# Patient Record
Sex: MALE | Race: WHITE | NOT HISPANIC OR LATINO | Employment: OTHER | URBAN - METROPOLITAN AREA
[De-identification: names, ages, dates, MRNs, and addresses within clinical notes are randomized per-mention and may not be internally consistent; named-entity substitution may affect disease eponyms.]

---

## 2017-02-21 DIAGNOSIS — I15.2 HYPERTENSION ASSOCIATED WITH DIABETES: ICD-10-CM

## 2017-02-21 DIAGNOSIS — E11.59 HYPERTENSION ASSOCIATED WITH DIABETES: ICD-10-CM

## 2017-02-21 DIAGNOSIS — I10 ESSENTIAL HYPERTENSION: ICD-10-CM

## 2017-02-21 RX ORDER — METOPROLOL SUCCINATE 50 MG/1
TABLET, EXTENDED RELEASE ORAL
Qty: 90 TABLET | Refills: 0 | Status: SHIPPED | OUTPATIENT
Start: 2017-02-21 | End: 2017-07-27 | Stop reason: SDUPTHER

## 2017-02-21 RX ORDER — CHLORTHALIDONE 25 MG/1
TABLET ORAL
Qty: 90 TABLET | Refills: 0 | Status: SHIPPED | OUTPATIENT
Start: 2017-02-21 | End: 2017-07-27 | Stop reason: SDUPTHER

## 2017-02-27 ENCOUNTER — TELEPHONE (OUTPATIENT)
Dept: FAMILY MEDICINE | Facility: CLINIC | Age: 67
End: 2017-02-27

## 2017-02-27 NOTE — TELEPHONE ENCOUNTER
----- Message from [place] sent at 2/27/2017 12:39 PM CST -----  Schedule would not allow me to reschedule for the requested dated.  Reschedule labs to July 20.  Please call patient at [phone].

## 2017-07-21 ENCOUNTER — CLINICAL SUPPORT (OUTPATIENT)
Dept: FAMILY MEDICINE | Facility: CLINIC | Age: 67
End: 2017-07-21
Payer: MEDICARE

## 2017-07-21 NOTE — PROGRESS NOTES
ID patient by name and date of birth.  Specimen collected with 21g butterfly using aseptic technique. Patient tolerated well.

## 2017-07-24 DIAGNOSIS — E11.9 TYPE 2 DIABETES, HBA1C GOAL < 7%: Primary | ICD-10-CM

## 2017-07-26 ENCOUNTER — DOCUMENTATION ONLY (OUTPATIENT)
Dept: FAMILY MEDICINE | Facility: CLINIC | Age: 67
End: 2017-07-26

## 2017-07-26 NOTE — PROGRESS NOTES
Health Maintenance Due   Topic Date Due    Eye Exam  09/26/1961    TETANUS VACCINE  09/26/1969    Colonoscopy  09/26/2001    Zoster Vaccine  09/26/2011    Pneumococcal (65+) (1 of 2 - PCV13) 09/26/2016    Foot Exam  09/15/2017

## 2017-07-27 ENCOUNTER — OFFICE VISIT (OUTPATIENT)
Dept: FAMILY MEDICINE | Facility: CLINIC | Age: 67
End: 2017-07-27
Payer: MEDICARE

## 2017-07-27 VITALS
OXYGEN SATURATION: 97 % | HEART RATE: 92 BPM | HEIGHT: 72 IN | RESPIRATION RATE: 16 BRPM | TEMPERATURE: 98 F | BODY MASS INDEX: 42.66 KG/M2 | WEIGHT: 315 LBS | SYSTOLIC BLOOD PRESSURE: 134 MMHG | DIASTOLIC BLOOD PRESSURE: 87 MMHG

## 2017-07-27 DIAGNOSIS — I15.2 HYPERTENSION ASSOCIATED WITH DIABETES: ICD-10-CM

## 2017-07-27 DIAGNOSIS — E78.5 HYPERLIPIDEMIA, UNSPECIFIED HYPERLIPIDEMIA TYPE: ICD-10-CM

## 2017-07-27 DIAGNOSIS — I10 ESSENTIAL HYPERTENSION: ICD-10-CM

## 2017-07-27 DIAGNOSIS — E66.01 MORBID OBESITY DUE TO EXCESS CALORIES: ICD-10-CM

## 2017-07-27 DIAGNOSIS — E11.40 TYPE 2 DIABETES, CONTROLLED, WITH NEUROPATHY: Primary | ICD-10-CM

## 2017-07-27 DIAGNOSIS — E11.59 HYPERTENSION ASSOCIATED WITH DIABETES: ICD-10-CM

## 2017-07-27 PROCEDURE — 3045F PR MOST RECENT HEMOGLOBIN A1C LEVEL 7.0-9.0%: CPT | Mod: S$GLB,,, | Performed by: INTERNAL MEDICINE

## 2017-07-27 PROCEDURE — 99214 OFFICE O/P EST MOD 30 MIN: CPT | Mod: S$GLB,,, | Performed by: INTERNAL MEDICINE

## 2017-07-27 PROCEDURE — 4010F ACE/ARB THERAPY RXD/TAKEN: CPT | Mod: S$GLB,,, | Performed by: INTERNAL MEDICINE

## 2017-07-27 PROCEDURE — 99499 UNLISTED E&M SERVICE: CPT | Mod: S$GLB,,, | Performed by: INTERNAL MEDICINE

## 2017-07-27 RX ORDER — METOPROLOL SUCCINATE 50 MG/1
TABLET, EXTENDED RELEASE ORAL
Qty: 90 TABLET | Refills: 1 | Status: SHIPPED | OUTPATIENT
Start: 2017-07-27 | End: 2018-07-27 | Stop reason: SDUPTHER

## 2017-07-27 RX ORDER — LISINOPRIL 20 MG/1
20 TABLET ORAL DAILY
Qty: 90 TABLET | Refills: 3 | Status: SHIPPED | OUTPATIENT
Start: 2017-07-27 | End: 2018-07-27 | Stop reason: SDUPTHER

## 2017-07-27 RX ORDER — ATORVASTATIN CALCIUM 10 MG/1
10 TABLET, FILM COATED ORAL DAILY
Qty: 90 TABLET | Refills: 3 | Status: SHIPPED | OUTPATIENT
Start: 2017-07-27 | End: 2018-07-27 | Stop reason: SDUPTHER

## 2017-07-27 RX ORDER — CHLORTHALIDONE 25 MG/1
TABLET ORAL
Qty: 90 TABLET | Refills: 1 | Status: SHIPPED | OUTPATIENT
Start: 2017-07-27 | End: 2018-07-27 | Stop reason: SDUPTHER

## 2017-07-27 NOTE — PATIENT INSTRUCTIONS
Lose 5 pounds.  Suggest [place] diet    Avoid white carbohydrates.   Eat  a palm sized protein with each meal.       Walk for 10 minutes after you eat.  This will keep your sugars from going high at that time.    Low-Salt Diet  This diet removes foods that are high in salt. It also limits the amount of salt you use when cooking. It is most often used for people with high blood pressure, edema (fluid retention), and kidney, liver, or heart disease.  Table salt contains the mineral sodium. Your body needs sodium to work normally. But too much sodium can make your health problems worse. Your healthcare provider is recommending a low-salt (also called low-sodium) diet for you. Your total daily allowance of salt is 1,500 to 2,300 milligrams (mg). It is less than 1 teaspoon of table salt. This means you can have only about 500 to 700 mg of sodium at each meal. People with certain health problems should limit salt intake to the lower end of the recommended range.    When you cook, dont add much salt. If you can cook without using salt, even better. Dont add salt to your food at the table.  When shopping, read food labels. Salt is often called sodium on the label. Choose foods that are salt-free, low salt, or very low salt. Note that foods with reduced salt may not lower your salt intake enough.    Beans, potatoes, and pasta  Ok: Dry beans, split peas, lentils, potatoes, rice, macaroni, pasta, spaghetti without added salt  Avoid: Potato chips, tortilla chips, and similar products  Breads and cereals  Ok: Low-sodium breads, rolls, cereals, and cakes; low-salt crackers, matzo crackers  Avoid: Salted crackers, pretzels, popcorn, Arabic toast, pancakes, muffins  Dairy  Ok: Milk, chocolate milk, hot chocolate mix, low-salt cheeses, and yogurt  Avoid: Processed cheese and cheese spreads; Roquefort, Camembert, and cottage cheese; buttermilk, instant breakfast drink  Desserts  Ok: Ice cream, frozen yogurt, juice bars,  gelatin, cookies and pies, sugar, honey, jelly, hard candy  Avoid: Most pies, cakes and cookies prepared or processed with salt; instant pudding  Drinks  Ok: Tea, coffee, fizzy (carbonated) drinks, juices  Avoid: Flavored coffees, electrolyte replacement drinks, sports drinks  Meats  Ok: All fresh meat, fish, poultry, low-salt tuna, eggs, egg substitute  Avoid: Smoked, pickled, brine-cured, or salted meats and fish. This includes adams, chipped beef, corned beef, hot dogs, deli meats, ham, kosher meats, salt pork, sausage, canned tuna, salted codfish, smoked salmon, herring, sardines, or anchovies.  Seasonings and spices  Ok: Most seasonings are okay. Good substitutes for salt include: fresh herb blends, hot sauce, lemon, garlic, cantrell, vinegar, dry mustard, parsley, cilantro, horseradish, tomato paste, regular margarine, mayonnaise, unsalted butter, cream cheese, vegetable oil, cream, low-salt salad dressing and gravy.  Avoid: Regular ketchup, relishes, pickles, soy sauce, teriyaki sauce, Worcestershire sauce, BBQ sauce, tartar sauce, meat tenderizer, chili sauce, regular gravy, regular salad dressing, salted butter  Soups  Ok: Low-salt soups and broths made with allowed foods  Avoid: Bouillon cubes, soups with smoked or salted meats, regular soup and broth  Vegetables  Ok: Most vegetables are okay; also low-salt tomato and vegetable juices  Avoid: Sauerkraut and other brine-soaked vegetables; pickles and other pickled vegetables; tomato juice, olives  © 4933-4989 [employer]. [address] [place] [address], [place] [address]. All rights reserved. This information is not intended as a substitute for professional medical care. Always follow your healthcare professional's instructions.

## 2017-07-27 NOTE — PROGRESS NOTES
Subjective:       Patient ID: Roosevelt Moser is a 66 y.o. male.    Chief Complaint: Diabetes (labs)    HPI       CHIEF COMPLAINT: Diabetes.  HPI: Patient gained 10 pounds while going on vacation.    ONSET/TIMING:     QUALITY/COURSE:   unchanged..  Controlled: yes.     INTENSITY/SEVERITY: . Measures blood sugar :  3 times per wk.        Lowest recent . Average .     CONTEXT/WHEN: last HgbA1c    Lab Results   Component Value Date    HGBA1C 7.4 (H) 07/21/2017      weights:    Wt Readings from Last 1 Encounters:   07/27/17 0805 (!) 148.9 kg (328 lb 4.2 oz)         SYMPTOMS/RELATED: . . Possible medication side effects include:     The following symptoms/statements  are present IF IN BOLD, negative otherwise.         MODIFIERS/TREATMENTS: Not_taking_medications:  .  Non-compliance_with_Diabetic_diet  .  No_eye_exam_within_last_year.  Not_practicing_good_foot_care.    REVIEW OF SYMPTOMS: . Weight_gain. Weight_loss. Neuropathy. Recurrent_infections. Skin_ulcers          CHIEF COMPLAINT: Hypertension  HPI:     ONSET:      QUALITY/COURSE:   Controlled:  yes     INTENSITY/SEVERITY:  Average blood pressure is .     MODIFIERS/TREATMENTS:  Taking medications: yes. .High sodium intake: no. alcohol: no      The following symptoms are positive only if BOLDED, otherwise are negative.      SYMPTOMS/RELATED: Possible medication side effects include:   Depression..  . Cough. . Constipation.    REVIEW OF SYMPTOMS: . Weight_loss . Weight_gain . Leg_cramps .Potency_problems .    TARGET ORGAN DAMAGE:: angina/ prior myocardial infarction, chronic kidney disease, heart failure, left ventricular hypertrophy, peripheral artery disease, prior coronary revascularization, retinopathy, stroke. transient ischemic attack.        CHIEF COMPLAINT: Hyperlipidemia. cholesterol screening: no.   HPI:     ONSET:    MODIFIERS/TREATMENTS: . Taking medications: yes. . Non-compliance with following diet: no. .     SYMPTOMS/RELATED:Possible medication  side effects include:   Myalgia: no.  .     REVIEW OF SYMPTOMS: past weights:   Wt Readings from Last 1 Encounters:   07/27/17 0805 (!) 148.9 kg (328 lb 4.2 oz)                                                     Last lipids: total   Lab Results   Component Value Date    CHOL 179 07/21/2017    CHOL 167 09/08/2016                                                                     HDL   Lab Results   Component Value Date    HDL 41 07/21/2017    HDL 40 09/08/2016                                                                     LDL   Lab Results   Component Value Date    LDLCALC 77.8 07/21/2017    LDLCALC 80.2 09/08/2016                                                                     TRIG   Lab Results   Component Value Date    TRIG 301 (H) 07/21/2017    TRIG 234 (H) 09/08/2016                                                                           Review of Systems    Objective:      Vitals:    07/27/17 0805   BP: 134/87   Pulse: 92   Resp: 16   Temp: 98.2 °F (36.8 °C)   TempSrc: Oral   SpO2: 97%   Weight: (!) 148.9 kg (328 lb 4.2 oz)   Height: 6' (1.829 m)   PainSc: 0-No pain     Physical Exam   Constitutional: He appears well-developed and well-nourished.   Eyes: Pupils are equal, round, and reactive to light.   Cardiovascular: Normal rate, regular rhythm and normal heart sounds.    Pulmonary/Chest: Effort normal and breath sounds normal.   Abdominal: Soft. There is no tenderness.   Musculoskeletal:   Callus on r great toe.    Neurological: He is alert.   Psychiatric: He has a normal mood and affect. His behavior is normal. Thought content normal.   Nursing note and vitals reviewed.        Assessment:       1. Type 2 diabetes, controlled, with neuropathy    2. Hyperlipidemia, unspecified hyperlipidemia type    3. Essential hypertension    4. Hypertension associated with diabetes    5. Morbid obesity due to excess calories          Plan:     Type 2 diabetes, controlled, with neuropathy  -     Ambulatory  referral to Ophthalmology  -     Hemoglobin A1c; Future; Expected date: 07/27/2017  -     Microalbumin/creatinine urine ratio; Future    Hyperlipidemia, unspecified hyperlipidemia type  -     atorvastatin (LIPITOR) 10 MG tablet; Take 1 tablet (10 mg total) by mouth once daily.  Dispense: 90 tablet; Refill: 3  -     Lipid panel; Future; Expected date: 07/27/2017    Essential hypertension  -     chlorthalidone (HYGROTEN) 25 MG Tab; TAKE 1 TABLET ONE TIME DAILY  Dispense: 90 tablet; Refill: 1    Hypertension associated with diabetes  -     lisinopril (PRINIVIL,ZESTRIL) 20 MG tablet; Take 1 tablet (20 mg total) by mouth once daily.  Dispense: 90 tablet; Refill: 3  -     metoprolol succinate (TOPROL-XL) 50 MG 24 hr tablet; TAKE 1 TABLET ONE TIME DAILY  Dispense: 90 tablet; Refill: 1  -     Comprehensive metabolic panel; Future; Expected date: 07/27/2017    Morbid obesity due to excess calories      Return in about 3 months (around 10/27/2017).

## 2017-08-31 DIAGNOSIS — Z12.11 COLON CANCER SCREENING: ICD-10-CM

## 2017-12-28 DIAGNOSIS — Z13.5 DIABETIC RETINOPATHY SCREENING: ICD-10-CM

## 2018-06-11 ENCOUNTER — PES CALL (OUTPATIENT)
Dept: ADMINISTRATIVE | Facility: CLINIC | Age: 68
End: 2018-06-11

## 2018-07-19 ENCOUNTER — CLINICAL SUPPORT (OUTPATIENT)
Dept: FAMILY MEDICINE | Facility: CLINIC | Age: 68
End: 2018-07-19
Payer: MEDICARE

## 2018-07-19 ENCOUNTER — APPOINTMENT (OUTPATIENT)
Dept: LAB | Facility: HOSPITAL | Age: 68
End: 2018-07-19
Attending: INTERNAL MEDICINE
Payer: MEDICARE

## 2018-07-19 DIAGNOSIS — E78.5 HYPERLIPIDEMIA, UNSPECIFIED HYPERLIPIDEMIA TYPE: ICD-10-CM

## 2018-07-19 DIAGNOSIS — E11.59 HYPERTENSION ASSOCIATED WITH DIABETES: ICD-10-CM

## 2018-07-19 DIAGNOSIS — I15.2 HYPERTENSION ASSOCIATED WITH DIABETES: ICD-10-CM

## 2018-07-19 DIAGNOSIS — E11.40 TYPE 2 DIABETES, CONTROLLED, WITH NEUROPATHY: ICD-10-CM

## 2018-07-19 LAB
ALBUMIN SERPL BCP-MCNC: 3.6 G/DL
ALP SERPL-CCNC: 70 U/L
ALT SERPL W/O P-5'-P-CCNC: 24 U/L
ANION GAP SERPL CALC-SCNC: 14 MMOL/L
AST SERPL-CCNC: 16 U/L
BILIRUB SERPL-MCNC: 0.6 MG/DL
BUN SERPL-MCNC: 22 MG/DL
CALCIUM SERPL-MCNC: 9 MG/DL
CHLORIDE SERPL-SCNC: 105 MMOL/L
CHOLEST SERPL-MCNC: 165 MG/DL
CHOLEST/HDLC SERPL: 3.8 {RATIO}
CO2 SERPL-SCNC: 19 MMOL/L
CREAT SERPL-MCNC: 1 MG/DL
EST. GFR  (AFRICAN AMERICAN): >60 ML/MIN/1.73 M^2
EST. GFR  (NON AFRICAN AMERICAN): >60 ML/MIN/1.73 M^2
ESTIMATED AVG GLUCOSE: 206 MG/DL
GLUCOSE SERPL-MCNC: 223 MG/DL
HBA1C MFR BLD HPLC: 8.8 %
HDLC SERPL-MCNC: 44 MG/DL
HDLC SERPL: 26.7 %
LDLC SERPL CALC-MCNC: 67.8 MG/DL
NONHDLC SERPL-MCNC: 121 MG/DL
POTASSIUM SERPL-SCNC: 4.2 MMOL/L
PROT SERPL-MCNC: 7.3 G/DL
SODIUM SERPL-SCNC: 138 MMOL/L
TRIGL SERPL-MCNC: 266 MG/DL

## 2018-07-19 PROCEDURE — 80061 LIPID PANEL: CPT

## 2018-07-19 PROCEDURE — 83036 HEMOGLOBIN GLYCOSYLATED A1C: CPT

## 2018-07-19 PROCEDURE — 80053 COMPREHEN METABOLIC PANEL: CPT

## 2018-07-24 ENCOUNTER — TELEPHONE (OUTPATIENT)
Dept: FAMILY MEDICINE | Facility: CLINIC | Age: 68
End: 2018-07-24

## 2018-07-24 NOTE — TELEPHONE ENCOUNTER
----- Message from [place] Ely sent at 7/24/2018  2:44 PM CDT -----  Patients wife states that patient has an appointment on tomorrow but wanted to let you know the following things.  Patient is having knee pain, need refill on fluid medication and also need a handicap sticker.  Any questions please call Erica at [phone].

## 2018-07-25 DIAGNOSIS — E11.8 TYPE 2 DIABETES MELLITUS WITH COMPLICATION, WITHOUT LONG-TERM CURRENT USE OF INSULIN: Primary | ICD-10-CM

## 2018-07-27 ENCOUNTER — DOCUMENTATION ONLY (OUTPATIENT)
Dept: FAMILY MEDICINE | Facility: CLINIC | Age: 68
End: 2018-07-27

## 2018-07-27 ENCOUNTER — OFFICE VISIT (OUTPATIENT)
Dept: FAMILY MEDICINE | Facility: CLINIC | Age: 68
End: 2018-07-27
Payer: MEDICARE

## 2018-07-27 VITALS
RESPIRATION RATE: 16 BRPM | TEMPERATURE: 98 F | HEIGHT: 72 IN | WEIGHT: 315 LBS | HEART RATE: 83 BPM | DIASTOLIC BLOOD PRESSURE: 86 MMHG | BODY MASS INDEX: 42.66 KG/M2 | SYSTOLIC BLOOD PRESSURE: 126 MMHG | OXYGEN SATURATION: 96 %

## 2018-07-27 DIAGNOSIS — E11.59 HYPERTENSION ASSOCIATED WITH DIABETES: ICD-10-CM

## 2018-07-27 DIAGNOSIS — E66.01 MORBID OBESITY: ICD-10-CM

## 2018-07-27 DIAGNOSIS — E78.5 HYPERLIPIDEMIA, UNSPECIFIED HYPERLIPIDEMIA TYPE: ICD-10-CM

## 2018-07-27 DIAGNOSIS — I15.2 HYPERTENSION ASSOCIATED WITH DIABETES: ICD-10-CM

## 2018-07-27 DIAGNOSIS — E11.40 TYPE 2 DIABETES, CONTROLLED, WITH NEUROPATHY: Primary | ICD-10-CM

## 2018-07-27 PROCEDURE — 3074F SYST BP LT 130 MM HG: CPT | Mod: CPTII,S$GLB,, | Performed by: INTERNAL MEDICINE

## 2018-07-27 PROCEDURE — 3045F PR MOST RECENT HEMOGLOBIN A1C LEVEL 7.0-9.0%: CPT | Mod: CPTII,S$GLB,, | Performed by: INTERNAL MEDICINE

## 2018-07-27 PROCEDURE — 99214 OFFICE O/P EST MOD 30 MIN: CPT | Mod: S$GLB,,, | Performed by: INTERNAL MEDICINE

## 2018-07-27 PROCEDURE — 3079F DIAST BP 80-89 MM HG: CPT | Mod: CPTII,S$GLB,, | Performed by: INTERNAL MEDICINE

## 2018-07-27 PROCEDURE — 99499 UNLISTED E&M SERVICE: CPT | Mod: S$GLB,,, | Performed by: INTERNAL MEDICINE

## 2018-07-27 RX ORDER — METFORMIN HYDROCHLORIDE 500 MG/1
500 TABLET ORAL 2 TIMES DAILY WITH MEALS
Qty: 180 TABLET | Refills: 3 | Status: CANCELLED | OUTPATIENT
Start: 2018-07-27 | End: 2019-07-27

## 2018-07-27 RX ORDER — CHLORTHALIDONE 25 MG/1
TABLET ORAL
Qty: 90 TABLET | Refills: 1 | Status: SHIPPED | OUTPATIENT
Start: 2018-07-27 | End: 2019-05-02 | Stop reason: SDUPTHER

## 2018-07-27 RX ORDER — LISINOPRIL 20 MG/1
20 TABLET ORAL DAILY
Qty: 90 TABLET | Refills: 3 | Status: SHIPPED | OUTPATIENT
Start: 2018-07-27 | End: 2019-06-27

## 2018-07-27 RX ORDER — ATORVASTATIN CALCIUM 10 MG/1
10 TABLET, FILM COATED ORAL DAILY
Qty: 90 TABLET | Refills: 3 | Status: SHIPPED | OUTPATIENT
Start: 2018-07-27 | End: 2019-06-27 | Stop reason: SDUPTHER

## 2018-07-27 RX ORDER — METOPROLOL SUCCINATE 50 MG/1
TABLET, EXTENDED RELEASE ORAL
Qty: 90 TABLET | Refills: 1 | Status: SHIPPED | OUTPATIENT
Start: 2018-07-27 | End: 2019-06-27 | Stop reason: SDUPTHER

## 2018-07-27 RX ORDER — METFORMIN HYDROCHLORIDE 500 MG/1
500 TABLET, EXTENDED RELEASE ORAL
Qty: 90 TABLET | Refills: 3 | Status: SHIPPED | OUTPATIENT
Start: 2018-07-27 | End: 2019-06-27 | Stop reason: SDUPTHER

## 2018-07-27 NOTE — PATIENT INSTRUCTIONS
Lose 5 pounds.  Suggest [place] diet    Avoid white carbohydrates.   Eat  a palm sized protein with each meal.       Walk for 10 minutes after you eat.  This will keep your sugars from going high at that time.

## 2018-07-27 NOTE — PROGRESS NOTES
Subjective:       Patient ID: Roosevelt Moser is a 67 y.o. male.    Chief Complaint: Hyperlipidemia (labs,refills)    HPI         CHIEF COMPLAINT: Diabetes.  HPI:  Had nausea in the past on metformin    ONSET/TIMING:     QUALITY/COURSE:   unchanged..      INTENSITY/SEVERITY: . Measures blood sugar :  3 times per day.        Lowest recent . Average .     CONTEXT/WHEN: last HgbA1c    Lab Results   Component Value Date    HGBA1C 8.8 (H) 07/19/2018      weights:    Wt Readings from Last 1 Encounters:   07/27/18 0806 (!) 145.7 kg (321 lb 3.4 oz)         SYMPTOMS/RELATED: . . Possible medication side effects include:     The following symptoms/statements  are present IF IN BOLD, negative otherwise.         MODIFIERS/TREATMENTS: Not_taking_medications:  .  Non-compliance_with_Diabetic_diet  .  No_eye_exam_within_last_year.  Not_practicing_good_foot_care.    REVIEW OF SYMPTOMS: . Weight_gain. Weight_loss. Neuropathy but hx of  barre. Recurrent_infections. Skin_ulcers        CHIEF COMPLAINT: Hypertension  HPI:     ONSET:      QUALITY/COURSE:   Unchanged.     INTENSITY/SEVERITY:  Average blood pressure is 120/80 .     MODIFIERS/TREATMENTS:  Taking medications: yes. .High sodium intake: no. alcohol: no      The following symptoms are positive only if BOLDED, otherwise are negative.      SYMPTOMS/RELATED: Possible medication side effects include:   Depression..  . Cough. . Constipation.    REVIEW OF SYMPTOMS: . Weight_loss . Weight_gain . Leg_cramps .Potency_problems .    TARGET ORGAN DAMAGE:: angina/ prior myocardial infarction, chronic kidney disease, heart failure, left ventricular hypertrophy, peripheral artery disease, prior coronary revascularization, retinopathy, stroke. transient ischemic attack.         CHIEF COMPLAINT: Hyperlipidemia. cholesterol screening: no.   HPI:     ONSET:    MODIFIERS/TREATMENTS: . Taking medications: yes. . Non-compliance with following diet: no. .      SYMPTOMS/RELATED:Possible medication side effects include:   Myalgia: no.  .     REVIEW OF SYMPTOMS: past weights:   Wt Readings from Last 1 Encounters:   07/27/18 0806 (!) 145.7 kg (321 lb 3.4 oz)                                                     Last lipids: total   Lab Results   Component Value Date    CHOL 165 07/19/2018    CHOL 179 07/21/2017    CHOL 167 09/08/2016                                                                     HDL   Lab Results   Component Value Date    HDL 44 07/19/2018    HDL 41 07/21/2017    HDL 40 09/08/2016                                                                     LDL   Lab Results   Component Value Date    LDLCALC 67.8 07/19/2018    LDLCALC 77.8 07/21/2017    LDLCALC 80.2 09/08/2016                                                                     TRIG   Lab Results   Component Value Date    TRIG 266 (H) 07/19/2018    TRIG 301 (H) 07/21/2017    TRIG 234 (H) 09/08/2016                                                                         Review of Systems      Objective:      Vitals:    07/27/18 0806   BP: 126/86   Pulse: 83   Resp: 16   Temp: 98.2 °F (36.8 °C)   TempSrc: Oral   SpO2: 96%   Weight: (!) 145.7 kg (321 lb 3.4 oz)   Height: 6' (1.829 m)   PainSc: 0-No pain     Physical Exam   Constitutional: He appears well-developed and well-nourished.   Cardiovascular: Normal rate, regular rhythm and normal heart sounds.    Pulses:       Dorsalis pedis pulses are 2+ on the right side, and 2+ on the left side.   Pulmonary/Chest: Effort normal and breath sounds normal. No respiratory distress. He has no wheezes.   Abdominal: Soft. Bowel sounds are normal. There is no tenderness.   Musculoskeletal:        Right foot: There is normal range of motion and no deformity.        Left foot: There is normal range of motion and no deformity.   Feet:   Right Foot:   Protective Sensation: 5 sites tested. 4 sites sensed.   Skin Integrity: Negative for ulcer, blister, skin breakdown,  erythema, warmth, callus or dry skin.   Left Foot:   Protective Sensation: 5 sites tested. 4 sites sensed.   Skin Integrity: Negative for ulcer, blister, skin breakdown, erythema, warmth, callus or dry skin.         Assessment:       1. Type 2 diabetes, controlled, with neuropathy    2. Hypertension associated with diabetes    3. Hyperlipidemia, unspecified hyperlipidemia type    4. Morbid obesity          Plan:   Discussed bariatric surgery with the patient that he is not ready for it  Will try low-dose extended-release metformin to see if he can take it.    Type 2 diabetes, controlled, with neuropathy  -     SITagliptin (JANUVIA) 100 MG Tab; Take 1 tablet (100 mg total) by mouth once daily.  Dispense: 90 tablet; Refill: 3  -     CBC auto differential; Future; Expected date: 07/27/2018  -     Hemoglobin A1c; Future; Expected date: 07/27/2018  -     Microalbumin/creatinine urine ratio; Future; Expected date: 07/27/2018  -     metFORMIN (GLUCOPHAGE-XR) 500 MG 24 hr tablet; Take 1 tablet (500 mg total) by mouth daily with breakfast.  Dispense: 90 tablet; Refill: 3  -     Ambulatory consult to Ophthalmology    Hypertension associated with diabetes  -     metoprolol succinate (TOPROL-XL) 50 MG 24 hr tablet; TAKE 1 TABLET ONE TIME DAILY  Dispense: 90 tablet; Refill: 1  -     lisinopril (PRINIVIL,ZESTRIL) 20 MG tablet; Take 1 tablet (20 mg total) by mouth once daily.  Dispense: 90 tablet; Refill: 3  -     chlorthalidone (HYGROTEN) 25 MG Tab; TAKE 1 TABLET ONE TIME DAILY  Dispense: 90 tablet; Refill: 1  -     Comprehensive metabolic panel; Future; Expected date: 07/27/2018    Hyperlipidemia, unspecified hyperlipidemia type  -     atorvastatin (LIPITOR) 10 MG tablet; Take 1 tablet (10 mg total) by mouth once daily.  Dispense: 90 tablet; Refill: 3  -     Lipid panel; Future; Expected date: 07/27/2018    Morbid obesity    Other orders  -     Cancel: metFORMIN (GLUCOPHAGE) 500 MG tablet; Take 1 tablet (500 mg total) by mouth 2  (two) times daily with meals.  Dispense: 180 tablet; Refill: 3      Follow-up in about 3 months (around 10/27/2018).

## 2018-09-05 ENCOUNTER — PES CALL (OUTPATIENT)
Dept: ADMINISTRATIVE | Facility: CLINIC | Age: 68
End: 2018-09-05

## 2018-09-06 DIAGNOSIS — Z12.11 COLON CANCER SCREENING: ICD-10-CM

## 2019-05-02 DIAGNOSIS — E11.59 HYPERTENSION ASSOCIATED WITH DIABETES: ICD-10-CM

## 2019-05-02 DIAGNOSIS — I15.2 HYPERTENSION ASSOCIATED WITH DIABETES: ICD-10-CM

## 2019-05-02 NOTE — TELEPHONE ENCOUNTER
----- Message from Brooke Renteria sent at 5/2/2019  2:42 PM CDT -----  Contact: Patient spouse Shana Moser  Type:  RX Refill Request    Who Called:  Patient  Refill or New Rx:  Refill  RX Name and Strength:  chlorthalidone (HYGROTEN) 25 MG Tab   Preferred Pharmacy with phone number:    [facility] - [place], [place] - [address]  [address] [facility] [address]  Phone: [phone] Fax: [phone]    Local or Mail Order:  Mail  Ordering Provider:  Dr.Butt Sorto Call Back Number:  [phone]   Additional Information:  Please contact to advise

## 2019-05-03 RX ORDER — CHLORTHALIDONE 25 MG/1
TABLET ORAL
Qty: 90 TABLET | Refills: 1 | Status: SHIPPED | OUTPATIENT
Start: 2019-05-03 | End: 2019-06-27 | Stop reason: SDUPTHER

## 2019-05-21 ENCOUNTER — PES CALL (OUTPATIENT)
Dept: ADMINISTRATIVE | Facility: CLINIC | Age: 69
End: 2019-05-21

## 2019-06-10 DIAGNOSIS — E11.9 TYPE 2 DIABETES MELLITUS WITHOUT COMPLICATION, UNSPECIFIED WHETHER LONG TERM INSULIN USE: ICD-10-CM

## 2019-06-11 ENCOUNTER — PATIENT OUTREACH (OUTPATIENT)
Dept: ADMINISTRATIVE | Facility: HOSPITAL | Age: 69
End: 2019-06-11

## 2019-06-11 NOTE — LETTER
June 11, 2019    Roosevelt Moser  [address] [facility] [address]             [facility]  [address]  [facility] [address]  Phone: [phone] Roosevelt Moser  [address] [facility] [address]            Dear Javierdemetrio Moser     My name is Chante, and I am a nurse that manages your preventive care.  I see that you are due to have a DIABETIC EYE EXAM.  If you have already seen an eye care specialist for you diabetic eye exam would you please have them fax your eye exam results to [phone], so we can update your chart for Dr. Roland Trotter MD.      Also,  If you have not had an eye exam in the past year, we now have a  Retinal Camera at the [facility].  Depending upon your insurance coverage, you may not have an additional copay for this visit if done on the same day you see a member of the Primary Care Team.   Please confirm with your insurance company.        Sincerely,    [facility]  MD Chante Hilton LPN Clinical Care Coordinator  [facility]  [address] [address]  Phone [phone]  Fax [phone]

## 2019-06-11 NOTE — PROGRESS NOTES
Health Maintenance Due   Topic Date Due    Eye Exam  09[birth year]26/1961    TETANUS VACCINE  09/26/1969    Colonoscopy  09/26/2001    Pneumococcal Vaccine (65+ Low/Medium Risk) (1 of 2 - PCV13) 09/26/2016    Hemoglobin A1c  10/27/2018    Foot Exam  07/27/2019

## 2019-06-13 ENCOUNTER — PATIENT OUTREACH (OUTPATIENT)
Dept: ADMINISTRATIVE | Facility: HOSPITAL | Age: 69
End: 2019-06-13

## 2019-06-13 NOTE — LETTER
"June 13, 2019    Roosevelt Moser  [address] [facility] [place] [address]             [facility]  [address]  [place] [address]  Phone: [phone] [facility] to your overall health.  To help you get the most out of each of your visits, we will review your information to make sure you are up to date on all of your recommended tests and/or procedures.      Dr. Roland Trotter MD has found that your chart shows you may be due for a:    DIABETIC EYE EXAM   DIABETIC FOOT EXAM  HEMOGLOBIN A1C (LAB)  COLORECTAL CANCER SCREENING    Our records show you are due for colon cancer screening.  If you have already had your screening, or you have made an appointment for your screening, congratulations!  You're on the road to good health. If you haven't signed up for a colorectal screening please accept this invitation to be screened.      According to the American Cancer Society, colon cancer is the third most common cancer for people in the [place].  A simple screening test "Fit Kit" - done in your own home - can help find colon cancer at an early stage when it can be treated, even before any signs or symptoms develop. THIS IS A YEARLY TEST.    Testing for blood in your stool (feces or bowel movement) is the first step. If you have an upcoming visit with your Primary Care Physician you can  a Fit Kit during your visit or you can  a Fit Kit at your Primary Care Clinic prior to your visit.    The Fit Kit includes:    · Instructions on how to perform the test  · (1) Sheet of tissue paper  · (1) Small Absorption pad  · (1) Bottle to hold the sample and a small probe to help you take the sample  · (1) Small plastic bio-hazard bag  · (1) Postage-paid return envelope    Please do your test (the instructions will tell you how) and then return your sample in the postage-paid return envelope within 24 hours of collection.     If your test results are negative, you won't need " "testing again for another year.  If results show you need more testing, we will call you with next steps.    Regular colorectal cancer screening is one of the most powerful weapons for preventing colon cancer.  The website https://www.cancer.org/cancer/colon-rectal-cancer.html can answer many of your questions about this cancer and its prevention.  Just search for "colorectal cancer".           If you have had any of the above done at another facility, please bring the records or information with you so that your record at [facility] will be complete.  If you would like to schedule any of these, please contact the clinic at [phone].    If you are currently taking medication, please bring it with you to your appointment for review.    Also, if you have any type of Advanced Directives, please bring them with you to your office visit so we may scan them into your chart.    Thank You,    [facility],  MD Chante Singh LPN Clinical Care Coordinator  [facility] [address] [place] [address]  Phone [phone]  Fax [phone]       "

## 2019-06-20 ENCOUNTER — APPOINTMENT (OUTPATIENT)
Dept: LAB | Facility: HOSPITAL | Age: 69
End: 2019-06-20
Attending: INTERNAL MEDICINE
Payer: MEDICARE

## 2019-06-20 ENCOUNTER — CLINICAL SUPPORT (OUTPATIENT)
Dept: FAMILY MEDICINE | Facility: CLINIC | Age: 69
End: 2019-06-20
Payer: MEDICARE

## 2019-06-20 DIAGNOSIS — E11.59 HYPERTENSION ASSOCIATED WITH DIABETES: ICD-10-CM

## 2019-06-20 DIAGNOSIS — E78.5 HYPERLIPIDEMIA, UNSPECIFIED HYPERLIPIDEMIA TYPE: ICD-10-CM

## 2019-06-20 DIAGNOSIS — E11.40 TYPE 2 DIABETES, CONTROLLED, WITH NEUROPATHY: ICD-10-CM

## 2019-06-20 DIAGNOSIS — I15.2 HYPERTENSION ASSOCIATED WITH DIABETES: ICD-10-CM

## 2019-06-20 LAB
ALBUMIN SERPL BCP-MCNC: 3.6 G/DL (ref 3.5–5.2)
ALP SERPL-CCNC: 69 U/L (ref 55–135)
ALT SERPL W/O P-5'-P-CCNC: 22 U/L (ref 10–44)
ANION GAP SERPL CALC-SCNC: 14 MMOL/L (ref 8–16)
AST SERPL-CCNC: 14 U/L (ref 10–40)
BASOPHILS # BLD AUTO: 0 K/UL (ref 0–0.2)
BASOPHILS NFR BLD: 0.4 % (ref 0–1.9)
BILIRUB SERPL-MCNC: 0.5 MG/DL (ref 0.1–1)
BUN SERPL-MCNC: 19 MG/DL (ref 8–23)
CALCIUM SERPL-MCNC: 10.3 MG/DL (ref 8.7–10.5)
CHLORIDE SERPL-SCNC: 104 MMOL/L (ref 95–110)
CHOLEST SERPL-MCNC: 160 MG/DL (ref 120–199)
CHOLEST/HDLC SERPL: 3.6 {RATIO} (ref 2–5)
CO2 SERPL-SCNC: 23 MMOL/L (ref 23–29)
CREAT SERPL-MCNC: 1.3 MG/DL (ref 0.5–1.4)
DIFFERENTIAL METHOD: ABNORMAL
EOSINOPHIL # BLD AUTO: 0.2 K/UL (ref 0–0.5)
EOSINOPHIL NFR BLD: 1.6 % (ref 0–8)
ERYTHROCYTE [DISTWIDTH] IN BLOOD BY AUTOMATED COUNT: 15.1 % (ref 11.5–14.5)
EST. GFR  (AFRICAN AMERICAN): >60 ML/MIN/1.73 M^2
EST. GFR  (NON AFRICAN AMERICAN): 56 ML/MIN/1.73 M^2
GLUCOSE SERPL-MCNC: 203 MG/DL (ref 70–110)
HCT VFR BLD AUTO: 44 % (ref 40–54)
HDLC SERPL-MCNC: 45 MG/DL (ref 40–75)
HDLC SERPL: 28.1 % (ref 20–50)
HGB BLD-MCNC: 14.5 G/DL (ref 14–18)
LDLC SERPL CALC-MCNC: 65.2 MG/DL (ref 63–159)
LYMPHOCYTES # BLD AUTO: 3.6 K/UL (ref 1–4.8)
LYMPHOCYTES NFR BLD: 31 % (ref 18–48)
MCH RBC QN AUTO: 29 PG (ref 27–31)
MCHC RBC AUTO-ENTMCNC: 33 G/DL (ref 32–36)
MCV RBC AUTO: 88 FL (ref 82–98)
MONOCYTES # BLD AUTO: 1.2 K/UL (ref 0.3–1)
MONOCYTES NFR BLD: 10.7 % (ref 4–15)
NEUTROPHILS # BLD AUTO: 6.5 K/UL (ref 1.8–7.7)
NEUTROPHILS NFR BLD: 56.3 % (ref 38–73)
NONHDLC SERPL-MCNC: 115 MG/DL
PLATELET # BLD AUTO: 278 K/UL (ref 150–350)
PMV BLD AUTO: 9.5 FL (ref 9.2–12.9)
POTASSIUM SERPL-SCNC: 4.3 MMOL/L (ref 3.5–5.1)
PROT SERPL-MCNC: 7.4 G/DL (ref 6–8.4)
RBC # BLD AUTO: 5.02 M/UL (ref 4.6–6.2)
SODIUM SERPL-SCNC: 141 MMOL/L (ref 136–145)
TRIGL SERPL-MCNC: 249 MG/DL (ref 30–150)
WBC # BLD AUTO: 11.6 K/UL (ref 3.9–12.7)

## 2019-06-20 PROCEDURE — 80061 LIPID PANEL: CPT | Mod: HCNC

## 2019-06-20 PROCEDURE — 85025 COMPLETE CBC W/AUTO DIFF WBC: CPT | Mod: HCNC

## 2019-06-20 PROCEDURE — 83036 HEMOGLOBIN GLYCOSYLATED A1C: CPT | Mod: HCNC

## 2019-06-20 PROCEDURE — 80053 COMPREHEN METABOLIC PANEL: CPT | Mod: HCNC

## 2019-06-20 NOTE — PROGRESS NOTES
Patient was unable to give specimen.  labeled specimen cup sent home with patient to bring back when collected.

## 2019-06-20 NOTE — PROGRESS NOTES
ID patient by name and date of birth.  Specimen collected with 21g butterfly to left AC using aseptic technique. Patient tolerated well.

## 2019-06-21 LAB
ESTIMATED AVG GLUCOSE: 209 MG/DL (ref 68–131)
HBA1C MFR BLD HPLC: 8.9 % (ref 4–5.6)

## 2019-06-25 ENCOUNTER — TELEPHONE (OUTPATIENT)
Dept: FAMILY MEDICINE | Facility: CLINIC | Age: 69
End: 2019-06-25

## 2019-06-27 ENCOUNTER — OFFICE VISIT (OUTPATIENT)
Dept: FAMILY MEDICINE | Facility: CLINIC | Age: 69
End: 2019-06-27
Payer: MEDICARE

## 2019-06-27 VITALS
DIASTOLIC BLOOD PRESSURE: 82 MMHG | BODY MASS INDEX: 42.66 KG/M2 | TEMPERATURE: 98 F | WEIGHT: 315 LBS | RESPIRATION RATE: 16 BRPM | SYSTOLIC BLOOD PRESSURE: 132 MMHG | HEIGHT: 72 IN | OXYGEN SATURATION: 96 % | HEART RATE: 99 BPM

## 2019-06-27 DIAGNOSIS — E11.40 TYPE 2 DIABETES, CONTROLLED, WITH NEUROPATHY: ICD-10-CM

## 2019-06-27 DIAGNOSIS — I15.2 HYPERTENSION ASSOCIATED WITH DIABETES: ICD-10-CM

## 2019-06-27 DIAGNOSIS — E11.59 HYPERTENSION ASSOCIATED WITH DIABETES: ICD-10-CM

## 2019-06-27 DIAGNOSIS — E78.5 HYPERLIPIDEMIA, UNSPECIFIED HYPERLIPIDEMIA TYPE: ICD-10-CM

## 2019-06-27 PROCEDURE — 1101F PT FALLS ASSESS-DOCD LE1/YR: CPT | Mod: CPTII,S$GLB,, | Performed by: INTERNAL MEDICINE

## 2019-06-27 PROCEDURE — 3075F PR MOST RECENT SYSTOLIC BLOOD PRESS GE 130-139MM HG: ICD-10-PCS | Mod: CPTII,S$GLB,, | Performed by: INTERNAL MEDICINE

## 2019-06-27 PROCEDURE — 3075F SYST BP GE 130 - 139MM HG: CPT | Mod: CPTII,S$GLB,, | Performed by: INTERNAL MEDICINE

## 2019-06-27 PROCEDURE — 99499 UNLISTED E&M SERVICE: CPT | Mod: S$GLB,,, | Performed by: INTERNAL MEDICINE

## 2019-06-27 PROCEDURE — 3045F PR MOST RECENT HEMOGLOBIN A1C LEVEL 7.0-9.0%: CPT | Mod: CPTII,S$GLB,, | Performed by: INTERNAL MEDICINE

## 2019-06-27 PROCEDURE — 1101F PR PT FALLS ASSESS DOC 0-1 FALLS W/OUT INJ PAST YR: ICD-10-PCS | Mod: CPTII,S$GLB,, | Performed by: INTERNAL MEDICINE

## 2019-06-27 PROCEDURE — 3045F PR MOST RECENT HEMOGLOBIN A1C LEVEL 7.0-9.0%: ICD-10-PCS | Mod: CPTII,S$GLB,, | Performed by: INTERNAL MEDICINE

## 2019-06-27 PROCEDURE — 3079F DIAST BP 80-89 MM HG: CPT | Mod: CPTII,S$GLB,, | Performed by: INTERNAL MEDICINE

## 2019-06-27 PROCEDURE — 3079F PR MOST RECENT DIASTOLIC BLOOD PRESSURE 80-89 MM HG: ICD-10-PCS | Mod: CPTII,S$GLB,, | Performed by: INTERNAL MEDICINE

## 2019-06-27 PROCEDURE — 99499 RISK ADDL DX/OHS AUDIT: ICD-10-PCS | Mod: S$GLB,,, | Performed by: INTERNAL MEDICINE

## 2019-06-27 PROCEDURE — 99214 PR OFFICE/OUTPT VISIT, EST, LEVL IV, 30-39 MIN: ICD-10-PCS | Mod: S$GLB,,, | Performed by: INTERNAL MEDICINE

## 2019-06-27 PROCEDURE — 99214 OFFICE O/P EST MOD 30 MIN: CPT | Mod: S$GLB,,, | Performed by: INTERNAL MEDICINE

## 2019-06-27 RX ORDER — TELMISARTAN 40 MG/1
40 TABLET ORAL DAILY
Qty: 90 TABLET | Refills: 3 | Status: SHIPPED | OUTPATIENT
Start: 2019-06-27 | End: 2020-05-26 | Stop reason: SDUPTHER

## 2019-06-27 RX ORDER — ATORVASTATIN CALCIUM 10 MG/1
10 TABLET, FILM COATED ORAL DAILY
Qty: 90 TABLET | Refills: 3 | Status: SHIPPED | OUTPATIENT
Start: 2019-06-27 | End: 2020-05-26 | Stop reason: SDUPTHER

## 2019-06-27 RX ORDER — METOPROLOL SUCCINATE 50 MG/1
TABLET, EXTENDED RELEASE ORAL
Qty: 90 TABLET | Refills: 1 | Status: SHIPPED | OUTPATIENT
Start: 2019-06-27 | End: 2020-05-26 | Stop reason: SDUPTHER

## 2019-06-27 RX ORDER — CHLORTHALIDONE 25 MG/1
TABLET ORAL
Qty: 90 TABLET | Refills: 1 | Status: SHIPPED | OUTPATIENT
Start: 2019-06-27 | End: 2020-05-26 | Stop reason: SDUPTHER

## 2019-06-27 RX ORDER — METFORMIN HYDROCHLORIDE 500 MG/1
1000 TABLET, EXTENDED RELEASE ORAL 2 TIMES DAILY WITH MEALS
Qty: 360 TABLET | Refills: 3 | Status: SHIPPED | OUTPATIENT
Start: 2019-06-27 | End: 2020-05-26 | Stop reason: SDUPTHER

## 2019-06-27 NOTE — PROGRESS NOTES
Subjective:       Patient ID: Roosevelt Moser is a 68 y.o. male.    Chief Complaint: Diabetes (labs,refills)    HPI         CHIEF COMPLAINT: Diabetes.  HPI:     ONSET/TIMING:     QUALITY/COURSE:   unchanged..      INTENSITY/SEVERITY: . Measures blood sugar :  3 times per month.        Lowest recent . Average .     CONTEXT/WHEN: last HgbA1c    Lab Results   Component Value Date    HGBA1C 8.9 (H) 06/20/2019      weights:    Wt Readings from Last 1 Encounters:   06/27/19 0806 (!) 148.8 kg (328 lb 0.7 oz)         SYMPTOMS/RELATED: . . Possible medication side effects include:     The following symptoms/statements  are present IF IN BOLD, negative otherwise.         MODIFIERS/TREATMENTS: Not_taking_medications:  .  Non-compliance_with_Diabetic_diet  .  No_eye_exam_within_last_year.  Not_practicing_good_foot_care.    REVIEW OF SYMPTOMS: . Weight_gain. Weight_loss. Neuropathy. Recurrent_infections. Skin_ulcers        CHIEF COMPLAINT: Hypertension  HPI:     ONSET:      QUALITY/COURSE:   Unchanged.     INTENSITY/SEVERITY:  Average blood pressure is unknown.      MODIFIERS/TREATMENTS:  Taking medications: yes. .High sodium intake: no. alcohol: no      The following symptoms are positive only if BOLDED, otherwise are negative.      SYMPTOMS/RELATED: Possible medication side effects include:   Depression..  . Cough. . Constipation.    REVIEW OF SYMPTOMS: . Weight_loss . Weight_gain . Leg_cramps .Potency_problems .    TARGET ORGAN DAMAGE:: angina/ prior myocardial infarction, chronic kidney disease, heart failure, left ventricular hypertrophy, peripheral artery disease, prior coronary revascularization, retinopathy, stroke. transient ischemic attack.        CHIEF COMPLAINT: Hyperlipidemia. cholesterol screening: no.   HPI:     ONSET:    MODIFIERS/TREATMENTS: . Taking medications: yes. . Non-compliance with following diet: no. .     SYMPTOMS/RELATED:Possible medication side effects include:   Myalgia: no.  .     REVIEW  OF SYMPTOMS: past weights:   Wt Readings from Last 1 Encounters:   06/27/19 0806 (!) 148.8 kg (328 lb 0.7 oz)                                                     Last lipids: total   Lab Results   Component Value Date    CHOL 160 06/20/2019    CHOL 165 07/19/2018    CHOL 179 07/21/2017                                                                     HDL   Lab Results   Component Value Date    HDL 45 06/20/2019    HDL 44 07/19/2018    HDL 41 07/21/2017                                                                     LDL   Lab Results   Component Value Date    LDLCALC 65.2 06/20/2019    LDLCALC 67.8 07/19/2018    LDLCALC 77.8 07/21/2017                                                                     TRIG   Lab Results   Component Value Date    TRIG 249 (H) 06/20/2019    TRIG 266 (H) 07/19/2018    TRIG 301 (H) 07/21/2017                                                                         Review of Systems   Constitutional: Negative for diaphoresis, fatigue and unexpected weight change.   Eyes: Negative for visual disturbance.   Respiratory: Negative for chest tightness and shortness of breath.    Cardiovascular: Negative for chest pain and leg swelling.   Endocrine: Negative for polyphagia and polyuria.   Neurological: Negative for dizziness, syncope, weakness, numbness and headaches.   Psychiatric/Behavioral: Negative for dysphoric mood. The patient is not nervous/anxious.          Objective:      Vitals:    06/27/19 0806   BP: 132/82   Pulse: 99   Resp: 16   Temp: 98.3 °F (36.8 °C)   TempSrc: Oral   SpO2: 96%   Weight: (!) 148.8 kg (328 lb 0.7 oz)   Height: 6' (1.829 m)   PainSc: 0-No pain     Physical Exam   Constitutional: He appears well-developed and well-nourished.   Cardiovascular: Normal rate, regular rhythm and normal heart sounds.   Pulmonary/Chest: Effort normal and breath sounds normal. No respiratory distress. He has no wheezes.   Abdominal: Soft. Bowel sounds are normal. There is no  "tenderness.   Musculoskeletal:        Right foot: There is normal range of motion and no deformity.        Left foot: There is normal range of motion and no deformity.   Feet:   Right Foot:   Protective Sensation: 5 sites tested. 0 sites sensed.   Skin Integrity: Positive for callus (Callus on the right great toe). Negative for ulcer, blister, skin breakdown, erythema, warmth or dry skin.   Left Foot:   Protective Sensation: 5 sites tested. 1 site sensed.  Skin Integrity: Negative for ulcer, blister, skin breakdown, erythema, warmth, callus or dry skin.         Assessment:       1. Type 2 diabetes, controlled, with neuropathy    2. Hypertension associated with diabetes    3. Hyperlipidemia, unspecified hyperlipidemia type          Plan:   Patient advised that he needs to get diabetic shoes and that he needs to see a podiatrist about the callus with his neuropathy but he refuses.  States that is just because" he has new shoes     Type 2 diabetes, controlled, with neuropathy  -     metFORMIN (GLUCOPHAGE-XR) 500 MG 24 hr tablet; Take 2 tablets (1,000 mg total) by mouth 2 (two) times daily with meals.  Dispense: 360 tablet; Refill: 3  -     empagliflozin (JARDIANCE) 10 mg Tab; Take 1 each by mouth once daily.  Dispense: 90 tablet; Refill: 1  -     CBC auto differential; Future; Expected date: 06/27/2019  -     Comprehensive metabolic panel; Future; Expected date: 06/27/2019  -     Hemoglobin A1c; Future; Expected date: 06/27/2019  -     Lipid panel; Future; Expected date: 06/27/2019  -     Microalbumin/creatinine urine ratio; Future    Hypertension associated with diabetes  -     metoprolol succinate (TOPROL-XL) 50 MG 24 hr tablet; TAKE 1 TABLET ONE TIME DAILY  Dispense: 90 tablet; Refill: 1  -     chlorthalidone (HYGROTEN) 25 MG Tab; TAKE 1 TABLET ONE TIME DAILY  Dispense: 90 tablet; Refill: 1  -     telmisartan (MICARDIS) 40 MG Tab; Take 1 tablet (40 mg total) by mouth once daily.  Dispense: 90 tablet; Refill: 3  -   "   Comprehensive metabolic panel; Future; Expected date: 06/27/2019    Hyperlipidemia, unspecified hyperlipidemia type  -     atorvastatin (LIPITOR) 10 MG tablet; Take 1 tablet (10 mg total) by mouth once daily.  Dispense: 90 tablet; Refill: 3  -     Lipid panel; Future; Expected date: 06/27/2019      Follow up in about 3 months (around 9/27/2019).

## 2019-06-27 NOTE — PATIENT INSTRUCTIONS
If you can get jardiaince stop the Januvia and the chlorthalidone.    Lose 5 pounds.  Suggest [place] diet    Avoid white carbohydrates.   Eat  a palm sized protein with each meal.       Walk for 10 minutes after you eat.  This will keep your sugars from going high at that time.    Foam donut on callus. Check it daily.

## 2019-07-02 NOTE — PROGRESS NOTES
Patient, Roosevelt Moser (MRN #[id number]), presented with a recorded BMI of 44.49 kg/m^2 consistent with the definition of morbid obesity (ICD-10 E66.01). The patient's morbid obesity was monitored, evaluated, addressed and/or treated. This addendum to the medical record is made on 07/01/2019.

## 2019-07-29 DIAGNOSIS — E11.40 TYPE 2 DIABETES, CONTROLLED, WITH NEUROPATHY: ICD-10-CM

## 2019-12-03 ENCOUNTER — PATIENT OUTREACH (OUTPATIENT)
Dept: ADMINISTRATIVE | Facility: HOSPITAL | Age: 69
End: 2019-12-03

## 2019-12-03 NOTE — LETTER
December 3, 2019    Roosevelt Moser  [address] [facility] [address]             [facility]  [address]  [facility] [address]  Phone: [phone] Roosevelt Moser  [address] [facility] [address]        Dear, Roosevelt Moser      [facility] is committed to your overall health.  To help you get the most out of each of your visits, we will review your information to make sure you are up to date on all of your recommended tests and/or procedures.      Dr. Roland Trotter MD  has found that your chart shows you may be due for     DIABETIC EYE EXAM    If you have had any of the above done at another facility, please bring the records or information with you so that your record at [facility] will be complete.  If you would like to schedule any of these, please contact the clinic at [phone].    Also, if you have not had a eye exam in the past year, we now have a Retinal Camera at our clinic. Depending on your insurance coverage, you may not have an additional copay for this visit if done on the same day you see a member of the Primary Care Team. Please confirm this with your insurance.  Thank You,    Your [facility] Team,  MD Yancy Singh,  Panel Care Coordinator  [facility] [address] [place] [address]  Phone [phone]  Fax [phone]

## 2019-12-27 DIAGNOSIS — Z12.11 COLON CANCER SCREENING: ICD-10-CM

## 2020-03-13 ENCOUNTER — PATIENT OUTREACH (OUTPATIENT)
Dept: ADMINISTRATIVE | Facility: HOSPITAL | Age: 70
End: 2020-03-13

## 2020-03-13 NOTE — LETTER
March 13, 2020    Roosevelt Moser  [address] [facility] [address]     [facility]  [address]  [facility] [address]  Phone: [phone] Roosevelt Moser  [address] [facility] [address]    Dear, Roosevelt Moser    [facility] is committed to your overall health.  To help you get the most out of each of your visits, we will review your information to make sure you are up to date on all of your recommended tests and/or procedures.  Roland Trotter MD  has found that your chart shows you may be due for the following:    Health Maintenance Due   Topic    Eye Exam     TETANUS VACCINE     Colonoscopy     Hemoglobin A1c        If you have had any of the above done at another facility, please bring the records with you or Fax them to [phone] so that your record at [facility] will be complete. If you have not had any of these tests or procedures done recently and would like to complete this testing ,  please call [phone] or send a message through your [facility] portal to your provider's office.     If you have an upcoming scheduled appointment for the above test and/or procedures, please disregard this letter.    If you are currently taking medication, please bring it with you to your appointment for review.    Thank you for letting us care for you,    Dawson Childress, Care Coordinator  [facility]  Phone: [phone]  Fax: [phone]

## 2020-03-13 NOTE — PROGRESS NOTES
Chart review completed 03/13/2020.  Care Everywhere updates requested and reviewed.  Immunizations reconciled. Media reviewed.     Letter mailed.

## 2020-05-15 ENCOUNTER — PATIENT OUTREACH (OUTPATIENT)
Dept: ADMINISTRATIVE | Facility: HOSPITAL | Age: 70
End: 2020-05-15

## 2020-05-15 NOTE — LETTER
May 15, 2020    Roosevelt Moser  [address] [facility] [address]     [facility]  [address]  [facility] [address]  Phone: [phone] Roosevelt Moser  [address] [facility] [address]    Dear, Roosevelt Moser    [facility] is committed to your overall health.  To help you get the most out of each of your visits, we will review your information to make sure you are up to date on all of your recommended tests and/or procedures.  Roland Trotter MD  has found that your chart shows you may be due for the following:    Health Maintenance Due   Topic    Eye Exam     TETANUS VACCINE     Colonoscopy     Hemoglobin A1c     Foot Exam      If you have had any of the above done at another facility, please bring the records with you or Fax them to [phone] so that your record at [facility] will be complete. If you have not had any of these tests or procedures done recently and would like to complete this testing ,  please call [phone] or send a message through your [facility] portal to your provider's office.     If you have an upcoming scheduled appointment for the above test and/or procedures, please disregard this letter.    If you are currently taking medication, please bring it with you to your appointment for review.    Thank you for letting us care for you,    Dawson Childress, Care Coordinator  [facility]  Phone: [phone]  Fax: [phone]

## 2020-05-15 NOTE — PROGRESS NOTES
Chart review completed 05/15/2020.  Care Everywhere updates requested and reviewed.  Immunizations reconciled. Media reviewed.     Letter mailed.

## 2020-05-23 ENCOUNTER — LAB VISIT (OUTPATIENT)
Dept: LAB | Facility: HOSPITAL | Age: 70
End: 2020-05-23
Attending: INTERNAL MEDICINE
Payer: MEDICARE

## 2020-05-23 DIAGNOSIS — I15.2 HYPERTENSION ASSOCIATED WITH DIABETES: ICD-10-CM

## 2020-05-23 DIAGNOSIS — E78.5 HYPERLIPIDEMIA, UNSPECIFIED HYPERLIPIDEMIA TYPE: ICD-10-CM

## 2020-05-23 DIAGNOSIS — E11.59 HYPERTENSION ASSOCIATED WITH DIABETES: ICD-10-CM

## 2020-05-23 DIAGNOSIS — E11.40 TYPE 2 DIABETES, CONTROLLED, WITH NEUROPATHY: ICD-10-CM

## 2020-05-23 LAB
ALBUMIN SERPL BCP-MCNC: 3.8 G/DL (ref 3.5–5.2)
ALP SERPL-CCNC: 68 U/L (ref 55–135)
ALT SERPL W/O P-5'-P-CCNC: 25 U/L (ref 10–44)
ANION GAP SERPL CALC-SCNC: 15 MMOL/L (ref 8–16)
AST SERPL-CCNC: 21 U/L (ref 10–40)
BASOPHILS # BLD AUTO: 0.07 K/UL (ref 0–0.2)
BASOPHILS NFR BLD: 0.7 % (ref 0–1.9)
BILIRUB SERPL-MCNC: 0.7 MG/DL (ref 0.1–1)
BUN SERPL-MCNC: 11 MG/DL (ref 8–23)
CALCIUM SERPL-MCNC: 9 MG/DL (ref 8.7–10.5)
CHLORIDE SERPL-SCNC: 104 MMOL/L (ref 95–110)
CHOLEST SERPL-MCNC: 152 MG/DL (ref 120–199)
CHOLEST/HDLC SERPL: 3.6 {RATIO} (ref 2–5)
CO2 SERPL-SCNC: 22 MMOL/L (ref 23–29)
CREAT SERPL-MCNC: 1.1 MG/DL (ref 0.5–1.4)
DIFFERENTIAL METHOD: ABNORMAL
EOSINOPHIL # BLD AUTO: 0.2 K/UL (ref 0–0.5)
EOSINOPHIL NFR BLD: 2.1 % (ref 0–8)
ERYTHROCYTE [DISTWIDTH] IN BLOOD BY AUTOMATED COUNT: 14.8 % (ref 11.5–14.5)
EST. GFR  (AFRICAN AMERICAN): >60 ML/MIN/1.73 M^2
EST. GFR  (NON AFRICAN AMERICAN): >60 ML/MIN/1.73 M^2
ESTIMATED AVG GLUCOSE: 200 MG/DL (ref 68–131)
GLUCOSE SERPL-MCNC: 203 MG/DL (ref 70–110)
HBA1C MFR BLD HPLC: 8.6 % (ref 4–5.6)
HCT VFR BLD AUTO: 47.2 % (ref 40–54)
HDLC SERPL-MCNC: 42 MG/DL (ref 40–75)
HDLC SERPL: 27.6 % (ref 20–50)
HGB BLD-MCNC: 14.7 G/DL (ref 14–18)
IMM GRANULOCYTES # BLD AUTO: 0.09 K/UL (ref 0–0.04)
IMM GRANULOCYTES NFR BLD AUTO: 0.9 % (ref 0–0.5)
LDLC SERPL CALC-MCNC: 47 MG/DL (ref 63–159)
LYMPHOCYTES # BLD AUTO: 3.1 K/UL (ref 1–4.8)
LYMPHOCYTES NFR BLD: 30 % (ref 18–48)
MCH RBC QN AUTO: 29.6 PG (ref 27–31)
MCHC RBC AUTO-ENTMCNC: 31.1 G/DL (ref 32–36)
MCV RBC AUTO: 95 FL (ref 82–98)
MONOCYTES # BLD AUTO: 0.9 K/UL (ref 0.3–1)
MONOCYTES NFR BLD: 8.9 % (ref 4–15)
NEUTROPHILS # BLD AUTO: 6 K/UL (ref 1.8–7.7)
NEUTROPHILS NFR BLD: 57.4 % (ref 38–73)
NONHDLC SERPL-MCNC: 110 MG/DL
NRBC BLD-RTO: 0 /100 WBC
PLATELET # BLD AUTO: 277 K/UL (ref 150–350)
PMV BLD AUTO: 10.8 FL (ref 9.2–12.9)
POTASSIUM SERPL-SCNC: 4.5 MMOL/L (ref 3.5–5.1)
PROT SERPL-MCNC: 7.7 G/DL (ref 6–8.4)
RBC # BLD AUTO: 4.97 M/UL (ref 4.6–6.2)
SODIUM SERPL-SCNC: 141 MMOL/L (ref 136–145)
TRIGL SERPL-MCNC: 315 MG/DL (ref 30–150)
WBC # BLD AUTO: 10.45 K/UL (ref 3.9–12.7)

## 2020-05-23 PROCEDURE — 85025 COMPLETE CBC W/AUTO DIFF WBC: CPT | Mod: HCNC

## 2020-05-23 PROCEDURE — 80053 COMPREHEN METABOLIC PANEL: CPT | Mod: HCNC

## 2020-05-23 PROCEDURE — 83036 HEMOGLOBIN GLYCOSYLATED A1C: CPT | Mod: HCNC

## 2020-05-23 PROCEDURE — 36415 COLL VENOUS BLD VENIPUNCTURE: CPT | Mod: HCNC,PO

## 2020-05-23 PROCEDURE — 80061 LIPID PANEL: CPT | Mod: HCNC

## 2020-05-26 ENCOUNTER — OFFICE VISIT (OUTPATIENT)
Dept: FAMILY MEDICINE | Facility: CLINIC | Age: 70
End: 2020-05-26
Payer: MEDICARE

## 2020-05-26 VITALS
DIASTOLIC BLOOD PRESSURE: 72 MMHG | HEART RATE: 97 BPM | TEMPERATURE: 98 F | RESPIRATION RATE: 20 BRPM | WEIGHT: 315 LBS | SYSTOLIC BLOOD PRESSURE: 130 MMHG | OXYGEN SATURATION: 97 % | BODY MASS INDEX: 42.66 KG/M2 | HEIGHT: 72 IN

## 2020-05-26 DIAGNOSIS — E11.42 DIABETIC POLYNEUROPATHY ASSOCIATED WITH TYPE 2 DIABETES MELLITUS: ICD-10-CM

## 2020-05-26 DIAGNOSIS — E11.40 TYPE 2 DIABETES, CONTROLLED, WITH NEUROPATHY: Primary | ICD-10-CM

## 2020-05-26 DIAGNOSIS — E11.59 HYPERTENSION ASSOCIATED WITH DIABETES: ICD-10-CM

## 2020-05-26 DIAGNOSIS — E11.69 HYPERLIPIDEMIA ASSOCIATED WITH TYPE 2 DIABETES MELLITUS: ICD-10-CM

## 2020-05-26 DIAGNOSIS — E66.01 MORBID OBESITY: ICD-10-CM

## 2020-05-26 DIAGNOSIS — E78.5 HYPERLIPIDEMIA, UNSPECIFIED HYPERLIPIDEMIA TYPE: ICD-10-CM

## 2020-05-26 DIAGNOSIS — E78.5 HYPERLIPIDEMIA ASSOCIATED WITH TYPE 2 DIABETES MELLITUS: ICD-10-CM

## 2020-05-26 DIAGNOSIS — I15.2 HYPERTENSION ASSOCIATED WITH DIABETES: ICD-10-CM

## 2020-05-26 PROCEDURE — 1159F MED LIST DOCD IN RCRD: CPT | Mod: S$GLB,,, | Performed by: INTERNAL MEDICINE

## 2020-05-26 PROCEDURE — 3075F PR MOST RECENT SYSTOLIC BLOOD PRESS GE 130-139MM HG: ICD-10-PCS | Mod: CPTII,S$GLB,, | Performed by: INTERNAL MEDICINE

## 2020-05-26 PROCEDURE — 1126F PR PAIN SEVERITY QUANTIFIED, NO PAIN PRESENT: ICD-10-PCS | Mod: S$GLB,,, | Performed by: INTERNAL MEDICINE

## 2020-05-26 PROCEDURE — 99214 PR OFFICE/OUTPT VISIT, EST, LEVL IV, 30-39 MIN: ICD-10-PCS | Mod: S$GLB,,, | Performed by: INTERNAL MEDICINE

## 2020-05-26 PROCEDURE — 99499 RISK ADDL DX/OHS AUDIT: ICD-10-PCS | Mod: S$GLB,,, | Performed by: INTERNAL MEDICINE

## 2020-05-26 PROCEDURE — 3075F SYST BP GE 130 - 139MM HG: CPT | Mod: CPTII,S$GLB,, | Performed by: INTERNAL MEDICINE

## 2020-05-26 PROCEDURE — 99214 OFFICE O/P EST MOD 30 MIN: CPT | Mod: S$GLB,,, | Performed by: INTERNAL MEDICINE

## 2020-05-26 PROCEDURE — 1101F PR PT FALLS ASSESS DOC 0-1 FALLS W/OUT INJ PAST YR: ICD-10-PCS | Mod: CPTII,S$GLB,, | Performed by: INTERNAL MEDICINE

## 2020-05-26 PROCEDURE — 3052F HG A1C>EQUAL 8.0%<EQUAL 9.0%: CPT | Mod: CPTII,S$GLB,, | Performed by: INTERNAL MEDICINE

## 2020-05-26 PROCEDURE — 1126F AMNT PAIN NOTED NONE PRSNT: CPT | Mod: S$GLB,,, | Performed by: INTERNAL MEDICINE

## 2020-05-26 PROCEDURE — 3078F PR MOST RECENT DIASTOLIC BLOOD PRESSURE < 80 MM HG: ICD-10-PCS | Mod: CPTII,S$GLB,, | Performed by: INTERNAL MEDICINE

## 2020-05-26 PROCEDURE — 99499 UNLISTED E&M SERVICE: CPT | Mod: S$GLB,,, | Performed by: INTERNAL MEDICINE

## 2020-05-26 PROCEDURE — 1159F PR MEDICATION LIST DOCUMENTED IN MEDICAL RECORD: ICD-10-PCS | Mod: S$GLB,,, | Performed by: INTERNAL MEDICINE

## 2020-05-26 PROCEDURE — 3078F DIAST BP <80 MM HG: CPT | Mod: CPTII,S$GLB,, | Performed by: INTERNAL MEDICINE

## 2020-05-26 PROCEDURE — 1101F PT FALLS ASSESS-DOCD LE1/YR: CPT | Mod: CPTII,S$GLB,, | Performed by: INTERNAL MEDICINE

## 2020-05-26 PROCEDURE — 3052F PR MOST RECENT HEMOGLOBIN A1C LEVEL 8.0 - < 9.0%: ICD-10-PCS | Mod: CPTII,S$GLB,, | Performed by: INTERNAL MEDICINE

## 2020-05-26 RX ORDER — TELMISARTAN 40 MG/1
40 TABLET ORAL DAILY
Qty: 90 TABLET | Refills: 3 | Status: SHIPPED | OUTPATIENT
Start: 2020-05-26 | End: 2021-07-30 | Stop reason: SDUPTHER

## 2020-05-26 RX ORDER — METOPROLOL SUCCINATE 50 MG/1
TABLET, EXTENDED RELEASE ORAL
Qty: 90 TABLET | Refills: 1 | Status: SHIPPED | OUTPATIENT
Start: 2020-05-26 | End: 2021-07-30 | Stop reason: SDUPTHER

## 2020-05-26 RX ORDER — METFORMIN HYDROCHLORIDE 500 MG/1
1000 TABLET, EXTENDED RELEASE ORAL 2 TIMES DAILY WITH MEALS
Qty: 360 TABLET | Refills: 3 | Status: SHIPPED | OUTPATIENT
Start: 2020-05-26 | End: 2021-07-30 | Stop reason: SDUPTHER

## 2020-05-26 RX ORDER — ATORVASTATIN CALCIUM 10 MG/1
10 TABLET, FILM COATED ORAL DAILY
Qty: 90 TABLET | Refills: 3 | Status: SHIPPED | OUTPATIENT
Start: 2020-05-26 | End: 2021-07-30 | Stop reason: SDUPTHER

## 2020-05-26 RX ORDER — CHLORTHALIDONE 25 MG/1
TABLET ORAL
Qty: 90 TABLET | Refills: 1 | Status: SHIPPED | OUTPATIENT
Start: 2020-05-26 | End: 2020-05-29

## 2020-05-26 NOTE — PROGRESS NOTES
Subjective:      4:19 PM     Patient ID: Roosevelt Moser is a 69 y.o. male.    Chief Complaint: Diabetes (labs,refills)    HPI         CHIEF COMPLAINT: Diabetes.  HPI:  The patient lost 7 lb any does not want to have more medication.  He does agree to take more medication for his diabetes if further weight loss does not improve sugars.    ONSET/TIMING:     QUALITY/COURSE:   unchanged..      INTENSITY/SEVERITY: . Measures blood sugar :  0x/d    CONTEXT/WHEN: last HgbA1c    Lab Results   Component Value Date    HGBA1C 8.6 (H) 05/23/2020      weights:    Wt Readings from Last 1 Encounters:   05/26/20 1559 (!) 146 kg (321 lb 14 oz)         SYMPTOMS/RELATED: . . Possible medication side effects include:     The following symptoms/statements  are present IF IN BOLD, negative otherwise.         MODIFIERS/TREATMENTS: Not_taking_medications:  .  Non-compliance_with_Diabetic_diet  .  No_eye_exam_within_last_year.  Not_practicing_good_foot_care.    REVIEW OF SYMPTOMS: . Weight_gain. Weight_loss down 7 lb. Neuropathy. Recurrent_infections. Skin_ulcers          CHIEF COMPLAINT: Hypertension  HPI:     ONSET:      QUALITY/COURSE:   Unchanged.     INTENSITY/SEVERITY:  Average blood pressure is unknown.      MODIFIERS/TREATMENTS:  Taking medications: yes. .High sodium intake: no. alcohol: no      The following symptoms are positive only if BOLDED, otherwise are negative.      SYMPTOMS/RELATED: Possible medication side effects include:   Depression..  . Cough. . Constipation.    REVIEW OF SYMPTOMS: . Weight_loss . Weight_gain . Leg_cramps .Potency_problems .    TARGET ORGAN DAMAGE:: angina/ prior myocardial infarction, chronic kidney disease, heart failure, left ventricular hypertrophy, peripheral artery disease, prior coronary revascularization, retinopathy, stroke. transient ischemic attack.        CHIEF COMPLAINT: Hyperlipidemia. cholesterol screening: no.   HPI:     ONSET:    MODIFIERS/TREATMENTS: . Taking medications: yes. .  Non-compliance with following diet: no. .     SYMPTOMS/RELATED:Possible medication side effects include:   Myalgia: no.  .     REVIEW OF SYMPTOMS: past weights:   Wt Readings from Last 1 Encounters:   05/26/20 1559 (!) 146 kg (321 lb 14 oz)                                                     Last lipids: total   Lab Results   Component Value Date    CHOL 152 05/23/2020    CHOL 160 06/20/2019    CHOL 165 07/19/2018                                                                     HDL   Lab Results   Component Value Date    HDL 42 05/23/2020    HDL 45 06/20/2019    HDL 44 07/19/2018                                                                     LDL   Lab Results   Component Value Date    LDLCALC 47.0 (L) 05/23/2020    LDLCALC 65.2 06/20/2019    LDLCALC 67.8 07/19/2018                                                                     TRIG   Lab Results   Component Value Date    TRIG 315 (H) 05/23/2020    TRIG 249 (H) 06/20/2019    TRIG 266 (H) 07/19/2018                                                                         Review of Systems   Constitutional: Negative for diaphoresis, fatigue and unexpected weight change.   Eyes: Negative for visual disturbance.   Respiratory: Negative for chest tightness and shortness of breath.    Cardiovascular: Negative for chest pain and leg swelling.   Endocrine: Negative for polyphagia and polyuria.   Neurological: Negative for dizziness, syncope, weakness, numbness and headaches.   Psychiatric/Behavioral: Negative for dysphoric mood. The patient is not nervous/anxious.          Objective:      Vitals:    05/26/20 1559   BP: 130/72   Pulse: 97   Resp: 20   Temp: 98.4 °F (36.9 °C)   TempSrc: Oral   SpO2: 97%   Weight: (!) 146 kg (321 lb 14 oz)   Height: 6' (1.829 m)   PainSc: 0-No pain     Physical Exam   Constitutional: He appears well-developed and well-nourished.   Cardiovascular: Normal rate, regular rhythm and normal heart sounds.   Pulmonary/Chest: Effort normal  and breath sounds normal.   Abdominal: Soft. There is no tenderness.   Neurological: He is alert.   Psychiatric: He has a normal mood and affect. His behavior is normal. Thought content normal.   Nursing note and vitals reviewed.    Recent Results (from the past 1008 hour(s))   CBC auto differential    Collection Time: 05/23/20  8:52 AM   Result Value Ref Range    WBC 10.45 3.90 - 12.70 K/uL    RBC 4.97 4.60 - 6.20 M/uL    Hemoglobin 14.7 14.0 - 18.0 g/dL    Hematocrit 47.2 40.0 - 54.0 %    Mean Corpuscular Volume 95 82 - 98 fL    Mean Corpuscular Hemoglobin 29.6 27.0 - 31.0 pg    Mean Corpuscular Hemoglobin Conc 31.1 (L) 32.0 - 36.0 g/dL    RDW 14.8 (H) 11.5 - 14.5 %    Platelets 277 150 - 350 K/uL    MPV 10.8 9.2 - 12.9 fL    Immature Granulocytes 0.9 (H) 0.0 - 0.5 %    Gran # (ANC) 6.0 1.8 - 7.7 K/uL    Immature Grans (Abs) 0.09 (H) 0.00 - 0.04 K/uL    Lymph # 3.1 1.0 - 4.8 K/uL    Mono # 0.9 0.3 - 1.0 K/uL    Eos # 0.2 0.0 - 0.5 K/uL    Baso # 0.07 0.00 - 0.20 K/uL    nRBC 0 0 /100 WBC    Gran% 57.4 38.0 - 73.0 %    Lymph% 30.0 18.0 - 48.0 %    Mono% 8.9 4.0 - 15.0 %    Eosinophil% 2.1 0.0 - 8.0 %    Basophil% 0.7 0.0 - 1.9 %    Differential Method Automated    Comprehensive metabolic panel    Collection Time: 05/23/20  8:52 AM   Result Value Ref Range    Sodium 141 136 - 145 mmol/L    Potassium 4.5 3.5 - 5.1 mmol/L    Chloride 104 95 - 110 mmol/L    CO2 22 (L) 23 - 29 mmol/L    Glucose 203 (H) 70 - 110 mg/dL    BUN, Bld 11 8 - 23 mg/dL    Creatinine 1.1 0.5 - 1.4 mg/dL    Calcium 9.0 8.7 - 10.5 mg/dL    Total Protein 7.7 6.0 - 8.4 g/dL    Albumin 3.8 3.5 - 5.2 g/dL    Total Bilirubin 0.7 0.1 - 1.0 mg/dL    Alkaline Phosphatase 68 55 - 135 U/L    AST 21 10 - 40 U/L    ALT 25 10 - 44 U/L    Anion Gap 15 8 - 16 mmol/L    eGFR if African American >60.0 >60 mL/min/1.73 m^2    eGFR if non African American >60.0 >60 mL/min/1.73 m^2   Hemoglobin A1c    Collection Time: 05/23/20  8:52 AM   Result Value Ref Range     Hemoglobin A1C 8.6 (H) 4.0 - 5.6 %    Estimated Avg Glucose 200 (H) 68 - 131 mg/dL   Lipid panel    Collection Time: 05/23/20  8:52 AM   Result Value Ref Range    Cholesterol 152 120 - 199 mg/dL    Triglycerides 315 (H) 30 - 150 mg/dL    HDL 42 40 - 75 mg/dL    LDL Cholesterol 47.0 (L) 63.0 - 159.0 mg/dL    Hdl/Cholesterol Ratio 27.6 20.0 - 50.0 %    Total Cholesterol/HDL Ratio 3.6 2.0 - 5.0    Non-HDL Cholesterol 110 mg/dL          Assessment:       1. Type 2 diabetes, controlled, with neuropathy    2. Morbid obesity    3. Diabetic polyneuropathy associated with type 2 diabetes mellitus    4. Hypertension associated with diabetes    5. Hyperlipidemia, unspecified hyperlipidemia type    6. Hyperlipidemia associated with type 2 diabetes mellitus          Plan:       Type 2 diabetes, controlled, with neuropathy  -     metFORMIN (GLUCOPHAGE-XR) 500 MG XR 24hr tablet; Take 2 tablets (1,000 mg total) by mouth 2 (two) times daily with meals.  Dispense: 360 tablet; Refill: 3  -     SITagliptin (JANUVIA) 100 MG Tab; Take 1 tablet (100 mg total) by mouth once daily.  Dispense: 90 tablet; Refill: 3    Morbid obesity    Diabetic polyneuropathy associated with type 2 diabetes mellitus    Hypertension associated with diabetes  -     metoprolol succinate (TOPROL-XL) 50 MG 24 hr tablet; TAKE 1 TABLET ONE TIME DAILY  Dispense: 90 tablet; Refill: 1  -     telmisartan (MICARDIS) 40 MG Tab; Take 1 tablet (40 mg total) by mouth once daily.  Dispense: 90 tablet; Refill: 3  -     chlorthalidone (HYGROTEN) 25 MG Tab; TAKE 1 TABLET ONE TIME DAILY  Dispense: 90 tablet; Refill: 1    Hyperlipidemia, unspecified hyperlipidemia type  -     atorvastatin (LIPITOR) 10 MG tablet; Take 1 tablet (10 mg total) by mouth once daily.  Dispense: 90 tablet; Refill: 3    Hyperlipidemia associated with type 2 diabetes mellitus  -     atorvastatin (LIPITOR) 10 MG tablet; Take 1 tablet (10 mg total) by mouth once daily.  Dispense: 90 tablet; Refill:  3      Follow up in about 3 months (around 8/26/2020).

## 2020-05-26 NOTE — PATIENT INSTRUCTIONS
Lose 5 pounds.  Suggest [employer] diet  Avoid white carbohydrates.   Eat  a palm sized protein with each meal.       Walk for 10 minutes after you eat.  This will keep your sugars from going high at that time.    Check sugar once a week fasting and once a week before and 2 hrs after a meal.  Write down all results and what you ate during the meal.       Thank you for choosing [facility].     Please fill out the patient experience survey.

## 2020-05-29 ENCOUNTER — TELEPHONE (OUTPATIENT)
Dept: FAMILY MEDICINE | Facility: CLINIC | Age: 70
End: 2020-05-29

## 2020-05-29 NOTE — TELEPHONE ENCOUNTER
----- Message from Thais Baird sent at 5/29/2020  8:54 AM CDT -----  Patient is calling regarding two medications  that were ordered for him that he no longer takes.  Jj is filling the order and there will be a charge for them.  Please cancel the januvia and HYGROTEN as quickly as possible.  His number is [phone].  Thank you!

## 2020-07-21 ENCOUNTER — OFFICE VISIT (OUTPATIENT)
Dept: FAMILY MEDICINE | Facility: CLINIC | Age: 70
End: 2020-07-21
Payer: MEDICARE

## 2020-07-21 VITALS
BODY MASS INDEX: 42.66 KG/M2 | WEIGHT: 315 LBS | OXYGEN SATURATION: 97 % | HEART RATE: 82 BPM | TEMPERATURE: 98 F | DIASTOLIC BLOOD PRESSURE: 82 MMHG | SYSTOLIC BLOOD PRESSURE: 120 MMHG | HEIGHT: 72 IN | RESPIRATION RATE: 20 BRPM

## 2020-07-21 DIAGNOSIS — L72.3 SEBACEOUS CYST: Primary | ICD-10-CM

## 2020-07-21 PROCEDURE — 3079F PR MOST RECENT DIASTOLIC BLOOD PRESSURE 80-89 MM HG: ICD-10-PCS | Mod: CPTII,S$GLB,, | Performed by: INTERNAL MEDICINE

## 2020-07-21 PROCEDURE — 1159F PR MEDICATION LIST DOCUMENTED IN MEDICAL RECORD: ICD-10-PCS | Mod: S$GLB,,, | Performed by: INTERNAL MEDICINE

## 2020-07-21 PROCEDURE — 3074F SYST BP LT 130 MM HG: CPT | Mod: CPTII,S$GLB,, | Performed by: INTERNAL MEDICINE

## 2020-07-21 PROCEDURE — 1101F PR PT FALLS ASSESS DOC 0-1 FALLS W/OUT INJ PAST YR: ICD-10-PCS | Mod: CPTII,S$GLB,, | Performed by: INTERNAL MEDICINE

## 2020-07-21 PROCEDURE — 99212 OFFICE O/P EST SF 10 MIN: CPT | Mod: S$GLB,,, | Performed by: INTERNAL MEDICINE

## 2020-07-21 PROCEDURE — 3079F DIAST BP 80-89 MM HG: CPT | Mod: CPTII,S$GLB,, | Performed by: INTERNAL MEDICINE

## 2020-07-21 PROCEDURE — 1101F PT FALLS ASSESS-DOCD LE1/YR: CPT | Mod: CPTII,S$GLB,, | Performed by: INTERNAL MEDICINE

## 2020-07-21 PROCEDURE — 1126F PR PAIN SEVERITY QUANTIFIED, NO PAIN PRESENT: ICD-10-PCS | Mod: S$GLB,,, | Performed by: INTERNAL MEDICINE

## 2020-07-21 PROCEDURE — 3074F PR MOST RECENT SYSTOLIC BLOOD PRESSURE < 130 MM HG: ICD-10-PCS | Mod: CPTII,S$GLB,, | Performed by: INTERNAL MEDICINE

## 2020-07-21 PROCEDURE — 99212 PR OFFICE/OUTPT VISIT, EST, LEVL II, 10-19 MIN: ICD-10-PCS | Mod: S$GLB,,, | Performed by: INTERNAL MEDICINE

## 2020-07-21 PROCEDURE — 1159F MED LIST DOCD IN RCRD: CPT | Mod: S$GLB,,, | Performed by: INTERNAL MEDICINE

## 2020-07-21 PROCEDURE — 3008F BODY MASS INDEX DOCD: CPT | Mod: CPTII,S$GLB,, | Performed by: INTERNAL MEDICINE

## 2020-07-21 PROCEDURE — 3008F PR BODY MASS INDEX (BMI) DOCUMENTED: ICD-10-PCS | Mod: CPTII,S$GLB,, | Performed by: INTERNAL MEDICINE

## 2020-07-21 PROCEDURE — 1126F AMNT PAIN NOTED NONE PRSNT: CPT | Mod: S$GLB,,, | Performed by: INTERNAL MEDICINE

## 2020-07-21 NOTE — PROGRESS NOTES
Subjective:      9:45 AM     Patient ID: Roosevelt Moser is a 69 y.o. male.    Chief Complaint: knot on back (no refills needed)        HPI   The patient not a cyst on his back to drain spontaneously 5 days ago.  It was bloody drainage.  He has never had any pain there.  No fever.    Review of Systems      Objective:      Vitals:    07/21/20 0903   BP: 120/82   Pulse: 82   Resp: 20   Temp: 98.2 °F (36.8 °C)   TempSrc: Oral   SpO2: 97%   Weight: (!) 143.3 kg (315 lb 14.7 oz)   Height: 6' (1.829 m)   PainSc: 0-No pain     Physical Exam  Skin:     Comments: 4 cm cyst on his back with a ring of bruising around it.                No results found for this or any previous visit (from the past 1008 hour(s)).       Assessment:       1. Sebaceous cyst          Plan:       Sebaceous cyst      Follow up for if you are not better return in 2 weeks.

## 2020-07-31 DIAGNOSIS — E11.40 TYPE 2 DIABETES, CONTROLLED, WITH NEUROPATHY: ICD-10-CM

## 2020-11-05 ENCOUNTER — TELEPHONE (OUTPATIENT)
Dept: FAMILY MEDICINE | Facility: CLINIC | Age: 70
End: 2020-11-05

## 2020-11-05 NOTE — TELEPHONE ENCOUNTER
Notified patient that the office was closed today but I will call him tomorrow when the form is ready for pickup.

## 2020-11-05 NOTE — TELEPHONE ENCOUNTER
----- Message from Sofia Brasher sent at 11/4/2020  3:12 PM CST -----  Contact: patient  Type: Needs Medical Advice  Who Called:  patient    Best Call Back Number: [phone] (home)     Additional Information: failed to complete one part of the paperwork for the handicap sticker so please call to advise with an update

## 2020-11-11 ENCOUNTER — PATIENT OUTREACH (OUTPATIENT)
Dept: ADMINISTRATIVE | Facility: HOSPITAL | Age: 70
End: 2020-11-11

## 2020-11-11 NOTE — LETTER
November 11, 2020    Roosevelt Moser  [address] [facility] [address]             [facility]  [address]  [facility] [address]  Phone: [phone] Roosevelt Moser  [address] [facility] [address]        Dear, Roosevelt Moser      [facility] is committed to your overall health.  To help you get the most out of each of your visits, we will review your information to make sure you are up to date on all of your recommended tests and/or procedures.      Dr. Roland Trotter MD  has found that your chart shows you may be due for     COLON CANCER SCREENING  DIABETIC EYE EXAM    If you have had any of the above done at another facility, please bring the records or information with you so that your record at [facility] will be complete.  If you would like to schedule any of these, please contact the clinic at [phone].      Thank You,    Your [facility],  MD Yancy Singh,  Panel Care Coordinator  [place] [facility] [address] [place] [address]  Phone [phone]  Fax [phone]

## 2020-11-13 DIAGNOSIS — Z12.11 COLON CANCER SCREENING: ICD-10-CM

## 2021-06-17 ENCOUNTER — PATIENT OUTREACH (OUTPATIENT)
Dept: ADMINISTRATIVE | Facility: HOSPITAL | Age: 71
End: 2021-06-17

## 2021-06-17 DIAGNOSIS — E11.9 DIABETES MELLITUS WITHOUT COMPLICATION: Primary | ICD-10-CM

## 2021-06-23 ENCOUNTER — TELEPHONE (OUTPATIENT)
Dept: FAMILY MEDICINE | Facility: CLINIC | Age: 71
End: 2021-06-23

## 2021-07-06 ENCOUNTER — PES CALL (OUTPATIENT)
Dept: ADMINISTRATIVE | Facility: CLINIC | Age: 71
End: 2021-07-06

## 2021-07-21 ENCOUNTER — TELEPHONE (OUTPATIENT)
Dept: FAMILY MEDICINE | Facility: CLINIC | Age: 71
End: 2021-07-21

## 2021-07-21 DIAGNOSIS — I15.2 HYPERTENSION ASSOCIATED WITH DIABETES: ICD-10-CM

## 2021-07-21 DIAGNOSIS — E11.40 TYPE 2 DIABETES, CONTROLLED, WITH NEUROPATHY: Primary | ICD-10-CM

## 2021-07-21 DIAGNOSIS — E11.59 HYPERTENSION ASSOCIATED WITH DIABETES: ICD-10-CM

## 2021-07-21 DIAGNOSIS — E78.5 HYPERLIPIDEMIA, UNSPECIFIED HYPERLIPIDEMIA TYPE: ICD-10-CM

## 2021-07-21 DIAGNOSIS — E11.42 DIABETIC POLYNEUROPATHY ASSOCIATED WITH TYPE 2 DIABETES MELLITUS: ICD-10-CM

## 2021-07-27 ENCOUNTER — LAB VISIT (OUTPATIENT)
Dept: LAB | Facility: HOSPITAL | Age: 71
End: 2021-07-27
Attending: INTERNAL MEDICINE
Payer: MEDICARE

## 2021-07-27 DIAGNOSIS — I15.2 HYPERTENSION ASSOCIATED WITH DIABETES: ICD-10-CM

## 2021-07-27 DIAGNOSIS — E11.40 TYPE 2 DIABETES, CONTROLLED, WITH NEUROPATHY: ICD-10-CM

## 2021-07-27 DIAGNOSIS — E78.5 HYPERLIPIDEMIA, UNSPECIFIED HYPERLIPIDEMIA TYPE: ICD-10-CM

## 2021-07-27 DIAGNOSIS — E11.59 HYPERTENSION ASSOCIATED WITH DIABETES: ICD-10-CM

## 2021-07-27 LAB
ALBUMIN SERPL BCP-MCNC: 3.5 G/DL (ref 3.5–5.2)
ALP SERPL-CCNC: 72 U/L (ref 55–135)
ALT SERPL W/O P-5'-P-CCNC: 17 U/L (ref 10–44)
ANION GAP SERPL CALC-SCNC: 14 MMOL/L (ref 8–16)
AST SERPL-CCNC: 15 U/L (ref 10–40)
BASOPHILS # BLD AUTO: 0.07 K/UL (ref 0–0.2)
BASOPHILS NFR BLD: 0.6 % (ref 0–1.9)
BILIRUB SERPL-MCNC: 0.7 MG/DL (ref 0.1–1)
BUN SERPL-MCNC: 22 MG/DL (ref 8–23)
CALCIUM SERPL-MCNC: 10 MG/DL (ref 8.7–10.5)
CHLORIDE SERPL-SCNC: 103 MMOL/L (ref 95–110)
CHOLEST SERPL-MCNC: 143 MG/DL (ref 120–199)
CHOLEST/HDLC SERPL: 3.8 {RATIO} (ref 2–5)
CO2 SERPL-SCNC: 21 MMOL/L (ref 23–29)
CREAT SERPL-MCNC: 1.3 MG/DL (ref 0.5–1.4)
DIFFERENTIAL METHOD: ABNORMAL
EOSINOPHIL # BLD AUTO: 0.3 K/UL (ref 0–0.5)
EOSINOPHIL NFR BLD: 2.8 % (ref 0–8)
ERYTHROCYTE [DISTWIDTH] IN BLOOD BY AUTOMATED COUNT: 14.2 % (ref 11.5–14.5)
EST. GFR  (AFRICAN AMERICAN): >60 ML/MIN/1.73 M^2
EST. GFR  (NON AFRICAN AMERICAN): 55.7 ML/MIN/1.73 M^2
ESTIMATED AVG GLUCOSE: 246 MG/DL (ref 68–131)
GLUCOSE SERPL-MCNC: 228 MG/DL (ref 70–110)
HBA1C MFR BLD: 10.2 % (ref 4–5.6)
HCT VFR BLD AUTO: 46.3 % (ref 40–54)
HDLC SERPL-MCNC: 38 MG/DL (ref 40–75)
HDLC SERPL: 26.6 % (ref 20–50)
HGB BLD-MCNC: 15.1 G/DL (ref 14–18)
IMM GRANULOCYTES # BLD AUTO: 0.13 K/UL (ref 0–0.04)
IMM GRANULOCYTES NFR BLD AUTO: 1.1 % (ref 0–0.5)
LDLC SERPL CALC-MCNC: 53.8 MG/DL (ref 63–159)
LYMPHOCYTES # BLD AUTO: 4.2 K/UL (ref 1–4.8)
LYMPHOCYTES NFR BLD: 36.1 % (ref 18–48)
MCH RBC QN AUTO: 29.5 PG (ref 27–31)
MCHC RBC AUTO-ENTMCNC: 32.6 G/DL (ref 32–36)
MCV RBC AUTO: 91 FL (ref 82–98)
MONOCYTES # BLD AUTO: 1.1 K/UL (ref 0.3–1)
MONOCYTES NFR BLD: 9.7 % (ref 4–15)
NEUTROPHILS # BLD AUTO: 5.7 K/UL (ref 1.8–7.7)
NEUTROPHILS NFR BLD: 49.7 % (ref 38–73)
NONHDLC SERPL-MCNC: 105 MG/DL
NRBC BLD-RTO: 0 /100 WBC
PLATELET # BLD AUTO: 305 K/UL (ref 150–450)
PMV BLD AUTO: 11.1 FL (ref 9.2–12.9)
POTASSIUM SERPL-SCNC: 4.4 MMOL/L (ref 3.5–5.1)
PROT SERPL-MCNC: 7.3 G/DL (ref 6–8.4)
RBC # BLD AUTO: 5.11 M/UL (ref 4.6–6.2)
SODIUM SERPL-SCNC: 138 MMOL/L (ref 136–145)
TRIGL SERPL-MCNC: 256 MG/DL (ref 30–150)
WBC # BLD AUTO: 11.53 K/UL (ref 3.9–12.7)

## 2021-07-27 PROCEDURE — 80061 LIPID PANEL: CPT | Performed by: INTERNAL MEDICINE

## 2021-07-27 PROCEDURE — 83036 HEMOGLOBIN GLYCOSYLATED A1C: CPT | Performed by: INTERNAL MEDICINE

## 2021-07-27 PROCEDURE — 36415 COLL VENOUS BLD VENIPUNCTURE: CPT | Mod: PO | Performed by: INTERNAL MEDICINE

## 2021-07-27 PROCEDURE — 80053 COMPREHEN METABOLIC PANEL: CPT | Performed by: INTERNAL MEDICINE

## 2021-07-27 PROCEDURE — 85025 COMPLETE CBC W/AUTO DIFF WBC: CPT | Performed by: INTERNAL MEDICINE

## 2021-07-30 ENCOUNTER — TELEPHONE (OUTPATIENT)
Dept: FAMILY MEDICINE | Facility: CLINIC | Age: 71
End: 2021-07-30

## 2021-07-30 ENCOUNTER — OFFICE VISIT (OUTPATIENT)
Dept: FAMILY MEDICINE | Facility: CLINIC | Age: 71
End: 2021-07-30
Payer: MEDICARE

## 2021-07-30 VITALS
HEART RATE: 90 BPM | DIASTOLIC BLOOD PRESSURE: 70 MMHG | WEIGHT: 310.44 LBS | OXYGEN SATURATION: 95 % | BODY MASS INDEX: 42.1 KG/M2 | RESPIRATION RATE: 20 BRPM | SYSTOLIC BLOOD PRESSURE: 132 MMHG | TEMPERATURE: 99 F

## 2021-07-30 DIAGNOSIS — E11.40 TYPE 2 DIABETES, CONTROLLED, WITH NEUROPATHY: ICD-10-CM

## 2021-07-30 DIAGNOSIS — E11.59 HYPERTENSION ASSOCIATED WITH DIABETES: ICD-10-CM

## 2021-07-30 DIAGNOSIS — E78.5 HYPERLIPIDEMIA, UNSPECIFIED HYPERLIPIDEMIA TYPE: ICD-10-CM

## 2021-07-30 DIAGNOSIS — E11.69 HYPERLIPIDEMIA ASSOCIATED WITH TYPE 2 DIABETES MELLITUS: ICD-10-CM

## 2021-07-30 DIAGNOSIS — E78.5 HYPERLIPIDEMIA ASSOCIATED WITH TYPE 2 DIABETES MELLITUS: ICD-10-CM

## 2021-07-30 DIAGNOSIS — L84 TYPE 2 DIABETES MELLITUS WITH PRESSURE CALLUS: ICD-10-CM

## 2021-07-30 DIAGNOSIS — E11.628 TYPE 2 DIABETES MELLITUS WITH PRESSURE CALLUS: ICD-10-CM

## 2021-07-30 DIAGNOSIS — I15.2 HYPERTENSION ASSOCIATED WITH DIABETES: ICD-10-CM

## 2021-07-30 PROCEDURE — 3075F SYST BP GE 130 - 139MM HG: CPT | Mod: CPTII,S$GLB,, | Performed by: INTERNAL MEDICINE

## 2021-07-30 PROCEDURE — 3075F PR MOST RECENT SYSTOLIC BLOOD PRESS GE 130-139MM HG: ICD-10-PCS | Mod: CPTII,S$GLB,, | Performed by: INTERNAL MEDICINE

## 2021-07-30 PROCEDURE — 99214 OFFICE O/P EST MOD 30 MIN: CPT | Mod: S$GLB,,, | Performed by: INTERNAL MEDICINE

## 2021-07-30 PROCEDURE — 3046F PR MOST RECENT HEMOGLOBIN A1C LEVEL > 9.0%: ICD-10-PCS | Mod: CPTII,S$GLB,, | Performed by: INTERNAL MEDICINE

## 2021-07-30 PROCEDURE — 99499 RISK ADDL DX/OHS AUDIT: ICD-10-PCS | Mod: S$GLB,,, | Performed by: INTERNAL MEDICINE

## 2021-07-30 PROCEDURE — 99214 PR OFFICE/OUTPT VISIT, EST, LEVL IV, 30-39 MIN: ICD-10-PCS | Mod: S$GLB,,, | Performed by: INTERNAL MEDICINE

## 2021-07-30 PROCEDURE — 3046F HEMOGLOBIN A1C LEVEL >9.0%: CPT | Mod: CPTII,S$GLB,, | Performed by: INTERNAL MEDICINE

## 2021-07-30 PROCEDURE — 3078F DIAST BP <80 MM HG: CPT | Mod: CPTII,S$GLB,, | Performed by: INTERNAL MEDICINE

## 2021-07-30 PROCEDURE — 3008F BODY MASS INDEX DOCD: CPT | Mod: CPTII,S$GLB,, | Performed by: INTERNAL MEDICINE

## 2021-07-30 PROCEDURE — 99499 UNLISTED E&M SERVICE: CPT | Mod: S$GLB,,, | Performed by: INTERNAL MEDICINE

## 2021-07-30 PROCEDURE — 3078F PR MOST RECENT DIASTOLIC BLOOD PRESSURE < 80 MM HG: ICD-10-PCS | Mod: CPTII,S$GLB,, | Performed by: INTERNAL MEDICINE

## 2021-07-30 PROCEDURE — 3008F PR BODY MASS INDEX (BMI) DOCUMENTED: ICD-10-PCS | Mod: CPTII,S$GLB,, | Performed by: INTERNAL MEDICINE

## 2021-07-30 RX ORDER — ATORVASTATIN CALCIUM 10 MG/1
10 TABLET, FILM COATED ORAL DAILY
Qty: 90 TABLET | Refills: 3 | Status: SHIPPED | OUTPATIENT
Start: 2021-07-30 | End: 2022-05-23 | Stop reason: SDUPTHER

## 2021-07-30 RX ORDER — METOPROLOL SUCCINATE 50 MG/1
TABLET, EXTENDED RELEASE ORAL
Qty: 90 TABLET | Refills: 1 | Status: SHIPPED | OUTPATIENT
Start: 2021-07-30 | End: 2022-02-01 | Stop reason: SDUPTHER

## 2021-07-30 RX ORDER — TELMISARTAN 40 MG/1
40 TABLET ORAL DAILY
Qty: 90 TABLET | Refills: 3 | Status: SHIPPED | OUTPATIENT
Start: 2021-07-30 | End: 2021-08-16 | Stop reason: DRUGHIGH

## 2021-07-30 RX ORDER — METFORMIN HYDROCHLORIDE 500 MG/1
1000 TABLET, EXTENDED RELEASE ORAL 2 TIMES DAILY WITH MEALS
Qty: 360 TABLET | Refills: 3 | Status: SHIPPED | OUTPATIENT
Start: 2021-07-30 | End: 2022-05-23 | Stop reason: SDUPTHER

## 2021-08-03 ENCOUNTER — TELEPHONE (OUTPATIENT)
Dept: FAMILY MEDICINE | Facility: CLINIC | Age: 71
End: 2021-08-03

## 2021-08-04 DIAGNOSIS — E11.40 TYPE 2 DIABETES, CONTROLLED, WITH NEUROPATHY: ICD-10-CM

## 2021-08-04 RX ORDER — SEMAGLUTIDE 1.34 MG/ML
1 INJECTION, SOLUTION SUBCUTANEOUS
Qty: 2 PEN | Refills: 11 | Status: SHIPPED | OUTPATIENT
Start: 2021-09-04 | End: 2021-08-13 | Stop reason: SDUPTHER

## 2021-08-13 ENCOUNTER — HOSPITAL ENCOUNTER (OUTPATIENT)
Dept: RADIOLOGY | Facility: HOSPITAL | Age: 71
Discharge: HOME | End: 2021-08-13
Attending: NURSE PRACTITIONER
Payer: MEDICARE

## 2021-08-13 ENCOUNTER — OFFICE VISIT (OUTPATIENT)
Dept: FAMILY MEDICINE | Facility: CLINIC | Age: 71
End: 2021-08-13
Payer: MEDICARE

## 2021-08-13 VITALS
HEIGHT: 72 IN | OXYGEN SATURATION: 96 % | HEART RATE: 89 BPM | RESPIRATION RATE: 18 BRPM | BODY MASS INDEX: 41.35 KG/M2 | WEIGHT: 305.31 LBS | TEMPERATURE: 99 F | DIASTOLIC BLOOD PRESSURE: 74 MMHG | SYSTOLIC BLOOD PRESSURE: 136 MMHG

## 2021-08-13 DIAGNOSIS — L03.031 CELLULITIS OF TOE OF RIGHT FOOT: ICD-10-CM

## 2021-08-13 DIAGNOSIS — L08.9 DIABETIC FOOT INFECTION: ICD-10-CM

## 2021-08-13 DIAGNOSIS — E11.628 DIABETIC FOOT INFECTION: ICD-10-CM

## 2021-08-13 PROCEDURE — 3288F PR FALLS RISK ASSESSMENT DOCUMENTED: ICD-10-PCS | Mod: CPTII,S$GLB,, | Performed by: NURSE PRACTITIONER

## 2021-08-13 PROCEDURE — 1159F PR MEDICATION LIST DOCUMENTED IN MEDICAL RECORD: ICD-10-PCS | Mod: CPTII,S$GLB,, | Performed by: NURSE PRACTITIONER

## 2021-08-13 PROCEDURE — 1126F AMNT PAIN NOTED NONE PRSNT: CPT | Mod: CPTII,S$GLB,, | Performed by: NURSE PRACTITIONER

## 2021-08-13 PROCEDURE — 3078F PR MOST RECENT DIASTOLIC BLOOD PRESSURE < 80 MM HG: ICD-10-PCS | Mod: CPTII,S$GLB,, | Performed by: NURSE PRACTITIONER

## 2021-08-13 PROCEDURE — 99215 OFFICE O/P EST HI 40 MIN: CPT | Mod: S$GLB,,, | Performed by: NURSE PRACTITIONER

## 2021-08-13 PROCEDURE — 73630 XR FOOT COMPLETE 3 VIEW RIGHT: ICD-10-PCS | Mod: 26,RT,, | Performed by: RADIOLOGY

## 2021-08-13 PROCEDURE — 99215 PR OFFICE/OUTPT VISIT, EST, LEVL V, 40-54 MIN: ICD-10-PCS | Mod: S$GLB,,, | Performed by: NURSE PRACTITIONER

## 2021-08-13 PROCEDURE — 87075 CULTR BACTERIA EXCEPT BLOOD: CPT | Performed by: NURSE PRACTITIONER

## 2021-08-13 PROCEDURE — 3075F PR MOST RECENT SYSTOLIC BLOOD PRESS GE 130-139MM HG: ICD-10-PCS | Mod: CPTII,S$GLB,, | Performed by: NURSE PRACTITIONER

## 2021-08-13 PROCEDURE — 73630 X-RAY EXAM OF FOOT: CPT | Mod: TC,FY,RT

## 2021-08-13 PROCEDURE — 1160F PR REVIEW ALL MEDS BY PRESCRIBER/CLIN PHARMACIST DOCUMENTED: ICD-10-PCS | Mod: CPTII,S$GLB,, | Performed by: NURSE PRACTITIONER

## 2021-08-13 PROCEDURE — 3075F SYST BP GE 130 - 139MM HG: CPT | Mod: CPTII,S$GLB,, | Performed by: NURSE PRACTITIONER

## 2021-08-13 PROCEDURE — 87070 CULTURE OTHR SPECIMN AEROBIC: CPT | Performed by: NURSE PRACTITIONER

## 2021-08-13 PROCEDURE — 1159F MED LIST DOCD IN RCRD: CPT | Mod: CPTII,S$GLB,, | Performed by: NURSE PRACTITIONER

## 2021-08-13 PROCEDURE — 87184 SC STD DISK METHOD PER PLATE: CPT | Performed by: NURSE PRACTITIONER

## 2021-08-13 PROCEDURE — 3078F DIAST BP <80 MM HG: CPT | Mod: CPTII,S$GLB,, | Performed by: NURSE PRACTITIONER

## 2021-08-13 PROCEDURE — 87186 SC STD MICRODIL/AGAR DIL: CPT | Mod: 59 | Performed by: NURSE PRACTITIONER

## 2021-08-13 PROCEDURE — 3046F HEMOGLOBIN A1C LEVEL >9.0%: CPT | Mod: CPTII,S$GLB,, | Performed by: NURSE PRACTITIONER

## 2021-08-13 PROCEDURE — 1126F PR PAIN SEVERITY QUANTIFIED, NO PAIN PRESENT: ICD-10-PCS | Mod: CPTII,S$GLB,, | Performed by: NURSE PRACTITIONER

## 2021-08-13 PROCEDURE — 3288F FALL RISK ASSESSMENT DOCD: CPT | Mod: CPTII,S$GLB,, | Performed by: NURSE PRACTITIONER

## 2021-08-13 PROCEDURE — 1101F PR PT FALLS ASSESS DOC 0-1 FALLS W/OUT INJ PAST YR: ICD-10-PCS | Mod: CPTII,S$GLB,, | Performed by: NURSE PRACTITIONER

## 2021-08-13 PROCEDURE — 73630 X-RAY EXAM OF FOOT: CPT | Mod: 26,RT,, | Performed by: RADIOLOGY

## 2021-08-13 PROCEDURE — 3008F BODY MASS INDEX DOCD: CPT | Mod: CPTII,S$GLB,, | Performed by: NURSE PRACTITIONER

## 2021-08-13 PROCEDURE — 1101F PT FALLS ASSESS-DOCD LE1/YR: CPT | Mod: CPTII,S$GLB,, | Performed by: NURSE PRACTITIONER

## 2021-08-13 PROCEDURE — 3046F PR MOST RECENT HEMOGLOBIN A1C LEVEL > 9.0%: ICD-10-PCS | Mod: CPTII,S$GLB,, | Performed by: NURSE PRACTITIONER

## 2021-08-13 PROCEDURE — 3008F PR BODY MASS INDEX (BMI) DOCUMENTED: ICD-10-PCS | Mod: CPTII,S$GLB,, | Performed by: NURSE PRACTITIONER

## 2021-08-13 PROCEDURE — 1160F RVW MEDS BY RX/DR IN RCRD: CPT | Mod: CPTII,S$GLB,, | Performed by: NURSE PRACTITIONER

## 2021-08-13 PROCEDURE — 87077 CULTURE AEROBIC IDENTIFY: CPT | Mod: 59 | Performed by: NURSE PRACTITIONER

## 2021-08-13 RX ORDER — AMOXICILLIN AND CLAVULANATE POTASSIUM 875; 125 MG/1; MG/1
1 TABLET, FILM COATED ORAL 2 TIMES DAILY
Qty: 20 TABLET | Refills: 0 | Status: SHIPPED | OUTPATIENT
Start: 2021-08-13 | End: 2021-08-16

## 2021-08-13 RX ORDER — SEMAGLUTIDE 1.34 MG/ML
INJECTION, SOLUTION SUBCUTANEOUS
COMMUNITY
Start: 2021-08-06 | End: 2022-10-03

## 2021-08-16 ENCOUNTER — OFFICE VISIT (OUTPATIENT)
Dept: FAMILY MEDICINE | Facility: CLINIC | Age: 71
End: 2021-08-16
Payer: MEDICARE

## 2021-08-16 VITALS
HEIGHT: 72 IN | SYSTOLIC BLOOD PRESSURE: 142 MMHG | TEMPERATURE: 99 F | OXYGEN SATURATION: 95 % | DIASTOLIC BLOOD PRESSURE: 84 MMHG | RESPIRATION RATE: 18 BRPM | WEIGHT: 305.31 LBS | BODY MASS INDEX: 41.35 KG/M2 | HEART RATE: 89 BPM

## 2021-08-16 DIAGNOSIS — E11.59 HYPERTENSION ASSOCIATED WITH DIABETES: ICD-10-CM

## 2021-08-16 DIAGNOSIS — Z22.322 MRSA (METHICILLIN RESISTANT STAPH AUREUS) CULTURE POSITIVE: Primary | ICD-10-CM

## 2021-08-16 DIAGNOSIS — I15.2 HYPERTENSION ASSOCIATED WITH DIABETES: ICD-10-CM

## 2021-08-16 DIAGNOSIS — L08.9 DIABETIC INFECTION OF RIGHT FOOT: Primary | ICD-10-CM

## 2021-08-16 DIAGNOSIS — E11.628 DIABETIC INFECTION OF RIGHT FOOT: Primary | ICD-10-CM

## 2021-08-16 PROCEDURE — 3077F PR MOST RECENT SYSTOLIC BLOOD PRESSURE >= 140 MM HG: ICD-10-PCS | Mod: CPTII,S$GLB,, | Performed by: NURSE PRACTITIONER

## 2021-08-16 PROCEDURE — 1159F PR MEDICATION LIST DOCUMENTED IN MEDICAL RECORD: ICD-10-PCS | Mod: CPTII,S$GLB,, | Performed by: NURSE PRACTITIONER

## 2021-08-16 PROCEDURE — 3008F BODY MASS INDEX DOCD: CPT | Mod: CPTII,S$GLB,, | Performed by: NURSE PRACTITIONER

## 2021-08-16 PROCEDURE — 3079F PR MOST RECENT DIASTOLIC BLOOD PRESSURE 80-89 MM HG: ICD-10-PCS | Mod: CPTII,S$GLB,, | Performed by: NURSE PRACTITIONER

## 2021-08-16 PROCEDURE — 99214 PR OFFICE/OUTPT VISIT, EST, LEVL IV, 30-39 MIN: ICD-10-PCS | Mod: S$GLB,,, | Performed by: NURSE PRACTITIONER

## 2021-08-16 PROCEDURE — 1160F PR REVIEW ALL MEDS BY PRESCRIBER/CLIN PHARMACIST DOCUMENTED: ICD-10-PCS | Mod: CPTII,S$GLB,, | Performed by: NURSE PRACTITIONER

## 2021-08-16 PROCEDURE — 3008F PR BODY MASS INDEX (BMI) DOCUMENTED: ICD-10-PCS | Mod: CPTII,S$GLB,, | Performed by: NURSE PRACTITIONER

## 2021-08-16 PROCEDURE — 1159F MED LIST DOCD IN RCRD: CPT | Mod: CPTII,S$GLB,, | Performed by: NURSE PRACTITIONER

## 2021-08-16 PROCEDURE — 3046F HEMOGLOBIN A1C LEVEL >9.0%: CPT | Mod: CPTII,S$GLB,, | Performed by: NURSE PRACTITIONER

## 2021-08-16 PROCEDURE — 1126F AMNT PAIN NOTED NONE PRSNT: CPT | Mod: CPTII,S$GLB,, | Performed by: NURSE PRACTITIONER

## 2021-08-16 PROCEDURE — 3288F FALL RISK ASSESSMENT DOCD: CPT | Mod: CPTII,S$GLB,, | Performed by: NURSE PRACTITIONER

## 2021-08-16 PROCEDURE — 1101F PR PT FALLS ASSESS DOC 0-1 FALLS W/OUT INJ PAST YR: ICD-10-PCS | Mod: CPTII,S$GLB,, | Performed by: NURSE PRACTITIONER

## 2021-08-16 PROCEDURE — 3079F DIAST BP 80-89 MM HG: CPT | Mod: CPTII,S$GLB,, | Performed by: NURSE PRACTITIONER

## 2021-08-16 PROCEDURE — 3288F PR FALLS RISK ASSESSMENT DOCUMENTED: ICD-10-PCS | Mod: CPTII,S$GLB,, | Performed by: NURSE PRACTITIONER

## 2021-08-16 PROCEDURE — 1101F PT FALLS ASSESS-DOCD LE1/YR: CPT | Mod: CPTII,S$GLB,, | Performed by: NURSE PRACTITIONER

## 2021-08-16 PROCEDURE — 1160F RVW MEDS BY RX/DR IN RCRD: CPT | Mod: CPTII,S$GLB,, | Performed by: NURSE PRACTITIONER

## 2021-08-16 PROCEDURE — 99214 OFFICE O/P EST MOD 30 MIN: CPT | Mod: S$GLB,,, | Performed by: NURSE PRACTITIONER

## 2021-08-16 PROCEDURE — 3046F PR MOST RECENT HEMOGLOBIN A1C LEVEL > 9.0%: ICD-10-PCS | Mod: CPTII,S$GLB,, | Performed by: NURSE PRACTITIONER

## 2021-08-16 PROCEDURE — 1126F PR PAIN SEVERITY QUANTIFIED, NO PAIN PRESENT: ICD-10-PCS | Mod: CPTII,S$GLB,, | Performed by: NURSE PRACTITIONER

## 2021-08-16 PROCEDURE — 3077F SYST BP >= 140 MM HG: CPT | Mod: CPTII,S$GLB,, | Performed by: NURSE PRACTITIONER

## 2021-08-16 RX ORDER — DOXYCYCLINE HYCLATE 100 MG
100 TABLET ORAL 2 TIMES DAILY
Qty: 20 TABLET | Refills: 0 | Status: SHIPPED | OUTPATIENT
Start: 2021-08-16 | End: 2021-08-16

## 2021-08-16 RX ORDER — TELMISARTAN 80 MG/1
80 TABLET ORAL DAILY
Qty: 90 TABLET | Refills: 3 | Status: SHIPPED | OUTPATIENT
Start: 2021-08-16 | End: 2022-08-08 | Stop reason: SDUPTHER

## 2021-08-16 RX ORDER — SULFAMETHOXAZOLE AND TRIMETHOPRIM 800; 160 MG/1; MG/1
1 TABLET ORAL 2 TIMES DAILY
Qty: 20 TABLET | Refills: 0 | Status: SHIPPED | OUTPATIENT
Start: 2021-08-16 | End: 2022-05-23

## 2021-08-16 RX ORDER — CIPROFLOXACIN 500 MG/1
500 TABLET ORAL 2 TIMES DAILY
Qty: 20 TABLET | Refills: 0 | Status: SHIPPED | OUTPATIENT
Start: 2021-08-16 | End: 2021-08-26

## 2021-08-16 RX ORDER — SULFAMETHOXAZOLE AND TRIMETHOPRIM 800; 160 MG/1; MG/1
1 TABLET ORAL 2 TIMES DAILY
Qty: 20 TABLET | Refills: 0 | Status: SHIPPED | OUTPATIENT
Start: 2021-08-16 | End: 2021-08-16 | Stop reason: SDUPTHER

## 2021-08-16 RX ORDER — CIPROFLOXACIN 500 MG/1
500 TABLET ORAL 2 TIMES DAILY
Qty: 20 TABLET | Refills: 0 | Status: SHIPPED | OUTPATIENT
Start: 2021-08-16 | End: 2021-08-16 | Stop reason: SDUPTHER

## 2021-08-18 LAB
BACTERIA SPEC AEROBE CULT: ABNORMAL

## 2021-08-19 ENCOUNTER — OFFICE VISIT (OUTPATIENT)
Dept: PODIATRY | Facility: CLINIC | Age: 71
End: 2021-08-19
Payer: MEDICARE

## 2021-08-19 VITALS — HEIGHT: 72 IN | OXYGEN SATURATION: 96 % | BODY MASS INDEX: 41.31 KG/M2 | HEART RATE: 102 BPM | WEIGHT: 305 LBS

## 2021-08-19 DIAGNOSIS — E11.49 TYPE II DIABETES MELLITUS WITH NEUROLOGICAL MANIFESTATIONS: ICD-10-CM

## 2021-08-19 DIAGNOSIS — L97.512 ULCER OF RIGHT SECOND TOE WITH FAT LAYER EXPOSED: Primary | ICD-10-CM

## 2021-08-19 DIAGNOSIS — L03.031 CELLULITIS OF TOE OF RIGHT FOOT: ICD-10-CM

## 2021-08-19 DIAGNOSIS — E11.628 DIABETIC INFECTION OF RIGHT FOOT: ICD-10-CM

## 2021-08-19 DIAGNOSIS — M79.674 PAIN OF TOE OF RIGHT FOOT: ICD-10-CM

## 2021-08-19 DIAGNOSIS — M20.41 HAMMER TOES, BILATERAL: ICD-10-CM

## 2021-08-19 DIAGNOSIS — M20.42 HAMMER TOES, BILATERAL: ICD-10-CM

## 2021-08-19 DIAGNOSIS — L08.9 DIABETIC INFECTION OF RIGHT FOOT: ICD-10-CM

## 2021-08-19 LAB — BACTERIA SPEC ANAEROBE CULT: NORMAL

## 2021-08-19 PROCEDURE — 3008F BODY MASS INDEX DOCD: CPT | Mod: CPTII,S$GLB,, | Performed by: PODIATRIST

## 2021-08-19 PROCEDURE — 1159F MED LIST DOCD IN RCRD: CPT | Mod: CPTII,S$GLB,, | Performed by: PODIATRIST

## 2021-08-19 PROCEDURE — 99204 PR OFFICE/OUTPT VISIT, NEW, LEVL IV, 45-59 MIN: ICD-10-PCS | Mod: S$GLB,,, | Performed by: PODIATRIST

## 2021-08-19 PROCEDURE — 1126F PR PAIN SEVERITY QUANTIFIED, NO PAIN PRESENT: ICD-10-PCS | Mod: CPTII,S$GLB,, | Performed by: PODIATRIST

## 2021-08-19 PROCEDURE — 1160F RVW MEDS BY RX/DR IN RCRD: CPT | Mod: CPTII,S$GLB,, | Performed by: PODIATRIST

## 2021-08-19 PROCEDURE — 3046F HEMOGLOBIN A1C LEVEL >9.0%: CPT | Mod: CPTII,S$GLB,, | Performed by: PODIATRIST

## 2021-08-19 PROCEDURE — 3288F PR FALLS RISK ASSESSMENT DOCUMENTED: ICD-10-PCS | Mod: CPTII,S$GLB,, | Performed by: PODIATRIST

## 2021-08-19 PROCEDURE — 1126F AMNT PAIN NOTED NONE PRSNT: CPT | Mod: CPTII,S$GLB,, | Performed by: PODIATRIST

## 2021-08-19 PROCEDURE — 99204 OFFICE O/P NEW MOD 45 MIN: CPT | Mod: S$GLB,,, | Performed by: PODIATRIST

## 2021-08-19 PROCEDURE — 3046F PR MOST RECENT HEMOGLOBIN A1C LEVEL > 9.0%: ICD-10-PCS | Mod: CPTII,S$GLB,, | Performed by: PODIATRIST

## 2021-08-19 PROCEDURE — 1101F PT FALLS ASSESS-DOCD LE1/YR: CPT | Mod: CPTII,S$GLB,, | Performed by: PODIATRIST

## 2021-08-19 PROCEDURE — 1160F PR REVIEW ALL MEDS BY PRESCRIBER/CLIN PHARMACIST DOCUMENTED: ICD-10-PCS | Mod: CPTII,S$GLB,, | Performed by: PODIATRIST

## 2021-08-19 PROCEDURE — 3008F PR BODY MASS INDEX (BMI) DOCUMENTED: ICD-10-PCS | Mod: CPTII,S$GLB,, | Performed by: PODIATRIST

## 2021-08-19 PROCEDURE — 1101F PR PT FALLS ASSESS DOC 0-1 FALLS W/OUT INJ PAST YR: ICD-10-PCS | Mod: CPTII,S$GLB,, | Performed by: PODIATRIST

## 2021-08-19 PROCEDURE — 3288F FALL RISK ASSESSMENT DOCD: CPT | Mod: CPTII,S$GLB,, | Performed by: PODIATRIST

## 2021-08-19 PROCEDURE — 1159F PR MEDICATION LIST DOCUMENTED IN MEDICAL RECORD: ICD-10-PCS | Mod: CPTII,S$GLB,, | Performed by: PODIATRIST

## 2021-09-02 ENCOUNTER — OFFICE VISIT (OUTPATIENT)
Dept: PODIATRY | Facility: CLINIC | Age: 71
End: 2021-09-02
Payer: MEDICARE

## 2021-09-02 VITALS — WEIGHT: 299 LBS | BODY MASS INDEX: 40.5 KG/M2 | HEIGHT: 72 IN | OXYGEN SATURATION: 97 % | HEART RATE: 105 BPM

## 2021-09-02 DIAGNOSIS — B35.1 ONYCHOMYCOSIS DUE TO DERMATOPHYTE: ICD-10-CM

## 2021-09-02 DIAGNOSIS — M79.674 PAIN OF TOE OF RIGHT FOOT: ICD-10-CM

## 2021-09-02 DIAGNOSIS — B35.3 TINEA PEDIS OF BOTH FEET: ICD-10-CM

## 2021-09-02 DIAGNOSIS — L03.031 CELLULITIS OF TOE OF RIGHT FOOT: Primary | ICD-10-CM

## 2021-09-02 PROCEDURE — 1160F PR REVIEW ALL MEDS BY PRESCRIBER/CLIN PHARMACIST DOCUMENTED: ICD-10-PCS | Mod: CPTII,S$GLB,, | Performed by: PODIATRIST

## 2021-09-02 PROCEDURE — 1159F PR MEDICATION LIST DOCUMENTED IN MEDICAL RECORD: ICD-10-PCS | Mod: CPTII,S$GLB,, | Performed by: PODIATRIST

## 2021-09-02 PROCEDURE — 3008F BODY MASS INDEX DOCD: CPT | Mod: CPTII,S$GLB,, | Performed by: PODIATRIST

## 2021-09-02 PROCEDURE — 1126F PR PAIN SEVERITY QUANTIFIED, NO PAIN PRESENT: ICD-10-PCS | Mod: CPTII,S$GLB,, | Performed by: PODIATRIST

## 2021-09-02 PROCEDURE — 99213 PR OFFICE/OUTPT VISIT, EST, LEVL III, 20-29 MIN: ICD-10-PCS | Mod: S$GLB,,, | Performed by: PODIATRIST

## 2021-09-02 PROCEDURE — 1126F AMNT PAIN NOTED NONE PRSNT: CPT | Mod: CPTII,S$GLB,, | Performed by: PODIATRIST

## 2021-09-02 PROCEDURE — 3288F FALL RISK ASSESSMENT DOCD: CPT | Mod: CPTII,S$GLB,, | Performed by: PODIATRIST

## 2021-09-02 PROCEDURE — 3008F PR BODY MASS INDEX (BMI) DOCUMENTED: ICD-10-PCS | Mod: CPTII,S$GLB,, | Performed by: PODIATRIST

## 2021-09-02 PROCEDURE — 1101F PR PT FALLS ASSESS DOC 0-1 FALLS W/OUT INJ PAST YR: ICD-10-PCS | Mod: CPTII,S$GLB,, | Performed by: PODIATRIST

## 2021-09-02 PROCEDURE — 1159F MED LIST DOCD IN RCRD: CPT | Mod: CPTII,S$GLB,, | Performed by: PODIATRIST

## 2021-09-02 PROCEDURE — 3288F PR FALLS RISK ASSESSMENT DOCUMENTED: ICD-10-PCS | Mod: CPTII,S$GLB,, | Performed by: PODIATRIST

## 2021-09-02 PROCEDURE — 3046F PR MOST RECENT HEMOGLOBIN A1C LEVEL > 9.0%: ICD-10-PCS | Mod: CPTII,S$GLB,, | Performed by: PODIATRIST

## 2021-09-02 PROCEDURE — 1160F RVW MEDS BY RX/DR IN RCRD: CPT | Mod: CPTII,S$GLB,, | Performed by: PODIATRIST

## 2021-09-02 PROCEDURE — 1101F PT FALLS ASSESS-DOCD LE1/YR: CPT | Mod: CPTII,S$GLB,, | Performed by: PODIATRIST

## 2021-09-02 PROCEDURE — 3046F HEMOGLOBIN A1C LEVEL >9.0%: CPT | Mod: CPTII,S$GLB,, | Performed by: PODIATRIST

## 2021-09-02 PROCEDURE — 99213 OFFICE O/P EST LOW 20 MIN: CPT | Mod: S$GLB,,, | Performed by: PODIATRIST

## 2021-09-02 RX ORDER — CLOTRIMAZOLE AND BETAMETHASONE DIPROPIONATE 10; .64 MG/G; MG/G
CREAM TOPICAL 2 TIMES DAILY
Qty: 45 G | Refills: 5 | Status: SHIPPED | OUTPATIENT
Start: 2021-09-02 | End: 2021-10-02

## 2021-09-02 RX ORDER — DOXYCYCLINE HYCLATE 100 MG
TABLET ORAL
COMMUNITY
Start: 2021-08-16 | End: 2022-10-03

## 2021-09-09 ENCOUNTER — TELEPHONE (OUTPATIENT)
Dept: FAMILY MEDICINE | Facility: CLINIC | Age: 71
End: 2021-09-09

## 2021-10-22 ENCOUNTER — LAB VISIT (OUTPATIENT)
Dept: LAB | Facility: HOSPITAL | Age: 71
End: 2021-10-22
Attending: INTERNAL MEDICINE
Payer: MEDICARE

## 2021-10-22 LAB
ALBUMIN/CREAT UR: 150 UG/MG (ref 0–30)
CREAT UR-MCNC: 68 MG/DL (ref 23–375)
MICROALBUMIN UR DL<=1MG/L-MCNC: 102 UG/ML

## 2021-10-22 PROCEDURE — 82570 ASSAY OF URINE CREATININE: CPT | Mod: HCNC | Performed by: INTERNAL MEDICINE

## 2021-11-01 ENCOUNTER — OFFICE VISIT (OUTPATIENT)
Dept: FAMILY MEDICINE | Facility: CLINIC | Age: 71
End: 2021-11-01
Payer: MEDICARE

## 2021-11-01 VITALS
WEIGHT: 295.63 LBS | OXYGEN SATURATION: 96 % | HEIGHT: 72 IN | DIASTOLIC BLOOD PRESSURE: 88 MMHG | TEMPERATURE: 99 F | HEART RATE: 65 BPM | SYSTOLIC BLOOD PRESSURE: 134 MMHG | BODY MASS INDEX: 40.04 KG/M2

## 2021-11-01 DIAGNOSIS — E78.5 HYPERLIPIDEMIA ASSOCIATED WITH TYPE 2 DIABETES MELLITUS: ICD-10-CM

## 2021-11-01 DIAGNOSIS — E11.59 HYPERTENSION ASSOCIATED WITH DIABETES: ICD-10-CM

## 2021-11-01 DIAGNOSIS — L84 TYPE 2 DIABETES MELLITUS WITH PRESSURE CALLUS: Primary | ICD-10-CM

## 2021-11-01 DIAGNOSIS — L08.9 DIABETIC INFECTION OF RIGHT FOOT: ICD-10-CM

## 2021-11-01 DIAGNOSIS — E11.21 DIABETIC NEPHROPATHY ASSOCIATED WITH TYPE 2 DIABETES MELLITUS: ICD-10-CM

## 2021-11-01 DIAGNOSIS — E11.69 HYPERLIPIDEMIA ASSOCIATED WITH TYPE 2 DIABETES MELLITUS: ICD-10-CM

## 2021-11-01 DIAGNOSIS — E11.628 TYPE 2 DIABETES MELLITUS WITH PRESSURE CALLUS: Primary | ICD-10-CM

## 2021-11-01 DIAGNOSIS — E11.628 DIABETIC INFECTION OF RIGHT FOOT: ICD-10-CM

## 2021-11-01 DIAGNOSIS — I15.2 HYPERTENSION ASSOCIATED WITH DIABETES: ICD-10-CM

## 2021-11-01 PROCEDURE — 3051F PR MOST RECENT HEMOGLOBIN A1C LEVEL 7.0 - < 8.0%: ICD-10-PCS | Mod: CPTII,S$GLB,, | Performed by: INTERNAL MEDICINE

## 2021-11-01 PROCEDURE — 3288F PR FALLS RISK ASSESSMENT DOCUMENTED: ICD-10-PCS | Mod: CPTII,S$GLB,, | Performed by: INTERNAL MEDICINE

## 2021-11-01 PROCEDURE — 4010F ACE/ARB THERAPY RXD/TAKEN: CPT | Mod: CPTII,S$GLB,, | Performed by: INTERNAL MEDICINE

## 2021-11-01 PROCEDURE — 3079F DIAST BP 80-89 MM HG: CPT | Mod: CPTII,S$GLB,, | Performed by: INTERNAL MEDICINE

## 2021-11-01 PROCEDURE — 3060F PR POS MICROALBUMINURIA RESULT DOCUMENTED/REVIEW: ICD-10-PCS | Mod: CPTII,S$GLB,, | Performed by: INTERNAL MEDICINE

## 2021-11-01 PROCEDURE — 3051F HG A1C>EQUAL 7.0%<8.0%: CPT | Mod: CPTII,S$GLB,, | Performed by: INTERNAL MEDICINE

## 2021-11-01 PROCEDURE — 3060F POS MICROALBUMINURIA REV: CPT | Mod: CPTII,S$GLB,, | Performed by: INTERNAL MEDICINE

## 2021-11-01 PROCEDURE — 1159F PR MEDICATION LIST DOCUMENTED IN MEDICAL RECORD: ICD-10-PCS | Mod: CPTII,S$GLB,, | Performed by: INTERNAL MEDICINE

## 2021-11-01 PROCEDURE — 3075F PR MOST RECENT SYSTOLIC BLOOD PRESS GE 130-139MM HG: ICD-10-PCS | Mod: CPTII,S$GLB,, | Performed by: INTERNAL MEDICINE

## 2021-11-01 PROCEDURE — 1160F RVW MEDS BY RX/DR IN RCRD: CPT | Mod: CPTII,S$GLB,, | Performed by: INTERNAL MEDICINE

## 2021-11-01 PROCEDURE — 99214 PR OFFICE/OUTPT VISIT, EST, LEVL IV, 30-39 MIN: ICD-10-PCS | Mod: S$GLB,,, | Performed by: INTERNAL MEDICINE

## 2021-11-01 PROCEDURE — 3008F BODY MASS INDEX DOCD: CPT | Mod: CPTII,S$GLB,, | Performed by: INTERNAL MEDICINE

## 2021-11-01 PROCEDURE — 1160F PR REVIEW ALL MEDS BY PRESCRIBER/CLIN PHARMACIST DOCUMENTED: ICD-10-PCS | Mod: CPTII,S$GLB,, | Performed by: INTERNAL MEDICINE

## 2021-11-01 PROCEDURE — 4010F PR ACE/ARB THEARPY RXD/TAKEN: ICD-10-PCS | Mod: CPTII,S$GLB,, | Performed by: INTERNAL MEDICINE

## 2021-11-01 PROCEDURE — 3075F SYST BP GE 130 - 139MM HG: CPT | Mod: CPTII,S$GLB,, | Performed by: INTERNAL MEDICINE

## 2021-11-01 PROCEDURE — 3288F FALL RISK ASSESSMENT DOCD: CPT | Mod: CPTII,S$GLB,, | Performed by: INTERNAL MEDICINE

## 2021-11-01 PROCEDURE — 1159F MED LIST DOCD IN RCRD: CPT | Mod: CPTII,S$GLB,, | Performed by: INTERNAL MEDICINE

## 2021-11-01 PROCEDURE — 1101F PR PT FALLS ASSESS DOC 0-1 FALLS W/OUT INJ PAST YR: ICD-10-PCS | Mod: CPTII,S$GLB,, | Performed by: INTERNAL MEDICINE

## 2021-11-01 PROCEDURE — 3066F NEPHROPATHY DOC TX: CPT | Mod: CPTII,S$GLB,, | Performed by: INTERNAL MEDICINE

## 2021-11-01 PROCEDURE — 1126F AMNT PAIN NOTED NONE PRSNT: CPT | Mod: CPTII,S$GLB,, | Performed by: INTERNAL MEDICINE

## 2021-11-01 PROCEDURE — 3066F PR DOCUMENTATION OF TREATMENT FOR NEPHROPATHY: ICD-10-PCS | Mod: CPTII,S$GLB,, | Performed by: INTERNAL MEDICINE

## 2021-11-01 PROCEDURE — 1101F PT FALLS ASSESS-DOCD LE1/YR: CPT | Mod: CPTII,S$GLB,, | Performed by: INTERNAL MEDICINE

## 2021-11-01 PROCEDURE — 3079F PR MOST RECENT DIASTOLIC BLOOD PRESSURE 80-89 MM HG: ICD-10-PCS | Mod: CPTII,S$GLB,, | Performed by: INTERNAL MEDICINE

## 2021-11-01 PROCEDURE — 3008F PR BODY MASS INDEX (BMI) DOCUMENTED: ICD-10-PCS | Mod: CPTII,S$GLB,, | Performed by: INTERNAL MEDICINE

## 2021-11-01 PROCEDURE — 1126F PR PAIN SEVERITY QUANTIFIED, NO PAIN PRESENT: ICD-10-PCS | Mod: CPTII,S$GLB,, | Performed by: INTERNAL MEDICINE

## 2021-11-01 PROCEDURE — 99214 OFFICE O/P EST MOD 30 MIN: CPT | Mod: S$GLB,,, | Performed by: INTERNAL MEDICINE

## 2021-11-02 ENCOUNTER — TELEPHONE (OUTPATIENT)
Dept: FAMILY MEDICINE | Facility: CLINIC | Age: 71
End: 2021-11-02
Payer: MEDICARE

## 2021-11-17 DIAGNOSIS — Z12.11 COLON CANCER SCREENING: ICD-10-CM

## 2021-12-02 ENCOUNTER — PATIENT OUTREACH (OUTPATIENT)
Dept: ADMINISTRATIVE | Facility: HOSPITAL | Age: 71
End: 2021-12-02
Payer: MEDICARE

## 2021-12-22 DIAGNOSIS — I15.2 HYPERTENSION ASSOCIATED WITH DIABETES: ICD-10-CM

## 2021-12-22 DIAGNOSIS — E11.59 HYPERTENSION ASSOCIATED WITH DIABETES: ICD-10-CM

## 2021-12-27 ENCOUNTER — PATIENT OUTREACH (OUTPATIENT)
Dept: ADMINISTRATIVE | Facility: HOSPITAL | Age: 71
End: 2021-12-27
Payer: MEDICARE

## 2021-12-30 DIAGNOSIS — I15.2 HYPERTENSION ASSOCIATED WITH DIABETES: ICD-10-CM

## 2021-12-30 DIAGNOSIS — E11.59 HYPERTENSION ASSOCIATED WITH DIABETES: ICD-10-CM

## 2022-01-05 DIAGNOSIS — E11.59 HYPERTENSION ASSOCIATED WITH DIABETES: ICD-10-CM

## 2022-01-05 DIAGNOSIS — I15.2 HYPERTENSION ASSOCIATED WITH DIABETES: ICD-10-CM

## 2022-01-12 DIAGNOSIS — I15.2 HYPERTENSION ASSOCIATED WITH DIABETES: ICD-10-CM

## 2022-01-12 DIAGNOSIS — E11.59 HYPERTENSION ASSOCIATED WITH DIABETES: ICD-10-CM

## 2022-01-21 DIAGNOSIS — E11.59 HYPERTENSION ASSOCIATED WITH DIABETES: ICD-10-CM

## 2022-01-21 DIAGNOSIS — I15.2 HYPERTENSION ASSOCIATED WITH DIABETES: ICD-10-CM

## 2022-01-25 ENCOUNTER — LAB VISIT (OUTPATIENT)
Dept: LAB | Facility: HOSPITAL | Age: 72
End: 2022-01-25
Attending: INTERNAL MEDICINE
Payer: MEDICARE

## 2022-01-25 DIAGNOSIS — E78.5 HYPERLIPIDEMIA ASSOCIATED WITH TYPE 2 DIABETES MELLITUS: ICD-10-CM

## 2022-01-25 DIAGNOSIS — E11.628 TYPE 2 DIABETES MELLITUS WITH PRESSURE CALLUS: ICD-10-CM

## 2022-01-25 DIAGNOSIS — E11.628 DIABETIC INFECTION OF RIGHT FOOT: ICD-10-CM

## 2022-01-25 DIAGNOSIS — L08.9 DIABETIC INFECTION OF RIGHT FOOT: ICD-10-CM

## 2022-01-25 DIAGNOSIS — L84 TYPE 2 DIABETES MELLITUS WITH PRESSURE CALLUS: ICD-10-CM

## 2022-01-25 DIAGNOSIS — E11.69 HYPERLIPIDEMIA ASSOCIATED WITH TYPE 2 DIABETES MELLITUS: ICD-10-CM

## 2022-01-25 LAB
ALBUMIN SERPL BCP-MCNC: 3.7 G/DL (ref 3.5–5.2)
ALP SERPL-CCNC: 66 U/L (ref 55–135)
ALT SERPL W/O P-5'-P-CCNC: 18 U/L (ref 10–44)
ANION GAP SERPL CALC-SCNC: 12 MMOL/L (ref 8–16)
AST SERPL-CCNC: 12 U/L (ref 10–40)
BASOPHILS # BLD AUTO: 0.1 K/UL (ref 0–0.2)
BASOPHILS NFR BLD: 0.9 % (ref 0–1.9)
BILIRUB SERPL-MCNC: 0.9 MG/DL (ref 0.1–1)
BUN SERPL-MCNC: 18 MG/DL (ref 8–23)
CALCIUM SERPL-MCNC: 9.9 MG/DL (ref 8.7–10.5)
CHLORIDE SERPL-SCNC: 105 MMOL/L (ref 95–110)
CHOLEST SERPL-MCNC: 135 MG/DL (ref 120–199)
CHOLEST/HDLC SERPL: 3.6 {RATIO} (ref 2–5)
CO2 SERPL-SCNC: 21 MMOL/L (ref 23–29)
CREAT SERPL-MCNC: 1.1 MG/DL (ref 0.5–1.4)
DIFFERENTIAL METHOD: ABNORMAL
EOSINOPHIL # BLD AUTO: 0.1 K/UL (ref 0–0.5)
EOSINOPHIL NFR BLD: 1 % (ref 0–8)
ERYTHROCYTE [DISTWIDTH] IN BLOOD BY AUTOMATED COUNT: 14.4 % (ref 11.5–14.5)
EST. GFR  (AFRICAN AMERICAN): >60 ML/MIN/1.73 M^2
EST. GFR  (NON AFRICAN AMERICAN): >60 ML/MIN/1.73 M^2
ESTIMATED AVG GLUCOSE: 157 MG/DL (ref 68–131)
GLUCOSE SERPL-MCNC: 149 MG/DL (ref 70–110)
HBA1C MFR BLD: 7.1 % (ref 4–5.6)
HCT VFR BLD AUTO: 49.3 % (ref 40–54)
HDLC SERPL-MCNC: 37 MG/DL (ref 40–75)
HDLC SERPL: 27.4 % (ref 20–50)
HGB BLD-MCNC: 15.8 G/DL (ref 14–18)
IMM GRANULOCYTES # BLD AUTO: 0.12 K/UL (ref 0–0.04)
IMM GRANULOCYTES NFR BLD AUTO: 1 % (ref 0–0.5)
LDLC SERPL CALC-MCNC: 48 MG/DL (ref 63–159)
LYMPHOCYTES # BLD AUTO: 3.6 K/UL (ref 1–4.8)
LYMPHOCYTES NFR BLD: 31.3 % (ref 18–48)
MCH RBC QN AUTO: 29.3 PG (ref 27–31)
MCHC RBC AUTO-ENTMCNC: 32 G/DL (ref 32–36)
MCV RBC AUTO: 91 FL (ref 82–98)
MONOCYTES # BLD AUTO: 1 K/UL (ref 0.3–1)
MONOCYTES NFR BLD: 8.8 % (ref 4–15)
NEUTROPHILS # BLD AUTO: 6.5 K/UL (ref 1.8–7.7)
NEUTROPHILS NFR BLD: 57 % (ref 38–73)
NONHDLC SERPL-MCNC: 98 MG/DL
NRBC BLD-RTO: 0 /100 WBC
PLATELET # BLD AUTO: 297 K/UL (ref 150–450)
PMV BLD AUTO: 10.7 FL (ref 9.2–12.9)
POTASSIUM SERPL-SCNC: 4.8 MMOL/L (ref 3.5–5.1)
PROT SERPL-MCNC: 7.7 G/DL (ref 6–8.4)
RBC # BLD AUTO: 5.4 M/UL (ref 4.6–6.2)
SODIUM SERPL-SCNC: 138 MMOL/L (ref 136–145)
TRIGL SERPL-MCNC: 250 MG/DL (ref 30–150)
WBC # BLD AUTO: 11.46 K/UL (ref 3.9–12.7)

## 2022-01-25 PROCEDURE — 85025 COMPLETE CBC W/AUTO DIFF WBC: CPT | Mod: HCNC | Performed by: INTERNAL MEDICINE

## 2022-01-25 PROCEDURE — 80061 LIPID PANEL: CPT | Mod: HCNC | Performed by: INTERNAL MEDICINE

## 2022-01-25 PROCEDURE — 80053 COMPREHEN METABOLIC PANEL: CPT | Mod: HCNC | Performed by: INTERNAL MEDICINE

## 2022-01-25 PROCEDURE — 36415 COLL VENOUS BLD VENIPUNCTURE: CPT | Mod: HCNC,PO | Performed by: INTERNAL MEDICINE

## 2022-01-25 PROCEDURE — 83036 HEMOGLOBIN GLYCOSYLATED A1C: CPT | Mod: HCNC | Performed by: INTERNAL MEDICINE

## 2022-01-27 NOTE — PROGRESS NOTES
Sugars are under good control.   Dr. Trotter has reviewed your labs, let us know if you have any problems

## 2022-02-01 ENCOUNTER — OFFICE VISIT (OUTPATIENT)
Dept: FAMILY MEDICINE | Facility: CLINIC | Age: 72
End: 2022-02-01
Payer: MEDICARE

## 2022-02-01 VITALS
TEMPERATURE: 98 F | SYSTOLIC BLOOD PRESSURE: 131 MMHG | BODY MASS INDEX: 39.44 KG/M2 | OXYGEN SATURATION: 98 % | HEIGHT: 73 IN | HEART RATE: 97 BPM | DIASTOLIC BLOOD PRESSURE: 67 MMHG | WEIGHT: 297.63 LBS

## 2022-02-01 DIAGNOSIS — I15.2 HYPERTENSION ASSOCIATED WITH DIABETES: ICD-10-CM

## 2022-02-01 DIAGNOSIS — I10 PRIMARY HYPERTENSION: Primary | ICD-10-CM

## 2022-02-01 DIAGNOSIS — E11.59 HYPERTENSION ASSOCIATED WITH DIABETES: ICD-10-CM

## 2022-02-01 DIAGNOSIS — E66.01 MORBID OBESITY: ICD-10-CM

## 2022-02-01 DIAGNOSIS — E11.9 CONTROLLED TYPE 2 DIABETES MELLITUS WITHOUT COMPLICATION, WITHOUT LONG-TERM CURRENT USE OF INSULIN: ICD-10-CM

## 2022-02-01 PROCEDURE — 99214 OFFICE O/P EST MOD 30 MIN: CPT | Mod: S$GLB,,, | Performed by: PHYSICIAN ASSISTANT

## 2022-02-01 PROCEDURE — 1101F PT FALLS ASSESS-DOCD LE1/YR: CPT | Mod: CPTII,S$GLB,, | Performed by: PHYSICIAN ASSISTANT

## 2022-02-01 PROCEDURE — 3078F DIAST BP <80 MM HG: CPT | Mod: CPTII,S$GLB,, | Performed by: PHYSICIAN ASSISTANT

## 2022-02-01 PROCEDURE — 1159F PR MEDICATION LIST DOCUMENTED IN MEDICAL RECORD: ICD-10-PCS | Mod: CPTII,S$GLB,, | Performed by: PHYSICIAN ASSISTANT

## 2022-02-01 PROCEDURE — 3288F PR FALLS RISK ASSESSMENT DOCUMENTED: ICD-10-PCS | Mod: CPTII,S$GLB,, | Performed by: PHYSICIAN ASSISTANT

## 2022-02-01 PROCEDURE — 3008F BODY MASS INDEX DOCD: CPT | Mod: CPTII,S$GLB,, | Performed by: PHYSICIAN ASSISTANT

## 2022-02-01 PROCEDURE — 1101F PR PT FALLS ASSESS DOC 0-1 FALLS W/OUT INJ PAST YR: ICD-10-PCS | Mod: CPTII,S$GLB,, | Performed by: PHYSICIAN ASSISTANT

## 2022-02-01 PROCEDURE — 3288F FALL RISK ASSESSMENT DOCD: CPT | Mod: CPTII,S$GLB,, | Performed by: PHYSICIAN ASSISTANT

## 2022-02-01 PROCEDURE — 99499 UNLISTED E&M SERVICE: CPT | Mod: S$GLB,,, | Performed by: PHYSICIAN ASSISTANT

## 2022-02-01 PROCEDURE — 3078F PR MOST RECENT DIASTOLIC BLOOD PRESSURE < 80 MM HG: ICD-10-PCS | Mod: CPTII,S$GLB,, | Performed by: PHYSICIAN ASSISTANT

## 2022-02-01 PROCEDURE — 3075F PR MOST RECENT SYSTOLIC BLOOD PRESS GE 130-139MM HG: ICD-10-PCS | Mod: CPTII,S$GLB,, | Performed by: PHYSICIAN ASSISTANT

## 2022-02-01 PROCEDURE — 99499 RISK ADDL DX/OHS AUDIT: ICD-10-PCS | Mod: S$GLB,,, | Performed by: PHYSICIAN ASSISTANT

## 2022-02-01 PROCEDURE — 1126F PR PAIN SEVERITY QUANTIFIED, NO PAIN PRESENT: ICD-10-PCS | Mod: CPTII,S$GLB,, | Performed by: PHYSICIAN ASSISTANT

## 2022-02-01 PROCEDURE — 1126F AMNT PAIN NOTED NONE PRSNT: CPT | Mod: CPTII,S$GLB,, | Performed by: PHYSICIAN ASSISTANT

## 2022-02-01 PROCEDURE — 3051F HG A1C>EQUAL 7.0%<8.0%: CPT | Mod: CPTII,S$GLB,, | Performed by: PHYSICIAN ASSISTANT

## 2022-02-01 PROCEDURE — 3051F PR MOST RECENT HEMOGLOBIN A1C LEVEL 7.0 - < 8.0%: ICD-10-PCS | Mod: CPTII,S$GLB,, | Performed by: PHYSICIAN ASSISTANT

## 2022-02-01 PROCEDURE — 99214 PR OFFICE/OUTPT VISIT, EST, LEVL IV, 30-39 MIN: ICD-10-PCS | Mod: S$GLB,,, | Performed by: PHYSICIAN ASSISTANT

## 2022-02-01 PROCEDURE — 1159F MED LIST DOCD IN RCRD: CPT | Mod: CPTII,S$GLB,, | Performed by: PHYSICIAN ASSISTANT

## 2022-02-01 PROCEDURE — 3075F SYST BP GE 130 - 139MM HG: CPT | Mod: CPTII,S$GLB,, | Performed by: PHYSICIAN ASSISTANT

## 2022-02-01 PROCEDURE — 1160F PR REVIEW ALL MEDS BY PRESCRIBER/CLIN PHARMACIST DOCUMENTED: ICD-10-PCS | Mod: CPTII,S$GLB,, | Performed by: PHYSICIAN ASSISTANT

## 2022-02-01 PROCEDURE — 3008F PR BODY MASS INDEX (BMI) DOCUMENTED: ICD-10-PCS | Mod: CPTII,S$GLB,, | Performed by: PHYSICIAN ASSISTANT

## 2022-02-01 PROCEDURE — 1160F RVW MEDS BY RX/DR IN RCRD: CPT | Mod: CPTII,S$GLB,, | Performed by: PHYSICIAN ASSISTANT

## 2022-02-01 RX ORDER — METOPROLOL SUCCINATE 50 MG/1
TABLET, EXTENDED RELEASE ORAL
Qty: 90 TABLET | Refills: 1 | Status: SHIPPED | OUTPATIENT
Start: 2022-02-01 | End: 2022-10-03 | Stop reason: SDUPTHER

## 2022-02-02 NOTE — PROGRESS NOTES
Subjective:       Patient ID: Roosevelt Moser is a 71 y.o. male.    Chief Complaint: Hyperlipidemia, Diabetes, and Hypertension    HPI   Pt. Feels well  Needs med refills  Recent labs stable  HbA1c improved to 7.1  Eye exam 7 mos ago  Review of Systems   Constitutional: Negative.  Negative for activity change, appetite change, chills, diaphoresis, fatigue, fever and unexpected weight change.   HENT: Negative.    Eyes: Negative.    Respiratory: Negative.  Negative for cough and shortness of breath.    Cardiovascular: Negative.  Negative for chest pain and leg swelling.   Gastrointestinal: Negative.    Endocrine: Negative.    Genitourinary: Negative.    Musculoskeletal: Negative.    Integumentary:  Negative for rash. Negative.   Neurological: Negative.          Objective:      Physical Exam  Vitals reviewed.   Constitutional:       General: He is not in acute distress.     Appearance: Normal appearance. He is obese. He is not ill-appearing, toxic-appearing or diaphoretic.   HENT:      Head: Normocephalic and atraumatic.      Right Ear: Tympanic membrane, ear canal and external ear normal. There is no impacted cerumen.      Left Ear: Tympanic membrane, ear canal and external ear normal. There is no impacted cerumen.      Nose: Nose normal.      Mouth/Throat:      Mouth: Mucous membranes are moist.      Pharynx: Oropharynx is clear. No oropharyngeal exudate or posterior oropharyngeal erythema.   Eyes:      General: No scleral icterus.     Conjunctiva/sclera: Conjunctivae normal.   Neck:      Vascular: No carotid bruit.   Cardiovascular:      Rate and Rhythm: Normal rate and regular rhythm.      Pulses: Normal pulses.      Heart sounds: Normal heart sounds. No murmur heard.  No friction rub.   Pulmonary:      Effort: Pulmonary effort is normal. No respiratory distress.      Breath sounds: Normal breath sounds. No stridor. No wheezing, rhonchi or rales.   Abdominal:      General: Abdomen is flat. Bowel sounds are normal.  There is no distension.      Palpations: Abdomen is soft. There is no mass.      Tenderness: There is no abdominal tenderness. There is no guarding or rebound.      Hernia: No hernia is present.   Musculoskeletal:         General: No swelling.      Cervical back: Normal range of motion and neck supple. No rigidity or tenderness.      Right lower leg: No edema.      Left lower leg: No edema.   Lymphadenopathy:      Cervical: No cervical adenopathy.   Skin:     General: Skin is warm and dry.      Findings: No rash.   Neurological:      General: No focal deficit present.      Mental Status: He is alert and oriented to person, place, and time.         Assessment:       Problem List Items Addressed This Visit    None     Visit Diagnoses     Hypertension associated with diabetes        Relevant Medications    metoprolol succinate (TOPROL-XL) 50 MG 24 hr tablet    Other Relevant Orders    Hemoglobin A1C          Plan:       Roosevelt was seen today for hyperlipidemia, diabetes and hypertension.    Diagnoses and all orders for this visit:    Hypertension associated with diabetes  -     metoprolol succinate (TOPROL-XL) 50 MG 24 hr tablet; TAKE 1 TABLET ONE TIME DAILY  -     Hemoglobin A1C; Future     discussed diet  Discussed exercise  90 day f/y  Discussed new PCP

## 2022-03-14 ENCOUNTER — TELEPHONE (OUTPATIENT)
Dept: FAMILY MEDICINE | Facility: CLINIC | Age: 72
End: 2022-03-14
Payer: MEDICARE

## 2022-03-14 NOTE — TELEPHONE ENCOUNTER
Patient has been rescheduled for 5/17/22 at 3:40pm, patient verbalized understanding and scheduled appt.

## 2022-04-20 ENCOUNTER — TELEPHONE (OUTPATIENT)
Dept: FAMILY MEDICINE | Facility: CLINIC | Age: 72
End: 2022-04-20
Payer: MEDICARE

## 2022-04-29 ENCOUNTER — LAB VISIT (OUTPATIENT)
Dept: LAB | Facility: HOSPITAL | Age: 72
End: 2022-04-29
Attending: PHYSICIAN ASSISTANT
Payer: MEDICARE

## 2022-04-29 DIAGNOSIS — E11.59 HYPERTENSION ASSOCIATED WITH DIABETES: ICD-10-CM

## 2022-04-29 DIAGNOSIS — I15.2 HYPERTENSION ASSOCIATED WITH DIABETES: ICD-10-CM

## 2022-04-29 LAB
ESTIMATED AVG GLUCOSE: 163 MG/DL (ref 68–131)
HBA1C MFR BLD: 7.3 % (ref 4–5.6)

## 2022-04-29 PROCEDURE — 36415 COLL VENOUS BLD VENIPUNCTURE: CPT | Mod: PO | Performed by: PHYSICIAN ASSISTANT

## 2022-04-29 PROCEDURE — 83036 HEMOGLOBIN GLYCOSYLATED A1C: CPT | Performed by: PHYSICIAN ASSISTANT

## 2022-05-04 ENCOUNTER — TELEPHONE (OUTPATIENT)
Dept: FAMILY MEDICINE | Facility: CLINIC | Age: 72
End: 2022-05-04
Payer: MEDICARE

## 2022-05-23 ENCOUNTER — OFFICE VISIT (OUTPATIENT)
Dept: FAMILY MEDICINE | Facility: CLINIC | Age: 72
End: 2022-05-23
Payer: MEDICARE

## 2022-05-23 VITALS
DIASTOLIC BLOOD PRESSURE: 78 MMHG | SYSTOLIC BLOOD PRESSURE: 130 MMHG | WEIGHT: 299.19 LBS | RESPIRATION RATE: 18 BRPM | OXYGEN SATURATION: 97 % | HEART RATE: 100 BPM | BODY MASS INDEX: 39.65 KG/M2 | TEMPERATURE: 99 F | HEIGHT: 73 IN

## 2022-05-23 DIAGNOSIS — Z12.11 COLON CANCER SCREENING: Primary | ICD-10-CM

## 2022-05-23 DIAGNOSIS — E78.5 HYPERLIPIDEMIA, UNSPECIFIED HYPERLIPIDEMIA TYPE: ICD-10-CM

## 2022-05-23 DIAGNOSIS — E78.5 HYPERLIPIDEMIA ASSOCIATED WITH TYPE 2 DIABETES MELLITUS: ICD-10-CM

## 2022-05-23 DIAGNOSIS — E11.40 TYPE 2 DIABETES, CONTROLLED, WITH NEUROPATHY: ICD-10-CM

## 2022-05-23 DIAGNOSIS — E11.69 HYPERLIPIDEMIA ASSOCIATED WITH TYPE 2 DIABETES MELLITUS: ICD-10-CM

## 2022-05-23 PROCEDURE — 1126F PR PAIN SEVERITY QUANTIFIED, NO PAIN PRESENT: ICD-10-PCS | Mod: CPTII,S$GLB,, | Performed by: FAMILY MEDICINE

## 2022-05-23 PROCEDURE — 4010F ACE/ARB THERAPY RXD/TAKEN: CPT | Mod: CPTII,S$GLB,, | Performed by: FAMILY MEDICINE

## 2022-05-23 PROCEDURE — 3008F BODY MASS INDEX DOCD: CPT | Mod: CPTII,S$GLB,, | Performed by: FAMILY MEDICINE

## 2022-05-23 PROCEDURE — 3288F FALL RISK ASSESSMENT DOCD: CPT | Mod: CPTII,S$GLB,, | Performed by: FAMILY MEDICINE

## 2022-05-23 PROCEDURE — 1159F PR MEDICATION LIST DOCUMENTED IN MEDICAL RECORD: ICD-10-PCS | Mod: CPTII,S$GLB,, | Performed by: FAMILY MEDICINE

## 2022-05-23 PROCEDURE — 99999 PR PBB SHADOW E&M-EST. PATIENT-LVL IV: CPT | Mod: PBBFAC,,, | Performed by: FAMILY MEDICINE

## 2022-05-23 PROCEDURE — 3288F PR FALLS RISK ASSESSMENT DOCUMENTED: ICD-10-PCS | Mod: CPTII,S$GLB,, | Performed by: FAMILY MEDICINE

## 2022-05-23 PROCEDURE — 3078F PR MOST RECENT DIASTOLIC BLOOD PRESSURE < 80 MM HG: ICD-10-PCS | Mod: CPTII,S$GLB,, | Performed by: FAMILY MEDICINE

## 2022-05-23 PROCEDURE — 3075F PR MOST RECENT SYSTOLIC BLOOD PRESS GE 130-139MM HG: ICD-10-PCS | Mod: CPTII,S$GLB,, | Performed by: FAMILY MEDICINE

## 2022-05-23 PROCEDURE — 4010F PR ACE/ARB THEARPY RXD/TAKEN: ICD-10-PCS | Mod: CPTII,S$GLB,, | Performed by: FAMILY MEDICINE

## 2022-05-23 PROCEDURE — 99214 PR OFFICE/OUTPT VISIT, EST, LEVL IV, 30-39 MIN: ICD-10-PCS | Mod: S$GLB,,, | Performed by: FAMILY MEDICINE

## 2022-05-23 PROCEDURE — 3078F DIAST BP <80 MM HG: CPT | Mod: CPTII,S$GLB,, | Performed by: FAMILY MEDICINE

## 2022-05-23 PROCEDURE — 1159F MED LIST DOCD IN RCRD: CPT | Mod: CPTII,S$GLB,, | Performed by: FAMILY MEDICINE

## 2022-05-23 PROCEDURE — 99999 PR PBB SHADOW E&M-EST. PATIENT-LVL IV: ICD-10-PCS | Mod: PBBFAC,,, | Performed by: FAMILY MEDICINE

## 2022-05-23 PROCEDURE — 3051F HG A1C>EQUAL 7.0%<8.0%: CPT | Mod: CPTII,S$GLB,, | Performed by: FAMILY MEDICINE

## 2022-05-23 PROCEDURE — 1101F PT FALLS ASSESS-DOCD LE1/YR: CPT | Mod: CPTII,S$GLB,, | Performed by: FAMILY MEDICINE

## 2022-05-23 PROCEDURE — 3008F PR BODY MASS INDEX (BMI) DOCUMENTED: ICD-10-PCS | Mod: CPTII,S$GLB,, | Performed by: FAMILY MEDICINE

## 2022-05-23 PROCEDURE — 3051F PR MOST RECENT HEMOGLOBIN A1C LEVEL 7.0 - < 8.0%: ICD-10-PCS | Mod: CPTII,S$GLB,, | Performed by: FAMILY MEDICINE

## 2022-05-23 PROCEDURE — 3075F SYST BP GE 130 - 139MM HG: CPT | Mod: CPTII,S$GLB,, | Performed by: FAMILY MEDICINE

## 2022-05-23 PROCEDURE — 1126F AMNT PAIN NOTED NONE PRSNT: CPT | Mod: CPTII,S$GLB,, | Performed by: FAMILY MEDICINE

## 2022-05-23 PROCEDURE — 99214 OFFICE O/P EST MOD 30 MIN: CPT | Mod: S$GLB,,, | Performed by: FAMILY MEDICINE

## 2022-05-23 PROCEDURE — 1101F PR PT FALLS ASSESS DOC 0-1 FALLS W/OUT INJ PAST YR: ICD-10-PCS | Mod: CPTII,S$GLB,, | Performed by: FAMILY MEDICINE

## 2022-05-23 RX ORDER — ATORVASTATIN CALCIUM 10 MG/1
10 TABLET, FILM COATED ORAL DAILY
Qty: 90 TABLET | Refills: 3 | Status: SHIPPED | OUTPATIENT
Start: 2022-05-23 | End: 2023-05-08 | Stop reason: SDUPTHER

## 2022-05-23 RX ORDER — METFORMIN HYDROCHLORIDE 500 MG/1
1000 TABLET, EXTENDED RELEASE ORAL 2 TIMES DAILY WITH MEALS
Qty: 360 TABLET | Refills: 3 | Status: SHIPPED | OUTPATIENT
Start: 2022-05-23 | End: 2023-05-08 | Stop reason: SDUPTHER

## 2022-05-23 RX ORDER — CLOTRIMAZOLE AND BETAMETHASONE DIPROPIONATE 10; .64 MG/G; MG/G
CREAM TOPICAL 2 TIMES DAILY
COMMUNITY
Start: 2022-03-07 | End: 2023-02-06

## 2022-05-23 NOTE — PROGRESS NOTES
Subjective:       Patient ID: Roosevelt Moser is a 71 y.o. male.    Chief Complaint: Follow-up (3 month)    Patient is a 71 year old male with history of hypertension, hyperlipidemia controlled type 2 diabetes, osteoarthritis obstructive sleep apnea diabetic polyneuropathy presenting for routine health maintenance and to establish care, patient endorses compliance with medication regimen and denies any acute symptoms today.   Patient would not like to do colon cnacer screening or eye screening        Current Outpatient Medications:     clotrimazole-betamethasone 1-0.05% (LOTRISONE) cream, 2 (two) times daily., Disp: , Rfl:     metoprolol succinate (TOPROL-XL) 50 MG 24 hr tablet, TAKE 1 TABLET ONE TIME DAILY, Disp: 90 tablet, Rfl: 1    telmisartan (MICARDIS) 80 MG Tab, Take 1 tablet (80 mg total) by mouth once daily., Disp: 90 tablet, Rfl: 3    atorvastatin (LIPITOR) 10 MG tablet, Take 1 tablet (10 mg total) by mouth once daily., Disp: 90 tablet, Rfl: 3    doxycycline (VIBRA-TABS) 100 MG tablet, , Disp: , Rfl:     metFORMIN (GLUCOPHAGE-XR) 500 MG ER 24hr tablet, Take 2 tablets (1,000 mg total) by mouth 2 (two) times daily with meals., Disp: 360 tablet, Rfl: 3    OZEMPIC 1 mg/dose (4 mg/3 mL), , Disp: , Rfl:     Review of patient's allergies indicates:  No Known Allergies    Past Medical History:   Diagnosis Date    Diabetes mellitus, type 2     Guillain-[place]     Hyperlipidemia     Hypertension        History reviewed. No pertinent surgical history.    History reviewed. No pertinent family history.    Social History     Tobacco Use    Smoking status: Never Smoker    Smokeless tobacco: Never Used   Substance Use Topics    Alcohol use: Yes     Alcohol/week: 0.0 standard drinks     Comment: social    Drug use: No       Review of Systems   Constitutional: Negative for activity change, appetite change, chills, diaphoresis, fatigue, fever and unexpected weight change.   HENT: Negative for hearing loss,  "postnasal drip, rhinorrhea, sinus pressure/congestion, sore throat and trouble swallowing.    Eyes: Negative for visual disturbance.   Respiratory: Negative for apnea, chest tightness, shortness of breath and wheezing.    Cardiovascular: Negative for chest pain.   Gastrointestinal: Negative for abdominal pain, blood in stool, change in bowel habit, constipation, diarrhea, nausea, vomiting and change in bowel habit.   Endocrine: Negative for polydipsia and polyphagia.   Genitourinary: Negative for dysuria, enuresis, flank pain, frequency and urgency.   Integumentary:  Negative for rash and mole/lesion.   Neurological: Negative for dizziness, light-headedness, headaches and memory loss.   Psychiatric/Behavioral: Negative for confusion, decreased concentration, sleep disturbance and suicidal ideas. The patient is not nervous/anxious.            Objective:      Vitals:    05/23/22 1455   BP: 130/78   BP Location: Left arm   Patient Position: Sitting   BP Method: Large (Manual)   Pulse: 100   Resp: 18   Temp: 98.5 °F (36.9 °C)   TempSrc: Oral   SpO2: 97%   Weight: 135.7 kg (299 lb 2.6 oz)   Height: 6' 1" (1.854 m)     Physical Exam  Constitutional:       General: He is not in acute distress.     Appearance: He is obese. He is not ill-appearing, toxic-appearing or diaphoretic.   HENT:      Head: Normocephalic and atraumatic.      Nose: Nose normal. No congestion or rhinorrhea.      Mouth/Throat:      Mouth: Mucous membranes are moist.      Pharynx: No oropharyngeal exudate or posterior oropharyngeal erythema.   Eyes:      General: No scleral icterus.        Right eye: No discharge.         Left eye: No discharge.      Conjunctiva/sclera: Conjunctivae normal.      Pupils: Pupils are equal, round, and reactive to light.   Cardiovascular:      Rate and Rhythm: Normal rate and regular rhythm.      Pulses: Normal pulses.      Heart sounds: Normal heart sounds. No murmur heard.    No friction rub. No gallop.   Pulmonary:      " Effort: Pulmonary effort is normal. No respiratory distress.      Breath sounds: Normal breath sounds. No stridor. No wheezing, rhonchi or rales.   Chest:      Chest wall: No tenderness.   Abdominal:      General: Abdomen is flat. Bowel sounds are normal. There is no distension.      Palpations: Abdomen is soft. There is no mass.      Tenderness: There is no abdominal tenderness. There is no guarding or rebound.      Hernia: No hernia is present.   Musculoskeletal:      Cervical back: No rigidity or tenderness.   Neurological:      Mental Status: He is alert.           Assessment:       1. Colon cancer screening    2. Hyperlipidemia associated with type 2 diabetes mellitus    3. Hyperlipidemia, unspecified hyperlipidemia type    4. Type 2 diabetes, controlled, with neuropathy        Plan:         Colon cancer screening    Hyperlipidemia associated with type 2 diabetes mellitus  -     atorvastatin (LIPITOR) 10 MG tablet; Take 1 tablet (10 mg total) by mouth once daily.  Dispense: 90 tablet; Refill: 3    Hyperlipidemia, unspecified hyperlipidemia type  -     atorvastatin (LIPITOR) 10 MG tablet; Take 1 tablet (10 mg total) by mouth once daily.  Dispense: 90 tablet; Refill: 3    Type 2 diabetes, controlled, with neuropathy  -     metFORMIN (GLUCOPHAGE-XR) 500 MG ER 24hr tablet; Take 2 tablets (1,000 mg total) by mouth 2 (two) times daily with meals.  Dispense: 360 tablet; Refill: 3        Follow up in about 6 months (around 11/23/2022).    Nicanor Ackerman MD           DISCLAIMER: This note was prepared with [employer] voice recognition transcription software. Garbled syntax, mangled pronouns, and other bizarre constructions may be attributed to that software system.

## 2022-05-23 NOTE — PATIENT INSTRUCTIONS
Vidal Albert,     If you are due for any health screening(s) below please notify me so we can arrange them to be ordered and scheduled to maintain your health. Most healthy patients complete it. Don't lose out on improving your health.     Health Maintenance   Topic Date Due    Eye Exam  Never done    TETANUS VACCINE  Never done    Foot Exam  08/19/2022    Hemoglobin A1c  10/29/2022    Lipid Panel  01/25/2023    Low Dose Statin  05/23/2023    Hepatitis C Screening  Completed          Colon Cancer Screening    Of cancers affecting both men and women, colorectal cancer is the third leading cancer killer in the [place]. But it doesnt have to be. Screening can prevent colorectal cancer or find it at an early stage when treatment often leads to a cure.    A colonoscopy is the preferred test for detecting colon cancer. It is needed only once every 10 years if results are negative. While sedated, a flexible, lighted tube with a tiny camera is inserted into the rectum and advanced through the colon to look for cancers. An alternative screening test that is used at home and returned to the lab may also be used. It detects hidden blood in bowel movements which could indicate cancer in the colon. If results are positive, you will need a colonoscopy to determine if the blood is a sign of cancer. This type of follow up (diagnostic) colonoscopy usually requires additional copays as required by your insurance provider. Please contact your PCP if you have any questions.    Although you are still overdue for this important screening, due to the COVID-19 pandemic, we recommend scheduling it in the near future.       Diabetic Retinal Eye Exam    Diabetes is the #1 cause of blindness in the [place] - early detection before signs or symptoms develop can prevent debilitating blindness.    Once-a-year screening is recommended for all diabetic patients. This exam can prevent and treat diabetes complications in the eye before developing  symptoms. This can be done with a special camera is used to take photographs of the back of your eye without having to dilate them, or you can see an eye doctor for a full dilated exam.    Although you are still overdue for this important screening, due to the COVID-19 pandemic, we recommend scheduling it in the near future.

## 2022-08-05 DIAGNOSIS — I15.2 HYPERTENSION ASSOCIATED WITH DIABETES: ICD-10-CM

## 2022-08-05 DIAGNOSIS — E11.59 HYPERTENSION ASSOCIATED WITH DIABETES: ICD-10-CM

## 2022-08-05 NOTE — TELEPHONE ENCOUNTER
----- Message from Roopa Snyder sent at 8/5/2022 12:41 PM CDT -----  Type:  RX Refill Request    Who Called: Pt     Refill or New Rx: Refill     RX Name and Strength:telmisartan (MICARDIS) 80 MG Tab    Preferred Pharmacy with phone number:[facility] ([facility] - [place] [address] [address]    Local or Mail Order:Mail     Ordering Provider: Nicanor Ackerman MD    Best Call Back Number:[phone]

## 2022-08-08 DIAGNOSIS — E11.59 HYPERTENSION ASSOCIATED WITH DIABETES: ICD-10-CM

## 2022-08-08 DIAGNOSIS — I15.2 HYPERTENSION ASSOCIATED WITH DIABETES: ICD-10-CM

## 2022-08-08 NOTE — TELEPHONE ENCOUNTER
----- Message from Josse Karimi sent at 8/8/2022  2:33 PM CDT -----  Contact: pt at [phone]  Type: Needs Medical Advice  Who Called:  pt    Best Call Back Number: [phone]    Additional Information: pt is calling the office to see if he can get a refill on his blood pressure medication. He states he previous provider is no longer with [facility] and he saw Mr. Cardoso a few months ago. He states he would like to have medication mailed through [facility]. Please call back and advise.

## 2022-08-09 RX ORDER — TELMISARTAN 80 MG/1
80 TABLET ORAL DAILY
Qty: 90 TABLET | Refills: 3 | Status: SHIPPED | OUTPATIENT
Start: 2022-08-09 | End: 2023-05-08 | Stop reason: SDUPTHER

## 2022-08-09 RX ORDER — TELMISARTAN 80 MG/1
80 TABLET ORAL DAILY
Qty: 90 TABLET | Refills: 3 | Status: SHIPPED | OUTPATIENT
Start: 2022-08-09 | End: 2022-10-03 | Stop reason: SDUPTHER

## 2022-10-03 ENCOUNTER — OFFICE VISIT (OUTPATIENT)
Dept: FAMILY MEDICINE | Facility: CLINIC | Age: 72
End: 2022-10-03
Payer: MEDICARE

## 2022-10-03 VITALS
OXYGEN SATURATION: 97 % | HEART RATE: 95 BPM | WEIGHT: 307.75 LBS | SYSTOLIC BLOOD PRESSURE: 136 MMHG | BODY MASS INDEX: 40.79 KG/M2 | HEIGHT: 73 IN | DIASTOLIC BLOOD PRESSURE: 80 MMHG | TEMPERATURE: 97 F

## 2022-10-03 DIAGNOSIS — Z86.69 HISTORY OF GUILLAIN-BARRE SYNDROME: ICD-10-CM

## 2022-10-03 DIAGNOSIS — I48.91 ATRIAL FIBRILLATION, NEW ONSET: ICD-10-CM

## 2022-10-03 DIAGNOSIS — E11.59 HYPERTENSION ASSOCIATED WITH DIABETES: ICD-10-CM

## 2022-10-03 DIAGNOSIS — R94.31 ABNORMAL EKG: ICD-10-CM

## 2022-10-03 DIAGNOSIS — E66.01 MORBID OBESITY: ICD-10-CM

## 2022-10-03 DIAGNOSIS — I10 PRIMARY HYPERTENSION: Primary | ICD-10-CM

## 2022-10-03 DIAGNOSIS — I15.2 HYPERTENSION ASSOCIATED WITH DIABETES: ICD-10-CM

## 2022-10-03 DIAGNOSIS — R31.9 URINARY TRACT INFECTION WITH HEMATURIA, SITE UNSPECIFIED: ICD-10-CM

## 2022-10-03 DIAGNOSIS — N39.0 URINARY TRACT INFECTION WITH HEMATURIA, SITE UNSPECIFIED: ICD-10-CM

## 2022-10-03 DIAGNOSIS — I49.9 IRREGULARLY IRREGULAR PULSE RHYTHM: ICD-10-CM

## 2022-10-03 DIAGNOSIS — E11.9 CONTROLLED TYPE 2 DIABETES MELLITUS WITHOUT COMPLICATION, WITHOUT LONG-TERM CURRENT USE OF INSULIN: ICD-10-CM

## 2022-10-03 PROCEDURE — 3079F DIAST BP 80-89 MM HG: CPT | Mod: CPTII,S$GLB,, | Performed by: PHYSICIAN ASSISTANT

## 2022-10-03 PROCEDURE — 1160F RVW MEDS BY RX/DR IN RCRD: CPT | Mod: CPTII,S$GLB,, | Performed by: PHYSICIAN ASSISTANT

## 2022-10-03 PROCEDURE — 1160F PR REVIEW ALL MEDS BY PRESCRIBER/CLIN PHARMACIST DOCUMENTED: ICD-10-PCS | Mod: CPTII,S$GLB,, | Performed by: PHYSICIAN ASSISTANT

## 2022-10-03 PROCEDURE — 99499 UNLISTED E&M SERVICE: CPT | Mod: HCNC,S$GLB,, | Performed by: PHYSICIAN ASSISTANT

## 2022-10-03 PROCEDURE — 93010 EKG 12-LEAD: ICD-10-PCS | Mod: S$GLB,,, | Performed by: INTERNAL MEDICINE

## 2022-10-03 PROCEDURE — 3075F SYST BP GE 130 - 139MM HG: CPT | Mod: CPTII,S$GLB,, | Performed by: PHYSICIAN ASSISTANT

## 2022-10-03 PROCEDURE — 99999 PR PBB SHADOW E&M-EST. PATIENT-LVL IV: ICD-10-PCS | Mod: PBBFAC,,, | Performed by: PHYSICIAN ASSISTANT

## 2022-10-03 PROCEDURE — 99214 PR OFFICE/OUTPT VISIT, EST, LEVL IV, 30-39 MIN: ICD-10-PCS | Mod: S$GLB,,, | Performed by: PHYSICIAN ASSISTANT

## 2022-10-03 PROCEDURE — 3288F PR FALLS RISK ASSESSMENT DOCUMENTED: ICD-10-PCS | Mod: CPTII,S$GLB,, | Performed by: PHYSICIAN ASSISTANT

## 2022-10-03 PROCEDURE — 93005 EKG 12-LEAD: ICD-10-PCS | Mod: S$GLB,,, | Performed by: PHYSICIAN ASSISTANT

## 2022-10-03 PROCEDURE — 1101F PT FALLS ASSESS-DOCD LE1/YR: CPT | Mod: CPTII,S$GLB,, | Performed by: PHYSICIAN ASSISTANT

## 2022-10-03 PROCEDURE — 4010F ACE/ARB THERAPY RXD/TAKEN: CPT | Mod: CPTII,S$GLB,, | Performed by: PHYSICIAN ASSISTANT

## 2022-10-03 PROCEDURE — 3008F BODY MASS INDEX DOCD: CPT | Mod: CPTII,S$GLB,, | Performed by: PHYSICIAN ASSISTANT

## 2022-10-03 PROCEDURE — 4010F PR ACE/ARB THEARPY RXD/TAKEN: ICD-10-PCS | Mod: CPTII,S$GLB,, | Performed by: PHYSICIAN ASSISTANT

## 2022-10-03 PROCEDURE — 99214 OFFICE O/P EST MOD 30 MIN: CPT | Mod: S$GLB,,, | Performed by: PHYSICIAN ASSISTANT

## 2022-10-03 PROCEDURE — 99999 PR PBB SHADOW E&M-EST. PATIENT-LVL IV: CPT | Mod: PBBFAC,,, | Performed by: PHYSICIAN ASSISTANT

## 2022-10-03 PROCEDURE — 1126F PR PAIN SEVERITY QUANTIFIED, NO PAIN PRESENT: ICD-10-PCS | Mod: CPTII,S$GLB,, | Performed by: PHYSICIAN ASSISTANT

## 2022-10-03 PROCEDURE — 93010 ELECTROCARDIOGRAM REPORT: CPT | Mod: S$GLB,,, | Performed by: INTERNAL MEDICINE

## 2022-10-03 PROCEDURE — 3288F FALL RISK ASSESSMENT DOCD: CPT | Mod: CPTII,S$GLB,, | Performed by: PHYSICIAN ASSISTANT

## 2022-10-03 PROCEDURE — 1101F PR PT FALLS ASSESS DOC 0-1 FALLS W/OUT INJ PAST YR: ICD-10-PCS | Mod: CPTII,S$GLB,, | Performed by: PHYSICIAN ASSISTANT

## 2022-10-03 PROCEDURE — 1159F PR MEDICATION LIST DOCUMENTED IN MEDICAL RECORD: ICD-10-PCS | Mod: CPTII,S$GLB,, | Performed by: PHYSICIAN ASSISTANT

## 2022-10-03 PROCEDURE — 3051F PR MOST RECENT HEMOGLOBIN A1C LEVEL 7.0 - < 8.0%: ICD-10-PCS | Mod: CPTII,S$GLB,, | Performed by: PHYSICIAN ASSISTANT

## 2022-10-03 PROCEDURE — 1126F AMNT PAIN NOTED NONE PRSNT: CPT | Mod: CPTII,S$GLB,, | Performed by: PHYSICIAN ASSISTANT

## 2022-10-03 PROCEDURE — 1159F MED LIST DOCD IN RCRD: CPT | Mod: CPTII,S$GLB,, | Performed by: PHYSICIAN ASSISTANT

## 2022-10-03 PROCEDURE — 93005 ELECTROCARDIOGRAM TRACING: CPT | Mod: S$GLB,,, | Performed by: PHYSICIAN ASSISTANT

## 2022-10-03 PROCEDURE — 3079F PR MOST RECENT DIASTOLIC BLOOD PRESSURE 80-89 MM HG: ICD-10-PCS | Mod: CPTII,S$GLB,, | Performed by: PHYSICIAN ASSISTANT

## 2022-10-03 PROCEDURE — 3008F PR BODY MASS INDEX (BMI) DOCUMENTED: ICD-10-PCS | Mod: CPTII,S$GLB,, | Performed by: PHYSICIAN ASSISTANT

## 2022-10-03 PROCEDURE — 3075F PR MOST RECENT SYSTOLIC BLOOD PRESS GE 130-139MM HG: ICD-10-PCS | Mod: CPTII,S$GLB,, | Performed by: PHYSICIAN ASSISTANT

## 2022-10-03 PROCEDURE — 3051F HG A1C>EQUAL 7.0%<8.0%: CPT | Mod: CPTII,S$GLB,, | Performed by: PHYSICIAN ASSISTANT

## 2022-10-03 RX ORDER — METOPROLOL SUCCINATE 50 MG/1
TABLET, EXTENDED RELEASE ORAL
Qty: 90 TABLET | Refills: 1 | Status: SHIPPED | OUTPATIENT
Start: 2022-10-03 | End: 2023-02-06 | Stop reason: SDUPTHER

## 2022-10-03 NOTE — PROGRESS NOTES
Subjective:       Patient ID: Roosevelt Moser is a 72 y.o. male.    Chief Complaint: Follow-up (6 month follow up visit)    HPI  Feels well  No chest pain or SOB  Last A1c 7.3  4-2022  Review of Systems   Constitutional: Negative.  Negative for activity change, appetite change, chills, diaphoresis, fatigue, fever and unexpected weight change.   HENT: Negative.     Eyes: Negative.    Respiratory: Negative.  Negative for cough, shortness of breath and wheezing.    Cardiovascular: Negative.  Negative for chest pain and leg swelling.   Gastrointestinal: Negative.    Endocrine: Negative.    Genitourinary: Negative.    Musculoskeletal:  Positive for arthralgias and leg pain.   Integumentary:  Negative for rash. Negative.   Neurological: Negative.        Objective:      Physical Exam  Constitutional:       General: He is not in acute distress.     Appearance: Normal appearance. He is obese. He is not ill-appearing, toxic-appearing or diaphoretic.   HENT:      Head: Normocephalic and atraumatic.      Right Ear: Tympanic membrane, ear canal and external ear normal. There is no impacted cerumen.      Left Ear: Tympanic membrane, ear canal and external ear normal. There is no impacted cerumen.      Nose: Nose normal.      Mouth/Throat:      Mouth: Mucous membranes are moist.      Pharynx: Oropharynx is clear. No oropharyngeal exudate or posterior oropharyngeal erythema.   Eyes:      General: No scleral icterus.     Conjunctiva/sclera: Conjunctivae normal.   Neck:      Vascular: No carotid bruit.   Cardiovascular:      Rate and Rhythm: Normal rate. Rhythm irregular.      Pulses: Normal pulses.      Heart sounds: Normal heart sounds. No murmur heard.    No friction rub. No gallop.      Comments: EKG confirms new onset A fib  Pulmonary:      Effort: Pulmonary effort is normal. No respiratory distress.      Breath sounds: Normal breath sounds. No stridor. No wheezing, rhonchi or rales.   Musculoskeletal:         General: No swelling.       Cervical back: Normal range of motion and neck supple. No rigidity or tenderness.      Right lower leg: No edema.      Left lower leg: No edema.   Lymphadenopathy:      Cervical: No cervical adenopathy.   Skin:     General: Skin is warm and dry.      Findings: No rash.   Neurological:      General: No focal deficit present.      Mental Status: He is alert and oriented to person, place, and time.       Assessment:       Problem List Items Addressed This Visit       Morbid obesity    Hypertension - Primary    Relevant Orders    CBC Auto Differential (Completed)    Comprehensive Metabolic Panel (Completed)    Hemoglobin A1C (Completed)    Lipid Panel (Completed)    TSH (Completed)    Urinalysis (Completed)    BNP (Completed)    Controlled type 2 diabetes mellitus without complication, without long-term current use of insulin    Relevant Orders    CBC Auto Differential (Completed)    Comprehensive Metabolic Panel (Completed)    Hemoglobin A1C (Completed)    Lipid Panel (Completed)    TSH (Completed)    Urinalysis (Completed)    BNP (Completed)    History of Guillain-[place] syndrome    Relevant Orders    CBC Auto Differential (Completed)    Comprehensive Metabolic Panel (Completed)    Hemoglobin A1C (Completed)    Lipid Panel (Completed)    TSH (Completed)    Urinalysis (Completed)    BNP (Completed)    Atrial fibrillation, new onset    Relevant Medications    apixaban (ELIQUIS) 5 mg Tab    Other Relevant Orders    Ambulatory referral/consult to Cardiology    CBC Auto Differential (Completed)    Comprehensive Metabolic Panel (Completed)    Hemoglobin A1C (Completed)    Lipid Panel (Completed)    TSH (Completed)    Urinalysis (Completed)    BNP (Completed)    Abnormal EKG    Relevant Orders    Ambulatory referral/consult to Cardiology    CBC Auto Differential (Completed)    Comprehensive Metabolic Panel (Completed)    Hemoglobin A1C (Completed)    Lipid Panel (Completed)    TSH (Completed)    Urinalysis (Completed)     BNP (Completed)     Other Visit Diagnoses       Hypertension associated with diabetes        Relevant Medications    metoprolol succinate (TOPROL-XL) 50 MG 24 hr tablet    Other Relevant Orders    CBC Auto Differential (Completed)    Comprehensive Metabolic Panel (Completed)    Hemoglobin A1C (Completed)    Lipid Panel (Completed)    TSH (Completed)    Urinalysis (Completed)    BNP (Completed)    Irregularly irregular pulse rhythm        Relevant Orders    EKG 12-lead (Completed)    CBC Auto Differential (Completed)    Comprehensive Metabolic Panel (Completed)    Hemoglobin A1C (Completed)    Lipid Panel (Completed)    TSH (Completed)    Urinalysis (Completed)    BNP (Completed)    Urinary tract infection with hematuria, site unspecified        Relevant Medications    doxycycline (MONODOX) 100 MG capsule    Other Relevant Orders    Urine culture              Plan:       Roosevelt was seen today for follow-up.    Diagnoses and all orders for this visit:    Primary hypertension  -     CBC Auto Differential; Future  -     Comprehensive Metabolic Panel; Future  -     Hemoglobin A1C; Future  -     Lipid Panel; Future  -     TSH; Future  -     Urinalysis; Future  -     BNP; Future    Hypertension associated with diabetes  -     metoprolol succinate (TOPROL-XL) 50 MG 24 hr tablet; TAKE 1 TABLET ONE TIME DAILY  -     CBC Auto Differential; Future  -     Comprehensive Metabolic Panel; Future  -     Hemoglobin A1C; Future  -     Lipid Panel; Future  -     TSH; Future  -     Urinalysis; Future  -     BNP; Future    Controlled type 2 diabetes mellitus without complication, without long-term current use of insulin  -     CBC Auto Differential; Future  -     Comprehensive Metabolic Panel; Future  -     Hemoglobin A1C; Future  -     Lipid Panel; Future  -     TSH; Future  -     Urinalysis; Future  -     BNP; Future    History of Guillain-[place] syndrome  -     CBC Auto Differential; Future  -     Comprehensive Metabolic Panel;  Future  -     Hemoglobin A1C; Future  -     Lipid Panel; Future  -     TSH; Future  -     Urinalysis; Future  -     BNP; Future    Irregularly irregular pulse rhythm  -     EKG 12-lead; Future  -     CBC Auto Differential; Future  -     Comprehensive Metabolic Panel; Future  -     Hemoglobin A1C; Future  -     Lipid Panel; Future  -     TSH; Future  -     Urinalysis; Future  -     BNP; Future  -     EKG 12-lead    Atrial fibrillation, new onset  -     Ambulatory referral/consult to Cardiology; Future  -     apixaban (ELIQUIS) 5 mg Tab; Take 1 tablet (5 mg total) by mouth 2 (two) times daily.  -     CBC Auto Differential; Future  -     Comprehensive Metabolic Panel; Future  -     Hemoglobin A1C; Future  -     Lipid Panel; Future  -     TSH; Future  -     Urinalysis; Future  -     BNP; Future    Abnormal EKG  -     Ambulatory referral/consult to Cardiology; Future  -     CBC Auto Differential; Future  -     Comprehensive Metabolic Panel; Future  -     Hemoglobin A1C; Future  -     Lipid Panel; Future  -     TSH; Future  -     Urinalysis; Future  -     BNP; Future    Morbid obesity    Urinary tract infection with hematuria, site unspecified  -     doxycycline (MONODOX) 100 MG capsule; Take 1 capsule (100 mg total) by mouth 2 (two) times daily. for 10 days  -     Urine culture       Pt. Instructed to go to ER immediately if chest pain or SOB occurs   Discussed diet  Salt restriction

## 2022-10-04 ENCOUNTER — LAB VISIT (OUTPATIENT)
Dept: LAB | Facility: HOSPITAL | Age: 72
End: 2022-10-04
Attending: PHYSICIAN ASSISTANT
Payer: MEDICARE

## 2022-10-04 ENCOUNTER — TELEPHONE (OUTPATIENT)
Dept: FAMILY MEDICINE | Facility: CLINIC | Age: 72
End: 2022-10-04

## 2022-10-04 DIAGNOSIS — R94.31 ABNORMAL EKG: ICD-10-CM

## 2022-10-04 DIAGNOSIS — I48.91 ATRIAL FIBRILLATION, NEW ONSET: ICD-10-CM

## 2022-10-04 DIAGNOSIS — I10 PRIMARY HYPERTENSION: ICD-10-CM

## 2022-10-04 DIAGNOSIS — E11.59 HYPERTENSION ASSOCIATED WITH DIABETES: ICD-10-CM

## 2022-10-04 DIAGNOSIS — I49.9 IRREGULARLY IRREGULAR PULSE RHYTHM: ICD-10-CM

## 2022-10-04 DIAGNOSIS — E11.9 CONTROLLED TYPE 2 DIABETES MELLITUS WITHOUT COMPLICATION, WITHOUT LONG-TERM CURRENT USE OF INSULIN: ICD-10-CM

## 2022-10-04 DIAGNOSIS — Z86.69 HISTORY OF GUILLAIN-BARRE SYNDROME: ICD-10-CM

## 2022-10-04 DIAGNOSIS — I15.2 HYPERTENSION ASSOCIATED WITH DIABETES: ICD-10-CM

## 2022-10-04 LAB
ALBUMIN SERPL BCP-MCNC: 3.6 G/DL (ref 3.5–5.2)
ALP SERPL-CCNC: 77 U/L (ref 55–135)
ALT SERPL W/O P-5'-P-CCNC: 16 U/L (ref 10–44)
ANION GAP SERPL CALC-SCNC: 15 MMOL/L (ref 8–16)
AST SERPL-CCNC: 13 U/L (ref 10–40)
BASOPHILS # BLD AUTO: 0.08 K/UL (ref 0–0.2)
BASOPHILS NFR BLD: 0.7 % (ref 0–1.9)
BILIRUB SERPL-MCNC: 0.6 MG/DL (ref 0.1–1)
BNP SERPL-MCNC: 85 PG/ML (ref 0–99)
BUN SERPL-MCNC: 28 MG/DL (ref 8–23)
CALCIUM SERPL-MCNC: 9.8 MG/DL (ref 8.7–10.5)
CHLORIDE SERPL-SCNC: 103 MMOL/L (ref 95–110)
CHOLEST SERPL-MCNC: 177 MG/DL (ref 120–199)
CHOLEST/HDLC SERPL: 4.3 {RATIO} (ref 2–5)
CO2 SERPL-SCNC: 21 MMOL/L (ref 23–29)
CREAT SERPL-MCNC: 1.3 MG/DL (ref 0.5–1.4)
DIFFERENTIAL METHOD: ABNORMAL
EOSINOPHIL # BLD AUTO: 0.3 K/UL (ref 0–0.5)
EOSINOPHIL NFR BLD: 2.3 % (ref 0–8)
ERYTHROCYTE [DISTWIDTH] IN BLOOD BY AUTOMATED COUNT: 14.2 % (ref 11.5–14.5)
EST. GFR  (NO RACE VARIABLE): 58.7 ML/MIN/1.73 M^2
ESTIMATED AVG GLUCOSE: 197 MG/DL (ref 68–131)
GLUCOSE SERPL-MCNC: 222 MG/DL (ref 70–110)
HBA1C MFR BLD: 8.5 % (ref 4–5.6)
HCT VFR BLD AUTO: 46.4 % (ref 40–54)
HDLC SERPL-MCNC: 41 MG/DL (ref 40–75)
HDLC SERPL: 23.2 % (ref 20–50)
HGB BLD-MCNC: 15.1 G/DL (ref 14–18)
IMM GRANULOCYTES # BLD AUTO: 0.13 K/UL (ref 0–0.04)
IMM GRANULOCYTES NFR BLD AUTO: 1.2 % (ref 0–0.5)
LDLC SERPL CALC-MCNC: 81.2 MG/DL (ref 63–159)
LYMPHOCYTES # BLD AUTO: 3.2 K/UL (ref 1–4.8)
LYMPHOCYTES NFR BLD: 29.8 % (ref 18–48)
MCH RBC QN AUTO: 29.6 PG (ref 27–31)
MCHC RBC AUTO-ENTMCNC: 32.5 G/DL (ref 32–36)
MCV RBC AUTO: 91 FL (ref 82–98)
MONOCYTES # BLD AUTO: 1.1 K/UL (ref 0.3–1)
MONOCYTES NFR BLD: 10.4 % (ref 4–15)
NEUTROPHILS # BLD AUTO: 6 K/UL (ref 1.8–7.7)
NEUTROPHILS NFR BLD: 55.6 % (ref 38–73)
NONHDLC SERPL-MCNC: 136 MG/DL
NRBC BLD-RTO: 0 /100 WBC
PLATELET # BLD AUTO: 272 K/UL (ref 150–450)
PMV BLD AUTO: 10.7 FL (ref 9.2–12.9)
POTASSIUM SERPL-SCNC: 4.6 MMOL/L (ref 3.5–5.1)
PROT SERPL-MCNC: 6.8 G/DL (ref 6–8.4)
RBC # BLD AUTO: 5.1 M/UL (ref 4.6–6.2)
SODIUM SERPL-SCNC: 139 MMOL/L (ref 136–145)
TRIGL SERPL-MCNC: 274 MG/DL (ref 30–150)
TSH SERPL DL<=0.005 MIU/L-ACNC: 1.71 UIU/ML (ref 0.4–4)
WBC # BLD AUTO: 10.72 K/UL (ref 3.9–12.7)

## 2022-10-04 PROCEDURE — 36415 COLL VENOUS BLD VENIPUNCTURE: CPT | Mod: PO | Performed by: PHYSICIAN ASSISTANT

## 2022-10-04 PROCEDURE — 80061 LIPID PANEL: CPT | Performed by: PHYSICIAN ASSISTANT

## 2022-10-04 PROCEDURE — 84443 ASSAY THYROID STIM HORMONE: CPT | Performed by: PHYSICIAN ASSISTANT

## 2022-10-04 PROCEDURE — 83036 HEMOGLOBIN GLYCOSYLATED A1C: CPT | Performed by: PHYSICIAN ASSISTANT

## 2022-10-04 PROCEDURE — 83880 ASSAY OF NATRIURETIC PEPTIDE: CPT | Performed by: PHYSICIAN ASSISTANT

## 2022-10-04 PROCEDURE — 85025 COMPLETE CBC W/AUTO DIFF WBC: CPT | Performed by: PHYSICIAN ASSISTANT

## 2022-10-04 PROCEDURE — 80053 COMPREHEN METABOLIC PANEL: CPT | Performed by: PHYSICIAN ASSISTANT

## 2022-10-04 NOTE — TELEPHONE ENCOUNTER
----- Message from Maryam Ceron sent at 10/4/2022 10:31 AM CDT -----  Contact: patient  Type:  Needs Medical Advice    Who Called: Patient     Symptoms (please be specific): Dark urine    How long has patient had these symptoms:  yesterday ( Same day apixaban (ELIQUIS) 5 mg Tab started)    Would the patient rather a call back or a response via [facility]? Call    Best Call Back Number: [phone] (home)     Additional Information: Patient states that dark urine started the same day he started      apixaban (ELIQUIS) 5 mg Tab

## 2022-10-05 RX ORDER — DOXYCYCLINE 100 MG/1
100 CAPSULE ORAL 2 TIMES DAILY
Qty: 20 CAPSULE | Refills: 0 | Status: SHIPPED | OUTPATIENT
Start: 2022-10-05 | End: 2022-10-15

## 2022-10-05 NOTE — PROGRESS NOTES
Spoke with patient regarding his results, his medication that he should  today, stopping eliquis and cardiology appointment for tomorrow.

## 2022-10-10 ENCOUNTER — OFFICE VISIT (OUTPATIENT)
Dept: FAMILY MEDICINE | Facility: CLINIC | Age: 72
End: 2022-10-10
Payer: MEDICARE

## 2022-10-10 VITALS
HEART RATE: 110 BPM | WEIGHT: 300.06 LBS | OXYGEN SATURATION: 98 % | BODY MASS INDEX: 39.77 KG/M2 | DIASTOLIC BLOOD PRESSURE: 74 MMHG | HEIGHT: 73 IN | TEMPERATURE: 98 F | SYSTOLIC BLOOD PRESSURE: 130 MMHG

## 2022-10-10 DIAGNOSIS — I48.91 ATRIAL FIBRILLATION, NEW ONSET: ICD-10-CM

## 2022-10-10 DIAGNOSIS — I10 HYPERTENSION, UNSPECIFIED TYPE: ICD-10-CM

## 2022-10-10 DIAGNOSIS — N39.0 URINARY TRACT INFECTION WITH HEMATURIA, SITE UNSPECIFIED: Primary | ICD-10-CM

## 2022-10-10 DIAGNOSIS — E11.9 CONTROLLED TYPE 2 DIABETES MELLITUS WITHOUT COMPLICATION, WITHOUT LONG-TERM CURRENT USE OF INSULIN: ICD-10-CM

## 2022-10-10 DIAGNOSIS — R31.9 URINARY TRACT INFECTION WITH HEMATURIA, SITE UNSPECIFIED: Primary | ICD-10-CM

## 2022-10-10 PROCEDURE — 1101F PR PT FALLS ASSESS DOC 0-1 FALLS W/OUT INJ PAST YR: ICD-10-PCS | Mod: CPTII,S$GLB,, | Performed by: PHYSICIAN ASSISTANT

## 2022-10-10 PROCEDURE — 99214 PR OFFICE/OUTPT VISIT, EST, LEVL IV, 30-39 MIN: ICD-10-PCS | Mod: S$GLB,,, | Performed by: PHYSICIAN ASSISTANT

## 2022-10-10 PROCEDURE — 99499 UNLISTED E&M SERVICE: CPT | Mod: HCNC,S$GLB,, | Performed by: PHYSICIAN ASSISTANT

## 2022-10-10 PROCEDURE — 1126F AMNT PAIN NOTED NONE PRSNT: CPT | Mod: CPTII,S$GLB,, | Performed by: PHYSICIAN ASSISTANT

## 2022-10-10 PROCEDURE — 3078F PR MOST RECENT DIASTOLIC BLOOD PRESSURE < 80 MM HG: ICD-10-PCS | Mod: CPTII,S$GLB,, | Performed by: PHYSICIAN ASSISTANT

## 2022-10-10 PROCEDURE — 1159F MED LIST DOCD IN RCRD: CPT | Mod: CPTII,S$GLB,, | Performed by: PHYSICIAN ASSISTANT

## 2022-10-10 PROCEDURE — 1159F PR MEDICATION LIST DOCUMENTED IN MEDICAL RECORD: ICD-10-PCS | Mod: CPTII,S$GLB,, | Performed by: PHYSICIAN ASSISTANT

## 2022-10-10 PROCEDURE — 1101F PT FALLS ASSESS-DOCD LE1/YR: CPT | Mod: CPTII,S$GLB,, | Performed by: PHYSICIAN ASSISTANT

## 2022-10-10 PROCEDURE — 3075F PR MOST RECENT SYSTOLIC BLOOD PRESS GE 130-139MM HG: ICD-10-PCS | Mod: CPTII,S$GLB,, | Performed by: PHYSICIAN ASSISTANT

## 2022-10-10 PROCEDURE — 3052F HG A1C>EQUAL 8.0%<EQUAL 9.0%: CPT | Mod: CPTII,S$GLB,, | Performed by: PHYSICIAN ASSISTANT

## 2022-10-10 PROCEDURE — 4010F PR ACE/ARB THEARPY RXD/TAKEN: ICD-10-PCS | Mod: CPTII,S$GLB,, | Performed by: PHYSICIAN ASSISTANT

## 2022-10-10 PROCEDURE — 1126F PR PAIN SEVERITY QUANTIFIED, NO PAIN PRESENT: ICD-10-PCS | Mod: CPTII,S$GLB,, | Performed by: PHYSICIAN ASSISTANT

## 2022-10-10 PROCEDURE — 99999 PR PBB SHADOW E&M-EST. PATIENT-LVL IV: ICD-10-PCS | Mod: PBBFAC,,, | Performed by: PHYSICIAN ASSISTANT

## 2022-10-10 PROCEDURE — 3075F SYST BP GE 130 - 139MM HG: CPT | Mod: CPTII,S$GLB,, | Performed by: PHYSICIAN ASSISTANT

## 2022-10-10 PROCEDURE — 3078F DIAST BP <80 MM HG: CPT | Mod: CPTII,S$GLB,, | Performed by: PHYSICIAN ASSISTANT

## 2022-10-10 PROCEDURE — 4010F ACE/ARB THERAPY RXD/TAKEN: CPT | Mod: CPTII,S$GLB,, | Performed by: PHYSICIAN ASSISTANT

## 2022-10-10 PROCEDURE — 3052F PR MOST RECENT HEMOGLOBIN A1C LEVEL 8.0 - < 9.0%: ICD-10-PCS | Mod: CPTII,S$GLB,, | Performed by: PHYSICIAN ASSISTANT

## 2022-10-10 PROCEDURE — 3288F PR FALLS RISK ASSESSMENT DOCUMENTED: ICD-10-PCS | Mod: CPTII,S$GLB,, | Performed by: PHYSICIAN ASSISTANT

## 2022-10-10 PROCEDURE — 3288F FALL RISK ASSESSMENT DOCD: CPT | Mod: CPTII,S$GLB,, | Performed by: PHYSICIAN ASSISTANT

## 2022-10-10 PROCEDURE — 99214 OFFICE O/P EST MOD 30 MIN: CPT | Mod: S$GLB,,, | Performed by: PHYSICIAN ASSISTANT

## 2022-10-10 PROCEDURE — 99999 PR PBB SHADOW E&M-EST. PATIENT-LVL IV: CPT | Mod: PBBFAC,,, | Performed by: PHYSICIAN ASSISTANT

## 2022-10-10 PROCEDURE — 1160F PR REVIEW ALL MEDS BY PRESCRIBER/CLIN PHARMACIST DOCUMENTED: ICD-10-PCS | Mod: CPTII,S$GLB,, | Performed by: PHYSICIAN ASSISTANT

## 2022-10-10 PROCEDURE — 1160F RVW MEDS BY RX/DR IN RCRD: CPT | Mod: CPTII,S$GLB,, | Performed by: PHYSICIAN ASSISTANT

## 2022-10-10 RX ORDER — SOTALOL HYDROCHLORIDE 80 MG/1
80 TABLET ORAL 2 TIMES DAILY
COMMUNITY
Start: 2022-10-06 | End: 2023-05-08

## 2022-10-10 NOTE — PROGRESS NOTES
Subjective:       Patient ID: Roosevelt Moser is a 72 y.o. male.    Chief Complaint: Follow-up    HPI  Pt. In for f/u   Urinary sx improved on doxy  Feels well  Pt. Did see Cardiology 4 days ago   Has f/u appt with Cardiology  dec 18  Saw Dr. Null in [place]  Restarted Eliquis at 2.5 BID  Also started new antiarthyrimitic 80 mg BID sotalol   Review of Systems   Constitutional:  Positive for fatigue. Negative for activity change, appetite change, chills, diaphoresis, fever and unexpected weight change.   HENT: Negative.     Eyes: Negative.    Respiratory: Negative.  Negative for cough, shortness of breath and wheezing.    Cardiovascular: Negative.  Negative for chest pain and leg swelling.   Gastrointestinal: Negative.    Endocrine: Negative.    Genitourinary: Negative.    Musculoskeletal: Negative.    Integumentary:  Negative for rash. Negative.   Neurological: Negative.        Objective:      Physical Exam  Vitals reviewed.   Constitutional:       General: He is not in acute distress.     Appearance: Normal appearance. He is obese. He is not ill-appearing, toxic-appearing or diaphoretic.   HENT:      Head: Normocephalic and atraumatic.      Right Ear: Tympanic membrane, ear canal and external ear normal. There is no impacted cerumen.      Left Ear: Tympanic membrane, ear canal and external ear normal. There is no impacted cerumen.      Nose: Nose normal.      Mouth/Throat:      Mouth: Mucous membranes are moist.      Pharynx: Oropharynx is clear. No oropharyngeal exudate or posterior oropharyngeal erythema.   Eyes:      General: No scleral icterus.     Conjunctiva/sclera: Conjunctivae normal.   Neck:      Vascular: No carotid bruit.   Cardiovascular:      Rate and Rhythm: Normal rate. Rhythm irregular.      Pulses: Normal pulses.      Heart sounds: Normal heart sounds. No murmur heard.    No friction rub. No gallop.   Pulmonary:      Effort: Pulmonary effort is normal. No respiratory distress.      Breath sounds:  Normal breath sounds. No stridor. No wheezing, rhonchi or rales.   Musculoskeletal:         General: No swelling.      Cervical back: Normal range of motion and neck supple. No rigidity or tenderness.      Right lower leg: No edema.      Left lower leg: No edema.   Lymphadenopathy:      Cervical: No cervical adenopathy.   Skin:     General: Skin is warm and dry.      Findings: No rash.   Neurological:      General: No focal deficit present.      Mental Status: He is alert and oriented to person, place, and time.       Assessment:       Problem List Items Addressed This Visit       Hypertension    Controlled type 2 diabetes mellitus without complication, without long-term current use of insulin    Atrial fibrillation, new onset     Other Visit Diagnoses       Urinary tract infection with hematuria, site unspecified    -  Primary    Relevant Orders    Urinalysis    Urine culture            Plan:       Roosevelt was seen today for follow-up.    Diagnoses and all orders for this visit:    Urinary tract infection with hematuria, site unspecified  -     Urinalysis; Future  -     Urine culture; Future    Atrial fibrillation, new onset    Hypertension, unspecified type    Controlled type 2 diabetes mellitus without complication, without long-term current use of insulin     F/u check 3 wks

## 2022-10-13 ENCOUNTER — PATIENT OUTREACH (OUTPATIENT)
Dept: ADMINISTRATIVE | Facility: HOSPITAL | Age: 72
End: 2022-10-13
Payer: MEDICARE

## 2022-10-13 NOTE — LETTER
October 13, 2022    Roosevelt Moser  [address] [facility] [place] [address]             [facility]  [address]  [place] [address]  Phone: [phone] Dear Mr. Moser,    Good morning!!    I am reaching out to you because you are over due for a Diabetic eye exam. Have you had an eye exam in the last year,  if so where did you have it? If not,  would you like me to schedule you an eye exam with one of our Physician's at [facility]?    Thanks so much and have a great day!    Sirena GUPTA LPN [facility]  [address] [address]  P- [phone]  F- [phone]

## 2022-12-16 ENCOUNTER — LAB VISIT (OUTPATIENT)
Dept: LAB | Facility: HOSPITAL | Age: 72
End: 2022-12-16
Attending: PHYSICIAN ASSISTANT
Payer: MEDICARE

## 2022-12-16 DIAGNOSIS — R31.9 URINARY TRACT INFECTION WITH HEMATURIA, SITE UNSPECIFIED: ICD-10-CM

## 2022-12-16 DIAGNOSIS — N39.0 URINARY TRACT INFECTION WITH HEMATURIA, SITE UNSPECIFIED: ICD-10-CM

## 2022-12-16 LAB
BACTERIA #/AREA URNS AUTO: ABNORMAL /HPF
BILIRUB UR QL STRIP: NEGATIVE
CAOX CRY UR QL COMP ASSIST: ABNORMAL
CLARITY UR REFRACT.AUTO: CLEAR
COLOR UR AUTO: YELLOW
GLUCOSE UR QL STRIP: ABNORMAL
HGB UR QL STRIP: ABNORMAL
HYALINE CASTS UR QL AUTO: 0 /LPF
KETONES UR QL STRIP: NEGATIVE
LEUKOCYTE ESTERASE UR QL STRIP: ABNORMAL
MICROSCOPIC COMMENT: ABNORMAL
NITRITE UR QL STRIP: NEGATIVE
PH UR STRIP: 6 [PH] (ref 5–8)
PROT UR QL STRIP: ABNORMAL
RBC #/AREA URNS AUTO: 20 /HPF (ref 0–4)
SP GR UR STRIP: 1.02 (ref 1–1.03)
URATE CRY UR QL COMP ASSIST: ABNORMAL
URN SPEC COLLECT METH UR: ABNORMAL
WBC #/AREA URNS AUTO: 81 /HPF (ref 0–5)
YEAST UR QL AUTO: ABNORMAL

## 2022-12-16 PROCEDURE — 87086 URINE CULTURE/COLONY COUNT: CPT | Performed by: PHYSICIAN ASSISTANT

## 2022-12-16 PROCEDURE — 81001 URINALYSIS AUTO W/SCOPE: CPT | Performed by: PHYSICIAN ASSISTANT

## 2022-12-17 LAB — BACTERIA UR CULT: NO GROWTH

## 2022-12-19 ENCOUNTER — TELEPHONE (OUTPATIENT)
Dept: FAMILY MEDICINE | Facility: CLINIC | Age: 72
End: 2022-12-19
Payer: MEDICARE

## 2022-12-19 NOTE — TELEPHONE ENCOUNTER
----- Message from Hudson Cardoso PA-C sent at 12/18/2022 10:35 PM CST -----  Your urine culture is normal

## 2023-02-06 ENCOUNTER — OFFICE VISIT (OUTPATIENT)
Dept: FAMILY MEDICINE | Facility: CLINIC | Age: 73
End: 2023-02-06
Payer: MEDICARE

## 2023-02-06 VITALS
DIASTOLIC BLOOD PRESSURE: 66 MMHG | HEART RATE: 87 BPM | TEMPERATURE: 98 F | SYSTOLIC BLOOD PRESSURE: 116 MMHG | HEIGHT: 73 IN | OXYGEN SATURATION: 96 % | WEIGHT: 313.5 LBS | BODY MASS INDEX: 41.55 KG/M2 | RESPIRATION RATE: 14 BRPM

## 2023-02-06 DIAGNOSIS — I10 HYPERTENSION, UNSPECIFIED TYPE: ICD-10-CM

## 2023-02-06 DIAGNOSIS — Z28.39 IMMUNIZATION DEFICIENCY: ICD-10-CM

## 2023-02-06 DIAGNOSIS — Z00.00 PERIODIC HEALTH ASSESSMENT, GENERAL SCREENING, ADULT: ICD-10-CM

## 2023-02-06 DIAGNOSIS — E11.9 CONTROLLED TYPE 2 DIABETES MELLITUS WITHOUT COMPLICATION, WITHOUT LONG-TERM CURRENT USE OF INSULIN: Primary | ICD-10-CM

## 2023-02-06 DIAGNOSIS — E11.59 HYPERTENSION ASSOCIATED WITH DIABETES: ICD-10-CM

## 2023-02-06 DIAGNOSIS — I15.2 HYPERTENSION ASSOCIATED WITH DIABETES: ICD-10-CM

## 2023-02-06 DIAGNOSIS — E66.01 MORBID OBESITY: ICD-10-CM

## 2023-02-06 PROCEDURE — 1126F AMNT PAIN NOTED NONE PRSNT: CPT | Mod: HCNC,CPTII,S$GLB, | Performed by: PHYSICIAN ASSISTANT

## 2023-02-06 PROCEDURE — 1160F PR REVIEW ALL MEDS BY PRESCRIBER/CLIN PHARMACIST DOCUMENTED: ICD-10-PCS | Mod: HCNC,CPTII,S$GLB, | Performed by: PHYSICIAN ASSISTANT

## 2023-02-06 PROCEDURE — 3288F FALL RISK ASSESSMENT DOCD: CPT | Mod: HCNC,CPTII,S$GLB, | Performed by: PHYSICIAN ASSISTANT

## 2023-02-06 PROCEDURE — 1160F RVW MEDS BY RX/DR IN RCRD: CPT | Mod: HCNC,CPTII,S$GLB, | Performed by: PHYSICIAN ASSISTANT

## 2023-02-06 PROCEDURE — 1101F PR PT FALLS ASSESS DOC 0-1 FALLS W/OUT INJ PAST YR: ICD-10-PCS | Mod: HCNC,CPTII,S$GLB, | Performed by: PHYSICIAN ASSISTANT

## 2023-02-06 PROCEDURE — 1159F PR MEDICATION LIST DOCUMENTED IN MEDICAL RECORD: ICD-10-PCS | Mod: HCNC,CPTII,S$GLB, | Performed by: PHYSICIAN ASSISTANT

## 2023-02-06 PROCEDURE — 3074F SYST BP LT 130 MM HG: CPT | Mod: HCNC,CPTII,S$GLB, | Performed by: PHYSICIAN ASSISTANT

## 2023-02-06 PROCEDURE — 3008F PR BODY MASS INDEX (BMI) DOCUMENTED: ICD-10-PCS | Mod: HCNC,CPTII,S$GLB, | Performed by: PHYSICIAN ASSISTANT

## 2023-02-06 PROCEDURE — 3288F PR FALLS RISK ASSESSMENT DOCUMENTED: ICD-10-PCS | Mod: HCNC,CPTII,S$GLB, | Performed by: PHYSICIAN ASSISTANT

## 2023-02-06 PROCEDURE — 3074F PR MOST RECENT SYSTOLIC BLOOD PRESSURE < 130 MM HG: ICD-10-PCS | Mod: HCNC,CPTII,S$GLB, | Performed by: PHYSICIAN ASSISTANT

## 2023-02-06 PROCEDURE — 3078F DIAST BP <80 MM HG: CPT | Mod: HCNC,CPTII,S$GLB, | Performed by: PHYSICIAN ASSISTANT

## 2023-02-06 PROCEDURE — 99214 OFFICE O/P EST MOD 30 MIN: CPT | Mod: HCNC,S$GLB,, | Performed by: PHYSICIAN ASSISTANT

## 2023-02-06 PROCEDURE — 1101F PT FALLS ASSESS-DOCD LE1/YR: CPT | Mod: HCNC,CPTII,S$GLB, | Performed by: PHYSICIAN ASSISTANT

## 2023-02-06 PROCEDURE — 1159F MED LIST DOCD IN RCRD: CPT | Mod: HCNC,CPTII,S$GLB, | Performed by: PHYSICIAN ASSISTANT

## 2023-02-06 PROCEDURE — 99214 PR OFFICE/OUTPT VISIT, EST, LEVL IV, 30-39 MIN: ICD-10-PCS | Mod: HCNC,S$GLB,, | Performed by: PHYSICIAN ASSISTANT

## 2023-02-06 PROCEDURE — 1126F PR PAIN SEVERITY QUANTIFIED, NO PAIN PRESENT: ICD-10-PCS | Mod: HCNC,CPTII,S$GLB, | Performed by: PHYSICIAN ASSISTANT

## 2023-02-06 PROCEDURE — 99999 PR PBB SHADOW E&M-EST. PATIENT-LVL III: CPT | Mod: PBBFAC,HCNC,, | Performed by: PHYSICIAN ASSISTANT

## 2023-02-06 PROCEDURE — 3008F BODY MASS INDEX DOCD: CPT | Mod: HCNC,CPTII,S$GLB, | Performed by: PHYSICIAN ASSISTANT

## 2023-02-06 PROCEDURE — 99999 PR PBB SHADOW E&M-EST. PATIENT-LVL III: ICD-10-PCS | Mod: PBBFAC,HCNC,, | Performed by: PHYSICIAN ASSISTANT

## 2023-02-06 PROCEDURE — 3078F PR MOST RECENT DIASTOLIC BLOOD PRESSURE < 80 MM HG: ICD-10-PCS | Mod: HCNC,CPTII,S$GLB, | Performed by: PHYSICIAN ASSISTANT

## 2023-02-06 RX ORDER — METOPROLOL SUCCINATE 50 MG/1
TABLET, EXTENDED RELEASE ORAL
Qty: 90 TABLET | Refills: 1 | Status: SHIPPED | OUTPATIENT
Start: 2023-02-06 | End: 2023-05-08 | Stop reason: SDUPTHER

## 2023-02-06 NOTE — PROGRESS NOTES
Subjective:       Patient ID: Roosevelt Moser is a 72 y.o. male.    Chief Complaint: No chief complaint on file.    HPI  Review of Systems   Constitutional: Negative.  Negative for activity change, appetite change, chills, diaphoresis, fatigue, fever and unexpected weight change.   HENT: Negative.     Eyes: Negative.    Respiratory: Negative.  Negative for cough and shortness of breath.    Cardiovascular: Negative.  Negative for chest pain and leg swelling.   Gastrointestinal: Negative.    Endocrine: Negative.    Genitourinary: Negative.    Musculoskeletal: Negative.    Integumentary:  Negative for rash. Negative.   Neurological: Negative.        Objective:      Physical Exam  Vitals reviewed.   Constitutional:       General: He is not in acute distress.     Appearance: Normal appearance. He is obese. He is not ill-appearing, toxic-appearing or diaphoretic.   HENT:      Head: Normocephalic and atraumatic.      Right Ear: Tympanic membrane, ear canal and external ear normal. There is no impacted cerumen.      Left Ear: Tympanic membrane, ear canal and external ear normal. There is no impacted cerumen.      Nose: Nose normal.      Mouth/Throat:      Mouth: Mucous membranes are moist.      Pharynx: Oropharynx is clear. No oropharyngeal exudate or posterior oropharyngeal erythema.   Eyes:      General: No scleral icterus.     Conjunctiva/sclera: Conjunctivae normal.   Neck:      Vascular: No carotid bruit.   Cardiovascular:      Rate and Rhythm: Normal rate and regular rhythm.      Pulses: Normal pulses.      Heart sounds: Normal heart sounds. No murmur heard.    No friction rub. No gallop.   Pulmonary:      Effort: Pulmonary effort is normal. No respiratory distress.      Breath sounds: Normal breath sounds. No stridor. No wheezing, rhonchi or rales.   Abdominal:      General: Abdomen is flat. Bowel sounds are normal. There is no distension.      Palpations: Abdomen is soft. There is no mass.      Tenderness: There is no  abdominal tenderness. There is no guarding or rebound.      Hernia: No hernia is present.   Musculoskeletal:         General: No swelling.      Cervical back: Normal range of motion and neck supple. No rigidity or tenderness.      Right lower leg: No edema.      Left lower leg: No edema.   Lymphadenopathy:      Cervical: No cervical adenopathy.   Skin:     General: Skin is warm and dry.      Findings: No rash.   Neurological:      General: No focal deficit present.      Mental Status: He is alert and oriented to person, place, and time.       Assessment:       Problem List Items Addressed This Visit       Morbid obesity    Relevant Orders    Basic Metabolic Panel    Hemoglobin A1C    Hypertension    Relevant Orders    Basic Metabolic Panel    Controlled type 2 diabetes mellitus without complication, without long-term current use of insulin - Primary    Relevant Orders    Basic Metabolic Panel    Hemoglobin A1C     Other Visit Diagnoses       Hypertension associated with diabetes        Relevant Medications    metoprolol succinate (TOPROL-XL) 50 MG 24 hr tablet    Periodic health assessment, general screening, adult        Immunization deficiency                  Plan:       Diagnoses and all orders for this visit:    Controlled type 2 diabetes mellitus without complication, without long-term current use of insulin  -     Basic Metabolic Panel; Future  -     Hemoglobin A1C; Future    Morbid obesity  -     Basic Metabolic Panel; Future  -     Hemoglobin A1C; Future    Hypertension, unspecified type  -     Basic Metabolic Panel; Future    Hypertension associated with diabetes  -     metoprolol succinate (TOPROL-XL) 50 MG 24 hr tablet; TAKE 1 TABLET ONE TIME DAILY    Periodic health assessment, general screening, adult    Immunization deficiency    Discussed otc's  Discussed diet  Discussed exercise  Schedule eye exam  Discussed immunization needs

## 2023-03-10 ENCOUNTER — PES CALL (OUTPATIENT)
Dept: ADMINISTRATIVE | Facility: CLINIC | Age: 73
End: 2023-03-10
Payer: MEDICARE

## 2023-04-28 ENCOUNTER — LAB VISIT (OUTPATIENT)
Dept: LAB | Facility: HOSPITAL | Age: 73
End: 2023-04-28
Payer: MEDICARE

## 2023-04-28 DIAGNOSIS — E66.01 MORBID OBESITY: ICD-10-CM

## 2023-04-28 DIAGNOSIS — I10 HYPERTENSION, UNSPECIFIED TYPE: ICD-10-CM

## 2023-04-28 DIAGNOSIS — E11.9 CONTROLLED TYPE 2 DIABETES MELLITUS WITHOUT COMPLICATION, WITHOUT LONG-TERM CURRENT USE OF INSULIN: ICD-10-CM

## 2023-04-28 LAB
ANION GAP SERPL CALC-SCNC: 10 MMOL/L (ref 8–16)
BUN SERPL-MCNC: 23 MG/DL (ref 8–23)
CALCIUM SERPL-MCNC: 9.4 MG/DL (ref 8.7–10.5)
CHLORIDE SERPL-SCNC: 105 MMOL/L (ref 95–110)
CO2 SERPL-SCNC: 23 MMOL/L (ref 23–29)
CREAT SERPL-MCNC: 1.1 MG/DL (ref 0.5–1.4)
EST. GFR  (NO RACE VARIABLE): >60 ML/MIN/1.73 M^2
ESTIMATED AVG GLUCOSE: 203 MG/DL (ref 68–131)
GLUCOSE SERPL-MCNC: 240 MG/DL (ref 70–110)
HBA1C MFR BLD: 8.7 % (ref 4–5.6)
POTASSIUM SERPL-SCNC: 4.3 MMOL/L (ref 3.5–5.1)
SODIUM SERPL-SCNC: 138 MMOL/L (ref 136–145)

## 2023-04-28 PROCEDURE — 80048 BASIC METABOLIC PNL TOTAL CA: CPT | Mod: HCNC | Performed by: PHYSICIAN ASSISTANT

## 2023-04-28 PROCEDURE — 36415 COLL VENOUS BLD VENIPUNCTURE: CPT | Mod: HCNC,PO | Performed by: PHYSICIAN ASSISTANT

## 2023-04-28 PROCEDURE — 83036 HEMOGLOBIN GLYCOSYLATED A1C: CPT | Mod: HCNC | Performed by: PHYSICIAN ASSISTANT

## 2023-05-08 ENCOUNTER — OFFICE VISIT (OUTPATIENT)
Dept: FAMILY MEDICINE | Facility: CLINIC | Age: 73
End: 2023-05-08
Payer: MEDICARE

## 2023-05-08 VITALS
TEMPERATURE: 98 F | DIASTOLIC BLOOD PRESSURE: 88 MMHG | SYSTOLIC BLOOD PRESSURE: 152 MMHG | OXYGEN SATURATION: 95 % | WEIGHT: 307.56 LBS | BODY MASS INDEX: 40.76 KG/M2 | HEIGHT: 73 IN | HEART RATE: 93 BPM

## 2023-05-08 DIAGNOSIS — E11.00 UNCONTROLLED TYPE 2 DM WITH HYPEROSMOLAR NONKETOTIC HYPERGLYCEMIA: ICD-10-CM

## 2023-05-08 DIAGNOSIS — I10 PRIMARY HYPERTENSION: Primary | ICD-10-CM

## 2023-05-08 DIAGNOSIS — E11.69 HYPERLIPIDEMIA ASSOCIATED WITH TYPE 2 DIABETES MELLITUS: ICD-10-CM

## 2023-05-08 DIAGNOSIS — E78.5 HYPERLIPIDEMIA, UNSPECIFIED HYPERLIPIDEMIA TYPE: ICD-10-CM

## 2023-05-08 DIAGNOSIS — E11.59 HYPERTENSION ASSOCIATED WITH DIABETES: ICD-10-CM

## 2023-05-08 DIAGNOSIS — E66.01 MORBID OBESITY: ICD-10-CM

## 2023-05-08 DIAGNOSIS — E78.2 MIXED HYPERLIPIDEMIA: ICD-10-CM

## 2023-05-08 DIAGNOSIS — I15.2 HYPERTENSION ASSOCIATED WITH DIABETES: ICD-10-CM

## 2023-05-08 DIAGNOSIS — E11.40 TYPE 2 DIABETES, CONTROLLED, WITH NEUROPATHY: ICD-10-CM

## 2023-05-08 DIAGNOSIS — E78.5 HYPERLIPIDEMIA ASSOCIATED WITH TYPE 2 DIABETES MELLITUS: ICD-10-CM

## 2023-05-08 DIAGNOSIS — Z91.199 NON-COMPLIANCE: ICD-10-CM

## 2023-05-08 PROCEDURE — 1126F PR PAIN SEVERITY QUANTIFIED, NO PAIN PRESENT: ICD-10-PCS | Mod: HCNC,CPTII,S$GLB, | Performed by: PHYSICIAN ASSISTANT

## 2023-05-08 PROCEDURE — 1126F AMNT PAIN NOTED NONE PRSNT: CPT | Mod: HCNC,CPTII,S$GLB, | Performed by: PHYSICIAN ASSISTANT

## 2023-05-08 PROCEDURE — 3079F PR MOST RECENT DIASTOLIC BLOOD PRESSURE 80-89 MM HG: ICD-10-PCS | Mod: HCNC,CPTII,S$GLB, | Performed by: PHYSICIAN ASSISTANT

## 2023-05-08 PROCEDURE — 4010F PR ACE/ARB THEARPY RXD/TAKEN: ICD-10-PCS | Mod: HCNC,CPTII,S$GLB, | Performed by: PHYSICIAN ASSISTANT

## 2023-05-08 PROCEDURE — 99214 PR OFFICE/OUTPT VISIT, EST, LEVL IV, 30-39 MIN: ICD-10-PCS | Mod: HCNC,S$GLB,, | Performed by: PHYSICIAN ASSISTANT

## 2023-05-08 PROCEDURE — 4010F ACE/ARB THERAPY RXD/TAKEN: CPT | Mod: HCNC,CPTII,S$GLB, | Performed by: PHYSICIAN ASSISTANT

## 2023-05-08 PROCEDURE — 3008F BODY MASS INDEX DOCD: CPT | Mod: HCNC,CPTII,S$GLB, | Performed by: PHYSICIAN ASSISTANT

## 2023-05-08 PROCEDURE — 99999 PR PBB SHADOW E&M-EST. PATIENT-LVL III: ICD-10-PCS | Mod: PBBFAC,HCNC,, | Performed by: PHYSICIAN ASSISTANT

## 2023-05-08 PROCEDURE — 3052F PR MOST RECENT HEMOGLOBIN A1C LEVEL 8.0 - < 9.0%: ICD-10-PCS | Mod: HCNC,CPTII,S$GLB, | Performed by: PHYSICIAN ASSISTANT

## 2023-05-08 PROCEDURE — 3077F PR MOST RECENT SYSTOLIC BLOOD PRESSURE >= 140 MM HG: ICD-10-PCS | Mod: HCNC,CPTII,S$GLB, | Performed by: PHYSICIAN ASSISTANT

## 2023-05-08 PROCEDURE — 3077F SYST BP >= 140 MM HG: CPT | Mod: HCNC,CPTII,S$GLB, | Performed by: PHYSICIAN ASSISTANT

## 2023-05-08 PROCEDURE — 99214 OFFICE O/P EST MOD 30 MIN: CPT | Mod: HCNC,S$GLB,, | Performed by: PHYSICIAN ASSISTANT

## 2023-05-08 PROCEDURE — 1159F MED LIST DOCD IN RCRD: CPT | Mod: HCNC,CPTII,S$GLB, | Performed by: PHYSICIAN ASSISTANT

## 2023-05-08 PROCEDURE — 3008F PR BODY MASS INDEX (BMI) DOCUMENTED: ICD-10-PCS | Mod: HCNC,CPTII,S$GLB, | Performed by: PHYSICIAN ASSISTANT

## 2023-05-08 PROCEDURE — 1159F PR MEDICATION LIST DOCUMENTED IN MEDICAL RECORD: ICD-10-PCS | Mod: HCNC,CPTII,S$GLB, | Performed by: PHYSICIAN ASSISTANT

## 2023-05-08 PROCEDURE — 3288F FALL RISK ASSESSMENT DOCD: CPT | Mod: HCNC,CPTII,S$GLB, | Performed by: PHYSICIAN ASSISTANT

## 2023-05-08 PROCEDURE — 1101F PT FALLS ASSESS-DOCD LE1/YR: CPT | Mod: HCNC,CPTII,S$GLB, | Performed by: PHYSICIAN ASSISTANT

## 2023-05-08 PROCEDURE — 3288F PR FALLS RISK ASSESSMENT DOCUMENTED: ICD-10-PCS | Mod: HCNC,CPTII,S$GLB, | Performed by: PHYSICIAN ASSISTANT

## 2023-05-08 PROCEDURE — 3079F DIAST BP 80-89 MM HG: CPT | Mod: HCNC,CPTII,S$GLB, | Performed by: PHYSICIAN ASSISTANT

## 2023-05-08 PROCEDURE — 1101F PR PT FALLS ASSESS DOC 0-1 FALLS W/OUT INJ PAST YR: ICD-10-PCS | Mod: HCNC,CPTII,S$GLB, | Performed by: PHYSICIAN ASSISTANT

## 2023-05-08 PROCEDURE — 3052F HG A1C>EQUAL 8.0%<EQUAL 9.0%: CPT | Mod: HCNC,CPTII,S$GLB, | Performed by: PHYSICIAN ASSISTANT

## 2023-05-08 PROCEDURE — 1160F PR REVIEW ALL MEDS BY PRESCRIBER/CLIN PHARMACIST DOCUMENTED: ICD-10-PCS | Mod: HCNC,CPTII,S$GLB, | Performed by: PHYSICIAN ASSISTANT

## 2023-05-08 PROCEDURE — 99999 PR PBB SHADOW E&M-EST. PATIENT-LVL III: CPT | Mod: PBBFAC,HCNC,, | Performed by: PHYSICIAN ASSISTANT

## 2023-05-08 PROCEDURE — 1160F RVW MEDS BY RX/DR IN RCRD: CPT | Mod: HCNC,CPTII,S$GLB, | Performed by: PHYSICIAN ASSISTANT

## 2023-05-08 RX ORDER — ATORVASTATIN CALCIUM 10 MG/1
10 TABLET, FILM COATED ORAL DAILY
Qty: 90 TABLET | Refills: 3 | Status: SHIPPED | OUTPATIENT
Start: 2023-05-08 | End: 2024-03-19 | Stop reason: SDUPTHER

## 2023-05-08 RX ORDER — TELMISARTAN 80 MG/1
80 TABLET ORAL DAILY
Qty: 90 TABLET | Refills: 3 | Status: ON HOLD | OUTPATIENT
Start: 2023-05-08 | End: 2024-03-09 | Stop reason: HOSPADM

## 2023-05-08 RX ORDER — METFORMIN HYDROCHLORIDE 500 MG/1
1000 TABLET, EXTENDED RELEASE ORAL 2 TIMES DAILY WITH MEALS
Qty: 360 TABLET | Refills: 3 | Status: SHIPPED | OUTPATIENT
Start: 2023-05-08 | End: 2024-03-19 | Stop reason: SDUPTHER

## 2023-05-08 RX ORDER — METOPROLOL SUCCINATE 50 MG/1
TABLET, EXTENDED RELEASE ORAL
Qty: 90 TABLET | Refills: 3 | Status: SHIPPED | OUTPATIENT
Start: 2023-05-08 | End: 2023-08-07

## 2023-06-01 PROBLEM — Z91.199 NON-COMPLIANCE: Status: ACTIVE | Noted: 2023-06-01

## 2023-06-01 NOTE — PROGRESS NOTES
Subjective     Patient ID: Roosevelt Moser is a 72 y.o. male.    Chief Complaint: Follow-up (Follow up)    HPI  Pt. Not checking glucose  Not interested in preventive care   Review of Systems   Constitutional:  Positive for activity change and fatigue. Negative for appetite change, chills, diaphoresis, fever and unexpected weight change.   HENT: Negative.     Eyes: Negative.    Respiratory: Negative.  Negative for cough, shortness of breath and wheezing.    Cardiovascular: Negative.  Negative for chest pain and leg swelling.   Gastrointestinal: Negative.    Endocrine: Negative.    Genitourinary: Negative.    Musculoskeletal: Negative.    Integumentary:  Negative for rash. Negative.   Neurological: Negative.         Objective     Physical Exam  Vitals reviewed.   Constitutional:       General: He is not in acute distress.     Appearance: Normal appearance. He is obese. He is not ill-appearing, toxic-appearing or diaphoretic.   HENT:      Head: Normocephalic and atraumatic.      Right Ear: Tympanic membrane, ear canal and external ear normal. There is no impacted cerumen.      Left Ear: Tympanic membrane, ear canal and external ear normal. There is no impacted cerumen.      Nose: Nose normal.      Mouth/Throat:      Mouth: Mucous membranes are moist.      Pharynx: Oropharynx is clear. No oropharyngeal exudate or posterior oropharyngeal erythema.   Eyes:      General: No scleral icterus.     Conjunctiva/sclera: Conjunctivae normal.   Neck:      Vascular: No carotid bruit.   Cardiovascular:      Rate and Rhythm: Normal rate and regular rhythm.      Pulses: Normal pulses.      Heart sounds: Normal heart sounds. No murmur heard.    No friction rub. No gallop.   Pulmonary:      Effort: Pulmonary effort is normal. No respiratory distress.      Breath sounds: Normal breath sounds. No stridor. No wheezing, rhonchi or rales.   Abdominal:      General: Abdomen is flat. Bowel sounds are normal. There is no distension.       Palpations: Abdomen is soft. There is no mass.      Tenderness: There is no abdominal tenderness. There is no right CVA tenderness, left CVA tenderness, guarding or rebound.      Hernia: No hernia is present.   Musculoskeletal:         General: No swelling.      Cervical back: Normal range of motion and neck supple. No rigidity or tenderness.      Right lower leg: No edema.      Left lower leg: No edema.   Lymphadenopathy:      Cervical: No cervical adenopathy.   Skin:     General: Skin is warm and dry.      Findings: No rash.   Neurological:      General: No focal deficit present.      Mental Status: He is alert and oriented to person, place, and time.          Assessment and Plan     1. Primary hypertension  -     Basic Metabolic Panel; Future; Expected date: 08/08/2023  -     Hemoglobin A1C; Future; Expected date: 08/08/2023    2. Hypertension associated with diabetes  -     telmisartan (MICARDIS) 80 MG Tab; Take 1 tablet (80 mg total) by mouth once daily.  Dispense: 90 tablet; Refill: 3  -     metoprolol succinate (TOPROL-XL) 50 MG 24 hr tablet; TAKE 1 TABLET ONE TIME DAILY  Dispense: 90 tablet; Refill: 3  -     Basic Metabolic Panel; Future; Expected date: 08/08/2023  -     Hemoglobin A1C; Future; Expected date: 08/08/2023    3. Type 2 diabetes, controlled, with neuropathy  -     metFORMIN (GLUCOPHAGE-XR) 500 MG ER 24hr tablet; Take 2 tablets (1,000 mg total) by mouth 2 (two) times daily with meals.  Dispense: 360 tablet; Refill: 3  -     Basic Metabolic Panel; Future; Expected date: 08/08/2023  -     Hemoglobin A1C; Future; Expected date: 08/08/2023    4. Hyperlipidemia associated with type 2 diabetes mellitus  -     atorvastatin (LIPITOR) 10 MG tablet; Take 1 tablet (10 mg total) by mouth once daily.  Dispense: 90 tablet; Refill: 3  -     Basic Metabolic Panel; Future; Expected date: 08/08/2023  -     Hemoglobin A1C; Future; Expected date: 08/08/2023    5. Hyperlipidemia, unspecified hyperlipidemia type  -      atorvastatin (LIPITOR) 10 MG tablet; Take 1 tablet (10 mg total) by mouth once daily.  Dispense: 90 tablet; Refill: 3  -     Basic Metabolic Panel; Future; Expected date: 08/08/2023  -     Hemoglobin A1C; Future; Expected date: 08/08/2023    6. Mixed hyperlipidemia  -     Basic Metabolic Panel; Future; Expected date: 08/08/2023  -     Hemoglobin A1C; Future; Expected date: 08/08/2023    7. Uncontrolled type 2 DM with hyperosmolar nonketotic hyperglycemia  -     Basic Metabolic Panel; Future; Expected date: 08/08/2023  -     Hemoglobin A1C; Future; Expected date: 08/08/2023    8. Morbid obesity  -     Basic Metabolic Panel; Future; Expected date: 08/08/2023  -     Hemoglobin A1C; Future; Expected date: 08/08/2023        Edward was seen today for follow-up.    Diagnoses and all orders for this visit:    Primary hypertension  -     Basic Metabolic Panel; Future  -     Hemoglobin A1C; Future    Hypertension associated with diabetes  -     telmisartan (MICARDIS) 80 MG Tab; Take 1 tablet (80 mg total) by mouth once daily.  -     metoprolol succinate (TOPROL-XL) 50 MG 24 hr tablet; TAKE 1 TABLET ONE TIME DAILY  -     Basic Metabolic Panel; Future  -     Hemoglobin A1C; Future    Type 2 diabetes, controlled, with neuropathy  -     metFORMIN (GLUCOPHAGE-XR) 500 MG ER 24hr tablet; Take 2 tablets (1,000 mg total) by mouth 2 (two) times daily with meals.  -     Basic Metabolic Panel; Future  -     Hemoglobin A1C; Future    Hyperlipidemia associated with type 2 diabetes mellitus  -     atorvastatin (LIPITOR) 10 MG tablet; Take 1 tablet (10 mg total) by mouth once daily.  -     Basic Metabolic Panel; Future  -     Hemoglobin A1C; Future    Hyperlipidemia, unspecified hyperlipidemia type  -     atorvastatin (LIPITOR) 10 MG tablet; Take 1 tablet (10 mg total) by mouth once daily.  -     Basic Metabolic Panel; Future  -     Hemoglobin A1C; Future    Mixed hyperlipidemia  -     Basic Metabolic Panel; Future  -     Hemoglobin A1C;  Future    Uncontrolled type 2 DM with hyperosmolar nonketotic hyperglycemia  -     Basic Metabolic Panel; Future  -     Hemoglobin A1C; Future    Morbid obesity  -     Basic Metabolic Panel; Future  -     Hemoglobin A1C; Future    Non-compliance       Discussed diet  Discussed exercise  Discussed immunization needs  Pt. Needs to establish care with new PCP  Serial BP checks and log       No follow-ups on file.

## 2023-07-25 ENCOUNTER — PATIENT OUTREACH (OUTPATIENT)
Dept: ADMINISTRATIVE | Facility: HOSPITAL | Age: 73
End: 2023-07-25
Payer: MEDICARE

## 2023-07-25 NOTE — PROGRESS NOTES
Patient has upcoming appt on 8/7 with PA.  Added to appt note that he needs to establish care with new PCP

## 2023-07-31 ENCOUNTER — LAB VISIT (OUTPATIENT)
Dept: LAB | Facility: HOSPITAL | Age: 73
End: 2023-07-31
Attending: PHYSICIAN ASSISTANT
Payer: MEDICARE

## 2023-07-31 DIAGNOSIS — E11.00 UNCONTROLLED TYPE 2 DM WITH HYPEROSMOLAR NONKETOTIC HYPERGLYCEMIA: ICD-10-CM

## 2023-07-31 DIAGNOSIS — I10 PRIMARY HYPERTENSION: ICD-10-CM

## 2023-07-31 DIAGNOSIS — E78.5 HYPERLIPIDEMIA, UNSPECIFIED HYPERLIPIDEMIA TYPE: ICD-10-CM

## 2023-07-31 DIAGNOSIS — E78.2 MIXED HYPERLIPIDEMIA: ICD-10-CM

## 2023-07-31 DIAGNOSIS — E11.59 HYPERTENSION ASSOCIATED WITH DIABETES: ICD-10-CM

## 2023-07-31 DIAGNOSIS — E11.40 TYPE 2 DIABETES, CONTROLLED, WITH NEUROPATHY: ICD-10-CM

## 2023-07-31 DIAGNOSIS — E66.01 MORBID OBESITY: ICD-10-CM

## 2023-07-31 DIAGNOSIS — E11.69 HYPERLIPIDEMIA ASSOCIATED WITH TYPE 2 DIABETES MELLITUS: ICD-10-CM

## 2023-07-31 DIAGNOSIS — I15.2 HYPERTENSION ASSOCIATED WITH DIABETES: ICD-10-CM

## 2023-07-31 DIAGNOSIS — E78.5 HYPERLIPIDEMIA ASSOCIATED WITH TYPE 2 DIABETES MELLITUS: ICD-10-CM

## 2023-07-31 LAB
ANION GAP SERPL CALC-SCNC: 14 MMOL/L (ref 8–16)
BUN SERPL-MCNC: 16 MG/DL (ref 8–23)
CALCIUM SERPL-MCNC: 9.7 MG/DL (ref 8.7–10.5)
CHLORIDE SERPL-SCNC: 106 MMOL/L (ref 95–110)
CO2 SERPL-SCNC: 19 MMOL/L (ref 23–29)
CREAT SERPL-MCNC: 1 MG/DL (ref 0.5–1.4)
EST. GFR  (NO RACE VARIABLE): >60 ML/MIN/1.73 M^2
ESTIMATED AVG GLUCOSE: 206 MG/DL (ref 68–131)
GLUCOSE SERPL-MCNC: 202 MG/DL (ref 70–110)
HBA1C MFR BLD: 8.8 % (ref 4–5.6)
POTASSIUM SERPL-SCNC: 4.7 MMOL/L (ref 3.5–5.1)
SODIUM SERPL-SCNC: 139 MMOL/L (ref 136–145)

## 2023-07-31 PROCEDURE — 80048 BASIC METABOLIC PNL TOTAL CA: CPT | Mod: HCNC | Performed by: PHYSICIAN ASSISTANT

## 2023-07-31 PROCEDURE — 83036 HEMOGLOBIN GLYCOSYLATED A1C: CPT | Mod: HCNC | Performed by: PHYSICIAN ASSISTANT

## 2023-07-31 PROCEDURE — 36415 COLL VENOUS BLD VENIPUNCTURE: CPT | Mod: HCNC,PO | Performed by: PHYSICIAN ASSISTANT

## 2023-08-07 ENCOUNTER — OFFICE VISIT (OUTPATIENT)
Dept: FAMILY MEDICINE | Facility: CLINIC | Age: 73
End: 2023-08-07
Payer: MEDICARE

## 2023-08-07 VITALS
WEIGHT: 310.88 LBS | HEIGHT: 73 IN | OXYGEN SATURATION: 96 % | HEART RATE: 98 BPM | DIASTOLIC BLOOD PRESSURE: 86 MMHG | RESPIRATION RATE: 16 BRPM | BODY MASS INDEX: 41.2 KG/M2 | TEMPERATURE: 99 F | SYSTOLIC BLOOD PRESSURE: 152 MMHG

## 2023-08-07 DIAGNOSIS — Z86.69 HX OF GUILLAIN-BARRE SYNDROME: ICD-10-CM

## 2023-08-07 DIAGNOSIS — E66.01 MORBID OBESITY WITH BMI OF 40.0-44.9, ADULT: ICD-10-CM

## 2023-08-07 DIAGNOSIS — M17.0 BILATERAL PRIMARY OSTEOARTHRITIS OF KNEE: ICD-10-CM

## 2023-08-07 DIAGNOSIS — E11.59 HYPERTENSION ASSOCIATED WITH DIABETES: Primary | ICD-10-CM

## 2023-08-07 DIAGNOSIS — E11.69 HYPERLIPIDEMIA ASSOCIATED WITH TYPE 2 DIABETES MELLITUS: ICD-10-CM

## 2023-08-07 DIAGNOSIS — E11.65 UNCONTROLLED TYPE 2 DIABETES MELLITUS WITH HYPERGLYCEMIA: ICD-10-CM

## 2023-08-07 DIAGNOSIS — I15.2 HYPERTENSION ASSOCIATED WITH DIABETES: Primary | ICD-10-CM

## 2023-08-07 DIAGNOSIS — E78.5 HYPERLIPIDEMIA ASSOCIATED WITH TYPE 2 DIABETES MELLITUS: ICD-10-CM

## 2023-08-07 PROCEDURE — 1160F RVW MEDS BY RX/DR IN RCRD: CPT | Mod: HCNC,CPTII,S$GLB,

## 2023-08-07 PROCEDURE — 3288F FALL RISK ASSESSMENT DOCD: CPT | Mod: HCNC,CPTII,S$GLB,

## 2023-08-07 PROCEDURE — 99999 PR PBB SHADOW E&M-EST. PATIENT-LVL IV: ICD-10-PCS | Mod: PBBFAC,HCNC,,

## 2023-08-07 PROCEDURE — 1101F PT FALLS ASSESS-DOCD LE1/YR: CPT | Mod: HCNC,CPTII,S$GLB,

## 2023-08-07 PROCEDURE — 3288F PR FALLS RISK ASSESSMENT DOCUMENTED: ICD-10-PCS | Mod: HCNC,CPTII,S$GLB,

## 2023-08-07 PROCEDURE — 3008F PR BODY MASS INDEX (BMI) DOCUMENTED: ICD-10-PCS | Mod: HCNC,CPTII,S$GLB,

## 2023-08-07 PROCEDURE — 99215 OFFICE O/P EST HI 40 MIN: CPT | Mod: HCNC,S$GLB,,

## 2023-08-07 PROCEDURE — 3079F DIAST BP 80-89 MM HG: CPT | Mod: HCNC,CPTII,S$GLB,

## 2023-08-07 PROCEDURE — 1126F PR PAIN SEVERITY QUANTIFIED, NO PAIN PRESENT: ICD-10-PCS | Mod: HCNC,CPTII,S$GLB,

## 2023-08-07 PROCEDURE — 3077F PR MOST RECENT SYSTOLIC BLOOD PRESSURE >= 140 MM HG: ICD-10-PCS | Mod: HCNC,CPTII,S$GLB,

## 2023-08-07 PROCEDURE — 3008F BODY MASS INDEX DOCD: CPT | Mod: HCNC,CPTII,S$GLB,

## 2023-08-07 PROCEDURE — 4010F ACE/ARB THERAPY RXD/TAKEN: CPT | Mod: HCNC,CPTII,S$GLB,

## 2023-08-07 PROCEDURE — 99215 PR OFFICE/OUTPT VISIT, EST, LEVL V, 40-54 MIN: ICD-10-PCS | Mod: HCNC,S$GLB,,

## 2023-08-07 PROCEDURE — 1126F AMNT PAIN NOTED NONE PRSNT: CPT | Mod: HCNC,CPTII,S$GLB,

## 2023-08-07 PROCEDURE — 3079F PR MOST RECENT DIASTOLIC BLOOD PRESSURE 80-89 MM HG: ICD-10-PCS | Mod: HCNC,CPTII,S$GLB,

## 2023-08-07 PROCEDURE — 3052F HG A1C>EQUAL 8.0%<EQUAL 9.0%: CPT | Mod: HCNC,CPTII,S$GLB,

## 2023-08-07 PROCEDURE — 4010F PR ACE/ARB THEARPY RXD/TAKEN: ICD-10-PCS | Mod: HCNC,CPTII,S$GLB,

## 2023-08-07 PROCEDURE — 3077F SYST BP >= 140 MM HG: CPT | Mod: HCNC,CPTII,S$GLB,

## 2023-08-07 PROCEDURE — 1160F PR REVIEW ALL MEDS BY PRESCRIBER/CLIN PHARMACIST DOCUMENTED: ICD-10-PCS | Mod: HCNC,CPTII,S$GLB,

## 2023-08-07 PROCEDURE — 99999 PR PBB SHADOW E&M-EST. PATIENT-LVL IV: CPT | Mod: PBBFAC,HCNC,,

## 2023-08-07 PROCEDURE — 3052F PR MOST RECENT HEMOGLOBIN A1C LEVEL 8.0 - < 9.0%: ICD-10-PCS | Mod: HCNC,CPTII,S$GLB,

## 2023-08-07 PROCEDURE — 1159F PR MEDICATION LIST DOCUMENTED IN MEDICAL RECORD: ICD-10-PCS | Mod: HCNC,CPTII,S$GLB,

## 2023-08-07 PROCEDURE — 1159F MED LIST DOCD IN RCRD: CPT | Mod: HCNC,CPTII,S$GLB,

## 2023-08-07 PROCEDURE — 1101F PR PT FALLS ASSESS DOC 0-1 FALLS W/OUT INJ PAST YR: ICD-10-PCS | Mod: HCNC,CPTII,S$GLB,

## 2023-08-07 RX ORDER — METOPROLOL SUCCINATE 100 MG/1
100 TABLET, EXTENDED RELEASE ORAL DAILY
Qty: 90 TABLET | Refills: 1
Start: 2023-08-07 | End: 2024-02-09 | Stop reason: SDUPTHER

## 2023-08-07 RX ORDER — METOPROLOL SUCCINATE 100 MG/1
100 TABLET, EXTENDED RELEASE ORAL DAILY
Qty: 90 TABLET | Refills: 1 | Status: SHIPPED | OUTPATIENT
Start: 2023-08-07 | End: 2023-08-07

## 2023-08-07 RX ORDER — GLIMEPIRIDE 2 MG/1
2 TABLET ORAL
Qty: 90 TABLET | Refills: 1 | Status: CANCELLED | OUTPATIENT
Start: 2023-08-07 | End: 2024-08-06

## 2023-08-07 NOTE — PROGRESS NOTES
Subjective:       Patient ID: Roosevelt Moser is a 72 y.o. male.    Chief Complaint: Follow-up (3 mo f/u)    Routine follow up chronic medical conditions. Overall states that he is feeling well     HTN. Elevated. States that he was working out in the yard prior to appointment. Elevated on previous appointments as well. Will increase therapy. He wishes to hold off on refill of the higher dose bc he already has a lot of the 50 left over. Will take 2 of the 50 and requests refill when he needs it in the future.     DM. Worsened. Taking metformin. Knows that he needs to modify his diet. Discussed started amaryl. Refuses changes in therapy today. Previously on ozempic and he states it didn't work well for him.     HLD. On statin therapy.     He doesn't want to establish with a new PCP. He states he would rather see me.     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         Past Medical History:   Diagnosis Date    Diabetes mellitus, type 2     Guill[place]     Hyperlipidemia     Hypertension        Review of patient's allergies indicates:  No Known Allergies      Current Outpatient Medications:     atorvastatin (LIPITOR) 10 MG tablet, Take 1 tablet (10 mg total) by mouth once daily., Disp: 90 tablet, Rfl: 3    metFORMIN (GLUCOPHAGE-XR) 500 MG ER 24hr tablet, Take 2 tablets (1,000 mg total) by mouth 2 (two) times daily with meals., Disp: 360 tablet, Rfl: 3    telmisartan (MICARDIS) 80 MG Tab, Take 1 tablet (80 mg total) by mouth once daily., Disp: 90 tablet, Rfl: 3    metoprolol succinate (TOPROL-XL) 100 MG 24 hr tablet, Take 1 tablet (100 mg total) by mouth once daily., Disp: 90 tablet, Rfl: 1    Review of Systems   Respiratory:  Negative for shortness of breath.    Cardiovascular:  Negative for chest pain and leg swelling.   Musculoskeletal:  Positive for arthralgias and gait problem.       Objective:      BP (!) 152/86 (BP Location: Left arm, Patient Position: Sitting, BP Method: Large (Manual))   Pulse 98   Temp  "98.6 °F (37 °C) (Oral)   Resp 16   Ht 6' 1" (1.854 m)   Wt (!) 141 kg (310 lb 13.6 oz)   SpO2 96%   BMI 41.01 kg/m²   Physical Exam  Vitals reviewed.   Constitutional:       General: He is not in acute distress.     Appearance: Normal appearance. He is obese. He is not ill-appearing, toxic-appearing or diaphoretic.   HENT:      Head: Normocephalic.      Right Ear: External ear normal.      Left Ear: External ear normal.      Nose: Nose normal. No congestion or rhinorrhea.      Mouth/Throat:      Mouth: Mucous membranes are moist.      Pharynx: Oropharynx is clear.   Eyes:      General: No scleral icterus.        Right eye: No discharge.         Left eye: No discharge.      Extraocular Movements: Extraocular movements intact.      Conjunctiva/sclera: Conjunctivae normal.   Cardiovascular:      Rate and Rhythm: Normal rate and regular rhythm.      Pulses: Normal pulses.      Heart sounds: Normal heart sounds. No murmur heard.     No friction rub. No gallop.   Pulmonary:      Effort: Pulmonary effort is normal. No respiratory distress.      Breath sounds: Normal breath sounds. No wheezing, rhonchi or rales.   Chest:      Chest wall: No tenderness.   Musculoskeletal:         General: Tenderness present. No swelling or deformity. Normal range of motion.      Cervical back: Normal range of motion.      Right lower leg: No edema.      Left lower leg: No edema.   Skin:     General: Skin is warm and dry.      Capillary Refill: Capillary refill takes less than 2 seconds.      Coloration: Skin is not jaundiced.      Findings: No bruising, erythema, lesion or rash.   Neurological:      Mental Status: He is alert and oriented to person, place, and time.      Gait: Gait abnormal.   Psychiatric:         Mood and Affect: Mood normal.         Behavior: Behavior normal.         Thought Content: Thought content normal.         Judgment: Judgment normal.         Assessment:       1. Hypertension associated with diabetes    2. " Hyperlipidemia associated with type 2 diabetes mellitus    3. Uncontrolled type 2 diabetes mellitus with hyperglycemia    4. Morbid obesity with BMI of 40.0-44.9, adult    5. Hx of Guillain-[place] syndrome    6. Bilateral primary osteoarthritis of knee        Plan:       Hypertension associated with diabetes  -     Comprehensive Metabolic Panel; Future; Expected date: 11/07/2023  -     Discontinue: metoprolol succinate (TOPROL-XL) 100 MG 24 hr tablet; Take 1 tablet (100 mg total) by mouth once daily.  Dispense: 90 tablet; Refill: 1  -     metoprolol succinate (TOPROL-XL) 100 MG 24 hr tablet; Take 1 tablet (100 mg total) by mouth once daily.  Dispense: 90 tablet; Refill: 1        -     Increase toprol. Will take 2 of his 50mg tablets daily until he runs out.     Hyperlipidemia associated with type 2 diabetes mellitus  -     Comprehensive Metabolic Panel; Future; Expected date: 11/07/2023        -    Continue meds. Will continue to monitor.     Uncontrolled type 2 diabetes mellitus with hyperglycemia  -     Comprehensive Metabolic Panel; Future; Expected date: 11/07/2023  -     Hemoglobin A1C; Future; Expected date: 11/07/2023  -     Microalbumin/Creatinine Ratio, Urine; Future; Expected date: 11/07/2023       -     Poor adherence to diet. Refuses additional therapy at this time.     Morbid obesity with BMI of 40.0-44.9, adult        -     Patient would benefit from healthy diet, exercise and weight loss. Overall health and wellbeing would likely improve.      Hx of Guillain-[place] syndrome          -     Ambulates with cane    Bilateral primary osteoarthritis of knee          -    Ambulates with cane. Will continue to monitor.              6 months me labs prior          Miguel Angel Enriquez PA-C  Family Medicine Physician Assistant       I spent a total of 40 minutes on the day of the visit.This includes face to face time and non-face to face time preparing to see the patient (eg, review of tests), obtaining and/or  reviewing separately obtained history, documenting clinical information in the electronic or other health record, independently interpreting results and communicating results to the patient/family/caregiver, or care coordinator.      We have addressed [4] Moderate: 1 or more chronic illnesses with exacerbation, progression, or side effects of treatment / 2 or more stable chronic illnesses / 1 undiagnosed new problem with uncertain prognosis / 1 acute illness with systemic symptoms / 1 acute complicated injury  The complexity of the data reviewed and analyzed for this visit was [3] Limited (Reviewed prior external note, ordered unique testing or reviewed the results of each unique test)   The risk of complications and/or morbidity or mortality are [4] Moderate risk (I.e. prescription drug management / decision regarding minor surgery with identified pt or procedure risk factors / decision regarding elective major surgery without identified pt or procedure risk factors / diagnosis or treatment significantly limited by social determinants of health)   The level of Medical Decision Making for this visit is [4] Moderate

## 2023-08-07 NOTE — PATIENT INSTRUCTIONS
Vidal Albert,     If you are due for any health screening(s) below please notify me so we can arrange them to be ordered and scheduled to maintain your health. Most healthy patients complete it. Don't lose out on improving your health.     Tests to Keep You Healthy    Eye Exam: ORDERED BUT NOT SCHEDULED  Colon Cancer Screening: DUE  Last Blood Pressure <= 139/89 (8/7/2023): NO  Last HbA1c < 8 (07/31/2023): NO         Colon Cancer Screening    Of cancers affecting both men and women, colorectal cancer is the third leading cancer killer in the [place]. But it doesnt have to be. Screening can prevent colorectal cancer or find it at an early stage when treatment often leads to a cure.    A colonoscopy is the preferred test for detecting colon cancer. It is needed only once every 10 years if results are negative. While sedated, a flexible, lighted tube with a tiny camera is inserted into the rectum and advanced through the colon to look for cancers. An alternative screening test that is used at home and returned to the lab may also be used. It detects hidden blood in bowel movements which could indicate cancer in the colon. If results are positive, you will need a colonoscopy to determine if the blood is a sign of cancer. This type of follow up (diagnostic) colonoscopy usually requires additional copays as required by your insurance provider. Please contact your PCP if you have any questions.         Diabetic Retinal Eye Exam    Diabetes is the #1 cause of blindness in the [place] - early detection before signs or symptoms develop can prevent debilitating blindness.    Once-a-year screening is recommended for all diabetic patients. This exam can prevent and treat diabetes complications in the eye before developing symptoms. This can be done with a special camera is used to take photographs of the back of your eye without having to dilate them, or you can see an eye doctor for a full dilated exam.      A1c every 3-6  "months, lipid panel every year, microalbumin (urine test) once per year, comprehensive foot exam once per year, dilated eye exam at least once per year, dental exam every 6 months, flu vaccination every year, and pneumonia vaccination(s) as recommended   Patient Education       Checking Your Blood Pressure at Home   The Basics   Written by the doctors and editors at [facility]   How is blood pressure measured? -- Blood pressure is usually measured with a device that goes around your upper arm. This is often done in a doctor's office. But some people also check their blood pressure themselves, at home or at work.  Blood pressure is explained with 2 numbers. For instance, your blood pressure might be "140 over 90." The first (top) number is the pressure inside your arteries when your heart is eitan. The second (bottom) number is the pressure inside your arteries when your heart is relaxed. The table shows how doctors and nurses define high and normal blood pressure (table 1).  If your blood pressure gets too high, it puts you at risk for heart attack, stroke, and kidney disease. High blood pressure does not usually cause symptoms. But it can be serious.  What is a home blood pressure meter? -- A home blood pressure meter (or "monitor") is a device you can use to check your blood pressure yourself. It has a cuff that goes around your upper arm (figure 1). Some devices have a cuff that goes around your wrist instead. But doctors aren't sure if these work as well. The meter also has a small screen, or dial, that shows your blood pressure numbers.  There are also special meters you can wear for a day or 2. These are different because they automatically check your blood pressure throughout the day and night, even while you are sleeping. If your doctor thinks you should use one of these devices, they will talk to you about how to wear it.  Why do I need to check my blood pressure at home? -- If your doctor knows or " suspects that you have high blood pressure, they might want you to check it at home. There are a few reasons for this. Your doctor might want to look at:  Whether your blood pressure measures the same at home as it did in the doctor's office  How well your blood pressure medicines are working  Changes in your blood pressure, for example, if it goes up and down  People who check their own blood pressure at home usually do better at keeping it low.  How do I choose a home blood pressure meter? -- When choosing a home blood pressure meter, you will probably want to think about:  Cost - Some devices cost more than others. You should also check to see if your insurance will help pay for your device.  Size - It's important to make sure the cuff fits your arm comfortably. Your doctor or nurse can help you with this.  How easy it is to use - You should make sure you understand how to use the device. You also need to be able to read the numbers on the screen.  You do not need a prescription to buy a home blood pressure meter. You can buy them at most pharmacies or over the internet. Your doctor or nurse can help you choose the right device for you.  How do I check my blood pressure at home? -- Once you have a home blood pressure meter, your doctor or nurse should check it to make sure it fits you and works correctly.  When it's time to check your blood pressure:  Go to the bathroom and empty your bladder first. Having a full bladder can temporarily increase your blood pressure, making the results inaccurate.  Sit in a chair with your feet flat on the ground.  Try to breathe normally and stay calm.  Attach the cuff to your arm. Place the cuff directly on your skin, not over your clothing. The cuff should be tight enough to not slip down, but not uncomfortably tight.  Sit and relax for about 3 to 5 minutes with the cuff on.  Follow the directions that came with your device to start measuring your blood pressure. This might  involve squeezing the bulb at the end of the tube to inflate the cuff (fill it with air). With some monitors, you just need to press a button to inflate the cuff. When the cuff fills with air, it feels like someone is squeezing your arm, but it should not hurt. Then you will slowly deflate the cuff (let the air out of it), or it will deflate by itself. The screen or dial will show your blood pressure numbers.  Stay seated and relax for 1 minute, then measure your blood pressure again.  How often should I check my blood pressure? -- It depends. Different people need to follow different schedules. Your doctor or nurse will tell you how often to check your blood pressure, and when. Some people need to check their blood pressure twice a day, in the morning and evening.  Your doctor or nurse will probably tell you to keep track of your blood pressure for at least a few days (table 2). Then they will look at the numbers. The reason for this is that it's normal for your blood pressure to change a bit from day to day. For example, the numbers might change depending on whether you recently had caffeine, just exercised, or feel stressed. Checking your blood pressure over several days - or longer - will give your doctor or nurse a better idea of what is average for you.  How should I keep track of my blood pressure? -- Some blood pressure meters will record your numbers for you, or send them to your computer or smartphone. If yours does not do this, you will need to write them down. Your doctor or nurse can help you figure out the best way to keep track of the numbers.  What if my blood pressure is high? -- Your doctor or nurse will tell you what to do if your blood pressure is high when you check it at home. If you get a number that is higher than normal, measure it again to see if it is still high. If it is very high (above a certain number, which your doctor or nurse will tell you to watch out for), you should call your  "doctor right away.  If your blood pressure is only a little high, your doctor or nurse might tell you to keep checking it for a few more days or weeks, and then call if it does not go back down. Then they can help you decide what to do next.  All topics are updated as new evidence becomes available and our peer review process is complete.  This topic retrieved from [employer] on: Sep 21, 2021.  Topic 644646 Version 4.0  Release: 29.4.2 - C29.263  © 2021 UpToDate, Inc. and/or its affiliates. All rights reserved.  table 1: Definition of normal and high blood pressure  Level  Top number  Bottom number    High 130 or above 80 or above   Elevated 120 to 129 79 or below   Normal 119 or below 79 or below   These definitions are from the American College of Cardiology/American Heart Association. Other expert groups might use slightly different definitions.  "Elevated blood pressure" is a term doctor or nurses use as a warning. It means you do not yet have high blood pressure, but your blood pressure is not as low as it should be for good health.  Graphic 40096 Version 6.0  figure 1: Using a home blood pressure meter     This is an example of a person using a home blood pressure meter.  Graphic 186781 Version 1.0    table 2: 7-day diary for checking blood pressure at home  Day 1  Day 2  Day 3  Day 4  Day 5  Day 6  Day 7    Morning  1st read Morning  1st read Morning  1st read Morning  1st read Morning  1st read Morning  1st read Morning  1st read   Systolic: __________ Systolic: __________ Systolic: __________ Systolic: __________ Systolic: __________ Systolic: __________ Systolic: __________   Diastolic: __________ Diastolic: __________ Diastolic: __________ Diastolic: __________ Diastolic: __________ Diastolic: __________ Diastolic: __________   Pulse: __________ Pulse: __________ Pulse: __________ Pulse: __________ Pulse: __________ Pulse: __________ Pulse: __________   Morning  2nd read Morning  2nd read Morning  2nd read " Morning  2nd read Morning  2nd read Morning  2nd read Morning  2nd read   Systolic: __________ Systolic: __________ Systolic: __________ Systolic: __________ Systolic: __________ Systolic: __________ Systolic: __________   Diastolic: __________ Diastolic: __________ Diastolic: __________ Diastolic: __________ Diastolic: __________ Diastolic: __________ Diastolic: __________   Pulse: __________ Pulse: __________ Pulse: __________ Pulse: __________ Pulse: __________ Pulse: __________ Pulse: __________   Evening  1st read Evening  1st read Evening  1st read Evening  1st read Evening  1st read Evening  1st read Evening  1st read   Systolic: __________ Systolic: __________ Systolic: __________ Systolic: __________ Systolic: __________ Systolic: __________ Systolic: __________   Diastolic: __________ Diastolic: __________ Diastolic: __________ Diastolic: __________ Diastolic: __________ Diastolic: __________ Diastolic: __________   Pulse: __________ Pulse: __________ Pulse: __________ Pulse: __________ Pulse: __________ Pulse: __________ Pulse: __________   Evening  2nd read Evening  2nd read Evening  2nd read Evening  2nd read Evening  2nd read Evening  2nd read Evening  2nd read   Systolic: __________ Systolic: __________ Systolic: __________ Systolic: __________ Systolic: __________ Systolic: __________ Systolic: __________   Diastolic: __________ Diastolic: __________ Diastolic: __________ Diastolic: __________ Diastolic: __________ Diastolic: __________ Diastolic: __________   Pulse: __________ Pulse: __________ Pulse: __________ Pulse: __________ Pulse: __________ Pulse: __________ Pulse: __________   Notes    Notes    Notes    Notes    Notes    Notes    Notes      ____________________ ____________________ ____________________ ____________________ ____________________ ____________________ ____________________   ____________________ ____________________ ____________________ ____________________ ____________________  ____________________ ____________________   ____________________ ____________________ ____________________ ____________________ ____________________ ____________________ ____________________   Patient name: ______________________________     Patient ID: ________________________________    Primary care provider: _______________________    Average BP: _______________________________    Graphic 941349 Version 1.0  Consumer Information Use and Disclaimer   This information is not specific medical advice and does not replace information you receive from your health care provider. This is only a brief summary of general information. It does NOT include all information about conditions, illnesses, injuries, tests, procedures, treatments, therapies, discharge instructions or life-style choices that may apply to you. You must talk with your health care provider for complete information about your health and treatment options. This information should not be used to decide whether or not to accept your health care provider's advice, instructions or recommendations. Only your health care provider has the knowledge and training to provide advice that is right for you. The use of this information is governed by the [employer] End User License Agreement, available at https://www.GTI.Tissue Regenix/en/solutions/[employer]/about/kay.The use of UpToDate content is governed by the [employer] Terms of Use. ©2021 [employer]. All rights reserved.  Copyright   © 2021 [employer] and/or its affiliates. All rights reserved.

## 2023-08-14 ENCOUNTER — TELEPHONE (OUTPATIENT)
Dept: DERMATOLOGY | Facility: CLINIC | Age: 73
End: 2023-08-14
Payer: MEDICARE

## 2023-08-14 NOTE — TELEPHONE ENCOUNTER
----- Message from Isabela Winters sent at 8/14/2023  1:32 PM CDT -----  Regarding: sooner apt  Contact: Patient  Type:  Sooner Appointment Request    Caller is requesting a sooner appointment.  Caller declined first available appointment listed below.  Caller will not accept being placed on the waitlist and is requesting a message be sent to doctor.    Name of Caller:  Patient  When is the first available appointment?    Symptoms:  boil on back of neck    Best Call Back Number:  [phone]    Additional Information:  Needs to be seen as soon as possible thanks!

## 2023-12-20 ENCOUNTER — PATIENT OUTREACH (OUTPATIENT)
Dept: ADMINISTRATIVE | Facility: HOSPITAL | Age: 73
End: 2023-12-20
Payer: MEDICARE

## 2023-12-20 NOTE — PROGRESS NOTES
Population Health Chart Review & Patient Outreach Details    Outreach Performed: YES Telephone Not Successful    Additional Pop Health Notes: Left Voice Message Regarding Overdue HM           Updates Requested / Reviewed:      Updated Care Coordination Note, Care Everywhere, , and Immunizations Reconciliation Completed or Queried: [place]         Health Maintenance Topics Overdue:    Health Maintenance Due   Topic Date Due    COVID-19 Vaccine (1) Never done    Pneumococcal Vaccines (Age 65+) (1 - PCV) Never done    Eye Exam  Never done    TETANUS VACCINE  Never done    Colorectal Cancer Screening  Never done    Shingles Vaccine (1 of 2) Never done    RSV Vaccine (Age 60+ and Pregnant patients) (1 - 1-dose 60+ series) Never done    Foot Exam  08/19/2022    Diabetes Urine Screening  10/22/2022    Influenza Vaccine (1) Never done    Lipid Panel  10/04/2023    Hemoglobin A1c  10/31/2023         Health Maintenance Topic(s) Outreach Outcomes & Actions Taken:    Eye Exam - Outreach Outcomes & Actions Taken  : Called left voice message.

## 2024-02-06 DIAGNOSIS — Z12.11 COLON CANCER SCREENING: ICD-10-CM

## 2024-02-09 DIAGNOSIS — I15.2 HYPERTENSION ASSOCIATED WITH DIABETES: ICD-10-CM

## 2024-02-09 DIAGNOSIS — E11.59 HYPERTENSION ASSOCIATED WITH DIABETES: ICD-10-CM

## 2024-02-09 RX ORDER — METOPROLOL SUCCINATE 100 MG/1
100 TABLET, EXTENDED RELEASE ORAL DAILY
Qty: 90 TABLET | Refills: 1 | Status: SHIPPED | OUTPATIENT
Start: 2024-02-09 | End: 2024-03-19 | Stop reason: SDUPTHER

## 2024-02-09 NOTE — TELEPHONE ENCOUNTER
Kayce Hernandez Staff     ----- Message from Kayce Hernandez sent at 2/9/2024  9:22 AM CST -----  Contact: pt [phone]  Type:  RX Refill Request    Who Called:  Pt   Refill or New Rx:  refill  RX Name and Strength:  metoprolol succinate (TOPROL-XL) 100 MG 24 hr tablet   How is the patient currently taking it? (ex. 1XDay):  1xday   Is this a 30 day or 90 day RX:  90  Preferred Pharmacy with phone number:  ,  [facility] - [place], [place] - [address]  [address] [address]  Phone: [phone] Fax: [phone]        Local or Mail Order:  Mail   Ordering Provider:  Zoe   Best Call Back Number:  [phone]    Additional Information:  Pt has appt in April/ he is out of town for a few months.

## 2024-03-04 ENCOUNTER — HOSPITAL ENCOUNTER (EMERGENCY)
Facility: HOSPITAL | Age: 74
Discharge: HOME | End: 2024-03-04
Attending: EMERGENCY MEDICINE
Payer: MEDICARE

## 2024-03-04 VITALS
HEART RATE: 76 BPM | BODY MASS INDEX: 41.08 KG/M2 | HEIGHT: 73 IN | WEIGHT: 310 LBS | SYSTOLIC BLOOD PRESSURE: 153 MMHG | RESPIRATION RATE: 20 BRPM | TEMPERATURE: 98 F | DIASTOLIC BLOOD PRESSURE: 95 MMHG | OXYGEN SATURATION: 99 %

## 2024-03-04 DIAGNOSIS — S42.212A CLOSED DISPLACED FRACTURE OF SURGICAL NECK OF LEFT HUMERUS, UNSPECIFIED FRACTURE MORPHOLOGY, INITIAL ENCOUNTER: Primary | ICD-10-CM
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
## 2024-03-04 PROCEDURE — 99283 EMERGENCY DEPT VISIT LOW MDM: CPT | Mod: 25

## 2024-03-04 PROCEDURE — 25000003 PHARM REV CODE 250: Performed by: EMERGENCY MEDICINE

## 2024-03-04 PROCEDURE — 29105 APPLICATION LONG ARM SPLINT: CPT | Mod: LT

## 2024-03-04 RX ORDER — HYDROCODONE BITARTRATE AND ACETAMINOPHEN 5; 325 MG/1; MG/1
1 TABLET ORAL EVERY 6 HOURS PRN
Qty: 12 TABLET | Refills: 0 | Status: SHIPPED | OUTPATIENT
Start: 2024-03-04 | End: 2024-03-07

## 2024-03-04 RX ORDER — MUPIROCIN 20 MG/G
1 OINTMENT TOPICAL
Status: COMPLETED | OUTPATIENT
Start: 2024-03-04 | End: 2024-03-04

## 2024-03-04 RX ORDER — ONDANSETRON 4 MG/1
4 TABLET, FILM COATED ORAL EVERY 6 HOURS
Qty: 12 TABLET | Refills: 0 | Status: SHIPPED | OUTPATIENT
Start: 2024-03-04 | End: 2024-03-04

## 2024-03-04 RX ORDER — ONDANSETRON 4 MG/1
4 TABLET, FILM COATED ORAL EVERY 6 HOURS
Qty: 12 TABLET | Refills: 0 | Status: SHIPPED | OUTPATIENT
Start: 2024-03-04 | End: 2024-03-07

## 2024-03-04 RX ORDER — HYDROCODONE BITARTRATE AND ACETAMINOPHEN 5; 325 MG/1; MG/1
1 TABLET ORAL EVERY 6 HOURS PRN
Qty: 12 TABLET | Refills: 0 | Status: SHIPPED | OUTPATIENT
Start: 2024-03-04 | End: 2024-03-04

## 2024-03-04 RX ADMIN — MUPIROCIN 1 TUBE: 20 OINTMENT TOPICAL at 03:03

## 2024-03-04 NOTE — ED PROVIDER NOTES
Encounter Date: 3/4/2024       History     Chief Complaint   Patient presents with    Fall     Pt comes in via ems with c/o pain to L shoulder after a slip and fall getting out of shower.      Patient was status post trip and fall.  Patient fell on the left shoulder just prior to arrival.  He did not hit his head or lose consciousness.  He complains of left arm pain.  At the worst symptoms are mild-to-moderate.  Movement makes it worse.  Rest makes it better.      Review of patient's allergies indicates:  No Known Allergies  Past Medical History:   Diagnosis Date    Diabetes mellitus, type 2     Guillain-Clio     Hyperlipidemia     Hypertension      No past surgical history on file.  No family history on file.  Social History     Tobacco Use    Smoking status: Never    Smokeless tobacco: Never   Substance Use Topics    Alcohol use: Yes     Alcohol/week: 0.0 standard drinks of alcohol     Comment: social    Drug use: No     Review of Systems   All other systems reviewed and are negative.      Physical Exam     Initial Vitals [03/04/24 1404]   BP Pulse Resp Temp SpO2   (!) 153/95 76 20 98.2 °F (36.8 °C) 99 %      MAP       --         Physical Exam    Nursing note and vitals reviewed.  Constitutional: He appears well-developed and well-nourished. He is not diaphoretic. No distress.   Pleasant, polite.   HENT:   Head: Normocephalic and atraumatic.   Eyes: EOM are normal.   Neck: Neck supple.   Normal range of motion.  Cardiovascular:  Intact distal pulses.           Pulmonary/Chest: No respiratory distress.   Musculoskeletal:      Cervical back: Normal range of motion and neck supple.      Comments: Patient is exquisitely tender to the left shoulder.  He is unable to raise shoulder secondary to pain.  There is a skin tear as well to the left mid arm towards the elbow.  Patient has a good radial pulse.  He is able to move all fingers.  There is no radial or ulnar nerve deficit.     Neurological: He is alert.   Skin:  Skin is warm and dry.   Psychiatric: He has a normal mood and affect. His behavior is normal. Judgment and thought content normal.         ED Course   Procedures  Labs Reviewed - No data to display       Imaging Results              X-Ray Shoulder 2 or More Views Left (Final result)  Result time 03/04/24 14:38:36      Final result by Papito Gonzalez MD (03/04/24 14:38:36)                   Impression:      Comminuted, displaced left proximal humerus fracture.    Severe glenohumeral osteoarthritis with intra-articular loose bodies.      Electronically signed by: Papito Gonzalez MD  Date:    03/04/2024  Time:    14:38               Narrative:    EXAMINATION:  XR SHOULDER COMPLETE 2 OR MORE VIEWS LEFT    CLINICAL HISTORY:  fall;    TECHNIQUE:  Two or three views of the left shoulder were performed.    COMPARISON:  None    FINDINGS:  There is a comminuted fracture of the left humeral neck and greater tuberosity with 1 shaft with posterior displacement of the shaft relative to the humeral head.  Severe glenohumeral joint space loss is present accompanied by marginal osteophyte formation and multiple intra-articular loose bodies.                                       X-Ray Humerus 2 View Left (Final result)  Result time 03/04/24 14:37:42      Final result by Papito Gonzalez MD (03/04/24 14:37:42)                   Impression:      Acute, comminuted, displaced left proximal humerus fracture.    Severe glenohumeral osteoarthritis with intra-articular bodies.      Electronically signed by: Papito Gonzalez MD  Date:    03/04/2024  Time:    14:37               Narrative:    EXAMINATION:  XR HUMERUS 2 VIEW LEFT    CLINICAL HISTORY:  Unspecified fall, initial encounter    TECHNIQUE:  AP and lateral left humerus    COMPARISON:  None    FINDINGS:  There is a comminuted, moderately displaced fracture of the left humeral neck and greater tuberosity.  There is severe glenohumeral joint space loss along with multiple  intra-articular bodies.                                       Medications   mupirocin 2 % ointment 1 Tube (1 Tube Topical (Top) Given 3/4/24 1552)     Medical Decision Making  Patient appears in pain    Considerations include but are not limited to fracture, dislocation    X-ray performed does indeed show a proximal humerus fracture with displacement.  Splint was placed with coaptation and posterior long-arm.  Splint was checked by myself to be in good position.  Sling was applied.  Patient was given prescription for analgesia and was advised he should follow up with Orthopedics within the next 1-2 days.  Patient will be discharged stable condition.  Detailed return precautions discussed.    Amount and/or Complexity of Data Reviewed  Radiology: ordered.    Risk  Prescription drug management.                                      Clinical Impression:  Final diagnoses:  [W19.XXXA] Fall  [S42.212A] Closed displaced fracture of surgical neck of left humerus, unspecified fracture morphology, initial encounter (Primary)          ED Disposition Condition    Discharge Stable          ED Prescriptions       Medication Sig Dispense Start Date End Date Auth. Provider    HYDROcodone-acetaminophen (NORCO) 5-325 mg per tablet  (Status: Discontinued) Take 1 tablet by mouth every 6 (six) hours as needed for Pain. 12 tablet 3/4/2024 3/4/2024 Shelton Linda MD    ondansetron (ZOFRAN) 4 MG tablet  (Status: Discontinued) Take 1 tablet (4 mg total) by mouth every 6 (six) hours. 12 tablet 3/4/2024 3/4/2024 Shelton Linda MD    HYDROcodone-acetaminophen (NORCO) 5-325 mg per tablet Take 1 tablet by mouth every 6 (six) hours as needed for Pain. 12 tablet 3/4/2024 -- Shelton Linda MD    ondansetron (ZOFRAN) 4 MG tablet Take 1 tablet (4 mg total) by mouth every 6 (six) hours. 12 tablet 3/4/2024 -- Shelton Linda MD          Follow-up Information       Follow up With Specialties Details Why Contact Info    Roman Paulson MD  Sports Medicine, Orthopedic Surgery Schedule an appointment as soon as possible for a visit in 1 day  [address] [place] [address] DR Nieves  [facility] [address]  [phone]               Shelton Linda MD  03/04/24 6133

## 2024-03-05 ENCOUNTER — PATIENT OUTREACH (OUTPATIENT)
Dept: EMERGENCY MEDICINE | Facility: HOSPITAL | Age: 74
End: 2024-03-05

## 2024-03-05 ENCOUNTER — TELEPHONE (OUTPATIENT)
Dept: ORTHOPEDICS | Facility: CLINIC | Age: 74
End: 2024-03-05
Payer: MEDICARE

## 2024-03-05 NOTE — TELEPHONE ENCOUNTER
----- Message from Juana Martinez MA sent at 3/5/2024  8:01 AM CST -----  Contact: pt  Calling to schedule appt   FX Humerus   Call back

## 2024-03-07 ENCOUNTER — OFFICE VISIT (OUTPATIENT)
Dept: ORTHOPEDICS | Facility: CLINIC | Age: 74
End: 2024-03-07
Payer: MEDICARE

## 2024-03-07 ENCOUNTER — HOSPITAL ENCOUNTER (OUTPATIENT)
Facility: HOSPITAL | Age: 74
Discharge: HOME | DRG: 309 | End: 2024-03-09
Attending: EMERGENCY MEDICINE | Admitting: STUDENT IN AN ORGANIZED HEALTH CARE EDUCATION/TRAINING PROGRAM
Payer: MEDICARE

## 2024-03-07 ENCOUNTER — HOSPITAL ENCOUNTER (OUTPATIENT)
Dept: PREADMISSION TESTING | Facility: HOSPITAL | Age: 74
Discharge: HOME | DRG: 309 | End: 2024-03-07
Attending: ORTHOPAEDIC SURGERY
Payer: MEDICARE

## 2024-03-07 ENCOUNTER — HOSPITAL ENCOUNTER (OUTPATIENT)
Dept: RADIOLOGY | Facility: HOSPITAL | Age: 74
Discharge: HOME | DRG: 309 | End: 2024-03-07
Attending: ORTHOPAEDIC SURGERY
Payer: MEDICARE

## 2024-03-07 VITALS — WEIGHT: 310 LBS | BODY MASS INDEX: 41.08 KG/M2 | HEIGHT: 73 IN

## 2024-03-07 VITALS
BODY MASS INDEX: 41.08 KG/M2 | HEIGHT: 73 IN | DIASTOLIC BLOOD PRESSURE: 84 MMHG | HEART RATE: 108 BPM | WEIGHT: 310 LBS | RESPIRATION RATE: 20 BRPM | OXYGEN SATURATION: 95 % | SYSTOLIC BLOOD PRESSURE: 149 MMHG

## 2024-03-07 DIAGNOSIS — Z01.818 PRE-OP TESTING: ICD-10-CM

## 2024-03-07 DIAGNOSIS — N18.31 ACUTE RENAL FAILURE WITH ACUTE RENAL CORTICAL NECROSIS SUPERIMPOSED ON STAGE 3A CHRONIC KIDNEY DISEASE: ICD-10-CM

## 2024-03-07 DIAGNOSIS — S42.202A CLOSED TRAUMATIC DISPLACED FRACTURE OF PROXIMAL END OF LEFT HUMERUS, INITIAL ENCOUNTER: Primary | ICD-10-CM

## 2024-03-07 DIAGNOSIS — R07.9 CHEST PAIN: ICD-10-CM

## 2024-03-07 DIAGNOSIS — I48.91 A-FIB: ICD-10-CM

## 2024-03-07 DIAGNOSIS — I48.91 ATRIAL FIBRILLATION WITH RAPID VENTRICULAR RESPONSE: Primary | ICD-10-CM

## 2024-03-07 DIAGNOSIS — E83.42 HYPOMAGNESEMIA: ICD-10-CM

## 2024-03-07 DIAGNOSIS — N17.9 AKI (ACUTE KIDNEY INJURY): ICD-10-CM

## 2024-03-07 DIAGNOSIS — E86.1 HYPOVOLEMIA: ICD-10-CM

## 2024-03-07 DIAGNOSIS — N17.1 ACUTE RENAL FAILURE WITH ACUTE RENAL CORTICAL NECROSIS SUPERIMPOSED ON STAGE 3A CHRONIC KIDNEY DISEASE: ICD-10-CM

## 2024-03-07 DIAGNOSIS — S42.202A CLOSED TRAUMATIC DISPLACED FRACTURE OF PROXIMAL END OF LEFT HUMERUS, INITIAL ENCOUNTER: ICD-10-CM

## 2024-03-07 LAB
ALBUMIN SERPL BCP-MCNC: 3.7 G/DL (ref 3.5–5.2)
ALP SERPL-CCNC: 82 U/L (ref 55–135)
ALT SERPL W/O P-5'-P-CCNC: 9 U/L (ref 10–44)
ANION GAP SERPL CALC-SCNC: 13 MMOL/L (ref 8–16)
AST SERPL-CCNC: 9 U/L (ref 10–40)
BASOPHILS # BLD AUTO: 0.11 K/UL (ref 0–0.2)
BASOPHILS NFR BLD: 0.8 % (ref 0–1.9)
BILIRUB SERPL-MCNC: 0.7 MG/DL (ref 0.1–1)
BNP SERPL-MCNC: 220 PG/ML (ref 0–99)
BUN SERPL-MCNC: 34 MG/DL (ref 8–23)
CALCIUM SERPL-MCNC: 9.2 MG/DL (ref 8.7–10.5)
CHLORIDE SERPL-SCNC: 101 MMOL/L (ref 95–110)
CO2 SERPL-SCNC: 19 MMOL/L (ref 23–29)
CREAT SERPL-MCNC: 2 MG/DL (ref 0.5–1.4)
DIFFERENTIAL METHOD BLD: ABNORMAL
EOSINOPHIL # BLD AUTO: 0.2 K/UL (ref 0–0.5)
EOSINOPHIL NFR BLD: 1.1 % (ref 0–8)
ERYTHROCYTE [DISTWIDTH] IN BLOOD BY AUTOMATED COUNT: 14.7 % (ref 11.5–14.5)
EST. GFR  (NO RACE VARIABLE): 34.8 ML/MIN/1.73 M^2
GLUCOSE SERPL-MCNC: 302 MG/DL (ref 70–110)
GLUCOSE SERPL-MCNC: 325 MG/DL (ref 70–110)
HCT VFR BLD AUTO: 41.8 % (ref 40–54)
HGB BLD-MCNC: 13.8 G/DL (ref 14–18)
IMM GRANULOCYTES # BLD AUTO: 0.21 K/UL (ref 0–0.04)
IMM GRANULOCYTES NFR BLD AUTO: 1.5 % (ref 0–0.5)
LYMPHOCYTES # BLD AUTO: 2.4 K/UL (ref 1–4.8)
LYMPHOCYTES NFR BLD: 17.2 % (ref 18–48)
MAGNESIUM SERPL-MCNC: 1.2 MG/DL (ref 1.6–2.6)
MCH RBC QN AUTO: 28.3 PG (ref 27–31)
MCHC RBC AUTO-ENTMCNC: 33 G/DL (ref 32–36)
MCV RBC AUTO: 86 FL (ref 82–98)
MONOCYTES # BLD AUTO: 1.8 K/UL (ref 0.3–1)
MONOCYTES NFR BLD: 13 % (ref 4–15)
NEUTROPHILS # BLD AUTO: 9.3 K/UL (ref 1.8–7.7)
NEUTROPHILS NFR BLD: 66.4 % (ref 38–73)
NRBC BLD-RTO: 0 /100 WBC
PLATELET # BLD AUTO: 448 K/UL (ref 150–450)
PMV BLD AUTO: 9.8 FL (ref 9.2–12.9)
POTASSIUM SERPL-SCNC: 4.6 MMOL/L (ref 3.5–5.1)
PROT SERPL-MCNC: 8 G/DL (ref 6–8.4)
RBC # BLD AUTO: 4.87 M/UL (ref 4.6–6.2)
SODIUM SERPL-SCNC: 133 MMOL/L (ref 136–145)
TROPONIN I SERPL HS-MCNC: 12.8 PG/ML (ref 0–14.9)
TSH SERPL DL<=0.005 MIU/L-ACNC: 1.86 UIU/ML (ref 0.34–5.6)
WBC # BLD AUTO: 14.05 K/UL (ref 3.9–12.7)

## 2024-03-07 PROCEDURE — 83880 ASSAY OF NATRIURETIC PEPTIDE: CPT | Performed by: PHYSICIAN ASSISTANT

## 2024-03-07 PROCEDURE — 25000003 PHARM REV CODE 250: Performed by: PHYSICAL THERAPY ASSISTANT

## 2024-03-07 PROCEDURE — 96361 HYDRATE IV INFUSION ADD-ON: CPT

## 2024-03-07 PROCEDURE — 1100F PTFALLS ASSESS-DOCD GE2>/YR: CPT | Mod: CPTII,S$GLB,, | Performed by: ORTHOPAEDIC SURGERY

## 2024-03-07 PROCEDURE — 25000003 PHARM REV CODE 250: Performed by: STUDENT IN AN ORGANIZED HEALTH CARE EDUCATION/TRAINING PROGRAM

## 2024-03-07 PROCEDURE — 25000003 PHARM REV CODE 250: Performed by: PHYSICIAN ASSISTANT

## 2024-03-07 PROCEDURE — 80053 COMPREHEN METABOLIC PANEL: CPT | Performed by: PHYSICIAN ASSISTANT

## 2024-03-07 PROCEDURE — 96365 THER/PROPH/DIAG IV INF INIT: CPT

## 2024-03-07 PROCEDURE — 36415 COLL VENOUS BLD VENIPUNCTURE: CPT | Performed by: EMERGENCY MEDICINE

## 2024-03-07 PROCEDURE — G0378 HOSPITAL OBSERVATION PER HR: HCPCS

## 2024-03-07 PROCEDURE — 1125F AMNT PAIN NOTED PAIN PRSNT: CPT | Mod: CPTII,S$GLB,, | Performed by: ORTHOPAEDIC SURGERY

## 2024-03-07 PROCEDURE — 99285 EMERGENCY DEPT VISIT HI MDM: CPT | Mod: 25

## 2024-03-07 PROCEDURE — 73200 CT UPPER EXTREMITY W/O DYE: CPT | Mod: TC,LT

## 2024-03-07 PROCEDURE — 63600175 PHARM REV CODE 636 W HCPCS: Performed by: STUDENT IN AN ORGANIZED HEALTH CARE EDUCATION/TRAINING PROGRAM

## 2024-03-07 PROCEDURE — 96366 THER/PROPH/DIAG IV INF ADDON: CPT

## 2024-03-07 PROCEDURE — 85025 COMPLETE CBC W/AUTO DIFF WBC: CPT | Mod: 91 | Performed by: PHYSICIAN ASSISTANT

## 2024-03-07 PROCEDURE — 93005 ELECTROCARDIOGRAM TRACING: CPT | Performed by: INTERNAL MEDICINE

## 2024-03-07 PROCEDURE — 93010 ELECTROCARDIOGRAM REPORT: CPT | Mod: ,,, | Performed by: INTERNAL MEDICINE

## 2024-03-07 PROCEDURE — 3288F FALL RISK ASSESSMENT DOCD: CPT | Mod: CPTII,S$GLB,, | Performed by: ORTHOPAEDIC SURGERY

## 2024-03-07 PROCEDURE — 3008F BODY MASS INDEX DOCD: CPT | Mod: CPTII,S$GLB,, | Performed by: ORTHOPAEDIC SURGERY

## 2024-03-07 PROCEDURE — 36415 COLL VENOUS BLD VENIPUNCTURE: CPT | Performed by: PHYSICIAN ASSISTANT

## 2024-03-07 PROCEDURE — 93010 ELECTROCARDIOGRAM REPORT: CPT | Mod: 76,,, | Performed by: INTERNAL MEDICINE

## 2024-03-07 PROCEDURE — 84484 ASSAY OF TROPONIN QUANT: CPT | Performed by: PHYSICIAN ASSISTANT

## 2024-03-07 PROCEDURE — 25000003 PHARM REV CODE 250: Performed by: INTERNAL MEDICINE

## 2024-03-07 PROCEDURE — 83735 ASSAY OF MAGNESIUM: CPT | Performed by: EMERGENCY MEDICINE

## 2024-03-07 PROCEDURE — 4010F ACE/ARB THERAPY RXD/TAKEN: CPT | Mod: CPTII,S$GLB,, | Performed by: ORTHOPAEDIC SURGERY

## 2024-03-07 PROCEDURE — 84443 ASSAY THYROID STIM HORMONE: CPT | Performed by: STUDENT IN AN ORGANIZED HEALTH CARE EDUCATION/TRAINING PROGRAM

## 2024-03-07 PROCEDURE — 99203 OFFICE O/P NEW LOW 30 MIN: CPT | Mod: S$GLB,,, | Performed by: ORTHOPAEDIC SURGERY

## 2024-03-07 RX ORDER — ACETAMINOPHEN 325 MG/1
650 TABLET ORAL EVERY 8 HOURS PRN
Status: DISCONTINUED | OUTPATIENT
Start: 2024-03-07 | End: 2024-03-09 | Stop reason: HOSPADM

## 2024-03-07 RX ORDER — HYDROCODONE BITARTRATE AND ACETAMINOPHEN 10; 325 MG/1; MG/1
1 TABLET ORAL EVERY 8 HOURS PRN
Qty: 21 TABLET | Refills: 0 | Status: SHIPPED | OUTPATIENT
Start: 2024-03-07 | End: 2024-03-20 | Stop reason: SDUPTHER

## 2024-03-07 RX ORDER — INSULIN ASPART 100 [IU]/ML
0-10 INJECTION, SOLUTION INTRAVENOUS; SUBCUTANEOUS
Status: DISCONTINUED | OUTPATIENT
Start: 2024-03-07 | End: 2024-03-09 | Stop reason: HOSPADM

## 2024-03-07 RX ORDER — GLUCAGON 1 MG
1 KIT INJECTION
Status: DISCONTINUED | OUTPATIENT
Start: 2024-03-07 | End: 2024-03-09 | Stop reason: HOSPADM

## 2024-03-07 RX ORDER — ENOXAPARIN SODIUM 100 MG/ML
40 INJECTION SUBCUTANEOUS EVERY 12 HOURS
Status: DISCONTINUED | OUTPATIENT
Start: 2024-03-07 | End: 2024-03-07

## 2024-03-07 RX ORDER — ENOXAPARIN SODIUM 100 MG/ML
40 INJECTION SUBCUTANEOUS EVERY 24 HOURS
Status: DISCONTINUED | OUTPATIENT
Start: 2024-03-08 | End: 2024-03-09 | Stop reason: HOSPADM

## 2024-03-07 RX ORDER — VALSARTAN 80 MG/1
320 TABLET ORAL NIGHTLY
Status: DISCONTINUED | OUTPATIENT
Start: 2024-03-07 | End: 2024-03-07

## 2024-03-07 RX ORDER — HYDROCODONE BITARTRATE AND ACETAMINOPHEN 5; 325 MG/1; MG/1
1 TABLET ORAL EVERY 6 HOURS PRN
Status: DISCONTINUED | OUTPATIENT
Start: 2024-03-07 | End: 2024-03-09

## 2024-03-07 RX ORDER — ATORVASTATIN CALCIUM 10 MG/1
10 TABLET, FILM COATED ORAL NIGHTLY
Status: DISCONTINUED | OUTPATIENT
Start: 2024-03-07 | End: 2024-03-09 | Stop reason: HOSPADM

## 2024-03-07 RX ORDER — METOPROLOL SUCCINATE 25 MG/1
100 TABLET, EXTENDED RELEASE ORAL ONCE
Status: COMPLETED | OUTPATIENT
Start: 2024-03-07 | End: 2024-03-07

## 2024-03-07 RX ORDER — LANOLIN ALCOHOL/MO/W.PET/CERES
800 CREAM (GRAM) TOPICAL
Status: DISCONTINUED | OUTPATIENT
Start: 2024-03-07 | End: 2024-03-09 | Stop reason: HOSPADM

## 2024-03-07 RX ORDER — AMOXICILLIN 250 MG
1 CAPSULE ORAL 2 TIMES DAILY PRN
Status: DISCONTINUED | OUTPATIENT
Start: 2024-03-07 | End: 2024-03-09 | Stop reason: HOSPADM

## 2024-03-07 RX ORDER — ONDANSETRON HYDROCHLORIDE 2 MG/ML
4 INJECTION, SOLUTION INTRAVENOUS EVERY 6 HOURS PRN
Status: DISCONTINUED | OUTPATIENT
Start: 2024-03-07 | End: 2024-03-09 | Stop reason: HOSPADM

## 2024-03-07 RX ORDER — INSULIN ASPART 100 [IU]/ML
0-5 INJECTION, SOLUTION INTRAVENOUS; SUBCUTANEOUS
Status: DISCONTINUED | OUTPATIENT
Start: 2024-03-07 | End: 2024-03-07

## 2024-03-07 RX ORDER — SODIUM,POTASSIUM PHOSPHATES 280-250MG
2 POWDER IN PACKET (EA) ORAL
Status: DISCONTINUED | OUTPATIENT
Start: 2024-03-07 | End: 2024-03-09 | Stop reason: HOSPADM

## 2024-03-07 RX ORDER — SODIUM CHLORIDE 9 MG/ML
INJECTION, SOLUTION INTRAVENOUS CONTINUOUS
Status: DISCONTINUED | OUTPATIENT
Start: 2024-03-07 | End: 2024-03-09

## 2024-03-07 RX ORDER — TALC
6 POWDER (GRAM) TOPICAL NIGHTLY PRN
Status: DISCONTINUED | OUTPATIENT
Start: 2024-03-07 | End: 2024-03-09 | Stop reason: HOSPADM

## 2024-03-07 RX ORDER — MAGNESIUM SULFATE HEPTAHYDRATE 40 MG/ML
2 INJECTION, SOLUTION INTRAVENOUS ONCE
Status: COMPLETED | OUTPATIENT
Start: 2024-03-07 | End: 2024-03-07

## 2024-03-07 RX ORDER — NALOXONE HCL 0.4 MG/ML
0.02 VIAL (ML) INJECTION
Status: DISCONTINUED | OUTPATIENT
Start: 2024-03-07 | End: 2024-03-09 | Stop reason: HOSPADM

## 2024-03-07 RX ORDER — HYDROCODONE BITARTRATE AND ACETAMINOPHEN 5; 325 MG/1; MG/1
1 TABLET ORAL
Status: COMPLETED | OUTPATIENT
Start: 2024-03-07 | End: 2024-03-07

## 2024-03-07 RX ORDER — FAMOTIDINE 20 MG/1
20 TABLET, FILM COATED ORAL DAILY
Status: DISCONTINUED | OUTPATIENT
Start: 2024-03-08 | End: 2024-03-09 | Stop reason: HOSPADM

## 2024-03-07 RX ORDER — UBIDECARENONE 30 MG
30 CAPSULE ORAL DAILY
Status: ON HOLD | COMMUNITY

## 2024-03-07 RX ORDER — ALUMINUM HYDROXIDE, MAGNESIUM HYDROXIDE, AND SIMETHICONE 1200; 120; 1200 MG/30ML; MG/30ML; MG/30ML
30 SUSPENSION ORAL 4 TIMES DAILY PRN
Status: DISCONTINUED | OUTPATIENT
Start: 2024-03-07 | End: 2024-03-09 | Stop reason: HOSPADM

## 2024-03-07 RX ORDER — METOPROLOL SUCCINATE 50 MG/1
100 TABLET, EXTENDED RELEASE ORAL NIGHTLY
Status: DISCONTINUED | OUTPATIENT
Start: 2024-03-08 | End: 2024-03-09 | Stop reason: HOSPADM

## 2024-03-07 RX ORDER — ACETAMINOPHEN 325 MG/1
650 TABLET ORAL EVERY 4 HOURS PRN
Status: DISCONTINUED | OUTPATIENT
Start: 2024-03-07 | End: 2024-03-09 | Stop reason: HOSPADM

## 2024-03-07 RX ORDER — SODIUM CHLORIDE 0.9 % (FLUSH) 0.9 %
10 SYRINGE (ML) INJECTION EVERY 12 HOURS PRN
Status: DISCONTINUED | OUTPATIENT
Start: 2024-03-07 | End: 2024-03-09 | Stop reason: HOSPADM

## 2024-03-07 RX ORDER — IBUPROFEN 200 MG
24 TABLET ORAL
Status: DISCONTINUED | OUTPATIENT
Start: 2024-03-07 | End: 2024-03-09 | Stop reason: HOSPADM

## 2024-03-07 RX ORDER — IBUPROFEN 200 MG
16 TABLET ORAL
Status: DISCONTINUED | OUTPATIENT
Start: 2024-03-07 | End: 2024-03-09 | Stop reason: HOSPADM

## 2024-03-07 RX ADMIN — ATORVASTATIN CALCIUM 10 MG: 10 TABLET, FILM COATED ORAL at 09:03

## 2024-03-07 RX ADMIN — HYDROCODONE BITARTRATE AND ACETAMINOPHEN 1 TABLET: 5; 325 TABLET ORAL at 02:03

## 2024-03-07 RX ADMIN — MAGNESIUM SULFATE HEPTAHYDRATE 2 G: 40 INJECTION, SOLUTION INTRAVENOUS at 04:03

## 2024-03-07 RX ADMIN — METOPROLOL SUCCINATE 100 MG: 25 TABLET, EXTENDED RELEASE ORAL at 04:03

## 2024-03-07 RX ADMIN — HYDROCODONE BITARTRATE AND ACETAMINOPHEN 1 TABLET: 5; 325 TABLET ORAL at 09:03

## 2024-03-07 RX ADMIN — SODIUM CHLORIDE: 9 INJECTION, SOLUTION INTRAVENOUS at 09:03

## 2024-03-07 RX ADMIN — SODIUM CHLORIDE, POTASSIUM CHLORIDE, SODIUM LACTATE AND CALCIUM CHLORIDE 250 ML: 600; 310; 30; 20 INJECTION, SOLUTION INTRAVENOUS at 04:03

## 2024-03-07 NOTE — PRE-PROCEDURE INSTRUCTIONS
EKG for pre op denotes afib with variable heart rate, /84 20 RR O2 sats 95% on room air. Result called to Dr Workman- Report called to Johnathon in ED; patient escorted via wheelchair with wife to ED.

## 2024-03-07 NOTE — HPI
Patient is a 73-year-old male with a past medical history of DM, hyperlipidemia, hypertension, Guillain-Conrad, and AFib.  Patient presents to the ED today after preop clearance for right humerus orthopedic clearance. Patient was found to be in AFib RVR on exam and was thus sent to ED for further evaluation.  EKG shows afib RVR with rate of 108. Patient denies dizziness, lightheadedness, chest pain, shortness for breath, palpitations, abdominal pain, nausea, vomiting, constipation and diarrhea.  Patient is on home metoprolol nightly but denies anticoagulation.  Patient denies history of AFib but per chart review had new onset in 2022.  Patient was given home dose of metoprolol in ED and rate decreased into the 80s and 90s.  No echo on file, updated pending. Cardiology was consulted.  Pt is supposed to have humeral fracture surgery following cardiology clearance. Pt follows with Dr Cooper. Patient is full code.    In ED: TSH 1.865, Magnesium 1.2, troponin 12.8, , K 4.6, WBC 14.05, hbg 13.8, AST and ALT 9, and creatinine 2.0.

## 2024-03-07 NOTE — SUBJECTIVE & OBJECTIVE
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    Review of patient's allergies indicates:  No Known Allergies    No current facility-administered medications on file prior to encounter.     Current Outpatient Medications on File Prior to Encounter   Medication Sig    atorvastatin (LIPITOR) 10 MG tablet Take 1 tablet (10 mg total) by mouth once daily. (Patient taking differently: Take 10 mg by mouth every evening.)    co-enzyme Q-10 30 mg capsule Take 30 mg by mouth once daily.    ergocalciferol, vitamin D2, (VITAMIN D ORAL) Take 1 tablet by mouth once daily.    metFORMIN (GLUCOPHAGE-XR) 500 MG ER 24hr tablet Take 2 tablets (1,000 mg total) by mouth 2 (two) times daily with meals.    metoprolol succinate (TOPROL-XL) 100 MG 24 hr tablet Take 1 tablet (100 mg total) by mouth once daily. (Patient taking differently: Take 100 mg by mouth nightly.)    telmisartan (MICARDIS) 80 MG Tab Take 1 tablet (80 mg total) by mouth once daily. (Patient taking differently: Take 80 mg by mouth every evening.)    HYDROcodone-acetaminophen (NORCO)  mg per tablet Take 1 tablet by mouth every 8 (eight) hours as needed for Pain.    [DISCONTINUED] HYDROcodone-acetaminophen (NORCO) 5-325 mg per tablet Take 1 tablet by mouth every 6 (six) hours as needed for Pain.    [DISCONTINUED] ondansetron (ZOFRAN) 4 MG tablet Take 1 tablet (4 mg total) by mouth every 6 (six) hours. (Patient not taking: Reported on 3/7/2024)     Family History    None       Tobacco Use    Smoking status: Never    Smokeless tobacco: Never   Substance and Sexual Activity    Alcohol use: Yes     Alcohol/week: 0.0 standard drinks of alcohol     Comment: social    Drug use: No    Sexual activity: Yes     Review of Systems   Constitutional: Negative.  Negative for appetite change, chills, fatigue, fever and unexpected weight change.   HENT:  Negative.  Negative for congestion, ear pain, hearing loss, rhinorrhea, sore throat and tinnitus.    Eyes: Negative.  Negative for pain, discharge and redness.   Respiratory: Negative.  Negative for cough, shortness of breath, wheezing and stridor.    Cardiovascular: Negative.  Negative for chest pain, palpitations and leg swelling.   Gastrointestinal: Negative.  Negative for abdominal distention, abdominal pain, constipation, diarrhea, nausea and vomiting.   Endocrine: Negative.    Genitourinary: Negative.  Negative for decreased urine volume, difficulty urinating, flank pain, hematuria and urgency.   Musculoskeletal:  Positive for arthralgias (left shoulder and arm). Negative for gait problem and myalgias.   Skin: Negative.  Negative for pallor and rash.   Allergic/Immunologic: Negative.  Negative for environmental allergies, food allergies and immunocompromised state.   Neurological: Negative.  Negative for dizziness, syncope, facial asymmetry, weakness and headaches.   Hematological: Negative.    Psychiatric/Behavioral: Negative.  Negative for agitation, confusion, self-injury and suicidal ideas.      Objective:     Vital Signs (Most Recent):  Temp: 98.4 °F (36.9 °C) (03/07/24 1353)  Pulse: 98 (03/07/24 1653)  Resp: (!) 28 (03/07/24 1645)  BP: (!) 140/64 (03/07/24 1653)  SpO2: 100 % (03/07/24 1645) Vital Signs (24h Range):  Temp:  [98.4 °F (36.9 °C)] 98.4 °F (36.9 °C)  Pulse:  [] 98  Resp:  [18-34] 28  SpO2:  [92 %-100 %] 100 %  BP: (107-173)/(64-92) 140/64     Weight: 136.1 kg (300 lb)  Body mass index is 39.58 kg/m².     Physical Exam  Vitals reviewed.   Constitutional:       General: He is not in acute distress.     Appearance: Normal appearance. He is obese. He is not toxic-appearing.   HENT:      Head: Normocephalic and atraumatic.      Nose: Nose normal. No congestion or rhinorrhea.      Mouth/Throat:      Mouth: Mucous membranes are moist.      Pharynx: Oropharynx is clear.   Eyes:      General:          Right eye: No discharge.         Left eye: No discharge.      Extraocular Movements: Extraocular movements intact.      Conjunctiva/sclera: Conjunctivae normal.      Pupils: Pupils are equal, round, and reactive to light.   Cardiovascular:      Rate and Rhythm: Tachycardia present. Rhythm irregular.      Pulses: Normal pulses.      Heart sounds: Normal heart sounds.   Pulmonary:      Effort: Pulmonary effort is normal. No respiratory distress.      Breath sounds: Normal breath sounds.   Chest:      Chest wall: No tenderness.   Abdominal:      General: Abdomen is flat. Bowel sounds are normal. There is no distension.      Palpations: Abdomen is soft.      Tenderness: There is no abdominal tenderness. There is no guarding.   Musculoskeletal:         General: Normal range of motion.      Cervical back: Normal range of motion and neck supple.      Right lower leg: No edema.      Left lower leg: No edema.      Comments: LUE in sling due to humerus fracture   Skin:     General: Skin is warm and dry.      Findings: No rash.   Neurological:      General: No focal deficit present.      Mental Status: He is alert and oriented to person, place, and time. Mental status is at baseline.      Motor: Weakness (generalized, diffiuculty sitting up in bed without assist due to LUE pain and imobility) present.   Psychiatric:         Mood and Affect: Mood normal.              CRANIAL NERVES     CN III, IV, VI   Pupils are equal, round, and reactive to light.       Significant Labs: All pertinent labs within the past 24 hours have been reviewed.  CBC:   Recent Labs   Lab 03/07/24  1238 03/07/24  1446   WBC 15.14* 14.05*   HGB 13.4* 13.8*   HCT 42.3 41.8    448     CMP:   Recent Labs   Lab 03/07/24  1238 03/07/24  1446   * 133*   K 4.6 4.6    101   CO2 22* 19*   * 302*   BUN 32* 34*   CREATININE 1.9* 2.0*   CALCIUM 9.2 9.2   PROT  --  8.0   ALBUMIN  --  3.7   BILITOT  --  0.7   ALKPHOS  --  82   AST  --  9*    ALT  --  9*   ANIONGAP 12 13     Troponin:   Recent Labs   Lab 03/07/24  1446   TROPONINIHS 12.8     Significant Imaging: I have reviewed all pertinent imaging results/findings within the past 24 hours.

## 2024-03-07 NOTE — Clinical Note
Diagnosis: A-fib [[id number]]   Future Attending Provider: RAGHU VILLA [[id number]]   Place in Observation: [facility] [[id number]]

## 2024-03-07 NOTE — ED PROVIDER NOTES
"Encounter Date: 3/7/2024       History     Chief Complaint   Patient presents with    Abnormal ECG     Sent from preop for "afib"     HPI    73-year-old man with a history of diabetes, hyperlipidemia, hypertension, Guillain-[place], presents for evaluation of AFib from preop clearance.  Denies dizziness lightheadedness chest pain shortness of breath palpitations belly pain nausea vomiting change in bowel or bladder habits.  He fell and broke his arm earlier this week and was being seen for preop ortho clearance.  He denies having a history of AFib.  He reports taking metoprolol some antihypertensives metformin.  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  No family history on file.  Social History     Tobacco Use    Smoking status: Never    Smokeless tobacco: Never   Substance Use Topics    Alcohol use: Yes     Alcohol/week: 0.0 standard drinks of alcohol     Comment: social    Drug use: No     Review of Systems   All other systems reviewed and are negative.      Physical Exam     Initial Vitals [03/07/24 1353]   BP Pulse Resp Temp SpO2   (!) 107/92 (!) 128 18 98.4 °F (36.9 °C) (!) 92 %      MAP       --         Physical Exam    Constitutional: He appears well-developed and well-nourished.   HENT:   Head: Normocephalic and atraumatic.   Eyes: Right eye exhibits no discharge. Left eye exhibits no discharge.   Neck: No tracheal deviation present.   Cardiovascular:  Normal rate and regular rhythm.           Pulmonary/Chest: Breath sounds normal. No stridor. No respiratory distress.   Abdominal: Abdomen is soft. Bowel sounds are normal. There is no abdominal tenderness.   Musculoskeletal:         General: No edema.      Comments: Left arm in splint and sling, able to wiggle his fingers on left hand, sensation " intact to light touch, good cap refill     Neurological: He is alert and oriented to person, place, and time.   Skin: Skin is warm and dry. Capillary refill takes less than 2 seconds.         ED Course   Procedures  Labs Reviewed   B-TYPE NATRIURETIC PEPTIDE - Abnormal; Notable for the following components:       Result Value     (*)     All other components within normal limits   CBC W/ AUTO DIFFERENTIAL - Abnormal; Notable for the following components:    WBC 14.05 (*)     Hemoglobin 13.8 (*)     RDW 14.7 (*)     Immature Granulocytes 1.5 (*)     Gran # (ANC) 9.3 (*)     Immature Grans (Abs) 0.21 (*)     Mono # 1.8 (*)     Lymph % 17.2 (*)     All other components within normal limits   COMPREHENSIVE METABOLIC PANEL - Abnormal; Notable for the following components:    Sodium 133 (*)     CO2 19 (*)     Glucose 302 (*)     BUN 34 (*)     Creatinine 2.0 (*)     AST 9 (*)     ALT 9 (*)     eGFR 34.8 (*)     All other components within normal limits   MAGNESIUM - Abnormal; Notable for the following components:    Magnesium 1.2 (*)     All other components within normal limits   TROPONIN I HIGH SENSITIVITY   TSH   MAGNESIUM   POCT GLUCOSE MONITORING CONTINUOUS        ECG Results              EKG 12-lead (In process)        Collection Time Result Time QRS Duration OHS QTC Calculation    03/07/24 14:25:59 03/07/24 14:36:49 98 460                     In process by Interface, Lab In [facility] (03/07/24 14:36:56)                   Narrative:    Test Reason : I48.91,    Vent. Rate : 110 BPM     Atrial Rate : 113 BPM     P-R Int : 000 ms          QRS Dur : 098 ms      QT Int : 340 ms       P-R-T Axes : 000 -45 049 degrees     QTc Int : 460 ms    Atrial fibrillation with rapid ventricular response  Left anterior fascicular block  Minimal voltage criteria for LVH, may be normal variant  Cannot rule out Inferior infarct (cited on or before 03-OCT-2022)  Possible Anterolateral infarct (cited on or before  03-OCT-2022)  Abnormal ECG  When compared with ECG of 07-MAR-2024 14:25,  No significant change was found    Referred By: [email or web address]   SELF           Confirmed By:                                   Imaging Results              X-Ray Chest 1 View (Final result)  Result time 03/07/24 16:45:22      Final result by Herberth Siddiqui MD (03/07/24 16:45:22)                   Narrative:    HISTORY: Atrial fibrillation.    FINDINGS: 2 AP chest radiographs at 1625 hours with no prior studies for comparison show the cardiac silhouette is enlarged, with normal pulmonary vascularity.    The lungs are normally expanded, with no consolidation, large pleural effusion, or evidence of pulmonary edema. No confluent infiltrates or pneumothorax. There are left glenohumeral arthropathic changes with prominent osteophytes, with no acute fractures.    IMPRESSION: No evidence of acute cardiopulmonary disease.    Electronically signed by:  Herberth Siddiqui MD  03/07/2024 04:45 PM CST Workstation: [id number]                                     Medications   magnesium sulfate 2g in water 50mL IVPB (premix) (2 g Intravenous New Bag 3/7/24 1655)   sodium chloride 0.9% flush 10 mL (has no administration in time range)   melatonin tablet 6 mg (has no administration in time range)   ondansetron injection 4 mg (has no administration in time range)   senna-docusate 8.6-50 mg per tablet 1 tablet (has no administration in time range)   acetaminophen tablet 650 mg (has no administration in time range)   aluminum-magnesium hydroxide-simethicone 200-200-20 mg/5 mL suspension 30 mL (has no administration in time range)   acetaminophen tablet 650 mg (has no administration in time range)   HYDROcodone-acetaminophen 5-325 mg per tablet 1 tablet (has no administration in time range)   naloxone 0.4 mg/mL injection 0.02 mg (has no administration in time range)   potassium bicarbonate disintegrating tablet 50 mEq (has no administration in time range)   potassium  bicarbonate disintegrating tablet 35 mEq (has no administration in time range)   potassium bicarbonate disintegrating tablet 60 mEq (has no administration in time range)   magnesium oxide tablet 800 mg (has no administration in time range)   magnesium oxide tablet 800 mg (has no administration in time range)   potassium, sodium phosphates 280-160-250 mg packet 2 packet (has no administration in time range)   potassium, sodium phosphates 280-160-250 mg packet 2 packet (has no administration in time range)   potassium, sodium phosphates 280-160-250 mg packet 2 packet (has no administration in time range)   glucose chewable tablet 16 g (has no administration in time range)   glucose chewable tablet 24 g (has no administration in time range)   dextrose 50% injection 12.5 g (has no administration in time range)   dextrose 50% injection 25 g (has no administration in time range)   glucagon (human recombinant) injection 1 mg (has no administration in time range)   atorvastatin tablet 10 mg (has no administration in time range)   metoprolol succinate (TOPROL-XL) 24 hr tablet 100 mg (has no administration in time range)   valsartan tablet 320 mg (has no administration in time range)   enoxaparin injection 40 mg (has no administration in time range)   insulin aspart U-100 pen 0-10 Units (has no administration in time range)   HYDROcodone-acetaminophen 5-325 mg per tablet 1 tablet (1 tablet Oral Given 3/7/24 1435)   metoprolol succinate (TOPROL-XL) 24 hr tablet 100 mg (100 mg Oral Given 3/7/24 1653)   lactated ringers bolus 250 mL (250 mLs Intravenous New Bag 3/7/24 1655)     Medical Decision Making  Man sent from preop for AFib with RVR, he has no symptoms, his initial blood pressure was 1 100s, tachycardic to 130, his initial listed sat was 92 on my exam he was rate controlled on his own in the 90s, normo hypertensive, satting well on room air, not tachypneic, on exam he is resting comfortably in bed, normal work of  breathing, differential includes metabolic endocrine derangement ACS volume overload arrhythmia medication noncompliance.  His workup notable for hypomagnesia,  from a normal baseline a year ago, troponin negative, potassium within normal limits, he has not MARBIN 7 months ago his creatinine was, bedside ultrasound was performed by me demonstrates no pericardial effusion demonstrates Bill collapsible IVC with regular respirations, we will give him small fluid bolus replete his Mag give him his home metoprolol early.  He had an episode of nonsustained VT he was asymptomatic.  He initially did not want to stay, we had a discussion about how he needed cardiac risk stratification to get his surgery and that is safest option was to stay he is amenable to this.    Amount and/or Complexity of Data Reviewed  Independent Historian:      Details: Family member at bedside he has not sure if he had AFib in the past  External Data Reviewed: notes.     Details: Episode of paroxysmal AFib in 2022 Eliquis was ordered  Labs: ordered.     Details:   Radiology: ordered and independent interpretation performed. Decision-making details documented in ED Course.  ECG/medicine tests: ordered and independent interpretation performed. Decision-making details documented in ED Course.    Risk  Prescription drug management.  Drug therapy requiring intensive monitoring for toxicity.  Decision regarding hospitalization.      Rayo Drake, HO4  U-  Chart dictated using voice recognition software, possible dictation errors present            Attending Attestation:   Physician Attestation Statement for Resident:  As the supervising MD   I agree with the above history.  -:   As the supervising MD I agree with the above PE.     As the supervising MD I agree with the above treatment, course, plan, and disposition.    I have reviewed and agree with the residents interpretation of the following: lab data, x-rays, EKG and rhythm strips.                  ED Course as of 03/07/24 1840   Thu Mar 07, 2024   1643 Chest x-ray on my independent interpretation no focal consolidation or pneumothorax poor inspiratory effort [IC]   1644 EKG on my independent interpretation rapid irregular rhythm left axis no STEMI, proximally 110 beats per minute [IC]      ED Course User Index  [IC] Rayo Drake MD                           Clinical Impression:  Final diagnoses:  [I48.91] A-fib  [E83.42] Hypomagnesemia  [I48.91] Atrial fibrillation with rapid ventricular response (Primary)  [N17.9] MARBIN (acute kidney injury)  [E86.1] Hypovolemia          ED Disposition Condition    Observation                 Rayo Drake MD  Resident  03/07/24 1743       Juan Ronquillo MD  03/07/24 1060

## 2024-03-07 NOTE — DISCHARGE INSTRUCTIONS
To confirm, Your doctor has instructed you that surgery is scheduled for:   Wednesday, March 13, 2024    Pre-Op will call the afternoon prior to surgery between 4:00 and 6:00 PM with the final arrival time.    Tuesday, March 12, 2024    Please report to [facility] entrance. Check in at registration desk.    Do not eat or drink anything after midnight the night before your surgery - THIS INCLUDES  WATER, GUM, MINTS AND CANDY.  YOU MAY BRUSH YOUR TEETH BUT DO NOT SWALLOW     TAKE ONLY THESE MEDICATIONS WITH A SMALL SIP OF WATER THE MORNING OF YOUR PROCEDURE:  OK TO TAKE PAIN PILL WITH SIP OF WATER MORNING OF SURGERY      ONLY if you are diabetic, check your sugar in the morning before your procedure.       Do not take any diabetic medicines or insulin the morning of surgery .     PLEASE NOTE:  The surgery schedule has many variables which may affect the time of your surgery case.  Family members should be available if your surgery time changes.  Plan to be here the day of your procedure between 4-6 hours.      DO NOT TAKE THESE MEDICATIONS 5-7 DAYS PRIOR to your procedure or per your surgeon's request: ASPIRIN, ALEVE, ADVIL, IBUPROFEN,  ANAY SELTZER, BC , FISH OIL , VITAMIN E, HERBALS  (May take Tylenol)      ONLY if you are prescribed any types of blood thinners such as:  Aspirin, Coumadin, Plavix, Pradaxa, Xarelto, Aggrenox, Effient, Eliquis, Savasya, Brilinta, or any other, ask your surgeon whether you should stop taking them and how long before surgery you should stop.  You may also need to verify with the prescribing physician if it is ok to stop your medication.                                                        IMPORTANT INSTRUCTIONS      Shower the night before AND the morning of your procedure with a Chlorhexidine wash such as Hibiclens or Dial antibacterial soap from the neck down. Do not apply any deodorants, lotions or powders after each shower.  Do not get it on your face or  in your eyes.  You may use your own shampoo and face wash. This helps your skin to be as bacteria free as possible.  DO NOT remove hair from the surgery site.  Do not shave the incision site unless you are given specific instructions to do so.    Sleep in a bed with clean sheets.  Do not sleep with a pet in the bed.     Please leave all jewelry, piercing's and valuables at home.   ONLY if you have been diagnosed with sleep apnea please bring your C-PAP machine.    Make arrangements in advance for transportation home by a responsible adult.    You must make arrangements for transportation, TAXI'S, UBER'S OR LYFTS ARE NOT ALLOWED.      If you have any questions about these instructions, call Pre-Op Admit  Nursing at [phone] or the Pre-Op Day Surgery Unit at [phone].

## 2024-03-07 NOTE — PHARMACY MED REC
"Admission Medication History     The home medication history was taken by Sorin Espinosa.    You may go to "Admission" then "Reconcile Home Medications" tabs to review and/or act upon these items.     The home medication list has been updated by the Pharmacy department.   Please read ALL comments highlighted in yellow.   Please address this information as you see fit.    Feel free to contact us if you have any questions or require assistance.        Medications listed below were obtained from: Patient/family and Analytic software- [employer]  No current facility-administered medications on file prior to encounter.     Current Outpatient Medications on File Prior to Encounter   Medication Sig Dispense Refill    atorvastatin (LIPITOR) 10 MG tablet Take 1 tablet (10 mg total) by mouth once daily. (Patient taking differently: Take 10 mg by mouth every evening.) 90 tablet 3    co-enzyme Q-10 30 mg capsule Take 30 mg by mouth once daily.      ergocalciferol, vitamin D2, (VITAMIN D ORAL) Take 1 tablet by mouth once daily.      metFORMIN (GLUCOPHAGE-XR) 500 MG ER 24hr tablet Take 2 tablets (1,000 mg total) by mouth 2 (two) times daily with meals. 360 tablet 3    metoprolol succinate (TOPROL-XL) 100 MG 24 hr tablet Take 1 tablet (100 mg total) by mouth once daily. (Patient taking differently: Take 100 mg by mouth nightly.) 90 tablet 1    telmisartan (MICARDIS) 80 MG Tab Take 1 tablet (80 mg total) by mouth once daily. (Patient taking differently: Take 80 mg by mouth every evening.) 90 tablet 3    HYDROcodone-acetaminophen (NORCO)  mg per tablet Take 1 tablet by mouth every 8 (eight) hours as needed for Pain. 21 tablet 0    [DISCONTINUED] HYDROcodone-acetaminophen (NORCO) 5-325 mg per tablet Take 1 tablet by mouth every 6 (six) hours as needed for Pain. 12 tablet 0    [DISCONTINUED] ondansetron (ZOFRAN) 4 MG tablet Take 1 tablet (4 mg total) by mouth every 6 (six) hours. (Patient not taking: Reported on 3/7/2024) 12 tablet " 0             Sorin Espinosa  EXT 1924                .

## 2024-03-07 NOTE — FIRST PROVIDER EVALUATION
" Emergency Department TeleTriage Encounter Note      CHIEF COMPLAINT    Chief Complaint   Patient presents with    Abnormal ECG     Sent from preop for "afib"       VITAL SIGNS   Initial Vitals [03/07/24 1353]   BP Pulse Resp Temp SpO2   (!) 107/92 (!) 128 18 98.4 °F (36.9 °C) (!) 92 %      MAP       --            ALLERGIES    Review of patient's allergies indicates:  No Known Allergies    PROVIDER TRIAGE NOTE  Patient was sent from pre-op because he was in afib. No prior history. He denies chest pain or shortness. He states he feels fine except for severe pain in the left arm secondary to fracture. He is trying to get clearance for surgery on the arm.       ORDERS  Labs Reviewed - No data to display    ED Orders (720h ago, onward)      None              Virtual Visit Note: The provider triage portion of this emergency department evaluation and documentation was performed via WhatsApp, a HIPAA-compliant telemedicine application, in concert with a tele-presenter in the room. A face to face patient evaluation with one of my colleagues will occur once the patient is placed in an emergency department room.      DISCLAIMER: This note was prepared with [employer] voice recognition transcription software. Garbled syntax, mangled pronouns, and other bizarre constructions may be attributed to that software system.    "

## 2024-03-07 NOTE — PROGRESS NOTES
[facility]    Subjective:     Chief Complaint:   Chief Complaint   Patient presents with    Left Shoulder - Pain     //EPIC// Left Closed Displaced Fracture Humerus, ED visit 03/04/24, on Monday, slipped on wet floor, landed on left arm,        Past Medical History:   Diagnosis Date    Diabetes mellitus, type 2     Guillain-[place]     Hyperlipidemia     Hypertension        No past surgical history on file.    Current Outpatient Medications   Medication Sig    atorvastatin (LIPITOR) 10 MG tablet Take 1 tablet (10 mg total) by mouth once daily.    HYDROcodone-acetaminophen (NORCO) 5-325 mg per tablet Take 1 tablet by mouth every 6 (six) hours as needed for Pain.    metFORMIN (GLUCOPHAGE-XR) 500 MG ER 24hr tablet Take 2 tablets (1,000 mg total) by mouth 2 (two) times daily with meals.    metoprolol succinate (TOPROL-XL) 100 MG 24 hr tablet Take 1 tablet (100 mg total) by mouth once daily.    telmisartan (MICARDIS) 80 MG Tab Take 1 tablet (80 mg total) by mouth once daily.    ondansetron (ZOFRAN) 4 MG tablet Take 1 tablet (4 mg total) by mouth every 6 (six) hours. (Patient not taking: Reported on 3/7/2024)     No current facility-administered medications for this visit.       Review of patient's allergies indicates:  No Known Allergies    No family history on file.    Social History     Socioeconomic History    Marital status:    Tobacco Use    Smoking status: Never    Smokeless tobacco: Never   Substance and Sexual Activity    Alcohol use: Yes     Alcohol/week: 0.0 standard drinks of alcohol     Comment: social    Drug use: No    Sexual activity: Yes       History of present illness: Mr. Moser presents to the office today as a new patient chief complaint of left shoulder pain after a fall at his home 4 days ago.  He was seen in the ED, had radiographs, diagnosed with a left proximal humeral fracture and placed in a sling.  He was referred to us for further evaluation and treatment.    Review of  Systems:    Constitution: Negative for chills, fever, and sweats.  Negative for unexplained weight loss.    HENT:  Negative for headaches and blurry vision.    Cardiovascular:Negative for chest pain or irregular heart beat. Negative for hypertension.    Respiratory:  Negative for cough and shortness of breath.    Gastrointestinal: Negative for abdominal pain, heartburn, melena, nausea, and vomitting.    Genitourinary:  Negative bladder incontinence and dysuria.    Musculoskeletal:  See HPI for details.     Neurological: Negative for numbness.    Psychiatric/Behavioral: Negative for depression.  The patient is not nervous/anxious.      Endocrine: Negative for polyuria    Hematologic/Lymphatic: Negative for bleeding problem.  Does not bruise/bleed easily.    Skin: Negative for poor would healing and rash    Objective:      Physical Examination:    Vital Signs:  There were no vitals filed for this visit.    Body mass index is 40.9 kg/m².    This a well-developed, well nourished patient in no acute distress.  They are alert and oriented and cooperative to examination.        Left shoulder exam:  Skin to left shoulder is clean dry and intact.  No erythema.  He has limited range of motion left shoulder secondary to pain in the known proximal humerus fracture.  He can open and close her left hand into a fist.  Capillary refill is brisk to all digits in the left hand.  He is neurovascularly intact throughout the left upper extremity.      Pertinent New Results:    XRAY Report / Interpretation:   No new radiographs taken on today's clinic visit.  Did review x-rays taken in the ED of the left humerus and left shoulder which do reveal a displaced comminuted left proximal humerus fracture.  It is extra-articular.  He however, he does have severe glenohumeral arthrosis with bone-on-bone deformity throughout.    Assessment/Plan:      1. Left proximal humerus fracture, displaced, comminuted, closed, initial encounter.  2. Left  "glenohumeral joint osteoarthritis, severe.      Treatment options discussed with him and his spouse today.  Recommendation would be to do an open reduction internal fixation with intramedullary lizabeth placement to better align the fracture fragments.  He has 100% displacement of the left humeral head in regards to the left humeral shaft.  We will start with a stat CT scan of the left shoulder with 3D reconstructions to better evaluate the fracture fragments to determine if he is even able to be repaired with an intramedullary nail.  Risks and benefits of the surgery were discussed with him and his spouse today.  They did understand.  All of their questions were answered.  Surgical consents were obtained today.  Also, did discuss with him the severity of the glenohumeral arthrosis in the shoulder in the potential for possible need for total shoulder arthroplasty at some point in the future as well.  Gave him a prescription Norco 10/325 to be taken once every 8 hours as needed pain as well.  Quantity 21.     Risks of the procedure were reviewed with the patient.  This includes, but is not limited to: bleeding, pain, swelling, decreased range of motion, nerve injury/damage, wound dehiscence, hardware failure, deep vein thrombosis, pulmonary embolism, reflex sympathetic dystrophy, dislocation, upper arm/humerus pain, hardware failure, and possible need for further surgery.    Hansel Tate, Physician Assistant, served in the capacity as a "scribe" for this patient encounter.  A "face-to-face" encounter occurred with Dr. Nathan Cooper on this date.  The treatment plan and medical decision-making is outlined above. Patient was seen and examined with a chaperone.       This note was created using Dragon voice recognition software that occasionally misinterpreted phrases or words.          "

## 2024-03-08 ENCOUNTER — CLINICAL SUPPORT (OUTPATIENT)
Dept: CARDIOLOGY | Facility: HOSPITAL | Age: 74
DRG: 309 | End: 2024-03-08
Attending: EMERGENCY MEDICINE
Payer: MEDICARE

## 2024-03-08 VITALS — WEIGHT: 300.06 LBS | BODY MASS INDEX: 39.77 KG/M2 | HEIGHT: 73 IN

## 2024-03-08 PROBLEM — N18.9 ACUTE ON CHRONIC KIDNEY FAILURE: Status: ACTIVE | Noted: 2024-03-08

## 2024-03-08 PROBLEM — N17.9 ACUTE ON CHRONIC KIDNEY FAILURE: Status: ACTIVE | Noted: 2024-03-08

## 2024-03-08 LAB
ALBUMIN SERPL BCP-MCNC: 3.2 G/DL (ref 3.5–5.2)
ALP SERPL-CCNC: 71 U/L (ref 55–135)
ALT SERPL W/O P-5'-P-CCNC: 9 U/L (ref 10–44)
ANION GAP SERPL CALC-SCNC: 11 MMOL/L (ref 8–16)
ANION GAP SERPL CALC-SCNC: 9 MMOL/L (ref 8–16)
AORTIC ROOT ANNULUS: 4.1 CM
AORTIC VALVE CUSP SEPERATION: 2.3 CM
AST SERPL-CCNC: 10 U/L (ref 10–40)
AV INDEX (PROSTH): 0.62
AV MEAN GRADIENT: 6 MMHG
AV PEAK GRADIENT: 11 MMHG
AV VALVE AREA BY VELOCITY RATIO: 2.3 CM²
AV VALVE AREA: 2.58 CM²
AV VELOCITY RATIO: 0.55
BACTERIA #/AREA URNS HPF: ABNORMAL /HPF
BASOPHILS # BLD AUTO: 0.12 K/UL (ref 0–0.2)
BASOPHILS NFR BLD: 1 % (ref 0–1.9)
BILIRUB SERPL-MCNC: 0.7 MG/DL (ref 0.1–1)
BILIRUB UR QL STRIP: NEGATIVE
BSA FOR ECHO PROCEDURE: 2.65 M2
BUN SERPL-MCNC: 31 MG/DL (ref 8–23)
BUN SERPL-MCNC: 38 MG/DL (ref 8–23)
CALCIUM SERPL-MCNC: 8.6 MG/DL (ref 8.7–10.5)
CALCIUM SERPL-MCNC: 8.6 MG/DL (ref 8.7–10.5)
CHLORIDE SERPL-SCNC: 102 MMOL/L (ref 95–110)
CHLORIDE SERPL-SCNC: 103 MMOL/L (ref 95–110)
CLARITY UR: CLEAR
CO2 SERPL-SCNC: 22 MMOL/L (ref 23–29)
CO2 SERPL-SCNC: 23 MMOL/L (ref 23–29)
COLOR UR: YELLOW
CREAT SERPL-MCNC: 1.7 MG/DL (ref 0.5–1.4)
CREAT SERPL-MCNC: 1.9 MG/DL (ref 0.5–1.4)
CV ECHO LV RWT: 0.5 CM
DIFFERENTIAL METHOD BLD: ABNORMAL
DOP CALC AO PEAK VEL: 1.64 M/S
DOP CALC AO VTI: 25.3 CM
DOP CALC LVOT AREA: 4.2 CM2
DOP CALC LVOT DIAMETER: 2.3 CM
DOP CALC LVOT PEAK VEL: 0.91 M/S
DOP CALC LVOT STROKE VOLUME: 65.2 CM3
DOP CALC MV VTI: 18.3 CM
DOP CALCLVOT PEAK VEL VTI: 15.7 CM
E WAVE DECELERATION TIME: 144 MSEC
E/E' RATIO: 10.7 M/S
ECHO LV POSTERIOR WALL: 1.23 CM (ref 0.6–1.1)
EOSINOPHIL # BLD AUTO: 0.4 K/UL (ref 0–0.5)
EOSINOPHIL NFR BLD: 3.1 % (ref 0–8)
ERYTHROCYTE [DISTWIDTH] IN BLOOD BY AUTOMATED COUNT: 14.9 % (ref 11.5–14.5)
EST. GFR  (NO RACE VARIABLE): 37 ML/MIN/1.73 M^2
EST. GFR  (NO RACE VARIABLE): 42.3 ML/MIN/1.73 M^2
FRACTIONAL SHORTENING: 23 % (ref 28–44)
GLUCOSE SERPL-MCNC: 265 MG/DL (ref 70–110)
GLUCOSE SERPL-MCNC: 282 MG/DL (ref 70–110)
GLUCOSE SERPL-MCNC: 282 MG/DL (ref 70–110)
GLUCOSE SERPL-MCNC: 301 MG/DL (ref 70–110)
GLUCOSE SERPL-MCNC: 324 MG/DL (ref 70–110)
GLUCOSE UR QL STRIP: ABNORMAL
HCT VFR BLD AUTO: 39.1 % (ref 40–54)
HGB BLD-MCNC: 12.8 G/DL (ref 14–18)
HGB UR QL STRIP: ABNORMAL
IMM GRANULOCYTES # BLD AUTO: 0.15 K/UL (ref 0–0.04)
IMM GRANULOCYTES NFR BLD AUTO: 1.2 % (ref 0–0.5)
INTERVENTRICULAR SEPTUM: 1.14 CM (ref 0.6–1.1)
IVC DIAMETER: 2.48 CM
KETONES UR QL STRIP: NEGATIVE
LEFT INTERNAL DIMENSION IN SYSTOLE: 3.81 CM (ref 2.1–4)
LEFT VENTRICLE DIASTOLIC VOLUME INDEX: 44.53 ML/M2
LEFT VENTRICLE DIASTOLIC VOLUME: 114 ML
LEFT VENTRICLE MASS INDEX: 87 G/M2
LEFT VENTRICLE SYSTOLIC VOLUME INDEX: 24.3 ML/M2
LEFT VENTRICLE SYSTOLIC VOLUME: 62.3 ML
LEFT VENTRICULAR INTERNAL DIMENSION IN DIASTOLE: 4.92 CM (ref 3.5–6)
LEFT VENTRICULAR MASS: 223.85 G
LEUKOCYTE ESTERASE UR QL STRIP: NEGATIVE
LV LATERAL E/E' RATIO: 8.2 M/S
LV SEPTAL E/E' RATIO: 15.38 M/S
LVOT MG: 2 MMHG
LVOT MV: 0.58 CM/S
LYMPHOCYTES # BLD AUTO: 2.5 K/UL (ref 1–4.8)
LYMPHOCYTES NFR BLD: 20.6 % (ref 18–48)
MAGNESIUM SERPL-MCNC: 1.9 MG/DL (ref 1.6–2.6)
MCH RBC QN AUTO: 28.8 PG (ref 27–31)
MCHC RBC AUTO-ENTMCNC: 32.7 G/DL (ref 32–36)
MCV RBC AUTO: 88 FL (ref 82–98)
MICROSCOPIC COMMENT: ABNORMAL
MONOCYTES # BLD AUTO: 1.7 K/UL (ref 0.3–1)
MONOCYTES NFR BLD: 13.6 % (ref 4–15)
MV MEAN GRADIENT: 3 MMHG
MV PEAK E VEL: 1.23 M/S
MV PEAK GRADIENT: 6 MMHG
MV STENOSIS PRESSURE HALF TIME: 51 MS
MV VALVE AREA BY CONTINUITY EQUATION: 3.56 CM2
MV VALVE AREA P 1/2 METHOD: 4.31 CM2
NEUTROPHILS # BLD AUTO: 7.5 K/UL (ref 1.8–7.7)
NEUTROPHILS NFR BLD: 60.5 % (ref 38–73)
NITRITE UR QL STRIP: NEGATIVE
NRBC BLD-RTO: 0 /100 WBC
PH UR STRIP: 5 [PH] (ref 5–8)
PHOSPHATE SERPL-MCNC: 3.3 MG/DL (ref 2.7–4.5)
PISA MRMAX VEL: 3.54 M/S
PISA TR MAX VEL: 2.66 M/S
PLATELET # BLD AUTO: 352 K/UL (ref 150–450)
PMV BLD AUTO: 9.5 FL (ref 9.2–12.9)
POTASSIUM SERPL-SCNC: 4.5 MMOL/L (ref 3.5–5.1)
POTASSIUM SERPL-SCNC: 4.6 MMOL/L (ref 3.5–5.1)
PROT SERPL-MCNC: 7 G/DL (ref 6–8.4)
PROT UR QL STRIP: ABNORMAL
PV MV: 0.97 M/S
PV PEAK GRADIENT: 8 MMHG
PV PEAK VELOCITY: 1.39 M/S
RA PRESSURE ESTIMATED: 3 MMHG
RBC # BLD AUTO: 4.44 M/UL (ref 4.6–6.2)
RBC #/AREA URNS HPF: 13 /HPF (ref 0–4)
RV TB RVSP: 6 MMHG
RV TISSUE DOPPLER FREE WALL SYSTOLIC VELOCITY 1 (APICAL 4 CHAMBER VIEW): 13.5 CM/S
SODIUM SERPL-SCNC: 135 MMOL/L (ref 136–145)
SODIUM SERPL-SCNC: 135 MMOL/L (ref 136–145)
SP GR UR STRIP: 1.01 (ref 1–1.03)
TDI LATERAL: 0.15 M/S
TDI SEPTAL: 0.08 M/S
TDI: 0.12 M/S
TR MAX PG: 28 MMHG
TRICUSPID ANNULAR PLANE SYSTOLIC EXCURSION: 1.28 CM
TROPONIN I SERPL HS-MCNC: 12.2 PG/ML (ref 0–14.9)
TV REST PULMONARY ARTERY PRESSURE: 31 MMHG
UNIDENT CRYS URNS QL MICRO: 2
URN SPEC COLLECT METH UR: ABNORMAL
UROBILINOGEN UR STRIP-ACNC: NEGATIVE EU/DL
WBC # BLD AUTO: 12.31 K/UL (ref 3.9–12.7)
WBC #/AREA URNS HPF: 3 /HPF (ref 0–5)
YEAST URNS QL MICRO: ABNORMAL
Z-SCORE OF LEFT VENTRICULAR DIMENSION IN END DIASTOLE: -12.05
Z-SCORE OF LEFT VENTRICULAR DIMENSION IN END SYSTOLE: -7.37

## 2024-03-08 PROCEDURE — 93306 TTE W/DOPPLER COMPLETE: CPT

## 2024-03-08 PROCEDURE — 25000003 PHARM REV CODE 250: Performed by: INTERNAL MEDICINE

## 2024-03-08 PROCEDURE — 63600175 PHARM REV CODE 636 W HCPCS: Mod: GZ | Performed by: PHYSICAL THERAPY ASSISTANT

## 2024-03-08 PROCEDURE — 84484 ASSAY OF TROPONIN QUANT: CPT | Performed by: PHYSICAL THERAPY ASSISTANT

## 2024-03-08 PROCEDURE — G0378 HOSPITAL OBSERVATION PER HR: HCPCS

## 2024-03-08 PROCEDURE — 83735 ASSAY OF MAGNESIUM: CPT | Performed by: PHYSICAL THERAPY ASSISTANT

## 2024-03-08 PROCEDURE — 96361 HYDRATE IV INFUSION ADD-ON: CPT

## 2024-03-08 PROCEDURE — 94799 UNLISTED PULMONARY SVC/PX: CPT

## 2024-03-08 PROCEDURE — 85025 COMPLETE CBC W/AUTO DIFF WBC: CPT | Performed by: PHYSICAL THERAPY ASSISTANT

## 2024-03-08 PROCEDURE — 96372 THER/PROPH/DIAG INJ SC/IM: CPT | Performed by: PHYSICAL THERAPY ASSISTANT

## 2024-03-08 PROCEDURE — 21400001 HC TELEMETRY ROOM

## 2024-03-08 PROCEDURE — 94761 N-INVAS EAR/PLS OXIMETRY MLT: CPT

## 2024-03-08 PROCEDURE — 80053 COMPREHEN METABOLIC PANEL: CPT | Performed by: PHYSICAL THERAPY ASSISTANT

## 2024-03-08 PROCEDURE — 63600175 PHARM REV CODE 636 W HCPCS: Performed by: PHYSICAL THERAPY ASSISTANT

## 2024-03-08 PROCEDURE — 25000003 PHARM REV CODE 250: Performed by: NURSE PRACTITIONER

## 2024-03-08 PROCEDURE — 84100 ASSAY OF PHOSPHORUS: CPT | Performed by: PHYSICAL THERAPY ASSISTANT

## 2024-03-08 PROCEDURE — 25000003 PHARM REV CODE 250: Performed by: PHYSICAL THERAPY ASSISTANT

## 2024-03-08 PROCEDURE — 81001 URINALYSIS AUTO W/SCOPE: CPT | Performed by: NURSE PRACTITIONER

## 2024-03-08 PROCEDURE — 99900035 HC TECH TIME PER 15 MIN (STAT)

## 2024-03-08 PROCEDURE — 93306 TTE W/DOPPLER COMPLETE: CPT | Mod: 26,,, | Performed by: INTERNAL MEDICINE

## 2024-03-08 PROCEDURE — 36415 COLL VENOUS BLD VENIPUNCTURE: CPT | Mod: XB | Performed by: NURSE PRACTITIONER

## 2024-03-08 PROCEDURE — 80048 BASIC METABOLIC PNL TOTAL CA: CPT | Performed by: NURSE PRACTITIONER

## 2024-03-08 PROCEDURE — 96372 THER/PROPH/DIAG INJ SC/IM: CPT | Performed by: NURSE PRACTITIONER

## 2024-03-08 RX ORDER — HYDRALAZINE HYDROCHLORIDE 25 MG/1
25 TABLET, FILM COATED ORAL EVERY 12 HOURS
Status: DISCONTINUED | OUTPATIENT
Start: 2024-03-08 | End: 2024-03-09 | Stop reason: HOSPADM

## 2024-03-08 RX ORDER — SODIUM CHLORIDE 9 MG/ML
INJECTION, SOLUTION INTRAVENOUS
Status: DISCONTINUED | OUTPATIENT
Start: 2024-03-08 | End: 2024-03-09

## 2024-03-08 RX ADMIN — INSULIN DETEMIR 10 UNITS: 100 INJECTION, SOLUTION SUBCUTANEOUS at 11:03

## 2024-03-08 RX ADMIN — HYDROCODONE BITARTRATE AND ACETAMINOPHEN 1 TABLET: 5; 325 TABLET ORAL at 05:03

## 2024-03-08 RX ADMIN — ENOXAPARIN SODIUM 40 MG: 100 INJECTION SUBCUTANEOUS at 05:03

## 2024-03-08 RX ADMIN — INSULIN ASPART 3 UNITS: 100 INJECTION, SOLUTION INTRAVENOUS; SUBCUTANEOUS at 05:03

## 2024-03-08 RX ADMIN — ATORVASTATIN CALCIUM 10 MG: 10 TABLET, FILM COATED ORAL at 09:03

## 2024-03-08 RX ADMIN — HYDRALAZINE HYDROCHLORIDE 25 MG: 25 TABLET ORAL at 09:03

## 2024-03-08 RX ADMIN — METOPROLOL SUCCINATE 100 MG: 50 TABLET, FILM COATED, EXTENDED RELEASE ORAL at 09:03

## 2024-03-08 NOTE — ASSESSMENT & PLAN NOTE
Chronic, controlled. Latest blood pressure and vitals reviewed-     Temp:  [98.4 °F (36.9 °C)]   Pulse:  []   Resp:  [18-34]   BP: (107-173)/(64-92)   SpO2:  [92 %-100 %] .   Home meds for hypertension were reviewed and noted below.   Hypertension Medications               metoprolol succinate (TOPROL-XL) 100 MG 24 hr tablet Take 1 tablet (100 mg total) by mouth once daily.    telmisartan (MICARDIS) 80 MG Tab Take 1 tablet (80 mg total) by mouth once daily.            While in the hospital, will manage blood pressure as follows; Continue home antihypertensive regimen    Will utilize p.r.n. blood pressure medication only if patient's blood pressure greater than 180/110 and he develops symptoms such as worsening chest pain or shortness of breath.

## 2024-03-08 NOTE — PLAN OF CARE
03/08/24 1215   EDDY Message   Medicare Outpatient and Observation Notification regarding financial responsibility Given to patient/caregiver;Explained to patient/caregiver;Signed/date by patient/caregiver   Date EDDY was signed 03/08/24   Time EDDY was signed [id number]

## 2024-03-08 NOTE — ASSESSMENT & PLAN NOTE
Creatine = 1.9 now -- Patient with acute kidney injury/acute renal failure likely due to pre-renal azotemia due to dehydration MARBIN is currently improving. Baseline creatinine  1.0  - Labs reviewed- Renal function/electrolytes with Estimated Creatinine Clearance: 50.9 mL/min (A) (based on SCr of 1.9 mg/dL (H)). according to latest data. Monitor urine output and serial BMP and adjust therapy as needed. Avoid nephrotoxins and renally dose meds for GFR listed above.  -Holding Telmisartan for now  -Hydralazine added for BP control  -IV fluids cautious at 75 cc/hr  -Follow 2d echo, if no risk for heart failure will consider NS bolus and recheck in the evening. He really wants to go home

## 2024-03-08 NOTE — H&P
"  [facility] - Emergency Dept  Hospital Medicine  History & Physical    Patient Name: Roosevelt Moser  MRN: [id number]  Patient Class: OP- Observation  Admission Date: 3/7/2024  Attending Physician: John Corona MD   Primary Care Provider: Miguel Angel Enriquez PA-C         Patient information was obtained from patient and ER records.     Subjective:     Principal Problem:Atrial fibrillation with rapid ventricular response    Chief Complaint:   Chief Complaint   Patient presents with    Abnormal ECG     Sent from preop for "afib"        HPI: Patient is a 73-year-old male with a past medical history of DM, hyperlipidemia, hypertension, Guillain-[place], and AFib.  Patient presents to the ED today after preop clearance for right humerus orthopedic clearance. Patient was found to be in AFib RVR on exam and was thus sent to ED for further evaluation.  EKG shows afib RVR with rate of 108. Patient denies dizziness, lightheadedness, chest pain, shortness for breath, palpitations, abdominal pain, nausea, vomiting, constipation and diarrhea.  Patient is on home metoprolol nightly but denies anticoagulation.  Patient denies history of AFib but per chart review had new onset in 2022.  Patient was given home dose of metoprolol in ED and rate decreased into the 80s and 90s.  No echo on file, updated pending. Cardiology was consulted.  Pt is supposed to have humeral fracture surgery following cardiology clearance. Pt follows with Dr Cooper. Patient is full code.    In ED: TSH 1.865, Magnesium 1.2, troponin 12.8, , K 4.6, WBC 14.05, hbg 13.8, AST and ALT 9, and creatinine 2.0.     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    Review of patient's allergies indicates:  No Known Allergies    No current facility-administered medications on file prior to encounter.     Current Outpatient " Medications on File Prior to Encounter   Medication Sig    atorvastatin (LIPITOR) 10 MG tablet Take 1 tablet (10 mg total) by mouth once daily. (Patient taking differently: Take 10 mg by mouth every evening.)    co-enzyme Q-10 30 mg capsule Take 30 mg by mouth once daily.    ergocalciferol, vitamin D2, (VITAMIN D ORAL) Take 1 tablet by mouth once daily.    metFORMIN (GLUCOPHAGE-XR) 500 MG ER 24hr tablet Take 2 tablets (1,000 mg total) by mouth 2 (two) times daily with meals.    metoprolol succinate (TOPROL-XL) 100 MG 24 hr tablet Take 1 tablet (100 mg total) by mouth once daily. (Patient taking differently: Take 100 mg by mouth nightly.)    telmisartan (MICARDIS) 80 MG Tab Take 1 tablet (80 mg total) by mouth once daily. (Patient taking differently: Take 80 mg by mouth every evening.)    HYDROcodone-acetaminophen (NORCO)  mg per tablet Take 1 tablet by mouth every 8 (eight) hours as needed for Pain.    [DISCONTINUED] HYDROcodone-acetaminophen (NORCO) 5-325 mg per tablet Take 1 tablet by mouth every 6 (six) hours as needed for Pain.    [DISCONTINUED] ondansetron (ZOFRAN) 4 MG tablet Take 1 tablet (4 mg total) by mouth every 6 (six) hours. (Patient not taking: Reported on 3/7/2024)     Family History    None       Tobacco Use    Smoking status: Never    Smokeless tobacco: Never   Substance and Sexual Activity    Alcohol use: Yes     Alcohol/week: 0.0 standard drinks of alcohol     Comment: social    Drug use: No    Sexual activity: Yes     Review of Systems   Constitutional: Negative.  Negative for appetite change, chills, fatigue, fever and unexpected weight change.   HENT: Negative.  Negative for congestion, ear pain, hearing loss, rhinorrhea, sore throat and tinnitus.    Eyes: Negative.  Negative for pain, discharge and redness.   Respiratory: Negative.  Negative for cough, shortness of breath, wheezing and stridor.    Cardiovascular: Negative.  Negative for chest pain, palpitations and leg swelling.    Gastrointestinal: Negative.  Negative for abdominal distention, abdominal pain, constipation, diarrhea, nausea and vomiting.   Endocrine: Negative.    Genitourinary: Negative.  Negative for decreased urine volume, difficulty urinating, flank pain, hematuria and urgency.   Musculoskeletal:  Positive for arthralgias (left shoulder and arm). Negative for gait problem and myalgias.   Skin: Negative.  Negative for pallor and rash.   Allergic/Immunologic: Negative.  Negative for environmental allergies, food allergies and immunocompromised state.   Neurological: Negative.  Negative for dizziness, syncope, facial asymmetry, weakness and headaches.   Hematological: Negative.    Psychiatric/Behavioral: Negative.  Negative for agitation, confusion, self-injury and suicidal ideas.      Objective:     Vital Signs (Most Recent):  Temp: 98.4 °F (36.9 °C) (03/07/24 1353)  Pulse: 98 (03/07/24 1653)  Resp: (!) 28 (03/07/24 1645)  BP: (!) 140/64 (03/07/24 1653)  SpO2: 100 % (03/07/24 1645) Vital Signs (24h Range):  Temp:  [98.4 °F (36.9 °C)] 98.4 °F (36.9 °C)  Pulse:  [] 98  Resp:  [18-34] 28  SpO2:  [92 %-100 %] 100 %  BP: (107-173)/(64-92) 140/64     Weight: 136.1 kg (300 lb)  Body mass index is 39.58 kg/m².     Physical Exam  Vitals reviewed.   Constitutional:       General: He is not in acute distress.     Appearance: Normal appearance. He is obese. He is not toxic-appearing.   HENT:      Head: Normocephalic and atraumatic.      Nose: Nose normal. No congestion or rhinorrhea.      Mouth/Throat:      Mouth: Mucous membranes are moist.      Pharynx: Oropharynx is clear.   Eyes:      General:         Right eye: No discharge.         Left eye: No discharge.      Extraocular Movements: Extraocular movements intact.      Conjunctiva/sclera: Conjunctivae normal.      Pupils: Pupils are equal, round, and reactive to light.   Cardiovascular:      Rate and Rhythm: Tachycardia present. Rhythm irregular.      Pulses: Normal pulses.       Heart sounds: Normal heart sounds.   Pulmonary:      Effort: Pulmonary effort is normal. No respiratory distress.      Breath sounds: Normal breath sounds.   Chest:      Chest wall: No tenderness.   Abdominal:      General: Abdomen is flat. Bowel sounds are normal. There is no distension.      Palpations: Abdomen is soft.      Tenderness: There is no abdominal tenderness. There is no guarding.   Musculoskeletal:         General: Normal range of motion.      Cervical back: Normal range of motion and neck supple.      Right lower leg: No edema.      Left lower leg: No edema.      Comments: LUE in sling due to humerus fracture   Skin:     General: Skin is warm and dry.      Findings: No rash.   Neurological:      General: No focal deficit present.      Mental Status: He is alert and oriented to person, place, and time. Mental status is at baseline.      Motor: Weakness (generalized, diffiuculty sitting up in bed without assist due to LUE pain and imobility) present.   Psychiatric:         Mood and Affect: Mood normal.              CRANIAL NERVES     CN III, IV, VI   Pupils are equal, round, and reactive to light.       Significant Labs: All pertinent labs within the past 24 hours have been reviewed.  CBC:   Recent Labs   Lab 03/07/24  1238 03/07/24  1446   WBC 15.14* 14.05*   HGB 13.4* 13.8*   HCT 42.3 41.8    448     CMP:   Recent Labs   Lab 03/07/24  1238 03/07/24  1446   * 133*   K 4.6 4.6    101   CO2 22* 19*   * 302*   BUN 32* 34*   CREATININE 1.9* 2.0*   CALCIUM 9.2 9.2   PROT  --  8.0   ALBUMIN  --  3.7   BILITOT  --  0.7   ALKPHOS  --  82   AST  --  9*   ALT  --  9*   ANIONGAP 12 13     Troponin:   Recent Labs   Lab 03/07/24  1446   TROPONINIHS 12.8     Significant Imaging: I have reviewed all pertinent imaging results/findings within the past 24 hours.    Assessment/Plan:     * Atrial fibrillation with rapid ventricular response  Patient with Paroxysmal (<7 days) atrial  fibrillation which is uncontrolled rate on arrival, currently controlled with one dose of home Beta Blocker. Patient is currently in atrial fibrillation.[id number] Score: 2. Anticoagulation indicated. Anticoagulation done with lovenox . Admit to monitor on telemetry. Cardiology consulted.     Mixed hyperlipidemia  Continue statin       Type 2 diabetes mellitus  POCT glucose, insulin sliding scale.   Last A1c reviewed-   Lab Results   Component Value Date    HGBA1C 8.8 (H) 07/31/2023     Current correctional scale  Medium  Antihyperglycemics (From admission, onward)      Start     Stop Route Frequency Ordered    03/07/24 1839  insulin aspart U-100 pen 0-10 Units         -- SubQ Before meals & nightly PRN 03/07/24 1816          Hold Oral hypoglycemics while patient is in the hospital.    Hypertension  Chronic, controlled. Latest blood pressure and vitals reviewed-     Temp:  [98.4 °F (36.9 °C)]   Pulse:  []   Resp:  [18-34]   BP: (107-173)/(64-92)   SpO2:  [92 %-100 %] .   Home meds for hypertension were reviewed and noted below.   Hypertension Medications               metoprolol succinate (TOPROL-XL) 100 MG 24 hr tablet Take 1 tablet (100 mg total) by mouth once daily.    telmisartan (MICARDIS) 80 MG Tab Take 1 tablet (80 mg total) by mouth once daily.            While in the hospital, will manage blood pressure as follows; Continue home antihypertensive regimen    Will utilize p.r.n. blood pressure medication only if patient's blood pressure greater than 180/110 and he develops symptoms such as worsening chest pain or shortness of breath.    MARA (obstructive sleep apnea)  CPAP qhs      VTE Risk Mitigation (From admission, onward)           Ordered     enoxaparin injection 40 mg  Every 24 hours         03/07/24 1759     IP VTE HIGH RISK PATIENT  Once         03/07/24 1741     Place sequential compression device  Until discontinued         03/07/24 1741                     On 03/07/2024, patient should be  placed in hospital observation services under my care in collaboration with Dr Corona.      KAREN Mathur  Department of Hospital Medicine  [facility] - Emergency Dept

## 2024-03-08 NOTE — ASSESSMENT & PLAN NOTE
POCT glucose, insulin sliding scale.   Last A1c reviewed-   Lab Results   Component Value Date    HGBA1C 8.8 (H) 07/31/2023     Current correctional scale  Medium  Antihyperglycemics (From admission, onward)      Start     Stop Route Frequency Ordered    03/07/24 1839  insulin aspart U-100 pen 0-10 Units         -- SubQ Before meals & nightly PRN 03/07/24 1816          Hold Oral hypoglycemics while patient is in the hospital.

## 2024-03-08 NOTE — ASSESSMENT & PLAN NOTE
Patient with Paroxysmal (<7 days) atrial fibrillation which is uncontrolled currently with Beta Blocker. Patient is currently in atrial fibrillation.[id number] Score: 2. Anticoagulation indicated. Anticoagulation done with lovenox .

## 2024-03-08 NOTE — SUBJECTIVE & OBJECTIVE
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    Review of patient's allergies indicates:  No Known Allergies    No current facility-administered medications on file prior to encounter.     Current Outpatient Medications on File Prior to Encounter   Medication Sig    atorvastatin (LIPITOR) 10 MG tablet Take 1 tablet (10 mg total) by mouth once daily. (Patient taking differently: Take 10 mg by mouth every evening.)    co-enzyme Q-10 30 mg capsule Take 30 mg by mouth once daily.    ergocalciferol, vitamin D2, (VITAMIN D ORAL) Take 1 tablet by mouth once daily.    metFORMIN (GLUCOPHAGE-XR) 500 MG ER 24hr tablet Take 2 tablets (1,000 mg total) by mouth 2 (two) times daily with meals.    metoprolol succinate (TOPROL-XL) 100 MG 24 hr tablet Take 1 tablet (100 mg total) by mouth once daily. (Patient taking differently: Take 100 mg by mouth nightly.)    telmisartan (MICARDIS) 80 MG Tab Take 1 tablet (80 mg total) by mouth once daily. (Patient taking differently: Take 80 mg by mouth every evening.)    HYDROcodone-acetaminophen (NORCO)  mg per tablet Take 1 tablet by mouth every 8 (eight) hours as needed for Pain.     Family History    None       Tobacco Use    Smoking status: Never    Smokeless tobacco: Never   Substance and Sexual Activity    Alcohol use: Yes     Alcohol/week: 0.0 standard drinks of alcohol     Comment: social    Drug use: No    Sexual activity: Yes     Review of Systems   Constitutional: Negative.  Negative for appetite change, chills, fatigue, fever and unexpected weight change.   HENT: Negative.  Negative for congestion, ear pain, hearing loss, rhinorrhea, sore throat and tinnitus.    Eyes: Negative.  Negative for pain, discharge and redness.   Respiratory: Negative.  Negative for cough, shortness of breath, wheezing and  stridor.    Cardiovascular: Negative.  Negative for chest pain, palpitations and leg swelling.   Gastrointestinal: Negative.  Negative for abdominal distention, abdominal pain, constipation, diarrhea, nausea and vomiting.   Endocrine: Negative.    Genitourinary: Negative.  Negative for decreased urine volume, difficulty urinating, flank pain, hematuria and urgency.   Musculoskeletal:  Positive for arthralgias (left shoulder and arm). Negative for gait problem and myalgias.   Skin: Negative.  Negative for pallor and rash.   Allergic/Immunologic: Negative.  Negative for environmental allergies, food allergies and immunocompromised state.   Neurological: Negative.  Negative for dizziness, syncope, facial asymmetry, weakness and headaches.   Hematological: Negative.    Psychiatric/Behavioral: Negative.  Negative for agitation, confusion, self-injury and suicidal ideas.      Objective:     Vital Signs (Most Recent):  Temp: 98.4 °F (36.9 °C) (03/07/24 1353)  Pulse: 75 (03/08/24 0804)  Resp: (!) 22 (03/08/24 0804)  BP: (!) 144/79 (03/08/24 0430)  SpO2: (!) 94 % (03/08/24 0804) Vital Signs (24h Range):  Temp:  [98.4 °F (36.9 °C)] 98.4 °F (36.9 °C)  Pulse:  [] 75  Resp:  [18-40] 22  SpO2:  [92 %-100 %] 94 %  BP: (107-173)/(56-92) 144/79     Weight: 136.1 kg (300 lb)  Body mass index is 39.58 kg/m².     Physical Exam  Vitals reviewed.   Constitutional:       General: He is not in acute distress.     Appearance: Normal appearance. He is obese. He is not toxic-appearing.   HENT:      Head: Normocephalic and atraumatic.      Nose: Nose normal. No congestion or rhinorrhea.      Mouth/Throat:      Mouth: Mucous membranes are moist.      Pharynx: Oropharynx is clear.   Eyes:      General:         Right eye: No discharge.         Left eye: No discharge.      Extraocular Movements: Extraocular movements intact.      Conjunctiva/sclera: Conjunctivae normal.      Pupils: Pupils are equal, round, and reactive to light.    Cardiovascular:      Rate and Rhythm: Tachycardia present. Rhythm irregular.      Pulses: Normal pulses.      Heart sounds: Normal heart sounds.   Pulmonary:      Effort: Pulmonary effort is normal. No respiratory distress.      Breath sounds: Normal breath sounds.   Chest:      Chest wall: No tenderness.   Abdominal:      General: Abdomen is flat. Bowel sounds are normal. There is no distension.      Palpations: Abdomen is soft.      Tenderness: There is no abdominal tenderness. There is no guarding.   Musculoskeletal:         General: Normal range of motion.      Cervical back: Normal range of motion and neck supple.      Right lower leg: No edema.      Left lower leg: No edema.      Comments: LUE in sling due to humerus fracture   Skin:     General: Skin is warm and dry.      Findings: No rash.   Neurological:      General: No focal deficit present.      Mental Status: He is alert and oriented to person, place, and time. Mental status is at baseline.      Motor: Weakness (generalized, diffiuculty sitting up in bed without assist due to LUE pain and imobility) present.   Psychiatric:         Mood and Affect: Mood normal.              CRANIAL NERVES     CN III, IV, VI   Pupils are equal, round, and reactive to light.       Significant Labs: All pertinent labs within the past 24 hours have been reviewed.  CBC:   Recent Labs   Lab 03/07/24  1238 03/07/24  1446 03/08/24  0529   WBC 15.14* 14.05* 12.31   HGB 13.4* 13.8* 12.8*   HCT 42.3 41.8 39.1*    448 352       CMP:   Recent Labs   Lab 03/07/24  1238 03/07/24  1446 03/08/24  0529   * 133* 135*   K 4.6 4.6 4.5    101 103   CO2 22* 19* 23   * 302* 324*   BUN 32* 34* 38*   CREATININE 1.9* 2.0* 1.9*   CALCIUM 9.2 9.2 8.6*   PROT  --  8.0 7.0   ALBUMIN  --  3.7 3.2*   BILITOT  --  0.7 0.7   ALKPHOS  --  82 71   AST  --  9* 10   ALT  --  9* 9*   ANIONGAP 12 13 9       Troponin:   Recent Labs   Lab 03/07/24  1446 03/08/24  0529   TROPONINIHS  12.8 12.2       Significant Imaging:   Imaging Results              X-Ray Chest 1 View (Final result)  Result time 03/07/24 16:45:22      Final result by Herberth Siddiqui MD (03/07/24 16:45:22)                   Narrative:    HISTORY: Atrial fibrillation.    FINDINGS: 2 AP chest radiographs at 1625 hours with no prior studies for comparison show the cardiac silhouette is enlarged, with normal pulmonary vascularity.    The lungs are normally expanded, with no consolidation, large pleural effusion, or evidence of pulmonary edema. No confluent infiltrates or pneumothorax. There are left glenohumeral arthropathic changes with prominent osteophytes, with no acute fractures.    IMPRESSION: No evidence of acute cardiopulmonary disease.    Electronically signed by:  Herberth Siddiqui MD  03/07/2024 04:45 PM [facility] Workstation: [id number]

## 2024-03-08 NOTE — PROGRESS NOTES
[facility] - Emergency Dept  Hospital Medicine  Progress Note    Patient Name: Roosevelt Moser  MRN: [id number]  Patient Class: OP- Observation   Admission Date: 3/7/2024  Length of Stay: 0 days  Attending Physician: Tex Belcher MD  Primary Care Provider: Miguel Angel Enriquez PA-C        Subjective:     Principal Problem:Atrial fibrillation with rapid ventricular response        HPI:  Patient is a 73-year-old male with a past medical history of DM, hyperlipidemia, hypertension, Guillain-[place], and AFib.  Patient presents to the ED today after preop clearance for right humerus orthopedic clearance. Patient was found to be in AFib RVR on exam and was thus sent to ED for further evaluation.  EKG shows afib RVR with rate of 108. Patient denies dizziness, lightheadedness, chest pain, shortness for breath, palpitations, abdominal pain, nausea, vomiting, constipation and diarrhea.  Patient is on home metoprolol nightly but denies anticoagulation.  Patient denies history of AFib but per chart review had new onset in 2022.  Patient was given home dose of metoprolol in ED and rate decreased into the 80s and 90s.  No echo on file, updated pending. Cardiology was consulted.  Pt is supposed to have humeral fracture surgery following cardiology clearance. Pt follows with Dr Cooper. Patient is full code.    In ED: TSH 1.865, Magnesium 1.2, troponin 12.8, , K 4.6, WBC 14.05, hbg 13.8, AST and ALT 9, and creatinine 2.0.     Overview/Hospital Course:  73 y.o. M patient who presented after pre-procedure workup revealed Afib RVR. His home medications were restarted with improvement to HR - now controlled afib. Patient also noted to have MARBIN with creatine noted 2.0 which improved somewhat to 1.9 with IV fluids. 2D echo was obtained and Cardiology consulted from ED for risk stratification. CHADsVac score >2 (age/afib, HTN, DMII) - 2D echo pending. He is not on anticoagulation - he has expressed that he is trying to get  surgery to repair his left femur fracture and is concerned about starting oral anticoagulation. Additionally, he denies history of afib but chart review reveals afib 2022 - on Toprol XL home med.    Awaiting cardiology consult - 2D echo - IV fluids Acute on chronic kidney failure. 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    Review of patient's allergies indicates:  No Known Allergies    No current facility-administered medications on file prior to encounter.     Current Outpatient Medications on File Prior to Encounter   Medication Sig    atorvastatin (LIPITOR) 10 MG tablet Take 1 tablet (10 mg total) by mouth once daily. (Patient taking differently: Take 10 mg by mouth every evening.)    co-enzyme Q-10 30 mg capsule Take 30 mg by mouth once daily.    ergocalciferol, vitamin D2, (VITAMIN D ORAL) Take 1 tablet by mouth once daily.    metFORMIN (GLUCOPHAGE-XR) 500 MG ER 24hr tablet Take 2 tablets (1,000 mg total) by mouth 2 (two) times daily with meals.    metoprolol succinate (TOPROL-XL) 100 MG 24 hr tablet Take 1 tablet (100 mg total) by mouth once daily. (Patient taking differently: Take 100 mg by mouth nightly.)    telmisartan (MICARDIS) 80 MG Tab Take 1 tablet (80 mg total) by mouth once daily. (Patient taking differently: Take 80 mg by mouth every evening.)    HYDROcodone-acetaminophen (NORCO)  mg per tablet Take 1 tablet by mouth every 8 (eight) hours as needed for Pain.     Family History    None       Tobacco Use    Smoking status: Never    Smokeless tobacco: Never   Substance and Sexual Activity    Alcohol use: Yes     Alcohol/week: 0.0 standard drinks of alcohol     Comment: social    Drug use: No    Sexual activity: Yes     Review of Systems   Constitutional: Negative.  Negative for appetite  change, chills, fatigue, fever and unexpected weight change.   HENT: Negative.  Negative for congestion, ear pain, hearing loss, rhinorrhea, sore throat and tinnitus.    Eyes: Negative.  Negative for pain, discharge and redness.   Respiratory: Negative.  Negative for cough, shortness of breath, wheezing and stridor.    Cardiovascular: Negative.  Negative for chest pain, palpitations and leg swelling.   Gastrointestinal: Negative.  Negative for abdominal distention, abdominal pain, constipation, diarrhea, nausea and vomiting.   Endocrine: Negative.    Genitourinary: Negative.  Negative for decreased urine volume, difficulty urinating, flank pain, hematuria and urgency.   Musculoskeletal:  Positive for arthralgias (left shoulder and arm). Negative for gait problem and myalgias.   Skin: Negative.  Negative for pallor and rash.   Allergic/Immunologic: Negative.  Negative for environmental allergies, food allergies and immunocompromised state.   Neurological: Negative.  Negative for dizziness, syncope, facial asymmetry, weakness and headaches.   Hematological: Negative.    Psychiatric/Behavioral: Negative.  Negative for agitation, confusion, self-injury and suicidal ideas.      Objective:     Vital Signs (Most Recent):  Temp: 98.4 °F (36.9 °C) (03/07/24 1353)  Pulse: 75 (03/08/24 0804)  Resp: (!) 22 (03/08/24 0804)  BP: (!) 144/79 (03/08/24 0430)  SpO2: (!) 94 % (03/08/24 0804) Vital Signs (24h Range):  Temp:  [98.4 °F (36.9 °C)] 98.4 °F (36.9 °C)  Pulse:  [] 75  Resp:  [18-40] 22  SpO2:  [92 %-100 %] 94 %  BP: (107-173)/(56-92) 144/79     Weight: 136.1 kg (300 lb)  Body mass index is 39.58 kg/m².     Physical Exam  Vitals reviewed.   Constitutional:       General: He is not in acute distress.     Appearance: Normal appearance. He is obese. He is not toxic-appearing.   HENT:      Head: Normocephalic and atraumatic.      Nose: Nose normal. No congestion or rhinorrhea.      Mouth/Throat:      Mouth: Mucous membranes  are moist.      Pharynx: Oropharynx is clear.   Eyes:      General:         Right eye: No discharge.         Left eye: No discharge.      Extraocular Movements: Extraocular movements intact.      Conjunctiva/sclera: Conjunctivae normal.      Pupils: Pupils are equal, round, and reactive to light.   Cardiovascular:      Rate and Rhythm: Tachycardia present. Rhythm irregular.      Pulses: Normal pulses.      Heart sounds: Normal heart sounds.   Pulmonary:      Effort: Pulmonary effort is normal. No respiratory distress.      Breath sounds: Normal breath sounds.   Chest:      Chest wall: No tenderness.   Abdominal:      General: Abdomen is flat. Bowel sounds are normal. There is no distension.      Palpations: Abdomen is soft.      Tenderness: There is no abdominal tenderness. There is no guarding.   Musculoskeletal:         General: Normal range of motion.      Cervical back: Normal range of motion and neck supple.      Right lower leg: No edema.      Left lower leg: No edema.      Comments: LUE in sling due to humerus fracture   Skin:     General: Skin is warm and dry.      Findings: No rash.   Neurological:      General: No focal deficit present.      Mental Status: He is alert and oriented to person, place, and time. Mental status is at baseline.      Motor: Weakness (generalized, diffiuculty sitting up in bed without assist due to LUE pain and imobility) present.   Psychiatric:         Mood and Affect: Mood normal.              CRANIAL NERVES     CN III, IV, VI   Pupils are equal, round, and reactive to light.       Significant Labs: All pertinent labs within the past 24 hours have been reviewed.  CBC:   Recent Labs   Lab 03/07/24  1238 03/07/24  1446 03/08/24  0529   WBC 15.14* 14.05* 12.31   HGB 13.4* 13.8* 12.8*   HCT 42.3 41.8 39.1*    448 352       CMP:   Recent Labs   Lab 03/07/24  1238 03/07/24  1446 03/08/24  0529   * 133* 135*   K 4.6 4.6 4.5    101 103   CO2 22* 19* 23   * 302*  324*   BUN 32* 34* 38*   CREATININE 1.9* 2.0* 1.9*   CALCIUM 9.2 9.2 8.6*   PROT  --  8.0 7.0   ALBUMIN  --  3.7 3.2*   BILITOT  --  0.7 0.7   ALKPHOS  --  82 71   AST  --  9* 10   ALT  --  9* 9*   ANIONGAP 12 13 9       Troponin:   Recent Labs   Lab 03/07/24  1446 03/08/24  0529   TROPONINIHS 12.8 12.2       Significant Imaging:   Imaging Results              X-Ray Chest 1 View (Final result)  Result time 03/07/24 16:45:22      Final result by Herberth Siddiqui MD (03/07/24 16:45:22)                   Narrative:    HISTORY: Atrial fibrillation.    FINDINGS: 2 AP chest radiographs at 1625 hours with no prior studies for comparison show the cardiac silhouette is enlarged, with normal pulmonary vascularity.    The lungs are normally expanded, with no consolidation, large pleural effusion, or evidence of pulmonary edema. No confluent infiltrates or pneumothorax. There are left glenohumeral arthropathic changes with prominent osteophytes, with no acute fractures.    IMPRESSION: No evidence of acute cardiopulmonary disease.    Electronically signed by:  Herberth Siddiqui MD  03/07/2024 04:45 PM CST Workstation: [id number]                                      Assessment/Plan:      * Atrial fibrillation with rapid ventricular response  Patient with Paroxysmal (<7 days) atrial fibrillation which is uncontrolled rate on arrival, currently controlled with one dose of home Beta Blocker. Patient is currently in atrial fibrillation.[id number] Score: 2. Anticoagulation indicated. Anticoagulation done with lovenox . Admit to monitor on telemetry. Cardiology consulted - 2d echo ordered  -Anticoagulation?      Acute on chronic kidney failure  Creatine = 1.9 now -- Patient with acute kidney injury/acute renal failure likely due to pre-renal azotemia due to dehydration MARBIN is currently improving. Baseline creatinine  1.0  - Labs reviewed- Renal function/electrolytes with Estimated Creatinine Clearance: 50.9 mL/min (A) (based on SCr of 1.9  mg/dL (H)). according to latest data. Monitor urine output and serial BMP and adjust therapy as needed. Avoid nephrotoxins and renally dose meds for GFR listed above.  -Holding Telmisartan for now  -Hydralazine added for BP control  -IV fluids cautious at 75 cc/hr  -Follow 2d echo, if no risk for heart failure will consider NS bolus and recheck in the evening. He really wants to go home    Mixed hyperlipidemia  Continue statin       Type 2 diabetes mellitus  POCT glucose, insulin sliding scale.   Last A1c reviewed-   Lab Results   Component Value Date    HGBA1C 8.8 (H) 07/31/2023     Current correctional scale  Medium  Antihyperglycemics (From admission, onward)      Start     Stop Route Frequency Ordered    03/07/24 1839  insulin aspart U-100 pen 0-10 Units         -- SubQ Before meals & nightly PRN 03/07/24 1816          Hold Oral hypoglycemics while patient is in the hospital.    Hypertension  Chronic, controlled. Latest blood pressure and vitals reviewed-     Temp:  [98.4 °F (36.9 °C)]   Pulse:  []   Resp:  [18-40]   BP: (107-173)/(56-92)   SpO2:  [92 %-100 %] .   Home meds for hypertension were reviewed and noted below.   Hypertension Medications               metoprolol succinate (TOPROL-XL) 100 MG 24 hr tablet Take 1 tablet (100 mg total) by mouth once daily.    telmisartan (MICARDIS) 80 MG Tab Take 1 tablet (80 mg total) by mouth once daily.          Holding telmisartan 2/2 renal  Added hydralazine po  Will utilize p.r.n. blood pressure medication only if patient's blood pressure greater than 180/110 and he develops symptoms such as worsening chest pain or shortness of breath.    MARA (obstructive sleep apnea)  CPAP qhs      VTE Risk Mitigation (From admission, onward)           Ordered     enoxaparin injection 40 mg  Every 24 hours         03/07/24 1759     IP VTE HIGH RISK PATIENT  Once         03/07/24 1741     Place sequential compression device  Until discontinued         03/07/24 1741                     Discharge Planning   KAMILA:      Code Status: Full Code   Is the patient medically ready for discharge?:     Reason for patient still in hospital (select all that apply): Patient trending condition, Treatment, and Consult recommendations  Discharge Plan A: Home with family              Batool Austin, INA, APRN, FNP-C  [facility]  [facility]  [email or web address]

## 2024-03-08 NOTE — HOSPITAL COURSE
73 y.o. M patient presented after pre-procedure workup revealed Afib RVR. Home medications were restarted with improvement to HR - now controlled afib. Patient also noted to have MARBIN with creatine noted 2.0 (baseline 1.0). cautious IV fluids started. 2D echo was obtained which showed HFEF 45-65% with normal DF - Cardiology consulted from ED for risk stratification. CHADsVac score >3 (age/afib, HTN, DMII/systolic HF). Patient has not been on anticoagulation in the past. He has expressed that he is trying to get surgery to repair his left femur fracture and is concerned about starting oral anticoagulation. On Toprol XL home med. Reached out via secure chat to Dr. Cooper to let him know patient was in hospital. If he has surgery scheduled for 3/15/24, records of appointments show a 3/12/24 encounter pending for Dr. Cooper - will need to coordinate with po anticoagulation. Cardiology has seen the patient and recommends anticoagulation - patient agrees to start after his surgery. Creatine improved from 2.0 to 1.5 near normal. Continue to hold home Telmisartan 2/2 renal - added hydralazine 25 mg BID to his usual metoprolol succinate 100 mg daily for tighter BP control while Telmisartan is being held. He can coordinate restarting with cardiology in clinic if kidney functions remain stable, anticipate will continue to self-correct. Cleared by cardiology for discharge to follow up in 1-2 weeks in clinic. Follow up with PCP in 1 week to repeat labs.

## 2024-03-08 NOTE — ASSESSMENT & PLAN NOTE
Chronic, controlled. Latest blood pressure and vitals reviewed-     Temp:  [98.4 °F (36.9 °C)]   Pulse:  []   Resp:  [18-40]   BP: (107-173)/(56-92)   SpO2:  [92 %-100 %] .   Home meds for hypertension were reviewed and noted below.   Hypertension Medications               metoprolol succinate (TOPROL-XL) 100 MG 24 hr tablet Take 1 tablet (100 mg total) by mouth once daily.    telmisartan (MICARDIS) 80 MG Tab Take 1 tablet (80 mg total) by mouth once daily.          Holding telmisartan 2/2 renal  Added hydralazine po  Will utilize p.r.n. blood pressure medication only if patient's blood pressure greater than 180/110 and he develops symptoms such as worsening chest pain or shortness of breath.

## 2024-03-08 NOTE — PROGRESS NOTES
Pharmacist Renal Dose Adjustment Note    Roosevelt Moser is a 73 y.o. male being treated with the medication Enoxaparin    Patient Data:    Vital Signs (Most Recent):  Temp: 98.4 °F (36.9 °C) (03/07/24 1353)  Pulse: 98 (03/07/24 1653)  Resp: (!) 28 (03/07/24 1645)  BP: (!) 140/64 (03/07/24 1653)  SpO2: 100 % (03/07/24 1645) Vital Signs (72h Range):  Temp:  [98.4 °F (36.9 °C)]   Pulse:  []   Resp:  [18-34]   BP: (107-173)/(64-92)   SpO2:  [92 %-100 %]      Recent Labs   Lab 03/07/24  1238 03/07/24  1446   CREATININE 1.9* 2.0*     Serum creatinine: 2 mg/dL (H) 03/07/24 1446  Estimated creatinine clearance: 48.4 mL/min (A)    Medication: Enoxaparin 40 mg Q12H changed to Enoxaparin 40 mg Q24H per Renal Dosing Protocol: BMI <40 kg/m2  Pharmacist's Name: Kimberly Lopez  Pharmacist's Extension: [phone]

## 2024-03-08 NOTE — PLAN OF CARE
[facility] - Emergency Dept  Initial Discharge Assessment       Primary Care Provider: Miguel Angel Enriquez PA-C    Admission Diagnosis: A-fib [I48.91]    Admission Date: 3/7/2024  Expected Discharge Date:     Pt is a 73-year-old male who arrived from home with Atrial fibrillation with rapid ventricular response. Information verified as correct on facesheet. The assessment was completed at the patient's bedside.  Pt lives with wife, Shana Moser [phone]. Pt does have a Living Will. The Pt is oriented to person, places, and times. PCP last seen six months ago.  Pt denies Coumadin, dialysis, , HH, and has not been readmitted into the hospital in the last thirty days.  Pt is capable of performing ADLs without assistance; Pt uses a CPAP. Pt drives to and from medical appointments. Pt reports he takes medication as prescribed; pharmacy used [facility].  Pt verbalized plan to discharge home via family transport. Pt has no other needs to be addressed at this time. CM reviewed the chart and will continue to monitor.       Transition of Care Barriers: None    Payor: HUMANA MANAGED MEDICARE / Plan: HUMANA MEDICARE HMO / Product Type: Capitation /     Extended Emergency Contact Information  Primary Emergency Contact: Shana Moser  Address: [address] [place] [address] [id number] [place] [address] of [facility]  Home Phone: [phone]  Mobile Phone: [phone]  Relation: Spouse  Preferred language: English   needed? No    Discharge Plan A: Home with family  Discharge Plan B: Home      [facility] - [place], [place] - [address]  [address] [address]  Phone: [phone] Fax: [phone]    [facility] - [place], [place] - [address]  [address]  [place] [address]  Phone: [phone] Fax: [phone]      Initial Assessment (most recent)       Adult Discharge Assessment - 03/08/24 1206          Discharge Assessment     Assessment Type Discharge Planning Assessment     Confirmed/corrected address, phone number and insurance Yes     Confirmed Demographics Correct on Facesheet     Source of Information patient     When was your last doctors appointment? --   6 months ago    Does patient/caregiver understand observation status Yes     Communicated KAMILA with patient/caregiver Date not available/Unable to determine     Reason For Admission Atrial fibrillation with rapid ventricular response     People in Home spouse     Facility Arrived From: Pt came from Dr. Cooper's office     Do you expect to return to your current living situation? Yes     Do you have help at home or someone to help you manage your care at home? Yes     Who are your caregiver(s) and their phone number(s)? Shana Moser/wife [phone]     Prior to hospitilization cognitive status: Alert/Oriented     Current cognitive status: Alert/Oriented     Walking or Climbing Stairs Difficulty no     Dressing/Bathing Difficulty no     Home Accessibility not wheelchair accessible     Equipment Currently Used at Home CPAP     Readmission within 30 days? No     Patient currently being followed by outpatient case management? No     Do you currently have service(s) that help you manage your care at home? No     Do you take prescription medications? Yes     Do you have prescription coverage? Yes     Coverage Payor: HUMANA MANAGED MEDICARE - HUMANA MEDICARE HMO - CAPITATED     Do you have any problems affording any of your prescribed medications? No     Is the patient taking medications as prescribed? yes     Who is going to help you get home at discharge? Shana Moser/wife [phone]     How do you get to doctors appointments? car, drives self     Are you on dialysis? No     Do you take coumadin? No     Discharge Plan A Home with family     Discharge Plan B Home     DME Needed Upon Discharge  none     Discharge Plan discussed with: Patient     Transition of Care Barriers None           no

## 2024-03-08 NOTE — NURSING
Nurses Note -- 4 Eyes      3/8/2024   4:55 PM      Skin assessed during: Admit      [x] No Altered Skin Integrity Present    []Prevention Measures Documented      [] Yes- Altered Skin Integrity Present or Discovered   [] LDA Added if Not in Epic (Describe Wound)   [] New Altered Skin Integrity was Present on Admit and Documented in LDA   [] Wound Image Taken    Wound Care Consulted? No    Attending Nurse:  Jasmine Ceballos RN/Staff Member:   [id number]

## 2024-03-09 VITALS
SYSTOLIC BLOOD PRESSURE: 135 MMHG | OXYGEN SATURATION: 96 % | WEIGHT: 313.5 LBS | HEIGHT: 73 IN | DIASTOLIC BLOOD PRESSURE: 76 MMHG | TEMPERATURE: 98 F | BODY MASS INDEX: 41.55 KG/M2 | HEART RATE: 87 BPM | RESPIRATION RATE: 20 BRPM

## 2024-03-09 LAB
ALBUMIN SERPL BCP-MCNC: 3.2 G/DL (ref 3.5–5.2)
ALP SERPL-CCNC: 71 U/L (ref 55–135)
ALT SERPL W/O P-5'-P-CCNC: 9 U/L (ref 10–44)
ANION GAP SERPL CALC-SCNC: 10 MMOL/L (ref 8–16)
ANION GAP SERPL CALC-SCNC: 10 MMOL/L (ref 8–16)
AST SERPL-CCNC: 9 U/L (ref 10–40)
BASOPHILS # BLD AUTO: 0.13 K/UL (ref 0–0.2)
BASOPHILS NFR BLD: 1 % (ref 0–1.9)
BILIRUB SERPL-MCNC: 0.7 MG/DL (ref 0.1–1)
BUN SERPL-MCNC: 27 MG/DL (ref 8–23)
BUN SERPL-MCNC: 28 MG/DL (ref 8–23)
CALCIUM SERPL-MCNC: 8.4 MG/DL (ref 8.7–10.5)
CALCIUM SERPL-MCNC: 8.5 MG/DL (ref 8.7–10.5)
CHLORIDE SERPL-SCNC: 102 MMOL/L (ref 95–110)
CHLORIDE SERPL-SCNC: 103 MMOL/L (ref 95–110)
CO2 SERPL-SCNC: 21 MMOL/L (ref 23–29)
CO2 SERPL-SCNC: 22 MMOL/L (ref 23–29)
CREAT SERPL-MCNC: 1.5 MG/DL (ref 0.5–1.4)
CREAT SERPL-MCNC: 1.5 MG/DL (ref 0.5–1.4)
DIFFERENTIAL METHOD BLD: ABNORMAL
EOSINOPHIL # BLD AUTO: 0.5 K/UL (ref 0–0.5)
EOSINOPHIL NFR BLD: 3.4 % (ref 0–8)
ERYTHROCYTE [DISTWIDTH] IN BLOOD BY AUTOMATED COUNT: 14.8 % (ref 11.5–14.5)
EST. GFR  (NO RACE VARIABLE): 49.2 ML/MIN/1.73 M^2
EST. GFR  (NO RACE VARIABLE): 49.2 ML/MIN/1.73 M^2
GLUCOSE SERPL-MCNC: 273 MG/DL (ref 70–110)
GLUCOSE SERPL-MCNC: 281 MG/DL (ref 70–110)
GLUCOSE SERPL-MCNC: 288 MG/DL (ref 70–110)
GLUCOSE SERPL-MCNC: 347 MG/DL (ref 70–110)
HCT VFR BLD AUTO: 39.2 % (ref 40–54)
HGB BLD-MCNC: 12.7 G/DL (ref 14–18)
IMM GRANULOCYTES # BLD AUTO: 0.19 K/UL (ref 0–0.04)
IMM GRANULOCYTES NFR BLD AUTO: 1.4 % (ref 0–0.5)
LYMPHOCYTES # BLD AUTO: 2.6 K/UL (ref 1–4.8)
LYMPHOCYTES NFR BLD: 19.1 % (ref 18–48)
MAGNESIUM SERPL-MCNC: 1.7 MG/DL (ref 1.6–2.6)
MCH RBC QN AUTO: 28 PG (ref 27–31)
MCHC RBC AUTO-ENTMCNC: 32.4 G/DL (ref 32–36)
MCV RBC AUTO: 87 FL (ref 82–98)
MONOCYTES # BLD AUTO: 1.9 K/UL (ref 0.3–1)
MONOCYTES NFR BLD: 14.5 % (ref 4–15)
NEUTROPHILS # BLD AUTO: 8.1 K/UL (ref 1.8–7.7)
NEUTROPHILS NFR BLD: 60.6 % (ref 38–73)
NRBC BLD-RTO: 0 /100 WBC
PHOSPHATE SERPL-MCNC: 2.8 MG/DL (ref 2.7–4.5)
PLATELET # BLD AUTO: 366 K/UL (ref 150–450)
PMV BLD AUTO: 9.4 FL (ref 9.2–12.9)
POTASSIUM SERPL-SCNC: 4.8 MMOL/L (ref 3.5–5.1)
POTASSIUM SERPL-SCNC: 5 MMOL/L (ref 3.5–5.1)
PROT SERPL-MCNC: 7 G/DL (ref 6–8.4)
RBC # BLD AUTO: 4.53 M/UL (ref 4.6–6.2)
SODIUM SERPL-SCNC: 134 MMOL/L (ref 136–145)
SODIUM SERPL-SCNC: 134 MMOL/L (ref 136–145)
WBC # BLD AUTO: 13.35 K/UL (ref 3.9–12.7)

## 2024-03-09 PROCEDURE — 36415 COLL VENOUS BLD VENIPUNCTURE: CPT | Mod: XB | Performed by: NURSE PRACTITIONER

## 2024-03-09 PROCEDURE — 63700000 PHARM REV CODE 250 ALT 637 W/O HCPCS: Performed by: NURSE PRACTITIONER

## 2024-03-09 PROCEDURE — 85025 COMPLETE CBC W/AUTO DIFF WBC: CPT | Performed by: PHYSICAL THERAPY ASSISTANT

## 2024-03-09 PROCEDURE — 94761 N-INVAS EAR/PLS OXIMETRY MLT: CPT

## 2024-03-09 PROCEDURE — 25000003 PHARM REV CODE 250: Performed by: NURSE PRACTITIONER

## 2024-03-09 PROCEDURE — 83735 ASSAY OF MAGNESIUM: CPT | Performed by: PHYSICAL THERAPY ASSISTANT

## 2024-03-09 PROCEDURE — G0378 HOSPITAL OBSERVATION PER HR: HCPCS

## 2024-03-09 PROCEDURE — 36415 COLL VENOUS BLD VENIPUNCTURE: CPT | Performed by: PHYSICAL THERAPY ASSISTANT

## 2024-03-09 PROCEDURE — 84100 ASSAY OF PHOSPHORUS: CPT | Performed by: PHYSICAL THERAPY ASSISTANT

## 2024-03-09 PROCEDURE — 80053 COMPREHEN METABOLIC PANEL: CPT | Performed by: PHYSICAL THERAPY ASSISTANT

## 2024-03-09 PROCEDURE — 25000003 PHARM REV CODE 250: Performed by: INTERNAL MEDICINE

## 2024-03-09 PROCEDURE — 99233 SBSQ HOSP IP/OBS HIGH 50: CPT | Mod: ,,, | Performed by: INTERNAL MEDICINE

## 2024-03-09 PROCEDURE — 99900035 HC TECH TIME PER 15 MIN (STAT)

## 2024-03-09 PROCEDURE — 80048 BASIC METABOLIC PNL TOTAL CA: CPT | Mod: XB | Performed by: NURSE PRACTITIONER

## 2024-03-09 RX ORDER — HYDRALAZINE HYDROCHLORIDE 25 MG/1
25 TABLET, FILM COATED ORAL EVERY 12 HOURS
Qty: 60 TABLET | Refills: 3 | Status: SHIPPED | OUTPATIENT
Start: 2024-03-09 | End: 2024-03-09

## 2024-03-09 RX ORDER — SODIUM CHLORIDE 9 MG/ML
INJECTION, SOLUTION INTRAVENOUS ONCE
Status: COMPLETED | OUTPATIENT
Start: 2024-03-09 | End: 2024-03-09

## 2024-03-09 RX ORDER — HYDROCODONE BITARTRATE AND ACETAMINOPHEN 10; 325 MG/1; MG/1
1 TABLET ORAL EVERY 4 HOURS PRN
Status: DISCONTINUED | OUTPATIENT
Start: 2024-03-09 | End: 2024-03-09 | Stop reason: HOSPADM

## 2024-03-09 RX ORDER — HYDRALAZINE HYDROCHLORIDE 25 MG/1
25 TABLET, FILM COATED ORAL EVERY 12 HOURS
Qty: 60 TABLET | Refills: 3 | Status: ON HOLD | OUTPATIENT
Start: 2024-03-09 | End: 2025-03-09

## 2024-03-09 RX ADMIN — FLUCONAZOLE 150 MG: 50 TABLET ORAL at 09:03

## 2024-03-09 RX ADMIN — INSULIN ASPART 1 UNITS: 100 INJECTION, SOLUTION INTRAVENOUS; SUBCUTANEOUS at 12:03

## 2024-03-09 RX ADMIN — FAMOTIDINE 20 MG: 20 TABLET ORAL at 09:03

## 2024-03-09 RX ADMIN — HYDROCODONE BITARTRATE AND ACETAMINOPHEN 1 TABLET: 10; 325 TABLET ORAL at 09:03

## 2024-03-09 RX ADMIN — SODIUM CHLORIDE: 9 INJECTION, SOLUTION INTRAVENOUS at 03:03

## 2024-03-09 RX ADMIN — INSULIN ASPART 3 UNITS: 100 INJECTION, SOLUTION INTRAVENOUS; SUBCUTANEOUS at 09:03

## 2024-03-09 RX ADMIN — INSULIN DETEMIR 10 UNITS: 100 INJECTION, SOLUTION SUBCUTANEOUS at 09:03

## 2024-03-09 RX ADMIN — SODIUM CHLORIDE: 9 INJECTION, SOLUTION INTRAVENOUS at 10:03

## 2024-03-09 RX ADMIN — HYDRALAZINE HYDROCHLORIDE 25 MG: 25 TABLET ORAL at 09:03

## 2024-03-09 RX ADMIN — INSULIN ASPART 4 UNITS: 100 INJECTION, SOLUTION INTRAVENOUS; SUBCUTANEOUS at 12:03

## 2024-03-09 NOTE — CONSULTS
[facility]   Cardiology Note    Consult Requested By: CELESTINO  Reason for Consult: Afib rvr     SUBJECTIVE:     History of Present Illness: The pt is 72 y/o M, last seen in office 3/2023 by  with pmh paroxysmal atrial fibrillation, Guillain-[place], hypertension, and hyperlipidemia, MARA-CPAP compliant who presented to ED from preop for afib RVR. The pt fell on wet floor and has left radial fracture. He denies any CP , SOB, dizziness , or palpitations. He reports feeling well other than left arm pain.     Per last office visit note, the pt was in afib rate 100 range in 3/2023. He states he does not feel palpiations at all . At that time he was taking Eliquis 2.5 mg BID dose and it was recommended that he be fully anticoagulated at 5 mg dosing and that he have a stress test. The pt did stopped DOAC at some point last year.     This am his bp is stable. Afib 80-90 range.He denies CP SOB . No LE edema. Echo noted. H/H 12/39 cr 1.5 egfr 49.2. EKG with no acute ST Set changes with inferior/lateral scar noted on previous EKG 2023. 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  No family history on file.  Social History     Tobacco Use    Smoking status: Never    Smokeless tobacco: Never   Substance Use Topics    Alcohol use: Yes     Alcohol/week: 0.0 standard drinks of alcohol     Comment: social    Drug use: No       Review of Systems:  Review of Systems   Constitutional:  Negative for chills, fever and weight loss.   Respiratory:  Negative for cough, hemoptysis, sputum production, shortness of breath and wheezing.    Cardiovascular:  Negative for chest pain, palpitations, orthopnea, claudication, leg swelling and PND.   Neurological:  Negative for dizziness.       OBJECTIVE:     Vital  Signs (Most Recent)  Temp: 98.2 °F (36.8 °C) (03/09/24 1100)  Pulse: 87 (03/09/24 1100)  Resp: 20 (03/09/24 1100)  BP: 135/76 (03/09/24 1100)  SpO2: 96 % (03/09/24 1100)    Vital Signs Range (Last 24H):  Temp:  [97.2 °F (36.2 °C)-99.2 °F (37.3 °C)]   Pulse:  [87-98]   Resp:  [15-20]   BP: (135-165)/()   SpO2:  [94 %-97 %]     I & O (Last 24H):    Intake/Output Summary (Last 24 hours) at 3/9/2024 1151  Last data filed at 3/9/2024 0936  Gross per 24 hour   Intake 600 ml   Output 1100 ml   Net -500 ml       Current Diet:     Current Diet Order   Procedures    Diet diabetic Cardiac (Low Na/Chol); 2000 Calorie; Standard Tray     Order Specific Question:   Additional Diet Options:     Answer:   Cardiac (Low Na/Chol)     Order Specific Question:   Total calories:     Answer:   2000 Calorie     Order Specific Question:   Tray type:     Answer:   Standard Tray        Allergies:  Review of patient's allergies indicates:  No Known Allergies    Meds:  Scheduled Meds:   atorvastatin  10 mg Oral QHS    enoxparin  40 mg Subcutaneous Q24H (prophylaxis, 1700)    famotidine  20 mg Oral Daily    hydrALAZINE  25 mg Oral Q12H    insulin detemir U-100  10 Units Subcutaneous Daily    metoprolol succinate  100 mg Oral Nightly     Continuous Infusions:   sodium chloride 0.9% 75 mL/hr at 03/09/24 0302     PRN Meds:sodium chloride 0.9%, acetaminophen, acetaminophen, aluminum-magnesium hydroxide-simethicone, dextrose 50% in water (D50W), dextrose 50% in water (D50W), glucagon (human recombinant), glucose, glucose, HYDROcodone-acetaminophen, insulin aspart U-100, magnesium oxide, magnesium oxide, melatonin, naloxone, ondansetron, potassium bicarbonate, potassium bicarbonate, potassium bicarbonate, potassium, sodium phosphates, potassium, sodium phosphates, potassium, sodium phosphates, senna-docusate 8.6-50 mg, sodium chloride 0.9%    Oxygen/Ventilator Data (Last 24H):  (if applicable)            Hemodynamic Parameters (Last 24H):   (if  applicable)        Laboratory and Radiology Data:  Recent Results (from the past 24 hour(s))   Basic Metabolic Panel    Collection Time: 03/08/24  4:02 PM   Result Value Ref Range    Sodium 135 (L) 136 - 145 mmol/L    Potassium 4.6 3.5 - 5.1 mmol/L    Chloride 102 95 - 110 mmol/L    CO2 22 (L) 23 - 29 mmol/L    Glucose 282 (H) 70 - 110 mg/dL    BUN 31 (H) 8 - 23 mg/dL    Creatinine 1.7 (H) 0.5 - 1.4 mg/dL    Calcium 8.6 (L) 8.7 - 10.5 mg/dL    Anion Gap 11 8 - 16 mmol/L    eGFR 42.3 (A) >60 mL/min/1.73 m^2   POCT glucose    Collection Time: 03/08/24  4:48 PM   Result Value Ref Range    POC Glucose 265 (H) 70 - 110   POCT glucose    Collection Time: 03/08/24  8:44 PM   Result Value Ref Range    POC Glucose 282 (H) 70 - 110   Comprehensive Metabolic Panel (CMP)    Collection Time: 03/09/24  5:17 AM   Result Value Ref Range    Sodium 134 (L) 136 - 145 mmol/L    Potassium 4.8 3.5 - 5.1 mmol/L    Chloride 102 95 - 110 mmol/L    CO2 22 (L) 23 - 29 mmol/L    Glucose 281 (H) 70 - 110 mg/dL    BUN 27 (H) 8 - 23 mg/dL    Creatinine 1.5 (H) 0.5 - 1.4 mg/dL    Calcium 8.5 (L) 8.7 - 10.5 mg/dL    Total Protein 7.0 6.0 - 8.4 g/dL    Albumin 3.2 (L) 3.5 - 5.2 g/dL    Total Bilirubin 0.7 0.1 - 1.0 mg/dL    Alkaline Phosphatase 71 55 - 135 U/L    AST 9 (L) 10 - 40 U/L    ALT 9 (L) 10 - 44 U/L    eGFR 49.2 (A) >60 mL/min/1.73 m^2    Anion Gap 10 8 - 16 mmol/L   Magnesium    Collection Time: 03/09/24  5:17 AM   Result Value Ref Range    Magnesium 1.7 1.6 - 2.6 mg/dL   CBC with Automated Differential    Collection Time: 03/09/24  5:17 AM   Result Value Ref Range    WBC 13.35 (H) 3.90 - 12.70 K/uL    RBC 4.53 (L) 4.60 - 6.20 M/uL    Hemoglobin 12.7 (L) 14.0 - 18.0 g/dL    Hematocrit 39.2 (L) 40.0 - 54.0 %    MCV 87 82 - 98 fL    MCH 28.0 27.0 - 31.0 pg    MCHC 32.4 32.0 - 36.0 g/dL    RDW 14.8 (H) 11.5 - 14.5 %    Platelets 366 150 - 450 K/uL    MPV 9.4 9.2 - 12.9 fL    Immature Granulocytes 1.4 (H) 0.0 - 0.5 %    Gran # (ANC) 8.1 (H)  1.8 - 7.7 K/uL    Immature Grans (Abs) 0.19 (H) 0.00 - 0.04 K/uL    Lymph # 2.6 1.0 - 4.8 K/uL    Mono # 1.9 (H) 0.3 - 1.0 K/uL    Eos # 0.5 0.0 - 0.5 K/uL    Baso # 0.13 0.00 - 0.20 K/uL    nRBC 0 0 /100 WBC    Gran % 60.6 38.0 - 73.0 %    Lymph % 19.1 18.0 - 48.0 %    Mono % 14.5 4.0 - 15.0 %    Eosinophil % 3.4 0.0 - 8.0 %    Basophil % 1.0 0.0 - 1.9 %    Differential Method Automated    Phosphorus    Collection Time: 03/09/24  5:17 AM   Result Value Ref Range    Phosphorus 2.8 2.7 - 4.5 mg/dL   POCT glucose    Collection Time: 03/09/24  7:34 AM   Result Value Ref Range    POC Glucose 288 (H) 70 - 110   POCT glucose    Collection Time: 03/09/24 11:14 AM   Result Value Ref Range    POC Glucose 347 (H) 70 - 110     Imaging Results              X-Ray Chest 1 View (Final result)  Result time 03/07/24 16:45:22      Final result by Herberth Siddiqui MD (03/07/24 16:45:22)                   Narrative:    HISTORY: Atrial fibrillation.    FINDINGS: 2 AP chest radiographs at 1625 hours with no prior studies for comparison show the cardiac silhouette is enlarged, with normal pulmonary vascularity.    The lungs are normally expanded, with no consolidation, large pleural effusion, or evidence of pulmonary edema. No confluent infiltrates or pneumothorax. There are left glenohumeral arthropathic changes with prominent osteophytes, with no acute fractures.    IMPRESSION: No evidence of acute cardiopulmonary disease.    Electronically signed by:  Herberth Siddiqui MD  03/07/2024 04:45 PM [facility] Workstation: [id number]                                    12-lead EKG interpretation:  (if applicable)      Current Cardiac Rhythm:   (if applicable)    Physical Exam:   Physical Exam  Cardiovascular:      Rate and Rhythm: Normal rate and regular rhythm.      Heart sounds: Murmur heard.   Pulmonary:      Effort: Pulmonary effort is normal.      Breath sounds: Normal breath sounds.   Abdominal:      General: Abdomen is flat.      Palpations:  Abdomen is soft.   Musculoskeletal:         General: No swelling.   Neurological:      General: No focal deficit present.      Mental Status: He is alert and oriented to person, place, and time.         ASSESSMENT/PLAN:   Assessment:   Afib RVR  Left radial fracture  HF mildly reduced EF  MARBIN   HTN  DM --appears uncontrolled last A1C 8.8 7/2023  Echo--EF 45-50 % LA mod dilated PAP 31 mmhg no sig valve abnormalities mild global hypokinesis    Plan:   CHADVASC 4--recommended that pt be anticoagulated , discussed at length with pt and wife. Pt needs to start Eliquis 5 mg BID  He is not agreeable to this until after surgery  He is scheduled for surgical repair left radial  fx 3/12  Pt has mildly reduced EF   He is on Toprol  Pt likely has been in afib for quite some time, LA mod dilated, goal for now is rate control and can discuss options for rhythm management at follow up  Goal HR < 110, pt has been in 80-90's   Cr was up to 1.5  Continue toprol 100 mg daily   Pt is stable for d/c home   He will need to follow up in office in 1-2 weeks

## 2024-03-09 NOTE — PLAN OF CARE
03/09/24 1334   Final Note   Assessment Type Final Discharge Note   Anticipated Discharge Disposition Home   What phone number can be called within the next 1-3 days to see how you are doing after discharge? [phone]   Post-Acute Status   Discharge Delays None known at this time     Discharge orders and chart reviewed with no further post-acute discharge needs identified at this time.  At this time, patient is cleared for discharge from Case Management standpoint.

## 2024-03-09 NOTE — ASSESSMENT & PLAN NOTE
Patient with Paroxysmal (<7 days) atrial fibrillation which is uncontrolled rate on arrival, currently controlled with one dose of home Beta Blocker. Patient is currently in atrial fibrillation.[id number] Score: 2. Anticoagulation indicated. Anticoagulation done with lovenox . Admit to monitor on telemetry.   -Cardiology consulted - 2d echo showed mildly depressed EF45-50% with normal DF  -Anticoagulation?

## 2024-03-09 NOTE — CARE UPDATE
03/09/24 0925   PRE-TX-O2   Device (Oxygen Therapy) room air   SpO2 97 %   Pulse Oximetry Type Intermittent   $ Pulse Oximetry - Multiple Charge Pulse Oximetry - Multiple   Pulse 98   Resp 15   Preset CPAP/BiPAP Settings   Mode Of Delivery   (pt is having family member bring home unit)   Respiratory Evaluation   $ Care Plan Tech Time 15 min

## 2024-03-09 NOTE — PLAN OF CARE
Problem: Adult Inpatient Plan of Care  Goal: Plan of Care Review  Outcome: Ongoing, Progressing  Goal: Patient-Specific Goal (Individualized)  Outcome: Ongoing, Progressing  Goal: Absence of Hospital-Acquired Illness or Injury  Outcome: Ongoing, Progressing  Goal: Optimal Comfort and Wellbeing  Outcome: Ongoing, Progressing  Goal: Readiness for Transition of Care  Outcome: Ongoing, Progressing     Problem: Diabetes Comorbidity  Goal: Blood Glucose Level Within Targeted Range  Outcome: Ongoing, Progressing     Problem: Fluid and Electrolyte Imbalance (Acute Kidney Injury/Impairment)  Goal: Fluid and Electrolyte Balance  Outcome: Ongoing, Progressing     Problem: Oral Intake Inadequate (Acute Kidney Injury/Impairment)  Goal: Optimal Nutrition Intake  Outcome: Ongoing, Progressing     Problem: Renal Function Impairment (Acute Kidney Injury/Impairment)  Goal: Effective Renal Function  Outcome: Ongoing, Progressing     Problem: Bariatric Environmental Safety  Goal: Safety Maintained with Care  Outcome: Ongoing, Progressing

## 2024-03-09 NOTE — ASSESSMENT & PLAN NOTE
Creatine = 1.5 now -- Patient with acute kidney injury/acute renal failure likely due to pre-renal azotemia due to dehydration MARBIN is currently improving. Baseline creatinine  1.0  - Labs reviewed- Renal function/electrolytes with Estimated Creatinine Clearance: 66 mL/min (A) (based on SCr of 1.5 mg/dL (H)). according to latest data. Monitor urine output and serial BMP and adjust therapy as needed. Avoid nephrotoxins and renally dose meds for GFR listed above.  -Holding Telmisartan for now  -Hydralazine added for BP control  -IV fluids cautious  -Repeat bmp this afternoon

## 2024-03-09 NOTE — SUBJECTIVE & OBJECTIVE
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    Review of patient's allergies indicates:  No Known Allergies    No current facility-administered medications on file prior to encounter.     Current Outpatient Medications on File Prior to Encounter   Medication Sig    atorvastatin (LIPITOR) 10 MG tablet Take 1 tablet (10 mg total) by mouth once daily. (Patient taking differently: Take 10 mg by mouth every evening.)    co-enzyme Q-10 30 mg capsule Take 30 mg by mouth once daily.    ergocalciferol, vitamin D2, (VITAMIN D ORAL) Take 1 tablet by mouth once daily.    metFORMIN (GLUCOPHAGE-XR) 500 MG ER 24hr tablet Take 2 tablets (1,000 mg total) by mouth 2 (two) times daily with meals.    metoprolol succinate (TOPROL-XL) 100 MG 24 hr tablet Take 1 tablet (100 mg total) by mouth once daily. (Patient taking differently: Take 100 mg by mouth nightly.)    telmisartan (MICARDIS) 80 MG Tab Take 1 tablet (80 mg total) by mouth once daily. (Patient taking differently: Take 80 mg by mouth every evening.)    HYDROcodone-acetaminophen (NORCO)  mg per tablet Take 1 tablet by mouth every 8 (eight) hours as needed for Pain.     Family History    None       Tobacco Use    Smoking status: Never    Smokeless tobacco: Never   Substance and Sexual Activity    Alcohol use: Yes     Alcohol/week: 0.0 standard drinks of alcohol     Comment: social    Drug use: No    Sexual activity: Yes     Review of Systems   Constitutional: Negative.  Negative for appetite change, chills, fatigue, fever and unexpected weight change.   HENT: Negative.  Negative for congestion, ear pain, hearing loss, rhinorrhea, sore throat and tinnitus.    Eyes: Negative.  Negative for pain, discharge and redness.   Respiratory: Negative.  Negative for cough, shortness of breath, wheezing and stridor.     Cardiovascular: Negative.  Negative for chest pain, palpitations and leg swelling.   Gastrointestinal: Negative.  Negative for abdominal distention, abdominal pain, constipation, diarrhea, nausea and vomiting.   Endocrine: Negative.    Genitourinary: Negative.  Negative for decreased urine volume, difficulty urinating, flank pain, hematuria and urgency.   Musculoskeletal:  Positive for arthralgias (left shoulder and arm). Negative for gait problem and myalgias.   Skin: Negative.  Negative for pallor and rash.   Allergic/Immunologic: Negative.  Negative for environmental allergies, food allergies and immunocompromised state.   Neurological: Negative.  Negative for dizziness, syncope, facial asymmetry, weakness and headaches.   Hematological: Negative.    Psychiatric/Behavioral: Negative.  Negative for agitation, confusion, self-injury and suicidal ideas.      Objective:     Vital Signs (Most Recent):  Temp: 98.1 °F (36.7 °C) (03/09/24 0701)  Pulse: 98 (03/09/24 0925)  Resp: 20 (03/09/24 0936)  BP: (!) 165/108 (03/09/24 0701)  SpO2: 97 % (03/09/24 0925) Vital Signs (24h Range):  Temp:  [97.2 °F (36.2 °C)-99.2 °F (37.3 °C)] 98.1 °F (36.7 °C)  Pulse:  [88-98] 98  Resp:  [15-20] 20  SpO2:  [94 %-97 %] 97 %  BP: (137-165)/() 165/108     Weight: (!) 142.2 kg (313 lb 7.9 oz)  Body mass index is 41.36 kg/m².     Physical Exam  Vitals reviewed.   Constitutional:       General: He is not in acute distress.     Appearance: Normal appearance. He is obese. He is not toxic-appearing.   HENT:      Head: Normocephalic and atraumatic.      Nose: Nose normal. No congestion or rhinorrhea.      Mouth/Throat:      Mouth: Mucous membranes are moist.      Pharynx: Oropharynx is clear.   Eyes:      General:         Right eye: No discharge.         Left eye: No discharge.      Extraocular Movements: Extraocular movements intact.      Conjunctiva/sclera: Conjunctivae normal.      Pupils: Pupils are equal, round, and reactive to light.    Cardiovascular:      Rate and Rhythm: Tachycardia present. Rhythm irregular.      Pulses: Normal pulses.      Heart sounds: Normal heart sounds.   Pulmonary:      Effort: Pulmonary effort is normal. No respiratory distress.      Breath sounds: Normal breath sounds.   Chest:      Chest wall: No tenderness.   Abdominal:      General: Abdomen is flat. Bowel sounds are normal. There is no distension.      Palpations: Abdomen is soft.      Tenderness: There is no abdominal tenderness. There is no guarding.   Musculoskeletal:         General: Normal range of motion.      Cervical back: Normal range of motion and neck supple.      Right lower leg: No edema.      Left lower leg: No edema.      Comments: LUE in sling due to humerus fracture   Skin:     General: Skin is warm and dry.      Findings: No rash.   Neurological:      General: No focal deficit present.      Mental Status: He is alert and oriented to person, place, and time. Mental status is at baseline.      Motor: Weakness (generalized, diffiuculty sitting up in bed without assist due to LUE pain and imobility) present.   Psychiatric:         Mood and Affect: Mood normal.              CRANIAL NERVES     CN III, IV, VI   Pupils are equal, round, and reactive to light.       Significant Labs: All pertinent labs within the past 24 hours have been reviewed.  CBC:   Recent Labs   Lab 03/07/24  1446 03/08/24  0529 03/09/24  0517   WBC 14.05* 12.31 13.35*   HGB 13.8* 12.8* 12.7*   HCT 41.8 39.1* 39.2*    352 366       CMP:   Recent Labs   Lab 03/07/24  1446 03/08/24  0529 03/08/24  1602 03/09/24  0517   * 135* 135* 134*   K 4.6 4.5 4.6 4.8    103 102 102   CO2 19* 23 22* 22*   * 324* 282* 281*   BUN 34* 38* 31* 27*   CREATININE 2.0* 1.9* 1.7* 1.5*   CALCIUM 9.2 8.6* 8.6* 8.5*   PROT 8.0 7.0  --  7.0   ALBUMIN 3.7 3.2*  --  3.2*   BILITOT 0.7 0.7  --  0.7   ALKPHOS 82 71  --  71   AST 9* 10  --  9*   ALT 9* 9*  --  9*   ANIONGAP 13 9 11 10        Troponin:   Recent Labs   Lab 03/07/24  1446 03/08/24  0529   TROPONINIHS 12.8 12.2       Significant Imaging:   Imaging Results              X-Ray Chest 1 View (Final result)  Result time 03/07/24 16:45:22      Final result by Herberth Siddiqui MD (03/07/24 16:45:22)                   Narrative:    HISTORY: Atrial fibrillation.    FINDINGS: 2 AP chest radiographs at 1625 hours with no prior studies for comparison show the cardiac silhouette is enlarged, with normal pulmonary vascularity.    The lungs are normally expanded, with no consolidation, large pleural effusion, or evidence of pulmonary edema. No confluent infiltrates or pneumothorax. There are left glenohumeral arthropathic changes with prominent osteophytes, with no acute fractures.    IMPRESSION: No evidence of acute cardiopulmonary disease.    Electronically signed by:  Herberth Siddiqui MD  03/07/2024 04:45 PM [facility] Workstation: [id number]

## 2024-03-09 NOTE — CARE UPDATE
03/08/24 2200   Patient Assessment/Suction   Level of Consciousness (AVPU) alert   Respiratory Effort Normal;Unlabored   Expansion/Accessory Muscles/Retractions no use of accessory muscles   All Lung Fields Breath Sounds equal bilaterally;clear   Rhythm/Pattern, Respiratory unlabored   PRE-TX-O2   Device (Oxygen Therapy) room air   SpO2 95 %   Pulse Oximetry Type Intermittent   $ Pulse Oximetry - Multiple Charge Pulse Oximetry - Multiple   Pulse 95   Resp 18   Preset CPAP/BiPAP Settings   Mode Of Delivery CPAP  (Pt stated he would get has home unit.)

## 2024-03-09 NOTE — DISCHARGE INSTRUCTIONS
- Stop Telmisartan for now due to acute renal failure  - Start hydralazine 25 mg twice daily for BP control  - Continue metoprolol succinate (toprol XL) 100 mg  - Start apixaban after surgery - coordinate with Cardiology and Ortho surgery      Complete medications as ordered  Follow all discharge instructions.  Please schedule follow up appointments as necessary      When to Call Your Doctor  Call your doctor immediately if you have any of the following:  Severe headache  Severe dizziness, or fainting  Nausea or vomiting  Fast heartbeat (higher than 100 beats per minute)  Fever or chills  Swollen ankles  Weakness  Chest Pain attacks that last longer, occur more often, or are more severe than in the past

## 2024-03-09 NOTE — PROGRESS NOTES
[facility] Medicine  Progress Note    Patient Name: Roosevelt Moser  MRN: [id number]  Patient Class: IP- Inpatient   Admission Date: 3/7/2024  Length of Stay: 1 days  Attending Physician: Miko Galaviz Jr., MD  Primary Care Provider: Miguel Angel Enriquez PA-C        Subjective:     Principal Problem:Atrial fibrillation with rapid ventricular response        HPI:  Patient is a 73-year-old male with a past medical history of DM, hyperlipidemia, hypertension, Guillain-[place], and AFib.  Patient presents to the ED today after preop clearance for right humerus orthopedic clearance. Patient was found to be in AFib RVR on exam and was thus sent to ED for further evaluation.  EKG shows afib RVR with rate of 108. Patient denies dizziness, lightheadedness, chest pain, shortness for breath, palpitations, abdominal pain, nausea, vomiting, constipation and diarrhea.  Patient is on home metoprolol nightly but denies anticoagulation.  Patient denies history of AFib but per chart review had new onset in 2022.  Patient was given home dose of metoprolol in ED and rate decreased into the 80s and 90s.  No echo on file, updated pending. Cardiology was consulted.  Pt is supposed to have humeral fracture surgery following cardiology clearance. Pt follows with Dr Cooper. Patient is full code.    In ED: TSH 1.865, Magnesium 1.2, troponin 12.8, , K 4.6, WBC 14.05, hbg 13.8, AST and ALT 9, and creatinine 2.0.     Overview/Hospital Course:  73 y.o. M patient presented after pre-procedure workup revealed Afib RVR. Home medications were restarted with improvement to HR - now controlled afib. Patient also noted to have MARBIN with creatine noted 2.0 (baseline 1.0). cautious IV fluids started. 2D echo was obtained which showed HFEF 45-65% with normal DF - Cardiology consulted from ED for risk stratification. CHADsVac score >3 (age/afib, HTN, DMII/systolic HF). Patient has not been on anticoagulation in the past. He has  expressed that he is trying to get surgery to repair his left femur fracture and is concerned about starting oral anticoagulation. On Toprol XL home med. Reached out to Dr. Cooper to let him know patient was in hospital. If he has surgery scheduled for 3/15/24 will need to coordinate with po anticoagulation if warranted.    Awaiting cardiology consult, anticoagulation discussion afib. Creatine improving 2.0-->1.5. Follow BMP. - hopefully home soon.    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    Review of patient's allergies indicates:  No Known Allergies    No current facility-administered medications on file prior to encounter.     Current Outpatient Medications on File Prior to Encounter   Medication Sig    atorvastatin (LIPITOR) 10 MG tablet Take 1 tablet (10 mg total) by mouth once daily. (Patient taking differently: Take 10 mg by mouth every evening.)    co-enzyme Q-10 30 mg capsule Take 30 mg by mouth once daily.    ergocalciferol, vitamin D2, (VITAMIN D ORAL) Take 1 tablet by mouth once daily.    metFORMIN (GLUCOPHAGE-XR) 500 MG ER 24hr tablet Take 2 tablets (1,000 mg total) by mouth 2 (two) times daily with meals.    metoprolol succinate (TOPROL-XL) 100 MG 24 hr tablet Take 1 tablet (100 mg total) by mouth once daily. (Patient taking differently: Take 100 mg by mouth nightly.)    telmisartan (MICARDIS) 80 MG Tab Take 1 tablet (80 mg total) by mouth once daily. (Patient taking differently: Take 80 mg by mouth every evening.)    HYDROcodone-acetaminophen (NORCO)  mg per tablet Take 1 tablet by mouth every 8 (eight) hours as needed for Pain.     Family History    None       Tobacco Use    Smoking status: Never    Smokeless tobacco: Never   Substance and Sexual Activity    Alcohol use: Yes     Alcohol/week: 0.0 standard drinks of alcohol      Comment: social    Drug use: No    Sexual activity: Yes     Review of Systems   Constitutional: Negative.  Negative for appetite change, chills, fatigue, fever and unexpected weight change.   HENT: Negative.  Negative for congestion, ear pain, hearing loss, rhinorrhea, sore throat and tinnitus.    Eyes: Negative.  Negative for pain, discharge and redness.   Respiratory: Negative.  Negative for cough, shortness of breath, wheezing and stridor.    Cardiovascular: Negative.  Negative for chest pain, palpitations and leg swelling.   Gastrointestinal: Negative.  Negative for abdominal distention, abdominal pain, constipation, diarrhea, nausea and vomiting.   Endocrine: Negative.    Genitourinary: Negative.  Negative for decreased urine volume, difficulty urinating, flank pain, hematuria and urgency.   Musculoskeletal:  Positive for arthralgias (left shoulder and arm). Negative for gait problem and myalgias.   Skin: Negative.  Negative for pallor and rash.   Allergic/Immunologic: Negative.  Negative for environmental allergies, food allergies and immunocompromised state.   Neurological: Negative.  Negative for dizziness, syncope, facial asymmetry, weakness and headaches.   Hematological: Negative.    Psychiatric/Behavioral: Negative.  Negative for agitation, confusion, self-injury and suicidal ideas.      Objective:     Vital Signs (Most Recent):  Temp: 98.1 °F (36.7 °C) (03/09/24 0701)  Pulse: 98 (03/09/24 0925)  Resp: 20 (03/09/24 0936)  BP: (!) 165/108 (03/09/24 0701)  SpO2: 97 % (03/09/24 0925) Vital Signs (24h Range):  Temp:  [97.2 °F (36.2 °C)-99.2 °F (37.3 °C)] 98.1 °F (36.7 °C)  Pulse:  [88-98] 98  Resp:  [15-20] 20  SpO2:  [94 %-97 %] 97 %  BP: (137-165)/() 165/108     Weight: (!) 142.2 kg (313 lb 7.9 oz)  Body mass index is 41.36 kg/m².     Physical Exam  Vitals reviewed.   Constitutional:       General: He is not in acute distress.     Appearance: Normal appearance. He is obese. He is not  toxic-appearing.   HENT:      Head: Normocephalic and atraumatic.      Nose: Nose normal. No congestion or rhinorrhea.      Mouth/Throat:      Mouth: Mucous membranes are moist.      Pharynx: Oropharynx is clear.   Eyes:      General:         Right eye: No discharge.         Left eye: No discharge.      Extraocular Movements: Extraocular movements intact.      Conjunctiva/sclera: Conjunctivae normal.      Pupils: Pupils are equal, round, and reactive to light.   Cardiovascular:      Rate and Rhythm: Tachycardia present. Rhythm irregular.      Pulses: Normal pulses.      Heart sounds: Normal heart sounds.   Pulmonary:      Effort: Pulmonary effort is normal. No respiratory distress.      Breath sounds: Normal breath sounds.   Chest:      Chest wall: No tenderness.   Abdominal:      General: Abdomen is flat. Bowel sounds are normal. There is no distension.      Palpations: Abdomen is soft.      Tenderness: There is no abdominal tenderness. There is no guarding.   Musculoskeletal:         General: Normal range of motion.      Cervical back: Normal range of motion and neck supple.      Right lower leg: No edema.      Left lower leg: No edema.      Comments: LUE in sling due to humerus fracture   Skin:     General: Skin is warm and dry.      Findings: No rash.   Neurological:      General: No focal deficit present.      Mental Status: He is alert and oriented to person, place, and time. Mental status is at baseline.      Motor: Weakness (generalized, diffiuculty sitting up in bed without assist due to LUE pain and imobility) present.   Psychiatric:         Mood and Affect: Mood normal.              CRANIAL NERVES     CN III, IV, VI   Pupils are equal, round, and reactive to light.       Significant Labs: All pertinent labs within the past 24 hours have been reviewed.  CBC:   Recent Labs   Lab 03/07/24  1446 03/08/24  0529 03/09/24  0517   WBC 14.05* 12.31 13.35*   HGB 13.8* 12.8* 12.7*   HCT 41.8 39.1* 39.2*     352 366       CMP:   Recent Labs   Lab 03/07/24  1446 03/08/24  0529 03/08/24  1602 03/09/24  0517   * 135* 135* 134*   K 4.6 4.5 4.6 4.8    103 102 102   CO2 19* 23 22* 22*   * 324* 282* 281*   BUN 34* 38* 31* 27*   CREATININE 2.0* 1.9* 1.7* 1.5*   CALCIUM 9.2 8.6* 8.6* 8.5*   PROT 8.0 7.0  --  7.0   ALBUMIN 3.7 3.2*  --  3.2*   BILITOT 0.7 0.7  --  0.7   ALKPHOS 82 71  --  71   AST 9* 10  --  9*   ALT 9* 9*  --  9*   ANIONGAP 13 9 11 10       Troponin:   Recent Labs   Lab 03/07/24  1446 03/08/24  0529   TROPONINIHS 12.8 12.2       Significant Imaging:   Imaging Results              X-Ray Chest 1 View (Final result)  Result time 03/07/24 16:45:22      Final result by Herberth Siddiqui MD (03/07/24 16:45:22)                   Narrative:    HISTORY: Atrial fibrillation.    FINDINGS: 2 AP chest radiographs at 1625 hours with no prior studies for comparison show the cardiac silhouette is enlarged, with normal pulmonary vascularity.    The lungs are normally expanded, with no consolidation, large pleural effusion, or evidence of pulmonary edema. No confluent infiltrates or pneumothorax. There are left glenohumeral arthropathic changes with prominent osteophytes, with no acute fractures.    IMPRESSION: No evidence of acute cardiopulmonary disease.    Electronically signed by:  Herberth Siddiqui MD  03/07/2024 04:45 PM [facility] Workstation: [id number]                                      Assessment/Plan:      * Atrial fibrillation with rapid ventricular response  Patient with Paroxysmal (<7 days) atrial fibrillation which is uncontrolled rate on arrival, currently controlled with one dose of home Beta Blocker. Patient is currently in atrial fibrillation.[id number] Score: 2. Anticoagulation indicated. Anticoagulation done with lovenox . Admit to monitor on telemetry.   -Cardiology consulted - 2d echo showed mildly depressed EF45-50% with normal DF  -Anticoagulation?      Acute on chronic kidney failure  Creatine =  1.5 now -- Patient with acute kidney injury/acute renal failure likely due to pre-renal azotemia due to dehydration MARBIN is currently improving. Baseline creatinine  1.0  - Labs reviewed- Renal function/electrolytes with Estimated Creatinine Clearance: 66 mL/min (A) (based on SCr of 1.5 mg/dL (H)). according to latest data. Monitor urine output and serial BMP and adjust therapy as needed. Avoid nephrotoxins and renally dose meds for GFR listed above.  -Holding Telmisartan for now  -Hydralazine added for BP control  -IV fluids cautious  -Repeat bmp this afternoon      Mixed hyperlipidemia  Continue statin       Type 2 diabetes mellitus  POCT glucose, insulin sliding scale.   Last A1c reviewed-   Lab Results   Component Value Date    HGBA1C 8.8 (H) 07/31/2023     Current correctional scale  Medium  Antihyperglycemics (From admission, onward)      Start     Stop Route Frequency Ordered    03/07/24 1839  insulin aspart U-100 pen 0-10 Units         -- SubQ Before meals & nightly PRN 03/07/24 1816          Hold Oral hypoglycemics while patient is in the hospital.    Hypertension  Chronic, controlled. Latest blood pressure and vitals reviewed-     Temp:  [98.4 °F (36.9 °C)]   Pulse:  []   Resp:  [18-40]   BP: (107-173)/(56-92)   SpO2:  [92 %-100 %] .   Home meds for hypertension were reviewed and noted below.   Hypertension Medications               metoprolol succinate (TOPROL-XL) 100 MG 24 hr tablet Take 1 tablet (100 mg total) by mouth once daily.    telmisartan (MICARDIS) 80 MG Tab Take 1 tablet (80 mg total) by mouth once daily.          Holding telmisartan 2/2 renal  Added hydralazine po  Will utilize p.r.n. blood pressure medication only if patient's blood pressure greater than 180/110 and he develops symptoms such as worsening chest pain or shortness of breath.    MARA (obstructive sleep apnea)  CPAP qhs      VTE Risk Mitigation (From admission, onward)           Ordered     enoxaparin injection 40 mg  Every 24  hours         03/07/24 1759     IP VTE HIGH RISK PATIENT  Once         03/07/24 1741     Place sequential compression device  Until discontinued         03/07/24 1741                    Discharge Planning   KAMILA:      Code Status: Full Code   Is the patient medically ready for discharge?:     Reason for patient still in hospital (select all that apply): Laboratory test, Treatment, and Consult recommendations  Discharge Plan A: Home with family              Batool Austin, INA, APRN, FNP-C  [facility] Medicine  [facility]  [email or web address]

## 2024-03-09 NOTE — DISCHARGE SUMMARY
[facility] Medicine  Discharge Summary      Patient Name: Roosevelt Moser  MRN: [id number]  [facility]: [id number]  Patient Class: IP- Inpatient  Admission Date: 3/7/2024  Hospital Length of Stay: 1 days  Discharge Date and Time:  03/09/2024 1:12 PM  Attending Physician: Miko Galaviz Jr., MD   Discharging Provider: MEI Simmons  Primary Care Provider: Miguel Angel Enriquez PA-C    Primary Care Team: Networked reference to record PCT     HPI:   Patient is a 73-year-old male with a past medical history of DM, hyperlipidemia, hypertension, Guillain-[place], and AFib.  Patient presents to the ED today after preop clearance for right humerus orthopedic clearance. Patient was found to be in AFib RVR on exam and was thus sent to ED for further evaluation.  EKG shows afib RVR with rate of 108. Patient denies dizziness, lightheadedness, chest pain, shortness for breath, palpitations, abdominal pain, nausea, vomiting, constipation and diarrhea.  Patient is on home metoprolol nightly but denies anticoagulation.  Patient denies history of AFib but per chart review had new onset in 2022.  Patient was given home dose of metoprolol in ED and rate decreased into the 80s and 90s.  No echo on file, updated pending. Cardiology was consulted.  Pt is supposed to have humeral fracture surgery following cardiology clearance. Pt follows with Dr Cooper. Patient is full code.    In ED: TSH 1.865, Magnesium 1.2, troponin 12.8, , K 4.6, WBC 14.05, hbg 13.8, AST and ALT 9, and creatinine 2.0.     * No surgery found *      Hospital Course:   73 y.o. M patient presented after pre-procedure workup revealed Afib RVR. Home medications were restarted with improvement to HR - now controlled afib. Patient also noted to have MARBIN with creatine noted 2.0 (baseline 1.0). cautious IV fluids started. 2D echo was obtained which showed HFEF 45-65% with normal DF - Cardiology consulted from ED for risk stratification. CHADsVac score  >3 (age/afib, HTN, DMII/systolic HF). Patient has not been on anticoagulation in the past. He has expressed that he is trying to get surgery to repair his left femur fracture and is concerned about starting oral anticoagulation. On Toprol XL home med. Reached out via secure chat to Dr. Cooper to let him know patient was in hospital. If he has surgery scheduled for 3/15/24, records of appointments show a 3/12/24 encounter pending for Dr. Cooper - will need to coordinate with po anticoagulation. Cardiology has seen the patient and recommends anticoagulation - patient agrees to start after his surgery. Creatine improved from 2.0 to 1.5 near normal. Continue to hold home Telmisartan 2/2 renal - added hydralazine 25 mg BID to his usual metoprolol succinate 100 mg daily for tighter BP control while Telmisartan is being held. He can coordinate restarting with cardiology in clinic if kidney functions remain stable, anticipate will continue to self-correct. Cleared by cardiology for discharge to follow up in 1-2 weeks in clinic. Follow up with PCP in 1 week to repeat labs.     Goals of Care Treatment Preferences:  Code Status: Full Code      Consults:   Consults (From admission, onward)          Status Ordering Provider     Inpatient consult to Cardiology  Once        Provider:  Hansel Espinosa MD    Completed BERNY HUNTLEY     Inpatient consult to Social Work  Once        Provider:  (Not yet assigned)    KLEBER Melgar                Final Active Diagnoses:    Diagnosis Date Noted POA    PRINCIPAL PROBLEM:  Atrial fibrillation with rapid ventricular response [I48.91] 10/03/2022 Yes    Acute on chronic kidney failure [N17.9, N18.9] 03/08/2024 Yes    Hypertension [I10] 07/22/2015 Yes    Mixed hyperlipidemia [E78.2] 07/22/2015 Yes    MARA (obstructive sleep apnea) [G47.33] 07/22/2015 Yes    Type 2 diabetes mellitus [E11.9] 07/22/2015 Yes      Problems Resolved During this Admission:       Discharged  "Condition: stable    Disposition: Home or Self Care    Follow Up:   Follow-up Information       Miguel Angel Enriquez PA-C. Go on 3/19/2024.    Specialty: Family Medicine  Why: hospital follow up apt scheduled  Contact information:  [address] [place] [address]  [phone]               Devan De La Vega MD Follow up in 2 week(s).    Specialty: Cardiology  Why: For outpatient follow-up/post hospitalizaton, Call the office to schedule appointment  Contact information:  [address]  [address]  [facility]  [place] [address]  [phone]                           Patient Instructions:      Ambulatory referral/consult to Cardiology   Standing Status: Future   Referral Priority: Routine Referral Type: Consultation   Referral Reason: Specialty Services Required   Requested Specialty: Cardiology   Number of Visits Requested: 1     Diet Cardiac     Activity as tolerated       Significant Diagnostic Studies: Labs: CMP   Recent Labs   Lab 03/08/24  0529 03/08/24  1602 03/09/24  0517 03/09/24  1213   * 135* 134* 134*   K 4.5 4.6 4.8 5.0    102 102 103   CO2 23 22* 22* 21*   * 282* 281* 273*   BUN 38* 31* 27* 28*   CREATININE 1.9* 1.7* 1.5* 1.5*   CALCIUM 8.6* 8.6* 8.5* 8.4*   PROT 7.0  --  7.0  --    ALBUMIN 3.2*  --  3.2*  --    BILITOT 0.7  --  0.7  --    ALKPHOS 71  --  71  --    AST 10  --  9*  --    ALT 9*  --  9*  --    ANIONGAP 9 11 10 10   , CBC   Recent Labs   Lab 03/07/24  1446 03/08/24  0529 03/09/24  0517   WBC 14.05* 12.31 13.35*   HGB 13.8* 12.8* 12.7*   HCT 41.8 39.1* 39.2*    352 366   , INR No results found for: "INR", "PROTIME", Lipid Panel   Lab Results   Component Value Date    CHOL 177 10/04/2022    HDL 41 10/04/2022    LDLCALC 81.2 10/04/2022    TRIG 274 (H) 10/04/2022    CHOLHDL 23.2 10/04/2022        Medications:  Reconciled Home Medications:      Medication List        START taking these medications      apixaban 5 mg Tab  Commonly known as: " ELIQUIS  Take 1 tablet (5 mg total) by mouth 2 (two) times daily.  Start taking on: March 26, 2024     hydrALAZINE 25 MG tablet  Commonly known as: APRESOLINE  Take 1 tablet (25 mg total) by mouth every 12 (twelve) hours.            CHANGE how you take these medications      atorvastatin 10 MG tablet  Commonly known as: LIPITOR  Take 1 tablet (10 mg total) by mouth once daily.  What changed: when to take this     metoprolol succinate 100 MG 24 hr tablet  Commonly known as: TOPROL-XL  Take 1 tablet (100 mg total) by mouth once daily.  What changed: when to take this            CONTINUE taking these medications      co-enzyme Q-10 30 mg capsule  Take 30 mg by mouth once daily.     HYDROcodone-acetaminophen  mg per tablet  Commonly known as: NORCO  Take 1 tablet by mouth every 8 (eight) hours as needed for Pain.     metFORMIN 500 MG ER 24hr tablet  Commonly known as: GLUCOPHAGE-XR  Take 2 tablets (1,000 mg total) by mouth 2 (two) times daily with meals.     VITAMIN D ORAL  Take 1 tablet by mouth once daily.            STOP taking these medications      telmisartan 80 MG Tab  Commonly known as: MICARDIS              Indwelling Lines/Drains at time of discharge:   Lines/Drains/Airways       None                   Time spent on the discharge of patient: 35 minutes               Batool Austin, INA, APRN, FNP-C  [facility] Medicine  [facility]  [email or web address]

## 2024-03-11 ENCOUNTER — PATIENT OUTREACH (OUTPATIENT)
Dept: ADMINISTRATIVE | Facility: CLINIC | Age: 74
End: 2024-03-11
Payer: MEDICARE

## 2024-03-11 NOTE — PROGRESS NOTES
C3 nurse spoke with Roosevelt Moser & Spouse for a TCC post hospital discharge follow up call. The patient has a scheduled HOSFU appointment with Miguel Angel Enriquez PA-C on 03/19/2024 @ 0800.

## 2024-03-12 ENCOUNTER — OFFICE VISIT (OUTPATIENT)
Dept: ORTHOPEDICS | Facility: CLINIC | Age: 74
End: 2024-03-12
Payer: MEDICARE

## 2024-03-12 VITALS — HEIGHT: 73 IN | WEIGHT: 313.5 LBS | BODY MASS INDEX: 41.55 KG/M2 | RESPIRATION RATE: 18 BRPM

## 2024-03-12 DIAGNOSIS — S42.202D CLOSED TRAUMATIC DISPLACED FRACTURE OF PROXIMAL END OF LEFT HUMERUS WITH ROUTINE HEALING, SUBSEQUENT ENCOUNTER: Primary | ICD-10-CM

## 2024-03-12 PROCEDURE — 4010F ACE/ARB THERAPY RXD/TAKEN: CPT | Mod: CPTII,S$GLB,, | Performed by: ORTHOPAEDIC SURGERY

## 2024-03-12 PROCEDURE — 3288F FALL RISK ASSESSMENT DOCD: CPT | Mod: CPTII,S$GLB,, | Performed by: ORTHOPAEDIC SURGERY

## 2024-03-12 PROCEDURE — 3008F BODY MASS INDEX DOCD: CPT | Mod: CPTII,S$GLB,, | Performed by: ORTHOPAEDIC SURGERY

## 2024-03-12 PROCEDURE — 1125F AMNT PAIN NOTED PAIN PRSNT: CPT | Mod: CPTII,S$GLB,, | Performed by: ORTHOPAEDIC SURGERY

## 2024-03-12 PROCEDURE — 23600 CLTX PROX HUMRL FX W/O MNPJ: CPT | Mod: LT,S$GLB,, | Performed by: ORTHOPAEDIC SURGERY

## 2024-03-12 PROCEDURE — 1160F RVW MEDS BY RX/DR IN RCRD: CPT | Mod: CPTII,S$GLB,, | Performed by: ORTHOPAEDIC SURGERY

## 2024-03-12 PROCEDURE — 99213 OFFICE O/P EST LOW 20 MIN: CPT | Mod: 57,S$GLB,, | Performed by: ORTHOPAEDIC SURGERY

## 2024-03-12 PROCEDURE — 1100F PTFALLS ASSESS-DOCD GE2>/YR: CPT | Mod: CPTII,S$GLB,, | Performed by: ORTHOPAEDIC SURGERY

## 2024-03-12 PROCEDURE — 1159F MED LIST DOCD IN RCRD: CPT | Mod: CPTII,S$GLB,, | Performed by: ORTHOPAEDIC SURGERY

## 2024-03-12 PROCEDURE — 1111F DSCHRG MED/CURRENT MED MERGE: CPT | Mod: CPTII,S$GLB,, | Performed by: ORTHOPAEDIC SURGERY

## 2024-03-12 NOTE — PROGRESS NOTES
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 No pertinent surgical history.    Current Outpatient Medications   Medication Sig    atorvastatin (LIPITOR) 10 MG tablet Take 1 tablet (10 mg total) by mouth once daily. (Patient taking differently: Take 10 mg by mouth every evening.)    co-enzyme Q-10 30 mg capsule Take 30 mg by mouth once daily.    ergocalciferol, vitamin D2, (VITAMIN D ORAL) Take 1 tablet by mouth once daily.    hydrALAZINE (APRESOLINE) 25 MG tablet Take 1 tablet (25 mg total) by mouth every 12 (twelve) hours.    HYDROcodone-acetaminophen (NORCO)  mg per tablet Take 1 tablet by mouth every 8 (eight) hours as needed for Pain.    metFORMIN (GLUCOPHAGE-XR) 500 MG ER 24hr tablet Take 2 tablets (1,000 mg total) by mouth 2 (two) times daily with meals.    metoprolol succinate (TOPROL-XL) 100 MG 24 hr tablet Take 1 tablet (100 mg total) by mouth once daily. (Patient taking differently: Take 100 mg by mouth nightly.)    [START ON 3/26/2024] apixaban (ELIQUIS) 5 mg Tab Take 1 tablet (5 mg total) by mouth 2 (two) times daily. (Patient not taking: Reported on 3/11/2024)     No current facility-administered medications for this visit.       Review of patient's allergies indicates:  No Known Allergies    History reviewed. No pertinent family history.    Social History     Socioeconomic History    Marital status:    Tobacco Use    Smoking status: Never    Smokeless tobacco: Never   Substance and Sexual Activity    Alcohol use: Yes     Alcohol/week: 0.0 standard drinks of alcohol     Comment: social    Drug use: No    Sexual activity: Yes       History of present illness:  73-year-old female returns to the  office today for follow-up of a chief complaint of left shoulder pain after a fall at his home March 4th.  He was seen in the ED, had radiographs, diagnosed with a left proximal humeral fracture and placed in a sling.  He was referred to us for further evaluation and treatment, we ordered a CT scan of his shoulder.  He is here today to review those results.       Review of Systems:    Constitution: Negative for chills, fever, and sweats.  Negative for unexplained weight loss.    HENT:  Negative for headaches and blurry vision.    Cardiovascular:Negative for chest pain or irregular heart beat. Negative for hypertension.    Respiratory:  Negative for cough and shortness of breath.    Gastrointestinal: Negative for abdominal pain, heartburn, melena, nausea, and vomitting.    Genitourinary:  Negative bladder incontinence and dysuria.    Musculoskeletal:  See HPI for details.     Neurological: Negative for numbness.    Psychiatric/Behavioral: Negative for depression.  The patient is not nervous/anxious.      Endocrine: Negative for polyuria    Hematologic/Lymphatic: Negative for bleeding problem.  Does not bruise/bleed easily.    Skin: Negative for poor would healing and rash    Objective:      Physical Examination:    Vital Signs:    Vitals:    03/12/24 0913   Resp: 18       Body mass index is 41.36 kg/m².    This a well-developed, well nourished patient in no acute distress.  They are alert and oriented and cooperative to examination.        Examination of the left shoulder, we removed the patient's posterior splint and ortho glass all of his dressings from the emergency department today.  Has significant bruising noted in the upper arm as well as the axilla the left chest wall.  He has good range of motion in the elbow wrist hand and fingers.  He has good range of motion in the shoulder girdle when he shrugs.  But he gets pain with active range of motion of the shoulder joint itself.    Pertinent New  Results:  Narrative & Impression  CMS MANDATED QUALITY DATA - CT RADIATION - 436     All CT scans at this facility utilize dose modulation, iterative reconstruction, and/or weight based dosing when appropriate to reduce radiation dose to as low as reasonably achievable.  Unless otherwise stated, incidental findings do not require dedicated follow-up imaging.           Reason: fracture Table formatting from the original note was not included.; Dx: Closed traumatic displaced fracture of proximal end of left humerus     TECHNIQUE: Left shoulder CT without IV contrast.     Comparison: None     Findings:  Acute comminuted proximal left humerus surgical neck fracture is displaced. The distal fragment demonstrates one full shaft width posterior and 6 mm lateral displacement. The distal fragment demonstrates 25 mm of proximal overlap and fracture has apex varus angulation.     Left humeral head remains concentrically within the left glenoid. Severe left glenohumeral joint osteoarthrosis consistent joint space narrowing and marginal osteophyte formation, with numerous ossified loose bodies along anterior, superior, and inferior aspects of the left glenohumeral joint. Moderate left AC joint osteoarthrosis is present. Type II acromion demonstrates no significant lateral downsloping.     Degenerative changes of the spine are partially visualized. Calcified left superior lung zone granuloma noted.     Impression:     1. Acute comminuted displaced proximal left humerus fracture, characteristic of Neer classification 2 part fracture.  2. 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     Electronically signed by:  Andres Garrison MD  03/07/2024 12:58 PM CST Workstation: [id number]           Specimen Collected: 03/07/24 12:19 CST Last Resulted: 03/07/24 12:58 CST           XRAY Report / Interpretation:       Assessment/Plan:      Left proximal humerus fracture with displacement of the humeral neck  "and the humeral head.  Great consideration has been given in terms of surgical fixation either by open reduction internal fixation in the placement of a intramedullary lizabeth to better align the fracture fragments.  However, I think that the patient is tolerating the fracture well.  His pain is well controlled.  That considerations for surgical fixation versus conservative healing would be strongly considered patient does not want to have surgery, he has several risk factors to include obesity, new onset atrial fibrillation, they started him on blood thinners.  Although he has been cleared to have surgery recommendations would be for serial x-rays, fracture is only 8-day-old.  We will check him back in 1 more week see how he is doing.  If he continues to tolerate pain well then I think we can let this heal conservatively.  He also has advanced glenohumeral arthritis which may necessitate shoulder replacement surgery at some point in the future.  We took him out of the OCL posterior splint, he is just in his sling for comfort.  Hopefully with removal of the splint he will get some distraction just with gravitational forces on the fracture site and improve alignment.  Follow-up 1 week for repeat x-rays.    Harish Aldrich, Physician Assistant, served in the capacity as a "scribe" for this patient encounter.  A "face-to-face" encounter occurred with Dr. Nathan Cooper on this date.  The treatment plan and medical decision-making is outlined above. Patient was seen and examined with a chaperone.       This note was created using Dragon voice recognition software that occasionally misinterpreted phrases or words.          "

## 2024-03-19 ENCOUNTER — LAB VISIT (OUTPATIENT)
Dept: LAB | Facility: HOSPITAL | Age: 74
End: 2024-03-19
Payer: MEDICARE

## 2024-03-19 ENCOUNTER — OFFICE VISIT (OUTPATIENT)
Dept: FAMILY MEDICINE | Facility: CLINIC | Age: 74
End: 2024-03-19
Payer: MEDICARE

## 2024-03-19 ENCOUNTER — OFFICE VISIT (OUTPATIENT)
Dept: ORTHOPEDICS | Facility: CLINIC | Age: 74
End: 2024-03-19
Payer: MEDICARE

## 2024-03-19 VITALS
SYSTOLIC BLOOD PRESSURE: 126 MMHG | TEMPERATURE: 98 F | OXYGEN SATURATION: 96 % | HEART RATE: 86 BPM | DIASTOLIC BLOOD PRESSURE: 78 MMHG | WEIGHT: 313.5 LBS | RESPIRATION RATE: 18 BRPM | HEIGHT: 73 IN | BODY MASS INDEX: 41.55 KG/M2

## 2024-03-19 VITALS — BODY MASS INDEX: 41.55 KG/M2 | WEIGHT: 313.5 LBS | HEIGHT: 73 IN

## 2024-03-19 DIAGNOSIS — E78.5 HYPERLIPIDEMIA ASSOCIATED WITH TYPE 2 DIABETES MELLITUS: ICD-10-CM

## 2024-03-19 DIAGNOSIS — S42.202D CLOSED TRAUMATIC DISPLACED FRACTURE OF PROXIMAL END OF LEFT HUMERUS WITH ROUTINE HEALING, SUBSEQUENT ENCOUNTER: Primary | ICD-10-CM

## 2024-03-19 DIAGNOSIS — E11.65 UNCONTROLLED TYPE 2 DIABETES MELLITUS WITH HYPERGLYCEMIA: ICD-10-CM

## 2024-03-19 DIAGNOSIS — E78.5 HYPERLIPIDEMIA, UNSPECIFIED HYPERLIPIDEMIA TYPE: ICD-10-CM

## 2024-03-19 DIAGNOSIS — N17.9 AKI (ACUTE KIDNEY INJURY): ICD-10-CM

## 2024-03-19 DIAGNOSIS — E11.40 TYPE 2 DIABETES, CONTROLLED, WITH NEUROPATHY: ICD-10-CM

## 2024-03-19 DIAGNOSIS — E11.69 HYPERLIPIDEMIA ASSOCIATED WITH TYPE 2 DIABETES MELLITUS: ICD-10-CM

## 2024-03-19 DIAGNOSIS — E11.59 HYPERTENSION ASSOCIATED WITH DIABETES: ICD-10-CM

## 2024-03-19 DIAGNOSIS — I15.2 HYPERTENSION ASSOCIATED WITH DIABETES: ICD-10-CM

## 2024-03-19 DIAGNOSIS — Z09 HOSPITAL DISCHARGE FOLLOW-UP: Primary | ICD-10-CM

## 2024-03-19 DIAGNOSIS — S91.109D OPEN WOUND OF TOE, SUBSEQUENT ENCOUNTER: ICD-10-CM

## 2024-03-19 DIAGNOSIS — I48.91 ATRIAL FIBRILLATION WITH RAPID VENTRICULAR RESPONSE: ICD-10-CM

## 2024-03-19 DIAGNOSIS — E66.01 MORBID OBESITY: ICD-10-CM

## 2024-03-19 LAB
ALBUMIN SERPL BCP-MCNC: 2.9 G/DL (ref 3.5–5.2)
ALP SERPL-CCNC: 95 U/L (ref 55–135)
ALT SERPL W/O P-5'-P-CCNC: 10 U/L (ref 10–44)
ANION GAP SERPL CALC-SCNC: 14 MMOL/L (ref 8–16)
AST SERPL-CCNC: 9 U/L (ref 10–40)
BILIRUB SERPL-MCNC: 0.6 MG/DL (ref 0.1–1)
BUN SERPL-MCNC: 23 MG/DL (ref 8–23)
CALCIUM SERPL-MCNC: 9.9 MG/DL (ref 8.7–10.5)
CHLORIDE SERPL-SCNC: 105 MMOL/L (ref 95–110)
CO2 SERPL-SCNC: 21 MMOL/L (ref 23–29)
CREAT SERPL-MCNC: 1 MG/DL (ref 0.5–1.4)
EST. GFR  (NO RACE VARIABLE): >60 ML/MIN/1.73 M^2
ESTIMATED AVG GLUCOSE: 226 MG/DL (ref 68–131)
GLUCOSE SERPL-MCNC: 254 MG/DL (ref 70–110)
HBA1C MFR BLD: 9.5 % (ref 4–5.6)
POTASSIUM SERPL-SCNC: 4.2 MMOL/L (ref 3.5–5.1)
PROT SERPL-MCNC: 7.7 G/DL (ref 6–8.4)
SODIUM SERPL-SCNC: 140 MMOL/L (ref 136–145)

## 2024-03-19 PROCEDURE — 99999 PR PBB SHADOW E&M-EST. PATIENT-LVL V: CPT | Mod: PBBFAC,HCNC,,

## 2024-03-19 PROCEDURE — 4010F ACE/ARB THERAPY RXD/TAKEN: CPT | Mod: HCNC,CPTII,S$GLB,

## 2024-03-19 PROCEDURE — 1125F AMNT PAIN NOTED PAIN PRSNT: CPT | Mod: CPTII,S$GLB,, | Performed by: ORTHOPAEDIC SURGERY

## 2024-03-19 PROCEDURE — 1111F DSCHRG MED/CURRENT MED MERGE: CPT | Mod: CPTII,S$GLB,, | Performed by: ORTHOPAEDIC SURGERY

## 2024-03-19 PROCEDURE — 1159F MED LIST DOCD IN RCRD: CPT | Mod: HCNC,CPTII,S$GLB,

## 2024-03-19 PROCEDURE — 3078F DIAST BP <80 MM HG: CPT | Mod: HCNC,CPTII,S$GLB,

## 2024-03-19 PROCEDURE — 80053 COMPREHEN METABOLIC PANEL: CPT | Mod: HCNC

## 2024-03-19 PROCEDURE — 1101F PT FALLS ASSESS-DOCD LE1/YR: CPT | Mod: HCNC,CPTII,S$GLB,

## 2024-03-19 PROCEDURE — 1100F PTFALLS ASSESS-DOCD GE2>/YR: CPT | Mod: CPTII,S$GLB,, | Performed by: ORTHOPAEDIC SURGERY

## 2024-03-19 PROCEDURE — 1111F DSCHRG MED/CURRENT MED MERGE: CPT | Mod: HCNC,CPTII,S$GLB,

## 2024-03-19 PROCEDURE — 3074F SYST BP LT 130 MM HG: CPT | Mod: HCNC,CPTII,S$GLB,

## 2024-03-19 PROCEDURE — 36415 COLL VENOUS BLD VENIPUNCTURE: CPT | Mod: HCNC,PO

## 2024-03-19 PROCEDURE — 4010F ACE/ARB THERAPY RXD/TAKEN: CPT | Mod: CPTII,S$GLB,, | Performed by: ORTHOPAEDIC SURGERY

## 2024-03-19 PROCEDURE — 99024 POSTOP FOLLOW-UP VISIT: CPT | Mod: S$GLB,POP,, | Performed by: ORTHOPAEDIC SURGERY

## 2024-03-19 PROCEDURE — 1125F AMNT PAIN NOTED PAIN PRSNT: CPT | Mod: HCNC,CPTII,S$GLB,

## 2024-03-19 PROCEDURE — 3288F FALL RISK ASSESSMENT DOCD: CPT | Mod: CPTII,S$GLB,, | Performed by: ORTHOPAEDIC SURGERY

## 2024-03-19 PROCEDURE — 1159F MED LIST DOCD IN RCRD: CPT | Mod: CPTII,S$GLB,, | Performed by: ORTHOPAEDIC SURGERY

## 2024-03-19 PROCEDURE — 1160F RVW MEDS BY RX/DR IN RCRD: CPT | Mod: CPTII,S$GLB,, | Performed by: ORTHOPAEDIC SURGERY

## 2024-03-19 PROCEDURE — 1160F RVW MEDS BY RX/DR IN RCRD: CPT | Mod: HCNC,CPTII,S$GLB,

## 2024-03-19 PROCEDURE — 3008F BODY MASS INDEX DOCD: CPT | Mod: CPTII,S$GLB,, | Performed by: ORTHOPAEDIC SURGERY

## 2024-03-19 PROCEDURE — 99495 TRANSJ CARE MGMT MOD F2F 14D: CPT | Mod: HCNC,S$GLB,,

## 2024-03-19 PROCEDURE — 3288F FALL RISK ASSESSMENT DOCD: CPT | Mod: HCNC,CPTII,S$GLB,

## 2024-03-19 PROCEDURE — 83036 HEMOGLOBIN GLYCOSYLATED A1C: CPT | Mod: HCNC

## 2024-03-19 RX ORDER — METOPROLOL SUCCINATE 100 MG/1
100 TABLET, EXTENDED RELEASE ORAL DAILY
Qty: 90 TABLET | Refills: 3 | Status: ON HOLD | OUTPATIENT
Start: 2024-03-19 | End: 2025-03-19

## 2024-03-19 RX ORDER — ATORVASTATIN CALCIUM 10 MG/1
10 TABLET, FILM COATED ORAL DAILY
Qty: 90 TABLET | Refills: 3 | Status: ON HOLD | OUTPATIENT
Start: 2024-03-19 | End: 2025-03-19

## 2024-03-19 RX ORDER — METFORMIN HYDROCHLORIDE 500 MG/1
1000 TABLET, EXTENDED RELEASE ORAL 2 TIMES DAILY WITH MEALS
Qty: 360 TABLET | Refills: 3 | Status: ON HOLD | OUTPATIENT
Start: 2024-03-19 | End: 2025-03-19

## 2024-03-19 RX ORDER — DOXYCYCLINE 100 MG/1
100 CAPSULE ORAL 2 TIMES DAILY
Status: ON HOLD | COMMUNITY

## 2024-03-19 RX ORDER — CEFAZOLIN SODIUM 2 G/50ML
2 SOLUTION INTRAVENOUS
Status: CANCELLED | OUTPATIENT
Start: 2024-03-19

## 2024-03-19 NOTE — PROGRESS NOTES
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    Current Outpatient Medications   Medication Sig    [START ON 3/26/2024] apixaban (ELIQUIS) 5 mg Tab Take 1 tablet (5 mg total) by mouth 2 (two) times daily.    atorvastatin (LIPITOR) 10 MG tablet Take 1 tablet (10 mg total) by mouth once daily.    co-enzyme Q-10 30 mg capsule Take 30 mg by mouth once daily.    doxycycline (VIBRAMYCIN) 100 MG Cap Take 100 mg by mouth 2 (two) times daily.    ergocalciferol, vitamin D2, (VITAMIN D ORAL) Take 1 tablet by mouth once daily.    hydrALAZINE (APRESOLINE) 25 MG tablet Take 1 tablet (25 mg total) by mouth every 12 (twelve) hours.    HYDROcodone-acetaminophen (NORCO)  mg per tablet Take 1 tablet by mouth every 8 (eight) hours as needed for Pain.    metFORMIN (GLUCOPHAGE-XR) 500 MG ER 24hr tablet Take 2 tablets (1,000 mg total) by mouth 2 (two) times daily with meals.    metoprolol succinate (TOPROL-XL) 100 MG 24 hr tablet Take 1 tablet (100 mg total) by mouth once daily.     No current facility-administered medications for this visit.       Review of patient's allergies indicates:  No Known Allergies    No family history on file.    Social History     Socioeconomic History    Marital status:    Tobacco Use    Smoking status: Never    Smokeless tobacco: Never   Substance and Sexual Activity    Alcohol use: Yes     Alcohol/week: 0.0 standard drinks of alcohol     Comment: social    Drug use: No    Sexual activity: Yes       History of present illness: 73-year-old female returns to the office today for follow-up of a chief complaint of left shoulder pain  after a fall at his home March 4th.     Consideration had been given to open reduction internal fixation versus allowing this to heal without surgical intervention.  Patient is doing well in terms of continued pain management.  He has using a sling for comfort.  He has been off his Eliquis.      Review of Systems:    Constitution: Negative for chills, fever, and sweats.  Negative for unexplained weight loss.    HENT:  Negative for headaches and blurry vision.    Cardiovascular:Negative for chest pain or irregular heart beat. Negative for hypertension.    Respiratory:  Negative for cough and shortness of breath.    Gastrointestinal: Negative for abdominal pain, heartburn, melena, nausea, and vomitting.    Genitourinary:  Negative bladder incontinence and dysuria.    Musculoskeletal:  See HPI for details.     Neurological: Negative for numbness.    Psychiatric/Behavioral: Negative for depression.  The patient is not nervous/anxious.      Endocrine: Negative for polyuria    Hematologic/Lymphatic: Negative for bleeding problem.  Does not bruise/bleed easily.    Skin: Negative for poor would healing and rash    Objective:      Physical Examination:    Vital Signs:  There were no vitals filed for this visit.    Body mass index is 41.36 kg/m².    This a well-developed, well nourished patient in no acute distress.  They are alert and oriented and cooperative to examination.        Examination of the left shoulder, the patient has prior noted ecchymosis has almost completely resolved.  He still has decreased and painful range motion of the left shoulder as to be expected.  Pertinent New Results:    XRAY Report / Interpretation:   AP and lateral views of the left shoulder taken today in the office, compared to initial images from March 4th show no interval change or displacement in the fracture.    Assessment/Plan:      Left proximal humerus fracture.  Patient has a displaced left proximal humerus fracture and consideration  "has been given for conservative treatment however I have recommended stabilization with open reduction internal fixation and placement of an intramedullary lizabeth to better approximate the fracture fragments to assist with healing and ultimately range of motion postoperatively.    Patient was consented for surgery. Risks and benefits of the procedure were discussed in great detail to include the expected preoperative, intraoperative and postoperative courses. Complications may include but are not limited to bleeding, infection, damage to nerves, arteries, blood vessels, bones, tendons, ligaments, stiffness, instability, deep vein thrombus (DVT), pulmonary embolus (PE), altered sensation, continued pain, RSD, loss of motion or a need for additional surgery. As well as general anesthetic complications including seizure, stroke, heart attack and even death.    Harish Aldrich, Physician Assistant, served in the capacity as a "scribe" for this patient encounter.  A "face-to-face" encounter occurred with Dr. Nathan Cooper on this date.  The treatment plan and medical decision-making is outlined above. Patient was seen and examined with a chaperone.      This note was created using Dragon voice recognition software that occasionally misinterpreted phrases or words.          "

## 2024-03-19 NOTE — H&P (VIEW-ONLY)
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    Current Outpatient Medications   Medication Sig    [START ON 3/26/2024] apixaban (ELIQUIS) 5 mg Tab Take 1 tablet (5 mg total) by mouth 2 (two) times daily.    atorvastatin (LIPITOR) 10 MG tablet Take 1 tablet (10 mg total) by mouth once daily.    co-enzyme Q-10 30 mg capsule Take 30 mg by mouth once daily.    doxycycline (VIBRAMYCIN) 100 MG Cap Take 100 mg by mouth 2 (two) times daily.    ergocalciferol, vitamin D2, (VITAMIN D ORAL) Take 1 tablet by mouth once daily.    hydrALAZINE (APRESOLINE) 25 MG tablet Take 1 tablet (25 mg total) by mouth every 12 (twelve) hours.    HYDROcodone-acetaminophen (NORCO)  mg per tablet Take 1 tablet by mouth every 8 (eight) hours as needed for Pain.    metFORMIN (GLUCOPHAGE-XR) 500 MG ER 24hr tablet Take 2 tablets (1,000 mg total) by mouth 2 (two) times daily with meals.    metoprolol succinate (TOPROL-XL) 100 MG 24 hr tablet Take 1 tablet (100 mg total) by mouth once daily.     No current facility-administered medications for this visit.       Review of patient's allergies indicates:  No Known Allergies    No family history on file.    Social History     Socioeconomic History    Marital status:    Tobacco Use    Smoking status: Never    Smokeless tobacco: Never   Substance and Sexual Activity    Alcohol use: Yes     Alcohol/week: 0.0 standard drinks of alcohol     Comment: social    Drug use: No    Sexual activity: Yes       History of present illness: 73-year-old female returns to the office today for follow-up of a chief complaint of left shoulder pain  after a fall at his home March 4th.     Consideration had been given to open reduction internal fixation versus allowing this to heal without surgical intervention.  Patient is doing well in terms of continued pain management.  He has using a sling for comfort.  He has been off his Eliquis.      Review of Systems:    Constitution: Negative for chills, fever, and sweats.  Negative for unexplained weight loss.    HENT:  Negative for headaches and blurry vision.    Cardiovascular:Negative for chest pain or irregular heart beat. Negative for hypertension.    Respiratory:  Negative for cough and shortness of breath.    Gastrointestinal: Negative for abdominal pain, heartburn, melena, nausea, and vomitting.    Genitourinary:  Negative bladder incontinence and dysuria.    Musculoskeletal:  See HPI for details.     Neurological: Negative for numbness.    Psychiatric/Behavioral: Negative for depression.  The patient is not nervous/anxious.      Endocrine: Negative for polyuria    Hematologic/Lymphatic: Negative for bleeding problem.  Does not bruise/bleed easily.    Skin: Negative for poor would healing and rash    Objective:      Physical Examination:    Vital Signs:  There were no vitals filed for this visit.    Body mass index is 41.36 kg/m².    This a well-developed, well nourished patient in no acute distress.  They are alert and oriented and cooperative to examination.        Examination of the left shoulder, the patient has prior noted ecchymosis has almost completely resolved.  He still has decreased and painful range motion of the left shoulder as to be expected.  Pertinent New Results:    XRAY Report / Interpretation:   AP and lateral views of the left shoulder taken today in the office, compared to initial images from March 4th show no interval change or displacement in the fracture.    Assessment/Plan:      Left proximal humerus fracture.  Patient has a displaced left proximal humerus fracture and consideration  "has been given for conservative treatment however I have recommended stabilization with open reduction internal fixation and placement of an intramedullary lizabeth to better approximate the fracture fragments to assist with healing and ultimately range of motion postoperatively.    Patient was consented for surgery. Risks and benefits of the procedure were discussed in great detail to include the expected preoperative, intraoperative and postoperative courses. Complications may include but are not limited to bleeding, infection, damage to nerves, arteries, blood vessels, bones, tendons, ligaments, stiffness, instability, deep vein thrombus (DVT), pulmonary embolus (PE), altered sensation, continued pain, RSD, loss of motion or a need for additional surgery. As well as general anesthetic complications including seizure, stroke, heart attack and even death.    Harish Aldrich, Physician Assistant, served in the capacity as a "scribe" for this patient encounter.  A "face-to-face" encounter occurred with Dr. Nathan Cooper on this date.  The treatment plan and medical decision-making is outlined above. Patient was seen and examined with a chaperone.      This note was created using Dragon voice recognition software that occasionally misinterpreted phrases or words.          "

## 2024-03-19 NOTE — PROGRESS NOTES
Transitional Care Note  Admit date: 03/07/2024  Discharge date: 03/09/2024  Date of interactive contact (2 business days post D/C): 03/11/2024  Hospitalized at: [facility]  Discharge diagnoses: A fib with RVR, acute on chronic kidney failure, MARA, HTN, T2DM, HLD    Family and/or Caretaker present at visit?  Yes.  Diagnostic tests reviewed/disposition: I have reviewed all completed as well as pending diagnostic tests at the time of discharge.  Disease/illness education: As below  Medication changes: As below  Home health/community services discussion/referrals: Patient does not have home health established from hospital visit.  They do need home health.  If needed, we will set up home health for the patient.   Establishment or re-establishment of referral orders for community resources: No other necessary community resources.   Discussion with other health care providers: No discussion with other health care providers necessary.                     Subjective:       Patient ID: Roosevelt Moser is a 73 y.o. male.    Chief Complaint: Hospital Follow Up    [facility] hospital discharge follow up. Presntation and course can be seen below. Since discharge has been doing fairly well. Arm feels better to a degree since putting it in a sling. Scheduled to follow up with Dr. Cooper today. He has follow up with cardiology scheduled for next Monday. States that he plans to hold off on any further cardiac testing until his arm is healed. Blood pressure has been well controlled on new regimen. Need to recheck kidneys. Will do other labs that were planned for the near future today as well. He wishes to establish with home health for assistance with wound care of a toe wound on his F foot.     Needs to establish with PCP.         HPI:   Patient is a 73-year-old male with a past medical history of DM, hyperlipidemia, hypertension, Guillain-[place], and AFib.  Patient presents to the ED today after preop clearance for right humerus orthopedic  clearance. Patient was found to be in AFib RVR on exam and was thus sent to ED for further evaluation.  EKG shows afib RVR with rate of 108. Patient denies dizziness, lightheadedness, chest pain, shortness for breath, palpitations, abdominal pain, nausea, vomiting, constipation and diarrhea.  Patient is on home metoprolol nightly but denies anticoagulation.  Patient denies history of AFib but per chart review had new onset in 2022.  Patient was given home dose of metoprolol in ED and rate decreased into the 80s and 90s.  No echo on file, updated pending. Cardiology was consulted.  Pt is supposed to have humeral fracture surgery following cardiology clearance. Pt follows with Dr Cooper. Patient is full code.     In ED: TSH 1.865, Magnesium 1.2, troponin 12.8, , K 4.6, WBC 14.05, hbg 13.8, AST and ALT 9, and creatinine 2.0.      * No surgery found *       Hospital Course:   73 y.o. M patient presented after pre-procedure workup revealed Afib RVR. Home medications were restarted with improvement to HR - now controlled afib. Patient also noted to have MARBIN with creatine noted 2.0 (baseline 1.0). cautious IV fluids started. 2D echo was obtained which showed HFEF 45-65% with normal DF - Cardiology consulted from ED for risk stratification. CHADsVac score >3 (age/afib, HTN, DMII/systolic HF). Patient has not been on anticoagulation in the past. He has expressed that he is trying to get surgery to repair his left femur fracture and is concerned about starting oral anticoagulation. On Toprol XL home med. Reached out via secure chat to Dr. Cooper to let him know patient was in hospital. If he has surgery scheduled for 3/15/24, records of appointments show a 3/12/24 encounter pending for Dr. Cooper - will need to coordinate with po anticoagulation. Cardiology has seen the patient and recommends anticoagulation - patient agrees to start after his surgery. Creatine improved from 2.0 to 1.5 near normal. Continue to hold  home Telmisartan 2/2 renal - added hydralazine 25 mg BID to his usual metoprolol succinate 100 mg daily for tighter BP control while Telmisartan is being held. He can coordinate restarting with cardiology in clinic if kidney functions remain stable, anticipate will continue to self-correct. Cleared by cardiology for discharge to follow up in 1-2 weeks in clinic. Follow up with PCP in 1 week to repeat labs.       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    Hyperlipidemia     Hypertension 2016       Review of patient's allergies indicates:  No Known Allergies      Current Outpatient Medications:     [START ON 3/26/2024] apixaban (ELIQUIS) 5 mg Tab, Take 1 tablet (5 mg total) by mouth 2 (two) times daily., Disp: 60 tablet, Rfl: 1    co-enzyme Q-10 30 mg capsule, Take 30 mg by mouth once daily., Disp: , Rfl:     doxycycline (VIBRAMYCIN) 100 MG Cap, Take 100 mg by mouth 2 (two) times daily., Disp: , Rfl:     ergocalciferol, vitamin D2, (VITAMIN D ORAL), Take 1 tablet by mouth once daily., Disp: , Rfl:     hydrALAZINE (APRESOLINE) 25 MG tablet, Take 1 tablet (25 mg total) by mouth every 12 (twelve) hours., Disp: 60 tablet, Rfl: 3    HYDROcodone-acetaminophen (NORCO)  mg per tablet, Take 1 tablet by mouth every 8 (eight) hours as needed for Pain., Disp: 21 tablet, Rfl: 0    atorvastatin (LIPITOR) 10 MG tablet, Take 1 tablet (10 mg total) by mouth once daily., Disp: 90 tablet, Rfl: 3    metFORMIN (GLUCOPHAGE-XR) 500 MG ER 24hr tablet, Take 2 tablets (1,000 mg total) by mouth 2 (two) times daily with meals., Disp: 360 tablet, Rfl: 3    metoprolol succinate (TOPROL-XL) 100 MG 24 hr tablet, Take 1 tablet (100 mg total) by mouth once daily., Disp: 90 tablet, Rfl: 3    Review of Systems   Respiratory:  Negative for shortness of breath.    Cardiovascular:  Negative for chest pain.   Musculoskeletal:  Positive for arthralgias.   Skin:  Positive for wound (3rd toe R  "foot).       Objective:      /78 (BP Location: Right arm, Patient Position: Sitting, BP Method: Large (Manual))   Pulse 86   Temp 98.1 °F (36.7 °C)   Resp 18   Ht 6' 1" (1.854 m)   Wt (!) 142.2 kg (313 lb 7.9 oz)   SpO2 96%   BMI 41.36 kg/m²   Physical Exam  Vitals reviewed.   Constitutional:       General: He is not in acute distress.     Appearance: Normal appearance. He is obese. He is not ill-appearing, toxic-appearing or diaphoretic.   HENT:      Head: Normocephalic.      Right Ear: External ear normal.      Left Ear: External ear normal.      Nose: Nose normal. No congestion or rhinorrhea.      Mouth/Throat:      Mouth: Mucous membranes are moist.      Pharynx: Oropharynx is clear.   Eyes:      General: No scleral icterus.        Right eye: No discharge.         Left eye: No discharge.      Extraocular Movements: Extraocular movements intact.      Conjunctiva/sclera: Conjunctivae normal.   Cardiovascular:      Rate and Rhythm: Normal rate and regular rhythm.      Pulses: Normal pulses.      Heart sounds: Normal heart sounds. No murmur heard.     No friction rub. No gallop.   Pulmonary:      Effort: Pulmonary effort is normal. No respiratory distress.      Breath sounds: Normal breath sounds. No wheezing, rhonchi or rales.   Chest:      Chest wall: No tenderness.   Musculoskeletal:         General: Signs of injury present. No swelling, tenderness or deformity. Normal range of motion.      Cervical back: Normal range of motion.      Right lower leg: No edema.      Left lower leg: No edema.   Skin:     General: Skin is warm and dry.      Capillary Refill: Capillary refill takes less than 2 seconds.      Coloration: Skin is not jaundiced.      Findings: Bruising present. No erythema, lesion or rash.   Neurological:      Mental Status: He is alert and oriented to person, place, and time.      Gait: Gait abnormal.         Assessment:       1. Hospital discharge follow-up    2. Hyperlipidemia associated " with type 2 diabetes mellitus    3. Hyperlipidemia, unspecified hyperlipidemia type    4. Type 2 diabetes, controlled, with neuropathy    5. Hypertension associated with diabetes    6. Open wound of toe, subsequent encounter        Plan:       Hospital discharge follow-up       -     Has appropriate follow ups scheduled with cardiology and orthopedics. Requests home health at this time.     Hyperlipidemia associated with type 2 diabetes mellitus  -     atorvastatin (LIPITOR) 10 MG tablet; Take 1 tablet (10 mg total) by mouth once daily.  Dispense: 90 tablet; Refill: 3        -    Continue meds. Will continue to monitor.     Hyperlipidemia, unspecified hyperlipidemia type  -     atorvastatin (LIPITOR) 10 MG tablet; Take 1 tablet (10 mg total) by mouth once daily.  Dispense: 90 tablet; Refill: 3    Type 2 diabetes, controlled, with neuropathy  -     metFORMIN (GLUCOPHAGE-XR) 500 MG ER 24hr tablet; Take 2 tablets (1,000 mg total) by mouth 2 (two) times daily with meals.  Dispense: 360 tablet; Refill: 3        -    Continue meds. Will continue to monitor.     Hypertension associated with diabetes  -     metoprolol succinate (TOPROL-XL) 100 MG 24 hr tablet; Take 1 tablet (100 mg total) by mouth once daily.  Dispense: 90 tablet; Refill: 3        -    Stable. Continue meds. Will continue to monitor.     Open wound of toe, subsequent encounter  -     Ambulatory referral/consult to Home Health; Future; Expected date: 03/20/2024    Uncontrolled type 2 diabetes mellitus with hyperglycemia        -    Continue meds. Will continue to monitor.     Morbid obesity        -     Patient would benefit from healthy diet, exercise and weight loss. Overall health and wellbeing would likely improve.      Atrial fibrillation with rapid ventricular response        -    Stable. Continue meds. Will continue to monitor.     MARBIN (acute kidney injury)        -    Recheck labs today. Continue meds as prescribed.                Miguel Angel Enriquez,  SILVIO  Family Medicine Physician Assistant       Future Appointments       Date Provider Specialty Appt Notes    3/19/2024  Lab .    6/19/2024  Lab .    7/19/2024 Carolin Lucio MD Family Medicine Establish Care               I spent a total of 20 minutes on the day of the visit.This includes face to face time and non-face to face time preparing to see the patient (eg, review of tests), obtaining and/or reviewing separately obtained history, documenting clinical information in the electronic or other health record, independently interpreting results and communicating results to the patient/family/caregiver, or care coordinator.      We have addressed [4] Moderate: 1 or more chronic illnesses with exacerbation, progression, or side effects of treatment / 2 or more stable chronic illnesses / 1 undiagnosed new problem with uncertain prognosis / 1 acute illness with systemic symptoms / 1 acute complicated injury  The complexity of the data reviewed and analyzed for this visit was [3] Limited (Reviewed prior external note, ordered unique testing or reviewed the results of each unique test)   The risk of complications and/or morbidity or mortality are [4] Moderate risk (I.e. prescription drug management / decision regarding minor surgery with identified pt or procedure risk factors / decision regarding elective major surgery without identified pt or procedure risk factors / diagnosis or treatment significantly limited by social determinants of health)   The level of Medical Decision Making for this visit is [4] Moderate

## 2024-03-19 NOTE — PATIENT INSTRUCTIONS
Vidal Albert,     If you are due for any health screening(s) below please notify me so we can arrange them to be ordered and scheduled to maintain your health. Most healthy patients complete it. Don't lose out on improving your health.     Tests to Keep You Healthy    Eye Exam: ORDERED BUT NOT SCHEDULED  Colon Cancer Screening: ORDERED  Last Blood Pressure <= 139/89 (3/19/2024): NO  Last HbA1c < 8 (07/31/2023): NO         Colon Cancer Screening    Of cancers affecting both men and women, colorectal cancer is the third leading cancer killer in the [place]. But it doesnt have to be. Screening can prevent colorectal cancer or find it at an early stage when treatment often leads to a cure.    A colonoscopy is the preferred test for detecting colon cancer. It is needed only once every 10 years if results are negative. While sedated, a flexible, lighted tube with a tiny camera is inserted into the rectum and advanced through the colon to look for cancers. An alternative screening test that is used at home and returned to the lab may also be used. It detects hidden blood in bowel movements which could indicate cancer in the colon. If results are positive, you will need a colonoscopy to determine if the blood is a sign of cancer. This type of follow up (diagnostic) colonoscopy usually requires additional copays as required by your insurance provider. Please contact your PCP if you have any questions.         Diabetic Retinal Eye Exam    Diabetes is the #1 cause of blindness in the [place] - early detection before signs or symptoms develop can prevent debilitating blindness.    Once-a-year screening is recommended for all diabetic patients. This exam can prevent and treat diabetes complications in the eye before developing symptoms. This can be done with a special camera is used to take photographs of the back of your eye without having to dilate them, or you can see an eye doctor for a full dilated exam.    Diabetic A1c  Testing    Your chart identifies you as having diabetes. It is recommended that all diabetes patients have an A1c test done at least once a year, but [facility] recommends twice a year for most patients. This helps your doctor to better help you manage your diabetes. This is a non-fasting lab test which can be completed at any time. Your result will be sent to your Primary Care Provider for review and that office will contact you with the results.  A1c every 3-6 months, lipid panel every year, microalbumin (urine test) once per year, comprehensive foot exam once per year, dilated eye exam at least once per year, dental exam every 6 months, flu vaccination every year, and pneumonia vaccination(s) as recommended

## 2024-03-20 ENCOUNTER — TELEPHONE (OUTPATIENT)
Dept: ORTHOPEDICS | Facility: CLINIC | Age: 74
End: 2024-03-20

## 2024-03-20 DIAGNOSIS — E11.65 UNCONTROLLED TYPE 2 DIABETES MELLITUS WITH HYPERGLYCEMIA: Primary | ICD-10-CM

## 2024-03-20 DIAGNOSIS — S42.202A CLOSED TRAUMATIC DISPLACED FRACTURE OF PROXIMAL END OF LEFT HUMERUS, INITIAL ENCOUNTER: ICD-10-CM

## 2024-03-20 RX ORDER — GLIMEPIRIDE 2 MG/1
2 TABLET ORAL
Qty: 90 TABLET | Refills: 3 | Status: ON HOLD | OUTPATIENT
Start: 2024-03-20 | End: 2025-03-20

## 2024-03-20 RX ORDER — HYDROCODONE BITARTRATE AND ACETAMINOPHEN 10; 325 MG/1; MG/1
1 TABLET ORAL EVERY 8 HOURS PRN
Qty: 15 TABLET | Refills: 0 | Status: SHIPPED | OUTPATIENT
Start: 2024-03-20 | End: 2024-03-21

## 2024-03-20 NOTE — TELEPHONE ENCOUNTER
Patient was supposed to have surgery today, so no pain medication was called in at his visit yesterday. Surgery has been moved to Monday and patient is requesting pain medication until his surgery.

## 2024-03-21 DIAGNOSIS — S42.202A CLOSED TRAUMATIC DISPLACED FRACTURE OF PROXIMAL END OF LEFT HUMERUS, INITIAL ENCOUNTER: Primary | ICD-10-CM

## 2024-03-21 RX ORDER — OXYCODONE AND ACETAMINOPHEN 7.5; 325 MG/1; MG/1
1 TABLET ORAL EVERY 6 HOURS PRN
Qty: 28 TABLET | Refills: 0 | Status: SHIPPED | OUTPATIENT
Start: 2024-03-21 | End: 2024-04-08

## 2024-03-21 NOTE — PROGRESS NOTES
Through communication with [facility], the Inpatient order is being changed to observation as the payer will not authorize Inpatient and the facility agrees not to appeal or challenge the change in status. The account will be changed to Observation for billing purposes.      Patrizia Valenzuela MD  Physician Advisor

## 2024-03-23 ENCOUNTER — ANESTHESIA EVENT (OUTPATIENT)
Dept: SURGERY | Facility: HOSPITAL | Age: 74
End: 2024-03-23
Payer: MEDICARE

## 2024-03-25 ENCOUNTER — ANESTHESIA (OUTPATIENT)
Dept: SURGERY | Facility: HOSPITAL | Age: 74
End: 2024-03-25
Payer: MEDICARE

## 2024-03-25 ENCOUNTER — HOSPITAL ENCOUNTER (OUTPATIENT)
Facility: HOSPITAL | Age: 74
Discharge: HOME | End: 2024-03-25
Attending: ORTHOPAEDIC SURGERY | Admitting: ORTHOPAEDIC SURGERY
Payer: MEDICARE

## 2024-03-25 DIAGNOSIS — Z01.818 PREOP TESTING: ICD-10-CM

## 2024-03-25 DIAGNOSIS — S42.202D CLOSED TRAUMATIC DISPLACED FRACTURE OF PROXIMAL END OF LEFT HUMERUS WITH ROUTINE HEALING, SUBSEQUENT ENCOUNTER: Primary | ICD-10-CM

## 2024-03-25 LAB
APTT PPP: 29.1 SEC (ref 21–32)
BACTERIA #/AREA URNS HPF: ABNORMAL /HPF
BILIRUB UR QL STRIP: NEGATIVE
CLARITY UR: ABNORMAL
COLOR UR: YELLOW
GLUCOSE UR QL STRIP: NEGATIVE
HGB UR QL STRIP: ABNORMAL
HYALINE CASTS #/AREA URNS LPF: 0 /LPF
INR PPP: 1.1 (ref 0.8–1.2)
KETONES UR QL STRIP: NEGATIVE
LEUKOCYTE ESTERASE UR QL STRIP: ABNORMAL
MICROSCOPIC COMMENT: ABNORMAL
NITRITE UR QL STRIP: NEGATIVE
OHS QRS DURATION: 82 MS
OHS QRS DURATION: 98 MS
OHS QTC CALCULATION: 423 MS
OHS QTC CALCULATION: 460 MS
PH UR STRIP: 6 [PH] (ref 5–8)
PROT UR QL STRIP: ABNORMAL
PROTHROMBIN TIME: 11.9 SEC (ref 9–12.5)
RBC #/AREA URNS HPF: >100 /HPF (ref 0–4)
SP GR UR STRIP: 1.02 (ref 1–1.03)
URN SPEC COLLECT METH UR: ABNORMAL
UROBILINOGEN UR STRIP-ACNC: NEGATIVE EU/DL
WBC #/AREA URNS HPF: 18 /HPF (ref 0–5)

## 2024-03-25 PROCEDURE — 71000039 HC RECOVERY, EACH ADD'L HOUR: Performed by: ORTHOPAEDIC SURGERY

## 2024-03-25 PROCEDURE — 63600175 PHARM REV CODE 636 W HCPCS: Mod: JZ,JG | Performed by: ANESTHESIOLOGY

## 2024-03-25 PROCEDURE — 71000015 HC POSTOP RECOV 1ST HR: Performed by: ORTHOPAEDIC SURGERY

## 2024-03-25 PROCEDURE — 27200665 HC NERVE BLOCK NEEDLE/ CATHETER: Performed by: ANESTHESIOLOGY

## 2024-03-25 PROCEDURE — 85730 THROMBOPLASTIN TIME PARTIAL: CPT | Performed by: ANESTHESIOLOGY

## 2024-03-25 PROCEDURE — C1769 GUIDE WIRE: HCPCS | Performed by: ORTHOPAEDIC SURGERY

## 2024-03-25 PROCEDURE — 37000008 HC ANESTHESIA 1ST 15 MINUTES: Performed by: ORTHOPAEDIC SURGERY

## 2024-03-25 PROCEDURE — 27201423 OPTIME MED/SURG SUP & DEVICES STERILE SUPPLY: Performed by: ORTHOPAEDIC SURGERY

## 2024-03-25 PROCEDURE — 85610 PROTHROMBIN TIME: CPT | Performed by: ANESTHESIOLOGY

## 2024-03-25 PROCEDURE — C9290 INJ, BUPIVACAINE LIPOSOME: HCPCS | Performed by: ANESTHESIOLOGY

## 2024-03-25 PROCEDURE — 64415 NJX AA&/STRD BRCH PLXS IMG: CPT | Performed by: ANESTHESIOLOGY

## 2024-03-25 PROCEDURE — 94799 UNLISTED PULMONARY SVC/PX: CPT | Mod: XB

## 2024-03-25 PROCEDURE — 93010 ELECTROCARDIOGRAM REPORT: CPT | Mod: ,,, | Performed by: GENERAL PRACTICE

## 2024-03-25 PROCEDURE — 25000003 PHARM REV CODE 250: Performed by: ORTHOPAEDIC SURGERY

## 2024-03-25 PROCEDURE — D9220A PRA ANESTHESIA: Mod: ANES,,, | Performed by: ANESTHESIOLOGY

## 2024-03-25 PROCEDURE — 25000003 PHARM REV CODE 250: Performed by: ANESTHESIOLOGY

## 2024-03-25 PROCEDURE — 93005 ELECTROCARDIOGRAM TRACING: CPT

## 2024-03-25 PROCEDURE — 63600175 PHARM REV CODE 636 W HCPCS: Performed by: NURSE ANESTHETIST, CERTIFIED REGISTERED

## 2024-03-25 PROCEDURE — 25000003 PHARM REV CODE 250: Performed by: NURSE ANESTHETIST, CERTIFIED REGISTERED

## 2024-03-25 PROCEDURE — 87086 URINE CULTURE/COLONY COUNT: CPT | Performed by: ORTHOPAEDIC SURGERY

## 2024-03-25 PROCEDURE — 63600175 PHARM REV CODE 636 W HCPCS: Performed by: ANESTHESIOLOGY

## 2024-03-25 PROCEDURE — 71000033 HC RECOVERY, INTIAL HOUR: Performed by: ORTHOPAEDIC SURGERY

## 2024-03-25 PROCEDURE — 36000710: Performed by: ORTHOPAEDIC SURGERY

## 2024-03-25 PROCEDURE — 36415 COLL VENOUS BLD VENIPUNCTURE: CPT | Performed by: ANESTHESIOLOGY

## 2024-03-25 PROCEDURE — 81000 URINALYSIS NONAUTO W/SCOPE: CPT | Performed by: ORTHOPAEDIC SURGERY

## 2024-03-25 PROCEDURE — 37000009 HC ANESTHESIA EA ADD 15 MINS: Performed by: ORTHOPAEDIC SURGERY

## 2024-03-25 PROCEDURE — C1713 ANCHOR/SCREW BN/BN,TIS/BN: HCPCS | Performed by: ORTHOPAEDIC SURGERY

## 2024-03-25 PROCEDURE — 36000711: Performed by: ORTHOPAEDIC SURGERY

## 2024-03-25 PROCEDURE — 23615 OPTX PROX HUMRL FX W/INT FIX: CPT | Mod: 58,LT,, | Performed by: ORTHOPAEDIC SURGERY

## 2024-03-25 PROCEDURE — D9220A PRA ANESTHESIA: Mod: CRNA,,, | Performed by: NURSE ANESTHETIST, CERTIFIED REGISTERED

## 2024-03-25 DEVICE — IMPLANTABLE DEVICE: Type: IMPLANTABLE DEVICE | Site: HUMERUS | Status: FUNCTIONAL

## 2024-03-25 RX ORDER — PROPOFOL 10 MG/ML
VIAL (ML) INTRAVENOUS
Status: DISCONTINUED | OUTPATIENT
Start: 2024-03-25 | End: 2024-03-25

## 2024-03-25 RX ORDER — HYDROCODONE BITARTRATE AND ACETAMINOPHEN 10; 325 MG/1; MG/1
1 TABLET ORAL
COMMUNITY
End: 2024-04-08

## 2024-03-25 RX ORDER — FENTANYL CITRATE 50 UG/ML
25 INJECTION, SOLUTION INTRAMUSCULAR; INTRAVENOUS EVERY 5 MIN PRN
Status: DISCONTINUED | OUTPATIENT
Start: 2024-03-25 | End: 2024-03-25 | Stop reason: HOSPADM

## 2024-03-25 RX ORDER — LIDOCAINE HYDROCHLORIDE 10 MG/ML
1 INJECTION, SOLUTION EPIDURAL; INFILTRATION; INTRACAUDAL; PERINEURAL ONCE
Status: DISCONTINUED | OUTPATIENT
Start: 2024-03-25 | End: 2024-03-25 | Stop reason: HOSPADM

## 2024-03-25 RX ORDER — PHENYLEPHRINE HYDROCHLORIDE 10 MG/ML
INJECTION INTRAVENOUS
Status: DISCONTINUED | OUTPATIENT
Start: 2024-03-25 | End: 2024-03-25

## 2024-03-25 RX ORDER — EPHEDRINE SULFATE 50 MG/ML
INJECTION, SOLUTION INTRAVENOUS
Status: DISCONTINUED | OUTPATIENT
Start: 2024-03-25 | End: 2024-03-25

## 2024-03-25 RX ORDER — ACETAMINOPHEN 10 MG/ML
INJECTION, SOLUTION INTRAVENOUS
Status: DISCONTINUED | OUTPATIENT
Start: 2024-03-25 | End: 2024-03-25

## 2024-03-25 RX ORDER — FENTANYL CITRATE 50 UG/ML
25-200 INJECTION, SOLUTION INTRAMUSCULAR; INTRAVENOUS
Status: DISCONTINUED | OUTPATIENT
Start: 2024-03-25 | End: 2024-03-25 | Stop reason: HOSPADM

## 2024-03-25 RX ORDER — ONDANSETRON HYDROCHLORIDE 2 MG/ML
4 INJECTION, SOLUTION INTRAVENOUS ONCE AS NEEDED
Status: DISCONTINUED | OUTPATIENT
Start: 2024-03-25 | End: 2024-03-25 | Stop reason: HOSPADM

## 2024-03-25 RX ORDER — DEXAMETHASONE SODIUM PHOSPHATE 4 MG/ML
INJECTION, SOLUTION INTRA-ARTICULAR; INTRALESIONAL; INTRAMUSCULAR; INTRAVENOUS; SOFT TISSUE
Status: DISCONTINUED | OUTPATIENT
Start: 2024-03-25 | End: 2024-03-25

## 2024-03-25 RX ORDER — MIDAZOLAM HYDROCHLORIDE 1 MG/ML
2 INJECTION, SOLUTION INTRAMUSCULAR; INTRAVENOUS
Status: DISCONTINUED | OUTPATIENT
Start: 2024-03-25 | End: 2024-03-25 | Stop reason: HOSPADM

## 2024-03-25 RX ORDER — ROCURONIUM BROMIDE 10 MG/ML
INJECTION, SOLUTION INTRAVENOUS
Status: DISCONTINUED | OUTPATIENT
Start: 2024-03-25 | End: 2024-03-25

## 2024-03-25 RX ORDER — BUPIVACAINE HYDROCHLORIDE 5 MG/ML
INJECTION, SOLUTION EPIDURAL; INTRACAUDAL
Status: COMPLETED | OUTPATIENT
Start: 2024-03-25 | End: 2024-03-25

## 2024-03-25 RX ORDER — SUCCINYLCHOLINE CHLORIDE 20 MG/ML
INJECTION INTRAMUSCULAR; INTRAVENOUS
Status: DISCONTINUED | OUTPATIENT
Start: 2024-03-25 | End: 2024-03-25

## 2024-03-25 RX ORDER — LIDOCAINE HYDROCHLORIDE 20 MG/ML
INJECTION INTRAVENOUS
Status: DISCONTINUED | OUTPATIENT
Start: 2024-03-25 | End: 2024-03-25

## 2024-03-25 RX ORDER — ONDANSETRON HYDROCHLORIDE 2 MG/ML
INJECTION, SOLUTION INTRAVENOUS
Status: DISCONTINUED | OUTPATIENT
Start: 2024-03-25 | End: 2024-03-25

## 2024-03-25 RX ORDER — OXYCODONE HYDROCHLORIDE 5 MG/1
5 TABLET ORAL ONCE AS NEEDED
Status: DISCONTINUED | OUTPATIENT
Start: 2024-03-25 | End: 2024-03-25 | Stop reason: HOSPADM

## 2024-03-25 RX ADMIN — SUCCINYLCHOLINE CHLORIDE 160 MG: 20 INJECTION, SOLUTION INTRAMUSCULAR; INTRAVENOUS at 10:03

## 2024-03-25 RX ADMIN — SODIUM CHLORIDE, SODIUM GLUCONATE, SODIUM ACETATE, POTASSIUM CHLORIDE AND MAGNESIUM CHLORIDE: 526; 502; 368; 37; 30 INJECTION, SOLUTION INTRAVENOUS at 12:03

## 2024-03-25 RX ADMIN — EPHEDRINE SULFATE 15 MG: 50 INJECTION, SOLUTION INTRAMUSCULAR; INTRAVENOUS; SUBCUTANEOUS at 12:03

## 2024-03-25 RX ADMIN — BUPIVACAINE 10 ML: 13.3 INJECTION, SUSPENSION, LIPOSOMAL INFILTRATION at 10:03

## 2024-03-25 RX ADMIN — EPHEDRINE SULFATE 15 MG: 50 INJECTION, SOLUTION INTRAMUSCULAR; INTRAVENOUS; SUBCUTANEOUS at 11:03

## 2024-03-25 RX ADMIN — FENTANYL CITRATE 50 MCG: 50 INJECTION, SOLUTION INTRAMUSCULAR; INTRAVENOUS at 10:03

## 2024-03-25 RX ADMIN — EPHEDRINE SULFATE 10 MG: 50 INJECTION, SOLUTION INTRAMUSCULAR; INTRAVENOUS; SUBCUTANEOUS at 12:03

## 2024-03-25 RX ADMIN — PHENYLEPHRINE HYDROCHLORIDE 0.32 MCG/KG/MIN: 10 INJECTION INTRAVENOUS at 11:03

## 2024-03-25 RX ADMIN — DEXAMETHASONE SODIUM PHOSPHATE 4 MG: 4 INJECTION, SOLUTION INTRA-ARTICULAR; INTRALESIONAL; INTRAMUSCULAR; INTRAVENOUS; SOFT TISSUE at 11:03

## 2024-03-25 RX ADMIN — MIDAZOLAM 2 MG: 1 INJECTION INTRAMUSCULAR; INTRAVENOUS at 10:03

## 2024-03-25 RX ADMIN — EPHEDRINE SULFATE 10 MG: 50 INJECTION, SOLUTION INTRAMUSCULAR; INTRAVENOUS; SUBCUTANEOUS at 11:03

## 2024-03-25 RX ADMIN — BUPIVACAINE HYDROCHLORIDE 5 ML: 5 INJECTION, SOLUTION EPIDURAL; INTRACAUDAL; PERINEURAL at 10:03

## 2024-03-25 RX ADMIN — PHENYLEPHRINE HYDROCHLORIDE 200 MCG: 10 INJECTION INTRAVENOUS at 11:03

## 2024-03-25 RX ADMIN — LIDOCAINE HYDROCHLORIDE 100 MG: 20 INJECTION, SOLUTION INTRAVENOUS at 10:03

## 2024-03-25 RX ADMIN — ACETAMINOPHEN 1000 MG: 10 INJECTION, SOLUTION INTRAVENOUS at 12:03

## 2024-03-25 RX ADMIN — PROPOFOL 150 MG: 10 INJECTION, EMULSION INTRAVENOUS at 10:03

## 2024-03-25 RX ADMIN — SODIUM CHLORIDE, SODIUM GLUCONATE, SODIUM ACETATE, POTASSIUM CHLORIDE AND MAGNESIUM CHLORIDE: 526; 502; 368; 37; 30 INJECTION, SOLUTION INTRAVENOUS at 09:03

## 2024-03-25 RX ADMIN — ONDANSETRON 4 MG: 2 INJECTION INTRAMUSCULAR; INTRAVENOUS at 11:03

## 2024-03-25 RX ADMIN — ROCURONIUM BROMIDE 10 MG: 10 INJECTION, SOLUTION INTRAVENOUS at 10:03

## 2024-03-25 NOTE — OP NOTE
[facility]  Surgery Department  Operative Note    SUMMARY     Date of Procedure: 3/25/2024     Procedure:  Intramedullary nailing of proximal humerus fracture    Surgeon(s) and Role:     * Nathan Cooper MD - Primary    Assisting Surgeon:  Eric    Pre-Operative Diagnosis: Closed traumatic displaced fracture of proximal end of left humerus with routine healing, subsequent encounter [S42.202D]    Post-Operative Diagnosis: Post-Op Diagnosis Codes:     * Closed traumatic displaced fracture of proximal end of left humerus with routine healing, subsequent encounter [S42.202D]    Anesthesia: General      Description of the Findings of the Procedure:  Patient was placed on the operative table in the beach chair position or.  We spent a lot of time padding and protecting him.  He was prepped and draped in the usual sterile manner for surgery.  Incision was made off the lateral aspect of the acromion.  A guidewire was inserted into the head and down the shaft.  With traction were able to get the construct to line up acceptably.  We now over drilled proximally and then inserted the nail down the shaft.  We now placed 5 locking screws proximally.  There position was checked on the C-arm to be sure that they were not penetrating the joint.  We now placed 2 distal locking screws using the out rigor.  Multiple C-arm views were taken to confirm that we had no screws in the joint.  We copiously irrigated.  The construct moved well as a unit.  He was obvious extremely arthritic and may ultimately need a reverse total shoulder.  The deltoid was closed with a running 1. Vicryl.  We irrigated and closed the subQ with 3-0 Vicryl and the skin with 4-0 Monocryl.  Sterile dressings were applied and the patient was noted to be in stable condition    Complications: No    Estimated Blood Loss (EBL): 100 mL           Implants:   Implant Name Type Inv. Item Serial No.  Lot No. LRB No. Used Action    POLARUS PROXIMAL LOCKING NAIL 150MM    [employer] [id number] Left 1 Implanted       Specimens:   Specimen (24h ago, onward)      None                    Condition: Good    Disposition: PACU - hemodynamically stable.              Discharge Note    SUMMARY     Admit Date: 3/25/2024    Discharge Date and Time:  03/25/2024 12:47 PM    Hospital Course (synopsis of major diagnoses, care, treatment, and services provided during the course of the hospital stay): Uneventful      Final Diagnosis: Post-Op Diagnosis Codes:     * Closed traumatic displaced fracture of proximal end of left humerus with routine healing, subsequent encounter [S42.202D]    Disposition: Home or Self Care    Follow Up/Patient Instructions:     Medications:  Reconciled Home Medications:   Current Discharge Medication List        CONTINUE these medications which have NOT CHANGED    Details   atorvastatin (LIPITOR) 10 MG tablet Take 1 tablet (10 mg total) by mouth once daily.  Qty: 90 tablet, Refills: 3    Associated Diagnoses: Hyperlipidemia associated with type 2 diabetes mellitus; Hyperlipidemia, unspecified hyperlipidemia type      co-enzyme Q-10 30 mg capsule Take 30 mg by mouth once daily.      doxycycline (VIBRAMYCIN) 100 MG Cap Take 100 mg by mouth 2 (two) times daily.      ergocalciferol, vitamin D2, (VITAMIN D ORAL) Take 1 tablet by mouth once daily.      hydrALAZINE (APRESOLINE) 25 MG tablet Take 1 tablet (25 mg total) by mouth every 12 (twelve) hours.  Qty: 60 tablet, Refills: 3    Comments: .      HYDROcodone-acetaminophen (NORCO)  mg per tablet Take 1 tablet by mouth.      metFORMIN (GLUCOPHAGE-XR) 500 MG ER 24hr tablet Take 2 tablets (1,000 mg total) by mouth 2 (two) times daily with meals.  Qty: 360 tablet, Refills: 3    Associated Diagnoses: Type 2 diabetes, controlled, with neuropathy      metoprolol succinate (TOPROL-XL) 100 MG 24 hr tablet Take 1 tablet (100 mg total) by mouth once daily.  Qty: 90 tablet, Refills: 3    Comments:  .  Associated Diagnoses: Hypertension associated with diabetes      apixaban (ELIQUIS) 5 mg Tab Take 1 tablet (5 mg total) by mouth 2 (two) times daily.  Qty: 60 tablet, Refills: 1    Comments: Do not start until after your surgery - coordinate with Dr. Cooper.      glimepiride (AMARYL) 2 MG tablet Take 1 tablet (2 mg total) by mouth before breakfast.  Qty: 90 tablet, Refills: 3    Associated Diagnoses: Uncontrolled type 2 diabetes mellitus with hyperglycemia      oxyCODONE-acetaminophen (PERCOCET) 7.5-325 mg per tablet Take 1 tablet by mouth every 6 (six) hours as needed for Pain.  Qty: 28 tablet, Refills: 0    Comments: This prescription is for postoperative use for the patient's scheduled ORIF on Monday March 25, 2024.  This is for the meds to bed program.  Associated Diagnoses: Closed traumatic displaced fracture of proximal end of left humerus, initial encounter           Discharge Procedure Orders   Diet Adult Regular     Leave dressing on - Keep it clean, dry, and intact until clinic visit     Activity as tolerated

## 2024-03-25 NOTE — ANESTHESIA POSTPROCEDURE EVALUATION
Anesthesia Post Evaluation    Patient: Roosevelt Moser    Procedure(s) Performed: Procedure(s) (LRB):  ORIF, FRACTURE, HUMERUS, PROXIMAL/IM HANNA, LEFT (Left)    Final Anesthesia Type: general      Patient location during evaluation: PACU  Patient participation: Yes- Able to Participate  Level of consciousness: awake  Post-procedure vital signs: reviewed and stable  Pain management: adequate  Airway patency: patent    PONV status at discharge: No PONV  Anesthetic complications: no      Cardiovascular status: blood pressure returned to baseline  Respiratory status: unassisted  Hydration status: euvolemic  Follow-up not needed.              Vitals Value Taken Time   /71 03/25/24 1419   Temp 36.9 °C (98.4 °F) 03/25/24 1405   Pulse 88 03/25/24 1419   Resp 20 03/25/24 1419   SpO2 94 % 03/25/24 1419         Event Time   Out of Recovery 14:11:00         Pain/Duane Score: Pain Rating Post Med Admin: 0 (3/25/2024  2:11 PM)  Duane Score: 10 (3/25/2024  2:00 PM)  Modified Duane Score: 20 (3/25/2024  2:11 PM)

## 2024-03-25 NOTE — ANESTHESIA PROCEDURE NOTES
Peripheral Block    Patient location during procedure: pre-op   Block not for primary anesthetic.  Reason for block: at surgeon's request and post-op pain management   Post-op Pain Location: Left shoulder pain   Start time: 3/25/2024 10:45 AM  Timeout: 3/25/2024 10:40 AM   End time: 3/25/2024 10:50 AM    Staffing  Authorizing Provider: Don Mccann MD  Performing Provider: Don Mccann MD    Staffing  Performed by: Don Mccann MD  Authorized by: Don Mccann MD    Preanesthetic Checklist  Completed: patient identified, IV checked, site marked, risks and benefits discussed, surgical consent, monitors and equipment checked, pre-op evaluation and timeout performed  Peripheral Block  Patient position: sitting  Prep: ChloraPrep  Patient monitoring: heart rate, cardiac monitor, continuous pulse ox, continuous capnometry and frequent blood pressure checks  Block type: interscalene  Laterality: left  Injection technique: single shot  Needle  Needle type: Stimuplex   Needle gauge: 22 G  Needle length: 2 in  Needle localization: anatomical landmarks and ultrasound guidance   -ultrasound image captured on disc.  Assessment  Injection assessment: negative aspiration, negative parasthesia and local visualized surrounding nerve  Paresthesia pain: none  Heart rate change: no  Slow fractionated injection: yes    Medications:    Medications: BUPivacaine liposome (PF) 1.3 % (13.3 mg/mL) suspension - Injection   10 mL - 3/25/2024 10:45:00 AM  bupivacaine (pf) (MARCAINE) injection 0.5% - Perineural   5 mL - 3/25/2024 10:45:00 AM    Additional Notes  VSS.  [facility] RN monitoring vitals throughout procedure.  Patient tolerated procedure well.

## 2024-03-25 NOTE — ANESTHESIA PREPROCEDURE EVALUATION
03/25/2024  Roosevelt Moser is a 73 y.o., male.      Pre-op Assessment    I have reviewed the Patient Summary Reports.     I have reviewed the Nursing Notes. I have reviewed the NPO Status.   I have reviewed the Medications.     Review of Systems  Anesthesia Hx:  No problems with previous Anesthesia                Cardiovascular:     Hypertension    Dysrhythmias atrial fibrillation      hyperlipidemia                       Hypertension     Atrial Fibrillation     Pulmonary:        Sleep Apnea     Obstructive Sleep Apnea (MARA).           Renal/:  Chronic Renal Disease        Kidney Function/Disease             Musculoskeletal:  Arthritis        Arthritis          Neurological:    Neuromuscular Disease,       Hx Guillain [place]    Arthritis  Peripheral Neuropathy                        Neuromuscular Disease   Endocrine:  Diabetes    Diabetes                    Obesity / BMI > 30      Physical Exam  General: Well nourished    Airway:  Mallampati: III   Mouth Opening: Normal  TM Distance: Normal  Neck ROM: Extension Decreased        Anesthesia Plan  Type of Anesthesia, risks & benefits discussed:    Anesthesia Type: Gen ETT  Intra-op Monitoring Plan: Standard ASA Monitors  Post Op Pain Control Plan: multimodal analgesia  Induction:  IV  Airway Plan: Video  Informed Consent: Informed consent signed with the Patient and all parties understand the risks and agree with anesthesia plan.  All questions answered.   ASA Score: 3    Ready For Surgery From Anesthesia Perspective.     .

## 2024-03-25 NOTE — TRANSFER OF CARE
"Anesthesia Transfer of Care Note    Patient: Roosevelt Moser    Procedure(s) Performed: Procedure(s) (LRB):  ORIF, FRACTURE, HUMERUS, PROXIMAL/IM HANNA, LEFT (Left)    Patient location: PACU    Anesthesia Type: general    Transport from OR: Transported from OR on 2-3 L/min O2 by NC with adequate spontaneous ventilation    Post pain: adequate analgesia    Post assessment: no apparent anesthetic complications    Post vital signs: stable    Level of consciousness: awake    Nausea/Vomiting: no nausea/vomiting    Complications: none    Transfer of care protocol was followed      Last vitals: Visit Vitals  /66   Pulse 73   Temp 36.5 °C (97.7 °F) (Temporal)   Resp 16   Ht 6' 1" (1.854 m)   Wt 136.1 kg (300 lb)   SpO2 100%   BMI 39.58 kg/m²     "

## 2024-03-25 NOTE — ANESTHESIA PROCEDURE NOTES
Intubation    Date/Time: 3/25/2024 11:00 AM    Performed by: Yovanny De La Cruz CRNA  Authorized by: Don Mccann MD    Intubation:     Induction:  Intravenous    Intubated:  Postinduction    Mask Ventilation:  Easy mask    Attempts:  1    Attempted By:  CRNA    Method of Intubation:  Video laryngoscopy    Blade:  Blunt 4    Laryngeal View Grade: Grade I - full view of cords      Difficult Airway Encountered?: No      Complications:  None    Airway Device:  Oral endotracheal tube    Airway Device Size:  8.0    Style/Cuff Inflation:  Cuffed (inflated to minimal occlusive pressure)    Inflation Amount (mL):  6    Tube secured:  23    Secured at:  The lips    Placement Verified By:  Capnometry    Complicating Factors:  None    Findings Post-Intubation:  BS equal bilateral

## 2024-03-26 ENCOUNTER — OFFICE VISIT (OUTPATIENT)
Dept: ORTHOPEDICS | Facility: CLINIC | Age: 74
End: 2024-03-26
Payer: MEDICARE

## 2024-03-26 VITALS
HEART RATE: 88 BPM | OXYGEN SATURATION: 94 % | BODY MASS INDEX: 39.76 KG/M2 | TEMPERATURE: 98 F | WEIGHT: 300 LBS | HEIGHT: 73 IN | DIASTOLIC BLOOD PRESSURE: 71 MMHG | SYSTOLIC BLOOD PRESSURE: 116 MMHG | RESPIRATION RATE: 20 BRPM

## 2024-03-26 VITALS — WEIGHT: 300.06 LBS | BODY MASS INDEX: 39.77 KG/M2 | HEIGHT: 73 IN

## 2024-03-26 DIAGNOSIS — Z98.890 S/P ORIF (OPEN REDUCTION INTERNAL FIXATION) FRACTURE: Primary | ICD-10-CM

## 2024-03-26 DIAGNOSIS — Z87.81 S/P ORIF (OPEN REDUCTION INTERNAL FIXATION) FRACTURE: Primary | ICD-10-CM

## 2024-03-26 PROCEDURE — 1159F MED LIST DOCD IN RCRD: CPT | Mod: CPTII,S$GLB,, | Performed by: ORTHOPAEDIC SURGERY

## 2024-03-26 PROCEDURE — 1160F RVW MEDS BY RX/DR IN RCRD: CPT | Mod: CPTII,S$GLB,, | Performed by: ORTHOPAEDIC SURGERY

## 2024-03-26 PROCEDURE — 1126F AMNT PAIN NOTED NONE PRSNT: CPT | Mod: CPTII,S$GLB,, | Performed by: ORTHOPAEDIC SURGERY

## 2024-03-26 PROCEDURE — 3046F HEMOGLOBIN A1C LEVEL >9.0%: CPT | Mod: CPTII,S$GLB,, | Performed by: ORTHOPAEDIC SURGERY

## 2024-03-26 PROCEDURE — 1100F PTFALLS ASSESS-DOCD GE2>/YR: CPT | Mod: CPTII,S$GLB,, | Performed by: ORTHOPAEDIC SURGERY

## 2024-03-26 PROCEDURE — 4010F ACE/ARB THERAPY RXD/TAKEN: CPT | Mod: CPTII,S$GLB,, | Performed by: ORTHOPAEDIC SURGERY

## 2024-03-26 PROCEDURE — 99024 POSTOP FOLLOW-UP VISIT: CPT | Mod: S$GLB,POP,, | Performed by: ORTHOPAEDIC SURGERY

## 2024-03-26 PROCEDURE — 3288F FALL RISK ASSESSMENT DOCD: CPT | Mod: CPTII,S$GLB,, | Performed by: ORTHOPAEDIC SURGERY

## 2024-03-26 NOTE — PROGRESS NOTES
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    Current Outpatient Medications   Medication Sig    apixaban (ELIQUIS) 5 mg Tab Take 1 tablet (5 mg total) by mouth 2 (two) times daily.    atorvastatin (LIPITOR) 10 MG tablet Take 1 tablet (10 mg total) by mouth once daily.    co-enzyme Q-10 30 mg capsule Take 30 mg by mouth once daily.    doxycycline (VIBRAMYCIN) 100 MG Cap Take 100 mg by mouth 2 (two) times daily.    ergocalciferol, vitamin D2, (VITAMIN D ORAL) Take 1 tablet by mouth once daily.    glimepiride (AMARYL) 2 MG tablet Take 1 tablet (2 mg total) by mouth before breakfast.    hydrALAZINE (APRESOLINE) 25 MG tablet Take 1 tablet (25 mg total) by mouth every 12 (twelve) hours.    HYDROcodone-acetaminophen (NORCO)  mg per tablet Take 1 tablet by mouth.    metFORMIN (GLUCOPHAGE-XR) 500 MG ER 24hr tablet Take 2 tablets (1,000 mg total) by mouth 2 (two) times daily with meals.    metoprolol succinate (TOPROL-XL) 100 MG 24 hr tablet Take 1 tablet (100 mg total) by mouth once daily. (Patient taking differently: Take 100 mg by mouth nightly.)    oxyCODONE-acetaminophen (PERCOCET) 7.5-325 mg per tablet Take 1 tablet by mouth every 6 (six) hours as needed for Pain.     No current facility-administered medications for this visit.       Review of patient's allergies indicates:  No Known Allergies    No family history on file.    Social History     Socioeconomic History    Marital status:    Tobacco Use    Smoking status: Never    Smokeless tobacco: Never   Substance and Sexual Activity    Alcohol use: Yes     Alcohol/week: 0.0 standard drinks  of alcohol     Comment: social    Drug use: No    Sexual activity: Yes       History of present illness:  73-year-old male, returns to clinic today status post open reduction internal fixation with the intramedullary lizabeth of a left proximal humerus fracture March 24th.  He is here today for routine follow-up.      Review of Systems:    Constitution: Negative for chills, fever, and sweats.  Negative for unexplained weight loss.    HENT:  Negative for headaches and blurry vision.    Cardiovascular:Negative for chest pain or irregular heart beat. Negative for hypertension.    Respiratory:  Negative for cough and shortness of breath.    Gastrointestinal: Negative for abdominal pain, heartburn, melena, nausea, and vomitting.    Genitourinary:  Negative bladder incontinence and dysuria.    Musculoskeletal:  See HPI for details.     Neurological: Negative for numbness.    Psychiatric/Behavioral: Negative for depression.  The patient is not nervous/anxious.      Endocrine: Negative for polyuria    Hematologic/Lymphatic: Negative for bleeding problem.  Does not bruise/bleed easily.    Skin: Negative for poor would healing and rash    Objective:      Physical Examination:    Vital Signs:  There were no vitals filed for this visit.    Body mass index is 39.59 kg/m².    This a well-developed, well nourished patient in no acute distress.  They are alert and oriented and cooperative to examination.        Examination of the left shoulder, patient has surgical dressings were removed, he still has some resolving ecchymosis from the initial injury, he has some new forming ecchymosis around the insertion of the lizabeth.  Dressings were dry and intact.  Scant amount of dry bloody drainage was noted.  He still has some residual affects of the block but he is begun with motion in the hand and fingers.  Still has some altered sensation.    Pertinent New Results:    XRAY Report / Interpretation:       Assessment/Plan:      Stable status post  "open reduction internal fixation of a left proximal humerus fracture yesterday.  He is doing well postoperatively.  His pain is well managed she is still having some residual affects of his block as well as his pain medication.  We went over his wound care instructions.  He will follow up for suture removal as scheduled.    Wound Care review: on approximately day 3 after surgery, or 72 hours after your initial surgery date, you may begin cleaning your wounds (AS LONG AS THERE IS NO DRAINAGE PRESENT).     Gentle cleansing with a mild soap and water is recommended. Pat the area dry, and then cover the wound with a clean, sterile dressing.  Do this at least once daily.  Do not apply any ointments creams or lotions to the wounds until told to do so.  Avoid submerging or immersing the wounds in water such as a sink, tub, or pool for at least 1 month after surgery.    Harish Aldrich, Physician Assistant, served in the capacity as a "scribe" for this patient encounter.  A "face-to-face" encounter occurred with Dr. Nathan Cooper on this date.  The treatment plan and medical decision-making is outlined above. Patient was seen and examined with a chaperone.        This note was created using Dragon voice recognition software that occasionally misinterpreted phrases or words.          "

## 2024-03-26 NOTE — PROGRESS NOTES
3/26/24  very pleased with care given.  States all staff were great.  States he has read and understands all discharge instructions.

## 2024-03-27 PROCEDURE — G0180 MD CERTIFICATION HHA PATIENT: HCPCS | Mod: ,,,

## 2024-03-28 LAB — BACTERIA UR CULT: NO GROWTH

## 2024-04-01 LAB
OHS QRS DURATION: 100 MS
OHS QTC CALCULATION: 437 MS

## 2024-04-08 ENCOUNTER — OFFICE VISIT (OUTPATIENT)
Dept: ORTHOPEDICS | Facility: CLINIC | Age: 74
End: 2024-04-08
Payer: MEDICARE

## 2024-04-08 VITALS
DIASTOLIC BLOOD PRESSURE: 66 MMHG | SYSTOLIC BLOOD PRESSURE: 122 MMHG | HEIGHT: 73 IN | WEIGHT: 292.19 LBS | BODY MASS INDEX: 38.73 KG/M2

## 2024-04-08 DIAGNOSIS — Z87.81 S/P ORIF (OPEN REDUCTION INTERNAL FIXATION) FRACTURE: Primary | ICD-10-CM

## 2024-04-08 DIAGNOSIS — Z98.890 S/P ORIF (OPEN REDUCTION INTERNAL FIXATION) FRACTURE: Primary | ICD-10-CM

## 2024-04-08 PROCEDURE — 3046F HEMOGLOBIN A1C LEVEL >9.0%: CPT | Mod: CPTII,S$GLB,, | Performed by: PHYSICIAN ASSISTANT

## 2024-04-08 PROCEDURE — 1159F MED LIST DOCD IN RCRD: CPT | Mod: CPTII,S$GLB,, | Performed by: PHYSICIAN ASSISTANT

## 2024-04-08 PROCEDURE — 99024 POSTOP FOLLOW-UP VISIT: CPT | Mod: S$GLB,,, | Performed by: PHYSICIAN ASSISTANT

## 2024-04-08 PROCEDURE — 1160F RVW MEDS BY RX/DR IN RCRD: CPT | Mod: CPTII,S$GLB,, | Performed by: PHYSICIAN ASSISTANT

## 2024-04-08 PROCEDURE — 1100F PTFALLS ASSESS-DOCD GE2>/YR: CPT | Mod: CPTII,S$GLB,, | Performed by: PHYSICIAN ASSISTANT

## 2024-04-08 PROCEDURE — 3074F SYST BP LT 130 MM HG: CPT | Mod: CPTII,S$GLB,, | Performed by: PHYSICIAN ASSISTANT

## 2024-04-08 PROCEDURE — 4010F ACE/ARB THERAPY RXD/TAKEN: CPT | Mod: CPTII,S$GLB,, | Performed by: PHYSICIAN ASSISTANT

## 2024-04-08 PROCEDURE — 3078F DIAST BP <80 MM HG: CPT | Mod: CPTII,S$GLB,, | Performed by: PHYSICIAN ASSISTANT

## 2024-04-08 PROCEDURE — 1125F AMNT PAIN NOTED PAIN PRSNT: CPT | Mod: CPTII,S$GLB,, | Performed by: PHYSICIAN ASSISTANT

## 2024-04-08 PROCEDURE — 3288F FALL RISK ASSESSMENT DOCD: CPT | Mod: CPTII,S$GLB,, | Performed by: PHYSICIAN ASSISTANT

## 2024-04-08 RX ORDER — OXYCODONE AND ACETAMINOPHEN 7.5; 325 MG/1; MG/1
1 TABLET ORAL EVERY 8 HOURS PRN
Qty: 21 TABLET | Refills: 0 | Status: SHIPPED | OUTPATIENT
Start: 2024-04-08 | End: 2024-05-07 | Stop reason: ALTCHOICE

## 2024-04-08 NOTE — PROGRESS NOTES
[facility]  [address]  [place] [address]  Phone: [phone]   [phone]    Patient's PCP:No, Primary Doctor  Referring Provider: No ref. 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    atorvastatin (LIPITOR) 10 MG tablet Take 1 tablet (10 mg total) by mouth once daily.    co-enzyme Q-10 30 mg capsule Take 30 mg by mouth once daily.    doxycycline (VIBRAMYCIN) 100 MG Cap Take 100 mg by mouth 2 (two) times daily.    ergocalciferol, vitamin D2, (VITAMIN D ORAL) Take 1 tablet by mouth once daily.    glimepiride (AMARYL) 2 MG tablet Take 1 tablet (2 mg total) by mouth before breakfast.    hydrALAZINE (APRESOLINE) 25 MG tablet Take 1 tablet (25 mg total) by mouth every 12 (twelve) hours.    metFORMIN (GLUCOPHAGE-XR) 500 MG ER 24hr tablet Take 2 tablets (1,000 mg total) by mouth 2 (two) times daily with meals.    metoprolol succinate (TOPROL-XL) 100 MG 24 hr tablet Take 1 tablet (100 mg total) by mouth once daily. (Patient taking differently: Take 100 mg by mouth nightly.)    oxyCODONE-acetaminophen (PERCOCET)  7.5-325 mg per tablet Take 1 tablet by mouth every 8 (eight) hours as needed for Pain.     No current facility-administered medications for this visit.       Review of patient's allergies indicates:  No Known Allergies    History reviewed. No pertinent family history.    Social History     Socioeconomic History    Marital status:    Tobacco Use    Smoking status: Never    Smokeless tobacco: Never   Substance and Sexual Activity    Alcohol use: Yes     Alcohol/week: 0.0 standard drinks of alcohol     Comment: social    Drug use: No    Sexual activity: Yes       History of present illness:  Mr. Moser comes in today for follow-up for his proximal humerus open reduction internal fixation.  He is 2 weeks out.  Comes in today for wound check and suture removal with plain film radiographs.  Reports his pain is progressively improving.    Review of Systems:    Musculoskeletal:  See HPI       Objective:        Physical Examination:    Vital Signs:   Vitals:    04/08/24 1037   BP: 122/66       Body mass index is 38.55 kg/m².    This a well-developed, well nourished patient in no acute distress.  They are alert and oriented and cooperative to examination.        Left shoulder exam:  Skin to left shoulder is clean dry and intact.  There is no erythema or ecchymosis.  No signs or symptoms of infection.  He is neurovascularly intact throughout the left upper extremity.  Surgical incisions are healing well without wound dehiscence or drainage.  He can fully flex/extend at the left elbow, pronate/supinate the left forearm, flex/extend at the left wrist, an open and close her left hand into a fist.  He can oppose his left thumb to all digits in the left hand.  I did not range of motion of the shoulder.    Pertinent New Results:     XRAY Report / Interpretation:  Three views taken of the left shoulder today:  2 different AP views and Y-view.  Patient has intramedullary early placed short retrograde nail in the proximal humerus  with cross fixed screws without evidence of hardware failure.  Interval reduction of the proximal humerus fracture.  Of note, he does have multiple bony loose bodies in the glenohumeral joint and subacromial space.  These were evident preoperatively.     Assessment:       1. S/P ORIF (open reduction internal fixation) fracture      Plan:     S/P ORIF (open reduction internal fixation) fracture  -     X-Ray Shoulder 2 or More Views Left  -     oxyCODONE-acetaminophen (PERCOCET) 7.5-325 mg per tablet; Take 1 tablet by mouth every 8 (eight) hours as needed for Pain.  Dispense: 21 tablet; Refill: 0        Follow up for 4 wk f/u, //XR// s/p left shoulder humerus IM nailing 3/25/24.    Sutures removed from the left shoulder today.  Tolerated this well.  He is advised to clean the operative site once a day with warm soapy water not apply any topical creams or ointments.  Instructed to not submerge operative site underwater in a pool or bathtub type environment for least 1 more week.  Encouraged gentle range of motion left elbow, forearm, wrist, and digits to reduce stiffness there.  Encouraged to not lift the left hand above the left shoulder yet.  He can perform gentle motion of the left shoulder.  Advised to lift nothing heavier than his cellular phone with the left upper extremity.  We will check him back in 1 month with repeat radiographs with the intent of referring to formal physical therapy at that time to work on left shoulder range of motion.  I did provide a refill of his oxycodone pain medication.      Hansel Tate, LORES, PA-C    This note was created using [employer] voice recognition software that occasionally misinterprets words or phrases.

## 2024-04-24 ENCOUNTER — TELEPHONE (OUTPATIENT)
Dept: FAMILY MEDICINE | Facility: CLINIC | Age: 74
End: 2024-04-24
Payer: MEDICARE

## 2024-04-24 DIAGNOSIS — E11.65 UNCONTROLLED TYPE 2 DIABETES MELLITUS WITH HYPERGLYCEMIA: Primary | ICD-10-CM

## 2024-04-24 NOTE — TELEPHONE ENCOUNTER
Kimberly Powell Miguel Angel Staff     ----- Message from Kimberly Powell sent at 4/24/2024 12:25 PM CDT -----  Contact: Ori Baer  Type:  Patient Requesting Referral    Who Called:  Pts ori Baer  Does the patient already have the specialty appointment scheduled?:  No  If yes, what is the date of that appointment?:  N/A  Referral to What Specialty:  Endocrinology  Reason for Referral:  Diabetes  Does the patient want the referral with a specific physician?:  Dr Ryanne Pruitt  [facility]  Patient Requesting a Call Back?:  yes  Best Call Back Number:  [phone]  Additional Information:   Thank You

## 2024-04-24 NOTE — TELEPHONE ENCOUNTER
"Call placed to patient's wife (Buffy) at number indicated in original message.  Call answered by patient who states he did not want referral to endocrinology.  Writer advised the referral has been faxed to Dr. Pruitt's office as per Mrs. Baer's request.  Patient stated "My wife needs to quit doing all of that stuff.  I don't want it.  Not right now.  Goodbye."  Call abruptly ended.   "

## 2024-04-24 NOTE — TELEPHONE ENCOUNTER
Patient requesting external endocrinology referral to Dr. Pruitt.  Referral pended; please review and make necessary changes. Thank you.

## 2024-04-29 ENCOUNTER — EXTERNAL HOME HEALTH (OUTPATIENT)
Dept: HOME HEALTH SERVICES | Facility: HOSPITAL | Age: 74
End: 2024-04-29
Payer: MEDICARE

## 2024-05-07 ENCOUNTER — OFFICE VISIT (OUTPATIENT)
Dept: ORTHOPEDICS | Facility: CLINIC | Age: 74
End: 2024-05-07
Payer: MEDICARE

## 2024-05-07 VITALS — BODY MASS INDEX: 38.72 KG/M2 | WEIGHT: 292.13 LBS | HEIGHT: 73 IN

## 2024-05-07 DIAGNOSIS — Z87.81 S/P ORIF (OPEN REDUCTION INTERNAL FIXATION) FRACTURE: Primary | ICD-10-CM

## 2024-05-07 DIAGNOSIS — Z98.890 S/P ORIF (OPEN REDUCTION INTERNAL FIXATION) FRACTURE: Primary | ICD-10-CM

## 2024-05-07 PROCEDURE — 99024 POSTOP FOLLOW-UP VISIT: CPT | Mod: S$GLB,,, | Performed by: ORTHOPAEDIC SURGERY

## 2024-05-07 PROCEDURE — 1159F MED LIST DOCD IN RCRD: CPT | Mod: CPTII,S$GLB,, | Performed by: ORTHOPAEDIC SURGERY

## 2024-05-07 PROCEDURE — 4010F ACE/ARB THERAPY RXD/TAKEN: CPT | Mod: CPTII,S$GLB,, | Performed by: ORTHOPAEDIC SURGERY

## 2024-05-07 PROCEDURE — 3046F HEMOGLOBIN A1C LEVEL >9.0%: CPT | Mod: CPTII,S$GLB,, | Performed by: ORTHOPAEDIC SURGERY

## 2024-05-07 PROCEDURE — 1160F RVW MEDS BY RX/DR IN RCRD: CPT | Mod: CPTII,S$GLB,, | Performed by: ORTHOPAEDIC SURGERY

## 2024-05-07 PROCEDURE — 1101F PT FALLS ASSESS-DOCD LE1/YR: CPT | Mod: CPTII,S$GLB,, | Performed by: ORTHOPAEDIC SURGERY

## 2024-05-07 PROCEDURE — 1125F AMNT PAIN NOTED PAIN PRSNT: CPT | Mod: CPTII,S$GLB,, | Performed by: ORTHOPAEDIC SURGERY

## 2024-05-07 PROCEDURE — 3288F FALL RISK ASSESSMENT DOCD: CPT | Mod: CPTII,S$GLB,, | Performed by: ORTHOPAEDIC SURGERY

## 2024-05-07 RX ORDER — HYDROCODONE BITARTRATE AND ACETAMINOPHEN 7.5; 325 MG/1; MG/1
1 TABLET ORAL EVERY 8 HOURS PRN
Qty: 21 TABLET | Refills: 0 | Status: SHIPPED | OUTPATIENT
Start: 2024-05-07 | End: 2024-05-14

## 2024-05-07 NOTE — PROGRESS NOTES
[facility] POST-OP NOTE    Subjective:           Chief Complaint:   Chief Complaint   Patient presents with    Left Shoulder - Post-op Evaluation     Patient is here PO for left humerus IM nailing 3.25.24. states pain is better than last visit. ROM is painful with raising motions.       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    atorvastatin (LIPITOR) 10 MG tablet Take 1 tablet (10 mg total) by mouth once daily.    co-enzyme Q-10 30 mg capsule Take 30 mg by mouth once daily.    doxycycline (VIBRAMYCIN) 100 MG Cap Take 100 mg by mouth 2 (two) times daily.    ergocalciferol, vitamin D2, (VITAMIN D ORAL) Take 1 tablet by mouth once daily.    glimepiride (AMARYL) 2 MG tablet Take 1 tablet (2 mg total) by mouth before breakfast.    hydrALAZINE (APRESOLINE) 25 MG tablet Take 1 tablet (25 mg total) by mouth every 12 (twelve) hours.    HYDROcodone-acetaminophen (NORCO) 7.5-325 mg per tablet Take 1 tablet by mouth every 8 (eight) hours as needed for Pain.    metFORMIN (GLUCOPHAGE-XR) 500 MG ER 24hr tablet Take 2 tablets (1,000 mg total) by mouth 2 (two) times daily with meals.    metoprolol succinate (TOPROL-XL) 100 MG 24 hr tablet Take 1 tablet (100 mg total) by mouth once daily. (Patient taking differently: Take 100 mg by mouth nightly.)     No current facility-administered medications for this visit.        Review of patient's allergies indicates:  No Known Allergies    No family history on file.    Social History     Socioeconomic History    Marital status:    Tobacco Use    Smoking status: Never    Smokeless tobacco: Never   Substance and Sexual Activity    Alcohol use: Yes     Alcohol/week: 0.0 standard drinks of alcohol     Comment: social    Drug use: No    Sexual activity: Yes       History of present illness:  73-year-old male, returns to clinic today status post open reduction internal fixation of a left proximal humerus fracture March 25th.  He is here today for routine follow-up.      Review of Systems:    Musculoskeletal:  See HPI      Objective:        Physical Examination:    Vital Signs:  There were no vitals filed for this visit.    Body mass index is 38.54 kg/m².    This a well-developed, well nourished patient in no acute distress.  They are alert and oriented and cooperative to examination.        Examination of the left shoulder, skin is dry and intact, no erythema or ecchymosis, no signs symptoms of infection.  His surgical sites are all well healed.  No evidence of wound failure dehiscence.  He still has some limitations with active and passive range of motion.  He can forward flex to about 70°, passively I can get him to 90° before he becomes painful.    Pertinent New Results:    XRAY Report / Interpretation:   AP and lateral views left shoulder taken today in the office do demonstrate some mild loss of fixation compared to prior images but still generally acceptable.  There is evidence of interval healing.    Assessment/Plan:      Stable status post open reduction internal fixation of a left proximal humerus fracture.  He does appear to have some mild loss of fixation but still very acceptable positioning after the surgery.  We will check him back in a month hold off on physical therapy for now and we will get a repeat x-ray.    Harish Aldrich, Physician Assistant, served in the  "capacity as a "scribe" for this patient encounter.  A "face-to-face" encounter occurred with Dr. Nathan Cooper on this date.  The treatment plan and medical decision-making is outlined above. Patient was seen and examined with a chaperone.     This note was created using Dragon voice recognition software that occasionally misinterpreted phrases or words.        "

## 2024-05-09 ENCOUNTER — TELEPHONE (OUTPATIENT)
Dept: FAMILY MEDICINE | Facility: CLINIC | Age: 74
End: 2024-05-09
Payer: MEDICARE

## 2024-05-09 NOTE — TELEPHONE ENCOUNTER
This message serves as a communication for the purpose of continuity of care.      Alba () with Dr. Pruitt's office advised patient was contacted for scheduling.  Patient declined to schedule appointment.  Please be advised. Thank you.

## 2024-05-09 NOTE — TELEPHONE ENCOUNTER
Kayce Hernandez Logan Staff     ----- Message from Kayce Hernandez sent at 5/9/2024 12:02 PM CDT -----  Contact: Alba [phone]  Type: Needs Medical Advice  Who Called:  Alba enrique/ DR ATKINS's office     Best Call Back Number: [phone]  Additional Information: Alba calling to advise that pt reused to schedule appt w/ dr Atkins.

## 2024-06-04 ENCOUNTER — OFFICE VISIT (OUTPATIENT)
Dept: ORTHOPEDICS | Facility: CLINIC | Age: 74
End: 2024-06-04
Payer: MEDICARE

## 2024-06-04 VITALS — HEIGHT: 73 IN | RESPIRATION RATE: 18 BRPM | WEIGHT: 292.13 LBS | OXYGEN SATURATION: 98 % | BODY MASS INDEX: 38.72 KG/M2

## 2024-06-04 DIAGNOSIS — Z98.890 S/P ORIF (OPEN REDUCTION INTERNAL FIXATION) FRACTURE: Primary | ICD-10-CM

## 2024-06-04 DIAGNOSIS — Z87.81 S/P ORIF (OPEN REDUCTION INTERNAL FIXATION) FRACTURE: Primary | ICD-10-CM

## 2024-06-04 PROCEDURE — 4010F ACE/ARB THERAPY RXD/TAKEN: CPT | Mod: CPTII,S$GLB,, | Performed by: ORTHOPAEDIC SURGERY

## 2024-06-04 PROCEDURE — 1100F PTFALLS ASSESS-DOCD GE2>/YR: CPT | Mod: CPTII,S$GLB,, | Performed by: ORTHOPAEDIC SURGERY

## 2024-06-04 PROCEDURE — 3288F FALL RISK ASSESSMENT DOCD: CPT | Mod: CPTII,S$GLB,, | Performed by: ORTHOPAEDIC SURGERY

## 2024-06-04 PROCEDURE — 99024 POSTOP FOLLOW-UP VISIT: CPT | Mod: S$GLB,,, | Performed by: ORTHOPAEDIC SURGERY

## 2024-06-04 PROCEDURE — 1159F MED LIST DOCD IN RCRD: CPT | Mod: CPTII,S$GLB,, | Performed by: ORTHOPAEDIC SURGERY

## 2024-06-04 PROCEDURE — 1160F RVW MEDS BY RX/DR IN RCRD: CPT | Mod: CPTII,S$GLB,, | Performed by: ORTHOPAEDIC SURGERY

## 2024-06-04 PROCEDURE — 1125F AMNT PAIN NOTED PAIN PRSNT: CPT | Mod: CPTII,S$GLB,, | Performed by: ORTHOPAEDIC SURGERY

## 2024-06-04 PROCEDURE — 3046F HEMOGLOBIN A1C LEVEL >9.0%: CPT | Mod: CPTII,S$GLB,, | Performed by: ORTHOPAEDIC SURGERY

## 2024-06-04 RX ORDER — HYDROCODONE BITARTRATE AND ACETAMINOPHEN 7.5; 325 MG/1; MG/1
1 TABLET ORAL EVERY 6 HOURS PRN
Qty: 28 TABLET | Refills: 0 | Status: SHIPPED | OUTPATIENT
Start: 2024-06-04 | End: 2024-06-11

## 2024-06-04 NOTE — PROGRESS NOTES
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    atorvastatin (LIPITOR) 10 MG tablet Take 1 tablet (10 mg total) by mouth once daily.    co-enzyme Q-10 30 mg capsule Take 30 mg by mouth once daily.    doxycycline (VIBRAMYCIN) 100 MG Cap Take 100 mg by mouth 2 (two) times daily.    ergocalciferol, vitamin D2, (VITAMIN D ORAL) Take 1 tablet by mouth once daily.    glimepiride (AMARYL) 2 MG tablet Take 1 tablet (2 mg total) by mouth before breakfast.    hydrALAZINE (APRESOLINE) 25 MG tablet Take 1 tablet (25 mg total) by mouth every 12 (twelve) hours.    metFORMIN (GLUCOPHAGE-XR) 500 MG ER 24hr tablet Take 2 tablets (1,000 mg total) by mouth 2 (two) times daily with meals.    metoprolol succinate (TOPROL-XL) 100 MG 24 hr tablet Take 1 tablet (100 mg total) by mouth once daily. (Patient taking differently: Take 100 mg by mouth nightly.)     No current facility-administered medications for this visit.       Review of patient's allergies indicates:  No Known Allergies    No family history on file.    Social History     Socioeconomic History    Marital status:    Tobacco Use     Smoking status: Never    Smokeless tobacco: Never   Substance and Sexual Activity    Alcohol use: Yes     Alcohol/week: 0.0 standard drinks of alcohol     Comment: social    Drug use: No    Sexual activity: Yes       History of present illness:  Patient comes in today for the left shoulder continues complain of left pain although it is certainly improving.  He said he really does not have any resting pain at this point.      Review of Systems:    Musculoskeletal:  See HPI      Objective:        Physical Examination:    Vital Signs:    Vitals:    06/04/24 1119   Resp: 18       Body mass index is 38.54 kg/m².    This a well-developed, well nourished patient in no acute distress.  They are alert and oriented and cooperative to examination.        Patient has very limited range of motion of the left proximal humerus and shoulder.  He had very limited range of motion before the surgery because of the severe osteoarthrosis.  It is difficult to tell for him whether not this is baseline for worse  Pertinent New Results:    XRAY Report / Interpretation:   AP and lateral left humerus demonstrates his fracture is beginning to callus.  He has had some loss of fixation from the nail best noted on the Y-view    Assessment/Plan:      Left shoulder proximal humerus fracture status post IM nailing with some loss of fixation.  I had a long discussion about possible revision to reverse total shoulder.  At this point he is completely uninterested in any surgical intervention says he is very happy with the result and is basically back to the way he was.  I am going to start him on gentle range of motion with therapy I will check him back in a month    This note was created using Dragon voice recognition software that occasionally misinterpreted phrases or words.

## 2024-06-10 PROBLEM — N17.9 ACUTE ON CHRONIC KIDNEY FAILURE: Status: RESOLVED | Noted: 2024-03-08 | Resolved: 2024-06-10

## 2024-06-10 PROBLEM — N18.9 ACUTE ON CHRONIC KIDNEY FAILURE: Status: RESOLVED | Noted: 2024-03-08 | Resolved: 2024-06-10

## 2024-06-13 ENCOUNTER — TELEPHONE (OUTPATIENT)
Dept: FAMILY MEDICINE | Facility: CLINIC | Age: 74
End: 2024-06-13
Payer: MEDICARE

## 2024-06-13 DIAGNOSIS — R29.6 FREQUENT FALLS: ICD-10-CM

## 2024-06-13 DIAGNOSIS — R29.898 DECREASED STRENGTH OF LOWER EXTREMITY: Primary | ICD-10-CM

## 2024-06-13 NOTE — TELEPHONE ENCOUNTER
Patient's wife (Shana) states patient's mobility status is declining steadily.  \[facility]\"" patient falls frequently due to decrease strength to lower extremities. \[facility]\"" patient recently had surgery performed to shoulder s/p fracture related to fall.  \[facility]\"" patient fell at 2 AM and again around 9 AM,  both times EMS was called to the home to assist with getting patient up from the floor.  Patient was not transported to ER either time.  Requesting to know if physical therapy per home health can be ordered for patient.  \[facility]\"" order can not be placed by surgeon as this provider did not see patient related to lower extremity weakness.  Mrs. Saldana states she is unable to bring patient in for an appointment due to not being able to assist with patient's transfers, unable to get patient in and out of the car.  Mrs. Saldana states if patient falls again, she plans to have EMS transport patient to ER.  Would like to know if Mr. Enriquez will jade home health for physical therapy.  Mrs. Saldana is aware Mr. Enriquez is out of the office today.  Requesting to send message to Mr. Enriquez to address upon his return to clinic tomorrow.  Message forwarded to provider as per Mrs. Saldana's request.  Please advise. Thank you.

## 2024-06-13 NOTE — TELEPHONE ENCOUNTER
Crystal Cosby [facility] Staff     ----- Message from Crystal Cosby sent at 6/13/2024  9:12 AM CDT -----  Contact: wife/Shana  Type: Needs Medical Advice  Who Called:  wife/shana     Best Call Back Number: [phone]  Additional Information: wife called, pt mobility is declining, states he needs pt hospital. Wife says she needs help. Pt is currently on the floor.

## 2024-06-14 ENCOUNTER — HOSPITAL ENCOUNTER (INPATIENT)
Facility: HOSPITAL | Age: 74
LOS: 23 days | Discharge: ANOTHER HOSPITAL | DRG: 463 | End: 2024-07-07
Attending: EMERGENCY MEDICINE | Admitting: STUDENT IN AN ORGANIZED HEALTH CARE EDUCATION/TRAINING PROGRAM
Payer: MEDICARE

## 2024-06-14 DIAGNOSIS — M00.9 PYOGENIC ARTHRITIS OF LEFT SHOULDER REGION, DUE TO UNSPECIFIED ORGANISM: ICD-10-CM

## 2024-06-14 DIAGNOSIS — A41.9 SEVERE SEPSIS: ICD-10-CM

## 2024-06-14 DIAGNOSIS — L02.414 ABSCESS OF LEFT SHOULDER: Primary | ICD-10-CM

## 2024-06-14 DIAGNOSIS — D63.8 ANEMIA, CHRONIC DISEASE: ICD-10-CM

## 2024-06-14 DIAGNOSIS — A41.9 SEPSIS, DUE TO UNSPECIFIED ORGANISM, UNSPECIFIED WHETHER ACUTE ORGAN DYSFUNCTION PRESENT: ICD-10-CM

## 2024-06-14 DIAGNOSIS — M86.9 OSTEOMYELITIS OF TOE: ICD-10-CM

## 2024-06-14 DIAGNOSIS — R65.20 SEVERE SEPSIS: ICD-10-CM

## 2024-06-14 DIAGNOSIS — L97.514 SKIN ULCER OF TOE OF RIGHT FOOT WITH NECROSIS OF BONE: ICD-10-CM

## 2024-06-14 DIAGNOSIS — A49.02 MRSA INFECTION: ICD-10-CM

## 2024-06-14 DIAGNOSIS — R53.1 WEAKNESS: ICD-10-CM

## 2024-06-14 DIAGNOSIS — N12 PYELONEPHRITIS: ICD-10-CM

## 2024-06-14 DIAGNOSIS — J90 PLEURAL EFFUSION: ICD-10-CM

## 2024-06-14 DIAGNOSIS — N17.9 AKI (ACUTE KIDNEY INJURY): ICD-10-CM

## 2024-06-14 DIAGNOSIS — E87.5 HYPERKALEMIA: ICD-10-CM

## 2024-06-14 DIAGNOSIS — L02.213 ABSCESS OF CHEST WALL: ICD-10-CM

## 2024-06-14 DIAGNOSIS — N18.9 ACUTE RENAL FAILURE SUPERIMPOSED ON CHRONIC KIDNEY DISEASE: ICD-10-CM

## 2024-06-14 DIAGNOSIS — I48.91 ATRIAL FIBRILLATION WITH RVR: ICD-10-CM

## 2024-06-14 DIAGNOSIS — R07.9 CHEST PAIN: ICD-10-CM

## 2024-06-14 DIAGNOSIS — N17.9 ACUTE RENAL FAILURE SUPERIMPOSED ON CHRONIC KIDNEY DISEASE: ICD-10-CM

## 2024-06-14 LAB
ALBUMIN SERPL BCP-MCNC: 1.6 G/DL (ref 3.5–5.2)
ALP SERPL-CCNC: 270 U/L (ref 55–135)
ALT SERPL W/O P-5'-P-CCNC: 22 U/L (ref 10–44)
ANION GAP SERPL CALC-SCNC: 18 MMOL/L (ref 8–16)
AST SERPL-CCNC: 39 U/L (ref 10–40)
BACTERIA #/AREA URNS HPF: ABNORMAL /HPF
BASOPHILS # BLD AUTO: 0.1 K/UL (ref 0–0.2)
BASOPHILS NFR BLD: 0.5 % (ref 0–1.9)
BILIRUB SERPL-MCNC: 0.6 MG/DL (ref 0.1–1)
BILIRUB UR QL STRIP: NEGATIVE
BUN SERPL-MCNC: 85 MG/DL (ref 8–23)
BUN SERPL-MCNC: 86 MG/DL (ref 8–23)
BUN SERPL-MCNC: 87 MG/DL (ref 8–23)
CALCIUM SERPL-MCNC: 9 MG/DL (ref 8.7–10.5)
CALCIUM SERPL-MCNC: 9.1 MG/DL (ref 8.7–10.5)
CALCIUM SERPL-MCNC: 9.1 MG/DL (ref 8.7–10.5)
CHLORIDE SERPL-SCNC: 106 MMOL/L (ref 95–110)
CHLORIDE SERPL-SCNC: 107 MMOL/L (ref 95–110)
CHLORIDE SERPL-SCNC: 108 MMOL/L (ref 95–110)
CK SERPL-CCNC: 79 U/L (ref 20–200)
CLARITY UR: ABNORMAL
CO2 SERPL-SCNC: 12 MMOL/L (ref 23–29)
CO2 SERPL-SCNC: 12 MMOL/L (ref 23–29)
CO2 SERPL-SCNC: 15 MMOL/L (ref 23–29)
COLOR UR: ABNORMAL
CREAT SERPL-MCNC: 4.9 MG/DL (ref 0.5–1.4)
CREAT SERPL-MCNC: 5 MG/DL (ref 0.5–1.4)
CREAT SERPL-MCNC: 5.1 MG/DL (ref 0.5–1.4)
DIFFERENTIAL METHOD BLD: ABNORMAL
EOSINOPHIL # BLD AUTO: 0.1 K/UL (ref 0–0.5)
EOSINOPHIL NFR BLD: 0.3 % (ref 0–8)
ERYTHROCYTE [DISTWIDTH] IN BLOOD BY AUTOMATED COUNT: 18.7 % (ref 11.5–14.5)
EST. GFR  (NO RACE VARIABLE): 11 ML/MIN/1.73 M^2
EST. GFR  (NO RACE VARIABLE): 12 ML/MIN/1.73 M^2
EST. GFR  (NO RACE VARIABLE): 12 ML/MIN/1.73 M^2
ESTIMATED AVG GLUCOSE: 134 MG/DL (ref 68–131)
GLUCOSE SERPL-MCNC: 105 MG/DL (ref 70–110)
GLUCOSE SERPL-MCNC: 106 MG/DL (ref 70–110)
GLUCOSE SERPL-MCNC: 91 MG/DL (ref 70–110)
GLUCOSE UR QL STRIP: ABNORMAL
HBA1C MFR BLD: 6.3 % (ref 4.5–6.2)
HCT VFR BLD AUTO: 34.9 % (ref 40–54)
HGB BLD-MCNC: 11.5 G/DL (ref 14–18)
HGB UR QL STRIP: ABNORMAL
HYALINE CASTS #/AREA URNS LPF: 0 /LPF
IMM GRANULOCYTES # BLD AUTO: 0.39 K/UL (ref 0–0.04)
IMM GRANULOCYTES NFR BLD AUTO: 1.9 % (ref 0–0.5)
KETONES UR QL STRIP: NEGATIVE
LACTATE SERPL-SCNC: 2.1 MMOL/L (ref 0.5–2.2)
LEUKOCYTE ESTERASE UR QL STRIP: ABNORMAL
LYMPHOCYTES # BLD AUTO: 1.8 K/UL (ref 1–4.8)
LYMPHOCYTES NFR BLD: 8.4 % (ref 18–48)
MAGNESIUM SERPL-MCNC: 1.3 MG/DL (ref 1.6–2.6)
MCH RBC QN AUTO: 26.6 PG (ref 27–31)
MCHC RBC AUTO-ENTMCNC: 33 G/DL (ref 32–36)
MCV RBC AUTO: 81 FL (ref 82–98)
MICROSCOPIC COMMENT: ABNORMAL
MONOCYTES # BLD AUTO: 1.6 K/UL (ref 0.3–1)
MONOCYTES NFR BLD: 7.9 % (ref 4–15)
NEUTROPHILS # BLD AUTO: 16.9 K/UL (ref 1.8–7.7)
NEUTROPHILS NFR BLD: 81 % (ref 38–73)
NITRITE UR QL STRIP: NEGATIVE
NRBC BLD-RTO: 0 /100 WBC
PH UR STRIP: 6 [PH] (ref 5–8)
PHOSPHATE SERPL-MCNC: 5 MG/DL (ref 2.7–4.5)
PLATELET # BLD AUTO: 541 K/UL (ref 150–450)
PMV BLD AUTO: 10.1 FL (ref 9.2–12.9)
POCT GLUCOSE: 115 MG/DL (ref 70–110)
POCT GLUCOSE: 88 MG/DL (ref 70–110)
POTASSIUM SERPL-SCNC: 5.8 MMOL/L (ref 3.5–5.1)
POTASSIUM SERPL-SCNC: 5.9 MMOL/L (ref 3.5–5.1)
POTASSIUM SERPL-SCNC: 6 MMOL/L (ref 3.5–5.1)
PROCALCITONIN SERPL IA-MCNC: 19.75 NG/ML (ref 0–0.5)
PROT SERPL-MCNC: 6.5 G/DL (ref 6–8.4)
PROT UR QL STRIP: ABNORMAL
RBC # BLD AUTO: 4.33 M/UL (ref 4.6–6.2)
RBC #/AREA URNS HPF: >100 /HPF (ref 0–4)
SODIUM SERPL-SCNC: 137 MMOL/L (ref 136–145)
SODIUM SERPL-SCNC: 138 MMOL/L (ref 136–145)
SODIUM SERPL-SCNC: 139 MMOL/L (ref 136–145)
SP GR UR STRIP: 1.02 (ref 1–1.03)
SQUAMOUS #/AREA URNS HPF: 1 /HPF
URN SPEC COLLECT METH UR: ABNORMAL
UROBILINOGEN UR STRIP-ACNC: NEGATIVE EU/DL
WBC # BLD AUTO: 20.81 K/UL (ref 3.9–12.7)
WBC #/AREA URNS HPF: >100 /HPF (ref 0–5)
WBC CLUMPS URNS QL MICRO: ABNORMAL

## 2024-06-14 PROCEDURE — 84145 PROCALCITONIN (PCT): CPT | Performed by: EMERGENCY MEDICINE

## 2024-06-14 PROCEDURE — 80053 COMPREHEN METABOLIC PANEL: CPT | Performed by: EMERGENCY MEDICINE

## 2024-06-14 PROCEDURE — 83605 ASSAY OF LACTIC ACID: CPT | Performed by: EMERGENCY MEDICINE

## 2024-06-14 PROCEDURE — 25000003 PHARM REV CODE 250

## 2024-06-14 PROCEDURE — 85025 COMPLETE CBC W/AUTO DIFF WBC: CPT | Performed by: EMERGENCY MEDICINE

## 2024-06-14 PROCEDURE — 99900035 HC TECH TIME PER 15 MIN (STAT)

## 2024-06-14 PROCEDURE — 82550 ASSAY OF CK (CPK): CPT

## 2024-06-14 PROCEDURE — 36415 COLL VENOUS BLD VENIPUNCTURE: CPT

## 2024-06-14 PROCEDURE — 94761 N-INVAS EAR/PLS OXIMETRY MLT: CPT

## 2024-06-14 PROCEDURE — 27000221 HC OXYGEN, UP TO 24 HOURS

## 2024-06-14 PROCEDURE — 93010 ELECTROCARDIOGRAM REPORT: CPT | Mod: ,,, | Performed by: GENERAL PRACTICE

## 2024-06-14 PROCEDURE — 93005 ELECTROCARDIOGRAM TRACING: CPT

## 2024-06-14 PROCEDURE — 63600175 PHARM REV CODE 636 W HCPCS: Performed by: INTERNAL MEDICINE

## 2024-06-14 PROCEDURE — 36569 INSJ PICC 5 YR+ W/O IMAGING: CPT

## 2024-06-14 PROCEDURE — 84100 ASSAY OF PHOSPHORUS: CPT

## 2024-06-14 PROCEDURE — 11000001 HC ACUTE MED/SURG PRIVATE ROOM

## 2024-06-14 PROCEDURE — 96361 HYDRATE IV INFUSION ADD-ON: CPT

## 2024-06-14 PROCEDURE — C1751 CATH, INF, PER/CENT/MIDLINE: HCPCS

## 2024-06-14 PROCEDURE — 87040 BLOOD CULTURE FOR BACTERIA: CPT | Mod: 59 | Performed by: EMERGENCY MEDICINE

## 2024-06-14 PROCEDURE — 25000003 PHARM REV CODE 250: Mod: JZ,JG | Performed by: EMERGENCY MEDICINE

## 2024-06-14 PROCEDURE — 63600175 PHARM REV CODE 636 W HCPCS: Performed by: EMERGENCY MEDICINE

## 2024-06-14 PROCEDURE — 80048 BASIC METABOLIC PNL TOTAL CA: CPT

## 2024-06-14 PROCEDURE — 94660 CPAP INITIATION&MGMT: CPT

## 2024-06-14 PROCEDURE — 36415 COLL VENOUS BLD VENIPUNCTURE: CPT | Performed by: EMERGENCY MEDICINE

## 2024-06-14 PROCEDURE — 83036 HEMOGLOBIN GLYCOSYLATED A1C: CPT

## 2024-06-14 PROCEDURE — 87086 URINE CULTURE/COLONY COUNT: CPT | Performed by: EMERGENCY MEDICINE

## 2024-06-14 PROCEDURE — 20600001 HC STEP DOWN PRIVATE ROOM

## 2024-06-14 PROCEDURE — 02HV33Z INSERTION OF INFUSION DEVICE INTO SUPERIOR VENA CAVA, PERCUTANEOUS APPROACH: ICD-10-PCS | Performed by: INTERNAL MEDICINE

## 2024-06-14 PROCEDURE — 99285 EMERGENCY DEPT VISIT HI MDM: CPT | Mod: 25

## 2024-06-14 PROCEDURE — 63600175 PHARM REV CODE 636 W HCPCS

## 2024-06-14 PROCEDURE — 94799 UNLISTED PULMONARY SVC/PX: CPT

## 2024-06-14 PROCEDURE — 83735 ASSAY OF MAGNESIUM: CPT

## 2024-06-14 PROCEDURE — 81000 URINALYSIS NONAUTO W/SCOPE: CPT | Performed by: EMERGENCY MEDICINE

## 2024-06-14 PROCEDURE — 96367 TX/PROPH/DG ADDL SEQ IV INF: CPT

## 2024-06-14 PROCEDURE — 51701 INSERT BLADDER CATHETER: CPT

## 2024-06-14 PROCEDURE — 96375 TX/PRO/DX INJ NEW DRUG ADDON: CPT

## 2024-06-14 PROCEDURE — 96365 THER/PROPH/DIAG IV INF INIT: CPT

## 2024-06-14 RX ORDER — METOPROLOL SUCCINATE 50 MG/1
100 TABLET, EXTENDED RELEASE ORAL NIGHTLY
Status: DISCONTINUED | OUTPATIENT
Start: 2024-06-14 | End: 2024-06-14

## 2024-06-14 RX ORDER — FUROSEMIDE 10 MG/ML
60 INJECTION INTRAMUSCULAR; INTRAVENOUS ONCE
Status: COMPLETED | OUTPATIENT
Start: 2024-06-14 | End: 2024-06-14

## 2024-06-14 RX ORDER — DILTIAZEM HYDROCHLORIDE 5 MG/ML
20 INJECTION INTRAVENOUS
Status: COMPLETED | OUTPATIENT
Start: 2024-06-14 | End: 2024-06-14

## 2024-06-14 RX ORDER — ONDANSETRON HYDROCHLORIDE 2 MG/ML
4 INJECTION, SOLUTION INTRAVENOUS EVERY 8 HOURS PRN
Status: DISCONTINUED | OUTPATIENT
Start: 2024-06-14 | End: 2024-07-01 | Stop reason: SDUPTHER

## 2024-06-14 RX ORDER — SODIUM CHLORIDE 0.9 % (FLUSH) 0.9 %
10 SYRINGE (ML) INJECTION
Status: DISCONTINUED | OUTPATIENT
Start: 2024-06-14 | End: 2024-07-07 | Stop reason: HOSPADM

## 2024-06-14 RX ORDER — DILTIAZEM HCL 1 MG/ML
0-15 INJECTION, SOLUTION INTRAVENOUS CONTINUOUS
Status: DISCONTINUED | OUTPATIENT
Start: 2024-06-14 | End: 2024-06-14

## 2024-06-14 RX ORDER — CALCIUM CHLORIDE INJECTION 100 MG/ML
INJECTION, SOLUTION INTRAVENOUS
Status: DISPENSED
Start: 2024-06-14 | End: 2024-06-15

## 2024-06-14 RX ORDER — SODIUM CHLORIDE 0.9 % (FLUSH) 0.9 %
10 SYRINGE (ML) INJECTION EVERY 6 HOURS
Status: DISCONTINUED | OUTPATIENT
Start: 2024-06-15 | End: 2024-07-07 | Stop reason: HOSPADM

## 2024-06-14 RX ORDER — MAGNESIUM SULFATE HEPTAHYDRATE 40 MG/ML
2 INJECTION, SOLUTION INTRAVENOUS ONCE
Status: COMPLETED | OUTPATIENT
Start: 2024-06-14 | End: 2024-06-15

## 2024-06-14 RX ORDER — SODIUM CHLORIDE 9 MG/ML
1000 INJECTION, SOLUTION INTRAVENOUS
Status: COMPLETED | OUTPATIENT
Start: 2024-06-14 | End: 2024-06-14

## 2024-06-14 RX ORDER — NALOXONE HCL 0.4 MG/ML
0.02 VIAL (ML) INJECTION
Status: DISCONTINUED | OUTPATIENT
Start: 2024-06-14 | End: 2024-07-07 | Stop reason: HOSPADM

## 2024-06-14 RX ORDER — HYDROCODONE BITARTRATE AND ACETAMINOPHEN 7.5; 325 MG/1; MG/1
1 TABLET ORAL EVERY 6 HOURS PRN
Status: DISCONTINUED | OUTPATIENT
Start: 2024-06-14 | End: 2024-06-17

## 2024-06-14 RX ORDER — CALCIUM GLUCONATE 98 MG/ML
1 INJECTION, SOLUTION INTRAVENOUS ONCE
Status: DISCONTINUED | OUTPATIENT
Start: 2024-06-14 | End: 2024-06-14

## 2024-06-14 RX ORDER — INSULIN ASPART 100 [IU]/ML
0-5 INJECTION, SOLUTION INTRAVENOUS; SUBCUTANEOUS
Status: DISCONTINUED | OUTPATIENT
Start: 2024-06-14 | End: 2024-06-18

## 2024-06-14 RX ORDER — PROCHLORPERAZINE EDISYLATE 5 MG/ML
5 INJECTION INTRAMUSCULAR; INTRAVENOUS EVERY 6 HOURS PRN
Status: DISCONTINUED | OUTPATIENT
Start: 2024-06-14 | End: 2024-07-01 | Stop reason: SDUPTHER

## 2024-06-14 RX ORDER — CEFTRIAXONE 1 G/1
1 INJECTION, POWDER, FOR SOLUTION INTRAMUSCULAR; INTRAVENOUS
Status: DISCONTINUED | OUTPATIENT
Start: 2024-06-14 | End: 2024-06-14

## 2024-06-14 RX ORDER — IPRATROPIUM BROMIDE AND ALBUTEROL SULFATE 2.5; .5 MG/3ML; MG/3ML
3 SOLUTION RESPIRATORY (INHALATION) EVERY 6 HOURS PRN
Status: DISCONTINUED | OUTPATIENT
Start: 2024-06-14 | End: 2024-07-03

## 2024-06-14 RX ORDER — SIMETHICONE 80 MG
1 TABLET,CHEWABLE ORAL 4 TIMES DAILY PRN
Status: DISCONTINUED | OUTPATIENT
Start: 2024-06-14 | End: 2024-07-07 | Stop reason: HOSPADM

## 2024-06-14 RX ORDER — CALCIUM GLUCONATE 20 MG/ML
1 INJECTION, SOLUTION INTRAVENOUS ONCE
Status: COMPLETED | OUTPATIENT
Start: 2024-06-14 | End: 2024-06-14

## 2024-06-14 RX ORDER — ALUMINUM HYDROXIDE, MAGNESIUM HYDROXIDE, AND SIMETHICONE 2400; 240; 2400 MG/30ML; MG/30ML; MG/30ML
15 SUSPENSION ORAL 4 TIMES DAILY PRN
Status: DISCONTINUED | OUTPATIENT
Start: 2024-06-14 | End: 2024-07-07 | Stop reason: HOSPADM

## 2024-06-14 RX ORDER — IBUPROFEN 200 MG
24 TABLET ORAL
Status: DISCONTINUED | OUTPATIENT
Start: 2024-06-14 | End: 2024-07-07 | Stop reason: HOSPADM

## 2024-06-14 RX ORDER — METOPROLOL SUCCINATE 50 MG/1
100 TABLET, EXTENDED RELEASE ORAL NIGHTLY
Status: DISCONTINUED | OUTPATIENT
Start: 2024-06-14 | End: 2024-07-07 | Stop reason: HOSPADM

## 2024-06-14 RX ORDER — IBUPROFEN 200 MG
16 TABLET ORAL
Status: DISCONTINUED | OUTPATIENT
Start: 2024-06-14 | End: 2024-07-07 | Stop reason: HOSPADM

## 2024-06-14 RX ORDER — SODIUM CHLORIDE 9 MG/ML
INJECTION, SOLUTION INTRAVENOUS
Status: COMPLETED | OUTPATIENT
Start: 2024-06-14 | End: 2024-06-14

## 2024-06-14 RX ORDER — SODIUM CHLORIDE 0.9 % (FLUSH) 0.9 %
10 SYRINGE (ML) INJECTION EVERY 12 HOURS PRN
Status: DISCONTINUED | OUTPATIENT
Start: 2024-06-14 | End: 2024-07-07 | Stop reason: HOSPADM

## 2024-06-14 RX ORDER — GLUCAGON 1 MG
1 KIT INJECTION
Status: DISCONTINUED | OUTPATIENT
Start: 2024-06-14 | End: 2024-07-07 | Stop reason: HOSPADM

## 2024-06-14 RX ADMIN — METOPROLOL SUCCINATE 100 MG: 50 TABLET, FILM COATED, EXTENDED RELEASE ORAL at 06:06

## 2024-06-14 RX ADMIN — SODIUM BICARBONATE: 84 INJECTION, SOLUTION INTRAVENOUS at 04:06

## 2024-06-14 RX ADMIN — FUROSEMIDE 60 MG: 10 INJECTION, SOLUTION INTRAMUSCULAR; INTRAVENOUS at 10:06

## 2024-06-14 RX ADMIN — APIXABAN 5 MG: 2.5 TABLET, FILM COATED ORAL at 08:06

## 2024-06-14 RX ADMIN — SODIUM CHLORIDE 500 ML: 9 INJECTION, SOLUTION INTRAVENOUS at 06:06

## 2024-06-14 RX ADMIN — CEFTRIAXONE SODIUM 1 G: 1 INJECTION, POWDER, FOR SOLUTION INTRAMUSCULAR; INTRAVENOUS at 02:06

## 2024-06-14 RX ADMIN — CALCIUM GLUCONATE 1 G: 20 INJECTION, SOLUTION INTRAVENOUS at 01:06

## 2024-06-14 RX ADMIN — SODIUM CHLORIDE 1000 ML: 9 INJECTION, SOLUTION INTRAVENOUS at 02:06

## 2024-06-14 RX ADMIN — MAGNESIUM SULFATE HEPTAHYDRATE 2 G: 40 INJECTION, SOLUTION INTRAVENOUS at 10:06

## 2024-06-14 RX ADMIN — DILTIAZEM HYDROCHLORIDE 20 MG: 5 INJECTION INTRAVENOUS at 12:06

## 2024-06-14 RX ADMIN — SODIUM ZIRCONIUM CYCLOSILICATE 10 G: 10 POWDER, FOR SUSPENSION ORAL at 02:06

## 2024-06-14 RX ADMIN — SODIUM CHLORIDE: 9 INJECTION, SOLUTION INTRAVENOUS at 12:06

## 2024-06-14 RX ADMIN — HYDROCODONE BITARTRATE AND ACETAMINOPHEN 1 TABLET: 7.5; 325 TABLET ORAL at 06:06

## 2024-06-14 RX ADMIN — FUROSEMIDE 60 MG: 10 INJECTION, SOLUTION INTRAMUSCULAR; INTRAVENOUS at 04:06

## 2024-06-14 RX ADMIN — CEFTRIAXONE SODIUM 1 G: 1 INJECTION, POWDER, FOR SOLUTION INTRAMUSCULAR; INTRAVENOUS at 04:06

## 2024-06-14 RX ADMIN — DILTIAZEM HYDROCHLORIDE 2.5 MG/HR: 5 INJECTION INTRAVENOUS at 08:06

## 2024-06-14 NOTE — ASSESSMENT & PLAN NOTE
Patient with Long standing persistent (>12 months) atrial fibrillation which is controlled currently with Beta Blocker and Calcium Channel Blocker. Patient is currently in atrial fibrillation.Patient currently takes Eliquis 5mg BID which has been continued

## 2024-06-14 NOTE — ASSESSMENT & PLAN NOTE
"Patient's FSGs are uncontrolled due to hyperglycemia on current medication regimen.  Last A1c reviewed-   Lab Results   Component Value Date    HGBA1C 9.5 (H) 03/19/2024     Most recent fingerstick glucose reviewed- No results for input(s): "POCTGLUCOSE" in the last 24 hours.  Current correctional scale  Low  Maintain anti-hyperglycemic dose as follows-   Antihyperglycemics (From admission, onward)      Start     Stop Route Frequency Ordered    06/14/24 1535  insulin aspart U-100 pen 0-5 Units         -- SubQ Before meals & nightly PRN 06/14/24 1438          Hold Oral hypoglycemics while patient is in the hospital.  "

## 2024-06-14 NOTE — ED PROVIDER NOTES
Encounter Date: 6/14/2024       History     Chief Complaint   Patient presents with    Weakness     Pt brought to ED via EMS with complaint of weakness and falling, pt advised EMS his urine is very dark.       Chief complaint: Weakness    HPI: 73-year-old male reports that he slid out of a chair and was unable to get up without assistance; therefore, EMS was called.  He reports that he ordinarily walks with a walker but has had more difficulty ambulating and has generalized weakness.  He suspects that he is dehydrated due to the dark discoloration of his urine.  He has had no vomiting or diarrhea.  He has no focal weakness.  Past medical history is significant for atrial fibrillation, diabetes, Guillain-Chauvin, hypertension and hyperlipidemia.  He underwent a ORIF of his left humerus in March 2024.      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;  Service: Orthopedics;  Laterality: Left;  Accumed, Synthes Small Frag set Avelino verified 3/22/24 ark     No family history on file.  Social History     Tobacco Use    Smoking status: Never    Smokeless tobacco: Never   Substance Use Topics    Alcohol use: Yes     Alcohol/week: 0.0 standard drinks of alcohol     Comment: social    Drug use: No     Review of Systems   Constitutional:  Negative for fever.   Eyes:  Negative for visual disturbance.   Respiratory:  Negative for apnea and shortness of breath.    Cardiovascular:  Negative for chest pain and palpitations.   Gastrointestinal:  Negative for abdominal distention and abdominal pain.    Genitourinary:  Positive for decreased urine volume. Negative for difficulty urinating.   Musculoskeletal:  Negative for neck pain.   Skin:  Negative for pallor and rash.   Neurological:  Positive for weakness. Negative for headaches.   Hematological:  Does not bruise/bleed easily.   Psychiatric/Behavioral:  Negative for agitation.        Physical Exam     Initial Vitals [06/14/24 1157]   BP Pulse Resp Temp SpO2   (!) 128/54 90 18 98.6 °F (37 °C) 97 %      MAP       --         Physical Exam    Nursing note and vitals reviewed.  Constitutional: He appears well-developed and well-nourished.   HENT:   Head: Normocephalic and atraumatic.   Eyes: Conjunctivae are normal.   Neck: Neck supple.   Normal range of motion.  Cardiovascular:  Normal rate, regular rhythm and normal heart sounds.           Pulmonary/Chest: Breath sounds normal. No respiratory distress.   Abdominal: He exhibits no distension.   Musculoskeletal:         General: Normal range of motion.      Cervical back: Normal range of motion and neck supple.     Neurological: He is alert and oriented to person, place, and time.   Skin: Skin is warm and dry.   Psychiatric: He has a normal mood and affect.         ED Course   Critical Care    Date/Time: 6/14/2024 11:54 AM    Performed by: Westley Alvarez III, MD  Authorized by: Shelton Newton MD  Direct patient critical care time: 120 minutes  Total critical care time (exclusive of procedural time) : 120 minutes  Critical care was necessary to treat or prevent imminent or life-threatening deterioration of the following conditions: sepsis and renal failure.  Critical care was time spent personally by me on the following activities: discussions with primary provider, evaluation of patient's response to treatment, examination of patient, obtaining history from patient or surrogate, ordering and performing treatments and interventions, ordering and review of laboratory studies, ordering and review of  radiographic studies, pulse oximetry, re-evaluation of patient's condition and review of old charts.        Labs Reviewed   CBC W/ AUTO DIFFERENTIAL - Abnormal; Notable for the following components:       Result Value    WBC 20.81 (*)     RBC 4.33 (*)     Hemoglobin 11.5 (*)     Hematocrit 34.9 (*)     MCV 81 (*)     MCH 26.6 (*)     RDW 18.7 (*)     Platelets 541 (*)     Immature Granulocytes 1.9 (*)     Gran # (ANC) 16.9 (*)     Immature Grans (Abs) 0.39 (*)     Mono # 1.6 (*)     Gran % 81.0 (*)     Lymph % 8.4 (*)     All other components within normal limits   COMPREHENSIVE METABOLIC PANEL - Abnormal; Notable for the following components:    Potassium 6.0 (*)     CO2 12 (*)     BUN 85 (*)     Creatinine 5.0 (*)     Albumin 1.6 (*)     Alkaline Phosphatase 270 (*)     eGFR 12 (*)     Anion Gap 18 (*)     All other components within normal limits   URINALYSIS, REFLEX TO URINE CULTURE - Abnormal; Notable for the following components:    Color, UA Orange (*)     Appearance, UA Cloudy (*)     Protein, UA 2+ (*)     Glucose, UA Trace (*)     Occult Blood UA 3+ (*)     Leukocytes, UA 3+ (*)     All other components within normal limits    Narrative:     Specimen Source->Urine   PROCALCITONIN - Abnormal; Notable for the following components:    Procalcitonin 19.75 (*)     All other components within normal limits   URINALYSIS MICROSCOPIC - Abnormal; Notable for the following components:    RBC, UA >100 (*)     WBC, UA >100 (*)     WBC Clumps, UA Few (*)     Bacteria Many (*)     All other components within normal limits    Narrative:     Specimen Source->Urine   CULTURE, URINE   LACTIC ACID, PLASMA     EKG Readings: (Independently Interpreted)   Rhythm: Atrial Fibrillation. Heart Rate: 120. Ectopy: No Ectopy. Conduction: Normal. ST Segments: Normal ST Segments. T Waves: Normal. Axis: Left Axis Deviation. Clinical Impression: Atrial Fibrillation with RVR       Imaging Results              US Retroperitoneal Complete  (Final result)  Result time 06/14/24 17:54:43      Final result by Cass Cutler MD (06/14/24 17:54:43)                   Impression:      No hydronephrosis.  Elevated resistive index right kidney suggesting intrinsic renal disease.      Electronically signed by: Cass Cutler MD  Date:    06/14/2024  Time:    17:54               Narrative:    EXAMINATION:  US RETROPERITONEAL COMPLETE    CLINICAL HISTORY:  acute renal failure;    TECHNIQUE:  Ultrasound of the kidneys and urinary bladder was performed including color flow and Doppler evaluation of the kidneys.    COMPARISON:  None.    FINDINGS:  Right kidney: The right kidney measures 12 cm. No cortical thinning. No loss of corticomedullary distinction. Resistive index measures 7 2.  No mass. No renal stone. No hydronephrosis.    Left kidney: The left kidney measures 12.9 cm. No cortical thinning. No loss of corticomedullary distinction. Resistive index measures 0.67.  No mass. No renal stone. No hydronephrosis.    The region the urinary bladder is image but bladder decompressed at the time of the exam.                                       Medications   calcium chloride 100 mg/mL (10 %) injection (  Canceled Entry 6/14/24 1345)   sodium chloride 0.9% flush 10 mL (has no administration in time range)   naloxone 0.4 mg/mL injection 0.02 mg (has no administration in time range)   glucose chewable tablet 16 g (has no administration in time range)   glucose chewable tablet 24 g (has no administration in time range)   glucagon (human recombinant) injection 1 mg (has no administration in time range)   apixaban tablet 5 mg ( Oral Canceled Entry 6/15/24 0900)   ondansetron injection 4 mg (has no administration in time range)   prochlorperazine injection Soln 5 mg (has no administration in time range)   insulin aspart U-100 pen 0-5 Units (2 Units Subcutaneous Given 6/15/24 0747)   albuterol-ipratropium 2.5 mg-0.5 mg/3 mL nebulizer solution 3 mL (has no administration  in time range)   simethicone chewable tablet 80 mg (has no administration in time range)   aluminum & magnesium hydroxide-simethicone 400-400-40 mg/5 mL suspension 15 mL (has no administration in time range)   sodium bicarbonate 150 mEq in dextrose 5 % (D5W) 1,000 mL infusion ( Intravenous Verify Only 6/15/24 0750)   dextrose 10% bolus 125 mL 125 mL (has no administration in time range)   dextrose 10% bolus 250 mL 250 mL (has no administration in time range)   HYDROcodone-acetaminophen 7.5-325 mg per tablet 1 tablet (1 tablet Oral Given 6/15/24 0746)   cefTRIAXone (Rocephin) 1 g in dextrose 5 % in water (D5W) 100 mL IVPB (MB+) (has no administration in time range)   metoprolol succinate (TOPROL-XL) 24 hr tablet 100 mg (100 mg Oral Given 6/14/24 1808)   diltiaZEM (CARDIZEM) 125 mg in dextrose 5 % (D5W) 125 mL infusion (2.5 mg/hr Intravenous Verify Only 6/15/24 0750)   sodium chloride 0.9% flush 10 mL (10 mLs Intravenous Not Given 6/15/24 0600)     And   sodium chloride 0.9% flush 10 mL (has no administration in time range)   sodium zirconium cyclosilicate packet 10 g (has no administration in time range)   0.9%  NaCl infusion (0 mL/hr Intravenous Stopped 6/14/24 1317)   diltiaZEM injection 20 mg (20 mg Intravenous Given 6/14/24 1254)   calcium gluconate 1 g in NS IVPB (premixed) (0 g Intravenous Stopped 6/14/24 1417)   cefTRIAXone (Rocephin) 1 g in dextrose 5 % in water (D5W) 100 mL IVPB (MB+) (0 g Intravenous Stopped 6/14/24 1447)   0.9%  NaCl infusion (0 mLs Intravenous Paused 6/14/24 1510)   sodium zirconium cyclosilicate packet 10 g (10 g Oral Given 6/14/24 1453)   cefTRIAXone (Rocephin) 1 g in dextrose 5 % in water (D5W) 100 mL IVPB (MB+) (0 g Intravenous Stopped 6/14/24 1704)   furosemide injection 60 mg (60 mg Intravenous Given 6/14/24 1635)   sodium chloride 0.9% bolus 500 mL 500 mL (0 mLs Intravenous Stopped 6/14/24 1910)   magnesium sulfate 2g in water 50mL IVPB (premix) (0 g Intravenous Stopped 6/15/24  0035)   furosemide injection 60 mg (60 mg Intravenous Given 6/14/24 4094)     Medical Decision Making  73-year-old male presents with generalized weakness.  He is found have significant pyuria consistent with pyelonephritis.  He has a history of atrial fibrillation and has atrial fibrillation with a rapid ventricular response.  He is also found to have acute kidney injury with a creatinine of 5.  He will be admitted for IV antibiotics and fluid resuscitation.  I discussed the case with hospital medicine who will admit the patient.    Amount and/or Complexity of Data Reviewed  Labs: ordered.    Risk  Prescription drug management.  Decision regarding hospitalization.                                      Clinical Impression:  Final diagnoses:  [R53.1] Weakness  [I48.91] Atrial fibrillation with RVR (Primary)  [N17.9] MARBIN (acute kidney injury)  [E87.5] Hyperkalemia  [N12] Pyelonephritis  [A41.9] Sepsis, due to unspecified organism, unspecified whether acute organ dysfunction present          ED Disposition Condition    Admit                 Westley Alvarez III, MD  06/15/24 2484

## 2024-06-14 NOTE — ASSESSMENT & PLAN NOTE
This patient has hyperkalemia which is uncontrolled. We will monitor for arrhythmias with EKG or continuous telemetry. We will treat the hyperkalemia with Potassium Binders and Calcium gluconate. The likely etiology of the hyperkalemia is MARBIN.  The patients latest potassium has been reviewed and the results are listed below  Recent Labs   Lab 06/14/24  1229   K 6.0*   -BMP Q 4 hours  -Lasix 60mg IV once per Nephrology recommendation

## 2024-06-14 NOTE — HPI
Roosevelt Moser is a 73 year old male with a previous medical history of atrial fibrillation on eliquis, HTN, DMII, obstructive sleep apnea, HLD, ORIF of left humerus and guillian-[place] who presented to the emergency room for weakness and multiple falls. Per wife of the patient he has had progressive weakness over the past week along with two falls one at 0300 Thursday and the other early morning Friday. Both time she had to activate EMS to pick patient up off floor due to weakness. Patient refused transport to hospital on both occasions. Today he slid out of his chair and was unable to get up without assistance and at this point the wife activated EMS to transport patient to the hospital. She endorses that two days ago she noticed that the patient had stopped urinating as he normally does. The patient concurs that he has noticed that his urine has decreased over the past two days and that it is dark. Patient denies dysuria or incomplete bladder emptying. Bladder scan showed zero upon admit and urine sample was obtained via straight cath in ED. Patient denies decreased PO intake and keeps a bottle of water with him at all times. Initial ED work-up showed a creatinine of 5 and potassium of 6. Urine studies positive for infection and WBC 20.81 with procalcitonin at 19.75. Blood cultures obtained and patient given 1 gram of rocephin and 1000ml of normal saline. Patient noted to bein afib with RVR and 20mg of diltiazem was administered IV which resulted in low blood pressure and 1 gram of calcium gluconate was administered. Patient admitted by hospital medicine for further evaluation and management

## 2024-06-14 NOTE — Clinical Note
Diagnosis: Atrial fibrillation with RVR [[id number]]   Future Attending Provider: KAYELY TORO [[id number]]   Place in Observation: [facility] [[id number]]

## 2024-06-14 NOTE — ED NOTES
Pt to be transferred to assigned room with RN on transport cardiac monitor. Pt clean/dry. Wife at bedside. Purewick in place and disconnected.

## 2024-06-14 NOTE — H&P
[facility] Medicine  History & Physical    Patient Name: Roosevelt Moser  MRN: [id number]  Patient Class: IP- Inpatient  Admission Date: 6/14/2024  Attending Physician: Ezra Villegas MD   Primary Care Provider: Miriam Primary Doctor         Patient information was obtained from patient, spouse/SO, and ER records.     Subjective:     Principal Problem:Acute renal failure superimposed on chronic kidney disease    Chief Complaint:   Chief Complaint   Patient presents with    Weakness     Pt brought to ED via EMS with complaint of weakness and falling, pt advised EMS his urine is very dark.          HPI: Roosevelt Moser is a 73 year old male with a previous medical history of atrial fibrillation on eliquis, HTN, DMII, obstructive sleep apnea, HLD, ORIF of left humerus and guillian-[place] who presented to the emergency room for weakness and multiple falls. Per wife of the patient he has had progressive weakness over the past week along with two falls one at 0300 Thursday and the other early morning Friday. Both time she had to activate EMS to pick patient up off floor due to weakness. Patient refused transport to hospital on both occasions. Today he slid out of his chair and was unable to get up without assistance and at this point the wife activated EMS to transport patient to the hospital. She endorses that two days ago she noticed that the patient had stopped urinating as he normally does. The patient concurs that he has noticed that his urine has decreased over the past two days and that it is dark. Patient denies dysuria or incomplete bladder emptying. Bladder scan showed zero upon admit and urine sample was obtained via straight cath in ED with only 100ml output. Patient denies decreased PO intake and keeps a bottle of water with him at all times. Initial ED work-up showed a creatinine of 5 and potassium of 6. Urine studies positive for infection and WBC 20.81 with procalcitonin at 19.75.  Blood cultures obtained and patient given 1 gram of rocephin and 1000ml of normal saline. Patient noted to bein afib with RVR and 20mg of diltiazem was administered IV which resulted in low blood pressure and 1 gram of calcium gluconate was administered. 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     Current Outpatient Medications on File Prior to Encounter   Medication Sig    apixaban (ELIQUIS) 5 mg Tab Take 1 tablet (5 mg total) by mouth 2 (two) times daily.    atorvastatin (LIPITOR) 10 MG tablet Take 1 tablet (10 mg total) by mouth once daily.    co-enzyme Q-10 30 mg capsule Take 30 mg by mouth once daily.    doxycycline (VIBRAMYCIN) 100 MG Cap Take 100 mg by mouth 2 (two) times daily.    ergocalciferol, vitamin D2, (VITAMIN D ORAL) Take 1 tablet by mouth once daily.    glimepiride (AMARYL) 2 MG tablet Take 1 tablet (2 mg total) by mouth before breakfast.    hydrALAZINE (APRESOLINE) 25 MG tablet Take 1 tablet (25 mg total) by mouth every 12 (twelve) hours.    metFORMIN (GLUCOPHAGE-XR) 500 MG ER 24hr tablet Take 2 tablets (1,000 mg total) by mouth 2 (two) times daily with meals.    metoprolol succinate (TOPROL-XL) 100 MG 24 hr tablet Take 1 tablet (100 mg total) by mouth once daily. (Patient taking differently: Take 100 mg by  mouth nightly.)     Family History    None       Tobacco Use    Smoking status: Never    Smokeless tobacco: Never   Substance and Sexual Activity    Alcohol use: Yes     Alcohol/week: 0.0 standard drinks of alcohol     Comment: social    Drug use: No    Sexual activity: Yes     Review of Systems   Genitourinary:  Positive for decreased urine volume and difficulty urinating.   Musculoskeletal:  Positive for gait problem.   Neurological:  Positive for weakness.        Generalized Weakness   All other systems reviewed and are negative.    Objective:     Vital Signs (Most Recent):  Temp: 98.6 °F (37 °C) (06/14/24 1157)  Pulse: 109 (06/14/24 1500)  Resp: 16 (06/14/24 1500)  BP: 122/60 (06/14/24 1430)  SpO2: 96 % (06/14/24 1500) Vital Signs (24h Range):  Temp:  [98.6 °F (37 °C)] 98.6 °F (37 °C)  Pulse:  [] 109  Resp:  [16-18] 16  SpO2:  [96 %-97 %] 96 %  BP: ()/(54-67) 122/60     Weight: 131.5 kg (290 lb)  Body mass index is 38.26 kg/m².     Physical Exam  Vitals reviewed.   Constitutional:       General: He is not in acute distress.     Appearance: He is obese.   HENT:      Head: Normocephalic and atraumatic.      Nose: Nose normal.      Mouth/Throat:      Mouth: Mucous membranes are dry.      Pharynx: Oropharynx is clear.   Eyes:      Extraocular Movements: Extraocular movements intact.      Pupils: Pupils are equal, round, and reactive to light.   Cardiovascular:      Rate and Rhythm: Tachycardia present. Rhythm irregular.   Pulmonary:      Effort: Pulmonary effort is normal.      Breath sounds: Normal breath sounds.   Abdominal:      General: Bowel sounds are normal.      Palpations: Abdomen is soft.   Musculoskeletal:         General: Normal range of motion.      Cervical back: Normal range of motion and neck supple.   Skin:     General: Skin is warm and dry.      Capillary Refill: Capillary refill takes less than 2 seconds.      Coloration: Skin is pale.   Neurological:      General: No focal deficit  present.      Mental Status: He is alert and oriented to person, place, and time. Mental status is at baseline.   Psychiatric:         Mood and Affect: Mood normal.         Behavior: Behavior normal.         Thought Content: Thought content normal.         Judgment: Judgment normal.              CRANIAL NERVES     CN III, IV, VI   Pupils are equal, round, and reactive to light.       Significant Labs: All pertinent labs within the past 24 hours have been reviewed.  CBC:   Recent Labs   Lab 06/14/24  1229   WBC 20.81*   HGB 11.5*   HCT 34.9*   *     CMP:   Recent Labs   Lab 06/14/24  1229      K 6.0*      CO2 12*      BUN 85*   CREATININE 5.0*   CALCIUM 9.1   PROT 6.5   ALBUMIN 1.6*   BILITOT 0.6   ALKPHOS 270*   AST 39   ALT 22   ANIONGAP 18*     Lactic Acid:   Recent Labs   Lab 06/14/24  1355   LACTATE 2.1     Urine Studies:   Recent Labs   Lab 06/14/24  1337   COLORU Orange*   APPEARANCEUA Cloudy*   PHUR 6.0   SPECGRAV 1.020   PROTEINUA 2+*   GLUCUA Trace*   KETONESU Negative   BILIRUBINUA Negative   OCCULTUA 3+*   NITRITE Negative   UROBILINOGEN Negative   LEUKOCYTESUR 3+*   RBCUA >100*   WBCUA >100*   BACTERIA Many*   SQUAMEPITHEL 1   HYALINECASTS 0     Procalcitonin: 19.75    Significant Imaging: I have reviewed all pertinent imaging results/findings within the past 24 hours.  Retroperitoneal US pending at time of note  Assessment/Plan:     * Acute renal failure superimposed on chronic kidney disease  Patient with acute kidney injury/acute renal failure likely due to pre-renal azotemia due to dehydration MARBIN is currently worsening. Baseline creatinine  1.5  - Labs reviewed- Renal function/electrolytes with Estimated Creatinine Clearance: 19 mL/min (A) (based on SCr of 5 mg/dL (H)). according to latest data.      -Strict Intake and output   -BMP Q 4 hours  -Nephrology consulted recommendations appreciated  -Avoid nephrotoxins and renally dose meds for GFR listed above.  -Renal  "Ultrasound pending  -Sodium bicarbonate infusion at 100ml/hour  -renal/diabetic diet      Severe sepsis  This patient does have evidence of infective focus  My overall impression is sepsis.  Source: Urinary Tract  Antibiotics given-   Antibiotics (72h ago, onward)      Rocephin 1 grams IV daily          Latest lactate reviewed-  Recent Labs   Lab 06/14/24  1355   LACTATE 2.1     Organ dysfunction indicated by Acute kidney injury    Limited fluid bolus of 1000ml normal saline. Per nephrology recommendation initiate sodium bicarb infusion    Post- resuscitation assessment Yes Perfusion exam was performed within 6 hours of septic shock presentation after bolus shows Adequate tissue perfusion assessed by non-invasive monitoring       Will Not start Pressors- Levophed for MAP of 65  Source control achieved by: IV antibiotics  Urine culture pending    Hyperkalemia  This patient has hyperkalemia which is uncontrolled. We will monitor for arrhythmias with EKG or continuous telemetry. We will treat the hyperkalemia with Potassium Binders and Calcium gluconate. The likely etiology of the hyperkalemia is MARBIN.  The patients latest potassium has been reviewed and the results are listed below  Recent Labs   Lab 06/14/24  1229   K 6.0*   -BMP Q 4 hours  -Lasix 60mg IV once per Nephrology recommendation          Atrial fibrillation with rapid ventricular response  Patient with Long standing persistent (>12 months) atrial fibrillation which is controlled currently with Beta Blocker and Calcium Channel Blocker. Patient is currently in atrial fibrillation.Patient currently takes Eliquis 5mg BID which has been continued    Type 2 diabetes mellitus  Patient's FSGs are uncontrolled due to hyperglycemia on current medication regimen.  Last A1c reviewed-   Lab Results   Component Value Date    HGBA1C 9.5 (H) 03/19/2024     Most recent fingerstick glucose reviewed- No results for input(s): "POCTGLUCOSE" in the last 24 hours.  Current " correctional scale  Low  Maintain anti-hyperglycemic dose as follows-   Antihyperglycemics (From admission, onward)      Start     Stop Route Frequency Ordered    06/14/24 1535  insulin aspart U-100 pen 0-5 Units         -- SubQ Before meals & nightly PRN 06/14/24 1438          Hold Oral hypoglycemics while patient is in the hospital.      VTE Risk Mitigation (From admission, onward)           Ordered     apixaban tablet 5 mg  2 times daily         06/14/24 1438     Reason for No Pharmacological VTE Prophylaxis  Once        Question:  Reasons:  Answer:  Already adequately anticoagulated on oral Anticoagulants    06/14/24 1438     IP VTE HIGH RISK PATIENT  Once         06/14/24 1438     Place sequential compression device  Until discontinued         06/14/24 1438                                    Lizett Elkins NP  Department of Hospital Medicine  [facility] [place] [facility]/Surg

## 2024-06-14 NOTE — SUBJECTIVE & OBJECTIVE
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     Current Outpatient Medications on File Prior to Encounter   Medication Sig    apixaban (ELIQUIS) 5 mg Tab Take 1 tablet (5 mg total) by mouth 2 (two) times daily.    atorvastatin (LIPITOR) 10 MG tablet Take 1 tablet (10 mg total) by mouth once daily.    co-enzyme Q-10 30 mg capsule Take 30 mg by mouth once daily.    doxycycline (VIBRAMYCIN) 100 MG Cap Take 100 mg by mouth 2 (two) times daily.    ergocalciferol, vitamin D2, (VITAMIN D ORAL) Take 1 tablet by mouth once daily.    glimepiride (AMARYL) 2 MG tablet Take 1 tablet (2 mg total) by mouth before breakfast.    hydrALAZINE (APRESOLINE) 25 MG tablet Take 1 tablet (25 mg total) by mouth every 12 (twelve) hours.    metFORMIN (GLUCOPHAGE-XR) 500 MG ER 24hr tablet Take 2 tablets (1,000 mg total) by mouth 2 (two) times daily with meals.    metoprolol succinate (TOPROL-XL) 100 MG 24 hr tablet Take 1 tablet (100 mg total) by mouth once daily. (Patient taking differently: Take 100 mg by mouth nightly.)     Family History    None       Tobacco Use    Smoking status: Never    Smokeless tobacco: Never   Substance and Sexual Activity    Alcohol use: Yes     Alcohol/week: 0.0 standard drinks of alcohol     Comment: social    Drug use: No    Sexual activity: Yes     Review of Systems   Genitourinary:  Positive for decreased  urine volume and difficulty urinating.   Musculoskeletal:  Positive for gait problem.   Neurological:  Positive for weakness.        Generalized Weakness   All other systems reviewed and are negative.    Objective:     Vital Signs (Most Recent):  Temp: 98.6 °F (37 °C) (06/14/24 1157)  Pulse: 109 (06/14/24 1500)  Resp: 16 (06/14/24 1500)  BP: 122/60 (06/14/24 1430)  SpO2: 96 % (06/14/24 1500) Vital Signs (24h Range):  Temp:  [98.6 °F (37 °C)] 98.6 °F (37 °C)  Pulse:  [] 109  Resp:  [16-18] 16  SpO2:  [96 %-97 %] 96 %  BP: ()/(54-67) 122/60     Weight: 131.5 kg (290 lb)  Body mass index is 38.26 kg/m².     Physical Exam  Vitals reviewed.   Constitutional:       General: He is not in acute distress.     Appearance: He is obese.   HENT:      Head: Normocephalic and atraumatic.      Nose: Nose normal.      Mouth/Throat:      Mouth: Mucous membranes are dry.      Pharynx: Oropharynx is clear.   Eyes:      Extraocular Movements: Extraocular movements intact.      Pupils: Pupils are equal, round, and reactive to light.   Cardiovascular:      Rate and Rhythm: Tachycardia present. Rhythm irregular.   Pulmonary:      Effort: Pulmonary effort is normal.      Breath sounds: Normal breath sounds.   Abdominal:      General: Bowel sounds are normal.      Palpations: Abdomen is soft.   Musculoskeletal:         General: Normal range of motion.      Cervical back: Normal range of motion and neck supple.   Skin:     General: Skin is warm and dry.      Capillary Refill: Capillary refill takes less than 2 seconds.      Coloration: Skin is pale.   Neurological:      General: No focal deficit present.      Mental Status: He is alert and oriented to person, place, and time. Mental status is at baseline.   Psychiatric:         Mood and Affect: Mood normal.         Behavior: Behavior normal.         Thought Content: Thought content normal.         Judgment: Judgment normal.              CRANIAL NERVES     CN III, IV, VI   Pupils  are equal, round, and reactive to light.       Significant Labs: All pertinent labs within the past 24 hours have been reviewed.  CBC:   Recent Labs   Lab 06/14/24  1229   WBC 20.81*   HGB 11.5*   HCT 34.9*   *     CMP:   Recent Labs   Lab 06/14/24  1229      K 6.0*      CO2 12*      BUN 85*   CREATININE 5.0*   CALCIUM 9.1   PROT 6.5   ALBUMIN 1.6*   BILITOT 0.6   ALKPHOS 270*   AST 39   ALT 22   ANIONGAP 18*     Lactic Acid:   Recent Labs   Lab 06/14/24  1355   LACTATE 2.1     Urine Studies:   Recent Labs   Lab 06/14/24  1337   COLORU Orange*   APPEARANCEUA Cloudy*   PHUR 6.0   SPECGRAV 1.020   PROTEINUA 2+*   GLUCUA Trace*   KETONESU Negative   BILIRUBINUA Negative   OCCULTUA 3+*   NITRITE Negative   UROBILINOGEN Negative   LEUKOCYTESUR 3+*   RBCUA >100*   WBCUA >100*   BACTERIA Many*   SQUAMEPITHEL 1   HYALINECASTS 0     Procalcitonin: 19.75    Significant Imaging: I have reviewed all pertinent imaging results/findings within the past 24 hours.  Retroperitoneal US pending at time of note

## 2024-06-14 NOTE — NURSING
Nurses Note -- 4 Eyes      6/14/2024   4:09 PM      Skin assessed during: Admit      [] No Altered Skin Integrity Present    []Prevention Measures Documented      [x] Yes- Altered Skin Integrity Present or Discovered   [x] LDA Added if Not in Epic (Describe Wound)   [] New Altered Skin Integrity was Present on Admit and Documented in LDA   [] Wound Image Taken    Wound Care Consulted? Yes    Attending Nurse: Rosalia HACKETT    Second RN/Staff Member:   ROXANN May

## 2024-06-14 NOTE — TELEPHONE ENCOUNTER
Call placed to patient's wife (Shana). No answer received. Left message requesting return call to office.

## 2024-06-14 NOTE — ASSESSMENT & PLAN NOTE
This patient does have evidence of infective focus  My overall impression is sepsis.  Source: Urinary Tract  Antibiotics given-   Antibiotics (72h ago, onward)      Rocephin 1 grams IV daily          Latest lactate reviewed-  Recent Labs   Lab 06/14/24  1355   LACTATE 2.1     Organ dysfunction indicated by Acute kidney injury    Fluid challenge Ideal Body Weight- The patient's ideal body weight is Ideal body weight: 79.9 kg (176 lb 2.4 oz) which will be used to calculate fluid bolus of 30 ml/kg for treatment of septic shock.      Post- resuscitation assessment Yes Perfusion exam was performed within 6 hours of septic shock presentation after bolus shows Adequate tissue perfusion assessed by non-invasive monitoring       Will Not start Pressors- Levophed for MAP of 65  Source control achieved by: IV antibiotics  Urine culture pending

## 2024-06-14 NOTE — ASSESSMENT & PLAN NOTE
Patient with acute kidney injury/acute renal failure likely due to pre-renal azotemia due to dehydration MARBIN is currently worsening. Baseline creatinine  1.5  - Labs reviewed- Renal function/electrolytes with Estimated Creatinine Clearance: 19 mL/min (A) (based on SCr of 5 mg/dL (H)). according to latest data.      -Strict Intake and output   -BMP Q 4 hours  -Nephrology consulted recommendations appreciated  -Avoid nephrotoxins and renally dose meds for GFR listed above.  -Renal Ultrasound pending  -Sodium bicarbonate infusion at 100ml/hour  -renal/diabetic diet

## 2024-06-14 NOTE — TELEPHONE ENCOUNTER
Follow up call placed to patient/patient's wife (Shana). No answer received. Left message requesting return call to office.

## 2024-06-14 NOTE — CONSULTS
Nephrology Consult Note        Patient Name: Roosevelt Moser  MRN: [id number]    Patient Class: IP- Inpatient   Admission Date: 6/14/2024  Length of Stay: 0 days  Date of Service: 6/14/2024    Attending Physician: Ezra Villegas MD  Primary Care Provider: No, Primary Doctor    Reason for Consult: marbin/hyperkalemia    SUBJECTIVE:     HPI: 73M with h/o DM, HTN, AF, GBS and recent shoulder surgery was brought to ER by EMS with recurrent falls in the last 24h. Reports decreased UO but no fever, cough, SOB, dysuria. Upon admission, noted to be in MARBIN with sCr 5, baseline 1 month ago is 1, hyperkalemia with K 6, acidosis with CO2 12, hypoalbuminemia with albumin 1.6. Lactate notably 2.1. Procal 20. A1c 9.5 in 3/2024. UA with hematuria and pyuria, urine Cx pending. Notably on Apixaban. WBC in blood elevated to 20. Plt 540. RP US ordered. Afebrile, normotensive, received IVF and abx. Lokelma, ca gluconate given bicarb gtt ordered. Straight cath in ER with only 100cc urine out.    Review of Systems:  Constitutional:  Negative for chills, fever, malaise/fatigue and weight loss.   HENT:  Negative for hearing loss and nosebleeds.    Eyes:  Negative for blurred vision, double vision and photophobia.   Respiratory:  Negative for cough, shortness of breath and wheezing.    Cardiovascular:  Negative for chest pain, palpitations and leg swelling.   Gastrointestinal:  Negative for abdominal pain, constipation, diarrhea, heartburn, nausea and vomiting.   Genitourinary:  Negative for dysuria, frequency and urgency.   Musculoskeletal:  POSITIVE for falls, joint pain and myalgias.   Skin:  Negative for itching and rash.   Neurological:  Negative for dizziness, speech change, focal weakness, loss of consciousness and headaches.   Endo/Heme/Allergies:  Does not bruise/bleed easily.   Psychiatric/Behavioral:  Negative for depression and substance abuse. The patient is not nervous/anxious.      ASSESSMENT/PLAN:     Oliguric MARBIN due to ATN due to  "sepsis due to UTI and/or PNA  Acidosis  Hyperkalemia  CKD stage 2 with diabetic proteinuria and severe hypoalbuminemia  HTN  DM poorly controlled A1c 9.5  Anemia  GBS  No NSAIDs, ACEI/ARB, IV contrast or other nephrotoxins.  Keep MAP > 60, SBP > 100.  Dose meds for GFR < 30 ml/min.  Renal diet - low K, low phos. Add protean supplement tomorrow.  Agree with lokelma, ca gluconate, bicarb gtt, add lasix 60mg IV x1 and PRN, labs q4 until K controlled. Get PICC line if possible. May need insulin as well.  Retroperitoneal US, gonzales if there are residuals. Strict IOs as possible.  Abx for UTI/PNA. Follow cx.  Control DM, hold metformin, give insulin.    Hgb and HCT are acceptable. Monitor for now.    Tolerate asymptomatic HTN up to -160.    Thank you for allowing us to participate in the care of your patient!   We will follow the patient and provide recommendations as needed.         Laboratory:  Recent Labs   Lab 06/14/24  1229      K 6.0*      CO2 12*   BUN 85*   CREATININE 5.0*          Recent Labs   Lab 06/14/24  1229   CALCIUM 9.1   ALBUMIN 1.6*             No results for input(s): "POCTGLUCOSE" in the last 168 hours.    Recent Labs   Lab 04/28/23  0742 07/31/23  0955 03/19/24  0830   Hemoglobin A1C 8.7 H 8.8 H 9.5 H       Recent Labs   Lab 06/14/24  1229   WBC 20.81*   HGB 11.5*   HCT 34.9*   *   MCV 81*   MCHC 33.0   MONO 7.9  1.6*   EOSINOPHIL 0.3       Recent Labs   Lab 06/14/24  1229   BILITOT 0.6   PROT 6.5   ALBUMIN 1.6*   ALKPHOS 270*   ALT 22   AST 39       Recent Labs   Lab 12/16/22  1238 03/08/24  1034 03/25/24  1022 06/14/24  1337   Color, UA Yellow Yellow Yellow Tina A   Appearance, UA Clear Clear Hazy A Cloudy A   pH, UA 6.0 5.0 6.0 6.0   Specific [place], UA 1.020 1.010 1.020 1.020   Protein, UA 1+ A Trace A 1+ A 2+ A   Glucose, UA 1+ A 3+ A Negative Trace A   Ketones, UA Negative Negative Negative Negative   Urobilinogen, UA  --  Negative Negative Negative "   Bilirubin (UA) Negative Negative Negative Negative   Occult Blood UA 3+ A 2+ A 2+ A 3+ A   Nitrite, UA Negative Negative Negative Negative   RBC, UA 20 H 13 H >100 H >100 H   WBC, UA 81 H 3 18 H >100 H   Bacteria Rare None Rare Many A   Hyaline Casts, UA 0  --  0 0             Microbiology Results (last 7 days)       Procedure Component Value Units Date/Time    Blood culture [[id number]] Collected: 06/14/24 1355    Order Status: Sent Specimen: Blood from Peripheral, Antecubital, Right     Blood culture [[id number]] Collected: 06/14/24 1355    Order Status: Sent Specimen: Blood from Peripheral, Hand, Right     Urine culture [[id number]] Collected: 06/14/24 1337    Order Status: No result Specimen: Urine Updated: 06/14/24 1347            Review of patient's allergies indicates:  No Known Allergies    Outpatient meds:  No current facility-administered medications on file prior to encounter.     Current Outpatient Medications on File Prior to Encounter   Medication Sig Dispense Refill    apixaban (ELIQUIS) 5 mg Tab Take 1 tablet (5 mg total) by mouth 2 (two) times daily. 60 tablet 1    atorvastatin (LIPITOR) 10 MG tablet Take 1 tablet (10 mg total) by mouth once daily. 90 tablet 3    co-enzyme Q-10 30 mg capsule Take 30 mg by mouth once daily.      doxycycline (VIBRAMYCIN) 100 MG Cap Take 100 mg by mouth 2 (two) times daily.      ergocalciferol, vitamin D2, (VITAMIN D ORAL) Take 1 tablet by mouth once daily.      glimepiride (AMARYL) 2 MG tablet Take 1 tablet (2 mg total) by mouth before breakfast. 90 tablet 3    hydrALAZINE (APRESOLINE) 25 MG tablet Take 1 tablet (25 mg total) by mouth every 12 (twelve) hours. 60 tablet 3    metFORMIN (GLUCOPHAGE-XR) 500 MG ER 24hr tablet Take 2 tablets (1,000 mg total) by mouth 2 (two) times daily with meals. 360 tablet 3    metoprolol succinate (TOPROL-XL) 100 MG 24 hr tablet Take 1 tablet (100 mg total) by mouth once daily. (Patient taking differently: Take 100 mg by mouth  nightly.) 90 tablet 3       Scheduled meds:   apixaban  5 mg Oral BID    calcium chloride        metoprolol succinate  100 mg Oral Nightly       Infusions:   sodium bicarbonate 150 mEq in dextrose 5 % (D5W) 1,000 mL infusion   Intravenous Continuous           PRN meds:    Current Facility-Administered Medications:     albuterol-ipratropium, 3 mL, Nebulization, Q6H PRN    aluminum & magnesium hydroxide-simethicone, 15 mL, Oral, QID PRN    calcium chloride, , ,     dextrose 10%, 12.5 g, Intravenous, PRN    dextrose 10%, 25 g, Intravenous, PRN    glucagon (human recombinant), 1 mg, Intramuscular, PRN    glucose, 16 g, Oral, PRN    glucose, 24 g, Oral, PRN    HYDROcodone-acetaminophen, 1 tablet, Oral, Q6H PRN    insulin aspart U-100, 0-5 Units, Subcutaneous, QID (AC + HS) PRN    naloxone, 0.02 mg, Intravenous, PRN    ondansetron, 4 mg, Intravenous, Q8H PRN    prochlorperazine, 5 mg, Intravenous, Q6H PRN    simethicone, 1 tablet, Oral, QID PRN    sodium chloride 0.9%, 10 mL, Intravenous, Q12H PRN    Past Medical History:   Diagnosis Date    A-fib 2024    Diabetes mellitus, type 2 2021    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;  Service: Orthopedics;  Laterality: Left;  Accumed, Synthes Small Frag set Avelino verified 3/22/24 ark     No family history on file.  Social History     Tobacco Use    Smoking status: Never    Smokeless tobacco: Never   Substance Use Topics    Alcohol use: Yes     Alcohol/week: 0.0 standard drinks of alcohol     Comment: social    Drug use: No       OBJECTIVE:     Vital Signs and IO:  Temp:  [98.6 °F (37 °C)]   Pulse:  []   Resp:  [16-18]   BP: ()/(54-67)   SpO2:  [96 %-97 %]   No intake/output data  recorded.  Wt Readings from Last 5 Encounters:   06/14/24 131.5 kg (290 lb)   06/04/24 132.5 kg (292 lb 1.8 oz)   05/07/24 132.5 kg (292 lb 1.8 oz)   04/08/24 132.5 kg (292 lb 3.2 oz)   03/26/24 (!) 136.1 kg (300 lb 0.7 oz)     Body mass index is 38.26 kg/m².    Physical Exam  Constitutional:       General: Patient is not in acute distress.     Appearance: Patient is well-developed. She is not diaphoretic.   HENT:      Head: Normocephalic and atraumatic.      Mouth/Throat: Mucous membranes are moist.   Eyes:      General: No scleral icterus.     Pupils: Pupils are equal, round, and reactive to light.   Cardiovascular:      Rate and Rhythm: Normal rate and regular rhythm.   Pulmonary:      Effort: Pulmonary effort is normal. No respiratory distress.      Breath sounds: No stridor.   Abdominal:      General: There is no distension.      Palpations: Abdomen is soft.   Musculoskeletal:         General: No deformity. Normal range of motion.      Cervical back: Neck supple.   Skin:     General: Skin is warm and dry.      Findings: No rash present. No erythema.   Neurological:      Mental Status: Patient is alert and oriented to person, place, and time.      Cranial Nerves: No cranial nerve deficit.   Psychiatric:         Behavior: Behavior normal.          Patient care time was spent personally by me on the following activities:     Obtaining a history.  Examination of patient.  Providing medical care at the patients bedside.  Developing a treatment plan with patient or surrogate and bedside caregivers.  Ordering and reviewing laboratory studies, radiographic studies, pulse oximetry.  Ordering and performing treatments and interventions.  Evaluation of patient's response to treatment.  Discussions with consultants while on the unit and immediately available to the patient.  Re-evaluation of the patient's condition.  Documentation in the medical record.     Anjum Mcmullen MD    [facility]  [address]  [place] [address]    [phone] - tel  [phone] - fax    6/14/2024

## 2024-06-15 LAB
ALBUMIN SERPL BCP-MCNC: 1.4 G/DL (ref 3.5–5.2)
ALP SERPL-CCNC: 238 U/L (ref 55–135)
ALT SERPL W/O P-5'-P-CCNC: 19 U/L (ref 10–44)
ANION GAP SERPL CALC-SCNC: 16 MMOL/L (ref 8–16)
ANION GAP SERPL CALC-SCNC: 16 MMOL/L (ref 8–16)
ANION GAP SERPL CALC-SCNC: 17 MMOL/L (ref 8–16)
AST SERPL-CCNC: 31 U/L (ref 10–40)
BILIRUB SERPL-MCNC: 0.5 MG/DL (ref 0.1–1)
BUN SERPL-MCNC: 87 MG/DL (ref 8–23)
BUN SERPL-MCNC: 89 MG/DL (ref 8–23)
BUN SERPL-MCNC: 93 MG/DL (ref 8–23)
CALCIUM SERPL-MCNC: 8.5 MG/DL (ref 8.7–10.5)
CALCIUM SERPL-MCNC: 8.7 MG/DL (ref 8.7–10.5)
CALCIUM SERPL-MCNC: 8.7 MG/DL (ref 8.7–10.5)
CHLORIDE SERPL-SCNC: 102 MMOL/L (ref 95–110)
CHLORIDE SERPL-SCNC: 102 MMOL/L (ref 95–110)
CHLORIDE SERPL-SCNC: 104 MMOL/L (ref 95–110)
CO2 SERPL-SCNC: 16 MMOL/L (ref 23–29)
CO2 SERPL-SCNC: 17 MMOL/L (ref 23–29)
CO2 SERPL-SCNC: 19 MMOL/L (ref 23–29)
CREAT SERPL-MCNC: 4.9 MG/DL (ref 0.5–1.4)
CREAT SERPL-MCNC: 5 MG/DL (ref 0.5–1.4)
CREAT SERPL-MCNC: 5 MG/DL (ref 0.5–1.4)
ERYTHROCYTE [DISTWIDTH] IN BLOOD BY AUTOMATED COUNT: 21.3 % (ref 11.5–14.5)
EST. GFR  (NO RACE VARIABLE): 12 ML/MIN/1.73 M^2
GLUCOSE SERPL-MCNC: 131 MG/DL (ref 70–110)
GLUCOSE SERPL-MCNC: 214 MG/DL (ref 70–110)
GLUCOSE SERPL-MCNC: 222 MG/DL (ref 70–110)
HCT VFR BLD AUTO: 33.6 % (ref 40–54)
HGB BLD-MCNC: 11.2 G/DL (ref 14–18)
MAGNESIUM SERPL-MCNC: 1.7 MG/DL (ref 1.6–2.6)
MCH RBC QN AUTO: 26.4 PG (ref 27–31)
MCHC RBC AUTO-ENTMCNC: 33.3 G/DL (ref 32–36)
MCV RBC AUTO: 79 FL (ref 82–98)
PHOSPHATE SERPL-MCNC: 5 MG/DL (ref 2.7–4.5)
PLATELET # BLD AUTO: 585 K/UL (ref 150–450)
PMV BLD AUTO: 12.3 FL (ref 9.2–12.9)
POCT GLUCOSE: 168 MG/DL (ref 70–110)
POCT GLUCOSE: 205 MG/DL (ref 70–110)
POCT GLUCOSE: 224 MG/DL (ref 70–110)
POCT GLUCOSE: 236 MG/DL (ref 70–110)
POTASSIUM SERPL-SCNC: 5.3 MMOL/L (ref 3.5–5.1)
POTASSIUM SERPL-SCNC: 5.4 MMOL/L (ref 3.5–5.1)
POTASSIUM SERPL-SCNC: 5.6 MMOL/L (ref 3.5–5.1)
PROT SERPL-MCNC: 6.2 G/DL (ref 6–8.4)
RBC # BLD AUTO: 4.24 M/UL (ref 4.6–6.2)
SODIUM SERPL-SCNC: 136 MMOL/L (ref 136–145)
SODIUM SERPL-SCNC: 136 MMOL/L (ref 136–145)
SODIUM SERPL-SCNC: 137 MMOL/L (ref 136–145)
WBC # BLD AUTO: 14.76 K/UL (ref 3.9–12.7)

## 2024-06-15 PROCEDURE — 86580 TB INTRADERMAL TEST: CPT | Performed by: HOSPITALIST

## 2024-06-15 PROCEDURE — 97110 THERAPEUTIC EXERCISES: CPT

## 2024-06-15 PROCEDURE — 80053 COMPREHEN METABOLIC PANEL: CPT | Performed by: STUDENT IN AN ORGANIZED HEALTH CARE EDUCATION/TRAINING PROGRAM

## 2024-06-15 PROCEDURE — 84100 ASSAY OF PHOSPHORUS: CPT | Performed by: STUDENT IN AN ORGANIZED HEALTH CARE EDUCATION/TRAINING PROGRAM

## 2024-06-15 PROCEDURE — 36415 COLL VENOUS BLD VENIPUNCTURE: CPT

## 2024-06-15 PROCEDURE — 36415 COLL VENOUS BLD VENIPUNCTURE: CPT | Performed by: STUDENT IN AN ORGANIZED HEALTH CARE EDUCATION/TRAINING PROGRAM

## 2024-06-15 PROCEDURE — 27000221 HC OXYGEN, UP TO 24 HOURS

## 2024-06-15 PROCEDURE — 25000003 PHARM REV CODE 250: Performed by: INTERNAL MEDICINE

## 2024-06-15 PROCEDURE — 97162 PT EVAL MOD COMPLEX 30 MIN: CPT

## 2024-06-15 PROCEDURE — 25000003 PHARM REV CODE 250: Performed by: STUDENT IN AN ORGANIZED HEALTH CARE EDUCATION/TRAINING PROGRAM

## 2024-06-15 PROCEDURE — 21400001 HC TELEMETRY ROOM

## 2024-06-15 PROCEDURE — 83735 ASSAY OF MAGNESIUM: CPT | Performed by: STUDENT IN AN ORGANIZED HEALTH CARE EDUCATION/TRAINING PROGRAM

## 2024-06-15 PROCEDURE — 97535 SELF CARE MNGMENT TRAINING: CPT

## 2024-06-15 PROCEDURE — 80048 BASIC METABOLIC PNL TOTAL CA: CPT

## 2024-06-15 PROCEDURE — 99900035 HC TECH TIME PER 15 MIN (STAT)

## 2024-06-15 PROCEDURE — 80048 BASIC METABOLIC PNL TOTAL CA: CPT | Mod: 91

## 2024-06-15 PROCEDURE — 11000001 HC ACUTE MED/SURG PRIVATE ROOM

## 2024-06-15 PROCEDURE — 25000003 PHARM REV CODE 250

## 2024-06-15 PROCEDURE — 85027 COMPLETE CBC AUTOMATED: CPT

## 2024-06-15 PROCEDURE — 5A09357 ASSISTANCE WITH RESPIRATORY VENTILATION, LESS THAN 24 CONSECUTIVE HOURS, CONTINUOUS POSITIVE AIRWAY PRESSURE: ICD-10-PCS

## 2024-06-15 PROCEDURE — 97530 THERAPEUTIC ACTIVITIES: CPT

## 2024-06-15 PROCEDURE — 25000003 PHARM REV CODE 250: Performed by: HOSPITALIST

## 2024-06-15 PROCEDURE — 30200315 PPD INTRADERMAL TEST REV CODE 302: Performed by: HOSPITALIST

## 2024-06-15 PROCEDURE — A4216 STERILE WATER/SALINE, 10 ML: HCPCS | Performed by: STUDENT IN AN ORGANIZED HEALTH CARE EDUCATION/TRAINING PROGRAM

## 2024-06-15 PROCEDURE — 63600175 PHARM REV CODE 636 W HCPCS

## 2024-06-15 PROCEDURE — 97165 OT EVAL LOW COMPLEX 30 MIN: CPT

## 2024-06-15 PROCEDURE — 94761 N-INVAS EAR/PLS OXIMETRY MLT: CPT

## 2024-06-15 RX ORDER — ATORVASTATIN CALCIUM 10 MG/1
10 TABLET, FILM COATED ORAL NIGHTLY
Status: DISCONTINUED | OUTPATIENT
Start: 2024-06-15 | End: 2024-06-24

## 2024-06-15 RX ADMIN — SODIUM BICARBONATE: 84 INJECTION, SOLUTION INTRAVENOUS at 04:06

## 2024-06-15 RX ADMIN — SODIUM CHLORIDE, PRESERVATIVE FREE 10 ML: 5 INJECTION INTRAVENOUS at 12:06

## 2024-06-15 RX ADMIN — CEFTRIAXONE SODIUM 1 G: 1 INJECTION, POWDER, FOR SOLUTION INTRAMUSCULAR; INTRAVENOUS at 04:06

## 2024-06-15 RX ADMIN — SODIUM BICARBONATE: 84 INJECTION, SOLUTION INTRAVENOUS at 09:06

## 2024-06-15 RX ADMIN — INSULIN ASPART 2 UNITS: 100 INJECTION, SOLUTION INTRAVENOUS; SUBCUTANEOUS at 04:06

## 2024-06-15 RX ADMIN — SODIUM ZIRCONIUM CYCLOSILICATE 10 G: 5 POWDER, FOR SUSPENSION ORAL at 09:06

## 2024-06-15 RX ADMIN — APIXABAN 5 MG: 2.5 TABLET, FILM COATED ORAL at 09:06

## 2024-06-15 RX ADMIN — ATORVASTATIN CALCIUM 10 MG: 10 TABLET, FILM COATED ORAL at 09:06

## 2024-06-15 RX ADMIN — SODIUM CHLORIDE, PRESERVATIVE FREE 10 ML: 5 INJECTION INTRAVENOUS at 05:06

## 2024-06-15 RX ADMIN — INSULIN ASPART 2 UNITS: 100 INJECTION, SOLUTION INTRAVENOUS; SUBCUTANEOUS at 11:06

## 2024-06-15 RX ADMIN — SODIUM BICARBONATE: 84 INJECTION, SOLUTION INTRAVENOUS at 12:06

## 2024-06-15 RX ADMIN — HYDROCODONE BITARTRATE AND ACETAMINOPHEN 1 TABLET: 7.5; 325 TABLET ORAL at 07:06

## 2024-06-15 RX ADMIN — APIXABAN 5 MG: 2.5 TABLET, FILM COATED ORAL at 08:06

## 2024-06-15 RX ADMIN — HYDROCODONE BITARTRATE AND ACETAMINOPHEN 1 TABLET: 7.5; 325 TABLET ORAL at 10:06

## 2024-06-15 RX ADMIN — INSULIN ASPART 2 UNITS: 100 INJECTION, SOLUTION INTRAVENOUS; SUBCUTANEOUS at 07:06

## 2024-06-15 RX ADMIN — HYDROCODONE BITARTRATE AND ACETAMINOPHEN 1 TABLET: 7.5; 325 TABLET ORAL at 02:06

## 2024-06-15 RX ADMIN — METOPROLOL SUCCINATE 100 MG: 50 TABLET, FILM COATED, EXTENDED RELEASE ORAL at 09:06

## 2024-06-15 RX ADMIN — TUBERCULIN PURIFIED PROTEIN DERIVATIVE 5 UNITS: 5 INJECTION, SOLUTION INTRADERMAL at 01:06

## 2024-06-15 NOTE — ASSESSMENT & PLAN NOTE
In setting of acute on chronic renal failure  Lokelma given x 1  Avoid ACE/ARB, K+ replacement  Trending down  tele

## 2024-06-15 NOTE — CONSULTS
CM addressed consult.  Spoke to pt's spouse in detail about SNF VS inpt rehab. Spouse agrees pt more appropriate for SNF. CM will assist with SNF placement

## 2024-06-15 NOTE — PLAN OF CARE
Goals to be met by: 7/13/24     Patient will increase functional independence with ADLs by performing:    Feeding with [place].  UE Dressing with Moderate Assistance.  LE Dressing with Moderate Assistance.  Grooming while seated with Set-up Assistance.  Toileting from bedside commode with Moderate Assistance for hygiene and clothing management.   Bathing from  shower chair/bench with Moderate Assistance.  Toilet transfer to bedside commode with Minimal Assistance.  Increased strength and functional activity tolerance for ADL's/IADL's

## 2024-06-15 NOTE — ASSESSMENT & PLAN NOTE
In setting of acute UTI  BCX NGTD  Ur CX NGTD  Continue Rocephin as he is clinically much improved  Trend PCT  No obstruction on renal u/s  He's urinating ok today

## 2024-06-15 NOTE — SUBJECTIVE & OBJECTIVE
Interval History:     6/15/24  Assumed care, pt seen and examined, chart reviewed.  Pt HR down, off dilt, denies cp/sob.  Debilitated, having mobility issues at home, await PT/OT input for post acute needs.     Review of Systems    11 pt ROS negative except as noted o/w    Objective:     Vital Signs (Most Recent):  Temp: 98 °F (36.7 °C) (06/15/24 1200)  Pulse: 76 (06/15/24 1200)  Resp: (!) 26 (06/15/24 1200)  BP: (!) 125/58 (06/15/24 1200)  SpO2: 96 % (06/15/24 1200) Vital Signs (24h Range):  Temp:  [97.4 °F (36.3 °C)-98.3 °F (36.8 °C)] 98 °F (36.7 °C)  Pulse:  [] 76  Resp:  [0-50] 26  SpO2:  [93 %-97 %] 96 %  BP: ()/() 125/58     Weight: (!) 138 kg (304 lb 3.8 oz)  Body mass index is 41.26 kg/m².    Intake/Output Summary (Last 24 hours) at 6/15/2024 1243  Last data filed at 6/15/2024 1209  Gross per 24 hour   Intake 3537.87 ml   Output 1950 ml   Net 1587.87 ml         Physical Exam      General:  A&O, NAD  HEENT: neck supple, PERRL/EOMI, EAC clear bilaterally, normal bilateral carotid upstroke w/o bruits, inf turbs clear bilaterally, OC/OP clear  Lungs: CTAB  CV: RRR w/o M/G/R  Abdomen: soft, NTND, no HSM, no bruits/masses/pulsations  Ext: no edema  : def  Rectal: def  Neuro: NF    Significant Labs: All pertinent labs within the past 24 hours have been reviewed.    Significant Imaging: I have reviewed all pertinent imaging results/findings within the past 24 hours.

## 2024-06-15 NOTE — PT/OT/SLP EVAL
Occupational Therapy   Evaluation    Name: Roosevelt Moser  MRN: [id number]  Admitting Diagnosis: Acute renal failure superimposed on chronic kidney disease  Recent Surgery: * No surgery found *      Recommendations:     Discharge Recommendations: High Intensity Therapy  Discharge Equipment Recommendations:  other (see comments) (TBD)  Barriers to discharge:       Assessment:     Roosevelt Moser is a 73 y.o. male with a medical diagnosis of Acute renal failure superimposed on chronic kidney disease.  He presents with the following performance deficits affecting function: weakness, impaired endurance, impaired self care skills, impaired functional mobility, impaired sensation, decreased upper extremity function, decreased lower extremity function, pain, decreased ROM, impaired coordination, impaired fine motor, edema.  Spouse present.    Rehab Prognosis: Good; patient would benefit from acute skilled OT services to address these deficits and reach maximum level of function.       Plan:     Patient to be seen 6 x/week to address the above listed problems via self-care/home management, therapeutic activities, therapeutic exercises  Plan of Care Expires: 07/13/24  Plan of Care Reviewed with: patient, spouse    Subjective     Chief Complaint: Pain/unable to use LUE  Patient/Family Comments/goals: To get better    Occupational Profile:  Living Environment: Pt lives with spouse in 1 story home with 1 SUDHEER. Pt has roll in shower with built in seat/grab bars; Pt has raised toilet(borrowed BSC over toilet).   Previous level of function: Assistance with ADL's since humerus fracture 3/4/24. ORIF 6/4/24 per spouse. No very active since retiring 10 years ago and becoming more debilitated over time. Neuropathy bilateral feet since Guillain [place].  Reports unable to move L elbow, wrist. Hand since surgery secondary to pain.  Roles and Routines: Spouse  Equipment Used at Home: cane, straight, bedside commode, raised toilet, grab bar, other  (see comments) (Built in seat in roll in shower)  Assistance upon Discharge: Spouse    Pain/Comfort:  Pain Rating 1: 8/10  Location - Side 1: Left  Location 1: arm  Pain Addressed 1: Reposition  Pain Rating Post-Intervention 1: 2/10    Patients cultural, spiritual, [employer] conflicts given the current situation:      Objective:     Communicated with: Nurse Lindsey prior to session.  Patient found HOB elevated with bed alarm, blood pressure cuff, peripheral IV, PureWick, telemetry upon OT entry to room.    General Precautions: Standard, fall  Orthopedic Precautions:  (L humerus ORIF 3/25/24. Pts spouse said due to start outpatient therapy this week. Recent follow up with Dr. Cooper spouse states pt is not supposed to lift more than a cell phone, but encourages L elbow/wrist/hand AROM.)  Braces:    Respiratory Status: Room air    Occupational Performance:      Activities of Daily Living:  Feeding:  minimum assistance    Grooming: moderate assistance    Bathing: maximal assistance    Upper Body Dressing: maximal assistance    Lower Body Dressing: maximal assistance    Toileting: Purewick in place      Cognitive/Visual Perceptual:  Pt alert and oriented    Physical Exam:  Upper Extremity Strength:    -       Right Upper Extremity: WFL  -       Left Upper Extremity: Pts spouse reports recent history of L humerus fracture 3/4/24 with ORIF L humerus 3/25/24 Spouse reports pt is only allowed to  items the weight of a cell phone and was due to begin light PT this week. When initially asked, pt said he could not move his elbow, wrist, and hand secondary to pain, but when distracted, pt was able to participate in AAROM L elbow and AROM L wrist/hand with encouragement and education. Pt with edema L hand with concern for wedding band creating a skin issue. OT spoke with nurse Lindsey and OT provided ice, massage, and instruction in AROM L wrist/hand/elbow and was able to remove ring and handed directly to pts spouse.  Nurse Rosalia advised.  OT provided education in importance of AROM L elbow/wrist/hand to reduce risk of weakness and edema. Pt/spouse verbalized understanding.    AMPAC 6 Click ADL:  AMPAC Total Score: 14    Treatment & Education:  OT provided education in role of OT. Patient verbalized understanding and participated in OT.  OT provided instruction in home safety with ADL/IADL including review of home set up and DME/AE. Patient verbalized understanding.  OT provided education in calling for assist. Patient verbalized understanding.        Patient left HOB elevated with all lines intact, call button in reach, bed alarm on, Nurse Rosalia  notified, and Spouse  present    GOALS:   Multidisciplinary Problems       Occupational Therapy Goals          Problem: Occupational Therapy    Goal Priority Disciplines Outcome Interventions   Occupational Therapy Goal     OT, PT/OT     Description: Goals to be met by: 7/13/24     Patient will increase functional independence with ADLs by performing:    Feeding with [place].  UE Dressing with Moderate Assistance.  LE Dressing with Moderate Assistance.  Grooming while seated with Set-up Assistance.  Toileting from bedside commode with Moderate Assistance for hygiene and clothing management.   Bathing from  shower chair/bench with Moderate Assistance.  Toilet transfer to bedside commode with Minimal Assistance.  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    Hyperlipidemia     Hypertension 2016         Past Surgical History:   Procedure Laterality Date    OPEN REDUCTION AND INTERNAL FIXATION (ORIF) OF FRACTURE OF PROXIMAL HUMERUS Left 3/25/2024    Procedure: ORIF, FRACTURE, HUMERUS, PROXIMAL/IM HANNA, LEFT;  Surgeon: Nathan Cooper MD;  Location: [facility];  Service: Orthopedics;  Laterality: Left;  Accumed,  Synthes Small Frag set Avelino verified 3/22/24 ark       Time Tracking:     OT Date of Treatment: 06/15/24  OT Start Time: 0957  OT Stop Time: 1046  OT Total Time (min): 49 min    Billable Minutes:Evaluation 8  Self Care/Home Management 18  Therapeutic Exercise 23    6/15/2024

## 2024-06-15 NOTE — PLAN OF CARE
Problem: Adult Inpatient Plan of Care  Goal: Plan of Care Review  Outcome: Progressing  Goal: Patient-Specific Goal (Individualized)  Outcome: Progressing     Problem: Diabetes Comorbidity  Goal: Blood Glucose Level Within Targeted Range  Outcome: Progressing     Problem: Acute Kidney Injury/Impairment  Goal: Fluid and Electrolyte Balance  Outcome: Progressing     Problem: Sepsis/Septic Shock  Goal: Optimal Coping  Outcome: Progressing

## 2024-06-15 NOTE — ASSESSMENT & PLAN NOTE
Appreciate input from renal services  In setting of acute UTI and IVVD  Gentle IVF  I think we can stop the IV bicarb and replace PO  Trend  U/s is w/o obstruction, masses, etc

## 2024-06-15 NOTE — EICU
Intervention Initiated From:  [facility] / EICU    Jose intervened regarding:  Rounding (Video assessment)    Nurse Notified:  Yes    Doctor Notified:  No    Comments: Awake in bed with nurse at bedside. Cardizem drip being started with HR A-fib 100-130 fluctuating. No needs at this time.

## 2024-06-15 NOTE — PROCEDURES
Roosevelt Moser is a 73 y.o. male patient.    Temp: 98.3 °F (36.8 °C) (06/14/24 2030)  Pulse: 109 (06/14/24 2115)  Resp: (!) 31 (06/14/24 2115)  BP: 132/75 (06/14/24 2115)  SpO2: 95 % (06/14/24 2115)  Weight: (!) 138 kg (304 lb 3.8 oz) (06/14/24 1700)  Height: 6' (182.9 cm) (06/14/24 1700)    PICC  Date/Time: 6/14/2024 9:15 PM  Performed by: Lucio Dooley RN  Consent Done: Yes  Time out: Immediately prior to procedure a time out was called to verify the correct patient, procedure, equipment, support staff and site/side marked as required  Indications: med administration and vascular access  Anesthesia: local infiltration  Local anesthetic: lidocaine 1% without epinephrine  Anesthetic Total (mL): 2  Preparation: skin prepped with ChloraPrep  Skin prep agent dried: skin prep agent completely dried prior to procedure  Sterile barriers: all five maximum sterile barriers used - cap, mask, sterile gown, sterile gloves, and large sterile sheet  Hand hygiene: hand hygiene performed prior to central venous catheter insertion  Location details: right basilic  Catheter type: double lumen  Catheter size: 5 Fr  Catheter Length: 38cm    Ultrasound guidance: yes  Vessel Caliber: medium and patent, compressibility normal  Vascular Doppler: not done  Needle advanced into vessel with real time Ultrasound guidance.  Guidewire confirmed in vessel.  Sterile sheath used.  no esophageal manometryNumber of attempts: 1  Post-procedure: blood return through all ports, chlorhexidine patch and sterile dressing applied            Name NAKIA HACKETT  6/14/2024

## 2024-06-15 NOTE — ASSESSMENT & PLAN NOTE
Body mass index is 41.26 kg/m².  Morbid obesity complicates all aspects of disease management from diagnostic modalities to treatment  Weight loss encouraged and health benefits explained to patient.

## 2024-06-15 NOTE — PLAN OF CARE
Problem: Adult Inpatient Plan of Care  Goal: Plan of Care Review  Outcome: Progressing     Problem: Adult Inpatient Plan of Care  Goal: Absence of Hospital-Acquired Illness or Injury  Outcome: Progressing     Problem: Adult Inpatient Plan of Care  Goal: Optimal Comfort and Wellbeing  Outcome: Progressing     Problem: Diabetes Comorbidity  Goal: Blood Glucose Level Within Targeted Range  Outcome: Progressing     Problem: Acute Kidney Injury/Impairment  Goal: Fluid and Electrolyte Balance  Outcome: Progressing     Problem: Sepsis/Septic Shock  Goal: Blood Glucose Level Within Targeted Range  Outcome: Progressing     Problem: Sepsis/Septic Shock  Goal: Absence of Infection Signs and Symptoms  Outcome: Progressing

## 2024-06-15 NOTE — RESPIRATORY THERAPY
06/15/24 0735   Patient Assessment/Suction   Level of Consciousness (AVPU) alert   Respiratory Effort Normal;Unlabored   Expansion/Accessory Muscles/Retractions expansion symmetric;no retractions;no use of accessory muscles   Rhythm/Pattern, Respiratory no shortness of breath reported;depth regular;pattern regular;unlabored   Cough Frequency no cough   PRE-TX-O2   Device (Oxygen Therapy) room air   $ Is the patient on Low Flow Oxygen? Yes  (on standby at NC 2L)   SpO2 96 %   Pulse Oximetry Type Continuous   $ Pulse Oximetry - Multiple Charge Pulse Oximetry - Multiple   Pulse 78   Resp 18   /70   Positioning HOB elevated 45 degrees   Preset CPAP/BiPAP Settings   Mode Of Delivery Standby

## 2024-06-15 NOTE — HOSPITAL COURSE
Patient presented after fall.  He was found to have osteo of his toe and abscesses left shoulder.  He underwent washouts with surgery on his shoulder. Status post repeat irrigation and debridement (3rd washout) and hardware removal of left shoulder for suspected infection. Wound VAC was discontinued intraoperatively 6/27. They cleared him on 06/28/2024. The patient was noted to have osteomyelitis of second right toe. Seen by podiatry, who performed amputation on 07/01. Patient noted to have dyspnea. CXR revealed bilateral pleural effusions. IV lasix started and stopped due to MARBIN. Pending snf placement at this time. Pt taken to OR for I&D of a chest wall abscess on 7/4/24. He had a thoracentesis of 1550 cc.  Dr. Escobedo states that the abscess extended down to the rectus abdominis muscles and to the pericardium but there was no pericardial effusion.  The patient also has a loculated right effusion and s/p VATS with mechanical lysis and drainage with chest tube placement was done.  Cont Teflaro, meropenem, and micagungin per ID recs and continued treatment .   CT surgery think thatthe patient is going to need to transfer to a facility that has thoracic surgery and reconstructive plastic surgery. The patient has osteomyelitis of multiple ribs which will need further debridement and then a flap placed. Unfortunately, [facility] do not have this capability at this facility. And later transfer initiated and transferred to [facility] plastic surgery team in stable condition .

## 2024-06-15 NOTE — ASSESSMENT & PLAN NOTE
Chronic AF  RVR in setting of acute illness, resolved, he's back off dilitiazem today, denies CP/SOB  TSH 1.8 in March  Monitor lytes  No etoh/drug use  TTE from March 2024:    Left Ventricle: The left ventricle is normal in size. Normal wall thickness. Mild global hypokinesis present. There is mildly reduced systolic function with a visually estimated ejection fraction of 45 - 50%. There is normal diastolic function.    Right Ventricle: Normal right ventricular cavity size. Wall thickness is normal. Right ventricle wall motion  is normal. Systolic function is normal.    Left Atrium: Left atrium is moderately dilated.    IVC/SVC: Normal venous pressure at 3 mmHg.  Continue OAC
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
## 2024-06-15 NOTE — PLAN OF CARE
Problem: Physical Therapy  Goal: Physical Therapy Goal  Description: Goals to be met by: 7/15/24     Patient will increase functional independence with mobility by performin. Supine to sit with Contact Guard Assistance  2. Sit to stand transfer with Contact Guard Assistance  3. Bed to chair transfer with Contact Guard Assistance using Rolling Walker  4. Gait  x 200 feet with Contact Guard Assistance using Rolling Walker.   5. Lower extremity exercise program x30 reps per handout, with supervision    Outcome: Progressing

## 2024-06-15 NOTE — PLAN OF CARE
SNF VS rehab discussed. Spouse agrees that pt is more appropriate for SNF at this time. CM provided SNF list to pt's spouse to review for preference.   SNF referral sent. Will need preference from spouse and choice form signed       06/15/24 6435   Post-Acute Status   Post-Acute Authorization Placement   Post-Acute Placement Status Referrals Sent   Patient choice form signed by patient/caregiver List from System Post-Acute Care

## 2024-06-15 NOTE — EICU
Intervention Initiated From:  COR / JUAN CARLOSU    Jose intervened regarding:  Rounding (Video assessment)    Nurse Notified:  No    Doctor Notified:  No    Comments: Video rounds complete. Patient resting in bed. Denies needs at this time. No distress noted.

## 2024-06-15 NOTE — PLAN OF CARE
06/1950   Patient Assessment/Suction   Level of Consciousness (AVPU) alert   Respiratory Effort Normal;Unlabored   Expansion/Accessory Muscles/Retractions expansion symmetric   All Lung Fields Breath Sounds diminished   Rhythm/Pattern, Respiratory pattern regular   Cough Frequency no cough   PRE-TX-O2   Device (Oxygen Therapy) room air   SpO2 95 %   Pulse Oximetry Type Continuous   Pulse 109   Resp (!) 34   Aerosol Therapy   $ Aerosol Therapy Charges PRN treatment not required   Preset CPAP/BiPAP Settings   Mode Of Delivery Standby

## 2024-06-15 NOTE — PT/OT/SLP EVAL
Physical Therapy Evaluation    Patient Name:  Roosevelt Moser   MRN:  [id number]    Recommendations:     Discharge Recommendations: High Intensity Therapy   Discharge Equipment Recommendations: to be determined by next level of care   Barriers to discharge: Decreased caregiver support    Assessment:     Roosevelt Moser is a 73 y.o. male admitted with a medical diagnosis of Acute renal failure superimposed on chronic kidney disease.  He presents with the following impairments/functional limitations: weakness, impaired self care skills, impaired sensation, impaired functional mobility, gait instability, pain, decreased lower extremity function, decreased upper extremity function; pt states his L shldr is very painful; pt fixated on L shldr pain; PT reassured pt to move LUE very carefully to avoid aggravation; pt sta at EOB x 2 min then performed sit<-> stand with a RW x 3 reps, lasting 4s, 8s & 15s, then pt requested to sit down in bed; pt requested to go lie back in bed after Tx; pt will benefit from skilled acute PT services to improve current level of functional mob and improve overall capacity to perform activities.    Rehab Prognosis: Good; patient would benefit from acute skilled PT services to address these deficits and reach maximum level of function.    Recent Surgery: * No surgery found *      Plan:     During this hospitalization, patient to be seen daily to address the identified rehab impairments via gait training, therapeutic activities, therapeutic exercises and progress toward the following goals:    Plan of Care Expires:  07/15/24    Subjective     Chief Complaint: pain to L shldr, very protective of it  Patient/Family Comments/goals: wants to sit and maybe stand if possible  Pain/Comfort:  Pain Rating 1: 8/10  Location - Side 1: Left  Location - Orientation 1: generalized  Location 1: shoulder  Pain Addressed 1: Nurse notified, Distraction, Reposition  Pain Rating Post-Intervention 1: 8/10    Patients  cultural, spiritual, Bahai conflicts given the current situation: no    Living Environment:  Lives with wife in single story home with a threshold @ the entrance;   Prior to admission, patients level of function was pt does not drive but was able to walk with his RW & cane at home with SBA.  Equipment used at home: CPAP, walker, rolling, rollator, other (see comments) (Hurri cane).  DME owned (not currently used): none.  Upon discharge, patient will have assistance from wife.    Objective:     Communicated with RN Rosalia prior to session.  Patient found HOB elevated with bed alarm, blood pressure cuff, peripheral IV, PureWick, telemetry, pulse ox (continuous)  upon PT entry to room.    General Precautions: Standard, fall  Orthopedic Precautions: (LUE S/P shldr Sx in March 2024)   Braces: N/A  Respiratory Status: Room air    Exams:  Cognitive Exam:  Patient is oriented to Person, Place, Time, and Situation  Gross Motor Coordination:  decreased on LUE  Postural Exam:  Patient presented with the following abnormalities:    -       Rounded shoulders  -       Forward head  -       Kyphosis  Skin Integrity/Edema:      -       Skin integrity: Visible skin intact  RLE ROM: WFL  RLE Strength: WFL  LLE ROM: WFL  LLE Strength: WFL    Functional Mobility:  Bed Mobility:     Rolling Left:  moderate assistance  Rolling Right: moderate assistance  Supine to Sit: moderate assistance  Sit to Supine: moderate assistance  Transfers:     Sit to Stand:  moderate assistance with rolling walker  Balance: Sit: G; Stand: F  Gait: took 3 small steps sideways towards the HOB; refused to amb further due to L shldr pain      AM-PAC 6 CLICK MOBILITY  Total Score:12     Treatment & Education:  Functional mobility training as above; pt educated on safety precautions and mobility techniques, as well as the role and benefits of PT and mobilization.      Patient left HOB elevated with all lines intact, call button in reach, bed alarm on, and RN  notified.    GOALS:   Multidisciplinary Problems       Physical Therapy Goals          Problem: Physical Therapy    Goal Priority Disciplines Outcome Goal Variances Interventions   Physical Therapy Goal     PT, PT/OT Progressing     Description: Goals to be met by: 7/15/24     Patient will increase functional independence with mobility by performin. Supine to sit with Contact Guard Assistance  2. Sit to stand transfer with Contact Guard Assistance  3. Bed to chair transfer with Contact Guard Assistance using Rolling Walker  4. Gait  x 200 feet with Contact Guard Assistance using Rolling Walker.   5. Lower extremity exercise program x30 reps per handout, with supervision                         History:     Past Medical History:   Diagnosis Date    A-fib     Diabetes mellitus, type 2     Guillain-[place]burg 10/2003    Hyperlipidemia     Hypertension        Past Surgical History:   Procedure Laterality Date    OPEN REDUCTION AND INTERNAL FIXATION (ORIF) OF FRACTURE OF PROXIMAL HUMERUS Left 3/25/2024    Procedure: ORIF, FRACTURE, HUMERUS, PROXIMAL/IM HANNA, LEFT;  Surgeon: Nathan Cooper MD;  Location: [facility];  Service: Orthopedics;  Laterality: Left;  Accumed, Synthes Small Frag set Avelino verified 3/22/24 ark       Time Tracking:     PT Received On: 06/15/24  PT Start Time: 1431     PT Stop Time: 1501  PT Total Time (min): 30 min     Billable Minutes: Evaluation 12 and Therapeutic Activity 17      06/15/2024

## 2024-06-15 NOTE — PROGRESS NOTES
[facility] Medicine  Progress Note    Patient Name: Roosevelt Moser  MRN: [id number]  Patient Class: IP- Inpatient   Admission Date: 6/14/2024  Length of Stay: 1 days  Attending Physician: Shelton Newton MD  Primary Care Provider: Miriam, Primary Doctor        Subjective:     Principal Problem:Acute renal failure superimposed on chronic kidney disease        HPI:  Roosevelt Moser is a 73 year old male with a previous medical history of atrial fibrillation on eliquis, HTN, DMII, obstructive sleep apnea, HLD, ORIF of left humerus and guillian-[place] who presented to the emergency room for weakness and multiple falls. Per wife of the patient he has had progressive weakness over the past week along with two falls one at 0300 Thursday and the other early morning Friday. Both time she had to activate EMS to pick patient up off floor due to weakness. Patient refused transport to hospital on both occasions. Today he slid out of his chair and was unable to get up without assistance and at this point the wife activated EMS to transport patient to the hospital. She endorses that two days ago she noticed that the patient had stopped urinating as he normally does. The patient concurs that he has noticed that his urine has decreased over the past two days and that it is dark. Patient denies dysuria or incomplete bladder emptying. Bladder scan showed zero upon admit and urine sample was obtained via straight cath in ED. Patient denies decreased PO intake and keeps a bottle of water with him at all times. Initial ED work-up showed a creatinine of 5 and potassium of 6. Urine studies positive for infection and WBC 20.81 with procalcitonin at 19.75. Blood cultures obtained and patient given 1 gram of rocephin and 1000ml of normal saline. Patient noted to bein afib with RVR and 20mg of diltiazem was administered IV which resulted in low blood pressure and 1 gram of calcium gluconate was administered. Patient  admitted by hospital medicine for further evaluation and management     Overview/Hospital Course:  6/15/24  Assumed care today, pt seen and examined, chart reviewed.  Pt sitting up in bed, wife at bedside, feeling better.  Off dilt drip since this AM, in AF on monitor but rate down to 80s.   Denies cp/sob.  Has been weak and debilitated, await PT/OT to see what post acute needs will be.      Interval History:     6/15/24  Assumed care, pt seen and examined, chart reviewed.  Pt HR down, off dilt, denies cp/sob.  Debilitated, having mobility issues at home, await PT/OT input for post acute needs.     Review of Systems    11 pt ROS negative except as noted o/w    Objective:     Vital Signs (Most Recent):  Temp: 98 °F (36.7 °C) (06/15/24 1200)  Pulse: 76 (06/15/24 1200)  Resp: (!) 26 (06/15/24 1200)  BP: (!) 125/58 (06/15/24 1200)  SpO2: 96 % (06/15/24 1200) Vital Signs (24h Range):  Temp:  [97.4 °F (36.3 °C)-98.3 °F (36.8 °C)] 98 °F (36.7 °C)  Pulse:  [] 76  Resp:  [0-50] 26  SpO2:  [93 %-97 %] 96 %  BP: ()/() 125/58     Weight: (!) 138 kg (304 lb 3.8 oz)  Body mass index is 41.26 kg/m².    Intake/Output Summary (Last 24 hours) at 6/15/2024 1243  Last data filed at 6/15/2024 1209  Gross per 24 hour   Intake 3537.87 ml   Output 1950 ml   Net 1587.87 ml         Physical Exam      General:  A&O, NAD  HEENT: neck supple, PERRL/EOMI, EAC clear bilaterally, normal bilateral carotid upstroke w/o bruits, inf turbs clear bilaterally, OC/OP clear  Lungs: CTAB  CV: RRR w/o M/G/R  Abdomen: soft, NTND, no HSM, no bruits/masses/pulsations  Ext: no edema  : def  Rectal: def  Neuro: NF    Significant Labs: All pertinent labs within the past 24 hours have been reviewed.    Significant Imaging: I have reviewed all pertinent imaging results/findings within the past 24 hours.    Assessment/Plan:      * Acute renal failure superimposed on chronic kidney disease  Appreciate input from renal services  In setting of acute  UTI and IVVD  Gentle IVF  I think we can stop the IV bicarb and replace PO  Trend  U/s is w/o obstruction, masses, etc        Severe sepsis  In setting of acute UTI  BCX NGTD  Ur CX NGTD  Continue Rocephin as he is clinically much improved  Trend PCT  No obstruction on renal u/s  He's urinating ok today    Hyperkalemia  In setting of acute on chronic renal failure  Lokelma given x 1  Avoid ACE/ARB, K+ replacement  Trending down  tele          Atrial fibrillation with rapid ventricular response  Chronic AF  RVR in setting of acute illness, resolved, he's back off dilitiazem today, denies CP/SOB  TSH 1.8 in March  Monitor lytes  No etoh/drug use  TTE from March 2024:    Left Ventricle: The left ventricle is normal in size. Normal wall thickness. Mild global hypokinesis present. There is mildly reduced systolic function with a visually estimated ejection fraction of 45 - 50%. There is normal diastolic function.    Right Ventricle: Normal right ventricular cavity size. Wall thickness is normal. Right ventricle wall motion  is normal. Systolic function is normal.    Left Atrium: Left atrium is moderately dilated.    IVC/SVC: Normal venous pressure at 3 mmHg.  Continue OAC      History of Guillain-Reedsburg syndrome  No acute issues  However, pt has impaired mobility and is acutely weakened from his sepsis/UTI/AF RVR  Await input from PT/OT  They could not work w/ him earlier d/t RVR        Type 2 diabetes mellitus  A1c 6.3%  SSI      Hypertension  Home meds as appropriate    MARA (obstructive sleep apnea)  Does pt use NIPPV at home?      Morbid obesity  Body mass index is 41.26 kg/m².  Morbid obesity complicates all aspects of disease management from diagnostic modalities to treatment  Weight loss encouraged and health benefits explained to patient.           VTE Risk Mitigation (From admission, onward)           Ordered     apixaban tablet 5 mg  2 times daily         06/14/24 5971     Reason for No Pharmacological VTE  Prophylaxis  Once        Question:  Reasons:  Answer:  Already adequately anticoagulated on oral Anticoagulants    06/14/24 1438     IP VTE HIGH RISK PATIENT  Once         06/14/24 1438     Place sequential compression device  Until discontinued         06/14/24 1438                    Discharge Planning   KAMILA:      Code Status: Full Code   Is the patient medically ready for discharge?:     Reason for patient still in hospital (select all that apply): Patient trending condition  Discharge Plan A: Skilled Nursing Facility        Time spent in direct patient care, review of data, conversation w/ patient and/or family, and moderately complex MDM 45 minutes            Shelton Newton MD  Department of Hospital Medicine   [facility]/Surg

## 2024-06-15 NOTE — PROGRESS NOTES
Nephrology addendum     Spoke just now to the floor nurse about the abnormal repeat potassium level of 5.9. She informed me that the patient made around 300cc urine output after he had the bicarb drip started and was given a dose of lasix.     I recommend the patient to receive another dose of lasix 60 mg IV * 1 and to increase the bicarb drip rate from 100cc per hour to 150cc per hour.    Anjum Mcmullen MD

## 2024-06-15 NOTE — ASSESSMENT & PLAN NOTE
No acute issues  However, pt has impaired mobility and is acutely weakened from his sepsis/UTI/AF RVR  Await input from PT/OT  They could not work w/ him earlier d/t RVR

## 2024-06-15 NOTE — CONSULTS
Nephrology Consult Note        Patient Name: Roosevelt Moser  MRN: [id number]    Patient Class: IP- Inpatient   Admission Date: 6/14/2024  Length of Stay: 1 days  Date of Service: 6/15/2024    Attending Physician: Shelton Newton MD  Primary Care Provider: No, Primary Doctor    Reason for Consult: marbin/hyperkalemia    SUBJECTIVE:     HPI: 73M with h/o DM, HTN, AF, GBS and recent shoulder surgery was brought to ER by EMS with recurrent falls in the last 24h. Reports decreased UO but no fever, cough, SOB, dysuria. Upon admission, noted to be in MARBIN with sCr 5, baseline 1 month ago is 1, hyperkalemia with K 6, acidosis with CO2 12, hypoalbuminemia with albumin 1.6. Lactate notably 2.1. Procal 20. A1c 9.5 in 3/2024. UA with hematuria and pyuria, urine Cx pending. Notably on Apixaban. WBC in blood elevated to 20. Plt 540. RP US ordered. Afebrile, normotensive, received IVF and abx. Lokelma, ca gluconate given bicarb gtt ordered. Straight cath in ER with only 100cc urine out.    Review of Systems:  Constitutional:  Negative for chills, fever, malaise/fatigue and weight loss.   HENT:  Negative for hearing loss and nosebleeds.    Eyes:  Negative for blurred vision, double vision and photophobia.   Respiratory:  Negative for cough, shortness of breath and wheezing.    Cardiovascular:  Negative for chest pain, palpitations and leg swelling.   Gastrointestinal:  Negative for abdominal pain, constipation, diarrhea, heartburn, nausea and vomiting.   Genitourinary:  Negative for dysuria, frequency and urgency.   Musculoskeletal:  POSITIVE for falls, joint pain and myalgias.   Skin:  Negative for itching and rash.   Neurological:  Negative for dizziness, speech change, focal weakness, loss of consciousness and headaches.   Endo/Heme/Allergies:  Does not bruise/bleed easily.   Psychiatric/Behavioral:  Negative for depression and substance abuse. The patient is not nervous/anxious.       6/15  AFVSS.   UOP 1200cc plus 2 unmeasured      ASSESSMENT/PLAN:     Oliguric MARBIN due to ATN due to sepsis due to UTI and/or PNA  Acidosis  Hyperkalemia  CKD stage 2 with diabetic proteinuria and severe hypoalbuminemia  HTN  DM poorly controlled A1c 9.5  Anemia  GBS  No NSAIDs, ACEI/ARB, IV contrast or other nephrotoxins.  Keep MAP > 60, SBP > 100.  Dose meds for GFR < 30 ml/min.  Renal diet - low K, low phos. Add protean supplement tomorrow.  Low k diet  Renal ultrasound with no hydronephrosis  Abx for UTI/PNA. Follow cx.  Control DM, hold metformin, give insulin.  Quantify proteinuria      Hgb and HCT are acceptable. Monitor for now.    Tolerate asymptomatic HTN up to -160.    Thank you for allowing us to participate in the care of your patient!   We will follow the patient and provide recommendations as needed.         Laboratory:  Recent Labs   Lab 06/14/24  2029 06/15/24  0022 06/15/24  0658    136 136   K 5.9* 5.4* 5.6*    104 102   CO2 15* 16* 17*   BUN 87* 87* 89*   CREATININE 5.1* 5.0* 5.0*    131* 214*       Recent Labs   Lab 06/14/24  1229 06/14/24  1539 06/14/24  2029 06/15/24  0022 06/15/24  0658   CALCIUM 9.1 9.1 9.0 8.7 8.7   ALBUMIN 1.6*  --   --   --  1.4*   PHOS  --  5.0*  --   --  5.0*   MG  --  1.3*  --   --  1.7             Recent Labs   Lab 06/14/24  1623 06/14/24  2037 06/15/24  0653   POCTGLUCOSE 88 115* 205*       Recent Labs   Lab 07/31/23  0955 03/19/24  0830 06/14/24  1539   Hemoglobin A1C 8.8 H 9.5 H 6.3 H       Recent Labs   Lab 06/14/24  1229 06/15/24  0556   WBC 20.81* 14.76*   HGB 11.5* 11.2*   HCT 34.9* 33.6*   * 585*   MCV 81* 79*   MCHC 33.0 33.3   MONO 7.9  1.6*  --    EOSINOPHIL 0.3  --        Recent Labs   Lab 06/14/24  1229 06/15/24  0658   BILITOT 0.6 0.5   PROT 6.5 6.2   ALBUMIN 1.6* 1.4*   ALKPHOS 270* 238*   ALT 22 19   AST 39 31       Recent Labs   Lab 12/16/22  1238 03/08/24  1034 03/25/24  1022 06/14/24  1337   Color, UA Yellow Yellow Yellow [place] A   Appearance, UA Clear Clear  Hazy A Cloudy A   pH, UA 6.0 5.0 6.0 6.0   Specific [place], UA 1.020 1.010 1.020 1.020   Protein, UA 1+ A Trace A 1+ A 2+ A   Glucose, UA 1+ A 3+ A Negative Trace A   Ketones, UA Negative Negative Negative Negative   Urobilinogen, UA  --  Negative Negative Negative   Bilirubin (UA) Negative Negative Negative Negative   Occult Blood UA 3+ A 2+ A 2+ A 3+ A   Nitrite, UA Negative Negative Negative Negative   RBC, UA 20 H 13 H >100 H >100 H   WBC, UA 81 H 3 18 H >100 H   Bacteria Rare None Rare Many A   Hyaline Casts, UA 0  --  0 0             Microbiology Results (last 7 days)       Procedure Component Value Units Date/Time    Blood culture [[id number]] Collected: 06/14/24 1355    Order Status: Completed Specimen: Blood from Peripheral, Hand, Right Updated: 06/15/24 0158     Blood Culture, Routine No Growth to date    Blood culture [[id number]] Collected: 06/14/24 1355    Order Status: Completed Specimen: Blood from Peripheral, Antecubital, Right Updated: 06/15/24 0158     Blood Culture, Routine No Growth to date    Urine culture [[id number]] Collected: 06/14/24 1337    Order Status: No result Specimen: Urine Updated: 06/14/24 1347            Review of patient's allergies indicates:  No Known Allergies    Outpatient meds:  No current facility-administered medications on file prior to encounter.     Current Outpatient Medications on File Prior to Encounter   Medication Sig Dispense Refill    apixaban (ELIQUIS) 5 mg Tab Take 1 tablet (5 mg total) by mouth 2 (two) times daily. 60 tablet 1    atorvastatin (LIPITOR) 10 MG tablet Take 1 tablet (10 mg total) by mouth once daily. 90 tablet 3    co-enzyme Q-10 30 mg capsule Take 30 mg by mouth once daily.      doxycycline (VIBRAMYCIN) 100 MG Cap Take 100 mg by mouth 2 (two) times daily.      ergocalciferol, vitamin D2, (VITAMIN D ORAL) Take 1 tablet by mouth once daily.      glimepiride (AMARYL) 2 MG tablet Take 1 tablet (2 mg total) by mouth before breakfast. 90 tablet 3     hydrALAZINE (APRESOLINE) 25 MG tablet Take 1 tablet (25 mg total) by mouth every 12 (twelve) hours. 60 tablet 3    metFORMIN (GLUCOPHAGE-XR) 500 MG ER 24hr tablet Take 2 tablets (1,000 mg total) by mouth 2 (two) times daily with meals. 360 tablet 3    metoprolol succinate (TOPROL-XL) 100 MG 24 hr tablet Take 1 tablet (100 mg total) by mouth once daily. (Patient taking differently: Take 100 mg by mouth nightly.) 90 tablet 3       Scheduled meds:   apixaban  5 mg Oral BID    cefTRIAXone (Rocephin) IV (PEDS and ADULTS)  1 g Intravenous Q24H    metoprolol succinate  100 mg Oral QHS    sodium chloride 0.9%  10 mL Intravenous Q6H       Infusions:   dilTIAZem  0-15 mg/hr Intravenous Continuous 2.5 mL/hr at 06/15/24 0750 2.5 mg/hr at 06/15/24 0750    sodium bicarbonate 150 mEq in dextrose 5 % (D5W) 1,000 mL infusion   Intravenous Continuous 150 mL/hr at 06/15/24 0750 Rate Verify at 06/15/24 0750       PRN meds:    Current Facility-Administered Medications:     albuterol-ipratropium, 3 mL, Nebulization, Q6H PRN    aluminum & magnesium hydroxide-simethicone, 15 mL, Oral, QID PRN    dextrose 10%, 12.5 g, Intravenous, PRN    dextrose 10%, 25 g, Intravenous, PRN    glucagon (human recombinant), 1 mg, Intramuscular, PRN    glucose, 16 g, Oral, PRN    glucose, 24 g, Oral, PRN    HYDROcodone-acetaminophen, 1 tablet, Oral, Q6H PRN    insulin aspart U-100, 0-5 Units, Subcutaneous, QID (AC + HS) PRN    naloxone, 0.02 mg, Intravenous, PRN    ondansetron, 4 mg, Intravenous, Q8H PRN    prochlorperazine, 5 mg, Intravenous, Q6H PRN    simethicone, 1 tablet, Oral, QID PRN    sodium chloride 0.9%, 10 mL, Intravenous, Q12H PRN    Flushing PICC/Midline Protocol, , , Until Discontinued **AND** sodium chloride 0.9%, 10 mL, Intravenous, Q6H **AND** sodium chloride 0.9%, 10 mL, Intravenous, PRN    Past Medical History:   Diagnosis Date    A-fib 2024    Diabetes mellitus, type 2 2021    Guillain-Bowman 10/2003    Hyperlipidemia     Hypertension 2016      Past Surgical History:   Procedure Laterality Date    OPEN REDUCTION AND INTERNAL FIXATION (ORIF) OF FRACTURE OF PROXIMAL HUMERUS Left 3/25/2024    Procedure: ORIF, FRACTURE, HUMERUS, PROXIMAL/IM HANNA, LEFT;  Surgeon: Nathan Cooper MD;  Location: [facility];  Service: Orthopedics;  Laterality: Left;  Accumed, [employer] Small Frag set Avelino verified 3/22/24 ark     No family history on file.  Social History     Tobacco Use    Smoking status: Never    Smokeless tobacco: Never   Substance Use Topics    Alcohol use: Yes     Alcohol/week: 0.0 standard drinks of alcohol     Comment: social    Drug use: No       OBJECTIVE:     Vital Signs and IO:  Temp:  [97.4 °F (36.3 °C)-98.6 °F (37 °C)]   Pulse:  []   Resp:  [16-50]   BP: ()/()   SpO2:  [93 %-97 %]   I/O last 3 completed shifts:  In: 1250 [P.O.:150; I.V.:1100]  Out: 1200 [Urine:1200]  Wt Readings from Last 5 Encounters:   06/14/24 (!) 138 kg (304 lb 3.8 oz)   06/04/24 132.5 kg (292 lb 1.8 oz)   05/07/24 132.5 kg (292 lb 1.8 oz)   04/08/24 132.5 kg (292 lb 3.2 oz)   03/26/24 (!) 136.1 kg (300 lb 0.7 oz)     Body mass index is 41.26 kg/m².    Physical Exam  Constitutional:       General: Patient is not in acute distress.     Appearance: Patient is well-developed. She is not diaphoretic.   HENT:      Head: Normocephalic and atraumatic.      Mouth/Throat: Mucous membranes are moist.   Eyes:      General: No scleral icterus.     Pupils: Pupils are equal, round, and reactive to light.   Cardiovascular:      Rate and Rhythm: Normal rate and regular rhythm.   Pulmonary:      Effort: Pulmonary effort is normal. No respiratory distress.      Breath sounds: No stridor.   Abdominal:      General: There is no distension.      Palpations: Abdomen is soft.   Musculoskeletal:         General: No deformity. Normal range of motion.      Cervical back: Neck supple.   Skin:     General: Skin is warm and dry.      Findings: No rash present. No erythema.   Neurological:       Mental Status: Patient is alert and oriented to person, place, and time.      Cranial Nerves: No cranial nerve deficit.   Psychiatric:         Behavior: Behavior normal.          Patient care time was spent personally by me on the following activities:     Obtaining a history.  Examination of patient.  Providing medical care at the patients bedside.  Developing a treatment plan with patient or surrogate and bedside caregivers.  Ordering and reviewing laboratory studies, radiographic studies, pulse oximetry.  Ordering and performing treatments and interventions.  Evaluation of patient's response to treatment.  Discussions with consultants while on the unit and immediately available to the patient.  Re-evaluation of the patient's condition.  Documentation in the medical record.     Jay Talavera MD    [facility]  [address] [place] [id number] [phone] - tel  [phone] - fax    6/15/2024

## 2024-06-16 PROBLEM — S41.112A SKIN TEAR OF LEFT UPPER EXTREMITY: Status: ACTIVE | Noted: 2024-06-16

## 2024-06-16 PROBLEM — M79.89 SWELLING OF LIMB, LEFT: Status: ACTIVE | Noted: 2024-06-16

## 2024-06-16 PROBLEM — R53.81 DEBILITY: Status: ACTIVE | Noted: 2024-06-16

## 2024-06-16 LAB
ALBUMIN SERPL BCP-MCNC: 1.4 G/DL (ref 3.5–5.2)
ALP SERPL-CCNC: 230 U/L (ref 55–135)
ALT SERPL W/O P-5'-P-CCNC: 14 U/L (ref 10–44)
ANION GAP SERPL CALC-SCNC: 16 MMOL/L (ref 8–16)
AST SERPL-CCNC: 22 U/L (ref 10–40)
BILIRUB SERPL-MCNC: 0.6 MG/DL (ref 0.1–1)
BNP SERPL-MCNC: 354 PG/ML (ref 0–99)
BUN SERPL-MCNC: 87 MG/DL (ref 8–23)
CALCIUM SERPL-MCNC: 8.3 MG/DL (ref 8.7–10.5)
CHLORIDE SERPL-SCNC: 98 MMOL/L (ref 95–110)
CO2 SERPL-SCNC: 24 MMOL/L (ref 23–29)
CREAT SERPL-MCNC: 4.4 MG/DL (ref 0.5–1.4)
CREAT UR-MCNC: 64.6 MG/DL (ref 23–375)
ERYTHROCYTE [DISTWIDTH] IN BLOOD BY AUTOMATED COUNT: 18.1 % (ref 11.5–14.5)
EST. GFR  (NO RACE VARIABLE): 14 ML/MIN/1.73 M^2
GLUCOSE SERPL-MCNC: 234 MG/DL (ref 70–110)
HCT VFR BLD AUTO: 31.8 % (ref 40–54)
HGB BLD-MCNC: 10.7 G/DL (ref 14–18)
MAGNESIUM SERPL-MCNC: 1.6 MG/DL (ref 1.6–2.6)
MCH RBC QN AUTO: 26.4 PG (ref 27–31)
MCHC RBC AUTO-ENTMCNC: 33.6 G/DL (ref 32–36)
MCV RBC AUTO: 79 FL (ref 82–98)
PHOSPHATE SERPL-MCNC: 4.5 MG/DL (ref 2.7–4.5)
PLATELET # BLD AUTO: 496 K/UL (ref 150–450)
PMV BLD AUTO: 10.5 FL (ref 9.2–12.9)
POCT GLUCOSE: 157 MG/DL (ref 70–110)
POCT GLUCOSE: 192 MG/DL (ref 70–110)
POCT GLUCOSE: 242 MG/DL (ref 70–110)
POCT GLUCOSE: 273 MG/DL (ref 70–110)
POTASSIUM SERPL-SCNC: 4.3 MMOL/L (ref 3.5–5.1)
PROCALCITONIN SERPL IA-MCNC: 7.88 NG/ML (ref 0–0.5)
PROT SERPL-MCNC: 5.9 G/DL (ref 6–8.4)
PROT UR-MCNC: 79 MG/DL (ref 0–15)
RBC # BLD AUTO: 4.05 M/UL (ref 4.6–6.2)
SODIUM SERPL-SCNC: 138 MMOL/L (ref 136–145)
WBC # BLD AUTO: 13.11 K/UL (ref 3.9–12.7)

## 2024-06-16 PROCEDURE — 25000003 PHARM REV CODE 250

## 2024-06-16 PROCEDURE — 36415 COLL VENOUS BLD VENIPUNCTURE: CPT | Performed by: INTERNAL MEDICINE

## 2024-06-16 PROCEDURE — 11000001 HC ACUTE MED/SURG PRIVATE ROOM

## 2024-06-16 PROCEDURE — 97530 THERAPEUTIC ACTIVITIES: CPT | Mod: CQ

## 2024-06-16 PROCEDURE — 99900035 HC TECH TIME PER 15 MIN (STAT)

## 2024-06-16 PROCEDURE — 94761 N-INVAS EAR/PLS OXIMETRY MLT: CPT

## 2024-06-16 PROCEDURE — 25000003 PHARM REV CODE 250: Performed by: STUDENT IN AN ORGANIZED HEALTH CARE EDUCATION/TRAINING PROGRAM

## 2024-06-16 PROCEDURE — 36415 COLL VENOUS BLD VENIPUNCTURE: CPT

## 2024-06-16 PROCEDURE — 84156 ASSAY OF PROTEIN URINE: CPT | Performed by: INTERNAL MEDICINE

## 2024-06-16 PROCEDURE — 82570 ASSAY OF URINE CREATININE: CPT | Performed by: INTERNAL MEDICINE

## 2024-06-16 PROCEDURE — A4216 STERILE WATER/SALINE, 10 ML: HCPCS | Performed by: STUDENT IN AN ORGANIZED HEALTH CARE EDUCATION/TRAINING PROGRAM

## 2024-06-16 PROCEDURE — 25000003 PHARM REV CODE 250: Performed by: INTERNAL MEDICINE

## 2024-06-16 PROCEDURE — 25000003 PHARM REV CODE 250: Performed by: HOSPITALIST

## 2024-06-16 PROCEDURE — 63600175 PHARM REV CODE 636 W HCPCS

## 2024-06-16 PROCEDURE — 83735 ASSAY OF MAGNESIUM: CPT

## 2024-06-16 PROCEDURE — 80053 COMPREHEN METABOLIC PANEL: CPT

## 2024-06-16 PROCEDURE — 84100 ASSAY OF PHOSPHORUS: CPT

## 2024-06-16 PROCEDURE — 21400001 HC TELEMETRY ROOM

## 2024-06-16 PROCEDURE — 83880 ASSAY OF NATRIURETIC PEPTIDE: CPT | Performed by: INTERNAL MEDICINE

## 2024-06-16 PROCEDURE — 84145 PROCALCITONIN (PCT): CPT | Performed by: HOSPITALIST

## 2024-06-16 PROCEDURE — 85027 COMPLETE CBC AUTOMATED: CPT

## 2024-06-16 RX ORDER — SODIUM CHLORIDE 9 MG/ML
INJECTION, SOLUTION INTRAVENOUS CONTINUOUS
Status: DISCONTINUED | OUTPATIENT
Start: 2024-06-16 | End: 2024-06-16

## 2024-06-16 RX ADMIN — SODIUM CHLORIDE: 9 INJECTION, SOLUTION INTRAVENOUS at 10:06

## 2024-06-16 RX ADMIN — APIXABAN 5 MG: 2.5 TABLET, FILM COATED ORAL at 08:06

## 2024-06-16 RX ADMIN — SODIUM CHLORIDE, PRESERVATIVE FREE 10 ML: 5 INJECTION INTRAVENOUS at 06:06

## 2024-06-16 RX ADMIN — INSULIN ASPART 2 UNITS: 100 INJECTION, SOLUTION INTRAVENOUS; SUBCUTANEOUS at 12:06

## 2024-06-16 RX ADMIN — ATORVASTATIN CALCIUM 10 MG: 10 TABLET, FILM COATED ORAL at 08:06

## 2024-06-16 RX ADMIN — HYDROCODONE BITARTRATE AND ACETAMINOPHEN 1 TABLET: 7.5; 325 TABLET ORAL at 06:06

## 2024-06-16 RX ADMIN — SODIUM BICARBONATE: 84 INJECTION, SOLUTION INTRAVENOUS at 06:06

## 2024-06-16 RX ADMIN — HYDROCODONE BITARTRATE AND ACETAMINOPHEN 1 TABLET: 7.5; 325 TABLET ORAL at 12:06

## 2024-06-16 RX ADMIN — SODIUM CHLORIDE, PRESERVATIVE FREE 10 ML: 5 INJECTION INTRAVENOUS at 03:06

## 2024-06-16 RX ADMIN — METOPROLOL SUCCINATE 100 MG: 50 TABLET, FILM COATED, EXTENDED RELEASE ORAL at 08:06

## 2024-06-16 RX ADMIN — CEFTRIAXONE SODIUM 1 G: 1 INJECTION, POWDER, FOR SOLUTION INTRAMUSCULAR; INTRAVENOUS at 04:06

## 2024-06-16 RX ADMIN — SODIUM BICARBONATE: 84 INJECTION, SOLUTION INTRAVENOUS at 12:06

## 2024-06-16 NOTE — CONSULTS
Nephrology Consult Note        Patient Name: Roosevelt Moser  MRN: [id number]    Patient Class: IP- Inpatient   Admission Date: 6/14/2024  Length of Stay: 2 days  Date of Service: 6/16/2024    Attending Physician: Shelton Newton MD  Primary Care Provider: No, Primary Doctor    Reason for Consult: marbin/hyperkalemia    SUBJECTIVE:     HPI: 73M with h/o DM, HTN, AF, GBS and recent shoulder surgery was brought to ER by EMS with recurrent falls in the last 24h. Reports decreased UO but no fever, cough, SOB, dysuria. Upon admission, noted to be in MARBIN with sCr 5, baseline 1 month ago is 1, hyperkalemia with K 6, acidosis with CO2 12, hypoalbuminemia with albumin 1.6. Lactate notably 2.1. Procal 20. A1c 9.5 in 3/2024. UA with hematuria and pyuria, urine Cx pending. Notably on Apixaban. WBC in blood elevated to 20. Plt 540. RP US ordered. Afebrile, normotensive, received IVF and abx. Lokelma, ca gluconate given bicarb gtt ordered. Straight cath in ER with only 100cc urine out.    Review of Systems:  Constitutional:  Negative for chills, fever, malaise/fatigue and weight loss.   HENT:  Negative for hearing loss and nosebleeds.    Eyes:  Negative for blurred vision, double vision and photophobia.   Respiratory:  Negative for cough, shortness of breath and wheezing.    Cardiovascular:  Negative for chest pain, palpitations and leg swelling.   Gastrointestinal:  Negative for abdominal pain, constipation, diarrhea, heartburn, nausea and vomiting.   Genitourinary:  Negative for dysuria, frequency and urgency.   Musculoskeletal:  POSITIVE for falls, joint pain and myalgias.   Skin:  Negative for itching and rash.   Neurological:  Negative for dizziness, speech change, focal weakness, loss of consciousness and headaches.   Endo/Heme/Allergies:  Does not bruise/bleed easily.   Psychiatric/Behavioral:  Negative for depression and substance abuse. The patient is not nervous/anxious.       6/15  AFVSS.   UOP 1200cc plus 2 unmeasured    6/16  AFVSS.  2150 cc uop.  No distress    ASSESSMENT/PLAN:     Oliguric MARBIN due to ATN due to sepsis due to UTI and/or PNA  Acidosis  Hyperkalemia  CKD stage 2 with diabetic proteinuria and severe hypoalbuminemia  HTN  DM poorly controlled A1c 9.5  Anemia  GBS  No NSAIDs, ACEI/ARB, IV contrast or other nephrotoxins.  Keep MAP > 60, SBP > 100.  Dose meds for GFR < 30 ml/min.  Renal diet - low K, low phos.   Low k diet  Renal ultrasound with no hydronephrosis  Abx for UTI/PNA. Follow cx.  Control DM, hold metformin, give insulin.  Quantify proteinuria  Switch to normal saline.  Cut rate to 75 cc given h/o depressed LV function       Hgb and HCT are acceptable. Monitor for now.    Tolerate asymptomatic HTN up to -160.    Thank you for allowing us to participate in the care of your patient!   We will follow the patient and provide recommendations as needed.         Laboratory:  Recent Labs   Lab 06/15/24  0658 06/15/24  1108 06/16/24  0453    137 138   K 5.6* 5.3* 4.3    102 98   CO2 17* 19* 24   BUN 89* 93* 87*   CREATININE 5.0* 4.9* 4.4*   * 222* 234*       Recent Labs   Lab 06/14/24  1229 06/14/24  1539 06/14/24 2029 06/15/24  0658 06/15/24  1108 06/16/24  0453   CALCIUM 9.1 9.1   < > 8.7 8.5* 8.3*   ALBUMIN 1.6*  --   --  1.4*  --  1.4*   PHOS  --  5.0*  --  5.0*  --  4.5   MG  --  1.3*  --  1.7  --  1.6    < > = values in this interval not displayed.             Recent Labs   Lab 06/14/24  1623 06/14/24  2037 06/15/24  0653 06/15/24  1117 06/15/24  1624 06/15/24  2131 06/16/24  0727   POCTGLUCOSE 88 115* 205* 236* 224* 168* 273*       Recent Labs   Lab 07/31/23  0955 03/19/24  0830 06/14/24  1539   Hemoglobin A1C 8.8 H 9.5 H 6.3 H       Recent Labs   Lab 06/14/24  1229 06/15/24  0556 06/16/24  0453   WBC 20.81* 14.76* 13.11*   HGB 11.5* 11.2* 10.7*   HCT 34.9* 33.6* 31.8*   * 585* 496*   MCV 81* 79* 79*   MCHC 33.0 33.3 33.6   MONO 7.9  1.6*  --   --    EOSINOPHIL 0.3  --   --         Recent Labs   Lab 06/14/24  1229 06/15/24  0658 06/16/24  0453   BILITOT 0.6 0.5 0.6   PROT 6.5 6.2 5.9*   ALBUMIN 1.6* 1.4* 1.4*   ALKPHOS 270* 238* 230*   ALT 22 19 14   AST 39 31 22       Recent Labs   Lab 12/16/22  1238 03/08/24  1034 03/25/24  1022 06/14/24  1337   Color, UA Yellow Yellow Yellow [place] A   Appearance, UA Clear Clear Hazy A Cloudy A   pH, UA 6.0 5.0 6.0 6.0   Specific [place], UA 1.020 1.010 1.020 1.020   Protein, UA 1+ A Trace A 1+ A 2+ A   Glucose, UA 1+ A 3+ A Negative Trace A   Ketones, UA Negative Negative Negative Negative   Urobilinogen, UA  --  Negative Negative Negative   Bilirubin (UA) Negative Negative Negative Negative   Occult Blood UA 3+ A 2+ A 2+ A 3+ A   Nitrite, UA Negative Negative Negative Negative   RBC, UA 20 H 13 H >100 H >100 H   WBC, UA 81 H 3 18 H >100 H   Bacteria Rare None Rare Many A   Hyaline Casts, UA 0  --  0 0             Microbiology Results (last 7 days)       Procedure Component Value Units Date/Time    Blood culture [[id number]] Collected: 06/14/24 1355    Order Status: Completed Specimen: Blood from Peripheral, Antecubital, Right Updated: 06/15/24 2032     Blood Culture, Routine No Growth to date      No Growth to date    Blood culture [[id number]] Collected: 06/14/24 1355    Order Status: Completed Specimen: Blood from Peripheral, Hand, Right Updated: 06/15/24 2032     Blood Culture, Routine No Growth to date      No Growth to date    Urine culture [[id number]] Collected: 06/14/24 1337    Order Status: Completed Specimen: Urine Updated: 06/15/24 1016     Urine Culture, Routine No growth to date    Narrative:      Specimen Source->Urine            Review of patient's allergies indicates:  No Known Allergies    Outpatient meds:  No current facility-administered medications on file prior to encounter.     Current Outpatient Medications on File Prior to Encounter   Medication Sig Dispense Refill    apixaban (ELIQUIS) 5 mg Tab Take 1 tablet (5 mg total) by  mouth 2 (two) times daily. 60 tablet 1    atorvastatin (LIPITOR) 10 MG tablet Take 1 tablet (10 mg total) by mouth once daily. 90 tablet 3    co-enzyme Q-10 30 mg capsule Take 30 mg by mouth once daily.      doxycycline (VIBRAMYCIN) 100 MG Cap Take 100 mg by mouth 2 (two) times daily.      ergocalciferol, vitamin D2, (VITAMIN D ORAL) Take 1 tablet by mouth once daily.      glimepiride (AMARYL) 2 MG tablet Take 1 tablet (2 mg total) by mouth before breakfast. 90 tablet 3    hydrALAZINE (APRESOLINE) 25 MG tablet Take 1 tablet (25 mg total) by mouth every 12 (twelve) hours. 60 tablet 3    metFORMIN (GLUCOPHAGE-XR) 500 MG ER 24hr tablet Take 2 tablets (1,000 mg total) by mouth 2 (two) times daily with meals. 360 tablet 3    metoprolol succinate (TOPROL-XL) 100 MG 24 hr tablet Take 1 tablet (100 mg total) by mouth once daily. (Patient taking differently: Take 100 mg by mouth nightly.) 90 tablet 3       Scheduled meds:   apixaban  5 mg Oral BID    atorvastatin  10 mg Oral QHS    cefTRIAXone (Rocephin) IV (PEDS and ADULTS)  1 g Intravenous Q24H    metoprolol succinate  100 mg Oral QHS    sodium chloride 0.9%  10 mL Intravenous Q6H       Infusions:   sodium bicarbonate 150 mEq in dextrose 5 % (D5W) 1,000 mL infusion   Intravenous Continuous 150 mL/hr at 06/16/24 0652 New Bag at 06/16/24 0652       PRN meds:    Current Facility-Administered Medications:     albuterol-ipratropium, 3 mL, Nebulization, Q6H PRN    aluminum & magnesium hydroxide-simethicone, 15 mL, Oral, QID PRN    dextrose 10%, 12.5 g, Intravenous, PRN    dextrose 10%, 25 g, Intravenous, PRN    glucagon (human recombinant), 1 mg, Intramuscular, PRN    glucose, 16 g, Oral, PRN    glucose, 24 g, Oral, PRN    HYDROcodone-acetaminophen, 1 tablet, Oral, Q6H PRN    insulin aspart U-100, 0-5 Units, Subcutaneous, QID (AC + HS) PRN    naloxone, 0.02 mg, Intravenous, PRN    ondansetron, 4 mg, Intravenous, Q8H PRN    prochlorperazine, 5 mg, Intravenous, Q6H PRN     simethicone, 1 tablet, Oral, QID PRN    sodium chloride 0.9%, 10 mL, Intravenous, Q12H PRN    Flushing PICC/Midline Protocol, , , Until Discontinued **AND** sodium chloride 0.9%, 10 mL, Intravenous, Q6H **AND** sodium chloride 0.9%, 10 mL, Intravenous, PRN    Past Medical History:   Diagnosis Date    A-fib 2024    Diabetes mellitus, type 2 2021    Guillain-Hitchcock 10/2003    Hyperlipidemia     Hypertension 2016     Past Surgical History:   Procedure Laterality Date    OPEN REDUCTION AND INTERNAL FIXATION (ORIF) OF FRACTURE OF PROXIMAL HUMERUS Left 3/25/2024    Procedure: ORIF, FRACTURE, HUMERUS, PROXIMAL/IM HANNA, LEFT;  Surgeon: Nathan Cooper MD;  Location: [facility];  Service: Orthopedics;  Laterality: Left;  Accumed, [employer] Small Frag set Avelino verified 3/22/24 ark     No family history on file.  Social History     Tobacco Use    Smoking status: Never    Smokeless tobacco: Never   Substance Use Topics    Alcohol use: Yes     Alcohol/week: 0.0 standard drinks of alcohol     Comment: social    Drug use: No       OBJECTIVE:     Vital Signs and IO:  Temp:  [97 °F (36.1 °C)-98.6 °F (37 °C)]   Pulse:  []   Resp:  [16-26]   BP: (100-133)/(53-90)   SpO2:  [90 %-97 %]   I/O last 3 completed shifts:  In: 3707.9 [P.O.:940; I.V.:2767.9]  Out: 3250 [Urine:3250]  Wt Readings from Last 5 Encounters:   06/14/24 (!) 138 kg (304 lb 3.8 oz)   06/04/24 132.5 kg (292 lb 1.8 oz)   05/07/24 132.5 kg (292 lb 1.8 oz)   04/08/24 132.5 kg (292 lb 3.2 oz)   03/26/24 (!) 136.1 kg (300 lb 0.7 oz)     Body mass index is 41.26 kg/m².    Physical Exam  Constitutional:       General: Patient is not in acute distress.     Appearance: Patient is well-developed. She is not diaphoretic.   HENT:      Head: Normocephalic and atraumatic.      Mouth/Throat: Mucous membranes are moist.   Eyes:      General: No scleral icterus.     Pupils: Pupils are equal, round, and reactive to light.   Cardiovascular:      Rate and Rhythm: Normal rate and regular  rhythm.   Pulmonary:      Effort: Pulmonary effort is normal. No respiratory distress.      Breath sounds: No stridor.   Abdominal:      General: There is no distension.      Palpations: Abdomen is soft.   Musculoskeletal:         General: No deformity. Normal range of motion.      Cervical back: Neck supple.   Skin:     General: Skin is warm and dry.      Findings: No rash present. No erythema.   Neurological:      Mental Status: Patient is alert and oriented to person, place, and time.      Cranial Nerves: No cranial nerve deficit.   Psychiatric:         Behavior: Behavior normal.          Patient care time was spent personally by me on the following activities:     Obtaining a history.  Examination of patient.  Providing medical care at the patients bedside.  Developing a treatment plan with patient or surrogate and bedside caregivers.  Ordering and reviewing laboratory studies, radiographic studies, pulse oximetry.  Ordering and performing treatments and interventions.  Evaluation of patient's response to treatment.  Discussions with consultants while on the unit and immediately available to the patient.  Re-evaluation of the patient's condition.  Documentation in the medical record.     Jay Talavera MD    [facility]  [address] [place] [address]  [place] [address]    [phone] - tel  [phone] - fax    6/16/2024

## 2024-06-16 NOTE — ASSESSMENT & PLAN NOTE
In setting of acute on chronic renal failure  Lokelma given x 1  Avoid ACE/ARB, K+ replacement  Trending down, 4.3 today, was 6 in ED  tele

## 2024-06-16 NOTE — ASSESSMENT & PLAN NOTE
Chronic AF  RVR in setting of acute illness, resolved, he's back off dilitiazem today, denies CP/SOB  TSH 1.8 in March  Monitor lytes  No etoh/drug use  TTE from March 2024:    Left Ventricle: The left ventricle is normal in size. Normal wall thickness. Mild global hypokinesis present. There is mildly reduced systolic function with a visually estimated ejection fraction of 45 - 50%. There is normal diastolic function.    Right Ventricle: Normal right ventricular cavity size. Wall thickness is normal. Right ventricle wall motion  is normal. Systolic function is normal.    Left Atrium: Left atrium is moderately dilated.    IVC/SVC: Normal venous pressure at 3 mmHg.  Continue OAC

## 2024-06-16 NOTE — NURSING
Report given to ROXANN Kemp. Pt resting in bed. Wife at bedside. Updated patient and wife with plan of care.

## 2024-06-16 NOTE — ASSESSMENT & PLAN NOTE
Body mass index is 41.26 kg/m².  Morbid obesity complicates all aspects of disease management from diagnostic modalities to treatment  Weight loss encouraged and health benefits explained to patient.   He has been working on weight loss at home w/ reduced caloric intake

## 2024-06-16 NOTE — PT/OT/SLP PROGRESS
Physical Therapy Treatment    Patient Name:  Roosevelt Moser   MRN:  [id number]    Recommendations:     Discharge Recommendations: High Intensity Therapy  Discharge Equipment Recommendations: to be determined by next level of care  Barriers to discharge:  Decreased mobility, increased caregiver burden    Assessment:     Roosevelt Moser is a 73 y.o. male admitted with a medical diagnosis of Acute renal failure superimposed on chronic kidney disease.  He presents with the following impairments/functional limitations: weakness, impaired endurance, impaired self care skills, gait instability, impaired functional mobility, impaired balance, decreased upper extremity function, pain, decreased ROM, impaired fine motor, edema, impaired cardiopulmonary response to activity . Upon entry to room, RN requested assistance with rolling pt for donning brief. Spoke with wife regarding status of WB, she reports pt is NWB to L UE, only allowed to  a cell phone. Pt then mobilized to EOB requiring mod a x2 d/t c/o L shoulder pain. Pt informed that we were going to attempt a stand pivot t/f to chair. Pt completed sit to stand t/f HHA requiring mod a. Shortly after pt requested to return to bed so that he could use the bed pan. Due to lack of endurance with standing, pt would not tolerate hygiene care following use of bsc.      Rehab Prognosis: Fair; patient would benefit from acute skilled PT services to address these deficits and reach maximum level of function.    Recent Surgery: * No surgery found *      Plan:     During this hospitalization, patient to be seen daily to address the identified rehab impairments via gait training, therapeutic activities, therapeutic exercises and progress toward the following goals:    Plan of Care Expires:  07/15/24    Subjective     Chief Complaint: L shoulder pain  Patient/Family Comments/goals: Pt agreeable to sit eob.   Pain/Comfort:  Pain Rating 1: 10/10  Location - Side 1: Left  Location -  Orientation 1: generalized  Location 1: shoulder  Pain Addressed 1: Cessation of Activity  Pain Rating Post-Intervention 1: 10/10      Objective:     Communicated with RN prior to session.  Patient found HOB elevated with bed alarm, blood pressure cuff, PureWick, peripheral IV, pulse ox (continuous), telemetry upon PT entry to room.     General Precautions: Standard, fall  Orthopedic Precautions:  (LUE S/P shldr Sx in 2024)  Braces: N/A  Respiratory Status: Room air     Functional Mobility:  Bed Mobility:     Supine to Sit: moderate assistance and of 2 persons  Sit to Supine: moderate assistance and of 2 persons  Transfers:     Sit to Stand:  moderate assistance with hand-held assist      AM-PAC 6 CLICK MOBILITY          Treatment & Education:  Pt was educated on the following: call light use, importance of OOB activity and functional mobility to negate the negative effects of prolonged bed rest during this hospitalization, safe transfers/ambulation and discharge planning recommendations/options.     Patient left HOB elevated with all lines intact, call button in reach, RN notified, and Spouse present..    GOALS:   Multidisciplinary Problems       Physical Therapy Goals          Problem: Physical Therapy    Goal Priority Disciplines Outcome Goal Variances Interventions   Physical Therapy Goal     PT, PT/OT Progressing     Description: Goals to be met by: 7/15/24     Patient will increase functional independence with mobility by performin. Supine to sit with Contact Guard Assistance  2. Sit to stand transfer with Contact Guard Assistance  3. Bed to chair transfer with Contact Guard Assistance using Rolling Walker  4. Gait  x 200 feet with Contact Guard Assistance using Rolling Walker.   5. Lower extremity exercise program x30 reps per handout, with supervision                         Time Tracking:     PT Received On: 24  PT Start Time: 0955     PT Stop Time: 1024  PT Total Time (min): 29 min      Billable Minutes: Therapeutic Activity 29    Treatment Type: Treatment  PT/PTA: PTA     Number of PTA visits since last PT visit: 1 06/16/2024

## 2024-06-16 NOTE — PLAN OF CARE
Problem: Adult Inpatient Plan of Care  Goal: Plan of Care Review  Outcome: Progressing     Problem: Adult Inpatient Plan of Care  Goal: Absence of Hospital-Acquired Illness or Injury  Outcome: Progressing     Problem: Adult Inpatient Plan of Care  Goal: Optimal Comfort and Wellbeing  Outcome: Progressing     Problem: Diabetes Comorbidity  Goal: Blood Glucose Level Within Targeted Range  Outcome: Progressing     Problem: Acute Kidney Injury/Impairment  Goal: Effective Renal Function  Outcome: Progressing     Problem: Sepsis/Septic Shock  Goal: Blood Glucose Level Within Targeted Range  Outcome: Progressing

## 2024-06-16 NOTE — ASSESSMENT & PLAN NOTE
Obtain LUE u/s to r/o DVT  Pt is already on OAC  Likely just related to recent ORIF and cuff being used on that arm for BP, will change to RUE  Arm is elevated on pillow  Not red/painful, no compartment syndrome  D/w pt/wife

## 2024-06-16 NOTE — PLAN OF CARE
06/15/24 2020   Patient Assessment/Suction   Level of Consciousness (AVPU) alert   Respiratory Effort Normal;Unlabored   Rhythm/Pattern, Respiratory pattern regular   Cough Frequency no cough   PRE-TX-O2   Device (Oxygen Therapy) room air   SpO2 (!) 93 %   Pulse Oximetry Type Continuous   Pulse (!) 111   Resp (!) 26   Aerosol Therapy   $ Aerosol Therapy Charges PRN treatment not required   Preset CPAP/BiPAP Settings   Mode Of Delivery other (see comments)  (pt has home cpap at bedside)

## 2024-06-16 NOTE — ASSESSMENT & PLAN NOTE
In setting of acute UTI  BCX NGTD  Ur CX NGTD  Continue Rocephin as he is clinically much improved  PCT and WBC trending down nicely  No obstruction on renal u/s  He's urinating ok today

## 2024-06-16 NOTE — PLAN OF CARE
Plan of care reviewed with patient. Safety maintained with bed in lowest position, wheels locked, side rails up x2 and call button in reach. Patient instructed to call for any assistance.     Problem: Adult Inpatient Plan of Care  Goal: Plan of Care Review  Outcome: Progressing  Goal: Patient-Specific Goal (Individualized)  Outcome: Progressing  Goal: Absence of Hospital-Acquired Illness or Injury  Outcome: Progressing  Goal: Optimal Comfort and Wellbeing  Outcome: Progressing  Goal: Readiness for Transition of Care  Outcome: Progressing     Problem: Diabetes Comorbidity  Goal: Blood Glucose Level Within Targeted Range  Outcome: Progressing     Problem: Acute Kidney Injury/Impairment  Goal: Fluid and Electrolyte Balance  Outcome: Progressing  Goal: Improved Oral Intake  Outcome: Progressing  Goal: Effective Renal Function  Outcome: Progressing     Problem: Sepsis/Septic Shock  Goal: Optimal Coping  Outcome: Progressing  Goal: Absence of Bleeding  Outcome: Progressing  Goal: Blood Glucose Level Within Targeted Range  Outcome: Progressing  Goal: Absence of Infection Signs and Symptoms  Outcome: Progressing  Goal: Optimal Nutrition Intake  Outcome: Progressing     Problem: Skin Injury Risk Increased  Goal: Skin Health and Integrity  Outcome: Progressing     Problem: Bariatric Environmental Safety  Goal: Safety Maintained with Care  Outcome: Progressing     Problem: Wound  Goal: Optimal Coping  Outcome: Progressing  Goal: Optimal Functional Ability  Outcome: Progressing  Goal: Absence of Infection Signs and Symptoms  Outcome: Progressing  Goal: Improved Oral Intake  Outcome: Progressing  Goal: Optimal Pain Control and Function  Outcome: Progressing  Goal: Skin Health and Integrity  Outcome: Progressing  Goal: Optimal Wound Healing  Outcome: Progressing     Problem: Infection  Goal: Absence of Infection Signs and Symptoms  Outcome: Progressing

## 2024-06-16 NOTE — ASSESSMENT & PLAN NOTE
Multifactorial process  Pt not able to manage his own care and wife cannot adequately care for him at this time  SNF consult  CM aware

## 2024-06-16 NOTE — PROGRESS NOTES
[facility] Medicine  Progress Note    Patient Name: Roosevelt Moser  MRN: [id number]  Patient Class: IP- Inpatient   Admission Date: 6/14/2024  Length of Stay: 2 days  Attending Physician: Shelton Newton MD  Primary Care Provider: Miriam, Primary Doctor        Subjective:     Principal Problem:Acute renal failure superimposed on chronic kidney disease        HPI:  Roosevelt Moser is a 73 year old male with a previous medical history of atrial fibrillation on eliquis, HTN, DMII, obstructive sleep apnea, HLD, ORIF of left humerus and guillian-[place] who presented to the emergency room for weakness and multiple falls. Per wife of the patient he has had progressive weakness over the past week along with two falls one at 0300 Thursday and the other early morning Friday. Both time she had to activate EMS to pick patient up off floor due to weakness. Patient refused transport to hospital on both occasions. Today he slid out of his chair and was unable to get up without assistance and at this point the wife activated EMS to transport patient to the hospital. She endorses that two days ago she noticed that the patient had stopped urinating as he normally does. The patient concurs that he has noticed that his urine has decreased over the past two days and that it is dark. Patient denies dysuria or incomplete bladder emptying. Bladder scan showed zero upon admit and urine sample was obtained via straight cath in ED. Patient denies decreased PO intake and keeps a bottle of water with him at all times. Initial ED work-up showed a creatinine of 5 and potassium of 6. Urine studies positive for infection and WBC 20.81 with procalcitonin at 19.75. Blood cultures obtained and patient given 1 gram of rocephin and 1000ml of normal saline. Patient noted to bein afib with RVR and 20mg of diltiazem was administered IV which resulted in low blood pressure and 1 gram of calcium gluconate was administered. Patient  admitted by hospital medicine for further evaluation and management     Overview/Hospital Course:  6/15/24  Assumed care today, pt seen and examined, chart reviewed.  Pt sitting up in bed, wife at bedside, feeling better.  Off dilt drip since this AM, in AF on monitor but rate down to 80s.   Denies cp/sob.  Has been weak and debilitated, await PT/OT to see what post acute needs will be.    6/16/24  Pt resting in NAD.  Advised by CM yesterday wife not willing/able to take him home at this time - We talked about that frankly today and she says she is unable to care for him, planning on SNF, hopefully regain enough function to allow d/c home from there. Pt w/ slow improvement.  Feels pretty good.  Appetite not too good, he says he's been deliberately eating small quantities of food at home to lose weight and feels it's been working.  Note pain/swelling LUE where he had a recent fx/ORIF.      Interval History:   6/15/24  Assumed care, pt seen and examined, chart reviewed.  Pt HR down, off dilt, denies cp/sob.  Debilitated, having mobility issues at home, await PT/OT input for post acute needs.    6/16/24  See course.    Review of Systems    11 pt ROS negative except as noted o/w    Objective:     Vital Signs (Most Recent):  Temp: 98.5 °F (36.9 °C) (06/16/24 1200)  Pulse: 94 (06/16/24 1200)  Resp: 20 (06/16/24 1228)  BP: 137/61 (06/16/24 1200)  SpO2: 95 % (06/16/24 1200) Vital Signs (24h Range):  Temp:  [97 °F (36.1 °C)-98.6 °F (37 °C)] 98.5 °F (36.9 °C)  Pulse:  [] 94  Resp:  [14-26] 20  SpO2:  [90 %-98 %] 95 %  BP: (100-137)/(53-90) 137/61     Weight: (!) 138 kg (304 lb 3.8 oz)  Body mass index is 41.26 kg/m².    Intake/Output Summary (Last 24 hours) at 6/16/2024 1414  Last data filed at 6/16/2024 1012  Gross per 24 hour   Intake 3660.6 ml   Output 1800 ml   Net 1860.6 ml         Physical Exam      General:  A&O, NAD, very non toxic looking  HEENT: neck supple, PERRL/EOMI, EAC clear bilaterally, normal bilateral  carotid upstroke w/o bruits, inf turbs clear bilaterally, OC/OP clear  Lungs: CTAB  CV: RRR w/o M/G/R  Abdomen: soft, NTND, no HSM, no bruits/masses/pulsations, obese  Ext: swelling LUE from shoulder to wrist, not warm/red/indurated.  Limited ROM shoulder d/t recent ORIF there.  Handgrip good.  Radial pulse ok.   : def  Rectal: def  Neuro: NF    Significant Labs: All pertinent labs within the past 24 hours have been reviewed.    Significant Imaging: I have reviewed all pertinent imaging results/findings within the past 24 hours.    Assessment/Plan:      * Acute renal failure superimposed on chronic kidney disease  Appreciate input from renal services  In setting of acute UTI and IVVD  Will d/c IVF  He did not require oral NaHCO3  U/s is w/o obstruction, masses, etc        Swelling of limb, left  Obtain LUE u/s to r/o DVT  Pt is already on OAC  Likely just related to recent ORIF and cuff being used on that arm for BP, will change to RUE  Arm is elevated on pillow  Not red/painful, no compartment syndrome  D/w pt/wife      Debility  Multifactorial process  Pt not able to manage his own care and wife cannot adequately care for him at this time  SNF consult  CM aware      Severe sepsis  In setting of acute UTI  BCX NGTD  Ur CX NGTD  Continue Rocephin as he is clinically much improved  PCT and WBC trending down nicely  No obstruction on renal u/s  He's urinating ok today    Hyperkalemia  In setting of acute on chronic renal failure  Lokelma given x 1  Avoid ACE/ARB, K+ replacement  Trending down, 4.3 today, was 6 in ED  tele          Atrial fibrillation with rapid ventricular response  Chronic AF  RVR in setting of acute illness, resolved, he's back off dilitiazem today, denies CP/SOB  TSH 1.8 in March  Monitor lytes  No etoh/drug use  TTE from March 2024:    Left Ventricle: The left ventricle is normal in size. Normal wall thickness. Mild global hypokinesis present. There is mildly reduced systolic function with a  visually estimated ejection fraction of 45 - 50%. There is normal diastolic function.    Right Ventricle: Normal right ventricular cavity size. Wall thickness is normal. Right ventricle wall motion  is normal. Systolic function is normal.    Left Atrium: Left atrium is moderately dilated.    IVC/SVC: Normal venous pressure at 3 mmHg.  Continue OAC      History of Guillain-Mckenna syndrome  No acute issues  However, pt has impaired mobility and is acutely weakened from his sepsis/UTI/AF RVR  PT/OT working w/ pt  Wife cannot manage his care at home currently  For SNF evaluation tomorrow  CM aware        Type 2 diabetes mellitus  A1c 6.3%  SSI      Hypertension  Home meds as appropriate    MARA (obstructive sleep apnea)  Does pt use NIPPV at home?      Morbid obesity  Body mass index is 41.26 kg/m².  Morbid obesity complicates all aspects of disease management from diagnostic modalities to treatment  Weight loss encouraged and health benefits explained to patient.   He has been working on weight loss at home w/ reduced caloric intake          VTE Risk Mitigation (From admission, onward)           Ordered     apixaban tablet 5 mg  2 times daily         06/14/24 1438     Reason for No Pharmacological VTE Prophylaxis  Once        Question:  Reasons:  Answer:  Already adequately anticoagulated on oral Anticoagulants    06/14/24 1438     IP VTE HIGH RISK PATIENT  Once         06/14/24 1438     Place sequential compression device  Until discontinued         06/14/24 1438                    Discharge Planning   KAMILA:      Code Status: Full Code   Is the patient medically ready for discharge?:     Reason for patient still in hospital (select all that apply): Patient new problem and Patient trending condition  Discharge Plan A: Skilled Nursing Facility      Time spent in direct patient care, review of data, conversation w/ patient and/or family, and moderately complex MDM 40 minutes              Shelton Newton MD  Department of  [facility] Medicine   [facility]/Surg

## 2024-06-16 NOTE — ASSESSMENT & PLAN NOTE
Appreciate input from renal services  In setting of acute UTI and IVVD  Will d/c IVF  He did not require oral NaHCO3  U/s is w/o obstruction, masses, etc

## 2024-06-16 NOTE — ASSESSMENT & PLAN NOTE
No acute issues  However, pt has impaired mobility and is acutely weakened from his sepsis/UTI/AF RVR  PT/OT working w/ pt  Wife cannot manage his care at home currently  For SNF evaluation tomorrow  CM aware

## 2024-06-16 NOTE — SUBJECTIVE & OBJECTIVE
Interval History:   6/15/24  Assumed care, pt seen and examined, chart reviewed.  Pt HR down, off dilt, denies cp/sob.  Debilitated, having mobility issues at home, await PT/OT input for post acute needs.    6/16/24  See course.    Review of Systems    11 pt ROS negative except as noted o/w    Objective:     Vital Signs (Most Recent):  Temp: 98.5 °F (36.9 °C) (06/16/24 1200)  Pulse: 94 (06/16/24 1200)  Resp: 20 (06/16/24 1228)  BP: 137/61 (06/16/24 1200)  SpO2: 95 % (06/16/24 1200) Vital Signs (24h Range):  Temp:  [97 °F (36.1 °C)-98.6 °F (37 °C)] 98.5 °F (36.9 °C)  Pulse:  [] 94  Resp:  [14-26] 20  SpO2:  [90 %-98 %] 95 %  BP: (100-137)/(53-90) 137/61     Weight: (!) 138 kg (304 lb 3.8 oz)  Body mass index is 41.26 kg/m².    Intake/Output Summary (Last 24 hours) at 6/16/2024 1414  Last data filed at 6/16/2024 1012  Gross per 24 hour   Intake 3660.6 ml   Output 1800 ml   Net 1860.6 ml         Physical Exam      General:  A&O, NAD, very non toxic looking  HEENT: neck supple, PERRL/EOMI, EAC clear bilaterally, normal bilateral carotid upstroke w/o bruits, inf turbs clear bilaterally, OC/OP clear  Lungs: CTAB  CV: RRR w/o M/G/R  Abdomen: soft, NTND, no HSM, no bruits/masses/pulsations, obese  Ext: swelling LUE from shoulder to wrist, not warm/red/indurated.  Limited ROM shoulder d/t recent ORIF there.  Handgrip good.  Radial pulse ok.   : def  Rectal: def  Neuro: NF    Significant Labs: All pertinent labs within the past 24 hours have been reviewed.    Significant Imaging: I have reviewed all pertinent imaging results/findings within the past 24 hours.

## 2024-06-17 LAB
ALBUMIN SERPL BCP-MCNC: 1.3 G/DL (ref 3.5–5.2)
ALP SERPL-CCNC: 235 U/L (ref 55–135)
ALT SERPL W/O P-5'-P-CCNC: 12 U/L (ref 10–44)
ANION GAP SERPL CALC-SCNC: 17 MMOL/L (ref 8–16)
AST SERPL-CCNC: 23 U/L (ref 10–40)
BACTERIA UR CULT: NO GROWTH
BILIRUB SERPL-MCNC: 0.6 MG/DL (ref 0.1–1)
BUN SERPL-MCNC: 76 MG/DL (ref 8–23)
CALCIUM SERPL-MCNC: 8.5 MG/DL (ref 8.7–10.5)
CHLORIDE SERPL-SCNC: 97 MMOL/L (ref 95–110)
CO2 SERPL-SCNC: 25 MMOL/L (ref 23–29)
CREAT SERPL-MCNC: 3.3 MG/DL (ref 0.5–1.4)
ERYTHROCYTE [DISTWIDTH] IN BLOOD BY AUTOMATED COUNT: 18.3 % (ref 11.5–14.5)
EST. GFR  (NO RACE VARIABLE): 19 ML/MIN/1.73 M^2
GLUCOSE SERPL-MCNC: 143 MG/DL (ref 70–110)
HCT VFR BLD AUTO: 32.7 % (ref 40–54)
HGB BLD-MCNC: 10.7 G/DL (ref 14–18)
MAGNESIUM SERPL-MCNC: 1.5 MG/DL (ref 1.6–2.6)
MCH RBC QN AUTO: 25.9 PG (ref 27–31)
MCHC RBC AUTO-ENTMCNC: 32.7 G/DL (ref 32–36)
MCV RBC AUTO: 79 FL (ref 82–98)
PHOSPHATE SERPL-MCNC: 4.3 MG/DL (ref 2.7–4.5)
PLATELET # BLD AUTO: 539 K/UL (ref 150–450)
PMV BLD AUTO: 10.7 FL (ref 9.2–12.9)
POCT GLUCOSE: 173 MG/DL (ref 70–110)
POCT GLUCOSE: 237 MG/DL (ref 70–110)
POCT GLUCOSE: 281 MG/DL (ref 70–110)
POCT GLUCOSE: 282 MG/DL (ref 70–110)
POTASSIUM SERPL-SCNC: 4.1 MMOL/L (ref 3.5–5.1)
PROT SERPL-MCNC: 6 G/DL (ref 6–8.4)
RBC # BLD AUTO: 4.13 M/UL (ref 4.6–6.2)
SODIUM SERPL-SCNC: 139 MMOL/L (ref 136–145)
WBC # BLD AUTO: 15.42 K/UL (ref 3.9–12.7)

## 2024-06-17 PROCEDURE — 21400001 HC TELEMETRY ROOM

## 2024-06-17 PROCEDURE — 99222 1ST HOSP IP/OBS MODERATE 55: CPT | Mod: ,,, | Performed by: FAMILY MEDICINE

## 2024-06-17 PROCEDURE — A4216 STERILE WATER/SALINE, 10 ML: HCPCS | Performed by: STUDENT IN AN ORGANIZED HEALTH CARE EDUCATION/TRAINING PROGRAM

## 2024-06-17 PROCEDURE — 85027 COMPLETE CBC AUTOMATED: CPT

## 2024-06-17 PROCEDURE — 36415 COLL VENOUS BLD VENIPUNCTURE: CPT

## 2024-06-17 PROCEDURE — 80053 COMPREHEN METABOLIC PANEL: CPT

## 2024-06-17 PROCEDURE — 25000003 PHARM REV CODE 250

## 2024-06-17 PROCEDURE — 25000003 PHARM REV CODE 250: Performed by: STUDENT IN AN ORGANIZED HEALTH CARE EDUCATION/TRAINING PROGRAM

## 2024-06-17 PROCEDURE — 83735 ASSAY OF MAGNESIUM: CPT

## 2024-06-17 PROCEDURE — 84100 ASSAY OF PHOSPHORUS: CPT

## 2024-06-17 PROCEDURE — 99900035 HC TECH TIME PER 15 MIN (STAT)

## 2024-06-17 PROCEDURE — 94761 N-INVAS EAR/PLS OXIMETRY MLT: CPT

## 2024-06-17 PROCEDURE — 63600175 PHARM REV CODE 636 W HCPCS: Performed by: HOSPITALIST

## 2024-06-17 PROCEDURE — 11000001 HC ACUTE MED/SURG PRIVATE ROOM

## 2024-06-17 PROCEDURE — 63600175 PHARM REV CODE 636 W HCPCS

## 2024-06-17 PROCEDURE — 25000003 PHARM REV CODE 250: Performed by: HOSPITALIST

## 2024-06-17 RX ORDER — MUPIROCIN 20 MG/G
OINTMENT TOPICAL 2 TIMES DAILY
Status: DISPENSED | OUTPATIENT
Start: 2024-06-17 | End: 2024-06-22

## 2024-06-17 RX ORDER — LIDOCAINE 50 MG/G
1 PATCH TOPICAL
Status: DISCONTINUED | OUTPATIENT
Start: 2024-06-17 | End: 2024-07-07 | Stop reason: HOSPADM

## 2024-06-17 RX ORDER — HYDROCODONE BITARTRATE AND ACETAMINOPHEN 10; 325 MG/1; MG/1
1 TABLET ORAL EVERY 4 HOURS PRN
Status: DISCONTINUED | OUTPATIENT
Start: 2024-06-17 | End: 2024-07-01 | Stop reason: SDUPTHER

## 2024-06-17 RX ORDER — MAGNESIUM SULFATE 1 G/100ML
1 INJECTION INTRAVENOUS ONCE
Status: COMPLETED | OUTPATIENT
Start: 2024-06-17 | End: 2024-06-17

## 2024-06-17 RX ADMIN — SODIUM CHLORIDE, PRESERVATIVE FREE 10 ML: 5 INJECTION INTRAVENOUS at 06:06

## 2024-06-17 RX ADMIN — APIXABAN 5 MG: 2.5 TABLET, FILM COATED ORAL at 08:06

## 2024-06-17 RX ADMIN — INSULIN ASPART 4 UNITS: 100 INJECTION, SOLUTION INTRAVENOUS; SUBCUTANEOUS at 11:06

## 2024-06-17 RX ADMIN — MAGNESIUM SULFATE 1 G: 1 INJECTION INTRAVENOUS at 12:06

## 2024-06-17 RX ADMIN — HYDROCODONE BITARTRATE AND ACETAMINOPHEN 1 TABLET: 7.5; 325 TABLET ORAL at 05:06

## 2024-06-17 RX ADMIN — CEFTRIAXONE SODIUM 1 G: 1 INJECTION, POWDER, FOR SOLUTION INTRAMUSCULAR; INTRAVENOUS at 03:06

## 2024-06-17 RX ADMIN — APIXABAN 5 MG: 2.5 TABLET, FILM COATED ORAL at 09:06

## 2024-06-17 RX ADMIN — METOPROLOL SUCCINATE 100 MG: 50 TABLET, FILM COATED, EXTENDED RELEASE ORAL at 09:06

## 2024-06-17 RX ADMIN — INSULIN ASPART 1 UNITS: 100 INJECTION, SOLUTION INTRAVENOUS; SUBCUTANEOUS at 09:06

## 2024-06-17 RX ADMIN — SODIUM CHLORIDE, PRESERVATIVE FREE 10 ML: 5 INJECTION INTRAVENOUS at 11:06

## 2024-06-17 RX ADMIN — ATORVASTATIN CALCIUM 10 MG: 10 TABLET, FILM COATED ORAL at 09:06

## 2024-06-17 RX ADMIN — INSULIN ASPART 4 UNITS: 100 INJECTION, SOLUTION INTRAVENOUS; SUBCUTANEOUS at 04:06

## 2024-06-17 RX ADMIN — LIDOCAINE 5% 1 PATCH: 700 PATCH TOPICAL at 04:06

## 2024-06-17 RX ADMIN — PIPERACILLIN AND TAZOBACTAM 4.5 G: 4; .5 INJECTION, POWDER, FOR SOLUTION INTRAVENOUS; PARENTERAL at 09:06

## 2024-06-17 RX ADMIN — SODIUM CHLORIDE, PRESERVATIVE FREE 10 ML: 5 INJECTION INTRAVENOUS at 12:06

## 2024-06-17 NOTE — ASSESSMENT & PLAN NOTE
Multifactorial process  Pt not able to manage his own care and wife cannot adequately care for him at this time  PT/OT  SNF consult  CM aware

## 2024-06-17 NOTE — ASSESSMENT & PLAN NOTE
Chronic AF w/ acute RVR, resolved  Required dilt drip on admission but off of that now  Tele some PVC/bigem/trigem  TSH 1.8 in March  Monitor lytes  No etoh/drug use  TTE from March 2024:    Left Ventricle: The left ventricle is normal in size. Normal wall thickness. Mild global hypokinesis present. There is mildly reduced systolic function with a visually estimated ejection fraction of 45 - 50%. There is normal diastolic function.    Right Ventricle: Normal right ventricular cavity size. Wall thickness is normal. Right ventricle wall motion  is normal. Systolic function is normal.    Left Atrium: Left atrium is moderately dilated.    IVC/SVC: Normal venous pressure at 3 mmHg.  Continue OAC

## 2024-06-17 NOTE — ASSESSMENT & PLAN NOTE
Resolved  in setting of acute on chronic renal failure  Lokelma given x 1 on admit  Avoid ACE/ARB, K+ replacement  tele

## 2024-06-17 NOTE — SUBJECTIVE & OBJECTIVE
Interval History:     6/15-17  See course.     Review of Systems    11 pt ROS negative except as noted o/w    Objective:     Vital Signs (Most Recent):  Temp: 98.1 °F (36.7 °C) (06/17/24 0736)  Pulse: 89 (06/17/24 0736)  Resp: 17 (06/17/24 0736)  BP: (!) 140/69 (06/17/24 0736)  SpO2: 97 % (06/17/24 0736) Vital Signs (24h Range):  Temp:  [97.9 °F (36.6 °C)-98.4 °F (36.9 °C)] 98.1 °F (36.7 °C)  Pulse:  [] 89  Resp:  [17-18] 17  SpO2:  [93 %-97 %] 97 %  BP: (127-140)/(61-72) 140/69     Weight: (!) 138 kg (304 lb 3.8 oz)  Body mass index is 41.26 kg/m².    Intake/Output Summary (Last 24 hours) at 6/17/2024 1527  Last data filed at 6/17/2024 0400  Gross per 24 hour   Intake 200 ml   Output 1100 ml   Net -900 ml         Physical Exam      General:  A&O, NAD  HEENT: neck supple, PERRL/EOMI, EAC clear bilaterally, normal bilateral carotid upstroke w/o bruits, inf turbs clear bilaterally, OC/OP clear  Lungs: CTAB  CV: RRR w/o M/G/R  Abdomen: soft, NTND, no HSM, no bruits/masses/pulsations, obese  Ext: puffy, dependent edema LUE but no compartment, radial pulse good, handgrip good, no redness/warmth  Dec ROM left shoulder/RC d/t pain - pressure across left ribs is painful, no crepitus, dropoff, no ext evidence of trauma is seen  : def  Rectal: def  Neuro: NF    Significant Labs: All pertinent labs within the past 24 hours have been reviewed.    Significant Imaging: I have reviewed all pertinent imaging results/findings within the past 24 hours.

## 2024-06-17 NOTE — PLAN OF CARE
Patient choice signed and placed in patient's blue chart.  Patient accepted by [facility] and [facility] via [employer] system.  Patient declined by [facility].   of [facility] reviewing.    Per [place] patient's 1st choice is [facility].  SW sent message to  via [employer] to review for acceptance.    CAROL called locet in to [facility] [phone] and faxed [facility] to [phone] for state approval.  Fax confirmation received.    2:30pm CAROL spoke to Jeanette with [facility], who stated patient medically accepted pending financial.  CAROL explained that patient has [employer] and authorization need to be submitted.  Per Jeanette, once financials completed she will return call.    3:30pm [facility] state approval obtained and scanned into [employer].     06/17/24 1005   Post-Acute Status   Post-Acute Authorization Placement   Post-Acute Placement Status Pending state direction/certification

## 2024-06-17 NOTE — ASSESSMENT & PLAN NOTE
U/s w/o DVT  Obtain xray of left upper extremity to assess recent surgical site as well as ribs  Pt is already on OAC  Arm is elevated on pillow  Not red/painful, no compartment syndrome  D/w pt/wife

## 2024-06-17 NOTE — ASSESSMENT & PLAN NOTE
No acute issues  However, pt has impaired mobility and is acutely weakened from his sepsis/UTI/AF RVR  PT/OT working w/ pt  Wife cannot manage his care at home currently  SNF assessment underway

## 2024-06-17 NOTE — ASSESSMENT & PLAN NOTE
In setting of acute UTI  BCX NGTD  Ur CX neg  Continue empiric Rocephin as he is clinically much improved, plan 7 days of that  PCT and WBC trending down nicely  No obstruction on renal u/s  He's urinating ok today

## 2024-06-17 NOTE — PLAN OF CARE
Met with pt and wife at bedside to discuss dc planning. Pt is not medically ready for dc at this time. They are agreeable to SNF. See SW note for placement updates       06/17/24 1311   Discharge Reassessment   Assessment Type Discharge Planning Reassessment   Did the patient's condition or plan change since previous assessment? Yes   Discharge Plan discussed with: Patient;Spouse/sig other   Communicated KAMILA with patient/caregiver Yes   Discharge Plan A Skilled Nursing Facility   Discharge Plan B Skilled Nursing Facility   DME Needed Upon Discharge  none   Why the patient remains in the hospital Requires continued medical care   Post-Acute Status   Post-Acute Authorization Placement   Post-Acute Placement Status Referrals Sent

## 2024-06-17 NOTE — TELEPHONE ENCOUNTER
Call placed to patient/patient's wife. No answer received. Left message requesting return call to office.  3rd attempt to reach.     [phone] --no answer, left message   [phone] --call answered by a person who states writer reached the wrong number

## 2024-06-17 NOTE — PLAN OF CARE
Plan of care reviewed with patient. Vital signs stable. Safety maintained, call light within reach, bed alarm on.    Problem: Adult Inpatient Plan of Care  Goal: Plan of Care Review  Outcome: Progressing     Problem: Adult Inpatient Plan of Care  Goal: Absence of Hospital-Acquired Illness or Injury  Outcome: Progressing     Problem: Adult Inpatient Plan of Care  Goal: Optimal Comfort and Wellbeing  Outcome: Progressing     Problem: Diabetes Comorbidity  Goal: Blood Glucose Level Within Targeted Range  Outcome: Progressing     Problem: Acute Kidney Injury/Impairment  Goal: Fluid and Electrolyte Balance  Outcome: Progressing     Problem: Sepsis/Septic Shock  Goal: Absence of Infection Signs and Symptoms  Outcome: Progressing

## 2024-06-17 NOTE — CARE UPDATE
06/17/24 0736   Patient Assessment/Suction   Level of Consciousness (AVPU) alert   Respiratory Effort Unlabored;Normal   Expansion/Accessory Muscles/Retractions no retractions   All Lung Fields Breath Sounds equal bilaterally   Rhythm/Pattern, Respiratory pattern regular;depth regular   Skin Integrity   $ Wound Care Tech Time 15 min   Area Observed Bridge of nose   Skin Appearance without discoloration   PRE-TX-O2   Device (Oxygen Therapy) room air   SpO2 97 %   Pulse Oximetry Type Intermittent   $ Pulse Oximetry - Multiple Charge Pulse Oximetry - Multiple   Pulse 89   Resp 17   Aerosol Therapy   $ Aerosol Therapy Charges PRN treatment not required   Respiratory Treatment Status (SVN) PRN treatment not required

## 2024-06-17 NOTE — ASSESSMENT & PLAN NOTE
Appreciate input from renal services  In setting of acute UTI and IVVD  I stopped IVF yesterday  He did not require oral NaHCO3  U/s is w/o obstruction, masses, etc

## 2024-06-17 NOTE — PROGRESS NOTES
[facility] Medicine  Progress Note    Patient Name: Roosevelt Moser  MRN: [id number]  Patient Class: IP- Inpatient   Admission Date: 6/14/2024  Length of Stay: 3 days  Attending Physician: Shelton Newton MD  Primary Care Provider: Miriam, Primary Doctor        Subjective:     Principal Problem:Acute renal failure superimposed on chronic kidney disease        HPI:  Roosevelt Moser is a 73 year old male with a previous medical history of atrial fibrillation on eliquis, HTN, DMII, obstructive sleep apnea, HLD, ORIF of left humerus and gu[place] who presented to the emergency room for weakness and multiple falls. Per wife of the patient he has had progressive weakness over the past week along with two falls one at 0300 Thursday and the other early morning Friday. Both time she had to activate EMS to pick patient up off floor due to weakness. Patient refused transport to hospital on both occasions. Today he slid out of his chair and was unable to get up without assistance and at this point the wife activated EMS to transport patient to the hospital. She endorses that two days ago she noticed that the patient had stopped urinating as he normally does. The patient concurs that he has noticed that his urine has decreased over the past two days and that it is dark. Patient denies dysuria or incomplete bladder emptying. Bladder scan showed zero upon admit and urine sample was obtained via straight cath in ED. Patient denies decreased PO intake and keeps a bottle of water with him at all times. Initial ED work-up showed a creatinine of 5 and potassium of 6. Urine studies positive for infection and WBC 20.81 with procalcitonin at 19.75. Blood cultures obtained and patient given 1 gram of rocephin and 1000ml of normal saline. Patient noted to bein afib with RVR and 20mg of diltiazem was administered IV which resulted in low blood pressure and 1 gram of calcium gluconate was administered. Patient  "admitted by hospital medicine for further evaluation and management     Overview/Hospital Course:  6/15/24  Assumed care today, pt seen and examined, chart reviewed.  Pt sitting up in bed, wife at bedside, feeling better.  Off dilt drip since this AM, in AF on monitor but rate down to 80s.   Denies cp/sob.  Has been weak and debilitated, await PT/OT to see what post acute needs will be.    6/16/24  Pt resting in NAD.  Advised by CM yesterday wife not willing/able to take him home at this time - We talked about that frankly today and she says she is unable to care for him, planning on SNF, hopefully regain enough function to allow d/c home from there. Pt w/ slow improvement.  Feels pretty good.  Appetite not too good, he says he's been deliberately eating small quantities of food at home to lose weight and feels it's been working.  Note pain/swelling LUE where he had a recent fx/ORIF.    6/17/24  Pt doing reasonably well, having pain sitting on the bedpan "it's sticking me", but overcame that w/ some adjusting position.  LUE u/s no DVT.  Still having a lot of pain in upper left arm w/ movement and also worried about persistent pain "under left boob" where he struck himself during a fall - we will image those areas too.  Says Norco 7.5 is like "eating a jelly bean" asking if we can give 10.  Interesting notes that he's currently constipated, at home often has diarrhea, says that higher doses of narcotics cause him to have loose bowels.  Odd.  Albumin 1.3.  Await SNF dispo. Wife at bedside.     Interval History:     6/15-17  See course.     Review of Systems    11 pt ROS negative except as noted o/w    Objective:     Vital Signs (Most Recent):  Temp: 98.1 °F (36.7 °C) (06/17/24 0736)  Pulse: 89 (06/17/24 0736)  Resp: 17 (06/17/24 0736)  BP: (!) 140/69 (06/17/24 0736)  SpO2: 97 % (06/17/24 0736) Vital Signs (24h Range):  Temp:  [97.9 °F (36.6 °C)-98.4 °F (36.9 °C)] 98.1 °F (36.7 °C)  Pulse:  [] 89  Resp:  [17-18] " 17  SpO2:  [93 %-97 %] 97 %  BP: (127-140)/(61-72) 140/69     Weight: (!) 138 kg (304 lb 3.8 oz)  Body mass index is 41.26 kg/m².    Intake/Output Summary (Last 24 hours) at 6/17/2024 1527  Last data filed at 6/17/2024 0400  Gross per 24 hour   Intake 200 ml   Output 1100 ml   Net -900 ml         Physical Exam      General:  A&O, NAD  HEENT: neck supple, PERRL/EOMI, EAC clear bilaterally, normal bilateral carotid upstroke w/o bruits, inf turbs clear bilaterally, OC/OP clear  Lungs: CTAB  CV: RRR w/o M/G/R  Abdomen: soft, NTND, no HSM, no bruits/masses/pulsations, obese  Ext: puffy, dependent edema LUE but no compartment, radial pulse good, handgrip good, no redness/warmth  Dec ROM left shoulder/RC d/t pain - pressure across left ribs is painful, no crepitus, dropoff, no ext evidence of trauma is seen  : def  Rectal: def  Neuro: NF    Significant Labs: All pertinent labs within the past 24 hours have been reviewed.    Significant Imaging: I have reviewed all pertinent imaging results/findings within the past 24 hours.    Assessment/Plan:      * Acute renal failure superimposed on chronic kidney disease  Appreciate input from renal services  In setting of acute UTI and IVVD  I stopped IVF yesterday  He did not require oral NaHCO3  U/s is w/o obstruction, masses, etc        Skin tear of left upper extremity  Local care, healing      Swelling of limb, left  U/s w/o DVT  Obtain xray of left upper extremity to assess recent surgical site as well as ribs  Pt is already on OAC  Arm is elevated on pillow  Not red/painful, no compartment syndrome  D/w pt/wife      Debility  Multifactorial process  Pt not able to manage his own care and wife cannot adequately care for him at this time  PT/OT  SNF consult  CM aware      Severe sepsis  In setting of acute UTI  BCX NGTD  Ur CX neg  Continue empiric Rocephin as he is clinically much improved, plan 7 days of that  PCT and WBC trending down nicely  No obstruction on renal  u/s  He's urinating ok today    Hyperkalemia  Resolved  in setting of acute on chronic renal failure  Lokelma given x 1 on admit  Avoid ACE/ARB, K+ replacement  tele          Atrial fibrillation with rapid ventricular response  Chronic AF w/ acute RVR, resolved  Required dilt drip on admission but off of that now  Tele some PVC/bigem/trigem  TSH 1.8 in March  Monitor lytes  No etoh/drug use  TTE from March 2024:    Left Ventricle: The left ventricle is normal in size. Normal wall thickness. Mild global hypokinesis present. There is mildly reduced systolic function with a visually estimated ejection fraction of 45 - 50%. There is normal diastolic function.    Right Ventricle: Normal right ventricular cavity size. Wall thickness is normal. Right ventricle wall motion  is normal. Systolic function is normal.    Left Atrium: Left atrium is moderately dilated.    IVC/SVC: Normal venous pressure at 3 mmHg.  Continue OAC      History of Guillain-[place] syndrome  No acute issues  However, pt has impaired mobility and is acutely weakened from his sepsis/UTI/AF RVR  PT/OT working w/ pt  Wife cannot manage his care at home currently  SNF assessment underway          Type 2 diabetes mellitus  A1c 6.3%  SSI      Hypertension  Home meds as appropriate    MARA (obstructive sleep apnea)  NIPPV HS/naps      Morbid obesity  Body mass index is 41.26 kg/m².  Morbid obesity complicates all aspects of disease management from diagnostic modalities to treatment  Weight loss encouraged and health benefits explained to patient.   He has been working on weight loss at home w/ reduced caloric intake        ADDENDUM 1830  Contacted by radiology re: xray of left humerus/shoulder shows unexplained gas all throughout the shoulder/axillla/chest wall - hardware and bones look ok -  I went back and reexamined pt and talked to him - He denies having any type of intervention since his ORIF left prox humerus Dr Finger in March, so this should not be  postop gas - The whole area is sensitive but not exquisitely so - No fluctuance, redness - incisions are well healed - axilla appears ok though pt is unable to fully adduct his arm for me to inspect it - In any case will change Rocephin for UTI to Zosyn to cover anaerobes, obtain CT shoulder and ask ortho to take a look tomorrow AM - d/w pt - AFC MD    VTE Risk Mitigation (From admission, onward)           Ordered     apixaban tablet 5 mg  2 times daily         06/14/24 1438     Reason for No Pharmacological VTE Prophylaxis  Once        Question:  Reasons:  Answer:  Already adequately anticoagulated on oral Anticoagulants    06/14/24 1438     IP VTE HIGH RISK PATIENT  Once         06/14/24 1438     Place sequential compression device  Until discontinued         06/14/24 1438                    Discharge Planning   KAMILA: 6/19/2024     Code Status: Full Code   Is the patient medically ready for discharge?:     Reason for patient still in hospital (select all that apply): Patient trending condition  Discharge Plan A: Skilled Nursing Facility      Time spent in direct patient care, review of data, conversation w/ patient and/or family, and moderately complex MDM 40 minutes            Shelton Newton MD  Department of Hospital Medicine   [facility]/Surg

## 2024-06-17 NOTE — PLAN OF CARE
Problem: Adult Inpatient Plan of Care  Goal: Plan of Care Review  Outcome: Progressing  Goal: Patient-Specific Goal (Individualized)  Outcome: Progressing  Goal: Absence of Hospital-Acquired Illness or Injury  Outcome: Progressing  Goal: Optimal Comfort and Wellbeing  Outcome: Progressing  Goal: Readiness for Transition of Care  Outcome: Progressing     Problem: Diabetes Comorbidity  Goal: Blood Glucose Level Within Targeted Range  Outcome: Progressing     Problem: Acute Kidney Injury/Impairment  Goal: Fluid and Electrolyte Balance  Outcome: Progressing  Goal: Improved Oral Intake  Outcome: Progressing  Goal: Effective Renal Function  Outcome: Progressing     Problem: Sepsis/Septic Shock  Goal: Optimal Coping  Outcome: Progressing  Goal: Absence of Bleeding  Outcome: Progressing  Goal: Blood Glucose Level Within Targeted Range  Outcome: Progressing  Goal: Absence of Infection Signs and Symptoms  Outcome: Progressing  Goal: Optimal Nutrition Intake  Outcome: Progressing     Problem: Skin Injury Risk Increased  Goal: Skin Health and Integrity  Outcome: Progressing     Problem: Bariatric Environmental Safety  Goal: Safety Maintained with Care  Outcome: Progressing     Problem: Wound  Goal: Optimal Coping  Outcome: Progressing  Goal: Optimal Functional Ability  Outcome: Progressing  Goal: Absence of Infection Signs and Symptoms  Outcome: Progressing  Goal: Improved Oral Intake  Outcome: Progressing  Goal: Optimal Pain Control and Function  Outcome: Progressing  Goal: Skin Health and Integrity  Outcome: Progressing  Goal: Optimal Wound Healing  Outcome: Progressing     Problem: Infection  Goal: Absence of Infection Signs and Symptoms  Outcome: Progressing

## 2024-06-17 NOTE — PT/OT/SLP PROGRESS
"Occupational Therapy      Patient Name:  Roosevelt Moser   MRN:  [id number]    Attempted OT tx. Patient's wife deferred OT. Patient's wife requested patient sleep as patient "didn't get much sleep last night." Will follow-up when appropriate.    6/17/2024  "

## 2024-06-17 NOTE — PROGRESS NOTES
Nephrology Progress Note        Patient Name: Roosevelt Moser  MRN: [id number]    Patient Class: IP- Inpatient   Admission Date: 6/14/2024  Length of Stay: 3 days  Date of Service: 6/17/2024    Attending Physician: Shelton Newton MD  Primary Care Provider: Miriam, Primary Doctor    Reason for Consult: marbin/hyperkalemia    SUBJECTIVE:     HPI: 73M with h/o DM, HTN, AF, GBS and recent shoulder surgery was brought to ER by EMS with recurrent falls in the last 24h. Reports decreased UO but no fever, cough, SOB, dysuria. Upon admission, noted to be in MARBIN with sCr 5, baseline 1 month ago is 1, hyperkalemia with K 6, acidosis with CO2 12, hypoalbuminemia with albumin 1.6. Lactate notably 2.1. Procal 20. A1c 9.5 in 3/2024. UA with hematuria and pyuria, urine Cx pending. Notably on Apixaban. WBC in blood elevated to 20. Plt 540. RP US ordered. Afebrile, normotensive, received IVF and abx. Lokelma, ca gluconate given bicarb gtt ordered. Straight cath in ER with only 100cc urine out.    Review of Systems:  Neg    6/15  AFVSS.   UOP 1200cc plus 2 unmeasured   6/16  AFVSS.  2150 cc uop.  No distress  6/17 VSS, on RA, UOP 1.5L- updated family at bedside    ASSESSMENT/PLAN:     MARBIN due to ATN due to sepsis due to UTI and/or PNA  CKD stage 2 with diabetic proteinuria and severe hypoalbuminemia  HTN  DM poorly controlled A1c 9.5  HyperK/Acidosis  HypoMg  SHPT  Anemia    - renal function is improving- nonoliguric  - no acute RRT needs- no nsaids or IV contrast- dose meds for CrCl < 20  - about 1g proteinuria- low alb is nutrition/acute phase reactant  - hold hydralazine  - hold metformin- use insulin  - hyperK/acidosis improved  - give IV Mg  - H/H stable    Thank you for allowing us to participate in the care of your patient!   We will follow the patient and provide recommendations as needed.         Laboratory:  Recent Labs   Lab 06/15/24  1108 06/16/24  0453 06/17/24  0255    138 139   K 5.3* 4.3 4.1    98 97   CO2 19* 24 25    BUN 93* 87* 76*   CREATININE 4.9* 4.4* 3.3*   * 234* 143*       Recent Labs   Lab 06/15/24  0658 06/15/24  1108 06/16/24  0453 06/17/24  0255   CALCIUM 8.7 8.5* 8.3* 8.5*   ALBUMIN 1.4*  --  1.4* 1.3*   PHOS 5.0*  --  4.5 4.3   MG 1.7  --  1.6 1.5*             Recent Labs   Lab 06/15/24  0653 06/15/24  1117 06/15/24  1624 06/15/24  2131 06/16/24  0727 06/16/24  1227 06/16/24  1656 06/16/24  2150 06/17/24  0723 06/17/24  1109   POCTGLUCOSE 205* 236* 224* 168* 273* 242* 192* 157* 173* 237*       Recent Labs   Lab 07/31/23  0955 03/19/24  0830 06/14/24  1539   Hemoglobin A1C 8.8 H 9.5 H 6.3 H       Recent Labs   Lab 06/14/24  1229 06/15/24  0556 06/16/24  0453 06/17/24  0255   WBC 20.81* 14.76* 13.11* 15.42*   HGB 11.5* 11.2* 10.7* 10.7*   HCT 34.9* 33.6* 31.8* 32.7*   * 585* 496* 539*   MCV 81* 79* 79* 79*   MCHC 33.0 33.3 33.6 32.7   MONO 7.9  1.6*  --   --   --    EOSINOPHIL 0.3  --   --   --        Recent Labs   Lab 06/15/24  0658 06/16/24  0453 06/17/24  0255   BILITOT 0.5 0.6 0.6   PROT 6.2 5.9* 6.0   ALBUMIN 1.4* 1.4* 1.3*   ALKPHOS 238* 230* 235*   ALT 19 14 12   AST 31 22 23       Recent Labs   Lab 12/16/22  1238 03/08/24  1034 03/25/24  1022 06/14/24  1337   Color, UA Yellow Yellow Yellow [place] A   Appearance, UA Clear Clear Hazy A Cloudy A   pH, UA 6.0 5.0 6.0 6.0   Specific [place], UA 1.020 1.010 1.020 1.020   Protein, UA 1+ A Trace A 1+ A 2+ A   Glucose, UA 1+ A 3+ A Negative Trace A   Ketones, UA Negative Negative Negative Negative   Urobilinogen, UA  --  Negative Negative Negative   Bilirubin (UA) Negative Negative Negative Negative   Occult Blood UA 3+ A 2+ A 2+ A 3+ A   Nitrite, UA Negative Negative Negative Negative   RBC, UA 20 H 13 H >100 H >100 H   WBC, UA 81 H 3 18 H >100 H   Bacteria Rare None Rare Many A   Hyaline Casts, UA 0  --  0 0             Microbiology Results (last 7 days)       Procedure Component Value Units Date/Time    Urine culture [[id number]] Collected: 06/14/24  1337    Order Status: Completed Specimen: Urine Updated: 06/17/24 0709     Urine Culture, Routine No growth    Narrative:      Specimen Source->Urine    Blood culture [[id number]] Collected: 06/14/24 1355    Order Status: Completed Specimen: Blood from Peripheral, Antecubital, Right Updated: 06/16/24 2032     Blood Culture, Routine No Growth to date      No Growth to date      No Growth to date    Blood culture [[id number]] Collected: 06/14/24 1355    Order Status: Completed Specimen: Blood from Peripheral, Hand, Right Updated: 06/16/24 2032     Blood Culture, Routine No Growth to date      No Growth to date      No Growth to date            Review of patient's allergies indicates:  No Known Allergies    Outpatient meds:  No current facility-administered medications on file prior to encounter.     Current Outpatient Medications on File Prior to Encounter   Medication Sig Dispense Refill    apixaban (ELIQUIS) 5 mg Tab Take 1 tablet (5 mg total) by mouth 2 (two) times daily. 60 tablet 1    atorvastatin (LIPITOR) 10 MG tablet Take 1 tablet (10 mg total) by mouth once daily. 90 tablet 3    co-enzyme Q-10 30 mg capsule Take 30 mg by mouth once daily.      doxycycline (VIBRAMYCIN) 100 MG Cap Take 100 mg by mouth 2 (two) times daily.      ergocalciferol, vitamin D2, (VITAMIN D ORAL) Take 1 tablet by mouth once daily.      glimepiride (AMARYL) 2 MG tablet Take 1 tablet (2 mg total) by mouth before breakfast. 90 tablet 3    hydrALAZINE (APRESOLINE) 25 MG tablet Take 1 tablet (25 mg total) by mouth every 12 (twelve) hours. 60 tablet 3    metFORMIN (GLUCOPHAGE-XR) 500 MG ER 24hr tablet Take 2 tablets (1,000 mg total) by mouth 2 (two) times daily with meals. 360 tablet 3    metoprolol succinate (TOPROL-XL) 100 MG 24 hr tablet Take 1 tablet (100 mg total) by mouth once daily. (Patient taking differently: Take 100 mg by mouth nightly.) 90 tablet 3       Scheduled meds:   apixaban  5 mg Oral BID    atorvastatin  10 mg Oral QHS     cefTRIAXone (Rocephin) IV (PEDS and ADULTS)  1 g Intravenous Q24H    magnesium sulfate IVPB  1 g Intravenous Once    metoprolol succinate  100 mg Oral QHS    sodium chloride 0.9%  10 mL Intravenous Q6H       Infusions:        PRN meds:    Current Facility-Administered Medications:     albuterol-ipratropium, 3 mL, Nebulization, Q6H PRN    aluminum & magnesium hydroxide-simethicone, 15 mL, Oral, QID PRN    dextrose 10%, 12.5 g, Intravenous, PRN    dextrose 10%, 25 g, Intravenous, PRN    glucagon (human recombinant), 1 mg, Intramuscular, PRN    glucose, 16 g, Oral, PRN    glucose, 24 g, Oral, PRN    HYDROcodone-acetaminophen, 1 tablet, Oral, Q6H PRN    insulin aspart U-100, 0-5 Units, Subcutaneous, QID (AC + HS) PRN    naloxone, 0.02 mg, Intravenous, PRN    ondansetron, 4 mg, Intravenous, Q8H PRN    prochlorperazine, 5 mg, Intravenous, Q6H PRN    simethicone, 1 tablet, Oral, QID PRN    sodium chloride 0.9%, 10 mL, Intravenous, Q12H PRN    Flushing PICC/Midline Protocol, , , Until Discontinued **AND** sodium chloride 0.9%, 10 mL, Intravenous, Q6H **AND** sodium chloride 0.9%, 10 mL, Intravenous, PRN    Past Medical History:   Diagnosis Date    A-fib 2024    Diabetes mellitus, type 2 2021    Guillain-[place] 10/2003    Hyperlipidemia     Hypertension 2016     Past Surgical History:   Procedure Laterality Date    OPEN REDUCTION AND INTERNAL FIXATION (ORIF) OF FRACTURE OF PROXIMAL HUMERUS Left 3/25/2024    Procedure: ORIF, FRACTURE, HUMERUS, PROXIMAL/IM HANNA, LEFT;  Surgeon: Nathan Cooper MD;  Location: [facility];  Service: Orthopedics;  Laterality: Left;  Accumed, [employer] Small Frag set Avelino verified 3/22/24 ark     No family history on file.  Social History     Tobacco Use    Smoking status: Never    Smokeless tobacco: Never   Substance Use Topics    Alcohol use: Yes     Alcohol/week: 0.0 standard drinks of alcohol     Comment: social    Drug use: No       OBJECTIVE:     Vital Signs and IO:  Temp:  [97.9 °F (36.6 °C)-98.4  °F (36.9 °C)]   Pulse:  []   Resp:  [17-18]   BP: (127-140)/(61-72)   SpO2:  [93 %-97 %]   I/O last 3 completed shifts:  In: 3581.4 [P.O.:850; I.V.:2731.4]  Out: 2300 [Urine:2300]  Wt Readings from Last 5 Encounters:   06/14/24 (!) 138 kg (304 lb 3.8 oz)   06/04/24 132.5 kg (292 lb 1.8 oz)   05/07/24 132.5 kg (292 lb 1.8 oz)   04/08/24 132.5 kg (292 lb 3.2 oz)   03/26/24 (!) 136.1 kg (300 lb 0.7 oz)     Body mass index is 41.26 kg/m².    Physical Exam  Constitutional:       General: Patient is not in acute distress.     Appearance: Patient is well-developed. She is not diaphoretic.   HENT:      Head: Normocephalic and atraumatic.      Mouth/Throat: Mucous membranes are moist.   Eyes:      General: No scleral icterus.     Pupils: Pupils are equal, round, and reactive to light.   Cardiovascular:      Rate and Rhythm: Normal rate and regular rhythm.   Pulmonary:      Effort: Pulmonary effort is normal. No respiratory distress.      Breath sounds: No stridor.   Abdominal:      General: There is no distension.      Palpations: Abdomen is soft.   Musculoskeletal:         General: No deformity. Normal range of motion.      Cervical back: Neck supple.   Skin:     General: Skin is warm and dry.      Findings: No rash present. No erythema.   Neurological:      Mental Status: Patient is alert and oriented to person, place, and time.      Cranial Nerves: No cranial nerve deficit.   Psychiatric:         Behavior: Behavior normal.          Patient care time was spent personally by me on the following activities:     Obtaining a history.  Examination of patient.  Providing medical care at the patients bedside.  Developing a treatment plan with patient or surrogate and bedside caregivers.  Ordering and reviewing laboratory studies, radiographic studies, pulse oximetry.  Ordering and performing treatments and interventions.  Evaluation of patient's response to treatment.  Discussions with consultants while on the unit and  immediately available to the patient.  Re-evaluation of the patient's condition.  Documentation in the medical record.     Von Braxton MD    [facility]  [address] [place] [id number]    [phone] - tel  [phone] - fax    6/17/2024

## 2024-06-17 NOTE — PLAN OF CARE
06/16/24 1952   Patient Assessment/Suction   Level of Consciousness (AVPU) alert   Respiratory Effort Normal;Unlabored   Rhythm/Pattern, Respiratory pattern regular   Cough Frequency no cough   PRE-TX-O2   Device (Oxygen Therapy) room air   SpO2 (!) 94 %   Pulse Oximetry Type Intermittent   Aerosol Therapy   $ Aerosol Therapy Charges PRN treatment not required

## 2024-06-17 NOTE — PT/OT/SLP PROGRESS
Physical Therapy      Patient Name:  Roosevelt Moser   MRN:  [id number]    Patient not seen today secondary to BIPAP in am and MRI in pm. Will follow 06-.

## 2024-06-18 ENCOUNTER — TELEPHONE (OUTPATIENT)
Dept: FAMILY MEDICINE | Facility: CLINIC | Age: 74
End: 2024-06-18
Payer: MEDICARE

## 2024-06-18 PROBLEM — L02.414 ABSCESS OF LEFT SHOULDER: Status: ACTIVE | Noted: 2024-06-18

## 2024-06-18 PROBLEM — M86.9 OSTEOMYELITIS OF TOE: Status: ACTIVE | Noted: 2024-06-18

## 2024-06-18 LAB
ALBUMIN SERPL BCP-MCNC: 1.3 G/DL (ref 3.5–5.2)
ANION GAP SERPL CALC-SCNC: 15 MMOL/L (ref 8–16)
BUN SERPL-MCNC: 68 MG/DL (ref 8–23)
CALCIUM SERPL-MCNC: 8.6 MG/DL (ref 8.7–10.5)
CHLORIDE SERPL-SCNC: 97 MMOL/L (ref 95–110)
CO2 SERPL-SCNC: 25 MMOL/L (ref 23–29)
CREAT SERPL-MCNC: 2.7 MG/DL (ref 0.5–1.4)
CRP SERPL-MCNC: 319 MG/L (ref 0–8.2)
ERYTHROCYTE [SEDIMENTATION RATE] IN BLOOD BY WESTERGREN METHOD: 95 MM/HR (ref 0–10)
EST. GFR  (NO RACE VARIABLE): 24 ML/MIN/1.73 M^2
GLUCOSE SERPL-MCNC: 225 MG/DL (ref 70–110)
MAGNESIUM SERPL-MCNC: 1.5 MG/DL (ref 1.6–2.6)
PHOSPHATE SERPL-MCNC: 3.8 MG/DL (ref 2.7–4.5)
POCT GLUCOSE: 236 MG/DL (ref 70–110)
POCT GLUCOSE: 242 MG/DL (ref 70–110)
POCT GLUCOSE: 272 MG/DL (ref 70–110)
POCT GLUCOSE: 348 MG/DL (ref 70–110)
POCT GLUCOSE: 403 MG/DL (ref 70–110)
POTASSIUM SERPL-SCNC: 3.8 MMOL/L (ref 3.5–5.1)
PREALB SERPL-MCNC: 5 MG/DL (ref 20–43)
SODIUM SERPL-SCNC: 137 MMOL/L (ref 136–145)
TB INDURATION 48 - 72 HR READ: NORMAL
TB SKIN TEST 48 - 72 HR READ: NORMAL

## 2024-06-18 PROCEDURE — 86060 ANTISTREPTOLYSIN O TITER: CPT | Performed by: INTERNAL MEDICINE

## 2024-06-18 PROCEDURE — 63600175 PHARM REV CODE 636 W HCPCS: Performed by: HOSPITALIST

## 2024-06-18 PROCEDURE — 36415 COLL VENOUS BLD VENIPUNCTURE: CPT | Performed by: HOSPITALIST

## 2024-06-18 PROCEDURE — 83735 ASSAY OF MAGNESIUM: CPT | Performed by: INTERNAL MEDICINE

## 2024-06-18 PROCEDURE — 85651 RBC SED RATE NONAUTOMATED: CPT | Performed by: HOSPITALIST

## 2024-06-18 PROCEDURE — 87075 CULTR BACTERIA EXCEPT BLOOD: CPT | Performed by: PODIATRIST

## 2024-06-18 PROCEDURE — 11000001 HC ACUTE MED/SURG PRIVATE ROOM

## 2024-06-18 PROCEDURE — 87070 CULTURE OTHR SPECIMN AEROBIC: CPT | Performed by: PODIATRIST

## 2024-06-18 PROCEDURE — 99222 1ST HOSP IP/OBS MODERATE 55: CPT | Mod: 25,,, | Performed by: PODIATRIST

## 2024-06-18 PROCEDURE — 63600175 PHARM REV CODE 636 W HCPCS: Mod: JZ,JG | Performed by: INTERNAL MEDICINE

## 2024-06-18 PROCEDURE — 86140 C-REACTIVE PROTEIN: CPT | Performed by: HOSPITALIST

## 2024-06-18 PROCEDURE — 63600175 PHARM REV CODE 636 W HCPCS: Performed by: INTERNAL MEDICINE

## 2024-06-18 PROCEDURE — 36415 COLL VENOUS BLD VENIPUNCTURE: CPT | Performed by: INTERNAL MEDICINE

## 2024-06-18 PROCEDURE — 87186 SC STD MICRODIL/AGAR DIL: CPT | Performed by: PODIATRIST

## 2024-06-18 PROCEDURE — 99223 1ST HOSP IP/OBS HIGH 75: CPT | Mod: ,,, | Performed by: INTERNAL MEDICINE

## 2024-06-18 PROCEDURE — 25000003 PHARM REV CODE 250: Performed by: HOSPITALIST

## 2024-06-18 PROCEDURE — 80069 RENAL FUNCTION PANEL: CPT | Performed by: INTERNAL MEDICINE

## 2024-06-18 PROCEDURE — 87077 CULTURE AEROBIC IDENTIFY: CPT | Performed by: PODIATRIST

## 2024-06-18 PROCEDURE — 25000003 PHARM REV CODE 250

## 2024-06-18 PROCEDURE — 25000003 PHARM REV CODE 250: Performed by: STUDENT IN AN ORGANIZED HEALTH CARE EDUCATION/TRAINING PROGRAM

## 2024-06-18 PROCEDURE — 25000003 PHARM REV CODE 250: Performed by: PODIATRIST

## 2024-06-18 PROCEDURE — 94761 N-INVAS EAR/PLS OXIMETRY MLT: CPT

## 2024-06-18 PROCEDURE — 87147 CULTURE TYPE IMMUNOLOGIC: CPT | Performed by: PODIATRIST

## 2024-06-18 PROCEDURE — 84134 ASSAY OF PREALBUMIN: CPT | Performed by: HOSPITALIST

## 2024-06-18 PROCEDURE — A4216 STERILE WATER/SALINE, 10 ML: HCPCS | Performed by: STUDENT IN AN ORGANIZED HEALTH CARE EDUCATION/TRAINING PROGRAM

## 2024-06-18 PROCEDURE — 97110 THERAPEUTIC EXERCISES: CPT

## 2024-06-18 PROCEDURE — 21400001 HC TELEMETRY ROOM

## 2024-06-18 PROCEDURE — 0JBQ0ZZ EXCISION OF RIGHT FOOT SUBCUTANEOUS TISSUE AND FASCIA, OPEN APPROACH: ICD-10-PCS | Performed by: PODIATRIST

## 2024-06-18 PROCEDURE — 99900035 HC TECH TIME PER 15 MIN (STAT)

## 2024-06-18 PROCEDURE — 11042 DBRDMT SUBQ TIS 1ST 20SQCM/<: CPT | Mod: ,,, | Performed by: PODIATRIST

## 2024-06-18 PROCEDURE — 87389 HIV-1 AG W/HIV-1&-2 AB AG IA: CPT | Performed by: INTERNAL MEDICINE

## 2024-06-18 RX ORDER — CLINDAMYCIN PHOSPHATE 600 MG/50ML
600 INJECTION, SOLUTION INTRAVENOUS
Status: DISCONTINUED | OUTPATIENT
Start: 2024-06-18 | End: 2024-06-22

## 2024-06-18 RX ORDER — SILVER NITRATE 38.21; 12.74 MG/1; MG/1
1 STICK TOPICAL ONCE AS NEEDED
Status: COMPLETED | OUTPATIENT
Start: 2024-06-18 | End: 2024-06-18

## 2024-06-18 RX ORDER — ENOXAPARIN SODIUM 100 MG/ML
40 INJECTION SUBCUTANEOUS EVERY 12 HOURS
Status: DISCONTINUED | OUTPATIENT
Start: 2024-06-18 | End: 2024-06-20

## 2024-06-18 RX ORDER — MAGNESIUM SULFATE HEPTAHYDRATE 40 MG/ML
2 INJECTION, SOLUTION INTRAVENOUS ONCE
Status: COMPLETED | OUTPATIENT
Start: 2024-06-18 | End: 2024-06-18

## 2024-06-18 RX ORDER — SODIUM CHLORIDE, SODIUM LACTATE, POTASSIUM CHLORIDE, CALCIUM CHLORIDE 600; 310; 30; 20 MG/100ML; MG/100ML; MG/100ML; MG/100ML
INJECTION, SOLUTION INTRAVENOUS CONTINUOUS
Status: DISCONTINUED | OUTPATIENT
Start: 2024-06-18 | End: 2024-06-19

## 2024-06-18 RX ORDER — INSULIN ASPART 100 [IU]/ML
0-10 INJECTION, SOLUTION INTRAVENOUS; SUBCUTANEOUS
Status: DISCONTINUED | OUTPATIENT
Start: 2024-06-18 | End: 2024-07-07 | Stop reason: HOSPADM

## 2024-06-18 RX ORDER — INSULIN GLARGINE 100 [IU]/ML
10 INJECTION, SOLUTION SUBCUTANEOUS DAILY
Status: DISCONTINUED | OUTPATIENT
Start: 2024-06-18 | End: 2024-06-25

## 2024-06-18 RX ORDER — SILVER NITRATE 38.21; 12.74 MG/1; MG/1
1 STICK TOPICAL ONCE
Status: COMPLETED | OUTPATIENT
Start: 2024-06-18 | End: 2024-06-18

## 2024-06-18 RX ADMIN — INSULIN ASPART 3 UNITS: 100 INJECTION, SOLUTION INTRAVENOUS; SUBCUTANEOUS at 08:06

## 2024-06-18 RX ADMIN — VANCOMYCIN HYDROCHLORIDE 2000 MG: 1 INJECTION, POWDER, LYOPHILIZED, FOR SOLUTION INTRAVENOUS at 02:06

## 2024-06-18 RX ADMIN — SILVER NITRATE APPLICATORS 1 APPLICATOR: 25; 75 STICK TOPICAL at 12:06

## 2024-06-18 RX ADMIN — HYDROCODONE BITARTRATE AND ACETAMINOPHEN 1 TABLET: 10; 325 TABLET ORAL at 04:06

## 2024-06-18 RX ADMIN — MAGNESIUM SULFATE HEPTAHYDRATE 2 G: 40 INJECTION, SOLUTION INTRAVENOUS at 04:06

## 2024-06-18 RX ADMIN — PIPERACILLIN AND TAZOBACTAM 4.5 G: 4; .5 INJECTION, POWDER, FOR SOLUTION INTRAVENOUS; PARENTERAL at 07:06

## 2024-06-18 RX ADMIN — CLINDAMYCIN PHOSPHATE 600 MG: 600 INJECTION, SOLUTION INTRAVENOUS at 09:06

## 2024-06-18 RX ADMIN — MUPIROCIN 1 G: 20 OINTMENT TOPICAL at 08:06

## 2024-06-18 RX ADMIN — SODIUM CHLORIDE, PRESERVATIVE FREE 10 ML: 5 INJECTION INTRAVENOUS at 06:06

## 2024-06-18 RX ADMIN — LIDOCAINE 5% 1 PATCH: 700 PATCH TOPICAL at 04:06

## 2024-06-18 RX ADMIN — HYDROCODONE BITARTRATE AND ACETAMINOPHEN 1 TABLET: 10; 325 TABLET ORAL at 02:06

## 2024-06-18 RX ADMIN — INSULIN ASPART 2 UNITS: 100 INJECTION, SOLUTION INTRAVENOUS; SUBCUTANEOUS at 08:06

## 2024-06-18 RX ADMIN — INSULIN ASPART 10 UNITS: 100 INJECTION, SOLUTION INTRAVENOUS; SUBCUTANEOUS at 05:06

## 2024-06-18 RX ADMIN — SODIUM CHLORIDE, PRESERVATIVE FREE 10 ML: 5 INJECTION INTRAVENOUS at 11:06

## 2024-06-18 RX ADMIN — METOPROLOL SUCCINATE 100 MG: 50 TABLET, FILM COATED, EXTENDED RELEASE ORAL at 08:06

## 2024-06-18 RX ADMIN — ATORVASTATIN CALCIUM 10 MG: 10 TABLET, FILM COATED ORAL at 08:06

## 2024-06-18 RX ADMIN — PIPERACILLIN AND TAZOBACTAM 4.5 G: 4; .5 INJECTION, POWDER, FOR SOLUTION INTRAVENOUS; PARENTERAL at 11:06

## 2024-06-18 RX ADMIN — SODIUM CHLORIDE, POTASSIUM CHLORIDE, SODIUM LACTATE AND CALCIUM CHLORIDE: 600; 310; 30; 20 INJECTION, SOLUTION INTRAVENOUS at 04:06

## 2024-06-18 RX ADMIN — INSULIN GLARGINE 10 UNITS: 100 INJECTION, SOLUTION SUBCUTANEOUS at 05:06

## 2024-06-18 RX ADMIN — PIPERACILLIN AND TAZOBACTAM 4.5 G: 4; .5 INJECTION, POWDER, FOR SOLUTION INTRAVENOUS; PARENTERAL at 04:06

## 2024-06-18 RX ADMIN — INSULIN ASPART 4 UNITS: 100 INJECTION, SOLUTION INTRAVENOUS; SUBCUTANEOUS at 11:06

## 2024-06-18 RX ADMIN — SODIUM CHLORIDE, PRESERVATIVE FREE 10 ML: 5 INJECTION INTRAVENOUS at 05:06

## 2024-06-18 RX ADMIN — ENOXAPARIN SODIUM 40 MG: 40 INJECTION SUBCUTANEOUS at 08:06

## 2024-06-18 RX ADMIN — APIXABAN 5 MG: 2.5 TABLET, FILM COATED ORAL at 08:06

## 2024-06-18 RX ADMIN — CLINDAMYCIN PHOSPHATE 600 MG: 600 INJECTION, SOLUTION INTRAVENOUS at 03:06

## 2024-06-18 NOTE — CARE UPDATE
06/18/24 0905   Patient Assessment/Suction   Level of Consciousness (AVPU) alert   Respiratory Effort Unlabored;Normal   Expansion/Accessory Muscles/Retractions no use of accessory muscles;no retractions;expansion symmetric   All Lung Fields Breath Sounds Anterior:;Lateral:;equal bilaterally   Rhythm/Pattern, Respiratory unlabored;pattern regular;depth regular;no shortness of breath reported   PRE-TX-O2   Device (Oxygen Therapy) room air   SpO2 (!) 94 %   Pulse Oximetry Type Intermittent   $ Pulse Oximetry - Multiple Charge Pulse Oximetry - Multiple   Pulse 104   Resp 20   Aerosol Therapy   $ Aerosol Therapy Charges PRN treatment not required   Respiratory Treatment Status (SVN) PRN treatment not required   Preset CPAP/BiPAP Settings   Mode Of Delivery Standby  (home unti at bedside)

## 2024-06-18 NOTE — PROCEDURES
"Roosevelt Moser is a 73 y.o. male patient.    Temp: 98 °F (36.7 °C) (06/18/24 1215)  Pulse: 93 (06/18/24 1215)  Resp: 20 (06/18/24 1215)  BP: (!) 159/79 (06/18/24 1215)  SpO2: 99 % (06/18/24 1215)  Weight: (!) 138 kg (304 lb 3.8 oz) (06/14/24 1700)  Height: 6' (182.9 cm) (06/14/24 1700)       Debridement    Date/Time: 6/18/2024 1:48 PM    Performed by: Yocasta Zhu DPM  Authorized by: Yocasta Zhu DPM    Time out: Immediately prior to procedure a "time out" was called to verify the correct patient, procedure, equipment, support staff and site/side marked as required.    Consent Done?:  Yes (Verbal)    Preparation: Patient was prepped and draped in usual sterile fashion, Patient was prepped and draped with aseptic technique and Patient was prepped and draped with clean technique    Local anesthesia used?: No      Wound Details:    Location:  Right foot    Location:  Right 2nd Toe    Type of Debridement:  Excisional       Length (cm):  1       Area (sq cm):  0.8       Width (cm):  0.8       Percent Debrided (%):  60       Depth (cm):  0.2       Total Area Debrided (sq cm):  0.48    Depth of debridement:  Subcutaneous tissue    Tissue debrided:  Subcutaneous    Devitalized tissue debrided:  Biofilm, Callus, Slough and Exudate    Instruments:  Forceps, Blade, Curette and Scissors  Bleeding:  Moderate  Hemostasis Achieved: Yes  Method Used:  Pressure and Silver Nitrate  Patient tolerance:  Patient tolerated the procedure well with no immediate complications  1st Wound Pain Assessment: 0  Specimen Collected: Specimen sent to microbiology     Deep tissue culture taken and sent for analysis      6/18/2024    "

## 2024-06-18 NOTE — ASSESSMENT & PLAN NOTE
In setting of acute UTI  Also has OM 2nd toe and appears to have anaerobic infection of left shoulder s/p ORIF there 3 mo ago  BCX NGTD  Ur CX neg  Rocephin replaced yesterday w/ Zosyn, today clinda/vanc added  PCT and WBC trending down nicely  No obstruction on renal u/s  He's urinating ok today, some blood in urine, on Eliquis

## 2024-06-18 NOTE — CONSULTS
Chief complaint:  Weakness (Pt brought to ED via EMS with complaint of weakness and falling, pt advised EMS his urine is very dark.  )      HPI:  Roosevelt Moser is a 73 y.o. male presenting with with a skin tear of the left elbow, eschar covered surgical wound of the abdomen, BL buttock MAD and a right second toe eschar covered wound, all POA. Pt was admitted for renal failure and wounds were found on admission. Not sure how long  he has had the wounds. 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    Hyperlipidemia     Hypertension 2016       Social History     Socioeconomic History    Marital status:    Tobacco Use    Smoking status: Never    Smokeless tobacco: Never   Substance and Sexual Activity    Alcohol use: Yes     Alcohol/week: 0.0 standard drinks of alcohol     Comment: social    Drug use: No    Sexual activity: Yes       Past Surgical History:   Procedure Laterality Date    OPEN REDUCTION AND INTERNAL FIXATION (ORIF) OF FRACTURE OF PROXIMAL HUMERUS Left 3/25/2024    Procedure: ORIF, FRACTURE, HUMERUS, PROXIMAL/IM HANNA, LEFT;  Surgeon: Nathan Cooper MD;  Location: [facility];  Service: Orthopedics;  Laterality: Left;  Accumed, Synthes Small Frag set Avelino verified 3/22/24 ark       No family history on file.    Review of patient's allergies indicates:  No Known Allergies    No current facility-administered medications on file prior to encounter.     Current Outpatient Medications on File Prior to Encounter   Medication Sig Dispense Refill    apixaban (ELIQUIS) 5 mg Tab Take 1 tablet (5 mg total) by mouth 2 (two) times daily. 60 tablet 1    atorvastatin (LIPITOR) 10 MG tablet Take 1 tablet (10 mg total) by mouth once daily. 90 tablet 3    co-enzyme Q-10 30 mg capsule Take 30 mg by mouth once daily.      doxycycline (VIBRAMYCIN) 100 MG Cap Take 100 mg by mouth 2 (two) times daily.       ergocalciferol, vitamin D2, (VITAMIN D ORAL) Take 1 tablet by mouth once daily.      glimepiride (AMARYL) 2 MG tablet Take 1 tablet (2 mg total) by mouth before breakfast. 90 tablet 3    hydrALAZINE (APRESOLINE) 25 MG tablet Take 1 tablet (25 mg total) by mouth every 12 (twelve) hours. 60 tablet 3    metFORMIN (GLUCOPHAGE-XR) 500 MG ER 24hr tablet Take 2 tablets (1,000 mg total) by mouth 2 (two) times daily with meals. 360 tablet 3    metoprolol succinate (TOPROL-XL) 100 MG 24 hr tablet Take 1 tablet (100 mg total) by mouth once daily. (Patient taking differently: Take 100 mg by mouth nightly.) 90 tablet 3       ROS: As per HPI and below:  Pertinent items are noted in HPI.      Physical Exam:     Vitals:    24 2347 24 0306 24 0519 24 0736   BP: 127/66 136/61  (!) 140/69   BP Location: Left arm Left arm     Patient Position: Lying Lying     Pulse: (!) 120 109  89   Resp: 18 18 18 17   Temp: 97.9 °F (36.6 °C) 98.4 °F (36.9 °C)  98.1 °F (36.7 °C)   TempSrc: Tympanic Oral     SpO2: (!) 93% 95%  97%   Weight:       Height:           BP  Min: 97/57  Max: 156/106  Temp  Av °F (36.7 °C)  Min: 97 °F (36.1 °C)  Max: 98.6 °F (37 °C)  Pulse  Av.6  Min: 69  Max: 127  Resp  Av.2  Min: 0  Max: 50  SpO2  Av.2 %  Min: 90 %  Max: 98 %  Height  Av' (182.9 cm)  Min: 6' (182.9 cm)  Max: 6' (182.9 cm)  Weight  Av.8 kg (297 lb 1.9 oz)  Min: 131.5 kg (290 lb)  Max: 138 kg (304 lb 3.8 oz)    Body mass index is 41.26 kg/m².          General:             Well developed, well nourished, no apparent distress  HEENT:              NCAT, no JVD, mucous membranes moist, EOM intact  Cardiovascular:  Regular rate and rhythm, normal S1, normal S2, No murmurs, rubs, or gallops  Respiratory:        Normal breath sounds, no wheezes, no rales, no rhonchi  Abdomen:           Bowel sounds present, non tender, non distended, no masses, no hepatojugular reflux  Extremities:        No clubbing, no  cyanosis, no edema  Vascular:            2+ b/l radial.  Peripheral pulses intact.  No carotid bruits.  Neurological:      No focal deficits  Skin:                   No obvious rashes or erythema, wounds as follows:  1. Osteomyelitis of the second toe of the right foot  2. Skin tear of the left elbow  3. Eschar covered surgical wound of the abdomen  4. BL buttocks MAD                    Lab Results   Component Value Date    WBC 15.42 (H) 06/17/2024    HGB 10.7 (L) 06/17/2024    HCT 32.7 (L) 06/17/2024    MCV 79 (L) 06/17/2024     (H) 06/17/2024     Lab Results   Component Value Date    CHOL 177 10/04/2022    CHOL 135 01/25/2022    CHOL 119 (L) 10/22/2021     Lab Results   Component Value Date    HDL 41 10/04/2022    HDL 37 (L) 01/25/2022    HDL 37 (L) 10/22/2021     Lab Results   Component Value Date    LDLCALC 81.2 10/04/2022    LDLCALC 48.0 (L) 01/25/2022    LDLCALC 51.2 (L) 10/22/2021     Lab Results   Component Value Date    TRIG 274 (H) 10/04/2022    TRIG 250 (H) 01/25/2022    TRIG 154 (H) 10/22/2021     Lab Results   Component Value Date    CHOLHDL 23.2 10/04/2022    CHOLHDL 27.4 01/25/2022    CHOLHDL 31.1 10/22/2021     CMP  Recent Labs   Lab 06/17/24  0255   *   CALCIUM 8.5*   ALBUMIN 1.3*   PROT 6.0      K 4.1   CO2 25   CL 97   BUN 76*   CREATININE 3.3*   ALKPHOS 235*   ALT 12   AST 23   BILITOT 0.6      Lab Results   Component Value Date    TSH 1.865 03/07/2024   MRI FOOT (FOREFOOT) RIGHT WITHOUT CONTRAST     CLINICAL HISTORY:  DFU right second toe;     TECHNIQUE:  To sequence multiplanar imaging of the foot tailored to evaluate the forefoot obtained without contrast     COMPARISON:  None     FINDINGS:  There are findings of osteomyelitis involving the middle and distal phalanx of the 3rd toe with decreased T1, increased T2 signal and cortical destruction.  Surrounding soft tissue edema and cellulitis.  Soft tissue changes extend around the distal aspect of the proximal phalanx as well  but without definite destruction.     Fairly diffuse edema suggesting cellulitis.  Increased T2 signal in the distal aspect of the distal phalanx of the 2nd toe without corresponding decreased T1 signal suggesting hyperemia.  Some images question very slight destruction of the tip of the tuft of the distal phalanx of the 2nd toe     Impression:     Findings consistent osteomyelitis involving the middle distal phalanges of the 3rd toe.  Suggesting changes from hyperemia the distal aspect of the proximal phalanx of the 3rd toe and distal aspect of the distal phalanx of the 2nd toe.     Cellulitis.     Findings detailed above        Electronically signed by:Cass Cutler MD  Date:                                            06/17/2024  Time:                                           16:09        Assessment and Recommendations       Diagnoses:    1. Osteomyelitis of the second toe of the right foot  2. Skin tear of the left elbow  3. Eschar covered surgical wound of the abdomen  4. BL buttocks MAD    Plan:  1. Triad to the BL buttocks   2. MRI of the right foot, done  3. Surgical wound of the abdomen - betadine painted daily  4.Xeroform to the left elbow skin tear daily  5. Consult podiatry    Complexity:    moderate

## 2024-06-18 NOTE — SUBJECTIVE & OBJECTIVE
Principal Problem:Acute renal failure superimposed on chronic kidney disease    Principal Orthopedic Problem:  Status post left shoulder open reduction internal fixation of proximal humerus fracture.    Interval History:      Review of patient's allergies indicates:  No Known Allergies    Current Facility-Administered Medications   Medication    albuterol-ipratropium 2.5 mg-0.5 mg/3 mL nebulizer solution 3 mL    aluminum & magnesium hydroxide-simethicone 400-400-40 mg/5 mL suspension 15 mL    atorvastatin tablet 10 mg    clindamycin in D5W 600 mg/50 mL IVPB 600 mg    dextrose 10% bolus 125 mL 125 mL    dextrose 10% bolus 250 mL 250 mL    enoxaparin injection 40 mg    glucagon (human recombinant) injection 1 mg    glucose chewable tablet 16 g    glucose chewable tablet 24 g    HYDROcodone-acetaminophen  mg per tablet 1 tablet    insulin aspart U-100 pen 0-5 Units    lactated ringers infusion    LIDOcaine 5 % patch 1 patch    magnesium sulfate 2g in water 50mL IVPB (premix)    metoprolol succinate (TOPROL-XL) 24 hr tablet 100 mg    mupirocin 2 % ointment    naloxone 0.4 mg/mL injection 0.02 mg    ondansetron injection 4 mg    piperacillin-tazobactam (ZOSYN) 4.5 g in dextrose 5 % in water (D5W) 100 mL IVPB (MB+)    prochlorperazine injection Soln 5 mg    simethicone chewable tablet 80 mg    sodium chloride 0.9% flush 10 mL    sodium chloride 0.9% flush 10 mL    And    sodium chloride 0.9% flush 10 mL    vancomycin - pharmacy to dose     Objective:     Vital Signs (Most Recent):  Temp: 98 °F (36.7 °C) (06/18/24 1215)  Pulse: 93 (06/18/24 1215)  Resp: 18 (06/18/24 1421)  BP: (!) 159/79 (06/18/24 1215)  SpO2: 99 % (06/18/24 1215) Vital Signs (24h Range):  Temp:  [97.8 °F (36.6 °C)-98 °F (36.7 °C)] 98 °F (36.7 °C)  Pulse:  [] 93  Resp:  [18-20] 18  SpO2:  [94 %-100 %] 99 %  BP: (111-159)/(56-79) 159/79     Weight: (!) 138 kg (304 lb 3.8 oz)  Height: 6' (182.9 cm)  Body mass index is 41.26  kg/m².      Intake/Output Summary (Last 24 hours) at 6/18/2024 1627  Last data filed at 6/18/2024 0434  Gross per 24 hour   Intake --   Output 1300 ml   Net -1300 ml        General    Constitutional: He is oriented to person, place, and time. He appears well-developed and well-nourished.   Pulmonary/Chest: Effort normal.   Neurological: He is alert and oriented to person, place, and time.   Psychiatric: He has a normal mood and affect. His behavior is normal. Judgment and thought content normal.             Left Shoulder Exam     Inspection/Observation   Swelling: present  Bruising: absent    Tenderness   The patient is experiencing no tenderness.     Other   Sensation: normal     Comments:  Skin to left shoulder is clean dry and intact.  There is no erythema or ecchymosis.  Surgical incisions are all well healed.  He does have a palpable effusion to the left shoulder.  There is no real change in his physical exam from previous clinic visits.  He has markedly reduced range of motion secondary to the known injury, surgery, and subsequent loss of reduction.  There is no real tenderness to palpation about the entirety of the shoulder both anteriorly, laterally, and posteriorly.  No pain with flexion/extension of the left elbow with pronation/supination of the left forearm.  He has expected usual pain with any attempts of range of motion of the left shoulder with the associated stiffness.  No change from previous clinic visit examinations.         Significant Labs: None    Significant Imaging: I have reviewed and interpreted all pertinent imaging results/findings.

## 2024-06-18 NOTE — CONSULTS
[facility]/Surg   Department of Infectious Disease  Consult Note        PATIENT NAME: Roosevelt Moser  MRN: [id number]  TODAY'S DATE: 06/18/2024  ADMIT DATE: 6/14/2024  LOS: 4 days    CHIEF COMPLAINT: Weakness (Pt brought to ED via EMS with complaint of weakness and falling, pt advised EMS his urine is very dark.  )      PRINCIPLE PROBLEM: Acute renal failure superimposed on chronic kidney disease    REASON FOR CONSULT:     ASSESSMENT and PLAN      1. Left upper extremity acute bacterial skin and skin structure infection.  Also concern for left shoulder septic arthritis.  CT done today has documented left shoulder effusion and also fluid collection in the soft tissue.  He will need evaluation by orthopedic surgeon.  Add vancomycin and clindamycin at this time.  Continue Zosyn for now.  Check ASO titer.    2. Right 3rd toe osteomyelitis.  He previously received?  Dalbavancin x2 doses.  Also has practically healed.  We will consider this as partially treated.  Antibiotics as above for this as well.    3. ARF.  This is resolving.  Current creatinine 2.7.  Management as per nephrologist.      4. Diabetes mellitus.  Management as per hospitalist.      5. Debility.    6. Oral thrush.  This is most likely secondary to hyperglycemia related to his diabetes mellitus.  Mycelex troches for now.  Check HIV screen    RECOMMENDATIONS:   Consult orthopedic surgeon to evaluate for I&D of left shoulder and left arm.  Add vancomycin and clindamycin for now and continue Zosyn   Check ASO titer  Check x-ray right foot    Thank you for this consult. Please send [employer] secure chat with any questions.  Discussed with patient and his wife at bedside.    Antibiotics (From admission, onward)      Start     Stop Route Frequency Ordered    06/17/24 2100  mupirocin 2 % ointment         06/22/24 2059 Nasl 2 times daily 06/17/24 1544    06/17/24 1930  piperacillin-tazobactam (ZOSYN) 4.5 g in dextrose 5 % in water (D5W) 100 mL IVPB (MB+)          -- IV Every 8 hours (non-standard times) 06/17/24 1825          Antifungals (From admission, onward)      None           Antivirals (From admission, onward)      None            HPI      Roosevelt Moser is a 73 y.o. male with history of diabetes mellitus, hypertension and previous going bowel syndrome.  He fell on 03/04/2024 and sustained a displaced comminuted fracture of the left humerus.  Seen by orthopedic surgeon with plan for ORIF which was deferred because of new AFib.  Admitted 03/09/2024 for rate control and ultimately discharged back home 03/12/2024.  Later had left humerus ORIF 03/25/2024 as a day procedure and was discharged home.      According to his wife, he developed pain and swelling of the 3rd toe and was with an ulcer.  It appears an x-ray of the foot was unremarkable.  Received 2 doses of an antibiotic that I presume to be dalbavancin 1 week apart in early April.    In the last 2 weeks he has continued to decline.  He also has reduction in urine output.  Fell twice at home on 06/13/2024 and wife activated EMS and he was brought to the hospital.  Received IV fluid for low normal blood pressure.  Also noted to have MARBIN with creatinine 5.0 and WBC was 21 K.  UA was abnormal with> 100 WBC,> 100 RBC, 3+ LE and positive nitrite.  He has been managed conservatively for MARBIN and for dehydration.    X-ray of left shoulder and left upper extremity 06/17/2024 documented gas in soft tissue.  MRI right foot documented osteomyelitis of the 3rd toe.  I was asked to see to assist with his care.  ESR 95,  milligram/liter.    Antibiotics   Ceftriaxone:  06/14/2024-06/17/2024   Zosyn:  06/17/2024-    Cultures   Blood culture 06/14/2024:  NGTD   Urine culture 06/14/2024: NGTD    Outdoor activities:  He lives with his wife has had difficulty ambulating in the last few weeks.  Travel:  No recent travel  Implants:  Left  humerus ORIF  Antibiotic history:  As in HPI    Social History  Marital Status:    Alcohol History:  reports current alcohol use.  Tobacco History:  reports that he has never smoked. He has never used smokeless tobacco.  Drug History:  reports no history of drug use.    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    SUBJECTIVE     Review of systems: As in HPI     OBJECTIVE   Temp:  [97.8 °F (36.6 °C)-98 °F (36.7 °C)] 98 °F (36.7 °C)  Pulse:  [] 104  Resp:  [18-20] 20  SpO2:  [94 %-100 %] 94 %  BP: (111-122)/(56-75) 119/56  Temp:  [97.8 °F (36.6 °C)-98 °F (36.7 °C)]   Temp: 98 °F (36.7 °C) (06/18/24 0809)  Pulse: 104 (06/18/24 0905)  Resp: 20 (06/18/24 0905)  BP: (!) 119/56 (06/18/24 0809)  SpO2: (!) 94 % (06/18/24 0905)    Intake/Output Summary (Last 24 hours) at 6/18/2024 1154  Last data filed at 6/18/2024 0434  Gross per 24 hour   Intake --   Output 1300 ml   Net -1300 ml       Physical Exam  General:  Elderly man lying quietly in bed in no acute distress at this time   HEENT: Oral thrush involving the oropharynx  Left upper extremity:  Edematous with faint erythema of arm and forearm.  Evidence of left shoulder effusion.  Limited active movement of the left upper extremity.  Pain on passive elevation of the forearm centered on the shoulder  CVS: S1 and 2 heard   Respiratory: Clear to auscultation anteriorly   Abdomen: Protuberant, soft, nontender, no palpable organomegaly   Skin: No rash appreciated   CNS: No focal deficits    Right foot:  Dry eschar 2nd toe tip with mild edema.  Mild edema and faint erythema of the 3rd toe.  Dry healed punctate ulcer dorsal 3rd toe    VAD:  Right arm PICC line  ISOLATION:  None    Wounds:     Significant Labs: All pertinent labs within the past 24 hours have been reviewed.    CBC LAST 7  Recent Labs   Lab 06/14/24  1229 06/15/24  0556 06/16/24  0453 06/17/24  0255   WBC 20.81* 14.76* 13.11* 15.42*   RBC 4.33* 4.24* 4.05* 4.13*   HGB 11.5* 11.2* 10.7* 10.7*   HCT 34.9* 33.6* 31.8* 32.7*   MCV 81* 79* 79* 79*   MCH 26.6* 26.4* 26.4* 25.9*   MCHC 33.0 33.3 33.6 32.7   RDW 18.7* 21.3* 18.1* 18.3*   * 585* 496* 539*   MPV 10.1 12.3 10.5 10.7   GRAN 81.0*  16.9*  --   --   --    LYMPH 8.4*  1.8  --   --   --    MONO 7.9  1.6*  --   --   --    BASO 0.10  --   --   --    NRBC 0  --   --   --        CHEMISTRY LAST 7  Recent Labs   Lab 06/14/24  1229 06/14/24  1539 06/14/24 2029 06/15/24  0022 06/15/24  0658 06/15/24  1108 06/16/24  0453 06/17/24  0255 06/18/24  0234    138 139 136 136 137 138 139 137   K 6.0* 5.8* 5.9* 5.4* 5.6* 5.3* 4.3 4.1 3.8    108 106 104 102 102 98 97 97   CO2 12* 12* 15* 16* 17* 19* 24 25 25   ANIONGAP 18* 18* 18* 16 17* 16 16 17* 15   BUN 85* 86* 87* 87* 89* 93* 87* 76* 68*   CREATININE 5.0* 4.9* 5.1* 5.0* 5.0* 4.9* 4.4* 3.3* 2.7*    91 106 131* 214* 222* 234* 143* 225*   CALCIUM 9.1 9.1 9.0 8.7 8.7 8.5* 8.3* 8.5* 8.6*   MG  --  1.3*  --   --  1.7  --  1.6 1.5* 1.5*   ALBUMIN 1.6*  --   --   --  1.4*  --  1.4* 1.3* 1.3*   PROT 6.5  --   --   --  6.2  --  5.9* 6.0  --    ALKPHOS 270*  --   --   --  238*  --  230* 235*  --    ALT 22  --   --   --  19  --  14 12  --    AST 39  --   --   --  31  --  22 23  --    BILITOT 0.6  --   --   --  0.5  --  0.6 0.6  --        Estimated Creatinine Clearance: 35.6 mL/min (A) (based on SCr of 2.7 mg/dL (H)).    INFLAMMATORY/PROCAL  LAST 7  Recent Labs   Lab 06/14/24  1229 06/16/24  0453 06/18/24  0233   PROCAL 19.75* 7.88*   "--    CRP  --   --  319.0*     No results found for: "ESR"  CRP   Date Value Ref Range Status   06/18/2024 319.0 (H) 0.0 - 8.2 mg/L Final       PRIOR  MICROBIOLOGY:  None    No results found for the last 90 days.    LAST 7 DAYS MICROBIOLOGY   Microbiology Results (last 7 days)       Procedure Component Value Units Date/Time    Blood culture [[id number]] Collected: 06/14/24 1355    Order Status: Completed Specimen: Blood from Peripheral, Hand, Right Updated: 06/17/24 2032     Blood Culture, Routine No Growth to date      No Growth to date      No Growth to date      No Growth to date    Blood culture [[id number]] Collected: 06/14/24 1355    Order Status: Completed Specimen: Blood from Peripheral, Antecubital, Right Updated: 06/17/24 2032     Blood Culture, Routine No Growth to date      No Growth to date      No Growth to date      No Growth to date    Urine culture [[id number]] Collected: 06/14/24 1337    Order Status: Completed Specimen: Urine Updated: 06/17/24 0709     Urine Culture, Routine No growth    Narrative:      Specimen Source->Urine              CURRENT/PREVIOUS VISIT EKG  Results for orders placed or performed during the hospital encounter of 03/25/24   EKG 12-lead    Collection Time: 03/25/24  9:14 AM   Result Value Ref Range    QRS Duration 100 ms    [facility] QTC Calculation 437 ms    Narrative    Test Reason : Z01.818,    Vent. Rate : 074 BPM     Atrial Rate : 340 BPM     P-R Int : 000 ms          QRS Dur : 100 ms      QT Int : 394 ms       P-R-T Axes : 000 -54 027 degrees     QTc Int : 437 ms    Atrial fibrillation  Left axis deviation  Abnormal ECG  No previous ECGs available  Confirmed by Jesús HATHAWAY, Hansel AMIN ([id number]) on 4/1/2024 11:51:23 PM    Referred By: ALLISON WINN           Confirmed By:Hansel Espinosa MD       Significant Imaging: I have reviewed all relevant and available imaging results/findings within the past 24 hours.    I spent a total of 45 minutes on the day of the visit.This includes " face to face time and non-face to face time preparing to see the patient (eg, review of tests), obtaining and/or reviewing separately obtained history, documenting clinical information in the electronic or other health record, independently interpreting results and communicating results to the patient/family/caregiver, or care coordinator.    Vasquez Calvin MD  Date of Service: 06/18/2024      This note was created using [employer] voice recognition software that occasionally misinterpreted phrases or words.

## 2024-06-18 NOTE — ASSESSMENT & PLAN NOTE
Chronic AF w/ acute RVR, resolved  Required dilt drip on admission but off of that now  Tele some PVC/bigem/trigem  TSH 1.8 in March  Monitor lytes  No etoh/drug use  TTE from March 2024:    Left Ventricle: The left ventricle is normal in size. Normal wall thickness. Mild global hypokinesis present. There is mildly reduced systolic function with a visually estimated ejection fraction of 45 - 50%. There is normal diastolic function.    Right Ventricle: Normal right ventricular cavity size. Wall thickness is normal. Right ventricle wall motion  is normal. Systolic function is normal.    Left Atrium: Left atrium is moderately dilated.    IVC/SVC: Normal venous pressure at 3 mmHg.  Hold OAC tonight for possible shoulder washout, start Lovenox 40 QD

## 2024-06-18 NOTE — HPI
""Roosevelt Moser is a 73 year old male with a previous medical history of atrial fibrillation on eliquis, HTN, DMII, obstructive sleep apnea, HLD, ORIF of left humerus and guillian-Orwell who presented to the emergency room for weakness and multiple falls. Per wife of the patient he has had progressive weakness over the past week along with two falls one at 0300 Thursday and the other early morning Friday. Both time she had to activate EMS to pick patient up off floor due to weakness. Patient refused transport to hospital on both occasions. Today he slid out of his chair and was unable to get up without assistance and at this point the wife activated EMS to transport patient to the hospital. She endorses that two days ago she noticed that the patient had stopped urinating as he normally does. The patient concurs that he has noticed that his urine has decreased over the past two days and that it is dark. Patient denies dysuria or incomplete bladder emptying. Bladder scan showed zero upon admit and urine sample was obtained via straight cath in ED. Patient denies decreased PO intake and keeps a bottle of water with him at all times. Initial ED work-up showed a creatinine of 5 and potassium of 6. Urine studies positive for infection and WBC 20.81 with procalcitonin at 19.75. Blood cultures obtained and patient given 1 gram of rocephin and 1000ml of normal saline. Patient noted to bein afib with RVR and 20mg of diltiazem was administered IV which resulted in low blood pressure and 1 gram of calcium gluconate was administered. Patient admitted by hospital medicine for further evaluation and management"    Patient was noted to have wounds to his right foot and Podiatry was consulted.    MRI was done which showed osteomyelitis of both the 2nd and 3rd digits.      "

## 2024-06-18 NOTE — TELEPHONE ENCOUNTER
Adelita Hamilton LPN Yarbrough, Donna M., LPN  Caller: Wife - Buffy (Yesterday,  4:15 PM)      ----- Message from Adelita Hamilton LPN sent at 6/17/2024  4:21 PM CDT -----  Contact: Wife - Buffy    ----- Message -----  From: Kimberly Powell  Sent: 6/17/2024   4:17 PM CDT  To: Zoe Michelle Staff    Type:  Patient Returning Call    Who Called:  Patients Wife   Who Left Message for Patient:  Daksha  Does the patient know what this is regarding?:  yes  Best Call Back Number:  [phone]  Additional Information:  Stated pt has been in [facility] since Friday and won't need Home Health yet since they will be transferring him to skilled nursing once he is discharged. Please call back to discuss on above number. Thank You

## 2024-06-18 NOTE — CONSULTS
[facility]/Surg  Orthopedics  Progress Note    Patient Name: Roosevelt Moser  MRN: [id number]  Admission Date: 6/14/2024  Hospital Length of Stay: 4 days  Attending Provider: Shelton Newton MD  Primary Care Provider: Miriam, Primary Doctor    Subjective:     Principal Problem:Acute renal failure superimposed on chronic kidney disease    Principal Orthopedic Problem:  Status post left shoulder open reduction internal fixation of proximal humerus fracture.    Interval History:      Review of patient's allergies indicates:  No Known Allergies    Current Facility-Administered Medications   Medication    albuterol-ipratropium 2.5 mg-0.5 mg/3 mL nebulizer solution 3 mL    aluminum & magnesium hydroxide-simethicone 400-400-40 mg/5 mL suspension 15 mL    atorvastatin tablet 10 mg    clindamycin in D5W 600 mg/50 mL IVPB 600 mg    dextrose 10% bolus 125 mL 125 mL    dextrose 10% bolus 250 mL 250 mL    enoxaparin injection 40 mg    glucagon (human recombinant) injection 1 mg    glucose chewable tablet 16 g    glucose chewable tablet 24 g    HYDROcodone-acetaminophen  mg per tablet 1 tablet    insulin aspart U-100 pen 0-5 Units    lactated ringers infusion    LIDOcaine 5 % patch 1 patch    magnesium sulfate 2g in water 50mL IVPB (premix)    metoprolol succinate (TOPROL-XL) 24 hr tablet 100 mg    mupirocin 2 % ointment    naloxone 0.4 mg/mL injection 0.02 mg    ondansetron injection 4 mg    piperacillin-tazobactam (ZOSYN) 4.5 g in dextrose 5 % in water (D5W) 100 mL IVPB (MB+)    prochlorperazine injection Soln 5 mg    simethicone chewable tablet 80 mg    sodium chloride 0.9% flush 10 mL    sodium chloride 0.9% flush 10 mL    And    sodium chloride 0.9% flush 10 mL    vancomycin - pharmacy to dose     Objective:     Vital Signs (Most Recent):  Temp: 98 °F (36.7 °C) (06/18/24 1215)  Pulse: 93 (06/18/24 1215)  Resp: 18 (06/18/24 1421)  BP: (!) 159/79 (06/18/24 1215)  SpO2: 99 % (06/18/24 1215) Vital Signs (24h  Range):  Temp:  [97.8 °F (36.6 °C)-98 °F (36.7 °C)] 98 °F (36.7 °C)  Pulse:  [] 93  Resp:  [18-20] 18  SpO2:  [94 %-100 %] 99 %  BP: (111-159)/(56-79) 159/79     Weight: (!) 138 kg (304 lb 3.8 oz)  Height: 6' (182.9 cm)  Body mass index is 41.26 kg/m².      Intake/Output Summary (Last 24 hours) at 6/18/2024 1627  Last data filed at 6/18/2024 0434  Gross per 24 hour   Intake --   Output 1300 ml   Net -1300 ml        General    Constitutional: He is oriented to person, place, and time. He appears well-developed and well-nourished.   Pulmonary/Chest: Effort normal.   Neurological: He is alert and oriented to person, place, and time.   Psychiatric: He has a normal mood and affect. His behavior is normal. Judgment and thought content normal.             Left Shoulder Exam     Inspection/Observation   Swelling: present  Bruising: absent    Tenderness   The patient is experiencing no tenderness.     Other   Sensation: normal     Comments:  Skin to left shoulder is clean dry and intact.  There is no erythema or ecchymosis.  Surgical incisions are all well healed.  He does have a palpable effusion to the left shoulder.  There is no real change in his physical exam from previous clinic visits.  He has markedly reduced range of motion secondary to the known injury, surgery, and subsequent loss of reduction.  There is no real tenderness to palpation about the entirety of the shoulder both anteriorly, laterally, and posteriorly.  No pain with flexion/extension of the left elbow with pronation/supination of the left forearm.  He has expected usual pain with any attempts of range of motion of the left shoulder with the associated stiffness.  No change from previous clinic visit examinations.         Significant Labs: None    Significant Imaging: I have reviewed and interpreted all pertinent imaging results/findings.  Assessment/Plan:     Swelling of limb, left  1. History of left proximal humerus fracture with open reduction  internal fixation.      Patient well known to our clinic.  I discussed the radiographic findings and reviewed the patient's chart with Dr. Cooper this afternoon.  On physical exam, there is no significant change from his previous clinic visits.  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  There is known intra-articular extension of hardware into the joint.  There is no loss of reduction of the proximal humerus fracture.  Only real changes a glenohumeral joint effusion.  It is quite large.  This is secondary to the injury, surgery, and loss of reduction.  He has no overt signs or symptoms of active infection.  There is no erythema or tenderness to palpation.  No surgical intervention is warranted currently at this time.  Plan is to allow this fracture to heal as is with subsequent hardware removal and conversion to total shoulder arthroplasty.  Dr. Cooper will evaluate the patient himself tomorrow morning.            Hansel Tate PA-C  Orthopedics  [facility]/Surg

## 2024-06-18 NOTE — PROGRESS NOTES
"Pharmacokinetic Initial Assessment: IV Vancomycin    Assessment/Plan:    Initiate intravenous vancomycin with a dose of 2000 mg once with subsequent doses when random concentrations are less than 20 mcg/mL  Desired empiric serum trough concentration is 15 to 20 mcg/mL  Draw vancomycin random level on 0630 at 6/19.  Pharmacy will continue to follow and monitor vancomycin.      Please contact pharmacy at extension [phone] with any questions regarding this assessment.     Thank you for the consult,   Terence Cadet       Patient brief summary:  Roosevelt Moser is a 73 y.o. male initiated on antimicrobial therapy with IV Vancomycin for treatment of suspected bone/joint infection    Drug Allergies:   Review of patient's allergies indicates:  No Known Allergies    Actual Body Weight:   138    Renal Function:   Estimated Creatinine Clearance: 35.6 mL/min (A) (based on SCr of 2.7 mg/dL (H)).,     Dialysis Method (if applicable):  N/A    CBC (last 72 hours):  Recent Labs   Lab Result Units 06/16/24  0453 06/17/24  0255   WBC K/uL 13.11* 15.42*   Hemoglobin g/dL 10.7* 10.7*   Hematocrit % 31.8* 32.7*   Platelets K/uL 496* 539*       Metabolic Panel (last 72 hours):  Recent Labs   Lab Result Units 06/16/24  0453 06/16/24  1540 06/17/24  0255 06/18/24  0234   Sodium mmol/L 138  --  139 137   Potassium mmol/L 4.3  --  4.1 3.8   Chloride mmol/L 98  --  97 97   CO2 mmol/L 24  --  25 25   Glucose mg/dL 234*  --  143* 225*   BUN mg/dL 87*  --  76* 68*   Creatinine mg/dL 4.4*  --  3.3* 2.7*   Creatinine, Urine mg/dL  --  64.6  --   --    Albumin g/dL 1.4*  --  1.3* 1.3*   Total Bilirubin mg/dL 0.6  --  0.6  --    Alkaline Phosphatase U/L 230*  --  235*  --    AST U/L 22  --  23  --    ALT U/L 14  --  12  --    Magnesium mg/dL 1.6  --  1.5* 1.5*   Phosphorus mg/dL 4.5  --  4.3 3.8       Drug levels (last 3 results):  No results for input(s): "VANCOMYCINRA", "VANCORANDOM", "VANCOMYCINPE", "VANCOPEAK", "VANCOMYCINTR", "VANCOTROUGH" in the " last 72 hours.    Microbiologic Results:  Microbiology Results (last 7 days)       Procedure Component Value Units Date/Time    Blood culture [[id number]] Collected: 06/14/24 1355    Order Status: Completed Specimen: Blood from Peripheral, Hand, Right Updated: 06/17/24 2032     Blood Culture, Routine No Growth to date      No Growth to date      No Growth to date      No Growth to date    Blood culture [[id number]] Collected: 06/14/24 1355    Order Status: Completed Specimen: Blood from Peripheral, Antecubital, Right Updated: 06/17/24 2032     Blood Culture, Routine No Growth to date      No Growth to date      No Growth to date      No Growth to date    Urine culture [[id number]] Collected: 06/14/24 1337    Order Status: Completed Specimen: Urine Updated: 06/17/24 0709     Urine Culture, Routine No growth    Narrative:      Specimen Source->Urine             Azathioprine Counseling:  I discussed with the patient the risks of azathioprine including but not limited to myelosuppression, immunosuppression, hepatotoxicity, lymphoma, and infections.  The patient understands that monitoring is required including baseline LFTs, Creatinine, possible TPMP genotyping and weekly CBCs for the first month and then every 2 weeks thereafter.  The patient verbalized understanding of the proper use and possible adverse effects of azathioprine.  All of the patient's questions and concerns were addressed.

## 2024-06-18 NOTE — CONSULTS
Wound care consult completed by Dr. Cunningham and wound care nurse at the bedside. Plan of care formulated. Wound care to follow.

## 2024-06-18 NOTE — ASSESSMENT & PLAN NOTE
U/s w/o DVT  Xray showed good position hardware and no bony destruction but a lot of SC air for which we don't have sufficient explanation, so got CT and looks like he's got an infected fluid collection there, will need washout  Messaging w/ Dr Bernal about it - he is going to assess in AM - ordinarily this would be a bit more emergent of a problem but suspect it's been like this for at least 7-10 days (was not present during xray/ofc visit 6/4 presumably but pt and wife say he's had swelling/pain there since surgery)  Appreciate help from ID, wound care, podiatry et al  Pt is already on OAC, holding now as he will need OR  Arm is elevated on pillow  Not red/painful, no compartment syndrome  D/w pt/wife

## 2024-06-18 NOTE — ASSESSMENT & PLAN NOTE
S/p bedside I&D  Appreciate input all consultants  Pt/wife opposed to amputation of digit  F/u on deep wound cultures done today  Local wound care  Note markedly elevated ESR, CRP

## 2024-06-18 NOTE — PT/OT/SLP PROGRESS
Physical Therapy Treatment    Patient Name:  Roosevelt Moser   MRN:  [id number]    Recommendations:     Discharge Recommendations: Moderate Intensity Therapy  Discharge Equipment Recommendations: none  Barriers to discharge: Decreased caregiver support    Assessment:     Roosevelt Moser is a 73 y.o. male admitted with a medical diagnosis of Acute renal failure superimposed on chronic kidney disease.  He presents with the following impairments/functional limitations: weakness, impaired endurance, impaired self care skills, gait instability, impaired functional mobility, impaired balance, decreased upper extremity function, pain, decreased ROM, impaired fine motor, edema, impaired cardiopulmonary response to activity .    Pt seen supine in bed. C/o pain LUE. Pt requiring encouragement for therapies. Pt seen for AAROME to SAI's. Pt completed isometrics. Requesting defer OOb due to having had no sleep x days  Spouse at bedside encouraging pt..    Rehab Prognosis: Fair; patient would benefit from acute skilled PT services to address these deficits and reach maximum level of function.    Recent Surgery: * No surgery found *      Plan:     During this hospitalization, patient to be seen daily to address the identified rehab impairments via gait training, therapeutic activities, therapeutic exercises and progress toward the following goals:    Plan of Care Expires:  07/15/24    Subjective     Chief Complaint: weakness/pain and tired  Patient/Family Comments/goals: none  Pain/Comfort:  Pain Rating 1:  (not rated)  Location - Side 1: Left  Location 1: arm  Pain Addressed 1: Reposition, Distraction, Cessation of Activity      Objective:     Communicated with nurse Rojas prior to session.  Patient found HOB elevated with   upon PT entry to room.     General Precautions: Standard, fall  Orthopedic Precautions:  (NINO S/P carmen Sx in March 2024)  Braces: N/A  Respiratory Status: Room air     Functional Mobility:  Bed Mobility:     Scooting:  maximal assistance      AM-PAC 6 CLICK MOBILITY          Treatment & Education:  Patient was educated on the importance of OOB activity and functional mobility to negate negative effects of prolonged bed rest during hospitalization, safe transfers and ambulation, and D/C planning   Thera ex including isometrics, AP,QS/GS,HS,SLR  Repositioning max assist    Patient left HOB elevated with all lines intact, call button in reach, bed alarm on, and spouse present..    GOALS:   Multidisciplinary Problems       Physical Therapy Goals          Problem: Physical Therapy    Goal Priority Disciplines Outcome Goal Variances Interventions   Physical Therapy Goal     PT, PT/OT Progressing     Description: Goals to be met by: 7/15/24     Patient will increase functional independence with mobility by performin. Supine to sit with Contact Guard Assistance  2. Sit to stand transfer with Contact Guard Assistance  3. Bed to chair transfer with Contact Guard Assistance using Rolling Walker  4. Gait  x 200 feet with Contact Guard Assistance using Rolling Walker.   5. Lower extremity exercise program x30 reps per handout, with supervision                         Time Tracking:     PT Received On: 24  PT Start Time: 0951     PT Stop Time: 1008  PT Total Time (min): 17 min     Billable Minutes: Therapeutic Exercise 17    Treatment Type: Treatment  PT/PTA: PT     Number of PTA visits since last PT visit: 0     2024

## 2024-06-18 NOTE — SUBJECTIVE & OBJECTIVE
Scheduled Meds:   apixaban  5 mg Oral BID    atorvastatin  10 mg Oral QHS    clindamycin IV (PEDS and ADULTS)  600 mg Intravenous Q8H    LIDOcaine  1 patch Transdermal Q24H    magnesium sulfate IVPB  2 g Intravenous Once    metoprolol succinate  100 mg Oral QHS    mupirocin   Nasal BID    piperacillin-tazobactam (Zosyn) IV (PEDS and ADULTS) (extended infusion is not appropriate)  4.5 g Intravenous Q8H    sodium chloride 0.9%  10 mL Intravenous Q6H    vancomycin (VANCOCIN) IV (PEDS and ADULTS)  2,000 mg Intravenous Once     Continuous Infusions:  PRN Meds:  Current Facility-Administered Medications:     albuterol-ipratropium, 3 mL, Nebulization, Q6H PRN    aluminum & magnesium hydroxide-simethicone, 15 mL, Oral, QID PRN    dextrose 10%, 12.5 g, Intravenous, PRN    dextrose 10%, 25 g, Intravenous, PRN    glucagon (human recombinant), 1 mg, Intramuscular, PRN    glucose, 16 g, Oral, PRN    glucose, 24 g, Oral, PRN    HYDROcodone-acetaminophen, 1 tablet, Oral, Q4H PRN    insulin aspart U-100, 0-5 Units, Subcutaneous, QID (AC + HS) PRN    naloxone, 0.02 mg, Intravenous, PRN    ondansetron, 4 mg, Intravenous, Q8H PRN    prochlorperazine, 5 mg, Intravenous, Q6H PRN    simethicone, 1 tablet, Oral, QID PRN    sodium chloride 0.9%, 10 mL, Intravenous, Q12H PRN    Flushing PICC/Midline Protocol, , , Until Discontinued **AND** sodium chloride 0.9%, 10 mL, Intravenous, Q6H **AND** sodium chloride 0.9%, 10 mL, Intravenous, PRN    Pharmacy to dose Vancomycin consult, , , Once **AND** vancomycin - pharmacy to dose, , Intravenous, pharmacy to manage frequency    Review of patient's allergies indicates:  No Known Allergies     Past Medical History:   Diagnosis Date    A-fib 2024    Diabetes mellitus, type 2 2021    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    Hyperlipidemia     Hypertension 2016     Past Surgical History:   Procedure Laterality Date    OPEN REDUCTION AND INTERNAL FIXATION (ORIF) OF FRACTURE OF PROXIMAL HUMERUS Left 3/25/2024     Procedure: ORIF, FRACTURE, HUMERUS, PROXIMAL/IM HANNA, LEFT;  Surgeon: Nathan Cooper MD;  Location: [facility];  Service: Orthopedics;  Laterality: Left;  Accumed, Synthes Small Frag set Avelino verified 3/22/24 ark       Family History    None       Tobacco Use    Smoking status: Never    Smokeless tobacco: Never   Substance and Sexual Activity    Alcohol use: Yes     Alcohol/week: 0.0 standard drinks of alcohol     Comment: social    Drug use: No    Sexual activity: Yes     Review of Systems  Objective:     Vital Signs (Most Recent):  Temp: 98 °F (36.7 °C) (06/18/24 1215)  Pulse: 93 (06/18/24 1215)  Resp: 20 (06/18/24 1215)  BP: (!) 159/79 (06/18/24 1215)  SpO2: 99 % (06/18/24 1215) Vital Signs (24h Range):  Temp:  [97.8 °F (36.6 °C)-98 °F (36.7 °C)] 98 °F (36.7 °C)  Pulse:  [] 93  Resp:  [18-20] 20  SpO2:  [94 %-100 %] 99 %  BP: (111-159)/(56-79) 159/79     Weight: (!) 138 kg (304 lb 3.8 oz)  Body mass index is 41.26 kg/m².    Foot Exam    Right Foot/Ankle     Neurovascular  Dorsalis pedis: 2+  Posterior tibial: 2+  Saphenous nerve sensation: diminished  Tibial nerve sensation: diminished  Superficial peroneal nerve sensation: diminished  Deep peroneal nerve sensation: diminished  Sural nerve sensation: diminished          Pre bedside debridement of right 2nd toe wound above.  Post debridement picture did not load.  Laboratory:  All pertinent labs reviewed within the last 24 hours.    Diagnostic Results:  I have reviewed all pertinent imaging results/findings within the past 24 hours.

## 2024-06-18 NOTE — ASSESSMENT & PLAN NOTE
Debridement of right 2nd toe wound with culture taken of deep tissue.    There is no open wound present to the right 3rd toe.  Therefore, no debridement was done.    Discussed options with patient's wife bedside for treatment of osteomyelitis consisting of amputation versus wound care and IV antibiotics long-term.  Patient and wife is against amputation.  Therefore, we will proceed with wound care and IV antibiotics.  Infectious Disease is following.    Recommend Aquacel Ag followed by bulky wrap.  No compression.    Patient can weightbear to tolerate.    Catarino and protein drinks daily.    Patient can follow-up outpatient in wound clinic once discharged.       Counseled patient on increasing protein intake, not getting wound wet, keeping dressing clean dry and intact, following a healthy diet, elevating legs when able, removing pressure from wound

## 2024-06-18 NOTE — HPI
"73-year-old male reports that he slid out of a chair and was unable to get up without assistance; therefore, EMS was called 6/14/24. He reports that he ordinarily walks with a walker but has had more difficulty ambulating and has generalized weakness. He suspects that he is dehydrated due to the dark discoloration of his urine. He has had no vomiting or diarrhea. He has no focal weakness. Past medical history is significant for atrial fibrillation, diabetes, Guillain-[place], hypertension and hyperlipidemia.     He underwent a ORIF of his left humerus in March 2024. Patient is status post left humerus fracture from a fall 3/4/24 and initial follow up in our clinic 3/7/24 with subsequent open reduction internal fixation using an IM nail by Dr. Kenneth shah in March. DOS was 3/25/24.  There was known loss of reduction of the proximal humerus fragments on follow up visits in May, 5/7/24 and June, 6/4/24 visits. He also had known severe glenohumeral osteoarthrosis and plan was to allow for sufficient healing to proceed with shoulder replacement surgery at a later date.      After admission and continued complaints of L chest wall and shoulder pain imagining was done of the left shoulder which demonstrated gas or air about the L shoulder region.    Per  note 6/17/24:    "ADDENDUM 1830  Contacted by radiology re: xray of left humerus/shoulder shows unexplained gas all throughout the shoulder/axillla/chest wall - hardware and bones look ok -  I went back and reexamined pt and talked to him - He denies having any type of intervention since his ORIF left prox humerus Dr Cooper in March, so this should not be postop gas - The whole area is sensitive but not exquisitely so - No fluctuance, redness - incisions are well healed - axilla appears ok though pt is unable to fully adduct his arm for me to inspect it - In any case will change Rocephin for UTI to Zosyn to cover anaerobes, obtain CT shoulder and ask ortho to take a look " "tomorrow AM - d/w pt - [place] [facility]"    Per ID consult 6/18/24:    "Left upper extremity acute bacterial skin and skin structure infection.  Also concern for left shoulder septic arthritis.  CT done today has documented left shoulder effusion and also fluid collection in the soft tissue.  He will need evaluation by orthopedic surgeon.  Add vancomycin and clindamycin at this time.  Continue Zosyn for now.  Check ASO titer."     We were consulted an saw the patient on 6/18/24. Patient was noted to have a left shoulder effusion but no overt signs of infection.     On 6/20/24 Dr. Cooper and the on call Orthopaedic surgeon Dr. Paulson discussed the case and the on call physician assumed care of the patient. Agreed the patient had a L shoulder infection and brought the patient to the OR 6/21 for I&D as well as hardware removal.  "

## 2024-06-18 NOTE — PLAN OF CARE
Problem: Physical Therapy  Goal: Physical Therapy Goal  Description: Goals to be met by: 7/15/24     Patient will increase functional independence with mobility by performin. Supine to sit with Contact Guard Assistance  2. Sit to stand transfer with Contact Guard Assistance  3. Bed to chair transfer with Contact Guard Assistance using Rolling Walker  4. Gait  x 200 feet with Contact Guard Assistance using Rolling Walker.   5. Lower extremity exercise program x30 reps per handout, with supervision    Outcome: Progressing   Pt seen for thera ex in supine with lots of encouragement. Educated on HEP

## 2024-06-18 NOTE — CONSULTS
"[facility]/Surg  Podiatry  Consult Note    Patient Name: Roosevelt Moser  MRN: [id number]  Admission Date: 6/14/2024  Hospital Length of Stay: 4 days  Attending Physician: Shelton Newton MD  Primary Care Provider: Miriam, Primary Doctor     Consults  Subjective:     History of Present Illness:  "Roosevelt Moser is a 73 year old male with a previous medical history of atrial fibrillation on eliquis, HTN, DMII, obstructive sleep apnea, HLD, ORIF of left humerus and guillian-[place] who presented to the emergency room for weakness and multiple falls. Per wife of the patient he has had progressive weakness over the past week along with two falls one at 0300 Thursday and the other early morning Friday. Both time she had to activate EMS to pick patient up off floor due to weakness. Patient refused transport to hospital on both occasions. Today he slid out of his chair and was unable to get up without assistance and at this point the wife activated EMS to transport patient to the hospital. She endorses that two days ago she noticed that the patient had stopped urinating as he normally does. The patient concurs that he has noticed that his urine has decreased over the past two days and that it is dark. Patient denies dysuria or incomplete bladder emptying. Bladder scan showed zero upon admit and urine sample was obtained via straight cath in ED. Patient denies decreased PO intake and keeps a bottle of water with him at all times. Initial ED work-up showed a creatinine of 5 and potassium of 6. Urine studies positive for infection and WBC 20.81 with procalcitonin at 19.75. Blood cultures obtained and patient given 1 gram of rocephin and 1000ml of normal saline. Patient noted to bein afib with RVR and 20mg of diltiazem was administered IV which resulted in low blood pressure and 1 gram of calcium gluconate was administered. Patient admitted by hospital medicine for further evaluation and management"    Patient was noted " to have wounds to his right foot and Podiatry was consulted.    MRI was done which showed osteomyelitis of both the 2nd and 3rd digits.        Scheduled Meds:   apixaban  5 mg Oral BID    atorvastatin  10 mg Oral QHS    clindamycin IV (PEDS and ADULTS)  600 mg Intravenous Q8H    LIDOcaine  1 patch Transdermal Q24H    magnesium sulfate IVPB  2 g Intravenous Once    metoprolol succinate  100 mg Oral QHS    mupirocin   Nasal BID    piperacillin-tazobactam (Zosyn) IV (PEDS and ADULTS) (extended infusion is not appropriate)  4.5 g Intravenous Q8H    sodium chloride 0.9%  10 mL Intravenous Q6H    vancomycin (VANCOCIN) IV (PEDS and ADULTS)  2,000 mg Intravenous Once     Continuous Infusions:  PRN Meds:  Current Facility-Administered Medications:     albuterol-ipratropium, 3 mL, Nebulization, Q6H PRN    aluminum & magnesium hydroxide-simethicone, 15 mL, Oral, QID PRN    dextrose 10%, 12.5 g, Intravenous, PRN    dextrose 10%, 25 g, Intravenous, PRN    glucagon (human recombinant), 1 mg, Intramuscular, PRN    glucose, 16 g, Oral, PRN    glucose, 24 g, Oral, PRN    HYDROcodone-acetaminophen, 1 tablet, Oral, Q4H PRN    insulin aspart U-100, 0-5 Units, Subcutaneous, QID (AC + HS) PRN    naloxone, 0.02 mg, Intravenous, PRN    ondansetron, 4 mg, Intravenous, Q8H PRN    prochlorperazine, 5 mg, Intravenous, Q6H PRN    simethicone, 1 tablet, Oral, QID PRN    sodium chloride 0.9%, 10 mL, Intravenous, Q12H PRN    Flushing PICC/Midline Protocol, , , Until Discontinued **AND** sodium chloride 0.9%, 10 mL, Intravenous, Q6H **AND** sodium chloride 0.9%, 10 mL, Intravenous, PRN    Pharmacy to dose Vancomycin consult, , , Once **AND** vancomycin - pharmacy to dose, , Intravenous, pharmacy to manage frequency    Review of patient's allergies indicates:  No Known Allergies     Past Medical History:   Diagnosis Date    A-fib 2024    Diabetes mellitus, type 2 2021    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;  Service: Orthopedics;  Laterality: Left;  Accumed, Synthes Small Frag set Avelino verified 3/22/24 ark       Family History    None       Tobacco Use    Smoking status: Never    Smokeless tobacco: Never   Substance and Sexual Activity    Alcohol use: Yes     Alcohol/week: 0.0 standard drinks of alcohol     Comment: social    Drug use: No    Sexual activity: Yes     Review of Systems  Objective:     Vital Signs (Most Recent):  Temp: 98 °F (36.7 °C) (06/18/24 1215)  Pulse: 93 (06/18/24 1215)  Resp: 20 (06/18/24 1215)  BP: (!) 159/79 (06/18/24 1215)  SpO2: 99 % (06/18/24 1215) Vital Signs (24h Range):  Temp:  [97.8 °F (36.6 °C)-98 °F (36.7 °C)] 98 °F (36.7 °C)  Pulse:  [] 93  Resp:  [18-20] 20  SpO2:  [94 %-100 %] 99 %  BP: (111-159)/(56-79) 159/79     Weight: (!) 138 kg (304 lb 3.8 oz)  Body mass index is 41.26 kg/m².    Foot Exam    Right Foot/Ankle     Neurovascular  Dorsalis pedis: 2+  Posterior tibial: 2+  Saphenous nerve sensation: diminished  Tibial nerve sensation: diminished  Superficial peroneal nerve sensation: diminished  Deep peroneal nerve sensation: diminished  Sural nerve sensation: diminished          Pre bedside debridement of right 2nd toe wound above.  Post debridement picture did not load.  Laboratory:  All pertinent labs reviewed within the last 24 hours.    Diagnostic Results:  I have reviewed all pertinent imaging results/findings within the past 24 hours.  Assessment/Plan:     ID  Osteomyelitis of toe  Debridement of right 2nd toe wound with culture taken of deep tissue.    There is no open wound present to the right 3rd toe.  Therefore, no debridement was done.    Discussed options with patient's wife bedside for treatment of osteomyelitis  consisting of amputation versus wound care and IV antibiotics long-term.  Patient and wife is against amputation.  Therefore, we will proceed with wound care and IV antibiotics.  Infectious Disease is following.    Recommend Aquacel Ag followed by bulky wrap.  No compression.    Patient can weightbear to tolerate.    Catarino and protein drinks daily.    Patient can follow-up outpatient in wound clinic once discharged.       Counseled patient on increasing protein intake, not getting wound wet, keeping dressing clean dry and intact, following a healthy diet, elevating legs when able, removing pressure from wound            Thank you for your consult. I will follow-up with patient. Please contact us if you have any additional questions.    Yocasta Zhu DPM  Podiatry  [facility]/Surg

## 2024-06-18 NOTE — PROGRESS NOTES
Nephrology Progress Note        Patient Name: Roosevelt Moser  MRN: [id number]    Patient Class: IP- Inpatient   Admission Date: 6/14/2024  Length of Stay: 4 days  Date of Service: 6/18/2024    Attending Physician: Shelton Newton MD  Primary Care Provider: Miriam, Primary Doctor    Reason for Consult: marbin/hyperkalemia    SUBJECTIVE:     HPI: 73M with h/o DM, HTN, AF, GBS and recent shoulder surgery was brought to ER by EMS with recurrent falls in the last 24h. Reports decreased UO but no fever, cough, SOB, dysuria. Upon admission, noted to be in MARBIN with sCr 5, baseline 1 month ago is 1, hyperkalemia with K 6, acidosis with CO2 12, hypoalbuminemia with albumin 1.6. Lactate notably 2.1. Procal 20. A1c 9.5 in 3/2024. UA with hematuria and pyuria, urine Cx pending. Notably on Apixaban. WBC in blood elevated to 20. Plt 540. RP US ordered. Afebrile, normotensive, received IVF and abx. Lokelma, ca gluconate given bicarb gtt ordered. Straight cath in ER with only 100cc urine out.    Review of Systems:  Neg    6/15  AFVSS.   UOP 1200cc plus 2 unmeasured   6/16  AFVSS.  2150 cc uop.  No distress  6/17 VSS, on RA, UOP 1.5L- updated family at bedside  6/18 VSS, on RA, UOP 1.3L    ASSESSMENT/PLAN:     MARBIN due to ATN due to sepsis due to UTI and/or PNA  CKD stage 2 with diabetic proteinuria and severe hypoalbuminemia  HTN  DM poorly controlled A1c 9.5  HyperK/Acidosis  HypoMg  SHPT  Anemia    - renal function is improving- nonoliguric  - no acute RRT needs- no nsaids or IV contrast- dose meds for CrCl < 20  - about 1g proteinuria- low alb is nutrition/acute phase reactant  - hold hydralazine  - hold metformin- use insulin  - hyperK/acidosis improved  - give IV Mg  - H/H stable    Thank you for allowing us to participate in the care of your patient!   We will follow the patient and provide recommendations as needed.         Laboratory:  Recent Labs   Lab 06/15/24  1108 06/16/24  0453 06/17/24  0255    138 139   K 5.3* 4.3 4.1     98 97   CO2 19* 24 25   BUN 93* 87* 76*   CREATININE 4.9* 4.4* 3.3*   * 234* 143*       Recent Labs   Lab 06/15/24  0658 06/15/24  1108 06/16/24  0453 06/17/24  0255   CALCIUM 8.7 8.5* 8.3* 8.5*   ALBUMIN 1.4*  --  1.4* 1.3*   PHOS 5.0*  --  4.5 4.3   MG 1.7  --  1.6 1.5*             Recent Labs   Lab 06/16/24  0727 06/16/24  1227 06/16/24  1656 06/16/24  2150 06/17/24  0723 06/17/24  1109 06/17/24  1623 06/17/24  2023 06/18/24  0631 06/18/24  0749   POCTGLUCOSE 273* 242* 192* 157* 173* 237* 282* 281* 236* 242*       Recent Labs   Lab 07/31/23  0955 03/19/24  0830 06/14/24  1539   Hemoglobin A1C 8.8 H 9.5 H 6.3 H       Recent Labs   Lab 06/14/24  1229 06/15/24  0556 06/16/24  0453 06/17/24  0255   WBC 20.81* 14.76* 13.11* 15.42*   HGB 11.5* 11.2* 10.7* 10.7*   HCT 34.9* 33.6* 31.8* 32.7*   * 585* 496* 539*   MCV 81* 79* 79* 79*   MCHC 33.0 33.3 33.6 32.7   MONO 7.9  1.6*  --   --   --    EOSINOPHIL 0.3  --   --   --        Recent Labs   Lab 06/15/24  0658 06/16/24  0453 06/17/24  0255   BILITOT 0.5 0.6 0.6   PROT 6.2 5.9* 6.0   ALBUMIN 1.4* 1.4* 1.3*   ALKPHOS 238* 230* 235*   ALT 19 14 12   AST 31 22 23       Recent Labs   Lab 12/16/22  1238 03/08/24  1034 03/25/24  1022 06/14/24  1337   Color, UA Yellow Yellow Yellow [place] A   Appearance, UA Clear Clear Hazy A Cloudy A   pH, UA 6.0 5.0 6.0 6.0   Specific [place], UA 1.020 1.010 1.020 1.020   Protein, UA 1+ A Trace A 1+ A 2+ A   Glucose, UA 1+ A 3+ A Negative Trace A   Ketones, UA Negative Negative Negative Negative   Urobilinogen, UA  --  Negative Negative Negative   Bilirubin (UA) Negative Negative Negative Negative   Occult Blood UA 3+ A 2+ A 2+ A 3+ A   Nitrite, UA Negative Negative Negative Negative   RBC, UA 20 H 13 H >100 H >100 H   WBC, UA 81 H 3 18 H >100 H   Bacteria Rare None Rare Many A   Hyaline Casts, UA 0  --  0 0             Microbiology Results (last 7 days)       Procedure Component Value Units Date/Time    Blood culture  [[id number]] Collected: 06/14/24 1355    Order Status: Completed Specimen: Blood from Peripheral, Hand, Right Updated: 06/17/24 2032     Blood Culture, Routine No Growth to date      No Growth to date      No Growth to date      No Growth to date    Blood culture [[id number]] Collected: 06/14/24 1355    Order Status: Completed Specimen: Blood from Peripheral, Antecubital, Right Updated: 06/17/24 2032     Blood Culture, Routine No Growth to date      No Growth to date      No Growth to date      No Growth to date    Urine culture [[id number]] Collected: 06/14/24 1337    Order Status: Completed Specimen: Urine Updated: 06/17/24 0709     Urine Culture, Routine No growth    Narrative:      Specimen Source->Urine            Review of patient's allergies indicates:  No Known Allergies    Outpatient meds:  No current facility-administered medications on file prior to encounter.     Current Outpatient Medications on File Prior to Encounter   Medication Sig Dispense Refill    apixaban (ELIQUIS) 5 mg Tab Take 1 tablet (5 mg total) by mouth 2 (two) times daily. 60 tablet 1    atorvastatin (LIPITOR) 10 MG tablet Take 1 tablet (10 mg total) by mouth once daily. 90 tablet 3    co-enzyme Q-10 30 mg capsule Take 30 mg by mouth once daily.      doxycycline (VIBRAMYCIN) 100 MG Cap Take 100 mg by mouth 2 (two) times daily.      ergocalciferol, vitamin D2, (VITAMIN D ORAL) Take 1 tablet by mouth once daily.      glimepiride (AMARYL) 2 MG tablet Take 1 tablet (2 mg total) by mouth before breakfast. 90 tablet 3    hydrALAZINE (APRESOLINE) 25 MG tablet Take 1 tablet (25 mg total) by mouth every 12 (twelve) hours. 60 tablet 3    metFORMIN (GLUCOPHAGE-XR) 500 MG ER 24hr tablet Take 2 tablets (1,000 mg total) by mouth 2 (two) times daily with meals. 360 tablet 3    metoprolol succinate (TOPROL-XL) 100 MG 24 hr tablet Take 1 tablet (100 mg total) by mouth once daily. (Patient taking differently: Take 100 mg by mouth nightly.) 90 tablet 3        Scheduled meds:   apixaban  5 mg Oral BID    atorvastatin  10 mg Oral QHS    LIDOcaine  1 patch Transdermal Q24H    metoprolol succinate  100 mg Oral QHS    mupirocin   Nasal BID    piperacillin-tazobactam (Zosyn) IV (PEDS and ADULTS) (extended infusion is not appropriate)  4.5 g Intravenous Q8H    sodium chloride 0.9%  10 mL Intravenous Q6H       Infusions:        PRN meds:    Current Facility-Administered Medications:     albuterol-ipratropium, 3 mL, Nebulization, Q6H PRN    aluminum & magnesium hydroxide-simethicone, 15 mL, Oral, QID PRN    dextrose 10%, 12.5 g, Intravenous, PRN    dextrose 10%, 25 g, Intravenous, PRN    glucagon (human recombinant), 1 mg, Intramuscular, PRN    glucose, 16 g, Oral, PRN    glucose, 24 g, Oral, PRN    HYDROcodone-acetaminophen, 1 tablet, Oral, Q4H PRN    insulin aspart U-100, 0-5 Units, Subcutaneous, QID (AC + HS) PRN    naloxone, 0.02 mg, Intravenous, PRN    ondansetron, 4 mg, Intravenous, Q8H PRN    prochlorperazine, 5 mg, Intravenous, Q6H PRN    simethicone, 1 tablet, Oral, QID PRN    sodium chloride 0.9%, 10 mL, Intravenous, Q12H PRN    Flushing PICC/Midline Protocol, , , Until Discontinued **AND** sodium chloride 0.9%, 10 mL, Intravenous, Q6H **AND** sodium chloride 0.9%, 10 mL, Intravenous, PRN    Past Medical History:   Diagnosis Date    A-fib 2024    Diabetes mellitus, type 2 2021    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  Social History     Tobacco Use    Smoking status: Never    Smokeless tobacco: Never   Substance Use Topics    Alcohol use: Yes      Alcohol/week: 0.0 standard drinks of alcohol     Comment: social    Drug use: No       OBJECTIVE:     Vital Signs and IO:  Temp:  [97.8 °F (36.6 °C)-98 °F (36.7 °C)]   Pulse:  []   Resp:  [18-20]   BP: (111-122)/(56-75)   SpO2:  [94 %-100 %]   I/O last 3 completed shifts:  In: 100 [P.O.:100]  Out: 2000 [Urine:2000]  Wt Readings from Last 5 Encounters:   06/14/24 (!) 138 kg (304 lb 3.8 oz)   06/04/24 132.5 kg (292 lb 1.8 oz)   05/07/24 132.5 kg (292 lb 1.8 oz)   04/08/24 132.5 kg (292 lb 3.2 oz)   03/26/24 (!) 136.1 kg (300 lb 0.7 oz)     Body mass index is 41.26 kg/m².    Physical Exam  Constitutional:       General: Patient is not in acute distress.     Appearance: Patient is well-developed. She is not diaphoretic.   HENT:      Head: Normocephalic and atraumatic.      Mouth/Throat: Mucous membranes are moist.   Eyes:      General: No scleral icterus.     Pupils: Pupils are equal, round, and reactive to light.   Cardiovascular:      Rate and Rhythm: Normal rate and regular rhythm.   Pulmonary:      Effort: Pulmonary effort is normal. No respiratory distress.      Breath sounds: No stridor.   Abdominal:      General: There is no distension.      Palpations: Abdomen is soft.   Musculoskeletal:         General: No deformity. Normal range of motion.      Cervical back: Neck supple.   Skin:     General: Skin is warm and dry.      Findings: No rash present. No erythema.   Neurological:      Mental Status: Patient is alert and oriented to person, place, and time.      Cranial Nerves: No cranial nerve deficit.   Psychiatric:         Behavior: Behavior normal.          Patient care time was spent personally by me on the following activities:     Obtaining a history.  Examination of patient.  Providing medical care at the patients bedside.  Developing a treatment plan with patient or surrogate and bedside caregivers.  Ordering and reviewing laboratory studies, radiographic studies, pulse oximetry.  Ordering and  performing treatments and interventions.  Evaluation of patient's response to treatment.  Discussions with consultants while on the unit and immediately available to the patient.  Re-evaluation of the patient's condition.  Documentation in the medical record.     Von Braxton MD    [facility]  [address] [place] [id number]    [phone] - tel  [phone] - fax    6/18/2024

## 2024-06-18 NOTE — ASSESSMENT & PLAN NOTE
Appreciate input from renal services  In setting of acute UTI and IVVD  I will resume low rate IVF as we are giving him vancomycin and Zosyn  He did not require oral NaHCO3  U/s is w/o obstruction, masses, etc

## 2024-06-18 NOTE — PT/OT/SLP PROGRESS
Occupational Therapy      Patient Name:  Roosevelt Moser   MRN:  [id number]    Patient not seen today secondary to Other (Comment) (pt unavailable/with another discipline). Will follow-up.    6/18/2024

## 2024-06-18 NOTE — PROGRESS NOTES
[facility] Medicine  Progress Note    Patient Name: Roosevelt Moser  MRN: [id number]  Patient Class: IP- Inpatient   Admission Date: 6/14/2024  Length of Stay: 4 days  Attending Physician: Shelton Newton MD  Primary Care Provider: Miriam, Primary Doctor        Subjective:     Principal Problem:Acute renal failure superimposed on chronic kidney disease        HPI:  Roosevelt Moser is a 73 year old male with a previous medical history of atrial fibrillation on eliquis, HTN, DMII, obstructive sleep apnea, HLD, ORIF of left humerus and guillian-[place] who presented to the emergency room for weakness and multiple falls. Per wife of the patient he has had progressive weakness over the past week along with two falls one at 0300 Thursday and the other early morning Friday. Both time she had to activate EMS to pick patient up off floor due to weakness. Patient refused transport to hospital on both occasions. Today he slid out of his chair and was unable to get up without assistance and at this point the wife activated EMS to transport patient to the hospital. She endorses that two days ago she noticed that the patient had stopped urinating as he normally does. The patient concurs that he has noticed that his urine has decreased over the past two days and that it is dark. Patient denies dysuria or incomplete bladder emptying. Bladder scan showed zero upon admit and urine sample was obtained via straight cath in ED. Patient denies decreased PO intake and keeps a bottle of water with him at all times. Initial ED work-up showed a creatinine of 5 and potassium of 6. Urine studies positive for infection and WBC 20.81 with procalcitonin at 19.75. Blood cultures obtained and patient given 1 gram of rocephin and 1000ml of normal saline. Patient noted to bein afib with RVR and 20mg of diltiazem was administered IV which resulted in low blood pressure and 1 gram of calcium gluconate was administered. Patient  "admitted by hospital medicine for further evaluation and management     Overview/Hospital Course:  6/15/24  Assumed care today, pt seen and examined, chart reviewed.  Pt sitting up in bed, wife at bedside, feeling better.  Off dilt drip since this AM, in AF on monitor but rate down to 80s.   Denies cp/sob.  Has been weak and debilitated, await PT/OT to see what post acute needs will be.    6/16/24  Pt resting in NAD.  Advised by CM yesterday wife not willing/able to take him home at this time - We talked about that frankly today and she says she is unable to care for him, planning on SNF, hopefully regain enough function to allow d/c home from there. Pt w/ slow improvement.  Feels pretty good.  Appetite not too good, he says he's been deliberately eating small quantities of food at home to lose weight and feels it's been working.  Note pain/swelling LUE where he had a recent fx/ORIF.    6/17/24  Pt doing reasonably well, having pain sitting on the bedpan "it's sticking me", but overcame that w/ some adjusting position.  LUE u/s no DVT.  Still having a lot of pain in upper left arm w/ movement and also worried about persistent pain "under left boob" where he struck himself during a fall - we will image those areas too.  Says Norco 7.5 is like "eating a jelly bean" asking if we can give 10.  Interesting notes that he's currently constipated, at home often has diarrhea, says that higher doses of narcotics cause him to have loose bowels.  Odd.  Albumin 1.3.  Await SNF dispo. Wife at bedside.   6/18/24  Pt continues to be quite jolly about everything, has good sense of humor, acting very non toxic - HOWEVER upon wound care assessment by Dr Elise he is seen to have osteomyelitis of his right 2nd toe - Podiatry and ID consulted, s/p debridement at bedside w/ deep tissue culture taken, pt/wife opposed to amputation.  Additionally shoulder CT favors abscess around his op site - await input from ortho - He has pain there but " it's not exquisite and the area is not flucuant/red/warm, pain seems more arthritic in nature - but he appears to have a deep seated infection and will need a washout in OR -  Dr Calvin has added clinda/vanc to Zosyn started yesterday.     Interval History:     6/15-18 see course    Review of Systems    11 pt ROS negative except as noted o/w    Objective:     Vital Signs (Most Recent):  Temp: 98 °F (36.7 °C) (06/18/24 1215)  Pulse: 93 (06/18/24 1215)  Resp: 18 (06/18/24 1421)  BP: (!) 159/79 (06/18/24 1215)  SpO2: 99 % (06/18/24 1215) Vital Signs (24h Range):  Temp:  [97.8 °F (36.6 °C)-98 °F (36.7 °C)] 98 °F (36.7 °C)  Pulse:  [] 93  Resp:  [18-20] 18  SpO2:  [94 %-100 %] 99 %  BP: (111-159)/(56-79) 159/79     Weight: (!) 138 kg (304 lb 3.8 oz)  Body mass index is 41.26 kg/m².    Intake/Output Summary (Last 24 hours) at 6/18/2024 1524  Last data filed at 6/18/2024 0434  Gross per 24 hour   Intake --   Output 1300 ml   Net -1300 ml         Physical Exam      General:  A&O, NAD, non toxic looking, very jovial  HEENT: neck supple, PERRL/EOMI, EAC clear bilaterally, normal bilateral carotid upstroke w/o bruits, inf turbs clear bilaterally, OC/OP clear  Lungs: CTAB  CV: RRR w/o M/G/R  Abdomen: soft, NTND, no HSM, no bruits/masses/pulsations, obese  Ext: puffy.  Left shoulder area is balottable d/t SC air, relative to right.  Not tender to palpation, only to manipulation.  Handgrip/radial pulse good.   Right 2nd toe bandaged s/p bedside procedure  : ext cathter draining red clear urine  Rectal: def  Neuro: NF    Significant Labs: All pertinent labs within the past 24 hours have been reviewed.    Significant Imaging: I have reviewed all pertinent imaging results/findings within the past 24 hours.    Assessment/Plan:      * Acute renal failure superimposed on chronic kidney disease  Appreciate input from renal services  In setting of acute UTI and IVVD  I will resume low rate IVF as we are giving him vancomycin and  Zosyn  He did not require oral NaHCO3  U/s is w/o obstruction, masses, etc        Abscess of left shoulder  As discussed under limb swelling      Osteomyelitis of toe  S/p bedside I&D  Appreciate input all consultants  Pt/wife opposed to amputation of digit  F/u on deep wound cultures done today  Local wound care  Note markedly elevated ESR, CRP        Skin tear of left upper extremity  Local care, healing      Swelling of limb, left  U/s w/o DVT  Xray showed good position hardware and no bony destruction but a lot of SC air for which we don't have sufficient explanation, so got CT and looks like he's got an infected fluid collection there, will need washout  Messaging w/ Dr Bernal about it - he is going to assess in AM - ordinarily this would be a bit more emergent of a problem but suspect it's been like this for at least 7-10 days (was not present during xray/ofc visit 6/4 presumably but pt and wife say he's had swelling/pain there since surgery)  Appreciate help from ID, wound care, podiatry et al  Pt is already on OAC, holding now as he will need OR  Arm is elevated on pillow  Not red/painful, no compartment syndrome  D/w pt/wife      Debility  Multifactorial process  Pt not able to manage his own care and wife cannot adequately care for him at this time  PT/OT  SNF consult  CM aware      Severe sepsis  In setting of acute UTI  Also has OM 2nd toe and appears to have anaerobic infection of left shoulder s/p ORIF there 3 mo ago  BCX NGTD  Ur CX neg  Rocephin replaced yesterday w/ Zosyn, today clinda/vanc added  PCT and WBC trending down nicely  No obstruction on renal u/s  He's urinating ok today, some blood in urine, on Eliquis    Hyperkalemia  Resolved  in setting of acute on chronic renal failure  Lokelma given x 1 on admit  Avoid ACE/ARB, K+ replacement  tele          Atrial fibrillation with rapid ventricular response  Chronic AF w/ acute RVR, resolved  Required dilt drip on admission but off of that  now  Tele some PVC/bigem/trigem  TSH 1.8 in March  Monitor lytes  No etoh/drug use  TTE from March 2024:    Left Ventricle: The left ventricle is normal in size. Normal wall thickness. Mild global hypokinesis present. There is mildly reduced systolic function with a visually estimated ejection fraction of 45 - 50%. There is normal diastolic function.    Right Ventricle: Normal right ventricular cavity size. Wall thickness is normal. Right ventricle wall motion  is normal. Systolic function is normal.    Left Atrium: Left atrium is moderately dilated.    IVC/SVC: Normal venous pressure at 3 mmHg.  Hold OAC tonight for possible shoulder washout, start Lovenox 40 QD      History of Guillain-[place] syndrome  No acute issues  However, pt has impaired mobility and is acutely weakened from his sepsis/UTI/AF RVR  PT/OT working w/ pt  Wife cannot manage his care at home currently  SNF assessment underway          Type 2 diabetes mellitus  A1c 6.3%  SSI      Hypertension  Home meds as appropriate    MARA (obstructive sleep apnea)  NIPPV HS/naps      Morbid obesity  Body mass index is 41.26 kg/m².  Morbid obesity complicates all aspects of disease management from diagnostic modalities to treatment  Weight loss encouraged and health benefits explained to patient.   He has been working on weight loss at home w/ reduced caloric intake        ADDENDUM  1646  Some confusion re: ortho consult.  Per charge was called this AM to Dr Cooper.  I could not find documentation of this when I was looking in chart so I reached out per on call finder to Dr Bernal and he was willing to see pt in AM.  Since this seems subchronic and pt is stable and covered w/ abx I was fine w/ that, but then Dr Luis Daniel RENTERIA came and saw pt - feels this is postop/traumatic - I agree it's quite atypical and he does not seem infected BUT this does not explain the GAS - I think we need to get a specimen of the fluid - whether by direct aspiration or CT guided or  whatever - Will d/w Dr Cooper who is going to see pt in AM.  In interim continue abx per ID.  Pt afebrile, WBC ok, PCT coming down w/ tx of UTI.  Eliquis held for now but will resume if no surgery unless this is thought to be blood in shoulder?  AFC MD    VTE Risk Mitigation (From admission, onward)           Ordered     enoxaparin injection 40 mg  Every 12 hours         06/18/24 1538     Reason for No Pharmacological VTE Prophylaxis  Once        Question:  Reasons:  Answer:  Already adequately anticoagulated on oral Anticoagulants    06/14/24 1438     IP VTE HIGH RISK PATIENT  Once         06/14/24 1438     Place sequential compression device  Until discontinued         06/14/24 1438                    Discharge Planning   KAMILA:      Code Status: Full Code   Is the patient medically ready for discharge?:     Reason for patient still in hospital (select all that apply): Patient trending condition  Discharge Plan A: Skilled Nursing Facility        Time spent in direct patient care, review of data, conversation w/ patient and/or family, and moderately complex MDM 50 minutes            Shelton Newton MD  Department of Hospital Medicine   [facility]/Surg

## 2024-06-18 NOTE — ASSESSMENT & PLAN NOTE
1. History of left proximal humerus fracture with open reduction internal fixation.      Patient well known to our clinic.  I discussed the radiographic findings and reviewed the patient's chart with Dr. Cooper this afternoon.  On physical exam, there is no significant change from his previous clinic visits.  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  There is known intra-articular extension of hardware into the joint.  There is no loss of reduction of the proximal humerus fracture.  Only real changes a glenohumeral joint effusion.  It is quite large.  This is secondary to the injury, surgery, and loss of reduction.  He has no overt signs or symptoms of active infection.  There is no erythema or tenderness to palpation.  No surgical intervention is warranted currently at this time.  Plan is to allow this fracture to heal as is with subsequent hardware removal and conversion to total shoulder arthroplasty.  Dr. Cooper will evaluate the patient himself tomorrow morning.

## 2024-06-18 NOTE — PLAN OF CARE
External catheter changed. The patient remained free from injury during shift. The patient had periods of confusion during shift.  The patient removed Telemetry leads at least 3 times during shift.  The patient stated that he was going to get up and walk.  The patient was redirected multiple times during shift.  Medication administered per MD order and tolerated well.

## 2024-06-18 NOTE — SUBJECTIVE & OBJECTIVE
Interval History:     6/15-18 see course    Review of Systems    11 pt ROS negative except as noted o/w    Objective:     Vital Signs (Most Recent):  Temp: 98 °F (36.7 °C) (06/18/24 1215)  Pulse: 93 (06/18/24 1215)  Resp: 18 (06/18/24 1421)  BP: (!) 159/79 (06/18/24 1215)  SpO2: 99 % (06/18/24 1215) Vital Signs (24h Range):  Temp:  [97.8 °F (36.6 °C)-98 °F (36.7 °C)] 98 °F (36.7 °C)  Pulse:  [] 93  Resp:  [18-20] 18  SpO2:  [94 %-100 %] 99 %  BP: (111-159)/(56-79) 159/79     Weight: (!) 138 kg (304 lb 3.8 oz)  Body mass index is 41.26 kg/m².    Intake/Output Summary (Last 24 hours) at 6/18/2024 1524  Last data filed at 6/18/2024 0434  Gross per 24 hour   Intake --   Output 1300 ml   Net -1300 ml         Physical Exam      General:  A&O, NAD, non toxic looking, very jovial  HEENT: neck supple, PERRL/EOMI, EAC clear bilaterally, normal bilateral carotid upstroke w/o bruits, inf turbs clear bilaterally, OC/OP clear  Lungs: CTAB  CV: RRR w/o M/G/R  Abdomen: soft, NTND, no HSM, no bruits/masses/pulsations, obese  Ext: puffy.  Left shoulder area is balottable d/t SC air, relative to right.  Not tender to palpation, only to manipulation.  Handgrip/radial pulse good.   Right 2nd toe bandaged s/p bedside procedure  : ext cathter draining red clear urine  Rectal: def  Neuro: NF    Significant Labs: All pertinent labs within the past 24 hours have been reviewed.    Significant Imaging: I have reviewed all pertinent imaging results/findings within the past 24 hours.

## 2024-06-18 NOTE — PROGRESS NOTES
Pharmacist Renal Dose Adjustment Note    Roosevelt Moser is a 73 y.o. male being treated with the medication Lovenox    Patient Data:    Vital Signs (Most Recent):  Temp: 98 °F (36.7 °C) (06/18/24 1215)  Pulse: 93 (06/18/24 1215)  Resp: 18 (06/18/24 1421)  BP: (!) 159/79 (06/18/24 1215)  SpO2: 99 % (06/18/24 1215) Vital Signs (72h Range):  Temp:  [97 °F (36.1 °C)-98.6 °F (37 °C)]   Pulse:  []   Resp:  [14-26]   BP: (100-159)/(53-90)   SpO2:  [90 %-100 %]      Recent Labs   Lab 06/16/24  0453 06/17/24  0255 06/18/24  0234   CREATININE 4.4* 3.3* 2.7*     Serum creatinine: 2.7 mg/dL (H) 06/18/24 0234  Estimated creatinine clearance: 35.6 mL/min (A)    Lovenox 40 mg Daily will be changed to Lovenox 40 mg BID  Due to pt's current BMI > 40, CrCl > 30    Pharmacist's Name: Dulce Cosby  Pharmacist's Extension: [phone]

## 2024-06-19 LAB
ALBUMIN SERPL BCP-MCNC: 1.3 G/DL (ref 3.5–5.2)
ALP SERPL-CCNC: 312 U/L (ref 55–135)
ALT SERPL W/O P-5'-P-CCNC: 23 U/L (ref 10–44)
ANION GAP SERPL CALC-SCNC: 17 MMOL/L (ref 8–16)
AST SERPL-CCNC: 45 U/L (ref 10–40)
BACTERIA BLD CULT: NORMAL
BACTERIA BLD CULT: NORMAL
BILIRUB SERPL-MCNC: 0.7 MG/DL (ref 0.1–1)
BUN SERPL-MCNC: 73 MG/DL (ref 8–23)
CALCIUM SERPL-MCNC: 8.8 MG/DL (ref 8.7–10.5)
CHLORIDE SERPL-SCNC: 98 MMOL/L (ref 95–110)
CO2 SERPL-SCNC: 25 MMOL/L (ref 23–29)
CREAT SERPL-MCNC: 2.8 MG/DL (ref 0.5–1.4)
ERYTHROCYTE [DISTWIDTH] IN BLOOD BY AUTOMATED COUNT: 18 % (ref 11.5–14.5)
EST. GFR  (NO RACE VARIABLE): 23 ML/MIN/1.73 M^2
GLUCOSE SERPL-MCNC: 198 MG/DL (ref 70–110)
HCT VFR BLD AUTO: 32.5 % (ref 40–54)
HGB BLD-MCNC: 10.6 G/DL (ref 14–18)
HIV 1+2 AB+HIV1 P24 AG SERPL QL IA: NEGATIVE
MAGNESIUM SERPL-MCNC: 2 MG/DL (ref 1.6–2.6)
MCH RBC QN AUTO: 26 PG (ref 27–31)
MCHC RBC AUTO-ENTMCNC: 32.6 G/DL (ref 32–36)
MCV RBC AUTO: 80 FL (ref 82–98)
PLATELET # BLD AUTO: 613 K/UL (ref 150–450)
PMV BLD AUTO: 10.2 FL (ref 9.2–12.9)
POCT GLUCOSE: 202 MG/DL (ref 70–110)
POCT GLUCOSE: 219 MG/DL (ref 70–110)
POCT GLUCOSE: 220 MG/DL (ref 70–110)
POCT GLUCOSE: 258 MG/DL (ref 70–110)
POTASSIUM SERPL-SCNC: 4 MMOL/L (ref 3.5–5.1)
PROT SERPL-MCNC: 6.2 G/DL (ref 6–8.4)
RBC # BLD AUTO: 4.08 M/UL (ref 4.6–6.2)
SODIUM SERPL-SCNC: 140 MMOL/L (ref 136–145)
VANCOMYCIN SERPL-MCNC: 15.8 UG/ML
VANCOMYCIN SERPL-MCNC: 19.7 UG/ML
WBC # BLD AUTO: 19.14 K/UL (ref 3.9–12.7)

## 2024-06-19 PROCEDURE — 80202 ASSAY OF VANCOMYCIN: CPT | Performed by: HOSPITALIST

## 2024-06-19 PROCEDURE — 25000003 PHARM REV CODE 250: Performed by: STUDENT IN AN ORGANIZED HEALTH CARE EDUCATION/TRAINING PROGRAM

## 2024-06-19 PROCEDURE — 94761 N-INVAS EAR/PLS OXIMETRY MLT: CPT

## 2024-06-19 PROCEDURE — 80053 COMPREHEN METABOLIC PANEL: CPT | Performed by: HOSPITALIST

## 2024-06-19 PROCEDURE — 25000003 PHARM REV CODE 250: Performed by: INTERNAL MEDICINE

## 2024-06-19 PROCEDURE — 99233 SBSQ HOSP IP/OBS HIGH 50: CPT | Mod: ,,, | Performed by: INTERNAL MEDICINE

## 2024-06-19 PROCEDURE — 85027 COMPLETE CBC AUTOMATED: CPT | Performed by: HOSPITALIST

## 2024-06-19 PROCEDURE — 99900035 HC TECH TIME PER 15 MIN (STAT)

## 2024-06-19 PROCEDURE — 83735 ASSAY OF MAGNESIUM: CPT | Performed by: HOSPITALIST

## 2024-06-19 PROCEDURE — A4216 STERILE WATER/SALINE, 10 ML: HCPCS | Performed by: STUDENT IN AN ORGANIZED HEALTH CARE EDUCATION/TRAINING PROGRAM

## 2024-06-19 PROCEDURE — 25000003 PHARM REV CODE 250: Performed by: HOSPITALIST

## 2024-06-19 PROCEDURE — 63600175 PHARM REV CODE 636 W HCPCS: Mod: JZ,JG | Performed by: INTERNAL MEDICINE

## 2024-06-19 PROCEDURE — 25000003 PHARM REV CODE 250

## 2024-06-19 PROCEDURE — 27000221 HC OXYGEN, UP TO 24 HOURS

## 2024-06-19 PROCEDURE — 11000001 HC ACUTE MED/SURG PRIVATE ROOM

## 2024-06-19 PROCEDURE — 21400001 HC TELEMETRY ROOM

## 2024-06-19 PROCEDURE — 36415 COLL VENOUS BLD VENIPUNCTURE: CPT | Performed by: HOSPITALIST

## 2024-06-19 PROCEDURE — 97110 THERAPEUTIC EXERCISES: CPT

## 2024-06-19 PROCEDURE — 63600175 PHARM REV CODE 636 W HCPCS: Performed by: HOSPITALIST

## 2024-06-19 RX ORDER — CLOTRIMAZOLE 10 MG/1
10 LOZENGE ORAL; TOPICAL
Status: DISPENSED | OUTPATIENT
Start: 2024-06-19 | End: 2024-06-29

## 2024-06-19 RX ADMIN — CLOTRIMAZOLE 10 MG: 10 LOZENGE ORAL at 05:06

## 2024-06-19 RX ADMIN — CLOTRIMAZOLE 10 MG: 10 LOZENGE ORAL at 11:06

## 2024-06-19 RX ADMIN — ENOXAPARIN SODIUM 40 MG: 40 INJECTION SUBCUTANEOUS at 08:06

## 2024-06-19 RX ADMIN — LIDOCAINE 5% 1 PATCH: 700 PATCH TOPICAL at 05:06

## 2024-06-19 RX ADMIN — HYDROCODONE BITARTRATE AND ACETAMINOPHEN 1 TABLET: 10; 325 TABLET ORAL at 06:06

## 2024-06-19 RX ADMIN — INSULIN ASPART 4 UNITS: 100 INJECTION, SOLUTION INTRAVENOUS; SUBCUTANEOUS at 05:06

## 2024-06-19 RX ADMIN — SODIUM CHLORIDE, PRESERVATIVE FREE 10 ML: 5 INJECTION INTRAVENOUS at 05:06

## 2024-06-19 RX ADMIN — CEFTRIAXONE SODIUM 2 G: 2 INJECTION, POWDER, FOR SOLUTION INTRAMUSCULAR; INTRAVENOUS at 11:06

## 2024-06-19 RX ADMIN — INSULIN GLARGINE 10 UNITS: 100 INJECTION, SOLUTION SUBCUTANEOUS at 08:06

## 2024-06-19 RX ADMIN — PIPERACILLIN AND TAZOBACTAM 4.5 G: 4; .5 INJECTION, POWDER, FOR SOLUTION INTRAVENOUS; PARENTERAL at 02:06

## 2024-06-19 RX ADMIN — HYDROCODONE BITARTRATE AND ACETAMINOPHEN 1 TABLET: 10; 325 TABLET ORAL at 08:06

## 2024-06-19 RX ADMIN — SODIUM CHLORIDE, PRESERVATIVE FREE 10 ML: 5 INJECTION INTRAVENOUS at 11:06

## 2024-06-19 RX ADMIN — MUPIROCIN 1 G: 20 OINTMENT TOPICAL at 08:06

## 2024-06-19 RX ADMIN — CLINDAMYCIN PHOSPHATE 600 MG: 600 INJECTION, SOLUTION INTRAVENOUS at 05:06

## 2024-06-19 RX ADMIN — ATORVASTATIN CALCIUM 10 MG: 10 TABLET, FILM COATED ORAL at 08:06

## 2024-06-19 RX ADMIN — SODIUM CHLORIDE, POTASSIUM CHLORIDE, SODIUM LACTATE AND CALCIUM CHLORIDE: 600; 310; 30; 20 INJECTION, SOLUTION INTRAVENOUS at 12:06

## 2024-06-19 RX ADMIN — INSULIN ASPART 2 UNITS: 100 INJECTION, SOLUTION INTRAVENOUS; SUBCUTANEOUS at 09:06

## 2024-06-19 RX ADMIN — SODIUM CHLORIDE, PRESERVATIVE FREE 10 ML: 5 INJECTION INTRAVENOUS at 12:06

## 2024-06-19 RX ADMIN — VANCOMYCIN HYDROCHLORIDE 750 MG: 750 INJECTION, POWDER, LYOPHILIZED, FOR SOLUTION INTRAVENOUS at 07:06

## 2024-06-19 RX ADMIN — HYDROCODONE BITARTRATE AND ACETAMINOPHEN 1 TABLET: 10; 325 TABLET ORAL at 12:06

## 2024-06-19 RX ADMIN — INSULIN ASPART 4 UNITS: 100 INJECTION, SOLUTION INTRAVENOUS; SUBCUTANEOUS at 07:06

## 2024-06-19 RX ADMIN — METOPROLOL SUCCINATE 100 MG: 50 TABLET, FILM COATED, EXTENDED RELEASE ORAL at 08:06

## 2024-06-19 RX ADMIN — INSULIN ASPART 6 UNITS: 100 INJECTION, SOLUTION INTRAVENOUS; SUBCUTANEOUS at 11:06

## 2024-06-19 RX ADMIN — HYDROCODONE BITARTRATE AND ACETAMINOPHEN 1 TABLET: 10; 325 TABLET ORAL at 04:06

## 2024-06-19 RX ADMIN — CLOTRIMAZOLE 10 MG: 10 LOZENGE ORAL at 08:06

## 2024-06-19 NOTE — PROGRESS NOTES
"[facility]   Department of Infectious Disease  Progress Note        PATIENT NAME: Roosevelt Moser  MRN: [id number]  TODAY'S DATE: 06/19/2024  ADMIT DATE: 6/14/2024  LOS: 5 days    CHIEF COMPLAINT: Weakness (Pt brought to ED via EMS with complaint of weakness and falling, pt advised EMS his urine is very dark.  )      PRINCIPLE PROBLEM: Acute renal failure superimposed on chronic kidney disease    INTERVAL HISTORY      06/19/2024: Seen and evaluated at bedside.  States left upper extremity pain better.  Having improved movement at the wrist and forearm.  Seen by Orthopedic surgery PA yesterday.  Notes reviewed.  Blood cultures remain negative.      Antibiotics (From admission, onward)      Start     Stop Route Frequency Ordered    06/18/24 1400  clindamycin in D5W 600 mg/50 mL IVPB 600 mg         -- IV Every 8 hours (non-standard times) 06/18/24 1212    06/18/24 1311  vancomycin - pharmacy to dose  (vancomycin IVPB (PEDS and ADULTS))        Placed in "And" Linked Group    -- IV pharmacy to manage frequency 06/18/24 1212    06/17/24 2100  mupirocin 2 % ointment         06/22/24 2059 Nasl 2 times daily 06/17/24 1544    06/17/24 1930  piperacillin-tazobactam (ZOSYN) 4.5 g in dextrose 5 % in water (D5W) 100 mL IVPB (MB+)         -- IV Every 8 hours (non-standard times) 06/17/24 1825          Antifungals (From admission, onward)      None           Antivirals (From admission, onward)      None            ASSESSMENT and PLAN      1. Left upper extremity acute bacterial skin and skin structure infection.  Also concern for left shoulder septic arthritis.  CT done today has documented left shoulder effusion and also fluid collection in the soft tissue.  He we will need diagnostic arthrocentesis left shoulder.  Continue vancomycin and clindamycin.  Switch Zosyn to ceftriaxone for now and reassess with ASO titer and joint fluid culture when available.     2. Right 3rd toe osteomyelitis.  He previously received?  " Dalbavancin x2 doses.  Ulcer practically healed.  We will consider this as partially treated.  Antibiotics as above for this as well.    3. Right 2nd toe tip ulcer.  It was debrided at bedside by podiatrist yesterday.  No osteomyelitis at this site on MRI foot.  Follow culture results.       4. ARF.  This is resolving.  Management as per nephrologist.       5. Diabetes mellitus.  Management as per hospitalist.       6. Debility.     7. Oral thrush.  This is most likely secondary to hyperglycemia related to his diabetes mellitus.  Mycelex troches for now.  Follow HIV screen    RECOMMENDATIONS:    He needs diagnostic arthrocentesis left shoulder.  DC Zosyn  Start ceftriaxone 2 g q.12 hours  Follow other pending studies including HIV screen and ASO titer.    Please send Epic secure chat with any questions.      SUBJECTIVE    Roosevelt Moser is a 73 y.o. male with history of diabetes mellitus, hypertension and previous going bowel syndrome.  He fell on 03/04/2024 and sustained a displaced comminuted fracture of the left humerus.  Seen by orthopedic surgeon with plan for ORIF which was deferred because of new AFib.  Admitted 03/09/2024 for rate control and ultimately discharged back home 03/12/2024.  Later had left humerus ORIF 03/25/2024 as a day procedure and was discharged home.       According to his wife, he developed pain and swelling of the 3rd toe and was with an ulcer.  It appears an x-ray of the foot was unremarkable.  Received 2 doses of an antibiotic that I presume to be dalbavancin 1 week apart in early April.     In the last 2 weeks he has continued to decline.  He also has reduction in urine output.  Fell twice at home on 06/13/2024 and wife activated EMS and he was brought to the hospital.  Received IV fluid for low normal blood pressure.  Also noted to have MARBIN with creatinine 5.0 and WBC was 21 K.  UA was abnormal with> 100 WBC,> 100 RBC, 3+ LE and positive nitrite.  He has been managed conservatively for  MARBIN and for dehydration.     X-ray of left shoulder and left upper extremity 06/17/2024 documented gas in soft tissue.  MRI right foot documented osteomyelitis of the 3rd toe.  I was asked to see to assist with his care.  ESR 95,  milligram/liter.     Antibiotics   Ceftriaxone:  06/14/2024-06/17/2024   Zosyn:  06/17/2024-     Cultures   Blood culture 06/14/2024:  NGTD   Urine culture 06/14/2024: NGTD    Review of Systems  Negative except as stated above in Interval History     OBJECTIVE   Temp:  [97.5 °F (36.4 °C)-98.2 °F (36.8 °C)] 98.2 °F (36.8 °C)  Pulse:  [] 95  Resp:  [18-20] 18  SpO2:  [94 %-99 %] 95 %  BP: (120-177)/(59-90) 125/73  Temp:  [97.5 °F (36.4 °C)-98.2 °F (36.8 °C)]   Temp: 98.2 °F (36.8 °C) (06/19/24 0743)  Pulse: 95 (06/19/24 0743)  Resp: 18 (06/19/24 0819)  BP: 125/73 (06/19/24 0743)  SpO2: 95 % (06/19/24 0743)    Intake/Output Summary (Last 24 hours) at 6/19/2024 0829  Last data filed at 6/19/2024 0517  Gross per 24 hour   Intake 920 ml   Output 1050 ml   Net -130 ml       Physical Exam  General:  Obese elderly man lying quietly in bed in no acute distress   Left upper extremity:  Erythema improved and almost resolved.  Persistent edema though better.  Left shoulder effusion.  Power 2+ over 5 left upper extremity with improvement in movement left hand and wrist.  Still unable to raise left upper extremity  CVS: S1 and 2 heard   Respiratory: Clear to auscultation   Abdomen:  Full, soft, nontender, no palpable organomegaly   Skin: No rash appreciated   CNS: No focal deficits    VAD:  None  ISOLATION:  None    WOUNDS:  Abrasion left upper extremity    Significant Labs: All pertinent labs within the past 24 hours have been reviewed.    CBC LAST 7 DAYS  Recent Labs   Lab 06/14/24  1229 06/15/24  0556 06/16/24  0453 06/17/24  0255 06/19/24  0322   WBC 20.81* 14.76* 13.11* 15.42* 19.14*   RBC 4.33* 4.24* 4.05* 4.13* 4.08*   HGB 11.5* 11.2* 10.7* 10.7* 10.6*   HCT 34.9* 33.6* 31.8* 32.7*  "32.5*   MCV 81* 79* 79* 79* 80*   MCH 26.6* 26.4* 26.4* 25.9* 26.0*   MCHC 33.0 33.3 33.6 32.7 32.6   RDW 18.7* 21.3* 18.1* 18.3* 18.0*   * 585* 496* 539* 613*   MPV 10.1 12.3 10.5 10.7 10.2   GRAN 81.0*  16.9*  --   --   --   --    LYMPH 8.4*  1.8  --   --   --   --    MONO 7.9  1.6*  --   --   --   --    BASO 0.10  --   --   --   --    NRBC 0  --   --   --   --        CHEMISTRY LAST 7 DAYS  Recent Labs   Lab 06/14/24  1229 06/14/24  1539 06/14/24  2029 06/15/24  0022 06/15/24  0658 06/15/24  1108 06/16/24  0453 06/17/24  0255 06/18/24  0234 06/19/24  0322    138   < > 136 136 137 138 139 137 140   K 6.0* 5.8*   < > 5.4* 5.6* 5.3* 4.3 4.1 3.8 4.0    108   < > 104 102 102 98 97 97 98   CO2 12* 12*   < > 16* 17* 19* 24 25 25 25   ANIONGAP 18* 18*   < > 16 17* 16 16 17* 15 17*   BUN 85* 86*   < > 87* 89* 93* 87* 76* 68* 73*   CREATININE 5.0* 4.9*   < > 5.0* 5.0* 4.9* 4.4* 3.3* 2.7* 2.8*    91   < > 131* 214* 222* 234* 143* 225* 198*   CALCIUM 9.1 9.1   < > 8.7 8.7 8.5* 8.3* 8.5* 8.6* 8.8   MG  --  1.3*  --   --  1.7  --  1.6 1.5* 1.5* 2.0   ALBUMIN 1.6*  --   --   --  1.4*  --  1.4* 1.3* 1.3* 1.3*   PROT 6.5  --   --   --  6.2  --  5.9* 6.0  --  6.2   ALKPHOS 270*  --   --   --  238*  --  230* 235*  --  312*   ALT 22  --   --   --  19  --  14 12  --  23   AST 39  --   --   --  31  --  22 23  --  45*   BILITOT 0.6  --   --   --  0.5  --  0.6 0.6  --  0.7    < > = values in this interval not displayed.       Estimated Creatinine Clearance: 34.3 mL/min (A) (based on SCr of 2.8 mg/dL (H)).    INFLAMMATORY/PROCAL  LAST 7 DAYS  Recent Labs   Lab 06/14/24  1229 06/16/24  0453 06/18/24  0233   PROCAL 19.75* 7.88*  --    CRP  --   --  319.0*     No results found for: "ESR"  CRP   Date Value Ref Range Status   06/18/2024 319.0 (H) 0.0 - 8.2 mg/L Final       PRIOR  MICROBIOLOGY:    No results found for the last 90 days.      LAST 7 DAYS MICROBIOLOGY   Microbiology Results (last 7 days)       " Procedure Component Value Units Date/Time    Blood culture [[id number]] Collected: 06/14/24 1355    Order Status: Completed Specimen: Blood from Peripheral, Antecubital, Right Updated: 06/18/24 2032     Blood Culture, Routine No Growth to date      No Growth to date      No Growth to date      No Growth to date      No Growth to date    Blood culture [[id number]] Collected: 06/14/24 1355    Order Status: Completed Specimen: Blood from Peripheral, Hand, Right Updated: 06/18/24 2032     Blood Culture, Routine No Growth to date      No Growth to date      No Growth to date      No Growth to date      No Growth to date    Culture, Anaerobe [[id number]] Collected: 06/18/24 1251    Order Status: Sent Specimen: Wound from Toe, Right Foot Updated: 06/18/24 1925    Aerobic culture [[id number]] Collected: 06/18/24 1251    Order Status: Sent Specimen: Wound from Toe, Right Foot Updated: 06/18/24 1925    Urine culture [[id number]] Collected: 06/14/24 1337    Order Status: Completed Specimen: Urine Updated: 06/17/24 0709     Urine Culture, Routine No growth    Narrative:      Specimen Source->Urine              CURRENT/PREVIOUS VISIT EKG  Results for orders placed or performed during the hospital encounter of 03/25/24   EKG 12-lead    Collection Time: 03/25/24  9:14 AM   Result Value Ref Range    QRS Duration 100 ms    [facility] QTC Calculation 437 ms    Narrative    Test Reason : Z01.818,    Vent. Rate : 074 BPM     Atrial Rate : 340 BPM     P-R Int : 000 ms          QRS Dur : 100 ms      QT Int : 394 ms       P-R-T Axes : 000 -54 027 degrees     QTc Int : 437 ms    Atrial fibrillation  Left axis deviation  Abnormal ECG  No previous ECGs available  Confirmed by Jesús HATHAWAY, Hansel AMIN ([id number]) on 4/1/2024 11:51:23 PM    Referred By: ALLISON WINN           Confirmed By:Hansel Espinosa MD          Significant Imaging: I have reviewed all relevant and available imaging results/findings within the past 24 hours.    I spent a total of 30  minutes on the day of the visit.This includes face to face time and non-face to face time preparing to see the patient (eg, review of tests), obtaining and/or reviewing separately obtained history, documenting clinical information in the electronic or other health record, independently interpreting results and communicating results to the patient/family/caregiver, or care coordinator.    Vasquez Calvin MD  Date of Service: 06/19/2024      This note was created using [employer] voice recognition software that occasionally misinterpreted phrases or words.

## 2024-06-19 NOTE — PROGRESS NOTES
Pharmacokinetic Assessment Follow Up: IV Vancomycin    Vancomycin serum concentration assessment(s):    The random level was drawn correctly and can be used to guide therapy at this time. The measurement is within the desired definitive target range of 15 to 20 mcg/mL.    Vancomycin Regimen Plan:    Change regimen to Vancomycin 750 mg IV once with next serum trough concentration measured at 1600 prior to next dose     Drug levels (last 3 results):  Recent Labs   Lab Result Units 06/19/24  0322 06/19/24  1246   Vancomycin, Random ug/mL 19.7 15.8       Pharmacy will continue to follow and monitor vancomycin.    Please contact pharmacy at extension [phone] for questions regarding this assessment.    Thank you for the consult,   Fabiola Rosas       Patient brief summary:  Roosevelt Moser is a 73 y.o. male initiated on antimicrobial therapy with IV Vancomycin for treatment of bone/joint infection    The patient's current regimen is pulse    Drug Allergies:   Review of patient's allergies indicates:  No Known Allergies    Actual Body Weight:   138 kg    Renal Function:   Estimated Creatinine Clearance: 34.3 mL/min (A) (based on SCr of 2.8 mg/dL (H)).,     Dialysis Method (if applicable):  N/A    CBC (last 72 hours):  Recent Labs   Lab Result Units 06/17/24  0255 06/19/24  0322   WBC K/uL 15.42* 19.14*   Hemoglobin g/dL 10.7* 10.6*   Hematocrit % 32.7* 32.5*   Platelets K/uL 539* 613*       Metabolic Panel (last 72 hours):  Recent Labs   Lab Result Units 06/17/24  0255 06/18/24  0234 06/19/24  0322   Sodium mmol/L 139 137 140   Potassium mmol/L 4.1 3.8 4.0   Chloride mmol/L 97 97 98   CO2 mmol/L 25 25 25   Glucose mg/dL 143* 225* 198*   BUN mg/dL 76* 68* 73*   Creatinine mg/dL 3.3* 2.7* 2.8*   Albumin g/dL 1.3* 1.3* 1.3*   Total Bilirubin mg/dL 0.6  --  0.7   Alkaline Phosphatase U/L 235*  --  312*   AST U/L 23  --  45*   ALT U/L 12  --  23   Magnesium mg/dL 1.5* 1.5* 2.0   Phosphorus mg/dL 4.3 3.8  --        Vancomycin  Administrations:  vancomycin given in the last 96 hours                     vancomycin (VANCOCIN) 2,000 mg in dextrose 5 % (D5W) 500 mL IVPB (mg) 2,000 mg New Bag 06/18/24 1426                    Microbiologic Results:  Microbiology Results (last 7 days)       Procedure Component Value Units Date/Time    Fungus culture [[id number]]     Order Status: No result Specimen: Abscess     Gram stain [[id number]]     Order Status: No result Specimen: Abscess     Culture, Anaerobic [[id number]]     Order Status: No result Specimen: Abscess     Aerobic culture [[id number]]     Order Status: No result Specimen: Abscess     Aerobic culture [[id number]] Collected: 06/18/24 1251    Order Status: Completed Specimen: Wound from Toe, Right Foot Updated: 06/19/24 0901     Aerobic Bacterial Culture No growth    Blood culture [[id number]] Collected: 06/14/24 1355    Order Status: Completed Specimen: Blood from Peripheral, Antecubital, Right Updated: 06/18/24 2032     Blood Culture, Routine No Growth to date      No Growth to date      No Growth to date      No Growth to date      No Growth to date    Blood culture [[id number]] Collected: 06/14/24 1355    Order Status: Completed Specimen: Blood from Peripheral, Hand, Right Updated: 06/18/24 2032     Blood Culture, Routine No Growth to date      No Growth to date      No Growth to date      No Growth to date      No Growth to date    Culture, Anaerobe [[id number]] Collected: 06/18/24 1251    Order Status: Sent Specimen: Wound from Toe, Right Foot Updated: 06/18/24 1925    Urine culture [[id number]] Collected: 06/14/24 1337    Order Status: Completed Specimen: Urine Updated: 06/17/24 0709     Urine Culture, Routine No growth    Narrative:      Specimen Source->Urine

## 2024-06-19 NOTE — ASSESSMENT & PLAN NOTE
In setting of acute UTI  Also has OM 2nd toe and appears to possibly anaerobic infection of left shoulder s/p ORIF there 3 mo ago  BCX NGTD  Ur CX neg  Rocephin was given for UTI, changed to Zosyn > added vanc/clinda, as of today Rocephin restarted and clinda continued, vanc/zosyn stopped per ID  Oral clotrimazole added for thrush  PCT and WBC trending down nicely  No obstruction on renal u/s  He's urinating ok, some blood in urine, on Eliquis

## 2024-06-19 NOTE — PLAN OF CARE
06/18/24 1935   Patient Assessment/Suction   Level of Consciousness (AVPU) alert   Respiratory Effort Normal;Unlabored   Expansion/Accessory Muscles/Retractions expansion symmetric   Rhythm/Pattern, Respiratory pattern regular   Cough Frequency no cough   PRE-TX-O2   Device (Oxygen Therapy) room air   SpO2 95 %   Pulse Oximetry Type Intermittent   Aerosol Therapy   $ Aerosol Therapy Charges PRN treatment not required   Preset CPAP/BiPAP Settings   Mode Of Delivery Standby  (home cpap at bedside)

## 2024-06-19 NOTE — PROGRESS NOTES
Nephrology Progress Note        Patient Name: Roosevelt Moser  MRN: [id number]    Patient Class: IP- Inpatient   Admission Date: 6/14/2024  Length of Stay: 5 days  Date of Service: 6/19/2024    Attending Physician: Shelton Newton MD  Primary Care Provider: [facility], Primary Doctor    Reason for Consult: marbin/hyperkalemia    SUBJECTIVE:     HPI: 73M with h/o DM, HTN, AF, GBS and recent shoulder surgery was brought to ER by EMS with recurrent falls in the last 24h. Reports decreased UO but no fever, cough, SOB, dysuria. Upon admission, noted to be in MARBIN with sCr 5, baseline 1 month ago is 1, hyperkalemia with K 6, acidosis with CO2 12, hypoalbuminemia with albumin 1.6. Lactate notably 2.1. Procal 20. A1c 9.5 in 3/2024. UA with hematuria and pyuria, urine Cx pending. Notably on Apixaban. WBC in blood elevated to 20. Plt 540. RP US ordered. Afebrile, normotensive, received IVF and abx. Lokelma, ca gluconate given bicarb gtt ordered. Straight cath in ER with only 100cc urine out.    Review of Systems:  Neg    6/15  AFVSS.   UOP 1200cc plus 2 unmeasured   6/16  AFVSS.  2150 cc uop.  No distress  6/17 VSS, on RA, UOP 1.5L- updated family at bedside  6/18 VSS, on RA, UOP 1.3L  6/19 VSS, on RA, UOP 1L, with osteo R toe- s/p debridement- not interested in amputation- antbx adjusted    ASSESSMENT/PLAN:     MARBIN due to ATN due to sepsis due to UTI and/or PNA  CKD stage 2 with diabetic proteinuria and severe hypoalbuminemia  HTN  DM poorly controlled A1c 9.5  HyperK/Acidosis  HypoMg  SHPT  Anemia  Hypoalbuminemia    - renal function is overall improved- nonoliguric  - no acute RRT needs- no nsaids or IV contrast- dose meds for CrCl < 30  - about 1g proteinuria- low alb is nutrition/acute phase reactant  - hold hydralazine  - hold metformin- use insulin  - hyperK/acidosis resolved  - Mg replete  - H/H stable  - optimize protein intake    Updated family at bedside    Thank you for allowing us to participate in the care of your patient!    We will follow the patient and provide recommendations as needed.         Laboratory:  Recent Labs   Lab 06/17/24  0255 06/18/24  0234 06/19/24  0322    137 140   K 4.1 3.8 4.0   CL 97 97 98   CO2 25 25 25   BUN 76* 68* 73*   CREATININE 3.3* 2.7* 2.8*   * 225* 198*       Recent Labs   Lab 06/16/24  0453 06/17/24  0255 06/18/24  0234 06/19/24  0322   CALCIUM 8.3* 8.5* 8.6* 8.8   ALBUMIN 1.4* 1.3* 1.3* 1.3*   PHOS 4.5 4.3 3.8  --    MG 1.6 1.5* 1.5* 2.0             Recent Labs   Lab 06/17/24  0723 06/17/24  1109 06/17/24  1623 06/17/24  2023 06/18/24  0631 06/18/24  0749 06/18/24  1134 06/18/24  1659 06/18/24  2031 06/19/24  0739   POCTGLUCOSE 173* 237* 282* 281* 236* 242* 348* 403* 272* 219*       Recent Labs   Lab 07/31/23  0955 03/19/24  0830 06/14/24  1539   Hemoglobin A1C 8.8 H 9.5 H 6.3 H       Recent Labs   Lab 06/14/24  1229 06/15/24  0556 06/16/24  0453 06/17/24  0255 06/19/24  0322   WBC 20.81*   < > 13.11* 15.42* 19.14*   HGB 11.5*   < > 10.7* 10.7* 10.6*   HCT 34.9*   < > 31.8* 32.7* 32.5*   *   < > 496* 539* 613*   MCV 81*   < > 79* 79* 80*   MCHC 33.0   < > 33.6 32.7 32.6   MONO 7.9  1.6*  --   --   --   --    EOSINOPHIL 0.3  --   --   --   --     < > = values in this interval not displayed.       Recent Labs   Lab 06/16/24  0453 06/17/24  0255 06/18/24  0234 06/19/24  0322   BILITOT 0.6 0.6  --  0.7   PROT 5.9* 6.0  --  6.2   ALBUMIN 1.4* 1.3* 1.3* 1.3*   ALKPHOS 230* 235*  --  312*   ALT 14 12  --  23   AST 22 23  --  45*       Recent Labs   Lab 12/16/22  1238 03/08/24  1034 03/25/24  1022 06/14/24  1337   Color, UA Yellow Yellow Yellow [place] A   Appearance, UA Clear Clear Hazy A Cloudy A   pH, UA 6.0 5.0 6.0 6.0   Specific [place], UA 1.020 1.010 1.020 1.020   Protein, UA 1+ A Trace A 1+ A 2+ A   Glucose, UA 1+ A 3+ A Negative Trace A   Ketones, UA Negative Negative Negative Negative   Urobilinogen, UA  --  Negative Negative Negative   Bilirubin (UA) Negative Negative Negative  Negative   Occult Blood UA 3+ A 2+ A 2+ A 3+ A   Nitrite, UA Negative Negative Negative Negative   RBC, UA 20 H 13 H >100 H >100 H   WBC, UA 81 H 3 18 H >100 H   Bacteria Rare None Rare Many A   Hyaline Casts, UA 0  --  0 0             Microbiology Results (last 7 days)       Procedure Component Value Units Date/Time    Aerobic culture [[id number]] Collected: 06/18/24 1251    Order Status: Completed Specimen: Wound from Toe, Right Foot Updated: 06/19/24 0901     Aerobic Bacterial Culture No growth    Blood culture [[id number]] Collected: 06/14/24 1355    Order Status: Completed Specimen: Blood from Peripheral, Antecubital, Right Updated: 06/18/24 2032     Blood Culture, Routine No Growth to date      No Growth to date      No Growth to date      No Growth to date      No Growth to date    Blood culture [[id number]] Collected: 06/14/24 1355    Order Status: Completed Specimen: Blood from Peripheral, Hand, Right Updated: 06/18/24 2032     Blood Culture, Routine No Growth to date      No Growth to date      No Growth to date      No Growth to date      No Growth to date    Culture, Anaerobe [[id number]] Collected: 06/18/24 1251    Order Status: Sent Specimen: Wound from Toe, Right Foot Updated: 06/18/24 1925    Urine culture [[id number]] Collected: 06/14/24 1337    Order Status: Completed Specimen: Urine Updated: 06/17/24 0709     Urine Culture, Routine No growth    Narrative:      Specimen Source->Urine            Review of patient's allergies indicates:  No Known Allergies    Outpatient meds:  No current facility-administered medications on file prior to encounter.     Current Outpatient Medications on File Prior to Encounter   Medication Sig Dispense Refill    apixaban (ELIQUIS) 5 mg Tab Take 1 tablet (5 mg total) by mouth 2 (two) times daily. 60 tablet 1    atorvastatin (LIPITOR) 10 MG tablet Take 1 tablet (10 mg total) by mouth once daily. 90 tablet 3    co-enzyme Q-10 30 mg capsule Take 30 mg by mouth once daily.       doxycycline (VIBRAMYCIN) 100 MG Cap Take 100 mg by mouth 2 (two) times daily.      ergocalciferol, vitamin D2, (VITAMIN D ORAL) Take 1 tablet by mouth once daily.      glimepiride (AMARYL) 2 MG tablet Take 1 tablet (2 mg total) by mouth before breakfast. 90 tablet 3    hydrALAZINE (APRESOLINE) 25 MG tablet Take 1 tablet (25 mg total) by mouth every 12 (twelve) hours. 60 tablet 3    metFORMIN (GLUCOPHAGE-XR) 500 MG ER 24hr tablet Take 2 tablets (1,000 mg total) by mouth 2 (two) times daily with meals. 360 tablet 3    metoprolol succinate (TOPROL-XL) 100 MG 24 hr tablet Take 1 tablet (100 mg total) by mouth once daily. (Patient taking differently: Take 100 mg by mouth nightly.) 90 tablet 3       Scheduled meds:   atorvastatin  10 mg Oral QHS    cefTRIAXone (Rocephin) IV (PEDS and ADULTS)  2 g Intravenous Q24H    clindamycin IV (PEDS and ADULTS)  600 mg Intravenous Q8H    clotrimazole  10 mg Oral 5x Daily    enoxparin  40 mg Subcutaneous Q12H (prophylaxis, 0900/2100)    insulin glargine U-100  10 Units Subcutaneous Daily    LIDOcaine  1 patch Transdermal Q24H    metoprolol succinate  100 mg Oral QHS    mupirocin   Nasal BID    sodium chloride 0.9%  10 mL Intravenous Q6H       Infusions:   lactated ringers   Intravenous Continuous 50 mL/hr at 06/18/24 1656 New Bag at 06/18/24 1656         PRN meds:    Current Facility-Administered Medications:     albuterol-ipratropium, 3 mL, Nebulization, Q6H PRN    aluminum & magnesium hydroxide-simethicone, 15 mL, Oral, QID PRN    dextrose 10%, 12.5 g, Intravenous, PRN    dextrose 10%, 25 g, Intravenous, PRN    glucagon (human recombinant), 1 mg, Intramuscular, PRN    glucose, 16 g, Oral, PRN    glucose, 24 g, Oral, PRN    HYDROcodone-acetaminophen, 1 tablet, Oral, Q4H PRN    insulin aspart U-100, 0-10 Units, Subcutaneous, QID (AC + HS) PRN    naloxone, 0.02 mg, Intravenous, PRN    ondansetron, 4 mg, Intravenous, Q8H PRN    prochlorperazine, 5 mg, Intravenous, Q6H PRN     simethicone, 1 tablet, Oral, QID PRN    sodium chloride 0.9%, 10 mL, Intravenous, Q12H PRN    Flushing PICC/Midline Protocol, , , Until Discontinued **AND** sodium chloride 0.9%, 10 mL, Intravenous, Q6H **AND** sodium chloride 0.9%, 10 mL, Intravenous, PRN    Pharmacy to dose Vancomycin consult, , , Once **AND** vancomycin - pharmacy to dose, , Intravenous, pharmacy to manage frequency    Past Medical History:   Diagnosis Date    A-fib 2024    Diabetes mellitus, type 2 2021    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  Social History     Tobacco Use    Smoking status: Never    Smokeless tobacco: Never   Substance Use Topics    Alcohol use: Yes     Alcohol/week: 0.0 standard drinks of alcohol     Comment: social    Drug use: No       OBJECTIVE:     Vital Signs and IO:  Temp:  [97.5 °F (36.4 °C)-98.2 °F (36.8 °C)]   Pulse:  []   Resp:  [18-20]   BP: (120-177)/(59-90)   SpO2:  [94 %-99 %]   I/O last 3 completed shifts:  In: 920 [P.O.:920]  Out: 1950 [Urine:1950]  Wt Readings from Last 5 Encounters:   06/14/24 (!) 138 kg (304 lb 3.8 oz)   06/04/24 132.5 kg (292 lb 1.8 oz)   05/07/24 132.5 kg (292 lb 1.8 oz)   04/08/24 132.5 kg (292 lb 3.2 oz)   03/26/24 (!) 136.1 kg (300 lb 0.7 oz)     Body mass index is 41.26 kg/m².    Physical Exam  Constitutional:       General: Patient is not in acute distress.     Appearance: Patient is well-developed. She is not diaphoretic.   HENT:      Head: Normocephalic and atraumatic.      Mouth/Throat: Mucous membranes are moist.   Eyes:      General: No scleral icterus.      Pupils: Pupils are equal, round, and reactive to light.   Cardiovascular:      Rate and Rhythm: Normal rate and regular rhythm.   Pulmonary:      Effort: Pulmonary effort is normal. No respiratory distress.      Breath sounds: No stridor.   Abdominal:      General: There is no distension.      Palpations: Abdomen is soft.   Musculoskeletal:         General: No deformity. Normal range of motion.      Cervical back: Neck supple.   Skin:     General: Skin is warm and dry.      Findings: No rash present. No erythema.   Neurological:      Mental Status: Patient is alert and oriented to person, place, and time.      Cranial Nerves: No cranial nerve deficit.   Psychiatric:         Behavior: Behavior normal.          Patient care time was spent personally by me on the following activities:     Obtaining a history.  Examination of patient.  Providing medical care at the patients bedside.  Developing a treatment plan with patient or surrogate and bedside caregivers.  Ordering and reviewing laboratory studies, radiographic studies, pulse oximetry.  Ordering and performing treatments and interventions.  Evaluation of patient's response to treatment.  Discussions with consultants while on the unit and immediately available to the patient.  Re-evaluation of the patient's condition.  Documentation in the medical record.     Von Braxton MD    [facility]  [address] [place] [address]  [place] [id number]    [phone] - tel  [phone] - fax    6/19/2024

## 2024-06-19 NOTE — PLAN OF CARE
DC plan could change to LTAC. ID ortho & podiatry consulted. Briefly discussed with pt and wife and they are agreeable. CM continues to follow    A referral was sent to [facility]ac for review via careport by CAROL        06/19/24 2938   Discharge Reassessment   Assessment Type Discharge Planning Reassessment   Did the patient's condition or plan change since previous assessment? Yes   Discharge Plan discussed with: Patient;Spouse/sig other   Communicated KAMILA with patient/caregiver Yes   Discharge Plan A Long-term acute care facility (LTAC)   Discharge Plan B Skilled Nursing Facility   DME Needed Upon Discharge  none   Why the patient remains in the hospital Requires continued medical care   Post-Acute Status   Post-Acute Authorization Placement

## 2024-06-19 NOTE — PLAN OF CARE
Problem: Adult Inpatient Plan of Care  Goal: Plan of Care Review  Outcome: Progressing  Goal: Patient-Specific Goal (Individualized)  Outcome: Progressing  Goal: Absence of Hospital-Acquired Illness or Injury  Outcome: Progressing  Goal: Optimal Comfort and Wellbeing  Outcome: Progressing     Problem: Diabetes Comorbidity  Goal: Blood Glucose Level Within Targeted Range  Outcome: Progressing     Problem: Acute Kidney Injury/Impairment  Goal: Fluid and Electrolyte Balance  Outcome: Progressing  Goal: Effective Renal Function  Outcome: Progressing     Problem: Sepsis/Septic Shock  Goal: Absence of Infection Signs and Symptoms  Outcome: Progressing     Problem: Skin Injury Risk Increased  Goal: Skin Health and Integrity  Outcome: Progressing     Problem: Bariatric Environmental Safety  Goal: Safety Maintained with Care  Outcome: Progressing     Problem: Wound  Goal: Optimal Coping  Outcome: Progressing

## 2024-06-19 NOTE — CARE UPDATE
06/19/24 0728   Patient Assessment/Suction   Level of Consciousness (AVPU) alert   Respiratory Effort Normal;Unlabored   PRE-TX-O2   Device (Oxygen Therapy) room air   SpO2 (!) 94 %   Pulse Oximetry Type Intermittent   $ Pulse Oximetry - Multiple Charge Pulse Oximetry - Multiple   Aerosol Therapy   $ Aerosol Therapy Charges PRN treatment not required   Preset CPAP/BiPAP Settings   Mode Of Delivery Standby  (Home cpap on S/B at bedside.)

## 2024-06-19 NOTE — ASSESSMENT & PLAN NOTE
U/s w/o DVT  Xray showed good position hardware and no bony destruction but a lot of SC air for which we don't have sufficient explanation, so got CT and looks like he's got an infected fluid collection there  D/w ortho team who do not recommend washout but are ok w/ IR doing diagnostic arthrocentesis which we will endeavor to schedule, I am in process of talking to them about it  Appreciate help from ID, wound care, podiatry et al  Pt is already on OAC, holding for now pending shoulder procedure  Arm is elevated on pillow  Not red/painful, no compartment syndrome  D/w pt/wife

## 2024-06-19 NOTE — PROGRESS NOTES
Pharmacokinetic Assessment Follow Up: IV Vancomycin    Vancomycin serum concentration assessment(s):    The random level was drawn correctly and can be used to guide therapy at this time. The measurement is within the desired definitive target range of 15 to 20 mcg/mL.    Vancomycin Regimen Plan:    Vancomycin level ordered for 6/19 at 1400.    Drug levels (last 3 results):  Recent Labs   Lab Result Units 06/19/24  0322   Vancomycin, Random ug/mL 19.7       Pharmacy will continue to follow and monitor vancomycin.    Please contact pharmacy at extension [phone] for questions regarding this assessment.    Thank you for the consult,   Terence Cadet       Patient brief summary:  Roosevelt Moser is a 73 y.o. male initiated on antimicrobial therapy with IV Vancomycin for treatment of bone/joint infection    The patient's current regimen is pulse    Drug Allergies:   Review of patient's allergies indicates:  No Known Allergies    Actual Body Weight:   138    Renal Function:   Estimated Creatinine Clearance: 34.3 mL/min (A) (based on SCr of 2.8 mg/dL (H)).,     Dialysis Method (if applicable):  N/A    CBC (last 72 hours):  Recent Labs   Lab Result Units 06/17/24  0255 06/19/24  0322   WBC K/uL 15.42* 19.14*   Hemoglobin g/dL 10.7* 10.6*   Hematocrit % 32.7* 32.5*   Platelets K/uL 539* 613*       Metabolic Panel (last 72 hours):  Recent Labs   Lab Result Units 06/16/24  1540 06/17/24  0255 06/18/24  0234 06/19/24  0322   Sodium mmol/L  --  139 137 140   Potassium mmol/L  --  4.1 3.8 4.0   Chloride mmol/L  --  97 97 98   CO2 mmol/L  --  25 25 25   Glucose mg/dL  --  143* 225* 198*   BUN mg/dL  --  76* 68* 73*   Creatinine mg/dL  --  3.3* 2.7* 2.8*   Creatinine, Urine mg/dL 64.6  --   --   --    Albumin g/dL  --  1.3* 1.3* 1.3*   Total Bilirubin mg/dL  --  0.6  --  0.7   Alkaline Phosphatase U/L  --  235*  --  312*   AST U/L  --  23  --  45*   ALT U/L  --  12  --  23   Magnesium mg/dL  --  1.5* 1.5* 2.0   Phosphorus mg/dL  --  4.3  3.8  --        Vancomycin Administrations:  vancomycin given in the last 96 hours                     vancomycin (VANCOCIN) 2,000 mg in dextrose 5 % (D5W) 500 mL IVPB (mg) 2,000 mg New Bag 06/18/24 1426                    Microbiologic Results:  Microbiology Results (last 7 days)       Procedure Component Value Units Date/Time    Blood culture [[id number]] Collected: 06/14/24 1355    Order Status: Completed Specimen: Blood from Peripheral, Antecubital, Right Updated: 06/18/24 2032     Blood Culture, Routine No Growth to date      No Growth to date      No Growth to date      No Growth to date      No Growth to date    Blood culture [[id number]] Collected: 06/14/24 1355    Order Status: Completed Specimen: Blood from Peripheral, Hand, Right Updated: 06/18/24 2032     Blood Culture, Routine No Growth to date      No Growth to date      No Growth to date      No Growth to date      No Growth to date    Culture, Anaerobe [[id number]] Collected: 06/18/24 1251    Order Status: Sent Specimen: Wound from Toe, Right Foot Updated: 06/18/24 1925    Aerobic culture [[id number]] Collected: 06/18/24 1251    Order Status: Sent Specimen: Wound from Toe, Right Foot Updated: 06/18/24 1925    Urine culture [[id number]] Collected: 06/14/24 1337    Order Status: Completed Specimen: Urine Updated: 06/17/24 0709     Urine Culture, Routine No growth    Narrative:      Specimen Source->Urine

## 2024-06-19 NOTE — PROGRESS NOTES
Pharmacokinetic Assessment Follow Up: IV Vancomycin    Vancomycin serum concentration assessment(s):    The random level was drawn correctly and can be used to guide therapy at this time. The measurement is within the desired definitive target range of 15 to 20 mcg/mL.    Vancomycin Regimen Plan:    Change regimen to Vancomycin 750 mg IV once with next serum trough concentration measured at 1330 prior to next dose on 6/20    Drug levels (last 3 results):  Recent Labs   Lab Result Units 06/19/24  0322   Vancomycin, Random ug/mL 19.7       Pharmacy will continue to follow and monitor vancomycin.    Please contact pharmacy at extension [phone] for questions regarding this assessment.    Thank you for the consult,   Kenroy Man       Patient brief summary:  Roosevelt Moser is a 73 y.o. male initiated on antimicrobial therapy with IV Vancomycin for treatment of bone/joint infection        Drug Allergies:   Review of patient's allergies indicates:  No Known Allergies    Actual Body Weight:   138 kg    Renal Function:   Estimated Creatinine Clearance: 34.3 mL/min (A) (based on SCr of 2.8 mg/dL (H)).,     Dialysis Method (if applicable):  N/A    CBC (last 72 hours):  Recent Labs   Lab Result Units 06/17/24  0255 06/19/24  0322   WBC K/uL 15.42* 19.14*   Hemoglobin g/dL 10.7* 10.6*   Hematocrit % 32.7* 32.5*   Platelets K/uL 539* 613*       Metabolic Panel (last 72 hours):  Recent Labs   Lab Result Units 06/16/24  1540 06/17/24  0255 06/18/24  0234 06/19/24  0322   Sodium mmol/L  --  139 137 140   Potassium mmol/L  --  4.1 3.8 4.0   Chloride mmol/L  --  97 97 98   CO2 mmol/L  --  25 25 25   Glucose mg/dL  --  143* 225* 198*   BUN mg/dL  --  76* 68* 73*   Creatinine mg/dL  --  3.3* 2.7* 2.8*   Creatinine, Urine mg/dL 64.6  --   --   --    Albumin g/dL  --  1.3* 1.3* 1.3*   Total Bilirubin mg/dL  --  0.6  --  0.7   Alkaline Phosphatase U/L  --  235*  --  312*   AST U/L  --  23  --  45*   ALT U/L  --  12  --  23   Magnesium mg/dL  --   1.5* 1.5* 2.0   Phosphorus mg/dL  --  4.3 3.8  --        Vancomycin Administrations:  vancomycin given in the last 96 hours                     vancomycin (VANCOCIN) 2,000 mg in dextrose 5 % (D5W) 500 mL IVPB (mg) 2,000 mg New Bag 06/18/24 1426                    Microbiologic Results:  Microbiology Results (last 7 days)       Procedure Component Value Units Date/Time    Blood culture [[id number]] Collected: 06/14/24 1355    Order Status: Completed Specimen: Blood from Peripheral, Antecubital, Right Updated: 06/18/24 2032     Blood Culture, Routine No Growth to date      No Growth to date      No Growth to date      No Growth to date      No Growth to date    Blood culture [[id number]] Collected: 06/14/24 1355    Order Status: Completed Specimen: Blood from Peripheral, Hand, Right Updated: 06/18/24 2032     Blood Culture, Routine No Growth to date      No Growth to date      No Growth to date      No Growth to date      No Growth to date    Culture, Anaerobe [[id number]] Collected: 06/18/24 1251    Order Status: Sent Specimen: Wound from Toe, Right Foot Updated: 06/18/24 1925    Aerobic culture [[id number]] Collected: 06/18/24 1251    Order Status: Sent Specimen: Wound from Toe, Right Foot Updated: 06/18/24 1925    Urine culture [[id number]] Collected: 06/14/24 1337    Order Status: Completed Specimen: Urine Updated: 06/17/24 0709     Urine Culture, Routine No growth    Narrative:      Specimen Source->Urine

## 2024-06-19 NOTE — ASSESSMENT & PLAN NOTE
Chronic AF w/ acute RVR, resolved  Required dilt drip on admission but off since 6/15  Tele some PVC/bigem/trigem  TSH 1.8 in March  Monitor lytes  No etoh/drug use  TTE from March 2024:    Left Ventricle: The left ventricle is normal in size. Normal wall thickness. Mild global hypokinesis present. There is mildly reduced systolic function with a visually estimated ejection fraction of 45 - 50%. There is normal diastolic function.    Right Ventricle: Normal right ventricular cavity size. Wall thickness is normal. Right ventricle wall motion  is normal. Systolic function is normal.    Left Atrium: Left atrium is moderately dilated.    IVC/SVC: Normal venous pressure at 3 mmHg.  OAC has been on hold d/t possible need for shoulder surgery but that is looking less likely  Trying to arrange diagnostic aspirate w/ IR

## 2024-06-19 NOTE — PT/OT/SLP PROGRESS
"Physical Therapy Treatment    Patient Name:  Roosevelt Moser   MRN:  [id number]    Recommendations:     Discharge Recommendations: Moderate Intensity Therapy  Discharge Equipment Recommendations: none  Barriers to discharge: Decreased caregiver support    Assessment:     Roosevelt Moser is a 73 y.o. male admitted with a medical diagnosis of Acute renal failure superimposed on chronic kidney disease.  He presents with the following impairments/functional limitations: weakness, impaired endurance, impaired self care skills, gait instability, impaired functional mobility, impaired balance, decreased upper extremity function, pain, decreased ROM, impaired fine motor, edema, impaired cardiopulmonary response to activity .    Pt seen supine in bed, alert and agreeable to PT. Pt talkative. Pt completed AAROME thera ex to LE"s. Pt is requesting defer EOB/OOB till seen by Dr Cooper for L shoulder  effusion.  Pt with dark urine- tea colored via [employer] system    Rehab Prognosis: Fair; patient would benefit from acute skilled PT services to address these deficits and reach maximum level of function.    Recent Surgery: * No surgery found *      Plan:     During this hospitalization, patient to be seen daily to address the identified rehab impairments via gait training, therapeutic activities, therapeutic exercises and progress toward the following goals:    Plan of Care Expires:  07/15/24    Subjective   Pt stated is waiting on Dr Coopre for LUE/shoulder pain  Chief Complaint: pain L UE  Patient/Family Comments/goals: get well  Pain/Comfort:  Pain Rating 1:  (not rated)  Location - Side 1: Left  Location 1: arm  Pain Addressed 1: Reposition, Distraction, Cessation of Activity      Objective:     Communicated with nurse Rojas prior to session.  Patient found HOB elevated with PureWick, bed alarm, peripheral IV upon PT entry to room.     General Precautions: Standard, fall  Orthopedic Precautions:  (LUE S/P shldr Sx in March " )  Braces: N/A  Respiratory Status: Room air     Functional Mobility:  Bed Mobility:     Scooting: maximal assistance      AM-PAC 6 CLICK MOBILITY          Treatment & Education:  Patient was educated on the importance of OOB activity and functional mobility to negate negative effects of prolonged bed rest during hospitalization, safe transfers and ambulation, and D/C planning   Thera ex with AP,QS/GS,abd/add, HS,SLR  Attempted long sitting max/total assist due to L shoulder pain    Patient left HOB elevated with all lines intact and bed alarm on..    GOALS:   Multidisciplinary Problems       Physical Therapy Goals          Problem: Physical Therapy    Goal Priority Disciplines Outcome Goal Variances Interventions   Physical Therapy Goal     PT, PT/OT Progressing     Description: Goals to be met by: 7/15/24     Patient will increase functional independence with mobility by performin. Supine to sit with Contact Guard Assistance  2. Sit to stand transfer with Contact Guard Assistance  3. Bed to chair transfer with Contact Guard Assistance using Rolling Walker  4. Gait  x 200 feet with Contact Guard Assistance using Rolling Walker.   5. Lower extremity exercise program x30 reps per handout, with supervision                         Time Tracking:     PT Received On: 24  PT Start Time: 1501     PT Stop Time: 1521  PT Total Time (min): 20 min     Billable Minutes: Therapeutic Exercise 20    Treatment Type: Treatment  PT/PTA: PT     Number of PTA visits since last PT visit: 0     2024

## 2024-06-19 NOTE — PROGRESS NOTES
[facility] Medicine  Progress Note    Patient Name: Roosevelt Moser  MRN: [id number]  Patient Class: IP- Inpatient   Admission Date: 6/14/2024  Length of Stay: 5 days  Attending Physician: Shelton Newton MD  Primary Care Provider: Miriam, Primary Doctor        Subjective:     Principal Problem:Acute renal failure superimposed on chronic kidney disease        HPI:  Roosevelt Moser is a 73 year old male with a previous medical history of atrial fibrillation on eliquis, HTN, DMII, obstructive sleep apnea, HLD, ORIF of left humerus and guillian-[place] who presented to the emergency room for weakness and multiple falls. Per wife of the patient he has had progressive weakness over the past week along with two falls one at 0300 Thursday and the other early morning Friday. Both time she had to activate EMS to pick patient up off floor due to weakness. Patient refused transport to hospital on both occasions. Today he slid out of his chair and was unable to get up without assistance and at this point the wife activated EMS to transport patient to the hospital. She endorses that two days ago she noticed that the patient had stopped urinating as he normally does. The patient concurs that he has noticed that his urine has decreased over the past two days and that it is dark. Patient denies dysuria or incomplete bladder emptying. Bladder scan showed zero upon admit and urine sample was obtained via straight cath in ED. Patient denies decreased PO intake and keeps a bottle of water with him at all times. Initial ED work-up showed a creatinine of 5 and potassium of 6. Urine studies positive for infection and WBC 20.81 with procalcitonin at 19.75. Blood cultures obtained and patient given 1 gram of rocephin and 1000ml of normal saline. Patient noted to bein afib with RVR and 20mg of diltiazem was administered IV which resulted in low blood pressure and 1 gram of calcium gluconate was administered. Patient  "admitted by hospital medicine for further evaluation and management     Overview/Hospital Course:  6/15/24  Assumed care today, pt seen and examined, chart reviewed.  Pt sitting up in bed, wife at bedside, feeling better.  Off dilt drip since this AM, in AF on monitor but rate down to 80s.   Denies cp/sob.  Has been weak and debilitated, await PT/OT to see what post acute needs will be.    6/16/24  Pt resting in NAD.  Advised by CM yesterday wife not willing/able to take him home at this time - We talked about that frankly today and she says she is unable to care for him, planning on SNF, hopefully regain enough function to allow d/c home from there. Pt w/ slow improvement.  Feels pretty good.  Appetite not too good, he says he's been deliberately eating small quantities of food at home to lose weight and feels it's been working.  Note pain/swelling LUE where he had a recent fx/ORIF.    6/17/24  Pt doing reasonably well, having pain sitting on the bedpan "it's sticking me", but overcame that w/ some adjusting position.  LUE u/s no DVT.  Still having a lot of pain in upper left arm w/ movement and also worried about persistent pain "under left boob" where he struck himself during a fall - we will image those areas too.  Says Norco 7.5 is like "eating a jelly bean" asking if we can give 10.  Interesting notes that he's currently constipated, at home often has diarrhea, says that higher doses of narcotics cause him to have loose bowels.  Odd.  Albumin 1.3.  Await SNF dispo. Wife at bedside.   6/18/24  Pt continues to be quite jolly about everything, has good sense of humor, acting very non toxic - HOWEVER upon wound care assessment by Dr Elise he is seen to have osteomyelitis of his right 2nd toe - Podiatry and ID consulted, s/p debridement at bedside w/ deep tissue culture taken, pt/wife opposed to amputation.  Additionally shoulder CT favors abscess around his op site - await input from ortho - He has pain there but " "it's not exquisite and the area is not flucuant/red/warm, pain seems more arthritic in nature - but he appears to have a deep seated infection and will need a washout in OR -  Dr Calvin has added clinda/vanc to Zosyn started yesterday.   6/19/24  Pt sitting up in bed, very conversant and non toxic appearing, thanks much to all consultants.  Per ortho conservative approach to shoulder, I am in chat w/ them currently about possible aspiration of fluid.  Note ID changes in abx coverage.  Pt still has a great deal of pain/mobility loss of left shoulder.  We have been working on SNF but he may need LTAC w/ current level of complexity/abx regimen.  Deep wound cultures from right 2nd toe are pending.       Interval History:     6/15-19 see course    Review of Systems    11 pt ROS negative except as noted o/w    Objective:     Vital Signs (Most Recent):  Temp: 98.1 °F (36.7 °C) (06/19/24 1223)  Pulse: 69 (06/19/24 1223)  Resp: 18 (06/19/24 1238)  BP: (!) 165/82 (06/19/24 1223)  SpO2: 95 % (06/19/24 1223) Vital Signs (24h Range):  Temp:  [97.5 °F (36.4 °C)-98.2 °F (36.8 °C)] 98.1 °F (36.7 °C)  Pulse:  [] 69  Resp:  [16-20] 18  SpO2:  [94 %-95 %] 95 %  BP: (120-177)/(59-90) 165/82     Weight: (!) 138 kg (304 lb 3.8 oz)  Body mass index is 41.26 kg/m².    Intake/Output Summary (Last 24 hours) at 6/19/2024 1401  Last data filed at 6/19/2024 0517  Gross per 24 hour   Intake 560 ml   Output 1050 ml   Net -490 ml         Physical Exam      General:  A&O, NAD, looks very non toxic, is jovial and conversant  HEENT: neck supple, PERRL/EOMI, EAC clear bilaterally, normal bilateral carotid upstroke w/o bruits, inf turbs clear bilaterally, OC/OP w some thrush noted today per ID  Lungs: CTAB  CV: RRR w/o M/G/R  Abdomen: soft, NTND, no HSM, no bruits/masses/pulsations, obese  Ext: puffy, LUE w/ perhaps a little less balottment of deltoid area (wife pointed out "it's softer"), limited ROM d/t pain/weakness in LUE, handgrip/radial " pulse good, right foot w/ dressing 2nd toe, foot looks good.   :  external catheter and blood tinged urine in wall canister  Rectal: def  Neuro: NF    Significant Labs: All pertinent labs within the past 24 hours have been reviewed.    Significant Imaging: I have reviewed all pertinent imaging results/findings within the past 24 hours.    Assessment/Plan:      * Acute renal failure superimposed on chronic kidney disease  Appreciate input from renal services  In setting of acute UTI and IVVD  Resumed gentle hydration on Zosyn/vanc but those have been stopped  Pt drinking ok - will d/c IVF  He did not require oral NaHCO3  U/s is w/o obstruction, masses, etc        Abscess of left shoulder  As discussed under limb swelling      Osteomyelitis of toe  S/p bedside I&D, deep tissue Cx pending  ID and podiatry following, appreciate help  Await plan for selection and duration of abx tx  Appreciate input all consultants  Pt/wife opposed to amputation of digit  Local wound care  Note markedly elevated ESR, CRP        Skin tear of left upper extremity  Local care, healing      Swelling of limb, left  U/s w/o DVT  Xray showed good position hardware and no bony destruction but a lot of SC air for which we don't have sufficient explanation, so got CT and looks like he's got an infected fluid collection there  D/w ortho team who do not recommend washout but are ok w/ IR doing diagnostic arthrocentesis which we will endeavor to schedule, I am in process of talking to them about it  Appreciate help from ID, wound care, podiatry et al  Pt is already on OAC, holding for now pending shoulder procedure  Arm is elevated on pillow  Not red/painful, no compartment syndrome  D/w pt/wife      Debility  Multifactorial process  Pt not able to manage his own care and wife cannot adequately care for him at this time  PT/OT  SNF consult  CM aware      Severe sepsis  In setting of acute UTI  Also has OM 2nd toe and appears to possibly anaerobic  infection of left shoulder s/p ORIF there 3 mo ago  BCX NGTD  Ur CX neg  Rocephin was given for UTI, changed to Zosyn > added vanc/clinda, as of today Rocephin restarted and clinda continued, vanc/zosyn stopped per ID  Oral clotrimazole added for thrush  PCT and WBC trending down nicely  No obstruction on renal u/s  He's urinating ok, some blood in urine, on Eliquis    Hyperkalemia  Resolved  in setting of acute on chronic renal failure  Lokelma given x 1 on admit  Avoid ACE/ARB, K+ replacement  tele          Atrial fibrillation with rapid ventricular response  Chronic AF w/ acute RVR, resolved  Required dilt drip on admission but off since 6/15  Tele some PVC/bigem/trigem  TSH 1.8 in March  Monitor lytes  No etoh/drug use  TTE from March 2024:    Left Ventricle: The left ventricle is normal in size. Normal wall thickness. Mild global hypokinesis present. There is mildly reduced systolic function with a visually estimated ejection fraction of 45 - 50%. There is normal diastolic function.    Right Ventricle: Normal right ventricular cavity size. Wall thickness is normal. Right ventricle wall motion  is normal. Systolic function is normal.    Left Atrium: Left atrium is moderately dilated.    IVC/SVC: Normal venous pressure at 3 mmHg.  OAC has been on hold d/t possible need for shoulder surgery but that is looking less likely  Trying to arrange diagnostic aspirate w/ IR      History of Guillain-[place] syndrome  No acute issues  However, pt has impaired mobility and is acutely weakened from his sepsis/UTI/AF RVR  PT/OT working w/ pt  Wife cannot manage his care at home currently  SNF assessment underway, may need LTAC depending on what abx he ends up for how long, it may take a few more days to narrow that down          Type 2 diabetes mellitus  A1c 6.3%  SSI      Hypertension  Home meds as appropriate    MARA (obstructive sleep apnea)  NIPPV HS/naps      Morbid obesity  Body mass index is 41.26 kg/m².  Morbid obesity  complicates all aspects of disease management from diagnostic modalities to treatment  Weight loss encouraged and health benefits explained to patient.   He has been working on weight loss at home w/ reduced caloric intake        Of note pt hyperglycemic on D5 with abx yesterday so added some scheduled Minal Casanova md    VTE Risk Mitigation (From admission, onward)           Ordered     enoxaparin injection 40 mg  Every 12 hours         06/18/24 1538     Reason for No Pharmacological VTE Prophylaxis  Once        Question:  Reasons:  Answer:  Already adequately anticoagulated on oral Anticoagulants    06/14/24 1438     IP VTE HIGH RISK PATIENT  Once         06/14/24 1438     Place sequential compression device  Until discontinued         06/14/24 1438                    Discharge Planning   KAMILA:      Code Status: Full Code   Is the patient medically ready for discharge?:     Reason for patient still in hospital (select all that apply): Patient trending condition  Discharge Plan A: Skilled Nursing Facility          Time spent in direct patient care, review of data, conversation w/ patient and/or family, and moderately complex MDM 40 minutes          Shelton Newton MD  Department of Hospital Medicine   [facility]/Surg

## 2024-06-19 NOTE — ASSESSMENT & PLAN NOTE
No acute issues  However, pt has impaired mobility and is acutely weakened from his sepsis/UTI/AF RVR  PT/OT working w/ pt  Wife cannot manage his care at home currently  SNF assessment underway, may need LTAC depending on what abx he ends up for how long, it may take a few more days to narrow that down

## 2024-06-19 NOTE — PT/OT/SLP PROGRESS
Occupational Therapy   Treatment    Name: Roosevelt Moser  MRN: [id number]  Admitting Diagnosis:  Acute renal failure superimposed on chronic kidney disease       Recommendations:     Discharge Recommendations: Moderate Intensity Therapy  Discharge Equipment Recommendations:  other (see comments) (TBD)  Barriers to discharge:       Assessment:     Roosevelt Moser is a 73 y.o. male with a medical diagnosis of Acute renal failure superimposed on chronic kidney disease.  He presents with the following performance deficits affecting function are weakness, impaired endurance, impaired self care skills, impaired functional mobility, decreased upper extremity function, decreased lower extremity function, pain, decreased ROM, edema, impaired skin, impaired coordination, impaired fine motor, orthopedic precautions. Spouse present. Pt refused initially, but agreed to participate with encouragement from spouse.     Rehab Prognosis:  Good; patient would benefit from acute skilled OT services to address these deficits and reach maximum level of function.       Plan:     Patient to be seen 6 x/week to address the above listed problems via self-care/home management, therapeutic activities, therapeutic exercises  Plan of Care Expires: 07/13/24  Plan of Care Reviewed with: patient, spouse    Subjective     Chief Complaint: Pain LUE  Patient/Family Comments/goals: To get better  Pain/Comfort:  Pain Rating 1: 8/10  Location - Side 1: Left  Location - Orientation 1: generalized  Location 1: arm  Pain Rating Post-Intervention 1: 6/10    Objective:     Communicated with: Nurse Rojas prior to session.  Patient found HOB elevated with bed alarm, peripheral IV upon OT entry to room.    General Precautions: Standard, fall    Orthopedic Precautions:LUE non weight bearing (LUE s/p shoulder surgery 3/25/24)  Braces: N/A  Respiratory Status: Room air          AMPAC 6 Click ADL:      Treatment & Education:  OT provided instruction in RUE AROM exercises to  increase strength and functional activity tolerance for ADL's/IADL's. Pt required cues and demonstration to complete.   Edema noted LUE greater than the last time this OT saw pt. OT provided instruction in AROM L wrist/hand and gentle AAROM L elbow to reduce risk of edema and weakness during immobilization of L shoulder. Pt reports pain 8/10 at beginning of session and 6/10 at end of session LUE.   OT provided education in calling for assist. Patient verbalized understanding.      Patient left HOB elevated with all lines intact, call button in reach, bed alarm on, and spouse  present    GOALS:   Multidisciplinary Problems       Occupational Therapy Goals          Problem: Occupational Therapy    Goal Priority Disciplines Outcome Interventions   Occupational Therapy Goal     OT, PT/OT Progressing    Description: Goals to be met by: 7/13/24     Patient will increase functional independence with ADLs by performing:    Feeding with [place].  UE Dressing with Moderate Assistance.  LE Dressing with Moderate Assistance.  Grooming while seated with Set-up Assistance.  Toileting from bedside commode with Moderate Assistance for hygiene and clothing management.   Bathing from  shower chair/bench with Moderate Assistance.  Toilet transfer to bedside commode with Minimal Assistance.  Increased strength and functional activity tolerance for ADL's/IADL's                         Time Tracking:     OT Date of Treatment: 06/19/24  OT Start Time: 1318  OT Stop Time: 1341  OT Total Time (min): 23 min    Billable Minutes:Therapeutic Exercise 23    OT/BAR: OT          6/19/2024

## 2024-06-19 NOTE — SUBJECTIVE & OBJECTIVE
"Interval History:     6/15-19 see course    Review of Systems    11 pt ROS negative except as noted o/w    Objective:     Vital Signs (Most Recent):  Temp: 98.1 °F (36.7 °C) (06/19/24 1223)  Pulse: 69 (06/19/24 1223)  Resp: 18 (06/19/24 1238)  BP: (!) 165/82 (06/19/24 1223)  SpO2: 95 % (06/19/24 1223) Vital Signs (24h Range):  Temp:  [97.5 °F (36.4 °C)-98.2 °F (36.8 °C)] 98.1 °F (36.7 °C)  Pulse:  [] 69  Resp:  [16-20] 18  SpO2:  [94 %-95 %] 95 %  BP: (120-177)/(59-90) 165/82     Weight: (!) 138 kg (304 lb 3.8 oz)  Body mass index is 41.26 kg/m².    Intake/Output Summary (Last 24 hours) at 6/19/2024 1401  Last data filed at 6/19/2024 0517  Gross per 24 hour   Intake 560 ml   Output 1050 ml   Net -490 ml         Physical Exam      General:  A&O, NAD, looks very non toxic, is jovial and conversant  HEENT: neck supple, PERRL/EOMI, EAC clear bilaterally, normal bilateral carotid upstroke w/o bruits, inf turbs clear bilaterally, OC/OP w some thrush noted today per ID  Lungs: CTAB  CV: RRR w/o M/G/R  Abdomen: soft, NTND, no HSM, no bruits/masses/pulsations, obese  Ext: puffy, LUE w/ perhaps a little less balottment of deltoid area (wife pointed out "it's softer"), limited ROM d/t pain/weakness in LUE, handgrip/radial pulse good, right foot w/ dressing 2nd toe, foot looks good.   :  external catheter and blood tinged urine in wall canister  Rectal: def  Neuro: NF    Significant Labs: All pertinent labs within the past 24 hours have been reviewed.    Significant Imaging: I have reviewed all pertinent imaging results/findings within the past 24 hours.  "

## 2024-06-19 NOTE — PLAN OF CARE
Problem: Occupational Therapy  Goal: Occupational Therapy Goal  Description: Goals to be met by: 7/13/24     Patient will increase functional independence with ADLs by performing:    Feeding with [place].  UE Dressing with Moderate Assistance.  LE Dressing with Moderate Assistance.  Grooming while seated with Set-up Assistance.  Toileting from bedside commode with Moderate Assistance for hygiene and clothing management.   Bathing from  shower chair/bench with Moderate Assistance.  Toilet transfer to bedside commode with Minimal Assistance.  Increased strength and functional activity tolerance for ADL's/IADL's    Outcome: Progressing  Pt participated in RUE exercises with encouragement of spouse after initially refusing.   Pt completed 3x10 reps R shoulder flex/ext, presses, elbow flex/ext and horizontal ab/adduction with HOB elevated. Pt participated in AROM L wrist/hand and AAROM L elbow to reduce risk of edema and weakness during immobilization proximal LUE.   Continue with POC

## 2024-06-19 NOTE — ASSESSMENT & PLAN NOTE
S/p bedside I&D, deep tissue Cx pending  ID and podiatry following, appreciate help  Await plan for selection and duration of abx tx  Appreciate input all consultants  Pt/wife opposed to amputation of digit  Local wound care  Note markedly elevated ESR, CRP

## 2024-06-19 NOTE — PLAN OF CARE
Problem: Physical Therapy  Goal: Physical Therapy Goal  Description: Goals to be met by: 7/15/24     Patient will increase functional independence with mobility by performin. Supine to sit with Contact Guard Assistance  2. Sit to stand transfer with Contact Guard Assistance  3. Bed to chair transfer with Contact Guard Assistance using Rolling Walker  4. Gait  x 200 feet with Contact Guard Assistance using Rolling Walker.   5. Lower extremity exercise program x30 reps per handout, with supervision    Outcome: Progressing   Pt seen for thera ex in supine with encouragement.  Not agreeable for EOB/OOB. Being eval for LTAC

## 2024-06-19 NOTE — ASSESSMENT & PLAN NOTE
Appreciate input from renal services  In setting of acute UTI and IVVD  Resumed gentle hydration on Zosyn/vanc but those have been stopped  Pt drinking ok - will d/c IVF  He did not require oral NaHCO3  U/s is w/o obstruction, masses, etc

## 2024-06-20 LAB
ALBUMIN SERPL BCP-MCNC: 1.3 G/DL (ref 3.5–5.2)
ANION GAP SERPL CALC-SCNC: 14 MMOL/L (ref 8–16)
APPEARANCE FLD: NORMAL
BODY FLD TYPE: NORMAL
BUN SERPL-MCNC: 78 MG/DL (ref 8–23)
CALCIUM SERPL-MCNC: 8.7 MG/DL (ref 8.7–10.5)
CHLORIDE SERPL-SCNC: 99 MMOL/L (ref 95–110)
CO2 SERPL-SCNC: 25 MMOL/L (ref 23–29)
COLOR FLD: NORMAL
CREAT SERPL-MCNC: 2.5 MG/DL (ref 0.5–1.4)
ERYTHROCYTE [DISTWIDTH] IN BLOOD BY AUTOMATED COUNT: 18.1 % (ref 11.5–14.5)
EST. GFR  (NO RACE VARIABLE): 27 ML/MIN/1.73 M^2
GLUCOSE SERPL-MCNC: 179 MG/DL (ref 70–110)
HCT VFR BLD AUTO: 31.3 % (ref 40–54)
HGB BLD-MCNC: 10.2 G/DL (ref 14–18)
LYMPHOCYTES NFR FLD MANUAL: 14 %
MCH RBC QN AUTO: 25.9 PG (ref 27–31)
MCHC RBC AUTO-ENTMCNC: 32.6 G/DL (ref 32–36)
MCV RBC AUTO: 79 FL (ref 82–98)
MONOS+MACROS NFR FLD MANUAL: 7 %
NEUTROPHILS NFR FLD MANUAL: 79 %
OHS QRS DURATION: 106 MS
OHS QTC CALCULATION: 443 MS
PHOSPHATE SERPL-MCNC: 4.2 MG/DL (ref 2.7–4.5)
PLATELET # BLD AUTO: 560 K/UL (ref 150–450)
PMV BLD AUTO: 10.2 FL (ref 9.2–12.9)
POCT GLUCOSE: 192 MG/DL (ref 70–110)
POCT GLUCOSE: 222 MG/DL (ref 70–110)
POCT GLUCOSE: 243 MG/DL (ref 70–110)
POCT GLUCOSE: 289 MG/DL (ref 70–110)
POCT GLUCOSE: 302 MG/DL (ref 70–110)
POTASSIUM SERPL-SCNC: 4 MMOL/L (ref 3.5–5.1)
PROCALCITONIN SERPL IA-MCNC: 1.41 NG/ML (ref 0–0.5)
RBC # BLD AUTO: 3.94 M/UL (ref 4.6–6.2)
SODIUM SERPL-SCNC: 138 MMOL/L (ref 136–145)
VANCOMYCIN SERPL-MCNC: 16.3 UG/ML
WBC # BLD AUTO: 21.32 K/UL (ref 3.9–12.7)
WBC # FLD: NORMAL /CU MM

## 2024-06-20 PROCEDURE — 87075 CULTR BACTERIA EXCEPT BLOOD: CPT | Performed by: HOSPITALIST

## 2024-06-20 PROCEDURE — 36415 COLL VENOUS BLD VENIPUNCTURE: CPT | Performed by: INTERNAL MEDICINE

## 2024-06-20 PROCEDURE — 0R9K30Z DRAINAGE OF LEFT SHOULDER JOINT WITH DRAINAGE DEVICE, PERCUTANEOUS APPROACH: ICD-10-PCS | Performed by: RADIOLOGY

## 2024-06-20 PROCEDURE — 89051 BODY FLUID CELL COUNT: CPT | Performed by: HOSPITALIST

## 2024-06-20 PROCEDURE — 87070 CULTURE OTHR SPECIMN AEROBIC: CPT | Performed by: HOSPITALIST

## 2024-06-20 PROCEDURE — 36415 COLL VENOUS BLD VENIPUNCTURE: CPT | Performed by: HOSPITALIST

## 2024-06-20 PROCEDURE — 80202 ASSAY OF VANCOMYCIN: CPT | Performed by: HOSPITALIST

## 2024-06-20 PROCEDURE — 84145 PROCALCITONIN (PCT): CPT | Performed by: HOSPITALIST

## 2024-06-20 PROCEDURE — 80069 RENAL FUNCTION PANEL: CPT | Performed by: HOSPITALIST

## 2024-06-20 PROCEDURE — 63600175 PHARM REV CODE 636 W HCPCS: Mod: JZ,JG | Performed by: INTERNAL MEDICINE

## 2024-06-20 PROCEDURE — 11000001 HC ACUTE MED/SURG PRIVATE ROOM

## 2024-06-20 PROCEDURE — 99900035 HC TECH TIME PER 15 MIN (STAT)

## 2024-06-20 PROCEDURE — 25000003 PHARM REV CODE 250: Performed by: STUDENT IN AN ORGANIZED HEALTH CARE EDUCATION/TRAINING PROGRAM

## 2024-06-20 PROCEDURE — 99900031 HC PATIENT EDUCATION (STAT)

## 2024-06-20 PROCEDURE — 25000003 PHARM REV CODE 250

## 2024-06-20 PROCEDURE — A4216 STERILE WATER/SALINE, 10 ML: HCPCS | Performed by: STUDENT IN AN ORGANIZED HEALTH CARE EDUCATION/TRAINING PROGRAM

## 2024-06-20 PROCEDURE — 25000003 PHARM REV CODE 250: Performed by: INTERNAL MEDICINE

## 2024-06-20 PROCEDURE — 21400001 HC TELEMETRY ROOM

## 2024-06-20 PROCEDURE — 87102 FUNGUS ISOLATION CULTURE: CPT | Performed by: HOSPITALIST

## 2024-06-20 PROCEDURE — 87205 SMEAR GRAM STAIN: CPT | Performed by: HOSPITALIST

## 2024-06-20 PROCEDURE — 85027 COMPLETE CBC AUTOMATED: CPT | Performed by: HOSPITALIST

## 2024-06-20 PROCEDURE — 94761 N-INVAS EAR/PLS OXIMETRY MLT: CPT

## 2024-06-20 PROCEDURE — 99223 1ST HOSP IP/OBS HIGH 75: CPT | Mod: ,,, | Performed by: ORTHOPAEDIC SURGERY

## 2024-06-20 PROCEDURE — 63600175 PHARM REV CODE 636 W HCPCS: Performed by: HOSPITALIST

## 2024-06-20 PROCEDURE — 86060 ANTISTREPTOLYSIN O TITER: CPT | Performed by: INTERNAL MEDICINE

## 2024-06-20 PROCEDURE — 99233 SBSQ HOSP IP/OBS HIGH 50: CPT | Mod: ,,, | Performed by: INTERNAL MEDICINE

## 2024-06-20 PROCEDURE — 25000003 PHARM REV CODE 250: Performed by: HOSPITALIST

## 2024-06-20 RX ADMIN — INSULIN ASPART 2 UNITS: 100 INJECTION, SOLUTION INTRAVENOUS; SUBCUTANEOUS at 10:06

## 2024-06-20 RX ADMIN — CLINDAMYCIN PHOSPHATE 600 MG: 600 INJECTION, SOLUTION INTRAVENOUS at 12:06

## 2024-06-20 RX ADMIN — LIDOCAINE 5% 1 PATCH: 700 PATCH TOPICAL at 06:06

## 2024-06-20 RX ADMIN — CLINDAMYCIN PHOSPHATE 600 MG: 600 INJECTION, SOLUTION INTRAVENOUS at 10:06

## 2024-06-20 RX ADMIN — CLOTRIMAZOLE 10 MG: 10 LOZENGE ORAL at 05:06

## 2024-06-20 RX ADMIN — ENOXAPARIN SODIUM 40 MG: 40 INJECTION SUBCUTANEOUS at 10:06

## 2024-06-20 RX ADMIN — INSULIN ASPART 6 UNITS: 100 INJECTION, SOLUTION INTRAVENOUS; SUBCUTANEOUS at 12:06

## 2024-06-20 RX ADMIN — SODIUM CHLORIDE, PRESERVATIVE FREE 10 ML: 5 INJECTION INTRAVENOUS at 06:06

## 2024-06-20 RX ADMIN — CEFTRIAXONE SODIUM 2 G: 2 INJECTION, POWDER, FOR SOLUTION INTRAMUSCULAR; INTRAVENOUS at 12:06

## 2024-06-20 RX ADMIN — ATORVASTATIN CALCIUM 10 MG: 10 TABLET, FILM COATED ORAL at 08:06

## 2024-06-20 RX ADMIN — VANCOMYCIN HYDROCHLORIDE 750 MG: 750 INJECTION, POWDER, LYOPHILIZED, FOR SOLUTION INTRAVENOUS at 06:06

## 2024-06-20 RX ADMIN — SODIUM CHLORIDE, PRESERVATIVE FREE 10 ML: 5 INJECTION INTRAVENOUS at 07:06

## 2024-06-20 RX ADMIN — MUPIROCIN 1 G: 20 OINTMENT TOPICAL at 10:06

## 2024-06-20 RX ADMIN — CLOTRIMAZOLE 10 MG: 10 LOZENGE ORAL at 10:06

## 2024-06-20 RX ADMIN — CLOTRIMAZOLE 10 MG: 10 LOZENGE ORAL at 06:06

## 2024-06-20 RX ADMIN — CLOTRIMAZOLE 10 MG: 10 LOZENGE ORAL at 12:06

## 2024-06-20 RX ADMIN — CLINDAMYCIN PHOSPHATE 600 MG: 600 INJECTION, SOLUTION INTRAVENOUS at 06:06

## 2024-06-20 RX ADMIN — INSULIN ASPART 8 UNITS: 100 INJECTION, SOLUTION INTRAVENOUS; SUBCUTANEOUS at 06:06

## 2024-06-20 RX ADMIN — INSULIN GLARGINE 10 UNITS: 100 INJECTION, SOLUTION SUBCUTANEOUS at 10:06

## 2024-06-20 RX ADMIN — HYDROCODONE BITARTRATE AND ACETAMINOPHEN 1 TABLET: 10; 325 TABLET ORAL at 10:06

## 2024-06-20 RX ADMIN — MUPIROCIN 1 G: 20 OINTMENT TOPICAL at 08:06

## 2024-06-20 RX ADMIN — SODIUM CHLORIDE, PRESERVATIVE FREE 10 ML: 5 INJECTION INTRAVENOUS at 12:06

## 2024-06-20 RX ADMIN — HYDROCODONE BITARTRATE AND ACETAMINOPHEN 1 TABLET: 10; 325 TABLET ORAL at 04:06

## 2024-06-20 RX ADMIN — HYDROCODONE BITARTRATE AND ACETAMINOPHEN 1 TABLET: 10; 325 TABLET ORAL at 09:06

## 2024-06-20 RX ADMIN — METOPROLOL SUCCINATE 100 MG: 50 TABLET, FILM COATED, EXTENDED RELEASE ORAL at 08:06

## 2024-06-20 NOTE — ASSESSMENT & PLAN NOTE
U/s w/o DVT  Appreciate help from Dr Bernal, ID, wound care, podiatry et al  Pt is already on OAC, holding for now pending shoulder procedure  Arm is elevated on pillow  Not red/painful, no compartment syndrome  D/w pt/wife

## 2024-06-20 NOTE — CONSULTS
[facility]/Surg  Orthopedics  Consult Note    Patient Name: Roosevelt Moser  MRN: [id number]  Admission Date: 6/14/2024  Hospital Length of Stay: 6 days  Attending Provider: Shelton Newton MD  Primary Care Provider: Miriam, Primary Doctor    Patient information was obtained from patient, spouse/SO, past medical records, ER records, and primary team.     Inpatient consult to Orthopedic Surgery  Consult performed by: Roman Paulson MD  Consult ordered by: Shelton Newton MD  Reason for consult: L shoulder infection        Subjective:     Principal Problem:Acute renal failure superimposed on chronic kidney disease    Chief Complaint:   Chief Complaint   Patient presents with    Weakness     Pt brought to ED via EMS with complaint of weakness and falling, pt advised EMS his urine is very dark.          HPI: Patient is status post left humerus fracture with subsequent open reduction internal fixation using an IM nail by Dr. Cooper back in March.  There is known loss of reduction of the proximal humerus fragments and severe glenohumeral osteoarthrosis.  Patient has a left shoulder infection.    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;  Service: Orthopedics;  Laterality: Left;  Accumed, S[employer] Small Frag set Avelino verified 3/22/24 ark       Review of patient's allergies indicates:  No Known Allergies    Current Facility-Administered Medications   Medication    albuterol-ipratropium 2.5 mg-0.5 mg/3 mL nebulizer solution 3 mL    aluminum & magnesium hydroxide-simethicone 400-400-40 mg/5 mL suspension 15 mL    atorvastatin tablet 10  mg    cefTRIAXone (ROCEPHIN) 2 g in dextrose 5 % in water (D5W) 100 mL IVPB (MB+)    clindamycin in D5W 600 mg/50 mL IVPB 600 mg    clotrimazole megha 10 mg    dextrose 10% bolus 125 mL 125 mL    dextrose 10% bolus 250 mL 250 mL    glucagon (human recombinant) injection 1 mg    glucose chewable tablet 16 g    glucose chewable tablet 24 g    HYDROcodone-acetaminophen  mg per tablet 1 tablet    insulin aspart U-100 pen 0-10 Units    insulin glargine U-100 (Lantus) pen 10 Units    LIDOcaine 5 % patch 1 patch    metoprolol succinate (TOPROL-XL) 24 hr tablet 100 mg    mupirocin 2 % ointment    naloxone 0.4 mg/mL injection 0.02 mg    ondansetron injection 4 mg    prochlorperazine injection Soln 5 mg    simethicone chewable tablet 80 mg    sodium chloride 0.9% flush 10 mL    sodium chloride 0.9% flush 10 mL    And    sodium chloride 0.9% flush 10 mL    vancomycin - pharmacy to dose     Family History    None       Tobacco Use    Smoking status: Never    Smokeless tobacco: Never   Substance and Sexual Activity    Alcohol use: Yes     Alcohol/week: 0.0 standard drinks of alcohol     Comment: social    Drug use: No    Sexual activity: Yes     Review of Systems   Constitutional: Negative for chills and fever.   HENT:  Negative for congestion.    Eyes:  Negative for blurred vision.   Cardiovascular:  Negative for chest pain and syncope.   Respiratory:  Negative for cough and shortness of breath.    Endocrine: Negative for polyuria.   Hematologic/Lymphatic: Negative for bleeding problem. Does not bruise/bleed easily.   Skin:  Negative for rash.   Musculoskeletal:  Positive for joint pain, joint swelling and muscle weakness. Negative for falls, muscle cramps and myalgias.   Gastrointestinal:  Negative for abdominal pain, nausea and vomiting.   Genitourinary:  Negative for flank pain.   Neurological:  Negative for numbness and seizures.   Psychiatric/Behavioral:  Negative for altered mental status.    Allergic/Immunologic:  "Negative for persistent infections.     Objective:     Vital Signs (Most Recent):  Temp: 97.2 °F (36.2 °C) (06/20/24 0355)  Pulse: 104 (06/20/24 1140)  Resp: 18 (06/20/24 1140)  BP: 129/83 (06/20/24 1140)  SpO2: 95 % (06/20/24 1140) Vital Signs (24h Range):  Temp:  [97 °F (36.1 °C)-97.9 °F (36.6 °C)] 97.2 °F (36.2 °C)  Pulse:  [] 104  Resp:  [16-18] 18  SpO2:  [94 %-95 %] 95 %  BP: (113-129)/(56-83) 129/83     Weight: (!) 138 kg (304 lb 3.8 oz)  Height: 6' (182.9 cm)  Body mass index is 41.26 kg/m².      Intake/Output Summary (Last 24 hours) at 6/20/2024 1624  Last data filed at 6/20/2024 0607  Gross per 24 hour   Intake 30 ml   Output 1200 ml   Net -1170 ml        General    Nursing note and vitals reviewed.  Constitutional: He is oriented to person, place, and time. He appears well-developed and well-nourished. No distress.   HENT:   Nose: Nose normal.   Eyes: EOM are normal.   Cardiovascular:  Regular rhythm.            Pulmonary/Chest: Effort normal.   Abdominal: Soft.   Neurological: He is alert and oriented to person, place, and time.   Psychiatric: His behavior is normal.         Right Shoulder Exam   Right shoulder exam is normal.    Left Shoulder Exam     Inspection/Observation   Swelling: present  Bruising: absent  Scars: present    Range of Motion   Active abduction:  abnormal   Passive abduction:  abnormal   Extension:  abnormal   Forward Flexion:  abnormal   Forward Elevation: abnormal  Adduction: abnormal  External Rotation 90 degrees: abnormal  Internal rotation 90 degrees:  abnormal     Other   Sensation: normal     Comments:  Left swollen shoulder with pain with range of motion.      Vascular Exam       Left Pulses      Radial:                    2+      Capillary Refill  Left Hand: normal capillary refill           Significant Labs: Blood Culture: No results for input(s): "LABBLOO" in the last 48 hours.  BMP:   Recent Labs   Lab 06/19/24  0322 06/20/24  0458   * 179*    138   K " "4.0 4.0   CL 98 99   CO2 25 25   BUN 73* 78*   CREATININE 2.8* 2.5*   CALCIUM 8.8 8.7   MG 2.0  --      CBC:   Recent Labs   Lab 06/19/24 0322 06/20/24  0458   WBC 19.14* 21.32*   HGB 10.6* 10.2*   HCT 32.5* 31.3*   * 560*     CMP:   Recent Labs   Lab 06/19/24 0322 06/20/24  0458    138   K 4.0 4.0   CL 98 99   CO2 25 25   * 179*   BUN 73* 78*   CREATININE 2.8* 2.5*   CALCIUM 8.8 8.7   PROT 6.2  --    ALBUMIN 1.3* 1.3*   BILITOT 0.7  --    ALKPHOS 312*  --    AST 45*  --    ALT 23  --    ANIONGAP 17* 14     Coagulation: No results for input(s): "LABPROT", "INR", "APTT" in the last 48 hours.  CRP: No results for input(s): "CRP" in the last 48 hours.  Lactic Acid: No results for input(s): "LACTATE" in the last 48 hours.  Wound Culture: No results for input(s): "LABAERO" in the last 48 hours.  All pertinent labs within the past 24 hours have been reviewed.    Significant Imaging: CT: I have reviewed all pertinent results/findings and my personal findings are:  CT of the left shoulder shows subacute left proximal humerus fracture with previous internal fixation.  Incomplete bony bridging across the fracture site.  Hardware only partially engage is some of the bone.  Severe arthritis of the glenohumeral joint with loose bodies and significant effusion.  Some focal soft tissue gas around the left shoulder.  X-Ray: I have reviewed all pertinent results/findings and my personal findings are:  X-rays of the left shoulder show lizabeth and screws with malunion.  Screws show some missing of the humeral head and penetration into the joint.  Assessment/Plan:     Abscess of left shoulder  Plan is for open I and D of the left shoulder with hardware removal.  Discussed with the patient and his wife.  Discussed the plan and Dr. Cooper agrees.    Risks, benefits, and alternatives to the procedure were explained to the patient including but not limited to damage to nerves, arteries, blood vessels, bones, tendons, " ligaments, stiffness, instability, infection, permanent limb dysfunction, DVT, PE, as well as general anesthetic complications including seizure, stroke, heart attack and even death. The patient understood these risks and wished to proceed and signed the informed consent.       Swelling of limb, left  1. History of left proximal humerus fracture with open reduction internal fixation.      Patient well known to our clinic.  I discussed the radiographic findings and reviewed the patient's chart with Dr. Cooper this afternoon.  On physical exam, there is no significant change from his previous clinic visits.  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  There is known intra-articular extension of hardware into the joint.  There is no loss of reduction of the proximal humerus fracture.  Only real changes a glenohumeral joint effusion.  It is quite large.  This is secondary to the injury, surgery, and loss of reduction.  He has no overt signs or symptoms of active infection.  There is no erythema or tenderness to palpation.  No surgical intervention is warranted currently at this time.  Plan is to allow this fracture to heal as is with subsequent hardware removal and conversion to total shoulder arthroplasty.  Dr. Cooper will evaluate the patient himself tomorrow morning.          Thank you for your consult. I will follow-up with patient. Please contact us if you have any additional questions.    Roman Paulson MD  Orthopedics  [facility]/Surg

## 2024-06-20 NOTE — ASSESSMENT & PLAN NOTE
Chronic AF w/ acute RVR, resolved  Required dilt drip on admission but off since 6/15  Tele some PVC/bigem/trigem  TSH 1.8 in March  Monitor lytes  No etoh/drug use  TTE from March 2024:    Left Ventricle: The left ventricle is normal in size. Normal wall thickness. Mild global hypokinesis present. There is mildly reduced systolic function with a visually estimated ejection fraction of 45 - 50%. There is normal diastolic function.    Right Ventricle: Normal right ventricular cavity size. Wall thickness is normal. Right ventricle wall motion  is normal. Systolic function is normal.    Left Atrium: Left atrium is moderately dilated.    IVC/SVC: Normal venous pressure at 3 mmHg.  OAC has been on hold d/t possible need for shoulder surgery - has been on Lovenox, holding for OR in AM

## 2024-06-20 NOTE — SUBJECTIVE & OBJECTIVE
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    Hyperlipidemia     Hypertension 2016       Past Surgical History:   Procedure Laterality Date    OPEN REDUCTION AND INTERNAL FIXATION (ORIF) OF FRACTURE OF PROXIMAL HUMERUS Left 3/25/2024    Procedure: ORIF, FRACTURE, HUMERUS, PROXIMAL/IM HANNA, LEFT;  Surgeon: Nathan Cooper MD;  Location: [facility];  Service: Orthopedics;  Laterality: Left;  Accumed, S[employer] Small Frag set Avelino verified 3/22/24 ark       Review of patient's allergies indicates:  No Known Allergies    Current Facility-Administered Medications   Medication    albuterol-ipratropium 2.5 mg-0.5 mg/3 mL nebulizer solution 3 mL    aluminum & magnesium hydroxide-simethicone 400-400-40 mg/5 mL suspension 15 mL    atorvastatin tablet 10 mg    cefTRIAXone (ROCEPHIN) 2 g in dextrose 5 % in water (D5W) 100 mL IVPB (MB+)    clindamycin in D5W 600 mg/50 mL IVPB 600 mg    clotrimazole megha 10 mg    dextrose 10% bolus 125 mL 125 mL    dextrose 10% bolus 250 mL 250 mL    glucagon (human recombinant) injection 1 mg    glucose chewable tablet 16 g    glucose chewable tablet 24 g    HYDROcodone-acetaminophen  mg per tablet 1 tablet    insulin aspart U-100 pen 0-10 Units    insulin glargine U-100 (Lantus) pen 10 Units    LIDOcaine 5 % patch 1 patch    metoprolol succinate (TOPROL-XL) 24 hr tablet 100 mg    mupirocin 2 % ointment    naloxone 0.4 mg/mL injection 0.02 mg    ondansetron injection 4 mg    prochlorperazine injection Soln 5 mg    simethicone chewable tablet 80 mg    sodium chloride 0.9% flush 10 mL    sodium chloride 0.9% flush 10 mL    And    sodium chloride 0.9% flush 10 mL    vancomycin - pharmacy to dose     Family History    None       Tobacco Use    Smoking status: Never    Smokeless tobacco: Never   Substance and Sexual Activity    Alcohol use: Yes     Alcohol/week: 0.0 standard drinks of alcohol     Comment: social    Drug use: No     Sexual activity: Yes     Review of Systems   Constitutional: Negative for chills and fever.   HENT:  Negative for congestion.    Eyes:  Negative for blurred vision.   Cardiovascular:  Negative for chest pain and syncope.   Respiratory:  Negative for cough and shortness of breath.    Endocrine: Negative for polyuria.   Hematologic/Lymphatic: Negative for bleeding problem. Does not bruise/bleed easily.   Skin:  Negative for rash.   Musculoskeletal:  Positive for joint pain, joint swelling and muscle weakness. Negative for falls, muscle cramps and myalgias.   Gastrointestinal:  Negative for abdominal pain, nausea and vomiting.   Genitourinary:  Negative for flank pain.   Neurological:  Negative for numbness and seizures.   Psychiatric/Behavioral:  Negative for altered mental status.    Allergic/Immunologic: Negative for persistent infections.     Objective:     Vital Signs (Most Recent):  Temp: 97.2 °F (36.2 °C) (06/20/24 0355)  Pulse: 104 (06/20/24 1140)  Resp: 18 (06/20/24 1140)  BP: 129/83 (06/20/24 1140)  SpO2: 95 % (06/20/24 1140) Vital Signs (24h Range):  Temp:  [97 °F (36.1 °C)-97.9 °F (36.6 °C)] 97.2 °F (36.2 °C)  Pulse:  [] 104  Resp:  [16-18] 18  SpO2:  [94 %-95 %] 95 %  BP: (113-129)/(56-83) 129/83     Weight: (!) 138 kg (304 lb 3.8 oz)  Height: 6' (182.9 cm)  Body mass index is 41.26 kg/m².      Intake/Output Summary (Last 24 hours) at 6/20/2024 1624  Last data filed at 6/20/2024 0607  Gross per 24 hour   Intake 30 ml   Output 1200 ml   Net -1170 ml        General    Nursing note and vitals reviewed.  Constitutional: He is oriented to person, place, and time. He appears well-developed and well-nourished. No distress.   HENT:   Nose: Nose normal.   Eyes: EOM are normal.   Cardiovascular:  Regular rhythm.            Pulmonary/Chest: Effort normal.   Abdominal: Soft.   Neurological: He is alert and oriented to person, place, and time.   Psychiatric: His behavior is normal.         Right Shoulder Exam  "  Right shoulder exam is normal.    Left Shoulder Exam     Inspection/Observation   Swelling: present  Bruising: absent  Scars: present    Range of Motion   Active abduction:  abnormal   Passive abduction:  abnormal   Extension:  abnormal   Forward Flexion:  abnormal   Forward Elevation: abnormal  Adduction: abnormal  External Rotation 90 degrees: abnormal  Internal rotation 90 degrees:  abnormal     Other   Sensation: normal     Comments:  Left swollen shoulder with pain with range of motion.      Vascular Exam       Left Pulses      Radial:                    2+      Capillary Refill  Left Hand: normal capillary refill           Significant Labs: Blood Culture: No results for input(s): "LABBLOO" in the last 48 hours.  BMP:   Recent Labs   Lab 06/19/24  0322 06/20/24  0458   * 179*    138   K 4.0 4.0   CL 98 99   CO2 25 25   BUN 73* 78*   CREATININE 2.8* 2.5*   CALCIUM 8.8 8.7   MG 2.0  --      CBC:   Recent Labs   Lab 06/19/24 0322 06/20/24  0458   WBC 19.14* 21.32*   HGB 10.6* 10.2*   HCT 32.5* 31.3*   * 560*     CMP:   Recent Labs   Lab 06/19/24 0322 06/20/24  0458    138   K 4.0 4.0   CL 98 99   CO2 25 25   * 179*   BUN 73* 78*   CREATININE 2.8* 2.5*   CALCIUM 8.8 8.7   PROT 6.2  --    ALBUMIN 1.3* 1.3*   BILITOT 0.7  --    ALKPHOS 312*  --    AST 45*  --    ALT 23  --    ANIONGAP 17* 14     Coagulation: No results for input(s): "LABPROT", "INR", "APTT" in the last 48 hours.  CRP: No results for input(s): "CRP" in the last 48 hours.  Lactic Acid: No results for input(s): "LACTATE" in the last 48 hours.  Wound Culture: No results for input(s): "LABAERO" in the last 48 hours.  All pertinent labs within the past 24 hours have been reviewed.    Significant Imaging: CT: I have reviewed all pertinent results/findings and my personal findings are:  CT of the left shoulder shows subacute left proximal humerus fracture with previous internal fixation.  Incomplete bony bridging across " the fracture site.  Hardware only partially engage is some of the bone.  Severe arthritis of the glenohumeral joint with loose bodies and significant effusion.  Some focal soft tissue gas around the left shoulder.  X-Ray: I have reviewed all pertinent results/findings and my personal findings are:  X-rays of the left shoulder show lizabeth and screws with malunion.  Screws show some missing of the humeral head and penetration into the joint.

## 2024-06-20 NOTE — PROGRESS NOTES
Nephrology Progress Note        Patient Name: Roosevelt Moser  MRN: [id number]    Patient Class: IP- Inpatient   Admission Date: 6/14/2024  Length of Stay: 6 days  Date of Service: 6/20/2024    Attending Physician: Shelton Newton MD  Primary Care Provider: Miriam, Primary Doctor    Reason for Consult: marbin/hyperkalemia    SUBJECTIVE:     HPI: 73M with h/o DM, HTN, AF, GBS and recent shoulder surgery was brought to ER by EMS with recurrent falls in the last 24h. Reports decreased UO but no fever, cough, SOB, dysuria. Upon admission, noted to be in MARBIN with sCr 5, baseline 1 month ago is 1, hyperkalemia with K 6, acidosis with CO2 12, hypoalbuminemia with albumin 1.6. Lactate notably 2.1. Procal 20. A1c 9.5 in 3/2024. UA with hematuria and pyuria, urine Cx pending. Notably on Apixaban. WBC in blood elevated to 20. Plt 540. RP US ordered. Afebrile, normotensive, received IVF and abx. Lokelma, ca gluconate given bicarb gtt ordered. Straight cath in ER with only 100cc urine out.    Review of Systems:  Neg    6/15  AFVSS.   UOP 1200cc plus 2 unmeasured   6/16  AFVSS.  2150 cc uop.  No distress  6/17 VSS, on RA, UOP 1.5L- updated family at bedside  6/18 VSS, on RA, UOP 1.3L  6/19 VSS, on RA, UOP 1L, with osteo R toe- s/p debridement- not interested in amputation- antbx adjusted  6/20 HR , BP stable, on 2L NC, UOP 1.2L, went for procedure, wife reports LE edema    ASSESSMENT/PLAN:     MARBIN due to ATN due to sepsis due to UTI and/or PNA  CKD stage 2 with diabetic proteinuria and severe hypoalbuminemia  HTN  DM poorly controlled A1c 9.5  HyperK/Acidosis  HypoMg  SHPT  Anemia  Hypoalbuminemia  Edema    - renal function is improving- nonoliguric  - no acute RRT needs- no nsaids or IV contrast- dose meds for CrCl < 30  - about 1g proteinuria- low alb is nutrition/acute phase reactant  - hold hydralazine  - hold metformin- use insulin  - hyperK/acidosis resolved  - Mg replete  - H/H stable  - optimize protein intake  - prefer  better renal function before trying diuretic therapy- may need midodrine as well    Updated family at bedside    Thank you for allowing us to participate in the care of your patient!   We will follow the patient and provide recommendations as needed.         Laboratory:  Recent Labs   Lab 06/18/24  0234 06/19/24  0322 06/20/24  0458    140 138   K 3.8 4.0 4.0   CL 97 98 99   CO2 25 25 25   BUN 68* 73* 78*   CREATININE 2.7* 2.8* 2.5*   * 198* 179*       Recent Labs   Lab 06/17/24  0255 06/18/24  0234 06/19/24  0322 06/20/24  0458   CALCIUM 8.5* 8.6* 8.8 8.7   ALBUMIN 1.3* 1.3* 1.3* 1.3*   PHOS 4.3 3.8  --  4.2   MG 1.5* 1.5* 2.0  --              Recent Labs   Lab 06/17/24 2023 06/18/24  0631 06/18/24  0749 06/18/24  1134 06/18/24  1659 06/18/24  2031 06/19/24  0739 06/19/24  1147 06/19/24  1737 06/19/24  2057   POCTGLUCOSE 281* 236* 242* 348* 403* 272* 219* 258* 220* 202*       Recent Labs   Lab 07/31/23  0955 03/19/24  0830 06/14/24  1539   Hemoglobin A1C 8.8 H 9.5 H 6.3 H       Recent Labs   Lab 06/14/24  1229 06/15/24  0556 06/17/24  0255 06/19/24  0322 06/20/24  0458   WBC 20.81*   < > 15.42* 19.14* 21.32*   HGB 11.5*   < > 10.7* 10.6* 10.2*   HCT 34.9*   < > 32.7* 32.5* 31.3*   *   < > 539* 613* 560*   MCV 81*   < > 79* 80* 79*   MCHC 33.0   < > 32.7 32.6 32.6   MONO 7.9  1.6*  --   --   --   --    EOSINOPHIL 0.3  --   --   --   --     < > = values in this interval not displayed.       Recent Labs   Lab 06/16/24  0453 06/17/24  0255 06/18/24  0234 06/19/24  0322 06/20/24  0458   BILITOT 0.6 0.6  --  0.7  --    PROT 5.9* 6.0  --  6.2  --    ALBUMIN 1.4* 1.3* 1.3* 1.3* 1.3*   ALKPHOS 230* 235*  --  312*  --    ALT 14 12  --  23  --    AST 22 23  --  45*  --        Recent Labs   Lab 12/16/22  1238 03/08/24  1034 03/25/24  1022 06/14/24  1337   Color, UA Yellow Yellow Yellow [place] A   Appearance, UA Clear Clear Hazy A Cloudy A   pH, UA 6.0 5.0 6.0 6.0   Specific [place], UA 1.020 1.010 1.020  1.020   Protein, UA 1+ A Trace A 1+ A 2+ A   Glucose, UA 1+ A 3+ A Negative Trace A   Ketones, UA Negative Negative Negative Negative   Urobilinogen, UA  --  Negative Negative Negative   Bilirubin (UA) Negative Negative Negative Negative   Occult Blood UA 3+ A 2+ A 2+ A 3+ A   Nitrite, UA Negative Negative Negative Negative   RBC, UA 20 H 13 H >100 H >100 H   WBC, UA 81 H 3 18 H >100 H   Bacteria Rare None Rare Many A   Hyaline Casts, UA 0  --  0 0             Microbiology Results (last 7 days)       Procedure Component Value Units Date/Time    Aerobic culture [[id number]]  (Abnormal) Collected: 06/18/24 1251    Order Status: Completed Specimen: Wound from Toe, Right Foot Updated: 06/20/24 0930     Aerobic Bacterial Culture STAPHYLOCOCCUS AUREUS  Few  Susceptibility pending      Blood culture [[id number]] Collected: 06/14/24 1355    Order Status: Completed Specimen: Blood from Peripheral, Antecubital, Right Updated: 06/19/24 2032     Blood Culture, Routine No growth after 5 days.    Blood culture [[id number]] Collected: 06/14/24 1355    Order Status: Completed Specimen: Blood from Peripheral, Hand, Right Updated: 06/19/24 2032     Blood Culture, Routine No growth after 5 days.    Fungus culture [[id number]]     Order Status: No result Specimen: Abscess     Gram stain [[id number]]     Order Status: No result Specimen: Abscess     Culture, Anaerobic [[id number]]     Order Status: No result Specimen: Abscess     Aerobic culture [[id number]]     Order Status: No result Specimen: Abscess     Culture, Anaerobe [[id number]] Collected: 06/18/24 1251    Order Status: Sent Specimen: Wound from Toe, Right Foot Updated: 06/18/24 1925    Urine culture [[id number]] Collected: 06/14/24 1337    Order Status: Completed Specimen: Urine Updated: 06/17/24 0709     Urine Culture, Routine No growth    Narrative:      Specimen Source->Urine            Review of patient's allergies indicates:  No Known Allergies    Outpatient meds:  No  current facility-administered medications on file prior to encounter.     Current Outpatient Medications on File Prior to Encounter   Medication Sig Dispense Refill    apixaban (ELIQUIS) 5 mg Tab Take 1 tablet (5 mg total) by mouth 2 (two) times daily. 60 tablet 1    atorvastatin (LIPITOR) 10 MG tablet Take 1 tablet (10 mg total) by mouth once daily. 90 tablet 3    co-enzyme Q-10 30 mg capsule Take 30 mg by mouth once daily.      doxycycline (VIBRAMYCIN) 100 MG Cap Take 100 mg by mouth 2 (two) times daily.      ergocalciferol, vitamin D2, (VITAMIN D ORAL) Take 1 tablet by mouth once daily.      glimepiride (AMARYL) 2 MG tablet Take 1 tablet (2 mg total) by mouth before breakfast. 90 tablet 3    hydrALAZINE (APRESOLINE) 25 MG tablet Take 1 tablet (25 mg total) by mouth every 12 (twelve) hours. 60 tablet 3    metFORMIN (GLUCOPHAGE-XR) 500 MG ER 24hr tablet Take 2 tablets (1,000 mg total) by mouth 2 (two) times daily with meals. 360 tablet 3    metoprolol succinate (TOPROL-XL) 100 MG 24 hr tablet Take 1 tablet (100 mg total) by mouth once daily. (Patient taking differently: Take 100 mg by mouth nightly.) 90 tablet 3       Scheduled meds:   atorvastatin  10 mg Oral QHS    cefTRIAXone (Rocephin) IV (PEDS and ADULTS)  2 g Intravenous Q24H    clindamycin IV (PEDS and ADULTS)  600 mg Intravenous Q8H    clotrimazole  10 mg Oral 5x Daily    enoxparin  40 mg Subcutaneous Q12H (prophylaxis, 0900/2100)    insulin glargine U-100  10 Units Subcutaneous Daily    LIDOcaine  1 patch Transdermal Q24H    metoprolol succinate  100 mg Oral QHS    mupirocin   Nasal BID    sodium chloride 0.9%  10 mL Intravenous Q6H       Infusions:          PRN meds:    Current Facility-Administered Medications:     albuterol-ipratropium, 3 mL, Nebulization, Q6H PRN    aluminum & magnesium hydroxide-simethicone, 15 mL, Oral, QID PRN    dextrose 10%, 12.5 g, Intravenous, PRN    dextrose 10%, 25 g, Intravenous, PRN    glucagon (human recombinant), 1 mg,  Intramuscular, PRN    glucose, 16 g, Oral, PRN    glucose, 24 g, Oral, PRN    HYDROcodone-acetaminophen, 1 tablet, Oral, Q4H PRN    insulin aspart U-100, 0-10 Units, Subcutaneous, QID (AC + HS) PRN    naloxone, 0.02 mg, Intravenous, PRN    ondansetron, 4 mg, Intravenous, Q8H PRN    prochlorperazine, 5 mg, Intravenous, Q6H PRN    simethicone, 1 tablet, Oral, QID PRN    sodium chloride 0.9%, 10 mL, Intravenous, Q12H PRN    Flushing PICC/Midline Protocol, , , Until Discontinued **AND** sodium chloride 0.9%, 10 mL, Intravenous, Q6H **AND** sodium chloride 0.9%, 10 mL, Intravenous, PRN    Pharmacy to dose Vancomycin consult, , , Once **AND** vancomycin - pharmacy to dose, , Intravenous, pharmacy to manage frequency    Past Medical History:   Diagnosis Date    A-fib 2024    Diabetes mellitus, type 2 2021    Guillain-[place] 10/2003    Hyperlipidemia     Hypertension 2016     Past Surgical History:   Procedure Laterality Date    OPEN REDUCTION AND INTERNAL FIXATION (ORIF) OF FRACTURE OF PROXIMAL HUMERUS Left 3/25/2024    Procedure: ORIF, FRACTURE, HUMERUS, PROXIMAL/IM HANNA, LEFT;  Surgeon: Nathan Cooper MD;  Location: [facility];  Service: Orthopedics;  Laterality: Left;  Accumed, [employer] Small Frag set Avelino verified 3/22/24 ark     No family history on file.  Social History     Tobacco Use    Smoking status: Never    Smokeless tobacco: Never   Substance Use Topics    Alcohol use: Yes     Alcohol/week: 0.0 standard drinks of alcohol     Comment: social    Drug use: No       OBJECTIVE:     Vital Signs and IO:  Temp:  [97 °F (36.1 °C)-98.3 °F (36.8 °C)]   Pulse:  []   Resp:  [16-18]   BP: (113-168)/(56-82)   SpO2:  [94 %-96 %]   I/O last 3 completed shifts:  In: 350 [P.O.:350]  Out: 1600 [Urine:1600]  Wt Readings from Last 5 Encounters:   06/14/24 (!) 138 kg (304 lb 3.8 oz)   06/04/24 132.5 kg (292 lb 1.8 oz)   05/07/24 132.5 kg (292 lb 1.8 oz)   04/08/24 132.5 kg (292 lb 3.2 oz)   03/26/24 (!) 136.1 kg (300 lb 0.7 oz)      Body mass index is 41.26 kg/m².    Physical Exam  Constitutional:       General: Patient is not in acute distress.     Appearance: Patient is well-developed. She is not diaphoretic.   HENT:      Head: Normocephalic and atraumatic.      Mouth/Throat: Mucous membranes are moist.   Eyes:      General: No scleral icterus.     Pupils: Pupils are equal, round, and reactive to light.   Cardiovascular:      Rate and Rhythm: Normal rate and regular rhythm.   Pulmonary:      Effort: Pulmonary effort is normal. No respiratory distress.      Breath sounds: No stridor.   Abdominal:      General: There is no distension.      Palpations: Abdomen is soft.   Musculoskeletal:         General: No deformity. Normal range of motion.      Cervical back: Neck supple.   Skin:     General: Skin is warm and dry.      Findings: No rash present. No erythema.   Neurological:      Mental Status: Patient is alert and oriented to person, place, and time.      Cranial Nerves: No cranial nerve deficit.   Psychiatric:         Behavior: Behavior normal.          Patient care time was spent personally by me on the following activities:     Obtaining a history.  Examination of patient.  Providing medical care at the patients bedside.  Developing a treatment plan with patient or surrogate and bedside caregivers.  Ordering and reviewing laboratory studies, radiographic studies, pulse oximetry.  Ordering and performing treatments and interventions.  Evaluation of patient's response to treatment.  Discussions with consultants while on the unit and immediately available to the patient.  Re-evaluation of the patient's condition.  Documentation in the medical record.     Von Braxton MD    [facility]  [address] [place] [address]  [place] [address]    [phone] - tel  [phone] - fax    6/20/2024

## 2024-06-20 NOTE — PT/OT/SLP PROGRESS
Occupational Therapy      Patient Name:  Roosevelt Moser   MRN:  [id number]    Patient not seen today secondary to unavailable due to a procedure. Will follow-up 6/21/2024.    6/20/2024

## 2024-06-20 NOTE — PROGRESS NOTES
Left shoulder aspiration-  Pt is brought to Cath lab. Ultrasound shows moderate fluid collection anterior to shoulder joint 2 cm from skin surface.   Following informed consent and time out and following local prep and anesthesia a 18 g spinal needle was advanced into fluid collection .  10 ml of pussy fluid was drained and sent for gram stain and cultures. Needle was withdrawn and bandage  placed.     Impression-  septic arthritis

## 2024-06-20 NOTE — PLAN OF CARE
Per [facility] via [employer] system, interested but need more information.  Per [facility], they need qualifying rev codes.  [place] sent email requesting rev code updates.       06/20/24 1247   Post-Acute Status   Post-Acute Authorization Placement   Post-Acute Placement Status Pending post-acute provider review/more information requested

## 2024-06-20 NOTE — PLAN OF CARE
Problem: Adult Inpatient Plan of Care  Goal: Plan of Care Review  Outcome: Progressing  Goal: Readiness for Transition of Care  Outcome: Progressing     Problem: Diabetes Comorbidity  Goal: Blood Glucose Level Within Targeted Range  Outcome: Progressing     Problem: Acute Kidney Injury/Impairment  Goal: Fluid and Electrolyte Balance  Outcome: Progressing     Problem: Sepsis/Septic Shock  Goal: Optimal Nutrition Intake  Outcome: Progressing     Problem: Skin Injury Risk Increased  Goal: Skin Health and Integrity  Outcome: Progressing     Problem: Bariatric Environmental Safety  Goal: Safety Maintained with Care  Outcome: Progressing     Problem: Wound  Goal: Optimal Coping  Outcome: Progressing  Goal: Optimal Wound Healing  Outcome: Progressing   Refused to wear home cpap over night.

## 2024-06-20 NOTE — PROGRESS NOTES
"[facility]   Department of Infectious Disease  Progress Note        PATIENT NAME: Roosevelt Moser  MRN: [id number]  TODAY'S DATE: 06/20/2024  ADMIT DATE: 6/14/2024  LOS: 6 days    CHIEF COMPLAINT: Weakness (Pt brought to ED via EMS with complaint of weakness and falling, pt advised EMS his urine is very dark.  )      PRINCIPLE PROBLEM: Acute renal failure superimposed on chronic kidney disease    INTERVAL HISTORY      06/19/2024: Seen and evaluated at bedside.  States left upper extremity pain better.  Having improved movement at the wrist and forearm.  Seen by Orthopedic surgery PA yesterday.  Notes reviewed.  Blood cultures remain negative.      06/20/2024:  Oral anticoagulants on hold to allow left shoulder arthrocentesis.  No other acute issues overnight.  Culture from right 3rd toe growing Staphylococcus aureus.  Blood culture remain negative.  Had aspiration left shoulder today that yielded purulent material.  Synovial fluid studies in progress.    Antibiotics (From admission, onward)      Start     Stop Route Frequency Ordered    06/19/24 1045  cefTRIAXone (ROCEPHIN) 2 g in dextrose 5 % in water (D5W) 100 mL IVPB (MB+)         -- IV Every 24 hours (non-standard times) 06/19/24 0932    06/18/24 1400  clindamycin in D5W 600 mg/50 mL IVPB 600 mg         -- IV Every 8 hours (non-standard times) 06/18/24 1212    06/18/24 1311  vancomycin - pharmacy to dose  (vancomycin IVPB (PEDS and ADULTS))        Placed in "And" Linked Group    -- IV pharmacy to manage frequency 06/18/24 1212    06/17/24 2100  mupirocin 2 % ointment         06/22/24 2059 Nasl 2 times daily 06/17/24 1544          Antifungals (From admission, onward)      Start     Stop Route Frequency Ordered    06/19/24 1045  clotrimazole megha 10 mg         06/29/24 0959 Oral 5 times daily 06/19/24 0932           Antivirals (From admission, onward)      None            ASSESSMENT and PLAN      1. Left upper extremity acute bacterial skin and " skin structure infection.  Also concern for left shoulder septic arthritis.  CT done today has documented left shoulder effusion and also fluid collection in the soft tissue.  Continue vancomycin, ceftriaxone and clindamycin for now and reassess with ASO titer and joint fluid culture when available.     2. Right 3rd toe osteomyelitis.  He previously received?  Dalbavancin x2 doses.  Ulcer practically healed.  We will consider this as partially treated.  Antibiotics as above for this as well.    3. Right 2nd toe tip ulcer.  It was debrided at bedside by podiatrist 03/18/2024.  No osteomyelitis at this site on MRI foot.  Cultures growing Staphylococcus.  Antibiotics as above.       4. ARF.  This is resolving.  Management as per nephrologist.       5. Diabetes mellitus.  Management as per hospitalist.       6. Debility.     7. Oral thrush.  HIV screen negative.  This is most likely secondary to hyperglycemia related to his diabetes mellitus.  Mycelex troches for now.      RECOMMENDATIONS:    Continue current antibiotics and reassess with synovial fluid results and sensitivity of Staphylococcus aureus  Follow other pending studies including  Await left shoulder on arm I&D and washout by orthopedic surgeon.    Please send Epic secure chat with any questions.  Discussed with hospitalist.      SUBJECTIVE    Roosevelt Moser is a 73 y.o. male with history of diabetes mellitus, hypertension and previous going bowel syndrome.  He fell on 03/04/2024 and sustained a displaced comminuted fracture of the left humerus.  Seen by orthopedic surgeon with plan for ORIF which was deferred because of new AFib.  Admitted 03/09/2024 for rate control and ultimately discharged back home 03/12/2024.  Later had left humerus ORIF 03/25/2024 as a day procedure and was discharged home.       According to his wife, he developed pain and swelling of the 3rd toe and was with an ulcer.  It appears an x-ray of the foot was unremarkable.  Received 2 doses  of an antibiotic that I presume to be dalbavancin 1 week apart in early April.     In the last 2 weeks he has continued to decline.  He also has reduction in urine output.  Fell twice at home on 06/13/2024 and wife activated EMS and he was brought to the hospital.  Received IV fluid for low normal blood pressure.  Also noted to have MARBIN with creatinine 5.0 and WBC was 21 K.  UA was abnormal with> 100 WBC,> 100 RBC, 3+ LE and positive nitrite.  He has been managed conservatively for MARBIN and for dehydration.     X-ray of left shoulder and left upper extremity 06/17/2024 documented gas in soft tissue.  MRI right foot documented osteomyelitis of the 3rd toe.  I was asked to see to assist with his care.  ESR 95,  milligram/liter.     Antibiotics   Ceftriaxone:  06/14/2024-06/17/2024   Zosyn:  06/17/2024-     Cultures   Blood culture 06/14/2024:  NGTD   Urine culture 06/14/2024: NGTD    Review of Systems  Negative except as stated above in Interval History     OBJECTIVE   Temp:  [97 °F (36.1 °C)-98.3 °F (36.8 °C)] 97.2 °F (36.2 °C)  Pulse:  [] 94  Resp:  [16-18] 17  SpO2:  [94 %-96 %] 95 %  BP: (113-168)/(56-82) 113/72  Temp:  [97 °F (36.1 °C)-98.3 °F (36.8 °C)]   Temp: 97.2 °F (36.2 °C) (06/20/24 0355)  Pulse: 94 (06/20/24 0746)  Resp: 17 (06/20/24 1024)  BP: 113/72 (06/20/24 0355)  SpO2: 95 % (06/20/24 0746)    Intake/Output Summary (Last 24 hours) at 6/20/2024 1042  Last data filed at 6/20/2024 0607  Gross per 24 hour   Intake 30 ml   Output 1200 ml   Net -1170 ml       Physical Exam  General:  Obese elderly man lying quietly in bed in no acute distress   Left upper extremity:  Erythema improved and almost resolved.  Persistent edema though better.  Left shoulder effusion.  Minimal movement of shoulder  CVS: S1 and 2 heard   Respiratory: Clear to auscultation   Abdomen:  Full, soft, nontender, no palpable organomegaly   Skin: No rash appreciated   CNS: No focal deficits    VAD:  None  ISOLATION:   None    WOUNDS:  Abrasion left upper extremity.  Right 2nd toe ulcer    Significant Labs: All pertinent labs within the past 24 hours have been reviewed.    CBC LAST 7 DAYS  Recent Labs   Lab 06/14/24  1229 06/15/24  0556 06/16/24  0453 06/17/24  0255 06/19/24  0322 06/20/24  0458   WBC 20.81* 14.76* 13.11* 15.42* 19.14* 21.32*   RBC 4.33* 4.24* 4.05* 4.13* 4.08* 3.94*   HGB 11.5* 11.2* 10.7* 10.7* 10.6* 10.2*   HCT 34.9* 33.6* 31.8* 32.7* 32.5* 31.3*   MCV 81* 79* 79* 79* 80* 79*   MCH 26.6* 26.4* 26.4* 25.9* 26.0* 25.9*   MCHC 33.0 33.3 33.6 32.7 32.6 32.6   RDW 18.7* 21.3* 18.1* 18.3* 18.0* 18.1*   * 585* 496* 539* 613* 560*   MPV 10.1 12.3 10.5 10.7 10.2 10.2   GRAN 81.0*  16.9*  --   --   --   --   --    LYMPH 8.4*  1.8  --   --   --   --   --    MONO 7.9  1.6*  --   --   --   --   --    BASO 0.10  --   --   --   --   --    NRBC 0  --   --   --   --   --        CHEMISTRY LAST 7 DAYS  Recent Labs   Lab 06/14/24  1229 06/14/24  1539 06/14/24  2029 06/15/24  0658 06/15/24  1108 06/16/24  0453 06/17/24  0255 06/18/24  0234 06/19/24  0322 06/20/24  0458    138   < > 136 137 138 139 137 140 138   K 6.0* 5.8*   < > 5.6* 5.3* 4.3 4.1 3.8 4.0 4.0    108   < > 102 102 98 97 97 98 99   CO2 12* 12*   < > 17* 19* 24 25 25 25 25   ANIONGAP 18* 18*   < > 17* 16 16 17* 15 17* 14   BUN 85* 86*   < > 89* 93* 87* 76* 68* 73* 78*   CREATININE 5.0* 4.9*   < > 5.0* 4.9* 4.4* 3.3* 2.7* 2.8* 2.5*    91   < > 214* 222* 234* 143* 225* 198* 179*   CALCIUM 9.1 9.1   < > 8.7 8.5* 8.3* 8.5* 8.6* 8.8 8.7   MG  --  1.3*  --  1.7  --  1.6 1.5* 1.5* 2.0  --    ALBUMIN 1.6*  --   --  1.4*  --  1.4* 1.3* 1.3* 1.3* 1.3*   PROT 6.5  --   --  6.2  --  5.9* 6.0  --  6.2  --    ALKPHOS 270*  --   --  238*  --  230* 235*  --  312*  --    ALT 22  --   --  19  --  14 12  --  23  --    AST 39  --   --  31  --  22 23  --  45*  --    BILITOT 0.6  --   --  0.5  --  0.6 0.6  --  0.7  --     < > = values in this interval not  "displayed.       Estimated Creatinine Clearance: 38.5 mL/min (A) (based on SCr of 2.5 mg/dL (H)).    INFLAMMATORY/PROCAL  LAST 7 DAYS  Recent Labs   Lab 06/14/24  1229 06/16/24  0453 06/18/24  0233 06/20/24  0458   PROCAL 19.75* 7.88*  --  1.41*   CRP  --   --  319.0*  --      No results found for: "ESR"  CRP   Date Value Ref Range Status   06/18/2024 319.0 (H) 0.0 - 8.2 mg/L Final       PRIOR  MICROBIOLOGY:    Susceptibility data from last 90 days.  Collected Specimen Info Organism   06/18/24 Wound from Toe, Right Foot Staphylococcus aureus   06/14/24 Blood from Peripheral, Antecubital, Right No growth after 5 days.   06/14/24 Blood from Peripheral, Hand, Right No growth after 5 days.       LAST 7 DAYS MICROBIOLOGY   Microbiology Results (last 7 days)       Procedure Component Value Units Date/Time    Aerobic culture [[id number]]  (Abnormal) Collected: 06/18/24 1251    Order Status: Completed Specimen: Wound from Toe, Right Foot Updated: 06/20/24 0930     Aerobic Bacterial Culture STAPHYLOCOCCUS AUREUS  Few  Susceptibility pending      Blood culture [[id number]] Collected: 06/14/24 1355    Order Status: Completed Specimen: Blood from Peripheral, Antecubital, Right Updated: 06/19/24 2032     Blood Culture, Routine No growth after 5 days.    Blood culture [[id number]] Collected: 06/14/24 1355    Order Status: Completed Specimen: Blood from Peripheral, Hand, Right Updated: 06/19/24 2032     Blood Culture, Routine No growth after 5 days.    Fungus culture [[id number]]     Order Status: No result Specimen: Abscess     Gram stain [[id number]]     Order Status: No result Specimen: Abscess     Culture, Anaerobic [[id number]]     Order Status: No result Specimen: Abscess     Aerobic culture [[id number]]     Order Status: No result Specimen: Abscess     Culture, Anaerobe [[id number]] Collected: 06/18/24 1251    Order Status: Sent Specimen: Wound from Toe, Right Foot Updated: 06/18/24 1925    Urine culture [[id number]] " Collected: 06/14/24 1337    Order Status: Completed Specimen: Urine Updated: 06/17/24 0709     Urine Culture, Routine No growth    Narrative:      Specimen Source->Urine              CURRENT/PREVIOUS VISIT EKG  Results for orders placed or performed during the hospital encounter of 03/25/24   EKG 12-lead    Collection Time: 03/25/24  9:14 AM   Result Value Ref Range    QRS Duration 100 ms    [facility] QTC Calculation 437 ms    Narrative    Test Reason : Z01.818,    Vent. Rate : 074 BPM     Atrial Rate : 340 BPM     P-R Int : 000 ms          QRS Dur : 100 ms      QT Int : 394 ms       P-R-T Axes : 000 -54 027 degrees     QTc Int : 437 ms    Atrial fibrillation  Left axis deviation  Abnormal ECG  No previous ECGs available  Confirmed by Jesús HATHAWAY, Hansel AMIN ([id number]) on 4/1/2024 11:51:23 PM    Referred By: ALLISON WINN           Confirmed By:Hansel Espinosa MD     Significant Imaging: I have reviewed all relevant and available imaging results/findings within the past 24 hours.    I spent a total of 30 minutes on the day of the visit.This includes face to face time and non-face to face time preparing to see the patient (eg, review of tests), obtaining and/or reviewing separately obtained history, documenting clinical information in the electronic or other health record, independently interpreting results and communicating results to the patient/family/caregiver, or care coordinator.    Vasquez Calvin MD  Date of Service: 06/20/2024      This note was created using [employer] voice recognition software that occasionally misinterpreted phrases or words.

## 2024-06-20 NOTE — SUBJECTIVE & OBJECTIVE
Interval History:     6/15-20  see course    Review of Systems    11 pt ROS negative except as noted o/w    Objective:     Vital Signs (Most Recent):  Temp: 97.2 °F (36.2 °C) (06/20/24 0355)  Pulse: 104 (06/20/24 1140)  Resp: 18 (06/20/24 1140)  BP: 129/83 (06/20/24 1140)  SpO2: 95 % (06/20/24 1140) Vital Signs (24h Range):  Temp:  [97 °F (36.1 °C)-98.3 °F (36.8 °C)] 97.2 °F (36.2 °C)  Pulse:  [] 104  Resp:  [16-18] 18  SpO2:  [94 %-96 %] 95 %  BP: (113-168)/(56-83) 129/83     Weight: (!) 138 kg (304 lb 3.8 oz)  Body mass index is 41.26 kg/m².    Intake/Output Summary (Last 24 hours) at 6/20/2024 1551  Last data filed at 6/20/2024 0607  Gross per 24 hour   Intake 30 ml   Output 1200 ml   Net -1170 ml         Physical Exam      General:  A&O, NAD  HEENT: neck supple, PERRL/EOMI, EAC clear bilaterally, normal bilateral carotid upstroke w/o bruits, inf turbs clear bilaterally, OC/OP clear  Lungs: CTAB  CV: RRR w/o M/G/R  Abdomen: soft, NTND, no HSM, no bruits/masses/pulsations  Ext: LUE edema improved, still w/ very limited ROM left shoulder, handgrip ok, radial pulse ok  Right foot looks good, toe dressed  : ext catheter, urine is less red today  Rectal: def  Neuro: NF    Significant Labs: All pertinent labs within the past 24 hours have been reviewed.    Significant Imaging: I have reviewed all pertinent imaging results/findings within the past 24 hours.

## 2024-06-20 NOTE — ASSESSMENT & PLAN NOTE
In setting of acute UTI  Also has OM 2nd toe and appears to possibly anaerobic infection of left shoulder s/p ORIF there 3 mo ago  BCX NGTD  Ur CX neg  Rocephin was given for UTI, changed to Zosyn > added vanc/clinda, now on Rocephin/clinda, vanc/zosyn stopped per ID  Oral clotrimazole added for thrush  PCT and WBC trending down nicely  No obstruction on renal u/s  Has gas containing abscess in and around left shoulder w/ malpositioned hardware, likely d/t falls, hard to say how the bug entered  Long d/w pt/wife, ortho - for washout  in OR tomorrow - aspirate sent for culture today  He's urinating well

## 2024-06-20 NOTE — PROGRESS NOTES
[facility] Medicine  Progress Note    Patient Name: Roosevelt Moser  MRN: [id number]  Patient Class: IP- Inpatient   Admission Date: 6/14/2024  Length of Stay: 6 days  Attending Physician: Shelton Newton MD  Primary Care Provider: Miriam, Primary Doctor        Subjective:     Principal Problem:Acute renal failure superimposed on chronic kidney disease        HPI:  Roosevelt Moser is a 73 year old male with a previous medical history of atrial fibrillation on eliquis, HTN, DMII, obstructive sleep apnea, HLD, ORIF of left humerus and guillian-[place] who presented to the emergency room for weakness and multiple falls. Per wife of the patient he has had progressive weakness over the past week along with two falls one at 0300 Thursday and the other early morning Friday. Both time she had to activate EMS to pick patient up off floor due to weakness. Patient refused transport to hospital on both occasions. Today he slid out of his chair and was unable to get up without assistance and at this point the wife activated EMS to transport patient to the hospital. She endorses that two days ago she noticed that the patient had stopped urinating as he normally does. The patient concurs that he has noticed that his urine has decreased over the past two days and that it is dark. Patient denies dysuria or incomplete bladder emptying. Bladder scan showed zero upon admit and urine sample was obtained via straight cath in ED. Patient denies decreased PO intake and keeps a bottle of water with him at all times. Initial ED work-up showed a creatinine of 5 and potassium of 6. Urine studies positive for infection and WBC 20.81 with procalcitonin at 19.75. Blood cultures obtained and patient given 1 gram of rocephin and 1000ml of normal saline. Patient noted to bein afib with RVR and 20mg of diltiazem was administered IV which resulted in low blood pressure and 1 gram of calcium gluconate was administered. Patient  "admitted by hospital medicine for further evaluation and management     Overview/Hospital Course:  6/15/24  Assumed care today, pt seen and examined, chart reviewed.  Pt sitting up in bed, wife at bedside, feeling better.  Off dilt drip since this AM, in AF on monitor but rate down to 80s.   Denies cp/sob.  Has been weak and debilitated, await PT/OT to see what post acute needs will be.    6/16/24  Pt resting in NAD.  Advised by CM yesterday wife not willing/able to take him home at this time - We talked about that frankly today and she says she is unable to care for him, planning on SNF, hopefully regain enough function to allow d/c home from there. Pt w/ slow improvement.  Feels pretty good.  Appetite not too good, he says he's been deliberately eating small quantities of food at home to lose weight and feels it's been working.  Note pain/swelling LUE where he had a recent fx/ORIF.    6/17/24  Pt doing reasonably well, having pain sitting on the bedpan "it's sticking me", but overcame that w/ some adjusting position.  LUE u/s no DVT.  Still having a lot of pain in upper left arm w/ movement and also worried about persistent pain "under left boob" where he struck himself during a fall - we will image those areas too.  Says Norco 7.5 is like "eating a jelly bean" asking if we can give 10.  Interesting notes that he's currently constipated, at home often has diarrhea, says that higher doses of narcotics cause him to have loose bowels.  Odd.  Albumin 1.3.  Await SNF dispo. Wife at bedside.   6/18/24  Pt continues to be quite jolly about everything, has good sense of humor, acting very non toxic - HOWEVER upon wound care assessment by Dr Elise he is seen to have osteomyelitis of his right 2nd toe - Podiatry and ID consulted, s/p debridement at bedside w/ deep tissue culture taken, pt/wife opposed to amputation.  Additionally shoulder CT favors abscess around his op site - await input from ortho - He has pain there but " "it's not exquisite and the area is not flucuant/red/warm, pain seems more arthritic in nature - but he appears to have a deep seated infection and will need a washout in OR -  Dr Calvin has added clinda/vanc to Zosyn started yesterday.   6/19/24  Pt sitting up in bed, very conversant and non toxic appearing, thanks much to all consultants.  Per ortho conservative approach to shoulder, I am in chat w/ them currently about possible aspiration of fluid.  Note ID changes in abx coverage.  Pt still has a great deal of pain/mobility loss of left shoulder.  We have been working on SNF but he may need LTAC w/ current level of complexity/abx regimen.  Deep wound cultures from right 2nd toe are pending.   6/20/24  Aspirate done today per IR and returned 10 cc of "pussy" fluid, S Lea reviewed and messaged me that Dr Cooper is out of town and to please contact on call ortho, Dr Bernal on call and is aware of care from prior discussion, he reviewed findings and will do washout in OR tomorrow, went to d/w pt and wife, he's sitting up in bed, feeling ok, no new c/o.  I also encountered Dr Calvin in the [place] and we talked about this and his abx.        Interval History:     6/15-20  see course    Review of Systems    11 pt ROS negative except as noted o/w    Objective:     Vital Signs (Most Recent):  Temp: 97.2 °F (36.2 °C) (06/20/24 0355)  Pulse: 104 (06/20/24 1140)  Resp: 18 (06/20/24 1140)  BP: 129/83 (06/20/24 1140)  SpO2: 95 % (06/20/24 1140) Vital Signs (24h Range):  Temp:  [97 °F (36.1 °C)-98.3 °F (36.8 °C)] 97.2 °F (36.2 °C)  Pulse:  [] 104  Resp:  [16-18] 18  SpO2:  [94 %-96 %] 95 %  BP: (113-168)/(56-83) 129/83     Weight: (!) 138 kg (304 lb 3.8 oz)  Body mass index is 41.26 kg/m².    Intake/Output Summary (Last 24 hours) at 6/20/2024 1551  Last data filed at 6/20/2024 0607  Gross per 24 hour   Intake 30 ml   Output 1200 ml   Net -1170 ml         Physical Exam      General:  A&O, NAD  HEENT: neck supple, " PERRL/EOMI, EAC clear bilaterally, normal bilateral carotid upstroke w/o bruits, inf turbs clear bilaterally, OC/OP clear  Lungs: CTAB  CV: RRR w/o M/G/R  Abdomen: soft, NTND, no HSM, no bruits/masses/pulsations  Ext: LUE edema improved, still w/ very limited ROM left shoulder, handgrip ok, radial pulse ok  Right foot looks good, toe dressed  : ext catheter, urine is less red today  Rectal: def  Neuro: NF    Significant Labs: All pertinent labs within the past 24 hours have been reviewed.    Significant Imaging: I have reviewed all pertinent imaging results/findings within the past 24 hours.    Assessment/Plan:      * Acute renal failure superimposed on chronic kidney disease  Appreciate input from renal services  In setting of acute UTI and IVVD  Resumed gentle hydration on Zosyn/vanc but those have been stopped  He did not require oral NaHCO3  U/s is w/o obstruction, masses, etc        Abscess of left shoulder  As discussed under limb swelling      Osteomyelitis of toe  S/p bedside I&D, deep tissue Cx growing staph, ID&S pending  ID and podiatry following, appreciate help  Await plan for selection and duration of abx tx  Appreciate input all consultants  Pt/wife opposed to amputation of digit  Local wound care  Note markedly elevated ESR, CRP        Skin tear of left upper extremity  Local care, healing      Swelling of limb, left  U/s w/o DVT  Appreciate help from Dr Bernal, ID, wound care, podiatry et al  Pt is already on OAC, holding for now pending shoulder procedure  Arm is elevated on pillow  Not red/painful, no compartment syndrome  D/w pt/wife      Debility  Multifactorial process  Pt not able to manage his own care and wife cannot adequately care for him at this time  PT/OT  SNF or possibly LTAC consult  CM aware      Severe sepsis  In setting of acute UTI  Also has OM 2nd toe and appears to possibly anaerobic infection of left shoulder s/p ORIF there 3 mo ago  BCX NGTD  Ur CX neg  Rocephin was given  for UTI, changed to Zosyn > added vanc/clinda, now on Rocephin/clinda, vanc/zosyn stopped per ID  Oral clotrimazole added for thrush  PCT and WBC trending down nicely  No obstruction on renal u/s  Has gas containing abscess in and around left shoulder w/ malpositioned hardware, likely d/t falls, hard to say how the bug entered  Long d/w pt/wife, ortho - for washout  in OR tomorrow - aspirate sent for culture today  He's urinating well    Hyperkalemia  Resolved  in setting of acute on chronic renal failure  Lokelma given x 1 on admit  Avoid ACE/ARB, K+ replacement  tele          Atrial fibrillation with rapid ventricular response  Chronic AF w/ acute RVR, resolved  Required dilt drip on admission but off since 6/15  Tele some PVC/bigem/trigem  TSH 1.8 in March  Monitor lytes  No etoh/drug use  TTE from March 2024:    Left Ventricle: The left ventricle is normal in size. Normal wall thickness. Mild global hypokinesis present. There is mildly reduced systolic function with a visually estimated ejection fraction of 45 - 50%. There is normal diastolic function.    Right Ventricle: Normal right ventricular cavity size. Wall thickness is normal. Right ventricle wall motion  is normal. Systolic function is normal.    Left Atrium: Left atrium is moderately dilated.    IVC/SVC: Normal venous pressure at 3 mmHg.  OAC has been on hold d/t possible need for shoulder surgery - has been on Lovenox, holding for OR in AM        History of Guillain-[place] syndrome  No acute issues  However, pt has impaired mobility and is acutely weakened from his sepsis/UTI/AF RVR  PT/OT working w/ pt  Wife cannot manage his care at home currently  SNF assessment underway, may need LTAC depending on what abx he ends up for how long, it may take a few more days to narrow that down          Type 2 diabetes mellitus  A1c 6.3%  SSI      Hypertension  Home meds as appropriate    MARA (obstructive sleep apnea)  NIPPV HS/naps      Morbid obesity  Body mass  index is 41.26 kg/m².  Morbid obesity complicates all aspects of disease management from diagnostic modalities to treatment  Weight loss encouraged and health benefits explained to patient.   He has been working on weight loss at home w/ reduced caloric intake          VTE Risk Mitigation (From admission, onward)           Ordered     Reason for No Pharmacological VTE Prophylaxis  Once        Question:  Reasons:  Answer:  Already adequately anticoagulated on oral Anticoagulants    06/14/24 1438     IP VTE HIGH RISK PATIENT  Once         06/14/24 1438     Place sequential compression device  Until discontinued         06/14/24 1438                    Discharge Planning   KAMILA:      Code Status: Full Code   Is the patient medically ready for discharge?:     Reason for patient still in hospital (select all that apply): Patient trending condition  Discharge Plan A: Long-term acute care facility (LTAC)        Time spent in direct patient care, review of data, conversation w/ patient and/or family, and moderately complex MDM 40 minutes            Shelton Newton MD  Department of Hospital Medicine   [facility]/Surg

## 2024-06-20 NOTE — PROGRESS NOTES
Pharmacokinetic Assessment Follow Up: IV Vancomycin    Vancomycin serum concentration assessment(s):    The random level was drawn correctly and can be used to guide therapy at this time. The measurement is within the desired definitive target range of 15 to 20 mcg/mL.    Vancomycin Regimen Plan:    Redose 750 mg Vancomycin x 1 today and Vancomycin random level ordered for 6/21 @1730    Drug levels (last 3 results):  Recent Labs   Lab Result Units 06/19/24  0322 06/19/24  1246 06/20/24  1623   Vancomycin, Random ug/mL 19.7 15.8 16.3       Pharmacy will continue to follow and monitor vancomycin.    Please contact pharmacy at extension [phone] for questions regarding this assessment.    Thank you for the consult,   Fabiola Rosas       Patient brief summary:  Roosevelt Moser is a 73 y.o. male initiated on antimicrobial therapy with IV Vancomycin for treatment of bone/joint infection    The patient's current regimen is pulse    Drug Allergies:   Review of patient's allergies indicates:  No Known Allergies    Actual Body Weight:   138 kg    Renal Function:   Estimated Creatinine Clearance: 38.5 mL/min (A) (based on SCr of 2.5 mg/dL (H)).,     Dialysis Method (if applicable):  N/A    CBC (last 72 hours):  Recent Labs   Lab Result Units 06/19/24  0322 06/20/24  0458   WBC K/uL 19.14* 21.32*   Hemoglobin g/dL 10.6* 10.2*   Hematocrit % 32.5* 31.3*   Platelets K/uL 613* 560*       Metabolic Panel (last 72 hours):  Recent Labs   Lab Result Units 06/18/24  0234 06/19/24  0322 06/20/24  0458   Sodium mmol/L 137 140 138   Potassium mmol/L 3.8 4.0 4.0   Chloride mmol/L 97 98 99   CO2 mmol/L 25 25 25   Glucose mg/dL 225* 198* 179*   BUN mg/dL 68* 73* 78*   Creatinine mg/dL 2.7* 2.8* 2.5*   Albumin g/dL 1.3* 1.3* 1.3*   Total Bilirubin mg/dL  --  0.7  --    Alkaline Phosphatase U/L  --  312*  --    AST U/L  --  45*  --    ALT U/L  --  23  --    Magnesium mg/dL 1.5* 2.0  --    Phosphorus mg/dL 3.8  --  4.2       Vancomycin  Administrations:  vancomycin given in the last 96 hours                     vancomycin 750 mg in sodium chloride 0.9% 250 mL IVPB (Vial-Mate) (mg) 750 mg New Bag 06/19/24 1930    vancomycin (VANCOCIN) 2,000 mg in dextrose 5 % (D5W) 500 mL IVPB (mg) 2,000 mg New Bag 06/18/24 1426                    Microbiologic Results:  Microbiology Results (last 7 days)       Procedure Component Value Units Date/Time    Aerobic culture [[id number]]  (Abnormal) Collected: 06/18/24 1251    Order Status: Completed Specimen: Wound from Toe, Right Foot Updated: 06/20/24 1509     Aerobic Bacterial Culture METHICILLIN RESISTANT STAPHYLOCOCCUS AUREUS  Few  Susceptibility pending  Results called to and read back by Rich Crockett RN-[facility];    06/20/2024  15:05 CJD      Culture, Anaerobic [[id number]] Collected: 06/20/24 1204    Order Status: Sent Specimen: Abscess from Shoulder, Left Updated: 06/20/24 1336    Aerobic culture [[id number]] Collected: 06/20/24 1204    Order Status: Sent Specimen: Abscess from Shoulder, Left Updated: 06/20/24 1335    Fungus culture [[id number]] Collected: 06/20/24 1204    Order Status: Sent Specimen: Abscess from Shoulder, Left Updated: 06/20/24 1335    Gram stain [[id number]] Collected: 06/20/24 1204    Order Status: Sent Specimen: Abscess from Shoulder, Left Updated: 06/20/24 1205    Blood culture [[id number]] Collected: 06/14/24 1355    Order Status: Completed Specimen: Blood from Peripheral, Antecubital, Right Updated: 06/19/24 2032     Blood Culture, Routine No growth after 5 days.    Blood culture [[id number]] Collected: 06/14/24 1355    Order Status: Completed Specimen: Blood from Peripheral, Hand, Right Updated: 06/19/24 2032     Blood Culture, Routine No growth after 5 days.    Culture, Anaerobe [[id number]] Collected: 06/18/24 1251    Order Status: Sent Specimen: Wound from Toe, Right Foot Updated: 06/18/24 1925    Urine culture [[id number]] Collected: 06/14/24 1337    Order Status: Completed  Specimen: Urine Updated: 06/17/24 0709     Urine Culture, Routine No growth    Narrative:      Specimen Source->Urine

## 2024-06-20 NOTE — PT/OT/SLP PROGRESS
Physical Therapy      Patient Name:  Roosevelt Moser   MRN:  [id number]    Patient not seen today secondary to Off the floor for procedure/surgery (patient in radiology per nurse.). Will follow-up 6/21/24.

## 2024-06-20 NOTE — HOSPITAL COURSE
07/07/2024, hospital day 23 Sunday:     Patient was resting comfortably in his bed.  Easily arousable.  No complaints overnight.  Pain is well controlled.  We discussed the findings from his repeat left shoulder x-ray yesterday.      07/06/2024, hospital day 22 Saturday:     Patient was alert and awake when I entered the room.  No significant complaints of the left shoulder.  Concerns voiced by family addressed in terms of induration or swelling.  Denies any pain associated with the left shoulder.  Had VATS procedure yesterday evening.  Hospital medicine has recommended transfer to higher level of care.    07/04/2024, hospital day 20    Transferred to ICU at [facility] secondary to overall regression in status.  Thoracentesis done this AM with planned I&D later.  Likely transfer to [facility] for plastics coverage if needed after I&D. No change with L shoulder.    07/03/2024, hospital day 19.      Patient lying in bed sleeping.  Did not arouse.  No change in orthopedic status.    07/02/2024, hospital day 18.    Patient continues to do well orthopedically postoperatively.  Lying in bed asleep.  Did not arouse.    07/01/2024, hospital day 17.    Patient doing well postoperatively.  Pain is controlled.    06/29/2024, hospital day 15    Patient remains stable.  No significant complaints pain.    06/28/2024 hospital day 14:    Status post repeat irrigation and drainage of the left shoulder wound.  Dr. Cooper reported that the procedure went well, no signs and symptoms of infection purulent material or abnormal drainage noted.  He decided to discontinue the wound VAC, and primarily close the left shoulder wound.  Patient without significant complaints.    06/27/2024 hospital day 13:    Patient underwent repeat irrigation and debridement of his left shoulder wound and removal of his wound VAC.  This was not done yesterday due to power failure at the hospital.    06/26/2024 hospital day 12th:    Patient is doing well.   Expected repeat irrigation and debridement left shoulder today with wound VAC change.    06/25/2024 hospital day 11: postop day 1 from repeat irrigation and debridement 06/24/2024    Patient had a bowel movement, he was being tended to by the nursing staff.  He was complaining of some pain or discomfort with movement such as being log rolled for nursing care.  Otherwise he is his normal state of health and his good spirits.    6/23/24 hospital day 10:    Patient doing exceptionally well today.  He is alert smiling cheerful and happy to see me.  I have not seen him since our last office visit in May.  Nursing staff was at bedside.  They performed a dressing change today.  They removed some of the packing as instructed by Dr. Bernal.  His pain is well-controlled.  He has no complaints today.    6/22/24:  Left shoulder infection with hardware for previous fracture status post I and D and hardware removal 6/21/24.

## 2024-06-20 NOTE — NURSING
Pt arrived via stretcher from 213 for an abscess drainage.   Pt AAOx4.- procedure explained and consent obtained by Dr Barrios.  Time out performed.   Pt received lidocaine from MD.   Direct patient monitoring provided by RN stable for duration of procedure.   Specimen submitted to lab per  orders.    Report given to inna.  Pt transported back to room via bed in stable condition.  10ml of fluid aspirated and collected.

## 2024-06-20 NOTE — ASSESSMENT & PLAN NOTE
Multifactorial process  Pt not able to manage his own care and wife cannot adequately care for him at this time  PT/OT  SNF or possibly LTAC consult  CM aware

## 2024-06-20 NOTE — CARE UPDATE
06/19/24 2135   Patient Assessment/Suction   Level of Consciousness (AVPU) alert   Respiratory Effort Unlabored;Normal   Expansion/Accessory Muscles/Retractions no use of accessory muscles   All Lung Fields Breath Sounds Anterior:;Lateral:;equal bilaterally   Rhythm/Pattern, Respiratory unlabored   Cough Frequency infrequent   Cough Type nonproductive   PRE-TX-O2   Device (Oxygen Therapy) nasal cannula   $ Is the patient on Low Flow Oxygen? Yes   Flow (L/min) (Oxygen Therapy) 2   Oxygen Concentration (%) 28   SpO2 95 %   Pulse Oximetry Type Intermittent   $ Pulse Oximetry - Multiple Charge Pulse Oximetry - Multiple   Pulse (!) 112   Resp 18   Aerosol Therapy   $ Aerosol Therapy Charges PRN treatment not required   Respiratory Treatment Status (SVN) PRN treatment not required   Preset CPAP/BiPAP Settings   Mode Of Delivery other (see comments)  (home unit on standby)

## 2024-06-20 NOTE — ASSESSMENT & PLAN NOTE
S/p bedside I&D, deep tissue Cx growing staph, ID&S pending  ID and podiatry following, appreciate help  Await plan for selection and duration of abx tx  Appreciate input all consultants  Pt/wife opposed to amputation of digit  Local wound care  Note markedly elevated ESR, CRP

## 2024-06-20 NOTE — ASSESSMENT & PLAN NOTE
Plan is for open I and D of the left shoulder with hardware removal.  Discussed with the patient and his wife.  Discussed the plan and Dr. Cooper agrees.    Risks, benefits, and alternatives to the procedure were explained to the patient including but not limited to damage to nerves, arteries, blood vessels, bones, tendons, ligaments, stiffness, instability, infection, permanent limb dysfunction, DVT, PE, as well as general anesthetic complications including seizure, stroke, heart attack and even death. The patient understood these risks and wished to proceed and signed the informed consent.

## 2024-06-20 NOTE — ASSESSMENT & PLAN NOTE
Appreciate input from renal services  In setting of acute UTI and IVVD  Resumed gentle hydration on Zosyn/vanc but those have been stopped  He did not require oral NaHCO3  U/s is w/o obstruction, masses, etc

## 2024-06-20 NOTE — H&P (VIEW-ONLY)
[place]/Surg  Orthopedics  Consult Note    Patient Name: Roosevelt Moser  MRN: [id number]  Admission Date: 6/14/2024  Hospital Length of Stay: 6 days  Attending Provider: Shelton Newton MD  Primary Care Provider: Miriam, Primary Doctor    Patient information was obtained from patient, spouse/SO, past medical records, ER records, and primary team.     Inpatient consult to Orthopedic Surgery  Consult performed by: Roman Paulson MD  Consult ordered by: Shelton Newton MD  Reason for consult: L shoulder infection        Subjective:     Principal Problem:Acute renal failure superimposed on chronic kidney disease    Chief Complaint:   Chief Complaint   Patient presents with    Weakness     Pt brought to ED via EMS with complaint of weakness and falling, pt advised EMS his urine is very dark.          HPI: Patient is status post left humerus fracture with subsequent open reduction internal fixation using an IM nail by Dr. Cooper back in March.  There is known loss of reduction of the proximal humerus fragments and severe glenohumeral osteoarthrosis.  Patient has a left shoulder infection.    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;  Service: Orthopedics;  Laterality: Left;  Accumed, [employer] Small Frag set Avelino verified 3/22/24 ark       Review of patient's allergies indicates:  No Known Allergies    Current Facility-Administered Medications   Medication    albuterol-ipratropium 2.5 mg-0.5 mg/3 mL nebulizer solution 3 mL    aluminum & magnesium hydroxide-simethicone 400-400-40 mg/5 mL suspension 15 mL    atorvastatin tablet 10  mg    cefTRIAXone (ROCEPHIN) 2 g in dextrose 5 % in water (D5W) 100 mL IVPB (MB+)    clindamycin in D5W 600 mg/50 mL IVPB 600 mg    clotrimazole megha 10 mg    dextrose 10% bolus 125 mL 125 mL    dextrose 10% bolus 250 mL 250 mL    glucagon (human recombinant) injection 1 mg    glucose chewable tablet 16 g    glucose chewable tablet 24 g    HYDROcodone-acetaminophen  mg per tablet 1 tablet    insulin aspart U-100 pen 0-10 Units    insulin glargine U-100 (Lantus) pen 10 Units    LIDOcaine 5 % patch 1 patch    metoprolol succinate (TOPROL-XL) 24 hr tablet 100 mg    mupirocin 2 % ointment    naloxone 0.4 mg/mL injection 0.02 mg    ondansetron injection 4 mg    prochlorperazine injection Soln 5 mg    simethicone chewable tablet 80 mg    sodium chloride 0.9% flush 10 mL    sodium chloride 0.9% flush 10 mL    And    sodium chloride 0.9% flush 10 mL    vancomycin - pharmacy to dose     Family History    None       Tobacco Use    Smoking status: Never    Smokeless tobacco: Never   Substance and Sexual Activity    Alcohol use: Yes     Alcohol/week: 0.0 standard drinks of alcohol     Comment: social    Drug use: No    Sexual activity: Yes     Review of Systems   Constitutional: Negative for chills and fever.   HENT:  Negative for congestion.    Eyes:  Negative for blurred vision.   Cardiovascular:  Negative for chest pain and syncope.   Respiratory:  Negative for cough and shortness of breath.    Endocrine: Negative for polyuria.   Hematologic/Lymphatic: Negative for bleeding problem. Does not bruise/bleed easily.   Skin:  Negative for rash.   Musculoskeletal:  Positive for joint pain, joint swelling and muscle weakness. Negative for falls, muscle cramps and myalgias.   Gastrointestinal:  Negative for abdominal pain, nausea and vomiting.   Genitourinary:  Negative for flank pain.   Neurological:  Negative for numbness and seizures.   Psychiatric/Behavioral:  Negative for altered mental status.    Allergic/Immunologic:  "Negative for persistent infections.     Objective:     Vital Signs (Most Recent):  Temp: 97.2 °F (36.2 °C) (06/20/24 0355)  Pulse: 104 (06/20/24 1140)  Resp: 18 (06/20/24 1140)  BP: 129/83 (06/20/24 1140)  SpO2: 95 % (06/20/24 1140) Vital Signs (24h Range):  Temp:  [97 °F (36.1 °C)-97.9 °F (36.6 °C)] 97.2 °F (36.2 °C)  Pulse:  [] 104  Resp:  [16-18] 18  SpO2:  [94 %-95 %] 95 %  BP: (113-129)/(56-83) 129/83     Weight: (!) 138 kg (304 lb 3.8 oz)  Height: 6' (182.9 cm)  Body mass index is 41.26 kg/m².      Intake/Output Summary (Last 24 hours) at 6/20/2024 1624  Last data filed at 6/20/2024 0607  Gross per 24 hour   Intake 30 ml   Output 1200 ml   Net -1170 ml        General    Nursing note and vitals reviewed.  Constitutional: He is oriented to person, place, and time. He appears well-developed and well-nourished. No distress.   HENT:   Nose: Nose normal.   Eyes: EOM are normal.   Cardiovascular:  Regular rhythm.            Pulmonary/Chest: Effort normal.   Abdominal: Soft.   Neurological: He is alert and oriented to person, place, and time.   Psychiatric: His behavior is normal.         Right Shoulder Exam   Right shoulder exam is normal.    Left Shoulder Exam     Inspection/Observation   Swelling: present  Bruising: absent  Scars: present    Range of Motion   Active abduction:  abnormal   Passive abduction:  abnormal   Extension:  abnormal   Forward Flexion:  abnormal   Forward Elevation: abnormal  Adduction: abnormal  External Rotation 90 degrees: abnormal  Internal rotation 90 degrees:  abnormal     Other   Sensation: normal     Comments:  Left swollen shoulder with pain with range of motion.      Vascular Exam       Left Pulses      Radial:                    2+      Capillary Refill  Left Hand: normal capillary refill           Significant Labs: Blood Culture: No results for input(s): "LABBLOO" in the last 48 hours.  BMP:   Recent Labs   Lab 06/19/24  0322 06/20/24  0458   * 179*    138   K " "4.0 4.0   CL 98 99   CO2 25 25   BUN 73* 78*   CREATININE 2.8* 2.5*   CALCIUM 8.8 8.7   MG 2.0  --      CBC:   Recent Labs   Lab 06/19/24 0322 06/20/24  0458   WBC 19.14* 21.32*   HGB 10.6* 10.2*   HCT 32.5* 31.3*   * 560*     CMP:   Recent Labs   Lab 06/19/24 0322 06/20/24  0458    138   K 4.0 4.0   CL 98 99   CO2 25 25   * 179*   BUN 73* 78*   CREATININE 2.8* 2.5*   CALCIUM 8.8 8.7   PROT 6.2  --    ALBUMIN 1.3* 1.3*   BILITOT 0.7  --    ALKPHOS 312*  --    AST 45*  --    ALT 23  --    ANIONGAP 17* 14     Coagulation: No results for input(s): "LABPROT", "INR", "APTT" in the last 48 hours.  CRP: No results for input(s): "CRP" in the last 48 hours.  Lactic Acid: No results for input(s): "LACTATE" in the last 48 hours.  Wound Culture: No results for input(s): "LABAERO" in the last 48 hours.  All pertinent labs within the past 24 hours have been reviewed.    Significant Imaging: CT: I have reviewed all pertinent results/findings and my personal findings are:  CT of the left shoulder shows subacute left proximal humerus fracture with previous internal fixation.  Incomplete bony bridging across the fracture site.  Hardware only partially engage is some of the bone.  Severe arthritis of the glenohumeral joint with loose bodies and significant effusion.  Some focal soft tissue gas around the left shoulder.  X-Ray: I have reviewed all pertinent results/findings and my personal findings are:  X-rays of the left shoulder show lizabeth and screws with malunion.  Screws show some missing of the humeral head and penetration into the joint.  Assessment/Plan:     Abscess of left shoulder  Plan is for open I and D of the left shoulder with hardware removal.  Discussed with the patient and his wife.  Discussed the plan and Dr. Cooper agrees.    Risks, benefits, and alternatives to the procedure were explained to the patient including but not limited to damage to nerves, arteries, blood vessels, bones, tendons, " ligaments, stiffness, instability, infection, permanent limb dysfunction, DVT, PE, as well as general anesthetic complications including seizure, stroke, heart attack and even death. The patient understood these risks and wished to proceed and signed the informed consent.       Swelling of limb, left  1. History of left proximal humerus fracture with open reduction internal fixation.      Patient well known to our clinic.  I discussed the radiographic findings and reviewed the patient's chart with Dr. Cooper this afternoon.  On physical exam, there is no significant change from his previous clinic visits.  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  There is known intra-articular extension of hardware into the joint.  There is no loss of reduction of the proximal humerus fracture.  Only real changes a glenohumeral joint effusion.  It is quite large.  This is secondary to the injury, surgery, and loss of reduction.  He has no overt signs or symptoms of active infection.  There is no erythema or tenderness to palpation.  No surgical intervention is warranted currently at this time.  Plan is to allow this fracture to heal as is with subsequent hardware removal and conversion to total shoulder arthroplasty.  Dr. Cooper will evaluate the patient himself tomorrow morning.          Thank you for your consult. I will follow-up with patient. Please contact us if you have any additional questions.    Roman Paulson MD  Orthopedics  [facility]/Surg

## 2024-06-20 NOTE — CARE UPDATE
06/20/24 0746   Patient Assessment/Suction   Level of Consciousness (AVPU) alert   Respiratory Effort Normal;Unlabored   Expansion/Accessory Muscles/Retractions no use of accessory muscles;no retractions;expansion symmetric   All Lung Fields Breath Sounds Anterior:;Lateral:;diminished   Rhythm/Pattern, Respiratory unlabored;pattern regular;depth regular;no shortness of breath reported   Cough Frequency no cough   PRE-TX-O2   Device (Oxygen Therapy) room air   SpO2 95 %   Pulse Oximetry Type Intermittent   $ Pulse Oximetry - Multiple Charge Pulse Oximetry - Multiple   Pulse 94   Resp 18   Positioning HOB elevated 30 degrees   Aerosol Therapy   $ Aerosol Therapy Charges PRN treatment not required   Education   $ Education Bronchodilator;15 min     Home CPAP at bedside

## 2024-06-21 ENCOUNTER — ANESTHESIA EVENT (OUTPATIENT)
Dept: SURGERY | Facility: HOSPITAL | Age: 74
End: 2024-06-21
Payer: MEDICARE

## 2024-06-21 ENCOUNTER — ANESTHESIA (OUTPATIENT)
Dept: SURGERY | Facility: HOSPITAL | Age: 74
End: 2024-06-21
Payer: MEDICARE

## 2024-06-21 PROBLEM — M00.9 PYOGENIC ARTHRITIS OF LEFT SHOULDER REGION: Status: ACTIVE | Noted: 2024-06-21

## 2024-06-21 LAB
ANION GAP SERPL CALC-SCNC: 13 MMOL/L (ref 8–16)
ASO AB SERPL-ACNC: 68 IU/ML
BACTERIA SPEC AEROBE CULT: ABNORMAL
BACTERIA SPEC ANAEROBE CULT: NORMAL
BUN SERPL-MCNC: 82 MG/DL (ref 8–23)
CALCIUM SERPL-MCNC: 9 MG/DL (ref 8.7–10.5)
CHLORIDE SERPL-SCNC: 100 MMOL/L (ref 95–110)
CO2 SERPL-SCNC: 25 MMOL/L (ref 23–29)
CREAT SERPL-MCNC: 2.4 MG/DL (ref 0.5–1.4)
ERYTHROCYTE [DISTWIDTH] IN BLOOD BY AUTOMATED COUNT: 17.8 % (ref 11.5–14.5)
EST. GFR  (NO RACE VARIABLE): 28 ML/MIN/1.73 M^2
GLUCOSE SERPL-MCNC: 206 MG/DL (ref 70–110)
GRAM STN SPEC: NORMAL
GRAM STN SPEC: NORMAL
HCT VFR BLD AUTO: 30.1 % (ref 40–54)
HGB BLD-MCNC: 9.8 G/DL (ref 14–18)
MCH RBC QN AUTO: 26.3 PG (ref 27–31)
MCHC RBC AUTO-ENTMCNC: 32.6 G/DL (ref 32–36)
MCV RBC AUTO: 81 FL (ref 82–98)
PLATELET # BLD AUTO: 560 K/UL (ref 150–450)
PMV BLD AUTO: 10.1 FL (ref 9.2–12.9)
POCT GLUCOSE: 206 MG/DL (ref 70–110)
POCT GLUCOSE: 225 MG/DL (ref 70–110)
POCT GLUCOSE: 261 MG/DL (ref 70–110)
POTASSIUM SERPL-SCNC: 3.7 MMOL/L (ref 3.5–5.1)
RBC # BLD AUTO: 3.73 M/UL (ref 4.6–6.2)
SODIUM SERPL-SCNC: 138 MMOL/L (ref 136–145)
VANCOMYCIN SERPL-MCNC: 15.3 UG/ML
WBC # BLD AUTO: 21.39 K/UL (ref 3.9–12.7)

## 2024-06-21 PROCEDURE — 36000708 HC OR TIME LEV III 1ST 15 MIN: Performed by: ORTHOPAEDIC SURGERY

## 2024-06-21 PROCEDURE — 64415 NJX AA&/STRD BRCH PLXS IMG: CPT | Performed by: ANESTHESIOLOGY

## 2024-06-21 PROCEDURE — 27200750 HC INSULATED NEEDLE/ STIMUPLEX: Performed by: ANESTHESIOLOGY

## 2024-06-21 PROCEDURE — 94799 UNLISTED PULMONARY SVC/PX: CPT | Mod: XB

## 2024-06-21 PROCEDURE — 25000003 PHARM REV CODE 250: Performed by: NURSE ANESTHETIST, CERTIFIED REGISTERED

## 2024-06-21 PROCEDURE — 27201423 OPTIME MED/SURG SUP & DEVICES STERILE SUPPLY: Performed by: ORTHOPAEDIC SURGERY

## 2024-06-21 PROCEDURE — A4216 STERILE WATER/SALINE, 10 ML: HCPCS | Performed by: ORTHOPAEDIC SURGERY

## 2024-06-21 PROCEDURE — 63600175 PHARM REV CODE 636 W HCPCS: Mod: JZ,JG | Performed by: ORTHOPAEDIC SURGERY

## 2024-06-21 PROCEDURE — 99900035 HC TECH TIME PER 15 MIN (STAT)

## 2024-06-21 PROCEDURE — 63600175 PHARM REV CODE 636 W HCPCS: Mod: JZ,JG | Performed by: INTERNAL MEDICINE

## 2024-06-21 PROCEDURE — 25000003 PHARM REV CODE 250: Performed by: INTERNAL MEDICINE

## 2024-06-21 PROCEDURE — 99233 SBSQ HOSP IP/OBS HIGH 50: CPT | Mod: ,,, | Performed by: INTERNAL MEDICINE

## 2024-06-21 PROCEDURE — 63600175 PHARM REV CODE 636 W HCPCS: Performed by: INTERNAL MEDICINE

## 2024-06-21 PROCEDURE — 87116 MYCOBACTERIA CULTURE: CPT | Performed by: ORTHOPAEDIC SURGERY

## 2024-06-21 PROCEDURE — 87205 SMEAR GRAM STAIN: CPT | Performed by: ORTHOPAEDIC SURGERY

## 2024-06-21 PROCEDURE — 63600175 PHARM REV CODE 636 W HCPCS

## 2024-06-21 PROCEDURE — 94761 N-INVAS EAR/PLS OXIMETRY MLT: CPT

## 2024-06-21 PROCEDURE — 37000009 HC ANESTHESIA EA ADD 15 MINS: Performed by: ORTHOPAEDIC SURGERY

## 2024-06-21 PROCEDURE — 23040 ARTHRT GH JT EXPL/DRG/RMV FB: CPT | Mod: LT,,, | Performed by: ORTHOPAEDIC SURGERY

## 2024-06-21 PROCEDURE — 87206 SMEAR FLUORESCENT/ACID STAI: CPT | Performed by: ORTHOPAEDIC SURGERY

## 2024-06-21 PROCEDURE — 71000039 HC RECOVERY, EACH ADD'L HOUR: Performed by: ORTHOPAEDIC SURGERY

## 2024-06-21 PROCEDURE — C9290 INJ, BUPIVACAINE LIPOSOME: HCPCS | Performed by: ANESTHESIOLOGY

## 2024-06-21 PROCEDURE — 25000003 PHARM REV CODE 250: Performed by: ORTHOPAEDIC SURGERY

## 2024-06-21 PROCEDURE — 87070 CULTURE OTHR SPECIMN AEROBIC: CPT | Performed by: ORTHOPAEDIC SURGERY

## 2024-06-21 PROCEDURE — 85027 COMPLETE CBC AUTOMATED: CPT | Performed by: HOSPITALIST

## 2024-06-21 PROCEDURE — 71000033 HC RECOVERY, INTIAL HOUR: Performed by: ORTHOPAEDIC SURGERY

## 2024-06-21 PROCEDURE — 36000709 HC OR TIME LEV III EA ADD 15 MIN: Performed by: ORTHOPAEDIC SURGERY

## 2024-06-21 PROCEDURE — 63600175 PHARM REV CODE 636 W HCPCS: Performed by: NURSE ANESTHETIST, CERTIFIED REGISTERED

## 2024-06-21 PROCEDURE — 11000001 HC ACUTE MED/SURG PRIVATE ROOM

## 2024-06-21 PROCEDURE — 80202 ASSAY OF VANCOMYCIN: CPT | Performed by: HOSPITALIST

## 2024-06-21 PROCEDURE — 37000008 HC ANESTHESIA 1ST 15 MINUTES: Performed by: ORTHOPAEDIC SURGERY

## 2024-06-21 PROCEDURE — 25000003 PHARM REV CODE 250: Performed by: HOSPITALIST

## 2024-06-21 PROCEDURE — 20680 REMOVAL OF IMPLANT DEEP: CPT | Mod: 51,,, | Performed by: ORTHOPAEDIC SURGERY

## 2024-06-21 PROCEDURE — 36415 COLL VENOUS BLD VENIPUNCTURE: CPT | Performed by: HOSPITALIST

## 2024-06-21 PROCEDURE — 27000221 HC OXYGEN, UP TO 24 HOURS

## 2024-06-21 PROCEDURE — 0PPG04Z REMOVAL OF INTERNAL FIXATION DEVICE FROM LEFT HUMERAL SHAFT, OPEN APPROACH: ICD-10-PCS | Performed by: ORTHOPAEDIC SURGERY

## 2024-06-21 PROCEDURE — 63600175 PHARM REV CODE 636 W HCPCS: Mod: JZ,JG | Performed by: ANESTHESIOLOGY

## 2024-06-21 PROCEDURE — 80048 BASIC METABOLIC PNL TOTAL CA: CPT | Performed by: HOSPITALIST

## 2024-06-21 PROCEDURE — 87102 FUNGUS ISOLATION CULTURE: CPT | Performed by: ORTHOPAEDIC SURGERY

## 2024-06-21 PROCEDURE — 87075 CULTR BACTERIA EXCEPT BLOOD: CPT | Performed by: ORTHOPAEDIC SURGERY

## 2024-06-21 RX ORDER — BUPIVACAINE HYDROCHLORIDE 5 MG/ML
INJECTION, SOLUTION EPIDURAL; INTRACAUDAL
Status: COMPLETED | OUTPATIENT
Start: 2024-06-21 | End: 2024-06-21

## 2024-06-21 RX ORDER — MEPERIDINE HYDROCHLORIDE 50 MG/ML
12.5 INJECTION INTRAMUSCULAR; INTRAVENOUS; SUBCUTANEOUS ONCE
Status: DISCONTINUED | OUTPATIENT
Start: 2024-06-21 | End: 2024-06-21 | Stop reason: HOSPADM

## 2024-06-21 RX ORDER — OXYCODONE HYDROCHLORIDE 5 MG/1
5 TABLET ORAL
Status: DISCONTINUED | OUTPATIENT
Start: 2024-06-21 | End: 2024-06-21 | Stop reason: HOSPADM

## 2024-06-21 RX ORDER — ONDANSETRON HYDROCHLORIDE 2 MG/ML
4 INJECTION, SOLUTION INTRAVENOUS ONCE
Status: DISCONTINUED | OUTPATIENT
Start: 2024-06-21 | End: 2024-06-21 | Stop reason: HOSPADM

## 2024-06-21 RX ORDER — DEXAMETHASONE SODIUM PHOSPHATE 4 MG/ML
INJECTION, SOLUTION INTRA-ARTICULAR; INTRALESIONAL; INTRAMUSCULAR; INTRAVENOUS; SOFT TISSUE
Status: DISCONTINUED | OUTPATIENT
Start: 2024-06-21 | End: 2024-06-21

## 2024-06-21 RX ORDER — FENTANYL CITRATE 50 UG/ML
INJECTION, SOLUTION INTRAMUSCULAR; INTRAVENOUS
Status: DISCONTINUED | OUTPATIENT
Start: 2024-06-21 | End: 2024-06-21

## 2024-06-21 RX ORDER — ONDANSETRON HYDROCHLORIDE 2 MG/ML
INJECTION, SOLUTION INTRAMUSCULAR; INTRAVENOUS
Status: DISCONTINUED | OUTPATIENT
Start: 2024-06-21 | End: 2024-06-21

## 2024-06-21 RX ORDER — DIPHENHYDRAMINE HYDROCHLORIDE 50 MG/ML
25 INJECTION INTRAMUSCULAR; INTRAVENOUS EVERY 6 HOURS PRN
Status: DISCONTINUED | OUTPATIENT
Start: 2024-06-21 | End: 2024-06-21 | Stop reason: HOSPADM

## 2024-06-21 RX ORDER — EPHEDRINE SULFATE 50 MG/ML
INJECTION, SOLUTION INTRAVENOUS
Status: DISCONTINUED | OUTPATIENT
Start: 2024-06-21 | End: 2024-06-21

## 2024-06-21 RX ORDER — METOPROLOL TARTRATE 1 MG/ML
5 INJECTION, SOLUTION INTRAVENOUS EVERY 5 MIN PRN
Status: DISCONTINUED | OUTPATIENT
Start: 2024-06-21 | End: 2024-07-07 | Stop reason: HOSPADM

## 2024-06-21 RX ORDER — LIDOCAINE HYDROCHLORIDE 20 MG/ML
INJECTION INTRAVENOUS
Status: DISCONTINUED | OUTPATIENT
Start: 2024-06-21 | End: 2024-06-21

## 2024-06-21 RX ORDER — SUCCINYLCHOLINE CHLORIDE 20 MG/ML
INJECTION INTRAMUSCULAR; INTRAVENOUS
Status: DISCONTINUED | OUTPATIENT
Start: 2024-06-21 | End: 2024-06-21

## 2024-06-21 RX ORDER — PROCHLORPERAZINE EDISYLATE 5 MG/ML
5 INJECTION INTRAMUSCULAR; INTRAVENOUS EVERY 4 HOURS PRN
Status: DISCONTINUED | OUTPATIENT
Start: 2024-06-21 | End: 2024-06-21 | Stop reason: HOSPADM

## 2024-06-21 RX ORDER — ACETAMINOPHEN 10 MG/ML
INJECTION, SOLUTION INTRAVENOUS
Status: DISCONTINUED | OUTPATIENT
Start: 2024-06-21 | End: 2024-06-21

## 2024-06-21 RX ORDER — HYDROMORPHONE HYDROCHLORIDE 2 MG/ML
0.2 INJECTION, SOLUTION INTRAMUSCULAR; INTRAVENOUS; SUBCUTANEOUS EVERY 5 MIN PRN
Status: DISCONTINUED | OUTPATIENT
Start: 2024-06-21 | End: 2024-06-21 | Stop reason: HOSPADM

## 2024-06-21 RX ORDER — ROCURONIUM BROMIDE 10 MG/ML
INJECTION, SOLUTION INTRAVENOUS
Status: DISCONTINUED | OUTPATIENT
Start: 2024-06-21 | End: 2024-06-21

## 2024-06-21 RX ORDER — PHENYLEPHRINE HYDROCHLORIDE 10 MG/ML
INJECTION INTRAVENOUS
Status: DISCONTINUED | OUTPATIENT
Start: 2024-06-21 | End: 2024-06-21

## 2024-06-21 RX ORDER — FENTANYL CITRATE 50 UG/ML
25 INJECTION, SOLUTION INTRAMUSCULAR; INTRAVENOUS EVERY 5 MIN PRN
Status: DISCONTINUED | OUTPATIENT
Start: 2024-06-21 | End: 2024-06-21 | Stop reason: HOSPADM

## 2024-06-21 RX ORDER — SODIUM CHLORIDE 0.9 % (FLUSH) 0.9 %
10 SYRINGE (ML) INJECTION EVERY 12 HOURS PRN
Status: DISCONTINUED | OUTPATIENT
Start: 2024-06-21 | End: 2024-07-07 | Stop reason: HOSPADM

## 2024-06-21 RX ORDER — PROPOFOL 10 MG/ML
VIAL (ML) INTRAVENOUS
Status: DISCONTINUED | OUTPATIENT
Start: 2024-06-21 | End: 2024-06-21

## 2024-06-21 RX ADMIN — CLOTRIMAZOLE 10 MG: 10 LOZENGE ORAL at 08:06

## 2024-06-21 RX ADMIN — BUPIVACAINE 10 ML: 13.3 INJECTION, SUSPENSION, LIPOSOMAL INFILTRATION at 01:06

## 2024-06-21 RX ADMIN — INSULIN ASPART 4 UNITS: 100 INJECTION, SOLUTION INTRAVENOUS; SUBCUTANEOUS at 09:06

## 2024-06-21 RX ADMIN — PHENYLEPHRINE HYDROCHLORIDE 200 MCG: 10 INJECTION INTRAVENOUS at 02:06

## 2024-06-21 RX ADMIN — ONDANSETRON 4 MG: 2 INJECTION INTRAMUSCULAR; INTRAVENOUS at 02:06

## 2024-06-21 RX ADMIN — SODIUM CHLORIDE, PRESERVATIVE FREE 10 ML: 5 INJECTION INTRAVENOUS at 08:06

## 2024-06-21 RX ADMIN — ATORVASTATIN CALCIUM 10 MG: 10 TABLET, FILM COATED ORAL at 08:06

## 2024-06-21 RX ADMIN — PHENYLEPHRINE HYDROCHLORIDE 200 MCG: 10 INJECTION INTRAVENOUS at 03:06

## 2024-06-21 RX ADMIN — PHENYLEPHRINE HYDROCHLORIDE 0.2 MCG/KG/MIN: 10 INJECTION INTRAVENOUS at 03:06

## 2024-06-21 RX ADMIN — SODIUM CHLORIDE, SODIUM GLUCONATE, SODIUM ACETATE, POTASSIUM CHLORIDE, MAGNESIUM CHLORIDE, SODIUM PHOSPHATE, DIBASIC, AND POTASSIUM PHOSPHATE: .53; .5; .37; .037; .03; .012; .00082 INJECTION, SOLUTION INTRAVENOUS at 04:06

## 2024-06-21 RX ADMIN — MUPIROCIN 1 G: 20 OINTMENT TOPICAL at 08:06

## 2024-06-21 RX ADMIN — EPHEDRINE SULFATE 20 MG: 50 INJECTION, SOLUTION INTRAMUSCULAR; INTRAVENOUS; SUBCUTANEOUS at 02:06

## 2024-06-21 RX ADMIN — INSULIN ASPART 4 UNITS: 100 INJECTION, SOLUTION INTRAVENOUS; SUBCUTANEOUS at 11:06

## 2024-06-21 RX ADMIN — EPHEDRINE SULFATE 10 MG: 50 INJECTION, SOLUTION INTRAMUSCULAR; INTRAVENOUS; SUBCUTANEOUS at 02:06

## 2024-06-21 RX ADMIN — CLINDAMYCIN PHOSPHATE 600 MG: 600 INJECTION, SOLUTION INTRAVENOUS at 09:06

## 2024-06-21 RX ADMIN — CLOTRIMAZOLE 10 MG: 10 LOZENGE ORAL at 05:06

## 2024-06-21 RX ADMIN — SUCCINYLCHOLINE CHLORIDE 120 MG: 20 INJECTION, SOLUTION INTRAMUSCULAR; INTRAVENOUS at 02:06

## 2024-06-21 RX ADMIN — PROPOFOL 150 MG: 10 INJECTION, EMULSION INTRAVENOUS at 02:06

## 2024-06-21 RX ADMIN — DEXAMETHASONE SODIUM PHOSPHATE 4 MG: 4 INJECTION, SOLUTION INTRA-ARTICULAR; INTRALESIONAL; INTRAMUSCULAR; INTRAVENOUS; SOFT TISSUE at 03:06

## 2024-06-21 RX ADMIN — ACETAMINOPHEN 1000 MG: 10 INJECTION, SOLUTION INTRAVENOUS at 03:06

## 2024-06-21 RX ADMIN — SODIUM CHLORIDE, SODIUM GLUCONATE, SODIUM ACETATE, POTASSIUM CHLORIDE, MAGNESIUM CHLORIDE, SODIUM PHOSPHATE, DIBASIC, AND POTASSIUM PHOSPHATE: .53; .5; .37; .037; .03; .012; .00082 INJECTION, SOLUTION INTRAVENOUS at 02:06

## 2024-06-21 RX ADMIN — FENTANYL CITRATE 50 MCG: 50 INJECTION, SOLUTION INTRAMUSCULAR; INTRAVENOUS at 01:06

## 2024-06-21 RX ADMIN — FENTANYL CITRATE 25 MCG: 50 INJECTION, SOLUTION INTRAMUSCULAR; INTRAVENOUS at 03:06

## 2024-06-21 RX ADMIN — INSULIN GLARGINE 10 UNITS: 100 INJECTION, SOLUTION SUBCUTANEOUS at 09:06

## 2024-06-21 RX ADMIN — GLYCOPYRROLATE 0.2 MG: 0.2 INJECTION, SOLUTION INTRAMUSCULAR; INTRAVITREAL at 02:06

## 2024-06-21 RX ADMIN — LIDOCAINE 5% 1 PATCH: 700 PATCH TOPICAL at 05:06

## 2024-06-21 RX ADMIN — LIDOCAINE HYDROCHLORIDE 75 MG: 20 INJECTION, SOLUTION INTRAVENOUS at 02:06

## 2024-06-21 RX ADMIN — FENTANYL CITRATE 50 MCG: 50 INJECTION, SOLUTION INTRAMUSCULAR; INTRAVENOUS at 04:06

## 2024-06-21 RX ADMIN — HYDROCODONE BITARTRATE AND ACETAMINOPHEN 1 TABLET: 10; 325 TABLET ORAL at 08:06

## 2024-06-21 RX ADMIN — MUPIROCIN 1 G: 20 OINTMENT TOPICAL at 09:06

## 2024-06-21 RX ADMIN — VANCOMYCIN HYDROCHLORIDE 1000 MG: 1 INJECTION, POWDER, LYOPHILIZED, FOR SOLUTION INTRAVENOUS at 06:06

## 2024-06-21 RX ADMIN — DEXAMETHASONE SODIUM PHOSPHATE 4 MG: 4 INJECTION, SOLUTION INTRA-ARTICULAR; INTRALESIONAL; INTRAMUSCULAR; INTRAVENOUS; SOFT TISSUE at 02:06

## 2024-06-21 RX ADMIN — CEFTRIAXONE SODIUM 2 G: 2 INJECTION, POWDER, FOR SOLUTION INTRAMUSCULAR; INTRAVENOUS at 11:06

## 2024-06-21 RX ADMIN — HYDROCODONE BITARTRATE AND ACETAMINOPHEN 1 TABLET: 10; 325 TABLET ORAL at 09:06

## 2024-06-21 RX ADMIN — CLINDAMYCIN PHOSPHATE 600 MG: 600 INJECTION, SOLUTION INTRAVENOUS at 05:06

## 2024-06-21 RX ADMIN — METOPROLOL SUCCINATE 100 MG: 50 TABLET, FILM COATED, EXTENDED RELEASE ORAL at 08:06

## 2024-06-21 RX ADMIN — CLINDAMYCIN PHOSPHATE 600 MG: 600 INJECTION, SOLUTION INTRAVENOUS at 02:06

## 2024-06-21 RX ADMIN — ROCURONIUM BROMIDE 5 MG: 10 INJECTION, SOLUTION INTRAVENOUS at 02:06

## 2024-06-21 RX ADMIN — INSULIN ASPART 3 UNITS: 100 INJECTION, SOLUTION INTRAVENOUS; SUBCUTANEOUS at 08:06

## 2024-06-21 RX ADMIN — BUPIVACAINE HYDROCHLORIDE 10 ML: 5 INJECTION, SOLUTION EPIDURAL; INTRACAUDAL; PERINEURAL at 01:06

## 2024-06-21 RX ADMIN — SODIUM CHLORIDE, PRESERVATIVE FREE 10 ML: 5 INJECTION INTRAVENOUS at 06:06

## 2024-06-21 NOTE — ASSESSMENT & PLAN NOTE
S/p bedside I&D, deep tissue Cx growing MRSA, S to clinda  ID and podiatry following, appreciate help  Await plan for selection and duration of abx tx  Appreciate input all consultants  Pt/wife opposed to amputation of digit  Local wound care  Note markedly elevated ESR, CRP

## 2024-06-21 NOTE — PROGRESS NOTES
[facility] Medicine  Progress Note    Patient Name: Roosevelt Moser  MRN: [id number]  Patient Class: IP- Inpatient   Admission Date: 6/14/2024  Length of Stay: 7 days  Attending Physician: Shelton Newton MD  Primary Care Provider: Miguel Angel Enriquez PA-C        Subjective:     Principal Problem:Acute renal failure superimposed on chronic kidney disease        HPI:  Roosevelt Moser is a 73 year old male with a previous medical history of atrial fibrillation on eliquis, HTN, DMII, obstructive sleep apnea, HLD, ORIF of left humerus and guillian-[place] who presented to the emergency room for weakness and multiple falls. Per wife of the patient he has had progressive weakness over the past week along with two falls one at 0300 Thursday and the other early morning Friday. Both time she had to activate EMS to pick patient up off floor due to weakness. Patient refused transport to hospital on both occasions. Today he slid out of his chair and was unable to get up without assistance and at this point the wife activated EMS to transport patient to the hospital. She endorses that two days ago she noticed that the patient had stopped urinating as he normally does. The patient concurs that he has noticed that his urine has decreased over the past two days and that it is dark. Patient denies dysuria or incomplete bladder emptying. Bladder scan showed zero upon admit and urine sample was obtained via straight cath in ED. Patient denies decreased PO intake and keeps a bottle of water with him at all times. Initial ED work-up showed a creatinine of 5 and potassium of 6. Urine studies positive for infection and WBC 20.81 with procalcitonin at 19.75. Blood cultures obtained and patient given 1 gram of rocephin and 1000ml of normal saline. Patient noted to bein afib with RVR and 20mg of diltiazem was administered IV which resulted in low blood pressure and 1 gram of calcium gluconate was administered.  "Patient admitted by hospital medicine for further evaluation and management     Overview/Hospital Course:  6/15/24  Assumed care today, pt seen and examined, chart reviewed.  Pt sitting up in bed, wife at bedside, feeling better.  Off dilt drip since this AM, in AF on monitor but rate down to 80s.   Denies cp/sob.  Has been weak and debilitated, await PT/OT to see what post acute needs will be.    6/16/24  Pt resting in NAD.  Advised by CM yesterday wife not willing/able to take him home at this time - We talked about that frankly today and she says she is unable to care for him, planning on SNF, hopefully regain enough function to allow d/c home from there. Pt w/ slow improvement.  Feels pretty good.  Appetite not too good, he says he's been deliberately eating small quantities of food at home to lose weight and feels it's been working.  Note pain/swelling LUE where he had a recent fx/ORIF.    6/17/24  Pt doing reasonably well, having pain sitting on the bedpan "it's sticking me", but overcame that w/ some adjusting position.  LUE u/s no DVT.  Still having a lot of pain in upper left arm w/ movement and also worried about persistent pain "under left boob" where he struck himself during a fall - we will image those areas too.  Says Norco 7.5 is like "eating a jelly bean" asking if we can give 10.  Interesting notes that he's currently constipated, at home often has diarrhea, says that higher doses of narcotics cause him to have loose bowels.  Odd.  Albumin 1.3.  Await SNF dispo. Wife at bedside.   6/18/24  Pt continues to be quite jolly about everything, has good sense of humor, acting very non toxic - HOWEVER upon wound care assessment by Dr Elise he is seen to have osteomyelitis of his right 2nd toe - Podiatry and ID consulted, s/p debridement at bedside w/ deep tissue culture taken, pt/wife opposed to amputation.  Additionally shoulder CT favors abscess around his op site - await input from ortho - He has pain " "there but it's not exquisite and the area is not flucuant/red/warm, pain seems more arthritic in nature - but he appears to have a deep seated infection and will need a washout in OR -  Dr Calvin has added clinda/vanc to Zosyn started yesterday.   6/19/24  Pt sitting up in bed, very conversant and non toxic appearing, thanks much to all consultants.  Per ortho conservative approach to shoulder, I am in chat w/ them currently about possible aspiration of fluid.  Note ID changes in abx coverage.  Pt still has a great deal of pain/mobility loss of left shoulder.  We have been working on SNF but he may need LTAC w/ current level of complexity/abx regimen.  Deep wound cultures from right 2nd toe are pending.   6/20/24  Aspirate done today per IR and returned 10 cc of "pussy" fluid, S Lea reviewed and messaged me that Dr Cooper is out of town and to please contact on call ortho, Dr Bernal on call and is aware of care from prior discussion, he reviewed findings and will do washout in OR tomorrow, went to d/w pt and wife, he's sitting up in bed, feeling ok, no new c/o.  I also encountered Dr Calvin in the vale and we talked about this and his abx.    6/21/24  Pt going for washout/removal of infected hardware today in OR.  Sitting up in bed, sister and wife here, everyone on board w/ careplan. He does have a lot more swelling today around proximal deltoid.  Not warm/tender but visibly swollen and this is the 1st time we have really noted that.          Interval History:     6/15-21 see course    Review of Systems    11 pt ROS negative except as noted o/w    Objective:     Vital Signs (Most Recent):  Temp: 97.9 °F (36.6 °C) (06/21/24 1239)  Pulse: 99 (06/21/24 1239)  Resp: 18 (06/21/24 1239)  BP: 135/66 (06/21/24 1239)  SpO2: 100 % (06/21/24 1239) Vital Signs (24h Range):  Temp:  [97.8 °F (36.6 °C)-98.9 °F (37.2 °C)] 97.9 °F (36.6 °C)  Pulse:  [] 99  Resp:  [16-20] 18  SpO2:  [92 %-100 %] 100 %  BP: " ()/(55-90) 135/66     Weight: (!) 137.9 kg (304 lb)  Body mass index is 41.23 kg/m².    Intake/Output Summary (Last 24 hours) at 6/21/2024 1427  Last data filed at 6/21/2024 1303  Gross per 24 hour   Intake 480 ml   Output 2500 ml   Net -2020 ml         Physical Exam      General:  A&O, NAD, continues to be very jovial and non toxic  HEENT: neck supple, PERRL/EOMI, EAC clear bilaterally, normal bilateral carotid upstroke w/o bruits, inf turbs clear bilaterally, OC/OP clear  Lungs: CTAB  CV: RRR w/o M/G/R  Abdomen: soft, NTND, no HSM, no bruits/masses/pulsations, obese  Ext: no edema. Right foot 2nd toe wrapped  : ext cath and urine clearing up, dark but not blood tinged now  Rectal: def  Neuro: NF    Significant Labs: All pertinent labs within the past 24 hours have been reviewed.    Significant Imaging: I have reviewed all pertinent imaging results/findings within the past 24 hours.    Assessment/Plan:      * Acute renal failure superimposed on chronic kidney disease  Appreciate input from renal services  In setting of acute UTI and IVVD  Resumed gentle hydration on Zosyn/vanc but those have been stopped  He did not require oral NaHCO3  U/s is w/o obstruction, masses, etc        Abscess of left shoulder  As discussed under limb swelling      Osteomyelitis of toe  S/p bedside I&D, deep tissue Cx growing MRSA, S to clinda  ID and podiatry following, appreciate help  Await plan for selection and duration of abx tx  Appreciate input all consultants  Pt/wife opposed to amputation of digit  Local wound care  Note markedly elevated ESR, CRP        Skin tear of left upper extremity  Local care, healing  Doubt this is a portal of entry    Swelling of limb, left  U/s w/o DVT  Appreciate help from Dr Bernal, ID, wound care, podiatry et al  Pt is already on OAC, holding for now pending shoulder procedure  Arm is elevated on pillow  Not red/painful, no compartment syndrome  D/w pt/wife      Debility  Multifactorial  process  Pt not able to manage his own care and wife cannot adequately care for him at this time  PT/OT  SNF or possibly LTAC consult  CM aware      Severe sepsis  In setting of acute UTI, MRSA OM right 2nd toe, and infected left shoulder hardware  BCX NGTD  Ur CX neg  Rocephin was given empirically for UTI, changed to Zosyn > added vanc/clinda, now on Rocephin/clinda, vanc/zosyn stopped per ID  Oral clotrimazole added for thrush  PCT and WBC trended down, WBC staying up a little now but pt looks good  No obstruction on renal u/s  Has gas containing abscess in and around left shoulder w/ malpositioned hardware, likely d/t falls, hard to say how the bug entered  Long d/w pt/wife, ortho - today he's having washout and hardware removal w/ Dr Bernal  F/u w/ Dr Kenneth mann d/c  He's urinating well    Hyperkalemia  Resolved  in setting of acute on chronic renal failure  Lokelma given x 1 on admit  Avoid ACE/ARB, K+ replacement  tele          Atrial fibrillation with rapid ventricular response  Chronic AF w/ acute RVR, resolved  Required dilt drip on admission but off since 6/15  Tele some PVC/bigem/trigem  TSH 1.8 in March  Monitor lytes  No etoh/drug use  TTE from March 2024:    Left Ventricle: The left ventricle is normal in size. Normal wall thickness. Mild global hypokinesis present. There is mildly reduced systolic function with a visually estimated ejection fraction of 45 - 50%. There is normal diastolic function.    Right Ventricle: Normal right ventricular cavity size. Wall thickness is normal. Right ventricle wall motion  is normal. Systolic function is normal.    Left Atrium: Left atrium is moderately dilated.    IVC/SVC: Normal venous pressure at 3 mmHg.  OAC has been on hold d/t possible need for shoulder surgery - has been on prophylaxis dose of Lovenox, this was help today for shoulder operation  Resume Eliquis as soon as feasible postop      History of Guillain-[place] syndrome  No acute issues  However,  pt has impaired mobility and is acutely weakened from his sepsis/UTI/AF RVR  PT/OT working w/ pt  Wife cannot manage his care at home currently  SNF assessment underway, may need LTAC depending on what abx he ends up for how long, it may take a few more days to narrow that down          Type 2 diabetes mellitus  A1c 6.3%  SSI      Hypertension  Home meds as appropriate    MARA (obstructive sleep apnea)  Continue CPAP HS and w/ naps      Morbid obesity  Body mass index is 41.23 kg/m².  Morbid obesity complicates all aspects of disease management from diagnostic modalities to treatment  Weight loss encouraged and health benefits explained to patient  He has been working on weight loss at home w/ reduced caloric intake          VTE Risk Mitigation (From admission, onward)           Ordered     Reason for No Pharmacological VTE Prophylaxis  Once        Question:  Reasons:  Answer:  Already adequately anticoagulated on oral Anticoagulants    06/14/24 1438     IP VTE HIGH RISK PATIENT  Once         06/14/24 1438     Place sequential compression device  Until discontinued         06/14/24 1438                    Discharge Planning   KAMILA:      Code Status: Full Code   Is the patient medically ready for discharge?:     Reason for patient still in hospital (select all that apply): Patient trending condition  Discharge Plan A: Long-term acute care facility (LTAC)      Time spent in direct patient care, review of data, conversation w/ patient and/or family, and moderately complex MDM 40 minutes              Shelton Newton MD  Department of Hospital Medicine   [facility]

## 2024-06-21 NOTE — PROGRESS NOTES
Nephrology Progress Note        Patient Name: Roosevelt Moser  MRN: [id number]    Patient Class: IP- Inpatient   Admission Date: 6/14/2024  Length of Stay: 7 days  Date of Service: 6/21/2024    Attending Physician: Shelton Newton MD  Primary Care Provider: Miriam, Primary Doctor    Reason for Consult: marbin/hyperkalemia    SUBJECTIVE:     HPI: 73M with h/o DM, HTN, AF, GBS and recent shoulder surgery was brought to ER by EMS with recurrent falls in the last 24h. Reports decreased UO but no fever, cough, SOB, dysuria. Upon admission, noted to be in MARBIN with sCr 5, baseline 1 month ago is 1, hyperkalemia with K 6, acidosis with CO2 12, hypoalbuminemia with albumin 1.6. Lactate notably 2.1. Procal 20. A1c 9.5 in 3/2024. UA with hematuria and pyuria, urine Cx pending. Notably on Apixaban. WBC in blood elevated to 20. Plt 540. RP US ordered. Afebrile, normotensive, received IVF and abx. Lokelma, ca gluconate given bicarb gtt ordered. Straight cath in ER with only 100cc urine out.    Review of Systems:  Neg    6/15  AFVSS.   UOP 1200cc plus 2 unmeasured   6/16  AFVSS.  2150 cc uop.  No distress  6/17 VSS, on RA, UOP 1.5L- updated family at bedside  6/18 VSS, on RA, UOP 1.3L  6/19 VSS, on RA, UOP 1L, with osteo R toe- s/p debridement- not interested in amputation- antbx adjusted  6/20 HR , BP stable, on 2L NC, UOP 1.2L, went for procedure, wife reports LE edema  6/21 HR 90-110s, -140s mostly, on RA, UOP 1.6L, to OR for shoulder washout and hardware removal today    ASSESSMENT/PLAN:     MARBIN due to ATN due to sepsis due to UTI and/or PNA  CKD stage 2 with diabetic proteinuria and severe hypoalbuminemia  HTN  DM poorly controlled A1c 9.5  HyperK/Acidosis  HypoMg  SHPT  Anemia  Hypoalbuminemia  Edema    - renal function is improving- nonoliguric  - no acute RRT needs- no nsaids or IV contrast- dose meds for CrCl < 30  - about 1g proteinuria- low alb is nutrition/acute phase reactant  - hold hydralazine  - hold metformin-  use insulin  - hyperK/acidosis resolved  - Mg replete  - H/H stable  - optimize protein intake  - prefer better renal function and > 24 hours away from washout (in case he becomes septic) before trying diuretic therapy- may need midodrine as well b/c with infection and low albumin might drop BP quickly    Updated family at bedside    Thank you for allowing us to participate in the care of your patient!   We will follow the patient and provide recommendations as needed.         Laboratory:  Recent Labs   Lab 06/19/24  0322 06/20/24  0458 06/21/24  0405    138 138   K 4.0 4.0 3.7   CL 98 99 100   CO2 25 25 25   BUN 73* 78* 82*   CREATININE 2.8* 2.5* 2.4*   * 179* 206*       Recent Labs   Lab 06/17/24  0255 06/18/24  0234 06/19/24  0322 06/20/24  0458 06/21/24  0405   CALCIUM 8.5* 8.6* 8.8 8.7 9.0   ALBUMIN 1.3* 1.3* 1.3* 1.3*  --    PHOS 4.3 3.8  --  4.2  --    MG 1.5* 1.5* 2.0  --   --              Recent Labs   Lab 06/19/24  0739 06/19/24  1147 06/19/24  1737 06/19/24  2057 06/20/24  0720 06/20/24  1057 06/20/24  1638 06/20/24  2016 06/20/24  2227 06/21/24  0714   POCTGLUCOSE 219* 258* 220* 202* 192* 289* 302* 243* 222* 206*       Recent Labs   Lab 07/31/23  0955 03/19/24  0830 06/14/24  1539   Hemoglobin A1C 8.8 H 9.5 H 6.3 H       Recent Labs   Lab 06/14/24  1229 06/15/24  0556 06/19/24  0322 06/20/24  0458 06/21/24  0410   WBC 20.81*   < > 19.14* 21.32* 21.39*   HGB 11.5*   < > 10.6* 10.2* 9.8*   HCT 34.9*   < > 32.5* 31.3* 30.1*   *   < > 613* 560* 560*   MCV 81*   < > 80* 79* 81*   MCHC 33.0   < > 32.6 32.6 32.6   MONO 7.9  1.6*  --   --   --   --    EOSINOPHIL 0.3  --   --   --   --     < > = values in this interval not displayed.       Recent Labs   Lab 06/16/24  0453 06/17/24  0255 06/18/24  0234 06/19/24  0322 06/20/24  0458   BILITOT 0.6 0.6  --  0.7  --    PROT 5.9* 6.0  --  6.2  --    ALBUMIN 1.4* 1.3* 1.3* 1.3* 1.3*   ALKPHOS 230* 235*  --  312*  --    ALT 14 12  --  23  --    AST  22 23  --  45*  --        Recent Labs   Lab 12/16/22  1238 03/08/24  1034 03/25/24  1022 06/14/24  1337   Color, UA Yellow Yellow Yellow [place]ickman A   Appearance, UA Clear Clear Hazy A Cloudy A   pH, UA 6.0 5.0 6.0 6.0   Specific [place], UA 1.020 1.010 1.020 1.020   Protein, UA 1+ A Trace A 1+ A 2+ A   Glucose, UA 1+ A 3+ A Negative Trace A   Ketones, UA Negative Negative Negative Negative   Urobilinogen, UA  --  Negative Negative Negative   Bilirubin (UA) Negative Negative Negative Negative   Occult Blood UA 3+ A 2+ A 2+ A 3+ A   Nitrite, UA Negative Negative Negative Negative   RBC, UA 20 H 13 H >100 H >100 H   WBC, UA 81 H 3 18 H >100 H   Bacteria Rare None Rare Many A   Hyaline Casts, UA 0  --  0 0             Microbiology Results (last 7 days)       Procedure Component Value Units Date/Time    Aerobic culture [[id number]] Collected: 06/20/24 1204    Order Status: Completed Specimen: Abscess from Shoulder, Left Updated: 06/21/24 0930     Aerobic Bacterial Culture No growth    Aerobic culture [[id number]]  (Abnormal)  (Susceptibility) Collected: 06/18/24 1251    Order Status: Completed Specimen: Wound from Toe, Right Foot Updated: 06/21/24 0755     Aerobic Bacterial Culture METHICILLIN RESISTANT STAPHYLOCOCCUS AUREUS  Few  Results called to and read back by Rich Crockett RN-[facility];    06/20/2024  15:05 CJD      Gram stain [[id number]] Collected: 06/20/24 1204    Order Status: Completed Specimen: Abscess from Shoulder, Left Updated: 06/20/24 2236     Gram Stain Result Many WBC's      No organisms seen    Culture, Anaerobic [[id number]] Collected: 06/20/24 1204    Order Status: Sent Specimen: Abscess from Shoulder, Left Updated: 06/20/24 1923    Fungus culture [[id number]] Collected: 06/20/24 1204    Order Status: Sent Specimen: Abscess from Shoulder, Left Updated: 06/20/24 1923    Blood culture [[id number]] Collected: 06/14/24 1355    Order Status: Completed Specimen: Blood from Peripheral, Antecubital, Right  Updated: 06/19/24 2032     Blood Culture, Routine No growth after 5 days.    Blood culture [[id number]] Collected: 06/14/24 1355    Order Status: Completed Specimen: Blood from Peripheral, Hand, Right Updated: 06/19/24 2032     Blood Culture, Routine No growth after 5 days.    Culture, Anaerobe [[id number]] Collected: 06/18/24 1251    Order Status: Sent Specimen: Wound from Toe, Right Foot Updated: 06/18/24 1925    Urine culture [[id number]] Collected: 06/14/24 1337    Order Status: Completed Specimen: Urine Updated: 06/17/24 0709     Urine Culture, Routine No growth    Narrative:      Specimen Source->Urine            Review of patient's allergies indicates:  No Known Allergies    Outpatient meds:  No current facility-administered medications on file prior to encounter.     Current Outpatient Medications on File Prior to Encounter   Medication Sig Dispense Refill    apixaban (ELIQUIS) 5 mg Tab Take 1 tablet (5 mg total) by mouth 2 (two) times daily. 60 tablet 1    atorvastatin (LIPITOR) 10 MG tablet Take 1 tablet (10 mg total) by mouth once daily. 90 tablet 3    co-enzyme Q-10 30 mg capsule Take 30 mg by mouth once daily.      doxycycline (VIBRAMYCIN) 100 MG Cap Take 100 mg by mouth 2 (two) times daily.      ergocalciferol, vitamin D2, (VITAMIN D ORAL) Take 1 tablet by mouth once daily.      glimepiride (AMARYL) 2 MG tablet Take 1 tablet (2 mg total) by mouth before breakfast. 90 tablet 3    hydrALAZINE (APRESOLINE) 25 MG tablet Take 1 tablet (25 mg total) by mouth every 12 (twelve) hours. 60 tablet 3    metFORMIN (GLUCOPHAGE-XR) 500 MG ER 24hr tablet Take 2 tablets (1,000 mg total) by mouth 2 (two) times daily with meals. 360 tablet 3    metoprolol succinate (TOPROL-XL) 100 MG 24 hr tablet Take 1 tablet (100 mg total) by mouth once daily. (Patient taking differently: Take 100 mg by mouth nightly.) 90 tablet 3       Scheduled meds:   atorvastatin  10 mg Oral QHS    cefTRIAXone (Rocephin) IV (PEDS and ADULTS)  2 g  Intravenous Q24H    clindamycin IV (PEDS and ADULTS)  600 mg Intravenous Q8H    clotrimazole  10 mg Oral 5x Daily    insulin glargine U-100  10 Units Subcutaneous Daily    LIDOcaine  1 patch Transdermal Q24H    metoprolol succinate  100 mg Oral QHS    mupirocin   Nasal BID    sodium chloride 0.9%  10 mL Intravenous Q6H       Infusions:          PRN meds:    Current Facility-Administered Medications:     albuterol-ipratropium, 3 mL, Nebulization, Q6H PRN    aluminum & magnesium hydroxide-simethicone, 15 mL, Oral, QID PRN    dextrose 10%, 12.5 g, Intravenous, PRN    dextrose 10%, 25 g, Intravenous, PRN    glucagon (human recombinant), 1 mg, Intramuscular, PRN    glucose, 16 g, Oral, PRN    glucose, 24 g, Oral, PRN    HYDROcodone-acetaminophen, 1 tablet, Oral, Q4H PRN    insulin aspart U-100, 0-10 Units, Subcutaneous, QID (AC + HS) PRN    metoprolol, 5 mg, Intravenous, Q5 Min PRN    naloxone, 0.02 mg, Intravenous, PRN    ondansetron, 4 mg, Intravenous, Q8H PRN    prochlorperazine, 5 mg, Intravenous, Q6H PRN    simethicone, 1 tablet, Oral, QID PRN    sodium chloride 0.9%, 10 mL, Intravenous, Q12H PRN    Flushing PICC/Midline Protocol, , , Until Discontinued **AND** sodium chloride 0.9%, 10 mL, Intravenous, Q6H **AND** sodium chloride 0.9%, 10 mL, Intravenous, PRN    Pharmacy to dose Vancomycin consult, , , Once **AND** vancomycin - pharmacy to dose, , Intravenous, pharmacy to manage frequency    Past Medical History:   Diagnosis Date    A-fib 2024    Diabetes mellitus, type 2 2021    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  Social History     Tobacco Use    Smoking status: Never    Smokeless tobacco: Never   Substance Use Topics    Alcohol use: Yes     Alcohol/week: 0.0 standard drinks of alcohol     Comment: social    Drug use: No       OBJECTIVE:     Vital Signs and IO:  Temp:  [97.8 °F (36.6 °C)-98.9 °F (37.2 °C)]   Pulse:  []   Resp:  [16-20]   BP: ()/(55-90)   SpO2:  [92 %-95 %]   I/O last 3 completed shifts:  In: 1310 [P.O.:1310]  Out: 2850 [Urine:2850]  Wt Readings from Last 5 Encounters:   06/14/24 (!) 138 kg (304 lb 3.8 oz)   06/04/24 132.5 kg (292 lb 1.8 oz)   05/07/24 132.5 kg (292 lb 1.8 oz)   04/08/24 132.5 kg (292 lb 3.2 oz)   03/26/24 (!) 136.1 kg (300 lb 0.7 oz)     Body mass index is 41.26 kg/m².    Physical Exam  Constitutional:       General: Patient is not in acute distress.     Appearance: Patient is well-developed. She is not diaphoretic.   HENT:      Head: Normocephalic and atraumatic.      Mouth/Throat: Mucous membranes are moist.   Eyes:      General: No scleral icterus.     Pupils: Pupils are equal, round, and reactive to light.   Cardiovascular:      Rate and Rhythm: Normal rate and regular rhythm.   Pulmonary:      Effort: Pulmonary effort is normal. No respiratory distress.      Breath sounds: No stridor.   Abdominal:      General: There is no distension.      Palpations: Abdomen is soft.   Musculoskeletal:         General: No deformity. Normal range of motion.      Cervical back: Neck supple.   Skin:     General: Skin is warm and dry.      Findings: No rash present. No erythema.   Neurological:      Mental Status: Patient is alert and oriented to person, place, and time.      Cranial Nerves: No cranial nerve deficit.   Psychiatric:         Behavior: Behavior normal.          Patient care time was spent personally by me on the following activities:     Obtaining a history.  Examination of patient.  Providing medical care at the patients bedside.  Developing a treatment plan  with patient or surrogate and bedside caregivers.  Ordering and reviewing laboratory studies, radiographic studies, pulse oximetry.  Ordering and performing treatments and interventions.  Evaluation of patient's response to treatment.  Discussions with consultants while on the unit and immediately available to the patient.  Re-evaluation of the patient's condition.  Documentation in the medical record.     Von Braxton MD    [facility]  [address] [place] [id number]    [phone] - tel  [phone] - fax    6/21/2024

## 2024-06-21 NOTE — ANESTHESIA PREPROCEDURE EVALUATION
06/21/2024  Roosevelt Moser is a 73 y.o., male.      Pre-op Assessment    I have reviewed the Patient Summary Reports.     I have reviewed the Nursing Notes. I have reviewed the NPO Status.   I have reviewed the Medications.     Review of Systems  Anesthesia Hx:  No problems with previous Anesthesia                Social:  Non-Smoker       Hematology/Oncology:                   Hematology Comments: Sepsis                      Cardiovascular:     Hypertension, well controlled    Dysrhythmias (w/RVR) atrial fibrillation      hyperlipidemia                             Pulmonary:        Sleep Apnea                Renal/:  Chronic Renal Disease, ARF, CKD                Musculoskeletal:  Arthritis               Neurological:    Neuromuscular Disease,       Guillian [place]      Peripheral Neuropathy                          Endocrine:  Diabetes, well controlled, type 2         Morbid Obesity / BMI > 40      Physical Exam  General: Well nourished, Cooperative, Alert and Oriented    Airway:  Mallampati: II   Mouth Opening: Normal  TM Distance: Normal  Neck ROM: Normal ROM    Anesthesia Plan  Type of Anesthesia, risks & benefits discussed:    Anesthesia Type: Gen ETT, Gen Supraglottic Airway, Gen Natural Airway, MAC  Intra-op Monitoring Plan: Standard ASA Monitors  Post Op Pain Control Plan: multimodal analgesia  Induction:  IV  Airway Plan: Direct, Video and Fiberoptic, Post-Induction  Informed Consent: Informed consent signed with the Patient and all parties understand the risks and agree with anesthesia plan.  All questions answered.   ASA Score: 3    Ready For Surgery From Anesthesia Perspective.   .

## 2024-06-21 NOTE — PLAN OF CARE
Problem: Adult Inpatient Plan of Care  Goal: Plan of Care Review  Outcome: Progressing  Goal: Readiness for Transition of Care  Outcome: Progressing     Problem: Diabetes Comorbidity  Goal: Blood Glucose Level Within Targeted Range  Outcome: Progressing     Problem: Acute Kidney Injury/Impairment  Goal: Fluid and Electrolyte Balance  Outcome: Progressing  Goal: Effective Renal Function  Outcome: Progressing     Problem: Sepsis/Septic Shock  Goal: Optimal Coping  Outcome: Progressing  Goal: Optimal Nutrition Intake  Outcome: Progressing     Problem: Skin Injury Risk Increased  Goal: Skin Health and Integrity  Outcome: Progressing     Problem: Bariatric Environmental Safety  Goal: Safety Maintained with Care  Outcome: Progressing     Problem: Infection  Goal: Absence of Infection Signs and Symptoms  Outcome: Progressing

## 2024-06-21 NOTE — ASSESSMENT & PLAN NOTE
In setting of acute UTI, MRSA OM right 2nd toe, and infected left shoulder hardware  BCX NGTD  Ur CX neg  Rocephin was given empirically for UTI, changed to Zosyn > added vanc/clinda, now on Rocephin/clinda, vanc/zosyn stopped per ID  Oral clotrimazole added for thrush  PCT and WBC trended down, WBC staying up a little now but pt looks good  No obstruction on renal u/s  Has gas containing abscess in and around left shoulder w/ malpositioned hardware, likely d/t falls, hard to say how the bug entered  Long d/w pt/wife, ortho - today he's having washout and hardware removal w/ Dr Bernal  F/u w/ Dr Kenneth mann d/c  He's urinating well

## 2024-06-21 NOTE — ANESTHESIA POSTPROCEDURE EVALUATION
Anesthesia Post Evaluation    Patient: Roosevelt Moser    Procedure(s) Performed: Procedure(s) (LRB):  ARTHROTOMY, SHOULDER (Left)    Final Anesthesia Type: general      Patient location during evaluation: PACU  Patient participation: Yes- Able to Participate  Level of consciousness: awake and alert and oriented  Post-procedure vital signs: reviewed and stable  Pain management: adequate  Airway patency: patent    PONV status at discharge: No PONV  Anesthetic complications: no      Cardiovascular status: blood pressure returned to baseline and stable  Respiratory status: unassisted and spontaneous ventilation  Hydration status: euvolemic  Follow-up not needed.              Vitals Value Taken Time   /66 06/21/24 1239   Temp 36.6 °C (97.9 °F) 06/21/24 1239   Pulse 99 06/21/24 1239   Resp 18 06/21/24 1239   SpO2 100 % 06/21/24 1239         No case tracking events are documented in the log.      Pain/Duane Score: Pain Rating Prior to Med Admin: 8 (6/21/2024  9:11 AM)  Pain Rating Post Med Admin: 6 (6/21/2024 10:11 AM)

## 2024-06-21 NOTE — NURSING
Returns to room 213 S/P drainage of left shoulder VSS afebrile bandaide dressing in place, Tele monitor in place pure wick in place  denies pain no acute distress noted at this time.

## 2024-06-21 NOTE — ANESTHESIA PROCEDURE NOTES
Intubation    Date/Time: 6/21/2024 2:38 PM    Performed by: Ben Matthew CRNA  Authorized by: Milind Neal MD    Intubation:     Induction:  Rapid sequence induction    Intubated:  Postinduction    Mask Ventilation:  Not attempted    Attempts:  1    Attempted By:  CRNA    Method of Intubation:  Video laryngoscopy    Blade:  Blunt 3    Laryngeal View Grade: Grade I - full view of cords      Difficult Airway Encountered?: No      Complications:  None    Airway Device:  Oral endotracheal tube    Airway Device Size:  7.5    Style/Cuff Inflation:  Cuffed (inflated to minimal occlusive pressure)    Tube secured:  23    Secured at:  The lips    Placement Verified By:  Capnometry    Complicating Factors:  None    Findings Post-Intubation:  BS equal bilateral and atraumatic/condition of teeth unchanged

## 2024-06-21 NOTE — PT/OT/SLP PROGRESS
Occupational Therapy      Patient Name:  Roosevelt Moser   MRN:  [id number]    Patient not seen today secondary to patient awaiting surgical procedure. Will follow-up.    6/21/2024

## 2024-06-21 NOTE — PLAN OF CARE
Problem: Adult Inpatient Plan of Care  Goal: Plan of Care Review  Outcome: Progressing  Goal: Readiness for Transition of Care  Outcome: Progressing     Problem: Diabetes Comorbidity  Goal: Blood Glucose Level Within Targeted Range  Outcome: Progressing     Problem: Acute Kidney Injury/Impairment  Goal: Fluid and Electrolyte Balance  Outcome: Progressing     Problem: Sepsis/Septic Shock  Goal: Optimal Nutrition Intake  Outcome: Progressing     Problem: Skin Injury Risk Increased  Goal: Skin Health and Integrity  Outcome: Progressing     Problem: Bariatric Environmental Safety  Goal: Safety Maintained with Care  Outcome: Progressing     Problem: Wound  Goal: Optimal Coping  Outcome: Progressing  Goal: Optimal Wound Healing  Outcome: Progressing   Refused to wear home cpap over night.         Revision History

## 2024-06-21 NOTE — ANESTHESIA PROCEDURE NOTES
Left interscalene    Patient location during procedure: pre-op   Block not for primary anesthetic.  Reason for block: at surgeon's request and post-op pain management   Post-op Pain Location: left shoulder pain   Start time: 6/21/2024 1:55 PM  Timeout: 6/21/2024 1:55 PM   End time: 6/21/2024 2:00 PM    Staffing  Authorizing Provider: Milind Neal MD  Performing Provider: Milind Neal MD    Staffing  Performed by: Milind Neal MD  Authorized by: Milind Neal MD    Preanesthetic Checklist  Completed: patient identified, IV checked, site marked, risks and benefits discussed, surgical consent, monitors and equipment checked, pre-op evaluation and timeout performed  Peripheral Block  Patient position: sitting  Prep: ChloraPrep  Patient monitoring: heart rate, cardiac monitor, continuous pulse ox, continuous capnometry and frequent blood pressure checks  Block type: interscalene  Laterality: left  Injection technique: single shot  Needle  Needle type: Stimuplex   Needle gauge: 22 G  Needle length: 2 in  Needle localization: anatomical landmarks and ultrasound guidance  Needle insertion depth: 6 cm   -ultrasound image captured on disc.  Assessment  Injection assessment: negative aspiration, negative parasthesia and local visualized surrounding nerve  Paresthesia pain: none  Heart rate change: no  Slow fractionated injection: yes  Pain Tolerance: comfortable throughout block and no complaints  Medications:    Medications: BUPivacaine liposome (PF) 1.3 % (13.3 mg/mL) suspension - Injection   10 mL - 6/21/2024 1:55:00 PM  bupivacaine (pf) (MARCAINE) injection 0.5% - Perineural   10 mL - 6/21/2024 1:55:00 PM    Additional Notes  VSS.  [facility] RN monitoring vitals throughout procedure.  Patient tolerated procedure well.     Exparel 10ml + Marcaine 0.5% 10ml dosed under direct Ultrasound guidance.

## 2024-06-21 NOTE — PT/OT/SLP PROGRESS
Physical Therapy      Patient Name:  Roosevelt Moser   MRN:  [id number]    Patient not seen today secondary to Other (Comment) (treatment deferred , awaiting surgical procedure today.). Will follow-up .

## 2024-06-21 NOTE — ASSESSMENT & PLAN NOTE
Chronic AF w/ acute RVR, resolved  Required dilt drip on admission but off since 6/15  Tele some PVC/bigem/trigem  TSH 1.8 in March  Monitor lytes  No etoh/drug use  TTE from March 2024:    Left Ventricle: The left ventricle is normal in size. Normal wall thickness. Mild global hypokinesis present. There is mildly reduced systolic function with a visually estimated ejection fraction of 45 - 50%. There is normal diastolic function.    Right Ventricle: Normal right ventricular cavity size. Wall thickness is normal. Right ventricle wall motion  is normal. Systolic function is normal.    Left Atrium: Left atrium is moderately dilated.    IVC/SVC: Normal venous pressure at 3 mmHg.  OAC has been on hold d/t possible need for shoulder surgery - has been on prophylaxis dose of Lovenox, this was help today for shoulder operation  Resume Eliquis as soon as feasible postop

## 2024-06-21 NOTE — OP NOTE
[facility]/Surg  Orthopedic Surgery  Operative Note    SUMMARY     Date of Procedure: 6/21/2024     Procedure: Procedure(s) (LRB):  ARTHROTOMY, SHOULDER (Left) of the glenohumeral joint  with incision and drainage for infection   Removal of deep hardware including lizabeth and screws.    Surgeons and Role:     * Roman Paulson MD - Primary    Assistant: Shaun Henry    Pre-Operative Diagnosis: Pyogenic arthritis of left shoulder region, due to unspecified organism [M00.9]    Post-Operative Diagnosis: Post-Op Diagnosis Codes:     * Pyogenic arthritis of left shoulder region, due to unspecified organism [M00.9]    Anesthesia: General    Description of the Findings of the Procedure:  Patient had abundant pus upon incision.  Fracture was not healed at all as well.    Complications: No    Estimated Blood Loss (EBL): 200 mL           Implants: * No implants in log *    Specimens:   Cultures of the left shoulder wound    Condition: Good    Disposition: PACU - hemodynamically stable.    Attestation: I was present and scrubbed for the entire procedure.    INDICATIONS FOR THE PROCEDURE: A 73-year-old male patient of Dr. Sánchez who had a previous proximal humerus fracture who underwent IM nail for open reduction internal fixation.  Patient began to have pain a few days ago and CT scan was done which showed possible abscess.  Aspiration of the fluid collection was performed which did confirm purulence.  Patient was consented for I and D with removal of infected hardware.    PROCEDURE IN DETAIL: Risks, benefits and alternatives of the procedure were   explained to the patient including, but not limited to damage to nerves,   arteries or blood vessels. Also, explained risk of instability, infection,   Continued pain, need for future surgeries, DVT, PE as well as anesthetic   complications including seizure, stroke, heart attack and death. he understood  this and signed informed consent. The patient's Left  shoulder was marked   prior to coming to the Operating Room. Once there a formal timeout was done in which   correct patient, procedure and operating site were all correctly identified and   confirmed by the entire operating team, 2 g of Ancef given prior to surgical   incision. General endotracheal anesthesia was induced. The patient was placed   in a relaxed beach chair type position. The patient's Left upper extremity was  prepped and draped in normal sterile fashion.  Patient's previous incisions were reopened starting with the 1 for the nail insertion..  Purulent material was expressed and abundant upon making the incision.  This tracked all the way down to the fracture site and hardware was made in purulence.  We then spent some time exposing the top of the nail.  Nail out  was then placed back onto the nail and tightened up.  We then reused the target her to remove all the screws from the nail and then removed the nail itself.  Fluoroscopy was confirmed to obtain that all hardware was removed.  Fracture is not healed and still relatively mobile.  Because of the fracture.  The wound went down into the glenohumeral joint itself.  We then spent some time doing a little debridement with some rongeur around the nail insertion.  We then copiously irrigated out the wound with 6 liters of normal saline using a Pulsavac.  We then proceeded with closing.  The screw hole incisions were closed with nylon.  The nail incision was packed very loosely with half-inch iodoform gauze and closed with nylon as well.    Sterile dressing was applied including Adaptic 4x4s ABDs and paper tape.. he   was placed in a sling, extubated, awakened, transferred from the Operating Room   to the Recovery Room in stable condition.

## 2024-06-21 NOTE — ANESTHESIA POSTPROCEDURE EVALUATION
Anesthesia Post Evaluation    Patient: Roosevelt Moser    Procedure(s) Performed: Procedure(s) (LRB):  ARTHROTOMY, SHOULDER (Left)    Final Anesthesia Type: general      Patient location during evaluation: PACU  Patient participation: Yes- Able to Participate  Level of consciousness: awake and alert and oriented  Post-procedure vital signs: reviewed and stable  Pain management: adequate  Airway patency: patent    PONV status at discharge: No PONV  Anesthetic complications: no      Cardiovascular status: blood pressure returned to baseline and stable  Respiratory status: unassisted and spontaneous ventilation  Hydration status: euvolemic  Follow-up not needed.              Vitals Value Taken Time   /53 06/21/24 1624   Temp   06/21/24 1627   Pulse 110 06/21/24 1627   Resp 34 06/21/24 1627   SpO2 96 % 06/21/24 1627   Vitals shown include unfiled device data.      No case tracking events are documented in the log.      Pain/Duane Score: Pain Rating Prior to Med Admin: 8 (6/21/2024  9:11 AM)  Pain Rating Post Med Admin: 6 (6/21/2024 10:11 AM)

## 2024-06-21 NOTE — PROGRESS NOTES
Pharmacokinetic Assessment Follow Up: IV Vancomycin    Vancomycin serum concentration assessment(s):    The random level was drawn correctly and can be used to guide therapy at this time. The measurement is within the desired definitive target range of 15 to 20 mcg/mL.    Vancomycin Regimen Plan:    Change regimen to Vancomycin 1000 mg IV once with next serum trough concentration measured at 1800 prior to next dose on 6/22    Drug levels (last 3 results):  Recent Labs   Lab Result Units 06/19/24  1246 06/20/24  1623 06/21/24  1655   Vancomycin, Random ug/mL 15.8 16.3 15.3       Pharmacy will continue to follow and monitor vancomycin.    Please contact pharmacy at extension [phone] for questions regarding this assessment.    Thank you for the consult,   Kenroy Man       Patient brief summary:  Roosevelt Moser is a 73 y.o. male initiated on antimicrobial therapy with IV Vancomycin for treatment of bone/joint infection      Drug Allergies:   Review of patient's allergies indicates:  No Known Allergies    Actual Body Weight:   137.9 kg    Renal Function:   Estimated Creatinine Clearance: 40 mL/min (A) (based on SCr of 2.4 mg/dL (H)).,     Dialysis Method (if applicable):  N/A    CBC (last 72 hours):  Recent Labs   Lab Result Units 06/19/24  0322 06/20/24  0458 06/21/24  0410   WBC K/uL 19.14* 21.32* 21.39*   Hemoglobin g/dL 10.6* 10.2* 9.8*   Hematocrit % 32.5* 31.3* 30.1*   Platelets K/uL 613* 560* 560*       Metabolic Panel (last 72 hours):  Recent Labs   Lab Result Units 06/19/24  0322 06/20/24  0458 06/21/24  0405   Sodium mmol/L 140 138 138   Potassium mmol/L 4.0 4.0 3.7   Chloride mmol/L 98 99 100   CO2 mmol/L 25 25 25   Glucose mg/dL 198* 179* 206*   BUN mg/dL 73* 78* 82*   Creatinine mg/dL 2.8* 2.5* 2.4*   Albumin g/dL 1.3* 1.3*  --    Total Bilirubin mg/dL 0.7  --   --    Alkaline Phosphatase U/L 312*  --   --    AST U/L 45*  --   --    ALT U/L 23  --   --    Magnesium mg/dL 2.0  --   --    Phosphorus mg/dL  --  4.2  --         Vancomycin Administrations:  vancomycin given in the last 96 hours                     vancomycin 750 mg in 0.9% NaCl 250 mL IVPB (Vial-Mate) (mg) 750 mg New Bag 06/20/24 1845    vancomycin 750 mg in sodium chloride 0.9% 250 mL IVPB (Vial-Mate) (mg) 750 mg New Bag 06/19/24 1930    vancomycin (VANCOCIN) 2,000 mg in dextrose 5 % (D5W) 500 mL IVPB (mg) 2,000 mg New Bag 06/18/24 1426                    Microbiologic Results:  Microbiology Results (last 7 days)       Procedure Component Value Units Date/Time    Aerobic culture [[id number]] Collected: 06/21/24 1645    Order Status: Sent Specimen: Abscess from Shoulder, Left Updated: 06/21/24 1706    Culture, Anaerobic [[id number]] Collected: 06/21/24 1645    Order Status: Sent Specimen: Abscess from Shoulder, Left Updated: 06/21/24 1706    AFB Culture & Smear [[id number]] Collected: 06/21/24 1645    Order Status: Sent Specimen: Abscess from Shoulder, Left Updated: 06/21/24 1706    Fungus culture [[id number]] Collected: 06/21/24 1645    Order Status: Sent Specimen: Abscess from Shoulder, Left Updated: 06/21/24 1706    Gram stain [[id number]] Collected: 06/21/24 1645    Order Status: Sent Specimen: Abscess from Shoulder, Left Updated: 06/21/24 1706    Gram stain [[id number]] Collected: 06/20/24 1204    Order Status: Completed Specimen: Abscess from Shoulder, Left Updated: 06/21/24 1520     Gram Stain Result Many WBC's      No organisms seen    Culture, Anaerobe [[id number]] Collected: 06/18/24 1251    Order Status: Completed Specimen: Wound from Toe, Right Foot Updated: 06/21/24 1428     Anaerobic Culture No anaerobes isolated    Aerobic culture [[id number]] Collected: 06/20/24 1204    Order Status: Completed Specimen: Abscess from Shoulder, Left Updated: 06/21/24 0930     Aerobic Bacterial Culture No growth    Aerobic culture [[id number]]  (Abnormal)  (Susceptibility) Collected: 06/18/24 1251    Order Status: Completed Specimen: Wound from Toe, Right Foot Updated:  06/21/24 0755     Aerobic Bacterial Culture METHICILLIN RESISTANT STAPHYLOCOCCUS AUREUS  Few  Results called to and read back by Rich Crockett RN-[facility];    06/20/2024  15:05 CJD      Culture, Anaerobic [[id number]] Collected: 06/20/24 1204    Order Status: Sent Specimen: Abscess from Shoulder, Left Updated: 06/20/24 1923    Fungus culture [[id number]] Collected: 06/20/24 1204    Order Status: Sent Specimen: Abscess from Shoulder, Left Updated: 06/20/24 1923    Blood culture [[id number]] Collected: 06/14/24 1355    Order Status: Completed Specimen: Blood from Peripheral, Antecubital, Right Updated: 06/19/24 2032     Blood Culture, Routine No growth after 5 days.    Blood culture [[id number]] Collected: 06/14/24 1355    Order Status: Completed Specimen: Blood from Peripheral, Hand, Right Updated: 06/19/24 2032     Blood Culture, Routine No growth after 5 days.    Urine culture [[id number]] Collected: 06/14/24 1337    Order Status: Completed Specimen: Urine Updated: 06/17/24 0709     Urine Culture, Routine No growth    Narrative:      Specimen Source->Urine

## 2024-06-21 NOTE — TRANSFER OF CARE
Anesthesia Transfer of Care Note    Patient: Roosevelt Moser    Procedure(s) Performed: Procedure(s) (LRB):  ARTHROTOMY, SHOULDER (Left)    Patient location: PACU    Anesthesia Type: general    Transport from OR: Transported from OR on 6-10 L/min O2 by face mask with adequate spontaneous ventilation    Post pain: adequate analgesia    Post assessment: no apparent anesthetic complications and tolerated procedure well    Post vital signs: stable    Level of consciousness: sedated and responds to stimulation    Nausea/Vomiting: no nausea/vomiting    Complications: none    Transfer of care protocol was followed      Last vitals: Visit Vitals  /66 (BP Location: Left leg, Patient Position: Lying)   Pulse 99   Temp 36.6 °C (97.9 °F)   Resp 18   Ht 6' (1.829 m)   Wt (!) 137.9 kg (304 lb)   SpO2 100%   BMI 41.23 kg/m²

## 2024-06-21 NOTE — SUBJECTIVE & OBJECTIVE
Interval History:     6/15-21 see course    Review of Systems    11 pt ROS negative except as noted o/w    Objective:     Vital Signs (Most Recent):  Temp: 97.9 °F (36.6 °C) (06/21/24 1239)  Pulse: 99 (06/21/24 1239)  Resp: 18 (06/21/24 1239)  BP: 135/66 (06/21/24 1239)  SpO2: 100 % (06/21/24 1239) Vital Signs (24h Range):  Temp:  [97.8 °F (36.6 °C)-98.9 °F (37.2 °C)] 97.9 °F (36.6 °C)  Pulse:  [] 99  Resp:  [16-20] 18  SpO2:  [92 %-100 %] 100 %  BP: ()/(55-90) 135/66     Weight: (!) 137.9 kg (304 lb)  Body mass index is 41.23 kg/m².    Intake/Output Summary (Last 24 hours) at 6/21/2024 1427  Last data filed at 6/21/2024 1303  Gross per 24 hour   Intake 480 ml   Output 2500 ml   Net -2020 ml         Physical Exam      General:  A&O, NAD, continues to be very jovial and non toxic  HEENT: neck supple, PERRL/EOMI, EAC clear bilaterally, normal bilateral carotid upstroke w/o bruits, inf turbs clear bilaterally, OC/OP clear  Lungs: CTAB  CV: RRR w/o M/G/R  Abdomen: soft, NTND, no HSM, no bruits/masses/pulsations, obese  Ext: no edema. Right foot 2nd toe wrapped  : ext cath and urine clearing up, dark but not blood tinged now  Rectal: def  Neuro: NF    Significant Labs: All pertinent labs within the past 24 hours have been reviewed.    Significant Imaging: I have reviewed all pertinent imaging results/findings within the past 24 hours.

## 2024-06-21 NOTE — PROGRESS NOTES
"[facility]   Department of Infectious Disease  Progress Note        PATIENT NAME: Roosevelt Moser  MRN: [id number]  TODAY'S DATE: 06/21/2024  ADMIT DATE: 6/14/2024  LOS: 7 days    CHIEF COMPLAINT: Weakness (Pt brought to ED via EMS with complaint of weakness and falling, pt advised EMS his urine is very dark.  )      PRINCIPLE PROBLEM: Acute renal failure superimposed on chronic kidney disease    INTERVAL HISTORY      06/19/2024: Seen and evaluated at bedside.  States left upper extremity pain better.  Having improved movement at the wrist and forearm.  Seen by Orthopedic surgery PA yesterday.  Notes reviewed.  Blood cultures remain negative.      06/20/2024:  Oral anticoagulants on hold to allow left shoulder arthrocentesis.  No other acute issues overnight.  Culture from right 3rd toe growing Staphylococcus aureus.  Blood culture remain negative.  Had aspiration left shoulder today that yielded purulent material.  Synovial fluid studies in progress.    06/21/2024:  Seen by Orthopedic surgery Service again yesterday.  For I and D today.  All cultures so far negative.  ASO titer normal.    Antibiotics (From admission, onward)      Start     Stop Route Frequency Ordered    06/19/24 1045  cefTRIAXone (ROCEPHIN) 2 g in dextrose 5 % in water (D5W) 100 mL IVPB (MB+)         -- IV Every 24 hours (non-standard times) 06/19/24 0932    06/18/24 1400  clindamycin in D5W 600 mg/50 mL IVPB 600 mg         -- IV Every 8 hours (non-standard times) 06/18/24 1212    06/18/24 1311  vancomycin - pharmacy to dose  (vancomycin IVPB (PEDS and ADULTS))        Placed in "And" Linked Group    -- IV pharmacy to manage frequency 06/18/24 1212    06/17/24 2100  mupirocin 2 % ointment         06/22/24 2059 Nasl 2 times daily 06/17/24 1544          Antifungals (From admission, onward)      Start     Stop Route Frequency Ordered    06/19/24 1045  clotrimazole megha 10 mg         06/29/24 0959 Oral 5 times daily 06/19/24 0932      "      Antivirals (From admission, onward)      None            ASSESSMENT and PLAN      1. Left upper extremity acute bacterial skin and skin structure infection.  This is complicated by left shoulder septic arthritis and probable acute femur osteomyelitis in a patient with femur hardware. Continue vancomycin, Ceftriaxone.  DC clindamycin after 5 days i.e./23/24.  He will need antibiotics for 6 weeks through 08/03/2024.  For I&D with possible hardware removal today.     2. Right 3rd toe osteomyelitis.  He previously received?  Dalbavancin x2 doses.  Ulcer practically healed.  We will consider this as partially treated.  Antibiotics as above for this as well.    3. MRSA Right 2nd toe tip with possible early osteomyelitis of the distal tuft.  It was debrided at bedside by podiatrist.  He will need 6 week course of antibiotics for this indication.      4. ARF.  This is resolving.  Management as per nephrologist.       5. Diabetes mellitus.  Management as per hospitalist.       6. Debility.     7. Oral thrush.  HIV screen negative.  This is most likely secondary to hyperglycemia related to his diabetes mellitus.  Mycelex troches x 10 days.     8. Knee arthritis.  Management as per hospitalist.      RECOMMENDATIONS:    Continue current antibiotics and reassess with synovial fluid results and sensitivity of Staphylococcus aureus  Follow other pending studies including  Await left shoulder and arm I&D and washout by orthopedic surgeon.    Id service will continue to follow with you while in the hospital.  Please send Epic secure chat with any questions.      SUBJECTIVE    Roosevelt Moser is a 73 y.o. male with history of diabetes mellitus, hypertension and previous going bowel syndrome.  He fell on 03/04/2024 and sustained a displaced comminuted fracture of the left humerus.  Seen by orthopedic surgeon with plan for ORIF which was deferred because of new AFib.  Admitted 03/09/2024 for rate control and ultimately discharged  back home 03/12/2024.  Later had left humerus ORIF 03/25/2024 as a day procedure and was discharged home.       According to his wife, he developed pain and swelling of the 3rd toe and was with an ulcer.  It appears an x-ray of the foot was unremarkable.  Received 2 doses of an antibiotic that I presume to be dalbavancin 1 week apart in early April.     In the last 2 weeks he has continued to decline.  He also has reduction in urine output.  Fell twice at home on 06/13/2024 and wife activated EMS and he was brought to the hospital.  Received IV fluid for low normal blood pressure.  Also noted to have MARBIN with creatinine 5.0 and WBC was 21 K.  UA was abnormal with> 100 WBC,> 100 RBC, 3+ LE and positive nitrite.  He has been managed conservatively for MARBIN and for dehydration.     X-ray of left shoulder and left upper extremity 06/17/2024 documented gas in soft tissue.  MRI right foot documented osteomyelitis of the 3rd toe.  I was asked to see to assist with his care.  ESR 95,  milligram/liter.     Antibiotics   Ceftriaxone:  06/14/2024-06/17/2024, 06/19/2024-  Zosyn:  06/17/2024-06/18/2024   Vancomycin:  06/18/2024-  Clindamycin:  06/18/2024-     Cultures   Blood culture 06/14/2024:  NGTD   Urine culture 06/14/2024: NGTD  Right second toe culture 06/18/2024: MRSA  Left shoulder synovial fluid culture 06/20/2024:  In progress    Review of Systems  Negative except as stated above in Interval History     OBJECTIVE   Temp:  [97.8 °F (36.6 °C)-98.9 °F (37.2 °C)] 98.4 °F (36.9 °C)  Pulse:  [] 124  Resp:  [16-20] 16  SpO2:  [92 %-95 %] 94 %  BP: ()/(55-90) 147/58  Temp:  [97.8 °F (36.6 °C)-98.9 °F (37.2 °C)]   Temp: 98.4 °F (36.9 °C) (06/21/24 0728)  Pulse: (!) 124 (06/21/24 0728)  Resp: 16 (06/21/24 0911)  BP: (!) 147/58 (06/21/24 0728)  SpO2: (!) 94 % (06/21/24 0728)    Intake/Output Summary (Last 24 hours) at 6/21/2024 0953  Last data filed at 6/21/2024 0430  Gross per 24 hour   Intake 960 ml   Output  1650 ml   Net -690 ml       Physical Exam  General:  Obese elderly man lying quietly in bed in no acute distress   Left upper extremity:  Edema with mild erythema.  Focal erythema with induration medial forearm.  Left shoulder effusion.  Minimal movement of shoulder  CVS: S1 and 2 heard   Respiratory: Clear to auscultation   Abdomen:  Full, soft, nontender, no palpable organomegaly   Skin: No rash appreciated   CNS: No focal   Musculoskeletal system:  Limited range of motion left knee without effusion.    VAD:  None  ISOLATION:  None    WOUNDS:  Abrasion left upper extremity.  Right 2nd toe ulcer    Significant Labs: All pertinent labs within the past 24 hours have been reviewed.    CBC LAST 7 DAYS  Recent Labs   Lab 06/14/24  1229 06/15/24  0556 06/16/24  0453 06/17/24  0255 06/19/24  0322 06/20/24  0458 06/21/24  0410   WBC 20.81* 14.76* 13.11* 15.42* 19.14* 21.32* 21.39*   RBC 4.33* 4.24* 4.05* 4.13* 4.08* 3.94* 3.73*   HGB 11.5* 11.2* 10.7* 10.7* 10.6* 10.2* 9.8*   HCT 34.9* 33.6* 31.8* 32.7* 32.5* 31.3* 30.1*   MCV 81* 79* 79* 79* 80* 79* 81*   MCH 26.6* 26.4* 26.4* 25.9* 26.0* 25.9* 26.3*   MCHC 33.0 33.3 33.6 32.7 32.6 32.6 32.6   RDW 18.7* 21.3* 18.1* 18.3* 18.0* 18.1* 17.8*   * 585* 496* 539* 613* 560* 560*   MPV 10.1 12.3 10.5 10.7 10.2 10.2 10.1   GRAN 81.0*  16.9*  --   --   --   --   --   --    LYMPH 8.4*  1.8  --   --   --   --   --   --    MONO 7.9  1.6*  --   --   --   --   --   --    BASO 0.10  --   --   --   --   --   --    NRBC 0  --   --   --   --   --   --        CHEMISTRY LAST 7 DAYS  Recent Labs   Lab 06/14/24  1229 06/14/24  1539 06/14/24  2029 06/15/24  0658 06/15/24  1108 06/16/24  0453 06/17/24  0255 06/18/24  0234 06/19/24  0322 06/20/24  0458 06/21/24  0405    138   < > 136 137 138 139 137 140 138 138   K 6.0* 5.8*   < > 5.6* 5.3* 4.3 4.1 3.8 4.0 4.0 3.7    108   < > 102 102 98 97 97 98 99 100   CO2 12* 12*   < > 17* 19* 24 25 25 25 25 25   ANIONGAP 18* 18*   < >  "17* 16 16 17* 15 17* 14 13   BUN 85* 86*   < > 89* 93* 87* 76* 68* 73* 78* 82*   CREATININE 5.0* 4.9*   < > 5.0* 4.9* 4.4* 3.3* 2.7* 2.8* 2.5* 2.4*    91   < > 214* 222* 234* 143* 225* 198* 179* 206*   CALCIUM 9.1 9.1   < > 8.7 8.5* 8.3* 8.5* 8.6* 8.8 8.7 9.0   MG  --  1.3*  --  1.7  --  1.6 1.5* 1.5* 2.0  --   --    ALBUMIN 1.6*  --   --  1.4*  --  1.4* 1.3* 1.3* 1.3* 1.3*  --    PROT 6.5  --   --  6.2  --  5.9* 6.0  --  6.2  --   --    ALKPHOS 270*  --   --  238*  --  230* 235*  --  312*  --   --    ALT 22  --   --  19  --  14 12  --  23  --   --    AST 39  --   --  31  --  22 23  --  45*  --   --    BILITOT 0.6  --   --  0.5  --  0.6 0.6  --  0.7  --   --     < > = values in this interval not displayed.       Estimated Creatinine Clearance: 40.1 mL/min (A) (based on SCr of 2.4 mg/dL (H)).    INFLAMMATORY/PROCAL  LAST 7 DAYS  Recent Labs   Lab 06/14/24  1229 06/16/24  0453 06/18/24  0233 06/20/24  0458   PROCAL 19.75* 7.88*  --  1.41*   CRP  --   --  319.0*  --      No results found for: "ESR"  CRP   Date Value Ref Range Status   06/18/2024 319.0 (H) 0.0 - 8.2 mg/L Final       PRIOR  MICROBIOLOGY:    Susceptibility data from last 90 days.  Collected Specimen Info Organism Ceftriaxone Clindamycin Erythromycin Oxacillin Penicillin Tetracycline Trimeth/Sulfa Vancomycin   06/18/24 Wound from Toe, Right Foot Methicillin resistant Staphylococcus aureus  R  S  R  R  R  S  S  S   06/14/24 Blood from Peripheral, Antecubital, Right No growth after 5 days.           06/14/24 Blood from Peripheral, Hand, Right No growth after 5 days.               LAST 7 DAYS MICROBIOLOGY   Microbiology Results (last 7 days)       Procedure Component Value Units Date/Time    Aerobic culture [[id number]] Collected: 06/20/24 1204    Order Status: Completed Specimen: Abscess from Shoulder, Left Updated: 06/21/24 0930     Aerobic Bacterial Culture No growth    Aerobic culture [[id number]]  (Abnormal)  (Susceptibility) Collected: 06/18/24 " 1251    Order Status: Completed Specimen: Wound from Toe, Right Foot Updated: 06/21/24 0755     Aerobic Bacterial Culture METHICILLIN RESISTANT STAPHYLOCOCCUS AUREUS  Few  Results called to and read back by Rich Crockett RN-[facility];    06/20/2024  15:05 CJD      Gram stain [[id number]] Collected: 06/20/24 1204    Order Status: Completed Specimen: Abscess from Shoulder, Left Updated: 06/20/24 2236     Gram Stain Result Many WBC's      No organisms seen    Culture, Anaerobic [[id number]] Collected: 06/20/24 1204    Order Status: Sent Specimen: Abscess from Shoulder, Left Updated: 06/20/24 1923    Fungus culture [[id number]] Collected: 06/20/24 1204    Order Status: Sent Specimen: Abscess from Shoulder, Left Updated: 06/20/24 1923    Blood culture [[id number]] Collected: 06/14/24 1355    Order Status: Completed Specimen: Blood from Peripheral, Antecubital, Right Updated: 06/19/24 2032     Blood Culture, Routine No growth after 5 days.    Blood culture [[id number]] Collected: 06/14/24 1355    Order Status: Completed Specimen: Blood from Peripheral, Hand, Right Updated: 06/19/24 2032     Blood Culture, Routine No growth after 5 days.    Culture, Anaerobe [[id number]] Collected: 06/18/24 1251    Order Status: Sent Specimen: Wound from Toe, Right Foot Updated: 06/18/24 1925    Urine culture [[id number]] Collected: 06/14/24 1337    Order Status: Completed Specimen: Urine Updated: 06/17/24 0709     Urine Culture, Routine No growth    Narrative:      Specimen Source->Urine              CURRENT/PREVIOUS VISIT EKG  Results for orders placed or performed during the hospital encounter of 06/14/24   EKG 12-lead    Collection Time: 06/14/24 12:16 PM   Result Value Ref Range    QRS Duration 106 ms    [facility] QTC Calculation 443 ms    Narrative    Test Reason : R53.1,    Vent. Rate : 120 BPM     Atrial Rate : 159 BPM     P-R Int : 000 ms          QRS Dur : 106 ms      QT Int : 314 ms       P-R-T Axes : 000 -54 093 degrees     QTc  Int : 443 ms    Atrial fibrillation with rapid ventricular response  Left axis deviation  Low voltage QRS  Inferior infarct ,age undetermined  Cannot rule out Anterior infarct ,age undetermined  Abnormal ECG  When compared with ECG of 25-MAR-2024 09:14,  Vent. rate has increased BY  46 BPM  Minimal criteria for Anterior infarct are now Present  Confirmed by Jesús HATHAWAY, Hansel AMIN ([id number]) on 6/20/2024 8:43:02 PM    Referred By: [email or web address]   SELF           Confirmed By:Hansel Espinosa MD     Significant Imaging: I have reviewed all relevant and available imaging results/findings within the past 24 hours.    I spent a total of 30 minutes on the day of the visit.This includes face to face time and non-face to face time preparing to see the patient (eg, review of tests), obtaining and/or reviewing separately obtained history, documenting clinical information in the electronic or other health record, independently interpreting results and communicating results to the patient/family/caregiver, or care coordinator.    Vasquez Calvin MD  Date of Service: 06/21/2024      This note was created using [employer] voice recognition software that occasionally misinterpreted phrases or words.

## 2024-06-21 NOTE — ASSESSMENT & PLAN NOTE
Body mass index is 41.23 kg/m².  Morbid obesity complicates all aspects of disease management from diagnostic modalities to treatment  Weight loss encouraged and health benefits explained to patient  He has been working on weight loss at home w/ reduced caloric intake

## 2024-06-21 NOTE — NURSING
Patient seen for wound care second toe right foot black and necrotic cleansed with wound cleanser, covered necrotic area with aquacel AG wrapperd with kerlix secured with medpore tape tolerated well

## 2024-06-22 PROBLEM — A49.02 MRSA INFECTION: Status: ACTIVE | Noted: 2024-06-22

## 2024-06-22 LAB
ALBUMIN SERPL BCP-MCNC: 1.2 G/DL (ref 3.5–5.2)
ALP SERPL-CCNC: 217 U/L (ref 55–135)
ALT SERPL W/O P-5'-P-CCNC: 13 U/L (ref 10–44)
ANION GAP SERPL CALC-SCNC: 11 MMOL/L (ref 8–16)
ASO AB SERPL-ACNC: 76 IU/ML
AST SERPL-CCNC: 20 U/L (ref 10–40)
BILIRUB SERPL-MCNC: 0.3 MG/DL (ref 0.1–1)
BUN SERPL-MCNC: 76 MG/DL (ref 8–23)
CALCIUM SERPL-MCNC: 8.5 MG/DL (ref 8.7–10.5)
CHLORIDE SERPL-SCNC: 102 MMOL/L (ref 95–110)
CO2 SERPL-SCNC: 25 MMOL/L (ref 23–29)
CREAT SERPL-MCNC: 2.4 MG/DL (ref 0.5–1.4)
ERYTHROCYTE [DISTWIDTH] IN BLOOD BY AUTOMATED COUNT: 17.7 % (ref 11.5–14.5)
EST. GFR  (NO RACE VARIABLE): 28 ML/MIN/1.73 M^2
GLUCOSE SERPL-MCNC: 257 MG/DL (ref 70–110)
HCT VFR BLD AUTO: 26.8 % (ref 40–54)
HGB BLD-MCNC: 8.6 G/DL (ref 14–18)
MAGNESIUM SERPL-MCNC: 1.7 MG/DL (ref 1.6–2.6)
MCH RBC QN AUTO: 25.8 PG (ref 27–31)
MCHC RBC AUTO-ENTMCNC: 32.1 G/DL (ref 32–36)
MCV RBC AUTO: 81 FL (ref 82–98)
PLATELET # BLD AUTO: 549 K/UL (ref 150–450)
PMV BLD AUTO: 9.9 FL (ref 9.2–12.9)
POCT GLUCOSE: 269 MG/DL (ref 70–110)
POCT GLUCOSE: 272 MG/DL (ref 70–110)
POCT GLUCOSE: 310 MG/DL (ref 70–110)
POTASSIUM SERPL-SCNC: 4.4 MMOL/L (ref 3.5–5.1)
PROT SERPL-MCNC: 5.8 G/DL (ref 6–8.4)
RBC # BLD AUTO: 3.33 M/UL (ref 4.6–6.2)
SODIUM SERPL-SCNC: 138 MMOL/L (ref 136–145)
VANCOMYCIN SERPL-MCNC: 18.3 UG/ML
WBC # BLD AUTO: 19.81 K/UL (ref 3.9–12.7)

## 2024-06-22 PROCEDURE — 99223 1ST HOSP IP/OBS HIGH 75: CPT | Mod: ,,, | Performed by: INTERNAL MEDICINE

## 2024-06-22 PROCEDURE — 99900035 HC TECH TIME PER 15 MIN (STAT)

## 2024-06-22 PROCEDURE — 99024 POSTOP FOLLOW-UP VISIT: CPT | Mod: ,,, | Performed by: ORTHOPAEDIC SURGERY

## 2024-06-22 PROCEDURE — 94761 N-INVAS EAR/PLS OXIMETRY MLT: CPT

## 2024-06-22 PROCEDURE — 94799 UNLISTED PULMONARY SVC/PX: CPT

## 2024-06-22 PROCEDURE — 85027 COMPLETE CBC AUTOMATED: CPT | Performed by: ORTHOPAEDIC SURGERY

## 2024-06-22 PROCEDURE — 80202 ASSAY OF VANCOMYCIN: CPT | Performed by: HOSPITALIST

## 2024-06-22 PROCEDURE — 25000003 PHARM REV CODE 250: Performed by: ORTHOPAEDIC SURGERY

## 2024-06-22 PROCEDURE — 36415 COLL VENOUS BLD VENIPUNCTURE: CPT | Performed by: ORTHOPAEDIC SURGERY

## 2024-06-22 PROCEDURE — 97530 THERAPEUTIC ACTIVITIES: CPT

## 2024-06-22 PROCEDURE — 83735 ASSAY OF MAGNESIUM: CPT | Performed by: ORTHOPAEDIC SURGERY

## 2024-06-22 PROCEDURE — 63600175 PHARM REV CODE 636 W HCPCS: Mod: JZ,JG | Performed by: ORTHOPAEDIC SURGERY

## 2024-06-22 PROCEDURE — 11000001 HC ACUTE MED/SURG PRIVATE ROOM

## 2024-06-22 PROCEDURE — 21400001 HC TELEMETRY ROOM

## 2024-06-22 PROCEDURE — 25000003 PHARM REV CODE 250: Performed by: INTERNAL MEDICINE

## 2024-06-22 PROCEDURE — 94799 UNLISTED PULMONARY SVC/PX: CPT | Mod: XB

## 2024-06-22 PROCEDURE — 63600175 PHARM REV CODE 636 W HCPCS: Performed by: INTERNAL MEDICINE

## 2024-06-22 PROCEDURE — 80053 COMPREHEN METABOLIC PANEL: CPT | Performed by: ORTHOPAEDIC SURGERY

## 2024-06-22 PROCEDURE — 97110 THERAPEUTIC EXERCISES: CPT

## 2024-06-22 PROCEDURE — 36415 COLL VENOUS BLD VENIPUNCTURE: CPT | Performed by: HOSPITALIST

## 2024-06-22 PROCEDURE — A4216 STERILE WATER/SALINE, 10 ML: HCPCS | Performed by: ORTHOPAEDIC SURGERY

## 2024-06-22 PROCEDURE — 36569 INSJ PICC 5 YR+ W/O IMAGING: CPT

## 2024-06-22 RX ADMIN — ATORVASTATIN CALCIUM 10 MG: 10 TABLET, FILM COATED ORAL at 10:06

## 2024-06-22 RX ADMIN — INSULIN ASPART 6 UNITS: 100 INJECTION, SOLUTION INTRAVENOUS; SUBCUTANEOUS at 08:06

## 2024-06-22 RX ADMIN — HYDROCODONE BITARTRATE AND ACETAMINOPHEN 1 TABLET: 10; 325 TABLET ORAL at 12:06

## 2024-06-22 RX ADMIN — SODIUM CHLORIDE, PRESERVATIVE FREE 10 ML: 5 INJECTION INTRAVENOUS at 05:06

## 2024-06-22 RX ADMIN — INSULIN ASPART 6 UNITS: 100 INJECTION, SOLUTION INTRAVENOUS; SUBCUTANEOUS at 11:06

## 2024-06-22 RX ADMIN — METOPROLOL SUCCINATE 100 MG: 50 TABLET, FILM COATED, EXTENDED RELEASE ORAL at 10:06

## 2024-06-22 RX ADMIN — LIDOCAINE 5% 1 PATCH: 700 PATCH TOPICAL at 05:06

## 2024-06-22 RX ADMIN — HYDROCODONE BITARTRATE AND ACETAMINOPHEN 1 TABLET: 10; 325 TABLET ORAL at 09:06

## 2024-06-22 RX ADMIN — MUPIROCIN 1 G: 20 OINTMENT TOPICAL at 08:06

## 2024-06-22 RX ADMIN — CEFTRIAXONE SODIUM 2 G: 2 INJECTION, POWDER, FOR SOLUTION INTRAMUSCULAR; INTRAVENOUS at 11:06

## 2024-06-22 RX ADMIN — CLINDAMYCIN PHOSPHATE 600 MG: 600 INJECTION, SOLUTION INTRAVENOUS at 01:06

## 2024-06-22 RX ADMIN — CLOTRIMAZOLE 10 MG: 10 LOZENGE ORAL at 05:06

## 2024-06-22 RX ADMIN — SODIUM CHLORIDE, PRESERVATIVE FREE 10 ML: 5 INJECTION INTRAVENOUS at 11:06

## 2024-06-22 RX ADMIN — HYDROCODONE BITARTRATE AND ACETAMINOPHEN 1 TABLET: 10; 325 TABLET ORAL at 08:06

## 2024-06-22 RX ADMIN — CLINDAMYCIN PHOSPHATE 600 MG: 600 INJECTION, SOLUTION INTRAVENOUS at 10:06

## 2024-06-22 RX ADMIN — INSULIN GLARGINE 10 UNITS: 100 INJECTION, SOLUTION SUBCUTANEOUS at 08:06

## 2024-06-22 RX ADMIN — HYDROCODONE BITARTRATE AND ACETAMINOPHEN 1 TABLET: 10; 325 TABLET ORAL at 02:06

## 2024-06-22 RX ADMIN — CLOTRIMAZOLE 10 MG: 10 LOZENGE ORAL at 10:06

## 2024-06-22 RX ADMIN — INSULIN ASPART 3 UNITS: 100 INJECTION, SOLUTION INTRAVENOUS; SUBCUTANEOUS at 10:06

## 2024-06-22 RX ADMIN — CLOTRIMAZOLE 10 MG: 10 LOZENGE ORAL at 02:06

## 2024-06-22 RX ADMIN — VANCOMYCIN HYDROCHLORIDE 1000 MG: 1 INJECTION, POWDER, LYOPHILIZED, FOR SOLUTION INTRAVENOUS at 09:06

## 2024-06-22 RX ADMIN — INSULIN ASPART 8 UNITS: 100 INJECTION, SOLUTION INTRAVENOUS; SUBCUTANEOUS at 05:06

## 2024-06-22 NOTE — SUBJECTIVE & OBJECTIVE
Interval History:  No acute events overnight.  Patient states he has well. no issues after shoulder washout.  Reviewed ID recommendations.  Plan for antibiotics until 08/03/2024.  Pending dispo.  Spoke with wife as well at bedside    Review of Systems  Objective:     Vital Signs (Most Recent):  Temp: 97.8 °F (36.6 °C) (06/22/24 0752)  Pulse: 90 (06/22/24 0752)  Resp: 18 (06/22/24 0805)  BP: (!) 143/68 (06/22/24 0752)  SpO2: (!) 94 % (06/22/24 0752) Vital Signs (24h Range):  Temp:  [97.7 °F (36.5 °C)-98.6 °F (37 °C)] 97.8 °F (36.6 °C)  Pulse:  [] 90  Resp:  [16-22] 18  SpO2:  [90 %-100 %] 94 %  BP: ()/(53-84) 143/68     Weight: (!) 140.6 kg (309 lb 15.5 oz)  Body mass index is 42.04 kg/m².    Intake/Output Summary (Last 24 hours) at 6/22/2024 1044  Last data filed at 6/22/2024 0446  Gross per 24 hour   Intake 2699.75 ml   Output 1600 ml   Net 1099.75 ml         Physical Exam  Constitutional:       Appearance: Normal appearance.   HENT:      Head: Normocephalic and atraumatic.      Right Ear: External ear normal.      Left Ear: External ear normal.      Nose: Nose normal.      Mouth/Throat:      Mouth: Mucous membranes are moist.      Pharynx: Oropharynx is clear.   Eyes:      Extraocular Movements: Extraocular movements intact.      Conjunctiva/sclera: Conjunctivae normal.   Cardiovascular:      Rate and Rhythm: Normal rate and regular rhythm.      Pulses: Normal pulses.      Heart sounds: Normal heart sounds. No murmur heard.     No gallop.   Pulmonary:      Effort: Pulmonary effort is normal. No respiratory distress.      Breath sounds: Normal breath sounds. No wheezing, rhonchi or rales.   Abdominal:      General: Abdomen is flat. There is no distension.      Palpations: Abdomen is soft.      Tenderness: There is no abdominal tenderness.   Musculoskeletal:         General: No swelling. Normal range of motion.      Cervical back: Normal range of motion and neck supple.      Comments: Left shoulder  bandage   Skin:     General: Skin is warm and dry.   Neurological:      General: No focal deficit present.      Mental Status: He is alert. Mental status is at baseline.             Significant Labs: All pertinent labs within the past 24 hours have been reviewed.    Significant Imaging: I have reviewed all pertinent imaging results/findings within the past 24 hours.

## 2024-06-22 NOTE — PROGRESS NOTES
[facility]/Surg   Department of Infectious Disease  Progress Note    PATIENT NAME: Roosevelt Moser  MRN: [id number]  TODAY'S DATE: 06/22/2024  ADMIT DATE: 6/14/2024  LOS: 8 days  CHIEF COMPLAINT: Weakness (Pt brought to ED via EMS with complaint of weakness and falling, pt advised EMS his urine is very dark.  )  PRINCIPLE PROBLEM: Acute renal failure superimposed on chronic kidney disease        INTERVAL HISTORY      06/19/2024: Seen and evaluated at bedside.  States left upper extremity pain better.  Having improved movement at the wrist and forearm.  Seen by Orthopedic surgery PA yesterday.  Notes reviewed.  Blood cultures remain negative.      06/20/2024:  Oral anticoagulants on hold to allow left shoulder arthrocentesis.  No other acute issues overnight.  Culture from right 3rd toe growing Staphylococcus aureus.  Blood culture remain negative.  Had aspiration left shoulder today that yielded purulent material.  Synovial fluid studies in progress.    06/21/2024:  Seen by Orthopedic surgery Service again yesterday.  For I and D today.  All cultures so far negative.  ASO titer normal.    06/22/2024.  Very pleasant.  So is his wife.  Patient is Afebrile.  He has swelling over the left arm, slightly improved.  Pain is well-controlled.    WBC 19--21--21.3--19.8.  He did receive dexamethasone yesterday by anesthesia, at the time of left shoulder surgery.  He is on ceftriaxone clindamycin and vancomycin.  Cultures reviewed:  right 2nd toe wound had grown MRSA.  Left intraop cultures no growth to date.  Left shoulder intraop, gram stain are showing Gram-positive cocci.  He is trying to move his legs in bed, but he is still  weak.  Wife and patient are able to do IV antibiotics at home, but all things considered, it is very cumbersome for them;  they are interested in placement, which I agree, it is the right course of action.    Antibiotics (From admission, onward)      Start     Stop Route Frequency Ordered  "   06/19/24 1045  cefTRIAXone (ROCEPHIN) 2 g in dextrose 5 % in water (D5W) 100 mL IVPB (MB+)         -- IV Every 24 hours (non-standard times) 06/19/24 0932    06/18/24 1400  clindamycin in D5W 600 mg/50 mL IVPB 600 mg         -- IV Every 8 hours (non-standard times) 06/18/24 1212    06/18/24 1311  vancomycin - pharmacy to dose  (vancomycin IVPB (PEDS and ADULTS))        Placed in "And" Linked Group    -- IV pharmacy to manage frequency 06/18/24 1212    06/17/24 2100  mupirocin 2 % ointment         06/22/24 2059 Nasl 2 times daily 06/17/24 1544          Antifungals (From admission, onward)      Start     Stop Route Frequency Ordered    06/19/24 1045  clotrimazole megha 10 mg         06/29/24 0959 Oral 5 times daily 06/19/24 0932           Antivirals (From admission, onward)      None            ASSESSMENT and PLAN     Left shoulder prosthetic joint infection, due to Gram-positive cocci,  06/21/2024 -- Left shoulder IND and removal of deep hardware.  "  Purulent material was expressed and abundant upon making the incision. This tracked all the way down to the fracture site and hardware was made in purulence. We then spent some time exposing the top of the nail. Nail out  was then placed back onto the nail and tightened up. We then reused the target her to remove all the screws from the nail and then removed the nail itself."   03/25/2024.  Intramedullary nailing of proximal humeral fracture.  (This was done after his fall and fracture of 03/04/2024 which had to be postponed due to AFib)   He will need antibiotics for 6 weeks through 08/03/2024.    Will follow cultures    2.  Right 3rd toe osteomyelitis.  He previously received ? Dalbavancin x2 doses.  Ulcer practically healed.  We will consider this as partially treated.  Antibiotics as above for this as well.    3.  MRSA Right 2nd toe tip with possible early osteomyelitis of the distal tuft.  It was debrided at bedside by podiatrist.  He will need 6 week " "course of antibiotics for this indication.      4.  ARF.  Management as per nephrologist.   Estimated Creatinine Clearance: 40.5 mL/min (A) (based on SCr of 2.4 mg/dL (H)).       5.  Diabetes mellitus.  Management as per hospitalist.    Hemoglobin A1c 8.8--9.5--6.3 now, the best it has been in awhile     6.  Peripheral arterial disease.    CT arterial ultrasound on 06/18/2024 " Mild to moderate atherosclerotic plaque is noted in the leg arteries bilaterally.  Doppler ultrasound however is within normal limits with triphasic waveforms and normal velocities throughout.  Severe stenosis or occlusion is not identified on this study."     7. Oral thrush.  HIV screen negative.  This is most likely secondary to hyperglycemia related to his diabetes mellitus.  Mycelex troches x 10 days.     8. Knee arthritis.  Management as per hospitalist.      9.Debility      RECOMMENDATIONS:    Continue vancomycin, keep vancomycin level trough 15-17--dosed as per pharmacy   Continue ceftriaxone for now   Discontinue clindamycin  Follow Markers of inflammation  Wound care to shoulder as per surgeon   Wound care to foot as per podiatrist  ECHO,( I wonder if patient was bacteremic at at some point)  Monitor anemia, hemoglobin continues to decrease   LTAC placement         Id service will continue to follow with you while in the hospital.  Please send Epic secure chat with any questions.      SUBJECTIVE    Roosevelt Moser is a 73 y.o. male with history of diabetes mellitus, hypertension and previous going bowel syndrome.  He fell on 03/04/2024 and sustained a displaced comminuted fracture of the left humerus.  Seen by orthopedic surgeon with plan for ORIF which was deferred because of new AFib.  Admitted 03/09/2024 for rate control and ultimately discharged back home 03/12/2024.  Later had left humerus ORIF 03/25/2024 as a day procedure and was discharged home.       According to his wife, he developed pain and swelling of the 3rd toe and was " with an ulcer.  It appears an x-ray of the foot was unremarkable.  Received 2 doses of an antibiotic that I presume to be dalbavancin 1 week apart in early April.     In the last 2 weeks he has continued to decline.  He also has reduction in urine output.  Fell twice at home on 06/13/2024 and wife activated EMS and he was brought to the hospital.  Received IV fluid for low normal blood pressure.  Also noted to have MARBIN with creatinine 5.0 and WBC was 21 K.  UA was abnormal with> 100 WBC,> 100 RBC, 3+ LE and positive nitrite.  He has been managed conservatively for MARBIN and for dehydration.     X-ray of left shoulder and left upper extremity 06/17/2024 documented gas in soft tissue.  MRI right foot documented osteomyelitis of the 3rd toe.  I was asked to see to assist with his care.  ESR 95,  milligram/liter.     Antibiotics   Ceftriaxone:  06/14/2024-06/17/2024, 06/19/2024-  Zosyn:  06/17/2024-06/18/2024   Vancomycin:  06/18/2024-  Clindamycin:  06/18/2024-     Cultures   Blood culture 06/14/2024:  NGTD   Urine culture 06/14/2024: NGTD  Right second toe culture 06/18/2024: MRSA  Left shoulder synovial fluid culture 06/20/2024:  In progress    Review of Systems  Negative except as stated above in Interval History     OBJECTIVE   Temp:  [97.7 °F (36.5 °C)-98.6 °F (37 °C)] 98.6 °F (37 °C)  Pulse:  [] 88  Resp:  [16-22] 17  SpO2:  [90 %-100 %] 95 %  BP: ()/(53-84) 127/62  Temp:  [97.7 °F (36.5 °C)-98.6 °F (37 °C)]   Temp: 98.6 °F (37 °C) (06/22/24 0329)  Pulse: 88 (06/22/24 0649)  Resp: 17 (06/22/24 0649)  BP: 127/62 (06/22/24 0329)  SpO2: 95 % (06/22/24 0649)    Intake/Output Summary (Last 24 hours) at 6/22/2024 0750  Last data filed at 6/22/2024 0446  Gross per 24 hour   Intake 2699.75 ml   Output 1600 ml   Net 1099.75 ml       Physical Exam  General:  Obese elderly man lying quietly in bed in no acute distress   Left upper extremity:  Edema with mild erythema.  Focal erythema with induration medial  forearm.  Left shoulder effusion.  Minimal movement of shoulder  CVS: S1 and 2 heard   Respiratory: Clear to auscultation   Abdomen:  Full, soft, nontender, no palpable organomegaly   Skin: No rash appreciated   CNS: No focal   Musculoskeletal system:  Limited range of motion left knee without effusion.    VAD:  None  ISOLATION:  None    WOUNDS:  Abrasion left upper extremity.  Right 2nd toe ulcer                      Significant Labs: All pertinent labs within the past 24 hours have been reviewed.    CBC LAST 7 DAYS  Recent Labs   Lab 06/16/24  0453 06/17/24  0255 06/19/24  0322 06/20/24  0458 06/21/24  0410 06/22/24  0513   WBC 13.11* 15.42* 19.14* 21.32* 21.39* 19.81*   RBC 4.05* 4.13* 4.08* 3.94* 3.73* 3.33*   HGB 10.7* 10.7* 10.6* 10.2* 9.8* 8.6*   HCT 31.8* 32.7* 32.5* 31.3* 30.1* 26.8*   MCV 79* 79* 80* 79* 81* 81*   MCH 26.4* 25.9* 26.0* 25.9* 26.3* 25.8*   MCHC 33.6 32.7 32.6 32.6 32.6 32.1   RDW 18.1* 18.3* 18.0* 18.1* 17.8* 17.7*   * 539* 613* 560* 560* 549*   MPV 10.5 10.7 10.2 10.2 10.1 9.9       CHEMISTRY LAST 7 DAYS  Recent Labs   Lab 06/16/24  0453 06/17/24  0255 06/18/24  0234 06/19/24  0322 06/20/24  0458 06/21/24  0405 06/22/24  0513    139 137 140 138 138 138   K 4.3 4.1 3.8 4.0 4.0 3.7 4.4   CL 98 97 97 98 99 100 102   CO2 24 25 25 25 25 25 25   ANIONGAP 16 17* 15 17* 14 13 11   BUN 87* 76* 68* 73* 78* 82* 76*   CREATININE 4.4* 3.3* 2.7* 2.8* 2.5* 2.4* 2.4*   * 143* 225* 198* 179* 206* 257*   CALCIUM 8.3* 8.5* 8.6* 8.8 8.7 9.0 8.5*   MG 1.6 1.5* 1.5* 2.0  --   --  1.7   ALBUMIN 1.4* 1.3* 1.3* 1.3* 1.3*  --  1.2*   PROT 5.9* 6.0  --  6.2  --   --  5.8*   ALKPHOS 230* 235*  --  312*  --   --  217*   ALT 14 12  --  23  --   --  13   AST 22 23  --  45*  --   --  20   BILITOT 0.6 0.6  --  0.7  --   --  0.3       Estimated Creatinine Clearance: 40.5 mL/min (A) (based on SCr of 2.4 mg/dL (H)).    INFLAMMATORY/PROCAL    Lab Results   Component Value Date    .0 (H) 06/18/2024            Component Ref Range & Units 2 d ago   Body Fluid Type  Abscess fluid, left shoulder   Fluid Appearance  Cloudy   Fluid Color  Pink   WBC, Body Fluid /cu mm SEE COMMENT   Comment: Reference ranges for body fluids not established.  Correlate clinically.  Test not performed  Cell count not performed on abscess fluid, see differential.   Segs, Fluid % 79   Lymphs, Fluid % 14   Monocytes/Macrophages, Fluid % 7        PRIOR  MICROBIOLOGY:    Susceptibility data from last 90 days.  Collected Specimen Info Organism Ceftriaxone Clindamycin Erythromycin Oxacillin Penicillin Tetracycline Trimeth/Sulfa Vancomycin   06/18/24 Wound from Toe, Right Foot Methicillin resistant Staphylococcus aureus  R  S  R  R  R  S  S  S   06/14/24 Blood from Peripheral, Antecubital, Right No growth after 5 days.           06/14/24 Blood from Peripheral, Hand, Right No growth after 5 days.               LAST 7 DAYS MICROBIOLOGY   Microbiology Results (last 7 days)       Procedure Component Value Units Date/Time    Gram stain [[id number]] Collected: 06/21/24 1645    Order Status: Completed Specimen: Abscess from Shoulder, Left Updated: 06/22/24 0332     Gram Stain Result Rare WBC's      Few Gram positive cocci    Aerobic culture [[id number]] Collected: 06/21/24 1645    Order Status: Sent Specimen: Abscess from Shoulder, Left Updated: 06/21/24 1924    Culture, Anaerobic [[id number]] Collected: 06/21/24 1645    Order Status: Sent Specimen: Abscess from Shoulder, Left Updated: 06/21/24 1924    AFB Culture & Smear [[id number]] Collected: 06/21/24 1645    Order Status: Sent Specimen: Abscess from Shoulder, Left Updated: 06/21/24 1924    Fungus culture [[id number]] Collected: 06/21/24 1645    Order Status: Sent Specimen: Abscess from Shoulder, Left Updated: 06/21/24 1924    Gram stain [[id number]] Collected: 06/20/24 1204    Order Status: Completed Specimen: Abscess from Shoulder, Left Updated: 06/21/24 1520     Gram Stain Result Many WBC's       No organisms seen    Culture, Anaerobe [[id number]] Collected: 06/18/24 1251    Order Status: Completed Specimen: Wound from Toe, Right Foot Updated: 06/21/24 1428     Anaerobic Culture No anaerobes isolated    Aerobic culture [[id number]] Collected: 06/20/24 1204    Order Status: Completed Specimen: Abscess from Shoulder, Left Updated: 06/21/24 0930     Aerobic Bacterial Culture No growth    Aerobic culture [[id number]]  (Abnormal)  (Susceptibility) Collected: 06/18/24 1251    Order Status: Completed Specimen: Wound from Toe, Right Foot Updated: 06/21/24 0755     Aerobic Bacterial Culture METHICILLIN RESISTANT STAPHYLOCOCCUS AUREUS  Few  Results called to and read back by Rich Crockett RN-[facility];    06/20/2024  15:05 CJD      Culture, Anaerobic [[id number]] Collected: 06/20/24 1204    Order Status: Sent Specimen: Abscess from Shoulder, Left Updated: 06/20/24 1923    Fungus culture [[id number]] Collected: 06/20/24 1204    Order Status: Sent Specimen: Abscess from Shoulder, Left Updated: 06/20/24 1923    Blood culture [[id number]] Collected: 06/14/24 1355    Order Status: Completed Specimen: Blood from Peripheral, Antecubital, Right Updated: 06/19/24 2032     Blood Culture, Routine No growth after 5 days.    Blood culture [[id number]] Collected: 06/14/24 1355    Order Status: Completed Specimen: Blood from Peripheral, Hand, Right Updated: 06/19/24 2032     Blood Culture, Routine No growth after 5 days.    Urine culture [[id number]] Collected: 06/14/24 1337    Order Status: Completed Specimen: Urine Updated: 06/17/24 0709     Urine Culture, Routine No growth    Narrative:      Specimen Source->Urine              CURRENT/PREVIOUS VISIT EKG  Results for orders placed or performed during the hospital encounter of 06/14/24   EKG 12-lead    Collection Time: 06/14/24 12:16 PM   Result Value Ref Range    QRS Duration 106 ms    [facility] QTC Calculation 443 ms    Narrative    Test Reason : R53.1,    Vent. Rate : 120 BPM      Atrial Rate : 159 BPM     P-R Int : 000 ms          QRS Dur : 106 ms      QT Int : 314 ms       P-R-T Axes : 000 -54 093 degrees     QTc Int : 443 ms    Atrial fibrillation with rapid ventricular response  Left axis deviation  Low voltage QRS  Inferior infarct ,age undetermined  Cannot rule out Anterior infarct ,age undetermined  Abnormal ECG  When compared with ECG of 25-MAR-2024 09:14,  Vent. rate has increased BY  46 BPM  Minimal criteria for Anterior infarct are now Present  Confirmed by Jesús HATHAWAY, Hansel AMIN ([id number]) on 6/20/2024 8:43:02 PM    Referred By: [email or web address]   SELF           Confirmed By:Hansel Espinosa MD     Significant Imaging: I have reviewed all relevant and available imaging results/findings within the past 24 hours.    06/18/2024.  CT left shoulder   1. Subacute left humerus fracture post internal fixation.  There is incomplete bony bridging across the fracture site, with persistent angulation between the dominant bony fragments as hardware is only partially engaged (see discussion).  2. Severe arthropathy of the glenohumeral joint with multiple intra-articular bodies, some interposed.  3. Large left joint effusion and adjacent soft tissue focal fluid collections containing gas and concerning for gas-forming infection.  4. Moderate size left pleural effusion.  5. Left basilar airspace disease is presumably atelectasis with pneumonia as a less favored consideration.  This report was flagged in Epic as abnormal.    X-ray on 06/17, prior to removal of hardware.      I spent a total of 56 minutes on the day of the visit.This includes face to face time and non-face to face time preparing to see the patient (eg, review of tests), obtaining and/or reviewing separately obtained history, documenting clinical information in the electronic or other health record, independently interpreting results and communicating results to the patient/family/caregiver, or care coordinator.    Jaquelin Laguerre MD  Date of  Service: 06/22/2024      This note was created using [employer] voice recognition software that occasionally misinterpreted phrases or words.

## 2024-06-22 NOTE — ASSESSMENT & PLAN NOTE
Cultures are no growth to date so far but did show Gram-positive cocci on Gram stain.  Patient is currently being treated with antibiotics for the toe wound and Infectious Disease is following.  wound care is consulted for packing and dressing changes.  Talked to the nurse about possibly doing it today or tomorrow since wound care likely will not be seeing him until Monday.  Sling for comfort of the left upper extremity.  Patient could do very light activity with the left upper extremity focusing more on elbow, hand and wrist.  Would not do dedicated therapy for the shoulder.  Patient could weightbear through the extremity if needed with a walker.  Patient with likely benefit from placement for IV antibiotics as well as his generalized deconditioned state.  Pain control and VTE prophylaxis.  We will turn the patient back over to Dr. Cooper on Monday when he returns

## 2024-06-22 NOTE — ASSESSMENT & PLAN NOTE
Body mass index is 42.04 kg/m².  Morbid obesity complicates all aspects of disease management from diagnostic modalities to treatment  Weight loss encouraged and health benefits explained to patient  He has been working on weight loss at home w/ reduced caloric intake

## 2024-06-22 NOTE — SUBJECTIVE & OBJECTIVE
Principal Problem:Acute renal failure superimposed on chronic kidney disease    Principal Orthopedic Problem:  Left shoulder infection status post I&D and hardware removal    Interval History:  No new events.  Did well postoperatively so far.  Shoulder feels better after the surgery.    Review of patient's allergies indicates:  No Known Allergies    Current Facility-Administered Medications   Medication    albuterol-ipratropium 2.5 mg-0.5 mg/3 mL nebulizer solution 3 mL    aluminum & magnesium hydroxide-simethicone 400-400-40 mg/5 mL suspension 15 mL    atorvastatin tablet 10 mg    cefTRIAXone (ROCEPHIN) 2 g in dextrose 5 % in water (D5W) 100 mL IVPB (MB+)    clotrimazole megha 10 mg    dextrose 10% bolus 125 mL 125 mL    dextrose 10% bolus 250 mL 250 mL    glucagon (human recombinant) injection 1 mg    glucose chewable tablet 16 g    glucose chewable tablet 24 g    HYDROcodone-acetaminophen  mg per tablet 1 tablet    insulin aspart U-100 pen 0-10 Units    insulin glargine U-100 (Lantus) pen 10 Units    LIDOcaine 5 % patch 1 patch    metoprolol injection 5 mg    metoprolol succinate (TOPROL-XL) 24 hr tablet 100 mg    mupirocin 2 % ointment    naloxone 0.4 mg/mL injection 0.02 mg    ondansetron injection 4 mg    prochlorperazine injection Soln 5 mg    simethicone chewable tablet 80 mg    sodium chloride 0.9% flush 10 mL    sodium chloride 0.9% flush 10 mL    And    sodium chloride 0.9% flush 10 mL    sodium chloride 0.9% flush 10 mL    vancomycin - pharmacy to dose     Objective:     Vital Signs (Most Recent):  Temp: 97.7 °F (36.5 °C) (06/22/24 1638)  Pulse: 87 (06/22/24 1638)  Resp: 18 (06/22/24 1638)  BP: 116/70 (06/22/24 1638)  SpO2: (!) 94 % (06/22/24 1638) Vital Signs (24h Range):  Temp:  [97.7 °F (36.5 °C)-98.6 °F (37 °C)] 97.7 °F (36.5 °C)  Pulse:  [] 87  Resp:  [17-20] 18  SpO2:  [92 %-95 %] 94 %  BP: ()/(58-70) 116/70     Weight: (!) 140.6 kg (309 lb 15.5 oz)  Height: 6' (182.9 cm)  Body  "mass index is 42.04 kg/m².      Intake/Output Summary (Last 24 hours) at 6/22/2024 1807  Last data filed at 6/22/2024 0446  Gross per 24 hour   Intake 1599.75 ml   Output 550 ml   Net 1049.75 ml        General    Nursing note and vitals reviewed.  Constitutional: He is oriented to person, place, and time. He appears well-developed and well-nourished. No distress.   HENT:   Nose: Nose normal.   Eyes: EOM are normal.   Cardiovascular:  Regular rhythm.            Pulmonary/Chest: Effort normal.   Abdominal: Soft.   Neurological: He is alert and oriented to person, place, and time.   Psychiatric: His behavior is normal.         Right Shoulder Exam   Right shoulder exam is normal.    Left Shoulder Exam     Inspection/Observation   Swelling: present  Bruising: present    Range of Motion   Active abduction:  abnormal   Passive abduction:  abnormal   Extension:  abnormal   Forward Flexion:  abnormal   Forward Elevation: abnormal  Adduction: abnormal  External Rotation 90 degrees: abnormal  Internal rotation 90 degrees:  abnormal     Other   Sensation: normal     Comments:  Dsg c/d/I  Packing in place. Minimal drainage      Vascular Exam       Left Pulses      Radial:                    2+      Capillary Refill  Left Hand: normal capillary refill           Significant Labs: Blood Culture: No results for input(s): "LABBLOO" in the last 48 hours.  BMP:   Recent Labs   Lab 06/22/24  0513   *      K 4.4      CO2 25   BUN 76*   CREATININE 2.4*   CALCIUM 8.5*   MG 1.7     CBC:   Recent Labs   Lab 06/21/24  0410 06/22/24  0513   WBC 21.39* 19.81*   HGB 9.8* 8.6*   HCT 30.1* 26.8*   * 549*     CMP:   Recent Labs   Lab 06/21/24  0405 06/22/24  0513    138   K 3.7 4.4    102   CO2 25 25   * 257*   BUN 82* 76*   CREATININE 2.4* 2.4*   CALCIUM 9.0 8.5*   PROT  --  5.8*   ALBUMIN  --  1.2*   BILITOT  --  0.3   ALKPHOS  --  217*   AST  --  20   ALT  --  13   ANIONGAP 13 11     Coagulation: No " "results for input(s): "LABPROT", "INR", "APTT" in the last 48 hours.  CRP: No results for input(s): "CRP" in the last 48 hours.  Lactic Acid: No results for input(s): "LACTATE" in the last 48 hours.  Wound Culture:   Recent Labs   Lab 06/21/24  1645   LABAERO No growth     All pertinent labs within the past 24 hours have been reviewed.    Significant Imaging: None  "

## 2024-06-22 NOTE — PROGRESS NOTES
[facility]/Surg  Orthopedics  Progress Note    Patient Name: Roosevelt Moser  MRN: [id number]  Admission Date: 6/14/2024  Hospital Length of Stay: 8 days  Attending Provider: Miko Galaviz Jr., MD  Primary Care Provider: Miguel Angel Enriquez PA-C  Follow-up For: Procedure(s) (LRB):  ARTHROTOMY, SHOULDER (Left)    Post-Operative Day: 1 Day Post-Op  Subjective:     Principal Problem:Acute renal failure superimposed on chronic kidney disease    Principal Orthopedic Problem:  Left shoulder infection status post I&D and hardware removal    Interval History:  No new events.  Did well postoperatively so far.  Shoulder feels better after the surgery.    Review of patient's allergies indicates:  No Known Allergies    Current Facility-Administered Medications   Medication    albuterol-ipratropium 2.5 mg-0.5 mg/3 mL nebulizer solution 3 mL    aluminum & magnesium hydroxide-simethicone 400-400-40 mg/5 mL suspension 15 mL    atorvastatin tablet 10 mg    cefTRIAXone (ROCEPHIN) 2 g in dextrose 5 % in water (D5W) 100 mL IVPB (MB+)    clotrimazole megha 10 mg    dextrose 10% bolus 125 mL 125 mL    dextrose 10% bolus 250 mL 250 mL    glucagon (human recombinant) injection 1 mg    glucose chewable tablet 16 g    glucose chewable tablet 24 g    HYDROcodone-acetaminophen  mg per tablet 1 tablet    insulin aspart U-100 pen 0-10 Units    insulin glargine U-100 (Lantus) pen 10 Units    LIDOcaine 5 % patch 1 patch    metoprolol injection 5 mg    metoprolol succinate (TOPROL-XL) 24 hr tablet 100 mg    mupirocin 2 % ointment    naloxone 0.4 mg/mL injection 0.02 mg    ondansetron injection 4 mg    prochlorperazine injection Soln 5 mg    simethicone chewable tablet 80 mg    sodium chloride 0.9% flush 10 mL    sodium chloride 0.9% flush 10 mL    And    sodium chloride 0.9% flush 10 mL    sodium chloride 0.9% flush 10 mL    vancomycin - pharmacy to dose     Objective:     Vital Signs (Most Recent):  Temp: 97.7 °F (36.5 °C)  "(06/22/24 1638)  Pulse: 87 (06/22/24 1638)  Resp: 18 (06/22/24 1638)  BP: 116/70 (06/22/24 1638)  SpO2: (!) 94 % (06/22/24 1638) Vital Signs (24h Range):  Temp:  [97.7 °F (36.5 °C)-98.6 °F (37 °C)] 97.7 °F (36.5 °C)  Pulse:  [] 87  Resp:  [17-20] 18  SpO2:  [92 %-95 %] 94 %  BP: ()/(58-70) 116/70     Weight: (!) 140.6 kg (309 lb 15.5 oz)  Height: 6' (182.9 cm)  Body mass index is 42.04 kg/m².      Intake/Output Summary (Last 24 hours) at 6/22/2024 1807  Last data filed at 6/22/2024 0446  Gross per 24 hour   Intake 1599.75 ml   Output 550 ml   Net 1049.75 ml        General    Nursing note and vitals reviewed.  Constitutional: He is oriented to person, place, and time. He appears well-developed and well-nourished. No distress.   HENT:   Nose: Nose normal.   Eyes: EOM are normal.   Cardiovascular:  Regular rhythm.            Pulmonary/Chest: Effort normal.   Abdominal: Soft.   Neurological: He is alert and oriented to person, place, and time.   Psychiatric: His behavior is normal.         Right Shoulder Exam   Right shoulder exam is normal.    Left Shoulder Exam     Inspection/Observation   Swelling: present  Bruising: present    Range of Motion   Active abduction:  abnormal   Passive abduction:  abnormal   Extension:  abnormal   Forward Flexion:  abnormal   Forward Elevation: abnormal  Adduction: abnormal  External Rotation 90 degrees: abnormal  Internal rotation 90 degrees:  abnormal     Other   Sensation: normal     Comments:  Dsg c/d/I  Packing in place. Minimal drainage      Vascular Exam       Left Pulses      Radial:                    2+      Capillary Refill  Left Hand: normal capillary refill           Significant Labs: Blood Culture: No results for input(s): "LABBLOO" in the last 48 hours.  BMP:   Recent Labs   Lab 06/22/24  0513   *      K 4.4      CO2 25   BUN 76*   CREATININE 2.4*   CALCIUM 8.5*   MG 1.7     CBC:   Recent Labs   Lab 06/21/24  0410 06/22/24  0513   WBC 21.39* " "19.81*   HGB 9.8* 8.6*   HCT 30.1* 26.8*   * 549*     CMP:   Recent Labs   Lab 06/21/24  0405 06/22/24  0513    138   K 3.7 4.4    102   CO2 25 25   * 257*   BUN 82* 76*   CREATININE 2.4* 2.4*   CALCIUM 9.0 8.5*   PROT  --  5.8*   ALBUMIN  --  1.2*   BILITOT  --  0.3   ALKPHOS  --  217*   AST  --  20   ALT  --  13   ANIONGAP 13 11     Coagulation: No results for input(s): "LABPROT", "INR", "APTT" in the last 48 hours.  CRP: No results for input(s): "CRP" in the last 48 hours.  Lactic Acid: No results for input(s): "LACTATE" in the last 48 hours.  Wound Culture:   Recent Labs   Lab 06/21/24  1645   LABAERO No growth     All pertinent labs within the past 24 hours have been reviewed.    Significant Imaging: None  Assessment/Plan:     Abscess of left shoulder  Cultures are no growth to date so far but did show Gram-positive cocci on Gram stain.  Patient is currently being treated with antibiotics for the toe wound and Infectious Disease is following.  wound care is consulted for packing and dressing changes.  Talked to the nurse about possibly doing it today or tomorrow since wound care likely will not be seeing him until Monday.  Sling for comfort of the left upper extremity.  Patient could do very light activity with the left upper extremity focusing more on elbow, hand and wrist.  Would not do dedicated therapy for the shoulder.  Patient could weightbear through the extremity if needed with a walker.  Patient with likely benefit from placement for IV antibiotics as well as his generalized deconditioned state.  Pain control and VTE prophylaxis.  We will turn the patient back over to Dr. Cooper on Monday when he returns     Swelling of limb, left  1. History of left proximal humerus fracture with open reduction internal fixation.      Patient well known to our clinic.  I discussed the radiographic findings and reviewed the patient's chart with Dr. Cooper this afternoon.  On physical exam, " there is no significant change from his previous clinic visits.  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  There is known intra-articular extension of hardware into the joint.  There is no loss of reduction of the proximal humerus fracture.  Only real changes a glenohumeral joint effusion.  It is quite large.  This is secondary to the injury, surgery, and loss of reduction.  He has no overt signs or symptoms of active infection.  There is no erythema or tenderness to palpation.  No surgical intervention is warranted currently at this time.  Plan is to allow this fracture to heal as is with subsequent hardware removal and conversion to total shoulder arthroplasty.  Dr. Cooper will evaluate the patient himself tomorrow morning.            Roman Paulson MD  Orthopedics  [facility]/Surg

## 2024-06-22 NOTE — ASSESSMENT & PLAN NOTE
In setting of acute UTI, MRSA OM right 2nd toe, and infected left shoulder hardware  BCX NGTD  Ur CX neg  Rocephin was given empirically for UTI, changed to Zosyn > added vanc/clinda, now on Rocephin/clinda, vanc/zosyn stopped per ID  Oral clotrimazole added for thrush  PCT and WBC trended down, WBC staying up a little now but pt looks good  No obstruction on renal u/s  Has gas containing abscess in and around left shoulder w/ malpositioned hardware, likely d/t falls, hard to say how the bug entered  Long d/w pt/wife, ortho - 6/22 had washout and hardware removal w/ Dr Bernal  F/u w/ Dr Kenneth mann d/c  He's urinating well  We will need antibiotics until 08/03/2024 per ID

## 2024-06-22 NOTE — PROGRESS NOTES
Nephrology Progress Note        Patient Name: Roosevelt Moser  MRN: [id number]    Patient Class: IP- Inpatient   Admission Date: 6/14/2024  Length of Stay: 8 days  Date of Service: 6/22/2024    Attending Physician: Miko Galaviz Jr., MD  Primary Care Provider: Miguel Angel Enriquez PA-C    Reason for Consult: marbin/hyperkalemia    SUBJECTIVE:     HPI: 73M with h/o DM, HTN, AF, GBS and recent shoulder surgery was brought to ER by EMS with recurrent falls in the last 24h. Reports decreased UO but no fever, cough, SOB, dysuria. Upon admission, noted to be in MARBIN with sCr 5, baseline 1 month ago is 1, hyperkalemia with K 6, acidosis with CO2 12, hypoalbuminemia with albumin 1.6. Lactate notably 2.1. Procal 20. A1c 9.5 in 3/2024. UA with hematuria and pyuria, urine Cx pending. Notably on Apixaban. WBC in blood elevated to 20. Plt 540. RP US ordered. Afebrile, normotensive, received IVF and abx. Lokelma, ca gluconate given bicarb gtt ordered. Straight cath in ER with only 100cc urine out.    Review of Systems:  Neg    6/15  AFVSS.   UOP 1200cc plus 2 unmeasured   6/16  AFVSS.  2150 cc uop.  No distress  6/17 VSS, on RA, UOP 1.5L- updated family at bedside  6/18 VSS, on RA, UOP 1.3L  6/19 VSS, on RA, UOP 1L, with osteo R toe- s/p debridement- not interested in amputation- antbx adjusted  6/20 HR , BP stable, on 2L NC, UOP 1.2L, went for procedure, wife reports LE edema  6/21 HR 90-110s, -140s mostly, on RA, UOP 1.6L, to OR for shoulder washout and hardware removal today  6/22 VSS s/p incision and drainage for infection from left shoulder and removal of deep hardware including lizabeth and screws.    ASSESSMENT/PLAN:     MARBIN due to ATN due to sepsis due to UTI and/or PNA  CKD stage 2 with diabetic proteinuria and severe hypoalbuminemia  HTN  DM poorly controlled A1c 9.5  HyperK/Acidosis  HypoMg  SHPT  Anemia  Hypoalbuminemia  Edema    - renal function is improving- nonoliguric  - no acute RRT needs- no nsaids or IV  contrast- dose meds for CrCl < 30  - about 1g proteinuria- low alb is nutrition/acute phase reactant  - hold hydralazine  - hold metformin- use insulin  - hyperK/acidosis resolved  - Mg replete  - H/H stable  - optimize protein intake  - prefer better renal function and > 24 hours away from washout (in case he becomes septic) before trying diuretic therapy- may need midodrine as well b/c with infection and low albumin might drop BP quickly    Thank you for allowing us to participate in the care of your patient!   We will follow the patient and provide recommendations as needed.         Laboratory:  Recent Labs   Lab 06/20/24  0458 06/21/24  0405 06/22/24  0513    138 138   K 4.0 3.7 4.4   CL 99 100 102   CO2 25 25 25   BUN 78* 82* 76*   CREATININE 2.5* 2.4* 2.4*   * 206* 257*       Recent Labs   Lab 06/17/24  0255 06/18/24  0234 06/19/24  0322 06/20/24  0458 06/21/24  0405 06/22/24  0513   CALCIUM 8.5* 8.6* 8.8 8.7 9.0 8.5*   ALBUMIN 1.3* 1.3* 1.3* 1.3*  --  1.2*   PHOS 4.3 3.8  --  4.2  --   --    MG 1.5* 1.5* 2.0  --   --  1.7             Recent Labs   Lab 06/19/24 2057 06/20/24  0720 06/20/24  1057 06/20/24  1638 06/20/24 2016 06/20/24  2227 06/21/24  0714 06/21/24  1133 06/21/24 2000 06/22/24  0755   POCTGLUCOSE 202* 192* 289* 302* 243* 222* 206* 225* 261* 269*       Recent Labs   Lab 07/31/23  0955 03/19/24  0830 06/14/24  1539   Hemoglobin A1C 8.8 H 9.5 H 6.3 H       Recent Labs   Lab 06/20/24  0458 06/21/24  0410 06/22/24  0513   WBC 21.32* 21.39* 19.81*   HGB 10.2* 9.8* 8.6*   HCT 31.3* 30.1* 26.8*   * 560* 549*   MCV 79* 81* 81*   MCHC 32.6 32.6 32.1       Recent Labs   Lab 06/17/24  0255 06/18/24  0234 06/19/24  0322 06/20/24  0458 06/22/24  0513   BILITOT 0.6  --  0.7  --  0.3   PROT 6.0  --  6.2  --  5.8*   ALBUMIN 1.3*   < > 1.3* 1.3* 1.2*   ALKPHOS 235*  --  312*  --  217*   ALT 12  --  23  --  13   AST 23  --  45*  --  20    < > = values in this interval not displayed.        Recent Labs   Lab 12/16/22  1238 03/08/24  1034 03/25/24  1022 06/14/24  1337   Color, UA Yellow Yellow Yellow Will A   Appearance, UA Clear Clear Hazy A Cloudy A   pH, UA 6.0 5.0 6.0 6.0   Specific [place], UA 1.020 1.010 1.020 1.020   Protein, UA 1+ A Trace A 1+ A 2+ A   Glucose, UA 1+ A 3+ A Negative Trace A   Ketones, UA Negative Negative Negative Negative   Urobilinogen, UA  --  Negative Negative Negative   Bilirubin (UA) Negative Negative Negative Negative   Occult Blood UA 3+ A 2+ A 2+ A 3+ A   Nitrite, UA Negative Negative Negative Negative   RBC, UA 20 H 13 H >100 H >100 H   WBC, UA 81 H 3 18 H >100 H   Bacteria Rare None Rare Many A   Hyaline Casts, UA 0  --  0 0             Microbiology Results (last 7 days)       Procedure Component Value Units Date/Time    Aerobic culture [[id number]] Collected: 06/21/24 1645    Order Status: Completed Specimen: Abscess from Shoulder, Left Updated: 06/22/24 0754     Aerobic Bacterial Culture No growth    Aerobic culture [[id number]] Collected: 06/20/24 1204    Order Status: Completed Specimen: Abscess from Shoulder, Left Updated: 06/22/24 0753     Aerobic Bacterial Culture No growth    Gram stain [[id number]] Collected: 06/21/24 1645    Order Status: Completed Specimen: Abscess from Shoulder, Left Updated: 06/22/24 0332     Gram Stain Result Rare WBC's      Few Gram positive cocci    Culture, Anaerobic [[id number]] Collected: 06/21/24 1645    Order Status: Sent Specimen: Abscess from Shoulder, Left Updated: 06/21/24 1924    AFB Culture & Smear [[id number]] Collected: 06/21/24 1645    Order Status: Sent Specimen: Abscess from Shoulder, Left Updated: 06/21/24 1924    Fungus culture [[id number]] Collected: 06/21/24 1645    Order Status: Sent Specimen: Abscess from Shoulder, Left Updated: 06/21/24 1924    Gram stain [[id number]] Collected: 06/20/24 1204    Order Status: Completed Specimen: Abscess from Shoulder, Left Updated: 06/21/24 1520     Gram Stain Result Many  WBC's      No organisms seen    Culture, Anaerobe [[id number]] Collected: 06/18/24 1251    Order Status: Completed Specimen: Wound from Toe, Right Foot Updated: 06/21/24 1428     Anaerobic Culture No anaerobes isolated    Aerobic culture [[id number]]  (Abnormal)  (Susceptibility) Collected: 06/18/24 1251    Order Status: Completed Specimen: Wound from Toe, Right Foot Updated: 06/21/24 0755     Aerobic Bacterial Culture METHICILLIN RESISTANT STAPHYLOCOCCUS AUREUS  Few  Results called to and read back by Rich Crockett RN-[facility];    06/20/2024  15:05 CJD      Culture, Anaerobic [[id number]] Collected: 06/20/24 1204    Order Status: Sent Specimen: Abscess from Shoulder, Left Updated: 06/20/24 1923    Fungus culture [[id number]] Collected: 06/20/24 1204    Order Status: Sent Specimen: Abscess from Shoulder, Left Updated: 06/20/24 1923    Blood culture [[id number]] Collected: 06/14/24 1355    Order Status: Completed Specimen: Blood from Peripheral, Antecubital, Right Updated: 06/19/24 2032     Blood Culture, Routine No growth after 5 days.    Blood culture [[id number]] Collected: 06/14/24 1355    Order Status: Completed Specimen: Blood from Peripheral, Hand, Right Updated: 06/19/24 2032     Blood Culture, Routine No growth after 5 days.    Urine culture [[id number]] Collected: 06/14/24 1337    Order Status: Completed Specimen: Urine Updated: 06/17/24 0709     Urine Culture, Routine No growth    Narrative:      Specimen Source->Urine            Review of patient's allergies indicates:  No Known Allergies    Outpatient meds:  No current facility-administered medications on file prior to encounter.     Current Outpatient Medications on File Prior to Encounter   Medication Sig Dispense Refill    apixaban (ELIQUIS) 5 mg Tab Take 1 tablet (5 mg total) by mouth 2 (two) times daily. 60 tablet 1    atorvastatin (LIPITOR) 10 MG tablet Take 1 tablet (10 mg total) by mouth once daily. 90 tablet 3    co-enzyme Q-10 30 mg  capsule Take 30 mg by mouth once daily.      doxycycline (VIBRAMYCIN) 100 MG Cap Take 100 mg by mouth 2 (two) times daily.      ergocalciferol, vitamin D2, (VITAMIN D ORAL) Take 1 tablet by mouth once daily.      glimepiride (AMARYL) 2 MG tablet Take 1 tablet (2 mg total) by mouth before breakfast. 90 tablet 3    hydrALAZINE (APRESOLINE) 25 MG tablet Take 1 tablet (25 mg total) by mouth every 12 (twelve) hours. 60 tablet 3    metFORMIN (GLUCOPHAGE-XR) 500 MG ER 24hr tablet Take 2 tablets (1,000 mg total) by mouth 2 (two) times daily with meals. 360 tablet 3    metoprolol succinate (TOPROL-XL) 100 MG 24 hr tablet Take 1 tablet (100 mg total) by mouth once daily. (Patient taking differently: Take 100 mg by mouth nightly.) 90 tablet 3       Scheduled meds:   atorvastatin  10 mg Oral QHS    cefTRIAXone (Rocephin) IV (PEDS and ADULTS)  2 g Intravenous Q24H    clindamycin IV (PEDS and ADULTS)  600 mg Intravenous Q8H    clotrimazole  10 mg Oral 5x Daily    insulin glargine U-100  10 Units Subcutaneous Daily    LIDOcaine  1 patch Transdermal Q24H    metoprolol succinate  100 mg Oral QHS    mupirocin   Nasal BID    sodium chloride 0.9%  10 mL Intravenous Q6H       Infusions:          PRN meds:    Current Facility-Administered Medications:     albuterol-ipratropium, 3 mL, Nebulization, Q6H PRN    aluminum & magnesium hydroxide-simethicone, 15 mL, Oral, QID PRN    dextrose 10%, 12.5 g, Intravenous, PRN    dextrose 10%, 25 g, Intravenous, PRN    glucagon (human recombinant), 1 mg, Intramuscular, PRN    glucose, 16 g, Oral, PRN    glucose, 24 g, Oral, PRN    HYDROcodone-acetaminophen, 1 tablet, Oral, Q4H PRN    insulin aspart U-100, 0-10 Units, Subcutaneous, QID (AC + HS) PRN    metoprolol, 5 mg, Intravenous, Q5 Min PRN    naloxone, 0.02 mg, Intravenous, PRN    ondansetron, 4 mg, Intravenous, Q8H PRN    prochlorperazine, 5 mg, Intravenous, Q6H PRN    simethicone, 1 tablet, Oral, QID PRN    sodium chloride 0.9%, 10 mL,  Intravenous, Q12H PRN    Flushing PICC/Midline Protocol, , , Until Discontinued **AND** sodium chloride 0.9%, 10 mL, Intravenous, Q6H **AND** sodium chloride 0.9%, 10 mL, Intravenous, PRN    sodium chloride 0.9%, 10 mL, Intravenous, Q12H PRN    Pharmacy to dose Vancomycin consult, , , Once **AND** vancomycin - pharmacy to dose, , Intravenous, pharmacy to manage frequency    Past Medical History:   Diagnosis Date    A-fib 2024    Diabetes mellitus, type 2 2021    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  Social History     Tobacco Use    Smoking status: Never    Smokeless tobacco: Never   Substance Use Topics    Alcohol use: Yes     Alcohol/week: 0.0 standard drinks of alcohol     Comment: social    Drug use: No       OBJECTIVE:     Vital Signs and IO:  Temp:  [97.7 °F (36.5 °C)-98.6 °F (37 °C)]   Pulse:  []   Resp:  [16-22]   BP: ()/(53-84)   SpO2:  [90 %-100 %]   I/O last 3 completed shifts:  In: 2699.8 [P.O.:360; I.V.:147.1; IV Piggyback:2192.7]  Out: 2400 [Urine:2200; Blood:200]  Wt Readings from Last 5 Encounters:   06/21/24 (!) 140.6 kg (309 lb 15.5 oz)   06/04/24 132.5 kg (292 lb 1.8 oz)   05/07/24 132.5 kg (292 lb 1.8 oz)   04/08/24 132.5 kg (292 lb 3.2 oz)   03/26/24 (!) 136.1 kg (300 lb 0.7 oz)     Body mass index is 42.04 kg/m².    Physical Exam  Constitutional:       General: Patient is not in acute distress.     Appearance: Patient is well-developed. She is not diaphoretic.   HENT:      Head: Normocephalic and atraumatic.      Mouth/Throat: Mucous membranes are moist.   Eyes:       General: No scleral icterus.     Pupils: Pupils are equal, round, and reactive to light.   Cardiovascular:      Rate and Rhythm: Normal rate and regular rhythm.   Pulmonary:      Effort: Pulmonary effort is normal. No respiratory distress.      Breath sounds: No stridor.   Abdominal:      General: There is no distension.      Palpations: Abdomen is soft.   Musculoskeletal:         General: No deformity. Normal range of motion.      Cervical back: Neck supple.   Skin:     General: Skin is warm and dry.      Findings: No rash present. No erythema.   Neurological:      Mental Status: Patient is alert and oriented to person, place, and time.      Cranial Nerves: No cranial nerve deficit.   Psychiatric:         Behavior: Behavior normal.          Patient care time was spent personally by me on the following activities:     Obtaining a history.  Examination of patient.  Providing medical care at the patients bedside.  Developing a treatment plan with patient or surrogate and bedside caregivers.  Ordering and reviewing laboratory studies, radiographic studies, pulse oximetry.  Ordering and performing treatments and interventions.  Evaluation of patient's response to treatment.  Discussions with consultants while on the unit and immediately available to the patient.  Re-evaluation of the patient's condition.  Documentation in the medical record.     Anjum Mcmullen MD    [facility]  [address] [place] [address]  [place] [id number]    [phone] - tel  [phone] - fax    6/22/2024

## 2024-06-22 NOTE — PT/OT/SLP PROGRESS
Physical Therapy Treatment    Patient Name:  Roosevelt Moser   MRN:  [id number]    Recommendations:     Discharge Recommendations: Moderate Intensity Therapy  Discharge Equipment Recommendations: none  Barriers to discharge: None    Assessment:     Roosevelt Moser is a 73 y.o. male admitted with a medical diagnosis of Acute renal failure superimposed on chronic kidney disease.  He presents with the following impairments/functional limitations: weakness, impaired endurance, impaired functional mobility, gait instability, impaired balance, decreased upper extremity function, decreased lower extremity function, pain, edema, orthopedic precautions .  Patient had I&D and removal of infected hardware in left shoulder yesterday and tolerated it well.  Patient presented supine in bed and required mod assist to transfer to EOB but refused attempting to stand today due to pain. Patient was able to sit unsupported on EOB before transferring back to supine with  mod assist.  Patient then instructed to pump left fist frequently to help reduce inflamation in the right UE.  Patient also instructed to perform quad sets and ankle pumps for the LE.    Rehab Prognosis: Good; patient would benefit from acute skilled PT services to address these deficits and reach maximum level of function.    Recent Surgery: Procedure(s) (LRB):  ARTHROTOMY, SHOULDER (Left) 1 Day Post-Op    Plan:     During this hospitalization, patient to be seen daily to address the identified rehab impairments via gait training, therapeutic activities, therapeutic exercises and progress toward the following goals:    Plan of Care Expires:  07/15/24    Subjective     Chief Complaint: pain  Patient/Family Comments/goals: get beter  Pain/Comfort:  Pain Rating 1: 7/10  Location - Side 1: Left  Location - Orientation 1: upper  Location 1: shoulder  Pain Addressed 1: Reposition, Cessation of Activity  Pain Rating Post-Intervention 1: 7/10      Objective:     Communicated with nurse  prior to session.  Patient found supine with   upon PT entry to room.     General Precautions: Standard, fall, contact (MRSA)  Orthopedic Precautions:  (LUE S/P shldr Sx in 2024)  Braces: Sling and swathe  Respiratory Status: Room air     Functional Mobility:  Bed Mobility:     Supine to Sit: moderate assistance  Sit to Supine: moderate assistance  Balance: sitting EOB unsupported      AM-PAC 6 CLICK MOBILITY  Turning over in bed (including adjusting bedclothes, sheets and blankets)?: 3  Sitting down on and standing up from a chair with arms (e.g., wheelchair, bedside commode, etc.): 3  Moving from lying on back to sitting on the side of the bed?: 3  Moving to and from a bed to a chair (including a wheelchair)?: 3  Need to walk in hospital room?: 3  Climbing 3-5 steps with a railing?: 3  Basic Mobility Total Score: 18       Treatment & Education:  Exercise to include ankle pumps, quad sets, fist pumps and LAQ.  All done bilateral LE x 10 reps  Transfer training supine to/from sit mod  Sitting unsupported x 12 minutes    Patient left supine with call button in reach, bed alarm on, and nurse notified..    GOALS:   Multidisciplinary Problems       Physical Therapy Goals          Problem: Physical Therapy    Goal Priority Disciplines Outcome Goal Variances Interventions   Physical Therapy Goal     PT, PT/OT Progressing     Description: Goals to be met by: 7/15/24     Patient will increase functional independence with mobility by performin. Supine to sit with Contact Guard Assistance  2. Sit to stand transfer with Contact Guard Assistance  3. Bed to chair transfer with Contact Guard Assistance using Rolling Walker  4. Gait  x 200 feet with Contact Guard Assistance using Rolling Walker.   5. Lower extremity exercise program x30 reps per handout, with supervision                         Time Tracking:     PT Received On: 24  PT Start Time: 1409     PT Stop Time: 1434  PT Total Time (min): 25 min      Billable Minutes: Therapeutic Activity 13 and Therapeutic Exercise 12    Treatment Type: Treatment  PT/PTA: PT     Number of PTA visits since last PT visit: 0     06/22/2024

## 2024-06-22 NOTE — ASSESSMENT & PLAN NOTE
S/p bedside I&D, deep tissue Cx growing MRSA, S to clinda  ID and podiatry following, appreciate help  Appreciate input all consultants  Pt/wife opposed to amputation of digit  Local wound care  Note markedly elevated ESR, CRP  Need antibiotics until 08/03/2024

## 2024-06-22 NOTE — ASSESSMENT & PLAN NOTE
As discussed under limb swelling, status post washout on 06/21/2024  Need antibiotics until 08/03/2024

## 2024-06-22 NOTE — CARE UPDATE
06/22/24 0649   Patient Assessment/Suction   Level of Consciousness (AVPU) alert   Respiratory Effort Unlabored;Normal   Expansion/Accessory Muscles/Retractions no use of accessory muscles   All Lung Fields Breath Sounds coarse   Rhythm/Pattern, Respiratory pattern regular;depth regular   PRE-TX-O2   Device (Oxygen Therapy) room air   SpO2 95 %   Pulse Oximetry Type Intermittent   $ Pulse Oximetry - Multiple Charge Pulse Oximetry - Multiple   Pulse 88   Resp 17   Aerosol Therapy   $ Aerosol Therapy Charges PRN treatment not required   Respiratory Treatment Status (SVN) PRN treatment not required   Incentive Spirometer   $ Incentive Spirometer Charges done with encouragement   Administration (IS) proper technique demonstrated   Number of Repetitions (IS) 15   Level Incentive Spirometer (mL) 1000   Patient Tolerance (IS) good

## 2024-06-22 NOTE — PROGRESS NOTES
[facility] Medicine  Progress Note    Patient Name: Roosevelt Moser  MRN: [id number]  Patient Class: IP- Inpatient   Admission Date: 6/14/2024  Length of Stay: 8 days  Attending Physician: Miko Galaviz Jr., MD  Primary Care Provider: Miguel Angel Enriquez PA-C        Subjective:     Principal Problem:Acute renal failure superimposed on chronic kidney disease        HPI:  Roosevelt Moser is a 73 year old male with a previous medical history of atrial fibrillation on eliquis, HTN, DMII, obstructive sleep apnea, HLD, ORIF of left humerus and guillian-Hector who presented to the emergency room for weakness and multiple falls. Per wife of the patient he has had progressive weakness over the past week along with two falls one at 0300 Thursday and the other early morning Friday. Both time she had to activate EMS to pick patient up off floor due to weakness. Patient refused transport to hospital on both occasions. Today he slid out of his chair and was unable to get up without assistance and at this point the wife activated EMS to transport patient to the hospital. She endorses that two days ago she noticed that the patient had stopped urinating as he normally does. The patient concurs that he has noticed that his urine has decreased over the past two days and that it is dark. Patient denies dysuria or incomplete bladder emptying. Bladder scan showed zero upon admit and urine sample was obtained via straight cath in ED. Patient denies decreased PO intake and keeps a bottle of water with him at all times. Initial ED work-up showed a creatinine of 5 and potassium of 6. Urine studies positive for infection and WBC 20.81 with procalcitonin at 19.75. Blood cultures obtained and patient given 1 gram of rocephin and 1000ml of normal saline. Patient noted to bein afib with RVR and 20mg of diltiazem was administered IV which resulted in low blood pressure and 1 gram of calcium gluconate was administered.  "Patient admitted by hospital medicine for further evaluation and management     Overview/Hospital Course:  6/15/24  Assumed care today, pt seen and examined, chart reviewed.  Pt sitting up in bed, wife at bedside, feeling better.  Off dilt drip since this AM, in AF on monitor but rate down to 80s.   Denies cp/sob.  Has been weak and debilitated, await PT/OT to see what post acute needs will be.    6/16/24  Pt resting in NAD.  Advised by CM yesterday wife not willing/able to take him home at this time - We talked about that frankly today and she says she is unable to care for him, planning on SNF, hopefully regain enough function to allow d/c home from there. Pt w/ slow improvement.  Feels pretty good.  Appetite not too good, he says he's been deliberately eating small quantities of food at home to lose weight and feels it's been working.  Note pain/swelling LUE where he had a recent fx/ORIF.    6/17/24  Pt doing reasonably well, having pain sitting on the bedpan "it's sticking me", but overcame that w/ some adjusting position.  LUE u/s no DVT.  Still having a lot of pain in upper left arm w/ movement and also worried about persistent pain "under left boob" where he struck himself during a fall - we will image those areas too.  Says Norco 7.5 is like "eating a jelly bean" asking if we can give 10.  Interesting notes that he's currently constipated, at home often has diarrhea, says that higher doses of narcotics cause him to have loose bowels.  Odd.  Albumin 1.3.  Await SNF dispo. Wife at bedside.   6/18/24  Pt continues to be quite jolly about everything, has good sense of humor, acting very non toxic - HOWEVER upon wound care assessment by Dr Elise he is seen to have osteomyelitis of his right 2nd toe - Podiatry and ID consulted, s/p debridement at bedside w/ deep tissue culture taken, pt/wife opposed to amputation.  Additionally shoulder CT favors abscess around his op site - await input from ortho - He has pain " "there but it's not exquisite and the area is not flucuant/red/warm, pain seems more arthritic in nature - but he appears to have a deep seated infection and will need a washout in OR -  Dr Calvin has added clinda/vanc to Zosyn started yesterday.   6/19/24  Pt sitting up in bed, very conversant and non toxic appearing, thanks much to all consultants.  Per ortho conservative approach to shoulder, I am in chat w/ them currently about possible aspiration of fluid.  Note ID changes in abx coverage.  Pt still has a great deal of pain/mobility loss of left shoulder.  We have been working on SNF but he may need LTAC w/ current level of complexity/abx regimen.  Deep wound cultures from right 2nd toe are pending.   6/20/24  Aspirate done today per IR and returned 10 cc of "pussy" fluid, S Lea reviewed and messaged me that Dr Cooper is out of town and to please contact on call ortho, Dr Bernal on call and is aware of care from prior discussion, he reviewed findings and will do washout in OR tomorrow, went to d/w pt and wife, he's sitting up in bed, feeling ok, no new c/o.  I also encountered Dr Calvin in the vale and we talked about this and his abx.    6/21/24  Pt going for washout/removal of infected hardware today in OR.  Sitting up in bed, sister and wife here, everyone on board w/ careplan. He does have a lot more swelling today around proximal deltoid.  Not warm/tender but visibly swollen and this is the 1st time we have really noted that.          Interval History:  No acute events overnight.  Patient states he has well. no issues after shoulder washout.  Reviewed ID recommendations.  Plan for antibiotics until 08/03/2024.  Pending dispo.  Spoke with wife as well at bedside    Review of Systems  Objective:     Vital Signs (Most Recent):  Temp: 97.8 °F (36.6 °C) (06/22/24 0752)  Pulse: 90 (06/22/24 0752)  Resp: 18 (06/22/24 0805)  BP: (!) 143/68 (06/22/24 0752)  SpO2: (!) 94 % (06/22/24 0752) Vital Signs (24h " Range):  Temp:  [97.7 °F (36.5 °C)-98.6 °F (37 °C)] 97.8 °F (36.6 °C)  Pulse:  [] 90  Resp:  [16-22] 18  SpO2:  [90 %-100 %] 94 %  BP: ()/(53-84) 143/68     Weight: (!) 140.6 kg (309 lb 15.5 oz)  Body mass index is 42.04 kg/m².    Intake/Output Summary (Last 24 hours) at 6/22/2024 1044  Last data filed at 6/22/2024 0446  Gross per 24 hour   Intake 2699.75 ml   Output 1600 ml   Net 1099.75 ml         Physical Exam  Constitutional:       Appearance: Normal appearance.   HENT:      Head: Normocephalic and atraumatic.      Right Ear: External ear normal.      Left Ear: External ear normal.      Nose: Nose normal.      Mouth/Throat:      Mouth: Mucous membranes are moist.      Pharynx: Oropharynx is clear.   Eyes:      Extraocular Movements: Extraocular movements intact.      Conjunctiva/sclera: Conjunctivae normal.   Cardiovascular:      Rate and Rhythm: Normal rate and regular rhythm.      Pulses: Normal pulses.      Heart sounds: Normal heart sounds. No murmur heard.     No gallop.   Pulmonary:      Effort: Pulmonary effort is normal. No respiratory distress.      Breath sounds: Normal breath sounds. No wheezing, rhonchi or rales.   Abdominal:      General: Abdomen is flat. There is no distension.      Palpations: Abdomen is soft.      Tenderness: There is no abdominal tenderness.   Musculoskeletal:         General: No swelling. Normal range of motion.      Cervical back: Normal range of motion and neck supple.      Comments: Left shoulder bandage   Skin:     General: Skin is warm and dry.   Neurological:      General: No focal deficit present.      Mental Status: He is alert. Mental status is at baseline.             Significant Labs: All pertinent labs within the past 24 hours have been reviewed.    Significant Imaging: I have reviewed all pertinent imaging results/findings within the past 24 hours.    Assessment/Plan:      * Acute renal failure superimposed on chronic kidney disease  Appreciate input  from renal services  In setting of acute UTI and IVVD  Resumed gentle hydration on Zosyn/vanc but those have been stopped  He did not require oral NaHCO3  U/s is w/o obstruction, masses, etc        Pyogenic arthritis of left shoulder region        Abscess of left shoulder  As discussed under limb swelling, status post washout on 06/21/2024  Need antibiotics until 08/03/2024    Osteomyelitis of toe  S/p bedside I&D, deep tissue Cx growing MRSA, S to clinda  ID and podiatry following, appreciate help  Appreciate input all consultants  Pt/wife opposed to amputation of digit  Local wound care  Note markedly elevated ESR, CRP  Need antibiotics until 08/03/2024      Skin tear of left upper extremity  Local care, healing  Doubt this is a portal of entry    Swelling of limb, left  U/s w/o DVT  Appreciate help from Dr Bernal, ID, wound care, podiatry et al  Pt is already on OAC, holding for now pending shoulder procedure  Arm is elevated on pillow  Not red/painful, no compartment syndrome  D/w pt/wife      Debility  Multifactorial process  Pt not able to manage his own care and wife cannot adequately care for him at this time  PT/OT  SNF or possibly LTAC consult  CM aware      Severe sepsis  In setting of acute UTI, MRSA OM right 2nd toe, and infected left shoulder hardware  BCX NGTD  Ur CX neg  Rocephin was given empirically for UTI, changed to Zosyn > added vanc/clinda, now on Rocephin/clinda, vanc/zosyn stopped per ID  Oral clotrimazole added for thrush  PCT and WBC trended down, WBC staying up a little now but pt looks good  No obstruction on renal u/s  Has gas containing abscess in and around left shoulder w/ malpositioned hardware, likely d/t falls, hard to say how the bug entered  Long d/w pt/wife, ortho - 6/22 had washout and hardware removal w/ Dr Bernal  F/u w/ Dr Kenneth mann d/c  He's urinating well  We will need antibiotics until 08/03/2024 per ID    Hyperkalemia  Resolved  in setting of acute on chronic  renal failure  Esterkelma given x 1 on admit  Avoid ACE/ARB, K+ replacement  tele          Atrial fibrillation with rapid ventricular response  Chronic AF w/ acute RVR, resolved  Required dilt drip on admission but off since 6/15  Tele some PVC/bigem/trigem  TSH 1.8 in March  Monitor lytes  No etoh/drug use  TTE from March 2024:    Left Ventricle: The left ventricle is normal in size. Normal wall thickness. Mild global hypokinesis present. There is mildly reduced systolic function with a visually estimated ejection fraction of 45 - 50%. There is normal diastolic function.    Right Ventricle: Normal right ventricular cavity size. Wall thickness is normal. Right ventricle wall motion  is normal. Systolic function is normal.    Left Atrium: Left atrium is moderately dilated.    IVC/SVC: Normal venous pressure at 3 mmHg.  OAC has been on hold d/t possible need for shoulder surgery - has been on prophylaxis dose of Lovenox, this was help today for shoulder operation  Resume Eliquis as soon as feasible postop      History of Guillain-[place] syndrome  No acute issues  However, pt has impaired mobility and is acutely weakened from his sepsis/UTI/AF RVR  PT/OT working w/ pt  Wife cannot manage his care at home currently  SNF assessment underway, may need LTAC depending on what abx he ends up for how long, it may take a few more days to narrow that down          Type 2 diabetes mellitus  A1c 6.3%  SSI      Hypertension  Home meds as appropriate    MARA (obstructive sleep apnea)  Continue CPAP HS and w/ naps      Morbid obesity  Body mass index is 42.04 kg/m².  Morbid obesity complicates all aspects of disease management from diagnostic modalities to treatment  Weight loss encouraged and health benefits explained to patient  He has been working on weight loss at home w/ reduced caloric intake          VTE Risk Mitigation (From admission, onward)           Ordered     Reason for No Pharmacological VTE Prophylaxis  Once         Question:  Reasons:  Answer:  Already adequately anticoagulated on oral Anticoagulants    06/14/24 1438     IP VTE HIGH RISK PATIENT  Once         06/14/24 1438     Place sequential compression device  Until discontinued         06/14/24 1438                    Discharge Planning   KAMILA:      Code Status: Full Code   Is the patient medically ready for discharge?:     Reason for patient still in hospital (select all that apply): Treatment  Discharge Plan A: Long-term acute care facility (LTAC)                  Miko Galaviz Jr, MD  Department of Hospital Medicine   [facility]

## 2024-06-23 LAB
ALBUMIN SERPL BCP-MCNC: 1.3 G/DL (ref 3.5–5.2)
ALP SERPL-CCNC: 195 U/L (ref 55–135)
ALT SERPL W/O P-5'-P-CCNC: 14 U/L (ref 10–44)
ANION GAP SERPL CALC-SCNC: 12 MMOL/L (ref 8–16)
AST SERPL-CCNC: 23 U/L (ref 10–40)
BACTERIA SPEC ANAEROBE CULT: NORMAL
BASOPHILS # BLD AUTO: 0.09 K/UL (ref 0–0.2)
BASOPHILS NFR BLD: 0.5 % (ref 0–1.9)
BILIRUB SERPL-MCNC: 0.3 MG/DL (ref 0.1–1)
BUN SERPL-MCNC: 72 MG/DL (ref 8–23)
CALCIUM SERPL-MCNC: 8.7 MG/DL (ref 8.7–10.5)
CHLORIDE SERPL-SCNC: 102 MMOL/L (ref 95–110)
CO2 SERPL-SCNC: 26 MMOL/L (ref 23–29)
CREAT SERPL-MCNC: 2 MG/DL (ref 0.5–1.4)
CRP SERPL-MCNC: 205 MG/L (ref 0–8.2)
DIFFERENTIAL METHOD BLD: ABNORMAL
EOSINOPHIL # BLD AUTO: 0.5 K/UL (ref 0–0.5)
EOSINOPHIL NFR BLD: 2.9 % (ref 0–8)
ERYTHROCYTE [DISTWIDTH] IN BLOOD BY AUTOMATED COUNT: 17.6 % (ref 11.5–14.5)
ERYTHROCYTE [SEDIMENTATION RATE] IN BLOOD BY WESTERGREN METHOD: 123 MM/HR (ref 0–10)
EST. GFR  (NO RACE VARIABLE): 35 ML/MIN/1.73 M^2
GLUCOSE SERPL-MCNC: 229 MG/DL (ref 70–110)
GRAM STN SPEC: NORMAL
GRAM STN SPEC: NORMAL
HCT VFR BLD AUTO: 26.2 % (ref 40–54)
HGB BLD-MCNC: 8.6 G/DL (ref 14–18)
IMM GRANULOCYTES # BLD AUTO: 0.6 K/UL (ref 0–0.04)
IMM GRANULOCYTES NFR BLD AUTO: 3.5 % (ref 0–0.5)
LYMPHOCYTES # BLD AUTO: 2.3 K/UL (ref 1–4.8)
LYMPHOCYTES NFR BLD: 13 % (ref 18–48)
MCH RBC QN AUTO: 26.3 PG (ref 27–31)
MCHC RBC AUTO-ENTMCNC: 32.8 G/DL (ref 32–36)
MCV RBC AUTO: 80 FL (ref 82–98)
MONOCYTES # BLD AUTO: 1.5 K/UL (ref 0.3–1)
MONOCYTES NFR BLD: 8.6 % (ref 4–15)
NEUTROPHILS # BLD AUTO: 12.4 K/UL (ref 1.8–7.7)
NEUTROPHILS NFR BLD: 71.5 % (ref 38–73)
NRBC BLD-RTO: 0 /100 WBC
PLATELET # BLD AUTO: 669 K/UL (ref 150–450)
PMV BLD AUTO: 10.1 FL (ref 9.2–12.9)
POCT GLUCOSE: 239 MG/DL (ref 70–110)
POCT GLUCOSE: 297 MG/DL (ref 70–110)
POCT GLUCOSE: 299 MG/DL (ref 70–110)
POCT GLUCOSE: 319 MG/DL (ref 70–110)
POTASSIUM SERPL-SCNC: 3.9 MMOL/L (ref 3.5–5.1)
PROCALCITONIN SERPL IA-MCNC: 1.39 NG/ML (ref 0–0.5)
PROT SERPL-MCNC: 5.8 G/DL (ref 6–8.4)
RBC # BLD AUTO: 3.27 M/UL (ref 4.6–6.2)
SODIUM SERPL-SCNC: 140 MMOL/L (ref 136–145)
VANCOMYCIN SERPL-MCNC: 13.5 UG/ML
WBC # BLD AUTO: 17.37 K/UL (ref 3.9–12.7)

## 2024-06-23 PROCEDURE — A4216 STERILE WATER/SALINE, 10 ML: HCPCS | Performed by: ORTHOPAEDIC SURGERY

## 2024-06-23 PROCEDURE — 25000003 PHARM REV CODE 250: Performed by: ORTHOPAEDIC SURGERY

## 2024-06-23 PROCEDURE — 25000003 PHARM REV CODE 250: Performed by: INTERNAL MEDICINE

## 2024-06-23 PROCEDURE — 94761 N-INVAS EAR/PLS OXIMETRY MLT: CPT

## 2024-06-23 PROCEDURE — 85651 RBC SED RATE NONAUTOMATED: CPT | Performed by: INTERNAL MEDICINE

## 2024-06-23 PROCEDURE — 85025 COMPLETE CBC W/AUTO DIFF WBC: CPT | Performed by: INTERNAL MEDICINE

## 2024-06-23 PROCEDURE — 21400001 HC TELEMETRY ROOM

## 2024-06-23 PROCEDURE — 11000001 HC ACUTE MED/SURG PRIVATE ROOM

## 2024-06-23 PROCEDURE — 99900035 HC TECH TIME PER 15 MIN (STAT)

## 2024-06-23 PROCEDURE — 80202 ASSAY OF VANCOMYCIN: CPT | Performed by: INTERNAL MEDICINE

## 2024-06-23 PROCEDURE — 99231 SBSQ HOSP IP/OBS SF/LOW 25: CPT | Mod: ,,, | Performed by: INTERNAL MEDICINE

## 2024-06-23 PROCEDURE — 87040 BLOOD CULTURE FOR BACTERIA: CPT | Performed by: INTERNAL MEDICINE

## 2024-06-23 PROCEDURE — 63600175 PHARM REV CODE 636 W HCPCS: Performed by: INTERNAL MEDICINE

## 2024-06-23 PROCEDURE — 80053 COMPREHEN METABOLIC PANEL: CPT | Performed by: INTERNAL MEDICINE

## 2024-06-23 PROCEDURE — 84145 PROCALCITONIN (PCT): CPT | Performed by: INTERNAL MEDICINE

## 2024-06-23 PROCEDURE — 63600175 PHARM REV CODE 636 W HCPCS: Performed by: ORTHOPAEDIC SURGERY

## 2024-06-23 PROCEDURE — 97530 THERAPEUTIC ACTIVITIES: CPT | Mod: CQ

## 2024-06-23 PROCEDURE — 86140 C-REACTIVE PROTEIN: CPT | Performed by: INTERNAL MEDICINE

## 2024-06-23 PROCEDURE — 94799 UNLISTED PULMONARY SVC/PX: CPT

## 2024-06-23 PROCEDURE — 36415 COLL VENOUS BLD VENIPUNCTURE: CPT | Performed by: INTERNAL MEDICINE

## 2024-06-23 RX ADMIN — HYDROCODONE BITARTRATE AND ACETAMINOPHEN 1 TABLET: 10; 325 TABLET ORAL at 05:06

## 2024-06-23 RX ADMIN — INSULIN ASPART 4 UNITS: 100 INJECTION, SOLUTION INTRAVENOUS; SUBCUTANEOUS at 09:06

## 2024-06-23 RX ADMIN — LIDOCAINE 5% 1 PATCH: 700 PATCH TOPICAL at 05:06

## 2024-06-23 RX ADMIN — CLOTRIMAZOLE 10 MG: 10 LOZENGE ORAL at 09:06

## 2024-06-23 RX ADMIN — INSULIN ASPART 8 UNITS: 100 INJECTION, SOLUTION INTRAVENOUS; SUBCUTANEOUS at 04:06

## 2024-06-23 RX ADMIN — CLOTRIMAZOLE 10 MG: 10 LOZENGE ORAL at 05:06

## 2024-06-23 RX ADMIN — SODIUM CHLORIDE, PRESERVATIVE FREE 10 ML: 5 INJECTION INTRAVENOUS at 06:06

## 2024-06-23 RX ADMIN — SODIUM CHLORIDE, PRESERVATIVE FREE 10 ML: 5 INJECTION INTRAVENOUS at 05:06

## 2024-06-23 RX ADMIN — CLOTRIMAZOLE 10 MG: 10 LOZENGE ORAL at 06:06

## 2024-06-23 RX ADMIN — METOPROLOL SUCCINATE 100 MG: 50 TABLET, FILM COATED, EXTENDED RELEASE ORAL at 09:06

## 2024-06-23 RX ADMIN — CLOTRIMAZOLE 10 MG: 10 LOZENGE ORAL at 12:06

## 2024-06-23 RX ADMIN — HYDROCODONE BITARTRATE AND ACETAMINOPHEN 1 TABLET: 10; 325 TABLET ORAL at 07:06

## 2024-06-23 RX ADMIN — ATORVASTATIN CALCIUM 10 MG: 10 TABLET, FILM COATED ORAL at 09:06

## 2024-06-23 RX ADMIN — CEFTRIAXONE SODIUM 2 G: 2 INJECTION, POWDER, FOR SOLUTION INTRAMUSCULAR; INTRAVENOUS at 12:06

## 2024-06-23 RX ADMIN — ALUMINUM HYDROXIDE, MAGNESIUM HYDROXIDE, AND DIMETHICONE 15 ML: 400; 400; 40 SUSPENSION ORAL at 09:06

## 2024-06-23 RX ADMIN — INSULIN GLARGINE 10 UNITS: 100 INJECTION, SOLUTION SUBCUTANEOUS at 08:06

## 2024-06-23 RX ADMIN — INSULIN ASPART 6 UNITS: 100 INJECTION, SOLUTION INTRAVENOUS; SUBCUTANEOUS at 12:06

## 2024-06-23 RX ADMIN — SODIUM CHLORIDE, PRESERVATIVE FREE 10 ML: 5 INJECTION INTRAVENOUS at 12:06

## 2024-06-23 RX ADMIN — INSULIN ASPART 4 UNITS: 100 INJECTION, SOLUTION INTRAVENOUS; SUBCUTANEOUS at 07:06

## 2024-06-23 RX ADMIN — HYDROCODONE BITARTRATE AND ACETAMINOPHEN 1 TABLET: 10; 325 TABLET ORAL at 12:06

## 2024-06-23 RX ADMIN — VANCOMYCIN HYDROCHLORIDE 1250 MG: 1.25 INJECTION, POWDER, LYOPHILIZED, FOR SOLUTION INTRAVENOUS at 09:06

## 2024-06-23 NOTE — PLAN OF CARE
Problem: Adult Inpatient Plan of Care  Goal: Plan of Care Review  Outcome: Progressing  Goal: Absence of Hospital-Acquired Illness or Injury  Outcome: Progressing  Goal: Optimal Comfort and Wellbeing  Outcome: Progressing     Problem: Diabetes Comorbidity  Goal: Blood Glucose Level Within Targeted Range  Outcome: Progressing     Problem: Acute Kidney Injury/Impairment  Goal: Fluid and Electrolyte Balance  Outcome: Progressing  Goal: Effective Renal Function  Outcome: Progressing     Problem: Sepsis/Septic Shock  Goal: Optimal Coping  Outcome: Progressing  Goal: Blood Glucose Level Within Targeted Range  Outcome: Progressing     Problem: Skin Injury Risk Increased  Goal: Skin Health and Integrity  Outcome: Progressing     Problem: Wound  Goal: Optimal Coping  Outcome: Progressing  Goal: Optimal Functional Ability  Outcome: Progressing  Goal: Optimal Pain Control and Function  Outcome: Progressing  Goal: Optimal Wound Healing  Outcome: Progressing

## 2024-06-23 NOTE — PROGRESS NOTES
Nephrology Progress Note        Patient Name: Roosevelt Moser  MRN: [id number]    Patient Class: IP- Inpatient   Admission Date: 6/14/2024  Length of Stay: 9 days  Date of Service: 6/23/2024    Attending Physician: Miko Galaviz Jr., MD  Primary Care Provider: Miguel Angel Enriquez PA-C    Reason for Consult: marbin/hyperkalemia    SUBJECTIVE:     HPI: 73M with h/o DM, HTN, AF, GBS and recent shoulder surgery was brought to ER by EMS with recurrent falls in the last 24h. Reports decreased UO but no fever, cough, SOB, dysuria. Upon admission, noted to be in MARBIN with sCr 5, baseline 1 month ago is 1, hyperkalemia with K 6, acidosis with CO2 12, hypoalbuminemia with albumin 1.6. Lactate notably 2.1. Procal 20. A1c 9.5 in 3/2024. UA with hematuria and pyuria, urine Cx pending. Notably on Apixaban. WBC in blood elevated to 20. Plt 540. RP US ordered. Afebrile, normotensive, received IVF and abx. Lokelma, ca gluconate given bicarb gtt ordered. Straight cath in ER with only 100cc urine out.    Review of Systems:  Neg    6/15  AFVSS.   UOP 1200cc plus 2 unmeasured   6/16  AFVSS.  2150 cc uop.  No distress  6/17 VSS, on RA, UOP 1.5L- updated family at bedside  6/18 VSS, on RA, UOP 1.3L  6/19 VSS, on RA, UOP 1L, with osteo R toe- s/p debridement- not interested in amputation- antbx adjusted  6/20 HR , BP stable, on 2L NC, UOP 1.2L, went for procedure, wife reports LE edema  6/21 HR 90-110s, -140s mostly, on RA, UOP 1.6L, to OR for shoulder washout and hardware removal today  6/22 VSS s/p incision and drainage for infection from left shoulder and removal of deep hardware including lizabeth and screws.  6/23 VSS. Consider resuming diuretics tomorrow.    ASSESSMENT/PLAN:     MARBIN due to ATN due to sepsis due to UTI and/or PNA  CKD stage 2 with diabetic proteinuria and severe hypoalbuminemia  HTN  DM poorly controlled A1c 9.5  HyperK/Acidosis  HypoMg  SHPT  Anemia  Hypoalbuminemia  Edema    - renal function is improving-  nonoliguric  - no acute RRT needs- no nsaids or IV contrast- dose meds for CrCl < 30  - about 1g proteinuria- low alb is nutrition/acute phase reactant  - hold hydralazine  - hold metformin- use insulin  - hyperK/acidosis resolved  - Mg replete  - H/H stable  - optimize protein intake  - prefer better renal function and > 24 hours away from washout (in case he becomes septic) before trying diuretic therapy- may need midodrine as well b/c with infection and low albumin might drop BP quickly    Thank you for allowing us to participate in the care of your patient!   We will follow the patient and provide recommendations as needed.         Laboratory:  Recent Labs   Lab 06/21/24  0405 06/22/24  0513 06/23/24  0849    138 140   K 3.7 4.4 3.9    102 102   CO2 25 25 26   BUN 82* 76* 72*   CREATININE 2.4* 2.4* 2.0*   * 257* 229*       Recent Labs   Lab 06/17/24  0255 06/18/24  0234 06/19/24  0322 06/20/24  0458 06/21/24  0405 06/22/24  0513 06/23/24  0849   CALCIUM 8.5* 8.6* 8.8 8.7 9.0 8.5* 8.7   ALBUMIN 1.3* 1.3* 1.3* 1.3*  --  1.2* 1.3*   PHOS 4.3 3.8  --  4.2  --   --   --    MG 1.5* 1.5* 2.0  --   --  1.7  --              Recent Labs   Lab 06/20/24 2016 06/20/24 2227 06/21/24  0714 06/21/24  1133 06/21/24 2000 06/22/24  0755 06/22/24  1154 06/22/24  1721 06/22/24  2252 06/23/24  0751   POCTGLUCOSE 243* 222* 206* 225* 261* 269* 272* 310* 297* 239*       Recent Labs   Lab 07/31/23  0955 03/19/24  0830 06/14/24  1539   Hemoglobin A1C 8.8 H 9.5 H 6.3 H       Recent Labs   Lab 06/21/24  0410 06/22/24  0513 06/23/24  0849   WBC 21.39* 19.81* 17.37*   HGB 9.8* 8.6* 8.6*   HCT 30.1* 26.8* 26.2*   * 549* 669*   MCV 81* 81* 80*   MCHC 32.6 32.1 32.8   MONO  --   --  8.6  1.5*   EOSINOPHIL  --   --  2.9       Recent Labs   Lab 06/19/24  0322 06/20/24  0458 06/22/24  0513 06/23/24  0849   BILITOT 0.7  --  0.3 0.3   PROT 6.2  --  5.8* 5.8*   ALBUMIN 1.3* 1.3* 1.2* 1.3*   ALKPHOS 312*  --  217* 195*    ALT 23  --  13 14   AST 45*  --  20 23       Recent Labs   Lab 12/16/22  1238 03/08/24  1034 03/25/24  1022 06/14/24  1337   Color, UA Yellow Yellow Yellow [place] A   Appearance, UA Clear Clear Hazy A Cloudy A   pH, UA 6.0 5.0 6.0 6.0   Specific [place], UA 1.020 1.010 1.020 1.020   Protein, UA 1+ A Trace A 1+ A 2+ A   Glucose, UA 1+ A 3+ A Negative Trace A   Ketones, UA Negative Negative Negative Negative   Urobilinogen, UA  --  Negative Negative Negative   Bilirubin (UA) Negative Negative Negative Negative   Occult Blood UA 3+ A 2+ A 2+ A 3+ A   Nitrite, UA Negative Negative Negative Negative   RBC, UA 20 H 13 H >100 H >100 H   WBC, UA 81 H 3 18 H >100 H   Bacteria Rare None Rare Many A   Hyaline Casts, UA 0  --  0 0             Microbiology Results (last 7 days)       Procedure Component Value Units Date/Time    Blood culture [[id number]] Collected: 06/23/24 0659    Order Status: Sent Specimen: Blood from Peripheral, Hand, Right Updated: 06/23/24 0924    Gram stain [[id number]] Collected: 06/21/24 1645    Order Status: Completed Specimen: Abscess from Shoulder, Left Updated: 06/23/24 0833     Gram Stain Result Few WBC's      No organisms seen    Culture, Anaerobic [[id number]] Collected: 06/20/24 1204    Order Status: Completed Specimen: Abscess from Shoulder, Left Updated: 06/23/24 0729     Anaerobic Culture No anaerobes isolated    Aerobic culture [[id number]] Collected: 06/20/24 1204    Order Status: Completed Specimen: Abscess from Shoulder, Left Updated: 06/23/24 0721     Aerobic Bacterial Culture No growth    Aerobic culture [[id number]] Collected: 06/21/24 1645    Order Status: Completed Specimen: Abscess from Shoulder, Left Updated: 06/23/24 0721     Aerobic Bacterial Culture No growth    Culture, Anaerobic [[id number]] Collected: 06/21/24 1645    Order Status: Sent Specimen: Abscess from Shoulder, Left Updated: 06/21/24 1924    AFB Culture & Smear [[id number]] Collected: 06/21/24 1645    Order Status:  Sent Specimen: Abscess from Shoulder, Left Updated: 06/21/24 1924    Fungus culture [[id number]] Collected: 06/21/24 1645    Order Status: Sent Specimen: Abscess from Shoulder, Left Updated: 06/21/24 1924    Gram stain [[id number]] Collected: 06/20/24 1204    Order Status: Completed Specimen: Abscess from Shoulder, Left Updated: 06/21/24 1520     Gram Stain Result Many WBC's      No organisms seen    Culture, Anaerobe [[id number]] Collected: 06/18/24 1251    Order Status: Completed Specimen: Wound from Toe, Right Foot Updated: 06/21/24 1428     Anaerobic Culture No anaerobes isolated    Aerobic culture [[id number]]  (Abnormal)  (Susceptibility) Collected: 06/18/24 1251    Order Status: Completed Specimen: Wound from Toe, Right Foot Updated: 06/21/24 0755     Aerobic Bacterial Culture METHICILLIN RESISTANT STAPHYLOCOCCUS AUREUS  Few  Results called to and read back by Rich Crockett RN-[facility];    06/20/2024  15:05 CJD      Fungus culture [[id number]] Collected: 06/20/24 1204    Order Status: Sent Specimen: Abscess from Shoulder, Left Updated: 06/20/24 1923    Blood culture [[id number]] Collected: 06/14/24 1355    Order Status: Completed Specimen: Blood from Peripheral, Antecubital, Right Updated: 06/19/24 2032     Blood Culture, Routine No growth after 5 days.    Blood culture [[id number]] Collected: 06/14/24 1355    Order Status: Completed Specimen: Blood from Peripheral, Hand, Right Updated: 06/19/24 2032     Blood Culture, Routine No growth after 5 days.    Urine culture [[id number]] Collected: 06/14/24 1337    Order Status: Completed Specimen: Urine Updated: 06/17/24 0709     Urine Culture, Routine No growth    Narrative:      Specimen Source->Urine            Review of patient's allergies indicates:  No Known Allergies    Outpatient meds:  No current facility-administered medications on file prior to encounter.     Current Outpatient Medications on File Prior to Encounter   Medication Sig Dispense Refill     apixaban (ELIQUIS) 5 mg Tab Take 1 tablet (5 mg total) by mouth 2 (two) times daily. 60 tablet 1    atorvastatin (LIPITOR) 10 MG tablet Take 1 tablet (10 mg total) by mouth once daily. 90 tablet 3    co-enzyme Q-10 30 mg capsule Take 30 mg by mouth once daily.      doxycycline (VIBRAMYCIN) 100 MG Cap Take 100 mg by mouth 2 (two) times daily.      ergocalciferol, vitamin D2, (VITAMIN D ORAL) Take 1 tablet by mouth once daily.      glimepiride (AMARYL) 2 MG tablet Take 1 tablet (2 mg total) by mouth before breakfast. 90 tablet 3    hydrALAZINE (APRESOLINE) 25 MG tablet Take 1 tablet (25 mg total) by mouth every 12 (twelve) hours. 60 tablet 3    metFORMIN (GLUCOPHAGE-XR) 500 MG ER 24hr tablet Take 2 tablets (1,000 mg total) by mouth 2 (two) times daily with meals. 360 tablet 3    metoprolol succinate (TOPROL-XL) 100 MG 24 hr tablet Take 1 tablet (100 mg total) by mouth once daily. (Patient taking differently: Take 100 mg by mouth nightly.) 90 tablet 3       Scheduled meds:   atorvastatin  10 mg Oral QHS    cefTRIAXone (Rocephin) IV (PEDS and ADULTS)  2 g Intravenous Q24H    clotrimazole  10 mg Oral 5x Daily    insulin glargine U-100  10 Units Subcutaneous Daily    LIDOcaine  1 patch Transdermal Q24H    metoprolol succinate  100 mg Oral QHS    sodium chloride 0.9%  10 mL Intravenous Q6H       Infusions:          PRN meds:    Current Facility-Administered Medications:     albuterol-ipratropium, 3 mL, Nebulization, Q6H PRN    aluminum & magnesium hydroxide-simethicone, 15 mL, Oral, QID PRN    dextrose 10%, 12.5 g, Intravenous, PRN    dextrose 10%, 25 g, Intravenous, PRN    glucagon (human recombinant), 1 mg, Intramuscular, PRN    glucose, 16 g, Oral, PRN    glucose, 24 g, Oral, PRN    HYDROcodone-acetaminophen, 1 tablet, Oral, Q4H PRN    insulin aspart U-100, 0-10 Units, Subcutaneous, QID (AC + HS) PRN    metoprolol, 5 mg, Intravenous, Q5 Min PRN    naloxone, 0.02 mg, Intravenous, PRN    ondansetron, 4 mg, Intravenous,  Q8H PRN    prochlorperazine, 5 mg, Intravenous, Q6H PRN    simethicone, 1 tablet, Oral, QID PRN    sodium chloride 0.9%, 10 mL, Intravenous, Q12H PRN    Flushing PICC/Midline Protocol, , , Until Discontinued **AND** sodium chloride 0.9%, 10 mL, Intravenous, Q6H **AND** sodium chloride 0.9%, 10 mL, Intravenous, PRN    sodium chloride 0.9%, 10 mL, Intravenous, Q12H PRN    Pharmacy to dose Vancomycin consult, , , Once **AND** vancomycin - pharmacy to dose, , Intravenous, pharmacy to manage frequency    Past Medical History:   Diagnosis Date    A-fib 2024    Diabetes mellitus, type 2 2021    Guillain-Audubon 10/2003    Hyperlipidemia     Hypertension 2016     Past Surgical History:   Procedure Laterality Date    OPEN REDUCTION AND INTERNAL FIXATION (ORIF) OF FRACTURE OF PROXIMAL HUMERUS Left 3/25/2024    Procedure: ORIF, FRACTURE, HUMERUS, PROXIMAL/IM HANNA, LEFT;  Surgeon: Nathan Cooper MD;  Location: [facility];  Service: Orthopedics;  Laterality: Left;  Accumed, [employer] Small Frag set Avelino verified 3/22/24 ark     No family history on file.  Social History     Tobacco Use    Smoking status: Never    Smokeless tobacco: Never   Substance Use Topics    Alcohol use: Yes     Alcohol/week: 0.0 standard drinks of alcohol     Comment: social    Drug use: No       OBJECTIVE:     Vital Signs and IO:  Temp:  [97.1 °F (36.2 °C)-99.5 °F (37.5 °C)]   Pulse:  [83-99]   Resp:  [17-18]   BP: (106-157)/(56-70)   SpO2:  [93 %-95 %]   I/O last 3 completed shifts:  In: 1939.8 [P.O.:700; I.V.:147.1; IV Piggyback:1092.7]  Out: 2000 [Urine:2000]  Wt Readings from Last 5 Encounters:   06/21/24 (!) 140.6 kg (309 lb 15.5 oz)   06/04/24 132.5 kg (292 lb 1.8 oz)   05/07/24 132.5 kg (292 lb 1.8 oz)   04/08/24 132.5 kg (292 lb 3.2 oz)   03/26/24 (!) 136.1 kg (300 lb 0.7 oz)     Body mass index is 42.04 kg/m².    Physical Exam  Constitutional:       General: Patient is not in acute distress.     Appearance: Patient is well-developed. She is not  diaphoretic.   HENT:      Head: Normocephalic and atraumatic.      Mouth/Throat: Mucous membranes are moist.   Eyes:      General: No scleral icterus.     Pupils: Pupils are equal, round, and reactive to light.   Cardiovascular:      Rate and Rhythm: Normal rate and regular rhythm.   Pulmonary:      Effort: Pulmonary effort is normal. No respiratory distress.      Breath sounds: No stridor.   Abdominal:      General: There is no distension.      Palpations: Abdomen is soft.   Musculoskeletal:         General: No deformity. Normal range of motion.      Cervical back: Neck supple.   Skin:     General: Skin is warm and dry.      Findings: No rash present. No erythema.   Neurological:      Mental Status: Patient is alert and oriented to person, place, and time.      Cranial Nerves: No cranial nerve deficit.   Psychiatric:         Behavior: Behavior normal.          Patient care time was spent personally by me on the following activities:     Obtaining a history.  Examination of patient.  Providing medical care at the patients bedside.  Developing a treatment plan with patient or surrogate and bedside caregivers.  Ordering and reviewing laboratory studies, radiographic studies, pulse oximetry.  Ordering and performing treatments and interventions.  Evaluation of patient's response to treatment.  Discussions with consultants while on the unit and immediately available to the patient.  Re-evaluation of the patient's condition.  Documentation in the medical record.     Anjum Mcmullen MD    [facility]  [address] [place] [address] [id number] [phone] - tel  [phone] - fax    6/23/2024

## 2024-06-23 NOTE — PT/OT/SLP PROGRESS
"Physical Therapy Treatment    Patient Name:  Roosevelt Moser   MRN:  [id number]    Recommendations:     Discharge Recommendations: Moderate Intensity Therapy  Discharge Equipment Recommendations: none  Barriers to discharge:  LUE pain    Assessment:     Roosevelt Moser is a 73 y.o. male admitted with a medical diagnosis of Acute renal failure superimposed on chronic kidney disease.  He presents with the following impairments/functional limitations: weakness, impaired functional mobility, gait instability, decreased upper extremity function, decreased lower extremity function, pain, decreased ROM.    Pt reluctant to participate due to recent BM on bedpan. Declined transfer to chair. Agreed to sit EOB. Attempted one stand to RW unsuccessfully despite maxA. Did successfully perform additional partial stand at therapist request, with maxA and enough hip clearance to remove soiled pads from underneath.  Pt may need encouragement to progress mobility.    LUE sling adjusted for improved support and comfort. Pt was left with arm supported appropriately on pillows, and good tolerance to participation. Wife at bedside throughout.    Rehab Prognosis: Good and Fair; patient would benefit from acute skilled PT services to address these deficits and reach maximum level of function.    Recent Surgery: Procedure(s) (LRB):  ARTHROTOMY, SHOULDER (Left) 2 Days Post-Op    Plan:     During this hospitalization, patient to be seen daily to address the identified rehab impairments via gait training, therapeutic activities, therapeutic exercises and progress toward the following goals:    Plan of Care Expires:  07/15/24    Subjective     Chief Complaint: "You came at the worst time. I just got off the bedpan and do you know how hard it is to have a BM? Now you want me to work."   Patient/Family Comments/goals: do more tomorrow, but just sit EOB today  Pain/Comfort:  Pain Rating 1: 9/10  Location - Side 1: Left  Location 1: shoulder  Pain Addressed " 1: Pre-medicate for activity, Reposition, Distraction, Cessation of Activity  Pain Rating Post-Intervention 1: 9/10      Objective:     Communicated with nurse prior to session.  Patient found HOB elevated with PureWick, bed alarm, peripheral IV, telemetry upon PT entry to room.     General Precautions: Standard, fall, contact  Orthopedic Precautions:  (LUE S/P shldr Sx in 2024)  Braces: Sling and swathe  Respiratory Status: Room air     Functional Mobility:  Bed Mobility:     Supine to Sit: maxA and modA, with poor command-following  Sit to Supine: maxAx2, with poor command-following  Transfers:     Sit to Stand:  attempted but not completed, with pt initiating return to sit due to c/o leg weakness  with rolling walker  Seated scooting EOB, maxA, with minimal success   Sit balance, SBA      AM-PAC 6 CLICK MOBILITY          Treatment & Education:  -Pt educ: benefits of participation with therapy, benefits of OOB to the chair during the day as tolerated, call light, fall prevention  -Adjustment of sling for proper support  -Positioning of LUE on pillows for reduced edema; encouragement to continue finger flxn/ext regularly   -Supine therex w/ VC for form, pace, range: AP, SAQs, heel slides all x10-15 reps with decreased L kn flxn ROM (c/o arthritis pain)    Patient left HOB elevated with all lines intact, call button in reach, bed alarm on, nurse notified, and wife present..    GOALS:   Multidisciplinary Problems       Physical Therapy Goals          Problem: Physical Therapy    Goal Priority Disciplines Outcome Goal Variances Interventions   Physical Therapy Goal     PT, PT/OT Progressing     Description: Goals to be met by: 7/15/24     Patient will increase functional independence with mobility by performin. Supine to sit with Contact Guard Assistance  2. Sit to stand transfer with Contact Guard Assistance  3. Bed to chair transfer with Contact Guard Assistance using Rolling Walker  4. Gait  x 200 feet  with Contact Guard Assistance using Rolling Walker.   5. Lower extremity exercise program x30 reps per handout, with supervision                         Time Tracking:     PT Received On: 06/23/24  PT Start Time: 1127     PT Stop Time: 1155  PT Total Time (min): 28 min     Billable Minutes: Therapeutic Activity 28    Treatment Type: Treatment  PT/PTA: PTA     Number of PTA visits since last PT visit: 1 06/23/2024

## 2024-06-23 NOTE — SUBJECTIVE & OBJECTIVE
Interval History:  No acute events overnight.  Patient states he is well.  Continue antibiotics.  Pending dispo    Review of Systems  Objective:     Vital Signs (Most Recent):  Temp: 99.5 °F (37.5 °C) (06/23/24 0722)  Pulse: 99 (06/23/24 0723)  Resp: 18 (06/23/24 0750)  BP: (!) 106/56 (06/23/24 0722)  SpO2: 95 % (06/23/24 0723) Vital Signs (24h Range):  Temp:  [97.1 °F (36.2 °C)-99.5 °F (37.5 °C)] 99.5 °F (37.5 °C)  Pulse:  [83-99] 99  Resp:  [17-18] 18  SpO2:  [93 %-95 %] 95 %  BP: (106-157)/(56-70) 106/56     Weight: (!) 140.6 kg (309 lb 15.5 oz)  Body mass index is 42.04 kg/m².    Intake/Output Summary (Last 24 hours) at 6/23/2024 1100  Last data filed at 6/23/2024 1036  Gross per 24 hour   Intake 340 ml   Output 2050 ml   Net -1710 ml         Physical Exam  Constitutional:       Appearance: Normal appearance.   HENT:      Head: Normocephalic and atraumatic.      Right Ear: External ear normal.      Left Ear: External ear normal.      Nose: Nose normal.      Mouth/Throat:      Mouth: Mucous membranes are moist.      Pharynx: Oropharynx is clear.   Eyes:      Extraocular Movements: Extraocular movements intact.      Conjunctiva/sclera: Conjunctivae normal.   Cardiovascular:      Rate and Rhythm: Normal rate and regular rhythm.      Pulses: Normal pulses.      Heart sounds: Normal heart sounds. No murmur heard.     No gallop.   Pulmonary:      Effort: Pulmonary effort is normal. No respiratory distress.      Breath sounds: Normal breath sounds. No wheezing, rhonchi or rales.   Abdominal:      General: Abdomen is flat. There is no distension.      Palpations: Abdomen is soft.      Tenderness: There is no abdominal tenderness.   Musculoskeletal:         General: No swelling. Normal range of motion.      Cervical back: Normal range of motion and neck supple.   Skin:     General: Skin is warm and dry.   Neurological:      General: No focal deficit present.      Mental Status: He is alert. Mental status is at baseline.              Significant Labs: All pertinent labs within the past 24 hours have been reviewed.    Significant Imaging: I have reviewed all pertinent imaging results/findings within the past 24 hours.

## 2024-06-23 NOTE — PROGRESS NOTES
Roosevelt Moser [id number] is a 73 y.o. male who has been consulted for vancomycin dosing.    Scheduled vancomycin random lab has been changed to 6/23/24 at 2030.      Patient will be followed by pharmacy for changes in renal function, toxicity, and efficacy.    Thank you for allowing us to participate in this patient's care.     Dulce Cosby Mai, PharmD

## 2024-06-23 NOTE — PROGRESS NOTES
Pharmacokinetic Assessment Follow Up: IV Vancomycin    Vancomycin serum concentration assessment(s):    The random level was drawn correctly and can be used to guide therapy at this time. The measurement is within the desired definitive target range of 15 to 20 mcg/mL.    Vancomycin Regimen Plan:    Continue regimen to Vancomycin 1000 mg once with  next serum trough concentration measured at 1800 prior to next dose on 6/23/24    Drug levels (last 3 results):  Recent Labs   Lab Result Units 06/20/24  1623 06/21/24  1655 06/22/24  1811   Vancomycin, Random ug/mL 16.3 15.3 18.3       Pharmacy will continue to follow and monitor vancomycin.    Please contact pharmacy at extension [phone] for questions regarding this assessment.    Thank you for the consult,   Lynn Kerr       Patient brief summary:  Roosevelt Moser is a 73 y.o. male initiated on antimicrobial therapy with IV Vancomycin for treatment of bone/joint infection        Drug Allergies:   Review of patient's allergies indicates:  No Known Allergies    Actual Body Weight:   140.6 kg    Renal Function:   Estimated Creatinine Clearance: 40.5 mL/min (A) (based on SCr of 2.4 mg/dL (H)).,     Dialysis Method (if applicable):  N/A    CBC (last 72 hours):  Recent Labs   Lab Result Units 06/20/24  0458 06/21/24  0410 06/22/24  0513   WBC K/uL 21.32* 21.39* 19.81*   Hemoglobin g/dL 10.2* 9.8* 8.6*   Hematocrit % 31.3* 30.1* 26.8*   Platelets K/uL 560* 560* 549*       Metabolic Panel (last 72 hours):  Recent Labs   Lab Result Units 06/20/24  0458 06/21/24  0405 06/22/24  0513   Sodium mmol/L 138 138 138   Potassium mmol/L 4.0 3.7 4.4   Chloride mmol/L 99 100 102   CO2 mmol/L 25 25 25   Glucose mg/dL 179* 206* 257*   BUN mg/dL 78* 82* 76*   Creatinine mg/dL 2.5* 2.4* 2.4*   Albumin g/dL 1.3*  --  1.2*   Total Bilirubin mg/dL  --   --  0.3   Alkaline Phosphatase U/L  --   --  217*   AST U/L  --   --  20   ALT U/L  --   --  13   Magnesium mg/dL  --   --  1.7   Phosphorus mg/dL  4.2  --   --        Vancomycin Administrations:  vancomycin given in the last 96 hours                     vancomycin (VANCOCIN) 1,000 mg in 0.9% NaCl 250 mL IVPB (Vial-Mate) (mg) 1,000 mg New Bag 06/21/24 1824    vancomycin 750 mg in 0.9% NaCl 250 mL IVPB (Vial-Mate) (mg) 750 mg New Bag 06/20/24 1845    vancomycin 750 mg in sodium chloride 0.9% 250 mL IVPB (Vial-Mate) (mg) 750 mg New Bag 06/19/24 1930                    Microbiologic Results:  Microbiology Results (last 7 days)       Procedure Component Value Units Date/Time    Aerobic culture [[id number]] Collected: 06/21/24 1645    Order Status: Completed Specimen: Abscess from Shoulder, Left Updated: 06/22/24 0754     Aerobic Bacterial Culture No growth    Aerobic culture [[id number]] Collected: 06/20/24 1204    Order Status: Completed Specimen: Abscess from Shoulder, Left Updated: 06/22/24 0753     Aerobic Bacterial Culture No growth    Gram stain [[id number]] Collected: 06/21/24 1645    Order Status: Completed Specimen: Abscess from Shoulder, Left Updated: 06/22/24 0332     Gram Stain Result Rare WBC's      Few Gram positive cocci    Culture, Anaerobic [[id number]] Collected: 06/21/24 1645    Order Status: Sent Specimen: Abscess from Shoulder, Left Updated: 06/21/24 1924    AFB Culture & Smear [[id number]] Collected: 06/21/24 1645    Order Status: Sent Specimen: Abscess from Shoulder, Left Updated: 06/21/24 1924    Fungus culture [[id number]] Collected: 06/21/24 1645    Order Status: Sent Specimen: Abscess from Shoulder, Left Updated: 06/21/24 1924    Gram stain [[id number]] Collected: 06/20/24 1204    Order Status: Completed Specimen: Abscess from Shoulder, Left Updated: 06/21/24 1520     Gram Stain Result Many WBC's      No organisms seen    Culture, Anaerobe [[id number]] Collected: 06/18/24 1251    Order Status: Completed Specimen: Wound from Toe, Right Foot Updated: 06/21/24 1428     Anaerobic Culture No anaerobes isolated    Aerobic culture [[id number]]   (Abnormal)  (Susceptibility) Collected: 06/18/24 1251    Order Status: Completed Specimen: Wound from Toe, Right Foot Updated: 06/21/24 0755     Aerobic Bacterial Culture METHICILLIN RESISTANT STAPHYLOCOCCUS AUREUS  Few  Results called to and read back by Rich Crockett RN-[facility];    06/20/2024  15:05 CJD      Culture, Anaerobic [[id number]] Collected: 06/20/24 1204    Order Status: Sent Specimen: Abscess from Shoulder, Left Updated: 06/20/24 1923    Fungus culture [[id number]] Collected: 06/20/24 1204    Order Status: Sent Specimen: Abscess from Shoulder, Left Updated: 06/20/24 1923    Blood culture [[id number]] Collected: 06/14/24 1355    Order Status: Completed Specimen: Blood from Peripheral, Antecubital, Right Updated: 06/19/24 2032     Blood Culture, Routine No growth after 5 days.    Blood culture [[id number]] Collected: 06/14/24 1355    Order Status: Completed Specimen: Blood from Peripheral, Hand, Right Updated: 06/19/24 2032     Blood Culture, Routine No growth after 5 days.    Urine culture [[id number]] Collected: 06/14/24 1337    Order Status: Completed Specimen: Urine Updated: 06/17/24 0709     Urine Culture, Routine No growth    Narrative:      Specimen Source->Urine

## 2024-06-23 NOTE — PROGRESS NOTES
[facility] Medicine  Progress Note    Patient Name: Roosevelt Moser  MRN: [id number]  Patient Class: IP- Inpatient   Admission Date: 6/14/2024  Length of Stay: 9 days  Attending Physician: Miko Galaviz Jr., MD  Primary Care Provider: Miguel Angel Enriquez PA-C        Subjective:     Principal Problem:Acute renal failure superimposed on chronic kidney disease        HPI:  Roosevelt Moser is a 73 year old male with a previous medical history of atrial fibrillation on eliquis, HTN, DMII, obstructive sleep apnea, HLD, ORIF of left humerus and guillian-[place] who presented to the emergency room for weakness and multiple falls. Per wife of the patient he has had progressive weakness over the past week along with two falls one at 0300 Thursday and the other early morning Friday. Both time she had to activate EMS to pick patient up off floor due to weakness. Patient refused transport to hospital on both occasions. Today he slid out of his chair and was unable to get up without assistance and at this point the wife activated EMS to transport patient to the hospital. She endorses that two days ago she noticed that the patient had stopped urinating as he normally does. The patient concurs that he has noticed that his urine has decreased over the past two days and that it is dark. Patient denies dysuria or incomplete bladder emptying. Bladder scan showed zero upon admit and urine sample was obtained via straight cath in ED. Patient denies decreased PO intake and keeps a bottle of water with him at all times. Initial ED work-up showed a creatinine of 5 and potassium of 6. Urine studies positive for infection and WBC 20.81 with procalcitonin at 19.75. Blood cultures obtained and patient given 1 gram of rocephin and 1000ml of normal saline. Patient noted to bein afib with RVR and 20mg of diltiazem was administered IV which resulted in low blood pressure and 1 gram of calcium gluconate was administered.  "Patient admitted by hospital medicine for further evaluation and management     Overview/Hospital Course:  6/15/24  Assumed care today, pt seen and examined, chart reviewed.  Pt sitting up in bed, wife at bedside, feeling better.  Off dilt drip since this AM, in AF on monitor but rate down to 80s.   Denies cp/sob.  Has been weak and debilitated, await PT/OT to see what post acute needs will be.    6/16/24  Pt resting in NAD.  Advised by CM yesterday wife not willing/able to take him home at this time - We talked about that frankly today and she says she is unable to care for him, planning on SNF, hopefully regain enough function to allow d/c home from there. Pt w/ slow improvement.  Feels pretty good.  Appetite not too good, he says he's been deliberately eating small quantities of food at home to lose weight and feels it's been working.  Note pain/swelling LUE where he had a recent fx/ORIF.    6/17/24  Pt doing reasonably well, having pain sitting on the bedpan "it's sticking me", but overcame that w/ some adjusting position.  LUE u/s no DVT.  Still having a lot of pain in upper left arm w/ movement and also worried about persistent pain "under left boob" where he struck himself during a fall - we will image those areas too.  Says Norco 7.5 is like "eating a jelly bean" asking if we can give 10.  Interesting notes that he's currently constipated, at home often has diarrhea, says that higher doses of narcotics cause him to have loose bowels.  Odd.  Albumin 1.3.  Await SNF dispo. Wife at bedside.   6/18/24  Pt continues to be quite jolly about everything, has good sense of humor, acting very non toxic - HOWEVER upon wound care assessment by Dr Elise he is seen to have osteomyelitis of his right 2nd toe - Podiatry and ID consulted, s/p debridement at bedside w/ deep tissue culture taken, pt/wife opposed to amputation.  Additionally shoulder CT favors abscess around his op site - await input from ortho - He has pain " "there but it's not exquisite and the area is not flucuant/red/warm, pain seems more arthritic in nature - but he appears to have a deep seated infection and will need a washout in OR -  Dr Calvin has added clinda/vanc to Zosyn started yesterday.   6/19/24  Pt sitting up in bed, very conversant and non toxic appearing, thanks much to all consultants.  Per ortho conservative approach to shoulder, I am in chat w/ them currently about possible aspiration of fluid.  Note ID changes in abx coverage.  Pt still has a great deal of pain/mobility loss of left shoulder.  We have been working on SNF but he may need LTAC w/ current level of complexity/abx regimen.  Deep wound cultures from right 2nd toe are pending.   6/20/24  Aspirate done today per IR and returned 10 cc of "pussy" fluid, S Lea reviewed and messaged me that Dr Cooper is out of town and to please contact on call ortho, Dr Bernal on call and is aware of care from prior discussion, he reviewed findings and will do washout in OR tomorrow, went to d/w pt and wife, he's sitting up in bed, feeling ok, no new c/o.  I also encountered Dr Calvin in the vale and we talked about this and his abx.    6/21/24  Pt going for washout/removal of infected hardware today in OR.  Sitting up in bed, sister and wife here, everyone on board w/ careplan. He does have a lot more swelling today around proximal deltoid.  Not warm/tender but visibly swollen and this is the 1st time we have really noted that.          Interval History:  No acute events overnight.  Patient states he is well.  Continue antibiotics.  Pending dispo    Review of Systems  Objective:     Vital Signs (Most Recent):  Temp: 99.5 °F (37.5 °C) (06/23/24 0722)  Pulse: 99 (06/23/24 0723)  Resp: 18 (06/23/24 0750)  BP: (!) 106/56 (06/23/24 0722)  SpO2: 95 % (06/23/24 0723) Vital Signs (24h Range):  Temp:  [97.1 °F (36.2 °C)-99.5 °F (37.5 °C)] 99.5 °F (37.5 °C)  Pulse:  [83-99] 99  Resp:  [17-18] 18  SpO2:  [93 %-95 " %] 95 %  BP: (106-157)/(56-70) 106/56     Weight: (!) 140.6 kg (309 lb 15.5 oz)  Body mass index is 42.04 kg/m².    Intake/Output Summary (Last 24 hours) at 6/23/2024 1100  Last data filed at 6/23/2024 1036  Gross per 24 hour   Intake 340 ml   Output 2050 ml   Net -1710 ml         Physical Exam  Constitutional:       Appearance: Normal appearance.   HENT:      Head: Normocephalic and atraumatic.      Right Ear: External ear normal.      Left Ear: External ear normal.      Nose: Nose normal.      Mouth/Throat:      Mouth: Mucous membranes are moist.      Pharynx: Oropharynx is clear.   Eyes:      Extraocular Movements: Extraocular movements intact.      Conjunctiva/sclera: Conjunctivae normal.   Cardiovascular:      Rate and Rhythm: Normal rate and regular rhythm.      Pulses: Normal pulses.      Heart sounds: Normal heart sounds. No murmur heard.     No gallop.   Pulmonary:      Effort: Pulmonary effort is normal. No respiratory distress.      Breath sounds: Normal breath sounds. No wheezing, rhonchi or rales.   Abdominal:      General: Abdomen is flat. There is no distension.      Palpations: Abdomen is soft.      Tenderness: There is no abdominal tenderness.   Musculoskeletal:         General: No swelling. Normal range of motion.      Cervical back: Normal range of motion and neck supple.   Skin:     General: Skin is warm and dry.   Neurological:      General: No focal deficit present.      Mental Status: He is alert. Mental status is at baseline.             Significant Labs: All pertinent labs within the past 24 hours have been reviewed.    Significant Imaging: I have reviewed all pertinent imaging results/findings within the past 24 hours.    Assessment/Plan:      * Acute renal failure superimposed on chronic kidney disease  Appreciate input from renal services  In setting of acute UTI and IVVD  Resumed gentle hydration on Zosyn/vanc but those have been stopped  He did not require oral NaHCO3  U/s is w/o  obstruction, masses, etc        Pyogenic arthritis of left shoulder region        Abscess of left shoulder  As discussed under limb swelling, status post washout on 06/21/2024  Need antibiotics until 08/03/2024    Osteomyelitis of toe  S/p bedside I&D, deep tissue Cx growing MRSA, S to clinda  ID and podiatry following, appreciate help  Appreciate input all consultants  Pt/wife opposed to amputation of digit  Local wound care  Note markedly elevated ESR, CRP  Need antibiotics until 08/03/2024      Skin tear of left upper extremity  Local care, healing  Doubt this is a portal of entry    Swelling of limb, left  U/s w/o DVT  Appreciate help from Dr Bernal, ID, wound care, podiatry et al  Pt is already on OAC, holding for now pending shoulder procedure  Arm is elevated on pillow  Not red/painful, no compartment syndrome  D/w pt/wife      Debility  Multifactorial process  Pt not able to manage his own care and wife cannot adequately care for him at this time  PT/OT  SNF or possibly LTAC consult  CM aware      Severe sepsis  In setting of acute UTI, MRSA OM right 2nd toe, and infected left shoulder hardware  BCX NGTD  Ur CX neg  Rocephin was given empirically for UTI, changed to Zosyn > added vanc/clinda, now on Rocephin/clinda, vanc/zosyn stopped per ID  Oral clotrimazole added for thrush  PCT and WBC trended down, WBC staying up a little now but pt looks good  No obstruction on renal u/s  Has gas containing abscess in and around left shoulder w/ malpositioned hardware, likely d/t falls, hard to say how the bug entered  Long d/w pt/wife, ortho - 6/22 had washout and hardware removal w/ Dr Bernal  F/u w/ Dr Kenneth mann d/c  He's urinating well  We will need antibiotics until 08/03/2024 per ID    Hyperkalemia  Resolved  in setting of acute on chronic renal failure  Lokelma given x 1 on admit  Avoid ACE/ARB, K+ replacement  tele          Atrial fibrillation with rapid ventricular response  Chronic AF w/ acute RVR,  resolved  Required dilt drip on admission but off since 6/15  Tele some PVC/bigem/trigem  TSH 1.8 in March  Monitor lytes  No etoh/drug use  TTE from March 2024:    Left Ventricle: The left ventricle is normal in size. Normal wall thickness. Mild global hypokinesis present. There is mildly reduced systolic function with a visually estimated ejection fraction of 45 - 50%. There is normal diastolic function.    Right Ventricle: Normal right ventricular cavity size. Wall thickness is normal. Right ventricle wall motion  is normal. Systolic function is normal.    Left Atrium: Left atrium is moderately dilated.    IVC/SVC: Normal venous pressure at 3 mmHg.  OAC has been on hold d/t possible need for shoulder surgery - has been on prophylaxis dose of Lovenox, this was help today for shoulder operation  Resume Eliquis as soon as feasible postop      History of Guillain-[place] syndrome  No acute issues  However, pt has impaired mobility and is acutely weakened from his sepsis/UTI/AF RVR  PT/OT working w/ pt  Wife cannot manage his care at home currently  SNF assessment underway, may need LTAC depending on what abx he ends up for how long, it may take a few more days to narrow that down          Type 2 diabetes mellitus  A1c 6.3%  SSI      Hypertension  Home meds as appropriate    MARA (obstructive sleep apnea)  Continue CPAP HS and w/ naps      Morbid obesity  Body mass index is 42.04 kg/m².  Morbid obesity complicates all aspects of disease management from diagnostic modalities to treatment  Weight loss encouraged and health benefits explained to patient  He has been working on weight loss at home w/ reduced caloric intake          VTE Risk Mitigation (From admission, onward)           Ordered     Reason for No Pharmacological VTE Prophylaxis  Once        Question:  Reasons:  Answer:  Already adequately anticoagulated on oral Anticoagulants    06/14/24 1438     IP VTE HIGH RISK PATIENT  Once         06/14/24 1438     Place  sequential compression device  Until discontinued         06/14/24 1438                    Discharge Planning   KAMILA:      Code Status: Full Code   Is the patient medically ready for discharge?:     Reason for patient still in hospital (select all that apply): Treatment  Discharge Plan A: Long-term acute care facility (LTAC)                  Miko Galaviz Jr, MD  Department of Hospital Medicine   [facility]/Surg

## 2024-06-23 NOTE — PROGRESS NOTES
[facility]/Surg   Department of Infectious Disease  Progress Note    PATIENT NAME: Roosevelt Moser  MRN: [id number]  TODAY'S DATE: 06/23/2024  ADMIT DATE: 6/14/2024  LOS: 9 days  CHIEF COMPLAINT: Weakness (Pt brought to ED via EMS with complaint of weakness and falling, pt advised EMS his urine is very dark.  )  PRINCIPLE PROBLEM: Acute renal failure superimposed on chronic kidney disease        INTERVAL HISTORY      06/19/2024: Seen and evaluated at bedside.  States left upper extremity pain better.  Having improved movement at the wrist and forearm.  Seen by Orthopedic surgery PA yesterday.  Notes reviewed.  Blood cultures remain negative.      06/20/2024:  Oral anticoagulants on hold to allow left shoulder arthrocentesis.  No other acute issues overnight.  Culture from right 3rd toe growing Staphylococcus aureus.  Blood culture remain negative.  Had aspiration left shoulder today that yielded purulent material.  Synovial fluid studies in progress.    06/21/2024:  Seen by Orthopedic surgery Service again yesterday.  For I and D today.  All cultures so far negative.  ASO titer normal.    06/22/2024.  Very pleasant.  So is his wife.  Patient is Afebrile.  He has swelling over the left arm, slightly improved.  Pain is well-controlled.    WBC 19--21--21.3--19.8.  He did receive dexamethasone yesterday by anesthesia, at the time of left shoulder surgery.  He is on ceftriaxone clindamycin and vancomycin.  Cultures reviewed:  right 2nd toe wound had grown MRSA.  Left intraop cultures no growth to date.  Left shoulder intraop, gram stain are showing Gram-positive cocci.  He is trying to move his legs in bed, but he is still  weak.  Wife and patient are able to do IV antibiotics at home, but all things considered, it is very cumbersome for them;  they are interested in placement, which I agree, it is the right course of action.  06/23/2024.  Sleeping -- I did not disturb Afebrile.  T-max 99.5° WBC  "21.3--19.8--17.  CRP is quite elevated at 205.  .  Procalcitonin 1.39.  Vancomycin random 18.  Cultures from shoulder no growth to date.    Gram stain yesterday was read as Gram-positive cocci--today it is changed to  "no organism"  GOing for another procedure tomorrow    Antibiotics (From admission, onward)      Start     Stop Route Frequency Ordered    06/19/24 1045  cefTRIAXone (ROCEPHIN) 2 g in dextrose 5 % in water (D5W) 100 mL IVPB (MB+)         -- IV Every 24 hours (non-standard times) 06/19/24 0932    06/18/24 1311  vancomycin - pharmacy to dose  (vancomycin IVPB (PEDS and ADULTS))        Placed in "And" Linked Group    -- IV pharmacy to manage frequency 06/18/24 1212    06/17/24 2100  mupirocin 2 % ointment         06/22/24 2059 Nasl 2 times daily 06/17/24 1544          Antifungals (From admission, onward)      Start     Stop Route Frequency Ordered    06/19/24 1045  clotrimazole megha 10 mg         06/29/24 0959 Oral 5 times daily 06/19/24 0932           Antivirals (From admission, onward)      None            ASSESSMENT and PLAN     Left shoulder prosthetic joint infection  06/21/2024 -- Left shoulder IND and removal of deep hardware.  "  Purulent material was expressed and abundant upon making the incision. This tracked all the way down to the fracture site and hardware was made in purulence. ..... to remove all the screws from the nail and then removed the nail itself."   03/25/2024.  Intramedullary nailing of proximal humeral fracture.  (This was done after his fall and fracture of 03/04/2024, which had to be postponed due to AFib)   He will need antibiotics for 6 weeks from the most recent Source control , InD  Will follow cultures  Another InD tomorrow    2.  Right 3rd toe osteomyelitis.  He previously received ? Dalbavancin x2 doses.  Ulcer practically healed.  We will consider this as partially treated.  Antibiotics as above for this as well.    3.  MRSA Right 2nd toe tip with possible " "early osteomyelitis of the distal tuft.  It was debrided at bedside by podiatrist.  He will need 6 week course of antibiotics for this indication.      4.  ARF.  Improved, Management as per nephrologist.   Estimated Creatinine Clearance: 48.5 mL/min (A) (based on SCr of 2 mg/dL (H)).       5.  Diabetes mellitus.  Management as per hospitalist.    Hemoglobin A1c 8.8--9.5--6.3 now, the best it has been      6.  Peripheral arterial disease.    CT arterial ultrasound on 06/18/2024 " Mild to moderate atherosclerotic plaque is noted in the leg arteries bilaterally.  Doppler ultrasound however is within normal limits with triphasic waveforms and normal velocities throughout.  Severe stenosis or occlusion is not identified on this study."     7. Oral thrush.  HIV screen negative.  This is most likely secondary to hyperglycemia related to his diabetes mellitus.  Mycelex troches x 10 days.     8. Knee arthritis, Debility, EF 40-50%, CKD     RECOMMENDATIONS:    Continue vancomycin, keep vancomycin level trough 15-17--dosed as per pharmacy   Continue ceftriaxone 2 gram iv q day  Follow Markers of inflammation  Wound care to shoulder as per surgeon   Wound care to foot as per podiatrist  ECHO,( I wonder if patient was bacteremic at at some point)  Monitor anemia, hemoglobin continues to decrease   LTAC placement         Id service will continue to follow with you while in the hospital.  Please send Epic secure chat with any questions.      SUBJECTIVE    Roosevelt Moser is a 73 y.o. male with history of diabetes mellitus, hypertension and previous going bowel syndrome.  He fell on 03/04/2024 and sustained a displaced comminuted fracture of the left humerus.  Seen by orthopedic surgeon with plan for ORIF which was deferred because of new AFib.  Admitted 03/09/2024 for rate control and ultimately discharged back home 03/12/2024.  Later had left humerus ORIF 03/25/2024 as a day procedure and was discharged home.       According to his " wife, he developed pain and swelling of the 3rd toe and was with an ulcer.  It appears an x-ray of the foot was unremarkable.  Received 2 doses of an antibiotic that I presume to be dalbavancin 1 week apart in early April.     In the last 2 weeks he has continued to decline.  He also has reduction in urine output.  Fell twice at home on 06/13/2024 and wife activated EMS and he was brought to the hospital.  Received IV fluid for low normal blood pressure.  Also noted to have MARBIN with creatinine 5.0 and WBC was 21 K.  UA was abnormal with> 100 WBC,> 100 RBC, 3+ LE and positive nitrite.  He has been managed conservatively for MARBIN and for dehydration.     X-ray of left shoulder and left upper extremity 06/17/2024 documented gas in soft tissue.  MRI right foot documented osteomyelitis of the 3rd toe.  I was asked to see to assist with his care.  ESR 95,  milligram/liter.     Antibiotics   Ceftriaxone:  06/14/2024-06/17/2024, 06/19/2024-  Zosyn:  06/17/2024-06/18/2024   Vancomycin:  06/18/2024-  Clindamycin:  06/18/2024-     Cultures   Blood culture 06/14/2024:  NGTD   Urine culture 06/14/2024: NGTD  Right second toe culture 06/18/2024: MRSA  Left shoulder synovial fluid culture 06/20/2024:  In progress    Review of Systems  Negative except as stated above in Interval History     OBJECTIVE   Temp:  [97.1 °F (36.2 °C)-99.5 °F (37.5 °C)] 99.5 °F (37.5 °C)  Pulse:  [83-99] 99  Resp:  [17-18] 18  SpO2:  [93 %-95 %] 95 %  BP: (106-157)/(56-70) 106/56  Temp:  [97.1 °F (36.2 °C)-99.5 °F (37.5 °C)]   Temp: 99.5 °F (37.5 °C) (06/23/24 0722)  Pulse: 99 (06/23/24 0723)  Resp: 18 (06/23/24 0750)  BP: (!) 106/56 (06/23/24 0722)  SpO2: 95 % (06/23/24 0723)    Intake/Output Summary (Last 24 hours) at 6/23/2024 0931  Last data filed at 6/23/2024 0400  Gross per 24 hour   Intake 340 ml   Output 1450 ml   Net -1110 ml       Physical Exam  General:  Obese elderly man lying.    VAD:  None  ISOLATION:  None    WOUNDS:                           Significant Labs: All pertinent labs within the past 24 hours have been reviewed.    CBC LAST 7 DAYS  Recent Labs   Lab 06/17/24  0255 06/19/24  0322 06/20/24  0458 06/21/24  0410 06/22/24  0513 06/23/24  0849   WBC 15.42* 19.14* 21.32* 21.39* 19.81* 17.37*   RBC 4.13* 4.08* 3.94* 3.73* 3.33* 3.27*   HGB 10.7* 10.6* 10.2* 9.8* 8.6* 8.6*   HCT 32.7* 32.5* 31.3* 30.1* 26.8* 26.2*   MCV 79* 80* 79* 81* 81* 80*   MCH 25.9* 26.0* 25.9* 26.3* 25.8* 26.3*   MCHC 32.7 32.6 32.6 32.6 32.1 32.8   RDW 18.3* 18.0* 18.1* 17.8* 17.7* 17.6*   * 613* 560* 560* 549* 669*   MPV 10.7 10.2 10.2 10.1 9.9 10.1   GRAN  --   --   --   --   --  71.5  12.4*   LYMPH  --   --   --   --   --  13.0*  2.3   MONO  --   --   --   --   --  8.6  1.5*   BASO  --   --   --   --   --  0.09   NRBC  --   --   --   --   --  0       CHEMISTRY LAST 7 DAYS  Recent Labs   Lab 06/17/24  0255 06/18/24  0234 06/19/24 0322 06/20/24 0458 06/21/24  0405 06/22/24  0513 06/23/24  0849    137 140 138 138 138 140   K 4.1 3.8 4.0 4.0 3.7 4.4 3.9   CL 97 97 98 99 100 102 102   CO2 25 25 25 25 25 25 26   ANIONGAP 17* 15 17* 14 13 11 12   BUN 76* 68* 73* 78* 82* 76* 72*   CREATININE 3.3* 2.7* 2.8* 2.5* 2.4* 2.4* 2.0*   * 225* 198* 179* 206* 257* 229*   CALCIUM 8.5* 8.6* 8.8 8.7 9.0 8.5* 8.7   MG 1.5* 1.5* 2.0  --   --  1.7  --    ALBUMIN 1.3* 1.3* 1.3* 1.3*  --  1.2* 1.3*   PROT 6.0  --  6.2  --   --  5.8* 5.8*   ALKPHOS 235*  --  312*  --   --  217* 195*   ALT 12  --  23  --   --  13 14   AST 23  --  45*  --   --  20 23   BILITOT 0.6  --  0.7  --   --  0.3 0.3       Estimated Creatinine Clearance: 48.5 mL/min (A) (based on SCr of 2 mg/dL (H)).    INFLAMMATORY/PROCAL    Lab Results   Component Value Date    .0 (H) 06/23/2024    .0 (H) 06/18/2024           Component Ref Range & Units 2 d ago   Body Fluid Type  Abscess fluid, left shoulder   Fluid Appearance  Cloudy   Fluid Color  Pink   WBC, Body Fluid /cu mm SEE COMMENT    Comment: Reference ranges for body fluids not established.  Correlate clinically.  Test not performed  Cell count not performed on abscess fluid, see differential.   Segs, Fluid % 79   Lymphs, Fluid % 14   Monocytes/Macrophages, Fluid % 7        PRIOR  MICROBIOLOGY:    Susceptibility data from last 90 days.  Collected Specimen Info Organism Ceftriaxone Clindamycin Erythromycin Oxacillin Penicillin Tetracycline Trimeth/Sulfa Vancomycin   06/18/24 Wound from Toe, Right Foot Methicillin resistant Staphylococcus aureus  R  S  R  R  R  S  S  S   06/14/24 Blood from Peripheral, Antecubital, Right No growth after 5 days.           06/14/24 Blood from Peripheral, Hand, Right No growth after 5 days.               LAST 7 DAYS MICROBIOLOGY   Microbiology Results (last 7 days)       Procedure Component Value Units Date/Time    Blood culture [[id number]] Collected: 06/23/24 0659    Order Status: Sent Specimen: Blood from Peripheral, Hand, Right Updated: 06/23/24 0924    Gram stain [[id number]] Collected: 06/21/24 1645    Order Status: Completed Specimen: Abscess from Shoulder, Left Updated: 06/23/24 0833     Gram Stain Result Few WBC's      No organisms seen    Culture, Anaerobic [[id number]] Collected: 06/20/24 1204    Order Status: Completed Specimen: Abscess from Shoulder, Left Updated: 06/23/24 0729     Anaerobic Culture No anaerobes isolated    Aerobic culture [[id number]] Collected: 06/20/24 1204    Order Status: Completed Specimen: Abscess from Shoulder, Left Updated: 06/23/24 0721     Aerobic Bacterial Culture No growth    Aerobic culture [[id number]] Collected: 06/21/24 1645    Order Status: Completed Specimen: Abscess from Shoulder, Left Updated: 06/23/24 0721     Aerobic Bacterial Culture No growth    Culture, Anaerobic [[id number]] Collected: 06/21/24 1645    Order Status: Sent Specimen: Abscess from Shoulder, Left Updated: 06/21/24 1924    AFB Culture & Smear [[id number]] Collected: 06/21/24 1645    Order Status:  Sent Specimen: Abscess from Shoulder, Left Updated: 06/21/24 1924    Fungus culture [[id number]] Collected: 06/21/24 1645    Order Status: Sent Specimen: Abscess from Shoulder, Left Updated: 06/21/24 1924    Gram stain [[id number]] Collected: 06/20/24 1204    Order Status: Completed Specimen: Abscess from Shoulder, Left Updated: 06/21/24 1520     Gram Stain Result Many WBC's      No organisms seen    Culture, Anaerobe [[id number]] Collected: 06/18/24 1251    Order Status: Completed Specimen: Wound from Toe, Right Foot Updated: 06/21/24 1428     Anaerobic Culture No anaerobes isolated    Aerobic culture [[id number]]  (Abnormal)  (Susceptibility) Collected: 06/18/24 1251    Order Status: Completed Specimen: Wound from Toe, Right Foot Updated: 06/21/24 0755     Aerobic Bacterial Culture METHICILLIN RESISTANT STAPHYLOCOCCUS AUREUS  Few  Results called to and read back by Rich Crockett RN-[facility];    06/20/2024  15:05 CJD      Fungus culture [[id number]] Collected: 06/20/24 1204    Order Status: Sent Specimen: Abscess from Shoulder, Left Updated: 06/20/24 1923    Blood culture [[id number]] Collected: 06/14/24 1355    Order Status: Completed Specimen: Blood from Peripheral, Antecubital, Right Updated: 06/19/24 2032     Blood Culture, Routine No growth after 5 days.    Blood culture [[id number]] Collected: 06/14/24 1355    Order Status: Completed Specimen: Blood from Peripheral, Hand, Right Updated: 06/19/24 2032     Blood Culture, Routine No growth after 5 days.    Urine culture [[id number]] Collected: 06/14/24 1337    Order Status: Completed Specimen: Urine Updated: 06/17/24 0709     Urine Culture, Routine No growth    Narrative:      Specimen Source->Urine              CURRENT/PREVIOUS VISIT EKG  Results for orders placed or performed during the hospital encounter of 06/14/24   EKG 12-lead    Collection Time: 06/14/24 12:16 PM   Result Value Ref Range    QRS Duration 106 ms    [facility] QTC Calculation 443 ms     Narrative    Test Reason : R53.1,    Vent. Rate : 120 BPM     Atrial Rate : 159 BPM     P-R Int : 000 ms          QRS Dur : 106 ms      QT Int : 314 ms       P-R-T Axes : 000 -54 093 degrees     QTc Int : 443 ms    Atrial fibrillation with rapid ventricular response  Left axis deviation  Low voltage QRS  Inferior infarct ,age undetermined  Cannot rule out Anterior infarct ,age undetermined  Abnormal ECG  When compared with ECG of 25-MAR-2024 09:14,  Vent. rate has increased BY  46 BPM  Minimal criteria for Anterior infarct are now Present  Confirmed by Hansel Espinosa MD ([id number]) on 6/20/2024 8:43:02 PM    Referred By: [email or web address]   SELF           Confirmed By:Hansel Espinosa MD     Significant Imaging: I have reviewed all relevant and available imaging results/findings within the past 24 hours.    06/18/2024.  CT left shoulder   1. Subacute left humerus fracture post internal fixation.  There is incomplete bony bridging across the fracture site, with persistent angulation between the dominant bony fragments as hardware is only partially engaged (see discussion).  2. Severe arthropathy of the glenohumeral joint with multiple intra-articular bodies, some interposed.  3. Large left joint effusion and adjacent soft tissue focal fluid collections containing gas and concerning for gas-forming infection.  4. Moderate size left pleural effusion.  5. Left basilar airspace disease is presumably atelectasis with pneumonia as a less favored consideration.  This report was flagged in Epic as abnormal.    X-ray on 06/17, prior to removal of hardware.      I spent a total of 56 minutes on the day of the visit.This includes face to face time and non-face to face time preparing to see the patient (eg, review of tests), obtaining and/or reviewing separately obtained history, documenting clinical information in the electronic or other health record, independently interpreting results and communicating results to the  patient/family/caregiver, or care coordinator.    Jaquelin Laguerre MD  Date of Service: 06/23/2024      This note was created using [employer] voice recognition software that occasionally misinterpreted phrases or words.

## 2024-06-24 ENCOUNTER — ANESTHESIA EVENT (OUTPATIENT)
Dept: SURGERY | Facility: HOSPITAL | Age: 74
End: 2024-06-24
Payer: MEDICARE

## 2024-06-24 ENCOUNTER — ANESTHESIA (OUTPATIENT)
Dept: SURGERY | Facility: HOSPITAL | Age: 74
End: 2024-06-24
Payer: MEDICARE

## 2024-06-24 LAB
ACID FAST MOD KINY STN SPEC: NORMAL
ALBUMIN SERPL BCP-MCNC: 1.4 G/DL (ref 3.5–5.2)
ALP SERPL-CCNC: 188 U/L (ref 55–135)
ALT SERPL W/O P-5'-P-CCNC: 18 U/L (ref 10–44)
ANION GAP SERPL CALC-SCNC: 14 MMOL/L (ref 8–16)
AORTIC ROOT ANNULUS: 3.17 CM
AORTIC VALVE CUSP SEPERATION: 2.34 CM
APICAL FOUR CHAMBER EJECTION FRACTION: 42 %
APICAL TWO CHAMBER EJECTION FRACTION: 33 %
ASCENDING AORTA: 3.28 CM
AST SERPL-CCNC: 28 U/L (ref 10–40)
AV INDEX (PROSTH): 0.75
AV MEAN GRADIENT: 5 MMHG
AV PEAK GRADIENT: 9 MMHG
AV VALVE AREA BY VELOCITY RATIO: 4.05 CM²
AV VALVE AREA: 4.15 CM²
AV VELOCITY RATIO: 0.73
BACTERIA SPEC AEROBE CULT: NO GROWTH
BACTERIA SPEC ANAEROBE CULT: NORMAL
BASOPHILS # BLD AUTO: 0.09 K/UL (ref 0–0.2)
BASOPHILS NFR BLD: 0.5 % (ref 0–1.9)
BILIRUB SERPL-MCNC: 0.3 MG/DL (ref 0.1–1)
BSA FOR ECHO PROCEDURE: 2.67 M2
BUN SERPL-MCNC: 79 MG/DL (ref 8–23)
CALCIUM SERPL-MCNC: 9.2 MG/DL (ref 8.7–10.5)
CHLORIDE SERPL-SCNC: 104 MMOL/L (ref 95–110)
CK SERPL-CCNC: 30 U/L (ref 20–200)
CO2 SERPL-SCNC: 24 MMOL/L (ref 23–29)
CREAT SERPL-MCNC: 1.8 MG/DL (ref 0.5–1.4)
CRP SERPL-MCNC: 204.5 MG/L (ref 0–8.2)
CV ECHO LV RWT: 0.38 CM
DIFFERENTIAL METHOD BLD: ABNORMAL
DOP CALC AO PEAK VEL: 1.47 M/S
DOP CALC AO VTI: 25.4 CM
DOP CALC LVOT AREA: 5.5 CM2
DOP CALC LVOT DIAMETER: 2.65 CM
DOP CALC LVOT PEAK VEL: 1.08 M/S
DOP CALC LVOT STROKE VOLUME: 105.29 CM3
DOP CALC MV VTI: 30.7 CM
DOP CALCLVOT PEAK VEL VTI: 19.1 CM
E WAVE DECELERATION TIME: 199.41 MSEC
E/E' RATIO: 15 M/S
ECHO LV POSTERIOR WALL: 1.01 CM (ref 0.6–1.1)
EOSINOPHIL # BLD AUTO: 0.4 K/UL (ref 0–0.5)
EOSINOPHIL NFR BLD: 2.2 % (ref 0–8)
ERYTHROCYTE [DISTWIDTH] IN BLOOD BY AUTOMATED COUNT: 17.9 % (ref 11.5–14.5)
ERYTHROCYTE [SEDIMENTATION RATE] IN BLOOD BY WESTERGREN METHOD: 128 MM/HR (ref 0–10)
EST. GFR  (NO RACE VARIABLE): 39 ML/MIN/1.73 M^2
FRACTIONAL SHORTENING: 20 % (ref 28–44)
GLUCOSE SERPL-MCNC: 246 MG/DL (ref 70–110)
HCT VFR BLD AUTO: 28.3 % (ref 40–54)
HGB BLD-MCNC: 9.1 G/DL (ref 14–18)
IMM GRANULOCYTES # BLD AUTO: 0.76 K/UL (ref 0–0.04)
IMM GRANULOCYTES NFR BLD AUTO: 4.2 % (ref 0–0.5)
INTERVENTRICULAR SEPTUM: 0.97 CM (ref 0.6–1.1)
IVRT: 72.31 MSEC
LA MAJOR: 4.97 CM
LEFT ATRIUM AREA SYSTOLIC (APICAL 2 CHAMBER): 24.8 CM2
LEFT ATRIUM AREA SYSTOLIC (APICAL 4 CHAMBER): 16.48 CM2
LEFT ATRIUM VOLUME INDEX MOD: 24.2 ML/M2
LEFT ATRIUM VOLUME MOD: 62.04 CM3
LEFT INTERNAL DIMENSION IN SYSTOLE: 4.22 CM (ref 2.1–4)
LEFT VENTRICLE DIASTOLIC VOLUME INDEX: 51.72 ML/M2
LEFT VENTRICLE DIASTOLIC VOLUME: 132.4 ML
LEFT VENTRICLE END DIASTOLIC VOLUME APICAL 2 CHAMBER: 164 ML
LEFT VENTRICLE END DIASTOLIC VOLUME APICAL 4 CHAMBER: 189.54 ML
LEFT VENTRICLE END SYSTOLIC VOLUME APICAL 2 CHAMBER: 92.46 ML
LEFT VENTRICLE END SYSTOLIC VOLUME APICAL 4 CHAMBER: 40.73 ML
LEFT VENTRICLE MASS INDEX: 76 G/M2
LEFT VENTRICLE SYSTOLIC VOLUME INDEX: 31 ML/M2
LEFT VENTRICLE SYSTOLIC VOLUME: 79.4 ML
LEFT VENTRICULAR INTERNAL DIMENSION IN DIASTOLE: 5.25 CM (ref 3.5–6)
LEFT VENTRICULAR MASS: 194.65 G
LV LATERAL E/E' RATIO: 13.13 M/S
LV SEPTAL E/E' RATIO: 17.5 M/S
LVED V (TEICH): 132.4 ML
LVES V (TEICH): 79.4 ML
LVOT MG: 2.58 MMHG
LVOT MV: 0.76 CM/S
LYMPHOCYTES # BLD AUTO: 2.5 K/UL (ref 1–4.8)
LYMPHOCYTES NFR BLD: 13.9 % (ref 18–48)
MCH RBC QN AUTO: 25.9 PG (ref 27–31)
MCHC RBC AUTO-ENTMCNC: 32.2 G/DL (ref 32–36)
MCV RBC AUTO: 80 FL (ref 82–98)
MONOCYTES # BLD AUTO: 1.5 K/UL (ref 0.3–1)
MONOCYTES NFR BLD: 8.3 % (ref 4–15)
MV MEAN GRADIENT: 2 MMHG
MV PEAK E VEL: 1.05 M/S
MV PEAK GRADIENT: 5 MMHG
MV STENOSIS PRESSURE HALF TIME: 91.38 MS
MV VALVE AREA BY CONTINUITY EQUATION: 3.43 CM2
MV VALVE AREA P 1/2 METHOD: 2.41 CM2
NEUTROPHILS # BLD AUTO: 12.9 K/UL (ref 1.8–7.7)
NEUTROPHILS NFR BLD: 70.9 % (ref 38–73)
NRBC BLD-RTO: 0 /100 WBC
OHS CV RV/LV RATIO: 0.77 CM
OHS LV EJECTION FRACTION SIMPSONS BIPLANE MOD: 39 %
PISA MRMAX VEL: 4.58 M/S
PISA TR MAX VEL: 2.59 M/S
PLATELET # BLD AUTO: 721 K/UL (ref 150–450)
PMV BLD AUTO: 9.9 FL (ref 9.2–12.9)
POCT GLUCOSE: 223 MG/DL (ref 70–110)
POCT GLUCOSE: 244 MG/DL (ref 70–110)
POCT GLUCOSE: 247 MG/DL (ref 70–110)
POCT GLUCOSE: 317 MG/DL (ref 70–110)
POTASSIUM SERPL-SCNC: 3.7 MMOL/L (ref 3.5–5.1)
PROCALCITONIN SERPL IA-MCNC: 0.97 NG/ML (ref 0–0.5)
PROT SERPL-MCNC: 6.2 G/DL (ref 6–8.4)
PV MV: 0.54 M/S
PV PEAK GRADIENT: 2 MMHG
PV PEAK VELOCITY: 0.72 M/S
RA MAJOR: 4.88 CM
RA PRESSURE ESTIMATED: 15 MMHG
RBC # BLD AUTO: 3.52 M/UL (ref 4.6–6.2)
RIGHT VENTRICLE DIASTOLIC BASEL DIMENSION: 3.4 CM
RIGHT VENTRICLE DIASTOLIC LENGTH: 6.3 CM
RIGHT VENTRICULAR END-DIASTOLIC DIMENSION: 4.03 CM
RIGHT VENTRICULAR LENGTH IN DIASTOLE (APICAL 4-CHAMBER VIEW): 6.28 CM
RV TB RVSP: 18 MMHG
RV TISSUE DOPPLER FREE WALL SYSTOLIC VELOCITY 1 (APICAL 4 CHAMBER VIEW): 12.69 CM/S
SODIUM SERPL-SCNC: 142 MMOL/L (ref 136–145)
STJ: 3.08 CM
TDI LATERAL: 0.08 M/S
TDI SEPTAL: 0.06 M/S
TDI: 0.07 M/S
TR MAX PG: 27 MMHG
TRICUSPID ANNULAR PLANE SYSTOLIC EXCURSION: 2.25 CM
TV REST PULMONARY ARTERY PRESSURE: 42 MMHG
WBC # BLD AUTO: 18.15 K/UL (ref 3.9–12.7)
Z-SCORE OF LEFT VENTRICULAR DIMENSION IN END DIASTOLE: -11.4
Z-SCORE OF LEFT VENTRICULAR DIMENSION IN END SYSTOLE: -6.56

## 2024-06-24 PROCEDURE — 63600175 PHARM REV CODE 636 W HCPCS: Performed by: NURSE ANESTHETIST, CERTIFIED REGISTERED

## 2024-06-24 PROCEDURE — 25000003 PHARM REV CODE 250: Performed by: INTERNAL MEDICINE

## 2024-06-24 PROCEDURE — 25000003 PHARM REV CODE 250: Performed by: ORTHOPAEDIC SURGERY

## 2024-06-24 PROCEDURE — 11000001 HC ACUTE MED/SURG PRIVATE ROOM

## 2024-06-24 PROCEDURE — 0R9K0ZZ DRAINAGE OF LEFT SHOULDER JOINT, OPEN APPROACH: ICD-10-PCS | Performed by: ORTHOPAEDIC SURGERY

## 2024-06-24 PROCEDURE — 99900035 HC TECH TIME PER 15 MIN (STAT)

## 2024-06-24 PROCEDURE — 25000003 PHARM REV CODE 250: Performed by: NURSE ANESTHETIST, CERTIFIED REGISTERED

## 2024-06-24 PROCEDURE — 23030 I&D SHOULDER DEEP ABSC/HMTMA: CPT | Mod: LT,,, | Performed by: ORTHOPAEDIC SURGERY

## 2024-06-24 PROCEDURE — 71000033 HC RECOVERY, INTIAL HOUR: Performed by: ORTHOPAEDIC SURGERY

## 2024-06-24 PROCEDURE — 99900031 HC PATIENT EDUCATION (STAT)

## 2024-06-24 PROCEDURE — 63600175 PHARM REV CODE 636 W HCPCS: Performed by: ORTHOPAEDIC SURGERY

## 2024-06-24 PROCEDURE — 71000039 HC RECOVERY, EACH ADD'L HOUR: Performed by: ORTHOPAEDIC SURGERY

## 2024-06-24 PROCEDURE — 37000009 HC ANESTHESIA EA ADD 15 MINS: Performed by: ORTHOPAEDIC SURGERY

## 2024-06-24 PROCEDURE — 36000706: Performed by: ORTHOPAEDIC SURGERY

## 2024-06-24 PROCEDURE — 94761 N-INVAS EAR/PLS OXIMETRY MLT: CPT

## 2024-06-24 PROCEDURE — 36000707: Performed by: ORTHOPAEDIC SURGERY

## 2024-06-24 PROCEDURE — 86140 C-REACTIVE PROTEIN: CPT | Performed by: INTERNAL MEDICINE

## 2024-06-24 PROCEDURE — 94799 UNLISTED PULMONARY SVC/PX: CPT

## 2024-06-24 PROCEDURE — A4216 STERILE WATER/SALINE, 10 ML: HCPCS | Performed by: ORTHOPAEDIC SURGERY

## 2024-06-24 PROCEDURE — 87075 CULTR BACTERIA EXCEPT BLOOD: CPT | Performed by: ORTHOPAEDIC SURGERY

## 2024-06-24 PROCEDURE — 99233 SBSQ HOSP IP/OBS HIGH 50: CPT | Mod: ,,, | Performed by: STUDENT IN AN ORGANIZED HEALTH CARE EDUCATION/TRAINING PROGRAM

## 2024-06-24 PROCEDURE — 25500020 PHARM REV CODE 255: Performed by: INTERNAL MEDICINE

## 2024-06-24 PROCEDURE — 85651 RBC SED RATE NONAUTOMATED: CPT | Performed by: STUDENT IN AN ORGANIZED HEALTH CARE EDUCATION/TRAINING PROGRAM

## 2024-06-24 PROCEDURE — 87070 CULTURE OTHR SPECIMN AEROBIC: CPT | Performed by: ORTHOPAEDIC SURGERY

## 2024-06-24 PROCEDURE — 36415 COLL VENOUS BLD VENIPUNCTURE: CPT | Performed by: STUDENT IN AN ORGANIZED HEALTH CARE EDUCATION/TRAINING PROGRAM

## 2024-06-24 PROCEDURE — 82550 ASSAY OF CK (CPK): CPT | Performed by: STUDENT IN AN ORGANIZED HEALTH CARE EDUCATION/TRAINING PROGRAM

## 2024-06-24 PROCEDURE — 84145 PROCALCITONIN (PCT): CPT | Performed by: INTERNAL MEDICINE

## 2024-06-24 PROCEDURE — 80053 COMPREHEN METABOLIC PANEL: CPT | Performed by: INTERNAL MEDICINE

## 2024-06-24 PROCEDURE — 37000008 HC ANESTHESIA 1ST 15 MINUTES: Performed by: ORTHOPAEDIC SURGERY

## 2024-06-24 PROCEDURE — 36415 COLL VENOUS BLD VENIPUNCTURE: CPT | Performed by: INTERNAL MEDICINE

## 2024-06-24 PROCEDURE — 85025 COMPLETE CBC W/AUTO DIFF WBC: CPT | Performed by: INTERNAL MEDICINE

## 2024-06-24 RX ORDER — SUCCINYLCHOLINE CHLORIDE 20 MG/ML
INJECTION INTRAMUSCULAR; INTRAVENOUS
Status: DISCONTINUED | OUTPATIENT
Start: 2024-06-24 | End: 2024-06-24

## 2024-06-24 RX ORDER — FENTANYL CITRATE 50 UG/ML
INJECTION, SOLUTION INTRAMUSCULAR; INTRAVENOUS
Status: DISCONTINUED | OUTPATIENT
Start: 2024-06-24 | End: 2024-06-24

## 2024-06-24 RX ORDER — SODIUM CHLORIDE 9 MG/ML
INJECTION, SOLUTION INTRAVENOUS CONTINUOUS
Status: ACTIVE | OUTPATIENT
Start: 2024-06-24 | End: 2024-06-24

## 2024-06-24 RX ORDER — ROCURONIUM BROMIDE 10 MG/ML
INJECTION, SOLUTION INTRAVENOUS
Status: DISCONTINUED | OUTPATIENT
Start: 2024-06-24 | End: 2024-06-24

## 2024-06-24 RX ORDER — ONDANSETRON HYDROCHLORIDE 2 MG/ML
INJECTION, SOLUTION INTRAMUSCULAR; INTRAVENOUS
Status: DISCONTINUED | OUTPATIENT
Start: 2024-06-24 | End: 2024-06-24

## 2024-06-24 RX ORDER — PROPOFOL 10 MG/ML
VIAL (ML) INTRAVENOUS
Status: DISCONTINUED | OUTPATIENT
Start: 2024-06-24 | End: 2024-06-24

## 2024-06-24 RX ORDER — LIDOCAINE HYDROCHLORIDE 20 MG/ML
INJECTION INTRAVENOUS
Status: DISCONTINUED | OUTPATIENT
Start: 2024-06-24 | End: 2024-06-24

## 2024-06-24 RX ADMIN — ONDANSETRON 4 MG: 2 INJECTION INTRAMUSCULAR; INTRAVENOUS at 02:06

## 2024-06-24 RX ADMIN — SODIUM CHLORIDE: 9 INJECTION, SOLUTION INTRAVENOUS at 09:06

## 2024-06-24 RX ADMIN — CLOTRIMAZOLE 10 MG: 10 LOZENGE ORAL at 05:06

## 2024-06-24 RX ADMIN — CLOTRIMAZOLE 10 MG: 10 LOZENGE ORAL at 09:06

## 2024-06-24 RX ADMIN — PROPOFOL 100 MG: 10 INJECTION, EMULSION INTRAVENOUS at 02:06

## 2024-06-24 RX ADMIN — LIDOCAINE 5% 1 PATCH: 700 PATCH TOPICAL at 05:06

## 2024-06-24 RX ADMIN — FENTANYL CITRATE 50 MCG: 50 INJECTION, SOLUTION INTRAMUSCULAR; INTRAVENOUS at 02:06

## 2024-06-24 RX ADMIN — ROCURONIUM BROMIDE 10 MG: 10 INJECTION, SOLUTION INTRAVENOUS at 02:06

## 2024-06-24 RX ADMIN — LIDOCAINE HYDROCHLORIDE 50 MG: 20 INJECTION, SOLUTION INTRAVENOUS at 02:06

## 2024-06-24 RX ADMIN — LIDOCAINE HYDROCHLORIDE 10 ML: 20 SOLUTION ORAL; TOPICAL at 09:06

## 2024-06-24 RX ADMIN — METOPROLOL SUCCINATE 100 MG: 50 TABLET, FILM COATED, EXTENDED RELEASE ORAL at 09:06

## 2024-06-24 RX ADMIN — DAPTOMYCIN 800 MG: 500 INJECTION, POWDER, LYOPHILIZED, FOR SOLUTION INTRAVENOUS at 05:06

## 2024-06-24 RX ADMIN — FENTANYL CITRATE 25 MCG: 50 INJECTION, SOLUTION INTRAMUSCULAR; INTRAVENOUS at 03:06

## 2024-06-24 RX ADMIN — SUCCINYLCHOLINE CHLORIDE 100 MG: 20 INJECTION, SOLUTION INTRAMUSCULAR; INTRAVENOUS at 02:06

## 2024-06-24 RX ADMIN — SODIUM CHLORIDE, PRESERVATIVE FREE 10 ML: 5 INJECTION INTRAVENOUS at 11:06

## 2024-06-24 RX ADMIN — HYDROCODONE BITARTRATE AND ACETAMINOPHEN 1 TABLET: 10; 325 TABLET ORAL at 05:06

## 2024-06-24 RX ADMIN — INSULIN ASPART 2 UNITS: 100 INJECTION, SOLUTION INTRAVENOUS; SUBCUTANEOUS at 09:06

## 2024-06-24 RX ADMIN — INSULIN GLARGINE 10 UNITS: 100 INJECTION, SOLUTION SUBCUTANEOUS at 09:06

## 2024-06-24 RX ADMIN — INSULIN ASPART 4 UNITS: 100 INJECTION, SOLUTION INTRAVENOUS; SUBCUTANEOUS at 09:06

## 2024-06-24 RX ADMIN — SULFUR HEXAFLUORIDE 2.4 ML: KIT at 11:06

## 2024-06-24 RX ADMIN — CEFTRIAXONE SODIUM 2 G: 2 INJECTION, POWDER, FOR SOLUTION INTRAMUSCULAR; INTRAVENOUS at 10:06

## 2024-06-24 NOTE — PLAN OF CARE
1:21pm - Aurelia at [facility], unable to accept, not in network with pt's insurance.    Pt accepted at [facility].    Met with wife and she selected [facility]. Sent clinicals to LTAC via [employer]     06/24/24 9452   Post-Acute Status   Post-Acute Authorization Placement   Discharge Plan   Discharge Plan A Long-term acute care facility (LTAC)   Discharge Plan B Skilled Nursing Facility

## 2024-06-24 NOTE — ASSESSMENT & PLAN NOTE
As discussed under limb swelling, status post washout on 06/21/2024, we will repeat washout on 06/24/2024  Need antibiotics until 08/03/2024

## 2024-06-24 NOTE — PROGRESS NOTES
[facility]/Surg  Orthopedics  Progress Note    Patient Name: Roosevelt Moser  MRN: [id number]  Admission Date: 6/14/2024  Hospital Length of Stay: 9 days  Attending Provider: Miko Galaviz Jr., MD  Primary Care Provider: Miguel Angel Enriquez PA-C  Follow-up For: Procedure(s) (LRB):  ARTHROTOMY, SHOULDER (Left)    Post-Operative Day: 2 Days Post-Op  Subjective:     Principal Problem:Acute renal failure superimposed on chronic kidney disease    Principal Orthopedic Problem: L shoulder infection, status post I and D and hardware removal    Interval History:  No interval change    Review of patient's allergies indicates:  No Known Allergies    Current Facility-Administered Medications   Medication    albuterol-ipratropium 2.5 mg-0.5 mg/3 mL nebulizer solution 3 mL    aluminum & magnesium hydroxide-simethicone 400-400-40 mg/5 mL suspension 15 mL    atorvastatin tablet 10 mg    cefTRIAXone (ROCEPHIN) 2 g in dextrose 5 % in water (D5W) 100 mL IVPB (MB+)    clotrimazole megha 10 mg    dextrose 10% bolus 125 mL 125 mL    dextrose 10% bolus 250 mL 250 mL    glucagon (human recombinant) injection 1 mg    glucose chewable tablet 16 g    glucose chewable tablet 24 g    HYDROcodone-acetaminophen  mg per tablet 1 tablet    insulin aspart U-100 pen 0-10 Units    insulin glargine U-100 (Lantus) pen 10 Units    LIDOcaine 5 % patch 1 patch    metoprolol injection 5 mg    metoprolol succinate (TOPROL-XL) 24 hr tablet 100 mg    naloxone 0.4 mg/mL injection 0.02 mg    ondansetron injection 4 mg    prochlorperazine injection Soln 5 mg    simethicone chewable tablet 80 mg    sodium chloride 0.9% flush 10 mL    sodium chloride 0.9% flush 10 mL    And    sodium chloride 0.9% flush 10 mL    sodium chloride 0.9% flush 10 mL    vancomycin - pharmacy to dose     Objective:     Vital Signs (Most Recent):  Temp: 98.3 °F (36.8 °C) (06/23/24 1558)  Pulse: (!) 113 (06/23/24 1558)  Resp: 18 (06/23/24 1702)  BP: 115/65 (06/23/24  1558)  SpO2: (!) 94 % (06/23/24 1558) Vital Signs (24h Range):  Temp:  [97.1 °F (36.2 °C)-99.5 °F (37.5 °C)] 98.3 °F (36.8 °C)  Pulse:  [] 113  Resp:  [18] 18  SpO2:  [93 %-95 %] 94 %  BP: ()/(56-67) 115/65     Weight: (!) 140.6 kg (309 lb 15.5 oz)  Height: 6' (182.9 cm)  Body mass index is 42.04 kg/m².      Intake/Output Summary (Last 24 hours) at 6/23/2024 1948  Last data filed at 6/23/2024 1036  Gross per 24 hour   Intake 100 ml   Output 1350 ml   Net -1250 ml        General    Nursing note and vitals reviewed.  Constitutional: He is oriented to person, place, and time. He appears well-developed and well-nourished. No distress.   HENT:   Head: Normocephalic and atraumatic.   Eyes: EOM are normal.   Pulmonary/Chest: Effort normal.   Neurological: He is alert and oriented to person, place, and time.   Psychiatric: He has a normal mood and affect. His behavior is normal. Thought content normal.             Left Shoulder Exam     Inspection/Observation   Swelling: present    Tenderness   Left shoulder tenderness location: general about the shoulder region.    Range of Motion   Active abduction:  abnormal   Passive abduction:  abnormal   Extension:  abnormal   Forward Flexion:  abnormal   Forward Elevation: abnormal  Adduction: abnormal  External Rotation 0 degrees:  abnormal   External Rotation 90 degrees: abnormal  Internal rotation 0 degrees:  abnormal   Internal rotation 90 degrees:  abnormal     Other   Sensation: normal     Comments:  Dressing CDI      Vascular Exam       Left Pulses      Radial:                    2+      Capillary Refill  Left Hand: normal capillary refill           Significant Labs:   Recent Lab Results  (Last 5 results in the past 24 hours)        06/23/24  1657   06/23/24  1214   06/23/24  0849   06/23/24  0751   06/23/24  0659        Procalcitonin         1.39  Comment: The Procalcitonin test is intended to aid in the risk assessment of   critically ill patients on their first  day of ICU admission for   progression   to severe sepsis and septic shock.    A result of <0.50 ng/mL is associated with a low risk of severe   sepsis   and/or septic shock.  This does not exclude localized infection.    A result between 0.50 ng/mL and 2.0 ng/mL should be interpreted with   consideration of the patient's history and is recommended to retest   within 6   to 24 hours.        A result of >2.0 ng/mL is associated with a high risk of severe   sepsis   and/or septic shock.    Procalcitonin may not be accurate among patients with   localized  infection, recent trauma or major surgery, immunosuppressed   state,  invasive fungal infection, renal dysfunction. Decisions   regarding  initiation or continuation of antibiotic therapy should not be   based  solely on procalcitonin levels.         Albumin     1.3           ALP     195           ALT     14           Anion Gap     12           AST     23           Baso #     0.09           Basophil %     0.5           BILIRUBIN TOTAL     0.3  Comment: For infants and newborns, interpretation of results should be based  on gestational age, weight and in agreement with clinical  observations.    Premature Infant recommended reference ranges:  Up to 24 hours.............<8.0 mg/dL  Up to 48 hours............<12.0 mg/dL  3-5 days..................<15.0 mg/dL  6-29 days.................<15.0 mg/dL             Blood Culture, Routine         No Growth to date  [P]       BUN     72           Calcium     8.7           Chloride     102           CO2     26           Creatinine     2.0           CRP         205.0       Differential Method     Automated           eGFR     35           Eos #     0.5           Eos %     2.9           Glucose     229           Gran # (ANC)     12.4           Gran %     71.5           Hematocrit     26.2           Hemoglobin     8.6           Immature Grans (Abs)     0.60  Comment: Mild elevation in immature granulocytes is non specific and   can  be seen in a variety of conditions including stress response,   acute inflammation, trauma and pregnancy. Correlation with other   laboratory and clinical findings is essential.             Immature Granulocytes     3.5           Lymph #     2.3           Lymph %     13.0           MCH     26.3           MCHC     32.8           MCV     80           Mono #     1.5           Mono %     8.6           MPV     10.1           nRBC     0           Platelet Count     669           POCT Glucose 319   299     239         Potassium     3.9           PROTEIN TOTAL     5.8           RBC     3.27           RDW     17.6           Sed Rate         123       Sodium     140           WBC     17.37                                   [P] - Preliminary Result             All pertinent labs within the past 24 hours have been reviewed.    Significant Imaging: None  Assessment/Plan:     Abscess of left shoulder  POD 2:    Status post irrigation and debridement and hardware removal of left shoulder for suspected infection.  Patient is approximately 3 months status post open reduction internal fixation of a left proximal humerus fracture which lost reduction.    Patient is doing well postoperatively.  His pain is well managed.    2. Preoperative and intraoperative cultures showed many WBCs but have demonstrated no bacterial growth to date.  3. Wound care was consulted for packing and dressing changes.  Dressings were changed today.  Per staff no significant drainage.  Per Dr. Paulson. Talked to the nurse about possibly doing them since wound care likely will not be seeing him until Monday.  4. Continue sling for comfort of the left upper extremity. Patient could do very light activity with the left upper extremity focusing more on elbow, hand and wrist.  Would not do dedicated therapy for the shoulder.  Patient could weightbear through the extremity if needed with a walker.  5. Discussed with Dr. Kenneth mendoza, Sunday, June 23, 2024.  Plan  for repeat irrigation and debridement on Monday June 24, 2024 when he returns.  6. NPO after midnight.  7. Consult case management as the patient with likely benefit from placement for IV antibiotics as well as his generalized deconditioned state.  8. Continue pain control and VTE prophylaxis.       Swelling of limb, left  History of left proximal humerus fracture with open reduction internal fixation.            KAREN Avina  Orthopedics  [facility]/Surg

## 2024-06-24 NOTE — NURSING
Wound care performed to right foot and left shoulder.  Triad applied to excoriation to buttocks.  Male catheter applied for urinary incontinence.

## 2024-06-24 NOTE — TRANSFER OF CARE
Anesthesia Transfer of Care Note    Patient: Roosevelt Moser    Procedure(s) Performed: Procedure(s) (LRB):  IRRIGATION AND DEBRIDEMENT, UPPER EXTREMITY (Left)    Patient location: PACU    Anesthesia Type: general    Transport from OR: Transported from OR on 2-3 L/min O2 by NC with adequate spontaneous ventilation    Post pain: adequate analgesia    Post assessment: no apparent anesthetic complications    Post vital signs: stable    Level of consciousness: awake and alert    Nausea/Vomiting: no nausea/vomiting    Complications: none    Transfer of care protocol was followed      Last vitals: Visit Vitals  BP (!) 178/74 (BP Location: Left leg, Patient Position: Lying)   Pulse 95   Temp 36.6 °C (97.9 °F) (Skin)   Resp 16   Ht 6' (1.829 m)   Wt (!) 140.2 kg (309 lb)   SpO2 97%   BMI 41.91 kg/m²

## 2024-06-24 NOTE — ASSESSMENT & PLAN NOTE
POD 2:    Status post irrigation and debridement and hardware removal of left shoulder for suspected infection.  Patient is approximately 3 months status post open reduction internal fixation of a left proximal humerus fracture which lost reduction.    Patient is doing well postoperatively.  His pain is well managed.    2. Preoperative and intraoperative cultures showed many WBCs but have demonstrated no bacterial growth to date.  3. Wound care was consulted for packing and dressing changes.  Dressings were changed today.  Per staff no significant drainage.  Per Dr. Paulson. Talked to the nurse about possibly doing them since wound care likely will not be seeing him until Monday.  4. Continue sling for comfort of the left upper extremity. Patient could do very light activity with the left upper extremity focusing more on elbow, hand and wrist.  Would not do dedicated therapy for the shoulder.  Patient could weightbear through the extremity if needed with a walker.  5. Discussed with Dr. Kenneth mendoza, Sunday, June 23, 2024.  Plan for repeat irrigation and debridement on Monday June 24, 2024 when he returns.  6. NPO after midnight.  7. Consult case management as the patient with likely benefit from placement for IV antibiotics as well as his generalized deconditioned state.  8. Continue pain control and VTE prophylaxis.

## 2024-06-24 NOTE — SUBJECTIVE & OBJECTIVE
Principal Problem:Acute renal failure superimposed on chronic kidney disease    Principal Orthopedic Problem: L shoulder infection, status post I and D and hardware removal    Interval History:  No interval change    Review of patient's allergies indicates:  No Known Allergies    Current Facility-Administered Medications   Medication    albuterol-ipratropium 2.5 mg-0.5 mg/3 mL nebulizer solution 3 mL    aluminum & magnesium hydroxide-simethicone 400-400-40 mg/5 mL suspension 15 mL    atorvastatin tablet 10 mg    cefTRIAXone (ROCEPHIN) 2 g in dextrose 5 % in water (D5W) 100 mL IVPB (MB+)    clotrimazole megha 10 mg    dextrose 10% bolus 125 mL 125 mL    dextrose 10% bolus 250 mL 250 mL    glucagon (human recombinant) injection 1 mg    glucose chewable tablet 16 g    glucose chewable tablet 24 g    HYDROcodone-acetaminophen  mg per tablet 1 tablet    insulin aspart U-100 pen 0-10 Units    insulin glargine U-100 (Lantus) pen 10 Units    LIDOcaine 5 % patch 1 patch    metoprolol injection 5 mg    metoprolol succinate (TOPROL-XL) 24 hr tablet 100 mg    naloxone 0.4 mg/mL injection 0.02 mg    ondansetron injection 4 mg    prochlorperazine injection Soln 5 mg    simethicone chewable tablet 80 mg    sodium chloride 0.9% flush 10 mL    sodium chloride 0.9% flush 10 mL    And    sodium chloride 0.9% flush 10 mL    sodium chloride 0.9% flush 10 mL    vancomycin - pharmacy to dose     Objective:     Vital Signs (Most Recent):  Temp: 98.3 °F (36.8 °C) (06/23/24 1558)  Pulse: (!) 113 (06/23/24 1558)  Resp: 18 (06/23/24 1702)  BP: 115/65 (06/23/24 1558)  SpO2: (!) 94 % (06/23/24 1558) Vital Signs (24h Range):  Temp:  [97.1 °F (36.2 °C)-99.5 °F (37.5 °C)] 98.3 °F (36.8 °C)  Pulse:  [] 113  Resp:  [18] 18  SpO2:  [93 %-95 %] 94 %  BP: ()/(56-67) 115/65     Weight: (!) 140.6 kg (309 lb 15.5 oz)  Height: 6' (182.9 cm)  Body mass index is 42.04 kg/m².      Intake/Output Summary (Last 24 hours) at 6/23/2024 1948  Last  data filed at 6/23/2024 1036  Gross per 24 hour   Intake 100 ml   Output 1350 ml   Net -1250 ml        General    Nursing note and vitals reviewed.  Constitutional: He is oriented to person, place, and time. He appears well-developed and well-nourished. No distress.   HENT:   Head: Normocephalic and atraumatic.   Eyes: EOM are normal.   Pulmonary/Chest: Effort normal.   Neurological: He is alert and oriented to person, place, and time.   Psychiatric: He has a normal mood and affect. His behavior is normal. Thought content normal.             Left Shoulder Exam     Inspection/Observation   Swelling: present    Tenderness   Left shoulder tenderness location: general about the shoulder region.    Range of Motion   Active abduction:  abnormal   Passive abduction:  abnormal   Extension:  abnormal   Forward Flexion:  abnormal   Forward Elevation: abnormal  Adduction: abnormal  External Rotation 0 degrees:  abnormal   External Rotation 90 degrees: abnormal  Internal rotation 0 degrees:  abnormal   Internal rotation 90 degrees:  abnormal     Other   Sensation: normal     Comments:  Dressing CDI      Vascular Exam       Left Pulses      Radial:                    2+      Capillary Refill  Left Hand: normal capillary refill           Significant Labs:   Recent Lab Results  (Last 5 results in the past 24 hours)        06/23/24  1657   06/23/24  1214   06/23/24  0849   06/23/24  0751   06/23/24  0659        Procalcitonin         1.39  Comment: The Procalcitonin test is intended to aid in the risk assessment of   critically ill patients on their first day of ICU admission for   progression   to severe sepsis and septic shock.    A result of <0.50 ng/mL is associated with a low risk of severe   sepsis   and/or septic shock.  This does not exclude localized infection.    A result between 0.50 ng/mL and 2.0 ng/mL should be interpreted with   consideration of the patient's history and is recommended to retest   within 6   to 24  hours.        A result of >2.0 ng/mL is associated with a high risk of severe   sepsis   and/or septic shock.    Procalcitonin may not be accurate among patients with   localized  infection, recent trauma or major surgery, immunosuppressed   state,  invasive fungal infection, renal dysfunction. Decisions   regarding  initiation or continuation of antibiotic therapy should not be   based  solely on procalcitonin levels.         Albumin     1.3           ALP     195           ALT     14           Anion Gap     12           AST     23           Baso #     0.09           Basophil %     0.5           BILIRUBIN TOTAL     0.3  Comment: For infants and newborns, interpretation of results should be based  on gestational age, weight and in agreement with clinical  observations.    Premature Infant recommended reference ranges:  Up to 24 hours.............<8.0 mg/dL  Up to 48 hours............<12.0 mg/dL  3-5 days..................<15.0 mg/dL  6-29 days.................<15.0 mg/dL             Blood Culture, Routine         No Growth to date  [P]       BUN     72           Calcium     8.7           Chloride     102           CO2     26           Creatinine     2.0           CRP         205.0       Differential Method     Automated           eGFR     35           Eos #     0.5           Eos %     2.9           Glucose     229           Gran # (ANC)     12.4           Gran %     71.5           Hematocrit     26.2           Hemoglobin     8.6           Immature Grans (Abs)     0.60  Comment: Mild elevation in immature granulocytes is non specific and   can be seen in a variety of conditions including stress response,   acute inflammation, trauma and pregnancy. Correlation with other   laboratory and clinical findings is essential.             Immature Granulocytes     3.5           Lymph #     2.3           Lymph %     13.0           MCH     26.3           MCHC     32.8           MCV     80           Mono #     1.5           Mono  %     8.6           MPV     10.1           nRBC     0           Platelet Count     669           POCT Glucose 319   299     239         Potassium     3.9           PROTEIN TOTAL     5.8           RBC     3.27           RDW     17.6           Sed Rate         123       Sodium     140           WBC     17.37                                   [P] - Preliminary Result             All pertinent labs within the past 24 hours have been reviewed.    Significant Imaging: None

## 2024-06-24 NOTE — PROGRESS NOTES
Nephrology Progress Note        Patient Name: Roosevelt Moser  MRN: [id number]    Patient Class: IP- Inpatient   Admission Date: 6/14/2024  Length of Stay: 10 days  Date of Service: 6/24/2024    Attending Physician: Miko Galaviz Jr., MD  Primary Care Provider: Miguel Angel Enriquez PA-C    Reason for Consult: marbin/hyperkalemia    SUBJECTIVE:     HPI: 73M with h/o DM, HTN, AF, GBS and recent shoulder surgery was brought to ER by EMS with recurrent falls in the last 24h. Reports decreased UO but no fever, cough, SOB, dysuria. Upon admission, noted to be in MARBIN with sCr 5, baseline 1 month ago is 1, hyperkalemia with K 6, acidosis with CO2 12, hypoalbuminemia with albumin 1.6. Lactate notably 2.1. Procal 20. A1c 9.5 in 3/2024. UA with hematuria and pyuria, urine Cx pending. Notably on Apixaban. WBC in blood elevated to 20. Plt 540. RP US ordered. Afebrile, normotensive, received IVF and abx. Lokelma, ca gluconate given bicarb gtt ordered. Straight cath in ER with only 100cc urine out.    Review of Systems:  Neg    6/15  AFVSS.   UOP 1200cc plus 2 unmeasured   6/16  AFVSS.  2150 cc uop.  No distress  6/17 VSS, on RA, UOP 1.5L- updated family at bedside  6/18 VSS, on RA, UOP 1.3L  6/19 VSS, on RA, UOP 1L, with osteo R toe- s/p debridement- not interested in amputation- antbx adjusted  6/20 HR , BP stable, on 2L NC, UOP 1.2L, went for procedure, wife reports LE edema  6/21 HR 90-110s, -140s mostly, on RA, UOP 1.6L, to OR for shoulder washout and hardware removal today  6/22 VSS s/p incision and drainage for infection from left shoulder and removal of deep hardware including lizabeth and screws.  6/23 VSS. Consider resuming diuretics tomorrow.  6/24  AFVSS.  1200 cc uop plus multiple unmeasured.  He is very thirsty.      ASSESSMENT/PLAN:     MARBIN due to ATN due to sepsis due to UTI and/or PNA  CKD stage 2 with diabetic proteinuria and severe hypoalbuminemia  HTN  DM poorly controlled A1c  9.5  HyperK/Acidosis  HypoMg  SHPT  Anemia  Hypoalbuminemia  Edema    - renal function is improving- nonoliguric  - no acute RRT needs- no nsaids or IV contrast- dose meds for CrCl < 30  - about 1g proteinuria- low alb is nutrition/acute phase reactant  - hold hydralazine  - hold metformin- use insulin  - hyperK/acidosis resolved  - Mg replete  - H/H stable  - optimize protein intake  - pt is NPO with no fluids and very thirsty.  Will order maintenance fluids    Thank you for allowing us to participate in the care of your patient!   We will follow the patient and provide recommendations as needed.         Laboratory:  Recent Labs   Lab 06/22/24  0513 06/23/24  0849 06/24/24  0357    140 142   K 4.4 3.9 3.7    102 104   CO2 25 26 24   BUN 76* 72* 79*   CREATININE 2.4* 2.0* 1.8*   * 229* 246*       Recent Labs   Lab 06/18/24  0234 06/19/24  0322 06/20/24  0458 06/21/24  0405 06/22/24  0513 06/23/24  0849 06/24/24  0357   CALCIUM 8.6* 8.8 8.7   < > 8.5* 8.7 9.2   ALBUMIN 1.3* 1.3* 1.3*  --  1.2* 1.3* 1.4*   PHOS 3.8  --  4.2  --   --   --   --    MG 1.5* 2.0  --   --  1.7  --   --     < > = values in this interval not displayed.             Recent Labs   Lab 06/21/24  0714 06/21/24  1133 06/21/24 2000 06/22/24  0755 06/22/24  1154 06/22/24  1721 06/22/24  2252 06/23/24  0751 06/23/24  1214 06/23/24  1657   POCTGLUCOSE 206* 225* 261* 269* 272* 310* 297* 239* 299* 319*       Recent Labs   Lab 07/31/23  0955 03/19/24  0830 06/14/24  1539   Hemoglobin A1C 8.8 H 9.5 H 6.3 H       Recent Labs   Lab 06/22/24  0513 06/23/24  0849 06/24/24  0357   WBC 19.81* 17.37* 18.15*   HGB 8.6* 8.6* 9.1*   HCT 26.8* 26.2* 28.3*   * 669* 721*   MCV 81* 80* 80*   MCHC 32.1 32.8 32.2   MONO  --  8.6  1.5* 8.3  1.5*   EOSINOPHIL  --  2.9 2.2       Recent Labs   Lab 06/22/24  0513 06/23/24  0849 06/24/24  0357   BILITOT 0.3 0.3 0.3   PROT 5.8* 5.8* 6.2   ALBUMIN 1.2* 1.3* 1.4*   ALKPHOS 217* 195* 188*   ALT 13 14 18    AST 20 23 28       Recent Labs   Lab 12/16/22  1238 03/08/24  1034 03/25/24  1022 06/14/24  1337   Color, UA Yellow Yellow Yellow [place] A   Appearance, UA Clear Clear Hazy A Cloudy A   pH, UA 6.0 5.0 6.0 6.0   Specific [place], UA 1.020 1.010 1.020 1.020   Protein, UA 1+ A Trace A 1+ A 2+ A   Glucose, UA 1+ A 3+ A Negative Trace A   Ketones, UA Negative Negative Negative Negative   Urobilinogen, UA  --  Negative Negative Negative   Bilirubin (UA) Negative Negative Negative Negative   Occult Blood UA 3+ A 2+ A 2+ A 3+ A   Nitrite, UA Negative Negative Negative Negative   RBC, UA 20 H 13 H >100 H >100 H   WBC, UA 81 H 3 18 H >100 H   Bacteria Rare None Rare Many A   Hyaline Casts, UA 0  --  0 0             Microbiology Results (last 7 days)       Procedure Component Value Units Date/Time    Culture, Anaerobic [[id number]] Collected: 06/21/24 1645    Order Status: Completed Specimen: Abscess from Shoulder, Left Updated: 06/24/24 0709     Anaerobic Culture No anaerobes isolated    Aerobic culture [[id number]] Collected: 06/21/24 1645    Order Status: Completed Specimen: Abscess from Shoulder, Left Updated: 06/24/24 0703     Aerobic Bacterial Culture No growth    Aerobic culture [[id number]] Collected: 06/20/24 1204    Order Status: Completed Specimen: Abscess from Shoulder, Left Updated: 06/24/24 0700     Aerobic Bacterial Culture No growth    Blood culture [[id number]] Collected: 06/23/24 0659    Order Status: Completed Specimen: Blood from Peripheral, Hand, Right Updated: 06/23/24 1717     Blood Culture, Routine No Growth to date    Gram stain [[id number]] Collected: 06/21/24 1645    Order Status: Completed Specimen: Abscess from Shoulder, Left Updated: 06/23/24 0833     Gram Stain Result Few WBC's      No organisms seen    Culture, Anaerobic [[id number]] Collected: 06/20/24 1204    Order Status: Completed Specimen: Abscess from Shoulder, Left Updated: 06/23/24 0729     Anaerobic Culture No anaerobes isolated     AFB Culture & Smear [[id number]] Collected: 06/21/24 1645    Order Status: Sent Specimen: Abscess from Shoulder, Left Updated: 06/21/24 1924    Fungus culture [[id number]] Collected: 06/21/24 1645    Order Status: Sent Specimen: Abscess from Shoulder, Left Updated: 06/21/24 1924    Gram stain [[id number]] Collected: 06/20/24 1204    Order Status: Completed Specimen: Abscess from Shoulder, Left Updated: 06/21/24 1520     Gram Stain Result Many WBC's      No organisms seen    Culture, Anaerobe [[id number]] Collected: 06/18/24 1251    Order Status: Completed Specimen: Wound from Toe, Right Foot Updated: 06/21/24 1428     Anaerobic Culture No anaerobes isolated    Aerobic culture [[id number]]  (Abnormal)  (Susceptibility) Collected: 06/18/24 1251    Order Status: Completed Specimen: Wound from Toe, Right Foot Updated: 06/21/24 0755     Aerobic Bacterial Culture METHICILLIN RESISTANT STAPHYLOCOCCUS AUREUS  Few  Results called to and read back by Rich Crockett RN-[facility];    06/20/2024  15:05 CJD      Fungus culture [[id number]] Collected: 06/20/24 1204    Order Status: Sent Specimen: Abscess from Shoulder, Left Updated: 06/20/24 1923    Blood culture [[id number]] Collected: 06/14/24 1355    Order Status: Completed Specimen: Blood from Peripheral, Antecubital, Right Updated: 06/19/24 2032     Blood Culture, Routine No growth after 5 days.    Blood culture [[id number]] Collected: 06/14/24 1355    Order Status: Completed Specimen: Blood from Peripheral, Hand, Right Updated: 06/19/24 2032     Blood Culture, Routine No growth after 5 days.            Review of patient's allergies indicates:  No Known Allergies    Outpatient meds:  No current facility-administered medications on file prior to encounter.     Current Outpatient Medications on File Prior to Encounter   Medication Sig Dispense Refill    apixaban (ELIQUIS) 5 mg Tab Take 1 tablet (5 mg total) by mouth 2 (two) times daily. 60 tablet 1    atorvastatin (LIPITOR)  10 MG tablet Take 1 tablet (10 mg total) by mouth once daily. 90 tablet 3    co-enzyme Q-10 30 mg capsule Take 30 mg by mouth once daily.      doxycycline (VIBRAMYCIN) 100 MG Cap Take 100 mg by mouth 2 (two) times daily.      ergocalciferol, vitamin D2, (VITAMIN D ORAL) Take 1 tablet by mouth once daily.      glimepiride (AMARYL) 2 MG tablet Take 1 tablet (2 mg total) by mouth before breakfast. 90 tablet 3    hydrALAZINE (APRESOLINE) 25 MG tablet Take 1 tablet (25 mg total) by mouth every 12 (twelve) hours. 60 tablet 3    metFORMIN (GLUCOPHAGE-XR) 500 MG ER 24hr tablet Take 2 tablets (1,000 mg total) by mouth 2 (two) times daily with meals. 360 tablet 3    metoprolol succinate (TOPROL-XL) 100 MG 24 hr tablet Take 1 tablet (100 mg total) by mouth once daily. (Patient taking differently: Take 100 mg by mouth nightly.) 90 tablet 3       Scheduled meds:   atorvastatin  10 mg Oral QHS    cefTRIAXone (Rocephin) IV (PEDS and ADULTS)  2 g Intravenous Q24H    clotrimazole  10 mg Oral 5x Daily    insulin glargine U-100  10 Units Subcutaneous Daily    LIDOcaine  1 patch Transdermal Q24H    metoprolol succinate  100 mg Oral QHS    sodium chloride 0.9%  10 mL Intravenous Q6H       Infusions:          PRN meds:    Current Facility-Administered Medications:     albuterol-ipratropium, 3 mL, Nebulization, Q6H PRN    aluminum & magnesium hydroxide-simethicone, 15 mL, Oral, QID PRN    dextrose 10%, 12.5 g, Intravenous, PRN    dextrose 10%, 25 g, Intravenous, PRN    glucagon (human recombinant), 1 mg, Intramuscular, PRN    glucose, 16 g, Oral, PRN    glucose, 24 g, Oral, PRN    HYDROcodone-acetaminophen, 1 tablet, Oral, Q4H PRN    insulin aspart U-100, 0-10 Units, Subcutaneous, QID (AC + HS) PRN    metoprolol, 5 mg, Intravenous, Q5 Min PRN    naloxone, 0.02 mg, Intravenous, PRN    ondansetron, 4 mg, Intravenous, Q8H PRN    prochlorperazine, 5 mg, Intravenous, Q6H PRN    simethicone, 1 tablet, Oral, QID PRN    sodium chloride 0.9%, 10  mL, Intravenous, Q12H PRN    Flushing PICC/Midline Protocol, , , Until Discontinued **AND** sodium chloride 0.9%, 10 mL, Intravenous, Q6H **AND** sodium chloride 0.9%, 10 mL, Intravenous, PRN    sodium chloride 0.9%, 10 mL, Intravenous, Q12H PRN    Pharmacy to dose Vancomycin consult, , , Once **AND** vancomycin - pharmacy to dose, , Intravenous, pharmacy to manage frequency    Past Medical History:   Diagnosis Date    A-fib 2024    Diabetes mellitus, type 2 2021    Guillain-Jefferson 10/2003    Hyperlipidemia     Hypertension 2016     Past Surgical History:   Procedure Laterality Date    OPEN REDUCTION AND INTERNAL FIXATION (ORIF) OF FRACTURE OF PROXIMAL HUMERUS Left 3/25/2024    Procedure: ORIF, FRACTURE, HUMERUS, PROXIMAL/IM HANNA, LEFT;  Surgeon: Nathan Cooper MD;  Location: [facility];  Service: Orthopedics;  Laterality: Left;  Accumed, [employer] Small Frag set Avelino verified 3/22/24 ark     No family history on file.  Social History     Tobacco Use    Smoking status: Never    Smokeless tobacco: Never   Substance Use Topics    Alcohol use: Yes     Alcohol/week: 0.0 standard drinks of alcohol     Comment: social    Drug use: No       OBJECTIVE:     Vital Signs and IO:  Temp:  [97.6 °F (36.4 °C)-99.5 °F (37.5 °C)]   Pulse:  []   Resp:  [18]   BP: ()/(56-75)   SpO2:  [93 %-95 %]   I/O last 3 completed shifts:  In: 100 [P.O.:100]  Out: 1950 [Urine:1950]  Wt Readings from Last 5 Encounters:   06/21/24 (!) 140.6 kg (309 lb 15.5 oz)   06/04/24 132.5 kg (292 lb 1.8 oz)   05/07/24 132.5 kg (292 lb 1.8 oz)   04/08/24 132.5 kg (292 lb 3.2 oz)   03/26/24 (!) 136.1 kg (300 lb 0.7 oz)     Body mass index is 42.04 kg/m².    Physical Exam  Constitutional:       General: Patient is not in acute distress.     Appearance: Patient is well-developed. She is not diaphoretic.   HENT:      Head: Normocephalic and atraumatic.      Mouth/Throat: Mucous membranes are moist.   Eyes:      General: No scleral icterus.     Pupils: Pupils  are equal, round, and reactive to light.   Cardiovascular:      Rate and Rhythm: Normal rate and regular rhythm.   Pulmonary:      Effort: Pulmonary effort is normal. No respiratory distress.      Breath sounds: No stridor.   Abdominal:      General: There is no distension.      Palpations: Abdomen is soft.   Musculoskeletal:         General: No deformity. Normal range of motion.      Cervical back: Neck supple.   Skin:     General: Skin is warm and dry.      Findings: No rash present. No erythema.   Neurological:      Mental Status: Patient is alert and oriented to person, place, and time.      Cranial Nerves: No cranial nerve deficit.   Psychiatric:         Behavior: Behavior normal.          Patient care time was spent personally by me on the following activities:     Obtaining a history.  Examination of patient.  Providing medical care at the patients bedside.  Developing a treatment plan with patient or surrogate and bedside caregivers.  Ordering and reviewing laboratory studies, radiographic studies, pulse oximetry.  Ordering and performing treatments and interventions.  Evaluation of patient's response to treatment.  Discussions with consultants while on the unit and immediately available to the patient.  Re-evaluation of the patient's condition.  Documentation in the medical record.     Jay Talavera MD    [facility]  [address] [place] [address]  [place] [address]    [phone] - tel  [phone] - fax    6/24/2024

## 2024-06-24 NOTE — PT/OT/SLP PROGRESS
Occupational Therapy      Patient Name:  Roosevelt Moser   MRN:  [id number]    Patient not seen today secondary to off the floor for a procedure. Will follow-up.    6/24/2024

## 2024-06-24 NOTE — PROGRESS NOTES
Pharmacokinetic Assessment Follow Up: IV Vancomycin    Vancomycin serum concentration assessment(s):    The random level was drawn correctly and can be used to guide therapy at this time. The measurement is below the desired definitive target range of 15 to 20 mcg/mL.    Vancomycin Regimen Plan:    Change regimen to Vancomycin 1250 mg IV once with next serum random concentration measured at 2030 prior to next dose on 6/24    Drug levels (last 3 results):  Recent Labs   Lab Result Units 06/21/24  1655 06/22/24  1811 06/23/24 2014   Vancomycin, Random ug/mL 15.3 18.3 13.5       Pharmacy will continue to follow and monitor vancomycin.    Please contact pharmacy at extension [phone] for questions regarding this assessment.    Thank you for the consult,   Fabiola Rosas       Patient brief summary:  Roosevelt Moser is a 73 y.o. male initiated on antimicrobial therapy with IV Vancomycin for treatment of bone/joint infection    The patient's current regimen is pulse    Drug Allergies:   Review of patient's allergies indicates:  No Known Allergies    Actual Body Weight:   140.6 kg    Renal Function:   Estimated Creatinine Clearance: 48.5 mL/min (A) (based on SCr of 2 mg/dL (H)).,     Dialysis Method (if applicable):  N/A    CBC (last 72 hours):  Recent Labs   Lab Result Units 06/21/24  0410 06/22/24  0513 06/23/24  0849   WBC K/uL 21.39* 19.81* 17.37*   Hemoglobin g/dL 9.8* 8.6* 8.6*   Hematocrit % 30.1* 26.8* 26.2*   Platelets K/uL 560* 549* 669*   Gran % %  --   --  71.5   Lymph % %  --   --  13.0*   Mono % %  --   --  8.6   Eosinophil % %  --   --  2.9   Basophil % %  --   --  0.5   Differential Method   --   --  Automated       Metabolic Panel (last 72 hours):  Recent Labs   Lab Result Units 06/21/24  0405 06/22/24  0513 06/23/24  0849   Sodium mmol/L 138 138 140   Potassium mmol/L 3.7 4.4 3.9   Chloride mmol/L 100 102 102   CO2 mmol/L 25 25 26   Glucose mg/dL 206* 257* 229*   BUN mg/dL 82* 76* 72*   Creatinine mg/dL 2.4*  2.4* 2.0*   Albumin g/dL  --  1.2* 1.3*   Total Bilirubin mg/dL  --  0.3 0.3   Alkaline Phosphatase U/L  --  217* 195*   AST U/L  --  20 23   ALT U/L  --  13 14   Magnesium mg/dL  --  1.7  --        Vancomycin Administrations:  vancomycin given in the last 96 hours                     vancomycin 1,250 mg in D5W 250 mL IVPB (Vial-Mate) (mg) 1,250 mg New Bag 06/23/24 2121    vancomycin (VANCOCIN) 1,000 mg in D5W 250 mL IVPB (Vial-Mate) (mg) 1,000 mg New Bag 06/22/24 2143    vancomycin (VANCOCIN) 1,000 mg in 0.9% NaCl 250 mL IVPB (Vial-Mate) (mg) 1,000 mg New Bag 06/21/24 1824    vancomycin 750 mg in 0.9% NaCl 250 mL IVPB (Vial-Mate) (mg) 750 mg New Bag 06/20/24 1845                    Microbiologic Results:  Microbiology Results (last 7 days)       Procedure Component Value Units Date/Time    Blood culture [[id number]] Collected: 06/23/24 0659    Order Status: Completed Specimen: Blood from Peripheral, Hand, Right Updated: 06/23/24 1717     Blood Culture, Routine No Growth to date    Gram stain [[id number]] Collected: 06/21/24 1645    Order Status: Completed Specimen: Abscess from Shoulder, Left Updated: 06/23/24 0833     Gram Stain Result Few WBC's      No organisms seen    Culture, Anaerobic [[id number]] Collected: 06/20/24 1204    Order Status: Completed Specimen: Abscess from Shoulder, Left Updated: 06/23/24 0729     Anaerobic Culture No anaerobes isolated    Aerobic culture [[id number]] Collected: 06/20/24 1204    Order Status: Completed Specimen: Abscess from Shoulder, Left Updated: 06/23/24 0721     Aerobic Bacterial Culture No growth    Aerobic culture [[id number]] Collected: 06/21/24 1645    Order Status: Completed Specimen: Abscess from Shoulder, Left Updated: 06/23/24 0721     Aerobic Bacterial Culture No growth    Culture, Anaerobic [[id number]] Collected: 06/21/24 1645    Order Status: Sent Specimen: Abscess from Shoulder, Left Updated: 06/21/24 1924    AFB Culture & Smear [[id number]] Collected: 06/21/24  1645    Order Status: Sent Specimen: Abscess from Shoulder, Left Updated: 06/21/24 1924    Fungus culture [[id number]] Collected: 06/21/24 1645    Order Status: Sent Specimen: Abscess from Shoulder, Left Updated: 06/21/24 1924    Gram stain [[id number]] Collected: 06/20/24 1204    Order Status: Completed Specimen: Abscess from Shoulder, Left Updated: 06/21/24 1520     Gram Stain Result Many WBC's      No organisms seen    Culture, Anaerobe [[id number]] Collected: 06/18/24 1251    Order Status: Completed Specimen: Wound from Toe, Right Foot Updated: 06/21/24 1428     Anaerobic Culture No anaerobes isolated    Aerobic culture [[id number]]  (Abnormal)  (Susceptibility) Collected: 06/18/24 1251    Order Status: Completed Specimen: Wound from Toe, Right Foot Updated: 06/21/24 0755     Aerobic Bacterial Culture METHICILLIN RESISTANT STAPHYLOCOCCUS AUREUS  Few  Results called to and read back by Rich Crockett RN-[facility];    06/20/2024  15:05 CJD      Fungus culture [[id number]] Collected: 06/20/24 1204    Order Status: Sent Specimen: Abscess from Shoulder, Left Updated: 06/20/24 1923    Blood culture [[id number]] Collected: 06/14/24 1355    Order Status: Completed Specimen: Blood from Peripheral, Antecubital, Right Updated: 06/19/24 2032     Blood Culture, Routine No growth after 5 days.    Blood culture [[id number]] Collected: 06/14/24 1355    Order Status: Completed Specimen: Blood from Peripheral, Hand, Right Updated: 06/19/24 2032     Blood Culture, Routine No growth after 5 days.    Urine culture [[id number]] Collected: 06/14/24 1337    Order Status: Completed Specimen: Urine Updated: 06/17/24 0709     Urine Culture, Routine No growth    Narrative:      Specimen Source->Urine

## 2024-06-24 NOTE — PLAN OF CARE
Released from Pacu per Anesthesia when criteria met pain controlled skin w+d No nausea No emesis dsg dry intact awake ox3 reoriented to time encouraged deep breaths instr on is in room Pt has all belongings in room wound vac on in place draining serousangous fluid from L shoulder

## 2024-06-24 NOTE — OP NOTE
[facility]  Surgery Department  Operative Note    SUMMARY     Date of Procedure: 6/24/2024     Procedure:  Irrigation and debridement of left shoulder abscess including soft tissue muscle and bone.                       Placement of wound VAC left shoulder    Surgeons and Role:     * Nathan Cooper MD - Primary    Assisting Surgeon:  Eric    Pre-Operative Diagnosis: Abscess of Left shoulder [L02.414]    Post-Operative Diagnosis: Abscess of Left shoulder [L02.414]    Anesthesia: General    Intraoperative Findings:  The patient had undergone I and D approximately 2 days prior.  The old wound had been packed open.    Description of the Findings of the Procedure:  Patient was placed on the operating table the beach chair position.  The packing was removed and all suture material was removed from the distal stab wounds.  Once removing the packing there was a large collection of bloody brownish fluid.  It was irrigated free.  At this point I extended the incision 5 cm distally.  I pulsed out the wound with 12 L of fluid.  Cultures were taking prior to pulsing out the wound.  The fracture could be easily visualized.  Clearly there was no union whatsoever.  Once 12 L of fluid were passed through the abscess cavity I placed a wound VAC sponge into the cavity.  The wound VAC was then sealed initiated.  The patient has a infected nonunion.  It did not appear to communicate with the joint however clearly communicates with the humerus and the fracture.  He will need long-term antibiotic and probably multiple washout prior to any consideration for new hardware    Complications: No    Estimated Blood Loss (EBL): * No values recorded between 6/24/2024  3:44 PM and 6/24/2024  3:58 PM *           Implants: * No implants in log *    Specimens:   Specimen (24h ago, onward)      None                    Condition: Good    Disposition: PACU - hemodynamically stable.

## 2024-06-24 NOTE — PT/OT/SLP PROGRESS
Physical Therapy      Patient Name:  Roosevelt Moser   MRN:  [id number]    Patient not seen today secondary to Other (Comment) (treatment deferred , awaiting surgical procedure today.). Will follow-up 6/25/24.

## 2024-06-24 NOTE — SUBJECTIVE & OBJECTIVE
Interval History:  No acute events overnight.  Patient states he feels well at this time.  Plan for repeat washout today with surgery.  Following recommendations from ID and surgery    Review of Systems  Objective:     Vital Signs (Most Recent):  Temp: 97.9 °F (36.6 °C) (06/24/24 0930)  Pulse: 101 (06/24/24 0930)  Resp: 18 (06/24/24 0930)  BP: 130/69 (06/24/24 0930)  SpO2: (!) 94 % (06/24/24 0930) Vital Signs (24h Range):  Temp:  [97.6 °F (36.4 °C)-98.3 °F (36.8 °C)] 97.9 °F (36.6 °C)  Pulse:  [] 101  Resp:  [18] 18  SpO2:  [93 %-95 %] 94 %  BP: ()/(57-75) 130/69     Weight: (!) 140.6 kg (309 lb 15.5 oz)  Body mass index is 42.04 kg/m².    Intake/Output Summary (Last 24 hours) at 6/24/2024 1024  Last data filed at 6/24/2024 0200  Gross per 24 hour   Intake --   Output 1200 ml   Net -1200 ml         Physical Exam  Constitutional:       Appearance: Normal appearance.   HENT:      Head: Normocephalic and atraumatic.      Right Ear: External ear normal.      Left Ear: External ear normal.      Nose: Nose normal.      Mouth/Throat:      Mouth: Mucous membranes are moist.      Pharynx: Oropharynx is clear.   Eyes:      Extraocular Movements: Extraocular movements intact.      Conjunctiva/sclera: Conjunctivae normal.   Cardiovascular:      Rate and Rhythm: Normal rate and regular rhythm.      Pulses: Normal pulses.      Heart sounds: Normal heart sounds. No murmur heard.     No gallop.   Pulmonary:      Effort: Pulmonary effort is normal. No respiratory distress.      Breath sounds: Normal breath sounds. No wheezing, rhonchi or rales.   Abdominal:      General: Abdomen is flat. There is no distension.      Palpations: Abdomen is soft.      Tenderness: There is no abdominal tenderness.   Musculoskeletal:         General: No swelling.      Cervical back: Normal range of motion and neck supple.      Comments: Left shoulder bandage   Skin:     General: Skin is warm and dry.   Neurological:      General: No focal  deficit present.      Mental Status: He is alert. Mental status is at baseline.             Significant Labs: All pertinent labs within the past 24 hours have been reviewed.    Significant Imaging: I have reviewed all pertinent imaging results/findings within the past 24 hours.

## 2024-06-24 NOTE — PROGRESS NOTES
Roosevelt Moser [id number] is a 73 y.o. male who has been consulted for vancomycin dosing.    Pharmacy consult for vancomycin dosing is no longer required.  Vancomycin was discontinued.    Thank you for allowing us to participate in this patient's care.     Dulce Cosby Mai, PharmD

## 2024-06-24 NOTE — PROGRESS NOTES
".[facility]   Department of Infectious Disease  Progress Note    PATIENT NAME: Roosevelt Moser  MRN: [id number]  TODAY'S DATE: 06/24/2024  ADMIT DATE: 6/14/2024  LOS: 10 days  CHIEF COMPLAINT: Weakness (Pt brought to ED via EMS with complaint of weakness and falling, pt advised EMS his urine is very dark.  )  PRINCIPLE PROBLEM: Acute renal failure superimposed on chronic kidney disease      INTERVAL HISTORY     6/24/24 (Pranay):  Patient seen and examined, wife present.  He is NPO for procedure today by Ortho, I&D and washout of left shoulder.  Patient awake, alert.  Hypertensive, T-max 99.5°, currently afebrile.  Adequate urinary output, had a bowel movement in the last 24 hours.  Labs reviewed, leukocytosis 18.1, no left shift, H&H 9.1/28.3, MCV 80, reactive thrombocytosis 721.  Sed rate extremely high 128, .  Stable electrolytes, MARBIN creatinine trending down 1.8, clearance 53.9 mL/min, baseline CPK 30.  Procalcitonin down to 0.97.  Last vancomycin random level 13.5.    06/23/2024.  Sleeping -- I did not disturb Afebrile.  T-max 99.5° WBC 21.3--19.8--17.  CRP is quite elevated at 205.  .  Procalcitonin 1.39.  Vancomycin random 18.  Cultures from shoulder no growth to date.    Gram stain yesterday was read as Gram-positive cocci--today it is changed to  "no organism"  GOing for another procedure tomorrow    06/22/2024.  Very pleasant.  So is his wife.  Patient is Afebrile.  He has swelling over the left arm, slightly improved.  Pain is well-controlled.    WBC 19--21--21.3--19.8.  He did receive dexamethasone yesterday by anesthesia, at the time of left shoulder surgery.  He is on ceftriaxone clindamycin and vancomycin.  Cultures reviewed:  right 2nd toe wound had grown MRSA.  Left intraop cultures no growth to date.  Left shoulder intraop, gram stain are showing Gram-positive cocci.  He is trying to move his legs in bed, but he is still  weak.  Wife and patient are able to do IV " "antibiotics at home, but all things considered, it is very cumbersome for them;  they are interested in placement, which I agree, it is the right course of action.    06/21/2024:  Seen by Orthopedic surgery Service again yesterday.  For I and D today.  All cultures so far negative.  ASO titer normal.    06/20/2024:  Oral anticoagulants on hold to allow left shoulder arthrocentesis.  No other acute issues overnight.  Culture from right 3rd toe growing Staphylococcus aureus.  Blood culture remain negative.  Had aspiration left shoulder today that yielded purulent material.  Synovial fluid studies in progress.    06/19/2024: Seen and evaluated at bedside.  States left upper extremity pain better.  Having improved movement at the wrist and forearm.  Seen by Orthopedic surgery PA yesterday.  Notes reviewed.  Blood cultures remain negative.        Antibiotics (From admission, onward)      Start     Stop Route Frequency Ordered    06/19/24 1045  cefTRIAXone (ROCEPHIN) 2 g in dextrose 5 % in water (D5W) 100 mL IVPB (MB+)         -- IV Every 24 hours (non-standard times) 06/19/24 0932    06/18/24 1311  vancomycin - pharmacy to dose  (vancomycin IVPB (PEDS and ADULTS))        Placed in "And" Linked Group    -- IV pharmacy to manage frequency 06/18/24 1212    06/17/24 2100  mupirocin 2 % ointment         06/22/24 2059 Nasl 2 times daily 06/17/24 1544          Antifungals (From admission, onward)      Start     Stop Route Frequency Ordered    06/19/24 1045  clotrimazole megha 10 mg         06/29/24 0959 Oral 5 times daily 06/19/24 0932           Antivirals (From admission, onward)      None            ASSESSMENT and PLAN     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  Right 3rd toe osteomyelitis s/p " Dalvance x 2 doses    3.  Osteomyelitis distal tuft of right 2nd toe   Wound cultures 6/18 MRSA, vanco JESSENIA 2      4.  MARBIN, improving Estimated Creatinine Clearance: 53.9 mL/min (A) (based on SCr of 1.8 mg/dL (H)).       5.  Aphthous ulcers on soft palate/oropharynx    6.  PMHx:  Diabetes, last A1c 6.3%, PID, CHF EF 50%     RECOMMENDATIONS:    Adequate source control   Plan for 2nd I&D and washout in the OR by Ortho today   Please send deep tissue for Gram stain and cultures including AFB and fungal   Stop vancomycin, JESSENIA  2  Baseline CPK normal   Start daptomycin 800 mg IV daily   Rocephin 2 g IV daily   Above empirically for septic left shoulder prosthetic joint infection  Stop statins while on daptomycin   Check CPK twice a week  Monitor CRP and ESR  Start magic mouthwash 10 mL 4 times a day, swish and swallow  Follow cultures   PT/OT as tolerated   Patient would benefit from LTAC  Aspiration precautions    D/w patient, wife at bedside, nursing, Dr Cooper/Ortho      Please send Epic secure chat with any questions.      SUBJECTIVE    Roosevelt Moser is a 73 y.o. male with history of diabetes mellitus, hypertension and previous going bowel syndrome. He fell on 03/04/2024 and sustained a displaced comminuted fracture of the left humerus.  Seen by orthopedic surgeon with plan for ORIF which was deferred because of new AFib.  Admitted 03/09/2024 for rate control and ultimately discharged back home 03/12/2024.  Later had left humerus ORIF 03/25/2024 as a day procedure and was discharged home.       According to his wife, he developed pain and swelling of the 3rd toe and was with an ulcer.  It appears an x-ray of the foot was unremarkable.  Received 2 doses of an antibiotic that I presume to be dalbavancin 1 week apart in early April.     In the last 2 weeks he has continued to decline.  He also has reduction in urine output.  Fell twice at home on 06/13/2024 and wife activated EMS and he was brought to the hospital.   Received IV fluid for low normal blood pressure.  Also noted to have MARBIN with creatinine 5.0 and WBC was 21 K.  UA was abnormal with> 100 WBC,> 100 RBC, 3+ LE and positive nitrite.  He has been managed conservatively for MARBIN and for dehydration.     X-ray of left shoulder and left upper extremity 06/17/2024 documented gas in soft tissue.  MRI right foot documented osteomyelitis of the 3rd toe.  I was asked to see to assist with his care.  ESR 95,  milligram/liter.     Antibiotics   Ceftriaxone:  06/14/2024-06/17/2024, 06/19/2024-  Zosyn:  06/17/2024-06/18/2024   Vancomycin:  06/18/2024-6/24/2024  Clindamycin:  06/18/2024-6/22/2024     Cultures   Blood culture 06/14/2024:  NGTD   Urine culture 06/14/2024: NGTD  Right second toe culture 06/18/2024: MRSA  Left shoulder synovial fluid culture 06/20/2024:  No growth      Review of Systems  Review of systems obtained and negative except as stated above in Interval History     OBJECTIVE   Temp:  [97.6 °F (36.4 °C)-98.3 °F (36.8 °C)] 97.6 °F (36.4 °C)  Pulse:  [] 93  Resp:  [18] 18  SpO2:  [93 %-95 %] 94 %  BP: ()/(57-75) 128/57  Temp:  [97.6 °F (36.4 °C)-98.3 °F (36.8 °C)]   Temp: 97.6 °F (36.4 °C) (06/24/24 0343)  Pulse: 93 (06/24/24 0343)  Resp: 18 (06/24/24 0542)  BP: (!) 128/57 (06/24/24 0343)  SpO2: (!) 94 % (06/24/24 0343)    Intake/Output Summary (Last 24 hours) at 6/24/2024 0923  Last data filed at 6/24/2024 0200  Gross per 24 hour   Intake --   Output 1200 ml   Net -1200 ml       Physical Exam  General:  Morbidly obese  male lying in bed, breathing comfortable on room air  Eyes: Eyes with no icterus or injection. Vision grossly normal  Ears: Hearing grossly normal.  Nose: Nares patent  Mouth: Moist mucous membranes, dentition is fair. No ulcerations, erythema or exudates.  Neck: Supple, no tenderness to palpation.  Cardiovascular: Regular rate and rhythm, no murmurs, no edema.    Respiratory:  Poor inspiratory effort, mostly clear to  auscultation bilaterally, no tachypnea or increased work of breathing.  Gastrointestinal:  Soft and obese with active bowel sounds, no tenderness to palpation, no distention.  Genitourinary:  PureWick in place.  No suprapubic tenderness.  Musculoskeletal:  Except for left shoulder, moves all extremities with good strength.    Skin:  Right great toe with necrotic tip of toe, left shoulder with bloody drainage to be changed by nursing today.    Neuro:   Oriented, conversant, follows commands.  Psych: Good mood, normal affect.     WOUNDS:    6/24:                            Significant Labs: All pertinent labs within the past 24 hours have been reviewed.    CBC LAST 7 DAYS  Recent Labs   Lab 06/19/24  0322 06/20/24  0458 06/21/24  0410 06/22/24  0513 06/23/24  0849 06/24/24  0357   WBC 19.14* 21.32* 21.39* 19.81* 17.37* 18.15*   RBC 4.08* 3.94* 3.73* 3.33* 3.27* 3.52*   HGB 10.6* 10.2* 9.8* 8.6* 8.6* 9.1*   HCT 32.5* 31.3* 30.1* 26.8* 26.2* 28.3*   MCV 80* 79* 81* 81* 80* 80*   MCH 26.0* 25.9* 26.3* 25.8* 26.3* 25.9*   MCHC 32.6 32.6 32.6 32.1 32.8 32.2   RDW 18.0* 18.1* 17.8* 17.7* 17.6* 17.9*   * 560* 560* 549* 669* 721*   MPV 10.2 10.2 10.1 9.9 10.1 9.9   GRAN  --   --   --   --  71.5  12.4* 70.9  12.9*   LYMPH  --   --   --   --  13.0*  2.3 13.9*  2.5   MONO  --   --   --   --  8.6  1.5* 8.3  1.5*   BASO  --   --   --   --  0.09 0.09   NRBC  --   --   --   --  0 0       CHEMISTRY LAST 7 DAYS  Recent Labs   Lab 06/18/24  0234 06/19/24  0322 06/20/24  0458 06/21/24  0405 06/22/24  0513 06/23/24  0849 06/24/24  0357    140 138 138 138 140 142   K 3.8 4.0 4.0 3.7 4.4 3.9 3.7   CL 97 98 99 100 102 102 104   CO2 25 25 25 25 25 26 24   ANIONGAP 15 17* 14 13 11 12 14   BUN 68* 73* 78* 82* 76* 72* 79*   CREATININE 2.7* 2.8* 2.5* 2.4* 2.4* 2.0* 1.8*   * 198* 179* 206* 257* 229* 246*   CALCIUM 8.6* 8.8 8.7 9.0 8.5* 8.7 9.2   MG 1.5* 2.0  --   --  1.7  --   --    ALBUMIN 1.3* 1.3* 1.3*  --  1.2* 1.3*  1.4*   PROT  --  6.2  --   --  5.8* 5.8* 6.2   ALKPHOS  --  312*  --   --  217* 195* 188*   ALT  --  23  --   --  13 14 18   AST  --  45*  --   --  20 23 28   BILITOT  --  0.7  --   --  0.3 0.3 0.3       Estimated Creatinine Clearance: 53.9 mL/min (A) (based on SCr of 1.8 mg/dL (H)).    INFLAMMATORY/PROCAL    Lab Results   Component Value Date    .5 (H) 06/24/2024    .0 (H) 06/23/2024    .0 (H) 06/18/2024          Component Ref Range & Units 2 d ago   Body Fluid Type  Abscess fluid, left shoulder   Fluid Appearance  Cloudy   Fluid Color  Pink   WBC, Body Fluid /cu mm SEE COMMENT   Comment: Reference ranges for body fluids not established.  Correlate clinically.  Test not performed  Cell count not performed on abscess fluid, see differential.   Segs, Fluid % 79   Lymphs, Fluid % 14   Monocytes/Macrophages, Fluid % 7        PRIOR  MICROBIOLOGY:    Susceptibility data from last 90 days.  Collected Specimen Info Organism Ceftriaxone Clindamycin Erythromycin Oxacillin Penicillin Tetracycline Trimeth/Sulfa Vancomycin   06/18/24 Wound from Toe, Right Foot Methicillin resistant Staphylococcus aureus  R  S  R  R  R  S  S  S   06/14/24 Blood from Peripheral, Antecubital, Right No growth after 5 days.           06/14/24 Blood from Peripheral, Hand, Right No growth after 5 days.               LAST 7 DAYS MICROBIOLOGY   Microbiology Results (last 7 days)       Procedure Component Value Units Date/Time    AFB Culture & Smear [[id number]] Collected: 06/21/24 1645    Order Status: Completed Specimen: Abscess from Shoulder, Left Updated: 06/24/24 0853     AFB CULTURE STAIN No acid fast bacilli seen.     AFB CULTURE STAIN Testing performed by:     AFB CULTURE STAIN Lab [facility]     [facility] STAIN [address] [facility] [address] [facility]     [place] [address]     AFB CULTURE STAIN Dr. Davis Jain MD    Culture, Anaerobic [[id number]] Collected: 06/21/24 1645    Order Status: Completed Specimen:  Abscess from Shoulder, Left Updated: 06/24/24 0709     Anaerobic Culture No anaerobes isolated    Aerobic culture [[id number]] Collected: 06/21/24 1645    Order Status: Completed Specimen: Abscess from Shoulder, Left Updated: 06/24/24 0703     Aerobic Bacterial Culture No growth    Aerobic culture [[id number]] Collected: 06/20/24 1204    Order Status: Completed Specimen: Abscess from Shoulder, Left Updated: 06/24/24 0700     Aerobic Bacterial Culture No growth    Blood culture [[id number]] Collected: 06/23/24 0659    Order Status: Completed Specimen: Blood from Peripheral, Hand, Right Updated: 06/23/24 1717     Blood Culture, Routine No Growth to date    Gram stain [[id number]] Collected: 06/21/24 1645    Order Status: Completed Specimen: Abscess from Shoulder, Left Updated: 06/23/24 0833     Gram Stain Result Few WBC's      No organisms seen    Culture, Anaerobic [[id number]] Collected: 06/20/24 1204    Order Status: Completed Specimen: Abscess from Shoulder, Left Updated: 06/23/24 0729     Anaerobic Culture No anaerobes isolated    Fungus culture [[id number]] Collected: 06/21/24 1645    Order Status: Sent Specimen: Abscess from Shoulder, Left Updated: 06/21/24 1924    Gram stain [[id number]] Collected: 06/20/24 1204    Order Status: Completed Specimen: Abscess from Shoulder, Left Updated: 06/21/24 1520     Gram Stain Result Many WBC's      No organisms seen    Culture, Anaerobe [[id number]] Collected: 06/18/24 1251    Order Status: Completed Specimen: Wound from Toe, Right Foot Updated: 06/21/24 1428     Anaerobic Culture No anaerobes isolated    Aerobic culture [[id number]]  (Abnormal)  (Susceptibility) Collected: 06/18/24 1251    Order Status: Completed Specimen: Wound from Toe, Right Foot Updated: 06/21/24 0755     Aerobic Bacterial Culture METHICILLIN RESISTANT STAPHYLOCOCCUS AUREUS  Few  Results called to and read back by Rich Crockett RN-[facility];    06/20/2024  15:05 CJD      Fungus culture  [[id number]] Collected: 06/20/24 1204    Order Status: Sent Specimen: Abscess from Shoulder, Left Updated: 06/20/24 1923    Blood culture [[id number]] Collected: 06/14/24 1355    Order Status: Completed Specimen: Blood from Peripheral, Antecubital, Right Updated: 06/19/24 2032     Blood Culture, Routine No growth after 5 days.    Blood culture [[id number]] Collected: 06/14/24 1355    Order Status: Completed Specimen: Blood from Peripheral, Hand, Right Updated: 06/19/24 2032     Blood Culture, Routine No growth after 5 days.              CURRENT/PREVIOUS VISIT EKG  Results for orders placed or performed during the hospital encounter of 06/14/24   EKG 12-lead    Collection Time: 06/14/24 12:16 PM   Result Value Ref Range    QRS Duration 106 ms    OHS QTC Calculation 443 ms    Narrative    Test Reason : R53.1,    Vent. Rate : 120 BPM     Atrial Rate : 159 BPM     P-R Int : 000 ms          QRS Dur : 106 ms      QT Int : 314 ms       P-R-T Axes : 000 -54 093 degrees     QTc Int : 443 ms    Atrial fibrillation with rapid ventricular response  Left axis deviation  Low voltage QRS  Inferior infarct ,age undetermined  Cannot rule out Anterior infarct ,age undetermined  Abnormal ECG  When compared with ECG of 25-MAR-2024 09:14,  Vent. rate has increased BY  46 BPM  Minimal criteria for Anterior infarct are now Present  Confirmed by Jesús HATHAWAY, Hansel AMIN ([id number]) on 6/20/2024 8:43:02 PM    Referred By: [email or web address]   SELF           Confirmed By:Hansel Espinosa MD     ECHO:       Extremely limited visualization of all cardiac structures.  No clinically significant determination can be made based on this echocardiogram.  If cardiac concerns persist, consider alternative cardiac imaging like RICKY for further evaluation.    Left Ventricle: Left ventricle was not well visualized due to poor sonic window. The left ventricle is normal in size. Unable to assess wall motion. There is normal systolic function with a visually estimated  ejection fraction of 55 - 60%.    Right Ventricle: Right ventricle was not well visualized due to poor acoustic window.    Left Atrium: Left atrium was not well visualized.    Mitral Valve: There is no stenosis. The mean pressure gradient across the mitral valve is 2 mmHg at a heart rate of  bpm. There is mild regurgitation.    IVC/SVC: Elevated venous pressure at 15 mmHg.    Significant Imaging: I have reviewed all relevant and available imaging results/findings within the past 24 hours.    06/18/2024.  CT left shoulder   1. Subacute left humerus fracture post internal fixation.  There is incomplete bony bridging across the fracture site, with persistent angulation between the dominant bony fragments as hardware is only partially engaged (see discussion).  2. Severe arthropathy of the glenohumeral joint with multiple intra-articular bodies, some interposed.  3. Large left joint effusion and adjacent soft tissue focal fluid collections containing gas and concerning for gas-forming infection.  4. Moderate size left pleural effusion.  5. Left basilar airspace disease is presumably atelectasis with pneumonia as a less favored consideration.  This report was flagged in Epic as abnormal.    X-ray on 06/17, prior to removal of hardware.      I spent a total of 55 minutes on the day of the visit.This includes face to face time and non-face to face time preparing to see the patient (eg, review of tests), obtaining and/or reviewing separately obtained history, documenting clinical information in the electronic or other health record, independently interpreting results and communicating results to the patient/family/caregiver, or care coordinator.    Capri Wesley MD  Date of Service: 06/24/2024      This note was created using [employer] voice recognition software that occasionally misinterpreted phrases or words.

## 2024-06-24 NOTE — ANESTHESIA PROCEDURE NOTES
Intubation    Date/Time: 6/24/2024 2:52 PM    Performed by: Maynor Parker CRNA  Authorized by: Gabe Hightower MD    Intubation:     Induction:  Intravenous    Intubated:  Postinduction    Mask Ventilation:  Easy mask    Attempts:  1    Attempted By:  CRNA    Method of Intubation:  Video laryngoscopy    Blade:  Blunt 3    Laryngeal View Grade: Grade I - full view of cords      Difficult Airway Encountered?: No      Complications:  None    Airway Device:  Oral endotracheal tube    Airway Device Size:  8.0    Style/Cuff Inflation:  Cuffed    Tube secured:  24    Secured at:  The lips    Placement Verified By:  Capnometry    Complicating Factors:  None    Findings Post-Intubation:  BS equal bilateral

## 2024-06-24 NOTE — INTERVAL H&P NOTE
The patient has been examined and the H&P has been reviewed:    I concur with the findings and no changes have occurred since H&P was written.    Surgery risks, benefits and alternative options discussed and understood by patient/family.          Active Hospital Problems    Diagnosis  POA    *Acute renal failure superimposed on chronic kidney disease [N17.9, N18.9]  Yes    MRSA infection [A49.02]  Yes    Pyogenic arthritis of left shoulder region [M00.9]  Yes    Osteomyelitis of toe [M86.9]  Yes    Abscess of left shoulder [L02.414]  Yes    Debility [R53.81]  Yes    Swelling of limb, left [M79.89]  Yes    Skin tear of left upper extremity [S41.112A]  Yes    Hyperkalemia [E87.5]  Yes    Severe sepsis [A41.9, R65.20]  Yes    Atrial fibrillation with rapid ventricular response [I48.91]  Yes    History of Guillain-Laverne syndrome [Z86.69]  Not Applicable    Type 2 diabetes mellitus [E11.9]  Yes    MARA (obstructive sleep apnea) [G47.33]  Yes    Morbid obesity [E66.01]  Yes    Hypertension [I10]  Yes      Resolved Hospital Problems   No resolved problems to display.

## 2024-06-24 NOTE — PROGRESS NOTES
[facility] Medicine  Progress Note    Patient Name: Roosevelt Moser  MRN: [id number]  Patient Class: IP- Inpatient   Admission Date: 6/14/2024  Length of Stay: 10 days  Attending Physician: Miko Galaviz Jr., MD  Primary Care Provider: Miguel Angel Enriquez PA-C        Subjective:     Principal Problem:Acute renal failure superimposed on chronic kidney disease        HPI:  Roosevelt Moser is a 73 year old male with a previous medical history of atrial fibrillation on eliquis, HTN, DMII, obstructive sleep apnea, HLD, ORIF of left humerus and guillian-[place] who presented to the emergency room for weakness and multiple falls. Per wife of the patient he has had progressive weakness over the past week along with two falls one at 0300 Thursday and the other early morning Friday. Both time she had to activate EMS to pick patient up off floor due to weakness. Patient refused transport to hospital on both occasions. Today he slid out of his chair and was unable to get up without assistance and at this point the wife activated EMS to transport patient to the hospital. She endorses that two days ago she noticed that the patient had stopped urinating as he normally does. The patient concurs that he has noticed that his urine has decreased over the past two days and that it is dark. Patient denies dysuria or incomplete bladder emptying. Bladder scan showed zero upon admit and urine sample was obtained via straight cath in ED. Patient denies decreased PO intake and keeps a bottle of water with him at all times. Initial ED work-up showed a creatinine of 5 and potassium of 6. Urine studies positive for infection and WBC 20.81 with procalcitonin at 19.75. Blood cultures obtained and patient given 1 gram of rocephin and 1000ml of normal saline. Patient noted to bein afib with RVR and 20mg of diltiazem was administered IV which resulted in low blood pressure and 1 gram of calcium gluconate was administered.  "Patient admitted by hospital medicine for further evaluation and management     Overview/Hospital Course:  6/15/24  Assumed care today, pt seen and examined, chart reviewed.  Pt sitting up in bed, wife at bedside, feeling better.  Off dilt drip since this AM, in AF on monitor but rate down to 80s.   Denies cp/sob.  Has been weak and debilitated, await PT/OT to see what post acute needs will be.    6/16/24  Pt resting in NAD.  Advised by CM yesterday wife not willing/able to take him home at this time - We talked about that frankly today and she says she is unable to care for him, planning on SNF, hopefully regain enough function to allow d/c home from there. Pt w/ slow improvement.  Feels pretty good.  Appetite not too good, he says he's been deliberately eating small quantities of food at home to lose weight and feels it's been working.  Note pain/swelling LUE where he had a recent fx/ORIF.    6/17/24  Pt doing reasonably well, having pain sitting on the bedpan "it's sticking me", but overcame that w/ some adjusting position.  LUE u/s no DVT.  Still having a lot of pain in upper left arm w/ movement and also worried about persistent pain "under left boob" where he struck himself during a fall - we will image those areas too.  Says Norco 7.5 is like "eating a jelly bean" asking if we can give 10.  Interesting notes that he's currently constipated, at home often has diarrhea, says that higher doses of narcotics cause him to have loose bowels.  Odd.  Albumin 1.3.  Await SNF dispo. Wife at bedside.   6/18/24  Pt continues to be quite jolly about everything, has good sense of humor, acting very non toxic - HOWEVER upon wound care assessment by Dr Elise he is seen to have osteomyelitis of his right 2nd toe - Podiatry and ID consulted, s/p debridement at bedside w/ deep tissue culture taken, pt/wife opposed to amputation.  Additionally shoulder CT favors abscess around his op site - await input from ortho - He has pain " "there but it's not exquisite and the area is not flucuant/red/warm, pain seems more arthritic in nature - but he appears to have a deep seated infection and will need a washout in OR -  Dr Calvin has added clinda/vanc to Zosyn started yesterday.   6/19/24  Pt sitting up in bed, very conversant and non toxic appearing, thanks much to all consultants.  Per ortho conservative approach to shoulder, I am in chat w/ them currently about possible aspiration of fluid.  Note ID changes in abx coverage.  Pt still has a great deal of pain/mobility loss of left shoulder.  We have been working on SNF but he may need LTAC w/ current level of complexity/abx regimen.  Deep wound cultures from right 2nd toe are pending.   6/20/24  Aspirate done today per IR and returned 10 cc of "pussy" fluid, S Lea reviewed and messaged me that Dr Cooper is out of town and to please contact on call ortho, Dr Bernal on call and is aware of care from prior discussion, he reviewed findings and will do washout in OR tomorrow, went to d/w pt and wife, he's sitting up in bed, feeling ok, no new c/o.  I also encountered Dr Calvin in the vale and we talked about this and his abx.    6/21/24  Pt going for washout/removal of infected hardware today in OR.  Sitting up in bed, sister and wife here, everyone on board w/ careplan. He does have a lot more swelling today around proximal deltoid.  Not warm/tender but visibly swollen and this is the 1st time we have really noted that.          Interval History:  No acute events overnight.  Patient states he feels well at this time.  Plan for repeat washout today with surgery.  Following recommendations from ID and surgery    Review of Systems  Objective:     Vital Signs (Most Recent):  Temp: 97.9 °F (36.6 °C) (06/24/24 0930)  Pulse: 101 (06/24/24 0930)  Resp: 18 (06/24/24 0930)  BP: 130/69 (06/24/24 0930)  SpO2: (!) 94 % (06/24/24 0930) Vital Signs (24h Range):  Temp:  [97.6 °F (36.4 °C)-98.3 °F (36.8 °C)] 97.9 " °F (36.6 °C)  Pulse:  [] 101  Resp:  [18] 18  SpO2:  [93 %-95 %] 94 %  BP: ()/(57-75) 130/69     Weight: (!) 140.6 kg (309 lb 15.5 oz)  Body mass index is 42.04 kg/m².    Intake/Output Summary (Last 24 hours) at 6/24/2024 1024  Last data filed at 6/24/2024 0200  Gross per 24 hour   Intake --   Output 1200 ml   Net -1200 ml         Physical Exam  Constitutional:       Appearance: Normal appearance.   HENT:      Head: Normocephalic and atraumatic.      Right Ear: External ear normal.      Left Ear: External ear normal.      Nose: Nose normal.      Mouth/Throat:      Mouth: Mucous membranes are moist.      Pharynx: Oropharynx is clear.   Eyes:      Extraocular Movements: Extraocular movements intact.      Conjunctiva/sclera: Conjunctivae normal.   Cardiovascular:      Rate and Rhythm: Normal rate and regular rhythm.      Pulses: Normal pulses.      Heart sounds: Normal heart sounds. No murmur heard.     No gallop.   Pulmonary:      Effort: Pulmonary effort is normal. No respiratory distress.      Breath sounds: Normal breath sounds. No wheezing, rhonchi or rales.   Abdominal:      General: Abdomen is flat. There is no distension.      Palpations: Abdomen is soft.      Tenderness: There is no abdominal tenderness.   Musculoskeletal:         General: No swelling.      Cervical back: Normal range of motion and neck supple.      Comments: Left shoulder bandage   Skin:     General: Skin is warm and dry.   Neurological:      General: No focal deficit present.      Mental Status: He is alert. Mental status is at baseline.             Significant Labs: All pertinent labs within the past 24 hours have been reviewed.    Significant Imaging: I have reviewed all pertinent imaging results/findings within the past 24 hours.    Assessment/Plan:      * Acute renal failure superimposed on chronic kidney disease  Appreciate input from renal services  In setting of acute UTI and IVVD  Resumed gentle hydration on Zosyn/vanc  but those have been stopped  He did not require oral NaHCO3  U/s is w/o obstruction, masses, etc        Pyogenic arthritis of left shoulder region        Abscess of left shoulder  As discussed under limb swelling, status post washout on 06/21/2024, we will repeat washout on 06/24/2024  Need antibiotics until 08/03/2024    Osteomyelitis of toe  S/p bedside I&D, deep tissue Cx growing MRSA, S to clinda  ID and podiatry following, appreciate help  Appreciate input all consultants  Pt/wife opposed to amputation of digit  Local wound care  Note markedly elevated ESR, CRP  Need antibiotics until 08/03/2024      Skin tear of left upper extremity  Local care, healing  Doubt this is a portal of entry    Swelling of limb, left  U/s w/o DVT  Appreciate help from Dr Bernal, ID, wound care, podiatry et al  Pt is already on OAC, holding for now pending shoulder procedure  Arm is elevated on pillow  Not red/painful, no compartment syndrome  D/w pt/wife      Debility  Multifactorial process  Pt not able to manage his own care and wife cannot adequately care for him at this time  PT/OT  SNF or possibly LTAC consult  CM aware      Severe sepsis  In setting of acute UTI, MRSA OM right 2nd toe, and infected left shoulder hardware  BCX NGTD  Ur CX neg  Rocephin was given empirically for UTI, changed to Zosyn > added vanc/clinda, now on Rocephin/clinda, vanc/zosyn stopped per ID  Oral clotrimazole added for thrush  PCT and WBC trended down, WBC staying up a little now but pt looks good  No obstruction on renal u/s  Has gas containing abscess in and around left shoulder w/ malpositioned hardware, likely d/t falls, hard to say how the bug entered  Long d/w pt/wife, ortho - 6/22 had washout and hardware removal w/ Dr Bernal  F/u w/ Dr Kenneth mann d/c  He's urinating well  We will need antibiotics until 08/03/2024 per ID    Hyperkalemia  Resolved  in setting of acute on chronic renal failure  Lokelma given x 1 on admit  Avoid ACE/ARB, K+  replacement  tele          Atrial fibrillation with rapid ventricular response  Chronic AF w/ acute RVR, resolved  Required dilt drip on admission but off since 6/15  Tele some PVC/bigem/trigem  TSH 1.8 in March  Monitor lytes  No etoh/drug use  TTE from March 2024:    Left Ventricle: The left ventricle is normal in size. Normal wall thickness. Mild global hypokinesis present. There is mildly reduced systolic function with a visually estimated ejection fraction of 45 - 50%. There is normal diastolic function.    Right Ventricle: Normal right ventricular cavity size. Wall thickness is normal. Right ventricle wall motion  is normal. Systolic function is normal.    Left Atrium: Left atrium is moderately dilated.    IVC/SVC: Normal venous pressure at 3 mmHg.  OAC has been on hold d/t possible need for shoulder surgery - has been on prophylaxis dose of Lovenox, this was help today for shoulder operation  Resume Eliquis as soon as feasible postop      History of Guillain-[place] syndrome  No acute issues  However, pt has impaired mobility and is acutely weakened from his sepsis/UTI/AF RVR  PT/OT working w/ pt  Wife cannot manage his care at home currently  SNF assessment underway, may need LTAC depending on what abx he ends up for how long, it may take a few more days to narrow that down          Type 2 diabetes mellitus  A1c 6.3%  SSI      Hypertension  Home meds as appropriate    MARA (obstructive sleep apnea)  Continue CPAP HS and w/ naps      Morbid obesity  Body mass index is 42.04 kg/m².  Morbid obesity complicates all aspects of disease management from diagnostic modalities to treatment  Weight loss encouraged and health benefits explained to patient  He has been working on weight loss at home w/ reduced caloric intake          VTE Risk Mitigation (From admission, onward)           Ordered     Reason for No Pharmacological VTE Prophylaxis  Once        Question:  Reasons:  Answer:  Already adequately anticoagulated on  oral Anticoagulants    06/14/24 1438     IP VTE HIGH RISK PATIENT  Once         06/14/24 1438     Place sequential compression device  Until discontinued         06/14/24 1438                    Discharge Planning   KAMILA:      Code Status: Full Code   Is the patient medically ready for discharge?:     Reason for patient still in hospital (select all that apply): Treatment  Discharge Plan A: Long-term acute care facility (LTAC)                  Miko Galaviz Jr, MD  Department of Hospital Medicine   [facility]/Surg

## 2024-06-24 NOTE — PLAN OF CARE
POC/Meds reviewed, pt verbalized understanding. Vitals stable.  Afebrile.   Tele In place-[id number]. Up with x1 assist. Repositions self. Hourly/Q2hr rounding performed, safety maintained. Bed in lowest position, wheels locked, SR up x2, call light in easy reach. No  complaints at this time.

## 2024-06-24 NOTE — ANESTHESIA PREPROCEDURE EVALUATION
06/24/2024  Roosevelt Moser is a 73 y.o., male.      Pre-op Assessment    I have reviewed the Patient Summary Reports.     I have reviewed the Nursing Notes. I have reviewed the NPO Status.   I have reviewed the Medications.     Review of Systems  Anesthesia Hx:  No problems with previous Anesthesia                Social:  Non-Smoker       Hematology/Oncology:                   Hematology Comments: Sepsis                      Cardiovascular:     Hypertension, well controlled    Dysrhythmias (w/RVR) atrial fibrillation      hyperlipidemia                             Pulmonary:        Sleep Apnea                Renal/:  Chronic Renal Disease, ARF, CKD                Musculoskeletal:  Arthritis               Neurological:    Neuromuscular Disease,       Guillian [place]      Peripheral Neuropathy                          Endocrine:  Diabetes, well controlled, type 2         Morbid Obesity / BMI > 40      Physical Exam  General: Well nourished, Cooperative, Alert and Oriented    Airway:  Mallampati: II   Mouth Opening: Normal  TM Distance: Normal  Neck ROM: Normal ROM      Anesthesia Plan  Type of Anesthesia, risks & benefits discussed:    Anesthesia Type: Gen ETT  Intra-op Monitoring Plan: Standard ASA Monitors  Post Op Pain Control Plan: multimodal analgesia  Induction:  IV  Airway Plan: Direct, Video and Fiberoptic, Post-Induction  Informed Consent: Informed consent signed with the Patient and all parties understand the risks and agree with anesthesia plan.  All questions answered.   ASA Score: 3    Ready For Surgery From Anesthesia Perspective.     .

## 2024-06-25 DIAGNOSIS — L02.414 ABSCESS OF LEFT SHOULDER: Primary | ICD-10-CM

## 2024-06-25 LAB
ALBUMIN SERPL BCP-MCNC: 1.3 G/DL (ref 3.5–5.2)
ALP SERPL-CCNC: 151 U/L (ref 55–135)
ALT SERPL W/O P-5'-P-CCNC: 12 U/L (ref 10–44)
ANION GAP SERPL CALC-SCNC: 14 MMOL/L (ref 8–16)
AST SERPL-CCNC: 14 U/L (ref 10–40)
BACTERIA SPEC AEROBE CULT: NO GROWTH
BASOPHILS # BLD AUTO: 0.14 K/UL (ref 0–0.2)
BASOPHILS NFR BLD: 0.7 % (ref 0–1.9)
BILIRUB SERPL-MCNC: 0.4 MG/DL (ref 0.1–1)
BUN SERPL-MCNC: 59 MG/DL (ref 8–23)
CALCIUM SERPL-MCNC: 8.9 MG/DL (ref 8.7–10.5)
CHLORIDE SERPL-SCNC: 107 MMOL/L (ref 95–110)
CO2 SERPL-SCNC: 24 MMOL/L (ref 23–29)
CREAT SERPL-MCNC: 1.6 MG/DL (ref 0.5–1.4)
CRP SERPL-MCNC: 219.3 MG/L (ref 0–8.2)
DIFFERENTIAL METHOD BLD: ABNORMAL
EOSINOPHIL # BLD AUTO: 0.5 K/UL (ref 0–0.5)
EOSINOPHIL NFR BLD: 2.3 % (ref 0–8)
ERYTHROCYTE [DISTWIDTH] IN BLOOD BY AUTOMATED COUNT: 17.8 % (ref 11.5–14.5)
EST. GFR  (NO RACE VARIABLE): 45 ML/MIN/1.73 M^2
GLUCOSE SERPL-MCNC: 221 MG/DL (ref 70–110)
HCT VFR BLD AUTO: 27.1 % (ref 40–54)
HGB BLD-MCNC: 8.6 G/DL (ref 14–18)
IMM GRANULOCYTES # BLD AUTO: 0.63 K/UL (ref 0–0.04)
IMM GRANULOCYTES NFR BLD AUTO: 3.1 % (ref 0–0.5)
LYMPHOCYTES # BLD AUTO: 2.6 K/UL (ref 1–4.8)
LYMPHOCYTES NFR BLD: 13.1 % (ref 18–48)
MCH RBC QN AUTO: 25.7 PG (ref 27–31)
MCHC RBC AUTO-ENTMCNC: 31.7 G/DL (ref 32–36)
MCV RBC AUTO: 81 FL (ref 82–98)
MONOCYTES # BLD AUTO: 1.8 K/UL (ref 0.3–1)
MONOCYTES NFR BLD: 8.8 % (ref 4–15)
NEUTROPHILS # BLD AUTO: 14.5 K/UL (ref 1.8–7.7)
NEUTROPHILS NFR BLD: 72 % (ref 38–73)
NRBC BLD-RTO: 0 /100 WBC
PLATELET # BLD AUTO: 720 K/UL (ref 150–450)
PMV BLD AUTO: 9.5 FL (ref 9.2–12.9)
POCT GLUCOSE: 228 MG/DL (ref 70–110)
POCT GLUCOSE: 274 MG/DL (ref 70–110)
POCT GLUCOSE: 281 MG/DL (ref 70–110)
POCT GLUCOSE: 376 MG/DL (ref 70–110)
POTASSIUM SERPL-SCNC: 3.8 MMOL/L (ref 3.5–5.1)
PROCALCITONIN SERPL IA-MCNC: 0.72 NG/ML (ref 0–0.5)
PROT SERPL-MCNC: 6 G/DL (ref 6–8.4)
RBC # BLD AUTO: 3.34 M/UL (ref 4.6–6.2)
SODIUM SERPL-SCNC: 145 MMOL/L (ref 136–145)
WBC # BLD AUTO: 20.19 K/UL (ref 3.9–12.7)

## 2024-06-25 PROCEDURE — 11000001 HC ACUTE MED/SURG PRIVATE ROOM

## 2024-06-25 PROCEDURE — 84145 PROCALCITONIN (PCT): CPT | Performed by: ORTHOPAEDIC SURGERY

## 2024-06-25 PROCEDURE — 85025 COMPLETE CBC W/AUTO DIFF WBC: CPT | Performed by: ORTHOPAEDIC SURGERY

## 2024-06-25 PROCEDURE — 99900035 HC TECH TIME PER 15 MIN (STAT)

## 2024-06-25 PROCEDURE — 25000003 PHARM REV CODE 250: Performed by: ORTHOPAEDIC SURGERY

## 2024-06-25 PROCEDURE — 63600175 PHARM REV CODE 636 W HCPCS: Performed by: ORTHOPAEDIC SURGERY

## 2024-06-25 PROCEDURE — 80053 COMPREHEN METABOLIC PANEL: CPT | Performed by: ORTHOPAEDIC SURGERY

## 2024-06-25 PROCEDURE — 86140 C-REACTIVE PROTEIN: CPT | Performed by: ORTHOPAEDIC SURGERY

## 2024-06-25 PROCEDURE — 97535 SELF CARE MNGMENT TRAINING: CPT

## 2024-06-25 PROCEDURE — 94761 N-INVAS EAR/PLS OXIMETRY MLT: CPT

## 2024-06-25 PROCEDURE — 94799 UNLISTED PULMONARY SVC/PX: CPT

## 2024-06-25 PROCEDURE — 36415 COLL VENOUS BLD VENIPUNCTURE: CPT | Performed by: ORTHOPAEDIC SURGERY

## 2024-06-25 PROCEDURE — 99233 SBSQ HOSP IP/OBS HIGH 50: CPT | Mod: ,,, | Performed by: STUDENT IN AN ORGANIZED HEALTH CARE EDUCATION/TRAINING PROGRAM

## 2024-06-25 PROCEDURE — A4216 STERILE WATER/SALINE, 10 ML: HCPCS | Performed by: ORTHOPAEDIC SURGERY

## 2024-06-25 RX ORDER — INSULIN GLARGINE 100 [IU]/ML
15 INJECTION, SOLUTION SUBCUTANEOUS DAILY
Status: DISCONTINUED | OUTPATIENT
Start: 2024-06-25 | End: 2024-07-07 | Stop reason: HOSPADM

## 2024-06-25 RX ORDER — CEFAZOLIN SODIUM 2 G/50ML
2 SOLUTION INTRAVENOUS
Status: CANCELLED | OUTPATIENT
Start: 2024-06-25

## 2024-06-25 RX ADMIN — INSULIN GLARGINE 15 UNITS: 100 INJECTION, SOLUTION SUBCUTANEOUS at 09:06

## 2024-06-25 RX ADMIN — LIDOCAINE 5% 1 PATCH: 700 PATCH TOPICAL at 05:06

## 2024-06-25 RX ADMIN — CLOTRIMAZOLE 10 MG: 10 LOZENGE ORAL at 05:06

## 2024-06-25 RX ADMIN — CEFTRIAXONE SODIUM 2 G: 2 INJECTION, POWDER, FOR SOLUTION INTRAMUSCULAR; INTRAVENOUS at 10:06

## 2024-06-25 RX ADMIN — CLOTRIMAZOLE 10 MG: 10 LOZENGE ORAL at 09:06

## 2024-06-25 RX ADMIN — INSULIN ASPART 3 UNITS: 100 INJECTION, SOLUTION INTRAVENOUS; SUBCUTANEOUS at 09:06

## 2024-06-25 RX ADMIN — METOPROLOL SUCCINATE 100 MG: 50 TABLET, FILM COATED, EXTENDED RELEASE ORAL at 08:06

## 2024-06-25 RX ADMIN — INSULIN ASPART 6 UNITS: 100 INJECTION, SOLUTION INTRAVENOUS; SUBCUTANEOUS at 09:06

## 2024-06-25 RX ADMIN — LIDOCAINE HYDROCHLORIDE 10 ML: 20 SOLUTION ORAL; TOPICAL at 05:06

## 2024-06-25 RX ADMIN — HYDROCODONE BITARTRATE AND ACETAMINOPHEN 1 TABLET: 10; 325 TABLET ORAL at 06:06

## 2024-06-25 RX ADMIN — DAPTOMYCIN 800 MG: 500 INJECTION, POWDER, LYOPHILIZED, FOR SOLUTION INTRAVENOUS at 05:06

## 2024-06-25 RX ADMIN — INSULIN ASPART 10 UNITS: 100 INJECTION, SOLUTION INTRAVENOUS; SUBCUTANEOUS at 04:06

## 2024-06-25 RX ADMIN — CLOTRIMAZOLE 10 MG: 10 LOZENGE ORAL at 01:06

## 2024-06-25 RX ADMIN — LIDOCAINE HYDROCHLORIDE 10 ML: 20 SOLUTION ORAL; TOPICAL at 08:06

## 2024-06-25 RX ADMIN — SODIUM CHLORIDE, PRESERVATIVE FREE 10 ML: 5 INJECTION INTRAVENOUS at 06:06

## 2024-06-25 RX ADMIN — INSULIN ASPART 4 UNITS: 100 INJECTION, SOLUTION INTRAVENOUS; SUBCUTANEOUS at 11:06

## 2024-06-25 RX ADMIN — HYDROCODONE BITARTRATE AND ACETAMINOPHEN 1 TABLET: 10; 325 TABLET ORAL at 09:06

## 2024-06-25 RX ADMIN — HYDROCODONE BITARTRATE AND ACETAMINOPHEN 1 TABLET: 10; 325 TABLET ORAL at 01:06

## 2024-06-25 RX ADMIN — LIDOCAINE HYDROCHLORIDE 10 ML: 20 SOLUTION ORAL; TOPICAL at 09:06

## 2024-06-25 RX ADMIN — CLOTRIMAZOLE 10 MG: 10 LOZENGE ORAL at 10:06

## 2024-06-25 RX ADMIN — LIDOCAINE HYDROCHLORIDE 10 ML: 20 SOLUTION ORAL; TOPICAL at 01:06

## 2024-06-25 NOTE — PROGRESS NOTES
[facility]/Surg  Orthopedics  Progress Note    Patient Name: Roosevelt Moser  MRN: [id number]  Admission Date: 6/14/2024  Hospital Length of Stay: 11 days  Attending Provider: Miko Galaviz Jr., MD  Primary Care Provider: Miguel Angel Enriquez PA-C  Follow-up For: Procedure(s) (LRB):  IRRIGATION AND DEBRIDEMENT, UPPER EXTREMITY (Left)    Post-Operative Day: 1 Day Post-Op  Subjective:     Principal Problem:Acute renal failure superimposed on chronic kidney disease    Principal Orthopedic Problem:  Left shoulder infection, proximal humerus fracture with nonunion    Interval History:  Status post repeat irrigation and debridement left shoulder wound    Review of patient's allergies indicates:  No Known Allergies    Current Facility-Administered Medications   Medication    (Magic mouthwash) 1:1:1 diphenhydrAMINE(Benadryl) 12.5mg/5ml liq, aluminum & magnesium hydroxide-simethicone (Maalox), LIDOcaine viscous 2%    albuterol-ipratropium 2.5 mg-0.5 mg/3 mL nebulizer solution 3 mL    aluminum & magnesium hydroxide-simethicone 400-400-40 mg/5 mL suspension 15 mL    cefTRIAXone (ROCEPHIN) 2 g in dextrose 5 % in water (D5W) 100 mL IVPB (MB+)    clotrimazole megha 10 mg    DAPTOmycin (CUBICIN) 800 mg in 0.9% NaCl SolP 50 mL IVPB    dextrose 10% bolus 125 mL 125 mL    dextrose 10% bolus 250 mL 250 mL    glucagon (human recombinant) injection 1 mg    glucose chewable tablet 16 g    glucose chewable tablet 24 g    HYDROcodone-acetaminophen  mg per tablet 1 tablet    insulin aspart U-100 pen 0-10 Units    insulin glargine U-100 (Lantus) pen 10 Units    LIDOcaine 5 % patch 1 patch    metoprolol injection 5 mg    metoprolol succinate (TOPROL-XL) 24 hr tablet 100 mg    naloxone 0.4 mg/mL injection 0.02 mg    ondansetron injection 4 mg    prochlorperazine injection Soln 5 mg    simethicone chewable tablet 80 mg    sodium chloride 0.9% flush 10 mL    sodium chloride 0.9% flush 10 mL    And    sodium chloride 0.9% flush  10 mL    sodium chloride 0.9% flush 10 mL     Objective:     Vital Signs (Most Recent):  Temp: 97.6 °F (36.4 °C) (06/25/24 0330)  Pulse: 102 (06/25/24 0330)  Resp: 18 (06/25/24 0612)  BP: 111/68 (06/25/24 0330)  SpO2: (!) 94 % (06/25/24 0330) Vital Signs (24h Range):  Temp:  [96.9 °F (36.1 °C)-98.2 °F (36.8 °C)] 97.6 °F (36.4 °C)  Pulse:  [] 102  Resp:  [16-23] 18  SpO2:  [92 %-98 %] 94 %  BP: (107-184)/(58-94) 111/68     Weight: (!) 140.2 kg (309 lb)  Height: 6' (182.9 cm)  Body mass index is 41.91 kg/m².      Intake/Output Summary (Last 24 hours) at 6/25/2024 0635  Last data filed at 6/25/2024 0631  Gross per 24 hour   Intake 1370 ml   Output 2000 ml   Net -630 ml        General    Nursing note and vitals reviewed.  Constitutional: He is oriented to person, place, and time. He appears well-developed and well-nourished.   HENT:   Head: Normocephalic and atraumatic.   Eyes: EOM are normal.   Cardiovascular:  Normal rate.            Neurological: He is alert and oriented to person, place, and time.   Psychiatric: He has a normal mood and affect. His behavior is normal. Thought content normal.             Left Shoulder Exam     Range of Motion   Active abduction:  abnormal   Passive abduction:  abnormal   Extension:  abnormal   Forward Flexion:  abnormal   Adduction: abnormal    Other   Sensation: normal     Comments:  Wound VAC in place.      Vascular Exam       Left Pulses      Radial:                    2+         Significant Labs:   Recent Lab Results  (Last 5 results in the past 24 hours)        06/25/24  0426   06/24/24  2115   06/24/24  1150   06/24/24  1117   06/24/24  0743        Procalcitonin 0.72  Comment: The Procalcitonin test is intended to aid in the risk assessment of   critically ill patients on their first day of ICU admission for   progression   to severe sepsis and septic shock.    A result of <0.50 ng/mL is associated with a low risk of severe   sepsis   and/or septic shock.  This does not  exclude localized infection.    A result between 0.50 ng/mL and 2.0 ng/mL should be interpreted with   consideration of the patient's history and is recommended to retest   within 6   to 24 hours.        A result of >2.0 ng/mL is associated with a high risk of severe   sepsis   and/or septic shock.    Procalcitonin may not be accurate among patients with   localized  infection, recent trauma or major surgery, immunosuppressed   state,  invasive fungal infection, renal dysfunction. Decisions   regarding  initiation or continuation of antibiotic therapy should not be   based  solely on procalcitonin levels.                 A2C EF     33           A4C EF     42           Albumin 1.3                              ALT 12               Anion Gap 14               Ao root annulus     3.17           Ascending aorta     3.28           Ao peak patricia     1.47           Ao VTI     25.40           AST 14               AV valve area     4.15           FRANDY by Velocity Ratio     4.05           AORTIC VALVE CUSP SEPERATION     2.34           AV mean gradient     5           AV index (prosthetic)     0.75           AV peak gradient     9           AV Velocity Ratio     0.73           Baso # 0.14               Basophil % 0.7               BILIRUBIN TOTAL 0.4  Comment: For infants and newborns, interpretation of results should be based  on gestational age, weight and in agreement with clinical  observations.    Premature Infant recommended reference ranges:  Up to 24 hours.............<8.0 mg/dL  Up to 48 hours............<12.0 mg/dL  3-5 days..................<15.0 mg/dL  6-29 days.................<15.0 mg/dL                 BSA     2.67           BUN 59               Calcium 8.9               Chloride 107               CO2 24               Creatinine 1.6               .3               Left Ventricle Relative Wall Thickness     0.38           Differential Method Automated               E/E' ratio     15.00           eGFR  45               Eos # 0.5               Eos % 2.3               E wave deceleration time     199.41           FS     20           Glucose 221               Gran # (ANC) 14.5               Gran % 72.0               Hematocrit 27.1               Hemoglobin 8.6               Immature Grans (Abs) 0.63  Comment: Mild elevation in immature granulocytes is non specific and   can be seen in a variety of conditions including stress response,   acute inflammation, trauma and pregnancy. Correlation with other   laboratory and clinical findings is essential.                 Immature Granulocytes 3.1               IVRT     72.31           IVSd     0.97           LA area A2C     24.80           LA area A4C     16.48           Left Atrium Major Axis     4.97           LA volume     62.04           LA Volume Index (Mod)     24.2           LVOT area     5.5           LV LATERAL E/E' RATIO     13.13           LV SEPTAL E/E' RATIO     17.50           LV EDV BP     132.40           LV Diastolic Volume Index     51.72           Left Ventricular End Diastolic Volume by Teichholz Method     132.40           LV EDV A2C     [id number]           LV EDV A4C     189.54           Left Ventricular End Systolic Volume by Teichholz Method     79.40           LV ESV A2C     92.46           LV ESV A4C     40.73           LVIDd     5.25           LVIDs     4.22           LV mass     194.65           LV Mass Index     76           Left Ventricular Outflow Tract Mean Gradient     2.58           Left Ventricular Outflow Tract Mean Velocity     0.76           LVOT diameter     2.65           LVOT peak patricia     1.08           LVOT stroke volume     105.29           LVOT peak VTI     19.10           LV ESV BP     79.40           LV Systolic Volume Index     31.0           Lymph # 2.6               Lymph % 13.1               MCH 25.7               MCHC 31.7               MCV 81               Mean e'     0.07           Mono # 1.8               Mono  % 8.8               MPV 9.5               Mr max nahum     4.58           MV valve area p 1/2 method     2.41           MV valve area by continuity eq     3.43           MV mean gradient     2           MV peak gradient     5           MV Peak E Nahum     1.05           MV stenosis pressure 1/2 time     91.38           MV VTI     30.7           nRBC 0               Oliveira's Biplane MOD Ejection Fraction     39           Platelet Count 720               POCT Glucose   247     244   223       Potassium 3.8               PROTEIN TOTAL 6.0               Pulmonary Valve Mean Velocity     0.54           PV peak gradient     2           PV PEAK VELOCITY     0.72           Posterior Wall     1.01           RA Major Axis     4.88           Est. RA pres     15           RBC 3.34               RDW 17.8               RV S'     12.69           RV TB RVSP     18           RV/LV Ratio     0.77           RVDD     4.03           RV- lombardo basal diam     3.4           RV-lombardo length     6.3           Right ventricular length in diastole (apical 4-chamber view)     6.28           Sodium 145               STJ     3.08           TAPSE     2.25           TDI SEPTAL     0.06           TDI LATERAL     0.08           Triscuspid Valve Regurgitation Peak Gradient     27           TR Max Nahum     2.59           TV resting pulmonary artery pressure     42           WBC 20.19               ZLVIDD     -11.40           ZLVIDS     -6.56                                  Significant Imaging: None  Assessment/Plan:     Abscess of left shoulder  POD 1:    Status repeat post irrigation and debridement and hardware removal of left shoulder for suspected infection.  Wound VAC placed.  Patient is approximately 3 months status post open reduction internal fixation of a left proximal humerus fracture which lost reduction.    1. Patient is doing well postoperatively.  His pain is well managed.    2. Preoperative and intraoperative cultures from 06/21 showed many  WBCs but have demonstrated no bacterial growth to date.  As well as cultures from 06/19 showed no growth.  New cultures were taken yesterday with the repeat irrigation and debridement on 06/24.  These are pending.  3. Wound care was consulted for wound VAC  4. Continue sling for comfort of the left upper extremity. Patient can do very light activity with the left upper extremity focusing more on elbow, hand and wrist.  Would not do dedicated therapy for the shoulder.  Patient could weightbear through the extremity if needed with a walker.  5. Plan for repeat irrigation and debridement on Wednesday June 26, 2024.  6. NPO after midnight tonight.  7. Consult case management as the patient will likely benefit from placement for IV antibiotics as well as his generalized deconditioned state.  8. Continue pain control.     Swelling of limb, left  History of left proximal humerus fracture with open reduction internal fixation.            KAREN Avina  Orthopedics  [facility]/Surg

## 2024-06-25 NOTE — PLAN OF CARE
Chart reviewed. Back to OR tomorrow for wash out per ortho  DC plan is LTAC. CM continues to follow       06/25/24 1049   Discharge Reassessment   Assessment Type Discharge Planning Reassessment   Did the patient's condition or plan change since previous assessment? Yes   Discharge Plan discussed with: Patient   Communicated KAMILA with patient/caregiver Yes   Discharge Plan A Long-term acute care facility (LTAC)   Discharge Plan B Skilled Nursing Facility   DME Needed Upon Discharge  none   Transition of Care Barriers None   Why the patient remains in the hospital Requires continued medical care   Post-Acute Status   Post-Acute Authorization Placement   Post-Acute Placement Status Pending medical clearance/testing

## 2024-06-25 NOTE — SUBJECTIVE & OBJECTIVE
Principal Problem:Acute renal failure superimposed on chronic kidney disease    Principal Orthopedic Problem:  Left shoulder infection, proximal humerus fracture with nonunion    Interval History:  Status post repeat irrigation and debridement left shoulder wound    Review of patient's allergies indicates:  No Known Allergies    Current Facility-Administered Medications   Medication    (Magic mouthwash) 1:1:1 diphenhydrAMINE(Benadryl) 12.5mg/5ml liq, aluminum & magnesium hydroxide-simethicone (Maalox), LIDOcaine viscous 2%    albuterol-ipratropium 2.5 mg-0.5 mg/3 mL nebulizer solution 3 mL    aluminum & magnesium hydroxide-simethicone 400-400-40 mg/5 mL suspension 15 mL    cefTRIAXone (ROCEPHIN) 2 g in dextrose 5 % in water (D5W) 100 mL IVPB (MB+)    clotrimazole megha 10 mg    DAPTOmycin (CUBICIN) 800 mg in 0.9% NaCl SolP 50 mL IVPB    dextrose 10% bolus 125 mL 125 mL    dextrose 10% bolus 250 mL 250 mL    glucagon (human recombinant) injection 1 mg    glucose chewable tablet 16 g    glucose chewable tablet 24 g    HYDROcodone-acetaminophen  mg per tablet 1 tablet    insulin aspart U-100 pen 0-10 Units    insulin glargine U-100 (Lantus) pen 10 Units    LIDOcaine 5 % patch 1 patch    metoprolol injection 5 mg    metoprolol succinate (TOPROL-XL) 24 hr tablet 100 mg    naloxone 0.4 mg/mL injection 0.02 mg    ondansetron injection 4 mg    prochlorperazine injection Soln 5 mg    simethicone chewable tablet 80 mg    sodium chloride 0.9% flush 10 mL    sodium chloride 0.9% flush 10 mL    And    sodium chloride 0.9% flush 10 mL    sodium chloride 0.9% flush 10 mL     Objective:     Vital Signs (Most Recent):  Temp: 97.6 °F (36.4 °C) (06/25/24 0330)  Pulse: 102 (06/25/24 0330)  Resp: 18 (06/25/24 0612)  BP: 111/68 (06/25/24 0330)  SpO2: (!) 94 % (06/25/24 0330) Vital Signs (24h Range):  Temp:  [96.9 °F (36.1 °C)-98.2 °F (36.8 °C)] 97.6 °F (36.4 °C)  Pulse:  [] 102  Resp:  [16-23] 18  SpO2:  [92 %-98 %] 94  %  BP: (107-184)/(58-94) 111/68     Weight: (!) 140.2 kg (309 lb)  Height: 6' (182.9 cm)  Body mass index is 41.91 kg/m².      Intake/Output Summary (Last 24 hours) at 6/25/2024 0635  Last data filed at 6/25/2024 0631  Gross per 24 hour   Intake 1370 ml   Output 2000 ml   Net -630 ml        General    Nursing note and vitals reviewed.  Constitutional: He is oriented to person, place, and time. He appears well-developed and well-nourished.   HENT:   Head: Normocephalic and atraumatic.   Eyes: EOM are normal.   Cardiovascular:  Normal rate.            Neurological: He is alert and oriented to person, place, and time.   Psychiatric: He has a normal mood and affect. His behavior is normal. Thought content normal.             Left Shoulder Exam     Range of Motion   Active abduction:  abnormal   Passive abduction:  abnormal   Extension:  abnormal   Forward Flexion:  abnormal   Adduction: abnormal    Other   Sensation: normal     Comments:  Wound VAC in place.      Vascular Exam       Left Pulses      Radial:                    2+         Significant Labs:   Recent Lab Results  (Last 5 results in the past 24 hours)        06/25/24  0426   06/24/24  2115   06/24/24  1150   06/24/24  1117   06/24/24  0743        Procalcitonin 0.72  Comment: The Procalcitonin test is intended to aid in the risk assessment of   critically ill patients on their first day of ICU admission for   progression   to severe sepsis and septic shock.    A result of <0.50 ng/mL is associated with a low risk of severe   sepsis   and/or septic shock.  This does not exclude localized infection.    A result between 0.50 ng/mL and 2.0 ng/mL should be interpreted with   consideration of the patient's history and is recommended to retest   within 6   to 24 hours.        A result of >2.0 ng/mL is associated with a high risk of severe   sepsis   and/or septic shock.    Procalcitonin may not be accurate among patients with   localized  infection, recent trauma or  major surgery, immunosuppressed   state,  invasive fungal infection, renal dysfunction. Decisions   regarding  initiation or continuation of antibiotic therapy should not be   based  solely on procalcitonin levels.                 A2C EF     33           A4C EF     42           Albumin 1.3                              ALT 12               Anion Gap 14               Ao root annulus     3.17           Ascending aorta     3.28           Ao peak patricia     1.47           Ao VTI     25.40           AST 14               AV valve area     4.15           FRANDY by Velocity Ratio     4.05           AORTIC VALVE CUSP SEPERATION     2.34           AV mean gradient     5           AV index (prosthetic)     0.75           AV peak gradient     9           AV Velocity Ratio     0.73           Baso # 0.14               Basophil % 0.7               BILIRUBIN TOTAL 0.4  Comment: For infants and newborns, interpretation of results should be based  on gestational age, weight and in agreement with clinical  observations.    Premature Infant recommended reference ranges:  Up to 24 hours.............<8.0 mg/dL  Up to 48 hours............<12.0 mg/dL  3-5 days..................<15.0 mg/dL  6-29 days.................<15.0 mg/dL                 BSA     2.67           BUN 59               Calcium 8.9               Chloride 107               CO2 24               Creatinine 1.6               .3               Left Ventricle Relative Wall Thickness     0.38           Differential Method Automated               E/E' ratio     15.00           eGFR 45               Eos # 0.5               Eos % 2.3               E wave deceleration time     199.41           FS     20           Glucose 221               Gran # (ANC) 14.5               Gran % 72.0               Hematocrit 27.1               Hemoglobin 8.6               Immature Grans (Abs) 0.63  Comment: Mild elevation in immature granulocytes is non specific and   can be seen in a variety of  conditions including stress response,   acute inflammation, trauma and pregnancy. Correlation with other   laboratory and clinical findings is essential.                 Immature Granulocytes 3.1               IVRT     72.31           IVSd     0.97           LA area A2C     24.80           LA area A4C     16.48           Left Atrium Major Axis     4.97           LA volume     62.04           LA Volume Index (Mod)     24.2           LVOT area     5.5           LV LATERAL E/E' RATIO     13.13           LV SEPTAL E/E' RATIO     17.50           LV EDV BP     132.40           LV Diastolic Volume Index     51.72           Left Ventricular End Diastolic Volume by Teichholz Method     132.40           LV EDV A2C     [id number]           LV EDV A4C     189.54           Left Ventricular End Systolic Volume by Teichholz Method     79.40           LV ESV A2C     92.46           LV ESV A4C     40.73           LVIDd     5.25           LVIDs     4.22           LV mass     194.65           LV Mass Index     76           Left Ventricular Outflow Tract Mean Gradient     2.58           Left Ventricular Outflow Tract Mean Velocity     0.76           LVOT diameter     2.65           LVOT peak nahum     1.08           LVOT stroke volume     105.29           LVOT peak VTI     19.10           LV ESV BP     79.40           LV Systolic Volume Index     31.0           Lymph # 2.6               Lymph % 13.1               MCH 25.7               MCHC 31.7               MCV 81               Mean e'     0.07           Mono # 1.8               Mono % 8.8               MPV 9.5               Mr max nahum     4.58           MV valve area p 1/2 method     2.41           MV valve area by continuity eq     3.43           MV mean gradient     2           MV peak gradient     5           MV Peak E Nahum     1.05           MV stenosis pressure 1/2 time     91.38           MV VTI     30.7           nRBC 0               Oliveira's Biplane MOD Ejection  Fraction     39           Platelet Count 720               POCT Glucose   247     244   223       Potassium 3.8               PROTEIN TOTAL 6.0               Pulmonary Valve Mean Velocity     0.54           PV peak gradient     2           PV PEAK VELOCITY     0.72           Posterior Wall     1.01           RA Major Axis     4.88           Est. RA pres     15           RBC 3.34               RDW 17.8               RV S'     12.69           RV TB RVSP     18           RV/LV Ratio     0.77           RVDD     4.03           RV- lombardo basal diam     3.4           RV-lombardo length     6.3           Right ventricular length in diastole (apical 4-chamber view)     6.28           Sodium 145               STJ     3.08           TAPSE     2.25           TDI SEPTAL     0.06           TDI LATERAL     0.08           Triscuspid Valve Regurgitation Peak Gradient     27           TR Max Nahum     2.59           TV resting pulmonary artery pressure     42           WBC 20.19               ZLVIDD     -11.40           ZLVIDS     -6.56                                  Significant Imaging: None

## 2024-06-25 NOTE — PROGRESS NOTES
Nephrology Progress Note        Patient Name: Roosevelt Moser  MRN: [id number]    Patient Class: IP- Inpatient   Admission Date: 6/14/2024  Length of Stay: 11 days  Date of Service: 6/25/2024    Attending Physician: Miko Galaviz Jr., MD  Primary Care Provider: Miguel Angel Enriquez PA-C    Reason for Consult: marbin/hyperkalemia    SUBJECTIVE:     HPI: 73M with h/o DM, HTN, AF, GBS and recent shoulder surgery was brought to ER by EMS with recurrent falls in the last 24h. Reports decreased UO but no fever, cough, SOB, dysuria. Upon admission, noted to be in MARBIN with sCr 5, baseline 1 month ago is 1, hyperkalemia with K 6, acidosis with CO2 12, hypoalbuminemia with albumin 1.6. Lactate notably 2.1. Procal 20. A1c 9.5 in 3/2024. UA with hematuria and pyuria, urine Cx pending. Notably on Apixaban. WBC in blood elevated to 20. Plt 540. RP US ordered. Afebrile, normotensive, received IVF and abx. Lokelma, ca gluconate given bicarb gtt ordered. Straight cath in ER with only 100cc urine out.    Review of Systems:  Neg    6/15  AFVSS.   UOP 1200cc plus 2 unmeasured   6/16  AFVSS.  2150 cc uop.  No distress  6/17 VSS, on RA, UOP 1.5L- updated family at bedside  6/18 VSS, on RA, UOP 1.3L  6/19 VSS, on RA, UOP 1L, with osteo R toe- s/p debridement- not interested in amputation- antbx adjusted  6/20 HR , BP stable, on 2L NC, UOP 1.2L, went for procedure, wife reports LE edema  6/21 HR 90-110s, -140s mostly, on RA, UOP 1.6L, to OR for shoulder washout and hardware removal today  6/22 VSS s/p incision and drainage for infection from left shoulder and removal of deep hardware including lizabeth and screws.  6/23 VSS. Consider resuming diuretics tomorrow.  6/24  AFVSS.  1200 cc uop plus multiple unmeasured.  He is very thirsty.    6/25  POD 1 s/p irrigation and debridementof left shoulder abscess.  VSS  2 liter uop    ASSESSMENT/PLAN:     MARBIN due to ATN due to sepsis due to UTI and/or PNA  CKD stage 2 with diabetic proteinuria and  severe hypoalbuminemia  HTN  DM poorly controlled A1c 9.5  HyperK/Acidosis  HypoMg  SHPT  Anemia  Hypoalbuminemia  Edema    - renal function is improving- nonoliguric  - no acute RRT needs- no nsaids or IV contrast- dose meds for CrCl < 30  - about 1g proteinuria- low alb is nutrition/acute phase reactant  - hold hydralazine  - hold metformin- use insulin  - hyperK/acidosis resolved  - Mg replete  - H/H stable  - optimize protein intake       Thank you for allowing us to participate in the care of your patient!   We will follow the patient and provide recommendations as needed.         Laboratory:  Recent Labs   Lab 06/23/24  0849 06/24/24  0357 06/25/24  0426    142 145   K 3.9 3.7 3.8    104 107   CO2 26 24 24   BUN 72* 79* 59*   CREATININE 2.0* 1.8* 1.6*   * 246* 221*       Recent Labs   Lab 06/19/24  0322 06/20/24  0458 06/21/24  0405 06/22/24  0513 06/23/24  0849 06/24/24  0357 06/25/24  0426   CALCIUM 8.8 8.7   < > 8.5* 8.7 9.2 8.9   ALBUMIN 1.3* 1.3*  --  1.2* 1.3* 1.4* 1.3*   PHOS  --  4.2  --   --   --   --   --    MG 2.0  --   --  1.7  --   --   --     < > = values in this interval not displayed.             Recent Labs   Lab 06/22/24  1721 06/22/24  2252 06/23/24  0751 06/23/24  1214 06/23/24  1657 06/23/24  2128 06/24/24  0743 06/24/24  1117 06/24/24  2115 06/25/24  0721   POCTGLUCOSE 310* 297* 239* 299* 319* 317* 223* 244* 247* 274*       Recent Labs   Lab 07/31/23  0955 03/19/24  0830 06/14/24  1539   Hemoglobin A1C 8.8 H 9.5 H 6.3 H       Recent Labs   Lab 06/23/24  0849 06/24/24  0357 06/25/24  0426   WBC 17.37* 18.15* 20.19*   HGB 8.6* 9.1* 8.6*   HCT 26.2* 28.3* 27.1*   * 721* 720*   MCV 80* 80* 81*   MCHC 32.8 32.2 31.7*   MONO 8.6  1.5* 8.3  1.5* 8.8  1.8*   EOSINOPHIL 2.9 2.2 2.3       Recent Labs   Lab 06/23/24  0849 06/24/24  0357 06/25/24  0426   BILITOT 0.3 0.3 0.4   PROT 5.8* 6.2 6.0   ALBUMIN 1.3* 1.4* 1.3*   ALKPHOS 195* 188* 151*   ALT 14 18 12   AST 23 28 14        Recent Labs   Lab 12/16/22  1238 03/08/24  1034 03/25/24  1022 06/14/24  1337   Color, UA Yellow Yellow Yellow Dunn A   Appearance, UA Clear Clear Hazy A Cloudy A   pH, UA 6.0 5.0 6.0 6.0   Specific Kinney, UA 1.020 1.010 1.020 1.020   Protein, UA 1+ A Trace A 1+ A 2+ A   Glucose, UA 1+ A 3+ A Negative Trace A   Ketones, UA Negative Negative Negative Negative   Urobilinogen, UA  --  Negative Negative Negative   Bilirubin (UA) Negative Negative Negative Negative   Occult Blood UA 3+ A 2+ A 2+ A 3+ A   Nitrite, UA Negative Negative Negative Negative   RBC, UA 20 H 13 H >100 H >100 H   WBC, UA 81 H 3 18 H >100 H   Bacteria Rare None Rare Many A   Hyaline Casts, UA 0  --  0 0             Microbiology Results (last 7 days)       Procedure Component Value Units Date/Time    Blood culture [[id number]] Collected: 06/23/24 0659    Order Status: Completed Specimen: Blood from Peripheral, Hand, Right Updated: 06/25/24 1032     Blood Culture, Routine No Growth to date      No Growth to date      No Growth to date    Aerobic culture [[id number]] Collected: 06/24/24 1622    Order Status: Completed Specimen: Incision site from Shoulder, Left Updated: 06/25/24 0731     Aerobic Bacterial Culture No growth    Aerobic culture [[id number]] Collected: 06/21/24 1645    Order Status: Completed Specimen: Abscess from Shoulder, Left Updated: 06/25/24 0725     Aerobic Bacterial Culture No growth    Culture, Anaerobe [[id number]] Collected: 06/24/24 1622    Order Status: Sent Specimen: Incision site from Shoulder, Left Updated: 06/24/24 1920    AFB Culture & Smear [[id number]] Collected: 06/21/24 1645    Order Status: Completed Specimen: Abscess from Shoulder, Left Updated: 06/24/24 0853     AFB CULTURE STAIN No acid fast bacilli seen.     AFB CULTURE STAIN Testing performed by:     AFB CULTURE STAIN [facility] [place]     AFB CULTURE STAIN [address] [facility]     [place] [address]     AFB CULTURE STAIN  Dr. Davis Jain MD    Culture, Anaerobic [[id number]] Collected: 06/21/24 1645    Order Status: Completed Specimen: Abscess from Shoulder, Left Updated: 06/24/24 0709     Anaerobic Culture No anaerobes isolated    Aerobic culture [[id number]] Collected: 06/20/24 1204    Order Status: Completed Specimen: Abscess from Shoulder, Left Updated: 06/24/24 0700     Aerobic Bacterial Culture No growth    Gram stain [[id number]] Collected: 06/21/24 1645    Order Status: Completed Specimen: Abscess from Shoulder, Left Updated: 06/23/24 0833     Gram Stain Result Few WBC's      No organisms seen    Culture, Anaerobic [[id number]] Collected: 06/20/24 1204    Order Status: Completed Specimen: Abscess from Shoulder, Left Updated: 06/23/24 0729     Anaerobic Culture No anaerobes isolated    Fungus culture [[id number]] Collected: 06/21/24 1645    Order Status: Sent Specimen: Abscess from Shoulder, Left Updated: 06/21/24 1924    Gram stain [[id number]] Collected: 06/20/24 1204    Order Status: Completed Specimen: Abscess from Shoulder, Left Updated: 06/21/24 1520     Gram Stain Result Many WBC's      No organisms seen    Culture, Anaerobe [[id number]] Collected: 06/18/24 1251    Order Status: Completed Specimen: Wound from Toe, Right Foot Updated: 06/21/24 1428     Anaerobic Culture No anaerobes isolated    Aerobic culture [[id number]]  (Abnormal)  (Susceptibility) Collected: 06/18/24 1251    Order Status: Completed Specimen: Wound from Toe, Right Foot Updated: 06/21/24 0755     Aerobic Bacterial Culture METHICILLIN RESISTANT STAPHYLOCOCCUS AUREUS  Few  Results called to and read back by Rich Crockett RN-[facility];    06/20/2024  15:05 CJD      Fungus culture [[id number]] Collected: 06/20/24 1204    Order Status: Sent Specimen: Abscess from Shoulder, Left Updated: 06/20/24 1923    Blood culture [[id number]] Collected: 06/14/24 1355    Order Status: Completed Specimen: Blood from Peripheral, Antecubital, Right Updated: 06/19/24  2032     Blood Culture, Routine No growth after 5 days.    Blood culture [[id number]] Collected: 06/14/24 1356    Order Status: Completed Specimen: Blood from Peripheral, Hand, Right Updated: 06/19/24 2032     Blood Culture, Routine No growth after 5 days.            Review of patient's allergies indicates:  No Known Allergies    Outpatient meds:  No current facility-administered medications on file prior to encounter.     Current Outpatient Medications on File Prior to Encounter   Medication Sig Dispense Refill    apixaban (ELIQUIS) 5 mg Tab Take 1 tablet (5 mg total) by mouth 2 (two) times daily. 60 tablet 1    atorvastatin (LIPITOR) 10 MG tablet Take 1 tablet (10 mg total) by mouth once daily. 90 tablet 3    co-enzyme Q-10 30 mg capsule Take 30 mg by mouth once daily.      doxycycline (VIBRAMYCIN) 100 MG Cap Take 100 mg by mouth 2 (two) times daily.      ergocalciferol, vitamin D2, (VITAMIN D ORAL) Take 1 tablet by mouth once daily.      glimepiride (AMARYL) 2 MG tablet Take 1 tablet (2 mg total) by mouth before breakfast. 90 tablet 3    hydrALAZINE (APRESOLINE) 25 MG tablet Take 1 tablet (25 mg total) by mouth every 12 (twelve) hours. 60 tablet 3    metFORMIN (GLUCOPHAGE-XR) 500 MG ER 24hr tablet Take 2 tablets (1,000 mg total) by mouth 2 (two) times daily with meals. 360 tablet 3    metoprolol succinate (TOPROL-XL) 100 MG 24 hr tablet Take 1 tablet (100 mg total) by mouth once daily. (Patient taking differently: Take 100 mg by mouth nightly.) 90 tablet 3       Scheduled meds:   (Magic mouthwash) 1:1:1 diphenhydrAMINE(Benadryl) 12.5mg/5ml liq, aluminum & magnesium hydroxide-simethicone (Maalox), LIDOcaine viscous 2%  10 mL Swish & Spit QID    cefTRIAXone (Rocephin) IV (PEDS and ADULTS)  2 g Intravenous Q24H    clotrimazole  10 mg Oral 5x Daily    DAPTOmycin (CUBICIN) IV (PEDS and ADULTS)  800 mg Intravenous Q24H    insulin glargine U-100  15 Units Subcutaneous Daily    LIDOcaine  1 patch Transdermal Q24H     metoprolol succinate  100 mg Oral QHS    sodium chloride 0.9%  10 mL Intravenous Q6H       Infusions:          PRN meds:    Current Facility-Administered Medications:     albuterol-ipratropium, 3 mL, Nebulization, Q6H PRN    aluminum & magnesium hydroxide-simethicone, 15 mL, Oral, QID PRN    dextrose 10%, 12.5 g, Intravenous, PRN    dextrose 10%, 25 g, Intravenous, PRN    glucagon (human recombinant), 1 mg, Intramuscular, PRN    glucose, 16 g, Oral, PRN    glucose, 24 g, Oral, PRN    HYDROcodone-acetaminophen, 1 tablet, Oral, Q4H PRN    insulin aspart U-100, 0-10 Units, Subcutaneous, QID (AC + HS) PRN    metoprolol, 5 mg, Intravenous, Q5 Min PRN    naloxone, 0.02 mg, Intravenous, PRN    ondansetron, 4 mg, Intravenous, Q8H PRN    prochlorperazine, 5 mg, Intravenous, Q6H PRN    simethicone, 1 tablet, Oral, QID PRN    sodium chloride 0.9%, 10 mL, Intravenous, Q12H PRN    Flushing PICC/Midline Protocol, , , Until Discontinued **AND** sodium chloride 0.9%, 10 mL, Intravenous, Q6H **AND** sodium chloride 0.9%, 10 mL, Intravenous, PRN    sodium chloride 0.9%, 10 mL, Intravenous, Q12H PRN    Past Medical History:   Diagnosis Date    A-fib 2024    Diabetes mellitus, type 2 2021    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    Hyperlipidemia     Hypertension 2016     Past Surgical History:   Procedure Laterality Date    ARTHROTOMY OF SHOULDER Left 6/21/2024    Procedure: ARTHROTOMY, SHOULDER;  Surgeon: Roman Paulson MD;  Location: [facility] OR;  Service: Orthopedics;  Laterality: Left;    IRRIGATION AND DEBRIDEMENT OF UPPER EXTREMITY Left 6/24/2024    Procedure: IRRIGATION AND DEBRIDEMENT, UPPER EXTREMITY;  Surgeon: Nathan Cooper MD;  Location: [facility] OR;  Service: Orthopedics;  Laterality: Left;    OPEN REDUCTION AND INTERNAL FIXATION (ORIF) OF FRACTURE OF PROXIMAL HUMERUS Left 3/25/2024    Procedure: ORIF, FRACTURE, HUMERUS, PROXIMAL/IM HANNA, LEFT;  Surgeon: Nathan Cooper MD;  Location: [facility] OR;  Service: Orthopedics;  Laterality: Left;   Accumed, [employer] Small Frag set Avelino verified 3/22/24 ark     No family history on file.  Social History     Tobacco Use    Smoking status: Never    Smokeless tobacco: Never   Substance Use Topics    Alcohol use: Yes     Alcohol/week: 0.0 standard drinks of alcohol     Comment: social    Drug use: No       OBJECTIVE:     Vital Signs and IO:  Temp:  [96.9 °F (36.1 °C)-98.2 °F (36.8 °C)]   Pulse:  []   Resp:  [16-23]   BP: (107-184)/(58-94)   SpO2:  [92 %-98 %]   I/O last 3 completed shifts:  In: 1370 [P.O.:370; I.V.:1000]  Out: 2600 [Urine:2600]  Wt Readings from Last 5 Encounters:   06/24/24 (!) 140.2 kg (309 lb)   06/04/24 132.5 kg (292 lb 1.8 oz)   05/07/24 132.5 kg (292 lb 1.8 oz)   04/08/24 132.5 kg (292 lb 3.2 oz)   03/26/24 (!) 136.1 kg (300 lb 0.7 oz)     Body mass index is 41.91 kg/m².    Physical Exam  Constitutional:       General: Patient is not in acute distress.     Appearance: Patient is well-developed. She is not diaphoretic.   HENT:      Head: Normocephalic and atraumatic.      Mouth/Throat: Mucous membranes are moist.   Eyes:      General: No scleral icterus.     Pupils: Pupils are equal, round, and reactive to light.   Cardiovascular:      Rate and Rhythm: Normal rate and regular rhythm.   Pulmonary:      Effort: Pulmonary effort is normal. No respiratory distress.      Breath sounds: No stridor.   Abdominal:      General: There is no distension.      Palpations: Abdomen is soft.   Musculoskeletal:         General: No deformity. Normal range of motion.      Cervical back: Neck supple.   Skin:     General: Skin is warm and dry.      Findings: No rash present. No erythema.   Neurological:      Mental Status: Patient is alert and oriented to person, place, and time.      Cranial Nerves: No cranial nerve deficit.   Psychiatric:         Behavior: Behavior normal.          Patient care time was spent personally by me on the following activities:     Obtaining a history.  Examination of  patient.  Providing medical care at the patients bedside.  Developing a treatment plan with patient or surrogate and bedside caregivers.  Ordering and reviewing laboratory studies, radiographic studies, pulse oximetry.  Ordering and performing treatments and interventions.  Evaluation of patient's response to treatment.  Discussions with consultants while on the unit and immediately available to the patient.  Re-evaluation of the patient's condition.  Documentation in the medical record.     Jay Talavera MD    [facility]  [address] [place] [id number]    [phone] - tel  [phone] - fax    6/25/2024

## 2024-06-25 NOTE — PROGRESS NOTES
[facility]/Surg   Department of Infectious Disease  Progress Note    PATIENT NAME: Roosevelt Moser  MRN: [id number]  TODAY'S DATE: 06/25/2024  ADMIT DATE: 6/14/2024  LOS: 11 days  CHIEF COMPLAINT: Weakness (Pt brought to ED via EMS with complaint of weakness and falling, pt advised EMS his urine is very dark.  )  PRINCIPLE PROBLEM: Acute renal failure superimposed on chronic kidney disease      INTERVAL HISTORY     6/25:  Patient seen and examined, wife present.  Patient lying in bed, has wound VAC placed to left shoulder.  Discussed with Ortho, very difficult situation in the setting of nonunion infected fracture.  As per operation note: After removing packing there was a large collection of bloody brownish fluid.  Irrigated.  Incision was extended 5 cm distally.  Pulse down the wound with 12 L of fluid.  Cultures were taken.  After extensive washout able to pass through the abscess cavity and a wound VAC was placed into cavity.  Evidence of no union or whatsoever.  The patient has an infected nonunion discussed with Hospital Medicine as well, plan for serial washouts in the OR for source control.  Slightly hypertensive, tachycardic, afebrile.  Adequate urinary output, had 2 bowel movements in the last 24 hours.  He did not get steroids preop, last dose dexamethasone 8 mg once on 06/21.  Labs reviewed, leukocytosis 20.1, no left shift, H&H 8.6/27.1, MCV 81, platelet count 720, reactive thrombocytosis.  Stable electrolytes, MARBIN, creatinine down to 1.6, creatinine clearance 60.6 mL/min, improving, normal LFTs, CRP remains taylor high 219.3, procalcitonin continues to fall 0.7.  We do not need to check this daily.  Echo unable to fully visualize oral valves.    6/24/24 (Pranay):  Patient seen and examined, wife present.  He is NPO for procedure today by Ortho, I&D and washout of left shoulder.  Patient awake, alert.  Hypertensive, T-max 99.5°, currently afebrile.  Adequate urinary output, had a bowel  "movement in the last 24 hours.  Labs reviewed, leukocytosis 18.1, no left shift, H&H 9.1/28.3, MCV 80, reactive thrombocytosis 721.  Sed rate extremely high 128, .  Stable electrolytes, MARBIN creatinine trending down 1.8, clearance 53.9 mL/min, baseline CPK 30.  Procalcitonin down to 0.97.  Last vancomycin random level 13.5.    06/23/2024.  Sleeping -- I did not disturb Afebrile.  T-max 99.5° WBC 21.3--19.8--17.  CRP is quite elevated at 205.  .  Procalcitonin 1.39.  Vancomycin random 18.  Cultures from shoulder no growth to date.    Gram stain yesterday was read as Gram-positive cocci--today it is changed to  "no organism"  GOing for another procedure tomorrow    06/22/2024.  Very pleasant.  So is his wife.  Patient is Afebrile.  He has swelling over the left arm, slightly improved.  Pain is well-controlled.    WBC 19--21--21.3--19.8.  He did receive dexamethasone yesterday by anesthesia, at the time of left shoulder surgery.  He is on ceftriaxone clindamycin and vancomycin.  Cultures reviewed:  right 2nd toe wound had grown MRSA.  Left intraop cultures no growth to date.  Left shoulder intraop, gram stain are showing Gram-positive cocci.  He is trying to move his legs in bed, but he is still  weak.  Wife and patient are able to do IV antibiotics at home, but all things considered, it is very cumbersome for them;  they are interested in placement, which I agree, it is the right course of action.    06/21/2024:  Seen by Orthopedic surgery Service again yesterday.  For I and D today.  All cultures so far negative.  ASO titer normal.    06/20/2024:  Oral anticoagulants on hold to allow left shoulder arthrocentesis.  No other acute issues overnight.  Culture from right 3rd toe growing Staphylococcus aureus.  Blood culture remain negative.  Had aspiration left shoulder today that yielded purulent material.  Synovial fluid studies in progress.    06/19/2024: Seen and evaluated at bedside.  States left upper " extremity pain better.  Having improved movement at the wrist and forearm.  Seen by Orthopedic surgery PA yesterday.  Notes reviewed.  Blood cultures remain negative.        Antibiotics (From admission, onward)      Start     Stop Route Frequency Ordered    06/24/24 1630  DAPTOmycin (CUBICIN) 800 mg in 0.9% NaCl SolP 50 mL IVPB         -- IV Every 24 hours (non-standard times) 06/24/24 1526    06/19/24 1045  cefTRIAXone (ROCEPHIN) 2 g in dextrose 5 % in water (D5W) 100 mL IVPB (MB+)         -- IV Every 24 hours (non-standard times) 06/19/24 0932    06/17/24 2100  mupirocin 2 % ointment         06/22/24 2059 Nasl 2 times daily 06/17/24 1544          Antifungals (From admission, onward)      Start     Stop Route Frequency Ordered    06/19/24 1045  clotrimazole megha 10 mg         06/29/24 0959 Oral 5 times daily 06/19/24 0932           Antivirals (From admission, onward)      None            ASSESSMENT and PLAN     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  Right 3rd toe osteomyelitis s/p Dalvance x 2 doses    3.  Osteomyelitis distal tuft of right 2nd toe   Wound cultures 6/18 MRSA, vanco JESSENIA 2      4.  MARBIN, improving Estimated Creatinine Clearance: 60.6 mL/min (A) (based on SCr of 1.6 mg/dL (H)).       5.  Aphthous ulcers on soft palate/oropharynx    6.  PMHx:  Diabetes, last A1c 6.3%, PID, CHF EF 50%     RECOMMENDATIONS:    Adequate source control   Discussed with Ortho/Dr. Cooper, plan for serial washouts to left shoulder to ensure source control   Continue daptomycin 800 mg IV daily   Holding statins while on daptomycin   Check CPK twice a week   Continue  Rocephin 2 g IV daily   Above empirically for septic left shoulder prosthetic joint infection  Stop statins while on daptomycin   Trend CRP and ESR  Continue magic mouthwash 10 mL 4 times a day, swish and swallow  Follow cultures   PT/OT as tolerated   Patient would benefit from LTAC  Aspiration precautions    D/w patient, wife at bedside, nursing, Dr Cooper/Ortho      Please send Epic secure chat with any questions.      SUBJECTIVE    Roosevelt Moser is a 73 y.o. male with history of diabetes mellitus, hypertension and previous going bowel syndrome. He fell on 03/04/2024 and sustained a displaced comminuted fracture of the left humerus.  Seen by orthopedic surgeon with plan for ORIF which was deferred because of new AFib.  Admitted 03/09/2024 for rate control and ultimately discharged back home 03/12/2024.  Later had left humerus ORIF 03/25/2024 as a day procedure and was discharged home.       According to his wife, he developed pain and swelling of the 3rd toe and was with an ulcer.  It appears an x-ray of the foot was unremarkable.  Received 2 doses of an antibiotic that I presume to be dalbavancin 1 week apart in early April.     In the last 2 weeks he has continued to decline.  He also has reduction in urine output.  Fell twice at home on 06/13/2024 and wife activated EMS and he was brought to the hospital.  Received IV fluid for low normal blood pressure.  Also noted to have MARBIN with creatinine 5.0 and WBC was 21 K.  UA was abnormal with> 100 WBC,> 100 RBC, 3+ LE and positive nitrite.  He has been managed conservatively for MARBIN and for dehydration.     X-ray of left shoulder and left upper extremity 06/17/2024 documented gas in soft tissue.  MRI right foot documented osteomyelitis of the 3rd toe.  I was asked to see to assist with his care.  ESR 95,  milligram/liter.     Antibiotics   Ceftriaxone:  06/14/2024-06/17/2024, 06/19/2024-  Zosyn:  06/17/2024-06/18/2024   Vancomycin:   06/18/2024-6/24/2024  Clindamycin:  06/18/2024-6/22/2024     Cultures   Blood culture 06/14/2024:  NGTD   Urine culture 06/14/2024: NGTD  Right second toe culture 06/18/2024: MRSA  Left shoulder synovial fluid culture 06/20/2024:  No growth      Review of Systems  Review of systems obtained and negative except as stated above in Interval History     OBJECTIVE   Temp:  [96.9 °F (36.1 °C)-98.2 °F (36.8 °C)] 96.9 °F (36.1 °C)  Pulse:  [] 103  Resp:  [16-23] 20  SpO2:  [92 %-98 %] 98 %  BP: (107-184)/(58-94) 138/68  Temp:  [96.9 °F (36.1 °C)-98.2 °F (36.8 °C)]   Temp: 96.9 °F (36.1 °C) (06/25/24 0719)  Pulse: 103 (06/25/24 0808)  Resp: 20 (06/25/24 0808)  BP: 138/68 (06/25/24 0719)  SpO2: 98 % (06/25/24 0808)    Intake/Output Summary (Last 24 hours) at 6/25/2024 0838  Last data filed at 6/25/2024 0631  Gross per 24 hour   Intake 1370 ml   Output 2000 ml   Net -630 ml       Physical Exam  General:  Morbidly obese  male lying in bed, breathing comfortable on room air  Eyes: Eyes with no icterus or injection. Vision grossly normal  Ears: Hearing grossly normal.  Nose: Nares patent  Mouth: Moist mucous membranes, dentition is fair. No ulcerations, erythema or exudates.  Neck: Supple, no tenderness to palpation.  Cardiovascular: Regular rate and rhythm, no murmurs, no edema.    Respiratory:  Poor inspiratory effort, mostly clear to auscultation bilaterally, no tachypnea or increased work of breathing.  Gastrointestinal:  Soft and obese with active bowel sounds, no tenderness to palpation, no distention.  Genitourinary:  PureWick in place.  No suprapubic tenderness.  Musculoskeletal:  Except for left shoulder, moves all extremities with good strength.    Skin:  Right great toe with necrotic tip of toe, left shoulder with bloody drainage to be changed by nursing today.    Neuro:   Oriented, conversant, follows commands.  Psych: Good mood, normal affect.     WOUNDS:    6/24:                            Significant  Labs: All pertinent labs within the past 24 hours have been reviewed.    CBC LAST 7 DAYS  Recent Labs   Lab 06/19/24 0322 06/20/24  0458 06/21/24  0410 06/22/24  0513 06/23/24  0849 06/24/24 0357 06/25/24  0426   WBC 19.14* 21.32* 21.39* 19.81* 17.37* 18.15* 20.19*   RBC 4.08* 3.94* 3.73* 3.33* 3.27* 3.52* 3.34*   HGB 10.6* 10.2* 9.8* 8.6* 8.6* 9.1* 8.6*   HCT 32.5* 31.3* 30.1* 26.8* 26.2* 28.3* 27.1*   MCV 80* 79* 81* 81* 80* 80* 81*   MCH 26.0* 25.9* 26.3* 25.8* 26.3* 25.9* 25.7*   MCHC 32.6 32.6 32.6 32.1 32.8 32.2 31.7*   RDW 18.0* 18.1* 17.8* 17.7* 17.6* 17.9* 17.8*   * 560* 560* 549* 669* 721* 720*   MPV 10.2 10.2 10.1 9.9 10.1 9.9 9.5   GRAN  --   --   --   --  71.5  12.4* 70.9  12.9* 72.0  14.5*   LYMPH  --   --   --   --  13.0*  2.3 13.9*  2.5 13.1*  2.6   MONO  --   --   --   --  8.6  1.5* 8.3  1.5* 8.8  1.8*   BASO  --   --   --   --  0.09 0.09 0.14   NRBC  --   --   --   --  0 0 0       CHEMISTRY LAST 7 DAYS  Recent Labs   Lab 06/19/24 0322 06/20/24 0458 06/21/24  0405 06/22/24  0513 06/23/24  0849 06/24/24 0357 06/25/24  0426    138 138 138 140 142 145   K 4.0 4.0 3.7 4.4 3.9 3.7 3.8   CL 98 99 100 102 102 104 107   CO2 25 25 25 25 26 24 24   ANIONGAP 17* 14 13 11 12 14 14   BUN 73* 78* 82* 76* 72* 79* 59*   CREATININE 2.8* 2.5* 2.4* 2.4* 2.0* 1.8* 1.6*   * 179* 206* 257* 229* 246* 221*   CALCIUM 8.8 8.7 9.0 8.5* 8.7 9.2 8.9   MG 2.0  --   --  1.7  --   --   --    ALBUMIN 1.3* 1.3*  --  1.2* 1.3* 1.4* 1.3*   PROT 6.2  --   --  5.8* 5.8* 6.2 6.0   ALKPHOS 312*  --   --  217* 195* 188* 151*   ALT 23  --   --  13 14 18 12   AST 45*  --   --  20 23 28 14   BILITOT 0.7  --   --  0.3 0.3 0.3 0.4       Estimated Creatinine Clearance: 60.6 mL/min (A) (based on SCr of 1.6 mg/dL (H)).    INFLAMMATORY/PROCAL    Lab Results   Component Value Date    .3 (H) 06/25/2024    .5 (H) 06/24/2024    .0 (H) 06/23/2024    .0 (H) 06/18/2024          Component Ref  Range & Units 2 d ago   Body Fluid Type  Abscess fluid, left shoulder   Fluid Appearance  Cloudy   Fluid Color  Pink   WBC, Body Fluid /cu mm SEE COMMENT   Comment: Reference ranges for body fluids not established.  Correlate clinically.  Test not performed  Cell count not performed on abscess fluid, see differential.   Segs, Fluid % 79   Lymphs, Fluid % 14   Monocytes/Macrophages, Fluid % 7        PRIOR  MICROBIOLOGY:    Susceptibility data from last 90 days.  Collected Specimen Info Organism Ceftriaxone Clindamycin Erythromycin Oxacillin Penicillin Tetracycline Trimeth/Sulfa Vancomycin   06/18/24 Wound from Toe, Right Foot Methicillin resistant Staphylococcus aureus  R  S  R  R  R  S  S  S   06/14/24 Blood from Peripheral, Antecubital, Right No growth after 5 days.           06/14/24 Blood from Peripheral, Hand, Right No growth after 5 days.               LAST 7 DAYS MICROBIOLOGY   Microbiology Results (last 7 days)       Procedure Component Value Units Date/Time    Aerobic culture [[id number]] Collected: 06/24/24 1622    Order Status: Completed Specimen: Incision site from Shoulder, Left Updated: 06/25/24 0731     Aerobic Bacterial Culture No growth    Aerobic culture [[id number]] Collected: 06/21/24 1645    Order Status: Completed Specimen: Abscess from Shoulder, Left Updated: 06/25/24 0725     Aerobic Bacterial Culture No growth    Culture, Anaerobe [[id number]] Collected: 06/24/24 1622    Order Status: Sent Specimen: Incision site from Shoulder, Left Updated: 06/24/24 1920    Blood culture [[id number]] Collected: 06/23/24 0659    Order Status: Completed Specimen: Blood from Peripheral, Hand, Right Updated: 06/24/24 1032     Blood Culture, Routine No Growth to date      No Growth to date    AFB Culture & Smear [[id number]] Collected: 06/21/24 1645    Order Status: Completed Specimen: Abscess from Shoulder, Left Updated: 06/24/24 0853     AFB CULTURE STAIN No acid fast bacilli seen.     AFB CULTURE STAIN Testing  performed by:     AFB CULTURE STAIN [facility]     AFB CULTURE STAIN [address] [facility]     AFB [place] [address]     AFB CULTURE STAIN Dr. Davis Jain MD    Culture, Anaerobic [[id number]] Collected: 06/21/24 1645    Order Status: Completed Specimen: Abscess from Shoulder, Left Updated: 06/24/24 0709     Anaerobic Culture No anaerobes isolated    Aerobic culture [[id number]] Collected: 06/20/24 1204    Order Status: Completed Specimen: Abscess from Shoulder, Left Updated: 06/24/24 0700     Aerobic Bacterial Culture No growth    Gram stain [[id number]] Collected: 06/21/24 1645    Order Status: Completed Specimen: Abscess from Shoulder, Left Updated: 06/23/24 0833     Gram Stain Result Few WBC's      No organisms seen    Culture, Anaerobic [[id number]] Collected: 06/20/24 1204    Order Status: Completed Specimen: Abscess from Shoulder, Left Updated: 06/23/24 0729     Anaerobic Culture No anaerobes isolated    Fungus culture [[id number]] Collected: 06/21/24 1645    Order Status: Sent Specimen: Abscess from Shoulder, Left Updated: 06/21/24 1924    Gram stain [[id number]] Collected: 06/20/24 1204    Order Status: Completed Specimen: Abscess from Shoulder, Left Updated: 06/21/24 1520     Gram Stain Result Many WBC's      No organisms seen    Culture, Anaerobe [[id number]] Collected: 06/18/24 1251    Order Status: Completed Specimen: Wound from Toe, Right Foot Updated: 06/21/24 1428     Anaerobic Culture No anaerobes isolated    Aerobic culture [[id number]]  (Abnormal)  (Susceptibility) Collected: 06/18/24 1251    Order Status: Completed Specimen: Wound from Toe, Right Foot Updated: 06/21/24 0755     Aerobic Bacterial Culture METHICILLIN RESISTANT STAPHYLOCOCCUS AUREUS  Few  Results called to and read back by Rich Crockett RN-[facility];    06/20/2024  15:05 CJD      Fungus culture [[id number]] Collected: 06/20/24 1204    Order Status: Sent Specimen: Abscess from Shoulder, Left  Updated: 06/20/24 1923    Blood culture [[id number]] Collected: 06/14/24 1355    Order Status: Completed Specimen: Blood from Peripheral, Antecubital, Right Updated: 06/19/24 2032     Blood Culture, Routine No growth after 5 days.    Blood culture [[id number]] Collected: 06/14/24 1355    Order Status: Completed Specimen: Blood from Peripheral, Hand, Right Updated: 06/19/24 2032     Blood Culture, Routine No growth after 5 days.              CURRENT/PREVIOUS VISIT EKG  Results for orders placed or performed during the hospital encounter of 06/14/24   EKG 12-lead    Collection Time: 06/14/24 12:16 PM   Result Value Ref Range    QRS Duration 106 ms    OHS QTC Calculation 443 ms    Narrative    Test Reason : R53.1,    Vent. Rate : 120 BPM     Atrial Rate : 159 BPM     P-R Int : 000 ms          QRS Dur : 106 ms      QT Int : 314 ms       P-R-T Axes : 000 -54 093 degrees     QTc Int : 443 ms    Atrial fibrillation with rapid ventricular response  Left axis deviation  Low voltage QRS  Inferior infarct ,age undetermined  Cannot rule out Anterior infarct ,age undetermined  Abnormal ECG  When compared with ECG of 25-MAR-2024 09:14,  Vent. rate has increased BY  46 BPM  Minimal criteria for Anterior infarct are now Present  Confirmed by Jesús HATHAWAY, Hansel AMIN ([id number]) on 6/20/2024 8:43:02 PM    Referred By: [email or web address]   SELF           Confirmed By:Hansel Espinosa MD     ECHO:       Extremely limited visualization of all cardiac structures.  No clinically significant determination can be made based on this echocardiogram.  If cardiac concerns persist, consider alternative cardiac imaging like RICKY for further evaluation.    Left Ventricle: Left ventricle was not well visualized due to poor sonic window. The left ventricle is normal in size. Unable to assess wall motion. There is normal systolic function with a visually estimated ejection fraction of 55 - 60%.    Right Ventricle: Right ventricle was not well visualized due to poor  acoustic window.    Left Atrium: Left atrium was not well visualized.    Mitral Valve: There is no stenosis. The mean pressure gradient across the mitral valve is 2 mmHg at a heart rate of  bpm. There is mild regurgitation.    IVC/SVC: Elevated venous pressure at 15 mmHg.    Significant Imaging: I have reviewed all relevant and available imaging results/findings within the past 24 hours.    06/18/2024.  CT left shoulder   1. Subacute left humerus fracture post internal fixation.  There is incomplete bony bridging across the fracture site, with persistent angulation between the dominant bony fragments as hardware is only partially engaged (see discussion).  2. Severe arthropathy of the glenohumeral joint with multiple intra-articular bodies, some interposed.  3. Large left joint effusion and adjacent soft tissue focal fluid collections containing gas and concerning for gas-forming infection.  4. Moderate size left pleural effusion.  5. Left basilar airspace disease is presumably atelectasis with pneumonia as a less favored consideration.  This report was flagged in Epic as abnormal.    X-ray on 06/17, prior to removal of hardware.      I spent a total of 55 minutes on the day of the visit.This includes face to face time and non-face to face time preparing to see the patient (eg, review of tests), obtaining and/or reviewing separately obtained history, documenting clinical information in the electronic or other health record, independently interpreting results and communicating results to the patient/family/caregiver, or care coordinator.    Capri Wesley MD  Date of Service: 06/25/2024      This note was created using [employer] voice recognition software that occasionally misinterpreted phrases or words.

## 2024-06-25 NOTE — ASSESSMENT & PLAN NOTE
As discussed under limb swelling, status post washout on 06/21/2024, we will repeat washout on 06/24/2024  Need antibiotics until 08/03/2024  Wound VAC in place.  Continue antibiotics.  Following cultures.  Pending LTAC placement.

## 2024-06-25 NOTE — PT/OT/SLP PROGRESS
Physical Therapy      Patient Name:  Roosevelt Moser   MRN:  [id number]    Patient not seen today secondary to Other (Comment) (2 attempts, attempting to position for LE exercises , heavily soiled, task nurse present.  Asleep at this time.). Will follow-up 6/26/24.

## 2024-06-25 NOTE — PT/OT/SLP PROGRESS
Occupational Therapy   Treatment    Name: Roosevelt Moser  MRN: [id number]  Admitting Diagnosis:  Acute renal failure superimposed on chronic kidney disease  1 Day Post-Op    Recommendations:     Discharge Recommendations: Moderate Intensity Therapy  Discharge Equipment Recommendations:  TBD  Barriers to discharge:      Assessment:     Roosevelt Moser is a 73 y.o. male with a medical diagnosis of Acute renal failure superimposed on chronic kidney disease.  He presents with performance deficits affecting function are weakness, impaired endurance, impaired self care skills, impaired functional mobility, decreased upper extremity function, decreased lower extremity function, pain, decreased ROM, edema, impaired skin, impaired coordination, impaired fine motor and orthopedic precautions.     Rehab Prognosis:  Fair; patient would benefit from acute skilled OT services to address these deficits and reach maximum level of function.       Plan:     Patient to be seen 6 x/week to address the above listed problems via self-care/home management, therapeutic activities, therapeutic exercises  Plan of Care Expires: 07/13/24  Plan of Care Reviewed with: patient, spouse    Subjective     Chief Complaint: Patient requested repositioning.  Patient/Family Comments/goals: Pt was agreeable to participate in OT.  Pain/Comfort:  Pain Rating 1: 0/10    Objective:     Communicated with: nurse prior to session.  Patient found HOB elevated upon OT entry to room.    General Precautions: Standard, fall    Orthopedic Precautions:LUE non weight bearing (LUE s/p shoulder surgery 3/25/24)  Braces: N/A  Respiratory Status: Room air     Occupational Performance:     Bed Mobility:    Patient completed Scooting/Bridging with total assistance and 2 persons     Activities of Daily Living:  Grooming: maximal assistance for left hand hygiene and set up A with right hand hygiene      Treatment & Education:  Pt was given education on L UE positioning in sling.  Pt  was given instruction and demonstration of left hand/finger extension/flexion AROM exercises to prevent stiffness and decrease edema.    Patient left HOB elevated with all lines intact, call button in reach, bed alarm on and nurse notified.    GOALS:   Multidisciplinary Problems       Occupational Therapy Goals          Problem: Occupational Therapy    Goal Priority Disciplines Outcome Interventions   Occupational Therapy Goal     OT, PT/OT Progressing    Description: Goals to be met by: 7/13/24     Patient will increase functional independence with ADLs by performing:    Feeding with [place].  UE Dressing with Moderate Assistance.  LE Dressing with Moderate Assistance.  Grooming while seated with Set-up Assistance.  Toileting from bedside commode with Moderate Assistance for hygiene and clothing management.   Bathing from  shower chair/bench with Moderate Assistance.  Toilet transfer to bedside commode with Minimal Assistance.  Increased strength and functional activity tolerance for ADL's/IADL's                         Time Tracking:     OT Date of Treatment: 06/25/24  OT Start Time: 0900  OT Stop Time: 0911  OT Total Time (min): 11 min    Billable Minutes:Self Care/Home Management 11               6/25/2024

## 2024-06-25 NOTE — PROGRESS NOTES
[facility] Medicine  Progress Note    Patient Name: Roosevelt Moser  MRN: [id number]  Patient Class: IP- Inpatient   Admission Date: 6/14/2024  Length of Stay: 11 days  Attending Physician: Miko Galaviz Jr., MD  Primary Care Provider: Miguel Angel Enriquez PA-C        Subjective:     Principal Problem:Acute renal failure superimposed on chronic kidney disease        HPI:  Roosevelt Moser is a 73 year old male with a previous medical history of atrial fibrillation on eliquis, HTN, DMII, obstructive sleep apnea, HLD, ORIF of left humerus and guillian-[place] who presented to the emergency room for weakness and multiple falls. Per wife of the patient he has had progressive weakness over the past week along with two falls one at 0300 Thursday and the other early morning Friday. Both time she had to activate EMS to pick patient up off floor due to weakness. Patient refused transport to hospital on both occasions. Today he slid out of his chair and was unable to get up without assistance and at this point the wife activated EMS to transport patient to the hospital. She endorses that two days ago she noticed that the patient had stopped urinating as he normally does. The patient concurs that he has noticed that his urine has decreased over the past two days and that it is dark. Patient denies dysuria or incomplete bladder emptying. Bladder scan showed zero upon admit and urine sample was obtained via straight cath in ED. Patient denies decreased PO intake and keeps a bottle of water with him at all times. Initial ED work-up showed a creatinine of 5 and potassium of 6. Urine studies positive for infection and WBC 20.81 with procalcitonin at 19.75. Blood cultures obtained and patient given 1 gram of rocephin and 1000ml of normal saline. Patient noted to bein afib with RVR and 20mg of diltiazem was administered IV which resulted in low blood pressure and 1 gram of calcium gluconate was administered.  "Patient admitted by hospital medicine for further evaluation and management     Overview/Hospital Course:  6/15/24  Assumed care today, pt seen and examined, chart reviewed.  Pt sitting up in bed, wife at bedside, feeling better.  Off dilt drip since this AM, in AF on monitor but rate down to 80s.   Denies cp/sob.  Has been weak and debilitated, await PT/OT to see what post acute needs will be.    6/16/24  Pt resting in NAD.  Advised by CM yesterday wife not willing/able to take him home at this time - We talked about that frankly today and she says she is unable to care for him, planning on SNF, hopefully regain enough function to allow d/c home from there. Pt w/ slow improvement.  Feels pretty good.  Appetite not too good, he says he's been deliberately eating small quantities of food at home to lose weight and feels it's been working.  Note pain/swelling LUE where he had a recent fx/ORIF.    6/17/24  Pt doing reasonably well, having pain sitting on the bedpan "it's sticking me", but overcame that w/ some adjusting position.  LUE u/s no DVT.  Still having a lot of pain in upper left arm w/ movement and also worried about persistent pain "under left boob" where he struck himself during a fall - we will image those areas too.  Says Norco 7.5 is like "eating a jelly bean" asking if we can give 10.  Interesting notes that he's currently constipated, at home often has diarrhea, says that higher doses of narcotics cause him to have loose bowels.  Odd.  Albumin 1.3.  Await SNF dispo. Wife at bedside.   6/18/24  Pt continues to be quite jolly about everything, has good sense of humor, acting very non toxic - HOWEVER upon wound care assessment by Dr Elise he is seen to have osteomyelitis of his right 2nd toe - Podiatry and ID consulted, s/p debridement at bedside w/ deep tissue culture taken, pt/wife opposed to amputation.  Additionally shoulder CT favors abscess around his op site - await input from ortho - He has pain " "there but it's not exquisite and the area is not flucuant/red/warm, pain seems more arthritic in nature - but he appears to have a deep seated infection and will need a washout in OR -  Dr Calvin has added clinda/vanc to Zosyn started yesterday.   6/19/24  Pt sitting up in bed, very conversant and non toxic appearing, thanks much to all consultants.  Per ortho conservative approach to shoulder, I am in chat w/ them currently about possible aspiration of fluid.  Note ID changes in abx coverage.  Pt still has a great deal of pain/mobility loss of left shoulder.  We have been working on SNF but he may need LTAC w/ current level of complexity/abx regimen.  Deep wound cultures from right 2nd toe are pending.   6/20/24  Aspirate done today per IR and returned 10 cc of "pussy" fluid, S Lea reviewed and messaged me that Dr Cooper is out of town and to please contact on call ortho, Dr Bernal on call and is aware of care from prior discussion, he reviewed findings and will do washout in OR tomorrow, went to d/w pt and wife, he's sitting up in bed, feeling ok, no new c/o.  I also encountered Dr Calvin in the vale and we talked about this and his abx.    6/21/24  Pt going for washout/removal of infected hardware today in OR.  Sitting up in bed, sister and wife here, everyone on board w/ careplan. He does have a lot more swelling today around proximal deltoid.  Not warm/tender but visibly swollen and this is the 1st time we have really noted that.          Interval History:  No acute events overnight.  Patient is status post irrigation washout on 06/24/2024.  No issues.  Wound VAC in place.  Pending LTAC placement.    Review of Systems  Objective:     Vital Signs (Most Recent):  Temp: 96.9 °F (36.1 °C) (06/25/24 0719)  Pulse: 103 (06/25/24 0808)  Resp: 20 (06/25/24 0808)  BP: 138/68 (06/25/24 0719)  SpO2: 98 % (06/25/24 0808) Vital Signs (24h Range):  Temp:  [96.9 °F (36.1 °C)-98.2 °F (36.8 °C)] 96.9 °F (36.1 °C)  Pulse:  " [] 103  Resp:  [16-23] 20  SpO2:  [92 %-98 %] 98 %  BP: (107-184)/(58-94) 138/68     Weight: (!) 140.2 kg (309 lb)  Body mass index is 41.91 kg/m².    Intake/Output Summary (Last 24 hours) at 6/25/2024 0952  Last data filed at 6/25/2024 0631  Gross per 24 hour   Intake 1370 ml   Output 2000 ml   Net -630 ml         Physical Exam  Constitutional:       Appearance: Normal appearance.   HENT:      Head: Normocephalic and atraumatic.      Right Ear: External ear normal.      Left Ear: External ear normal.      Nose: Nose normal.      Mouth/Throat:      Mouth: Mucous membranes are moist.      Pharynx: Oropharynx is clear.   Eyes:      Extraocular Movements: Extraocular movements intact.      Conjunctiva/sclera: Conjunctivae normal.   Cardiovascular:      Rate and Rhythm: Normal rate and regular rhythm.      Pulses: Normal pulses.      Heart sounds: Normal heart sounds. No murmur heard.     No gallop.   Pulmonary:      Effort: Pulmonary effort is normal. No respiratory distress.      Breath sounds: Normal breath sounds. No wheezing, rhonchi or rales.   Abdominal:      General: Abdomen is flat. There is no distension.      Palpations: Abdomen is soft.      Tenderness: There is no abdominal tenderness.   Musculoskeletal:         General: No swelling. Normal range of motion.      Cervical back: Normal range of motion and neck supple.      Comments: Wound VAC and left shoulder   Skin:     General: Skin is warm and dry.   Neurological:      General: No focal deficit present.      Mental Status: He is alert. Mental status is at baseline.             Significant Labs: All pertinent labs within the past 24 hours have been reviewed.    Significant Imaging: I have reviewed all pertinent imaging results/findings within the past 24 hours.    Assessment/Plan:      * Acute renal failure superimposed on chronic kidney disease  Appreciate input from renal services  In setting of acute UTI and IVVD  Resumed gentle hydration on  Zosyn/vanc but those have been stopped  He did not require oral NaHCO3  U/s is w/o obstruction, masses, etc        Pyogenic arthritis of left shoulder region        Abscess of left shoulder  As discussed under limb swelling, status post washout on 06/21/2024, we will repeat washout on 06/24/2024  Need antibiotics until 08/03/2024  Wound VAC in place.  Continue antibiotics.  Following cultures.  Pending LTAC placement.    Osteomyelitis of toe  S/p bedside I&D, deep tissue Cx growing MRSA, S to clinda  ID and podiatry following, appreciate help  Appreciate input all consultants  Pt/wife opposed to amputation of digit  Local wound care  Note markedly elevated ESR, CRP  Need antibiotics until 08/03/2024      Skin tear of left upper extremity  Local care, healing  Doubt this is a portal of entry    Swelling of limb, left  U/s w/o DVT  Appreciate help from Dr Bernal, ID, wound care, podiatry et al  Pt is already on OAC, holding for now pending shoulder procedure  Arm is elevated on pillow  Not red/painful, no compartment syndrome  D/w pt/wife      Debility  Multifactorial process  Pt not able to manage his own care and wife cannot adequately care for him at this time  PT/OT  SNF or possibly LTAC consult  CM aware      Severe sepsis  In setting of acute UTI, MRSA OM right 2nd toe, and infected left shoulder hardware  BCX NGTD  Ur CX neg  Rocephin was given empirically for UTI, changed to Zosyn > added vanc/clinda, now on Rocephin/clinda, vanc/zosyn stopped per ID  Oral clotrimazole added for thrush  PCT and WBC trended down, WBC staying up a little now but pt looks good  No obstruction on renal u/s  Has gas containing abscess in and around left shoulder w/ malpositioned hardware, likely d/t falls, hard to say how the bug entered  Long d/w pt/wife, ortho - 6/22 had washout and hardware removal w/ Dr Bernal  F/u w/ Dr Kenneth mann d/c  He's urinating well  We will need antibiotics until 08/03/2024 per  ID    Hyperkalemia  Resolved  in setting of acute on chronic renal failure  Lokelma given x 1 on admit  Avoid ACE/ARB, K+ replacement  tele          Atrial fibrillation with rapid ventricular response  Chronic AF w/ acute RVR, resolved  Required dilt drip on admission but off since 6/15  Tele some PVC/bigem/trigem  TSH 1.8 in March  Monitor lytes  No etoh/drug use  TTE from March 2024:    Left Ventricle: The left ventricle is normal in size. Normal wall thickness. Mild global hypokinesis present. There is mildly reduced systolic function with a visually estimated ejection fraction of 45 - 50%. There is normal diastolic function.    Right Ventricle: Normal right ventricular cavity size. Wall thickness is normal. Right ventricle wall motion  is normal. Systolic function is normal.    Left Atrium: Left atrium is moderately dilated.    IVC/SVC: Normal venous pressure at 3 mmHg.  OAC has been on hold d/t possible need for shoulder surgery - has been on prophylaxis dose of Lovenox, this was help today for shoulder operation  Resume Eliquis as soon as feasible postop      History of Guillain-[place] syndrome  No acute issues  However, pt has impaired mobility and is acutely weakened from his sepsis/UTI/AF RVR  PT/OT working w/ pt  Wife cannot manage his care at home currently  SNF assessment underway, may need LTAC depending on what abx he ends up for how long, it may take a few more days to narrow that down          Type 2 diabetes mellitus  A1c 6.3%  SSI      Hypertension  Home meds as appropriate    MARA (obstructive sleep apnea)  Continue CPAP HS and w/ naps      Morbid obesity  Body mass index is 42.04 kg/m².  Morbid obesity complicates all aspects of disease management from diagnostic modalities to treatment  Weight loss encouraged and health benefits explained to patient  He has been working on weight loss at home w/ reduced caloric intake          VTE Risk Mitigation (From admission, onward)           Ordered      Reason for No Pharmacological VTE Prophylaxis  Once        Question:  Reasons:  Answer:  Already adequately anticoagulated on oral Anticoagulants    06/14/24 1438     IP VTE HIGH RISK PATIENT  Once         06/14/24 1438     Place sequential compression device  Until discontinued         06/14/24 1438                    Discharge Planning   KAMILA: 6/26/2024     Code Status: Full Code   Is the patient medically ready for discharge?:     Reason for patient still in hospital (select all that apply): Treatment  Discharge Plan A: Long-term acute care facility (LTAC)                  Miko Galaviz Jr, MD  Department of Hospital Medicine   [facility]/Surg

## 2024-06-25 NOTE — ASSESSMENT & PLAN NOTE
POD 1:    Status repeat post irrigation and debridement and hardware removal of left shoulder for suspected infection.  Wound VAC placed.  Patient is approximately 3 months status post open reduction internal fixation of a left proximal humerus fracture which lost reduction.    1. Patient is doing well postoperatively.  His pain is well managed.    2. Preoperative and intraoperative cultures from 06/21 showed many WBCs but have demonstrated no bacterial growth to date.  As well as cultures from 06/19 showed no growth.  New cultures were taken yesterday with the repeat irrigation and debridement on 06/24.  These are pending.  3. Wound care was consulted for wound VAC  4. Continue sling for comfort of the left upper extremity. Patient can do very light activity with the left upper extremity focusing more on elbow, hand and wrist.  Would not do dedicated therapy for the shoulder.  Patient could weightbear through the extremity if needed with a walker.  5. Plan for repeat irrigation and debridement on Wednesday June 26, 2024.  6. NPO after midnight tonight.  7. Consult case management as the patient will likely benefit from placement for IV antibiotics as well as his generalized deconditioned state.  8. Continue pain control.

## 2024-06-25 NOTE — CARE UPDATE
06/24/24 1941   Patient Assessment/Suction   Level of Consciousness (AVPU) alert   Respiratory Effort Normal;Unlabored   Expansion/Accessory Muscles/Retractions no use of accessory muscles;no retractions;expansion symmetric   All Lung Fields Breath Sounds diminished   ANNABELLE Breath Sounds crackles, fine   LLL Breath Sounds diminished   RUL Breath Sounds crackles, fine   RML Breath Sounds diminished   RLL Breath Sounds diminished   Rhythm/Pattern, Respiratory pattern regular;unlabored   PRE-TX-O2   Device (Oxygen Therapy) room air   Pulse Oximetry Type Intermittent   $ Pulse Oximetry - Multiple Charge Pulse Oximetry - Multiple   Pulse 100   Positioning Flat   Incentive Spirometer   $ Incentive Spirometer Charges done with encouragement   Incentive Spirometer Predicted Level (mL) 2200   Administration (IS) mouthpiece utilized   Number of Repetitions (IS) 5   Level Incentive Spirometer (mL) 1250   Patient Tolerance (IS) good   Education   $ Education Other (see comment);15 min  (pulse oximetry)

## 2024-06-25 NOTE — CARE UPDATE
06/24/24 0238   Preset CPAP/BiPAP Settings   Mode Of Delivery CPAP;other (see comments)  (home unit)   $ Is patient using? No/refused   Reason patient is not wearing? Patient refused  (I woke him to ask if he was ready, he said he was not. He asked what time it was. I told him and he said that was great and proceeded to go back to sleep.)

## 2024-06-25 NOTE — PLAN OF CARE
POC/Meds reviewed, pt verbalized understanding. Vitals stable.  Afebrile.   Tele In place-[id number]. ivf infusing. Male pw being monitored. Repositions/ weight shifts self. Hourly/Q2hr rounding performed, safety maintained. Bed in lowest position, wheels locked, SR up x2, call light in easy reach. No  complaints at this time.

## 2024-06-25 NOTE — SUBJECTIVE & OBJECTIVE
Interval History:  No acute events overnight.  Patient is status post irrigation washout on 06/24/2024.  No issues.  Wound VAC in place.  Pending LTAC placement.    Review of Systems  Objective:     Vital Signs (Most Recent):  Temp: 96.9 °F (36.1 °C) (06/25/24 0719)  Pulse: 103 (06/25/24 0808)  Resp: 20 (06/25/24 0808)  BP: 138/68 (06/25/24 0719)  SpO2: 98 % (06/25/24 0808) Vital Signs (24h Range):  Temp:  [96.9 °F (36.1 °C)-98.2 °F (36.8 °C)] 96.9 °F (36.1 °C)  Pulse:  [] 103  Resp:  [16-23] 20  SpO2:  [92 %-98 %] 98 %  BP: (107-184)/(58-94) 138/68     Weight: (!) 140.2 kg (309 lb)  Body mass index is 41.91 kg/m².    Intake/Output Summary (Last 24 hours) at 6/25/2024 0952  Last data filed at 6/25/2024 0631  Gross per 24 hour   Intake 1370 ml   Output 2000 ml   Net -630 ml         Physical Exam  Constitutional:       Appearance: Normal appearance.   HENT:      Head: Normocephalic and atraumatic.      Right Ear: External ear normal.      Left Ear: External ear normal.      Nose: Nose normal.      Mouth/Throat:      Mouth: Mucous membranes are moist.      Pharynx: Oropharynx is clear.   Eyes:      Extraocular Movements: Extraocular movements intact.      Conjunctiva/sclera: Conjunctivae normal.   Cardiovascular:      Rate and Rhythm: Normal rate and regular rhythm.      Pulses: Normal pulses.      Heart sounds: Normal heart sounds. No murmur heard.     No gallop.   Pulmonary:      Effort: Pulmonary effort is normal. No respiratory distress.      Breath sounds: Normal breath sounds. No wheezing, rhonchi or rales.   Abdominal:      General: Abdomen is flat. There is no distension.      Palpations: Abdomen is soft.      Tenderness: There is no abdominal tenderness.   Musculoskeletal:         General: No swelling. Normal range of motion.      Cervical back: Normal range of motion and neck supple.      Comments: Wound VAC and left shoulder   Skin:     General: Skin is warm and dry.   Neurological:      General: No  focal deficit present.      Mental Status: He is alert. Mental status is at baseline.             Significant Labs: All pertinent labs within the past 24 hours have been reviewed.    Significant Imaging: I have reviewed all pertinent imaging results/findings within the past 24 hours.

## 2024-06-26 ENCOUNTER — ANESTHESIA EVENT (OUTPATIENT)
Dept: SURGERY | Facility: HOSPITAL | Age: 74
End: 2024-06-26
Payer: MEDICARE

## 2024-06-26 ENCOUNTER — ANESTHESIA (OUTPATIENT)
Dept: SURGERY | Facility: HOSPITAL | Age: 74
End: 2024-06-26
Payer: MEDICARE

## 2024-06-26 LAB
ALBUMIN SERPL BCP-MCNC: 1.3 G/DL (ref 3.5–5.2)
ALP SERPL-CCNC: 172 U/L (ref 55–135)
ALT SERPL W/O P-5'-P-CCNC: 18 U/L (ref 10–44)
ANION GAP SERPL CALC-SCNC: 13 MMOL/L (ref 8–16)
AST SERPL-CCNC: 30 U/L (ref 10–40)
BASOPHILS # BLD AUTO: 0.11 K/UL (ref 0–0.2)
BASOPHILS NFR BLD: 0.6 % (ref 0–1.9)
BILIRUB SERPL-MCNC: 0.3 MG/DL (ref 0.1–1)
BUN SERPL-MCNC: 58 MG/DL (ref 8–23)
CALCIUM SERPL-MCNC: 8.7 MG/DL (ref 8.7–10.5)
CHLORIDE SERPL-SCNC: 108 MMOL/L (ref 95–110)
CO2 SERPL-SCNC: 24 MMOL/L (ref 23–29)
CREAT SERPL-MCNC: 1.4 MG/DL (ref 0.5–1.4)
CRP SERPL-MCNC: 192.2 MG/L (ref 0–8.2)
DIFFERENTIAL METHOD BLD: ABNORMAL
EOSINOPHIL # BLD AUTO: 0.8 K/UL (ref 0–0.5)
EOSINOPHIL NFR BLD: 4.6 % (ref 0–8)
ERYTHROCYTE [DISTWIDTH] IN BLOOD BY AUTOMATED COUNT: 18.1 % (ref 11.5–14.5)
EST. GFR  (NO RACE VARIABLE): 53 ML/MIN/1.73 M^2
GLUCOSE SERPL-MCNC: 203 MG/DL (ref 70–110)
HCT VFR BLD AUTO: 25.8 % (ref 40–54)
HGB BLD-MCNC: 8.2 G/DL (ref 14–18)
IMM GRANULOCYTES # BLD AUTO: 0.39 K/UL (ref 0–0.04)
IMM GRANULOCYTES NFR BLD AUTO: 2.2 % (ref 0–0.5)
LYMPHOCYTES # BLD AUTO: 3.1 K/UL (ref 1–4.8)
LYMPHOCYTES NFR BLD: 17.3 % (ref 18–48)
MCH RBC QN AUTO: 26.1 PG (ref 27–31)
MCHC RBC AUTO-ENTMCNC: 31.8 G/DL (ref 32–36)
MCV RBC AUTO: 82 FL (ref 82–98)
MONOCYTES # BLD AUTO: 1.8 K/UL (ref 0.3–1)
MONOCYTES NFR BLD: 10.2 % (ref 4–15)
NEUTROPHILS # BLD AUTO: 11.6 K/UL (ref 1.8–7.7)
NEUTROPHILS NFR BLD: 65.1 % (ref 38–73)
NRBC BLD-RTO: 0 /100 WBC
PLATELET # BLD AUTO: 694 K/UL (ref 150–450)
PMV BLD AUTO: 9.8 FL (ref 9.2–12.9)
POCT GLUCOSE: 182 MG/DL (ref 70–110)
POCT GLUCOSE: 218 MG/DL (ref 70–110)
POCT GLUCOSE: 241 MG/DL (ref 70–110)
POCT GLUCOSE: 286 MG/DL (ref 70–110)
POTASSIUM SERPL-SCNC: 3.7 MMOL/L (ref 3.5–5.1)
PROT SERPL-MCNC: 5.9 G/DL (ref 6–8.4)
RBC # BLD AUTO: 3.14 M/UL (ref 4.6–6.2)
SODIUM SERPL-SCNC: 145 MMOL/L (ref 136–145)
WBC # BLD AUTO: 17.83 K/UL (ref 3.9–12.7)

## 2024-06-26 PROCEDURE — 27000221 HC OXYGEN, UP TO 24 HOURS

## 2024-06-26 PROCEDURE — 85025 COMPLETE CBC W/AUTO DIFF WBC: CPT | Performed by: ORTHOPAEDIC SURGERY

## 2024-06-26 PROCEDURE — 86140 C-REACTIVE PROTEIN: CPT | Performed by: ORTHOPAEDIC SURGERY

## 2024-06-26 PROCEDURE — 97110 THERAPEUTIC EXERCISES: CPT

## 2024-06-26 PROCEDURE — 36415 COLL VENOUS BLD VENIPUNCTURE: CPT | Performed by: ORTHOPAEDIC SURGERY

## 2024-06-26 PROCEDURE — 99231 SBSQ HOSP IP/OBS SF/LOW 25: CPT | Mod: ,,, | Performed by: FAMILY MEDICINE

## 2024-06-26 PROCEDURE — 99900035 HC TECH TIME PER 15 MIN (STAT)

## 2024-06-26 PROCEDURE — 94761 N-INVAS EAR/PLS OXIMETRY MLT: CPT

## 2024-06-26 PROCEDURE — 25000003 PHARM REV CODE 250: Performed by: ORTHOPAEDIC SURGERY

## 2024-06-26 PROCEDURE — 97110 THERAPEUTIC EXERCISES: CPT | Mod: CQ

## 2024-06-26 PROCEDURE — 99233 SBSQ HOSP IP/OBS HIGH 50: CPT | Mod: ,,, | Performed by: STUDENT IN AN ORGANIZED HEALTH CARE EDUCATION/TRAINING PROGRAM

## 2024-06-26 PROCEDURE — 80053 COMPREHEN METABOLIC PANEL: CPT | Performed by: ORTHOPAEDIC SURGERY

## 2024-06-26 PROCEDURE — 63600175 PHARM REV CODE 636 W HCPCS: Performed by: ORTHOPAEDIC SURGERY

## 2024-06-26 PROCEDURE — 11000001 HC ACUTE MED/SURG PRIVATE ROOM

## 2024-06-26 RX ADMIN — INSULIN ASPART 4 UNITS: 100 INJECTION, SOLUTION INTRAVENOUS; SUBCUTANEOUS at 08:06

## 2024-06-26 RX ADMIN — LIDOCAINE HYDROCHLORIDE 10 ML: 20 SOLUTION ORAL; TOPICAL at 10:06

## 2024-06-26 RX ADMIN — HYDROCODONE BITARTRATE AND ACETAMINOPHEN 1 TABLET: 10; 325 TABLET ORAL at 09:06

## 2024-06-26 RX ADMIN — LIDOCAINE HYDROCHLORIDE 10 ML: 20 SOLUTION ORAL; TOPICAL at 04:06

## 2024-06-26 RX ADMIN — LIDOCAINE 5% 1 PATCH: 700 PATCH TOPICAL at 04:06

## 2024-06-26 RX ADMIN — INSULIN ASPART 6 UNITS: 100 INJECTION, SOLUTION INTRAVENOUS; SUBCUTANEOUS at 04:06

## 2024-06-26 RX ADMIN — METOPROLOL SUCCINATE 100 MG: 50 TABLET, FILM COATED, EXTENDED RELEASE ORAL at 09:06

## 2024-06-26 RX ADMIN — HYDROCODONE BITARTRATE AND ACETAMINOPHEN 1 TABLET: 10; 325 TABLET ORAL at 05:06

## 2024-06-26 RX ADMIN — CLOTRIMAZOLE 10 MG: 10 LOZENGE ORAL at 04:06

## 2024-06-26 RX ADMIN — HYDROCODONE BITARTRATE AND ACETAMINOPHEN 1 TABLET: 10; 325 TABLET ORAL at 08:06

## 2024-06-26 RX ADMIN — CLOTRIMAZOLE 10 MG: 10 LOZENGE ORAL at 09:06

## 2024-06-26 RX ADMIN — INSULIN GLARGINE 15 UNITS: 100 INJECTION, SOLUTION SUBCUTANEOUS at 08:06

## 2024-06-26 RX ADMIN — LIDOCAINE HYDROCHLORIDE 10 ML: 20 SOLUTION ORAL; TOPICAL at 08:06

## 2024-06-26 RX ADMIN — INSULIN ASPART 2 UNITS: 100 INJECTION, SOLUTION INTRAVENOUS; SUBCUTANEOUS at 10:06

## 2024-06-26 RX ADMIN — CEFTRIAXONE SODIUM 2 G: 2 INJECTION, POWDER, FOR SOLUTION INTRAMUSCULAR; INTRAVENOUS at 11:06

## 2024-06-26 RX ADMIN — DAPTOMYCIN 800 MG: 500 INJECTION, POWDER, LYOPHILIZED, FOR SOLUTION INTRAVENOUS at 05:06

## 2024-06-26 NOTE — PLAN OF CARE
Problem: Adult Inpatient Plan of Care  Goal: Plan of Care Review  Outcome: Progressing     Problem: Diabetes Comorbidity  Goal: Blood Glucose Level Within Targeted Range  Outcome: Progressing       Problem: Skin Injury Risk Increased  Goal: Skin Health and Integrity  Outcome: Progressing     Problem: Bariatric Environmental Safety  Goal: Safety Maintained with Care  Outcome: Progressing     Problem: Wound  Goal: Optimal Coping  Outcome: Progressing     Problem: Infection  Goal: Absence of Infection Signs and Symptoms  Outcome: Progressing

## 2024-06-26 NOTE — PROGRESS NOTES
[facility] Medicine  Progress Note    Patient Name: Roosevelt Moser  MRN: [id number]  Patient Class: IP- Inpatient   Admission Date: 6/14/2024  Length of Stay: 12 days  Attending Physician: Miko Galaviz Jr., MD  Primary Care Provider: Miguel Angel Enriquez PA-C        Subjective:     Principal Problem:Acute renal failure superimposed on chronic kidney disease        HPI:  Roosevelt Moser is a 73 year old male with a previous medical history of atrial fibrillation on eliquis, HTN, DMII, obstructive sleep apnea, HLD, ORIF of left humerus and guillian-[place] who presented to the emergency room for weakness and multiple falls. Per wife of the patient he has had progressive weakness over the past week along with two falls one at 0300 Thursday and the other early morning Friday. Both time she had to activate EMS to pick patient up off floor due to weakness. Patient refused transport to hospital on both occasions. Today he slid out of his chair and was unable to get up without assistance and at this point the wife activated EMS to transport patient to the hospital. She endorses that two days ago she noticed that the patient had stopped urinating as he normally does. The patient concurs that he has noticed that his urine has decreased over the past two days and that it is dark. Patient denies dysuria or incomplete bladder emptying. Bladder scan showed zero upon admit and urine sample was obtained via straight cath in ED. Patient denies decreased PO intake and keeps a bottle of water with him at all times. Initial ED work-up showed a creatinine of 5 and potassium of 6. Urine studies positive for infection and WBC 20.81 with procalcitonin at 19.75. Blood cultures obtained and patient given 1 gram of rocephin and 1000ml of normal saline. Patient noted to bein afib with RVR and 20mg of diltiazem was administered IV which resulted in low blood pressure and 1 gram of calcium gluconate was administered.  "Patient admitted by hospital medicine for further evaluation and management     Overview/Hospital Course:  6/15/24  Assumed care today, pt seen and examined, chart reviewed.  Pt sitting up in bed, wife at bedside, feeling better.  Off dilt drip since this AM, in AF on monitor but rate down to 80s.   Denies cp/sob.  Has been weak and debilitated, await PT/OT to see what post acute needs will be.    6/16/24  Pt resting in NAD.  Advised by CM yesterday wife not willing/able to take him home at this time - We talked about that frankly today and she says she is unable to care for him, planning on SNF, hopefully regain enough function to allow d/c home from there. Pt w/ slow improvement.  Feels pretty good.  Appetite not too good, he says he's been deliberately eating small quantities of food at home to lose weight and feels it's been working.  Note pain/swelling LUE where he had a recent fx/ORIF.    6/17/24  Pt doing reasonably well, having pain sitting on the bedpan "it's sticking me", but overcame that w/ some adjusting position.  LUE u/s no DVT.  Still having a lot of pain in upper left arm w/ movement and also worried about persistent pain "under left boob" where he struck himself during a fall - we will image those areas too.  Says Norco 7.5 is like "eating a jelly bean" asking if we can give 10.  Interesting notes that he's currently constipated, at home often has diarrhea, says that higher doses of narcotics cause him to have loose bowels.  Odd.  Albumin 1.3.  Await SNF dispo. Wife at bedside.   6/18/24  Pt continues to be quite jolly about everything, has good sense of humor, acting very non toxic - HOWEVER upon wound care assessment by Dr Elise he is seen to have osteomyelitis of his right 2nd toe - Podiatry and ID consulted, s/p debridement at bedside w/ deep tissue culture taken, pt/wife opposed to amputation.  Additionally shoulder CT favors abscess around his op site - await input from ortho - He has pain " "there but it's not exquisite and the area is not flucuant/red/warm, pain seems more arthritic in nature - but he appears to have a deep seated infection and will need a washout in OR -  Dr Calvin has added clinda/vanc to Zosyn started yesterday.   6/19/24  Pt sitting up in bed, very conversant and non toxic appearing, thanks much to all consultants.  Per ortho conservative approach to shoulder, I am in chat w/ them currently about possible aspiration of fluid.  Note ID changes in abx coverage.  Pt still has a great deal of pain/mobility loss of left shoulder.  We have been working on SNF but he may need LTAC w/ current level of complexity/abx regimen.  Deep wound cultures from right 2nd toe are pending.   6/20/24  Aspirate done today per IR and returned 10 cc of "pussy" fluid, S Lea reviewed and messaged me that Dr Cooper is out of town and to please contact on call ortho, Dr Bernal on call and is aware of care from prior discussion, he reviewed findings and will do washout in OR tomorrow, went to d/w pt and wife, he's sitting up in bed, feeling ok, no new c/o.  I also encountered Dr Calvin in the vale and we talked about this and his abx.    6/21/24  Pt going for washout/removal of infected hardware today in OR.  Sitting up in bed, sister and wife here, everyone on board w/ careplan. He does have a lot more swelling today around proximal deltoid.  Not warm/tender but visibly swollen and this is the 1st time we have really noted that.          Interval History:  No acute events overnight.  Patient is going back to OR for repeat washout and wound VAC change    Review of Systems  Objective:     Vital Signs (Most Recent):  Temp: 98.2 °F (36.8 °C) (06/26/24 0357)  Pulse: 102 (06/26/24 0823)  Resp: 16 (06/26/24 0832)  BP: 123/74 (06/26/24 0357)  SpO2: (!) 94 % (06/26/24 0357) Vital Signs (24h Range):  Temp:  [97 °F (36.1 °C)-98.9 °F (37.2 °C)] 98.2 °F (36.8 °C)  Pulse:  [] 102  Resp:  [] 16  SpO2:  [92 " %-97 %] 94 %  BP: (110-142)/(56-93) 123/74     Weight: (!) 140.8 kg (310 lb 6.5 oz)  Body mass index is 42.1 kg/m².    Intake/Output Summary (Last 24 hours) at 6/26/2024 1024  Last data filed at 6/26/2024 0402  Gross per 24 hour   Intake 1200 ml   Output 2250 ml   Net -1050 ml         Physical Exam  Constitutional:       Appearance: Normal appearance.   HENT:      Head: Normocephalic and atraumatic.      Right Ear: External ear normal.      Left Ear: External ear normal.      Nose: Nose normal.      Mouth/Throat:      Mouth: Mucous membranes are moist.      Pharynx: Oropharynx is clear.   Eyes:      Extraocular Movements: Extraocular movements intact.      Conjunctiva/sclera: Conjunctivae normal.   Cardiovascular:      Rate and Rhythm: Normal rate and regular rhythm.      Pulses: Normal pulses.      Heart sounds: Normal heart sounds. No murmur heard.     No gallop.   Pulmonary:      Effort: Pulmonary effort is normal. No respiratory distress.      Breath sounds: Normal breath sounds. No wheezing, rhonchi or rales.   Abdominal:      General: Abdomen is flat. There is no distension.      Palpations: Abdomen is soft.      Tenderness: There is no abdominal tenderness.   Musculoskeletal:         General: No swelling. Normal range of motion.      Cervical back: Normal range of motion and neck supple.      Comments: Left shoulder wound VAC in place   Skin:     General: Skin is warm and dry.   Neurological:      General: No focal deficit present.      Mental Status: He is alert. Mental status is at baseline.             Significant Labs: All pertinent labs within the past 24 hours have been reviewed.    Significant Imaging: I have reviewed all pertinent imaging results/findings within the past 24 hours.    Assessment/Plan:      * Acute renal failure superimposed on chronic kidney disease  Appreciate input from renal services  In setting of acute UTI and IVVD  Resumed gentle hydration on Zosyn/vanc but those have been  stopped  He did not require oral NaHCO3  U/s is w/o obstruction, masses, etc        Pyogenic arthritis of left shoulder region        Abscess of left shoulder  As discussed under limb swelling, status post washout on 06/21/2024, we will repeat washout on 06/24/2024 and 06/26/2024  Need antibiotics until 08/03/2024  Wound VAC in place.  Continue antibiotics.  Following cultures.  Pending LTAC placement.    Osteomyelitis of toe  S/p bedside I&D, deep tissue Cx growing MRSA, S to clinda  ID and podiatry following, appreciate help  Appreciate input all consultants  Pt/wife opposed to amputation of digit  Local wound care  Note markedly elevated ESR, CRP  Need antibiotics until 08/03/2024      Skin tear of left upper extremity  Local care, healing  Doubt this is a portal of entry    Swelling of limb, left  U/s w/o DVT  Appreciate help from Dr Bernal, ID, wound care, podiatry et al  Pt is already on OAC, holding for now pending shoulder procedure  Arm is elevated on pillow  Not red/painful, no compartment syndrome  D/w pt/wife      Debility  Multifactorial process  Pt not able to manage his own care and wife cannot adequately care for him at this time  PT/OT  SNF or possibly LTAC consult  CM aware      Severe sepsis  In setting of acute UTI, MRSA OM right 2nd toe, and infected left shoulder hardware  BCX NGTD  Ur CX neg  Rocephin was given empirically for UTI, changed to Zosyn > added vanc/clinda, now on Rocephin/clinda, vanc/zosyn stopped per ID  Oral clotrimazole added for thrush  PCT and WBC trended down, WBC staying up a little now but pt looks good  No obstruction on renal u/s  Has gas containing abscess in and around left shoulder w/ malpositioned hardware, likely d/t falls, hard to say how the bug entered  Long d/w pt/wife, ortho - 6/22 had washout and hardware removal w/ Dr Bernal  F/u w/ Dr Kenneth mann d/c  He's urinating well  We will need antibiotics until 08/03/2024 per ID    Hyperkalemia  Resolved  in  setting of acute on chronic renal failure  Lokelma given x 1 on admit  Avoid ACE/ARB, K+ replacement  tele          Atrial fibrillation with rapid ventricular response  Chronic AF w/ acute RVR, resolved  Required dilt drip on admission but off since 6/15  Tele some PVC/bigem/trigem  TSH 1.8 in March  Monitor lytes  No etoh/drug use  TTE from March 2024:    Left Ventricle: The left ventricle is normal in size. Normal wall thickness. Mild global hypokinesis present. There is mildly reduced systolic function with a visually estimated ejection fraction of 45 - 50%. There is normal diastolic function.    Right Ventricle: Normal right ventricular cavity size. Wall thickness is normal. Right ventricle wall motion  is normal. Systolic function is normal.    Left Atrium: Left atrium is moderately dilated.    IVC/SVC: Normal venous pressure at 3 mmHg.  OAC has been on hold d/t possible need for shoulder surgery - has been on prophylaxis dose of Lovenox, this was help today for shoulder operation  Resume Eliquis as soon as feasible postop      History of Guillain-[place] syndrome  No acute issues  However, pt has impaired mobility and is acutely weakened from his sepsis/UTI/AF RVR  PT/OT working w/ pt  Wife cannot manage his care at home currently  SNF assessment underway, may need LTAC depending on what abx he ends up for how long, it may take a few more days to narrow that down          Type 2 diabetes mellitus  A1c 6.3%  SSI      Hypertension  Home meds as appropriate    MARA (obstructive sleep apnea)  Continue CPAP HS and w/ naps      Morbid obesity  Body mass index is 42.04 kg/m².  Morbid obesity complicates all aspects of disease management from diagnostic modalities to treatment  Weight loss encouraged and health benefits explained to patient  He has been working on weight loss at home w/ reduced caloric intake          VTE Risk Mitigation (From admission, onward)           Ordered     Reason for No Pharmacological VTE  Prophylaxis  Once        Question:  Reasons:  Answer:  Already adequately anticoagulated on oral Anticoagulants    06/14/24 1438     IP VTE HIGH RISK PATIENT  Once         06/14/24 1438     Place sequential compression device  Until discontinued         06/14/24 1438                    Discharge Planning   KAMILA: 6/28/2024     Code Status: Full Code   Is the patient medically ready for discharge?:     Reason for patient still in hospital (select all that apply): Treatment  Discharge Plan A: Long-term acute care facility (LTAC)                  Miko Galaviz Jr, MD  Department of Hospital Medicine   [facility]

## 2024-06-26 NOTE — ASSESSMENT & PLAN NOTE
As discussed under limb swelling, status post washout on 06/21/2024, we will repeat washout on 06/24/2024 and 06/26/2024  Need antibiotics until 08/03/2024  Wound VAC in place.  Continue antibiotics.  Following cultures.  Pending LTAC placement.

## 2024-06-26 NOTE — PLAN OF CARE
Aurelia from [facility] called previously and was incorrect, they are in network with pt's insurance.    SW met with wife and informed her of above.  Wife was adamant that she doesn't want [facility] in [place].  Want to move forward with [facility].    Message from Maryam at [facility], auth still pending.    10:25am - Received call from wife and she does want to go w/ [facility], no longer wants [facility].    10:27am - Called and notified Aurelia at [facility], she will submit for auth today.    Sent message to Maryam at [facility], asking her to cancel auth request.     06/26/24 1005   Post-Acute Status   Post-Acute Authorization Placement   Post-Acute Placement Status Pending payor review/awaiting authorization (if required)   Discharge Plan   Discharge Plan A Long-term acute care facility (LTAC)   Discharge Plan B Long-term acute care facility (LTAC)

## 2024-06-26 NOTE — PROGRESS NOTES
[facility]/Surg   Department of Infectious Disease  Progress Note    PATIENT NAME: Roosevelt Moser  MRN: [id number]  TODAY'S DATE: 06/26/2024  ADMIT DATE: 6/14/2024  LOS: 12 days  CHIEF COMPLAINT: Weakness (Pt brought to ED via EMS with complaint of weakness and falling, pt advised EMS his urine is very dark.  )  PRINCIPLE PROBLEM: Acute renal failure superimposed on chronic kidney disease      INTERVAL HISTORY     6/26:  Patient seen and examined, wife present.  He is awake, alert, NPO for 3rd washout of left shoulder.  He reports he is feeling better, awaiting surgery to have something to eat after procedure.  Left shoulder with wound VAC place draining significant amounts of bloody purulent fluid.  Hypertensive, afebrile.  Adequate urinary output, had 2 bowel movements in the last 24 hours.  Significant upper extremity edema decreasing, persistent bilateral lower extremity pitting edema.  Labs reviewed, leukocytosis 17.8, no left shift, H&H 8.2/25.8, platelet count 694, reactive thrombocytosis.  Stable electrolytes, creatinine down to 1.4, MARBIN improved, creatinine clearance 69.4 mL/min, normal LFTs, CRP down to 192.2.  Micro reviewed, OR cultures from 06/21 no growth to date, pending final.    6/25:  Patient seen and examined, wife present.  Patient lying in bed, has wound VAC placed to left shoulder.  Discussed with Ortho, very difficult situation in the setting of nonunion infected fracture.  As per operation note: After removing packing there was a large collection of bloody brownish fluid.  Irrigated.  Incision was extended 5 cm distally.  Pulse down the wound with 12 L of fluid.  Cultures were taken.  After extensive washout able to pass through the abscess cavity and a wound VAC was placed into cavity.  Evidence of no union or whatsoever.  The patient has an infected nonunion discussed with Hospital Medicine as well, plan for serial washouts in the OR for source control.  Slightly  "hypertensive, tachycardic, afebrile.  Adequate urinary output, had 2 bowel movements in the last 24 hours.  He did not get steroids preop, last dose dexamethasone 8 mg once on 06/21.  Labs reviewed, leukocytosis 20.1, no left shift, H&H 8.6/27.1, MCV 81, platelet count 720, reactive thrombocytosis.  Stable electrolytes, MARBIN, creatinine down to 1.6, creatinine clearance 60.6 mL/min, improving, normal LFTs, CRP remains taylor high 219.3, procalcitonin continues to fall 0.7.  We do not need to check this daily.  Echo unable to fully visualize oral valves.    6/24/24 (Pranay):  Patient seen and examined, wife present.  He is NPO for procedure today by Ortho, I&D and washout of left shoulder.  Patient awake, alert.  Hypertensive, T-max 99.5°, currently afebrile.  Adequate urinary output, had a bowel movement in the last 24 hours.  Labs reviewed, leukocytosis 18.1, no left shift, H&H 9.1/28.3, MCV 80, reactive thrombocytosis 721.  Sed rate extremely high 128, .  Stable electrolytes, MARBIN creatinine trending down 1.8, clearance 53.9 mL/min, baseline CPK 30.  Procalcitonin down to 0.97.  Last vancomycin random level 13.5.    06/23/2024.  Sleeping -- I did not disturb Afebrile.  T-max 99.5° WBC 21.3--19.8--17.  CRP is quite elevated at 205.  .  Procalcitonin 1.39.  Vancomycin random 18.  Cultures from shoulder no growth to date.    Gram stain yesterday was read as Gram-positive cocci--today it is changed to  "no organism"  GOing for another procedure tomorrow    06/22/2024.  Very pleasant.  So is his wife.  Patient is Afebrile.  He has swelling over the left arm, slightly improved.  Pain is well-controlled.    WBC 19--21--21.3--19.8.  He did receive dexamethasone yesterday by anesthesia, at the time of left shoulder surgery.  He is on ceftriaxone clindamycin and vancomycin.  Cultures reviewed:  right 2nd toe wound had grown MRSA.  Left intraop cultures no growth to date.  Left shoulder intraop, gram stain are " showing Gram-positive cocci.  He is trying to move his legs in bed, but he is still  weak.  Wife and patient are able to do IV antibiotics at home, but all things considered, it is very cumbersome for them;  they are interested in placement, which I agree, it is the right course of action.    06/21/2024:  Seen by Orthopedic surgery Service again yesterday.  For I and D today.  All cultures so far negative.  ASO titer normal.    06/20/2024:  Oral anticoagulants on hold to allow left shoulder arthrocentesis.  No other acute issues overnight.  Culture from right 3rd toe growing Staphylococcus aureus.  Blood culture remain negative.  Had aspiration left shoulder today that yielded purulent material.  Synovial fluid studies in progress.    06/19/2024: Seen and evaluated at bedside.  States left upper extremity pain better.  Having improved movement at the wrist and forearm.  Seen by Orthopedic surgery PA yesterday.  Notes reviewed.  Blood cultures remain negative.        Antibiotics (From admission, onward)      Start     Stop Route Frequency Ordered    06/24/24 1630  DAPTOmycin (CUBICIN) 800 mg in 0.9% NaCl SolP 50 mL IVPB         -- IV Every 24 hours (non-standard times) 06/24/24 1526    06/19/24 1045  cefTRIAXone (ROCEPHIN) 2 g in dextrose 5 % in water (D5W) 100 mL IVPB (MB+)         -- IV Every 24 hours (non-standard times) 06/19/24 0932    06/17/24 2100  mupirocin 2 % ointment         06/22/24 2059 Nasl 2 times daily 06/17/24 1544          Antifungals (From admission, onward)      Start     Stop Route Frequency Ordered    06/19/24 1045  clotrimazole megha 10 mg         06/29/24 0959 Oral 5 times daily 06/19/24 0932           Antivirals (From admission, onward)      None            ASSESSMENT and PLAN     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  Right 3rd toe osteomyelitis s/p Dalvance x 2 doses    3.  Osteomyelitis distal tuft of right 2nd toe   Wound cultures 6/18 MRSA, vanco JESSENIA 2      4.  MARBIN, improving Estimated Creatinine Clearance: 69.4 mL/min (based on SCr of 1.4 mg/dL).       5.  Aphthous ulcers on soft palate/oropharynx    6.  PMHx:  Diabetes, last A1c 6.3%, PID, CHF EF 50%     RECOMMENDATIONS:    Adequate source control   Discussed with Ortho/Dr. Cooper, plan for serial washouts to left shoulder to ensure source control - next one today 6/26  Continue daptomycin 800 mg IV daily   Holding statins while on daptomycin   Check CPK twice a week   Continue Rocephin 2 g IV daily   Above empirically for septic left shoulder prosthetic joint infection  Trend CRP and ESR  Continue magic mouthwash 10 mL 4 times a day, swish and swallow complete 7 days  Follow cultures   PT/OT as tolerated   Patient would benefit from LTAC  Aspiration precautions    D/w patient, wife at bedside, nursing    Please send Epic secure chat with any questions.      SUBJECTIVE    Roosevelt Moser is a 73 y.o. male with history of diabetes mellitus, hypertension and previous going bowel syndrome. He fell on 03/04/2024 and sustained a displaced comminuted fracture of the left humerus.  Seen by orthopedic surgeon with plan for ORIF which was deferred because of new AFib.  Admitted 03/09/2024 for rate control and ultimately discharged back home 03/12/2024.  Later had left humerus ORIF 03/25/2024 as a day procedure and was discharged home.       According to his wife, he developed pain and swelling of the 3rd toe and was with an ulcer.  It appears an x-ray of the foot was unremarkable.  Received 2 doses of an antibiotic that I presume to be dalbavancin 1 week apart in early April.     In the last 2  weeks he has continued to decline.  He also has reduction in urine output.  Fell twice at home on 06/13/2024 and wife activated EMS and he was brought to the hospital.  Received IV fluid for low normal blood pressure.  Also noted to have MARBIN with creatinine 5.0 and WBC was 21 K.  UA was abnormal with> 100 WBC,> 100 RBC, 3+ LE and positive nitrite.  He has been managed conservatively for MARBIN and for dehydration.     X-ray of left shoulder and left upper extremity 06/17/2024 documented gas in soft tissue.  MRI right foot documented osteomyelitis of the 3rd toe.  I was asked to see to assist with his care.  ESR 95,  milligram/liter.     Antibiotics   Ceftriaxone:  06/14/2024-06/17/2024, 06/19/2024-  Zosyn:  06/17/2024-06/18/2024   Vancomycin:  06/18/2024-6/24/2024  Clindamycin:  06/18/2024-6/22/2024     Cultures   Blood culture 06/14/2024:  NGTD   Urine culture 06/14/2024: NGTD  Right second toe culture 06/18/2024: MRSA  Left shoulder synovial fluid culture 06/20/2024:  No growth      Review of Systems  Review of systems obtained and negative except as stated above in Interval History     OBJECTIVE   Temp:  [97 °F (36.1 °C)-98.9 °F (37.2 °C)] 98.2 °F (36.8 °C)  Pulse:  [] 102  Resp:  [] 22  SpO2:  [92 %-97 %] 94 %  BP: (110-142)/(56-93) 123/74  Temp:  [97 °F (36.1 °C)-98.9 °F (37.2 °C)]   Temp: 98.2 °F (36.8 °C) (06/26/24 0357)  Pulse: 102 (06/26/24 0823)  Resp: (!) 22 (06/26/24 0823)  BP: 123/74 (06/26/24 0357)  SpO2: (!) 94 % (06/26/24 0357)    Intake/Output Summary (Last 24 hours) at 6/26/2024 0828  Last data filed at 6/26/2024 0402  Gross per 24 hour   Intake 1200 ml   Output 2250 ml   Net -1050 ml       Physical Exam  General:  Morbidly obese  male lying in bed, breathing comfortable on room air  Eyes: Eyes with no icterus or injection. Vision grossly normal  Ears: Hearing grossly normal.  Nose: Nares patent  Mouth: Moist mucous membranes, dentition is fair.  Oropharynx with resolving  aphthous ulcers.  Neck: Supple  Cardiovascular: Regular rate and rhythm, no murmurs, resolving upper extremity edema bilaterally, persistent bilateral lower extremity pitting edema  Respiratory:  Poor inspiratory effort, mostly clear to auscultation bilaterally, no tachypnea or increased work of breathing.  Gastrointestinal:  Soft and obese with active bowel sounds, no tenderness to palpation, no distention.  Genitourinary:  PureWick in place.  No suprapubic tenderness.  Musculoskeletal:  Except for left shoulder, moves all extremities with good strength.    Skin:  Right great toe with necrotic tip of toe, left shoulder with wound VAC in place draining significant amounts of purulent sanguinous fluid.  Neuro:   Oriented, conversant, follows commands.  Psych: Good mood, normal affect.     WOUNDS:    6/25:      6/24:                            Significant Labs: All pertinent labs within the past 24 hours have been reviewed.    CBC LAST 7 DAYS  Recent Labs   Lab 06/20/24  0458 06/21/24  0410 06/22/24  0513 06/23/24  0849 06/24/24  0357 06/25/24  0426 06/26/24  0349   WBC 21.32* 21.39* 19.81* 17.37* 18.15* 20.19* 17.83*   RBC 3.94* 3.73* 3.33* 3.27* 3.52* 3.34* 3.14*   HGB 10.2* 9.8* 8.6* 8.6* 9.1* 8.6* 8.2*   HCT 31.3* 30.1* 26.8* 26.2* 28.3* 27.1* 25.8*   MCV 79* 81* 81* 80* 80* 81* 82   MCH 25.9* 26.3* 25.8* 26.3* 25.9* 25.7* 26.1*   MCHC 32.6 32.6 32.1 32.8 32.2 31.7* 31.8*   RDW 18.1* 17.8* 17.7* 17.6* 17.9* 17.8* 18.1*   * 560* 549* 669* 721* 720* 694*   MPV 10.2 10.1 9.9 10.1 9.9 9.5 9.8   GRAN  --   --   --  71.5  12.4* 70.9  12.9* 72.0  14.5* 65.1  11.6*   LYMPH  --   --   --  13.0*  2.3 13.9*  2.5 13.1*  2.6 17.3*  3.1   MONO  --   --   --  8.6  1.5* 8.3  1.5* 8.8  1.8* 10.2  1.8*   BASO  --   --   --  0.09 0.09 0.14 0.11   NRBC  --   --   --  0 0 0 0       CHEMISTRY LAST 7 DAYS  Recent Labs   Lab 06/20/24  0458 06/21/24  0405 06/22/24  0513 06/23/24  0849 06/24/24  0357 06/25/24  0426  06/26/24  0349    138 138 140 142 145 145   K 4.0 3.7 4.4 3.9 3.7 3.8 3.7   CL 99 100 102 102 104 107 108   CO2 25 25 25 26 24 24 24   ANIONGAP 14 13 11 12 14 14 13   BUN 78* 82* 76* 72* 79* 59* 58*   CREATININE 2.5* 2.4* 2.4* 2.0* 1.8* 1.6* 1.4   * 206* 257* 229* 246* 221* 203*   CALCIUM 8.7 9.0 8.5* 8.7 9.2 8.9 8.7   MG  --   --  1.7  --   --   --   --    ALBUMIN 1.3*  --  1.2* 1.3* 1.4* 1.3* 1.3*   PROT  --   --  5.8* 5.8* 6.2 6.0 5.9*   ALKPHOS  --   --  217* 195* 188* 151* 172*   ALT  --   --  13 14 18 12 18   AST  --   --  20 23 28 14 30   BILITOT  --   --  0.3 0.3 0.3 0.4 0.3       Estimated Creatinine Clearance: 69.4 mL/min (based on SCr of 1.4 mg/dL).    INFLAMMATORY/PROCAL    Lab Results   Component Value Date    .2 (H) 06/26/2024    .3 (H) 06/25/2024    .5 (H) 06/24/2024    .0 (H) 06/23/2024    .0 (H) 06/18/2024          Component Ref Range & Units 2 d ago   Body Fluid Type  Abscess fluid, left shoulder   Fluid Appearance  Cloudy   Fluid Color  Pink   WBC, Body Fluid /cu mm SEE COMMENT   Comment: Reference ranges for body fluids not established.  Correlate clinically.  Test not performed  Cell count not performed on abscess fluid, see differential.   Segs, Fluid % 79   Lymphs, Fluid % 14   Monocytes/Macrophages, Fluid % 7        PRIOR  MICROBIOLOGY:    Susceptibility data from last 90 days.  Collected Specimen Info Organism Ceftriaxone Clindamycin Erythromycin Oxacillin Penicillin Tetracycline Trimeth/Sulfa Vancomycin   06/18/24 Wound from Toe, Right Foot Methicillin resistant Staphylococcus aureus  R  S  R  R  R  S  S  S   06/14/24 Blood from Peripheral, Antecubital, Right No growth after 5 days.           06/14/24 Blood from Peripheral, Hand, Right No growth after 5 days.               LAST 7 DAYS MICROBIOLOGY   Microbiology Results (last 7 days)       Procedure Component Value Units Date/Time    Blood culture [[id number]] Collected: 06/23/24 0659    Order  Status: Completed Specimen: Blood from Peripheral, Hand, Right Updated: 06/25/24 1032     Blood Culture, Routine No Growth to date      No Growth to date      No Growth to date    Aerobic culture [[id number]] Collected: 06/24/24 1622    Order Status: Completed Specimen: Incision site from Shoulder, Left Updated: 06/25/24 0731     Aerobic Bacterial Culture No growth    Aerobic culture [[id number]] Collected: 06/21/24 1645    Order Status: Completed Specimen: Abscess from Shoulder, Left Updated: 06/25/24 0725     Aerobic Bacterial Culture No growth    Culture, Anaerobe [[id number]] Collected: 06/24/24 1622    Order Status: Sent Specimen: Incision site from Shoulder, Left Updated: 06/24/24 1920    AFB Culture & Smear [[id number]] Collected: 06/21/24 1645    Order Status: Completed Specimen: Abscess from Shoulder, Left Updated: 06/24/24 0853     AFB CULTURE STAIN No acid fast bacilli seen.     AFB CULTURE STAIN Testing performed by:     AFB CULTURE STAIN [facility]     [facility] [address] [facility]     [place] [address]     AFB CULTURE STAIN Dr. Davis Jain MD    Culture, Anaerobic [[id number]] Collected: 06/21/24 1645    Order Status: Completed Specimen: Abscess from Shoulder, Left Updated: 06/24/24 0709     Anaerobic Culture No anaerobes isolated    Aerobic culture [[id number]] Collected: 06/20/24 1204    Order Status: Completed Specimen: Abscess from Shoulder, Left Updated: 06/24/24 0700     Aerobic Bacterial Culture No growth    Gram stain [[id number]] Collected: 06/21/24 1645    Order Status: Completed Specimen: Abscess from Shoulder, Left Updated: 06/23/24 0833     Gram Stain Result Few WBC's      No organisms seen    Culture, Anaerobic [[id number]] Collected: 06/20/24 1204    Order Status: Completed Specimen: Abscess from Shoulder, Left Updated: 06/23/24 0729     Anaerobic Culture No anaerobes isolated    Fungus culture [[id number]] Collected: 06/21/24 1645    Order  Status: Sent Specimen: Abscess from Shoulder, Left Updated: 06/21/24 1924    Gram stain [[id number]] Collected: 06/20/24 1204    Order Status: Completed Specimen: Abscess from Shoulder, Left Updated: 06/21/24 1520     Gram Stain Result Many WBC's      No organisms seen    Culture, Anaerobe [[id number]] Collected: 06/18/24 1251    Order Status: Completed Specimen: Wound from Toe, Right Foot Updated: 06/21/24 1428     Anaerobic Culture No anaerobes isolated    Aerobic culture [[id number]]  (Abnormal)  (Susceptibility) Collected: 06/18/24 1251    Order Status: Completed Specimen: Wound from Toe, Right Foot Updated: 06/21/24 0755     Aerobic Bacterial Culture METHICILLIN RESISTANT STAPHYLOCOCCUS AUREUS  Few  Results called to and read back by Rich Crockett RN-[facility];    06/20/2024  15:05 CJD      Fungus culture [[id number]] Collected: 06/20/24 1204    Order Status: Sent Specimen: Abscess from Shoulder, Left Updated: 06/20/24 1923    Blood culture [[id number]] Collected: 06/14/24 1355    Order Status: Completed Specimen: Blood from Peripheral, Antecubital, Right Updated: 06/19/24 2032     Blood Culture, Routine No growth after 5 days.    Blood culture [[id number]] Collected: 06/14/24 1355    Order Status: Completed Specimen: Blood from Peripheral, Hand, Right Updated: 06/19/24 2032     Blood Culture, Routine No growth after 5 days.              CURRENT/PREVIOUS VISIT EKG  Results for orders placed or performed during the hospital encounter of 06/14/24   EKG 12-lead    Collection Time: 06/14/24 12:16 PM   Result Value Ref Range    QRS Duration 106 ms    [facility] QTC Calculation 443 ms    Narrative    Test Reason : R53.1,    Vent. Rate : 120 BPM     Atrial Rate : 159 BPM     P-R Int : 000 ms          QRS Dur : 106 ms      QT Int : 314 ms       P-R-T Axes : 000 -54 093 degrees     QTc Int : 443 ms    Atrial fibrillation with rapid ventricular response  Left axis deviation  Low voltage QRS  Inferior infarct ,age  undetermined  Cannot rule out Anterior infarct ,age undetermined  Abnormal ECG  When compared with ECG of 25-MAR-2024 09:14,  Vent. rate has increased BY  46 BPM  Minimal criteria for Anterior infarct are now Present  Confirmed by Jesús HATHAWAY, Hansel AMIN ([id number]) on 6/20/2024 8:43:02 PM    Referred By: [email or web address]   SELF           Confirmed By:Hansel Espinosa MD     ECHO:       Extremely limited visualization of all cardiac structures.  No clinically significant determination can be made based on this echocardiogram.  If cardiac concerns persist, consider alternative cardiac imaging like RICKY for further evaluation.    Left Ventricle: Left ventricle was not well visualized due to poor sonic window. The left ventricle is normal in size. Unable to assess wall motion. There is normal systolic function with a visually estimated ejection fraction of 55 - 60%.    Right Ventricle: Right ventricle was not well visualized due to poor acoustic window.    Left Atrium: Left atrium was not well visualized.    Mitral Valve: There is no stenosis. The mean pressure gradient across the mitral valve is 2 mmHg at a heart rate of  bpm. There is mild regurgitation.    IVC/SVC: Elevated venous pressure at 15 mmHg.    Significant Imaging: I have reviewed all relevant and available imaging results/findings within the past 24 hours.    06/18/2024.  CT left shoulder   1. Subacute left humerus fracture post internal fixation.  There is incomplete bony bridging across the fracture site, with persistent angulation between the dominant bony fragments as hardware is only partially engaged (see discussion).  2. Severe arthropathy of the glenohumeral joint with multiple intra-articular bodies, some interposed.  3. Large left joint effusion and adjacent soft tissue focal fluid collections containing gas and concerning for gas-forming infection.  4. Moderate size left pleural effusion.  5. Left basilar airspace disease is presumably atelectasis with  pneumonia as a less favored consideration.  This report was flagged in Epic as abnormal.    X-ray on 06/17, prior to removal of hardware.      I spent a total of 55 minutes on the day of the visit.This includes face to face time and non-face to face time preparing to see the patient (eg, review of tests), obtaining and/or reviewing separately obtained history, documenting clinical information in the electronic or other health record, independently interpreting results and communicating results to the patient/family/caregiver, or care coordinator.    Capri Wesley MD  Date of Service: 06/26/2024      This note was created using [email or web address] voice recognition software that occasionally misinterpreted phrases or words.

## 2024-06-26 NOTE — SUBJECTIVE & OBJECTIVE
Principal Problem:Acute renal failure superimposed on chronic kidney disease    Principal Orthopedic Problem:  Left shoulder infection    Interval History:  No change    Review of patient's allergies indicates:  No Known Allergies    Current Facility-Administered Medications   Medication    (Magic mouthwash) 1:1:1 diphenhydrAMINE(Benadryl) 12.5mg/5ml liq, aluminum & magnesium hydroxide-simethicone (Maalox), LIDOcaine viscous 2%    albuterol-ipratropium 2.5 mg-0.5 mg/3 mL nebulizer solution 3 mL    aluminum & magnesium hydroxide-simethicone 400-400-40 mg/5 mL suspension 15 mL    cefTRIAXone (ROCEPHIN) 2 g in dextrose 5 % in water (D5W) 100 mL IVPB (MB+)    clotrimazole megha 10 mg    DAPTOmycin (CUBICIN) 800 mg in 0.9% NaCl SolP 50 mL IVPB    dextrose 10% bolus 125 mL 125 mL    dextrose 10% bolus 250 mL 250 mL    glucagon (human recombinant) injection 1 mg    glucose chewable tablet 16 g    glucose chewable tablet 24 g    HYDROcodone-acetaminophen  mg per tablet 1 tablet    insulin aspart U-100 pen 0-10 Units    insulin glargine U-100 (Lantus) pen 15 Units    LIDOcaine 5 % patch 1 patch    metoprolol injection 5 mg    metoprolol succinate (TOPROL-XL) 24 hr tablet 100 mg    naloxone 0.4 mg/mL injection 0.02 mg    ondansetron injection 4 mg    prochlorperazine injection Soln 5 mg    simethicone chewable tablet 80 mg    sodium chloride 0.9% flush 10 mL    sodium chloride 0.9% flush 10 mL    And    sodium chloride 0.9% flush 10 mL    sodium chloride 0.9% flush 10 mL     Objective:     Vital Signs (Most Recent):  Temp: 98.2 °F (36.8 °C) (06/26/24 0357)  Pulse: 99 (06/26/24 0357)  Resp: (!) 22 (06/26/24 0357)  BP: 123/74 (06/26/24 0357)  SpO2: (!) 94 % (06/26/24 0357) Vital Signs (24h Range):  Temp:  [97 °F (36.1 °C)-98.9 °F (37.2 °C)] 98.2 °F (36.8 °C)  Pulse:  [] 99  Resp:  [] 22  SpO2:  [92 %-98 %] 94 %  BP: (110-142)/(56-93) 123/74     Weight: (!) 140.8 kg (310 lb 6.5 oz)  Height: 6' (182.9 cm)  Body  mass index is 42.1 kg/m².      Intake/Output Summary (Last 24 hours) at 6/26/2024 0722  Last data filed at 6/26/2024 0402  Gross per 24 hour   Intake 1300 ml   Output 2250 ml   Net -950 ml        General    Nursing note and vitals reviewed.  Constitutional: He is oriented to person, place, and time. He appears well-developed and well-nourished. No distress.   HENT:   Head: Normocephalic and atraumatic.   Nose: Nose normal.   Eyes: EOM are normal.   Cardiovascular:  Normal rate.            Pulmonary/Chest: Effort normal.   Neurological: He is alert and oriented to person, place, and time.   Psychiatric: He has a normal mood and affect. His behavior is normal. Thought content normal.             Left Shoulder Exam     Range of Motion   Extension:  abnormal   Forward Flexion:  abnormal   Adduction: abnormal  External Rotation 90 degrees: abnormal  Internal rotation 90 degrees:  abnormal     Other   Sensation: normal     Comments:  Wound VAC in place.        Vascular Exam       Left Pulses      Radial:                    2+      Capillary Refill  Left Hand: normal capillary refill           Significant Labs:   Recent Lab Results         06/26/24  0349   06/25/24  2102   06/25/24  1606   06/25/24  1137        Albumin 1.3                          ALT 18             Anion Gap 13             AST 30             Baso # 0.11             Basophil % 0.6             BILIRUBIN TOTAL 0.3  Comment: For infants and newborns, interpretation of results should be based  on gestational age, weight and in agreement with clinical  observations.    Premature Infant recommended reference ranges:  Up to 24 hours.............<8.0 mg/dL  Up to 48 hours............<12.0 mg/dL  3-5 days..................<15.0 mg/dL  6-29 days.................<15.0 mg/dL               BUN 58             Calcium 8.7             Chloride 108             CO2 24             Creatinine 1.4             .2             Differential Method Automated              eGFR 53             Eos # 0.8             Eos % 4.6             Glucose 203             Gran # (ANC) 11.6             Gran % 65.1             Hematocrit 25.8             Hemoglobin 8.2             Immature Grans (Abs) 0.39  Comment: Mild elevation in immature granulocytes is non specific and   can be seen in a variety of conditions including stress response,   acute inflammation, trauma and pregnancy. Correlation with other   laboratory and clinical findings is essential.               Immature Granulocytes 2.2             Lymph # 3.1             Lymph % 17.3             MCH 26.1             MCHC 31.8             MCV 82             Mono # 1.8             Mono % 10.2             MPV 9.8             nRBC 0             Platelet Count 694             POCT Glucose   281   376   228       Potassium 3.7             PROTEIN TOTAL 5.9             RBC 3.14             RDW 18.1             Sodium 145             WBC 17.83                     Significant Imaging: None

## 2024-06-26 NOTE — PLAN OF CARE
Problem: Physical Therapy  Goal: Physical Therapy Goal  Description: Goals to be met by: 7/15/24     Patient will increase functional independence with mobility by performin. Supine to sit with Contact Guard Assistance  2. Sit to stand transfer with Contact Guard Assistance  3. Bed to chair transfer with Contact Guard Assistance using Rolling Walker  4. Gait  x 200 feet with Contact Guard Assistance using Rolling Walker.   5. Lower extremity exercise program x30 reps per handout, with supervision    Outcome: Progressing   Therapeutic activity to improve functional mobility :  bed mobility , transfers, gait with rw and assistance for safety, LE exercises.    Patient

## 2024-06-26 NOTE — SUBJECTIVE & OBJECTIVE
Interval History:  No acute events overnight.  Patient is going back to OR for repeat washout and wound VAC change    Review of Systems  Objective:     Vital Signs (Most Recent):  Temp: 98.2 °F (36.8 °C) (06/26/24 0357)  Pulse: 102 (06/26/24 0823)  Resp: 16 (06/26/24 0832)  BP: 123/74 (06/26/24 0357)  SpO2: (!) 94 % (06/26/24 0357) Vital Signs (24h Range):  Temp:  [97 °F (36.1 °C)-98.9 °F (37.2 °C)] 98.2 °F (36.8 °C)  Pulse:  [] 102  Resp:  [] 16  SpO2:  [92 %-97 %] 94 %  BP: (110-142)/(56-93) 123/74     Weight: (!) 140.8 kg (310 lb 6.5 oz)  Body mass index is 42.1 kg/m².    Intake/Output Summary (Last 24 hours) at 6/26/2024 1024  Last data filed at 6/26/2024 0402  Gross per 24 hour   Intake 1200 ml   Output 2250 ml   Net -1050 ml         Physical Exam  Constitutional:       Appearance: Normal appearance.   HENT:      Head: Normocephalic and atraumatic.      Right Ear: External ear normal.      Left Ear: External ear normal.      Nose: Nose normal.      Mouth/Throat:      Mouth: Mucous membranes are moist.      Pharynx: Oropharynx is clear.   Eyes:      Extraocular Movements: Extraocular movements intact.      Conjunctiva/sclera: Conjunctivae normal.   Cardiovascular:      Rate and Rhythm: Normal rate and regular rhythm.      Pulses: Normal pulses.      Heart sounds: Normal heart sounds. No murmur heard.     No gallop.   Pulmonary:      Effort: Pulmonary effort is normal. No respiratory distress.      Breath sounds: Normal breath sounds. No wheezing, rhonchi or rales.   Abdominal:      General: Abdomen is flat. There is no distension.      Palpations: Abdomen is soft.      Tenderness: There is no abdominal tenderness.   Musculoskeletal:         General: No swelling. Normal range of motion.      Cervical back: Normal range of motion and neck supple.      Comments: Left shoulder wound VAC in place   Skin:     General: Skin is warm and dry.   Neurological:      General: No focal deficit present.      Mental  Status: He is alert. Mental status is at baseline.             Significant Labs: All pertinent labs within the past 24 hours have been reviewed.    Significant Imaging: I have reviewed all pertinent imaging results/findings within the past 24 hours.

## 2024-06-26 NOTE — CARE UPDATE
06/25/24 1923   Patient Assessment/Suction   Level of Consciousness (AVPU) alert   Respiratory Effort Normal   Expansion/Accessory Muscles/Retractions expansion symmetric   All Lung Fields Breath Sounds Anterior:;Lateral:;diminished   Cough Frequency no cough   Cough Type nonproductive;good   PRE-TX-O2   Device (Oxygen Therapy) room air   SpO2 (!) 94 %   Pulse Oximetry Type Intermittent   $ Pulse Oximetry - Multiple Charge Pulse Oximetry - Multiple   Pulse 98   Resp (!) 106   Aerosol Therapy   $ Aerosol Therapy Charges PRN treatment not required   Respiratory Treatment Status (SVN) PRN treatment not required   Incentive Spirometer   $ Incentive Spirometer Charges done independently per patient   Preset CPAP/BiPAP Settings   Mode Of Delivery Standby;CPAP  (home unit)

## 2024-06-26 NOTE — PROGRESS NOTES
[facility]/Surg  Orthopedics  Progress Note    Patient Name: Roosevelt Moser  MRN: [id number]  Admission Date: 6/14/2024  Hospital Length of Stay: 12 days  Attending Provider: Miko Galaviz Jr., MD  Primary Care Provider: Miguel Angel Enriquez PA-C  Follow-up For: Procedure(s) (LRB):  IRRIGATION AND DEBRIDEMENT, UPPER EXTREMITY (Left)    Post-Operative Day: 2 Days Post-Op  Subjective:     Principal Problem:Acute renal failure superimposed on chronic kidney disease    Principal Orthopedic Problem:  Left shoulder infection    Interval History:  No change    Review of patient's allergies indicates:  No Known Allergies    Current Facility-Administered Medications   Medication    (Magic mouthwash) 1:1:1 diphenhydrAMINE(Benadryl) 12.5mg/5ml liq, aluminum & magnesium hydroxide-simethicone (Maalox), LIDOcaine viscous 2%    albuterol-ipratropium 2.5 mg-0.5 mg/3 mL nebulizer solution 3 mL    aluminum & magnesium hydroxide-simethicone 400-400-40 mg/5 mL suspension 15 mL    cefTRIAXone (ROCEPHIN) 2 g in dextrose 5 % in water (D5W) 100 mL IVPB (MB+)    clotrimazole megha 10 mg    DAPTOmycin (CUBICIN) 800 mg in 0.9% NaCl SolP 50 mL IVPB    dextrose 10% bolus 125 mL 125 mL    dextrose 10% bolus 250 mL 250 mL    glucagon (human recombinant) injection 1 mg    glucose chewable tablet 16 g    glucose chewable tablet 24 g    HYDROcodone-acetaminophen  mg per tablet 1 tablet    insulin aspart U-100 pen 0-10 Units    insulin glargine U-100 (Lantus) pen 15 Units    LIDOcaine 5 % patch 1 patch    metoprolol injection 5 mg    metoprolol succinate (TOPROL-XL) 24 hr tablet 100 mg    naloxone 0.4 mg/mL injection 0.02 mg    ondansetron injection 4 mg    prochlorperazine injection Soln 5 mg    simethicone chewable tablet 80 mg    sodium chloride 0.9% flush 10 mL    sodium chloride 0.9% flush 10 mL    And    sodium chloride 0.9% flush 10 mL    sodium chloride 0.9% flush 10 mL     Objective:     Vital Signs (Most Recent):  Temp:  98.2 °F (36.8 °C) (06/26/24 0357)  Pulse: 99 (06/26/24 0357)  Resp: (!) 22 (06/26/24 0357)  BP: 123/74 (06/26/24 0357)  SpO2: (!) 94 % (06/26/24 0357) Vital Signs (24h Range):  Temp:  [97 °F (36.1 °C)-98.9 °F (37.2 °C)] 98.2 °F (36.8 °C)  Pulse:  [] 99  Resp:  [] 22  SpO2:  [92 %-98 %] 94 %  BP: (110-142)/(56-93) 123/74     Weight: (!) 140.8 kg (310 lb 6.5 oz)  Height: 6' (182.9 cm)  Body mass index is 42.1 kg/m².      Intake/Output Summary (Last 24 hours) at 6/26/2024 0722  Last data filed at 6/26/2024 0402  Gross per 24 hour   Intake 1300 ml   Output 2250 ml   Net -950 ml        General    Nursing note and vitals reviewed.  Constitutional: He is oriented to person, place, and time. He appears well-developed and well-nourished. No distress.   HENT:   Head: Normocephalic and atraumatic.   Nose: Nose normal.   Eyes: EOM are normal.   Cardiovascular:  Normal rate.            Pulmonary/Chest: Effort normal.   Neurological: He is alert and oriented to person, place, and time.   Psychiatric: He has a normal mood and affect. His behavior is normal. Thought content normal.             Left Shoulder Exam     Range of Motion   Extension:  abnormal   Forward Flexion:  abnormal   Adduction: abnormal  External Rotation 90 degrees: abnormal  Internal rotation 90 degrees:  abnormal     Other   Sensation: normal     Comments:  Wound VAC in place.        Vascular Exam       Left Pulses      Radial:                    2+      Capillary Refill  Left Hand: normal capillary refill           Significant Labs:   Recent Lab Results         06/26/24  0349   06/25/24  2102   06/25/24  1606   06/25/24  1137        Albumin 1.3                          ALT 18             Anion Gap 13             AST 30             Baso # 0.11             Basophil % 0.6             BILIRUBIN TOTAL 0.3  Comment: For infants and newborns, interpretation of results should be based  on gestational age, weight and in agreement with  clinical  observations.    Premature Infant recommended reference ranges:  Up to 24 hours.............<8.0 mg/dL  Up to 48 hours............<12.0 mg/dL  3-5 days..................<15.0 mg/dL  6-29 days.................<15.0 mg/dL               BUN 58             Calcium 8.7             Chloride 108             CO2 24             Creatinine 1.4             .2             Differential Method Automated             eGFR 53             Eos # 0.8             Eos % 4.6             Glucose 203             Gran # (ANC) 11.6             Gran % 65.1             Hematocrit 25.8             Hemoglobin 8.2             Immature Grans (Abs) 0.39  Comment: Mild elevation in immature granulocytes is non specific and   can be seen in a variety of conditions including stress response,   acute inflammation, trauma and pregnancy. Correlation with other   laboratory and clinical findings is essential.               Immature Granulocytes 2.2             Lymph # 3.1             Lymph % 17.3             MCH 26.1             MCHC 31.8             MCV 82             Mono # 1.8             Mono % 10.2             MPV 9.8             nRBC 0             Platelet Count 694             POCT Glucose   281   376   228       Potassium 3.7             PROTEIN TOTAL 5.9             RBC 3.14             RDW 18.1             Sodium 145             WBC 17.83                     Significant Imaging: None  Assessment/Plan:     Abscess of left shoulder  Hospital day 12:    Status post repeat irrigation and debridement and hardware removal of left shoulder for suspected infection.  Wound VAC placed.    Patient is approximately 3 months status post open reduction internal fixation of a left proximal humerus fracture which lost reduction.    1. Patient is doing well postoperatively.  His pain is well managed.    2.  Intraoperative cultures from irrigation and debridement 6/24, the aerobic culture has no growth.  Anaerobic culture still pending.       Preoperative and intraoperative cultures from 06/21 showed many WBCs but have demonstrated no bacterial growth to date.  As well as cultures from 06/19 showed no growth.      3. Wound care was consulted for wound VAC  4. Continue sling for comfort of the left upper extremity. Patient can do very light activity with the left upper extremity focusing more on elbow, hand and wrist.  Would not do dedicated therapy for the shoulder.  Patient could weightbear through the extremity if needed with a walker.  5. Plan for repeat irrigation and debridement today, Wednesday June 26, 2024.  6. Consult case management as the patient will likely benefit from placement for IV antibiotics as well as his generalized deconditioned state.  7. Continue pain control.     Swelling of limb, left  History of left proximal humerus fracture with open reduction internal fixation.            KAREN Avina  Orthopedics  [facility]/Surg

## 2024-06-26 NOTE — ASSESSMENT & PLAN NOTE
Hospital day 12:    Status post repeat irrigation and debridement and hardware removal of left shoulder for suspected infection.  Wound VAC placed.    Patient is approximately 3 months status post open reduction internal fixation of a left proximal humerus fracture which lost reduction.    1. Patient is doing well postoperatively.  His pain is well managed.    2.  Intraoperative cultures from irrigation and debridement 6/24, the aerobic culture has no growth.  Anaerobic culture still pending.      Preoperative and intraoperative cultures from 06/21 showed many WBCs but have demonstrated no bacterial growth to date.  As well as cultures from 06/19 showed no growth.      3. Wound care was consulted for wound VAC  4. Continue sling for comfort of the left upper extremity. Patient can do very light activity with the left upper extremity focusing more on elbow, hand and wrist.  Would not do dedicated therapy for the shoulder.  Patient could weightbear through the extremity if needed with a walker.  5. Plan for repeat irrigation and debridement today, Wednesday June 26, 2024.  6. Consult case management as the patient will likely benefit from placement for IV antibiotics as well as his generalized deconditioned state.  7. Continue pain control.

## 2024-06-26 NOTE — PT/OT/SLP PROGRESS
"Physical Therapy Treatment    Patient Name:  Roosevelt Moser   MRN:  [id number]    Recommendations:     Discharge Recommendations: Moderate Intensity Therapy  Discharge Equipment Recommendations: none  Barriers to discharge: None    Assessment:     Roosevelt Moser is a 73 y.o. male admitted with a medical diagnosis of Acute renal failure superimposed on chronic kidney disease.  He presents with the following impairments/functional limitations: weakness, impaired endurance, impaired self care skills, impaired functional mobility, decreased upper extremity function, decreased lower extremity function, pain, decreased ROM, edema, orthopedic precautions . Supine in bed with spouse present.  Agreed to receive LE exercises.  Anticipating surgical procedure later today.  Presents with FELIX sling in place, edema both feet noted.  Reports some discomfort L shoulder, L knee also with movement.     Rehab Prognosis: Fair; patient would benefit from acute skilled PT services to address these deficits and reach maximum level of function.    Recent Surgery: Procedure(s) (LRB):  IRRIGATION AND DEBRIDEMENT, UPPER EXTREMITY (Left) 2 Days Post-Op    Plan:     During this hospitalization, patient to be seen daily to address the identified rehab impairments via gait training, therapeutic activities, therapeutic exercises and progress toward the following goals:    Plan of Care Expires:  07/15/24    Subjective     Chief Complaint: pain L shoulder / LK with movement.  Patient/Family Comments/goals: none stated  Pain/Comfort:  Pain Rating 1: other (see comments) (did not rate , " a little".)  Location - Side 1: Left  Location - Orientation 1: generalized  Location 1: shoulder (and L knee)  Pain Addressed 1: Reposition, Nurse notified      Objective:     Communicated with nurse Sargent prior to session.  Patient found supine with bed alarm, telemetry, SCD, wound vac, peripheral IV upon PT entry to room.     General Precautions: Standard, contact, " fall  Orthopedic Precautions: LUE non weight bearing  Braces: UE Sling  Respiratory Status: Room air     Functional Mobility:        AM-PAC 6 CLICK MOBILITY          Treatment & Education:  BLE ROM exercises in supine ( AAROM LLE):  SLR's, HS, QS,  Hip abd/add, AP's.  Rest periods required between each .      Patient left supine with all lines intact, call button in reach, bed alarm on, nurse Arie notified, and spouse present..    GOALS:   Multidisciplinary Problems       Physical Therapy Goals          Problem: Physical Therapy    Goal Priority Disciplines Outcome Goal Variances Interventions   Physical Therapy Goal     PT, PT/OT Progressing     Description: Goals to be met by: 7/15/24     Patient will increase functional independence with mobility by performin. Supine to sit with Contact Guard Assistance  2. Sit to stand transfer with Contact Guard Assistance  3. Bed to chair transfer with Contact Guard Assistance using Rolling Walker  4. Gait  x 200 feet with Contact Guard Assistance using Rolling Walker.   5. Lower extremity exercise program x30 reps per handout, with supervision                         Time Tracking:     PT Received On: 24  PT Start Time: 1305     PT Stop Time: 1320  PT Total Time (min): 15 min     Billable Minutes: Therapeutic Exercise 15min    Treatment Type: Treatment  PT/PTA: PTA     Number of PTA visits since last PT visit: 2     2024

## 2024-06-26 NOTE — PT/OT/SLP PROGRESS
Occupational Therapy   Treatment    Name: Roosevelt Moser  MRN: [id number]  Admitting Diagnosis:  Acute renal failure superimposed on chronic kidney disease  2 Days Post-Op    Recommendations:     Discharge Recommendations: Moderate Intensity Therapy  Discharge Equipment Recommendations:  TBD  Barriers to discharge:      Assessment:     Roosevelt Moser is a 73 y.o. male with a medical diagnosis of Acute renal failure superimposed on chronic kidney disease.  He presents with weakness, impaired endurance, impaired self care skills, impaired functional mobility, decreased upper extremity function, decreased lower extremity function, pain, decreased ROM, edema, impaired skin, impaired coordination, impaired fine motor and orthopedic precautions.     Rehab Prognosis:  Fair; patient would benefit from acute skilled OT services to address these deficits and reach maximum level of function.       Plan:     Patient to be seen 6 x/week to address the above listed problems via self-care/home management, therapeutic activities, therapeutic exercises  Plan of Care Expires: 07/13/24  Plan of Care Reviewed with: patient, spouse    Subjective     Chief Complaint: Tired  Patient/Family Comments/goals: Patient agreed to participate in left hand/wrist exercises.   Pain/Comfort:  Pain Rating 1: 0/10    Objective:     Communicated with: nurse prior to session.  Patient found HOB elevated upon OT entry to room.    General Precautions: Standard, fall    Orthopedic Precautions: L UE non weight bearing (LUE s/p shoulder surgery 3/25/24)  Braces: N/A  Respiratory Status: Room air      Treatment & Education:  Pt was given instruction and demonstration of left hand/finger and wrist extension/flexion AROM exercises to prevent stiffness and decrease edema. Pt completed 3x10 with supervision and verbal cues.    Patient left HOB elevated with all lines intact, call button in reach, bed alarm on and patient's wife present.    GOALS:   Multidisciplinary  Problems       Occupational Therapy Goals          Problem: Occupational Therapy    Goal Priority Disciplines Outcome Interventions   Occupational Therapy Goal     OT, PT/OT Progressing    Description: Goals to be met by: 7/13/24     Patient will increase functional independence with ADLs by performing:    Feeding with [place].  UE Dressing with Moderate Assistance.  LE Dressing with Moderate Assistance.  Grooming while seated with Set-up Assistance.  Toileting from bedside commode with Moderate Assistance for hygiene and clothing management.   Bathing from  shower chair/bench with Moderate Assistance.  Toilet transfer to bedside commode with Minimal Assistance.  Increased strength and functional activity tolerance for ADL's/IADL's                         Time Tracking:     OT Date of Treatment: 06/26/24  OT Start Time: 0840  OT Stop Time: 0850  OT Total Time (min): 10 min    Billable Minutes:Therapeutic Exercise 10               6/26/2024

## 2024-06-26 NOTE — PROGRESS NOTES
Nephrology Progress Note        Patient Name: Roosevelt Moser  MRN: [id number]    Patient Class: IP- Inpatient   Admission Date: 6/14/2024  Length of Stay: 12 days  Date of Service: 6/26/2024    Attending Physician: Miko Galaviz Jr., MD  Primary Care Provider: Miguel Angel Enriquez PA-C    Reason for Consult: marbin/hyperkalemia    SUBJECTIVE:     HPI: 73M with h/o DM, HTN, AF, GBS and recent shoulder surgery was brought to ER by EMS with recurrent falls in the last 24h. Reports decreased UO but no fever, cough, SOB, dysuria. Upon admission, noted to be in MARBIN with sCr 5, baseline 1 month ago is 1, hyperkalemia with K 6, acidosis with CO2 12, hypoalbuminemia with albumin 1.6. Lactate notably 2.1. Procal 20. A1c 9.5 in 3/2024. UA with hematuria and pyuria, urine Cx pending. Notably on Apixaban. WBC in blood elevated to 20. Plt 540. RP US ordered. Afebrile, normotensive, received IVF and abx. Lokelma, ca gluconate given bicarb gtt ordered. Straight cath in ER with only 100cc urine out.    Review of Systems:  Neg    6/15  AFVSS.   UOP 1200cc plus 2 unmeasured   6/16  AFVSS.  2150 cc uop.  No distress  6/17 VSS, on RA, UOP 1.5L- updated family at bedside  6/18 VSS, on RA, UOP 1.3L  6/19 VSS, on RA, UOP 1L, with osteo R toe- s/p debridement- not interested in amputation- antbx adjusted  6/20 HR , BP stable, on 2L NC, UOP 1.2L, went for procedure, wife reports LE edema  6/21 HR 90-110s, -140s mostly, on RA, UOP 1.6L, to OR for shoulder washout and hardware removal today  6/22 VSS s/p incision and drainage for infection from left shoulder and removal of deep hardware including lizabeth and screws.  6/23 VSS. Consider resuming diuretics tomorrow.  6/24  AFVSS.  1200 cc uop plus multiple unmeasured.  He is very thirsty.    6/25  POD 1 s/p irrigation and debridementof left shoulder abscess.  VSS  2 liter uop  6/26 AFVSS. 2250 cc uop    ASSESSMENT/PLAN:     MARBIN due to ATN due to sepsis due to UTI and/or PNA  CKD stage 2 with  diabetic proteinuria and severe hypoalbuminemia  HTN  DM poorly controlled A1c 9.5  HyperK/Acidosis  HypoMg  SHPT  Anemia  Hypoalbuminemia  Edema    - renal function is improving- nonoliguric  - no acute RRT needs- no nsaids or IV contrast- dose meds for CrCl 10-50  - about 1g proteinuria- low alb is nutrition/acute phase reactant  - hold hydralazine  - hold metformin- use insulin  - hyperK/acidosis resolved  - Mg replete  - H/H stable  - optimize protein intake  --strict I/Os   --check iron stores. Can give MARIA DE JESUS if iron stores adequate       Thank you for allowing us to participate in the care of your patient!   We will follow the patient and provide recommendations as needed.         Laboratory:  Recent Labs   Lab 06/24/24  0357 06/25/24  0426 06/26/24  0349    145 145   K 3.7 3.8 3.7    107 108   CO2 24 24 24   BUN 79* 59* 58*   CREATININE 1.8* 1.6* 1.4   * 221* 203*       Recent Labs   Lab 06/20/24  0458 06/21/24  0405 06/22/24  0513 06/23/24  0849 06/24/24  0357 06/25/24  0426 06/26/24  0349   CALCIUM 8.7   < > 8.5*   < > 9.2 8.9 8.7   ALBUMIN 1.3*  --  1.2*   < > 1.4* 1.3* 1.3*   PHOS 4.2  --   --   --   --   --   --    MG  --   --  1.7  --   --   --   --     < > = values in this interval not displayed.             Recent Labs   Lab 06/23/24 2128 06/24/24  0743 06/24/24  1117 06/24/24  2115 06/25/24  0721 06/25/24  1137 06/25/24  1606 06/25/24  2102 06/26/24  0751 06/26/24  1107   POCTGLUCOSE 317* 223* 244* 247* 274* 228* 376* 281* 218* 182*       Recent Labs   Lab 07/31/23  0955 03/19/24  0830 06/14/24  1539   Hemoglobin A1C 8.8 H 9.5 H 6.3 H       Recent Labs   Lab 06/24/24  0357 06/25/24  0426 06/26/24  0349   WBC 18.15* 20.19* 17.83*   HGB 9.1* 8.6* 8.2*   HCT 28.3* 27.1* 25.8*   * 720* 694*   MCV 80* 81* 82   MCHC 32.2 31.7* 31.8*   MONO 8.3  1.5* 8.8  1.8* 10.2  1.8*   EOSINOPHIL 2.2 2.3 4.6       Recent Labs   Lab 06/24/24  0357 06/25/24  0426 06/26/24  0349   BILITOT 0.3  0.4 0.3   PROT 6.2 6.0 5.9*   ALBUMIN 1.4* 1.3* 1.3*   ALKPHOS 188* 151* 172*   ALT 18 12 18   AST 28 14 30       Recent Labs   Lab 12/16/22  1238 03/08/24  1034 03/25/24  1022 06/14/24  1337   Color, UA Yellow Yellow Yellow [place] A   Appearance, UA Clear Clear Hazy A Cloudy A   pH, UA 6.0 5.0 6.0 6.0   Specific [place], UA 1.020 1.010 1.020 1.020   Protein, UA 1+ A Trace A 1+ A 2+ A   Glucose, UA 1+ A 3+ A Negative Trace A   Ketones, UA Negative Negative Negative Negative   Urobilinogen, UA  --  Negative Negative Negative   Bilirubin (UA) Negative Negative Negative Negative   Occult Blood UA 3+ A 2+ A 2+ A 3+ A   Nitrite, UA Negative Negative Negative Negative   RBC, UA 20 H 13 H >100 H >100 H   WBC, UA 81 H 3 18 H >100 H   Bacteria Rare None Rare Many A   Hyaline Casts, UA 0  --  0 0             Microbiology Results (last 7 days)       Procedure Component Value Units Date/Time    Blood culture [[id number]] Collected: 06/23/24 0659    Order Status: Completed Specimen: Blood from Peripheral, Hand, Right Updated: 06/26/24 1032     Blood Culture, Routine No Growth to date      No Growth to date      No Growth to date      No Growth to date    Aerobic culture [[id number]] Collected: 06/24/24 1622    Order Status: Completed Specimen: Incision site from Shoulder, Left Updated: 06/26/24 0925     Aerobic Bacterial Culture No growth    Aerobic culture [[id number]] Collected: 06/21/24 1645    Order Status: Completed Specimen: Abscess from Shoulder, Left Updated: 06/25/24 0725     Aerobic Bacterial Culture No growth    Culture, Anaerobe [[id number]] Collected: 06/24/24 1622    Order Status: Sent Specimen: Incision site from Shoulder, Left Updated: 06/24/24 1920    AFB Culture & Smear [[id number]] Collected: 06/21/24 1645    Order Status: Completed Specimen: Abscess from Shoulder, Left Updated: 06/24/24 0853     AFB CULTURE STAIN No acid fast bacilli seen.     AFB CULTURE STAIN Testing performed by:     AFB CULTURE STAIN Lab  [facility]     AFB CULTURE STAIN [address] [facility]     [place] [address]     AFB CULTURE STAIN Dr. Davis Jain MD    Culture, Anaerobic [[id number]] Collected: 06/21/24 1645    Order Status: Completed Specimen: Abscess from Shoulder, Left Updated: 06/24/24 0709     Anaerobic Culture No anaerobes isolated    Aerobic culture [[id number]] Collected: 06/20/24 1204    Order Status: Completed Specimen: Abscess from Shoulder, Left Updated: 06/24/24 0700     Aerobic Bacterial Culture No growth    Gram stain [[id number]] Collected: 06/21/24 1645    Order Status: Completed Specimen: Abscess from Shoulder, Left Updated: 06/23/24 0833     Gram Stain Result Few WBC's      No organisms seen    Culture, Anaerobic [[id number]] Collected: 06/20/24 1204    Order Status: Completed Specimen: Abscess from Shoulder, Left Updated: 06/23/24 0729     Anaerobic Culture No anaerobes isolated    Fungus culture [[id number]] Collected: 06/21/24 1645    Order Status: Sent Specimen: Abscess from Shoulder, Left Updated: 06/21/24 1924    Gram stain [[id number]] Collected: 06/20/24 1204    Order Status: Completed Specimen: Abscess from Shoulder, Left Updated: 06/21/24 1520     Gram Stain Result Many WBC's      No organisms seen    Culture, Anaerobe [[id number]] Collected: 06/18/24 1251    Order Status: Completed Specimen: Wound from Toe, Right Foot Updated: 06/21/24 1428     Anaerobic Culture No anaerobes isolated    Aerobic culture [[id number]]  (Abnormal)  (Susceptibility) Collected: 06/18/24 1251    Order Status: Completed Specimen: Wound from Toe, Right Foot Updated: 06/21/24 0755     Aerobic Bacterial Culture METHICILLIN RESISTANT STAPHYLOCOCCUS AUREUS  Few  Results called to and read back by Rich Crockett RN-[facility];    06/20/2024  15:05 CJD      Fungus culture [[id number]] Collected: 06/20/24 1204    Order Status: Sent Specimen: Abscess from Shoulder, Left Updated: 06/20/24 1923    Blood culture  [[id number]] Collected: 06/14/24 1355    Order Status: Completed Specimen: Blood from Peripheral, Antecubital, Right Updated: 06/19/24 2032     Blood Culture, Routine No growth after 5 days.    Blood culture [[id number]] Collected: 06/14/24 1355    Order Status: Completed Specimen: Blood from Peripheral, Hand, Right Updated: 06/19/24 2032     Blood Culture, Routine No growth after 5 days.            Review of patient's allergies indicates:  No Known Allergies    Outpatient meds:  No current facility-administered medications on file prior to encounter.     Current Outpatient Medications on File Prior to Encounter   Medication Sig Dispense Refill    apixaban (ELIQUIS) 5 mg Tab Take 1 tablet (5 mg total) by mouth 2 (two) times daily. 60 tablet 1    atorvastatin (LIPITOR) 10 MG tablet Take 1 tablet (10 mg total) by mouth once daily. 90 tablet 3    co-enzyme Q-10 30 mg capsule Take 30 mg by mouth once daily.      doxycycline (VIBRAMYCIN) 100 MG Cap Take 100 mg by mouth 2 (two) times daily.      ergocalciferol, vitamin D2, (VITAMIN D ORAL) Take 1 tablet by mouth once daily.      glimepiride (AMARYL) 2 MG tablet Take 1 tablet (2 mg total) by mouth before breakfast. 90 tablet 3    hydrALAZINE (APRESOLINE) 25 MG tablet Take 1 tablet (25 mg total) by mouth every 12 (twelve) hours. 60 tablet 3    metFORMIN (GLUCOPHAGE-XR) 500 MG ER 24hr tablet Take 2 tablets (1,000 mg total) by mouth 2 (two) times daily with meals. 360 tablet 3    metoprolol succinate (TOPROL-XL) 100 MG 24 hr tablet Take 1 tablet (100 mg total) by mouth once daily. (Patient taking differently: Take 100 mg by mouth nightly.) 90 tablet 3       Scheduled meds:   (Magic mouthwash) 1:1:1 diphenhydrAMINE(Benadryl) 12.5mg/5ml liq, aluminum & magnesium hydroxide-simethicone (Maalox), LIDOcaine viscous 2%  10 mL Swish & Spit QID    cefTRIAXone (Rocephin) IV (PEDS and ADULTS)  2 g Intravenous Q24H    clotrimazole  10 mg Oral 5x Daily    DAPTOmycin (CUBICIN) IV (PEDS and  ADULTS)  800 mg Intravenous Q24H    insulin glargine U-100  15 Units Subcutaneous Daily    LIDOcaine  1 patch Transdermal Q24H    metoprolol succinate  100 mg Oral QHS    sodium chloride 0.9%  10 mL Intravenous Q6H       Infusions:          PRN meds:    Current Facility-Administered Medications:     albuterol-ipratropium, 3 mL, Nebulization, Q6H PRN    aluminum & magnesium hydroxide-simethicone, 15 mL, Oral, QID PRN    dextrose 10%, 12.5 g, Intravenous, PRN    dextrose 10%, 25 g, Intravenous, PRN    glucagon (human recombinant), 1 mg, Intramuscular, PRN    glucose, 16 g, Oral, PRN    glucose, 24 g, Oral, PRN    HYDROcodone-acetaminophen, 1 tablet, Oral, Q4H PRN    insulin aspart U-100, 0-10 Units, Subcutaneous, QID (AC + HS) PRN    metoprolol, 5 mg, Intravenous, Q5 Min PRN    naloxone, 0.02 mg, Intravenous, PRN    ondansetron, 4 mg, Intravenous, Q8H PRN    prochlorperazine, 5 mg, Intravenous, Q6H PRN    simethicone, 1 tablet, Oral, QID PRN    sodium chloride 0.9%, 10 mL, Intravenous, Q12H PRN    Flushing PICC/Midline Protocol, , , Until Discontinued **AND** sodium chloride 0.9%, 10 mL, Intravenous, Q6H **AND** sodium chloride 0.9%, 10 mL, Intravenous, PRN    sodium chloride 0.9%, 10 mL, Intravenous, Q12H PRN    Past Medical History:   Diagnosis Date    A-fib 2024    Diabetes mellitus, type 2 2021    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    Hyperlipidemia     Hypertension 2016     Past Surgical History:   Procedure Laterality Date    ARTHROTOMY OF SHOULDER Left 6/21/2024    Procedure: ARTHROTOMY, SHOULDER;  Surgeon: Roman Paulson MD;  Location: [facility] OR;  Service: Orthopedics;  Laterality: Left;    IRRIGATION AND DEBRIDEMENT OF UPPER EXTREMITY Left 6/24/2024    Procedure: IRRIGATION AND DEBRIDEMENT, UPPER EXTREMITY;  Surgeon: Nathan Cooper MD;  Location: [facility] OR;  Service: Orthopedics;  Laterality: Left;    OPEN REDUCTION AND INTERNAL FIXATION (ORIF) OF FRACTURE OF PROXIMAL HUMERUS Left 3/25/2024    Procedure: ORIF,  FRACTURE, HUMERUS, PROXIMAL/IM HANNA, LEFT;  Surgeon: Nathan Cooper MD;  Location: [facility];  Service: Orthopedics;  Laterality: Left;  Accumed, [employer] Small Frag set Avelino verified 3/22/24 ark     No family history on file.  Social History     Tobacco Use    Smoking status: Never    Smokeless tobacco: Never   Substance Use Topics    Alcohol use: Yes     Alcohol/week: 0.0 standard drinks of alcohol     Comment: social    Drug use: No       OBJECTIVE:     Vital Signs and IO:  Temp:  [97 °F (36.1 °C)-98.9 °F (37.2 °C)]   Pulse:  []   Resp:  []   BP: (110-126)/(53-93)   SpO2:  [92 %-94 %]   I/O last 3 completed shifts:  In: 1300 [P.O.:1300]  Out: 3550 [Urine:3550]  Wt Readings from Last 5 Encounters:   06/26/24 (!) 140.8 kg (310 lb 6.5 oz)   06/04/24 132.5 kg (292 lb 1.8 oz)   05/07/24 132.5 kg (292 lb 1.8 oz)   04/08/24 132.5 kg (292 lb 3.2 oz)   03/26/24 (!) 136.1 kg (300 lb 0.7 oz)     Body mass index is 42.1 kg/m².    Physical Exam  Constitutional:       General: Patient is not in acute distress.     Appearance: Patient is well-developed. She is not diaphoretic.   HENT:      Head: Normocephalic and atraumatic.      Mouth/Throat: Mucous membranes are moist.   Eyes:      General: No scleral icterus.     Pupils: Pupils are equal, round, and reactive to light.   Cardiovascular:      Rate and Rhythm: Normal rate and regular rhythm.   Pulmonary:      Effort: Pulmonary effort is normal. No respiratory distress.      Breath sounds: No stridor.   Abdominal:      General: There is no distension.      Palpations: Abdomen is soft.   Musculoskeletal:         General: No deformity. Normal range of motion.      Cervical back: Neck supple.   Skin:     General: Skin is warm and dry.      Findings: No rash present. No erythema.   Neurological:      Mental Status: Patient is alert and oriented to person, place, and time.      Cranial Nerves: No cranial nerve deficit.   Psychiatric:         Behavior: Behavior normal.           Patient care time was spent personally by me on the following activities:     Obtaining a history.  Examination of patient.  Providing medical care at the patients bedside.  Developing a treatment plan with patient or surrogate and bedside caregivers.  Ordering and reviewing laboratory studies, radiographic studies, pulse oximetry.  Ordering and performing treatments and interventions.  Evaluation of patient's response to treatment.  Discussions with consultants while on the unit and immediately available to the patient.  Re-evaluation of the patient's condition.  Documentation in the medical record.     Jay Talavera MD    [facility]  [address] [place] [address]  [place] [address]    [phone] - tel  [phone] - fax    6/26/2024

## 2024-06-27 LAB
ALBUMIN SERPL BCP-MCNC: 1.4 G/DL (ref 3.5–5.2)
ALP SERPL-CCNC: 176 U/L (ref 55–135)
ALT SERPL W/O P-5'-P-CCNC: 30 U/L (ref 10–44)
ANION GAP SERPL CALC-SCNC: 12 MMOL/L (ref 8–16)
AST SERPL-CCNC: 51 U/L (ref 10–40)
BACTERIA SPEC ANAEROBE CULT: NORMAL
BASOPHILS # BLD AUTO: 0.14 K/UL (ref 0–0.2)
BASOPHILS NFR BLD: 0.8 % (ref 0–1.9)
BILIRUB SERPL-MCNC: 0.3 MG/DL (ref 0.1–1)
BNP SERPL-MCNC: 456 PG/ML (ref 0–99)
BUN SERPL-MCNC: 54 MG/DL (ref 8–23)
CALCIUM SERPL-MCNC: 8.7 MG/DL (ref 8.7–10.5)
CHLORIDE SERPL-SCNC: 107 MMOL/L (ref 95–110)
CK SERPL-CCNC: 8 U/L (ref 20–200)
CO2 SERPL-SCNC: 26 MMOL/L (ref 23–29)
CREAT SERPL-MCNC: 1.4 MG/DL (ref 0.5–1.4)
CRP SERPL-MCNC: 180.2 MG/L (ref 0–8.2)
DIFFERENTIAL METHOD BLD: ABNORMAL
EOSINOPHIL # BLD AUTO: 0.8 K/UL (ref 0–0.5)
EOSINOPHIL NFR BLD: 4.4 % (ref 0–8)
ERYTHROCYTE [DISTWIDTH] IN BLOOD BY AUTOMATED COUNT: 18.1 % (ref 11.5–14.5)
EST. GFR  (NO RACE VARIABLE): 53 ML/MIN/1.73 M^2
FERRITIN SERPL-MCNC: 885 NG/ML (ref 20–300)
GLUCOSE SERPL-MCNC: 211 MG/DL (ref 70–110)
HCT VFR BLD AUTO: 25.7 % (ref 40–54)
HGB BLD-MCNC: 8.2 G/DL (ref 14–18)
IMM GRANULOCYTES # BLD AUTO: 0.42 K/UL (ref 0–0.04)
IMM GRANULOCYTES NFR BLD AUTO: 2.4 % (ref 0–0.5)
IRON SERPL-MCNC: 15 UG/DL (ref 45–160)
LYMPHOCYTES # BLD AUTO: 3.1 K/UL (ref 1–4.8)
LYMPHOCYTES NFR BLD: 18 % (ref 18–48)
MCH RBC QN AUTO: 26.1 PG (ref 27–31)
MCHC RBC AUTO-ENTMCNC: 31.9 G/DL (ref 32–36)
MCV RBC AUTO: 82 FL (ref 82–98)
MONOCYTES # BLD AUTO: 1.9 K/UL (ref 0.3–1)
MONOCYTES NFR BLD: 10.8 % (ref 4–15)
NEUTROPHILS # BLD AUTO: 11.1 K/UL (ref 1.8–7.7)
NEUTROPHILS NFR BLD: 63.6 % (ref 38–73)
NRBC BLD-RTO: 0 /100 WBC
PLATELET # BLD AUTO: 703 K/UL (ref 150–450)
PMV BLD AUTO: 9.5 FL (ref 9.2–12.9)
POCT GLUCOSE: 195 MG/DL (ref 70–110)
POCT GLUCOSE: 252 MG/DL (ref 70–110)
POCT GLUCOSE: 272 MG/DL (ref 70–110)
POCT GLUCOSE: 308 MG/DL (ref 70–110)
POTASSIUM SERPL-SCNC: 3.6 MMOL/L (ref 3.5–5.1)
PROT SERPL-MCNC: 6.1 G/DL (ref 6–8.4)
RBC # BLD AUTO: 3.14 M/UL (ref 4.6–6.2)
SATURATED IRON: 8 % (ref 20–50)
SODIUM SERPL-SCNC: 145 MMOL/L (ref 136–145)
TOTAL IRON BINDING CAPACITY: 199 UG/DL (ref 250–450)
TRANSFERRIN SERPL-MCNC: 142 MG/DL (ref 200–375)
WBC # BLD AUTO: 17.46 K/UL (ref 3.9–12.7)

## 2024-06-27 PROCEDURE — 36000706: Performed by: ORTHOPAEDIC SURGERY

## 2024-06-27 PROCEDURE — 37000008 HC ANESTHESIA 1ST 15 MINUTES: Performed by: ORTHOPAEDIC SURGERY

## 2024-06-27 PROCEDURE — 63600175 PHARM REV CODE 636 W HCPCS: Performed by: ORTHOPAEDIC SURGERY

## 2024-06-27 PROCEDURE — 25000003 PHARM REV CODE 250: Performed by: ORTHOPAEDIC SURGERY

## 2024-06-27 PROCEDURE — 87205 SMEAR GRAM STAIN: CPT | Performed by: INTERNAL MEDICINE

## 2024-06-27 PROCEDURE — 86140 C-REACTIVE PROTEIN: CPT | Performed by: ORTHOPAEDIC SURGERY

## 2024-06-27 PROCEDURE — 23030 I&D SHOULDER DEEP ABSC/HMTMA: CPT | Mod: 58,LT,, | Performed by: ORTHOPAEDIC SURGERY

## 2024-06-27 PROCEDURE — 83540 ASSAY OF IRON: CPT | Performed by: INTERNAL MEDICINE

## 2024-06-27 PROCEDURE — 11000001 HC ACUTE MED/SURG PRIVATE ROOM

## 2024-06-27 PROCEDURE — 97530 THERAPEUTIC ACTIVITIES: CPT | Mod: CQ

## 2024-06-27 PROCEDURE — 87075 CULTR BACTERIA EXCEPT BLOOD: CPT | Performed by: INTERNAL MEDICINE

## 2024-06-27 PROCEDURE — 25000003 PHARM REV CODE 250: Performed by: ANESTHESIOLOGY

## 2024-06-27 PROCEDURE — 83880 ASSAY OF NATRIURETIC PEPTIDE: CPT | Performed by: STUDENT IN AN ORGANIZED HEALTH CARE EDUCATION/TRAINING PROGRAM

## 2024-06-27 PROCEDURE — 0R9K0ZZ DRAINAGE OF LEFT SHOULDER JOINT, OPEN APPROACH: ICD-10-PCS | Performed by: ORTHOPAEDIC SURGERY

## 2024-06-27 PROCEDURE — 25000003 PHARM REV CODE 250: Performed by: NURSE ANESTHETIST, CERTIFIED REGISTERED

## 2024-06-27 PROCEDURE — 85025 COMPLETE CBC W/AUTO DIFF WBC: CPT | Performed by: ORTHOPAEDIC SURGERY

## 2024-06-27 PROCEDURE — A4216 STERILE WATER/SALINE, 10 ML: HCPCS | Performed by: ORTHOPAEDIC SURGERY

## 2024-06-27 PROCEDURE — 99233 SBSQ HOSP IP/OBS HIGH 50: CPT | Mod: ,,, | Performed by: NURSE PRACTITIONER

## 2024-06-27 PROCEDURE — 36415 COLL VENOUS BLD VENIPUNCTURE: CPT | Performed by: INTERNAL MEDICINE

## 2024-06-27 PROCEDURE — 71000039 HC RECOVERY, EACH ADD'L HOUR: Performed by: ORTHOPAEDIC SURGERY

## 2024-06-27 PROCEDURE — 80053 COMPREHEN METABOLIC PANEL: CPT | Performed by: ORTHOPAEDIC SURGERY

## 2024-06-27 PROCEDURE — 71000033 HC RECOVERY, INTIAL HOUR: Performed by: ORTHOPAEDIC SURGERY

## 2024-06-27 PROCEDURE — 82728 ASSAY OF FERRITIN: CPT | Performed by: INTERNAL MEDICINE

## 2024-06-27 PROCEDURE — 99900035 HC TECH TIME PER 15 MIN (STAT)

## 2024-06-27 PROCEDURE — 63600175 PHARM REV CODE 636 W HCPCS: Performed by: NURSE ANESTHETIST, CERTIFIED REGISTERED

## 2024-06-27 PROCEDURE — 94799 UNLISTED PULMONARY SVC/PX: CPT

## 2024-06-27 PROCEDURE — 87070 CULTURE OTHR SPECIMN AEROBIC: CPT | Performed by: INTERNAL MEDICINE

## 2024-06-27 PROCEDURE — 82550 ASSAY OF CK (CPK): CPT | Performed by: ORTHOPAEDIC SURGERY

## 2024-06-27 PROCEDURE — 36000707: Performed by: ORTHOPAEDIC SURGERY

## 2024-06-27 PROCEDURE — 37000009 HC ANESTHESIA EA ADD 15 MINS: Performed by: ORTHOPAEDIC SURGERY

## 2024-06-27 RX ORDER — FENTANYL CITRATE 50 UG/ML
INJECTION, SOLUTION INTRAMUSCULAR; INTRAVENOUS
Status: DISCONTINUED | OUTPATIENT
Start: 2024-06-27 | End: 2024-06-27

## 2024-06-27 RX ORDER — HYDROMORPHONE HYDROCHLORIDE 2 MG/ML
0.2 INJECTION, SOLUTION INTRAMUSCULAR; INTRAVENOUS; SUBCUTANEOUS EVERY 5 MIN PRN
Status: DISCONTINUED | OUTPATIENT
Start: 2024-06-27 | End: 2024-06-27 | Stop reason: HOSPADM

## 2024-06-27 RX ORDER — DEXAMETHASONE SODIUM PHOSPHATE 4 MG/ML
INJECTION, SOLUTION INTRA-ARTICULAR; INTRALESIONAL; INTRAMUSCULAR; INTRAVENOUS; SOFT TISSUE
Status: DISCONTINUED | OUTPATIENT
Start: 2024-06-27 | End: 2024-06-27

## 2024-06-27 RX ORDER — SUCCINYLCHOLINE CHLORIDE 20 MG/ML
INJECTION INTRAMUSCULAR; INTRAVENOUS
Status: DISCONTINUED | OUTPATIENT
Start: 2024-06-27 | End: 2024-06-27

## 2024-06-27 RX ORDER — FENTANYL CITRATE 50 UG/ML
25 INJECTION, SOLUTION INTRAMUSCULAR; INTRAVENOUS EVERY 5 MIN PRN
Status: DISCONTINUED | OUTPATIENT
Start: 2024-06-27 | End: 2024-06-27 | Stop reason: HOSPADM

## 2024-06-27 RX ORDER — PHENYLEPHRINE HYDROCHLORIDE 10 MG/ML
INJECTION INTRAVENOUS
Status: DISCONTINUED | OUTPATIENT
Start: 2024-06-27 | End: 2024-06-27

## 2024-06-27 RX ORDER — LIDOCAINE HYDROCHLORIDE 20 MG/ML
INJECTION INTRAVENOUS
Status: DISCONTINUED | OUTPATIENT
Start: 2024-06-27 | End: 2024-06-27

## 2024-06-27 RX ORDER — ROCURONIUM BROMIDE 10 MG/ML
INJECTION, SOLUTION INTRAVENOUS
Status: DISCONTINUED | OUTPATIENT
Start: 2024-06-27 | End: 2024-06-27

## 2024-06-27 RX ORDER — OXYCODONE HYDROCHLORIDE 5 MG/1
5 TABLET ORAL
Status: DISCONTINUED | OUTPATIENT
Start: 2024-06-27 | End: 2024-06-27 | Stop reason: HOSPADM

## 2024-06-27 RX ORDER — PROPOFOL 10 MG/ML
VIAL (ML) INTRAVENOUS
Status: DISCONTINUED | OUTPATIENT
Start: 2024-06-27 | End: 2024-06-27

## 2024-06-27 RX ORDER — ONDANSETRON HYDROCHLORIDE 2 MG/ML
INJECTION, SOLUTION INTRAMUSCULAR; INTRAVENOUS
Status: DISCONTINUED | OUTPATIENT
Start: 2024-06-27 | End: 2024-06-27

## 2024-06-27 RX ADMIN — CEFAZOLIN 2 G: 2 INJECTION, POWDER, FOR SOLUTION INTRAMUSCULAR; INTRAVENOUS at 06:06

## 2024-06-27 RX ADMIN — PHENYLEPHRINE HYDROCHLORIDE 100 MCG: 10 INJECTION INTRAVENOUS at 07:06

## 2024-06-27 RX ADMIN — LIDOCAINE HYDROCHLORIDE 10 ML: 20 SOLUTION ORAL; TOPICAL at 08:06

## 2024-06-27 RX ADMIN — INSULIN GLARGINE 15 UNITS: 100 INJECTION, SOLUTION SUBCUTANEOUS at 09:06

## 2024-06-27 RX ADMIN — CLOTRIMAZOLE 10 MG: 10 LOZENGE ORAL at 05:06

## 2024-06-27 RX ADMIN — METOPROLOL SUCCINATE 100 MG: 50 TABLET, FILM COATED, EXTENDED RELEASE ORAL at 08:06

## 2024-06-27 RX ADMIN — OXYCODONE HYDROCHLORIDE 5 MG: 5 TABLET ORAL at 08:06

## 2024-06-27 RX ADMIN — HYDROCODONE BITARTRATE AND ACETAMINOPHEN 1 TABLET: 10; 325 TABLET ORAL at 11:06

## 2024-06-27 RX ADMIN — SODIUM CHLORIDE, SODIUM GLUCONATE, SODIUM ACETATE, POTASSIUM CHLORIDE, MAGNESIUM CHLORIDE, SODIUM PHOSPHATE, DIBASIC, AND POTASSIUM PHOSPHATE: .53; .5; .37; .037; .03; .012; .00082 INJECTION, SOLUTION INTRAVENOUS at 06:06

## 2024-06-27 RX ADMIN — CLOTRIMAZOLE 10 MG: 10 LOZENGE ORAL at 09:06

## 2024-06-27 RX ADMIN — CLOTRIMAZOLE 10 MG: 10 LOZENGE ORAL at 01:06

## 2024-06-27 RX ADMIN — LIDOCAINE HYDROCHLORIDE 10 ML: 20 SOLUTION ORAL; TOPICAL at 01:06

## 2024-06-27 RX ADMIN — HYDROCODONE BITARTRATE AND ACETAMINOPHEN 1 TABLET: 10; 325 TABLET ORAL at 03:06

## 2024-06-27 RX ADMIN — INSULIN ASPART 6 UNITS: 100 INJECTION, SOLUTION INTRAVENOUS; SUBCUTANEOUS at 09:06

## 2024-06-27 RX ADMIN — CEFTRIAXONE SODIUM 2 G: 2 INJECTION, POWDER, FOR SOLUTION INTRAMUSCULAR; INTRAVENOUS at 10:06

## 2024-06-27 RX ADMIN — SODIUM CHLORIDE, PRESERVATIVE FREE 10 ML: 5 INJECTION INTRAVENOUS at 05:06

## 2024-06-27 RX ADMIN — LIDOCAINE 5% 1 PATCH: 700 PATCH TOPICAL at 05:06

## 2024-06-27 RX ADMIN — ROCURONIUM BROMIDE 5 MG: 10 INJECTION, SOLUTION INTRAVENOUS at 07:06

## 2024-06-27 RX ADMIN — HYDROCODONE BITARTRATE AND ACETAMINOPHEN 1 TABLET: 10; 325 TABLET ORAL at 05:06

## 2024-06-27 RX ADMIN — INSULIN ASPART 8 UNITS: 100 INJECTION, SOLUTION INTRAVENOUS; SUBCUTANEOUS at 04:06

## 2024-06-27 RX ADMIN — INSULIN ASPART 1 UNITS: 100 INJECTION, SOLUTION INTRAVENOUS; SUBCUTANEOUS at 09:06

## 2024-06-27 RX ADMIN — SODIUM CHLORIDE, PRESERVATIVE FREE 10 ML: 5 INJECTION INTRAVENOUS at 11:06

## 2024-06-27 RX ADMIN — LIDOCAINE HYDROCHLORIDE 10 ML: 20 SOLUTION ORAL; TOPICAL at 04:06

## 2024-06-27 RX ADMIN — SUCCINYLCHOLINE CHLORIDE 120 MG: 20 INJECTION, SOLUTION INTRAMUSCULAR; INTRAVENOUS at 07:06

## 2024-06-27 RX ADMIN — DEXAMETHASONE SODIUM PHOSPHATE 8 MG: 4 INJECTION, SOLUTION INTRA-ARTICULAR; INTRALESIONAL; INTRAMUSCULAR; INTRAVENOUS; SOFT TISSUE at 07:06

## 2024-06-27 RX ADMIN — ONDANSETRON 4 MG: 2 INJECTION INTRAMUSCULAR; INTRAVENOUS at 06:06

## 2024-06-27 RX ADMIN — FENTANYL CITRATE 50 MCG: 0.05 INJECTION, SOLUTION INTRAMUSCULAR; INTRAVENOUS at 06:06

## 2024-06-27 RX ADMIN — PROPOFOL 100 MG: 10 INJECTION, EMULSION INTRAVENOUS at 07:06

## 2024-06-27 RX ADMIN — DAPTOMYCIN 800 MG: 500 INJECTION, POWDER, LYOPHILIZED, FOR SOLUTION INTRAVENOUS at 04:06

## 2024-06-27 RX ADMIN — INSULIN ASPART 6 UNITS: 100 INJECTION, SOLUTION INTRAVENOUS; SUBCUTANEOUS at 11:06

## 2024-06-27 RX ADMIN — LIDOCAINE HYDROCHLORIDE 75 MG: 20 INJECTION, SOLUTION INTRAVENOUS at 07:06

## 2024-06-27 NOTE — ANESTHESIA PREPROCEDURE EVALUATION
06/27/2024  Roosevelt Msoer is a 73 y.o., male.      Pre-op Assessment          Review of Systems  Cardiovascular:     Hypertension                                  Hypertension     Atrial Fibrillation     Pulmonary:        Sleep Apnea     Obstructive Sleep Apnea (MARA).           Renal/:  Chronic Renal Disease        Kidney Function/Disease             Musculoskeletal:  Arthritis        Arthritis          Neurological:    Neuromuscular Disease,           Arthritis                         Neuromuscular Disease   Endocrine:  Diabetes    Diabetes                          Physical Exam  General: Well nourished        Anesthesia Plan  Type of Anesthesia, risks & benefits discussed:    Anesthesia Type: Gen ETT  Intra-op Monitoring Plan: Standard ASA Monitors  Post Op Pain Control Plan: multimodal analgesia and IV/PO Opioids PRN  Induction:  IV  Informed Consent: Informed consent signed with the Patient and all parties understand the risks and agree with anesthesia plan.  All questions answered.   ASA Score: 4  Day of Surgery Review of History & Physical: H&P Update referred to the surgeon/provider.    Ready For Surgery From Anesthesia Perspective.     .

## 2024-06-27 NOTE — PLAN OF CARE
Pt accepted at [facility], pending auth.    9:14am - Called Jj, [phone] ext. [phone], to f/u on status of auth request, left message for Lee De La Cruz, clinical advisor.    Received return call from Lee at [facility], offering peer to peer (P2P), must be scheduled by 9am  6/28.     Met with Sanchez and Dr. Galaviz regarding P2P, Dr declined P2P offer, at this time. Pt not medically cleared, anticipate dc sometimes next week, will have [facility] submit for auth closer to discharge.    10:38am - Called [facility]'s P2P dept, [phone], spoke to Anayeli and declined P2P.    1:25pm - Called wife and gave and informed of insurance [facility] denial but plan is to resubmit next week.  Wife acknowledged understanding.     06/27/24 0913   Post-Acute Status   Post-Acute Authorization Placement   Post-Acute Placement Status Pending payor review/awaiting authorization (if required)   Discharge Plan   Discharge Plan A Long-term acute care facility (LTAC)   Discharge Plan B Long-term acute care facility (LTAC)

## 2024-06-27 NOTE — PROGRESS NOTES
[facility]/Surg   Department of Infectious Disease  Progress Note    PATIENT NAME: Roosevelt Moser  MRN: [id number]  TODAY'S DATE: 06/27/2024  ADMIT DATE: 6/14/2024  LOS: 13 days  CHIEF COMPLAINT: Weakness (Pt brought to ED via EMS with complaint of weakness and falling, pt advised EMS his urine is very dark.  )    PRINCIPLE PROBLEM: Acute renal failure superimposed on chronic kidney disease      INTERVAL HISTORY     06/24/24@ (Denette): Patient is lying in bed awake and alert with his wife at bedside.  He was status post washout of his left shoulder.  Per Orthopedic surgery lifting much better with no pus or fluid collections and looked clean and healthy.   He has no complaints.  No diarrhea with a bowel movement today, no nausea vomiting, states he has a fair appetite, no fevers or chills.   Much improved mouth dryness.  T-max 98.6° in last 24 hours.   WBC 17.46, platelets 703, H&H 8.2/25.7, no left shift, no bands, BUN/ CR 54/1.4 with estimated creatinine clearance 69.4.  6/24 cultures with no growth, 6/21 cultures no growth.  6/20 cultures negative.  6/18 culture from right 2nd toe with MRSA.  CRP trending down at 180, BNP is elevated at 456 and total CK is 8.    6/26:  Patient seen and examined, wife present.  He is awake, alert, NPO for 3rd washout of left shoulder.  He reports he is feeling better, awaiting surgery to have something to eat after procedure.  Left shoulder with wound VAC place draining significant amounts of bloody purulent fluid.  Hypertensive, afebrile.  Adequate urinary output, had 2 bowel movements in the last 24 hours.  Significant upper extremity edema decreasing, persistent bilateral lower extremity pitting edema.  Labs reviewed, leukocytosis 17.8, no left shift, H&H 8.2/25.8, platelet count 694, reactive thrombocytosis.  Stable electrolytes, creatinine down to 1.4, MARBIN improved, creatinine clearance 69.4 mL/min, normal LFTs, CRP down to 192.2.  Micro reviewed, OR cultures  from 06/21 no growth to date, pending final.    6/25:  Patient seen and examined, wife present.  Patient lying in bed, has wound VAC placed to left shoulder.  Discussed with Ortho, very difficult situation in the setting of nonunion infected fracture.  As per operation note: After removing packing there was a large collection of bloody brownish fluid.  Irrigated.  Incision was extended 5 cm distally.  Pulse down the wound with 12 L of fluid.  Cultures were taken.  After extensive washout able to pass through the abscess cavity and a wound VAC was placed into cavity.  Evidence of no union or whatsoever.  The patient has an infected nonunion discussed with Hospital Medicine as well, plan for serial washouts in the OR for source control.  Slightly hypertensive, tachycardic, afebrile.  Adequate urinary output, had 2 bowel movements in the last 24 hours.  He did not get steroids preop, last dose dexamethasone 8 mg once on 06/21.  Labs reviewed, leukocytosis 20.1, no left shift, H&H 8.6/27.1, MCV 81, platelet count 720, reactive thrombocytosis.  Stable electrolytes, MARBIN, creatinine down to 1.6, creatinine clearance 60.6 mL/min, improving, normal LFTs, CRP remains taylor high 219.3, procalcitonin continues to fall 0.7.  We do not need to check this daily.  Echo unable to fully visualize oral valves.    6/24/24 (Pranay):  Patient seen and examined, wife present.  He is NPO for procedure today by Ortho, I&D and washout of left shoulder.  Patient awake, alert.  Hypertensive, T-max 99.5°, currently afebrile.  Adequate urinary output, had a bowel movement in the last 24 hours.  Labs reviewed, leukocytosis 18.1, no left shift, H&H 9.1/28.3, MCV 80, reactive thrombocytosis 721.  Sed rate extremely high 128, .  Stable electrolytes, MARBIN creatinine trending down 1.8, clearance 53.9 mL/min, baseline CPK 30.  Procalcitonin down to 0.97.  Last vancomycin random level 13.5.    06/23/2024.  Sleeping -- I did not disturb Afebrile.   "T-max 99.5° WBC 21.3--19.8--17.  CRP is quite elevated at 205.  .  Procalcitonin 1.39.  Vancomycin random 18.  Cultures from shoulder no growth to date.    Gram stain yesterday was read as Gram-positive cocci--today it is changed to  "no organism"  GOing for another procedure tomorrow    06/22/2024.  Very pleasant.  So is his wife.  Patient is Afebrile.  He has swelling over the left arm, slightly improved.  Pain is well-controlled.    WBC 19--21--21.3--19.8.  He did receive dexamethasone yesterday by anesthesia, at the time of left shoulder surgery.  He is on ceftriaxone clindamycin and vancomycin.  Cultures reviewed:  right 2nd toe wound had grown MRSA.  Left intraop cultures no growth to date.  Left shoulder intraop, gram stain are showing Gram-positive cocci.  He is trying to move his legs in bed, but he is still  weak.  Wife and patient are able to do IV antibiotics at home, but all things considered, it is very cumbersome for them;  they are interested in placement, which I agree, it is the right course of action.    06/21/2024:  Seen by Orthopedic surgery Service again yesterday.  For I and D today.  All cultures so far negative.  ASO titer normal.    06/20/2024:  Oral anticoagulants on hold to allow left shoulder arthrocentesis.  No other acute issues overnight.  Culture from right 3rd toe growing Staphylococcus aureus.  Blood culture remain negative.  Had aspiration left shoulder today that yielded purulent material.  Synovial fluid studies in progress.    06/19/2024: Seen and evaluated at bedside.  States left upper extremity pain better.  Having improved movement at the wrist and forearm.  Seen by Orthopedic surgery PA yesterday.  Notes reviewed.  Blood cultures remain negative.        Antibiotics (From admission, onward)      Start     Stop Route Frequency Ordered    06/24/24 1630  DAPTOmycin (CUBICIN) 800 mg in 0.9% NaCl SolP 50 mL IVPB         -- IV Every 24 hours (non-standard times) 06/24/24 " 1526    06/19/24 1045  cefTRIAXone (ROCEPHIN) 2 g in dextrose 5 % in water (D5W) 100 mL IVPB (MB+)         -- IV Every 24 hours (non-standard times) 06/19/24 0932    06/17/24 2100  mupirocin 2 % ointment         06/22/24 2059 Nasl 2 times daily 06/17/24 1544          Antifungals (From admission, onward)      Start     Stop Route Frequency Ordered    06/19/24 1045  clotrimazole megha 10 mg         06/29/24 0959 Oral 5 times daily 06/19/24 0932           Antivirals (From admission, onward)      None            ASSESSMENT and PLAN     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  Right 3rd toe osteomyelitis s/p Dalvance x 2 doses    3.  Osteomyelitis distal tuft of right 2nd toe   Wound cultures 6/18 MRSA, vanco JESSENIA 2      4.  MARBIN, improving Estimated Creatinine Clearance: 69.4 mL/min (based on SCr of 1.4 mg/dL).       5.  Aphthous ulcers on soft palate/oropharynx    6.  PMHx:  Diabetes, last A1c 6.3%, PID, CHF EF 50%     RECOMMENDATIONS:    Continue daptomycin 800 mg IV daily   Holding statins while on daptomycin   Check CPK twice a week   Continue Rocephin 2 g IV daily   Above empirically for septic left shoulder prosthetic joint infection  Trend CRP and ESR  Continue magic mouthwash 10 mL 4 times a day, swish and swallow complete 7 days  Follow cultures   PT/OT as tolerated   Patient would benefit from LTAC  Aspiration precautions  XR right 3rd toe    D/W Dr Pires    Please send Epic secure chat with any questions.       SUBJECTIVE    Roosevelt Moser is a 73 y.o. male with history of diabetes mellitus, hypertension and previous going bowel syndrome. He fell on 03/04/2024 and sustained a displaced comminuted fracture of the left humerus.  Seen by orthopedic surgeon with plan for ORIF which was deferred because of new AFib.  Admitted 03/09/2024 for rate control and ultimately discharged back home 03/12/2024.  Later had left humerus ORIF 03/25/2024 as a day procedure and was discharged home.       According to his wife, he developed pain and swelling of the 3rd toe and was with an ulcer.  It appears an x-ray of the foot was unremarkable.  Received 2 doses of an antibiotic that I presume to be dalbavancin 1 week apart in early April.     In the last 2 weeks he has continued to decline.  He also has reduction in urine output.  Fell twice at home on 06/13/2024 and wife activated EMS and he was brought to the hospital.  Received IV fluid for low normal blood pressure.  Also noted to have MARBIN with creatinine 5.0 and WBC was 21 K.  UA was abnormal with> 100 WBC,> 100 RBC, 3+ LE and positive nitrite.  He has been managed conservatively for MARBIN and for dehydration.     X-ray of left shoulder and left upper extremity 06/17/2024 documented gas in soft tissue.  MRI right foot documented osteomyelitis of the 3rd toe.  I was asked to see to assist with his care.  ESR 95,  milligram/liter.     Antibiotics   Ceftriaxone:  06/14/2024-06/17/2024, 06/19/2024-  Zosyn:  06/17/2024-06/18/2024   Vancomycin:  06/18/2024-6/24/2024  Clindamycin:  06/18/2024-6/22/2024     Cultures   Blood culture 06/14/2024:  NGTD   Urine culture 06/14/2024: NGTD  Right second toe culture 06/18/2024: MRSA  Left shoulder synovial fluid culture 06/20/2024:  No growth      Review of Systems  Review of systems obtained and negative except as stated above in Interval History     OBJECTIVE   Temp:  [98 °F (36.7 °C)-98.6 °F (37 °C)] 98.4 °F (36.9 °C)  Pulse:  []  83  Resp:  [16-65] 18  SpO2:  [84 %-98 %] 93 %  BP: (104-206)/(53-96) 106/62  Temp:  [98 °F (36.7 °C)-98.6 °F (37 °C)]   Temp: 98.4 °F (36.9 °C) (06/27/24 0902)  Pulse: 83 (06/27/24 0902)  Resp: 18 (06/27/24 0902)  BP: 106/62 (06/27/24 0902)  SpO2: (!) 93 % (06/27/24 0902)    Intake/Output Summary (Last 24 hours) at 6/27/2024 1038  Last data filed at 6/27/2024 0755  Gross per 24 hour   Intake 970 ml   Output 3400 ml   Net -2430 ml       Physical Exam  General:   lying in bed awake and alert, he has in no distress, pleasant and conversant.  Eyes: Eyes with no icterus or injection. Vision grossly normal  Ears: Hearing grossly normal.  Nose: Nares patent  Mouth: Moist mucous membranes, dentition is fair.  Oropharynx with resolving aphthous ulcers.  Neck: Supple  Cardiovascular: Regular rate and rhythm, no murmurs,   Much improved upper extremity edema, persistent bilateral lower extremity pitting edema  Respiratory:  Poor inspiratory effort, mostly clear to auscultation bilaterally, no tachypnea or increased work of breathing.  Gastrointestinal:  Soft and obese with active bowel sounds, no tenderness to palpation, no distention.  Genitourinary:  PureWick in place.  No suprapubic tenderness.  Musculoskeletal:  Except for left shoulder, moves all extremities with good strength.    Skin:  Right great toe with necrotic tip of toe, right 3rd toe is purplish colored, all toes and forefoot are cool to touch, 3+ pedal pulse,  left shoulder with dressing in place, wound VAC was removed.  Neuro:   Oriented, conversant, follows commands.  Psych: Good mood, normal affect.     WOUNDS:    6/27 6/25:      6/24:                            Significant Labs: All pertinent labs within the past 24 hours have been reviewed.    CBC LAST 7 DAYS  Recent Labs   Lab 06/21/24  0410 06/22/24  0513 06/23/24  0849 06/23/24  0849 06/24/24  0357 06/25/24  0426 06/26/24  0349 06/27/24  0424   WBC 21.39* 19.81* 17.37*  --  18.15* 20.19* 17.83*  17.46*   RBC 3.73* 3.33* 3.27*  --  3.52* 3.34* 3.14* 3.14*   HGB 9.8* 8.6* 8.6*  --  9.1* 8.6* 8.2* 8.2*   HCT 30.1* 26.8* 26.2*  --  28.3* 27.1* 25.8* 25.7*   MCV 81* 81* 80*  --  80* 81* 82 82   MCH 26.3* 25.8* 26.3*  --  25.9* 25.7* 26.1* 26.1*   MCHC 32.6 32.1 32.8  --  32.2 31.7* 31.8* 31.9*   RDW 17.8* 17.7* 17.6*  --  17.9* 17.8* 18.1* 18.1*   * 549* 669*  --  721* 720* 694* 703*   MPV 10.1 9.9 10.1  --  9.9 9.5 9.8 9.5   GRAN  --   --  71.5  12.4*   < > 70.9  12.9* 72.0  14.5* 65.1  11.6* 63.6  11.1*   LYMPH  --   --  13.0*  2.3   < > 13.9*  2.5 13.1*  2.6 17.3*  3.1 18.0  3.1   MONO  --   --  8.6  1.5*   < > 8.3  1.5* 8.8  1.8* 10.2  1.8* 10.8  1.9*   BASO  --   --  0.09  --  0.09 0.14 0.11 0.14   NRBC  --   --  0  --  0 0 0 0    < > = values in this interval not displayed.       CHEMISTRY LAST 7 DAYS  Recent Labs   Lab 06/21/24  0405 06/22/24  0513 06/23/24  0849 06/24/24  0357 06/25/24  0426 06/26/24  0349 06/27/24  0424    138 140 142 145 145 145   K 3.7 4.4 3.9 3.7 3.8 3.7 3.6    102 102 104 107 108 107   CO2 25 25 26 24 24 24 26   ANIONGAP 13 11 12 14 14 13 12   BUN 82* 76* 72* 79* 59* 58* 54*   CREATININE 2.4* 2.4* 2.0* 1.8* 1.6* 1.4 1.4   * 257* 229* 246* 221* 203* 211*   CALCIUM 9.0 8.5* 8.7 9.2 8.9 8.7 8.7   MG  --  1.7  --   --   --   --   --    ALBUMIN  --  1.2* 1.3* 1.4* 1.3* 1.3* 1.4*   PROT  --  5.8* 5.8* 6.2 6.0 5.9* 6.1   ALKPHOS  --  217* 195* 188* 151* 172* 176*   ALT  --  13 14 18 12 18 30   AST  --  20 23 28 14 30 51*   BILITOT  --  0.3 0.3 0.3 0.4 0.3 0.3       Estimated Creatinine Clearance: 69.4 mL/min (based on SCr of 1.4 mg/dL).    INFLAMMATORY/PROCAL    Lab Results   Component Value Date    .2 (H) 06/27/2024    .2 (H) 06/26/2024    .3 (H) 06/25/2024    .5 (H) 06/24/2024    .0 (H) 06/23/2024    .0 (H) 06/18/2024          Component Ref Range & Units 2 d ago   Body Fluid Type  Abscess fluid, left  shoulder   Fluid Appearance  Cloudy   Fluid Color  Pink   WBC, Body Fluid /cu mm SEE COMMENT   Comment: Reference ranges for body fluids not established.  Correlate clinically.  Test not performed  Cell count not performed on abscess fluid, see differential.   Segs, Fluid % 79   Lymphs, Fluid % 14   Monocytes/Macrophages, Fluid % 7        PRIOR  MICROBIOLOGY:    Susceptibility data from last 90 days.  Collected Specimen Info Organism Ceftriaxone Clindamycin Erythromycin Oxacillin Penicillin Tetracycline Trimeth/Sulfa Vancomycin   06/18/24 Wound from Toe, Right Foot Methicillin resistant Staphylococcus aureus  R  S  R  R  R  S  S  S   06/14/24 Blood from Peripheral, Antecubital, Right No growth after 5 days.           06/14/24 Blood from Peripheral, Hand, Right No growth after 5 days.               LAST 7 DAYS MICROBIOLOGY   Microbiology Results (last 7 days)       Procedure Component Value Units Date/Time    Blood culture [[id number]] Collected: 06/23/24 0659    Order Status: Completed Specimen: Blood from Peripheral, Hand, Right Updated: 06/27/24 1032     Blood Culture, Routine No Growth to date      No Growth to date      No Growth to date      No Growth to date      No Growth to date    Aerobic culture [[id number]] Collected: 06/24/24 1622    Order Status: Completed Specimen: Incision site from Shoulder, Left Updated: 06/27/24 0912     Aerobic Bacterial Culture No growth    Gram stain [[id number]] Collected: 06/27/24 0733    Order Status: Sent Specimen: Incision site from Shoulder, Left Updated: 06/27/24 0733    Culture, Anaerobe [[id number]] Collected: 06/27/24 0733    Order Status: Sent Specimen: Incision site from Shoulder, Left Updated: 06/27/24 0733    Aerobic culture [[id number]] Collected: 06/27/24 0733    Order Status: Sent Specimen: Incision site from Shoulder, Left Updated: 06/27/24 0733    Aerobic culture [[id number]] Collected: 06/21/24 1645    Order Status: Completed Specimen: Abscess from Shoulder,  Left Updated: 06/25/24 0725     Aerobic Bacterial Culture No growth    Culture, Anaerobe [[id number]] Collected: 06/24/24 1622    Order Status: Sent Specimen: Incision site from Shoulder, Left Updated: 06/24/24 1920    AFB Culture & Smear [[id number]] Collected: 06/21/24 1645    Order Status: Completed Specimen: Abscess from Shoulder, Left Updated: 06/24/24 0853     AFB CULTURE STAIN No acid fast bacilli seen.     AFB CULTURE STAIN Testing performed by:     AFB CULTURE STAIN Lab [facility]     AFB CULTURE STAIN [address] [facility]     [place] [address]     AFB CULTURE STAIN Dr. Davis Jain MD    Culture, Anaerobic [[id number]] Collected: 06/21/24 1645    Order Status: Completed Specimen: Abscess from Shoulder, Left Updated: 06/24/24 0709     Anaerobic Culture No anaerobes isolated    Aerobic culture [[id number]] Collected: 06/20/24 1204    Order Status: Completed Specimen: Abscess from Shoulder, Left Updated: 06/24/24 0700     Aerobic Bacterial Culture No growth    Gram stain [[id number]] Collected: 06/21/24 1645    Order Status: Completed Specimen: Abscess from Shoulder, Left Updated: 06/23/24 0833     Gram Stain Result Few WBC's      No organisms seen    Culture, Anaerobic [[id number]] Collected: 06/20/24 1204    Order Status: Completed Specimen: Abscess from Shoulder, Left Updated: 06/23/24 0729     Anaerobic Culture No anaerobes isolated    Fungus culture [[id number]] Collected: 06/21/24 1645    Order Status: Sent Specimen: Abscess from Shoulder, Left Updated: 06/21/24 1924    Gram stain [[id number]] Collected: 06/20/24 1204    Order Status: Completed Specimen: Abscess from Shoulder, Left Updated: 06/21/24 1520     Gram Stain Result Many WBC's      No organisms seen    Culture, Anaerobe [[id number]] Collected: 06/18/24 1251    Order Status: Completed Specimen: Wound from Toe, Right Foot Updated: 06/21/24 1428     Anaerobic Culture No anaerobes isolated    Aerobic culture  [[id number]]  (Abnormal)  (Susceptibility) Collected: 06/18/24 1251    Order Status: Completed Specimen: Wound from Toe, Right Foot Updated: 06/21/24 0755     Aerobic Bacterial Culture METHICILLIN RESISTANT STAPHYLOCOCCUS AUREUS  Few  Results called to and read back by Rich Crockett RN-SMEH-DOUPDARIEN;    06/20/2024  15:05 CJD      Fungus culture [[id number]] Collected: 06/20/24 1204    Order Status: Sent Specimen: Abscess from Shoulder, Left Updated: 06/20/24 1923              CURRENT/PREVIOUS VISIT EKG  Results for orders placed or performed during the hospital encounter of 06/14/24   EKG 12-lead    Collection Time: 06/14/24 12:16 PM   Result Value Ref Range    QRS Duration 106 ms    [facility] QTC Calculation 443 ms    Narrative    Test Reason : R53.1,    Vent. Rate : 120 BPM     Atrial Rate : 159 BPM     P-R Int : 000 ms          QRS Dur : 106 ms      QT Int : 314 ms       P-R-T Axes : 000 -54 093 degrees     QTc Int : 443 ms    Atrial fibrillation with rapid ventricular response  Left axis deviation  Low voltage QRS  Inferior infarct ,age undetermined  Cannot rule out Anterior infarct ,age undetermined  Abnormal ECG  When compared with ECG of 25-MAR-2024 09:14,  Vent. rate has increased BY  46 BPM  Minimal criteria for Anterior infarct are now Present  Confirmed by Jseús HATHAWAY, Hansel AMIN ([id number]) on 6/20/2024 8:43:02 PM    Referred By: [email or web address]   SELF           Confirmed By:Hansel Espinosa MD     ECHO:       Extremely limited visualization of all cardiac structures.  No clinically significant determination can be made based on this echocardiogram.  If cardiac concerns persist, consider alternative cardiac imaging like RICKY for further evaluation.    Left Ventricle: Left ventricle was not well visualized due to poor sonic window. The left ventricle is normal in size. Unable to assess wall motion. There is normal systolic function with a visually estimated ejection fraction of 55 - 60%.    Right Ventricle: Right ventricle  was not well visualized due to poor acoustic window.    Left Atrium: Left atrium was not well visualized.    Mitral Valve: There is no stenosis. The mean pressure gradient across the mitral valve is 2 mmHg at a heart rate of  bpm. There is mild regurgitation.    IVC/SVC: Elevated venous pressure at 15 mmHg.    Significant Imaging: I have reviewed all relevant and available imaging results/findings within the past 24 hours.    06/18/2024.  CT left shoulder   1. Subacute left humerus fracture post internal fixation.  There is incomplete bony bridging across the fracture site, with persistent angulation between the dominant bony fragments as hardware is only partially engaged (see discussion).  2. Severe arthropathy of the glenohumeral joint with multiple intra-articular bodies, some interposed.  3. Large left joint effusion and adjacent soft tissue focal fluid collections containing gas and concerning for gas-forming infection.  4. Moderate size left pleural effusion.  5. Left basilar airspace disease is presumably atelectasis with pneumonia as a less favored consideration.  This report was flagged in Epic as abnormal.    X-ray on 06/17, prior to removal of hardware.      I spent a total of 55 minutes on the day of the visit.This includes face to face time and non-face to face time preparing to see the patient (eg, review of tests), obtaining and/or reviewing separately obtained history, documenting clinical information in the electronic or other health record, independently interpreting results and communicating results to the patient/family/caregiver, or care coordinator.    Fabiola Salgado NP  Date of Service: 06/27/2024      This note was created using [email or web address] voice recognition software that occasionally misinterpreted phrases or words.

## 2024-06-27 NOTE — PROGRESS NOTES
[facility] Medicine  Progress Note    Patient Name: Roosevelt Moser  MRN: [id number]  Patient Class: IP- Inpatient   Admission Date: 6/14/2024  Length of Stay: 13 days  Attending Physician: Miko Galaviz Jr., MD  Primary Care Provider: Miguel Angel Enriquez PA-C        Subjective:     Principal Problem:Acute renal failure superimposed on chronic kidney disease        HPI:  Roosevelt Moser is a 73 year old male with a previous medical history of atrial fibrillation on eliquis, HTN, DMII, obstructive sleep apnea, HLD, ORIF of left humerus and guillian-[place] who presented to the emergency room for weakness and multiple falls. Per wife of the patient he has had progressive weakness over the past week along with two falls one at 0300 Thursday and the other early morning Friday. Both time she had to activate EMS to pick patient up off floor due to weakness. Patient refused transport to hospital on both occasions. Today he slid out of his chair and was unable to get up without assistance and at this point the wife activated EMS to transport patient to the hospital. She endorses that two days ago she noticed that the patient had stopped urinating as he normally does. The patient concurs that he has noticed that his urine has decreased over the past two days and that it is dark. Patient denies dysuria or incomplete bladder emptying. Bladder scan showed zero upon admit and urine sample was obtained via straight cath in ED. Patient denies decreased PO intake and keeps a bottle of water with him at all times. Initial ED work-up showed a creatinine of 5 and potassium of 6. Urine studies positive for infection and WBC 20.81 with procalcitonin at 19.75. Blood cultures obtained and patient given 1 gram of rocephin and 1000ml of normal saline. Patient noted to bein afib with RVR and 20mg of diltiazem was administered IV which resulted in low blood pressure and 1 gram of calcium gluconate was administered.  "Patient admitted by hospital medicine for further evaluation and management     Overview/Hospital Course:  6/15/24  Assumed care today, pt seen and examined, chart reviewed.  Pt sitting up in bed, wife at bedside, feeling better.  Off dilt drip since this AM, in AF on monitor but rate down to 80s.   Denies cp/sob.  Has been weak and debilitated, await PT/OT to see what post acute needs will be.    6/16/24  Pt resting in NAD.  Advised by CM yesterday wife not willing/able to take him home at this time - We talked about that frankly today and she says she is unable to care for him, planning on SNF, hopefully regain enough function to allow d/c home from there. Pt w/ slow improvement.  Feels pretty good.  Appetite not too good, he says he's been deliberately eating small quantities of food at home to lose weight and feels it's been working.  Note pain/swelling LUE where he had a recent fx/ORIF.    6/17/24  Pt doing reasonably well, having pain sitting on the bedpan "it's sticking me", but overcame that w/ some adjusting position.  LUE u/s no DVT.  Still having a lot of pain in upper left arm w/ movement and also worried about persistent pain "under left boob" where he struck himself during a fall - we will image those areas too.  Says Norco 7.5 is like "eating a jelly bean" asking if we can give 10.  Interesting notes that he's currently constipated, at home often has diarrhea, says that higher doses of narcotics cause him to have loose bowels.  Odd.  Albumin 1.3.  Await SNF dispo. Wife at bedside.   6/18/24  Pt continues to be quite jolly about everything, has good sense of humor, acting very non toxic - HOWEVER upon wound care assessment by Dr Elise he is seen to have osteomyelitis of his right 2nd toe - Podiatry and ID consulted, s/p debridement at bedside w/ deep tissue culture taken, pt/wife opposed to amputation.  Additionally shoulder CT favors abscess around his op site - await input from ortho - He has pain " "there but it's not exquisite and the area is not flucuant/red/warm, pain seems more arthritic in nature - but he appears to have a deep seated infection and will need a washout in OR -  Dr Calvin has added clinda/vanc to Zosyn started yesterday.   6/19/24  Pt sitting up in bed, very conversant and non toxic appearing, thanks much to all consultants.  Per ortho conservative approach to shoulder, I am in chat w/ them currently about possible aspiration of fluid.  Note ID changes in abx coverage.  Pt still has a great deal of pain/mobility loss of left shoulder.  We have been working on SNF but he may need LTAC w/ current level of complexity/abx regimen.  Deep wound cultures from right 2nd toe are pending.   6/20/24  Aspirate done today per IR and returned 10 cc of "pussy" fluid, S Lea reviewed and messaged me that Dr Cooper is out of town and to please contact on call ortho, Dr Bernal on call and is aware of care from prior discussion, he reviewed findings and will do washout in OR tomorrow, went to d/w pt and wife, he's sitting up in bed, feeling ok, no new c/o.  I also encountered Dr Calvin in the vale and we talked about this and his abx.    6/21/24  Pt going for washout/removal of infected hardware today in OR.  Sitting up in bed, sister and wife here, everyone on board w/ careplan. He does have a lot more swelling today around proximal deltoid.  Not warm/tender but visibly swollen and this is the 1st time we have really noted that.          Interval History:  No acute events overnight.  Patient went back to OR today.  No issues status post washout.    Review of Systems  Objective:     Vital Signs (Most Recent):  Temp: 98.4 °F (36.9 °C) (06/27/24 0902)  Pulse: 83 (06/27/24 0902)  Resp: 18 (06/27/24 1129)  BP: 106/62 (06/27/24 0902)  SpO2: (!) 93 % (06/27/24 0902) Vital Signs (24h Range):  Temp:  [98 °F (36.7 °C)-98.6 °F (37 °C)] 98.4 °F (36.9 °C)  Pulse:  [] 83  Resp:  [16-65] 18  SpO2:  [84 %-98 %] 93 " %  BP: (104-206)/(53-96) 106/62     Weight: (!) 140.8 kg (310 lb 6.5 oz)  Body mass index is 42.1 kg/m².    Intake/Output Summary (Last 24 hours) at 6/27/2024 1233  Last data filed at 6/27/2024 0755  Gross per 24 hour   Intake 730 ml   Output 3400 ml   Net -2670 ml         Physical Exam  Constitutional:       Appearance: Normal appearance.   HENT:      Head: Normocephalic and atraumatic.      Right Ear: External ear normal.      Left Ear: External ear normal.      Nose: Nose normal.      Mouth/Throat:      Mouth: Mucous membranes are moist.      Pharynx: Oropharynx is clear.   Eyes:      Extraocular Movements: Extraocular movements intact.      Conjunctiva/sclera: Conjunctivae normal.   Cardiovascular:      Rate and Rhythm: Normal rate and regular rhythm.      Pulses: Normal pulses.      Heart sounds: Normal heart sounds. No murmur heard.     No gallop.   Pulmonary:      Effort: Pulmonary effort is normal. No respiratory distress.      Breath sounds: Normal breath sounds. No wheezing, rhonchi or rales.   Abdominal:      General: Abdomen is flat. There is no distension.      Palpations: Abdomen is soft.      Tenderness: There is no abdominal tenderness.   Musculoskeletal:         General: No swelling. Normal range of motion.      Cervical back: Normal range of motion and neck supple.      Comments: Left shoulder bandage   Skin:     General: Skin is warm and dry.   Neurological:      General: No focal deficit present.      Mental Status: He is alert. Mental status is at baseline.             Significant Labs: All pertinent labs within the past 24 hours have been reviewed.    Significant Imaging: I have reviewed all pertinent imaging results/findings within the past 24 hours.    Assessment/Plan:      * Acute renal failure superimposed on chronic kidney disease  Appreciate input from renal services  In setting of acute UTI and IVVD  Resumed gentle hydration on Zosyn/vanc but those have been stopped  He did not require  oral NaHCO3  U/s is w/o obstruction, masses, etc        Pyogenic arthritis of left shoulder region        Abscess of left shoulder  As discussed under limb swelling, status post washout on 06/21/2024, we will repeat washout on 06/24/2024, 06/26/2024,6/27/24  Need antibiotics until 08/03/2024  Wound VAC in place.  Continue antibiotics.  Following cultures.  Pending LTAC placement.    Osteomyelitis of toe  S/p bedside I&D, deep tissue Cx growing MRSA, S to clinda  ID and podiatry following, appreciate help  Appreciate input all consultants  Pt/wife opposed to amputation of digit  Local wound care  Note markedly elevated ESR, CRP  Need antibiotics until 08/03/2024      Skin tear of left upper extremity  Local care, healing  Doubt this is a portal of entry    Swelling of limb, left  U/s w/o DVT  Appreciate help from Dr Bernal, ID, wound care, podiatry et al  Pt is already on OAC, holding for now pending shoulder procedure  Arm is elevated on pillow  Not red/painful, no compartment syndrome  D/w pt/wife      Debility  Multifactorial process  Pt not able to manage his own care and wife cannot adequately care for him at this time  PT/OT  SNF or possibly LTAC consult  CM aware      Severe sepsis  In setting of acute UTI, MRSA OM right 2nd toe, and infected left shoulder hardware  BCX NGTD  Ur CX neg  Rocephin was given empirically for UTI, changed to Zosyn > added vanc/clinda, now on Rocephin/clinda, vanc/zosyn stopped per ID  Oral clotrimazole added for thrush  PCT and WBC trended down, WBC staying up a little now but pt looks good  No obstruction on renal u/s  Has gas containing abscess in and around left shoulder w/ malpositioned hardware, likely d/t falls, hard to say how the bug entered  Long d/w pt/wife, ortho - 6/22 had washout and hardware removal w/ Dr Bernal  F/u w/ Dr Kenneth mann d/c  He's urinating well  We will need antibiotics until 08/03/2024 per ID    Hyperkalemia  Resolved  in setting of acute on  chronic renal failure  Lokelma given x 1 on admit  Avoid ACE/ARB, K+ replacement  tele          Atrial fibrillation with rapid ventricular response  Chronic AF w/ acute RVR, resolved  Required dilt drip on admission but off since 6/15  Tele some PVC/bigem/trigem  TSH 1.8 in March  Monitor lytes  No etoh/drug use  TTE from March 2024:    Left Ventricle: The left ventricle is normal in size. Normal wall thickness. Mild global hypokinesis present. There is mildly reduced systolic function with a visually estimated ejection fraction of 45 - 50%. There is normal diastolic function.    Right Ventricle: Normal right ventricular cavity size. Wall thickness is normal. Right ventricle wall motion  is normal. Systolic function is normal.    Left Atrium: Left atrium is moderately dilated.    IVC/SVC: Normal venous pressure at 3 mmHg.  OAC has been on hold d/t possible need for shoulder surgery - has been on prophylaxis dose of Lovenox, this was help today for shoulder operation  Resume Eliquis as soon as feasible postop      History of Guillain-[place] syndrome  No acute issues  However, pt has impaired mobility and is acutely weakened from his sepsis/UTI/AF RVR  PT/OT working w/ pt  Wife cannot manage his care at home currently  SNF assessment underway, may need LTAC depending on what abx he ends up for how long, it may take a few more days to narrow that down          Type 2 diabetes mellitus  A1c 6.3%  SSI      Hypertension  Home meds as appropriate    MARA (obstructive sleep apnea)  Continue CPAP HS and w/ naps      Morbid obesity  Body mass index is 42.04 kg/m².  Morbid obesity complicates all aspects of disease management from diagnostic modalities to treatment  Weight loss encouraged and health benefits explained to patient  He has been working on weight loss at home w/ reduced caloric intake          VTE Risk Mitigation (From admission, onward)           Ordered     Reason for No Pharmacological VTE Prophylaxis  Once         Question:  Reasons:  Answer:  Already adequately anticoagulated on oral Anticoagulants    06/14/24 1438     IP VTE HIGH RISK PATIENT  Once         06/14/24 1438     Place sequential compression device  Until discontinued         06/14/24 1438                    Discharge Planning   KAMILA:      Code Status: Full Code   Is the patient medically ready for discharge?:     Reason for patient still in hospital (select all that apply): Treatment  Discharge Plan A: Long-term acute care facility (LTAC)                  Miko Galaviz Jr, MD  Department of Hospital Medicine   [facility]

## 2024-06-27 NOTE — OP NOTE
[facility]  Surgery Department  Operative Note    SUMMARY     Date of Procedure: 6/27/2024     Procedure:  Irrigation and debridement of left shoulder abscess    Surgeons and Role:     * Nathan Cooper MD - Primary    Assisting Surgeon:      Pre-Operative Diagnosis: Abscess of left shoulder [L02.414]    Post-Operative Diagnosis: Post-Op Diagnosis Codes:     * Abscess of left shoulder [L02.414]    Anesthesia: General    Intraoperative Findings:  Grossly resolved abscess    Description of the Findings of the Procedure:  Patient placed on the operating table in supine position.  He was then elevated to the beach chair position.  The sponge from the wound VAC was removed.  There was absolutely no residual fluid collection.  It appeared to be healthy red and beefy.  He was now prepped and draped in the usual sterile manner for surgery.  Retractors were utilized we inspected the entire wound all the way down to the humerus.  There was no fluid collection at all.  The tissues appeared to be very clean and very healthy.  Cultures were taken.  We now ran 6 L of fluid through the wound.  At this point I felt that the wound was so much  that it was safe to loosely approximate especially because we had exposed bone.  Therefore I loosely closed the wound edges with 4-0 nylon sutures.  Patient was taken recovery room in stable condition.    Complications: No    Estimated Blood Loss (EBL): * No values recorded between 6/27/2024  7:21 AM and 6/27/2024  7:28 AM *           Implants: * No implants in log *    Specimens:   Specimen (24h ago, onward)      None                    Condition: Good    Disposition: PACU - hemodynamically stable.

## 2024-06-27 NOTE — PLAN OF CARE
Problem: Adult Inpatient Plan of Care  Goal: Plan of Care Review  Outcome: Progressing     Problem: Diabetes Comorbidity  Goal: Blood Glucose Level Within Targeted Range  Outcome: Progressing     Problem: Acute Kidney Injury/Impairment  Goal: Fluid and Electrolyte Balance  Outcome: Progressing     Problem: Sepsis/Septic Shock  Goal: Optimal Coping  Outcome: Progressing

## 2024-06-27 NOTE — ANESTHESIA PROCEDURE NOTES
Intubation    Date/Time: 6/27/2024 7:09 AM    Performed by: Taiwo Sibley CRNA  Authorized by: Taiwo Sibley CRNA    Intubation:     Induction:  Intravenous    Intubated:  Postinduction    Mask Ventilation:  Easy mask    Attempts:  1    Attempted By:  CRNA    Method of Intubation:  Video laryngoscopy    Blade:  Blunt 3    Laryngeal View Grade: Grade I - full view of cords      Difficult Airway Encountered?: No      Complications:  None    Airway Device:  Oral endotracheal tube    Airway Device Size:  7.5    Style/Cuff Inflation:  Cuffed    Inflation Amount (mL):  4    Tube secured:  22    Secured at:  The lips    Placement Verified By:  Capnometry    Complicating Factors:  None    Findings Post-Intubation:  BS equal bilateral

## 2024-06-27 NOTE — ASSESSMENT & PLAN NOTE
As discussed under limb swelling, status post washout on 06/21/2024, we will repeat washout on 06/24/2024, 06/26/2024,6/27/24  Need antibiotics until 08/03/2024  Wound VAC in place.  Continue antibiotics.  Following cultures.  Pending LTAC placement.

## 2024-06-27 NOTE — ANESTHESIA POSTPROCEDURE EVALUATION
Anesthesia Post Evaluation    Patient: Roosevelt Moser    Procedure(s) Performed: Procedure(s) (LRB):  IRRIGATION AND DEBRIDEMENT, UPPER EXTREMITY (Left)    Final Anesthesia Type: general      Patient location during evaluation: PACU  Patient participation: Yes- Able to Participate  Level of consciousness: awake and alert and oriented  Post-procedure vital signs: reviewed and stable  Pain management: adequate  Airway patency: patent  MARA mitigation strategies: Extubation while patient is awake, Verification of full reversal of neuromuscular block, Multimodal analgesia and Extubation and recovery carried out in lateral, semiupright, or other nonsupine position  PONV status at discharge: No PONV  Anesthetic complications: no      Cardiovascular status: blood pressure returned to baseline  Respiratory status: unassisted, spontaneous ventilation and room air  Hydration status: euvolemic  Follow-up not needed.              Vitals Value Taken Time   /72 06/27/24 0651   Temp 36.7 °C (98.1 °F) 06/27/24 0633   Pulse 92 06/27/24 0633   Resp 20 06/27/24 0633   SpO2 96 % 06/27/24 0633         Event Time   Out of Recovery 06/24/2024 17:00:00         Pain/Duane Score: Pain Rating Prior to Med Admin: 7 (6/27/2024  5:01 AM)  Pain Rating Post Med Admin: 7 (6/27/2024  6:01 AM)

## 2024-06-27 NOTE — PROGRESS NOTES
No drainage noted to second toe on right foot. No wound changes noted from last pic taken. Small amount of betadine applied with new clean dry gauze applied and secured with tape. Redness noted to 3rd toe on right foot. Fabiola Salgado, NP with ID at bedside during assessment. NP noted redness of 3rd toe on right foot. +4 edema on right lower extremity. Elevated with pillow. Educated pt on keeping RLE elevated to assist with edema. Pt states he has been doing flexion and extension exercises on his own to assist.  Resolving redness to lower/abdomen groin. Triad applied.   Belly button wound improving. Triad applied.   Nursing addressed buttocks with triad just prior to this nurse assessing pt.   Left elbow wounds unchanged.

## 2024-06-27 NOTE — TRANSFER OF CARE
Anesthesia Transfer of Care Note    Patient: Roosevelt Moser    Procedure(s) Performed: Procedure(s) (LRB):  IRRIGATION AND DEBRIDEMENT, UPPER EXTREMITY, LEFT (Left)    Patient location: PACU    Anesthesia Type: general    Transport from OR: Transported from OR on 6-10 L/min O2 by face mask with adequate spontaneous ventilation    Post pain: adequate analgesia    Post assessment: no apparent anesthetic complications and tolerated procedure well    Post vital signs: stable    Level of consciousness: sedated    Nausea/Vomiting: no nausea/vomiting    Complications: none    Transfer of care protocol was followed    Last vitals: Visit Vitals  BP (!) 180/72   Pulse 92   Temp 36.7 °C (98.1 °F)   Resp 20   Ht 6' (1.829 m)   Wt (!) 140.8 kg (310 lb 6.5 oz)   SpO2 96%   BMI 42.10 kg/m²

## 2024-06-27 NOTE — PROGRESS NOTES
Chief complaint:  Weakness (Pt brought to ED via EMS with complaint of weakness and falling, pt advised EMS his urine is very dark.  )      HPI:  Roosevelt Moser is a 73 y.o. male presenting with with a skin tear of the left elbow, eschar covered surgical wound of the abdomen, BL buttock MAD and a right second toe eschar covered wound, all POA. Pt was admitted for renal failure and wounds were found on admission. Not sure how long  he has had the wounds. No other complaints today    6/26/24  Pt seen at bedside for a FU for stage 2 pressure ulcers of the BL buttocks with DTI. Pt has some discomfort , but not overly painful. No other complaints today    PMH:  As per HPI and below:  Past Medical History:   Diagnosis Date    A-fib 2024    Diabetes mellitus, type 2 2021    Guillain-Inman 10/2003    Hyperlipidemia     Hypertension 2016       Social History     Socioeconomic History    Marital status:    Tobacco Use    Smoking status: Never    Smokeless tobacco: Never   Substance and Sexual Activity    Alcohol use: Yes     Alcohol/week: 0.0 standard drinks of alcohol     Comment: social    Drug use: No    Sexual activity: Yes       Past Surgical History:   Procedure Laterality Date    ARTHROTOMY OF SHOULDER Left 6/21/2024    Procedure: ARTHROTOMY, SHOULDER;  Surgeon: Roman Paulson MD;  Location: [facility] OR;  Service: Orthopedics;  Laterality: Left;    IRRIGATION AND DEBRIDEMENT OF UPPER EXTREMITY Left 6/24/2024    Procedure: IRRIGATION AND DEBRIDEMENT, UPPER EXTREMITY;  Surgeon: Nathan Cooper MD;  Location: [facility] OR;  Service: Orthopedics;  Laterality: Left;    OPEN REDUCTION AND INTERNAL FIXATION (ORIF) OF FRACTURE OF PROXIMAL HUMERUS Left 3/25/2024    Procedure: ORIF, FRACTURE, HUMERUS, PROXIMAL/IM HANNA, LEFT;  Surgeon: Nathan Cooper MD;  Location: [facility] OR;  Service: Orthopedics;  Laterality: Left;  Accumed, [employer] Small Frag set Avelino verified 3/22/24 ark       No family history on file.    Review of  patient's allergies indicates:  No Known Allergies    No current facility-administered medications on file prior to encounter.     Current Outpatient Medications on File Prior to Encounter   Medication Sig Dispense Refill    apixaban (ELIQUIS) 5 mg Tab Take 1 tablet (5 mg total) by mouth 2 (two) times daily. 60 tablet 1    atorvastatin (LIPITOR) 10 MG tablet Take 1 tablet (10 mg total) by mouth once daily. 90 tablet 3    co-enzyme Q-10 30 mg capsule Take 30 mg by mouth once daily.      doxycycline (VIBRAMYCIN) 100 MG Cap Take 100 mg by mouth 2 (two) times daily.      ergocalciferol, vitamin D2, (VITAMIN D ORAL) Take 1 tablet by mouth once daily.      glimepiride (AMARYL) 2 MG tablet Take 1 tablet (2 mg total) by mouth before breakfast. 90 tablet 3    hydrALAZINE (APRESOLINE) 25 MG tablet Take 1 tablet (25 mg total) by mouth every 12 (twelve) hours. 60 tablet 3    metFORMIN (GLUCOPHAGE-XR) 500 MG ER 24hr tablet Take 2 tablets (1,000 mg total) by mouth 2 (two) times daily with meals. 360 tablet 3    metoprolol succinate (TOPROL-XL) 100 MG 24 hr tablet Take 1 tablet (100 mg total) by mouth once daily. (Patient taking differently: Take 100 mg by mouth nightly.) 90 tablet 3       ROS: As per HPI and below:  Pertinent items are noted in HPI.      Physical Exam:     Vitals:    24 1300 24 1520 24 1704 24 1931   BP:  (!) 183/79  (!) 112/53   BP Location:  Left leg  Right arm   Patient Position:  Lying  Lying   Pulse: 103 96  (!) 118   Resp: 20 20 16 20   Temp:  98 °F (36.7 °C)  98.4 °F (36.9 °C)   TempSrc:  Oral  Oral   SpO2: (!) 94% 95%  95%   Weight:       Height:           BP  Min: 95/55  Max: 184/74  Temp  Av.9 °F (36.6 °C)  Min: 96.9 °F (36.1 °C)  Max: 99.5 °F (37.5 °C)  Pulse  Av.4  Min: 69  Max: 131  Resp  Av.6  Min: 0  Max: 106  SpO2  Av.9 %  Min: 90 %  Max: 100 %  Height  Av' (182.9 cm)  Min: 6' (182.9 cm)  Max: 6' (182.9 cm)  Weight  Av.2 kg (304 lb 9.6 oz)   Min: 131.5 kg (290 lb)  Max: 140.8 kg (310 lb 6.5 oz)    Body mass index is 42.1 kg/m².          General:             Well developed, well nourished, no apparent distress  HEENT:              NCAT, no JVD, mucous membranes moist, EOM intact  Cardiovascular:  Regular rate and rhythm, normal S1, normal S2, No murmurs, rubs, or gallops  Respiratory:        Normal breath sounds, no wheezes, no rales, no rhonchi  Abdomen:           Bowel sounds present, non tender, non distended, no masses, no hepatojugular reflux  Extremities:        No clubbing, no cyanosis, no edema  Vascular:            2+ b/l radial.  Peripheral pulses intact.  No carotid bruits.  Neurological:      No focal deficits  Skin:                   No obvious rashes or erythema, wounds as follows:  1. Osteomyelitis of the second toe of the right foot  2. Skin tear of the left elbow  3. Eschar covered surgical wound of the abdomen  4. BL buttocks MAD                    Lab Results   Component Value Date    WBC 17.83 (H) 06/26/2024    HGB 8.2 (L) 06/26/2024    HCT 25.8 (L) 06/26/2024    MCV 82 06/26/2024     (H) 06/26/2024     Lab Results   Component Value Date    CHOL 177 10/04/2022    CHOL 135 01/25/2022    CHOL 119 (L) 10/22/2021     Lab Results   Component Value Date    HDL 41 10/04/2022    HDL 37 (L) 01/25/2022    HDL 37 (L) 10/22/2021     Lab Results   Component Value Date    LDLCALC 81.2 10/04/2022    LDLCALC 48.0 (L) 01/25/2022    LDLCALC 51.2 (L) 10/22/2021     Lab Results   Component Value Date    TRIG 274 (H) 10/04/2022    TRIG 250 (H) 01/25/2022    TRIG 154 (H) 10/22/2021     Lab Results   Component Value Date    CHOLHDL 23.2 10/04/2022    CHOLHDL 27.4 01/25/2022    CHOLHDL 31.1 10/22/2021     CMP  Recent Labs   Lab 06/26/24  0349   *   CALCIUM 8.7   ALBUMIN 1.3*   PROT 5.9*      K 3.7   CO2 24      BUN 58*   CREATININE 1.4   ALKPHOS 172*   ALT 18   AST 30   BILITOT 0.3      Lab Results   Component Value Date    TSH  1.865 03/07/2024   MRI FOOT (FOREFOOT) RIGHT WITHOUT CONTRAST     CLINICAL HISTORY:  DFU right second toe;     TECHNIQUE:  To sequence multiplanar imaging of the foot tailored to evaluate the forefoot obtained without contrast     COMPARISON:  None     FINDINGS:  There are findings of osteomyelitis involving the middle and distal phalanx of the 3rd toe with decreased T1, increased T2 signal and cortical destruction.  Surrounding soft tissue edema and cellulitis.  Soft tissue changes extend around the distal aspect of the proximal phalanx as well but without definite destruction.     Fairly diffuse edema suggesting cellulitis.  Increased T2 signal in the distal aspect of the distal phalanx of the 2nd toe without corresponding decreased T1 signal suggesting hyperemia.  Some images question very slight destruction of the tip of the tuft of the distal phalanx of the 2nd toe     Impression:     Findings consistent osteomyelitis involving the middle distal phalanges of the 3rd toe.  Suggesting changes from hyperemia the distal aspect of the proximal phalanx of the 3rd toe and distal aspect of the distal phalanx of the 2nd toe.     Cellulitis.     Findings detailed above        Electronically signed by:Cass Cutler MD  Date:                                            06/17/2024  Time:                                           16:09        Assessment and Recommendations       Diagnoses:    1. Osteomyelitis of the second toe of the right foot  2. Skin tear of the left elbow  3. Eschar covered surgical wound of the abdomen  4. BL buttocks MAD    Plan:  1. Triad to the BL buttocks   2. MRI of the right foot, done  3. Surgical wound of the abdomen - betadine painted daily  4.Xeroform to the left elbow skin tear daily      Complexity:    moderate

## 2024-06-27 NOTE — ANESTHESIA POSTPROCEDURE EVALUATION
Anesthesia Post Evaluation    Patient: Roosevelt Moser    Procedure(s) Performed: Procedure(s) (LRB):  IRRIGATION AND DEBRIDEMENT, UPPER EXTREMITY (Left)    Final Anesthesia Type: general      Patient location during evaluation: PACU  Patient participation: Yes- Able to Participate  Level of consciousness: awake and alert  Post-procedure vital signs: reviewed and stable  Pain management: adequate  Airway patency: patent    PONV status at discharge: No PONV  Anesthetic complications: no      Cardiovascular status: blood pressure returned to baseline  Respiratory status: unassisted and room air  Hydration status: euvolemic  Follow-up not needed.              Vitals Value Taken Time   /62 06/27/24 0902   Temp 36.9 °C (98.4 °F) 06/27/24 0902   Pulse 83 06/27/24 0902   Resp 18 06/27/24 1129   SpO2 93 % 06/27/24 0902         Event Time   Out of Recovery 06/27/2024 09:02:00         Pain/Duane Score: Pain Rating Prior to Med Admin: 8 (6/27/2024 11:29 AM)  Pain Rating Post Med Admin: 4 (6/27/2024  9:31 AM)  Duane Score: 10 (6/27/2024  8:55 AM)

## 2024-06-27 NOTE — SUBJECTIVE & OBJECTIVE
Interval History:  No acute events overnight.  Patient went back to OR today.  No issues status post washout.    Review of Systems  Objective:     Vital Signs (Most Recent):  Temp: 98.4 °F (36.9 °C) (06/27/24 0902)  Pulse: 83 (06/27/24 0902)  Resp: 18 (06/27/24 1129)  BP: 106/62 (06/27/24 0902)  SpO2: (!) 93 % (06/27/24 0902) Vital Signs (24h Range):  Temp:  [98 °F (36.7 °C)-98.6 °F (37 °C)] 98.4 °F (36.9 °C)  Pulse:  [] 83  Resp:  [16-65] 18  SpO2:  [84 %-98 %] 93 %  BP: (104-206)/(53-96) 106/62     Weight: (!) 140.8 kg (310 lb 6.5 oz)  Body mass index is 42.1 kg/m².    Intake/Output Summary (Last 24 hours) at 6/27/2024 1233  Last data filed at 6/27/2024 0755  Gross per 24 hour   Intake 730 ml   Output 3400 ml   Net -2670 ml         Physical Exam  Constitutional:       Appearance: Normal appearance.   HENT:      Head: Normocephalic and atraumatic.      Right Ear: External ear normal.      Left Ear: External ear normal.      Nose: Nose normal.      Mouth/Throat:      Mouth: Mucous membranes are moist.      Pharynx: Oropharynx is clear.   Eyes:      Extraocular Movements: Extraocular movements intact.      Conjunctiva/sclera: Conjunctivae normal.   Cardiovascular:      Rate and Rhythm: Normal rate and regular rhythm.      Pulses: Normal pulses.      Heart sounds: Normal heart sounds. No murmur heard.     No gallop.   Pulmonary:      Effort: Pulmonary effort is normal. No respiratory distress.      Breath sounds: Normal breath sounds. No wheezing, rhonchi or rales.   Abdominal:      General: Abdomen is flat. There is no distension.      Palpations: Abdomen is soft.      Tenderness: There is no abdominal tenderness.   Musculoskeletal:         General: No swelling. Normal range of motion.      Cervical back: Normal range of motion and neck supple.      Comments: Left shoulder bandage   Skin:     General: Skin is warm and dry.   Neurological:      General: No focal deficit present.      Mental Status: He is alert.  Mental status is at baseline.             Significant Labs: All pertinent labs within the past 24 hours have been reviewed.    Significant Imaging: I have reviewed all pertinent imaging results/findings within the past 24 hours.

## 2024-06-27 NOTE — PT/OT/SLP PROGRESS
Physical Therapy Treatment    Patient Name:  Roosevelt Moser   MRN:  [id number]    Recommendations:     Discharge Recommendations: Moderate Intensity Therapy  Discharge Equipment Recommendations: none  Barriers to discharge: None    Assessment:     Roosevelt Moser is a 73 y.o. male admitted with a medical diagnosis of Acute renal failure superimposed on chronic kidney disease.  He presents with the following impairments/functional limitations: weakness, impaired endurance, impaired self care skills, impaired functional mobility, decreased upper extremity function, decreased lower extremity function, pain, impaired skin, decreased ROM, orthopedic precautions . Awake, alert, supine in bed.  Agreed to participate in therapy.  Spouse at bedside.  Pleasant, cooperative and interactive.  Sup > sit EOB with A x 2, NWB LUE.  LUE sling adjusted.  Able to scoot forward to feet flat with Min A.  Requested pain medication and ice water .  Sat ~ 15 min with CGA,  nurse arrived to assess BG, charge nurse later arrived with pain medication. Reported pain meds not strong enough.  Performed AP's.  Scoot L side HOB with Max A and verbal cues for technique x 3 attempts to complete.  Sit > supine with A x 2. Scoot HOB with A x 2.  Tolerated treatment well.      Rehab Prognosis: Good; patient would benefit from acute skilled PT services to address these deficits and reach maximum level of function.    Recent Surgery: Procedure(s) (LRB):  IRRIGATION AND DEBRIDEMENT, UPPER EXTREMITY (Left) Day of Surgery    Plan:     During this hospitalization, patient to be seen daily to address the identified rehab impairments via gait training, therapeutic activities, therapeutic exercises and progress toward the following goals:    Plan of Care Expires:  07/15/24    Subjective     Chief Complaint: pain L shoulder ( however was able to participate in therapy).   Patient/Family Comments/goals: to go to facility.  Pain/Comfort:  Pain Rating 1: 8/10  Location -  Side 1: Left  Location - Orientation 1: generalized  Location 1: shoulder  Pain Addressed 1: Reposition, Nurse notified (pain medication given by charge nurse while seated EOB)      Objective:     Communicated with nurse Morrison prior to session.  Patient found supine with bed alarm, PureWick, telemetry upon PT entry to room.     General Precautions: Standard, contact, fall  Orthopedic Precautions: LUE non weight bearing  Braces: UE Sling  Respiratory Status: Room air     Functional Mobility:  Bed Mobility:     Supine to Sit: maximal assistance and of 2 persons  Sit to Supine: maximal assistance and of 2 persons      AM-PAC 6 CLICK MOBILITY          Treatment & Education:  Sup <>sit with A x 2 , NWB LUE.     Patient left HOB elevated with all lines intact, call button in reach, bed alarm on, nurse Tamiko notified, and spouse present..    GOALS:   Multidisciplinary Problems       Physical Therapy Goals          Problem: Physical Therapy    Goal Priority Disciplines Outcome Goal Variances Interventions   Physical Therapy Goal     PT, PT/OT Progressing     Description: Goals to be met by: 7/15/24     Patient will increase functional independence with mobility by performin. Supine to sit with Contact Guard Assistance  2. Sit to stand transfer with Contact Guard Assistance  3. Bed to chair transfer with Contact Guard Assistance using Rolling Walker  4. Gait  x 200 feet with Contact Guard Assistance using Rolling Walker.   5. Lower extremity exercise program x30 reps per handout, with supervision                         Time Tracking:     PT Received On: 24  PT Start Time: 1115     PT Stop Time: 1138  PT Total Time (min): 23 min     Billable Minutes: Therapeutic Activity 23min    Treatment Type: Treatment  PT/PTA: PTA     Number of PTA visits since last PT visit: 3     2024

## 2024-06-27 NOTE — PROGRESS NOTES
Nephrology Progress Note        Patient Name: Roosevelt Moser  MRN: [id number]    Patient Class: IP- Inpatient   Admission Date: 6/14/2024  Length of Stay: 13 days  Date of Service: 6/27/2024    Attending Physician: Miko Galaviz Jr., MD  Primary Care Provider: Miguel Angel Enriquez PA-C    Reason for Consult: marbin/hyperkalemia    SUBJECTIVE:     HPI: 73M with h/o DM, HTN, AF, GBS and recent shoulder surgery was brought to ER by EMS with recurrent falls in the last 24h. Reports decreased UO but no fever, cough, SOB, dysuria. Upon admission, noted to be in MARBIN with sCr 5, baseline 1 month ago is 1, hyperkalemia with K 6, acidosis with CO2 12, hypoalbuminemia with albumin 1.6. Lactate notably 2.1. Procal 20. A1c 9.5 in 3/2024. UA with hematuria and pyuria, urine Cx pending. Notably on Apixaban. WBC in blood elevated to 20. Plt 540. RP US ordered. Afebrile, normotensive, received IVF and abx. Lokelma, ca gluconate given bicarb gtt ordered. Straight cath in ER with only 100cc urine out.    Review of Systems:  Neg    6/15  AFVSS.   UOP 1200cc plus 2 unmeasured   6/16  AFVSS.  2150 cc uop.  No distress  6/17 VSS, on RA, UOP 1.5L- updated family at bedside  6/18 VSS, on RA, UOP 1.3L  6/19 VSS, on RA, UOP 1L, with osteo R toe- s/p debridement- not interested in amputation- antbx adjusted  6/20 HR , BP stable, on 2L NC, UOP 1.2L, went for procedure, wife reports LE edema  6/21 HR 90-110s, -140s mostly, on RA, UOP 1.6L, to OR for shoulder washout and hardware removal today  6/22 VSS s/p incision and drainage for infection from left shoulder and removal of deep hardware including lizabeth and screws.  6/23 VSS. Consider resuming diuretics tomorrow.  6/24  AFVSS.  1200 cc uop plus multiple unmeasured.  He is very thirsty.    6/25  POD 1 s/p irrigation and debridementof left shoulder abscess.  VSS  2 liter uop  6/26 AFVSS. 2250 cc uop  6/27  AFVSS.  In th OR    ASSESSMENT/PLAN:     MARBIN due to ATN due to sepsis due to UTI and/or  PNA  CKD stage 2 with diabetic proteinuria and severe hypoalbuminemia  HTN  DM poorly controlled A1c 9.5  HyperK/Acidosis  HypoMg  SHPT  Anemia  Hypoalbuminemia  Edema    - renal function is improving- nonoliguric  - no acute RRT needs- no nsaids or IV contrast- dose meds for CrCl 10-50  - about 1g proteinuria- low alb is nutrition/acute phase reactant  - hold hydralazine  - hold metformin- use insulin  - hyperK/acidosis resolved  - Mg replete  - H/H stable  - optimize protein intake  --strict I/Os   --checking iron stores. Can give MARIA DE JESUS if iron stores adequate       Thank you for allowing us to participate in the care of your patient!   We will follow the patient and provide recommendations as needed.         Laboratory:  Recent Labs   Lab 06/25/24  0426 06/26/24 0349 06/27/24  0424    145 145   K 3.8 3.7 3.6    108 107   CO2 24 24 26   BUN 59* 58* 54*   CREATININE 1.6* 1.4 1.4   * 203* 211*       Recent Labs   Lab 06/22/24  0513 06/23/24  0849 06/25/24  0426 06/26/24 0349 06/27/24  0424   CALCIUM 8.5*   < > 8.9 8.7 8.7   ALBUMIN 1.2*   < > 1.3* 1.3* 1.4*   MG 1.7  --   --   --   --     < > = values in this interval not displayed.             Recent Labs   Lab 06/24/24  1117 06/24/24  2115 06/25/24  0721 06/25/24  1137 06/25/24  1606 06/25/24  2102 06/26/24  0751 06/26/24  1107 06/26/24  1655 06/26/24  2156   POCTGLUCOSE 244* 247* 274* 228* 376* 281* 218* 182* 286* 241*       Recent Labs   Lab 07/31/23  0955 03/19/24  0830 06/14/24  1539   Hemoglobin A1C 8.8 H 9.5 H 6.3 H       Recent Labs   Lab 06/25/24  0426 06/26/24  0349 06/27/24  0424   WBC 20.19* 17.83* 17.46*   HGB 8.6* 8.2* 8.2*   HCT 27.1* 25.8* 25.7*   * 694* 703*   MCV 81* 82 82   MCHC 31.7* 31.8* 31.9*   MONO 8.8  1.8* 10.2  1.8* 10.8  1.9*   EOSINOPHIL 2.3 4.6 4.4       Recent Labs   Lab 06/25/24  0426 06/26/24  0349 06/27/24  0424   BILITOT 0.4 0.3 0.3   PROT 6.0 5.9* 6.1   ALBUMIN 1.3* 1.3* 1.4*   ALKPHOS 151* 172* 176*    ALT 12 18 30   AST 14 30 51*       Recent Labs   Lab 12/16/22  1238 03/08/24  1034 03/25/24  1022 06/14/24  1337   Color, UA Yellow Yellow Yellow [place] A   Appearance, UA Clear Clear Hazy A Cloudy A   pH, UA 6.0 5.0 6.0 6.0   Specific [place], UA 1.020 1.010 1.020 1.020   Protein, UA 1+ A Trace A 1+ A 2+ A   Glucose, UA 1+ A 3+ A Negative Trace A   Ketones, UA Negative Negative Negative Negative   Urobilinogen, UA  --  Negative Negative Negative   Bilirubin (UA) Negative Negative Negative Negative   Occult Blood UA 3+ A 2+ A 2+ A 3+ A   Nitrite, UA Negative Negative Negative Negative   RBC, UA 20 H 13 H >100 H >100 H   WBC, UA 81 H 3 18 H >100 H   Bacteria Rare None Rare Many A   Hyaline Casts, UA 0  --  0 0             Microbiology Results (last 7 days)       Procedure Component Value Units Date/Time    Gram stain [[id number]] Collected: 06/27/24 0733    Order Status: Sent Specimen: Incision site from Shoulder, Left Updated: 06/27/24 0733    Culture, Anaerobe [[id number]] Collected: 06/27/24 0733    Order Status: Sent Specimen: Incision site from Shoulder, Left Updated: 06/27/24 0733    Aerobic culture [[id number]] Collected: 06/27/24 0733    Order Status: Sent Specimen: Incision site from Shoulder, Left Updated: 06/27/24 0733    Blood culture [[id number]] Collected: 06/23/24 0659    Order Status: Completed Specimen: Blood from Peripheral, Hand, Right Updated: 06/26/24 1032     Blood Culture, Routine No Growth to date      No Growth to date      No Growth to date      No Growth to date    Aerobic culture [[id number]] Collected: 06/24/24 1622    Order Status: Completed Specimen: Incision site from Shoulder, Left Updated: 06/26/24 0925     Aerobic Bacterial Culture No growth    Aerobic culture [[id number]] Collected: 06/21/24 1645    Order Status: Completed Specimen: Abscess from Shoulder, Left Updated: 06/25/24 0725     Aerobic Bacterial Culture No growth    Culture, Anaerobe [[id number]] Collected: 06/24/24 1622     Order Status: Sent Specimen: Incision site from Shoulder, Left Updated: 06/24/24 1920    AFB Culture & Smear [[id number]] Collected: 06/21/24 1645    Order Status: Completed Specimen: Abscess from Shoulder, Left Updated: 06/24/24 0853     AFB CULTURE STAIN No acid fast bacilli seen.     AFB CULTURE STAIN Testing performed by:     AFB CULTURE STAIN Lab [facility]     [facility]     [place] [address]     AFB CULTURE STAIN Dr. Davis Jain MD    Culture, Anaerobic [[id number]] Collected: 06/21/24 1645    Order Status: Completed Specimen: Abscess from Shoulder, Left Updated: 06/24/24 0709     Anaerobic Culture No anaerobes isolated    Aerobic culture [[id number]] Collected: 06/20/24 1204    Order Status: Completed Specimen: Abscess from Shoulder, Left Updated: 06/24/24 0700     Aerobic Bacterial Culture No growth    Gram stain [[id number]] Collected: 06/21/24 1645    Order Status: Completed Specimen: Abscess from Shoulder, Left Updated: 06/23/24 0833     Gram Stain Result Few WBC's      No organisms seen    Culture, Anaerobic [[id number]] Collected: 06/20/24 1204    Order Status: Completed Specimen: Abscess from Shoulder, Left Updated: 06/23/24 0729     Anaerobic Culture No anaerobes isolated    Fungus culture [[id number]] Collected: 06/21/24 1645    Order Status: Sent Specimen: Abscess from Shoulder, Left Updated: 06/21/24 1924    Gram stain [[id number]] Collected: 06/20/24 1204    Order Status: Completed Specimen: Abscess from Shoulder, Left Updated: 06/21/24 1520     Gram Stain Result Many WBC's      No organisms seen    Culture, Anaerobe [[id number]] Collected: 06/18/24 1251    Order Status: Completed Specimen: Wound from Toe, Right Foot Updated: 06/21/24 1428     Anaerobic Culture No anaerobes isolated    Aerobic culture [[id number]]  (Abnormal)  (Susceptibility) Collected: 06/18/24 1251    Order Status: Completed Specimen: Wound from Toe, Right Foot  Updated: 06/21/24 0755     Aerobic Bacterial Culture METHICILLIN RESISTANT STAPHYLOCOCCUS AUREUS  Few  Results called to and read back by Rich Crockett RN-[facility];    06/20/2024  15:05 CJD      Fungus culture [[id number]] Collected: 06/20/24 1204    Order Status: Sent Specimen: Abscess from Shoulder, Left Updated: 06/20/24 1923            Review of patient's allergies indicates:  No Known Allergies    Outpatient meds:  No current facility-administered medications on file prior to encounter.     Current Outpatient Medications on File Prior to Encounter   Medication Sig Dispense Refill    apixaban (ELIQUIS) 5 mg Tab Take 1 tablet (5 mg total) by mouth 2 (two) times daily. 60 tablet 1    atorvastatin (LIPITOR) 10 MG tablet Take 1 tablet (10 mg total) by mouth once daily. 90 tablet 3    co-enzyme Q-10 30 mg capsule Take 30 mg by mouth once daily.      doxycycline (VIBRAMYCIN) 100 MG Cap Take 100 mg by mouth 2 (two) times daily.      ergocalciferol, vitamin D2, (VITAMIN D ORAL) Take 1 tablet by mouth once daily.      glimepiride (AMARYL) 2 MG tablet Take 1 tablet (2 mg total) by mouth before breakfast. 90 tablet 3    hydrALAZINE (APRESOLINE) 25 MG tablet Take 1 tablet (25 mg total) by mouth every 12 (twelve) hours. 60 tablet 3    metFORMIN (GLUCOPHAGE-XR) 500 MG ER 24hr tablet Take 2 tablets (1,000 mg total) by mouth 2 (two) times daily with meals. 360 tablet 3    metoprolol succinate (TOPROL-XL) 100 MG 24 hr tablet Take 1 tablet (100 mg total) by mouth once daily. (Patient taking differently: Take 100 mg by mouth nightly.) 90 tablet 3       Scheduled meds:   (Magic mouthwash) 1:1:1 diphenhydrAMINE(Benadryl) 12.5mg/5ml liq, aluminum & magnesium hydroxide-simethicone (Maalox), LIDOcaine viscous 2%  10 mL Swish & Spit QID    cefTRIAXone (Rocephin) IV (PEDS and ADULTS)  2 g Intravenous Q24H    clotrimazole  10 mg Oral 5x Daily    DAPTOmycin (CUBICIN) IV (PEDS and ADULTS)  800 mg Intravenous Q24H    insulin glargine  U-100  15 Units Subcutaneous Daily    LIDOcaine  1 patch Transdermal Q24H    metoprolol succinate  100 mg Oral QHS    sodium chloride 0.9%  10 mL Intravenous Q6H       Infusions:          PRN meds:    Current Facility-Administered Medications:     albuterol-ipratropium, 3 mL, Nebulization, Q6H PRN    aluminum & magnesium hydroxide-simethicone, 15 mL, Oral, QID PRN    dextrose 10%, 12.5 g, Intravenous, PRN    dextrose 10%, 25 g, Intravenous, PRN    fentaNYL, 25 mcg, Intravenous, Q5 Min PRN    glucagon (human recombinant), 1 mg, Intramuscular, PRN    glucose, 16 g, Oral, PRN    glucose, 24 g, Oral, PRN    HYDROcodone-acetaminophen, 1 tablet, Oral, Q4H PRN    HYDROmorphone, 0.2 mg, Intravenous, Q5 Min PRN    insulin aspart U-100, 0-10 Units, Subcutaneous, QID (AC + HS) PRN    metoprolol, 5 mg, Intravenous, Q5 Min PRN    naloxone, 0.02 mg, Intravenous, PRN    ondansetron, 4 mg, Intravenous, Q8H PRN    oxyCODONE, 5 mg, Oral, Q3H PRN    prochlorperazine, 5 mg, Intravenous, Q6H PRN    simethicone, 1 tablet, Oral, QID PRN    sodium chloride 0.9%, 10 mL, Intravenous, Q12H PRN    Flushing PICC/Midline Protocol, , , Until Discontinued **AND** sodium chloride 0.9%, 10 mL, Intravenous, Q6H **AND** sodium chloride 0.9%, 10 mL, Intravenous, PRN    sodium chloride 0.9%, 10 mL, Intravenous, Q12H PRN    Past Medical History:   Diagnosis Date    A-fib 2024    Diabetes mellitus, type 2 2021    Guillain-Sterling 10/2003    Hyperlipidemia     Hypertension 2016    Sleep apnea      Past Surgical History:   Procedure Laterality Date    ARTHROTOMY OF SHOULDER Left 6/21/2024    Procedure: ARTHROTOMY, SHOULDER;  Surgeon: Roman Paulson MD;  Location: [facility] OR;  Service: Orthopedics;  Laterality: Left;    IRRIGATION AND DEBRIDEMENT OF UPPER EXTREMITY Left 6/24/2024    Procedure: IRRIGATION AND DEBRIDEMENT, UPPER EXTREMITY;  Surgeon: Nathan Cooper MD;  Location: [facility] OR;  Service: Orthopedics;  Laterality: Left;    OPEN REDUCTION AND  INTERNAL FIXATION (ORIF) OF FRACTURE OF PROXIMAL HUMERUS Left 3/25/2024    Procedure: ORIF, FRACTURE, HUMERUS, PROXIMAL/IM HANNA, LEFT;  Surgeon: Nathan Cooper MD;  Location: [facility];  Service: Orthopedics;  Laterality: Left;  Accumed, [employer] Small Frag set Avelino verified 3/22/24 ark     No family history on file.  Social History     Tobacco Use    Smoking status: Never    Smokeless tobacco: Never   Substance Use Topics    Alcohol use: Yes     Alcohol/week: 0.0 standard drinks of alcohol     Comment: social    Drug use: No       OBJECTIVE:     Vital Signs and IO:  Temp:  [97.8 °F (36.6 °C)-98.6 °F (37 °C)]   Pulse:  []   Resp:  [16-22]   BP: (104-190)/(53-81)   SpO2:  [91 %-96 %]   I/O last 3 completed shifts:  In: 1080 [P.O.:1080]  Out: 4950 [Urine:4950]  Wt Readings from Last 5 Encounters:   06/26/24 (!) 140.8 kg (310 lb 6.5 oz)   06/04/24 132.5 kg (292 lb 1.8 oz)   05/07/24 132.5 kg (292 lb 1.8 oz)   04/08/24 132.5 kg (292 lb 3.2 oz)   03/26/24 (!) 136.1 kg (300 lb 0.7 oz)     Body mass index is 42.1 kg/m².    Physical Exam  Constitutional:       General: Patient is not in acute distress.     Appearance: Patient is well-developed. She is not diaphoretic.   HENT:      Head: Normocephalic and atraumatic.      Mouth/Throat: Mucous membranes are moist.   Eyes:      General: No scleral icterus.     Pupils: Pupils are equal, round, and reactive to light.   Cardiovascular:      Rate and Rhythm: Normal rate and regular rhythm.   Pulmonary:      Effort: Pulmonary effort is normal. No respiratory distress.      Breath sounds: No stridor.   Abdominal:      General: There is no distension.      Palpations: Abdomen is soft.   Musculoskeletal:         General: No deformity. Normal range of motion.      Cervical back: Neck supple.   Skin:     General: Skin is warm and dry.      Findings: No rash present. No erythema.   Neurological:      Mental Status: Patient is alert and oriented to person, place, and time.       Cranial Nerves: No cranial nerve deficit.   Psychiatric:         Behavior: Behavior normal.          Patient care time was spent personally by me on the following activities:     Obtaining a history.  Examination of patient.  Providing medical care at the patients bedside.  Developing a treatment plan with patient or surrogate and bedside caregivers.  Ordering and reviewing laboratory studies, radiographic studies, pulse oximetry.  Ordering and performing treatments and interventions.  Evaluation of patient's response to treatment.  Discussions with consultants while on the unit and immediately available to the patient.  Re-evaluation of the patient's condition.  Documentation in the medical record.     Jay Talavera MD    [facility]  [address] [place] [address]  [place] [address]    [phone] - tel  [phone] - fax    6/27/2024

## 2024-06-27 NOTE — PLAN OF CARE
Problem: Physical Therapy  Goal: Physical Therapy Goal  Description: Goals to be met by: 7/15/24     Patient will increase functional independence with mobility by performin. Supine to sit with Contact Guard Assistance  2. Sit to stand transfer with Contact Guard Assistance  3. Bed to chair transfer with Contact Guard Assistance using Rolling Walker  4. Gait  x 200 feet with Contact Guard Assistance using Rolling Walker.   5. Lower extremity exercise program x30 reps per handout, with supervision    Outcome: Progressing   Therapeutic activity to improve functional mobility : bed mobility , transfers, gait with rw and assistance, LE exercises.

## 2024-06-27 NOTE — PLAN OF CARE
Pt needs serial washout's of LUE per ID/ortho.   [employer] offered a peer to peer for ltac. MD/CM decided to cancel the ltac request and plan to resubmit when pt is closer to discharge.   CM following       06/27/24 1156   Discharge Reassessment   Assessment Type Discharge Planning Reassessment   Did the patient's condition or plan change since previous assessment? Yes   Discharge Plan discussed with: Patient;Spouse/sig other   Communicated KAMILA with patient/caregiver Yes   Discharge Plan A Long-term acute care facility (LTAC)   Discharge Plan B Skilled Nursing Facility   Why the patient remains in the hospital Requires continued medical care   Post-Acute Status   Post-Acute Authorization Placement   Post-Acute Placement Status Pending payor review/awaiting authorization (if required)

## 2024-06-28 LAB
ALBUMIN SERPL BCP-MCNC: 1.4 G/DL (ref 3.5–5.2)
ALP SERPL-CCNC: 155 U/L (ref 55–135)
ALT SERPL W/O P-5'-P-CCNC: 24 U/L (ref 10–44)
ANION GAP SERPL CALC-SCNC: 11 MMOL/L (ref 8–16)
AST SERPL-CCNC: 30 U/L (ref 10–40)
BACTERIA BLD CULT: NORMAL
BACTERIA SPEC AEROBE CULT: NO GROWTH
BASOPHILS # BLD AUTO: 0.13 K/UL (ref 0–0.2)
BASOPHILS NFR BLD: 0.8 % (ref 0–1.9)
BILIRUB SERPL-MCNC: 0.3 MG/DL (ref 0.1–1)
BUN SERPL-MCNC: 48 MG/DL (ref 8–23)
CALCIUM SERPL-MCNC: 8.8 MG/DL (ref 8.7–10.5)
CHLORIDE SERPL-SCNC: 106 MMOL/L (ref 95–110)
CO2 SERPL-SCNC: 26 MMOL/L (ref 23–29)
CREAT SERPL-MCNC: 1.4 MG/DL (ref 0.5–1.4)
DIFFERENTIAL METHOD BLD: ABNORMAL
EOSINOPHIL # BLD AUTO: 1 K/UL (ref 0–0.5)
EOSINOPHIL NFR BLD: 6.4 % (ref 0–8)
ERYTHROCYTE [DISTWIDTH] IN BLOOD BY AUTOMATED COUNT: 17.9 % (ref 11.5–14.5)
ERYTHROCYTE [SEDIMENTATION RATE] IN BLOOD BY WESTERGREN METHOD: 130 MM/HR (ref 0–10)
EST. GFR  (NO RACE VARIABLE): 53 ML/MIN/1.73 M^2
GLUCOSE SERPL-MCNC: 171 MG/DL (ref 70–110)
GRAM STN SPEC: NORMAL
GRAM STN SPEC: NORMAL
HCT VFR BLD AUTO: 26.7 % (ref 40–54)
HGB BLD-MCNC: 8.3 G/DL (ref 14–18)
IMM GRANULOCYTES # BLD AUTO: 0.24 K/UL (ref 0–0.04)
IMM GRANULOCYTES NFR BLD AUTO: 1.6 % (ref 0–0.5)
LYMPHOCYTES # BLD AUTO: 3.1 K/UL (ref 1–4.8)
LYMPHOCYTES NFR BLD: 20.1 % (ref 18–48)
MCH RBC QN AUTO: 25.9 PG (ref 27–31)
MCHC RBC AUTO-ENTMCNC: 31.1 G/DL (ref 32–36)
MCV RBC AUTO: 83 FL (ref 82–98)
MONOCYTES # BLD AUTO: 1.7 K/UL (ref 0.3–1)
MONOCYTES NFR BLD: 11.2 % (ref 4–15)
NEUTROPHILS # BLD AUTO: 9.3 K/UL (ref 1.8–7.7)
NEUTROPHILS NFR BLD: 59.9 % (ref 38–73)
NRBC BLD-RTO: 0 /100 WBC
PLATELET # BLD AUTO: 654 K/UL (ref 150–450)
PMV BLD AUTO: 9.3 FL (ref 9.2–12.9)
POCT GLUCOSE: 200 MG/DL (ref 70–110)
POCT GLUCOSE: 249 MG/DL (ref 70–110)
POCT GLUCOSE: 258 MG/DL (ref 70–110)
POCT GLUCOSE: 259 MG/DL (ref 70–110)
POTASSIUM SERPL-SCNC: 3.6 MMOL/L (ref 3.5–5.1)
PROT SERPL-MCNC: 6.6 G/DL (ref 6–8.4)
RBC # BLD AUTO: 3.2 M/UL (ref 4.6–6.2)
SODIUM SERPL-SCNC: 143 MMOL/L (ref 136–145)
WBC # BLD AUTO: 15.46 K/UL (ref 3.9–12.7)

## 2024-06-28 PROCEDURE — 85025 COMPLETE CBC W/AUTO DIFF WBC: CPT | Performed by: ORTHOPAEDIC SURGERY

## 2024-06-28 PROCEDURE — 63600175 PHARM REV CODE 636 W HCPCS: Performed by: ORTHOPAEDIC SURGERY

## 2024-06-28 PROCEDURE — 25000003 PHARM REV CODE 250: Performed by: INTERNAL MEDICINE

## 2024-06-28 PROCEDURE — 99232 SBSQ HOSP IP/OBS MODERATE 35: CPT | Mod: ,,, | Performed by: PODIATRIST

## 2024-06-28 PROCEDURE — 99233 SBSQ HOSP IP/OBS HIGH 50: CPT | Mod: ,,, | Performed by: STUDENT IN AN ORGANIZED HEALTH CARE EDUCATION/TRAINING PROGRAM

## 2024-06-28 PROCEDURE — 11000001 HC ACUTE MED/SURG PRIVATE ROOM

## 2024-06-28 PROCEDURE — 99900035 HC TECH TIME PER 15 MIN (STAT)

## 2024-06-28 PROCEDURE — 25000003 PHARM REV CODE 250: Performed by: ORTHOPAEDIC SURGERY

## 2024-06-28 PROCEDURE — A4216 STERILE WATER/SALINE, 10 ML: HCPCS | Performed by: ORTHOPAEDIC SURGERY

## 2024-06-28 PROCEDURE — 94799 UNLISTED PULMONARY SVC/PX: CPT

## 2024-06-28 PROCEDURE — 85651 RBC SED RATE NONAUTOMATED: CPT | Performed by: STUDENT IN AN ORGANIZED HEALTH CARE EDUCATION/TRAINING PROGRAM

## 2024-06-28 PROCEDURE — 97530 THERAPEUTIC ACTIVITIES: CPT | Mod: CQ

## 2024-06-28 PROCEDURE — 63600175 PHARM REV CODE 636 W HCPCS: Mod: JZ,EC,JG | Performed by: INTERNAL MEDICINE

## 2024-06-28 PROCEDURE — 97110 THERAPEUTIC EXERCISES: CPT

## 2024-06-28 PROCEDURE — 94761 N-INVAS EAR/PLS OXIMETRY MLT: CPT

## 2024-06-28 PROCEDURE — 80053 COMPREHEN METABOLIC PANEL: CPT | Performed by: ORTHOPAEDIC SURGERY

## 2024-06-28 PROCEDURE — 36415 COLL VENOUS BLD VENIPUNCTURE: CPT | Performed by: ORTHOPAEDIC SURGERY

## 2024-06-28 PROCEDURE — 36415 COLL VENOUS BLD VENIPUNCTURE: CPT | Performed by: STUDENT IN AN ORGANIZED HEALTH CARE EDUCATION/TRAINING PROGRAM

## 2024-06-28 RX ORDER — LANOLIN ALCOHOL/MO/W.PET/CERES
1 CREAM (GRAM) TOPICAL DAILY
Status: DISCONTINUED | OUTPATIENT
Start: 2024-06-28 | End: 2024-07-07 | Stop reason: HOSPADM

## 2024-06-28 RX ADMIN — CEFTRIAXONE SODIUM 2 G: 2 INJECTION, POWDER, FOR SOLUTION INTRAMUSCULAR; INTRAVENOUS at 10:06

## 2024-06-28 RX ADMIN — CLOTRIMAZOLE 10 MG: 10 LOZENGE ORAL at 10:06

## 2024-06-28 RX ADMIN — LIDOCAINE HYDROCHLORIDE 10 ML: 20 SOLUTION ORAL; TOPICAL at 05:06

## 2024-06-28 RX ADMIN — INSULIN ASPART 6 UNITS: 100 INJECTION, SOLUTION INTRAVENOUS; SUBCUTANEOUS at 05:06

## 2024-06-28 RX ADMIN — EPOETIN ALFA-EPBX 14100 UNITS: 10000 INJECTION, SOLUTION INTRAVENOUS; SUBCUTANEOUS at 10:06

## 2024-06-28 RX ADMIN — METOPROLOL SUCCINATE 100 MG: 50 TABLET, FILM COATED, EXTENDED RELEASE ORAL at 08:06

## 2024-06-28 RX ADMIN — INSULIN ASPART 2 UNITS: 100 INJECTION, SOLUTION INTRAVENOUS; SUBCUTANEOUS at 08:06

## 2024-06-28 RX ADMIN — CLOTRIMAZOLE 10 MG: 10 LOZENGE ORAL at 01:06

## 2024-06-28 RX ADMIN — FERROUS SULFATE TAB 325 MG (65 MG ELEMENTAL FE) 1 EACH: 325 (65 FE) TAB at 10:06

## 2024-06-28 RX ADMIN — SODIUM CHLORIDE, PRESERVATIVE FREE 10 ML: 5 INJECTION INTRAVENOUS at 12:06

## 2024-06-28 RX ADMIN — HYDROCODONE BITARTRATE AND ACETAMINOPHEN 1 TABLET: 10; 325 TABLET ORAL at 01:06

## 2024-06-28 RX ADMIN — LIDOCAINE HYDROCHLORIDE 10 ML: 20 SOLUTION ORAL; TOPICAL at 10:06

## 2024-06-28 RX ADMIN — DAPTOMYCIN 800 MG: 500 INJECTION, POWDER, LYOPHILIZED, FOR SOLUTION INTRAVENOUS at 05:06

## 2024-06-28 RX ADMIN — HYDROCODONE BITARTRATE AND ACETAMINOPHEN 1 TABLET: 10; 325 TABLET ORAL at 05:06

## 2024-06-28 RX ADMIN — HYDROCODONE BITARTRATE AND ACETAMINOPHEN 1 TABLET: 10; 325 TABLET ORAL at 03:06

## 2024-06-28 RX ADMIN — LIDOCAINE 5% 1 PATCH: 700 PATCH TOPICAL at 05:06

## 2024-06-28 RX ADMIN — INSULIN ASPART 6 UNITS: 100 INJECTION, SOLUTION INTRAVENOUS; SUBCUTANEOUS at 11:06

## 2024-06-28 RX ADMIN — LIDOCAINE HYDROCHLORIDE 10 ML: 20 SOLUTION ORAL; TOPICAL at 08:06

## 2024-06-28 RX ADMIN — INSULIN GLARGINE 15 UNITS: 100 INJECTION, SOLUTION SUBCUTANEOUS at 08:06

## 2024-06-28 RX ADMIN — HYDROCODONE BITARTRATE AND ACETAMINOPHEN 1 TABLET: 10; 325 TABLET ORAL at 10:06

## 2024-06-28 RX ADMIN — CLOTRIMAZOLE 10 MG: 10 LOZENGE ORAL at 05:06

## 2024-06-28 RX ADMIN — LIDOCAINE HYDROCHLORIDE 10 ML: 20 SOLUTION ORAL; TOPICAL at 12:06

## 2024-06-28 RX ADMIN — SODIUM CHLORIDE, PRESERVATIVE FREE 10 ML: 5 INJECTION INTRAVENOUS at 05:06

## 2024-06-28 NOTE — ASSESSMENT & PLAN NOTE
Hospital day 14:    Status post repeat irrigation and debridement (3rd washout) and hardware removal of left shoulder for suspected infection.  Wound VAC removal    Patient is approximately 3 months status post open reduction internal fixation of a left proximal humerus fracture which lost reduction.    1. Patient is doing well postoperatively.  His pain is well managed.    2.  Intraoperative cultures from irrigation and debridement 6/27, g stain showed few WBCs, no organisms.  Aerobic and anaerobic cultures still not resulted.      Preoperative and intraoperative cultures from 06/21 and 6/24 showed many WBCs but have demonstrated no bacterial growth to date.  As well as cultures from 06/19 showed no growth.      3.  Wound VAC was discontinued intraoperatively yesterday  4.  Sling as needed for comfort of the left upper extremity. Patient can do very light activity with the left upper extremity focusing more on elbow, hand and wrist.  Would not do dedicated therapy for the shoulder.  Patient could weightbear through the extremity if needed with a walker.  5.  No plan for additional orthopedic intervention for the left shoulder at this time  6. Consult case management as the patient will likely benefit from placement for IV antibiotics as well as his generalized deconditioned state.  7. Continue Infectious Disease recommendations but no laboratory findings suggestive of left shoulder infection.  8.  Stable from an orthopedic standpoint.  Stable for transfer or discharge from an orthopedic standpoint.    We will continue to follow along

## 2024-06-28 NOTE — PLAN OF CARE
Problem: Physical Therapy  Goal: Physical Therapy Goal  Description: Goals to be met by: 7/15/24     Patient will increase functional independence with mobility by performin. Supine to sit with Contact Guard Assistance  2. Sit to stand transfer with Contact Guard Assistance  3. Bed to chair transfer with Contact Guard Assistance using Rolling Walker  4. Gait  x 200 feet with Contact Guard Assistance using Rolling Walker.   5. Lower extremity exercise program x30 reps per handout, with supervision    Outcome: Progressing   Therapeutic activity : bed mobility , transfers, gait with rw and assistance for safety, LE exercises.

## 2024-06-28 NOTE — PROGRESS NOTES
[facility]/Surg  Orthopedics  Progress Note    Patient Name: Roosevelt Moser  MRN: [id number]  Admission Date: 6/14/2024  Hospital Length of Stay: 14 days  Attending Provider: Miko Galaviz Jr., MD  Primary Care Provider: Miguel Angel Enriquez PA-C  Follow-up For: Procedure(s) (LRB):  IRRIGATION AND DEBRIDEMENT, UPPER EXTREMITY (Left)    Post-Operative Day: 1 Day Post-Op  Subjective:     Principal Problem:Acute renal failure superimposed on chronic kidney disease    Principal Orthopedic Problem:  Left shoulder infection    Interval History:  Status post repeat irrigation and drainage with removal of wound VAC left shoulder    Review of patient's allergies indicates:  No Known Allergies    Current Facility-Administered Medications   Medication    (Magic mouthwash) 1:1:1 diphenhydrAMINE(Benadryl) 12.5mg/5ml liq, aluminum & magnesium hydroxide-simethicone (Maalox), LIDOcaine viscous 2%    albuterol-ipratropium 2.5 mg-0.5 mg/3 mL nebulizer solution 3 mL    aluminum & magnesium hydroxide-simethicone 400-400-40 mg/5 mL suspension 15 mL    cefTRIAXone (ROCEPHIN) 2 g in dextrose 5 % in water (D5W) 100 mL IVPB (MB+)    clotrimazole megha 10 mg    DAPTOmycin (CUBICIN) 800 mg in 0.9% NaCl SolP 50 mL IVPB    dextrose 10% bolus 125 mL 125 mL    dextrose 10% bolus 250 mL 250 mL    glucagon (human recombinant) injection 1 mg    glucose chewable tablet 16 g    glucose chewable tablet 24 g    HYDROcodone-acetaminophen  mg per tablet 1 tablet    insulin aspart U-100 pen 0-10 Units    insulin glargine U-100 (Lantus) pen 15 Units    LIDOcaine 5 % patch 1 patch    metoprolol injection 5 mg    metoprolol succinate (TOPROL-XL) 24 hr tablet 100 mg    naloxone 0.4 mg/mL injection 0.02 mg    ondansetron injection 4 mg    prochlorperazine injection Soln 5 mg    simethicone chewable tablet 80 mg    sodium chloride 0.9% flush 10 mL    sodium chloride 0.9% flush 10 mL    And    sodium chloride 0.9% flush 10 mL    sodium  chloride 0.9% flush 10 mL     Objective:     Vital Signs (Most Recent):  Temp: 98 °F (36.7 °C) (06/28/24 0348)  Pulse: 93 (06/28/24 0348)  Resp: 18 (06/28/24 0348)  BP: 137/61 (06/28/24 0348)  SpO2: (!) 93 % (06/28/24 0348) Vital Signs (24h Range):  Temp:  [97.4 °F (36.3 °C)-98.4 °F (36.9 °C)] 98 °F (36.7 °C)  Pulse:  [] 93  Resp:  [18-65] 18  SpO2:  [84 %-98 %] 93 %  BP: (106-206)/(56-96) 137/61     Weight: (!) 141 kg (310 lb 13.6 oz)  Height: 6' (182.9 cm)  Body mass index is 42.16 kg/m².      Intake/Output Summary (Last 24 hours) at 6/28/2024 0713  Last data filed at 6/28/2024 0552  Gross per 24 hour   Intake 1390 ml   Output 2050 ml   Net -660 ml        General    Nursing note and vitals reviewed.  Constitutional: He is oriented to person, place, and time. He appears well-developed and well-nourished. No distress.   HENT:   Head: Normocephalic and atraumatic.   Nose: Nose normal.   Eyes: EOM are normal.   Cardiovascular:  Normal rate.            Pulmonary/Chest: Effort normal.   Neurological: He is alert and oriented to person, place, and time.   Psychiatric: He has a normal mood and affect. His behavior is normal. Thought content normal.             Left Shoulder Exam     Range of Motion   Extension:  abnormal   Forward Flexion:  abnormal   Forward Elevation: abnormal  Adduction: abnormal  External Rotation 90 degrees: abnormal  Internal rotation 90 degrees:  abnormal     Other   Sensation: normal     Comments:  Dressing with a scant amount of serosanguineous drainage noted.  Dry and intact however.    I removed his sling today, this was for comfort only, every time I have seen the patient the sling has been ineffective in supporting the shoulder.      Vascular Exam       Left Pulses      Radial:                    2+      Capillary Refill  Left Hand: normal capillary refill           Significant Labs:   Recent Lab Results  (Last 5 results in the past 24 hours)        06/28/24  0403   06/27/24  3969    06/27/24  1614   06/27/24  1125   06/27/24  0904        Albumin 1.4                              ALT 24               Anion Gap 11               AST 30               Baso # 0.13               Basophil % 0.8               BILIRUBIN TOTAL 0.3  Comment: For infants and newborns, interpretation of results should be based  on gestational age, weight and in agreement with clinical  observations.    Premature Infant recommended reference ranges:  Up to 24 hours.............<8.0 mg/dL  Up to 48 hours............<12.0 mg/dL  3-5 days..................<15.0 mg/dL  6-29 days.................<15.0 mg/dL                 BUN 48               Calcium 8.8               Chloride 106               CO2 26               Creatinine 1.4               Differential Method Automated               eGFR 53               Eos # 1.0               Eos % 6.4               Glucose 171               GRAM STAIN               Gran # (ANC) 9.3               Gran % 59.9               Hematocrit 26.7               Hemoglobin 8.3               Immature Grans (Abs) 0.24  Comment: Mild elevation in immature granulocytes is non specific and   can be seen in a variety of conditions including stress response,   acute inflammation, trauma and pregnancy. Correlation with other   laboratory and clinical findings is essential.                 Immature Granulocytes 1.6               Lymph # 3.1               Lymph % 20.1               MCH 25.9               MCHC 31.1               MCV 83               Mono # 1.7               Mono % 11.2               MPV 9.3               nRBC 0               Platelet Count 654               POCT Glucose   195   308   272   252       Potassium 3.6               PROTEIN TOTAL 6.6               RBC 3.20               RDW 17.9               Sodium 143               WBC 15.46                                      Significant Imaging: None              Assessment/Plan:     Abscess of left shoulder  Hospital day 14:    Status post  repeat irrigation and debridement (3rd washout) and hardware removal of left shoulder for suspected infection.  Wound VAC removal    Patient is approximately 3 months status post open reduction internal fixation of a left proximal humerus fracture which lost reduction.    1. Patient is doing well postoperatively.  His pain is well managed.    2.  Intraoperative cultures from irrigation and debridement 6/27, g stain showed few WBCs, no organisms.  Aerobic and anaerobic cultures still not resulted.      Preoperative and intraoperative cultures from 06/21 and 6/24 showed many WBCs but have demonstrated no bacterial growth to date.  As well as cultures from 06/19 showed no growth.      3.  Wound VAC was discontinued intraoperatively yesterday  4.  Sling as needed for comfort of the left upper extremity. Patient can do very light activity with the left upper extremity focusing more on elbow, hand and wrist.  Would not do dedicated therapy for the shoulder.  Patient could weightbear through the extremity if needed with a walker.  5.  No plan for additional orthopedic intervention for the left shoulder at this time  6. Consult case management as the patient will likely benefit from placement for IV antibiotics as well as his generalized deconditioned state.  7. Continue Infectious Disease recommendations but no laboratory findings suggestive of left shoulder infection.  8.  Stable from an orthopedic standpoint.  Stable for transfer or discharge from an orthopedic standpoint.    We will continue to follow along    Swelling of limb, left  History of left proximal humerus fracture with open reduction internal fixation.            KAREN Avina  Orthopedics  [facility]/Surg

## 2024-06-28 NOTE — SUBJECTIVE & OBJECTIVE
Subjective:     Interval History: Patient resting in bed. Denies complaints of pain to either extremity. Denies any new complaints.       Follow-up For: Procedure(s) (LRB):  IRRIGATION AND DEBRIDEMENT, UPPER EXTREMITY (Left)    Post-Operative Day: 1 Day Post-Op    Scheduled Meds:   (Magic mouthwash) 1:1:1 diphenhydrAMINE(Benadryl) 12.5mg/5ml liq, aluminum & magnesium hydroxide-simethicone (Maalox), LIDOcaine viscous 2%  10 mL Swish & Spit QID    cefTRIAXone (Rocephin) IV (PEDS and ADULTS)  2 g Intravenous Q24H    clotrimazole  10 mg Oral 5x Daily    DAPTOmycin (CUBICIN) IV (PEDS and ADULTS)  800 mg Intravenous Q24H    epoetin leonel-epbx  100 Units/kg Subcutaneous Q7 Days    ferrous sulfate  1 tablet Oral Daily    insulin glargine U-100  15 Units Subcutaneous Daily    LIDOcaine  1 patch Transdermal Q24H    metoprolol succinate  100 mg Oral QHS    sodium chloride 0.9%  10 mL Intravenous Q6H     Continuous Infusions:  PRN Meds:  Current Facility-Administered Medications:     albuterol-ipratropium, 3 mL, Nebulization, Q6H PRN    aluminum & magnesium hydroxide-simethicone, 15 mL, Oral, QID PRN    dextrose 10%, 12.5 g, Intravenous, PRN    dextrose 10%, 25 g, Intravenous, PRN    glucagon (human recombinant), 1 mg, Intramuscular, PRN    glucose, 16 g, Oral, PRN    glucose, 24 g, Oral, PRN    HYDROcodone-acetaminophen, 1 tablet, Oral, Q4H PRN    insulin aspart U-100, 0-10 Units, Subcutaneous, QID (AC + HS) PRN    metoprolol, 5 mg, Intravenous, Q5 Min PRN    naloxone, 0.02 mg, Intravenous, PRN    ondansetron, 4 mg, Intravenous, Q8H PRN    prochlorperazine, 5 mg, Intravenous, Q6H PRN    simethicone, 1 tablet, Oral, QID PRN    sodium chloride 0.9%, 10 mL, Intravenous, Q12H PRN    Flushing PICC/Midline Protocol, , , Until Discontinued **AND** sodium chloride 0.9%, 10 mL, Intravenous, Q6H **AND** sodium chloride 0.9%, 10 mL, Intravenous, PRN    sodium chloride 0.9%, 10 mL, Intravenous, Q12H PRN    Review of Systems  Objective:      Vital Signs (Most Recent):  Temp: 97.9 °F (36.6 °C) (06/28/24 1125)  Pulse: 92 (06/28/24 1329)  Resp: 18 (06/28/24 1329)  BP: (!) 124/56 (06/28/24 1125)  SpO2: (!) 94 % (06/28/24 1329) Vital Signs (24h Range):  Temp:  [97.4 °F (36.3 °C)-98.1 °F (36.7 °C)] 97.9 °F (36.6 °C)  Pulse:  [] 92  Resp:  [16-18] 18  SpO2:  [93 %-94 %] 94 %  BP: (109-144)/(56-69) 124/56     Weight: (!) 141 kg (310 lb 13.6 oz)  Body mass index is 42.16 kg/m².    Foot Exam    Right Foot/Ankle     Neurovascular  Dorsalis pedis: 2+  Posterior tibial: 2+  Saphenous nerve sensation: diminished  Tibial nerve sensation: diminished  Superficial peroneal nerve sensation: diminished  Deep peroneal nerve sensation: diminished  Sural nerve sensation: diminished                Laboratory:  All pertinent labs reviewed within the last 24 hours.    Diagnostic Results:  I have reviewed all pertinent imaging results/findings within the past 24 hours.

## 2024-06-28 NOTE — PROGRESS NOTES
Nephrology Progress Note        Patient Name: Roosevelt Moser  MRN: [id number]    Patient Class: IP- Inpatient   Admission Date: 6/14/2024  Length of Stay: 14 days  Date of Service: 6/28/2024    Attending Physician: Miko Galaviz Jr., MD  Primary Care Provider: Miguel Angel Enriquez PA-C    Reason for Consult: marbin/hyperkalemia    SUBJECTIVE:     HPI: 73M with h/o DM, HTN, AF, GBS and recent shoulder surgery was brought to ER by EMS with recurrent falls in the last 24h. Reports decreased UO but no fever, cough, SOB, dysuria. Upon admission, noted to be in MARBIN with sCr 5, baseline 1 month ago is 1, hyperkalemia with K 6, acidosis with CO2 12, hypoalbuminemia with albumin 1.6. Lactate notably 2.1. Procal 20. A1c 9.5 in 3/2024. UA with hematuria and pyuria, urine Cx pending. Notably on Apixaban. WBC in blood elevated to 20. Plt 540. RP US ordered. Afebrile, normotensive, received IVF and abx. Lokelma, ca gluconate given bicarb gtt ordered. Straight cath in ER with only 100cc urine out.    Review of Systems:  Neg    6/15  AFVSS.   UOP 1200cc plus 2 unmeasured   6/16  AFVSS.  2150 cc uop.  No distress  6/17 VSS, on RA, UOP 1.5L- updated family at bedside  6/18 VSS, on RA, UOP 1.3L  6/19 VSS, on RA, UOP 1L, with osteo R toe- s/p debridement- not interested in amputation- antbx adjusted  6/20 HR , BP stable, on 2L NC, UOP 1.2L, went for procedure, wife reports LE edema  6/21 HR 90-110s, -140s mostly, on RA, UOP 1.6L, to OR for shoulder washout and hardware removal today  6/22 VSS s/p incision and drainage for infection from left shoulder and removal of deep hardware including lizabeth and screws.  6/23 VSS. Consider resuming diuretics tomorrow.  6/24  AFVSS.  1200 cc uop plus multiple unmeasured.  He is very thirsty.    6/25  POD 1 s/p irrigation and debridementof left shoulder abscess.  VSS  2 liter uop  6/26 AFVSS. 2250 cc uop  6/27  AFVSS.  In th OR  6/28 POD 1 s/p left shoulder irrigation and debridement.  2050 cc  uop    ASSESSMENT/PLAN:     MARBIN due to ATN due to sepsis due to UTI and/or PNA  CKD stage 2 with diabetic proteinuria and severe hypoalbuminemia  HTN  DM poorly controlled A1c 9.5  HyperK/Acidosis  HypoMg  SHPT  Anemia  Hypoalbuminemia  Edema    - renal function is improving- nonoliguric  - no acute RRT needs- no nsaids or IV contrast- dose meds for CrCl 10-50  - about 1g proteinuria- low alb is nutrition/acute phase reactant  - hold hydralazine  - hold metformin- use insulin  - hyperK/acidosis resolved  - Mg replete  - H/H stable  - optimize protein intake  --strict I/Os   --iron stores low.  Ferritin too high for iv iron.  Will start oral supplement  --will give a dose erythropoietin stimulating agent today       Thank you for allowing us to participate in the care of your patient!   We will follow the patient and provide recommendations as needed.         Laboratory:  Recent Labs   Lab 06/26/24  0349 06/27/24  0424 06/28/24  0403    145 143   K 3.7 3.6 3.6    107 106   CO2 24 26 26   BUN 58* 54* 48*   CREATININE 1.4 1.4 1.4   * 211* 171*       Recent Labs   Lab 06/22/24  0513 06/23/24  0849 06/26/24  0349 06/27/24  0424 06/28/24  0403   CALCIUM 8.5*   < > 8.7 8.7 8.8   ALBUMIN 1.2*   < > 1.3* 1.4* 1.4*   MG 1.7  --   --   --   --     < > = values in this interval not displayed.             Recent Labs   Lab 06/25/24  2102 06/26/24  0751 06/26/24  1107 06/26/24  1655 06/26/24  2156 06/27/24  0904 06/27/24  1125 06/27/24  1614 06/27/24  2059 06/28/24  0720   POCTGLUCOSE 281* 218* 182* 286* 241* 252* 272* 308* 195* 200*       Recent Labs   Lab 07/31/23  0955 03/19/24  0830 06/14/24  1539   Hemoglobin A1C 8.8 H 9.5 H 6.3 H       Recent Labs   Lab 06/26/24  0349 06/27/24  0424 06/28/24  0403   WBC 17.83* 17.46* 15.46*   HGB 8.2* 8.2* 8.3*   HCT 25.8* 25.7* 26.7*   * 703* 654*   MCV 82 82 83   MCHC 31.8* 31.9* 31.1*   MONO 10.2  1.8* 10.8  1.9* 11.2  1.7*   EOSINOPHIL 4.6 4.4 6.4        Recent Labs   Lab 06/26/24  0349 06/27/24  0424 06/28/24  0403   BILITOT 0.3 0.3 0.3   PROT 5.9* 6.1 6.6   ALBUMIN 1.3* 1.4* 1.4*   ALKPHOS 172* 176* 155*   ALT 18 30 24   AST 30 51* 30       Recent Labs   Lab 12/16/22  1238 03/08/24  1034 03/25/24  1022 06/14/24  1337   Color, UA Yellow Yellow Yellow [place] A   Appearance, UA Clear Clear Hazy A Cloudy A   pH, UA 6.0 5.0 6.0 6.0   Specific [place], UA 1.020 1.010 1.020 1.020   Protein, UA 1+ A Trace A 1+ A 2+ A   Glucose, UA 1+ A 3+ A Negative Trace A   Ketones, UA Negative Negative Negative Negative   Urobilinogen, UA  --  Negative Negative Negative   Bilirubin (UA) Negative Negative Negative Negative   Occult Blood UA 3+ A 2+ A 2+ A 3+ A   Nitrite, UA Negative Negative Negative Negative   RBC, UA 20 H 13 H >100 H >100 H   WBC, UA 81 H 3 18 H >100 H   Bacteria Rare None Rare Many A   Hyaline Casts, UA 0  --  0 0             Microbiology Results (last 7 days)       Procedure Component Value Units Date/Time    Gram stain [[id number]] Collected: 06/27/24 0733    Order Status: Completed Specimen: Incision site from Shoulder, Left Updated: 06/28/24 0643     Gram Stain Result Few WBC's      No organisms seen    Culture, Anaerobe [[id number]] Collected: 06/24/24 1622    Order Status: Completed Specimen: Incision site from Shoulder, Left Updated: 06/27/24 1357     Anaerobic Culture No anaerobes isolated    Culture, Anaerobe [[id number]] Collected: 06/27/24 0733    Order Status: Sent Specimen: Incision site from Shoulder, Left Updated: 06/27/24 1343    Aerobic culture [[id number]] Collected: 06/27/24 0733    Order Status: Sent Specimen: Incision site from Shoulder, Left Updated: 06/27/24 1343    Blood culture [[id number]] Collected: 06/23/24 0659    Order Status: Completed Specimen: Blood from Peripheral, Hand, Right Updated: 06/27/24 1032     Blood Culture, Routine No Growth to date      No Growth to date      No Growth to date      No Growth to date      No Growth  to date    Aerobic culture [[id number]] Collected: 06/24/24 1622    Order Status: Completed Specimen: Incision site from Shoulder, Left Updated: 06/27/24 0912     Aerobic Bacterial Culture No growth    Aerobic culture [[id number]] Collected: 06/21/24 1645    Order Status: Completed Specimen: Abscess from Shoulder, Left Updated: 06/25/24 0725     Aerobic Bacterial Culture No growth    AFB Culture & Smear [[id number]] Collected: 06/21/24 1645    Order Status: Completed Specimen: Abscess from Shoulder, Left Updated: 06/24/24 0853     AFB CULTURE STAIN No acid fast bacilli seen.     AFB CULTURE STAIN Testing performed by:     AF[facility] CULTURE STAIN [facility]     [facility] [address] [facility]     [place] [address]     AFB CULTURE STAIN Dr. Davis Jain MD    Culture, Anaerobic [[id number]] Collected: 06/21/24 1645    Order Status: Completed Specimen: Abscess from Shoulder, Left Updated: 06/24/24 0709     Anaerobic Culture No anaerobes isolated    Aerobic culture [[id number]] Collected: 06/20/24 1204    Order Status: Completed Specimen: Abscess from Shoulder, Left Updated: 06/24/24 0700     Aerobic Bacterial Culture No growth    Gram stain [[id number]] Collected: 06/21/24 1645    Order Status: Completed Specimen: Abscess from Shoulder, Left Updated: 06/23/24 0833     Gram Stain Result Few WBC's      No organisms seen    Culture, Anaerobic [[id number]] Collected: 06/20/24 1204    Order Status: Completed Specimen: Abscess from Shoulder, Left Updated: 06/23/24 0729     Anaerobic Culture No anaerobes isolated    Fungus culture [[id number]] Collected: 06/21/24 1645    Order Status: Sent Specimen: Abscess from Shoulder, Left Updated: 06/21/24 1924    Gram stain [[id number]] Collected: 06/20/24 1204    Order Status: Completed Specimen: Abscess from Shoulder, Left Updated: 06/21/24 1520     Gram Stain Result Many WBC's      No organisms seen    Culture, Anaerobe [[id number]] Collected:  06/18/24 1251    Order Status: Completed Specimen: Wound from Toe, Right Foot Updated: 06/21/24 1428     Anaerobic Culture No anaerobes isolated            Review of patient's allergies indicates:  No Known Allergies    Outpatient meds:  No current facility-administered medications on file prior to encounter.     Current Outpatient Medications on File Prior to Encounter   Medication Sig Dispense Refill    apixaban (ELIQUIS) 5 mg Tab Take 1 tablet (5 mg total) by mouth 2 (two) times daily. 60 tablet 1    atorvastatin (LIPITOR) 10 MG tablet Take 1 tablet (10 mg total) by mouth once daily. 90 tablet 3    co-enzyme Q-10 30 mg capsule Take 30 mg by mouth once daily.      doxycycline (VIBRAMYCIN) 100 MG Cap Take 100 mg by mouth 2 (two) times daily.      ergocalciferol, vitamin D2, (VITAMIN D ORAL) Take 1 tablet by mouth once daily.      glimepiride (AMARYL) 2 MG tablet Take 1 tablet (2 mg total) by mouth before breakfast. 90 tablet 3    hydrALAZINE (APRESOLINE) 25 MG tablet Take 1 tablet (25 mg total) by mouth every 12 (twelve) hours. 60 tablet 3    metFORMIN (GLUCOPHAGE-XR) 500 MG ER 24hr tablet Take 2 tablets (1,000 mg total) by mouth 2 (two) times daily with meals. 360 tablet 3    metoprolol succinate (TOPROL-XL) 100 MG 24 hr tablet Take 1 tablet (100 mg total) by mouth once daily. (Patient taking differently: Take 100 mg by mouth nightly.) 90 tablet 3       Scheduled meds:   (Magic mouthwash) 1:1:1 diphenhydrAMINE(Benadryl) 12.5mg/5ml liq, aluminum & magnesium hydroxide-simethicone (Maalox), LIDOcaine viscous 2%  10 mL Swish & Spit QID    cefTRIAXone (Rocephin) IV (PEDS and ADULTS)  2 g Intravenous Q24H    clotrimazole  10 mg Oral 5x Daily    DAPTOmycin (CUBICIN) IV (PEDS and ADULTS)  800 mg Intravenous Q24H    insulin glargine U-100  15 Units Subcutaneous Daily    LIDOcaine  1 patch Transdermal Q24H    metoprolol succinate  100 mg Oral QHS    sodium chloride 0.9%  10 mL Intravenous Q6H       Infusions:          PRN  meds:    Current Facility-Administered Medications:     albuterol-ipratropium, 3 mL, Nebulization, Q6H PRN    aluminum & magnesium hydroxide-simethicone, 15 mL, Oral, QID PRN    dextrose 10%, 12.5 g, Intravenous, PRN    dextrose 10%, 25 g, Intravenous, PRN    glucagon (human recombinant), 1 mg, Intramuscular, PRN    glucose, 16 g, Oral, PRN    glucose, 24 g, Oral, PRN    HYDROcodone-acetaminophen, 1 tablet, Oral, Q4H PRN    insulin aspart U-100, 0-10 Units, Subcutaneous, QID (AC + HS) PRN    metoprolol, 5 mg, Intravenous, Q5 Min PRN    naloxone, 0.02 mg, Intravenous, PRN    ondansetron, 4 mg, Intravenous, Q8H PRN    prochlorperazine, 5 mg, Intravenous, Q6H PRN    simethicone, 1 tablet, Oral, QID PRN    sodium chloride 0.9%, 10 mL, Intravenous, Q12H PRN    Flushing PICC/Midline Protocol, , , Until Discontinued **AND** sodium chloride 0.9%, 10 mL, Intravenous, Q6H **AND** sodium chloride 0.9%, 10 mL, Intravenous, PRN    sodium chloride 0.9%, 10 mL, Intravenous, Q12H PRN    Past Medical History:   Diagnosis Date    A-fib 2024    Diabetes mellitus, type 2 2021    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    Hyperlipidemia     Hypertension 2016    Sleep apnea      Past Surgical History:   Procedure Laterality Date    ARTHROTOMY OF SHOULDER Left 6/21/2024    Procedure: ARTHROTOMY, SHOULDER;  Surgeon: Roman Paulson MD;  Location: [facility] OR;  Service: Orthopedics;  Laterality: Left;    IRRIGATION AND DEBRIDEMENT OF UPPER EXTREMITY Left 6/24/2024    Procedure: IRRIGATION AND DEBRIDEMENT, UPPER EXTREMITY;  Surgeon: Nathan Cooper MD;  Location: [facility] OR;  Service: Orthopedics;  Laterality: Left;    OPEN REDUCTION AND INTERNAL FIXATION (ORIF) OF FRACTURE OF PROXIMAL HUMERUS Left 3/25/2024    Procedure: ORIF, FRACTURE, HUMERUS, PROXIMAL/IM HANNA, LEFT;  Surgeon: Nathan Cooper MD;  Location: [facility] OR;  Service: Orthopedics;  Laterality: Left;  Accumed, [employer] Small Frag set Avelino verified 3/22/24 ark     No family history on file.  Social  History     Tobacco Use    Smoking status: Never    Smokeless tobacco: Never   Substance Use Topics    Alcohol use: Yes     Alcohol/week: 0.0 standard drinks of alcohol     Comment: social    Drug use: No       OBJECTIVE:     Vital Signs and IO:  Temp:  [97.4 °F (36.3 °C)-98.1 °F (36.7 °C)]   Pulse:  []   Resp:  [17-20]   BP: (109-144)/(56-69)   SpO2:  [93 %-94 %]   I/O last 3 completed shifts:  In: 1390 [P.O.:1140; IV Piggyback:250]  Out: 3850 [Urine:3850]  Wt Readings from Last 5 Encounters:   06/28/24 (!) 141 kg (310 lb 13.6 oz)   06/04/24 132.5 kg (292 lb 1.8 oz)   05/07/24 132.5 kg (292 lb 1.8 oz)   04/08/24 132.5 kg (292 lb 3.2 oz)   03/26/24 (!) 136.1 kg (300 lb 0.7 oz)     Body mass index is 42.16 kg/m².    Physical Exam  Constitutional:       General: Patient is not in acute distress.     Appearance: Patient is well-developed. She is not diaphoretic.   HENT:      Head: Normocephalic and atraumatic.      Mouth/Throat: Mucous membranes are moist.   Eyes:      General: No scleral icterus.     Pupils: Pupils are equal, round, and reactive to light.   Cardiovascular:      Rate and Rhythm: Normal rate and regular rhythm.   Pulmonary:      Effort: Pulmonary effort is normal. No respiratory distress.      Breath sounds: No stridor.   Abdominal:      General: There is no distension.      Palpations: Abdomen is soft.   Musculoskeletal:         General: No deformity. Normal range of motion.      Cervical back: Neck supple.   Skin:     General: Skin is warm and dry.      Findings: No rash present. No erythema.   Neurological:      Mental Status: Patient is alert and oriented to person, place, and time.      Cranial Nerves: No cranial nerve deficit.   Psychiatric:         Behavior: Behavior normal.          Patient care time was spent personally by me on the following activities:     Obtaining a history.  Examination of patient.  Providing medical care at the patients bedside.  Developing a treatment plan with  patient or surrogate and bedside caregivers.  Ordering and reviewing laboratory studies, radiographic studies, pulse oximetry.  Ordering and performing treatments and interventions.  Evaluation of patient's response to treatment.  Discussions with consultants while on the unit and immediately available to the patient.  Re-evaluation of the patient's condition.  Documentation in the medical record.     Jay Talavera MD    [facility]  [address] [place] [id number]    [phone] - tel  [phone] - fax    6/28/2024

## 2024-06-28 NOTE — PROGRESS NOTES
[facility]/Surg   Department of Infectious Disease  Progress Note    PATIENT NAME: Roosevelt Moser  MRN: [id number]  TODAY'S DATE: 06/28/2024  ADMIT DATE: 6/14/2024  LOS: 14 days  CHIEF COMPLAINT: Weakness (Pt brought to ED via EMS with complaint of weakness and falling, pt advised EMS his urine is very dark.  )    PRINCIPLE PROBLEM: Acute renal failure superimposed on chronic kidney disease      INTERVAL HISTORY     6/28/24 (Pranay):  Patient seen and examined at bedside, wife present.  He is lying in bed, wife reports he has a little more alert and interactive compared to prior.  Patient awaiting authorization to go to facility to continue IV antibiotics.  He went for 3rd washout yesterday and as per discussion with ortho surgeon tissue look much better, healthier, no pus or fluid collections identified.  Hemodynamically stable, T-max 98.4°, afebrile.  Breathing comfortable on room air.  Adequate urinary output, having regular bowel movements.  PICC line in place since 06/14.  Labs reviewed, leukocytosis down to 15.4, no left shift, H&H 8.3/26.7, platelet count 654, reactive thrombocytosis.  Stable electrolytes, MARBIN improved, creatinine 1.4, creatinine clearance 69.5 mL/min, normal LFTs, CPK stable. 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     06/27/24@ (Naomi): Patient is lying in bed awake and alert with his wife at bedside.  He was status post washout of his left shoulder.  Per Orthopedic surgery lifting much better with no pus or fluid collections and looked clean and healthy.   He has no complaints.  No diarrhea with a bowel movement today, no nausea vomiting, states he has a fair appetite, no fevers or chills.   Much improved mouth dryness.  T-max 98.6° in last 24 hours.   WBC 17.46, platelets 703, H&H 8.2/25.7,  no left shift, no bands, BUN/ CR 54/1.4 with estimated creatinine clearance 69.4.  6/24 cultures with no growth, 6/21 cultures no growth.  6/20 cultures negative.  6/18 culture from right 2nd toe with MRSA.  CRP trending down at 180, BNP is elevated at 456 and total CK is 8.    6/26:  Patient seen and examined, wife present.  He is awake, alert, NPO for 3rd washout of left shoulder.  He reports he is feeling better, awaiting surgery to have something to eat after procedure.  Left shoulder with wound VAC place draining significant amounts of bloody purulent fluid.  Hypertensive, afebrile.  Adequate urinary output, had 2 bowel movements in the last 24 hours.  Significant upper extremity edema decreasing, persistent bilateral lower extremity pitting edema.  Labs reviewed, leukocytosis 17.8, no left shift, H&H 8.2/25.8, platelet count 694, reactive thrombocytosis.  Stable electrolytes, creatinine down to 1.4, MARBIN improved, creatinine clearance 69.4 mL/min, normal LFTs, CRP down to 192.2.  Micro reviewed, OR cultures from 06/21 no growth to date, pending final.    6/25:  Patient seen and examined, wife present.  Patient lying in bed, has wound VAC placed to left shoulder.  Discussed with Ortho, very difficult situation in the setting of nonunion infected fracture.  As per operation note: After removing packing there was a large collection of bloody brownish fluid.  Irrigated.  Incision was extended 5 cm distally.  Pulse down the wound with 12 L of fluid.  Cultures were taken.  After extensive washout able to pass through the abscess cavity and a wound VAC was placed into cavity.  Evidence of no union or whatsoever.  The patient has an infected nonunion discussed with Hospital Medicine as well, plan for serial washouts in the OR for source control.  Slightly hypertensive, tachycardic, afebrile.  Adequate urinary output, had 2 bowel movements in the last 24 hours.  He did not get steroids preop, last dose dexamethasone 8  "mg once on 06/21.  Labs reviewed, leukocytosis 20.1, no left shift, H&H 8.6/27.1, MCV 81, platelet count 720, reactive thrombocytosis.  Stable electrolytes, MARBIN, creatinine down to 1.6, creatinine clearance 60.6 mL/min, improving, normal LFTs, CRP remains taylor high 219.3, procalcitonin continues to fall 0.7.  We do not need to check this daily.  Echo unable to fully visualize oral valves.    6/24/24 (Pranay):  Patient seen and examined, wife present.  He is NPO for procedure today by Ortho, I&D and washout of left shoulder.  Patient awake, alert.  Hypertensive, T-max 99.5°, currently afebrile.  Adequate urinary output, had a bowel movement in the last 24 hours.  Labs reviewed, leukocytosis 18.1, no left shift, H&H 9.1/28.3, MCV 80, reactive thrombocytosis 721.  Sed rate extremely high 128, .  Stable electrolytes, MARBIN creatinine trending down 1.8, clearance 53.9 mL/min, baseline CPK 30.  Procalcitonin down to 0.97.  Last vancomycin random level 13.5.    06/23/2024.  Sleeping -- I did not disturb Afebrile.  T-max 99.5° WBC 21.3--19.8--17.  CRP is quite elevated at 205.  .  Procalcitonin 1.39.  Vancomycin random 18.  Cultures from shoulder no growth to date.    Gram stain yesterday was read as Gram-positive cocci--today it is changed to  "no organism"  GOing for another procedure tomorrow    06/22/2024.  Very pleasant.  So is his wife.  Patient is Afebrile.  He has swelling over the left arm, slightly improved.  Pain is well-controlled.    WBC 19--21--21.3--19.8.  He did receive dexamethasone yesterday by anesthesia, at the time of left shoulder surgery.  He is on ceftriaxone clindamycin and vancomycin.  Cultures reviewed:  right 2nd toe wound had grown MRSA.  Left intraop cultures no growth to date.  Left shoulder intraop, gram stain are showing Gram-positive cocci.  He is trying to move his legs in bed, but he is still  weak.  Wife and patient are able to do IV antibiotics at home, but all things " considered, it is very cumbersome for them;  they are interested in placement, which I agree, it is the right course of action.    06/21/2024:  Seen by Orthopedic surgery Service again yesterday.  For I and D today.  All cultures so far negative.  ASO titer normal.    06/20/2024:  Oral anticoagulants on hold to allow left shoulder arthrocentesis.  No other acute issues overnight.  Culture from right 3rd toe growing Staphylococcus aureus.  Blood culture remain negative.  Had aspiration left shoulder today that yielded purulent material.  Synovial fluid studies in progress.    06/19/2024: Seen and evaluated at bedside.  States left upper extremity pain better.  Having improved movement at the wrist and forearm.  Seen by Orthopedic surgery PA yesterday.  Notes reviewed.  Blood cultures remain negative.        Antibiotics (From admission, onward)      Start     Stop Route Frequency Ordered    06/24/24 1630  DAPTOmycin (CUBICIN) 800 mg in 0.9% NaCl SolP 50 mL IVPB         -- IV Every 24 hours (non-standard times) 06/24/24 1526    06/19/24 1045  cefTRIAXone (ROCEPHIN) 2 g in dextrose 5 % in water (D5W) 100 mL IVPB (MB+)         -- IV Every 24 hours (non-standard times) 06/19/24 0932    06/17/24 2100  mupirocin 2 % ointment         06/22/24 2059 Nasl 2 times daily 06/17/24 1544          Antifungals (From admission, onward)      Start     Stop Route Frequency Ordered    06/19/24 1045  clotrimazole megha 10 mg         06/29/24 0959 Oral 5 times daily 06/19/24 0932           Antivirals (From admission, onward)      None            ASSESSMENT and PLAN     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  Right 3rd toe osteomyelitis s/p Dalvance x 2 doses & 2nd  distal phalanx s/p I&D at bedside    X-ray with bony erosion of the 3rd DIP representing osteomyelitis   MRI osteomyelitis involving the middle distal phalanges of the 3rd toe.  Very slight destruction of the tip of the tuft of the distal phalanx of the 2nd toe.  Wound cultures 2nd R toe 6/18 MRSA, vanco JESSENIA 2      4.  MARBIN, improving Estimated Creatinine Clearance: 69.5 mL/min (based on SCr of 1.4 mg/dL).       5.  Aphthous ulcers on soft palate/oropharynx - improved    6.  PMHx:  Diabetes, last A1c 6.3%, PID, CHF EF 50%     RECOMMENDATIONS:    Discussed with Podiatry, plan to reassess with patient's wife possible source control and amputation of right 2nd toe  Continue daptomycin 800 mg IV daily   Holding statins while on daptomycin   Check CPK twice a week   Continue Rocephin 2 g IV daily   Above empirically for septic left shoulder prosthetic joint infection  Trend CRP and ESR  PICC line placed   Anticipating 6 weeks of IV antibiotics from last washout through August 8th follow by 6 weeks of oral antibiotics for prosthetic left shoulder joint infection/infected nonunion  Follow cultures   Wound care to all affected areas   Aspiration precautions    D/w patient, wife at bedside, nursing, Dr LORELEI Zhu/Podiatry     Our service will follow over the weekend    Please send Epic secure chat with any questions.      SUBJECTIVE      Roosevelt Moser is a 73 y.o. male with history of diabetes mellitus, hypertension and previous going bowel syndrome. He fell on 03/04/2024 and sustained a displaced comminuted fracture of the left humerus.  Seen by orthopedic surgeon with plan for ORIF which was deferred because of new AFib.  Admitted 03/09/2024 for rate control  and ultimately discharged back home 03/12/2024.  Later had left humerus ORIF 03/25/2024 as a day procedure and was discharged home.       According to his wife, he developed pain and swelling of the 3rd toe and was with an ulcer.  It appears an x-ray of the foot was unremarkable.  Received 2 doses of an antibiotic that I presume to be dalbavancin 1 week apart in early April.     In the last 2 weeks he has continued to decline.  He also has reduction in urine output.  Fell twice at home on 06/13/2024 and wife activated EMS and he was brought to the hospital.  Received IV fluid for low normal blood pressure.  Also noted to have MARBIN with creatinine 5.0 and WBC was 21 K.  UA was abnormal with> 100 WBC,> 100 RBC, 3+ LE and positive nitrite.  He has been managed conservatively for MARBIN and for dehydration.     X-ray of left shoulder and left upper extremity 06/17/2024 documented gas in soft tissue.  MRI right foot documented osteomyelitis of the 3rd toe.  I was asked to see to assist with his care.  ESR 95,  milligram/liter.     Antibiotics   Ceftriaxone:  06/14/2024-06/17/2024, 06/19/2024-  Zosyn:  06/17/2024-06/18/2024   Vancomycin:  06/18/2024-6/24/2024  Clindamycin:  06/18/2024-6/22/2024     Cultures   Blood culture 06/14/2024:  NGTD   Urine culture 06/14/2024: NGTD  Right second toe culture 06/18/2024: MRSA  Left shoulder synovial fluid culture 06/20/2024:  No growth & 6/21 & 6/27 no growth    Review of Systems  Review of systems obtained and negative except as stated above in Interval History     OBJECTIVE   Temp:  [97.4 °F (36.3 °C)-98.4 °F (36.9 °C)] 98 °F (36.7 °C)  Pulse:  [] 104  Resp:  [17-30] 17  SpO2:  [86 %-94 %] 94 %  BP: (106-144)/(56-69) 144/63  Temp:  [97.4 °F (36.3 °C)-98.4 °F (36.9 °C)]   Temp: 98 °F (36.7 °C) (06/28/24 0348)  Pulse: 104 (06/28/24 0714)  Resp: 17 (06/28/24 0714)  BP: (!) 144/63 (06/28/24 0714)  SpO2: (!) 94 % (06/28/24 0714)    Intake/Output Summary (Last 24 hours) at  6/28/2024 0840  Last data filed at 6/28/2024 0552  Gross per 24 hour   Intake 1140 ml   Output 2050 ml   Net -910 ml       Physical Exam  General: Morbidly obese  male, awake, alert, lying in bed awake and alert, he is in no distress, pleasant and conversant.  Eyes: Eyes with no icterus or injection. Vision grossly normal  Ears: Hearing grossly normal.  Nose: Nares patent  Mouth: Moist mucous membranes, dentition is fair.  Oropharynx with resolving aphthous ulcers.  Neck: Supple  Cardiovascular: Regular rate and rhythm, no murmurs,   Much improved upper extremity edema, persistent bilateral lower extremity pitting edema  Respiratory:  Poor inspiratory effort, mostly clear to auscultation bilaterally, no tachypnea or increased work of breathing.  Gastrointestinal:  Soft and obese with active bowel sounds, no tenderness to palpation, no distention.  Genitourinary:  PureWick in place.  No suprapubic tenderness.  Musculoskeletal:  Except for left shoulder, moves all extremities with good strength.    Skin:  Right great toe with necrotic tip of toe, second toe with dry nitrate, right 3rd toe is purplish colored, all toes and forefoot are cool to touch, 3+ pedal pulse,  left shoulder with dressing in place  Neuro:   Oriented, conversant, follows commands.  Psych: Good mood, normal affect.     WOUNDS:    6/27 6/25:      6/24:                            Significant Labs: All pertinent labs within the past 24 hours have been reviewed.    CBC LAST 7 DAYS  Recent Labs   Lab 06/22/24  0513 06/23/24  0849 06/23/24  0849 06/24/24  0357 06/25/24  0426 06/26/24  0349 06/27/24  0424 06/28/24  0403   WBC 19.81* 17.37*  --  18.15* 20.19* 17.83* 17.46* 15.46*   RBC 3.33* 3.27*  --  3.52* 3.34* 3.14* 3.14* 3.20*   HGB 8.6* 8.6*  --  9.1* 8.6* 8.2* 8.2* 8.3*   HCT 26.8* 26.2*  --  28.3* 27.1* 25.8* 25.7* 26.7*   MCV 81* 80*  --  80* 81* 82 82 83   MCH 25.8* 26.3*  --  25.9* 25.7* 26.1* 26.1* 25.9*   MCHC 32.1 32.8  --   32.2 31.7* 31.8* 31.9* 31.1*   RDW 17.7* 17.6*  --  17.9* 17.8* 18.1* 18.1* 17.9*   * 669*  --  721* 720* 694* 703* 654*   MPV 9.9 10.1  --  9.9 9.5 9.8 9.5 9.3   GRAN  --  71.5  12.4*   < > 70.9  12.9* 72.0  14.5* 65.1  11.6* 63.6  11.1* 59.9  9.3*   LYMPH  --  13.0*  2.3   < > 13.9*  2.5 13.1*  2.6 17.3*  3.1 18.0  3.1 20.1  3.1   MONO  --  8.6  1.5*   < > 8.3  1.5* 8.8  1.8* 10.2  1.8* 10.8  1.9* 11.2  1.7*   BASO  --  0.09  --  0.09 0.14 0.11 0.14 0.13   NRBC  --  0  --  0 0 0 0 0    < > = values in this interval not displayed.       CHEMISTRY LAST 7 DAYS  Recent Labs   Lab 06/22/24  0513 06/23/24  0849 06/24/24  0357 06/25/24  0426 06/26/24  0349 06/27/24  0424 06/28/24  0403    140 142 145 145 145 143   K 4.4 3.9 3.7 3.8 3.7 3.6 3.6    102 104 107 108 107 106   CO2 25 26 24 24 24 26 26   ANIONGAP 11 12 14 14 13 12 11   BUN 76* 72* 79* 59* 58* 54* 48*   CREATININE 2.4* 2.0* 1.8* 1.6* 1.4 1.4 1.4   * 229* 246* 221* 203* 211* 171*   CALCIUM 8.5* 8.7 9.2 8.9 8.7 8.7 8.8   MG 1.7  --   --   --   --   --   --    ALBUMIN 1.2* 1.3* 1.4* 1.3* 1.3* 1.4* 1.4*   PROT 5.8* 5.8* 6.2 6.0 5.9* 6.1 6.6   ALKPHOS 217* 195* 188* 151* 172* 176* 155*   ALT 13 14 18 12 18 30 24   AST 20 23 28 14 30 51* 30   BILITOT 0.3 0.3 0.3 0.4 0.3 0.3 0.3       Estimated Creatinine Clearance: 69.5 mL/min (based on SCr of 1.4 mg/dL).    INFLAMMATORY/PROCAL    Lab Results   Component Value Date    .2 (H) 06/27/2024    .2 (H) 06/26/2024    .3 (H) 06/25/2024    .5 (H) 06/24/2024    .0 (H) 06/23/2024    .0 (H) 06/18/2024          Component Ref Range & Units 2 d ago   Body Fluid Type  Abscess fluid, left shoulder   Fluid Appearance  Cloudy   Fluid Color  Pink   WBC, Body Fluid /cu mm SEE COMMENT   Comment: Reference ranges for body fluids not established.  Correlate clinically.  Test not performed  Cell count not performed on abscess fluid, see differential.   Segs,  Fluid % 79   Lymphs, Fluid % 14   Monocytes/Macrophages, Fluid % 7        PRIOR  MICROBIOLOGY:    Susceptibility data from last 90 days.  Collected Specimen Info Organism Ceftriaxone Clindamycin Erythromycin Oxacillin Penicillin Tetracycline Trimeth/Sulfa Vancomycin   06/18/24 Wound from Toe, Right Foot Methicillin resistant Staphylococcus aureus  R  S  R  R  R  S  S  S   06/14/24 Blood from Peripheral, Antecubital, Right No growth after 5 days.           06/14/24 Blood from Peripheral, Hand, Right No growth after 5 days.               LAST 7 DAYS MICROBIOLOGY   Microbiology Results (last 7 days)       Procedure Component Value Units Date/Time    Gram stain [[id number]] Collected: 06/27/24 0733    Order Status: Completed Specimen: Incision site from Shoulder, Left Updated: 06/28/24 0643     Gram Stain Result Few WBC's      No organisms seen    Culture, Anaerobe [[id number]] Collected: 06/24/24 1622    Order Status: Completed Specimen: Incision site from Shoulder, Left Updated: 06/27/24 1357     Anaerobic Culture No anaerobes isolated    Culture, Anaerobe [[id number]] Collected: 06/27/24 0733    Order Status: Sent Specimen: Incision site from Shoulder, Left Updated: 06/27/24 1343    Aerobic culture [[id number]] Collected: 06/27/24 0733    Order Status: Sent Specimen: Incision site from Shoulder, Left Updated: 06/27/24 1343    Blood culture [[id number]] Collected: 06/23/24 0659    Order Status: Completed Specimen: Blood from Peripheral, Hand, Right Updated: 06/27/24 1032     Blood Culture, Routine No Growth to date      No Growth to date      No Growth to date      No Growth to date      No Growth to date    Aerobic culture [[id number]] Collected: 06/24/24 1622    Order Status: Completed Specimen: Incision site from Shoulder, Left Updated: 06/27/24 0912     Aerobic Bacterial Culture No growth    Aerobic culture [[id number]] Collected: 06/21/24 1645    Order Status: Completed Specimen: Abscess from Shoulder, Left  Updated: 06/25/24 0725     Aerobic Bacterial Culture No growth    AFB Culture & Smear [[id number]] Collected: 06/21/24 1645    Order Status: Completed Specimen: Abscess from Shoulder, Left Updated: 06/24/24 0853     AFB CULTURE STAIN No acid fast bacilli seen.     AFB CULTURE STAIN Testing performed by:     AFB CULTURE STAIN Lab [facility]     AFB CULTURE STAIN [address] [facility]     [place] [address]     AFB CULTURE STAIN Dr. Davis Jain MD    Culture, Anaerobic [[id number]] Collected: 06/21/24 1645    Order Status: Completed Specimen: Abscess from Shoulder, Left Updated: 06/24/24 0709     Anaerobic Culture No anaerobes isolated    Aerobic culture [[id number]] Collected: 06/20/24 1204    Order Status: Completed Specimen: Abscess from Shoulder, Left Updated: 06/24/24 0700     Aerobic Bacterial Culture No growth    Gram stain [[id number]] Collected: 06/21/24 1645    Order Status: Completed Specimen: Abscess from Shoulder, Left Updated: 06/23/24 0833     Gram Stain Result Few WBC's      No organisms seen    Culture, Anaerobic [[id number]] Collected: 06/20/24 1204    Order Status: Completed Specimen: Abscess from Shoulder, Left Updated: 06/23/24 0729     Anaerobic Culture No anaerobes isolated    Fungus culture [[id number]] Collected: 06/21/24 1645    Order Status: Sent Specimen: Abscess from Shoulder, Left Updated: 06/21/24 1924    Gram stain [[id number]] Collected: 06/20/24 1204    Order Status: Completed Specimen: Abscess from Shoulder, Left Updated: 06/21/24 1520     Gram Stain Result Many WBC's      No organisms seen    Culture, Anaerobe [[id number]] Collected: 06/18/24 1251    Order Status: Completed Specimen: Wound from Toe, Right Foot Updated: 06/21/24 1428     Anaerobic Culture No anaerobes isolated              CURRENT/PREVIOUS VISIT EKG  Results for orders placed or performed during the hospital encounter of 06/14/24   EKG 12-lead    Collection Time: 06/14/24 12:16 PM    Result Value Ref Range    QRS Duration 106 ms    [facility] QTC Calculation 443 ms    Narrative    Test Reason : R53.1,    Vent. Rate : 120 BPM     Atrial Rate : 159 BPM     P-R Int : 000 ms          QRS Dur : 106 ms      QT Int : 314 ms       P-R-T Axes : 000 -54 093 degrees     QTc Int : 443 ms    Atrial fibrillation with rapid ventricular response  Left axis deviation  Low voltage QRS  Inferior infarct ,age undetermined  Cannot rule out Anterior infarct ,age undetermined  Abnormal ECG  When compared with ECG of 25-MAR-2024 09:14,  Vent. rate has increased BY  46 BPM  Minimal criteria for Anterior infarct are now Present  Confirmed by Hansel Espinosa MD ([id number]) on 6/20/2024 8:43:02 PM    Referred By: [id number]   SELF           Confirmed By:Hansel Espinosa MD     X-ray R foot:  FINDINGS:  There is a small region of bony erosion involving the distal 3rd digit.  There is soft tissue injury of the distal 2nd digit with thickening of the nail.  There is no evidence fracture.    Impression:    There is bony erosion of the 3rd D IP possibly representing osteomyelitis.    ECHO:     Extremely limited visualization of all cardiac structures.  No clinically significant determination can be made based on this echocardiogram.  If cardiac concerns persist, consider alternative cardiac imaging like RICKY for further evaluation.    Left Ventricle: Left ventricle was not well visualized due to poor sonic window. The left ventricle is normal in size. Unable to assess wall motion. There is normal systolic function with a visually estimated ejection fraction of 55 - 60%.    Right Ventricle: Right ventricle was not well visualized due to poor acoustic window.    Left Atrium: Left atrium was not well visualized.    Mitral Valve: There is no stenosis. The mean pressure gradient across the mitral valve is 2 mmHg at a heart rate of  bpm. There is mild regurgitation.    IVC/SVC: Elevated venous pressure at 15 mmHg.    Significant Imaging: I  have reviewed all relevant and available imaging results/findings within the past 24 hours.    06/18/2024.  CT left shoulder   1. Subacute left humerus fracture post internal fixation.  There is incomplete bony bridging across the fracture site, with persistent angulation between the dominant bony fragments as hardware is only partially engaged (see discussion).  2. Severe arthropathy of the glenohumeral joint with multiple intra-articular bodies, some interposed.  3. Large left joint effusion and adjacent soft tissue focal fluid collections containing gas and concerning for gas-forming infection.  4. Moderate size left pleural effusion.  5. Left basilar airspace disease is presumably atelectasis with pneumonia as a less favored consideration.  This report was flagged in Epic as abnormal.    X-ray on 06/17, prior to removal of hardware.      I spent a total of 55 minutes on the day of the visit.This includes face to face time and non-face to face time preparing to see the patient (eg, review of tests), obtaining and/or reviewing separately obtained history, documenting clinical information in the electronic or other health record, independently interpreting results and communicating results to the patient/family/caregiver, or care coordinator.    Capri Wesley MD  Date of Service: 06/28/2024      This note was created using [employer] voice recognition software that occasionally misinterpreted phrases or words.

## 2024-06-28 NOTE — PROGRESS NOTES
[facility] Medicine  Progress Note    Patient Name: Roosevelt Moser  MRN: [id number]  Patient Class: IP- Inpatient   Admission Date: 6/14/2024  Length of Stay: 14 days  Attending Physician: Miko Galaviz Jr., MD  Primary Care Provider: Miguel Angel Enriquez PA-C        Subjective:     Principal Problem:Acute renal failure superimposed on chronic kidney disease        HPI:  Roosevelt Moser is a 73 year old male with a previous medical history of atrial fibrillation on eliquis, HTN, DMII, obstructive sleep apnea, HLD, ORIF of left humerus and guillian-[place] who presented to the emergency room for weakness and multiple falls. Per wife of the patient he has had progressive weakness over the past week along with two falls one at 0300 Thursday and the other early morning Friday. Both time she had to activate EMS to pick patient up off floor due to weakness. Patient refused transport to hospital on both occasions. Today he slid out of his chair and was unable to get up without assistance and at this point the wife activated EMS to transport patient to the hospital. She endorses that two days ago she noticed that the patient had stopped urinating as he normally does. The patient concurs that he has noticed that his urine has decreased over the past two days and that it is dark. Patient denies dysuria or incomplete bladder emptying. Bladder scan showed zero upon admit and urine sample was obtained via straight cath in ED. Patient denies decreased PO intake and keeps a bottle of water with him at all times. Initial ED work-up showed a creatinine of 5 and potassium of 6. Urine studies positive for infection and WBC 20.81 with procalcitonin at 19.75. Blood cultures obtained and patient given 1 gram of rocephin and 1000ml of normal saline. Patient noted to bein afib with RVR and 20mg of diltiazem was administered IV which resulted in low blood pressure and 1 gram of calcium gluconate was administered.  "Patient admitted by hospital medicine for further evaluation and management     Overview/Hospital Course:  6/15/24  Assumed care today, pt seen and examined, chart reviewed.  Pt sitting up in bed, wife at bedside, feeling better.  Off dilt drip since this AM, in AF on monitor but rate down to 80s.   Denies cp/sob.  Has been weak and debilitated, await PT/OT to see what post acute needs will be.    6/16/24  Pt resting in NAD.  Advised by CM yesterday wife not willing/able to take him home at this time - We talked about that frankly today and she says she is unable to care for him, planning on SNF, hopefully regain enough function to allow d/c home from there. Pt w/ slow improvement.  Feels pretty good.  Appetite not too good, he says he's been deliberately eating small quantities of food at home to lose weight and feels it's been working.  Note pain/swelling LUE where he had a recent fx/ORIF.    6/17/24  Pt doing reasonably well, having pain sitting on the bedpan "it's sticking me", but overcame that w/ some adjusting position.  LUE u/s no DVT.  Still having a lot of pain in upper left arm w/ movement and also worried about persistent pain "under left boob" where he struck himself during a fall - we will image those areas too.  Says Norco 7.5 is like "eating a jelly bean" asking if we can give 10.  Interesting notes that he's currently constipated, at home often has diarrhea, says that higher doses of narcotics cause him to have loose bowels.  Odd.  Albumin 1.3.  Await SNF dispo. Wife at bedside.   6/18/24  Pt continues to be quite jolly about everything, has good sense of humor, acting very non toxic - HOWEVER upon wound care assessment by Dr Elise he is seen to have osteomyelitis of his right 2nd toe - Podiatry and ID consulted, s/p debridement at bedside w/ deep tissue culture taken, pt/wife opposed to amputation.  Additionally shoulder CT favors abscess around his op site - await input from ortho - He has pain " "there but it's not exquisite and the area is not flucuant/red/warm, pain seems more arthritic in nature - but he appears to have a deep seated infection and will need a washout in OR -  Dr Calvin has added clinda/vanc to Zosyn started yesterday.   6/19/24  Pt sitting up in bed, very conversant and non toxic appearing, thanks much to all consultants.  Per ortho conservative approach to shoulder, I am in chat w/ them currently about possible aspiration of fluid.  Note ID changes in abx coverage.  Pt still has a great deal of pain/mobility loss of left shoulder.  We have been working on SNF but he may need LTAC w/ current level of complexity/abx regimen.  Deep wound cultures from right 2nd toe are pending.   6/20/24  Aspirate done today per IR and returned 10 cc of "pussy" fluid, S Lea reviewed and messaged me that Dr Cooper is out of town and to please contact on call ortho, Dr Bernal on call and is aware of care from prior discussion, he reviewed findings and will do washout in OR tomorrow, went to d/w pt and wife, he's sitting up in bed, feeling ok, no new c/o.  I also encountered Dr Calvin in the vale and we talked about this and his abx.    6/21/24  Pt going for washout/removal of infected hardware today in OR.  Sitting up in bed, sister and wife here, everyone on board w/ careplan. He does have a lot more swelling today around proximal deltoid.  Not warm/tender but visibly swollen and this is the 1st time we have really noted that.          Interval History:  No acute events overnight.  Patient states he is well at this time.  No complaints.  Ortho cleared patient.  Pending placement at this time for IV antibiotics    Review of Systems  Objective:     Vital Signs (Most Recent):  Temp: 98.1 °F (36.7 °C) (06/28/24 0714)  Pulse: 104 (06/28/24 0714)  Resp: 17 (06/28/24 0714)  BP: (!) 144/63 (06/28/24 0714)  SpO2: (!) 94 % (06/28/24 0714) Vital Signs (24h Range):  Temp:  [97.4 °F (36.3 °C)-98.1 °F (36.7 °C)] 98.1 " °F (36.7 °C)  Pulse:  [] 104  Resp:  [17-20] 17  SpO2:  [93 %-94 %] 94 %  BP: (109-144)/(56-69) 144/63     Weight: (!) 141 kg (310 lb 13.6 oz)  Body mass index is 42.16 kg/m².    Intake/Output Summary (Last 24 hours) at 6/28/2024 1043  Last data filed at 6/28/2024 0552  Gross per 24 hour   Intake 1140 ml   Output 2050 ml   Net -910 ml         Physical Exam  Constitutional:       Appearance: Normal appearance.   HENT:      Head: Normocephalic and atraumatic.      Right Ear: External ear normal.      Left Ear: External ear normal.      Nose: Nose normal.      Mouth/Throat:      Mouth: Mucous membranes are moist.      Pharynx: Oropharynx is clear.   Eyes:      Extraocular Movements: Extraocular movements intact.      Conjunctiva/sclera: Conjunctivae normal.   Cardiovascular:      Rate and Rhythm: Normal rate and regular rhythm.      Pulses: Normal pulses.      Heart sounds: Normal heart sounds. No murmur heard.     No gallop.   Pulmonary:      Effort: Pulmonary effort is normal. No respiratory distress.      Breath sounds: Normal breath sounds. No wheezing, rhonchi or rales.   Abdominal:      General: Abdomen is flat. There is no distension.      Palpations: Abdomen is soft.      Tenderness: There is no abdominal tenderness.   Musculoskeletal:         General: No swelling. Normal range of motion.      Cervical back: Normal range of motion and neck supple.      Comments: Left shoulder bandaged   Skin:     General: Skin is warm and dry.   Neurological:      General: No focal deficit present.      Mental Status: He is alert. Mental status is at baseline.             Significant Labs: All pertinent labs within the past 24 hours have been reviewed.    Significant Imaging: I have reviewed all pertinent imaging results/findings within the past 24 hours.    Assessment/Plan:      * Acute renal failure superimposed on chronic kidney disease  Appreciate input from renal services  In setting of acute UTI and IVVD  Resumed  gentle hydration on Zosyn/vanc but those have been stopped  He did not require oral NaHCO3  U/s is w/o obstruction, masses, etc        Pyogenic arthritis of left shoulder region        Abscess of left shoulder  As discussed under limb swelling, status post washout on 06/21/2024, we will repeat washout on 06/24/2024, 06/26/2024,6/27/24  Need antibiotics until 08/03/2024  Wound VAC in place.  Continue antibiotics.  Following cultures.  Pending LTAC placement.    Osteomyelitis of toe  S/p bedside I&D, deep tissue Cx growing MRSA, S to clinda  ID and podiatry following, appreciate help  Appreciate input all consultants  Pt/wife opposed to amputation of digit  Local wound care  Note markedly elevated ESR, CRP  Need antibiotics until 08/03/2024      Skin tear of left upper extremity  Local care, healing  Doubt this is a portal of entry    Swelling of limb, left  U/s w/o DVT  Appreciate help from Dr Bernal, ID, wound care, podiatry et al  Pt is already on OAC, holding for now pending shoulder procedure  Arm is elevated on pillow  Not red/painful, no compartment syndrome  D/w pt/wife      Debility  Multifactorial process  Pt not able to manage his own care and wife cannot adequately care for him at this time  PT/OT  SNF or possibly LTAC consult  CM aware      Severe sepsis  In setting of acute UTI, MRSA OM right 2nd toe, and infected left shoulder hardware  BCX NGTD  Ur CX neg  Rocephin was given empirically for UTI, changed to Zosyn > added vanc/clinda, now on Rocephin/clinda, vanc/zosyn stopped per ID  Oral clotrimazole added for thrush  PCT and WBC trended down, WBC staying up a little now but pt looks good  No obstruction on renal u/s  Has gas containing abscess in and around left shoulder w/ malpositioned hardware, likely d/t falls, hard to say how the bug entered  Long d/w pt/wife, ortho - 6/22 had washout and hardware removal w/ Dr Bernal  F/u w/ Dr Kenneth mann d/c  He's urinating well  We will need antibiotics  until 08/03/2024 per ID    Hyperkalemia  Resolved  in setting of acute on chronic renal failure  Lokelma given x 1 on admit  Avoid ACE/ARB, K+ replacement  tele          Atrial fibrillation with rapid ventricular response  Chronic AF w/ acute RVR, resolved  Required dilt drip on admission but off since 6/15  Tele some PVC/bigem/trigem  TSH 1.8 in March  Monitor lytes  No etoh/drug use  TTE from March 2024:    Left Ventricle: The left ventricle is normal in size. Normal wall thickness. Mild global hypokinesis present. There is mildly reduced systolic function with a visually estimated ejection fraction of 45 - 50%. There is normal diastolic function.    Right Ventricle: Normal right ventricular cavity size. Wall thickness is normal. Right ventricle wall motion  is normal. Systolic function is normal.    Left Atrium: Left atrium is moderately dilated.    IVC/SVC: Normal venous pressure at 3 mmHg.  OAC has been on hold d/t possible need for shoulder surgery - has been on prophylaxis dose of Lovenox, this was help today for shoulder operation  Resume Eliquis as soon as feasible postop      History of Guillain-[place] syndrome  No acute issues  However, pt has impaired mobility and is acutely weakened from his sepsis/UTI/AF RVR  PT/OT working w/ pt  Wife cannot manage his care at home currently  SNF assessment underway, may need LTAC depending on what abx he ends up for how long, it may take a few more days to narrow that down          Type 2 diabetes mellitus  A1c 6.3%  SSI      Hypertension  Home meds as appropriate    MARA (obstructive sleep apnea)  Continue CPAP HS and w/ naps      Morbid obesity  Body mass index is 42.16 kg/m².  Morbid obesity complicates all aspects of disease management from diagnostic modalities to treatment  Weight loss encouraged and health benefits explained to patient  He has been working on weight loss at home w/ reduced caloric intake          VTE Risk Mitigation (From admission, onward)            Ordered     Reason for No Pharmacological VTE Prophylaxis  Once        Question:  Reasons:  Answer:  Already adequately anticoagulated on oral Anticoagulants    06/14/24 1438     IP VTE HIGH RISK PATIENT  Once         06/14/24 1438     Place sequential compression device  Until discontinued         06/14/24 1438                    Discharge Planning   KAMILA: 7/2/2024     Code Status: Full Code   Is the patient medically ready for discharge?:     Reason for patient still in hospital (select all that apply): Treatment  Discharge Plan A: Long-term acute care facility (LTAC)                  Miko Galaviz Jr, MD  Department of Hospital Medicine   [facility]/Surg

## 2024-06-28 NOTE — ASSESSMENT & PLAN NOTE
Body mass index is 42.16 kg/m².  Morbid obesity complicates all aspects of disease management from diagnostic modalities to treatment  Weight loss encouraged and health benefits explained to patient  He has been working on weight loss at home w/ reduced caloric intake

## 2024-06-28 NOTE — PLAN OF CARE
Met with pt and wife at bedside to discuss dc planning.   Per ID pt needs dapto qd and rocephin qd x6 weeks  Pt was accepted at [facility] pending [employer] auth.     CM following       06/28/24 1139   Discharge Reassessment   Assessment Type Discharge Planning Reassessment   Did the patient's condition or plan change since previous assessment? Yes   Discharge Plan discussed with: Patient;Spouse/sig other   Communicated KAMILA with patient/caregiver Yes   Discharge Plan A Long-term acute care facility (LTAC)   Discharge Plan B Skilled Nursing Facility   Why the patient remains in the hospital Requires continued medical care   Post-Acute Status   Post-Acute Authorization Placement   Post-Acute Placement Status Pending payor review/awaiting authorization (if required)

## 2024-06-28 NOTE — PROGRESS NOTES
[facility]/Surg  Podiatry  Progress Note    Patient Name: Roosevelt Moser  MRN: [id number]  Admission Date: 6/14/2024  Hospital Length of Stay: 14 days  Attending Physician: Miko Galaviz Jr., MD  Primary Care Provider: Miguel Angel Enriquez PA-C     Subjective:     Interval History: Patient resting in bed. Denies complaints of pain to either extremity. Denies any new complaints.       Follow-up For: Procedure(s) (LRB):  IRRIGATION AND DEBRIDEMENT, UPPER EXTREMITY (Left)    Post-Operative Day: 1 Day Post-Op    Scheduled Meds:   (Magic mouthwash) 1:1:1 diphenhydrAMINE(Benadryl) 12.5mg/5ml liq, aluminum & magnesium hydroxide-simethicone (Maalox), LIDOcaine viscous 2%  10 mL Swish & Spit QID    cefTRIAXone (Rocephin) IV (PEDS and ADULTS)  2 g Intravenous Q24H    clotrimazole  10 mg Oral 5x Daily    DAPTOmycin (CUBICIN) IV (PEDS and ADULTS)  800 mg Intravenous Q24H    epoetin leonel-epbx  100 Units/kg Subcutaneous Q7 Days    ferrous sulfate  1 tablet Oral Daily    insulin glargine U-100  15 Units Subcutaneous Daily    LIDOcaine  1 patch Transdermal Q24H    metoprolol succinate  100 mg Oral QHS    sodium chloride 0.9%  10 mL Intravenous Q6H     Continuous Infusions:  PRN Meds:  Current Facility-Administered Medications:     albuterol-ipratropium, 3 mL, Nebulization, Q6H PRN    aluminum & magnesium hydroxide-simethicone, 15 mL, Oral, QID PRN    dextrose 10%, 12.5 g, Intravenous, PRN    dextrose 10%, 25 g, Intravenous, PRN    glucagon (human recombinant), 1 mg, Intramuscular, PRN    glucose, 16 g, Oral, PRN    glucose, 24 g, Oral, PRN    HYDROcodone-acetaminophen, 1 tablet, Oral, Q4H PRN    insulin aspart U-100, 0-10 Units, Subcutaneous, QID (AC + HS) PRN    metoprolol, 5 mg, Intravenous, Q5 Min PRN    naloxone, 0.02 mg, Intravenous, PRN    ondansetron, 4 mg, Intravenous, Q8H PRN    prochlorperazine, 5 mg, Intravenous, Q6H PRN    simethicone, 1 tablet, Oral, QID PRN    sodium chloride 0.9%, 10 mL, Intravenous,  Q12H PRN    Flushing PICC/Midline Protocol, , , Until Discontinued **AND** sodium chloride 0.9%, 10 mL, Intravenous, Q6H **AND** sodium chloride 0.9%, 10 mL, Intravenous, PRN    sodium chloride 0.9%, 10 mL, Intravenous, Q12H PRN    Review of Systems  Objective:     Vital Signs (Most Recent):  Temp: 97.9 °F (36.6 °C) (06/28/24 1125)  Pulse: 92 (06/28/24 1329)  Resp: 18 (06/28/24 1329)  BP: (!) 124/56 (06/28/24 1125)  SpO2: (!) 94 % (06/28/24 1329) Vital Signs (24h Range):  Temp:  [97.4 °F (36.3 °C)-98.1 °F (36.7 °C)] 97.9 °F (36.6 °C)  Pulse:  [] 92  Resp:  [16-18] 18  SpO2:  [93 %-94 %] 94 %  BP: (109-144)/(56-69) 124/56     Weight: (!) 141 kg (310 lb 13.6 oz)  Body mass index is 42.16 kg/m².    Foot Exam    Right Foot/Ankle     Neurovascular  Dorsalis pedis: 2+  Posterior tibial: 2+  Saphenous nerve sensation: diminished  Tibial nerve sensation: diminished  Superficial peroneal nerve sensation: diminished  Deep peroneal nerve sensation: diminished  Sural nerve sensation: diminished                Laboratory:  All pertinent labs reviewed within the last 24 hours.    Diagnostic Results:  I have reviewed all pertinent imaging results/findings within the past 24 hours.  Assessment/Plan:     ID  Osteomyelitis of toe  Discussed with patient wound care with IV antibiotics for the right 2nd toe wound vs amputation. Discussed risks and benefits of each option. Patient states he would like to proceed with amputation of the right 2nd toe.     We will take patient to the OR early next week, either Monday or Tuesday for amputation of the right 2nd toe, depending on OR availability.     Patient will need to be heel touch only weight bearing following amputation of the right 2nd toe, until the sutures are removed.     Recommend Aquacel Ag followed by bulky wrap for now.  No compression.    Catarino and protein drinks daily.    Discussed with infectious disease/          Yocasta Zhu DPM  Podiatry  [facility]  Med/Surg

## 2024-06-28 NOTE — CARE UPDATE
06/28/24 0823   Patient Assessment/Suction   Level of Consciousness (AVPU) alert   Respiratory Effort Normal;Unlabored   Expansion/Accessory Muscles/Retractions no use of accessory muscles;no retractions;expansion symmetric   All Lung Fields Breath Sounds Anterior:;Lateral:;clear;diminished   Rhythm/Pattern, Respiratory pattern regular;unlabored;depth regular;no shortness of breath reported   PRE-TX-O2   Device (Oxygen Therapy) room air   SpO2 (!) 94 %   Pulse Oximetry Type Intermittent   $ Pulse Oximetry - Multiple Charge Pulse Oximetry - Multiple   Pulse 100   Resp 16   Aerosol Therapy   $ Aerosol Therapy Charges PRN treatment not required   Respiratory Treatment Status (SVN) PRN treatment not required   Incentive Spirometer   $ Incentive Spirometer Charges done independently per patient   Incentive Spirometer Predicted Level (mL) 2200   Administration (IS) self-administered   Preset CPAP/BiPAP Settings   Mode Of Delivery other (see comments)  (home cpap at bedside on standby)

## 2024-06-28 NOTE — PLAN OF CARE
Problem: Adult Inpatient Plan of Care  Goal: Plan of Care Review  Outcome: Progressing     Problem: Diabetes Comorbidity  Goal: Blood Glucose Level Within Targeted Range  Outcome: Progressing     Problem: Sepsis/Septic Shock  Goal: Optimal Nutrition Intake  Outcome: Progressing     Problem: Skin Injury Risk Increased  Goal: Skin Health and Integrity  Outcome: Progressing     Problem: Wound  Goal: Optimal Coping  Outcome: Progressing     Problem: Bariatric Environmental Safety  Goal: Safety Maintained with Care  Outcome: Progressing     Problem: Infection  Goal: Absence of Infection Signs and Symptoms  Outcome: Progressing     Problem: Fall Injury Risk  Goal: Absence of Fall and Fall-Related Injury  Outcome: Progressing

## 2024-06-28 NOTE — PLAN OF CARE
Problem: Adult Inpatient Plan of Care  Goal: Plan of Care Review  Outcome: Progressing  Goal: Patient-Specific Goal (Individualized)  Outcome: Progressing  Goal: Absence of Hospital-Acquired Illness or Injury  Outcome: Progressing  Goal: Optimal Comfort and Wellbeing  Outcome: Progressing  Goal: Readiness for Transition of Care  Outcome: Progressing     Problem: Diabetes Comorbidity  Goal: Blood Glucose Level Within Targeted Range  Outcome: Progressing     Problem: Acute Kidney Injury/Impairment  Goal: Fluid and Electrolyte Balance  Outcome: Progressing  Goal: Improved Oral Intake  Outcome: Progressing  Goal: Effective Renal Function  Outcome: Progressing     Problem: Sepsis/Septic Shock  Goal: Optimal Coping  Outcome: Progressing  Goal: Absence of Bleeding  Outcome: Progressing  Goal: Blood Glucose Level Within Targeted Range  Outcome: Progressing  Goal: Absence of Infection Signs and Symptoms  Outcome: Progressing  Goal: Optimal Nutrition Intake  Outcome: Progressing     Problem: Skin Injury Risk Increased  Goal: Skin Health and Integrity  Outcome: Progressing     Problem: Bariatric Environmental Safety  Goal: Safety Maintained with Care  Outcome: Progressing     Problem: Wound  Goal: Optimal Coping  Outcome: Progressing  Goal: Optimal Functional Ability  Outcome: Progressing  Goal: Absence of Infection Signs and Symptoms  Outcome: Progressing  Goal: Improved Oral Intake  Outcome: Progressing  Goal: Optimal Pain Control and Function  Outcome: Progressing  Goal: Skin Health and Integrity  Outcome: Progressing  Goal: Optimal Wound Healing  Outcome: Progressing     Problem: Infection  Goal: Absence of Infection Signs and Symptoms  Outcome: Progressing     Problem: Fall Injury Risk  Goal: Absence of Fall and Fall-Related Injury  Outcome: Progressing

## 2024-06-28 NOTE — PLAN OF CARE
RD was not consulted but was identified through LOS.    Pt admitted with MARBIN on CKD. His medical Hx includes:  PMH includes: atrial fibrillation on eliquis, HTN, DMII, obstructive sleep apnea, HLD, ORIF of left humerus and guillian-[place] who presented to the emergency room for weakness and multiple falls.  He had washout/removal of infected hardware on 6/27 pyogenic arthritis of left shoulder.  In addition, he is morbidly obese and has osteomyelitis of toe and skin tear to left upper arm.    Diet Rx:  Renal Consistent carbohydrate supplemented with Novasource Renal all meals, ProSource protein supplement ( substituted for Proteinex) and Catarino BID to promote wound healing.   Intake has been ranging 50-75% of meals/nourishments  Current wt:  310#; BMI = 42    MedS: IV rocephin and cubicin; epoetin, ferrous sulfate; insulin glargine; ss insulin aspart; toprol and magic mouthwash.  Pt will need 6 weeks of IV abx.  Labs:    Latest Reference Range & Units Most Recent   Hemoglobin A1C External 4.5 - 6.2 % 6.3 (H)  6/14/24 15:39   (H): Data is abnormally high   Latest Reference Range & Units Most Recent   Hemoglobin 14.0 - 18.0 g/dL 8.3 (L)  6/28/24 04:03   Hematocrit 40.0 - 54.0 % 26.7 (L)  6/28/24 04:03   (L): Data is abnormally low   Latest Reference Range & Units Most Recent   Albumin 3.5 - 5.2 g/dL 1.4 (L)  6/28/24 04:03   Prealbumin 20 - 43 mg/dL 5 (L)  6/18/24 02:33   (L): Data is abnormally low   Latest Reference Range & Units Most Recent   BUN 8 - 23 mg/dL 48 (H)  6/28/24 04:03   Creatinine 0.5 - 1.4 mg/dL 1.4  6/28/24 04:03   (H): Data is abnormally high    DC plans are to [facility]AN LTAC over the weekend for completion of antibiotics, wound care and Rehab services.  Recommend continuing diet Rx with current nutritional supplements. Will F/U on 7/1 if still admitted.

## 2024-06-28 NOTE — PT/OT/SLP PROGRESS
Occupational Therapy   Treatment    Name: Roosevelt Moser  MRN: [id number]  Admitting Diagnosis:  Acute renal failure superimposed on chronic kidney disease  1 Day Post-Op    Recommendations:     Discharge Recommendations: Moderate Intensity Therapy  Discharge Equipment Recommendations:  TBD  Barriers to discharge:      Assessment:     Roosevelt Moser is a 73 y.o. male with a medical diagnosis of Acute renal failure superimposed on chronic kidney disease.  He presents with performance deficits affecting function are weakness, impaired endurance, impaired self care skills, impaired functional mobility, decreased upper extremity function, decreased lower extremity function, pain, decreased ROM, edema, impaired skin, impaired coordination, impaired fine motor and orthopedic precautions.     Rehab Prognosis:  Fair; patient would benefit from acute skilled OT services to address these deficits and reach maximum level of function.       Plan:     Patient to be seen 6 x/week to address the above listed problems via self-care/home management, therapeutic activities, therapeutic exercises  Plan of Care Expires: 07/13/24  Plan of Care Reviewed with: patient, spouse    Subjective     Chief Complaint: Left shoulder pain  Patient/Family Comments/goals: Pain relief  Pain/Comfort:  Pain Rating 1: 7/10  Location - Side 1: Left  Location 1: shoulder    Objective:     Communicated with: nurse prior to session.  Patient found HOB elevated upon OT entry to room.    General Precautions: Standard, fall    Orthopedic Precautions: L UE non weight bearing (LUE s/p shoulder surgery 3/25/24)  Braces: N/A  Respiratory Status: Room air    Activities of Daily Living:  Patient declined.       Treatment & Education:  Pt was given instruction and demonstration of left hand/finger and wrist extension/flexion AROM exercises to prevent stiffness and decrease edema. Pt completed 3x10 with supervision and verbal cues.   Pt completed 2x10 gentle left elbow  extension/flexion AAROM to prevent stiffness. Pt tolerated ROM exercises well.     Patient left HOB elevated with all lines intact, call button in reach, bed alarm on and patient's wife present.    GOALS:   Multidisciplinary Problems       Occupational Therapy Goals          Problem: Occupational Therapy    Goal Priority Disciplines Outcome Interventions   Occupational Therapy Goal     OT, PT/OT Progressing    Description: Goals to be met by: 7/13/24     Patient will increase functional independence with ADLs by performing:    Feeding with [place].  UE Dressing with Moderate Assistance.  LE Dressing with Moderate Assistance.  Grooming while seated with Set-up Assistance.  Toileting from bedside commode with Moderate Assistance for hygiene and clothing management.   Bathing from  shower chair/bench with Moderate Assistance.  Toilet transfer to bedside commode with Minimal Assistance.  Increased strength and functional activity tolerance for ADL's/IADL's                         Time Tracking:     OT Date of Treatment: 06/28/24  OT Start Time: 0850  OT Stop Time: 0900  OT Total Time (min): 10 min    Billable Minutes:Therapeutic Exercise 10               6/28/2024

## 2024-06-28 NOTE — PLAN OF CARE
06/27/24 1930   Patient Assessment/Suction   Level of Consciousness (AVPU) alert   Respiratory Effort Normal;Unlabored   Expansion/Accessory Muscles/Retractions expansion symmetric   Rhythm/Pattern, Respiratory pattern regular   Cough Frequency no cough   PRE-TX-O2   Device (Oxygen Therapy) room air   SpO2 (!) 94 %   Pulse Oximetry Type Intermittent   Aerosol Therapy   $ Aerosol Therapy Charges PRN treatment not required   Incentive Spirometer   Administration (IS) unable to perform   Preset CPAP/BiPAP Settings   Mode Of Delivery other (see comments)  (home cpap at bedside)

## 2024-06-28 NOTE — SUBJECTIVE & OBJECTIVE
Interval History:  No acute events overnight.  Patient states he is well at this time.  No complaints.  Ortho cleared patient.  Pending placement at this time for IV antibiotics    Review of Systems  Objective:     Vital Signs (Most Recent):  Temp: 98.1 °F (36.7 °C) (06/28/24 0714)  Pulse: 104 (06/28/24 0714)  Resp: 17 (06/28/24 0714)  BP: (!) 144/63 (06/28/24 0714)  SpO2: (!) 94 % (06/28/24 0714) Vital Signs (24h Range):  Temp:  [97.4 °F (36.3 °C)-98.1 °F (36.7 °C)] 98.1 °F (36.7 °C)  Pulse:  [] 104  Resp:  [17-20] 17  SpO2:  [93 %-94 %] 94 %  BP: (109-144)/(56-69) 144/63     Weight: (!) 141 kg (310 lb 13.6 oz)  Body mass index is 42.16 kg/m².    Intake/Output Summary (Last 24 hours) at 6/28/2024 1043  Last data filed at 6/28/2024 0552  Gross per 24 hour   Intake 1140 ml   Output 2050 ml   Net -910 ml         Physical Exam  Constitutional:       Appearance: Normal appearance.   HENT:      Head: Normocephalic and atraumatic.      Right Ear: External ear normal.      Left Ear: External ear normal.      Nose: Nose normal.      Mouth/Throat:      Mouth: Mucous membranes are moist.      Pharynx: Oropharynx is clear.   Eyes:      Extraocular Movements: Extraocular movements intact.      Conjunctiva/sclera: Conjunctivae normal.   Cardiovascular:      Rate and Rhythm: Normal rate and regular rhythm.      Pulses: Normal pulses.      Heart sounds: Normal heart sounds. No murmur heard.     No gallop.   Pulmonary:      Effort: Pulmonary effort is normal. No respiratory distress.      Breath sounds: Normal breath sounds. No wheezing, rhonchi or rales.   Abdominal:      General: Abdomen is flat. There is no distension.      Palpations: Abdomen is soft.      Tenderness: There is no abdominal tenderness.   Musculoskeletal:         General: No swelling. Normal range of motion.      Cervical back: Normal range of motion and neck supple.      Comments: Left shoulder bandaged   Skin:     General: Skin is warm and dry.    Neurological:      General: No focal deficit present.      Mental Status: He is alert. Mental status is at baseline.             Significant Labs: All pertinent labs within the past 24 hours have been reviewed.    Significant Imaging: I have reviewed all pertinent imaging results/findings within the past 24 hours.

## 2024-06-28 NOTE — NURSING
Pt seen at bedside for a FU for stage 2 pressure ulcers of the BL buttocks with DTI. Pt has some discomfort , but not overly painful. No other complaints today Bilaterl buttocks cleansed with wound cleanser applied triad to all areas tolerated well cleansed necrotic second toe right foot with wound cleansed covered with mepelex dressing

## 2024-06-28 NOTE — SUBJECTIVE & OBJECTIVE
Principal Problem:Acute renal failure superimposed on chronic kidney disease    Principal Orthopedic Problem:  Left shoulder infection    Interval History:  Status post repeat irrigation and drainage with removal of wound VAC left shoulder    Review of patient's allergies indicates:  No Known Allergies    Current Facility-Administered Medications   Medication    (Magic mouthwash) 1:1:1 diphenhydrAMINE(Benadryl) 12.5mg/5ml liq, aluminum & magnesium hydroxide-simethicone (Maalox), LIDOcaine viscous 2%    albuterol-ipratropium 2.5 mg-0.5 mg/3 mL nebulizer solution 3 mL    aluminum & magnesium hydroxide-simethicone 400-400-40 mg/5 mL suspension 15 mL    cefTRIAXone (ROCEPHIN) 2 g in dextrose 5 % in water (D5W) 100 mL IVPB (MB+)    clotrimazole megha 10 mg    DAPTOmycin (CUBICIN) 800 mg in 0.9% NaCl SolP 50 mL IVPB    dextrose 10% bolus 125 mL 125 mL    dextrose 10% bolus 250 mL 250 mL    glucagon (human recombinant) injection 1 mg    glucose chewable tablet 16 g    glucose chewable tablet 24 g    HYDROcodone-acetaminophen  mg per tablet 1 tablet    insulin aspart U-100 pen 0-10 Units    insulin glargine U-100 (Lantus) pen 15 Units    LIDOcaine 5 % patch 1 patch    metoprolol injection 5 mg    metoprolol succinate (TOPROL-XL) 24 hr tablet 100 mg    naloxone 0.4 mg/mL injection 0.02 mg    ondansetron injection 4 mg    prochlorperazine injection Soln 5 mg    simethicone chewable tablet 80 mg    sodium chloride 0.9% flush 10 mL    sodium chloride 0.9% flush 10 mL    And    sodium chloride 0.9% flush 10 mL    sodium chloride 0.9% flush 10 mL     Objective:     Vital Signs (Most Recent):  Temp: 98 °F (36.7 °C) (06/28/24 0348)  Pulse: 93 (06/28/24 0348)  Resp: 18 (06/28/24 0348)  BP: 137/61 (06/28/24 0348)  SpO2: (!) 93 % (06/28/24 0348) Vital Signs (24h Range):  Temp:  [97.4 °F (36.3 °C)-98.4 °F (36.9 °C)] 98 °F (36.7 °C)  Pulse:  [] 93  Resp:  [18-65] 18  SpO2:  [84 %-98 %] 93 %  BP: (106-206)/(56-96) 137/61      Weight: (!) 141 kg (310 lb 13.6 oz)  Height: 6' (182.9 cm)  Body mass index is 42.16 kg/m².      Intake/Output Summary (Last 24 hours) at 6/28/2024 0713  Last data filed at 6/28/2024 0552  Gross per 24 hour   Intake 1390 ml   Output 2050 ml   Net -660 ml        General    Nursing note and vitals reviewed.  Constitutional: He is oriented to person, place, and time. He appears well-developed and well-nourished. No distress.   HENT:   Head: Normocephalic and atraumatic.   Nose: Nose normal.   Eyes: EOM are normal.   Cardiovascular:  Normal rate.            Pulmonary/Chest: Effort normal.   Neurological: He is alert and oriented to person, place, and time.   Psychiatric: He has a normal mood and affect. His behavior is normal. Thought content normal.             Left Shoulder Exam     Range of Motion   Extension:  abnormal   Forward Flexion:  abnormal   Forward Elevation: abnormal  Adduction: abnormal  External Rotation 90 degrees: abnormal  Internal rotation 90 degrees:  abnormal     Other   Sensation: normal     Comments:  Dressing with a scant amount of serosanguineous drainage noted.  Dry and intact however.    I removed his sling today, this was for comfort only, every time I have seen the patient the sling has been ineffective in supporting the shoulder.      Vascular Exam       Left Pulses      Radial:                    2+      Capillary Refill  Left Hand: normal capillary refill           Significant Labs:   Recent Lab Results  (Last 5 results in the past 24 hours)        06/28/24  0403   06/27/24  2059   06/27/24  1614   06/27/24  1125   06/27/24  0904        Albumin 1.4                              ALT 24               Anion Gap 11               AST 30               Baso # 0.13               Basophil % 0.8               BILIRUBIN TOTAL 0.3  Comment: For infants and newborns, interpretation of results should be based  on gestational age, weight and in agreement with  clinical  observations.    Premature Infant recommended reference ranges:  Up to 24 hours.............<8.0 mg/dL  Up to 48 hours............<12.0 mg/dL  3-5 days..................<15.0 mg/dL  6-29 days.................<15.0 mg/dL                 BUN 48               Calcium 8.8               Chloride 106               CO2 26               Creatinine 1.4               Differential Method Automated               eGFR 53               Eos # 1.0               Eos % 6.4               Glucose 171               GRAM STAIN               Gran # (ANC) 9.3               Gran % 59.9               Hematocrit 26.7               Hemoglobin 8.3               Immature Grans (Abs) 0.24  Comment: Mild elevation in immature granulocytes is non specific and   can be seen in a variety of conditions including stress response,   acute inflammation, trauma and pregnancy. Correlation with other   laboratory and clinical findings is essential.                 Immature Granulocytes 1.6               Lymph # 3.1               Lymph % 20.1               MCH 25.9               MCHC 31.1               MCV 83               Mono # 1.7               Mono % 11.2               MPV 9.3               nRBC 0               Platelet Count 654               POCT Glucose   195   308   272   252       Potassium 3.6               PROTEIN TOTAL 6.6               RBC 3.20               RDW 17.9               Sodium 143               WBC 15.46                                      Significant Imaging: None

## 2024-06-28 NOTE — ASSESSMENT & PLAN NOTE
Discussed with patient wound care with IV antibiotics for the right 2nd toe wound vs amputation. Discussed risks and benefits of each option. Patient states he would like to proceed with amputation of the right 2nd toe.     We will take patient to the OR early next week, either Monday or Tuesday for amputation of the right 2nd toe, depending on OR availability.     Patient will need to be heel touch only weight bearing following amputation of the right 2nd toe, until the sutures are removed.     Recommend Aquacel Ag followed by bulky wrap for now.  No compression.    Catarino and protein drinks daily.    Discussed with infectious disease/

## 2024-06-28 NOTE — PT/OT/SLP PROGRESS
-- DO NOT REPLY / DO NOT REPLY ALL --  -- Message is from Engagement Center Operations ([facility]) --    Referral Request  Name of Specialist: Willian Poe MD  Provider's specialty: Ophthalmology    Medical condition for referral:  cataracts surgery     Is this a NEW request?: yes      Referral ordered by: Prudence Church MD       Insurance type:  devoted      Payor:  [employer] / Plan: [facility] [employer] / Product Type:  MEDICARE ADVANTAGE      Preferred Delivery Method   Fax - number to send to: [phone]    Caller Information       Type Contact Phone/Fax    05/31/2023 03:33 PM CDT Phone (Incoming) Ted Holder (Self) [phone] (H)          Alternative phone number: [phone]    Can a detailed message be left? Yes    Please give this turnaround time to the caller:   \"This message will be sent to [state Provider's full name]. The clinical team will return your call as soon as they review your message. Typically, it takes 3 business days to process referral requests.\"             Physical Therapy Treatment    Patient Name:  Roosevelt Moser   MRN:  [id number]    Recommendations:     Discharge Recommendations: Moderate Intensity Therapy  Discharge Equipment Recommendations: none  Barriers to discharge: None    Assessment:     Roosevelt Moser is a 73 y.o. male admitted with a medical diagnosis of Acute renal failure superimposed on chronic kidney disease.  He presents with the following impairments/functional limitations: weakness, impaired endurance, impaired self care skills, impaired functional mobility, decreased lower extremity function, decreased upper extremity function, pain, impaired skin, orthopedic precautions . Awake, alert, supine in bed with spouse present.  Agreed to mobilize.  LUE sling applied for activity . Sup > sit with A x 2.  Performed AROM BLE's seated.  Sit <>stand x 2 trials unable to  stand upright ( leaning forward heavily).  Scoot L side HOB wth Max A ( 3 attempts with verbal cues for technique).  Sit > supine with A x 2.     Rehab Prognosis: Good; patient would benefit from acute skilled PT services to address these deficits and reach maximum level of function.    Recent Surgery: Procedure(s) (LRB):  IRRIGATION AND DEBRIDEMENT, UPPER EXTREMITY (Left) 1 Day Post-Op    Plan:     During this hospitalization, patient to be seen daily to address the identified rehab impairments via gait training, therapeutic activities, therapeutic exercises and progress toward the following goals:    Plan of Care Expires:  07/15/24    Subjective     Chief Complaint: pain LUE  Patient/Family Comments/goals: to go to facility then home.   Pain/Comfort:  Pain Rating 1: other (see comments) (did not rate)  Location - Side 1: Left  Location - Orientation 1: generalized  Location 1: shoulder (with movement)  Pain Addressed 1: Reposition, Nurse notified      Objective:     Communicated with nurse Gtz prior to session.  Patient found supine with bed alarm, PureWick, PICC line, telemetry, SCD upon PT  entry to room.     General Precautions: Standard, contact, fall  Orthopedic Precautions: LUE non weight bearing  Braces: UE Sling  Respiratory Status: Room air     Functional Mobility:  Bed Mobility:     Supine to Sit: maximal assistance and of 2 persons  Sit to Supine: maximal assistance and of 2 persons  Transfers:     Sit to Stand:  maximal assistance and of 2 persons with rolling walker      AM-PAC 6 CLICK MOBILITY          Treatment & Education:  Sup > sit with A x 2. Performed: TKE's, Hip abd/add/ flexion, AP's.   Sit <> stand with rw and A x 2.9 2 trials).   Scoot L side, Max A ( 3 attempts).   Sit > supine A x 2.      Patient left supine with all lines intact, call button in reach, bed alarm on, nurse Tresa notified, and spouse present..    GOALS:   Multidisciplinary Problems       Physical Therapy Goals          Problem: Physical Therapy    Goal Priority Disciplines Outcome Goal Variances Interventions   Physical Therapy Goal     PT, PT/OT Progressing     Description: Goals to be met by: 7/15/24     Patient will increase functional independence with mobility by performin. Supine to sit with Contact Guard Assistance  2. Sit to stand transfer with Contact Guard Assistance  3. Bed to chair transfer with Contact Guard Assistance using Rolling Walker  4. Gait  x 200 feet with Contact Guard Assistance using Rolling Walker.   5. Lower extremity exercise program x30 reps per handout, with supervision                         Time Tracking:     PT Received On: 24  PT Start Time: 1040     PT Stop Time: 1105  PT Total Time (min): 25 min     Billable Minutes: Therapeutic Activity 25min    Treatment Type: Treatment  PT/PTA: PTA     Number of PTA visits since last PT visit: 4     2024

## 2024-06-29 LAB
ALBUMIN SERPL BCP-MCNC: 1.5 G/DL (ref 3.5–5.2)
ALP SERPL-CCNC: 153 U/L (ref 55–135)
ALT SERPL W/O P-5'-P-CCNC: 23 U/L (ref 10–44)
ANION GAP SERPL CALC-SCNC: 11 MMOL/L (ref 8–16)
AST SERPL-CCNC: 31 U/L (ref 10–40)
BACTERIA SPEC ANAEROBE CULT: NORMAL
BASOPHILS # BLD AUTO: 0.18 K/UL (ref 0–0.2)
BASOPHILS NFR BLD: 1.1 % (ref 0–1.9)
BILIRUB SERPL-MCNC: 0.3 MG/DL (ref 0.1–1)
BUN SERPL-MCNC: 48 MG/DL (ref 8–23)
CALCIUM SERPL-MCNC: 8.8 MG/DL (ref 8.7–10.5)
CHLORIDE SERPL-SCNC: 105 MMOL/L (ref 95–110)
CO2 SERPL-SCNC: 25 MMOL/L (ref 23–29)
CREAT SERPL-MCNC: 1.3 MG/DL (ref 0.5–1.4)
DIFFERENTIAL METHOD BLD: ABNORMAL
EOSINOPHIL # BLD AUTO: 1 K/UL (ref 0–0.5)
EOSINOPHIL NFR BLD: 5.8 % (ref 0–8)
ERYTHROCYTE [DISTWIDTH] IN BLOOD BY AUTOMATED COUNT: 17.9 % (ref 11.5–14.5)
EST. GFR  (NO RACE VARIABLE): 58 ML/MIN/1.73 M^2
GLUCOSE SERPL-MCNC: 178 MG/DL (ref 70–110)
HCT VFR BLD AUTO: 28.5 % (ref 40–54)
HGB BLD-MCNC: 8.7 G/DL (ref 14–18)
IMM GRANULOCYTES # BLD AUTO: 0.31 K/UL (ref 0–0.04)
IMM GRANULOCYTES NFR BLD AUTO: 1.9 % (ref 0–0.5)
LYMPHOCYTES # BLD AUTO: 3.1 K/UL (ref 1–4.8)
LYMPHOCYTES NFR BLD: 18.9 % (ref 18–48)
MCH RBC QN AUTO: 25.2 PG (ref 27–31)
MCHC RBC AUTO-ENTMCNC: 30.5 G/DL (ref 32–36)
MCV RBC AUTO: 83 FL (ref 82–98)
MONOCYTES # BLD AUTO: 1.9 K/UL (ref 0.3–1)
MONOCYTES NFR BLD: 11.4 % (ref 4–15)
NEUTROPHILS # BLD AUTO: 10 K/UL (ref 1.8–7.7)
NEUTROPHILS NFR BLD: 60.9 % (ref 38–73)
NRBC BLD-RTO: 0 /100 WBC
PLATELET # BLD AUTO: 603 K/UL (ref 150–450)
PMV BLD AUTO: 9.2 FL (ref 9.2–12.9)
POCT GLUCOSE: 123 MG/DL (ref 70–110)
POCT GLUCOSE: 183 MG/DL (ref 70–110)
POCT GLUCOSE: 193 MG/DL (ref 70–110)
POCT GLUCOSE: 288 MG/DL (ref 70–110)
POTASSIUM SERPL-SCNC: 4 MMOL/L (ref 3.5–5.1)
PROT SERPL-MCNC: 6.6 G/DL (ref 6–8.4)
RBC # BLD AUTO: 3.45 M/UL (ref 4.6–6.2)
SODIUM SERPL-SCNC: 141 MMOL/L (ref 136–145)
WBC # BLD AUTO: 16.36 K/UL (ref 3.9–12.7)

## 2024-06-29 PROCEDURE — 25000003 PHARM REV CODE 250: Performed by: ORTHOPAEDIC SURGERY

## 2024-06-29 PROCEDURE — 63600175 PHARM REV CODE 636 W HCPCS: Performed by: ORTHOPAEDIC SURGERY

## 2024-06-29 PROCEDURE — 11000001 HC ACUTE MED/SURG PRIVATE ROOM

## 2024-06-29 PROCEDURE — 80053 COMPREHEN METABOLIC PANEL: CPT | Performed by: ORTHOPAEDIC SURGERY

## 2024-06-29 PROCEDURE — 99900035 HC TECH TIME PER 15 MIN (STAT)

## 2024-06-29 PROCEDURE — 99233 SBSQ HOSP IP/OBS HIGH 50: CPT | Mod: ,,, | Performed by: INTERNAL MEDICINE

## 2024-06-29 PROCEDURE — 25000003 PHARM REV CODE 250: Performed by: INTERNAL MEDICINE

## 2024-06-29 PROCEDURE — 94761 N-INVAS EAR/PLS OXIMETRY MLT: CPT

## 2024-06-29 PROCEDURE — 36415 COLL VENOUS BLD VENIPUNCTURE: CPT | Performed by: ORTHOPAEDIC SURGERY

## 2024-06-29 PROCEDURE — 94799 UNLISTED PULMONARY SVC/PX: CPT

## 2024-06-29 PROCEDURE — 85025 COMPLETE CBC W/AUTO DIFF WBC: CPT | Performed by: ORTHOPAEDIC SURGERY

## 2024-06-29 PROCEDURE — 97530 THERAPEUTIC ACTIVITIES: CPT

## 2024-06-29 PROCEDURE — A4216 STERILE WATER/SALINE, 10 ML: HCPCS | Performed by: ORTHOPAEDIC SURGERY

## 2024-06-29 RX ADMIN — DAPTOMYCIN 800 MG: 500 INJECTION, POWDER, LYOPHILIZED, FOR SOLUTION INTRAVENOUS at 04:06

## 2024-06-29 RX ADMIN — INSULIN ASPART 2 UNITS: 100 INJECTION, SOLUTION INTRAVENOUS; SUBCUTANEOUS at 04:06

## 2024-06-29 RX ADMIN — LIDOCAINE HYDROCHLORIDE 10 ML: 20 SOLUTION ORAL; TOPICAL at 12:06

## 2024-06-29 RX ADMIN — METOPROLOL SUCCINATE 100 MG: 50 TABLET, FILM COATED, EXTENDED RELEASE ORAL at 08:06

## 2024-06-29 RX ADMIN — HYDROCODONE BITARTRATE AND ACETAMINOPHEN 1 TABLET: 10; 325 TABLET ORAL at 05:06

## 2024-06-29 RX ADMIN — HYDROCODONE BITARTRATE AND ACETAMINOPHEN 1 TABLET: 10; 325 TABLET ORAL at 10:06

## 2024-06-29 RX ADMIN — HYDROCODONE BITARTRATE AND ACETAMINOPHEN 1 TABLET: 10; 325 TABLET ORAL at 08:06

## 2024-06-29 RX ADMIN — SODIUM CHLORIDE, PRESERVATIVE FREE 10 ML: 5 INJECTION INTRAVENOUS at 04:06

## 2024-06-29 RX ADMIN — FERROUS SULFATE TAB 325 MG (65 MG ELEMENTAL FE) 1 EACH: 325 (65 FE) TAB at 09:06

## 2024-06-29 RX ADMIN — CEFTRIAXONE SODIUM 2 G: 2 INJECTION, POWDER, FOR SOLUTION INTRAMUSCULAR; INTRAVENOUS at 10:06

## 2024-06-29 RX ADMIN — LIDOCAINE HYDROCHLORIDE 10 ML: 20 SOLUTION ORAL; TOPICAL at 04:06

## 2024-06-29 RX ADMIN — LIDOCAINE 5% 1 PATCH: 700 PATCH TOPICAL at 04:06

## 2024-06-29 RX ADMIN — HYDROCODONE BITARTRATE AND ACETAMINOPHEN 1 TABLET: 10; 325 TABLET ORAL at 04:06

## 2024-06-29 RX ADMIN — LIDOCAINE HYDROCHLORIDE 10 ML: 20 SOLUTION ORAL; TOPICAL at 08:06

## 2024-06-29 RX ADMIN — INSULIN ASPART 8 UNITS: 100 INJECTION, SOLUTION INTRAVENOUS; SUBCUTANEOUS at 11:06

## 2024-06-29 RX ADMIN — INSULIN ASPART 2 UNITS: 100 INJECTION, SOLUTION INTRAVENOUS; SUBCUTANEOUS at 09:06

## 2024-06-29 RX ADMIN — INSULIN GLARGINE 15 UNITS: 100 INJECTION, SOLUTION SUBCUTANEOUS at 09:06

## 2024-06-29 RX ADMIN — CLOTRIMAZOLE 10 MG: 10 LOZENGE ORAL at 05:06

## 2024-06-29 NOTE — PROGRESS NOTES
[facility]/Surg  Orthopedics  Progress Note    Patient Name: Roosevelt Moser  MRN: [id number]  Admission Date: 6/14/2024  Hospital Length of Stay: 15 days  Attending Provider: Miko Galaviz Jr., MD  Primary Care Provider: Miguel Angel Enriquez PA-C  Follow-up For: Procedure(s) (LRB):  IRRIGATION AND DEBRIDEMENT, UPPER EXTREMITY (Left)    Post-Operative Day: 2 Days Post-Op  Subjective:     Principal Problem:Acute renal failure superimposed on chronic kidney disease    Principal Orthopedic Problem:  Left shoulder infection    Interval History:  Stable status post irrigation and debridement x3    Review of patient's allergies indicates:  No Known Allergies    Current Facility-Administered Medications   Medication    (Magic mouthwash) 1:1:1 diphenhydrAMINE(Benadryl) 12.5mg/5ml liq, aluminum & magnesium hydroxide-simethicone (Maalox), LIDOcaine viscous 2%    albuterol-ipratropium 2.5 mg-0.5 mg/3 mL nebulizer solution 3 mL    aluminum & magnesium hydroxide-simethicone 400-400-40 mg/5 mL suspension 15 mL    cefTRIAXone (ROCEPHIN) 2 g in dextrose 5 % in water (D5W) 100 mL IVPB (MB+)    clotrimazole megha 10 mg    DAPTOmycin (CUBICIN) 800 mg in 0.9% NaCl SolP 50 mL IVPB    dextrose 10% bolus 125 mL 125 mL    dextrose 10% bolus 250 mL 250 mL    epoetin leonel-epbx injection 14,100 Units    ferrous sulfate tablet 1 each    glucagon (human recombinant) injection 1 mg    glucose chewable tablet 16 g    glucose chewable tablet 24 g    HYDROcodone-acetaminophen  mg per tablet 1 tablet    insulin aspart U-100 pen 0-10 Units    insulin glargine U-100 (Lantus) pen 15 Units    LIDOcaine 5 % patch 1 patch    metoprolol injection 5 mg    metoprolol succinate (TOPROL-XL) 24 hr tablet 100 mg    naloxone 0.4 mg/mL injection 0.02 mg    ondansetron injection 4 mg    prochlorperazine injection Soln 5 mg    simethicone chewable tablet 80 mg    sodium chloride 0.9% flush 10 mL    sodium chloride 0.9% flush 10 mL    And    sodium  chloride 0.9% flush 10 mL    sodium chloride 0.9% flush 10 mL     Objective:     Vital Signs (Most Recent):  Temp: 97.9 °F (36.6 °C) (06/29/24 0400)  Pulse: 61 (06/29/24 0400)  Resp: 16 (06/29/24 0548)  BP: 94/60 (06/29/24 0400)  SpO2: 98 % (06/29/24 0400) Vital Signs (24h Range):  Temp:  [97.9 °F (36.6 °C)-98.4 °F (36.9 °C)] 97.9 °F (36.6 °C)  Pulse:  [] 61  Resp:  [16-19] 16  SpO2:  [90 %-98 %] 98 %  BP: ()/(56-79) 94/60     Weight: (!) 141 kg (310 lb 13.6 oz)  Height: 6' (182.9 cm)  Body mass index is 42.16 kg/m².      Intake/Output Summary (Last 24 hours) at 6/29/2024 0616  Last data filed at 6/29/2024 0549  Gross per 24 hour   Intake 360 ml   Output 800 ml   Net -440 ml        General    Nursing note and vitals reviewed.  Constitutional: He is oriented to person, place, and time. He appears well-developed and well-nourished. No distress.   HENT:   Head: Normocephalic and atraumatic.   Nose: Nose normal.   Eyes: EOM are normal.   Cardiovascular:  Normal rate.            Neurological: He is alert and oriented to person, place, and time.   Psychiatric: He has a normal mood and affect. His behavior is normal. Thought content normal.             Left Shoulder Exam     Inspection/Observation   Swelling: absent  Bruising: absent  Deformity: absent    Tenderness   The patient is tender to palpation of the acromioclavicular joint and greater tuberosity.    Range of Motion   Extension:  abnormal   Forward Flexion:  abnormal   Forward Elevation: abnormal  Adduction: abnormal  External Rotation 90 degrees: abnormal  Internal rotation 90 degrees:  abnormal     Other   Sensation: normal     Comments:  Surgical dressings without significant additional drainage from yesterday's evaluation.  These were removed today.  Surgical sites, sutures are intact.  Skin is otherwise dry and intact.  No erythema, no ecchymosis, no signs or symptoms of infection, no evidence of wound failure dehiscence.      I personally changed  his surgical dressings today, sterile ABD pad and paper tape      Vascular Exam       Capillary Refill  Left Hand: normal capillary refill           Significant Labs:   Recent Lab Results  (Last 5 results in the past 24 hours)        06/29/24  0520   06/28/24  2033   06/28/24  1609   06/28/24  1345   06/28/24  1119        Albumin 1.5                              ALT 23               Anion Gap 11               AST 31               Baso # 0.18               Basophil % 1.1               BILIRUBIN TOTAL 0.3  Comment: For infants and newborns, interpretation of results should be based  on gestational age, weight and in agreement with clinical  observations.    Premature Infant recommended reference ranges:  Up to 24 hours.............<8.0 mg/dL  Up to 48 hours............<12.0 mg/dL  3-5 days..................<15.0 mg/dL  6-29 days.................<15.0 mg/dL                 BUN 48               Calcium 8.8               Chloride 105               CO2 25               Creatinine 1.3               Differential Method Automated               eGFR 58               Eos # 1.0               Eos % 5.8               Glucose 178               Gran # (ANC) 10.0               Gran % 60.9               Hematocrit 28.5               Hemoglobin 8.7               Immature Grans (Abs) 0.31  Comment: Mild elevation in immature granulocytes is non specific and   can be seen in a variety of conditions including stress response,   acute inflammation, trauma and pregnancy. Correlation with other   laboratory and clinical findings is essential.                 Immature Granulocytes 1.9               Lymph # 3.1               Lymph % 18.9               MCH 25.2               MCHC 30.5               MCV 83               Mono # 1.9               Mono % 11.4               MPV 9.2               nRBC 0               Platelet Count 603               POCT Glucose   249   258     259       Potassium 4.0               PROTEIN TOTAL 6.6                RBC 3.45               RDW 17.9               Sed Rate       130         Sodium 141               WBC 16.36                                      Significant Imaging: None  Assessment/Plan:     Abscess of left shoulder  Hospital day 15:    Status post repeat irrigation and debridement (3rd washout) and hardware removal of left shoulder for suspected infection.  Wound VAC removal    Patient is approximately 3 months status post open reduction internal fixation of a left proximal humerus fracture which lost reduction.    1. Patient is doing well postoperatively.  His pain is well managed.    2.  Intraoperative cultures from irrigation and debridement 6/27, g stain showed few WBCs, no organisms.  Aerobic preliminary report shows no growth and anaerobic cultures still pending.      Preoperative and intraoperative cultures from 06/21 and 6/24 showed many WBCs but have demonstrated no bacterial growth to date.  As well as cultures from 06/19 showed no growth.      3.  Wound VAC was discontinued intraoperatively 6/27  4.  Sling as needed for comfort of the left upper extremity. Patient can do very light activity with the left upper extremity focusing more on elbow, hand and wrist.  Would not do dedicated therapy for the shoulder.  Patient could weightbear through the extremity if needed with a walker.  5.  No plan for additional orthopedic intervention for the left shoulder at this time  6. Consult case management as the patient will likely benefit from placement for IV antibiotics as well as his generalized deconditioned state.  7. Continue Infectious Disease recommendations but no laboratory findings suggestive of left shoulder infection.  8.  Stable from an orthopedic standpoint.  Stable for transfer or discharge from an orthopedic standpoint.    We will continue to follow along, patient does have a right foot, 2nd toe osteomyelitis.  Scheduled for patient next week    Swelling of limb, left  History of left proximal  humerus fracture with open reduction internal fixation.            KAREN Avina  Orthopedics  [facility]/Surg

## 2024-06-29 NOTE — PT/OT/SLP PROGRESS
Occupational Therapy      Patient Name:  Roosevelt Moser   MRN:  [id number]    Patient not seen today secondary to Patient unwilling to participate (pt said he had diarrhea and was not feeling well enough to participate.). Will follow-up 7/1/2024.    6/29/2024

## 2024-06-29 NOTE — ASSESSMENT & PLAN NOTE
Chronic AF w/ acute RVR, resolved  Required dilt drip on admission but off since 6/15  Tele some PVC/bigem/trigem  TSH 1.8 in March  Monitor lytes  No etoh/drug use  TTE from March 2024:    Left Ventricle: The left ventricle is normal in size. Normal wall thickness. Mild global hypokinesis present. There is mildly reduced systolic function with a visually estimated ejection fraction of 45 - 50%. There is normal diastolic function.    Right Ventricle: Normal right ventricular cavity size. Wall thickness is normal. Right ventricle wall motion  is normal. Systolic function is normal.    Left Atrium: Left atrium is moderately dilated.    IVC/SVC: Normal venous pressure at 3 mmHg.  OAC has been on hold d/t possible need for shoulder surgery - has been on prophylaxis dose of Lovenox, this was help today for shoulder operation  Resume Eliquis as soon as feasible postop

## 2024-06-29 NOTE — SUBJECTIVE & OBJECTIVE
Interval History: Patient states pain currently controlled. No acute events overnight.    Review of Systems   Constitutional:  Negative for activity change, appetite change, chills and fever.   HENT:  Negative for congestion, ear pain, nosebleeds and sinus pain.    Eyes:  Negative for discharge and itching.   Respiratory:  Negative for apnea, cough, chest tightness and shortness of breath.    Cardiovascular:  Negative for chest pain, palpitations and leg swelling.   Gastrointestinal:  Negative for abdominal distention, abdominal pain and vomiting.   Genitourinary:  Negative for difficulty urinating, dysuria, flank pain and frequency.   Musculoskeletal:  Positive for arthralgias. Negative for back pain, joint swelling and myalgias.   Skin:  Positive for wound. Negative for color change, pallor and rash.   Neurological:  Negative for dizziness, weakness, light-headedness and headaches.   Psychiatric/Behavioral:  Negative for agitation, behavioral problems, confusion and suicidal ideas.      Objective:     Vital Signs (Most Recent):  Temp: 98.2 °F (36.8 °C) (06/29/24 1157)  Pulse: 93 (06/29/24 1157)  Resp: 18 (06/29/24 1014)  BP: 120/81 (06/29/24 0749)  SpO2: (!) 93 % (06/29/24 1157) Vital Signs (24h Range):  Temp:  [97.6 °F (36.4 °C)-98.4 °F (36.9 °C)] 98.2 °F (36.8 °C)  Pulse:  [] 93  Resp:  [16-20] 18  SpO2:  [90 %-98 %] 93 %  BP: ()/(56-81) 120/81     Weight: (!) 141 kg (310 lb 13.6 oz)  Body mass index is 42.16 kg/m².    Intake/Output Summary (Last 24 hours) at 6/29/2024 1227  Last data filed at 6/29/2024 0549  Gross per 24 hour   Intake 360 ml   Output 800 ml   Net -440 ml         Physical Exam  Vitals reviewed.   Constitutional:       General: He is not in acute distress.     Appearance: Normal appearance. He is normal weight. He is not ill-appearing.   HENT:      Head: Normocephalic and atraumatic.      Nose: Nose normal.      Mouth/Throat:      Mouth: Mucous membranes are moist.      Pharynx:  Oropharynx is clear.   Eyes:      General: No scleral icterus.     Extraocular Movements: Extraocular movements intact.      Conjunctiva/sclera: Conjunctivae normal.      Pupils: Pupils are equal, round, and reactive to light.   Cardiovascular:      Rate and Rhythm: Normal rate and regular rhythm.      Pulses: Normal pulses.      Heart sounds: Normal heart sounds. No murmur heard.     No gallop.   Pulmonary:      Effort: Pulmonary effort is normal. No respiratory distress.      Breath sounds: Normal breath sounds. No wheezing, rhonchi or rales.   Chest:      Chest wall: No tenderness.   Abdominal:      General: Abdomen is flat. There is no distension.      Palpations: Abdomen is soft.      Tenderness: There is no abdominal tenderness.   Musculoskeletal:         General: No swelling or tenderness.      Cervical back: Normal range of motion and neck supple. No rigidity or tenderness.      Right lower leg: No edema.      Left lower leg: No edema.   Skin:     General: Skin is warm and dry.      Findings: Lesion present.   Neurological:      General: No focal deficit present.      Mental Status: He is alert and oriented to person, place, and time. Mental status is at baseline.      Motor: No weakness.   Psychiatric:         Mood and Affect: Mood normal.         Behavior: Behavior normal.         Thought Content: Thought content normal.             Significant Labs: All pertinent labs within the past 24 hours have been reviewed.    Significant Imaging: I have reviewed all pertinent imaging results/findings within the past 24 hours.

## 2024-06-29 NOTE — PLAN OF CARE
06/28/24 1944   Patient Assessment/Suction   Level of Consciousness (AVPU) alert   Respiratory Effort Unlabored   Expansion/Accessory Muscles/Retractions expansion symmetric   All Lung Fields Breath Sounds diminished   Rhythm/Pattern, Respiratory pattern regular   Cough Frequency no cough   PRE-TX-O2   Device (Oxygen Therapy) room air   SpO2 (!) 93 %   Pulse Oximetry Type Intermittent   Aerosol Therapy   $ Aerosol Therapy Charges PRN treatment not required   Incentive Spirometer   $ Incentive Spirometer Charges done with encouragement   Administration (IS) instruction provided, follow-up   Number of Repetitions (IS) 10   Level Incentive Spirometer (mL) 750   Patient Tolerance (IS) no adverse signs/symptoms present   Preset CPAP/BiPAP Settings   Mode Of Delivery other (see comments)  (home cpap on standby at bedside)

## 2024-06-29 NOTE — PT/OT/SLP PROGRESS
Physical Therapy      Patient Name:  Roosevelt Moser   MRN:  [id number]    Patient not seen today secondary to Patient unwilling to participate. Will follow-up 6/29/24.

## 2024-06-29 NOTE — PROGRESS NOTES
[facility] Medicine  Progress Note    Patient Name: Roosevelt Moser  MRN: [id number]  Patient Class: IP- Inpatient   Admission Date: 6/14/2024  Length of Stay: 15 days  Attending Physician: Rosi Vo MD  Primary Care Provider: Miguel Angel Enriquez PA-C        Subjective:     Principal Problem:Acute renal failure superimposed on chronic kidney disease        HPI:  Roosevelt Moser is a 73 year old male with a previous medical history of atrial fibrillation on eliquis, HTN, DMII, obstructive sleep apnea, HLD, ORIF of left humerus and guillian-[place] who presented to the emergency room for weakness and multiple falls. Per wife of the patient he has had progressive weakness over the past week along with two falls one at 0300 Thursday and the other early morning Friday. Both time she had to activate EMS to pick patient up off floor due to weakness. Patient refused transport to hospital on both occasions. Today he slid out of his chair and was unable to get up without assistance and at this point the wife activated EMS to transport patient to the hospital. She endorses that two days ago she noticed that the patient had stopped urinating as he normally does. The patient concurs that he has noticed that his urine has decreased over the past two days and that it is dark. Patient denies dysuria or incomplete bladder emptying. Bladder scan showed zero upon admit and urine sample was obtained via straight cath in ED. Patient denies decreased PO intake and keeps a bottle of water with him at all times. Initial ED work-up showed a creatinine of 5 and potassium of 6. Urine studies positive for infection and WBC 20.81 with procalcitonin at 19.75. Blood cultures obtained and patient given 1 gram of rocephin and 1000ml of normal saline. Patient noted to bein afib with RVR and 20mg of diltiazem was administered IV which resulted in low blood pressure and 1 gram of calcium gluconate was administered. Patient  admitted by hospital medicine for further evaluation and management     Overview/Hospital Course:  Patient presents after fall.  He was found to have osteo of his toe and abscesses left shoulder.  Has been undergoing washouts with surgery on his shoulder.  They cleared him on 06/28/2024.  Pending snf placement at this time.  Patient will need extended antibiotics per ID recommendations. The patient was noted to have osteomyelitis of second right toe. Seen by podiatry, who planned amputation.      Interval History: Patient states pain currently controlled. No acute events overnight.    Review of Systems   Constitutional:  Negative for activity change, appetite change, chills and fever.   HENT:  Negative for congestion, ear pain, nosebleeds and sinus pain.    Eyes:  Negative for discharge and itching.   Respiratory:  Negative for apnea, cough, chest tightness and shortness of breath.    Cardiovascular:  Negative for chest pain, palpitations and leg swelling.   Gastrointestinal:  Negative for abdominal distention, abdominal pain and vomiting.   Genitourinary:  Negative for difficulty urinating, dysuria, flank pain and frequency.   Musculoskeletal:  Positive for arthralgias. Negative for back pain, joint swelling and myalgias.   Skin:  Positive for wound. Negative for color change, pallor and rash.   Neurological:  Negative for dizziness, weakness, light-headedness and headaches.   Psychiatric/Behavioral:  Negative for agitation, behavioral problems, confusion and suicidal ideas.      Objective:     Vital Signs (Most Recent):  Temp: 98.2 °F (36.8 °C) (06/29/24 1157)  Pulse: 93 (06/29/24 1157)  Resp: 18 (06/29/24 1014)  BP: 120/81 (06/29/24 0749)  SpO2: (!) 93 % (06/29/24 1157) Vital Signs (24h Range):  Temp:  [97.6 °F (36.4 °C)-98.4 °F (36.9 °C)] 98.2 °F (36.8 °C)  Pulse:  [] 93  Resp:  [16-20] 18  SpO2:  [90 %-98 %] 93 %  BP: ()/(56-81) 120/81     Weight: (!) 141 kg (310 lb 13.6 oz)  Body mass index is  42.16 kg/m².    Intake/Output Summary (Last 24 hours) at 6/29/2024 1227  Last data filed at 6/29/2024 0549  Gross per 24 hour   Intake 360 ml   Output 800 ml   Net -440 ml         Physical Exam  Vitals reviewed.   Constitutional:       General: He is not in acute distress.     Appearance: Normal appearance. He is normal weight. He is not ill-appearing.   HENT:      Head: Normocephalic and atraumatic.      Nose: Nose normal.      Mouth/Throat:      Mouth: Mucous membranes are moist.      Pharynx: Oropharynx is clear.   Eyes:      General: No scleral icterus.     Extraocular Movements: Extraocular movements intact.      Conjunctiva/sclera: Conjunctivae normal.      Pupils: Pupils are equal, round, and reactive to light.   Cardiovascular:      Rate and Rhythm: Normal rate and regular rhythm.      Pulses: Normal pulses.      Heart sounds: Normal heart sounds. No murmur heard.     No gallop.   Pulmonary:      Effort: Pulmonary effort is normal. No respiratory distress.      Breath sounds: Normal breath sounds. No wheezing, rhonchi or rales.   Chest:      Chest wall: No tenderness.   Abdominal:      General: Abdomen is flat. There is no distension.      Palpations: Abdomen is soft.      Tenderness: There is no abdominal tenderness.   Musculoskeletal:         General: No swelling or tenderness.      Cervical back: Normal range of motion and neck supple. No rigidity or tenderness.      Right lower leg: No edema.      Left lower leg: No edema.   Skin:     General: Skin is warm and dry.      Findings: Lesion present.   Neurological:      General: No focal deficit present.      Mental Status: He is alert and oriented to person, place, and time. Mental status is at baseline.      Motor: No weakness.   Psychiatric:         Mood and Affect: Mood normal.         Behavior: Behavior normal.         Thought Content: Thought content normal.             Significant Labs: All pertinent labs within the past 24 hours have been  reviewed.    Significant Imaging: I have reviewed all pertinent imaging results/findings within the past 24 hours.    Assessment/Plan:      * Acute renal failure superimposed on chronic kidney disease  Appreciate input from renal services  In setting of acute UTI and IVVD  Resumed gentle hydration on Zosyn/vanc but those have been stopped  He did not require oral NaHCO3  U/s is w/o obstruction, masses, etc        Pyogenic arthritis of left shoulder region        Abscess of left shoulder  As discussed under limb swelling, status post washout on 06/21/2024, we will repeat washout on 06/24/2024, 06/26/2024,6/27/24  Need antibiotics until 08/03/2024  Wound VAC in place.  Continue antibiotics.  Following cultures.  Pending LTAC placement.    Osteomyelitis of toe  S/p bedside I&D, deep tissue Cx growing MRSA, S to clinda  ID and podiatry following, appreciate help  Appreciate input all consultants  Pt/wife opposed to amputation of digit  Local wound care  Note markedly elevated ESR, CRP  Need antibiotics until 08/03/2024      Skin tear of left upper extremity  Local care, healing  Doubt this is a portal of entry    Swelling of limb, left  U/s w/o DVT  Appreciate help from Dr Bernal, ID, wound care, podiatry et al  Pt is already on OAC, holding for now pending shoulder procedure  Arm is elevated on pillow  Not red/painful, no compartment syndrome  D/w pt/wife      Debility  Multifactorial process  Pt not able to manage his own care and wife cannot adequately care for him at this time  PT/OT  SNF or possibly LTAC consult  CM aware      Severe sepsis  In setting of acute UTI, MRSA OM right 2nd toe, and infected left shoulder hardware  BCX NGTD  Ur CX neg  Rocephin was given empirically for UTI, changed to Zosyn > added vanc/clinda, now on Rocephin/clinda, vanc/zosyn stopped per ID  Oral clotrimazole added for thrush  PCT and WBC trended down, WBC staying up a little now but pt looks good  No obstruction on renal u/s  Has  gas containing abscess in and around left shoulder w/ malpositioned hardware, likely d/t falls, hard to say how the bug entered  Long d/w pt/wife, ortho - 6/22 had washout and hardware removal w/ Dr Bernal  F/u w/ Dr Kenneth mann d/c  He's urinating well  We will need antibiotics until 08/03/2024 per ID    Hyperkalemia  Resolved  in setting of acute on chronic renal failure  Lokelma given x 1 on admit  Avoid ACE/ARB, K+ replacement  tele          Atrial fibrillation with rapid ventricular response  Chronic AF w/ acute RVR, resolved  Required dilt drip on admission but off since 6/15  Tele some PVC/bigem/trigem  TSH 1.8 in March  Monitor lytes  No etoh/drug use  TTE from March 2024:    Left Ventricle: The left ventricle is normal in size. Normal wall thickness. Mild global hypokinesis present. There is mildly reduced systolic function with a visually estimated ejection fraction of 45 - 50%. There is normal diastolic function.    Right Ventricle: Normal right ventricular cavity size. Wall thickness is normal. Right ventricle wall motion  is normal. Systolic function is normal.    Left Atrium: Left atrium is moderately dilated.    IVC/SVC: Normal venous pressure at 3 mmHg.  OAC has been on hold d/t possible need for shoulder surgery - has been on prophylaxis dose of Lovenox, this was help today for shoulder operation  Resume Eliquis as soon as feasible postop      History of Guillain-[place] syndrome  No acute issues  However, pt has impaired mobility and is acutely weakened from his sepsis/UTI/AF RVR  PT/OT working w/ pt  Wife cannot manage his care at home currently  SNF assessment underway, may need LTAC depending on what abx he ends up for how long, it may take a few more days to narrow that down          Type 2 diabetes mellitus  A1c 6.3%  SSI      Hypertension  Home meds as appropriate    MARA (obstructive sleep apnea)  Continue CPAP HS and w/ naps      Morbid obesity  Body mass index is 42.16 kg/m².  Morbid  obesity complicates all aspects of disease management from diagnostic modalities to treatment  Weight loss encouraged and health benefits explained to patient  He has been working on weight loss at home w/ reduced caloric intake          VTE Risk Mitigation (From admission, onward)           Ordered     Reason for No Pharmacological VTE Prophylaxis  Once        Question:  Reasons:  Answer:  Already adequately anticoagulated on oral Anticoagulants    06/14/24 1438     IP VTE HIGH RISK PATIENT  Once         06/14/24 1438     Place sequential compression device  Until discontinued         06/14/24 1438                    Discharge Planning   KAMILA: 7/2/2024     Code Status: Full Code   Is the patient medically ready for discharge?:     Reason for patient still in hospital (select all that apply): Patient trending condition  Discharge Plan A: Long-term acute care facility (LTAC)                  Rosi Vo MD, MD  Department of Hospital Medicine   [place]/Surg

## 2024-06-29 NOTE — CARE UPDATE
06/29/24 0811   Patient Assessment/Suction   Level of Consciousness (AVPU) alert   Respiratory Effort Unlabored;Normal   Expansion/Accessory Muscles/Retractions no use of accessory muscles;no retractions;expansion symmetric   All Lung Fields Breath Sounds Anterior:;Lateral:;diminished   Rhythm/Pattern, Respiratory unlabored;pattern regular;depth regular;no shortness of breath reported   PRE-TX-O2   Device (Oxygen Therapy) room air   SpO2 (!) 94 %   Pulse Oximetry Type Intermittent   $ Pulse Oximetry - Multiple Charge Pulse Oximetry - Multiple   Pulse 98   Resp 18   Aerosol Therapy   $ Aerosol Therapy Charges PRN treatment not required   Respiratory Treatment Status (SVN) PRN treatment not required   Incentive Spirometer   $ Incentive Spirometer Charges done with encouragement   Incentive Spirometer Predicted Level (mL) 2200   Administration (IS) mouthpiece utilized   Number of Repetitions (IS) 10   Level Incentive Spirometer (mL) 800   Patient Tolerance (IS) no adverse signs/symptoms present;fair   Preset CPAP/BiPAP Settings   Mode Of Delivery other (see comments)  (home cpap on standby)

## 2024-06-29 NOTE — PROGRESS NOTES
Nephrology Progress Note        Patient Name: Roosevelt Moser  MRN: [id number]    Patient Class: IP- Inpatient   Admission Date: 6/14/2024  Length of Stay: 15 days  Date of Service: 6/29/2024    Attending Physician: Rosi Vo MD  Primary Care Provider: Miguel Angel Enriquez PA-C    Reason for Consult: marbin/hyperkalemia    SUBJECTIVE:     HPI: 73M with h/o DM, HTN, AF, GBS and recent shoulder surgery was brought to ER by EMS with recurrent falls in the last 24h. Reports decreased UO but no fever, cough, SOB, dysuria. Upon admission, noted to be in MARBIN with sCr 5, baseline 1 month ago is 1, hyperkalemia with K 6, acidosis with CO2 12, hypoalbuminemia with albumin 1.6. Lactate notably 2.1. Procal 20. A1c 9.5 in 3/2024. UA with hematuria and pyuria, urine Cx pending. Notably on Apixaban. WBC in blood elevated to 20. Plt 540. RP US ordered. Afebrile, normotensive, received IVF and abx. Lokelma, ca gluconate given bicarb gtt ordered. Straight cath in ER with only 100cc urine out.    Review of Systems:  Neg    6/15  AFVSS.   UOP 1200cc plus 2 unmeasured   6/16  AFVSS.  2150 cc uop.  No distress  6/17 VSS, on RA, UOP 1.5L- updated family at bedside  6/18 VSS, on RA, UOP 1.3L  6/19 VSS, on RA, UOP 1L, with osteo R toe- s/p debridement- not interested in amputation- antbx adjusted  6/20 HR , BP stable, on 2L NC, UOP 1.2L, went for procedure, wife reports LE edema  6/21 HR 90-110s, -140s mostly, on RA, UOP 1.6L, to OR for shoulder washout and hardware removal today  6/22 VSS s/p incision and drainage for infection from left shoulder and removal of deep hardware including lizabeth and screws.  6/23 VSS. Consider resuming diuretics tomorrow.  6/24  AFVSS.  1200 cc uop plus multiple unmeasured.  He is very thirsty.    6/25  POD 1 s/p irrigation and debridementof left shoulder abscess.  VSS  2 liter uop  6/26 AFVSS. 2250 cc uop  6/27  AFVSS.  In th OR  6/28 POD 1 s/p left shoulder irrigation and debridement.  2050 cc  "uop  6/29  one shift UOP recorded, 800cc.  VSS, renal function improving.  States he feels "sick" today, denies nausea, says had diarrhea after breakfast.  No other complaints.    ASSESSMENT/PLAN:     MARBIN due to ATN due to sepsis due to UTI and/or PNA  CKD stage 2 with diabetic proteinuria and severe hypoalbuminemia  HTN  DM poorly controlled A1c 9.5  HyperK/Acidosis  HypoMg  SHPT  Anemia  Hypoalbuminemia  Edema    - renal function is improving- nonoliguric  - no acute RRT needs- no nsaids or IV contrast- dose meds for CrCl 10-50  - about 1g proteinuria- low alb is nutrition/acute phase reactant  - hold hydralazine  - hold metformin- use insulin  - hyperK/acidosis resolved  - Mg replete  - H/H stable  - optimize protein intake  --strict I/Os   --iron stores low.  Ferritin too high for iv iron.  Will start oral supplement  --will give a dose erythropoietin stimulating agent weekly       Thank you for allowing us to participate in the care of your patient!   We will follow the patient and provide recommendations as needed.         Laboratory:  Recent Labs   Lab 06/27/24  0424 06/28/24  0403 06/29/24  0520    143 141   K 3.6 3.6 4.0    106 105   CO2 26 26 25   BUN 54* 48* 48*   CREATININE 1.4 1.4 1.3   * 171* 178*       Recent Labs   Lab 06/27/24  0424 06/28/24  0403 06/29/24  0520   CALCIUM 8.7 8.8 8.8   ALBUMIN 1.4* 1.4* 1.5*             Recent Labs   Lab 06/26/24  2156 06/27/24  0904 06/27/24  1125 06/27/24  1614 06/27/24  2059 06/28/24  0720 06/28/24  1119 06/28/24  1609 06/28/24  2033 06/29/24  0719   POCTGLUCOSE 241* 252* 272* 308* 195* 200* 259* 258* 249* 183*       Recent Labs   Lab 07/31/23  0955 03/19/24  0830 06/14/24  1539   Hemoglobin A1C 8.8 H 9.5 H 6.3 H       Recent Labs   Lab 06/27/24  0424 06/28/24  0403 06/29/24  0520   WBC 17.46* 15.46* 16.36*   HGB 8.2* 8.3* 8.7*   HCT 25.7* 26.7* 28.5*   * 654* 603*   MCV 82 83 83   MCHC 31.9* 31.1* 30.5*   MONO 10.8  1.9* 11.2  1.7* " 11.4  1.9*   EOSINOPHIL 4.4 6.4 5.8       Recent Labs   Lab 06/27/24  0424 06/28/24  0403 06/29/24  0520   BILITOT 0.3 0.3 0.3   PROT 6.1 6.6 6.6   ALBUMIN 1.4* 1.4* 1.5*   ALKPHOS 176* 155* 153*   ALT 30 24 23   AST 51* 30 31       Recent Labs   Lab 12/16/22  1238 03/08/24  1034 03/25/24  1022 06/14/24  1337   Color, UA Yellow Yellow Yellow [place] A   Appearance, UA Clear Clear Hazy A Cloudy A   pH, UA 6.0 5.0 6.0 6.0   Specific [place], UA 1.020 1.010 1.020 1.020   Protein, UA 1+ A Trace A 1+ A 2+ A   Glucose, UA 1+ A 3+ A Negative Trace A   Ketones, UA Negative Negative Negative Negative   Urobilinogen, UA  --  Negative Negative Negative   Bilirubin (UA) Negative Negative Negative Negative   Occult Blood UA 3+ A 2+ A 2+ A 3+ A   Nitrite, UA Negative Negative Negative Negative   RBC, UA 20 H 13 H >100 H >100 H   WBC, UA 81 H 3 18 H >100 H   Bacteria Rare None Rare Many A   Hyaline Casts, UA 0  --  0 0             Microbiology Results (last 7 days)       Procedure Component Value Units Date/Time    Aerobic culture [[id number]] Collected: 06/27/24 0733    Order Status: Completed Specimen: Incision site from Shoulder, Left Updated: 06/29/24 1023     Aerobic Bacterial Culture No growth    Fungus culture [[id number]] Collected: 06/20/24 1204    Order Status: Completed Specimen: Abscess from Shoulder, Left Updated: 06/29/24 0444     Fungus (Mycology) Culture No fungal growth to date    Fungus culture [[id number]] Collected: 06/21/24 1645    Order Status: Completed Specimen: Abscess from Shoulder, Left Updated: 06/29/24 0444     Fungus (Mycology) Culture No fungal growth to date    Gram stain [[id number]] Collected: 06/27/24 0733    Order Status: Completed Specimen: Incision site from Shoulder, Left Updated: 06/28/24 1516     Gram Stain Result Few WBC's      No organisms seen    Aerobic culture [[id number]] Collected: 06/24/24 1622    Order Status: Completed Specimen: Incision site from Shoulder, Left Updated: 06/28/24  1035     Aerobic Bacterial Culture No growth    Blood culture [[id number]] Collected: 06/23/24 0659    Order Status: Completed Specimen: Blood from Peripheral, Hand, Right Updated: 06/28/24 1032     Blood Culture, Routine No growth after 5 days.    Culture, Anaerobe [[id number]] Collected: 06/24/24 1622    Order Status: Completed Specimen: Incision site from Shoulder, Left Updated: 06/27/24 1357     Anaerobic Culture No anaerobes isolated    Culture, Anaerobe [[id number]] Collected: 06/27/24 0733    Order Status: Sent Specimen: Incision site from Shoulder, Left Updated: 06/27/24 1343    Aerobic culture [[id number]] Collected: 06/21/24 1645    Order Status: Completed Specimen: Abscess from Shoulder, Left Updated: 06/25/24 0725     Aerobic Bacterial Culture No growth    AFB Culture & Smear [[id number]] Collected: 06/21/24 1645    Order Status: Completed Specimen: Abscess from Shoulder, Left Updated: 06/24/24 0853     AFB CULTURE STAIN No acid fast bacilli seen.     AFB CULTURE STAIN Testing performed by:     AFB CULTURE STAIN Lab [facility]     AFB CULTURE STAIN [address] [place] [address]     [place] [address]     AFB CULTURE STAIN Dr. Davis Jain MD    Culture, Anaerobic [[id number]] Collected: 06/21/24 1645    Order Status: Completed Specimen: Abscess from Shoulder, Left Updated: 06/24/24 0709     Anaerobic Culture No anaerobes isolated    Aerobic culture [[id number]] Collected: 06/20/24 1204    Order Status: Completed Specimen: Abscess from Shoulder, Left Updated: 06/24/24 0700     Aerobic Bacterial Culture No growth    Gram stain [[id number]] Collected: 06/21/24 1645    Order Status: Completed Specimen: Abscess from Shoulder, Left Updated: 06/23/24 0833     Gram Stain Result Few WBC's      No organisms seen    Culture, Anaerobic [[id number]] Collected: 06/20/24 1204    Order Status: Completed Specimen: Abscess from Shoulder, Left Updated: 06/23/24 0729     Anaerobic Culture No anaerobes  isolated            Review of patient's allergies indicates:  No Known Allergies    Outpatient meds:  No current facility-administered medications on file prior to encounter.     Current Outpatient Medications on File Prior to Encounter   Medication Sig Dispense Refill    apixaban (ELIQUIS) 5 mg Tab Take 1 tablet (5 mg total) by mouth 2 (two) times daily. 60 tablet 1    atorvastatin (LIPITOR) 10 MG tablet Take 1 tablet (10 mg total) by mouth once daily. 90 tablet 3    co-enzyme Q-10 30 mg capsule Take 30 mg by mouth once daily.      doxycycline (VIBRAMYCIN) 100 MG Cap Take 100 mg by mouth 2 (two) times daily.      ergocalciferol, vitamin D2, (VITAMIN D ORAL) Take 1 tablet by mouth once daily.      glimepiride (AMARYL) 2 MG tablet Take 1 tablet (2 mg total) by mouth before breakfast. 90 tablet 3    hydrALAZINE (APRESOLINE) 25 MG tablet Take 1 tablet (25 mg total) by mouth every 12 (twelve) hours. 60 tablet 3    metFORMIN (GLUCOPHAGE-XR) 500 MG ER 24hr tablet Take 2 tablets (1,000 mg total) by mouth 2 (two) times daily with meals. 360 tablet 3    metoprolol succinate (TOPROL-XL) 100 MG 24 hr tablet Take 1 tablet (100 mg total) by mouth once daily. (Patient taking differently: Take 100 mg by mouth nightly.) 90 tablet 3       Scheduled meds:   (Magic mouthwash) 1:1:1 diphenhydrAMINE(Benadryl) 12.5mg/5ml liq, aluminum & magnesium hydroxide-simethicone (Maalox), LIDOcaine viscous 2%  10 mL Swish & Spit QID    cefTRIAXone (Rocephin) IV (PEDS and ADULTS)  2 g Intravenous Q24H    DAPTOmycin (CUBICIN) IV (PEDS and ADULTS)  800 mg Intravenous Q24H    epoetin leonel-epbx  100 Units/kg Subcutaneous Q7 Days    ferrous sulfate  1 tablet Oral Daily    insulin glargine U-100  15 Units Subcutaneous Daily    LIDOcaine  1 patch Transdermal Q24H    metoprolol succinate  100 mg Oral QHS    sodium chloride 0.9%  10 mL Intravenous Q6H       Infusions:          PRN meds:    Current Facility-Administered Medications:     albuterol-ipratropium,  3 mL, Nebulization, Q6H PRN    aluminum & magnesium hydroxide-simethicone, 15 mL, Oral, QID PRN    dextrose 10%, 12.5 g, Intravenous, PRN    dextrose 10%, 25 g, Intravenous, PRN    glucagon (human recombinant), 1 mg, Intramuscular, PRN    glucose, 16 g, Oral, PRN    glucose, 24 g, Oral, PRN    HYDROcodone-acetaminophen, 1 tablet, Oral, Q4H PRN    insulin aspart U-100, 0-10 Units, Subcutaneous, QID (AC + HS) PRN    metoprolol, 5 mg, Intravenous, Q5 Min PRN    naloxone, 0.02 mg, Intravenous, PRN    ondansetron, 4 mg, Intravenous, Q8H PRN    prochlorperazine, 5 mg, Intravenous, Q6H PRN    simethicone, 1 tablet, Oral, QID PRN    sodium chloride 0.9%, 10 mL, Intravenous, Q12H PRN    Flushing PICC/Midline Protocol, , , Until Discontinued **AND** sodium chloride 0.9%, 10 mL, Intravenous, Q6H **AND** sodium chloride 0.9%, 10 mL, Intravenous, PRN    sodium chloride 0.9%, 10 mL, Intravenous, Q12H PRN    Past Medical History:   Diagnosis Date    A-fib 2024    Diabetes mellitus, type 2 2021    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    Hyperlipidemia     Hypertension 2016    Sleep apnea      Past Surgical History:   Procedure Laterality Date    ARTHROTOMY OF SHOULDER Left 6/21/2024    Procedure: ARTHROTOMY, SHOULDER;  Surgeon: Roman Paulson MD;  Location: [facility];  Service: Orthopedics;  Laterality: Left;    IRRIGATION AND DEBRIDEMENT OF UPPER EXTREMITY Left 6/24/2024    Procedure: IRRIGATION AND DEBRIDEMENT, UPPER EXTREMITY;  Surgeon: Nathan Cooper MD;  Location: [facility] OR;  Service: Orthopedics;  Laterality: Left;    IRRIGATION AND DEBRIDEMENT OF UPPER EXTREMITY Left 6/27/2024    Procedure: IRRIGATION AND DEBRIDEMENT, UPPER EXTREMITY;  Surgeon: Nathan Cooper MD;  Location: [facility] OR;  Service: Orthopedics;  Laterality: Left;    OPEN REDUCTION AND INTERNAL FIXATION (ORIF) OF FRACTURE OF PROXIMAL HUMERUS Left 3/25/2024    Procedure: ORIF, FRACTURE, HUMERUS, PROXIMAL/IM HANNA, LEFT;  Surgeon: Nathan Cooper MD;  Location: [facility];   Service: Orthopedics;  Laterality: Left;  Accumed, [employer] Small Frag set Avelino verified 3/22/24 ark     No family history on file.  Social History     Tobacco Use    Smoking status: Never    Smokeless tobacco: Never   Substance Use Topics    Alcohol use: Yes     Alcohol/week: 0.0 standard drinks of alcohol     Comment: social    Drug use: No       OBJECTIVE:     Vital Signs and IO:  Temp:  [97.6 °F (36.4 °C)-98.4 °F (36.9 °C)]   Pulse:  []   Resp:  [16-20]   BP: ()/(56-81)   SpO2:  [90 %-98 %]   I/O last 3 completed shifts:  In: 780 [P.O.:780]  Out: 1950 [Urine:1950]  Wt Readings from Last 5 Encounters:   06/28/24 (!) 141 kg (310 lb 13.6 oz)   06/04/24 132.5 kg (292 lb 1.8 oz)   05/07/24 132.5 kg (292 lb 1.8 oz)   04/08/24 132.5 kg (292 lb 3.2 oz)   03/26/24 (!) 136.1 kg (300 lb 0.7 oz)     Body mass index is 42.16 kg/m².    Physical Exam  Constitutional:       General: Patient is not in acute distress.     Appearance: Patient is well-developed. She is not diaphoretic.   HENT:      Head: Normocephalic and atraumatic.      Mouth/Throat: Mucous membranes are moist.   Eyes:      General: No scleral icterus.     Pupils: Pupils are equal, round, and reactive to light.   Cardiovascular:      Rate and Rhythm: Normal rate and regular rhythm.   Pulmonary:      Effort: Pulmonary effort is normal. No respiratory distress.      Breath sounds: No stridor.   Abdominal:      General: There is no distension.      Palpations: Abdomen is soft.   Musculoskeletal:         General: No deformity. Normal range of motion.      Cervical back: Neck supple.   Skin:     General: Skin is warm and dry.      Findings: No rash present. No erythema.   Neurological:      Mental Status: Patient is alert and oriented to person, place, and time.      Cranial Nerves: No cranial nerve deficit.   Psychiatric:         Behavior: Behavior normal.          Patient care time was spent personally by me on the following activities:     Obtaining a  history.  Examination of patient.  Providing medical care at the patients bedside.  Developing a treatment plan with patient or surrogate and bedside caregivers.  Ordering and reviewing laboratory studies, radiographic studies, pulse oximetry.  Ordering and performing treatments and interventions.  Evaluation of patient's response to treatment.  Discussions with consultants while on the unit and immediately available to the patient.  Re-evaluation of the patient's condition.  Documentation in the medical record.     Geeta Frey NP      [facility]  [address] [place] [address]  [place] [address]    [phone] - tel  [phone] - fax    6/29/2024

## 2024-06-29 NOTE — PT/OT/SLP PROGRESS
Physical Therapy Treatment    Patient Name:  Roosevelt Moser   MRN:  [id number]    Recommendations:     Discharge Recommendations: Moderate Intensity Therapy  Discharge Equipment Recommendations: none  Barriers to discharge: None    Assessment:     Roosevelt Moser is a 73 y.o. male admitted with a medical diagnosis of Acute renal failure superimposed on chronic kidney disease.  He presents with the following impairments/functional limitations: weakness, impaired endurance, impaired functional mobility, gait instability, impaired balance, decreased lower extremity function, pain, decreased ROM, impaired cardiopulmonary response to activity, orthopedic precautions .  Patient agreeable to PT treatment this afternoon.  Patient presented supine in bed and required max assist to transfer to sitting. Patient then stood x 2 times RW max assist but was not able to maintain standing more then a few seconds due to weakness and SOB.  Patient then returned to supine with max assist and was panicked once supine due to not being to catch his breath.  O2 sats taken at that time at 94%.    Rehab Prognosis: Good; patient would benefit from acute skilled PT services to address these deficits and reach maximum level of function.    Recent Surgery: Procedure(s) (LRB):  IRRIGATION AND DEBRIDEMENT, UPPER EXTREMITY (Left) 2 Days Post-Op    Plan:     During this hospitalization, patient to be seen 6 x/week to address the identified rehab impairments via gait training, therapeutic activities, therapeutic exercises and progress toward the following goals:    Plan of Care Expires:  07/15/24    Subjective     Chief Complaint: SOB and weakness  Patient/Family Comments/goals: get stronger  Pain/Comfort:  Pain Rating 1: 1/10  Location - Side 1: Left  Location - Orientation 1: upper  Location 1: shoulder  Pain Addressed 1: Reposition, Cessation of Activity  Pain Rating Post-Intervention 1: 2/10      Objective:     Communicated with nurse prior to session.   Patient found supine with bed alarm, telemetry upon PT entry to room.     General Precautions: Standard, fall, contact (MRSA)  Orthopedic Precautions: LUE non weight bearing  Braces: UE Sling  Respiratory Status: Nasal cannula, flow 2 L/min     Functional Mobility:  Bed Mobility:     Supine to Sit: maximal assistance  Sit to Supine: maximal assistance  Transfers:     Sit to Stand:  maximal assistance with rolling walker      AM-PAC 6 CLICK MOBILITY          Treatment & Education:  Transfer training supine to/from sit max, sit to stand x 2 times max    Patient left supine with call button in reach, bed alarm on, and nurse notified..    GOALS:   Multidisciplinary Problems       Physical Therapy Goals          Problem: Physical Therapy    Goal Priority Disciplines Outcome Goal Variances Interventions   Physical Therapy Goal     PT, PT/OT Progressing     Description: Goals to be met by: 7/15/24     Patient will increase functional independence with mobility by performin. Supine to sit with Contact Guard Assistance  2. Sit to stand transfer with Contact Guard Assistance  3. Bed to chair transfer with Contact Guard Assistance using Rolling Walker  4. Gait  x 200 feet with Contact Guard Assistance using Rolling Walker.   5. Lower extremity exercise program x30 reps per handout, with supervision                         Time Tracking:     PT Received On: 24  PT Start Time: 1416     PT Stop Time: 1433  PT Total Time (min): 17 min     Billable Minutes: Therapeutic Activity 17    Treatment Type: Treatment  PT/PTA: PT     Number of PTA visits since last PT visit: 0     2024

## 2024-06-29 NOTE — SUBJECTIVE & OBJECTIVE
Principal Problem:Acute renal failure superimposed on chronic kidney disease    Principal Orthopedic Problem:  Left shoulder infection    Interval History:  Stable status post irrigation and debridement x3    Review of patient's allergies indicates:  No Known Allergies    Current Facility-Administered Medications   Medication    (Magic mouthwash) 1:1:1 diphenhydrAMINE(Benadryl) 12.5mg/5ml liq, aluminum & magnesium hydroxide-simethicone (Maalox), LIDOcaine viscous 2%    albuterol-ipratropium 2.5 mg-0.5 mg/3 mL nebulizer solution 3 mL    aluminum & magnesium hydroxide-simethicone 400-400-40 mg/5 mL suspension 15 mL    cefTRIAXone (ROCEPHIN) 2 g in dextrose 5 % in water (D5W) 100 mL IVPB (MB+)    clotrimazole megha 10 mg    DAPTOmycin (CUBICIN) 800 mg in 0.9% NaCl SolP 50 mL IVPB    dextrose 10% bolus 125 mL 125 mL    dextrose 10% bolus 250 mL 250 mL    epoetin leonel-epbx injection 14,100 Units    ferrous sulfate tablet 1 each    glucagon (human recombinant) injection 1 mg    glucose chewable tablet 16 g    glucose chewable tablet 24 g    HYDROcodone-acetaminophen  mg per tablet 1 tablet    insulin aspart U-100 pen 0-10 Units    insulin glargine U-100 (Lantus) pen 15 Units    LIDOcaine 5 % patch 1 patch    metoprolol injection 5 mg    metoprolol succinate (TOPROL-XL) 24 hr tablet 100 mg    naloxone 0.4 mg/mL injection 0.02 mg    ondansetron injection 4 mg    prochlorperazine injection Soln 5 mg    simethicone chewable tablet 80 mg    sodium chloride 0.9% flush 10 mL    sodium chloride 0.9% flush 10 mL    And    sodium chloride 0.9% flush 10 mL    sodium chloride 0.9% flush 10 mL     Objective:     Vital Signs (Most Recent):  Temp: 97.9 °F (36.6 °C) (06/29/24 0400)  Pulse: 61 (06/29/24 0400)  Resp: 16 (06/29/24 0548)  BP: 94/60 (06/29/24 0400)  SpO2: 98 % (06/29/24 0400) Vital Signs (24h Range):  Temp:  [97.9 °F (36.6 °C)-98.4 °F (36.9 °C)] 97.9 °F (36.6 °C)  Pulse:  [] 61  Resp:  [16-19] 16  SpO2:  [90 %-98  %] 98 %  BP: ()/(56-79) 94/60     Weight: (!) 141 kg (310 lb 13.6 oz)  Height: 6' (182.9 cm)  Body mass index is 42.16 kg/m².      Intake/Output Summary (Last 24 hours) at 6/29/2024 0616  Last data filed at 6/29/2024 0549  Gross per 24 hour   Intake 360 ml   Output 800 ml   Net -440 ml        General    Nursing note and vitals reviewed.  Constitutional: He is oriented to person, place, and time. He appears well-developed and well-nourished. No distress.   HENT:   Head: Normocephalic and atraumatic.   Nose: Nose normal.   Eyes: EOM are normal.   Cardiovascular:  Normal rate.            Neurological: He is alert and oriented to person, place, and time.   Psychiatric: He has a normal mood and affect. His behavior is normal. Thought content normal.             Left Shoulder Exam     Inspection/Observation   Swelling: absent  Bruising: absent  Deformity: absent    Tenderness   The patient is tender to palpation of the acromioclavicular joint and greater tuberosity.    Range of Motion   Extension:  abnormal   Forward Flexion:  abnormal   Forward Elevation: abnormal  Adduction: abnormal  External Rotation 90 degrees: abnormal  Internal rotation 90 degrees:  abnormal     Other   Sensation: normal     Comments:  Surgical dressings without significant additional drainage from yesterday's evaluation.  These were removed today.  Surgical sites, sutures are intact.  Skin is otherwise dry and intact.  No erythema, no ecchymosis, no signs or symptoms of infection, no evidence of wound failure dehiscence.      I personally changed his surgical dressings today, sterile ABD pad and paper tape      Vascular Exam       Capillary Refill  Left Hand: normal capillary refill           Significant Labs:   Recent Lab Results  (Last 5 results in the past 24 hours)        06/29/24  0520   06/28/24  2033   06/28/24  1609   06/28/24  1345   06/28/24  1119        Albumin 1.5                              ALT 23               Anion Gap  11               AST 31               Baso # 0.18               Basophil % 1.1               BILIRUBIN TOTAL 0.3  Comment: For infants and newborns, interpretation of results should be based  on gestational age, weight and in agreement with clinical  observations.    Premature Infant recommended reference ranges:  Up to 24 hours.............<8.0 mg/dL  Up to 48 hours............<12.0 mg/dL  3-5 days..................<15.0 mg/dL  6-29 days.................<15.0 mg/dL                 BUN 48               Calcium 8.8               Chloride 105               CO2 25               Creatinine 1.3               Differential Method Automated               eGFR 58               Eos # 1.0               Eos % 5.8               Glucose 178               Gran # (ANC) 10.0               Gran % 60.9               Hematocrit 28.5               Hemoglobin 8.7               Immature Grans (Abs) 0.31  Comment: Mild elevation in immature granulocytes is non specific and   can be seen in a variety of conditions including stress response,   acute inflammation, trauma and pregnancy. Correlation with other   laboratory and clinical findings is essential.                 Immature Granulocytes 1.9               Lymph # 3.1               Lymph % 18.9               MCH 25.2               MCHC 30.5               MCV 83               Mono # 1.9               Mono % 11.4               MPV 9.2               nRBC 0               Platelet Count 603               POCT Glucose   249   258     259       Potassium 4.0               PROTEIN TOTAL 6.6               RBC 3.45               RDW 17.9               Sed Rate       130         Sodium 141               WBC 16.36                                      Significant Imaging: None

## 2024-06-29 NOTE — ASSESSMENT & PLAN NOTE
Hospital day 15:    Status post repeat irrigation and debridement (3rd washout) and hardware removal of left shoulder for suspected infection.  Wound VAC removal    Patient is approximately 3 months status post open reduction internal fixation of a left proximal humerus fracture which lost reduction.    1. Patient is doing well postoperatively.  His pain is well managed.    2.  Intraoperative cultures from irrigation and debridement 6/27, g stain showed few WBCs, no organisms.  Aerobic preliminary report shows no growth and anaerobic cultures still pending.      Preoperative and intraoperative cultures from 06/21 and 6/24 showed many WBCs but have demonstrated no bacterial growth to date.  As well as cultures from 06/19 showed no growth.      3.  Wound VAC was discontinued intraoperatively 6/27  4.  Sling as needed for comfort of the left upper extremity. Patient can do very light activity with the left upper extremity focusing more on elbow, hand and wrist.  Would not do dedicated therapy for the shoulder.  Patient could weightbear through the extremity if needed with a walker.  5.  No plan for additional orthopedic intervention for the left shoulder at this time  6. Consult case management as the patient will likely benefit from placement for IV antibiotics as well as his generalized deconditioned state.  7. Continue Infectious Disease recommendations but no laboratory findings suggestive of left shoulder infection.  8.  Stable from an orthopedic standpoint.  Stable for transfer or discharge from an orthopedic standpoint.    We will continue to follow along, patient does have a right foot, 2nd toe osteomyelitis.  Scheduled for patient next week

## 2024-06-29 NOTE — PROGRESS NOTES
[facility]   Department of Infectious Disease  Progress Note        PATIENT NAME: Roosevelt Moser  MRN: [id number]  TODAY'S DATE: 06/29/2024  ADMIT DATE: 6/14/2024  LOS: 15 days    CHIEF COMPLAINT: Weakness (Pt brought to ED via EMS with complaint of weakness and falling, pt advised EMS his urine is very dark.  )      PRINCIPLE PROBLEM: Acute renal failure superimposed on chronic kidney disease    INTERVAL HISTORY      06/19/2024: Seen and evaluated at bedside.  States left upper extremity pain better.  Having improved movement at the wrist and forearm.  Seen by Orthopedic surgery PA yesterday.  Notes reviewed.  Blood cultures remain negative.      06/20/2024:  Oral anticoagulants on hold to allow left shoulder arthrocentesis.  No other acute issues overnight.  Culture from right 3rd toe growing Staphylococcus aureus.  Blood culture remain negative.  Had aspiration left shoulder today that yielded purulent material.  Synovial fluid studies in progress.    06/21/2024:  Seen by Orthopedic surgery Service again yesterday.  For I and D today.  All cultures so far negative.  ASO titer normal.    06/29/2024:  Events of last 9 days noted.  He had I and D left shoulder with removal of humerus hardware 06/21/2024.  Had 2nd I&D 06/24/2024 and 3rd I&D 06/27/2024.  All cultures were negative with negative Gram stains.    Antibiotics (From admission, onward)      Start     Stop Route Frequency Ordered    06/24/24 1630  DAPTOmycin (CUBICIN) 800 mg in 0.9% NaCl SolP 50 mL IVPB         -- IV Every 24 hours (non-standard times) 06/24/24 1526    06/19/24 1045  cefTRIAXone (ROCEPHIN) 2 g in dextrose 5 % in water (D5W) 100 mL IVPB (MB+)         -- IV Every 24 hours (non-standard times) 06/19/24 0932    06/17/24 2100  mupirocin 2 % ointment         06/22/24 2059 Nasl 2 times daily 06/17/24 1544          Antifungals (From admission, onward)      Start     Stop Route Frequency Ordered    06/19/24 1045  clotrimazole megha  10 mg         06/29/24 0959 Oral 5 times daily 06/19/24 0932           Antivirals (From admission, onward)      None            ASSESSMENT and PLAN      1. Left upper extremity acute bacterial skin and skin structure infection with septic arthritis and humerus osteomyelitis.  He has had multiple I and D with removal of humerus hardware.  All cultures were negative most likely because of antibiotics received prior to surgery and cultures.  Continue current antibiotics.  Plan to treat for 6 weeks through 08/08/2024 for this indication.      2. Right 3rd toe osteomyelitis.  He previously received?  Dalbavancin x2 doses 1 month prior to hospitalization..  Ulcer practically healed.  Antibiotics above.    3. MRSA Right 2nd toe tip with possible early osteomyelitis of the distal tuft.  It was debrided at bedside by podiatrist.  Toe does not look too bad clinically and MRI was underwhelming. Can probably hold off amputation at this time since this patient is still going to require prolonged antibiotics for left shoulder which should continue to cover the right 2nd and 3rd toes.      4. ARF.  Almost resolved. Creatinine now 1.3.      5. Diabetes mellitus.  Management as per hospitalist.       6. Debility.    7. Dyspnea.  Has a history of MARA.  However, dyspneic while talking during the encounter today.  Check chest x-ray and BNP.  Keep in mind possibility of PE.      RECOMMENDATIONS:    Continue current antibiotics for 8 weeks through 08/08/2024.  Check CBC, BNP, CRP, ESR, CPK q.week  Monitor right 2nd and 3rd toes on antibiotics    Please send Epic secure chat with any questions.      SUBJECTIVE    Roosevelt Moser is a 73 y.o. male with history of diabetes mellitus, hypertension and previous going bowel syndrome.  He fell on 03/04/2024 and sustained a displaced comminuted fracture of the left humerus.  Seen by orthopedic surgeon with plan for ORIF which was deferred because of new AFib.  Admitted 03/09/2024 for rate control  and ultimately discharged back home 03/12/2024.  Later had left humerus ORIF 03/25/2024 as a day procedure and was discharged home.       According to his wife, he developed pain and swelling of the 3rd toe and was with an ulcer.  It appears an x-ray of the foot was unremarkable.  Received 2 doses of an antibiotic that I presume to be dalbavancin 1 week apart in early April.     In the last 2 weeks he has continued to decline.  He also has reduction in urine output.  Fell twice at home on 06/13/2024 and wife activated EMS and he was brought to the hospital.  Received IV fluid for low normal blood pressure.  Also noted to have MARBIN with creatinine 5.0 and WBC was 21 K.  UA was abnormal with> 100 WBC,> 100 RBC, 3+ LE and positive nitrite.  He has been managed conservatively for MARBIN and for dehydration.     X-ray of left shoulder and left upper extremity 06/17/2024 documented gas in soft tissue.  MRI right foot documented osteomyelitis of the 3rd toe.  I was asked to see to assist with his care.  ESR 95,  milligram/liter.     Antibiotics   Ceftriaxone:  06/14/2024-06/17/2024, 06/19/2024-  Zosyn:  06/17/2024-06/18/2024   Vancomycin:  06/18/2024-  Clindamycin:  06/18/2024-     Cultures   Blood culture 06/14/2024:  NGTD   Urine culture 06/14/2024: NGTD  Right second toe culture 06/18/2024: MRSA  Left shoulder synovial fluid culture 06/20/2024:  In progress    Review of Systems  Negative except as stated above in Interval History     OBJECTIVE   Temp:  [97.6 °F (36.4 °C)-98.4 °F (36.9 °C)] 97.6 °F (36.4 °C)  Pulse:  [] 98  Resp:  [16-20] 18  SpO2:  [90 %-98 %] 94 %  BP: ()/(56-81) 120/81  Temp:  [97.6 °F (36.4 °C)-98.4 °F (36.9 °C)]   Temp: 97.6 °F (36.4 °C) (06/29/24 0749)  Pulse: 98 (06/29/24 0811)  Resp: 18 (06/29/24 1014)  BP: 120/81 (06/29/24 0749)  SpO2: (!) 94 % (06/29/24 0811)    Intake/Output Summary (Last 24 hours) at 6/29/2024 1136  Last data filed at 6/29/2024 0549  Gross per 24 hour   Intake  360 ml   Output 800 ml   Net -440 ml       Physical Exam  General:  Obese elderly man lying quietly in bed in no acute distress   Left upper extremity:  Dressing left shoulder.  Still with mild edema of left upper extremity but no erythema at this time.  Improved range of motion elbow and wrist.  Minimal range of motion of shoulder.    CVS: S1 and 2 heard   Respiratory: Clear to auscultation.  Dyspneic while talking.  On 2 L oxygen    Abdomen:  Full, soft, nontender, no palpable organomegaly   Skin: No rash appreciated   CNS: No focal   Musculoskeletal system:  Limited range of motion left knee without effusion.    VAD:  None  ISOLATION:  None    WOUNDS:  Abrasion left upper extremity.  Right 2nd toe ulcer    Significant Labs: All pertinent labs within the past 24 hours have been reviewed.    CBC LAST 7 DAYS  Recent Labs   Lab 06/23/24  0849 06/24/24  0357 06/25/24  0426 06/26/24  0349 06/27/24  0424 06/28/24  0403 06/29/24  0520   WBC 17.37* 18.15* 20.19* 17.83* 17.46* 15.46* 16.36*   RBC 3.27* 3.52* 3.34* 3.14* 3.14* 3.20* 3.45*   HGB 8.6* 9.1* 8.6* 8.2* 8.2* 8.3* 8.7*   HCT 26.2* 28.3* 27.1* 25.8* 25.7* 26.7* 28.5*   MCV 80* 80* 81* 82 82 83 83   MCH 26.3* 25.9* 25.7* 26.1* 26.1* 25.9* 25.2*   MCHC 32.8 32.2 31.7* 31.8* 31.9* 31.1* 30.5*   RDW 17.6* 17.9* 17.8* 18.1* 18.1* 17.9* 17.9*   * 721* 720* 694* 703* 654* 603*   MPV 10.1 9.9 9.5 9.8 9.5 9.3 9.2   GRAN 71.5  12.4* 70.9  12.9* 72.0  14.5* 65.1  11.6* 63.6  11.1* 59.9  9.3* 60.9  10.0*   LYMPH 13.0*  2.3 13.9*  2.5 13.1*  2.6 17.3*  3.1 18.0  3.1 20.1  3.1 18.9  3.1   MONO 8.6  1.5* 8.3  1.5* 8.8  1.8* 10.2  1.8* 10.8  1.9* 11.2  1.7* 11.4  1.9*   BASO 0.09 0.09 0.14 0.11 0.14 0.13 0.18   NRBC 0 0 0 0 0 0 0       CHEMISTRY LAST 7 DAYS  Recent Labs   Lab 06/23/24  0849 06/24/24  0357 06/25/24  0426 06/26/24  0349 06/27/24  0424 06/28/24  0403 06/29/24  0520    142 145 145 145 143 141   K 3.9 3.7 3.8 3.7 3.6 3.6 4.0     "104 107 108 107 106 105   CO2 26 24 24 24 26 26 25   ANIONGAP 12 14 14 13 12 11 11   BUN 72* 79* 59* 58* 54* 48* 48*   CREATININE 2.0* 1.8* 1.6* 1.4 1.4 1.4 1.3   * 246* 221* 203* 211* 171* 178*   CALCIUM 8.7 9.2 8.9 8.7 8.7 8.8 8.8   ALBUMIN 1.3* 1.4* 1.3* 1.3* 1.4* 1.4* 1.5*   PROT 5.8* 6.2 6.0 5.9* 6.1 6.6 6.6   ALKPHOS 195* 188* 151* 172* 176* 155* 153*   ALT 14 18 12 18 30 24 23   AST 23 28 14 30 51* 30 31   BILITOT 0.3 0.3 0.4 0.3 0.3 0.3 0.3       Estimated Creatinine Clearance: 74.8 mL/min (based on SCr of 1.3 mg/dL).    INFLAMMATORY/PROCAL  LAST 7 DAYS  Recent Labs   Lab 06/23/24  0659 06/24/24  0357 06/25/24  0426 06/26/24  0349 06/27/24  0424   PROCAL 1.39* 0.97* 0.72*  --   --    .0* 204.5* 219.3* 192.2* 180.2*     No results found for: "ESR"  CRP   Date Value Ref Range Status   06/27/2024 180.2 (H) 0.0 - 8.2 mg/L Final   06/26/2024 192.2 (H) 0.0 - 8.2 mg/L Final   06/25/2024 219.3 (H) 0.0 - 8.2 mg/L Final   06/24/2024 204.5 (H) 0.0 - 8.2 mg/L Final   06/23/2024 205.0 (H) 0.0 - 8.2 mg/L Final   06/18/2024 319.0 (H) 0.0 - 8.2 mg/L Final       PRIOR  MICROBIOLOGY:    Susceptibility data from last 90 days.  Collected Specimen Info Organism Ceftriaxone Clindamycin Erythromycin Oxacillin Penicillin Tetracycline Trimeth/Sulfa Vancomycin   06/23/24 Blood from Peripheral, Hand, Right No growth after 5 days.           06/18/24 Wound from Toe, Right Foot Methicillin resistant Staphylococcus aureus  R  S  R  R  R  S  S  S   06/14/24 Blood from Peripheral, Antecubital, Right No growth after 5 days.           06/14/24 Blood from Peripheral, Hand, Right No growth after 5 days.               LAST 7 DAYS MICROBIOLOGY   Microbiology Results (last 7 days)       Procedure Component Value Units Date/Time    Aerobic culture [[id number]] Collected: 06/27/24 0733    Order Status: Completed Specimen: Incision site from Shoulder, Left Updated: 06/29/24 1023     Aerobic Bacterial Culture No growth    Fungus culture " [[id number]] Collected: 06/20/24 1204    Order Status: Completed Specimen: Abscess from Shoulder, Left Updated: 06/29/24 0444     Fungus (Mycology) Culture No fungal growth to date    Fungus culture [[id number]] Collected: 06/21/24 1645    Order Status: Completed Specimen: Abscess from Shoulder, Left Updated: 06/29/24 0444     Fungus (Mycology) Culture No fungal growth to date    Gram stain [[id number]] Collected: 06/27/24 0733    Order Status: Completed Specimen: Incision site from Shoulder, Left Updated: 06/28/24 1516     Gram Stain Result Few WBC's      No organisms seen    Aerobic culture [[id number]] Collected: 06/24/24 1622    Order Status: Completed Specimen: Incision site from Shoulder, Left Updated: 06/28/24 1035     Aerobic Bacterial Culture No growth    Blood culture [[id number]] Collected: 06/23/24 0659    Order Status: Completed Specimen: Blood from Peripheral, Hand, Right Updated: 06/28/24 1032     Blood Culture, Routine No growth after 5 days.    Culture, Anaerobe [[id number]] Collected: 06/24/24 1622    Order Status: Completed Specimen: Incision site from Shoulder, Left Updated: 06/27/24 1357     Anaerobic Culture No anaerobes isolated    Culture, Anaerobe [[id number]] Collected: 06/27/24 0733    Order Status: Sent Specimen: Incision site from Shoulder, Left Updated: 06/27/24 1343    Aerobic culture [[id number]] Collected: 06/21/24 1645    Order Status: Completed Specimen: Abscess from Shoulder, Left Updated: 06/25/24 0725     Aerobic Bacterial Culture No growth    AFB Culture & Smear [[id number]] Collected: 06/21/24 1645    Order Status: Completed Specimen: Abscess from Shoulder, Left Updated: 06/24/24 0853     AFB CULTURE STAIN No acid fast bacilli seen.     AFB CULTURE STAIN Testing performed by:     AF[facility] CULTURE STAIN Lab [facility]     [id number]     [place] [address]     AFB CULTURE STAIN Dr. Davis Jain MD    Culture, Anaerobic  [[id number]] Collected: 06/21/24 1645    Order Status: Completed Specimen: Abscess from Shoulder, Left Updated: 06/24/24 0709     Anaerobic Culture No anaerobes isolated    Aerobic culture [[id number]] Collected: 06/20/24 1204    Order Status: Completed Specimen: Abscess from Shoulder, Left Updated: 06/24/24 0700     Aerobic Bacterial Culture No growth    Gram stain [[id number]] Collected: 06/21/24 1645    Order Status: Completed Specimen: Abscess from Shoulder, Left Updated: 06/23/24 0833     Gram Stain Result Few WBC's      No organisms seen    Culture, Anaerobic [[id number]] Collected: 06/20/24 1204    Order Status: Completed Specimen: Abscess from Shoulder, Left Updated: 06/23/24 0729     Anaerobic Culture No anaerobes isolated              CURRENT/PREVIOUS VISIT EKG  Results for orders placed or performed during the hospital encounter of 06/14/24   EKG 12-lead    Collection Time: 06/14/24 12:16 PM   Result Value Ref Range    QRS Duration 106 ms    [facility] QTC Calculation 443 ms    Narrative    Test Reason : R53.1,    Vent. Rate : 120 BPM     Atrial Rate : 159 BPM     P-R Int : 000 ms          QRS Dur : 106 ms      QT Int : 314 ms       P-R-T Axes : 000 -54 093 degrees     QTc Int : 443 ms    Atrial fibrillation with rapid ventricular response  Left axis deviation  Low voltage QRS  Inferior infarct ,age undetermined  Cannot rule out Anterior infarct ,age undetermined  Abnormal ECG  When compared with ECG of 25-MAR-2024 09:14,  Vent. rate has increased BY  46 BPM  Minimal criteria for Anterior infarct are now Present  Confirmed by Jesús HATHAWAY, Hansel AMIN ([id number]) on 6/20/2024 8:43:02 PM    Referred By: JEANNETTE   SELF           Confirmed By:Hansel Espinosa MD     Significant Imaging: I have reviewed all relevant and available imaging results/findings within the past 24 hours.    I spent a total of 30 minutes on the day of the visit.This includes face to face time and non-face to face time preparing to see the patient  (eg, review of tests), obtaining and/or reviewing separately obtained history, documenting clinical information in the electronic or other health record, independently interpreting results and communicating results to the patient/family/caregiver, or care coordinator.    Vasquez Calvin MD  Date of Service: 06/29/2024      This note was created using [employer] voice recognition software that occasionally misinterpreted phrases or words.

## 2024-06-30 LAB
ALBUMIN SERPL BCP-MCNC: 1.5 G/DL (ref 3.5–5.2)
ALP SERPL-CCNC: 137 U/L (ref 55–135)
ALT SERPL W/O P-5'-P-CCNC: 24 U/L (ref 10–44)
ANION GAP SERPL CALC-SCNC: 12 MMOL/L (ref 8–16)
AST SERPL-CCNC: 27 U/L (ref 10–40)
BASOPHILS # BLD AUTO: 0.17 K/UL (ref 0–0.2)
BASOPHILS NFR BLD: 1.1 % (ref 0–1.9)
BILIRUB SERPL-MCNC: 0.3 MG/DL (ref 0.1–1)
BUN SERPL-MCNC: 41 MG/DL (ref 8–23)
CALCIUM SERPL-MCNC: 9 MG/DL (ref 8.7–10.5)
CHLORIDE SERPL-SCNC: 106 MMOL/L (ref 95–110)
CO2 SERPL-SCNC: 24 MMOL/L (ref 23–29)
CREAT SERPL-MCNC: 1.1 MG/DL (ref 0.5–1.4)
DIFFERENTIAL METHOD BLD: ABNORMAL
EOSINOPHIL # BLD AUTO: 0.6 K/UL (ref 0–0.5)
EOSINOPHIL NFR BLD: 3.7 % (ref 0–8)
ERYTHROCYTE [DISTWIDTH] IN BLOOD BY AUTOMATED COUNT: 18 % (ref 11.5–14.5)
EST. GFR  (NO RACE VARIABLE): >60 ML/MIN/1.73 M^2
GLUCOSE SERPL-MCNC: 139 MG/DL (ref 70–110)
HCT VFR BLD AUTO: 27.6 % (ref 40–54)
HGB BLD-MCNC: 8.3 G/DL (ref 14–18)
IMM GRANULOCYTES # BLD AUTO: 0.21 K/UL (ref 0–0.04)
IMM GRANULOCYTES NFR BLD AUTO: 1.4 % (ref 0–0.5)
LYMPHOCYTES # BLD AUTO: 2.6 K/UL (ref 1–4.8)
LYMPHOCYTES NFR BLD: 17.2 % (ref 18–48)
MCH RBC QN AUTO: 25.3 PG (ref 27–31)
MCHC RBC AUTO-ENTMCNC: 30.1 G/DL (ref 32–36)
MCV RBC AUTO: 84 FL (ref 82–98)
MONOCYTES # BLD AUTO: 1.9 K/UL (ref 0.3–1)
MONOCYTES NFR BLD: 12.4 % (ref 4–15)
NEUTROPHILS # BLD AUTO: 9.7 K/UL (ref 1.8–7.7)
NEUTROPHILS NFR BLD: 64.2 % (ref 38–73)
NRBC BLD-RTO: 0 /100 WBC
PLATELET # BLD AUTO: 620 K/UL (ref 150–450)
PMV BLD AUTO: 9.8 FL (ref 9.2–12.9)
POCT GLUCOSE: 138 MG/DL (ref 70–110)
POCT GLUCOSE: 203 MG/DL (ref 70–110)
POCT GLUCOSE: 231 MG/DL (ref 70–110)
POCT GLUCOSE: 250 MG/DL (ref 70–110)
POTASSIUM SERPL-SCNC: 3.6 MMOL/L (ref 3.5–5.1)
PROT SERPL-MCNC: 6.7 G/DL (ref 6–8.4)
RBC # BLD AUTO: 3.28 M/UL (ref 4.6–6.2)
SODIUM SERPL-SCNC: 142 MMOL/L (ref 136–145)
WBC # BLD AUTO: 15.03 K/UL (ref 3.9–12.7)

## 2024-06-30 PROCEDURE — 94761 N-INVAS EAR/PLS OXIMETRY MLT: CPT

## 2024-06-30 PROCEDURE — 25000003 PHARM REV CODE 250: Performed by: ORTHOPAEDIC SURGERY

## 2024-06-30 PROCEDURE — 63600175 PHARM REV CODE 636 W HCPCS: Performed by: ORTHOPAEDIC SURGERY

## 2024-06-30 PROCEDURE — 25000003 PHARM REV CODE 250: Performed by: INTERNAL MEDICINE

## 2024-06-30 PROCEDURE — 36415 COLL VENOUS BLD VENIPUNCTURE: CPT | Performed by: ORTHOPAEDIC SURGERY

## 2024-06-30 PROCEDURE — 99232 SBSQ HOSP IP/OBS MODERATE 35: CPT | Mod: ,,, | Performed by: INTERNAL MEDICINE

## 2024-06-30 PROCEDURE — 80053 COMPREHEN METABOLIC PANEL: CPT | Performed by: ORTHOPAEDIC SURGERY

## 2024-06-30 PROCEDURE — 11000001 HC ACUTE MED/SURG PRIVATE ROOM

## 2024-06-30 PROCEDURE — A4216 STERILE WATER/SALINE, 10 ML: HCPCS | Performed by: ORTHOPAEDIC SURGERY

## 2024-06-30 PROCEDURE — 85025 COMPLETE CBC W/AUTO DIFF WBC: CPT | Performed by: ORTHOPAEDIC SURGERY

## 2024-06-30 PROCEDURE — 99900035 HC TECH TIME PER 15 MIN (STAT)

## 2024-06-30 PROCEDURE — 94799 UNLISTED PULMONARY SVC/PX: CPT

## 2024-06-30 RX ADMIN — INSULIN ASPART 6 UNITS: 100 INJECTION, SOLUTION INTRAVENOUS; SUBCUTANEOUS at 04:06

## 2024-06-30 RX ADMIN — HYDROCODONE BITARTRATE AND ACETAMINOPHEN 1 TABLET: 10; 325 TABLET ORAL at 03:06

## 2024-06-30 RX ADMIN — SODIUM CHLORIDE, PRESERVATIVE FREE 10 ML: 5 INJECTION INTRAVENOUS at 08:06

## 2024-06-30 RX ADMIN — INSULIN ASPART 4 UNITS: 100 INJECTION, SOLUTION INTRAVENOUS; SUBCUTANEOUS at 11:06

## 2024-06-30 RX ADMIN — SODIUM CHLORIDE, PRESERVATIVE FREE 10 ML: 5 INJECTION INTRAVENOUS at 06:06

## 2024-06-30 RX ADMIN — SODIUM CHLORIDE, PRESERVATIVE FREE 10 ML: 5 INJECTION INTRAVENOUS at 11:06

## 2024-06-30 RX ADMIN — LIDOCAINE 5% 1 PATCH: 700 PATCH TOPICAL at 04:06

## 2024-06-30 RX ADMIN — METOPROLOL SUCCINATE 100 MG: 50 TABLET, FILM COATED, EXTENDED RELEASE ORAL at 08:06

## 2024-06-30 RX ADMIN — DAPTOMYCIN 800 MG: 500 INJECTION, POWDER, LYOPHILIZED, FOR SOLUTION INTRAVENOUS at 04:06

## 2024-06-30 RX ADMIN — INSULIN ASPART 2 UNITS: 100 INJECTION, SOLUTION INTRAVENOUS; SUBCUTANEOUS at 08:06

## 2024-06-30 RX ADMIN — LIDOCAINE HYDROCHLORIDE 10 ML: 20 SOLUTION ORAL; TOPICAL at 08:06

## 2024-06-30 RX ADMIN — FERROUS SULFATE TAB 325 MG (65 MG ELEMENTAL FE) 1 EACH: 325 (65 FE) TAB at 08:06

## 2024-06-30 RX ADMIN — CEFTRIAXONE SODIUM 2 G: 2 INJECTION, POWDER, FOR SOLUTION INTRAMUSCULAR; INTRAVENOUS at 11:06

## 2024-06-30 RX ADMIN — INSULIN GLARGINE 15 UNITS: 100 INJECTION, SOLUTION SUBCUTANEOUS at 08:06

## 2024-06-30 NOTE — PROGRESS NOTES
[facility] Medicine  Progress Note    Patient Name: Roosevelt Moser  MRN: [id number]  Patient Class: IP- Inpatient   Admission Date: 6/14/2024  Length of Stay: 16 days  Attending Physician: Rosi Vo MD  Primary Care Provider: Miguel Angel Enriquez PA-C        Subjective:     Principal Problem:Acute renal failure superimposed on chronic kidney disease        HPI:  Roosevelt Moser is a 73 year old male with a previous medical history of atrial fibrillation on eliquis, HTN, DMII, obstructive sleep apnea, HLD, ORIF of left humerus and guillian-Capulin who presented to the emergency room for weakness and multiple falls. Per wife of the patient he has had progressive weakness over the past week along with two falls one at 0300 Thursday and the other early morning Friday. Both time she had to activate EMS to pick patient up off floor due to weakness. Patient refused transport to hospital on both occasions. Today he slid out of his chair and was unable to get up without assistance and at this point the wife activated EMS to transport patient to the hospital. She endorses that two days ago she noticed that the patient had stopped urinating as he normally does. The patient concurs that he has noticed that his urine has decreased over the past two days and that it is dark. Patient denies dysuria or incomplete bladder emptying. Bladder scan showed zero upon admit and urine sample was obtained via straight cath in ED. Patient denies decreased PO intake and keeps a bottle of water with him at all times. Initial ED work-up showed a creatinine of 5 and potassium of 6. Urine studies positive for infection and WBC 20.81 with procalcitonin at 19.75. Blood cultures obtained and patient given 1 gram of rocephin and 1000ml of normal saline. Patient noted to bein afib with RVR and 20mg of diltiazem was administered IV which resulted in low blood pressure and 1 gram of calcium gluconate was administered. Patient  admitted by hospital medicine for further evaluation and management     Overview/Hospital Course:  Patient presented after fall.  He was found to have osteo of his toe and abscesses left shoulder.  He underwent washouts with surgery on his shoulder.  They cleared him on 06/28/2024. The patient was noted to have osteomyelitis of second right toe. Seen by podiatry, who planned amputation Pending snf placement at this time.  Patient will need extended antibiotics per ID recommendations. .      Interval History: Patient states pain currently controlled. No new complaints.    Review of Systems   Constitutional:  Negative for activity change, appetite change, chills and fever.   HENT:  Negative for congestion, ear pain, nosebleeds and sinus pain.    Eyes:  Negative for discharge and itching.   Respiratory:  Negative for apnea, cough, chest tightness and shortness of breath.    Cardiovascular:  Negative for chest pain, palpitations and leg swelling.   Gastrointestinal:  Negative for abdominal distention, abdominal pain and vomiting.   Genitourinary:  Negative for difficulty urinating, dysuria, flank pain and frequency.   Musculoskeletal:  Positive for arthralgias. Negative for back pain, joint swelling and myalgias.   Skin:  Positive for wound. Negative for color change, pallor and rash.   Neurological:  Negative for dizziness, weakness, light-headedness and headaches.   Psychiatric/Behavioral:  Negative for agitation, behavioral problems, confusion and suicidal ideas.      Objective:     Vital Signs (Most Recent):  Temp: 98 °F (36.7 °C) (06/30/24 0719)  Pulse: 82 (06/30/24 0719)  Resp: 17 (06/30/24 0719)  BP: 125/72 (06/30/24 0719)  SpO2: 97 % (06/30/24 0719) Vital Signs (24h Range):  Temp:  [97 °F (36.1 °C)-98.2 °F (36.8 °C)] 98 °F (36.7 °C)  Pulse:  [] 82  Resp:  [16-20] 17  SpO2:  [92 %-97 %] 97 %  BP: (118-184)/(67-74) 125/72     Weight: (!) 141 kg (310 lb 13.6 oz)  Body mass index is 42.16  kg/m².    Intake/Output Summary (Last 24 hours) at 6/30/2024 1008  Last data filed at 6/30/2024 0523  Gross per 24 hour   Intake 480 ml   Output 400 ml   Net 80 ml         Physical Exam  Vitals reviewed.   Constitutional:       General: He is not in acute distress.     Appearance: Normal appearance. He is normal weight. He is not ill-appearing.   HENT:      Head: Normocephalic and atraumatic.      Nose: Nose normal.      Mouth/Throat:      Mouth: Mucous membranes are moist.      Pharynx: Oropharynx is clear.   Eyes:      General: No scleral icterus.     Extraocular Movements: Extraocular movements intact.      Conjunctiva/sclera: Conjunctivae normal.      Pupils: Pupils are equal, round, and reactive to light.   Cardiovascular:      Rate and Rhythm: Normal rate and regular rhythm.      Pulses: Normal pulses.      Heart sounds: Normal heart sounds. No murmur heard.     No gallop.   Pulmonary:      Effort: Pulmonary effort is normal. No respiratory distress.      Breath sounds: Normal breath sounds. No wheezing, rhonchi or rales.   Chest:      Chest wall: No tenderness.   Abdominal:      General: Abdomen is flat. There is no distension.      Palpations: Abdomen is soft.      Tenderness: There is no abdominal tenderness.   Musculoskeletal:         General: No swelling or tenderness.      Cervical back: Normal range of motion and neck supple. No rigidity or tenderness.      Right lower leg: No edema.      Left lower leg: No edema.   Skin:     General: Skin is warm and dry.      Findings: Lesion present.   Neurological:      General: No focal deficit present.      Mental Status: He is alert and oriented to person, place, and time. Mental status is at baseline.      Motor: No weakness.   Psychiatric:         Mood and Affect: Mood normal.         Behavior: Behavior normal.         Thought Content: Thought content normal.             Significant Labs: All pertinent labs within the past 24 hours have been  reviewed.    Significant Imaging: I have reviewed all pertinent imaging results/findings within the past 24 hours.    Assessment/Plan:      * Acute renal failure superimposed on chronic kidney disease  Appreciate input from renal services  In setting of acute UTI and IVVD  Resumed gentle hydration on Zosyn/vanc but those have been stopped  He did not require oral NaHCO3  U/s is w/o obstruction, masses, etc        MRSA infection    As per ID    Pyogenic arthritis of left shoulder region  As above      Abscess of left shoulder  As discussed under limb swelling, status post multiple washouts on 06/21/2024, 06/24/2024, 06/26/2024,6/27/24  Need antibiotics until 08/03/2024  Wound VAC in place.  Continue antibiotics.  Following cultures.  Pending possible LTAC placement.    Osteomyelitis of toe  S/p bedside I&D, deep tissue Cx growing MRSA, S to clinda  ID and podiatry following, appreciate help  Appreciate input all consultants  Podiatry plans amputation sometime this week  Local wound care  Note markedly elevated ESR, CRP  Need antibiotics until 08/03/2024      Skin tear of left upper extremity  Local care, healing  Doubt this is a portal of entry    Swelling of limb, left  U/s w/o DVT  Appreciate help from Dr Bernal, ID, wound care, podiatry et al  Pt is already on OAC, holding for now pending shoulder procedure and toe amputation  Arm is elevated on pillow  Not red/painful, no compartment syndrome  D/w pt/wife      Debility  Multifactorial process  Pt not able to manage his own care and wife cannot adequately care for him at this time  PT/OT  SNF or possibly LTAC consult  CM aware      Severe sepsis  In setting of acute UTI, MRSA OM right 2nd toe, and infected left shoulder hardware  BCX NGTD  Ur CX neg  Rocephin was given empirically for UTI, changed to Zosyn > added vanc/clinda, now on Rocephin/clinda, vanc/zosyn stopped per ID  Oral clotrimazole added for thrush  PCT and WBC trended down, WBC staying up a little  now but pt looks good  No obstruction on renal u/s  Has gas containing abscess in and around left shoulder w/ malpositioned hardware, likely d/t falls, hard to say how the bug entered  Long d/w pt/wife, ortho - 6/22 had washout and hardware removal w/ Dr Bernal  F/u w/ Dr Kenneth mann d/c  He's urinating well  Will need antibiotics until 08/03/2024 per ID    Hyperkalemia  Resolved  in setting of acute on chronic renal failure  Lokelma given x 1 on admit  Avoid ACE/ARB, K+ replacement  tele          Atrial fibrillation with rapid ventricular response  Chronic AF w/ acute RVR, resolved  Required dilt drip on admission but off since 6/15  Tele some PVC/bigem/trigem  TSH 1.8 in March  Monitor lytes  No etoh/drug use  TTE from March 2024:    Left Ventricle: The left ventricle is normal in size. Normal wall thickness. Mild global hypokinesis present. There is mildly reduced systolic function with a visually estimated ejection fraction of 45 - 50%. There is normal diastolic function.    Right Ventricle: Normal right ventricular cavity size. Wall thickness is normal. Right ventricle wall motion  is normal. Systolic function is normal.    Left Atrium: Left atrium is moderately dilated.    IVC/SVC: Normal venous pressure at 3 mmHg.  OAC has been on hold d/t possible need for shoulder surgery - has been on prophylaxis dose of Lovenox, this was help today for shoulder operation  Resume Eliquis as soon as feasible postop      History of Guillain-Miramonte syndrome  No acute issues  However, pt has impaired mobility and is acutely weakened from his sepsis/UTI/AF RVR  PT/OT working w/ pt  Wife cannot manage his care at home currently  SNF assessment underway, may need LTAC depending on what abx he ends up for how long, it may take a few more days to narrow that down          Type 2 diabetes mellitus  A1c 6.3%  SSI      Hypertension  Home meds as appropriate    MARA (obstructive sleep apnea)  Continue CPAP HS and w/ naps      Morbid  obesity  Body mass index is 42.16 kg/m².  Morbid obesity complicates all aspects of disease management from diagnostic modalities to treatment  Weight loss encouraged and health benefits explained to patient  He has been working on weight loss at home w/ reduced caloric intake          VTE Risk Mitigation (From admission, onward)           Ordered     Reason for No Pharmacological VTE Prophylaxis  Once        Question:  Reasons:  Answer:  Already adequately anticoagulated on oral Anticoagulants    06/14/24 1438     IP VTE HIGH RISK PATIENT  Once         06/14/24 1438     Place sequential compression device  Until discontinued         06/14/24 1438                    Discharge Planning   KAMILA: 7/2/2024     Code Status: Full Code   Is the patient medically ready for discharge?:     Reason for patient still in hospital (select all that apply): Patient trending condition  Discharge Plan A: Long-term acute care facility (LTAC)                  Rosi Vo MD, MD  Department of Hospital Medicine   [facility]/Surg

## 2024-06-30 NOTE — ASSESSMENT & PLAN NOTE
In setting of acute UTI, MRSA OM right 2nd toe, and infected left shoulder hardware  BCX NGTD  Ur CX neg  Rocephin was given empirically for UTI, changed to Zosyn > added vanc/clinda, now on Rocephin/clinda, vanc/zosyn stopped per ID  Oral clotrimazole added for thrush  PCT and WBC trended down, WBC staying up a little now but pt looks good  No obstruction on renal u/s  Has gas containing abscess in and around left shoulder w/ malpositioned hardware, likely d/t falls, hard to say how the bug entered  Long d/w pt/wife, ortho - 6/22 had washout and hardware removal w/ Dr Bernal  F/u w/ Dr Kenneth mann d/c  He's urinating well  Will need antibiotics until 08/03/2024 per ID

## 2024-06-30 NOTE — ASSESSMENT & PLAN NOTE
As discussed under limb swelling, status post multiple washouts on 06/21/2024, 06/24/2024, 06/26/2024,6/27/24  Need antibiotics until 08/03/2024  Wound VAC in place.  Continue antibiotics.  Following cultures.  Pending possible LTAC placement.

## 2024-06-30 NOTE — PROGRESS NOTES
Nephrology Progress Note        Patient Name: Roosevelt Moser  MRN: [id number]    Patient Class: IP- Inpatient   Admission Date: 6/14/2024  Length of Stay: 16 days  Date of Service: 6/30/2024    Attending Physician: Rosi Vo MD  Primary Care Provider: Miguel Angel Enriquez PA-C    Reason for Consult: marbin/hyperkalemia    SUBJECTIVE:     HPI: 73M with h/o DM, HTN, AF, GBS and recent shoulder surgery was brought to ER by EMS with recurrent falls in the last 24h. Reports decreased UO but no fever, cough, SOB, dysuria. Upon admission, noted to be in MARBIN with sCr 5, baseline 1 month ago is 1, hyperkalemia with K 6, acidosis with CO2 12, hypoalbuminemia with albumin 1.6. Lactate notably 2.1. Procal 20. A1c 9.5 in 3/2024. UA with hematuria and pyuria, urine Cx pending. Notably on Apixaban. WBC in blood elevated to 20. Plt 540. RP US ordered. Afebrile, normotensive, received IVF and abx. Lokelma, ca gluconate given bicarb gtt ordered. Straight cath in ER with only 100cc urine out.    Review of Systems:  Neg    6/15  AFVSS.   UOP 1200cc plus 2 unmeasured   6/16  AFVSS.  2150 cc uop.  No distress  6/17 VSS, on RA, UOP 1.5L- updated family at bedside  6/18 VSS, on RA, UOP 1.3L  6/19 VSS, on RA, UOP 1L, with osteo R toe- s/p debridement- not interested in amputation- antbx adjusted  6/20 HR , BP stable, on 2L NC, UOP 1.2L, went for procedure, wife reports LE edema  6/21 HR 90-110s, -140s mostly, on RA, UOP 1.6L, to OR for shoulder washout and hardware removal today  6/22 VSS s/p incision and drainage for infection from left shoulder and removal of deep hardware including lizabeth and screws.  6/23 VSS. Consider resuming diuretics tomorrow.  6/24  AFVSS.  1200 cc uop plus multiple unmeasured.  He is very thirsty.    6/25  POD 1 s/p irrigation and debridementof left shoulder abscess.  VSS  2 liter uop  6/26 AFVSS. 2250 cc uop  6/27  AFVSS.  In th OR  6/28 POD 1 s/p left shoulder irrigation and debridement.  2050 cc  "uop  6/29  one shift UOP recorded, 800cc.  VSS, renal function improving.  States he feels "sick" today, denies nausea, says had diarrhea after breakfast.  No other complaints.  6/30  VSS, renal function improved.  Sleeping soundly, on bipap.  Updated family member at bedside.    ASSESSMENT/PLAN:     MARBIN due to ATN due to sepsis due to UTI and/or PNA  CKD stage 2 with diabetic proteinuria and severe hypoalbuminemia  HTN  DM poorly controlled A1c 9.5  HyperK/Acidosis  HypoMg  SHPT  Anemia  Hypoalbuminemia  Edema    - renal function is improving- nonoliguric  - no acute RRT needs- no nsaids or IV contrast- dose meds for CrCl 10-50  - about 1g proteinuria- low alb is nutrition/acute phase reactant  - hold hydralazine  - hold metformin- use insulin  - hyperK/acidosis resolved  - Mg replete  - H/H stable  - optimize protein intake  --strict I/Os   --iron stores low.  Ferritin too high for iv iron.  Will start oral supplement  --will give a dose erythropoietin stimulating agent weekly       Thank you for allowing us to participate in the care of your patient!   We will follow the patient and provide recommendations as needed.         Laboratory:  Recent Labs   Lab 06/28/24  0403 06/29/24  0520 06/30/24  0459    141 142   K 3.6 4.0 3.6    105 106   CO2 26 25 24   BUN 48* 48* 41*   CREATININE 1.4 1.3 1.1   * 178* 139*       Recent Labs   Lab 06/28/24  0403 06/29/24  0520 06/30/24  0459   CALCIUM 8.8 8.8 9.0   ALBUMIN 1.4* 1.5* 1.5*             Recent Labs   Lab 06/27/24 2059 06/28/24  0720 06/28/24  1119 06/28/24  1609 06/28/24  2033 06/29/24  0719 06/29/24  1129 06/29/24  1630 06/29/24 2036 06/30/24  0710   POCTGLUCOSE 195* 200* 259* 258* 249* 183* 288* 193* 123* 138*       Recent Labs   Lab 07/31/23  0955 03/19/24  0830 06/14/24  1539   Hemoglobin A1C 8.8 H 9.5 H 6.3 H       Recent Labs   Lab 06/28/24  0403 06/29/24  0520 06/30/24  0459   WBC 15.46* 16.36* 15.03*   HGB 8.3* 8.7* 8.3*   HCT 26.7* 28.5* " 27.6*   * 603* 620*   MCV 83 83 84   MCHC 31.1* 30.5* 30.1*   MONO 11.2  1.7* 11.4  1.9* 12.4  1.9*   EOSINOPHIL 6.4 5.8 3.7       Recent Labs   Lab 06/28/24  0403 06/29/24  0520 06/30/24  0459   BILITOT 0.3 0.3 0.3   PROT 6.6 6.6 6.7   ALBUMIN 1.4* 1.5* 1.5*   ALKPHOS 155* 153* 137*   ALT 24 23 24   AST 30 31 27       Recent Labs   Lab 12/16/22  1238 03/08/24  1034 03/25/24  1022 06/14/24  1337   Color, UA Yellow Yellow Yellow [place] A   Appearance, UA Clear Clear Hazy A Cloudy A   pH, UA 6.0 5.0 6.0 6.0   Specific [place], UA 1.020 1.010 1.020 1.020   Protein, UA 1+ A Trace A 1+ A 2+ A   Glucose, UA 1+ A 3+ A Negative Trace A   Ketones, UA Negative Negative Negative Negative   Urobilinogen, UA  --  Negative Negative Negative   Bilirubin (UA) Negative Negative Negative Negative   Occult Blood UA 3+ A 2+ A 2+ A 3+ A   Nitrite, UA Negative Negative Negative Negative   RBC, UA 20 H 13 H >100 H >100 H   WBC, UA 81 H 3 18 H >100 H   Bacteria Rare None Rare Many A   Hyaline Casts, UA 0  --  0 0             Microbiology Results (last 7 days)       Procedure Component Value Units Date/Time    Aerobic culture [[id number]] Collected: 06/27/24 0733    Order Status: Completed Specimen: Incision site from Shoulder, Left Updated: 06/30/24 0823     Aerobic Bacterial Culture No growth    Culture, Anaerobe [[id number]] Collected: 06/27/24 0733    Order Status: Completed Specimen: Incision site from Shoulder, Left Updated: 06/29/24 1441     Anaerobic Culture No anaerobes isolated    Fungus culture [[id number]] Collected: 06/20/24 1204    Order Status: Completed Specimen: Abscess from Shoulder, Left Updated: 06/29/24 0444     Fungus (Mycology) Culture No fungal growth to date    Fungus culture [[id number]] Collected: 06/21/24 1645    Order Status: Completed Specimen: Abscess from Shoulder, Left Updated: 06/29/24 5699     Fungus (Mycology) Culture No fungal growth to date    Gram stain [[id number]] Collected: 06/27/24 0779     Order Status: Completed Specimen: Incision site from Shoulder, Left Updated: 06/28/24 1516     Gram Stain Result Few WBC's      No organisms seen    Aerobic culture [[id number]] Collected: 06/24/24 1622    Order Status: Completed Specimen: Incision site from Shoulder, Left Updated: 06/28/24 1035     Aerobic Bacterial Culture No growth    Blood culture [[id number]] Collected: 06/23/24 0659    Order Status: Completed Specimen: Blood from Peripheral, Hand, Right Updated: 06/28/24 1032     Blood Culture, Routine No growth after 5 days.    Culture, Anaerobe [[id number]] Collected: 06/24/24 1622    Order Status: Completed Specimen: Incision site from Shoulder, Left Updated: 06/27/24 1357     Anaerobic Culture No anaerobes isolated    Aerobic culture [[id number]] Collected: 06/21/24 1645    Order Status: Completed Specimen: Abscess from Shoulder, Left Updated: 06/25/24 0725     Aerobic Bacterial Culture No growth    AFB Culture & Smear [[id number]] Collected: 06/21/24 1645    Order Status: Completed Specimen: Abscess from Shoulder, Left Updated: 06/24/24 0853     AFB CULTURE STAIN No acid fast bacilli seen.     AFB CULTURE STAIN Testing performed by:     AFB CULTURE STAIN Lab [facility]     AFB CULTURE STAIN [address] [facility]     [place] [address]     AFB CULTURE STAIN Dr. Davis Jain MD    Culture, Anaerobic [[id number]] Collected: 06/21/24 1645    Order Status: Completed Specimen: Abscess from Shoulder, Left Updated: 06/24/24 0709     Anaerobic Culture No anaerobes isolated    Aerobic culture [[id number]] Collected: 06/20/24 1204    Order Status: Completed Specimen: Abscess from Shoulder, Left Updated: 06/24/24 0700     Aerobic Bacterial Culture No growth            Review of patient's allergies indicates:  No Known Allergies    Outpatient meds:  No current facility-administered medications on file prior to encounter.     Current Outpatient Medications on File Prior to Encounter    Medication Sig Dispense Refill    apixaban (ELIQUIS) 5 mg Tab Take 1 tablet (5 mg total) by mouth 2 (two) times daily. 60 tablet 1    atorvastatin (LIPITOR) 10 MG tablet Take 1 tablet (10 mg total) by mouth once daily. 90 tablet 3    co-enzyme Q-10 30 mg capsule Take 30 mg by mouth once daily.      doxycycline (VIBRAMYCIN) 100 MG Cap Take 100 mg by mouth 2 (two) times daily.      ergocalciferol, vitamin D2, (VITAMIN D ORAL) Take 1 tablet by mouth once daily.      glimepiride (AMARYL) 2 MG tablet Take 1 tablet (2 mg total) by mouth before breakfast. 90 tablet 3    hydrALAZINE (APRESOLINE) 25 MG tablet Take 1 tablet (25 mg total) by mouth every 12 (twelve) hours. 60 tablet 3    metFORMIN (GLUCOPHAGE-XR) 500 MG ER 24hr tablet Take 2 tablets (1,000 mg total) by mouth 2 (two) times daily with meals. 360 tablet 3    metoprolol succinate (TOPROL-XL) 100 MG 24 hr tablet Take 1 tablet (100 mg total) by mouth once daily. (Patient taking differently: Take 100 mg by mouth nightly.) 90 tablet 3       Scheduled meds:   (Magic mouthwash) 1:1:1 diphenhydrAMINE(Benadryl) 12.5mg/5ml liq, aluminum & magnesium hydroxide-simethicone (Maalox), LIDOcaine viscous 2%  10 mL Swish & Spit QID    cefTRIAXone (Rocephin) IV (PEDS and ADULTS)  2 g Intravenous Q24H    DAPTOmycin (CUBICIN) IV (PEDS and ADULTS)  800 mg Intravenous Q24H    epoetin leonel-epbx  100 Units/kg Subcutaneous Q7 Days    ferrous sulfate  1 tablet Oral Daily    insulin glargine U-100  15 Units Subcutaneous Daily    LIDOcaine  1 patch Transdermal Q24H    metoprolol succinate  100 mg Oral QHS    sodium chloride 0.9%  10 mL Intravenous Q6H       Infusions:          PRN meds:    Current Facility-Administered Medications:     albuterol-ipratropium, 3 mL, Nebulization, Q6H PRN    aluminum & magnesium hydroxide-simethicone, 15 mL, Oral, QID PRN    dextrose 10%, 12.5 g, Intravenous, PRN    dextrose 10%, 25 g, Intravenous, PRN    glucagon (human recombinant), 1 mg, Intramuscular,  PRN    glucose, 16 g, Oral, PRN    glucose, 24 g, Oral, PRN    HYDROcodone-acetaminophen, 1 tablet, Oral, Q4H PRN    insulin aspart U-100, 0-10 Units, Subcutaneous, QID (AC + HS) PRN    metoprolol, 5 mg, Intravenous, Q5 Min PRN    naloxone, 0.02 mg, Intravenous, PRN    ondansetron, 4 mg, Intravenous, Q8H PRN    prochlorperazine, 5 mg, Intravenous, Q6H PRN    simethicone, 1 tablet, Oral, QID PRN    sodium chloride 0.9%, 10 mL, Intravenous, Q12H PRN    Flushing PICC/Midline Protocol, , , Until Discontinued **AND** sodium chloride 0.9%, 10 mL, Intravenous, Q6H **AND** sodium chloride 0.9%, 10 mL, Intravenous, PRN    sodium chloride 0.9%, 10 mL, Intravenous, Q12H PRN    Past Medical History:   Diagnosis Date    A-fib 2024    Diabetes mellitus, type 2 2021    Guillain-Zirconia 10/2003    Hyperlipidemia     Hypertension 2016    Sleep apnea      Past Surgical History:   Procedure Laterality Date    ARTHROTOMY OF SHOULDER Left 6/21/2024    Procedure: ARTHROTOMY, SHOULDER;  Surgeon: Roman Paulson MD;  Location: [facility] OR;  Service: Orthopedics;  Laterality: Left;    IRRIGATION AND DEBRIDEMENT OF UPPER EXTREMITY Left 6/24/2024    Procedure: IRRIGATION AND DEBRIDEMENT, UPPER EXTREMITY;  Surgeon: Nathan Cooper MD;  Location: [facility] OR;  Service: Orthopedics;  Laterality: Left;    IRRIGATION AND DEBRIDEMENT OF UPPER EXTREMITY Left 6/27/2024    Procedure: IRRIGATION AND DEBRIDEMENT, UPPER EXTREMITY;  Surgeon: Nathan Cooper MD;  Location: [facility] OR;  Service: Orthopedics;  Laterality: Left;    OPEN REDUCTION AND INTERNAL FIXATION (ORIF) OF FRACTURE OF PROXIMAL HUMERUS Left 3/25/2024    Procedure: ORIF, FRACTURE, HUMERUS, PROXIMAL/IM HANNA, LEFT;  Surgeon: Nathan Cooper MD;  Location: [facility] OR;  Service: Orthopedics;  Laterality: Left;  Accumed, [employer] Small Frag set Avelino verified 3/22/24 ark     No family history on file.  Social History     Tobacco Use    Smoking status: Never    Smokeless tobacco: Never   Substance Use  Topics    Alcohol use: Yes     Alcohol/week: 0.0 standard drinks of alcohol     Comment: social    Drug use: No       OBJECTIVE:     Vital Signs and IO:  Temp:  [97 °F (36.1 °C)-98.2 °F (36.8 °C)]   Pulse:  []   Resp:  [16-20]   BP: (118-184)/(67-74)   SpO2:  [92 %-97 %]   I/O last 3 completed shifts:  In: 1080 [P.O.:1080]  Out: 400 [Urine:400]  Wt Readings from Last 5 Encounters:   06/28/24 (!) 141 kg (310 lb 13.6 oz)   06/04/24 132.5 kg (292 lb 1.8 oz)   05/07/24 132.5 kg (292 lb 1.8 oz)   04/08/24 132.5 kg (292 lb 3.2 oz)   03/26/24 (!) 136.1 kg (300 lb 0.7 oz)     Body mass index is 42.16 kg/m².    Physical Exam  Constitutional:       General: Patient is not in acute distress.     Appearance: Patient is well-developed. She is not diaphoretic.   HENT:      Head: Normocephalic and atraumatic.      Mouth/Throat: Mucous membranes are moist.   Eyes:      General: No scleral icterus.     Pupils: Pupils are equal, round, and reactive to light.   Cardiovascular:      Rate and Rhythm: Normal rate and regular rhythm.   Pulmonary:      Effort: Pulmonary effort is normal. No respiratory distress.      Breath sounds: No stridor.   Abdominal:      General: There is no distension.      Palpations: Abdomen is soft.   Musculoskeletal:         General: No deformity. Normal range of motion.      Cervical back: Neck supple.   Skin:     General: Skin is warm and dry.      Findings: No rash present. No erythema.   Neurological:      Mental Status: Patient is alert and oriented to person, place, and time.      Cranial Nerves: No cranial nerve deficit.   Psychiatric:         Behavior: Behavior normal.          Patient care time was spent personally by me on the following activities:     Obtaining a history.  Examination of patient.  Providing medical care at the patients bedside.  Developing a treatment plan with patient or surrogate and bedside caregivers.  Ordering and reviewing laboratory studies, radiographic studies, pulse  oximetry.  Ordering and performing treatments and interventions.  Evaluation of patient's response to treatment.  Discussions with consultants while on the unit and immediately available to the patient.  Re-evaluation of the patient's condition.  Documentation in the medical record.     Geeta Frey NP      [facility]  [address] [place] [address]  [place] [address]    [phone] - tel  [phone] - fax    6/30/2024

## 2024-06-30 NOTE — ASSESSMENT & PLAN NOTE
S/p bedside I&D, deep tissue Cx growing MRSA, S to clinda  ID and podiatry following, appreciate help  Appreciate input all consultants  Podiatry plans amputation sometime this week  Local wound care  Note markedly elevated ESR, CRP  Need antibiotics until 08/03/2024

## 2024-06-30 NOTE — PLAN OF CARE
Plan of care continues. Dsg's cdi. O2@ 2l/min. Pain treated prn. BG monitored pm snack provided. Picc dsg cdi with good blood return. Afib on monitor . Had bm this shift. Takes pills whole without difficulty. Some confusion overnight- asked to call his wife multiple times but reoriented easily. Podiatry is planning for toe amputation next week. Safety maintained through shift

## 2024-06-30 NOTE — SUBJECTIVE & OBJECTIVE
Interval History: Patient states pain currently controlled. No new complaints.    Review of Systems   Constitutional:  Negative for activity change, appetite change, chills and fever.   HENT:  Negative for congestion, ear pain, nosebleeds and sinus pain.    Eyes:  Negative for discharge and itching.   Respiratory:  Negative for apnea, cough, chest tightness and shortness of breath.    Cardiovascular:  Negative for chest pain, palpitations and leg swelling.   Gastrointestinal:  Negative for abdominal distention, abdominal pain and vomiting.   Genitourinary:  Negative for difficulty urinating, dysuria, flank pain and frequency.   Musculoskeletal:  Positive for arthralgias. Negative for back pain, joint swelling and myalgias.   Skin:  Positive for wound. Negative for color change, pallor and rash.   Neurological:  Negative for dizziness, weakness, light-headedness and headaches.   Psychiatric/Behavioral:  Negative for agitation, behavioral problems, confusion and suicidal ideas.      Objective:     Vital Signs (Most Recent):  Temp: 98 °F (36.7 °C) (06/30/24 0719)  Pulse: 82 (06/30/24 0719)  Resp: 17 (06/30/24 0719)  BP: 125/72 (06/30/24 0719)  SpO2: 97 % (06/30/24 0719) Vital Signs (24h Range):  Temp:  [97 °F (36.1 °C)-98.2 °F (36.8 °C)] 98 °F (36.7 °C)  Pulse:  [] 82  Resp:  [16-20] 17  SpO2:  [92 %-97 %] 97 %  BP: (118-184)/(67-74) 125/72     Weight: (!) 141 kg (310 lb 13.6 oz)  Body mass index is 42.16 kg/m².    Intake/Output Summary (Last 24 hours) at 6/30/2024 1008  Last data filed at 6/30/2024 0523  Gross per 24 hour   Intake 480 ml   Output 400 ml   Net 80 ml         Physical Exam  Vitals reviewed.   Constitutional:       General: He is not in acute distress.     Appearance: Normal appearance. He is normal weight. He is not ill-appearing.   HENT:      Head: Normocephalic and atraumatic.      Nose: Nose normal.      Mouth/Throat:      Mouth: Mucous membranes are moist.      Pharynx: Oropharynx is clear.    Eyes:      General: No scleral icterus.     Extraocular Movements: Extraocular movements intact.      Conjunctiva/sclera: Conjunctivae normal.      Pupils: Pupils are equal, round, and reactive to light.   Cardiovascular:      Rate and Rhythm: Normal rate and regular rhythm.      Pulses: Normal pulses.      Heart sounds: Normal heart sounds. No murmur heard.     No gallop.   Pulmonary:      Effort: Pulmonary effort is normal. No respiratory distress.      Breath sounds: Normal breath sounds. No wheezing, rhonchi or rales.   Chest:      Chest wall: No tenderness.   Abdominal:      General: Abdomen is flat. There is no distension.      Palpations: Abdomen is soft.      Tenderness: There is no abdominal tenderness.   Musculoskeletal:         General: No swelling or tenderness.      Cervical back: Normal range of motion and neck supple. No rigidity or tenderness.      Right lower leg: No edema.      Left lower leg: No edema.   Skin:     General: Skin is warm and dry.      Findings: Lesion present.   Neurological:      General: No focal deficit present.      Mental Status: He is alert and oriented to person, place, and time. Mental status is at baseline.      Motor: No weakness.   Psychiatric:         Mood and Affect: Mood normal.         Behavior: Behavior normal.         Thought Content: Thought content normal.             Significant Labs: All pertinent labs within the past 24 hours have been reviewed.    Significant Imaging: I have reviewed all pertinent imaging results/findings within the past 24 hours.

## 2024-06-30 NOTE — CARE UPDATE
06/29/24 1937   Patient Assessment/Suction   Level of Consciousness (AVPU) alert   Respiratory Effort Normal;Unlabored   Expansion/Accessory Muscles/Retractions expansion symmetric;no retractions;no use of accessory muscles   All Lung Fields Breath Sounds clear   PRE-TX-O2   Device (Oxygen Therapy) room air   SpO2 96 %   Pulse Oximetry Type Intermittent   Pulse 96   Resp 18   Aerosol Therapy   $ Aerosol Therapy Charges PRN treatment not required   Respiratory Treatment Status (SVN) PRN treatment not required   Incentive Spirometer   $ Incentive Spirometer Charges done with encouragement   Administration (IS) proper technique demonstrated   Number of Repetitions (IS) 10   Level Incentive Spirometer (mL) 750   Patient Tolerance (IS) good

## 2024-06-30 NOTE — PROGRESS NOTES
[facility]/Surg   Department of Infectious Disease  Progress Note        PATIENT NAME: Roosevelt Moser  MRN: [id number]  TODAY'S DATE: 06/30/2024  ADMIT DATE: 6/14/2024  LOS: 16 days    CHIEF COMPLAINT: Weakness (Pt brought to ED via EMS with complaint of weakness and falling, pt advised EMS his urine is very dark.  )      PRINCIPLE PROBLEM: Acute renal failure superimposed on chronic kidney disease    INTERVAL HISTORY      06/19/2024: Seen and evaluated at bedside.  States left upper extremity pain better.  Having improved movement at the wrist and forearm.  Seen by Orthopedic surgery PA yesterday.  Notes reviewed.  Blood cultures remain negative.      06/20/2024:  Oral anticoagulants on hold to allow left shoulder arthrocentesis.  No other acute issues overnight.  Culture from right 3rd toe growing Staphylococcus aureus.  Blood culture remain negative.  Had aspiration left shoulder today that yielded purulent material.  Synovial fluid studies in progress.    06/21/2024:  Seen by Orthopedic surgery Service again yesterday.  For I and D today.  All cultures so far negative.  ASO titer normal.    06/29/2024:  Events of last 9 days noted.  He had I and D left shoulder with removal of humerus hardware 06/21/2024.  Had 2nd I&D 06/24/2024 and 3rd I&D 06/27/2024.  All cultures were negative with negative Gram stains.    06/30/2024:  No acute issues overnight.  Tolerating antibiotics okay.    Antibiotics (From admission, onward)      Start     Stop Route Frequency Ordered    06/24/24 1630  DAPTOmycin (CUBICIN) 800 mg in 0.9% NaCl SolP 50 mL IVPB         -- IV Every 24 hours (non-standard times) 06/24/24 1526    06/19/24 1045  cefTRIAXone (ROCEPHIN) 2 g in dextrose 5 % in water (D5W) 100 mL IVPB (MB+)         -- IV Every 24 hours (non-standard times) 06/19/24 0932    06/17/24 2100  mupirocin 2 % ointment         06/22/24 2059 Nasl 2 times daily 06/17/24 1544          Antifungals (From admission, onward)       Start     Stop Route Frequency Ordered    06/19/24 1045  clotrimazole megha 10 mg         06/29/24 0959 Oral 5 times daily 06/19/24 0932           Antivirals (From admission, onward)      None            ASSESSMENT and PLAN      1. Left upper extremity acute bacterial skin and skin structure infection with septic arthritis and humerus osteomyelitis.  He has had multiple I and D with removal of humerus hardware.  All cultures were negative most likely because of antibiotics received prior to surgery and cultures.  Continue current antibiotics.  Plan to treat for 6 weeks through 08/08/2024 for this indication.      2. Right 3rd toe osteomyelitis.  He previously received?  Dalbavancin x2 doses 1 month prior to hospitalization..  Ulcer practically healed.  Antibiotics above.    3. MRSA Right 2nd toe tip with possible early osteomyelitis of the distal tuft.  It was debrided at bedside by podiatrist.  Toe does not look too bad clinically and MRI was underwhelming. Can probably hold off amputation at this time since this patient is still going to require prolonged antibiotics for left shoulder which should continue to cover the right 2nd and 3rd toes.      4. ARF.  Almost resolved. Creatinine now 1.3.      5. Diabetes mellitus.  Management as per hospitalist.       6. Debility.    7. Dyspnea.  Has a history of MARA.  However, dyspneic while talking during the encounter today.  Check chest x-ray and BNP.  Keep in mind possibility of PE.      RECOMMENDATIONS:    Continue current antibiotics for 8 weeks through 08/08/2024.  Check CBC, BNP, CRP, ESR, CPK q.week  Monitor right 2nd and 3rd toes on antibiotics    Please send Epic secure chat with any questions.      SUBJECTIVE    Roosevelt Moser is a 73 y.o. male with history of diabetes mellitus, hypertension and previous going bowel syndrome.  He fell on 03/04/2024 and sustained a displaced comminuted fracture of the left humerus.  Seen by orthopedic surgeon with plan for ORIF  which was deferred because of new AFib.  Admitted 03/09/2024 for rate control and ultimately discharged back home 03/12/2024.  Later had left humerus ORIF 03/25/2024 as a day procedure and was discharged home.       According to his wife, he developed pain and swelling of the 3rd toe and was with an ulcer.  It appears an x-ray of the foot was unremarkable.  Received 2 doses of an antibiotic that I presume to be dalbavancin 1 week apart in early April.     In the last 2 weeks he has continued to decline.  He also has reduction in urine output.  Fell twice at home on 06/13/2024 and wife activated EMS and he was brought to the hospital.  Received IV fluid for low normal blood pressure.  Also noted to have MARBIN with creatinine 5.0 and WBC was 21 K.  UA was abnormal with> 100 WBC,> 100 RBC, 3+ LE and positive nitrite.  He has been managed conservatively for MARBIN and for dehydration.     X-ray of left shoulder and left upper extremity 06/17/2024 documented gas in soft tissue.  MRI right foot documented osteomyelitis of the 3rd toe.  I was asked to see to assist with his care.  ESR 95,  milligram/liter.     Antibiotics   Ceftriaxone:  06/14/2024-06/17/2024, 06/19/2024-  Zosyn:  06/17/2024-06/18/2024   Vancomycin:  06/18/2024-  Clindamycin:  06/18/2024-     Cultures   Blood culture 06/14/2024:  NGTD   Urine culture 06/14/2024: NGTD  Right second toe culture 06/18/2024: MRSA  Left shoulder synovial fluid culture 06/20/2024:  In progress    Review of Systems  Negative except as stated above in Interval History     OBJECTIVE   Temp:  [97 °F (36.1 °C)-98.2 °F (36.8 °C)] 98 °F (36.7 °C)  Pulse:  [] 82  Resp:  [16-20] 17  SpO2:  [92 %-97 %] 97 %  BP: (118-184)/(67-81) 125/72  Temp:  [97 °F (36.1 °C)-98.2 °F (36.8 °C)]   Temp: 98 °F (36.7 °C) (06/30/24 0719)  Pulse: 82 (06/30/24 0719)  Resp: 17 (06/30/24 0719)  BP: 125/72 (06/30/24 0719)  SpO2: 97 % (06/30/24 0719)    Intake/Output Summary (Last 24 hours) at 6/30/2024  0730  Last data filed at 6/30/2024 0523  Gross per 24 hour   Intake 720 ml   Output 400 ml   Net 320 ml       Physical Exam  General:  Obese elderly man lying quietly in bed.  Resting comfortably.  Did not wake up   Left shoulder dressing in place   Right foot: Dressing in place e of motion left knee without effusion.  Respiratory:  On CPAP    VAD:  None  ISOLATION:  None    WOUNDS:  Abrasion left upper extremity.  Right 2nd toe ulcer    Significant Labs: All pertinent labs within the past 24 hours have been reviewed.    CBC LAST 7 DAYS  Recent Labs   Lab 06/24/24  0357 06/25/24  0426 06/26/24  0349 06/27/24  0424 06/28/24  0403 06/29/24  0520 06/30/24  0459   WBC 18.15* 20.19* 17.83* 17.46* 15.46* 16.36* 15.03*   RBC 3.52* 3.34* 3.14* 3.14* 3.20* 3.45* 3.28*   HGB 9.1* 8.6* 8.2* 8.2* 8.3* 8.7* 8.3*   HCT 28.3* 27.1* 25.8* 25.7* 26.7* 28.5* 27.6*   MCV 80* 81* 82 82 83 83 84   MCH 25.9* 25.7* 26.1* 26.1* 25.9* 25.2* 25.3*   MCHC 32.2 31.7* 31.8* 31.9* 31.1* 30.5* 30.1*   RDW 17.9* 17.8* 18.1* 18.1* 17.9* 17.9* 18.0*   * 720* 694* 703* 654* 603* 620*   MPV 9.9 9.5 9.8 9.5 9.3 9.2 9.8   GRAN 70.9  12.9* 72.0  14.5* 65.1  11.6* 63.6  11.1* 59.9  9.3* 60.9  10.0* 64.2  9.7*   LYMPH 13.9*  2.5 13.1*  2.6 17.3*  3.1 18.0  3.1 20.1  3.1 18.9  3.1 17.2*  2.6   MONO 8.3  1.5* 8.8  1.8* 10.2  1.8* 10.8  1.9* 11.2  1.7* 11.4  1.9* 12.4  1.9*   BASO 0.09 0.14 0.11 0.14 0.13 0.18 0.17   NRBC 0 0 0 0 0 0 0       CHEMISTRY LAST 7 DAYS  Recent Labs   Lab 06/24/24  0357 06/25/24  0426 06/26/24  0349 06/27/24  0424 06/28/24  0403 06/29/24  0520 06/30/24  0459    145 145 145 143 141 142   K 3.7 3.8 3.7 3.6 3.6 4.0 3.6    107 108 107 106 105 106   CO2 24 24 24 26 26 25 24   ANIONGAP 14 14 13 12 11 11 12   BUN 79* 59* 58* 54* 48* 48* 41*   CREATININE 1.8* 1.6* 1.4 1.4 1.4 1.3 1.1   * 221* 203* 211* 171* 178* 139*   CALCIUM 9.2 8.9 8.7 8.7 8.8 8.8 9.0   ALBUMIN 1.4* 1.3* 1.3* 1.4* 1.4* 1.5*  "1.5*   PROT 6.2 6.0 5.9* 6.1 6.6 6.6 6.7   ALKPHOS 188* 151* 172* 176* 155* 153* 137*   ALT 18 12 18 30 24 23 24   AST 28 14 30 51* 30 31 27   BILITOT 0.3 0.4 0.3 0.3 0.3 0.3 0.3       Estimated Creatinine Clearance: 88.4 mL/min (based on SCr of 1.1 mg/dL).    INFLAMMATORY/PROCAL  LAST 7 DAYS  Recent Labs   Lab 06/24/24  0357 06/25/24  0426 06/26/24  0349 06/27/24 0424   PROCAL 0.97* 0.72*  --   --    .5* 219.3* 192.2* 180.2*     No results found for: "ESR"  CRP   Date Value Ref Range Status   06/27/2024 180.2 (H) 0.0 - 8.2 mg/L Final   06/26/2024 192.2 (H) 0.0 - 8.2 mg/L Final   06/25/2024 219.3 (H) 0.0 - 8.2 mg/L Final   06/24/2024 204.5 (H) 0.0 - 8.2 mg/L Final   06/23/2024 205.0 (H) 0.0 - 8.2 mg/L Final   06/18/2024 319.0 (H) 0.0 - 8.2 mg/L Final       PRIOR  MICROBIOLOGY:    Susceptibility data from last 90 days.  Collected Specimen Info Organism Ceftriaxone Clindamycin Erythromycin Oxacillin Penicillin Tetracycline Trimeth/Sulfa Vancomycin   06/23/24 Blood from Peripheral, Hand, Right No growth after 5 days.           06/18/24 Wound from Toe, Right Foot Methicillin resistant Staphylococcus aureus  R  S  R  R  R  S  S  S   06/14/24 Blood from Peripheral, Antecubital, Right No growth after 5 days.           06/14/24 Blood from Peripheral, Hand, Right No growth after 5 days.               LAST 7 DAYS MICROBIOLOGY   Microbiology Results (last 7 days)       Procedure Component Value Units Date/Time    Culture, Anaerobe [[id number]] Collected: 06/27/24 0733    Order Status: Completed Specimen: Incision site from Shoulder, Left Updated: 06/29/24 1441     Anaerobic Culture No anaerobes isolated    Aerobic culture [[id number]] Collected: 06/27/24 0733    Order Status: Completed Specimen: Incision site from Shoulder, Left Updated: 06/29/24 1023     Aerobic Bacterial Culture No growth    Fungus culture [[id number]] Collected: 06/20/24 1204    Order Status: Completed Specimen: Abscess from Shoulder, Left Updated: " 06/29/24 0444     Fungus (Mycology) Culture No fungal growth to date    Fungus culture [[id number]] Collected: 06/21/24 1645    Order Status: Completed Specimen: Abscess from Shoulder, Left Updated: 06/29/24 0444     Fungus (Mycology) Culture No fungal growth to date    Gram stain [[id number]] Collected: 06/27/24 0733    Order Status: Completed Specimen: Incision site from Shoulder, Left Updated: 06/28/24 1516     Gram Stain Result Few WBC's      No organisms seen    Aerobic culture [[id number]] Collected: 06/24/24 1622    Order Status: Completed Specimen: Incision site from Shoulder, Left Updated: 06/28/24 1035     Aerobic Bacterial Culture No growth    Blood culture [[id number]] Collected: 06/23/24 0659    Order Status: Completed Specimen: Blood from Peripheral, Hand, Right Updated: 06/28/24 1032     Blood Culture, Routine No growth after 5 days.    Culture, Anaerobe [[id number]] Collected: 06/24/24 1622    Order Status: Completed Specimen: Incision site from Shoulder, Left Updated: 06/27/24 1357     Anaerobic Culture No anaerobes isolated    Aerobic culture [[id number]] Collected: 06/21/24 1645    Order Status: Completed Specimen: Abscess from Shoulder, Left Updated: 06/25/24 0725     Aerobic Bacterial Culture No growth    AFB Culture & Smear [[id number]] Collected: 06/21/24 1645    Order Status: Completed Specimen: Abscess from Shoulder, Left Updated: 06/24/24 0853     AFB CULTURE STAIN No acid fast bacilli seen.     AFB CULTURE STAIN Testing performed by:     AFB CULTURE STAIN Lab [facility]     AFB CULTURE STAIN [address] [facility]     [place] [address]     AFB CULTURE STAIN Dr. Davis Jain MD    Culture, Anaerobic [[id number]] Collected: 06/21/24 1645    Order Status: Completed Specimen: Abscess from Shoulder, Left Updated: 06/24/24 0709     Anaerobic Culture No anaerobes isolated    Aerobic culture [[id number]] Collected: 06/20/24 1204    Order Status: Completed Specimen:  Abscess from Shoulder, Left Updated: 06/24/24 0700     Aerobic Bacterial Culture No growth    Gram stain [[id number]] Collected: 06/21/24 1645    Order Status: Completed Specimen: Abscess from Shoulder, Left Updated: 06/23/24 0833     Gram Stain Result Few WBC's      No organisms seen              CURRENT/PREVIOUS VISIT EKG  Results for orders placed or performed during the hospital encounter of 06/14/24   EKG 12-lead    Collection Time: 06/14/24 12:16 PM   Result Value Ref Range    QRS Duration 106 ms    [facility] QTC Calculation 443 ms    Narrative    Test Reason : R53.1,    Vent. Rate : 120 BPM     Atrial Rate : 159 BPM     P-R Int : 000 ms          QRS Dur : 106 ms      QT Int : 314 ms       P-R-T Axes : 000 -54 093 degrees     QTc Int : 443 ms    Atrial fibrillation with rapid ventricular response  Left axis deviation  Low voltage QRS  Inferior infarct ,age undetermined  Cannot rule out Anterior infarct ,age undetermined  Abnormal ECG  When compared with ECG of 25-MAR-2024 09:14,  Vent. rate has increased BY  46 BPM  Minimal criteria for Anterior infarct are now Present  Confirmed by Jesús HATHAWAY, Hansel AMNI ([id number]) on 6/20/2024 8:43:02 PM    Referred By: [email or web address]   SELF           Confirmed By:Hansel Espinosa MD     Significant Imaging: I have reviewed all relevant and available imaging results/findings within the past 24 hours.    I spent a total of 20 minutes on the day of the visit.This includes face to face time and non-face to face time preparing to see the patient (eg, review of tests), obtaining and/or reviewing separately obtained history, documenting clinical information in the electronic or other health record, independently interpreting results and communicating results to the patient/family/caregiver, or care coordinator.    Vasquez Calvin MD  Date of Service: 06/30/2024      This note was created using [employer] voice recognition software that occasionally misinterpreted phrases or words.

## 2024-06-30 NOTE — ASSESSMENT & PLAN NOTE
U/s w/o DVT  Appreciate help from Dr Bernal, ID, wound care, podiatry et al  Pt is already on OAC, holding for now pending shoulder procedure and toe amputation  Arm is elevated on pillow  Not red/painful, no compartment syndrome  D/w pt/wife

## 2024-06-30 NOTE — CARE UPDATE
06/30/24 1040   Patient Assessment/Suction   Level of Consciousness (AVPU) responds to voice   Respiratory Effort Unlabored   Expansion/Accessory Muscles/Retractions no use of accessory muscles;no retractions;expansion symmetric   Rhythm/Pattern, Respiratory unlabored;pattern regular;depth regular;no shortness of breath reported   PRE-TX-O2   Device (Oxygen Therapy) room air;other (comment)  (pt has home cpap on)   SpO2 99 %   Pulse Oximetry Type Intermittent   $ Pulse Oximetry - Multiple Charge Pulse Oximetry - Multiple   Pulse 100   Resp 16   Aerosol Therapy   $ Aerosol Therapy Charges PRN treatment not required   Respiratory Treatment Status (SVN) PRN treatment not required   Incentive Spirometer   $ Incentive Spirometer Charges unable to perform;other (see comments)  (pt thas home cpap on)   Preset CPAP/BiPAP Settings   Mode Of Delivery other (see comments)  (home cpap in use)

## 2024-07-01 ENCOUNTER — ANESTHESIA (OUTPATIENT)
Dept: SURGERY | Facility: HOSPITAL | Age: 74
End: 2024-07-01
Payer: MEDICARE

## 2024-07-01 ENCOUNTER — ANESTHESIA EVENT (OUTPATIENT)
Dept: SURGERY | Facility: HOSPITAL | Age: 74
End: 2024-07-01
Payer: MEDICARE

## 2024-07-01 PROBLEM — A41.9 SEPSIS: Status: ACTIVE | Noted: 2024-07-01

## 2024-07-01 PROBLEM — L97.514 SKIN ULCER OF TOE OF RIGHT FOOT WITH NECROSIS OF BONE: Status: ACTIVE | Noted: 2024-07-01

## 2024-07-01 LAB
ALBUMIN SERPL BCP-MCNC: 1.5 G/DL (ref 3.5–5.2)
ALP SERPL-CCNC: 134 U/L (ref 55–135)
ALT SERPL W/O P-5'-P-CCNC: 19 U/L (ref 10–44)
ANION GAP SERPL CALC-SCNC: 11 MMOL/L (ref 8–16)
AST SERPL-CCNC: 24 U/L (ref 10–40)
BACTERIA SPEC AEROBE CULT: NO GROWTH
BASOPHILS # BLD AUTO: 0.13 K/UL (ref 0–0.2)
BASOPHILS NFR BLD: 1 % (ref 0–1.9)
BILIRUB SERPL-MCNC: 0.3 MG/DL (ref 0.1–1)
BUN SERPL-MCNC: 38 MG/DL (ref 8–23)
CALCIUM SERPL-MCNC: 8.9 MG/DL (ref 8.7–10.5)
CHLORIDE SERPL-SCNC: 107 MMOL/L (ref 95–110)
CK SERPL-CCNC: 9 U/L (ref 20–200)
CO2 SERPL-SCNC: 25 MMOL/L (ref 23–29)
CREAT SERPL-MCNC: 1.1 MG/DL (ref 0.5–1.4)
DIFFERENTIAL METHOD BLD: ABNORMAL
EOSINOPHIL # BLD AUTO: 0.2 K/UL (ref 0–0.5)
EOSINOPHIL NFR BLD: 1.5 % (ref 0–8)
ERYTHROCYTE [DISTWIDTH] IN BLOOD BY AUTOMATED COUNT: 18.1 % (ref 11.5–14.5)
EST. GFR  (NO RACE VARIABLE): >60 ML/MIN/1.73 M^2
GLUCOSE SERPL-MCNC: 149 MG/DL (ref 70–110)
HCT VFR BLD AUTO: 26.3 % (ref 40–54)
HGB BLD-MCNC: 8.2 G/DL (ref 14–18)
IMM GRANULOCYTES # BLD AUTO: 0.18 K/UL (ref 0–0.04)
IMM GRANULOCYTES NFR BLD AUTO: 1.3 % (ref 0–0.5)
LYMPHOCYTES # BLD AUTO: 2.4 K/UL (ref 1–4.8)
LYMPHOCYTES NFR BLD: 17.7 % (ref 18–48)
MAGNESIUM SERPL-MCNC: 1.5 MG/DL (ref 1.6–2.6)
MCH RBC QN AUTO: 25.7 PG (ref 27–31)
MCHC RBC AUTO-ENTMCNC: 31.2 G/DL (ref 32–36)
MCV RBC AUTO: 82 FL (ref 82–98)
MONOCYTES # BLD AUTO: 1.6 K/UL (ref 0.3–1)
MONOCYTES NFR BLD: 12 % (ref 4–15)
NEUTROPHILS # BLD AUTO: 9 K/UL (ref 1.8–7.7)
NEUTROPHILS NFR BLD: 66.5 % (ref 38–73)
NRBC BLD-RTO: 0 /100 WBC
PLATELET # BLD AUTO: 568 K/UL (ref 150–450)
PMV BLD AUTO: 9.4 FL (ref 9.2–12.9)
POCT GLUCOSE: 240 MG/DL (ref 70–110)
POTASSIUM SERPL-SCNC: 3.2 MMOL/L (ref 3.5–5.1)
PROT SERPL-MCNC: 6.7 G/DL (ref 6–8.4)
RBC # BLD AUTO: 3.19 M/UL (ref 4.6–6.2)
SODIUM SERPL-SCNC: 143 MMOL/L (ref 136–145)
WBC # BLD AUTO: 13.54 K/UL (ref 3.9–12.7)

## 2024-07-01 PROCEDURE — 63600175 PHARM REV CODE 636 W HCPCS: Performed by: ORTHOPAEDIC SURGERY

## 2024-07-01 PROCEDURE — 25000003 PHARM REV CODE 250: Performed by: INTERNAL MEDICINE

## 2024-07-01 PROCEDURE — 71000039 HC RECOVERY, EACH ADD'L HOUR: Performed by: PODIATRIST

## 2024-07-01 PROCEDURE — 82550 ASSAY OF CK (CPK): CPT | Performed by: ORTHOPAEDIC SURGERY

## 2024-07-01 PROCEDURE — 94799 UNLISTED PULMONARY SVC/PX: CPT

## 2024-07-01 PROCEDURE — 88305 TISSUE EXAM BY PATHOLOGIST: CPT | Mod: TC | Performed by: PATHOLOGY

## 2024-07-01 PROCEDURE — 63600175 PHARM REV CODE 636 W HCPCS: Mod: JZ,JG | Performed by: PODIATRIST

## 2024-07-01 PROCEDURE — 85025 COMPLETE CBC W/AUTO DIFF WBC: CPT | Performed by: ORTHOPAEDIC SURGERY

## 2024-07-01 PROCEDURE — 71000033 HC RECOVERY, INTIAL HOUR: Performed by: PODIATRIST

## 2024-07-01 PROCEDURE — 36415 COLL VENOUS BLD VENIPUNCTURE: CPT | Performed by: ORTHOPAEDIC SURGERY

## 2024-07-01 PROCEDURE — 63600175 PHARM REV CODE 636 W HCPCS: Performed by: NURSE ANESTHETIST, CERTIFIED REGISTERED

## 2024-07-01 PROCEDURE — 36000707: Performed by: PODIATRIST

## 2024-07-01 PROCEDURE — 25000003 PHARM REV CODE 250: Performed by: ORTHOPAEDIC SURGERY

## 2024-07-01 PROCEDURE — 36000706: Performed by: PODIATRIST

## 2024-07-01 PROCEDURE — 28820 AMPUTATION OF TOE: CPT | Mod: T6,,, | Performed by: PODIATRIST

## 2024-07-01 PROCEDURE — 94761 N-INVAS EAR/PLS OXIMETRY MLT: CPT

## 2024-07-01 PROCEDURE — 11000001 HC ACUTE MED/SURG PRIVATE ROOM

## 2024-07-01 PROCEDURE — 99900035 HC TECH TIME PER 15 MIN (STAT)

## 2024-07-01 PROCEDURE — 37000008 HC ANESTHESIA 1ST 15 MINUTES: Performed by: PODIATRIST

## 2024-07-01 PROCEDURE — 80053 COMPREHEN METABOLIC PANEL: CPT | Performed by: ORTHOPAEDIC SURGERY

## 2024-07-01 PROCEDURE — 83735 ASSAY OF MAGNESIUM: CPT | Performed by: INTERNAL MEDICINE

## 2024-07-01 PROCEDURE — A4216 STERILE WATER/SALINE, 10 ML: HCPCS | Performed by: ORTHOPAEDIC SURGERY

## 2024-07-01 PROCEDURE — 36415 COLL VENOUS BLD VENIPUNCTURE: CPT | Performed by: INTERNAL MEDICINE

## 2024-07-01 PROCEDURE — D9220A PRA ANESTHESIA: Mod: CRNA,,, | Performed by: NURSE ANESTHETIST, CERTIFIED REGISTERED

## 2024-07-01 PROCEDURE — D9220A PRA ANESTHESIA: Mod: ANES,,, | Performed by: ANESTHESIOLOGY

## 2024-07-01 PROCEDURE — 0Y6R0Z0 DETACHMENT AT RIGHT 2ND TOE, COMPLETE, OPEN APPROACH: ICD-10-PCS | Performed by: PODIATRIST

## 2024-07-01 PROCEDURE — 37000009 HC ANESTHESIA EA ADD 15 MINS: Performed by: PODIATRIST

## 2024-07-01 PROCEDURE — 25000003 PHARM REV CODE 250: Performed by: PODIATRIST

## 2024-07-01 RX ORDER — PROMETHAZINE HYDROCHLORIDE 25 MG/1
25 TABLET ORAL EVERY 6 HOURS PRN
Status: DISCONTINUED | OUTPATIENT
Start: 2024-07-01 | End: 2024-07-07 | Stop reason: HOSPADM

## 2024-07-01 RX ORDER — POTASSIUM CHLORIDE 20 MEQ/1
20 TABLET, EXTENDED RELEASE ORAL 2 TIMES DAILY
Status: DISCONTINUED | OUTPATIENT
Start: 2024-07-01 | End: 2024-07-07 | Stop reason: HOSPADM

## 2024-07-01 RX ORDER — MIDAZOLAM HYDROCHLORIDE 1 MG/ML
INJECTION INTRAMUSCULAR; INTRAVENOUS
Status: DISCONTINUED | OUTPATIENT
Start: 2024-07-01 | End: 2024-07-01

## 2024-07-01 RX ORDER — HYDROCODONE BITARTRATE AND ACETAMINOPHEN 10; 325 MG/1; MG/1
1 TABLET ORAL EVERY 4 HOURS PRN
Status: DISCONTINUED | OUTPATIENT
Start: 2024-07-01 | End: 2024-07-07 | Stop reason: HOSPADM

## 2024-07-01 RX ORDER — HYDROCODONE BITARTRATE AND ACETAMINOPHEN 5; 325 MG/1; MG/1
1 TABLET ORAL EVERY 4 HOURS PRN
Status: DISCONTINUED | OUTPATIENT
Start: 2024-07-01 | End: 2024-07-07 | Stop reason: HOSPADM

## 2024-07-01 RX ORDER — OXYCODONE HYDROCHLORIDE 5 MG/1
5 TABLET ORAL ONCE AS NEEDED
Status: DISCONTINUED | OUTPATIENT
Start: 2024-07-01 | End: 2024-07-03

## 2024-07-01 RX ORDER — FENTANYL CITRATE 50 UG/ML
INJECTION, SOLUTION INTRAMUSCULAR; INTRAVENOUS
Status: DISCONTINUED | OUTPATIENT
Start: 2024-07-01 | End: 2024-07-01

## 2024-07-01 RX ORDER — SODIUM CHLORIDE 0.9 % (FLUSH) 0.9 %
10 SYRINGE (ML) INJECTION
Status: DISCONTINUED | OUTPATIENT
Start: 2024-07-01 | End: 2024-07-03

## 2024-07-01 RX ORDER — FENTANYL CITRATE 50 UG/ML
25 INJECTION, SOLUTION INTRAMUSCULAR; INTRAVENOUS EVERY 5 MIN PRN
Status: DISCONTINUED | OUTPATIENT
Start: 2024-07-01 | End: 2024-07-03

## 2024-07-01 RX ORDER — TRAMADOL HYDROCHLORIDE 50 MG/1
50 TABLET ORAL EVERY 4 HOURS PRN
Status: DISCONTINUED | OUTPATIENT
Start: 2024-07-01 | End: 2024-07-07 | Stop reason: HOSPADM

## 2024-07-01 RX ORDER — ONDANSETRON HYDROCHLORIDE 2 MG/ML
4 INJECTION, SOLUTION INTRAVENOUS ONCE AS NEEDED
Status: DISCONTINUED | OUTPATIENT
Start: 2024-07-01 | End: 2024-07-03

## 2024-07-01 RX ORDER — ONDANSETRON 4 MG/1
8 TABLET, ORALLY DISINTEGRATING ORAL EVERY 8 HOURS PRN
Status: DISCONTINUED | OUTPATIENT
Start: 2024-07-01 | End: 2024-07-07 | Stop reason: HOSPADM

## 2024-07-01 RX ORDER — BUPIVACAINE HYDROCHLORIDE 5 MG/ML
INJECTION, SOLUTION EPIDURAL; INTRACAUDAL
Status: DISCONTINUED | OUTPATIENT
Start: 2024-07-01 | End: 2024-07-01 | Stop reason: HOSPADM

## 2024-07-01 RX ADMIN — LIDOCAINE HYDROCHLORIDE 10 ML: 20 SOLUTION ORAL; TOPICAL at 09:07

## 2024-07-01 RX ADMIN — INSULIN ASPART 1 UNITS: 100 INJECTION, SOLUTION INTRAVENOUS; SUBCUTANEOUS at 09:07

## 2024-07-01 RX ADMIN — INSULIN ASPART 4 UNITS: 100 INJECTION, SOLUTION INTRAVENOUS; SUBCUTANEOUS at 05:07

## 2024-07-01 RX ADMIN — LIDOCAINE 5% 1 PATCH: 700 PATCH TOPICAL at 05:07

## 2024-07-01 RX ADMIN — MIDAZOLAM HYDROCHLORIDE 2 MG: 1 INJECTION INTRAMUSCULAR; INTRAVENOUS at 12:07

## 2024-07-01 RX ADMIN — HYDROCODONE BITARTRATE AND ACETAMINOPHEN 1 TABLET: 10; 325 TABLET ORAL at 08:07

## 2024-07-01 RX ADMIN — LIDOCAINE HYDROCHLORIDE 10 ML: 20 SOLUTION ORAL; TOPICAL at 05:07

## 2024-07-01 RX ADMIN — DAPTOMYCIN 800 MG: 500 INJECTION, POWDER, LYOPHILIZED, FOR SOLUTION INTRAVENOUS at 05:07

## 2024-07-01 RX ADMIN — FENTANYL CITRATE 50 MCG: 50 INJECTION, SOLUTION INTRAMUSCULAR; INTRAVENOUS at 12:07

## 2024-07-01 RX ADMIN — SODIUM CHLORIDE, PRESERVATIVE FREE 10 ML: 5 INJECTION INTRAVENOUS at 11:07

## 2024-07-01 RX ADMIN — INSULIN GLARGINE 15 UNITS: 100 INJECTION, SOLUTION SUBCUTANEOUS at 08:07

## 2024-07-01 RX ADMIN — HYDROCODONE BITARTRATE AND ACETAMINOPHEN 1 TABLET: 5; 325 TABLET ORAL at 05:07

## 2024-07-01 RX ADMIN — CEFTRIAXONE SODIUM 2 G: 2 INJECTION, POWDER, FOR SOLUTION INTRAMUSCULAR; INTRAVENOUS at 09:07

## 2024-07-01 RX ADMIN — POTASSIUM CHLORIDE 20 MEQ: 1500 TABLET, EXTENDED RELEASE ORAL at 08:07

## 2024-07-01 RX ADMIN — METOPROLOL SUCCINATE 100 MG: 50 TABLET, FILM COATED, EXTENDED RELEASE ORAL at 09:07

## 2024-07-01 RX ADMIN — FERROUS SULFATE TAB 325 MG (65 MG ELEMENTAL FE) 1 EACH: 325 (65 FE) TAB at 08:07

## 2024-07-01 RX ADMIN — SODIUM CHLORIDE, PRESERVATIVE FREE 10 ML: 5 INJECTION INTRAVENOUS at 01:07

## 2024-07-01 RX ADMIN — SODIUM CHLORIDE, PRESERVATIVE FREE 10 ML: 5 INJECTION INTRAVENOUS at 05:07

## 2024-07-01 RX ADMIN — LIDOCAINE HYDROCHLORIDE 10 ML: 20 SOLUTION ORAL; TOPICAL at 01:07

## 2024-07-01 RX ADMIN — POTASSIUM CHLORIDE 20 MEQ: 1500 TABLET, EXTENDED RELEASE ORAL at 09:07

## 2024-07-01 NOTE — PLAN OF CARE
Problem: Adult Inpatient Plan of Care  Goal: Plan of Care Review  Outcome: Progressing     Problem: Adult Inpatient Plan of Care  Goal: Absence of Hospital-Acquired Illness or Injury  Outcome: Progressing     Problem: Adult Inpatient Plan of Care  Goal: Optimal Comfort and Wellbeing  Outcome: Progressing     Problem: Diabetes Comorbidity  Goal: Blood Glucose Level Within Targeted Range  Outcome: Progressing     Problem: Acute Kidney Injury/Impairment  Goal: Effective Renal Function  Outcome: Progressing

## 2024-07-01 NOTE — ASSESSMENT & PLAN NOTE
Chronic AF w/ acute RVR, resolved  Required dilt drip on admission but off since 6/15  Tele some PVC/bigem/trigem  TSH 1.8 in March  Monitor lytes  No etoh/drug use  TTE from March 2024:    Left Ventricle: The left ventricle is normal in size. Normal wall thickness. Mild global hypokinesis present. There is mildly reduced systolic function with a visually estimated ejection fraction of 45 - 50%. There is normal diastolic function.    Right Ventricle: Normal right ventricular cavity size. Wall thickness is normal. Right ventricle wall motion  is normal. Systolic function is normal.    Left Atrium: Left atrium is moderately dilated.    IVC/SVC: Normal venous pressure at 3 mmHg.  OAC has been on hold d/t possible need for shoulder surgery - has been on prophylaxis dose of LovenoxResume Eliquis as soon as feasible postop

## 2024-07-01 NOTE — OP NOTE
Operative Report     Patient name: Roosevelt Moser   MRN: [id number]  Date of surgery: 7/1/2024    Surgeon: Stevie Zhu DPM   Assistant:  None    Preoperative diagnosis:  Osteomyelitis right 2nd toe  Postoperative diagnosis:  Same as above  Procedure:  Amputation right 2nd toe  Anesthesia:  Mac with local  Hemostasis:  Pneumatic ankle tourniquet at 250 mmHg  Estimated blood loss:  5 mL   Specimen:  Right 2nd toe  Complications: None  Condition upon discharge: Stable    Procedure in detail:  Patient was brought the operating room placed the operating table in a supine position.  Following adequate IV sedation well-padded pneumatic ankle tourniquet was placed around the patient's right ankle and set at 250 mmHg.  A local block consisting of 10 cc of 0.5% Marcaine plain was utilized in a block of the 2nd toe.  The right foot was then prepped scrubbed draped in normal aseptic manner.  A time-out was then called.  An Esmarch bandage was then utilized to exsanguinated the right foot and the right pneumatic ankle tourniquet was inflated to 250 mmHg.  At this time attention was directed to the patient's right 2nd toe where utilizing a 15 blade a racquet type incision was made about the base of the toe.  Dissection was then carried sharply down to the 2nd metatarsophalangeal joint and the toe was then sharply disarticulated at the joint.  The soft tissue surrounding the joint appeared healthy.  The wound was flushed with copious amounts of sterile saline.  Bleeders cauterized as necessary.  The subcutaneous tissues were then closed utilizing 3-0 Vicryl.  The skin was closed utilizing 3-0 Prolene in a simple interrupted fashion.  5 cc of 0.5% Marcaine plain was utilized a postoperative block.  The foot was then dressed with Adaptic 4x4s Kerlix and an Ace wrap.  The pneumatic ankle tourniquet was deflated neurovascular status noted be intact all digits of the right foot.  Patient's right foot was placed in a postoperative Cam  Malick graff.  Patient tolerated the procedure well.  He had anesthesia reversed and left the operating Room stable vitals.

## 2024-07-01 NOTE — ANESTHESIA POSTPROCEDURE EVALUATION
Anesthesia Post Evaluation    Patient: Roosevelt Moser    Procedure(s) Performed: Procedure(s) (LRB):  AMPUTATION, TOE (Right)    Final Anesthesia Type: MAC      Patient location during evaluation: PACU  Patient participation: Yes- Able to Participate  Level of consciousness: awake and alert  Post-procedure vital signs: reviewed and stable  Pain management: adequate  Airway patency: patent    PONV status at discharge: No PONV  Anesthetic complications: no      Cardiovascular status: hemodynamically stable  Respiratory status: unassisted and room air  Hydration status: euvolemic  Follow-up not needed.              Vitals Value Taken Time   /75 07/01/24 1340   Temp 36.6 °C (97.9 °F) 07/01/24 1335   Pulse 88 07/01/24 1340   Resp 24 07/01/24 1340   SpO2 95 % 07/01/24 1340         Event Time   Out of Recovery 07/01/2024 13:35:00         Pain/Duane Score: Pain Rating Prior to Med Admin: 6 (7/1/2024  8:51 AM)  Pain Rating Post Med Admin: 0 (7/1/2024  9:26 AM)  Duane Score: 10 (7/1/2024  1:30 PM)

## 2024-07-01 NOTE — SUBJECTIVE & OBJECTIVE
Interval History: Patient states pain currently controlled. No new complaints.    Review of Systems   Constitutional:  Negative for activity change, appetite change, chills and fever.   HENT:  Negative for congestion, ear pain, nosebleeds and sinus pain.    Eyes:  Negative for discharge and itching.   Respiratory:  Negative for apnea, cough, chest tightness and shortness of breath.    Cardiovascular:  Negative for chest pain, palpitations and leg swelling.   Gastrointestinal:  Negative for abdominal distention, abdominal pain and vomiting.   Genitourinary:  Negative for difficulty urinating, dysuria, flank pain and frequency.   Musculoskeletal:  Positive for arthralgias. Negative for back pain, joint swelling and myalgias.   Skin:  Positive for wound. Negative for color change, pallor and rash.   Neurological:  Negative for dizziness, weakness, light-headedness and headaches.   Psychiatric/Behavioral:  Negative for agitation, behavioral problems, confusion and suicidal ideas.      Objective:     Vital Signs (Most Recent):  Temp: 97.7 °F (36.5 °C) (07/01/24 0834)  Pulse: 102 (07/01/24 0834)  Resp: 18 (07/01/24 0851)  BP: (!) 188/80 (07/01/24 0834)  SpO2: 95 % (07/01/24 0834) Vital Signs (24h Range):  Temp:  [97 °F (36.1 °C)-98.1 °F (36.7 °C)] 97.7 °F (36.5 °C)  Pulse:  [] 102  Resp:  [16-24] 18  SpO2:  [92 %-96 %] 95 %  BP: (127-188)/(57-80) 188/80     Weight: (!) 141 kg (310 lb 13.6 oz)  Body mass index is 42.16 kg/m².    Intake/Output Summary (Last 24 hours) at 7/1/2024 1105  Last data filed at 7/1/2024 0400  Gross per 24 hour   Intake 490 ml   Output 850 ml   Net -360 ml         Physical Exam  Vitals reviewed.   Constitutional:       General: He is not in acute distress.     Appearance: Normal appearance. He is normal weight. He is not ill-appearing.   HENT:      Head: Normocephalic and atraumatic.      Nose: Nose normal.      Mouth/Throat:      Mouth: Mucous membranes are moist.      Pharynx: Oropharynx is  clear.   Eyes:      General: No scleral icterus.     Extraocular Movements: Extraocular movements intact.      Conjunctiva/sclera: Conjunctivae normal.      Pupils: Pupils are equal, round, and reactive to light.   Cardiovascular:      Rate and Rhythm: Normal rate and regular rhythm.      Pulses: Normal pulses.      Heart sounds: Normal heart sounds. No murmur heard.     No gallop.   Pulmonary:      Effort: Pulmonary effort is normal. No respiratory distress.      Breath sounds: Normal breath sounds. No wheezing, rhonchi or rales.   Chest:      Chest wall: No tenderness.   Abdominal:      General: Abdomen is flat. There is no distension.      Palpations: Abdomen is soft.      Tenderness: There is no abdominal tenderness.   Musculoskeletal:         General: No swelling or tenderness.      Cervical back: Normal range of motion and neck supple. No rigidity or tenderness.      Right lower leg: No edema.      Left lower leg: No edema.   Skin:     General: Skin is warm and dry.      Findings: Lesion present.   Neurological:      General: No focal deficit present.      Mental Status: He is alert and oriented to person, place, and time. Mental status is at baseline.      Motor: No weakness.   Psychiatric:         Mood and Affect: Mood normal.         Behavior: Behavior normal.         Thought Content: Thought content normal.             Significant Labs: All pertinent labs within the past 24 hours have been reviewed.    Significant Imaging: I have reviewed all pertinent imaging results/findings within the past 24 hours.

## 2024-07-01 NOTE — SUBJECTIVE & OBJECTIVE
Principal Problem:Acute renal failure superimposed on chronic kidney disease    Principal Orthopedic Problem:  Infected/failed left proximal humerus open reduction internal fixation status post hardware removal and multiple open irrigation and debridements.    Interval History:      Review of patient's allergies indicates:  No Known Allergies    Current Facility-Administered Medications   Medication    (Magic mouthwash) 1:1:1 diphenhydrAMINE(Benadryl) 12.5mg/5ml liq, aluminum & magnesium hydroxide-simethicone (Maalox), LIDOcaine viscous 2%    albuterol-ipratropium 2.5 mg-0.5 mg/3 mL nebulizer solution 3 mL    aluminum & magnesium hydroxide-simethicone 400-400-40 mg/5 mL suspension 15 mL    cefTRIAXone (ROCEPHIN) 2 g in dextrose 5 % in water (D5W) 100 mL IVPB (MB+)    DAPTOmycin (CUBICIN) 800 mg in 0.9% NaCl SolP 50 mL IVPB    dextrose 10% bolus 125 mL 125 mL    dextrose 10% bolus 250 mL 250 mL    epoetin elonel-epbx injection 14,100 Units    ferrous sulfate tablet 1 each    glucagon (human recombinant) injection 1 mg    glucose chewable tablet 16 g    glucose chewable tablet 24 g    HYDROcodone-acetaminophen  mg per tablet 1 tablet    insulin aspart U-100 pen 0-10 Units    insulin glargine U-100 (Lantus) pen 15 Units    LIDOcaine 5 % patch 1 patch    metoprolol injection 5 mg    metoprolol succinate (TOPROL-XL) 24 hr tablet 100 mg    naloxone 0.4 mg/mL injection 0.02 mg    ondansetron injection 4 mg    prochlorperazine injection Soln 5 mg    simethicone chewable tablet 80 mg    sodium chloride 0.9% flush 10 mL    sodium chloride 0.9% flush 10 mL    And    sodium chloride 0.9% flush 10 mL    sodium chloride 0.9% flush 10 mL     Objective:     Vital Signs (Most Recent):  Temp: 97.2 °F (36.2 °C) (07/01/24 0456)  Pulse: 94 (07/01/24 0456)  Resp: (!) 24 (07/01/24 0456)  BP: (!) 143/70 (07/01/24 0456)  SpO2: 96 % (07/01/24 0456) Vital Signs (24h Range):  Temp:  [96.9 °F (36.1 °C)-98.1 °F (36.7 °C)] 97.2 °F (36.2  °C)  Pulse:  [] 94  Resp:  [16-24] 24  SpO2:  [92 %-99 %] 96 %  BP: (127-151)/(57-72) 143/70     Weight: (!) 141 kg (310 lb 13.6 oz)  Height: 6' (182.9 cm)  Body mass index is 42.16 kg/m².      Intake/Output Summary (Last 24 hours) at 7/1/2024 0811  Last data filed at 7/1/2024 0400  Gross per 24 hour   Intake 490 ml   Output 850 ml   Net -360 ml        General    Constitutional: He is oriented to person, place, and time. He appears well-developed and well-nourished.   Pulmonary/Chest: Effort normal.   Neurological: He is alert and oriented to person, place, and time.   Psychiatric: He has a normal mood and affect. His behavior is normal. Judgment and thought content normal.         Right Shoulder Exam   Right shoulder exam is normal.    Left Shoulder Exam     Inspection/Observation   Bruising: absent    Tenderness   The patient is experiencing no tenderness.     Range of Motion   Active abduction:  abnormal   Passive abduction:  abnormal   Extension:  abnormal   Forward Flexion:  abnormal   Forward Elevation: abnormal  Adduction: abnormal    Other   Sensation: normal     Comments:  Surgical incisions to left shoulder clean dry and intact.  No wound dehiscence or drainage.  No signs or symptoms of infection.  He is neurovascularly intact throughout the left upper extremity.  Has markedly decreased range of motion secondary to known fracture nonunion and glenohumeral osteoarthritis.  He has a good bit of soft tissue swelling/edema into the left hand.  He is unable to move the arm very much to elevate.         Significant Labs: None    Significant Imaging: None

## 2024-07-01 NOTE — PLAN OF CARE
06/30/24 1922   Patient Assessment/Suction   Level of Consciousness (AVPU) responds to voice   Respiratory Effort Normal;Unlabored   Rhythm/Pattern, Respiratory pattern regular   Cough Frequency no cough   PRE-TX-O2   Device (Oxygen Therapy) CPAP   Oxygen Concentration (%) 21   SpO2 96 %   Pulse Oximetry Type Intermittent   Resp 16   Aerosol Therapy   $ Aerosol Therapy Charges PRN treatment not required   Incentive Spirometer   $ Incentive Spirometer Charges unable to perform  (pt alsleep and on home cpap)   Ready to Wean/Extubation Screen   FIO2<=50 (chart decimal) 0.21   Preset CPAP/BiPAP Settings   Mode Of Delivery other (see comments)  (pt using home cpap at this time)

## 2024-07-01 NOTE — PROGRESS NOTES
[facility]/Surg  Orthopedics  Progress Note    Patient Name: Roosevelt Moser  MRN: [id number]  Admission Date: 6/14/2024  Hospital Length of Stay: 17 days  Attending Provider: Rosi Vo MD  Primary Care Provider: Miguel Angel Enriquez PA-C  Follow-up For: Procedure(s) (LRB):  IRRIGATION AND DEBRIDEMENT, UPPER EXTREMITY (Left)    Post-Operative Day: 4 Days Post-Op  Subjective:     Principal Problem:Acute renal failure superimposed on chronic kidney disease    Principal Orthopedic Problem:  Infected/failed left proximal humerus open reduction internal fixation status post hardware removal and multiple open irrigation and debridements.    Interval History:      Review of patient's allergies indicates:  No Known Allergies    Current Facility-Administered Medications   Medication    (Magic mouthwash) 1:1:1 diphenhydrAMINE(Benadryl) 12.5mg/5ml liq, aluminum & magnesium hydroxide-simethicone (Maalox), LIDOcaine viscous 2%    albuterol-ipratropium 2.5 mg-0.5 mg/3 mL nebulizer solution 3 mL    aluminum & magnesium hydroxide-simethicone 400-400-40 mg/5 mL suspension 15 mL    cefTRIAXone (ROCEPHIN) 2 g in dextrose 5 % in water (D5W) 100 mL IVPB (MB+)    DAPTOmycin (CUBICIN) 800 mg in 0.9% NaCl SolP 50 mL IVPB    dextrose 10% bolus 125 mL 125 mL    dextrose 10% bolus 250 mL 250 mL    epoetin leonel-epbx injection 14,100 Units    ferrous sulfate tablet 1 each    glucagon (human recombinant) injection 1 mg    glucose chewable tablet 16 g    glucose chewable tablet 24 g    HYDROcodone-acetaminophen  mg per tablet 1 tablet    insulin aspart U-100 pen 0-10 Units    insulin glargine U-100 (Lantus) pen 15 Units    LIDOcaine 5 % patch 1 patch    metoprolol injection 5 mg    metoprolol succinate (TOPROL-XL) 24 hr tablet 100 mg    naloxone 0.4 mg/mL injection 0.02 mg    ondansetron injection 4 mg    prochlorperazine injection Soln 5 mg    simethicone chewable tablet 80 mg    sodium chloride 0.9% flush 10 mL    sodium  chloride 0.9% flush 10 mL    And    sodium chloride 0.9% flush 10 mL    sodium chloride 0.9% flush 10 mL     Objective:     Vital Signs (Most Recent):  Temp: 97.2 °F (36.2 °C) (07/01/24 0456)  Pulse: 94 (07/01/24 0456)  Resp: (!) 24 (07/01/24 0456)  BP: (!) 143/70 (07/01/24 0456)  SpO2: 96 % (07/01/24 0456) Vital Signs (24h Range):  Temp:  [96.9 °F (36.1 °C)-98.1 °F (36.7 °C)] 97.2 °F (36.2 °C)  Pulse:  [] 94  Resp:  [16-24] 24  SpO2:  [92 %-99 %] 96 %  BP: (127-151)/(57-72) 143/70     Weight: (!) 141 kg (310 lb 13.6 oz)  Height: 6' (182.9 cm)  Body mass index is 42.16 kg/m².      Intake/Output Summary (Last 24 hours) at 7/1/2024 0811  Last data filed at 7/1/2024 0400  Gross per 24 hour   Intake 490 ml   Output 850 ml   Net -360 ml        General    Constitutional: He is oriented to person, place, and time. He appears well-developed and well-nourished.   Pulmonary/Chest: Effort normal.   Neurological: He is alert and oriented to person, place, and time.   Psychiatric: He has a normal mood and affect. His behavior is normal. Judgment and thought content normal.         Right Shoulder Exam   Right shoulder exam is normal.    Left Shoulder Exam     Inspection/Observation   Bruising: absent    Tenderness   The patient is experiencing no tenderness.     Range of Motion   Active abduction:  abnormal   Passive abduction:  abnormal   Extension:  abnormal   Forward Flexion:  abnormal   Forward Elevation: abnormal  Adduction: abnormal    Other   Sensation: normal     Comments:  Surgical incisions to left shoulder clean dry and intact.  No wound dehiscence or drainage.  No signs or symptoms of infection.  He is neurovascularly intact throughout the left upper extremity.  Has markedly decreased range of motion secondary to known fracture nonunion and glenohumeral osteoarthritis.  He has a good bit of soft tissue swelling/edema into the left hand.  He is unable to move the arm very much to elevate.         Significant  Labs: None    Significant Imaging: None  Assessment/Plan:     Abscess of left shoulder  Hospital day 17:    Status post repeat irrigation and debridement (3rd washout) and hardware removal of left shoulder for suspected infection.  Wound VAC removal    Patient is approximately 3 months status post open reduction internal fixation of a left proximal humerus fracture which lost reduction.    1. Patient is doing well postoperatively.  His pain is well managed.    2.  Intraoperative cultures from irrigation and debridement 6/27, gram stain showed few WBCs, no organisms.  Aerobic preliminary report shows no growth and anaerobic cultures still pending.      Preoperative and intraoperative cultures from 06/21 and 6/24 showed many WBCs but have demonstrated no bacterial growth to date.  As well as cultures from 06/19 showed no growth.      3.  Wound VAC was discontinued intraoperatively 6/27  4.  Sling as needed for comfort of the left upper extremity. Patient can do very light activity with the left upper extremity focusing more on elbow, hand and wrist.  Would not do dedicated therapy for the shoulder.  Patient could weightbear through the extremity if needed with a walker.  5.  No plan for additional orthopedic intervention for the left shoulder at this time  6. Consult case management as the patient will likely benefit from placement for IV antibiotics as well as his generalized deconditioned state.  7. Continue Infectious Disease recommendations but no laboratory findings suggestive of left shoulder infection.  8.  Stable from an orthopedic standpoint.  Stable for transfer or discharge from an orthopedic standpoint.    We will continue to follow along, patient does have a right foot, 2nd toe osteomyelitis.  Scheduled for patient next week    Swelling of limb, left  History of left proximal humerus fracture with open reduction internal fixation.            Hansel Tate PA-C  Orthopedics  [facility]  Med/Surg

## 2024-07-01 NOTE — PROGRESS NOTES
Nephrology Progress Note        Patient Name: Roosevelt Moser  MRN: [id number]    Patient Class: IP- Inpatient   Admission Date: 6/14/2024  Length of Stay: 17 days  Date of Service: 7/1/2024    Attending Physician: Rosi Vo MD  Primary Care Provider: Miguel Angel Enriquez PA-C    Reason for Consult: marbin/hyperkalemia    SUBJECTIVE:     HPI: 73M with h/o DM, HTN, AF, GBS and recent shoulder surgery was brought to ER by EMS with recurrent falls in the last 24h. Reports decreased UO but no fever, cough, SOB, dysuria. Upon admission, noted to be in MARBIN with sCr 5, baseline 1 month ago is 1, hyperkalemia with K 6, acidosis with CO2 12, hypoalbuminemia with albumin 1.6. Lactate notably 2.1. Procal 20. A1c 9.5 in 3/2024. UA with hematuria and pyuria, urine Cx pending. Notably on Apixaban. WBC in blood elevated to 20. Plt 540. RP US ordered. Afebrile, normotensive, received IVF and abx. Lokelma, ca gluconate given bicarb gtt ordered. Straight cath in ER with only 100cc urine out.    6/15  AFVSS.   UOP 1200cc plus 2 unmeasured   6/16  AFVSS.  2150 cc uop.  No distress  6/17 VSS, on RA, UOP 1.5L- updated family at bedside  6/18 VSS, on RA, UOP 1.3L  6/19 VSS, on RA, UOP 1L, with osteo R toe- s/p debridement- not interested in amputation- antbx adjusted  6/20 HR , BP stable, on 2L NC, UOP 1.2L, went for procedure, wife reports LE edema  6/21 HR 90-110s, -140s mostly, on RA, UOP 1.6L, to OR for shoulder washout and hardware removal today  6/22 VSS s/p incision and drainage for infection from left shoulder and removal of deep hardware including lizabeth and screws.  6/23 VSS. Consider resuming diuretics tomorrow.  6/24  AFVSS.  1200 cc uop plus multiple unmeasured.  He is very thirsty.    6/25  POD 1 s/p irrigation and debridementof left shoulder abscess.  VSS  2 liter uop  6/26 AFVSS. 2250 cc uop  6/27  AFVSS.  In th OR  6/28 POD 1 s/p left shoulder irrigation and debridement.  2050 cc uop  6/29  one shift UOP recorded,  "800cc.  VSS, renal function improving.  States he feels "sick" today, denies nausea, says had diarrhea after breakfast.  No other complaints.  6/30  VSS, renal function improved.  Sleeping soundly, on bipap.  Updated family member at bedside.  7/1 VSS. Treat hypokalemia, monitor Mg as well. Agree with toe amputation.    ASSESSMENT/PLAN:     MARBIN due to ATN due to sepsis due to UTI and/or PNA  CKD stage 2 with diabetic proteinuria and severe hypoalbuminemia  HTN  DM poorly controlled A1c 9.5  Hypokalemia/Hypomagnesemia  SHPT  Anemia  Hypoalbuminemia  Edema    - renal function is improving- nonoliguric  - dose meds for CrCl 10-50  - about 1g proteinuria- low alb is nutrition/acute phase reactant  - hold hydralazine  - hold metformin- use insulin  - treat hypokalemia, add oral KCL and non-renal diet.  - monitor Mg and replete as needed  - H/H stable  - optimize protein intake  --strict I/Os   --iron stores low. Ferritin too high for iv iron, on oral supplement  --getting erythropoietin stimulating agent weekly    Thank you for allowing us to participate in the care of your patient!   We will follow the patient and provide recommendations as needed.         Laboratory:  Recent Labs   Lab 06/29/24  0520 06/30/24  0459 07/01/24  0523    142 143   K 4.0 3.6 3.2*    106 107   CO2 25 24 25   BUN 48* 41* 38*   CREATININE 1.3 1.1 1.1   * 139* 149*       Recent Labs   Lab 06/29/24  0520 06/30/24  0459 07/01/24  0523   CALCIUM 8.8 9.0 8.9   ALBUMIN 1.5* 1.5* 1.5*             Recent Labs   Lab 06/28/24  1609 06/28/24  2033 06/29/24  0719 06/29/24  1129 06/29/24  1630 06/29/24  2036 06/30/24  0710 06/30/24  1105 06/30/24  1631 06/30/24  2053   POCTGLUCOSE 258* 249* 183* 288* 193* 123* 138* 203* 231* 250*       Recent Labs   Lab 07/31/23  0955 03/19/24  0830 06/14/24  1539   Hemoglobin A1C 8.8 H 9.5 H 6.3 H       Recent Labs   Lab 06/29/24  0520 06/30/24  0459 07/01/24  0523   WBC 16.36* 15.03* 13.54*   HGB 8.7* " 8.3* 8.2*   HCT 28.5* 27.6* 26.3*   * 620* 568*   MCV 83 84 82   MCHC 30.5* 30.1* 31.2*   MONO 11.4  1.9* 12.4  1.9* 12.0  1.6*   EOSINOPHIL 5.8 3.7 1.5       Recent Labs   Lab 06/29/24  0520 06/30/24  0459 07/01/24  0523   BILITOT 0.3 0.3 0.3   PROT 6.6 6.7 6.7   ALBUMIN 1.5* 1.5* 1.5*   ALKPHOS 153* 137* 134   ALT 23 24 19   AST 31 27 24       Recent Labs   Lab 12/16/22  1238 03/08/24  1034 03/25/24  1022 06/14/24  1337   Color, UA Yellow Yellow Yellow Culver A   Appearance, UA Clear Clear Hazy A Cloudy A   pH, UA 6.0 5.0 6.0 6.0   Specific Whipple, UA 1.020 1.010 1.020 1.020   Protein, UA 1+ A Trace A 1+ A 2+ A   Glucose, UA 1+ A 3+ A Negative Trace A   Ketones, UA Negative Negative Negative Negative   Urobilinogen, UA  --  Negative Negative Negative   Bilirubin (UA) Negative Negative Negative Negative   Occult Blood UA 3+ A 2+ A 2+ A 3+ A   Nitrite, UA Negative Negative Negative Negative   RBC, UA 20 H 13 H >100 H >100 H   WBC, UA 81 H 3 18 H >100 H   Bacteria Rare None Rare Many A   Hyaline Casts, UA 0  --  0 0             Microbiology Results (last 7 days)       Procedure Component Value Units Date/Time    Aerobic culture [[id number]] Collected: 06/27/24 0733    Order Status: Completed Specimen: Incision site from Shoulder, Left Updated: 07/01/24 0704     Aerobic Bacterial Culture No growth    Culture, Anaerobe [[id number]] Collected: 06/27/24 0733    Order Status: Completed Specimen: Incision site from Shoulder, Left Updated: 06/29/24 1441     Anaerobic Culture No anaerobes isolated    Fungus culture [[id number]] Collected: 06/20/24 1204    Order Status: Completed Specimen: Abscess from Shoulder, Left Updated: 06/29/24 0444     Fungus (Mycology) Culture No fungal growth to date    Fungus culture [[id number]] Collected: 06/21/24 6329    Order Status: Completed Specimen: Abscess from Shoulder, Left Updated: 06/29/24 0441     Fungus (Mycology) Culture No fungal growth to date    Gram stain [[id number]]  Collected: 06/27/24 0733    Order Status: Completed Specimen: Incision site from Shoulder, Left Updated: 06/28/24 1516     Gram Stain Result Few WBC's      No organisms seen    Aerobic culture [[id number]] Collected: 06/24/24 1622    Order Status: Completed Specimen: Incision site from Shoulder, Left Updated: 06/28/24 1035     Aerobic Bacterial Culture No growth    Blood culture [[id number]] Collected: 06/23/24 0659    Order Status: Completed Specimen: Blood from Peripheral, Hand, Right Updated: 06/28/24 1032     Blood Culture, Routine No growth after 5 days.    Culture, Anaerobe [[id number]] Collected: 06/24/24 1622    Order Status: Completed Specimen: Incision site from Shoulder, Left Updated: 06/27/24 1357     Anaerobic Culture No anaerobes isolated    Aerobic culture [[id number]] Collected: 06/21/24 1645    Order Status: Completed Specimen: Abscess from Shoulder, Left Updated: 06/25/24 0725     Aerobic Bacterial Culture No growth    AFB Culture & Smear [[id number]] Collected: 06/21/24 1645    Order Status: Completed Specimen: Abscess from Shoulder, Left Updated: 06/24/24 0853     AFB CULTURE STAIN No acid fast bacilli seen.     AFB CULTURE STAIN Testing performed by:     AFB CULTURE STAIN Lab [facility]     AFB CULTURE STAIN [address] [facility]     [place] [address]     AFB CULTURE STAIN Dr. Davis Jain MD            Review of patient's allergies indicates:  No Known Allergies    Outpatient meds:  No current facility-administered medications on file prior to encounter.     Current Outpatient Medications on File Prior to Encounter   Medication Sig Dispense Refill    apixaban (ELIQUIS) 5 mg Tab Take 1 tablet (5 mg total) by mouth 2 (two) times daily. 60 tablet 1    atorvastatin (LIPITOR) 10 MG tablet Take 1 tablet (10 mg total) by mouth once daily. 90 tablet 3    co-enzyme Q-10 30 mg capsule Take 30 mg by mouth once daily.      doxycycline (VIBRAMYCIN) 100 MG Cap Take 100 mg by  mouth 2 (two) times daily.      ergocalciferol, vitamin D2, (VITAMIN D ORAL) Take 1 tablet by mouth once daily.      glimepiride (AMARYL) 2 MG tablet Take 1 tablet (2 mg total) by mouth before breakfast. 90 tablet 3    hydrALAZINE (APRESOLINE) 25 MG tablet Take 1 tablet (25 mg total) by mouth every 12 (twelve) hours. 60 tablet 3    metFORMIN (GLUCOPHAGE-XR) 500 MG ER 24hr tablet Take 2 tablets (1,000 mg total) by mouth 2 (two) times daily with meals. 360 tablet 3    metoprolol succinate (TOPROL-XL) 100 MG 24 hr tablet Take 1 tablet (100 mg total) by mouth once daily. (Patient taking differently: Take 100 mg by mouth nightly.) 90 tablet 3       Scheduled meds:   (Magic mouthwash) 1:1:1 diphenhydrAMINE(Benadryl) 12.5mg/5ml liq, aluminum & magnesium hydroxide-simethicone (Maalox), LIDOcaine viscous 2%  10 mL Swish & Spit QID    cefTRIAXone (Rocephin) IV (PEDS and ADULTS)  2 g Intravenous Q24H    DAPTOmycin (CUBICIN) IV (PEDS and ADULTS)  800 mg Intravenous Q24H    epoetin leonel-epbx  100 Units/kg Subcutaneous Q7 Days    ferrous sulfate  1 tablet Oral Daily    insulin glargine U-100  15 Units Subcutaneous Daily    LIDOcaine  1 patch Transdermal Q24H    metoprolol succinate  100 mg Oral QHS    sodium chloride 0.9%  10 mL Intravenous Q6H       Infusions:          PRN meds:    Current Facility-Administered Medications:     albuterol-ipratropium, 3 mL, Nebulization, Q6H PRN    aluminum & magnesium hydroxide-simethicone, 15 mL, Oral, QID PRN    dextrose 10%, 12.5 g, Intravenous, PRN    dextrose 10%, 25 g, Intravenous, PRN    glucagon (human recombinant), 1 mg, Intramuscular, PRN    glucose, 16 g, Oral, PRN    glucose, 24 g, Oral, PRN    HYDROcodone-acetaminophen, 1 tablet, Oral, Q4H PRN    insulin aspart U-100, 0-10 Units, Subcutaneous, QID (AC + HS) PRN    metoprolol, 5 mg, Intravenous, Q5 Min PRN    naloxone, 0.02 mg, Intravenous, PRN    ondansetron, 4 mg, Intravenous, Q8H PRN    prochlorperazine, 5 mg, Intravenous, Q6H  PRN    simethicone, 1 tablet, Oral, QID PRN    sodium chloride 0.9%, 10 mL, Intravenous, Q12H PRN    Flushing PICC/Midline Protocol, , , Until Discontinued **AND** sodium chloride 0.9%, 10 mL, Intravenous, Q6H **AND** sodium chloride 0.9%, 10 mL, Intravenous, PRN    sodium chloride 0.9%, 10 mL, Intravenous, Q12H PRN    Past Medical History:   Diagnosis Date    A-fib 2024    Diabetes mellitus, type 2 2021    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    Hyperlipidemia     Hypertension 2016    Sleep apnea      Past Surgical History:   Procedure Laterality Date    ARTHROTOMY OF SHOULDER Left 6/21/2024    Procedure: ARTHROTOMY, SHOULDER;  Surgeon: Roman Paulson MD;  Location: [facility] OR;  Service: Orthopedics;  Laterality: Left;    IRRIGATION AND DEBRIDEMENT OF UPPER EXTREMITY Left 6/24/2024    Procedure: IRRIGATION AND DEBRIDEMENT, UPPER EXTREMITY;  Surgeon: Nathan Cooper MD;  Location: [facility] OR;  Service: Orthopedics;  Laterality: Left;    IRRIGATION AND DEBRIDEMENT OF UPPER EXTREMITY Left 6/27/2024    Procedure: IRRIGATION AND DEBRIDEMENT, UPPER EXTREMITY;  Surgeon: Nathan Cooper MD;  Location: [facility] OR;  Service: Orthopedics;  Laterality: Left;    OPEN REDUCTION AND INTERNAL FIXATION (ORIF) OF FRACTURE OF PROXIMAL HUMERUS Left 3/25/2024    Procedure: ORIF, FRACTURE, HUMERUS, PROXIMAL/IM HANNA, LEFT;  Surgeon: Nathan Cooper MD;  Location: [facility] OR;  Service: Orthopedics;  Laterality: Left;  Accumed, [employer] Small Frag set Avelino verified 3/22/24 ark     No family history on file.  Social History     Tobacco Use    Smoking status: Never    Smokeless tobacco: Never   Substance Use Topics    Alcohol use: Yes     Alcohol/week: 0.0 standard drinks of alcohol     Comment: social    Drug use: No       OBJECTIVE:     Vital Signs and IO:  Temp:  [96.9 °F (36.1 °C)-98.1 °F (36.7 °C)]   Pulse:  []   Resp:  [16-24]   BP: (127-151)/(57-72)   SpO2:  [92 %-99 %]   I/O last 3 completed shifts:  In: 970 [P.O.:970]  Out: 1250 [Urine:1250]  Wt  Readings from Last 5 Encounters:   06/28/24 (!) 141 kg (310 lb 13.6 oz)   06/04/24 132.5 kg (292 lb 1.8 oz)   05/07/24 132.5 kg (292 lb 1.8 oz)   04/08/24 132.5 kg (292 lb 3.2 oz)   03/26/24 (!) 136.1 kg (300 lb 0.7 oz)     Body mass index is 42.16 kg/m².    Physical Exam  Constitutional:       General: Patient is not in acute distress.     Appearance: Patient is well-developed. She is not diaphoretic.   HENT:      Head: Normocephalic and atraumatic.      Mouth/Throat: Mucous membranes are moist.   Eyes:      General: No scleral icterus.     Pupils: Pupils are equal, round, and reactive to light.   Cardiovascular:      Rate and Rhythm: Normal rate and regular rhythm.   Pulmonary:      Effort: Pulmonary effort is normal. No respiratory distress.      Breath sounds: No stridor.   Abdominal:      General: There is no distension.      Palpations: Abdomen is soft.   Musculoskeletal:         General: No deformity. Normal range of motion.      Cervical back: Neck supple.   Skin:     General: Skin is warm and dry.      Findings: No rash present. No erythema.   Neurological:      Mental Status: Patient is alert and oriented to person, place, and time.      Cranial Nerves: No cranial nerve deficit.   Psychiatric:         Behavior: Behavior normal.          Patient care time was spent personally by me on the following activities:     Obtaining a history.  Examination of patient.  Providing medical care at the patients bedside.  Developing a treatment plan with patient or surrogate and bedside caregivers.  Ordering and reviewing laboratory studies, radiographic studies, pulse oximetry.  Ordering and performing treatments and interventions.  Evaluation of patient's response to treatment.  Discussions with consultants while on the unit and immediately available to the patient.  Re-evaluation of the patient's condition.  Documentation in the medical record.     Anjum Mcmullen MD    [facility]  [address] [place] [address]  [address]  [place] [id number]    [phone] - tel  [phone] - fax    7/1/2024

## 2024-07-01 NOTE — ASSESSMENT & PLAN NOTE
In setting of acute UTI, MRSA OM right 2nd toe, and infected left shoulder hardware  BCX negative  Urine culture negative  Will need antibiotics until 08/03/2024 per ID

## 2024-07-01 NOTE — ASSESSMENT & PLAN NOTE
No acute issues  However, pt has impaired mobility and is acutely weakened from his sepsis/UTI/AF RVR  PT/OT working w/ pt  Wife cannot manage his care at home currently  SNF assessment underway, may need LTAC

## 2024-07-01 NOTE — PT/OT/SLP PROGRESS
Occupational Therapy      Patient Name:  Roosevelt Moser   MRN:  [id number]    Patient not seen today secondary to surgery. Will follow-up.    7/1/2024

## 2024-07-01 NOTE — ASSESSMENT & PLAN NOTE
Hospital day 17:    Status post repeat irrigation and debridement (3rd washout) and hardware removal of left shoulder for suspected infection.  Wound VAC removal    Patient is approximately 3 months status post open reduction internal fixation of a left proximal humerus fracture which lost reduction.    1. Patient is doing well postoperatively.  His pain is well managed.    2.  Intraoperative cultures from irrigation and debridement 6/27, gram stain showed few WBCs, no organisms.  Aerobic preliminary report shows no growth and anaerobic cultures still pending.      Preoperative and intraoperative cultures from 06/21 and 6/24 showed many WBCs but have demonstrated no bacterial growth to date.  As well as cultures from 06/19 showed no growth.      3.  Wound VAC was discontinued intraoperatively 6/27  4.  Sling as needed for comfort of the left upper extremity. Patient can do very light activity with the left upper extremity focusing more on elbow, hand and wrist.  Would not do dedicated therapy for the shoulder.  Patient could weightbear through the extremity if needed with a walker.  5.  No plan for additional orthopedic intervention for the left shoulder at this time  6. Consult case management as the patient will likely benefit from placement for IV antibiotics as well as his generalized deconditioned state.  7. Continue Infectious Disease recommendations but no laboratory findings suggestive of left shoulder infection.  8.  Stable from an orthopedic standpoint.  Stable for transfer or discharge from an orthopedic standpoint.    We will continue to follow along, patient does have a right foot, 2nd toe osteomyelitis.  Scheduled for patient next week

## 2024-07-01 NOTE — PROGRESS NOTES
[facility] Medicine  Progress Note    Patient Name: Roosevelt Moser  MRN: [id number]  Patient Class: IP- Inpatient   Admission Date: 6/14/2024  Length of Stay: 17 days  Attending Physician: Rosi Vo MD  Primary Care Provider: Miguel Angel Enriquez PA-C        Subjective:     Principal Problem:Acute renal failure superimposed on chronic kidney disease        HPI:  Roosevelt Moser is a 73 year old male with a previous medical history of atrial fibrillation on eliquis, HTN, DMII, obstructive sleep apnea, HLD, ORIF of left humerus and guillian-[place] who presented to the emergency room for weakness and multiple falls. Per wife of the patient he has had progressive weakness over the past week along with two falls one at 0300 Thursday and the other early morning Friday. Both time she had to activate EMS to pick patient up off floor due to weakness. Patient refused transport to hospital on both occasions. Today he slid out of his chair and was unable to get up without assistance and at this point the wife activated EMS to transport patient to the hospital. She endorses that two days ago she noticed that the patient had stopped urinating as he normally does. The patient concurs that he has noticed that his urine has decreased over the past two days and that it is dark. Patient denies dysuria or incomplete bladder emptying. Bladder scan showed zero upon admit and urine sample was obtained via straight cath in ED. Patient denies decreased PO intake and keeps a bottle of water with him at all times. Initial ED work-up showed a creatinine of 5 and potassium of 6. Urine studies positive for infection and WBC 20.81 with procalcitonin at 19.75. Blood cultures obtained and patient given 1 gram of rocephin and 1000ml of normal saline. Patient noted to bein afib with RVR and 20mg of diltiazem was administered IV which resulted in low blood pressure and 1 gram of calcium gluconate was administered. Patient  admitted by hospital medicine for further evaluation and management     Overview/Hospital Course:  Patient presented after fall.  He was found to have osteo of his toe and abscesses left shoulder.  He underwent washouts with surgery on his shoulder.  They cleared him on 06/28/2024. The patient was noted to have osteomyelitis of second right toe. Seen by podiatry, who performed amputation on 07/01. Pending snf placement at this time.  Patient will need extended antibiotics per ID recommendations. .      Interval History: Patient states pain currently controlled. No new complaints.    Review of Systems   Constitutional:  Negative for activity change, appetite change, chills and fever.   HENT:  Negative for congestion, ear pain, nosebleeds and sinus pain.    Eyes:  Negative for discharge and itching.   Respiratory:  Negative for apnea, cough, chest tightness and shortness of breath.    Cardiovascular:  Negative for chest pain, palpitations and leg swelling.   Gastrointestinal:  Negative for abdominal distention, abdominal pain and vomiting.   Genitourinary:  Negative for difficulty urinating, dysuria, flank pain and frequency.   Musculoskeletal:  Positive for arthralgias. Negative for back pain, joint swelling and myalgias.   Skin:  Positive for wound. Negative for color change, pallor and rash.   Neurological:  Negative for dizziness, weakness, light-headedness and headaches.   Psychiatric/Behavioral:  Negative for agitation, behavioral problems, confusion and suicidal ideas.      Objective:     Vital Signs (Most Recent):  Temp: 97.7 °F (36.5 °C) (07/01/24 0834)  Pulse: 102 (07/01/24 0834)  Resp: 18 (07/01/24 0851)  BP: (!) 188/80 (07/01/24 0834)  SpO2: 95 % (07/01/24 0834) Vital Signs (24h Range):  Temp:  [97 °F (36.1 °C)-98.1 °F (36.7 °C)] 97.7 °F (36.5 °C)  Pulse:  [] 102  Resp:  [16-24] 18  SpO2:  [92 %-96 %] 95 %  BP: (127-188)/(57-80) 188/80     Weight: (!) 141 kg (310 lb 13.6 oz)  Body mass index is 42.16  kg/m².    Intake/Output Summary (Last 24 hours) at 7/1/2024 1105  Last data filed at 7/1/2024 0400  Gross per 24 hour   Intake 490 ml   Output 850 ml   Net -360 ml         Physical Exam  Vitals reviewed.   Constitutional:       General: He is not in acute distress.     Appearance: Normal appearance. He is normal weight. He is not ill-appearing.   HENT:      Head: Normocephalic and atraumatic.      Nose: Nose normal.      Mouth/Throat:      Mouth: Mucous membranes are moist.      Pharynx: Oropharynx is clear.   Eyes:      General: No scleral icterus.     Extraocular Movements: Extraocular movements intact.      Conjunctiva/sclera: Conjunctivae normal.      Pupils: Pupils are equal, round, and reactive to light.   Cardiovascular:      Rate and Rhythm: Normal rate and regular rhythm.      Pulses: Normal pulses.      Heart sounds: Normal heart sounds. No murmur heard.     No gallop.   Pulmonary:      Effort: Pulmonary effort is normal. No respiratory distress.      Breath sounds: Normal breath sounds. No wheezing, rhonchi or rales.   Chest:      Chest wall: No tenderness.   Abdominal:      General: Abdomen is flat. There is no distension.      Palpations: Abdomen is soft.      Tenderness: There is no abdominal tenderness.   Musculoskeletal:         General: No swelling or tenderness.      Cervical back: Normal range of motion and neck supple. No rigidity or tenderness.      Right lower leg: No edema.      Left lower leg: No edema.   Skin:     General: Skin is warm and dry.      Findings: Lesion present.   Neurological:      General: No focal deficit present.      Mental Status: He is alert and oriented to person, place, and time. Mental status is at baseline.      Motor: No weakness.   Psychiatric:         Mood and Affect: Mood normal.         Behavior: Behavior normal.         Thought Content: Thought content normal.             Significant Labs: All pertinent labs within the past 24 hours have been  reviewed.    Significant Imaging: I have reviewed all pertinent imaging results/findings within the past 24 hours.    Assessment/Plan:      * Acute renal failure superimposed on chronic kidney disease  Appreciate input from nephrology  In setting of acute UTI and IVVD  Resumed gentle hydration on Zosyn/vanc but those have been stopped  He did not require oral NaHCO3  U/s is w/o obstruction, masses, etc        MRSA infection    As per ID    Pyogenic arthritis of left shoulder region  As above      Abscess of left shoulder  As discussed under limb swelling, status post multiple washouts on 06/21/2024, 06/24/2024, 06/26/2024,6/27/24  Need antibiotics until 08/03/2024  Wound VAC in place.  Continue antibiotics.  Following cultures.  Pending possible LTAC placement.    Osteomyelitis of toe  S/p bedside I&D, deep tissue Cx growing MRSA, S to clinda  ID and podiatry following, appreciate help  Appreciate input all consultants  Podiatry plans amputation today  Need antibiotics until 08/03/2024      Skin tear of left upper extremity  Local care, healing  Doubt this is a portal of entry    Swelling of limb, left  U/s w/o DVT  Appreciate help from Dr Bernal, ID, wound care, podiatry et al  Pt is already on OAC, holding for now pending shoulder procedure and toe amputation  Arm is elevated on pillow  Not red/painful, no compartment syndrome  D/w pt/wife      Debility  Multifactorial process  Pt not able to manage his own care and wife cannot adequately care for him at this time  PT/OT  SNF or possibly LTAC placement        Severe sepsis  In setting of acute UTI, MRSA OM right 2nd toe, and infected left shoulder hardware  BCX negative  Urine culture negative  Will need antibiotics until 08/03/2024 per ID    Hyperkalemia  Resolved  in setting of acute on chronic renal failure  Lokelma given x 1 on admit  Avoid ACE/ARB, K+ replacement  tele          Atrial fibrillation with rapid ventricular response  Chronic AF w/ acute RVR,  resolved  Required dilt drip on admission but off since 6/15  Tele some PVC/bigem/trigem  TSH 1.8 in March  Monitor lytes  No etoh/drug use  TTE from March 2024:    Left Ventricle: The left ventricle is normal in size. Normal wall thickness. Mild global hypokinesis present. There is mildly reduced systolic function with a visually estimated ejection fraction of 45 - 50%. There is normal diastolic function.    Right Ventricle: Normal right ventricular cavity size. Wall thickness is normal. Right ventricle wall motion  is normal. Systolic function is normal.    Left Atrium: Left atrium is moderately dilated.    IVC/SVC: Normal venous pressure at 3 mmHg.  OAC has been on hold d/t possible need for shoulder surgery - has been on prophylaxis dose of LovenoxResume Eliquis as soon as feasible postop      History of Guillain-[place] syndrome  No acute issues  However, pt has impaired mobility and is acutely weakened from his sepsis/UTI/AF RVR  PT/OT working w/ pt  Wife cannot manage his care at home currently  SNF assessment underway, may need LTAC         Type 2 diabetes mellitus  A1c 6.3%  SSI      Hypertension  Home meds as appropriate    MARA (obstructive sleep apnea)  Continue CPAP HS and w/ naps      Morbid obesity  Body mass index is 42.16 kg/m².  Morbid obesity complicates all aspects of disease management from diagnostic modalities to treatment  Weight loss encouraged and health benefits explained to patient  He has been working on weight loss at home w/ reduced caloric intake          VTE Risk Mitigation (From admission, onward)           Ordered     Reason for No Pharmacological VTE Prophylaxis  Once        Question:  Reasons:  Answer:  Already adequately anticoagulated on oral Anticoagulants    06/14/24 1438     IP VTE HIGH RISK PATIENT  Once         06/14/24 1438     Place sequential compression device  Until discontinued         06/14/24 1438                    Discharge Planning   KAMILA: 7/3/2024     Code Status:  Full Code   Is the patient medically ready for discharge?:     Reason for patient still in hospital (select all that apply): Patient trending condition  Discharge Plan A: Long-term acute care facility (LTAC)                  Rosi Vo MD, MD  Department of Hospital Medicine   [facility]

## 2024-07-01 NOTE — TRANSFER OF CARE
Anesthesia Transfer of Care Note    Patient: Roosevelt Moser    Procedure(s) Performed: Procedure(s) (LRB):  AMPUTATION, TOE (Right)    Patient location: PACU    Anesthesia Type: MAC    Transport from OR: Transported from OR on room air with adequate spontaneous ventilation    Post pain: adequate analgesia    Post assessment: no apparent anesthetic complications and tolerated procedure well    Post vital signs: stable    Level of consciousness: awake, alert and oriented    Nausea/Vomiting: no nausea/vomiting    Complications: none    Transfer of care protocol was followed    Last vitals: Visit Vitals  BP (!) 188/80   Pulse 102   Temp 36.5 °C (97.7 °F)   Resp 18   Ht 6' (1.829 m)   Wt (!) 141 kg (310 lb 13.6 oz)   SpO2 95%   BMI 42.16 kg/m²

## 2024-07-01 NOTE — ANESTHESIA PREPROCEDURE EVALUATION
07/01/2024  Roosevelt Moser is a 73 y.o., male.      Pre-op Assessment    I have reviewed the Patient Summary Reports.     I have reviewed the Nursing Notes. I have reviewed the NPO Status.   I have reviewed the Medications.     Review of Systems  Anesthesia Hx:  No problems with previous Anesthesia                Social:  Non-Smoker       Hematology/Oncology:                   Hematology Comments: Sepsis                      Cardiovascular:     Hypertension, well controlled    Dysrhythmias (w/RVR) atrial fibrillation      hyperlipidemia                       Hypertension     Atrial Fibrillation     Pulmonary:        Sleep Apnea     Obstructive Sleep Apnea (MARA).           Renal/:  Chronic Renal Disease, ARF, CKD        Kidney Function/Disease             Musculoskeletal:  Arthritis   Skin ulcer of second toe of right foot      Arthritis          Neurological:    Neuromuscular Disease,       Guillian [place]    Arthritis  Peripheral Neuropathy                        Neuromuscular Disease   Endocrine:  Diabetes, well controlled, type 2    Diabetes                    Morbid Obesity / BMI > 40      Physical Exam  General: Well nourished, Cooperative, Alert and Oriented    Airway:  Mallampati: II   Mouth Opening: Normal  TM Distance: Normal  Neck ROM: Normal ROM    Dental:  Intact    Chest/Lungs:  Normal Respiratory Rate    Heart:  Rate: Normal      Anesthesia Plan  Type of Anesthesia, risks & benefits discussed:    Anesthesia Type: MAC  Intra-op Monitoring Plan: Standard ASA Monitors  Post Op Pain Control Plan: multimodal analgesia  Induction:  IV  Informed Consent: Informed consent signed with the Patient and all parties understand the risks and agree with anesthesia plan.  All questions answered.   ASA Score: 3    Ready For Surgery From Anesthesia Perspective.     .

## 2024-07-01 NOTE — PLAN OF CARE
Pt to OR today for toe amputation. DC plan remains LTAC vs SNF. Pending [employer] auth. Hopeful for DC this week       07/01/24 1055   Discharge Reassessment   Assessment Type Discharge Planning Reassessment   Did the patient's condition or plan change since previous assessment? Yes   Discharge Plan discussed with: Spouse/sig other;Patient   Communicated KAMILA with patient/caregiver Yes   Discharge Plan A Long-term acute care facility (LTAC)   Discharge Plan B Skilled Nursing Facility   DME Needed Upon Discharge  none   Transition of Care Barriers None   Why the patient remains in the hospital Requires continued medical care   Post-Acute Status   Post-Acute Authorization Placement   Post-Acute Placement Status Pending payor review/awaiting authorization (if required)

## 2024-07-01 NOTE — ASSESSMENT & PLAN NOTE
History of left proximal humerus fracture with open reduction internal fixation.  Status post hardware removal with multiple irrigation and debridements.  Continue with antibiotics per ID direction.  We will ultimately need total shoulder arthroplasty at some point in the future to restore function of the left glenohumeral joint.

## 2024-07-01 NOTE — ASSESSMENT & PLAN NOTE
Appreciate input from nephrology  In setting of acute UTI and IVVD  Resumed gentle hydration on Zosyn/vanc but those have been stopped  He did not require oral NaHCO3  U/s is w/o obstruction, masses, etc

## 2024-07-01 NOTE — PT/OT/SLP PROGRESS
Physical Therapy      Patient Name:  Roosevelt Moser   MRN:  [id number]    Patient not seen today secondary to Pt preparing to depart unit to OR. Will follow-up 7/2/24.

## 2024-07-01 NOTE — ASSESSMENT & PLAN NOTE
S/p bedside I&D, deep tissue Cx growing MRSA, S to clinda  ID and podiatry following, appreciate help  Appreciate input all consultants  Podiatry plans amputation today  Need antibiotics until 08/03/2024

## 2024-07-01 NOTE — ASSESSMENT & PLAN NOTE
Multifactorial process  Pt not able to manage his own care and wife cannot adequately care for him at this time  PT/OT  SNF or possibly LTAC placement

## 2024-07-01 NOTE — PLAN OF CARE
Patient without gown and assisted to place, also patient was repostitioned for comfort. Placed swollen left hand elevated on pillow. BLE ankles elevated to aleviate ankle pressure. Plan of care reviewed, wife at side, patient without any difficulties

## 2024-07-02 LAB
ALBUMIN SERPL BCP-MCNC: 1.6 G/DL (ref 3.5–5.2)
ALP SERPL-CCNC: 143 U/L (ref 55–135)
ALT SERPL W/O P-5'-P-CCNC: 18 U/L (ref 10–44)
ANION GAP SERPL CALC-SCNC: 14 MMOL/L (ref 8–16)
AST SERPL-CCNC: 26 U/L (ref 10–40)
BASOPHILS # BLD AUTO: 0.16 K/UL (ref 0–0.2)
BASOPHILS NFR BLD: 1.1 % (ref 0–1.9)
BILIRUB SERPL-MCNC: 0.3 MG/DL (ref 0.1–1)
BUN SERPL-MCNC: 46 MG/DL (ref 8–23)
CALCIUM SERPL-MCNC: 9.2 MG/DL (ref 8.7–10.5)
CHLORIDE SERPL-SCNC: 107 MMOL/L (ref 95–110)
CO2 SERPL-SCNC: 24 MMOL/L (ref 23–29)
CREAT SERPL-MCNC: 1.2 MG/DL (ref 0.5–1.4)
DIFFERENTIAL METHOD BLD: ABNORMAL
EOSINOPHIL # BLD AUTO: 0.7 K/UL (ref 0–0.5)
EOSINOPHIL NFR BLD: 4.9 % (ref 0–8)
ERYTHROCYTE [DISTWIDTH] IN BLOOD BY AUTOMATED COUNT: 18.2 % (ref 11.5–14.5)
EST. GFR  (NO RACE VARIABLE): >60 ML/MIN/1.73 M^2
GLUCOSE SERPL-MCNC: 201 MG/DL (ref 70–110)
HCT VFR BLD AUTO: 29.7 % (ref 40–54)
HGB BLD-MCNC: 9 G/DL (ref 14–18)
IMM GRANULOCYTES # BLD AUTO: 0.27 K/UL (ref 0–0.04)
IMM GRANULOCYTES NFR BLD AUTO: 1.9 % (ref 0–0.5)
LYMPHOCYTES # BLD AUTO: 2.4 K/UL (ref 1–4.8)
LYMPHOCYTES NFR BLD: 16.4 % (ref 18–48)
MAGNESIUM SERPL-MCNC: 1.6 MG/DL (ref 1.6–2.6)
MCH RBC QN AUTO: 25.4 PG (ref 27–31)
MCHC RBC AUTO-ENTMCNC: 30.3 G/DL (ref 32–36)
MCV RBC AUTO: 84 FL (ref 82–98)
MONOCYTES # BLD AUTO: 1.8 K/UL (ref 0.3–1)
MONOCYTES NFR BLD: 12.2 % (ref 4–15)
NEUTROPHILS # BLD AUTO: 9.1 K/UL (ref 1.8–7.7)
NEUTROPHILS NFR BLD: 63.5 % (ref 38–73)
NRBC BLD-RTO: 0 /100 WBC
PLATELET # BLD AUTO: 489 K/UL (ref 150–450)
PMV BLD AUTO: 11.5 FL (ref 9.2–12.9)
POCT GLUCOSE: 179 MG/DL (ref 70–110)
POCT GLUCOSE: 200 MG/DL (ref 70–110)
POCT GLUCOSE: 208 MG/DL (ref 70–110)
POCT GLUCOSE: 232 MG/DL (ref 70–110)
POCT GLUCOSE: 294 MG/DL (ref 70–110)
POTASSIUM SERPL-SCNC: 3.6 MMOL/L (ref 3.5–5.1)
PROT SERPL-MCNC: 7.1 G/DL (ref 6–8.4)
RBC # BLD AUTO: 3.54 M/UL (ref 4.6–6.2)
SODIUM SERPL-SCNC: 145 MMOL/L (ref 136–145)
WBC # BLD AUTO: 14.35 K/UL (ref 3.9–12.7)

## 2024-07-02 PROCEDURE — 25000003 PHARM REV CODE 250: Performed by: PODIATRIST

## 2024-07-02 PROCEDURE — 99232 SBSQ HOSP IP/OBS MODERATE 35: CPT | Mod: ,,, | Performed by: PODIATRIST

## 2024-07-02 PROCEDURE — 99233 SBSQ HOSP IP/OBS HIGH 50: CPT | Mod: ,,, | Performed by: INTERNAL MEDICINE

## 2024-07-02 PROCEDURE — 25000003 PHARM REV CODE 250: Performed by: ORTHOPAEDIC SURGERY

## 2024-07-02 PROCEDURE — 83735 ASSAY OF MAGNESIUM: CPT | Performed by: INTERNAL MEDICINE

## 2024-07-02 PROCEDURE — 94799 UNLISTED PULMONARY SVC/PX: CPT

## 2024-07-02 PROCEDURE — 63600175 PHARM REV CODE 636 W HCPCS: Performed by: INTERNAL MEDICINE

## 2024-07-02 PROCEDURE — 94761 N-INVAS EAR/PLS OXIMETRY MLT: CPT

## 2024-07-02 PROCEDURE — 99900035 HC TECH TIME PER 15 MIN (STAT)

## 2024-07-02 PROCEDURE — 97164 PT RE-EVAL EST PLAN CARE: CPT

## 2024-07-02 PROCEDURE — 85025 COMPLETE CBC W/AUTO DIFF WBC: CPT | Performed by: ORTHOPAEDIC SURGERY

## 2024-07-02 PROCEDURE — 25000003 PHARM REV CODE 250: Performed by: INTERNAL MEDICINE

## 2024-07-02 PROCEDURE — 11000001 HC ACUTE MED/SURG PRIVATE ROOM

## 2024-07-02 PROCEDURE — 97530 THERAPEUTIC ACTIVITIES: CPT

## 2024-07-02 PROCEDURE — 97110 THERAPEUTIC EXERCISES: CPT

## 2024-07-02 PROCEDURE — 36415 COLL VENOUS BLD VENIPUNCTURE: CPT | Performed by: INTERNAL MEDICINE

## 2024-07-02 PROCEDURE — 63600175 PHARM REV CODE 636 W HCPCS: Performed by: ORTHOPAEDIC SURGERY

## 2024-07-02 PROCEDURE — A4216 STERILE WATER/SALINE, 10 ML: HCPCS | Performed by: ORTHOPAEDIC SURGERY

## 2024-07-02 PROCEDURE — 80053 COMPREHEN METABOLIC PANEL: CPT | Performed by: ORTHOPAEDIC SURGERY

## 2024-07-02 PROCEDURE — 36415 COLL VENOUS BLD VENIPUNCTURE: CPT | Performed by: ORTHOPAEDIC SURGERY

## 2024-07-02 RX ORDER — SODIUM,POTASSIUM PHOSPHATES 280-250MG
2 POWDER IN PACKET (EA) ORAL
Status: DISCONTINUED | OUTPATIENT
Start: 2024-07-02 | End: 2024-07-07 | Stop reason: HOSPADM

## 2024-07-02 RX ORDER — MAGNESIUM SULFATE HEPTAHYDRATE 40 MG/ML
2 INJECTION, SOLUTION INTRAVENOUS DAILY PRN
Status: DISCONTINUED | OUTPATIENT
Start: 2024-07-02 | End: 2024-07-07 | Stop reason: HOSPADM

## 2024-07-02 RX ORDER — MAGNESIUM SULFATE HEPTAHYDRATE 40 MG/ML
2 INJECTION, SOLUTION INTRAVENOUS ONCE
Status: COMPLETED | OUTPATIENT
Start: 2024-07-02 | End: 2024-07-02

## 2024-07-02 RX ORDER — FUROSEMIDE 10 MG/ML
20 INJECTION INTRAMUSCULAR; INTRAVENOUS EVERY 12 HOURS
Status: DISCONTINUED | OUTPATIENT
Start: 2024-07-02 | End: 2024-07-04

## 2024-07-02 RX ADMIN — LIDOCAINE 5% 1 PATCH: 700 PATCH TOPICAL at 05:07

## 2024-07-02 RX ADMIN — FUROSEMIDE 20 MG: 10 INJECTION, SOLUTION INTRAMUSCULAR; INTRAVENOUS at 08:07

## 2024-07-02 RX ADMIN — INSULIN ASPART 4 UNITS: 100 INJECTION, SOLUTION INTRAVENOUS; SUBCUTANEOUS at 05:07

## 2024-07-02 RX ADMIN — LIDOCAINE HYDROCHLORIDE 10 ML: 20 SOLUTION ORAL; TOPICAL at 02:07

## 2024-07-02 RX ADMIN — LIDOCAINE HYDROCHLORIDE 10 ML: 20 SOLUTION ORAL; TOPICAL at 09:07

## 2024-07-02 RX ADMIN — FERROUS SULFATE TAB 325 MG (65 MG ELEMENTAL FE) 1 EACH: 325 (65 FE) TAB at 09:07

## 2024-07-02 RX ADMIN — INSULIN ASPART 6 UNITS: 100 INJECTION, SOLUTION INTRAVENOUS; SUBCUTANEOUS at 11:07

## 2024-07-02 RX ADMIN — INSULIN ASPART 2 UNITS: 100 INJECTION, SOLUTION INTRAVENOUS; SUBCUTANEOUS at 07:07

## 2024-07-02 RX ADMIN — SODIUM CHLORIDE, PRESERVATIVE FREE 10 ML: 5 INJECTION INTRAVENOUS at 11:07

## 2024-07-02 RX ADMIN — MAGNESIUM SULFATE HEPTAHYDRATE 2 G: 40 INJECTION, SOLUTION INTRAVENOUS at 11:07

## 2024-07-02 RX ADMIN — POTASSIUM CHLORIDE 20 MEQ: 1500 TABLET, EXTENDED RELEASE ORAL at 09:07

## 2024-07-02 RX ADMIN — HYDROCODONE BITARTRATE AND ACETAMINOPHEN 1 TABLET: 10; 325 TABLET ORAL at 09:07

## 2024-07-02 RX ADMIN — POTASSIUM CHLORIDE 20 MEQ: 1500 TABLET, EXTENDED RELEASE ORAL at 08:07

## 2024-07-02 RX ADMIN — DAPTOMYCIN 800 MG: 500 INJECTION, POWDER, LYOPHILIZED, FOR SOLUTION INTRAVENOUS at 05:07

## 2024-07-02 RX ADMIN — SODIUM CHLORIDE, PRESERVATIVE FREE 10 ML: 5 INJECTION INTRAVENOUS at 05:07

## 2024-07-02 RX ADMIN — METOPROLOL SUCCINATE 100 MG: 50 TABLET, FILM COATED, EXTENDED RELEASE ORAL at 08:07

## 2024-07-02 RX ADMIN — LIDOCAINE HYDROCHLORIDE 10 ML: 20 SOLUTION ORAL; TOPICAL at 05:07

## 2024-07-02 RX ADMIN — INSULIN GLARGINE 15 UNITS: 100 INJECTION, SOLUTION SUBCUTANEOUS at 09:07

## 2024-07-02 RX ADMIN — INSULIN ASPART 2 UNITS: 100 INJECTION, SOLUTION INTRAVENOUS; SUBCUTANEOUS at 08:07

## 2024-07-02 RX ADMIN — CEFTRIAXONE SODIUM 2 G: 2 INJECTION, POWDER, FOR SOLUTION INTRAMUSCULAR; INTRAVENOUS at 09:07

## 2024-07-02 NOTE — PROGRESS NOTES
Interval note 7/2:  Visited patient and his wife yesterday and today during meal rounds.  He had a right toe amputation done 7/1; feeling better today but appetite remains fair.  His wife is diligent with getting him to drink the Catarino, Prosource and Novasource renal nutritional supplements 2-3 x daily.  He is still awaiting LTAC placement.  Continue to follow weekly.    RD was not consulted but was identified through LOS.     Pt admitted with MARBIN on CKD. His medical Hx includes:  PMH includes: atrial fibrillation on eliquis, HTN, DMII, obstructive sleep apnea, HLD, ORIF of left humerus and guillian-[place] who presented to the emergency room for weakness and multiple falls.  He had washout/removal of infected hardware on 6/27 pyogenic arthritis of left shoulder.  In addition, he is morbidly obese and has osteomyelitis of toe and skin tear to left upper arm.     Diet Rx:  Renal Consistent carbohydrate supplemented with Novasource Renal all meals, ProSource protein supplement ( substituted for Proteinex) and Catarino BID to promote wound healing.   Intake has been ranging 50-75% of meals/nourishments  Current wt:  310#; BMI = 42     MedS: IV rocephin and cubicin; epoetin, ferrous sulfate; insulin glargine; ss insulin aspart; toprol and magic mouthwash.  Pt will need 6 weeks of IV abx.  Labs:     Latest Reference Range & Units Most Recent   Hemoglobin A1C External 4.5 - 6.2 % 6.3 (H)  6/14/24 15:39   (H): Data is abnormally high    Latest Reference Range & Units Most Recent   Hemoglobin 14.0 - 18.0 g/dL 8.3 (L)  6/28/24 04:03   Hematocrit 40.0 - 54.0 % 26.7 (L)  6/28/24 04:03   (L): Data is abnormally low    Latest Reference Range & Units Most Recent   Albumin 3.5 - 5.2 g/dL 1.4 (L)  6/28/24 04:03   Prealbumin 20 - 43 mg/dL 5 (L)  6/18/24 02:33   (L): Data is abnormally low    Latest Reference Range & Units Most Recent   BUN 8 - 23 mg/dL 48 (H)  6/28/24 04:03   Creatinine 0.5 - 1.4 mg/dL 1.4  6/28/24 04:03   (H): Data is  abnormally high     DC plans are to [facility]AN LTAC over the weekend for completion of antibiotics, wound care and Rehab services.  Recommend continuing diet Rx with current nutritional supplements. Will F/U on 7/1 if still admitted.

## 2024-07-02 NOTE — PROGRESS NOTES
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    Interval History:      Review of patient's allergies indicates:  No Known Allergies    Current Facility-Administered Medications   Medication    (Magic mouthwash) 1:1:1 diphenhydrAMINE(Benadryl) 12.5mg/5ml liq, aluminum & magnesium hydroxide-simethicone (Maalox), LIDOcaine viscous 2%    albuterol-ipratropium 2.5 mg-0.5 mg/3 mL nebulizer solution 3 mL    aluminum & magnesium hydroxide-simethicone 400-400-40 mg/5 mL suspension 15 mL    cefTRIAXone (ROCEPHIN) 2 g in dextrose 5 % in water (D5W) 100 mL IVPB (MB+)    DAPTOmycin (CUBICIN) 800 mg in 0.9% NaCl SolP 50 mL IVPB    dextrose 10% bolus 125 mL 125 mL    dextrose 10% bolus 250 mL 250 mL    electrolyte-S (ISOLYTE)    electrolyte-S (ISOLYTE)    epoetin leonel-epbx injection 14,100 Units    fentaNYL 50 mcg/mL injection 25 mcg    ferrous sulfate tablet 1 each    glucagon (human recombinant) injection 1 mg    glucose chewable tablet 16 g    glucose chewable tablet 24 g    HYDROcodone-acetaminophen  mg per tablet 1 tablet    HYDROcodone-acetaminophen 5-325 mg per tablet 1 tablet    insulin aspart U-100 pen 0-10 Units    insulin glargine U-100 (Lantus) pen 15 Units    LIDOcaine 5 % patch 1 patch    metoprolol injection 5 mg    metoprolol succinate (TOPROL-XL) 24 hr tablet 100 mg    naloxone 0.4 mg/mL injection 0.02 mg    ondansetron disintegrating tablet 8 mg    ondansetron injection 4 mg    oxyCODONE immediate release tablet 5 mg     potassium chloride SA CR tablet 20 mEq    promethazine tablet 25 mg    simethicone chewable tablet 80 mg    sodium chloride 0.9% flush 10 mL    sodium chloride 0.9% flush 10 mL    And    sodium chloride 0.9% flush 10 mL    sodium chloride 0.9% flush 10 mL    sodium chloride 0.9% flush 10 mL    traMADoL tablet 50 mg     Objective:     Vital Signs (Most Recent):  Temp: 97.3 °F (36.3 °C) (07/02/24 0353)  Pulse: (!) 122 (07/02/24 0353)  Resp: 18 (07/02/24 0353)  BP: (!) 152/71 (07/02/24 0353)  SpO2: (!) 94 % (07/02/24 0353) Vital Signs (24h Range):  Temp:  [96.8 °F (36 °C)-98.1 °F (36.7 °C)] 97.3 °F (36.3 °C)  Pulse:  [] 122  Resp:  [14-28] 18  SpO2:  [92 %-98 %] 94 %  BP: (110-188)/(51-80) 152/71     Weight: (!) 141 kg (310 lb 13.6 oz)  Height: 6' (182.9 cm)  Body mass index is 42.16 kg/m².      Intake/Output Summary (Last 24 hours) at 7/2/2024 0603  Last data filed at 7/1/2024 1345  Gross per 24 hour   Intake 170 ml   Output 650 ml   Net -480 ml        General    Constitutional: He appears well-developed and well-nourished.             Left Shoulder Exam     Inspection/Observation   Swelling: absent  Bruising: absent     Comments:  Dressing to left shoulder continues to be clean dry and intact.  No surrounding erythema or ecchymosis.  Continues with the limitations in range of motion due to the known fracture nonunion and glenohumeral arthrosis.         Significant Labs: None    Significant Imaging: None  Assessment/Plan:     Abscess of left shoulder  Hospital day 18:    Status post repeat irrigation and debridement (3rd washout) and hardware removal of left shoulder for suspected infection.  Wound VAC removal    Patient is approximately 3 months status post open reduction internal fixation of a left proximal humerus fracture which lost reduction.    1. Patient is doing well postoperatively.  His pain is well managed.    2.  Intraoperative cultures from irrigation and debridement 6/27, gram stain showed few  WBCs, no organisms.  Aerobic preliminary report shows no growth and anaerobic cultures no growth.      Preoperative and intraoperative cultures from 06/21 and 6/24 showed many WBCs but have demonstrated no bacterial growth to date.  As well as cultures from 06/19 showed no growth.      3.  Wound VAC was discontinued intraoperatively 6/27  4.  Sling as needed for comfort of the left upper extremity. Patient can do very light activity with the left upper extremity focusing more on elbow, hand and wrist.  Would not do dedicated therapy for the shoulder.  Patient could weightbear through the extremity if needed with a walker.  5.  No plan for additional orthopedic intervention for the left shoulder at this time  6. Consult case management as the patient will likely benefit from placement for IV antibiotics as well as his generalized deconditioned state.  7. Continue Infectious Disease recommendations but no laboratory findings suggestive of left shoulder infection.  8.  Stable from an orthopedic standpoint.  Stable for transfer or discharge from an orthopedic standpoint.    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      Swelling of limb, left  History of left proximal humerus fracture with open reduction internal fixation.            Hansel Tate PA-C  Orthopedics  [facility]/Surg

## 2024-07-02 NOTE — PT/OT/SLP RE-EVAL
Physical Therapy Re-evaluation    Patient Name:  Roosevelt Moser   MRN:  [id number]    Recommendations:     Discharge Recommendations: Moderate Intensity Therapy  Discharge Equipment Recommendations: none   Barriers to discharge: Decreased caregiver support    Assessment:     Roosevelt Moser is a 73 y.o. male admitted with a medical diagnosis of Acute renal failure superimposed on chronic kidney disease.  He presents with the following impairments/functional limitations: weakness, impaired endurance, impaired functional mobility, gait instability, impaired balance, decreased upper extremity function, decreased lower extremity function, decreased ROM, impaired cardiopulmonary response to activity, orthopedic precautions .    Pt seen supine in bed, alert and agreeable to PT. LUE now allowed WBAT and ok to use RW. RLE PWB with cam boot. Pt seen for thera ex in supine. Max assist to sit EOB and maintaining balance. Sit to stand with RW max assist x2 with partial standing. Pt progressed to OOB chair max assist x2/3 for safety via squat pivot. Pt repositioned in chair and reclined.  Pt to benefit from mod intensity therapies.    Rehab Prognosis:  fair; patient would benefit from acute skilled PT services to address these deficits and reach maximum level of function.      Recent Surgery: Procedure(s) (LRB):  AMPUTATION, TOE (Right) 1 Day Post-Op    Plan:     During this hospitalization, patient to be seen 6 x/week to address the above listed problems via therapeutic activities, therapeutic exercises, neuromuscular re-education  Plan of Care Expires:  07/15/24  Plan of Care Reviewed with: patient, spouse    Subjective     Communicated with nurse Cummins prior to session.  Patient found HOB elevated with bed alarm, PureWick, PICC line, peripheral IV upon PT entry to room, agreeable to evaluation.      Chief Complaint: gets SOB/anxious when flat  Patient comments/goals: none  Pain/Comfort:  Pain Rating 1: 0/10    Patients  cultural, spiritual, Church conflicts given the current situation: no      Objective:     Patient found with: bed alarm, PureWick, PICC line, peripheral IV     General Precautions: Standard, fall, contact  Orthopedic Precautions: RLE partial weight bearing, LUE weight bearing as tolerated  Braces:  (cam boot R)  Respiratory Status: Room air    Exams:  Postural Exam:  Patient presented with the following abnormalities:    -       Rounded shoulders  -       Forward head  -       BMI 42.16  RLE ROM: WFL except R cam walker boot  RLE Strength: Deficits: 3/5  LLE ROM: WFL  LLE Strength: Deficits: 3-/5    Functional Mobility:  Bed Mobility:     Rolling Left:  maximal assistance  Rolling Right: maximal assistance  Scooting: maximal assistance and of 2 persons  Supine to Sit: maximal assistance and of 2 persons  Transfers:     Sit to Stand:  maximal assistance and of 2 persons with rolling walker  Bed to Chair: maximal assistance and of 2/3 persons with  no AD  using  Squat Pivot    AM-PAC 6 CLICK MOBILITY  Total Score:10       Treatment and Education:   Patient was educated on the importance of OOB activity and functional mobility to negate negative effects of prolonged bed rest during hospitalization, safe transfers and ambulation, and D/C planning   Thera ex with AP,QS/GS,assisted NHS and SLR  Pt soiled with urine and diaper changed with BM total assist  EOB sitting assist x2 and OOB chair assist x2 and another person holding chair steady    Patient left up in chair with all lines intact, call button in reach, chair alarm on, and spouse present.    GOALS:   Multidisciplinary Problems       Physical Therapy Goals          Problem: Physical Therapy    Goal Priority Disciplines Outcome Goal Variances Interventions   Physical Therapy Goal     PT, PT/OT Progressing     Description: Goals to be met by: 7/15/24     Patient will increase functional independence with mobility by performin. Supine to sit with mod  assist  2. Sit to stand transfer with mod assist  3. Bed to chair transfer with mod assist using Rolling Walker  4. Gait  x 10 feet with moderate Assistance using Rolling Walker.   5. 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    Hyperlipidemia     Hypertension 2016    Sleep apnea        Past Surgical History:   Procedure Laterality Date    ARTHROTOMY OF SHOULDER Left 6/21/2024    Procedure: ARTHROTOMY, SHOULDER;  Surgeon: Roman Paulson MD;  Location: [facility];  Service: Orthopedics;  Laterality: Left;    IRRIGATION AND DEBRIDEMENT OF UPPER EXTREMITY Left 6/24/2024    Procedure: IRRIGATION AND DEBRIDEMENT, UPPER EXTREMITY;  Surgeon: Nathan Cooepr MD;  Location: [facility] OR;  Service: Orthopedics;  Laterality: Left;    IRRIGATION AND DEBRIDEMENT OF UPPER EXTREMITY Left 6/27/2024    Procedure: IRRIGATION AND DEBRIDEMENT, UPPER EXTREMITY;  Surgeon: Nathan Cooper MD;  Location: [facility] OR;  Service: Orthopedics;  Laterality: Left;    OPEN REDUCTION AND INTERNAL FIXATION (ORIF) OF FRACTURE OF PROXIMAL HUMERUS Left 3/25/2024    Procedure: ORIF, FRACTURE, HUMERUS, PROXIMAL/IM HANNA, LEFT;  Surgeon: Nathan Cooper MD;  Location: [facility] OR;  Service: Orthopedics;  Laterality: Left;  Accumed, Synthes Small Frag set Avelino verified 3/22/24 ark    TOE AMPUTATION Right 7/1/2024    Procedure: AMPUTATION, TOE;  Surgeon: Stevie Zhu DPM;  Location: [facility];  Service: Podiatry;  Laterality: Right;       Time Tracking:     PT Received On: 07/02/24  PT Start Time: 1107     PT Stop Time: 1157  PT Total Time (min): 50 min     Billable Minutes: Re-eval 10, Therapeutic Activity 30, and Therapeutic Exercise 10      07/02/2024

## 2024-07-02 NOTE — PT/OT/SLP PROGRESS
Occupational Therapy      Patient Name:  Roosevelt Moser   MRN:  [id number]    Patient not seen today secondary to Increased agitation. Will follow-up.    7/2/2024

## 2024-07-02 NOTE — PLAN OF CARE
Problem: Physical Therapy  Goal: Physical Therapy Goal  Description: Goals to be met by: 7/15/24     Patient will increase functional independence with mobility by performin. Supine to sit with mod assist  2. Sit to stand transfer with mod assist  3. Bed to chair transfer with mod assist using Rolling Walker  4. Gait  x 10 feet with moderate Assistance using Rolling Walker.   5. Lower extremity exercise program x20 reps    R cam walker boot/PWB and LUE WBAT with walker      Outcome: Progressing   PT re eval post R second toe amputation with cam boot/PWB.  L shoulder septic joint WBAT with walker.  Pt requiring max assist x2 to sit EOB and assist x2-3 for squat pivot and OOB chair. Mod intensity

## 2024-07-02 NOTE — ASSESSMENT & PLAN NOTE
Patient can weight bear only while wearing the camboot, until the sutures are removed in approximately 2- 3 weeks.    Recommend dressing changes to consist of adaptic followed by gauze followed by lightly wrapped kerlix- dressing only needs to be changed every 3-4 days.    Catarino and protein drinks daily.

## 2024-07-02 NOTE — SUBJECTIVE & OBJECTIVE
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    Interval History:      Review of patient's allergies indicates:  No Known Allergies    Current Facility-Administered Medications   Medication    (Magic mouthwash) 1:1:1 diphenhydrAMINE(Benadryl) 12.5mg/5ml liq, aluminum & magnesium hydroxide-simethicone (Maalox), LIDOcaine viscous 2%    albuterol-ipratropium 2.5 mg-0.5 mg/3 mL nebulizer solution 3 mL    aluminum & magnesium hydroxide-simethicone 400-400-40 mg/5 mL suspension 15 mL    cefTRIAXone (ROCEPHIN) 2 g in dextrose 5 % in water (D5W) 100 mL IVPB (MB+)    DAPTOmycin (CUBICIN) 800 mg in 0.9% NaCl SolP 50 mL IVPB    dextrose 10% bolus 125 mL 125 mL    dextrose 10% bolus 250 mL 250 mL    electrolyte-S (ISOLYTE)    electrolyte-S (ISOLYTE)    epoetin leonel-epbx injection 14,100 Units    fentaNYL 50 mcg/mL injection 25 mcg    ferrous sulfate tablet 1 each    glucagon (human recombinant) injection 1 mg    glucose chewable tablet 16 g    glucose chewable tablet 24 g    HYDROcodone-acetaminophen  mg per tablet 1 tablet    HYDROcodone-acetaminophen 5-325 mg per tablet 1 tablet    insulin aspart U-100 pen 0-10 Units    insulin glargine U-100 (Lantus) pen 15 Units    LIDOcaine 5 % patch 1 patch    metoprolol injection 5 mg    metoprolol succinate (TOPROL-XL) 24 hr tablet 100 mg    naloxone 0.4 mg/mL injection 0.02 mg    ondansetron disintegrating tablet 8 mg    ondansetron injection 4 mg    oxyCODONE immediate release tablet 5 mg    potassium chloride SA CR tablet 20 mEq    promethazine tablet 25 mg    simethicone chewable tablet 80 mg    sodium chloride 0.9% flush 10 mL    sodium chloride 0.9% flush 10 mL    And    sodium chloride 0.9% flush 10 mL    sodium chloride 0.9% flush 10 mL    sodium chloride 0.9% flush 10 mL    traMADoL tablet 50 mg     Objective:     Vital Signs (Most Recent):  Temp:  97.3 °F (36.3 °C) (07/02/24 0353)  Pulse: (!) 122 (07/02/24 0353)  Resp: 18 (07/02/24 0353)  BP: (!) 152/71 (07/02/24 0353)  SpO2: (!) 94 % (07/02/24 0353) Vital Signs (24h Range):  Temp:  [96.8 °F (36 °C)-98.1 °F (36.7 °C)] 97.3 °F (36.3 °C)  Pulse:  [] 122  Resp:  [14-28] 18  SpO2:  [92 %-98 %] 94 %  BP: (110-188)/(51-80) 152/71     Weight: (!) 141 kg (310 lb 13.6 oz)  Height: 6' (182.9 cm)  Body mass index is 42.16 kg/m².      Intake/Output Summary (Last 24 hours) at 7/2/2024 0603  Last data filed at 7/1/2024 1345  Gross per 24 hour   Intake 170 ml   Output 650 ml   Net -480 ml        General    Constitutional: He appears well-developed and well-nourished.             Left Shoulder Exam     Inspection/Observation   Swelling: absent  Bruising: absent     Comments:  Dressing to left shoulder continues to be clean dry and intact.  No surrounding erythema or ecchymosis.  Continues with the limitations in range of motion due to the known fracture nonunion and glenohumeral arthrosis.         Significant Labs: None    Significant Imaging: None

## 2024-07-02 NOTE — PLAN OF CARE
"The patient refused to wear telemetry.  The patient continued to take off leads every time they were replaced.  Charge nurse notified.  The patient stated "I don't want that bull shit on me".  Provider aware.   "

## 2024-07-02 NOTE — PROGRESS NOTES
[facility]/Surg   Department of Infectious Disease  Progress Note        PATIENT NAME: Roosevelt Moser  MRN: [id number]  TODAY'S DATE: 07/02/2024  ADMIT DATE: 6/14/2024  LOS: 18 days    CHIEF COMPLAINT: Weakness (Pt brought to ED via EMS with complaint of weakness and falling, pt advised EMS his urine is very dark.  )      PRINCIPLE PROBLEM: Acute renal failure superimposed on chronic kidney disease    INTERVAL HISTORY      06/19/2024: Seen and evaluated at bedside.  States left upper extremity pain better.  Having improved movement at the wrist and forearm.  Seen by Orthopedic surgery PA yesterday.  Notes reviewed.  Blood cultures remain negative.      06/20/2024:  Oral anticoagulants on hold to allow left shoulder arthrocentesis.  No other acute issues overnight.  Culture from right 3rd toe growing Staphylococcus aureus.  Blood culture remain negative.  Had aspiration left shoulder today that yielded purulent material.  Synovial fluid studies in progress.    06/21/2024:  Seen by Orthopedic surgery Service again yesterday.  For I and D today.  All cultures so far negative.  ASO titer normal.    06/29/2024:  Events of last 9 days noted.  He had I and D left shoulder with removal of humerus hardware 06/21/2024.  Had 2nd I&D 06/24/2024 and 3rd I&D 06/27/2024.  All cultures were negative with negative Gram stains.    06/30/2024:  No acute issues overnight.  Tolerating antibiotics okay.    07/02/2024:  He had right 2nd toe amputation yesterday 07/01/2024.  No other acute issues overnight.    Antibiotics (From admission, onward)      Start     Stop Route Frequency Ordered    06/24/24 1630  DAPTOmycin (CUBICIN) 800 mg in 0.9% NaCl SolP 50 mL IVPB         -- IV Every 24 hours (non-standard times) 06/24/24 1526    06/19/24 1045  cefTRIAXone (ROCEPHIN) 2 g in dextrose 5 % in water (D5W) 100 mL IVPB (MB+)         -- IV Every 24 hours (non-standard times) 06/19/24 0932    06/17/24 2100  mupirocin 2 % ointment          06/22/24 2059 Nasl 2 times daily 06/17/24 1544          Antifungals (From admission, onward)      Start     Stop Route Frequency Ordered    06/19/24 1045  clotrimazole megha 10 mg         06/29/24 0959 Oral 5 times daily 06/19/24 0932           Antivirals (From admission, onward)      None            ASSESSMENT and PLAN      1. Left upper extremity acute bacterial skin and skin structure infection with septic arthritis and humerus osteomyelitis.  He has had multiple I and D with removal of humerus hardware.  All cultures were negative most likely because of antibiotics received prior to surgery and cultures.  Continue current antibiotics.  Plan to treat for 6 weeks through 08/08/2024 for this indication.      2. Right 3rd toe osteomyelitis.  He previously received?  Dalbavancin x2 doses 1 month prior to hospitalization. Ulcer healed.  Antibiotics as above.    3. MRSA Right 2nd toe tip with possible early osteomyelitis of the distal tuft.  Toe was amputated 07/01/2024 as per patient's preference.      4. ARF.  Resolved.    5. Left humerus fracture status post ORIF.  Hardware has been removed this hospitalization.  CT 06/18/2024 did show malunion.  Management as per orthopedic surgeon.     6. Debility.  This is going to be a big challenge.  Patient of very motivated.  Long discussion with wife at bedside who recognizes this as well.  Hopefully, he will benefit from LTAC and maybe inpatient rehab post discharge.    7. Dyspnea.  Chest x-ray showed bilateral pleural effusion worse on the left.  TTE with normal EF.  BNP minimally elevated at 430.  Consider diuresis as per hospitalist.  Repeat chest x-ray.      RECOMMENDATIONS:    Continue current antibiotics for 8 weeks through 08/08/2024.  Check CBC, BNP, CRP, ESR, CPK q.week  Right 2nd toe amputation.  Management as per podiatrist   Check chest x-ray   And consider diuresis for bilateral pleural effusion.    Please send Epic secure chat with any questions.  Long  discussion with wife at bedside.  She is concern about his future ability to walk given poor motivation.    SUBJECTIVE    Roosevelt Moser is a 73 y.o. male with history of diabetes mellitus, hypertension and previous going bowel syndrome.  He fell on 03/04/2024 and sustained a displaced comminuted fracture of the left humerus.  Seen by orthopedic surgeon with plan for ORIF which was deferred because of new AFib.  Admitted 03/09/2024 for rate control and ultimately discharged back home 03/12/2024.  Later had left humerus ORIF 03/25/2024 as a day procedure and was discharged home.       According to his wife, he developed pain and swelling of the 3rd toe and was with an ulcer.  It appears an x-ray of the foot was unremarkable.  Received 2 doses of an antibiotic that I presume to be dalbavancin 1 week apart in early April.     In the last 2 weeks he has continued to decline.  He also has reduction in urine output.  Fell twice at home on 06/13/2024 and wife activated EMS and he was brought to the hospital.  Received IV fluid for low normal blood pressure.  Also noted to have MARBIN with creatinine 5.0 and WBC was 21 K.  UA was abnormal with> 100 WBC,> 100 RBC, 3+ LE and positive nitrite.  He has been managed conservatively for MARBIN and for dehydration.     X-ray of left shoulder and left upper extremity 06/17/2024 documented gas in soft tissue.  MRI right foot documented osteomyelitis of the 3rd toe.  I was asked to see to assist with his care.  ESR 95,  milligram/liter.     Antibiotics   Ceftriaxone:  06/14/2024-06/17/2024, 06/19/2024-  Zosyn:  06/17/2024-06/18/2024   Vancomycin:  06/18/2024-  Clindamycin:  06/18/2024-     Cultures   Blood culture 06/14/2024:  NGTD   Urine culture 06/14/2024: NGTD  Right second toe culture 06/18/2024: MRSA  Left shoulder synovial fluid culture 06/20/2024:  In progress    Review of Systems  Negative except as stated above in Interval History     OBJECTIVE   Temp:  [96.8 °F (36 °C)-98.7  °F (37.1 °C)] 98.7 °F (37.1 °C)  Pulse:  [] 85  Resp:  [14-28] 22  SpO2:  [92 %-98 %] 95 %  BP: (110-159)/(51-91) 140/91  Temp:  [96.8 °F (36 °C)-98.7 °F (37.1 °C)]   Temp: 98.7 °F (37.1 °C) (07/02/24 0812)  Pulse: 85 (07/02/24 0915)  Resp: (!) 22 (07/02/24 0957)  BP: (!) 140/91 (07/02/24 0812)  SpO2: 95 % (07/02/24 0915)    Intake/Output Summary (Last 24 hours) at 7/2/2024 1035  Last data filed at 7/2/2024 0639  Gross per 24 hour   Intake 170 ml   Output 1750 ml   Net -1580 ml       Physical Exam  General:  Obese elderly man lying quietly in bed.  Resting comfortably.   Right foot: Dressing in place e of motion left knee without effusion.  Respiratory:  On CPAP.  Clear to auscultation anteriorly   Left upper extremity:  Clean dry epic incisions on shoulder.  Resolved erythema of upper extremity resolving edema.  CVS: S1 and 2 heard   Abdomen: Full, soft, nontender, no palpable organomegaly   Skin:  No rash appreciated    VAD:  None  ISOLATION:  None    WOUNDS:  Abrasion left upper extremity.  Right 2nd toe ulcer    Significant Labs: All pertinent labs within the past 24 hours have been reviewed.    CBC LAST 7 DAYS  Recent Labs   Lab 06/26/24  0349 06/27/24  0424 06/28/24  0403 06/29/24  0520 06/30/24  0459 07/01/24  0523 07/02/24  0345   WBC 17.83* 17.46* 15.46* 16.36* 15.03* 13.54* 14.35*   RBC 3.14* 3.14* 3.20* 3.45* 3.28* 3.19* 3.54*   HGB 8.2* 8.2* 8.3* 8.7* 8.3* 8.2* 9.0*   HCT 25.8* 25.7* 26.7* 28.5* 27.6* 26.3* 29.7*   MCV 82 82 83 83 84 82 84   MCH 26.1* 26.1* 25.9* 25.2* 25.3* 25.7* 25.4*   MCHC 31.8* 31.9* 31.1* 30.5* 30.1* 31.2* 30.3*   RDW 18.1* 18.1* 17.9* 17.9* 18.0* 18.1* 18.2*   * 703* 654* 603* 620* 568* 489*   MPV 9.8 9.5 9.3 9.2 9.8 9.4 11.5   GRAN 65.1  11.6* 63.6  11.1* 59.9  9.3* 60.9  10.0* 64.2  9.7* 66.5  9.0* 63.5  9.1*   LYMPH 17.3*  3.1 18.0  3.1 20.1  3.1 18.9  3.1 17.2*  2.6 17.7*  2.4 16.4*  2.4   MONO 10.2  1.8* 10.8  1.9* 11.2  1.7* 11.4  1.9* 12.4   "1.9* 12.0  1.6* 12.2  1.8*   BASO 0.11 0.14 0.13 0.18 0.17 0.13 0.16   NRBC 0 0 0 0 0 0 0       CHEMISTRY LAST 7 DAYS  Recent Labs   Lab 06/26/24  0349 06/27/24  0424 06/28/24  0403 06/29/24  0520 06/30/24  0459 07/01/24  0523 07/02/24  0345    145 143 141 142 143 145   K 3.7 3.6 3.6 4.0 3.6 3.2* 3.6    107 106 105 106 107 107   CO2 24 26 26 25 24 25 24   ANIONGAP 13 12 11 11 12 11 14   BUN 58* 54* 48* 48* 41* 38* 46*   CREATININE 1.4 1.4 1.4 1.3 1.1 1.1 1.2   * 211* 171* 178* 139* 149* 201*   CALCIUM 8.7 8.7 8.8 8.8 9.0 8.9 9.2   MG  --   --   --   --   --  1.5* 1.6   ALBUMIN 1.3* 1.4* 1.4* 1.5* 1.5* 1.5* 1.6*   PROT 5.9* 6.1 6.6 6.6 6.7 6.7 7.1   ALKPHOS 172* 176* 155* 153* 137* 134 143*   ALT 18 30 24 23 24 19 18   AST 30 51* 30 31 27 24 26   BILITOT 0.3 0.3 0.3 0.3 0.3 0.3 0.3       Estimated Creatinine Clearance: 81.1 mL/min (based on SCr of 1.2 mg/dL).    INFLAMMATORY/PROCAL  LAST 7 DAYS  Recent Labs   Lab 06/26/24 0349 06/27/24 0424   .2* 180.2*     No results found for: "ESR"  CRP   Date Value Ref Range Status   06/27/2024 180.2 (H) 0.0 - 8.2 mg/L Final   06/26/2024 192.2 (H) 0.0 - 8.2 mg/L Final   06/25/2024 219.3 (H) 0.0 - 8.2 mg/L Final   06/24/2024 204.5 (H) 0.0 - 8.2 mg/L Final   06/23/2024 205.0 (H) 0.0 - 8.2 mg/L Final   06/18/2024 319.0 (H) 0.0 - 8.2 mg/L Final       PRIOR  MICROBIOLOGY:    Susceptibility data from last 90 days.  Collected Specimen Info Organism Ceftriaxone Clindamycin Erythromycin Oxacillin Penicillin Tetracycline Trimeth/Sulfa Vancomycin   06/23/24 Blood from Peripheral, Hand, Right No growth after 5 days.           06/18/24 Wound from Toe, Right Foot Methicillin resistant Staphylococcus aureus  R  S  R  R  R  S  S  S   06/14/24 Blood from Peripheral, Antecubital, Right No growth after 5 days.           06/14/24 Blood from Peripheral, Hand, Right No growth after 5 days.               LAST 7 DAYS MICROBIOLOGY   Microbiology Results (last 7 days)       " Procedure Component Value Units Date/Time    Aerobic culture [[id number]] Collected: 06/27/24 0733    Order Status: Completed Specimen: Incision site from Shoulder, Left Updated: 07/01/24 0704     Aerobic Bacterial Culture No growth    Culture, Anaerobe [[id number]] Collected: 06/27/24 0733    Order Status: Completed Specimen: Incision site from Shoulder, Left Updated: 06/29/24 1441     Anaerobic Culture No anaerobes isolated    Fungus culture [[id number]] Collected: 06/20/24 1204    Order Status: Completed Specimen: Abscess from Shoulder, Left Updated: 06/29/24 0444     Fungus (Mycology) Culture No fungal growth to date    Fungus culture [[id number]] Collected: 06/21/24 1645    Order Status: Completed Specimen: Abscess from Shoulder, Left Updated: 06/29/24 0444     Fungus (Mycology) Culture No fungal growth to date    Gram stain [[id number]] Collected: 06/27/24 0733    Order Status: Completed Specimen: Incision site from Shoulder, Left Updated: 06/28/24 1516     Gram Stain Result Few WBC's      No organisms seen    Aerobic culture [[id number]] Collected: 06/24/24 1622    Order Status: Completed Specimen: Incision site from Shoulder, Left Updated: 06/28/24 1035     Aerobic Bacterial Culture No growth    Blood culture [[id number]] Collected: 06/23/24 0659    Order Status: Completed Specimen: Blood from Peripheral, Hand, Right Updated: 06/28/24 1032     Blood Culture, Routine No growth after 5 days.    Culture, Anaerobe [[id number]] Collected: 06/24/24 1622    Order Status: Completed Specimen: Incision site from Shoulder, Left Updated: 06/27/24 1357     Anaerobic Culture No anaerobes isolated              CURRENT/PREVIOUS VISIT EKG  Results for orders placed or performed during the hospital encounter of 06/14/24   EKG 12-lead    Collection Time: 06/14/24 12:16 PM   Result Value Ref Range    QRS Duration 106 ms    [facility] QTC Calculation 443 ms    Narrative    Test Reason : R53.1,    Vent. Rate : 120 BPM     Atrial Rate :  159 BPM     P-R Int : 000 ms          QRS Dur : 106 ms      QT Int : 314 ms       P-R-T Axes : 000 -54 093 degrees     QTc Int : 443 ms    Atrial fibrillation with rapid ventricular response  Left axis deviation  Low voltage QRS  Inferior infarct ,age undetermined  Cannot rule out Anterior infarct ,age undetermined  Abnormal ECG  When compared with ECG of 25-MAR-2024 09:14,  Vent. rate has increased BY  46 BPM  Minimal criteria for Anterior infarct are now Present  Confirmed by Jesús HATHAWAY, Hansel AMIN ([id number]) on 6/20/2024 8:43:02 PM    Referred By: JEANNETTE   SELF           Confirmed By:Hansel Espinosa MD     Significant Imaging: I have reviewed all relevant and available imaging results/findings within the past 24 hours.    I spent a total of 35 minutes on the day of the visit.This includes face to face time and non-face to face time preparing to see the patient (eg, review of tests), obtaining and/or reviewing separately obtained history, documenting clinical information in the electronic or other health record, independently interpreting results and communicating results to the patient/family/caregiver, or care coordinator.    Vasquez Calvin MD  Date of Service: 07/02/2024      This note was created using R[employer]ot voice recognition software that occasionally misinterpreted phrases or words.

## 2024-07-02 NOTE — PROGRESS NOTES
[facility] Medicine  Progress Note    Patient Name: Roosevelt Moser  MRN: [id number]  Patient Class: IP- Inpatient   Admission Date: 6/14/2024  Length of Stay: 18 days  Attending Physician: Rosi Vo MD  Primary Care Provider: Miguel Angel Enriquez PA-C        Subjective:     Principal Problem:Acute renal failure superimposed on chronic kidney disease        HPI:  Roosevelt Moser is a 73 year old male with a previous medical history of atrial fibrillation on eliquis, HTN, DMII, obstructive sleep apnea, HLD, ORIF of left humerus and guillian-[place] who presented to the emergency room for weakness and multiple falls. Per wife of the patient he has had progressive weakness over the past week along with two falls one at 0300 Thursday and the other early morning Friday. Both time she had to activate EMS to pick patient up off floor due to weakness. Patient refused transport to hospital on both occasions. Today he slid out of his chair and was unable to get up without assistance and at this point the wife activated EMS to transport patient to the hospital. She endorses that two days ago she noticed that the patient had stopped urinating as he normally does. The patient concurs that he has noticed that his urine has decreased over the past two days and that it is dark. Patient denies dysuria or incomplete bladder emptying. Bladder scan showed zero upon admit and urine sample was obtained via straight cath in ED. Patient denies decreased PO intake and keeps a bottle of water with him at all times. Initial ED work-up showed a creatinine of 5 and potassium of 6. Urine studies positive for infection and WBC 20.81 with procalcitonin at 19.75. Blood cultures obtained and patient given 1 gram of rocephin and 1000ml of normal saline. Patient noted to bein afib with RVR and 20mg of diltiazem was administered IV which resulted in low blood pressure and 1 gram of calcium gluconate was administered. Patient  admitted by hospital medicine for further evaluation and management     Overview/Hospital Course:  Patient presented after fall.  He was found to have osteo of his toe and abscesses left shoulder.  He underwent washouts with surgery on his shoulder.  They cleared him on 06/28/2024. The patient was noted to have osteomyelitis of second right toe. Seen by podiatry, who performed amputation on 07/01. Pending snf placement at this time.  Patient will need extended antibiotics per ID recommendations.       Interval History: Patient states pain currently controlled. No new complaints. Sleeps a lot during the day, not sleeping at night.    Review of Systems   Constitutional:  Negative for activity change, appetite change, chills and fever.   HENT:  Negative for congestion, ear pain, nosebleeds and sinus pain.    Eyes:  Negative for discharge and itching.   Respiratory:  Negative for apnea, cough, chest tightness and shortness of breath.    Cardiovascular:  Negative for chest pain, palpitations and leg swelling.   Gastrointestinal:  Negative for abdominal distention, abdominal pain and vomiting.   Genitourinary:  Negative for difficulty urinating, dysuria, flank pain and frequency.   Musculoskeletal:  Positive for arthralgias. Negative for back pain, joint swelling and myalgias.   Skin:  Positive for wound. Negative for color change, pallor and rash.   Neurological:  Negative for dizziness, weakness, light-headedness and headaches.   Psychiatric/Behavioral:  Negative for agitation, behavioral problems, confusion and suicidal ideas.      Objective:     Vital Signs (Most Recent):  Temp: 98.7 °F (37.1 °C) (07/02/24 0812)  Pulse: 85 (07/02/24 0915)  Resp: (!) 22 (07/02/24 0957)  BP: (!) 140/91 (07/02/24 0812)  SpO2: 95 % (07/02/24 0915) Vital Signs (24h Range):  Temp:  [96.8 °F (36 °C)-98.7 °F (37.1 °C)] 98.7 °F (37.1 °C)  Pulse:  [] 85  Resp:  [16-22] 22  SpO2:  [92 %-96 %] 95 %  BP: (110-152)/(56-91) 140/91     Weight:  (!) 141 kg (310 lb 13.6 oz)  Body mass index is 42.16 kg/m².    Intake/Output Summary (Last 24 hours) at 7/2/2024 1353  Last data filed at 7/2/2024 0639  Gross per 24 hour   Intake --   Output 1100 ml   Net -1100 ml         Physical Exam  Vitals reviewed.   Constitutional:       General: He is not in acute distress.     Appearance: Normal appearance. He is normal weight. He is not ill-appearing.   HENT:      Head: Normocephalic and atraumatic.      Nose: Nose normal.      Mouth/Throat:      Mouth: Mucous membranes are moist.      Pharynx: Oropharynx is clear.   Eyes:      General: No scleral icterus.     Extraocular Movements: Extraocular movements intact.      Conjunctiva/sclera: Conjunctivae normal.      Pupils: Pupils are equal, round, and reactive to light.   Cardiovascular:      Rate and Rhythm: Normal rate and regular rhythm.      Pulses: Normal pulses.      Heart sounds: Normal heart sounds. No murmur heard.     No gallop.   Pulmonary:      Effort: Pulmonary effort is normal. No respiratory distress.      Breath sounds: Normal breath sounds. No wheezing, rhonchi or rales.   Chest:      Chest wall: No tenderness.   Abdominal:      General: Abdomen is flat. There is no distension.      Palpations: Abdomen is soft.      Tenderness: There is no abdominal tenderness.   Musculoskeletal:         General: No swelling or tenderness.      Cervical back: Normal range of motion and neck supple. No rigidity or tenderness.      Right lower leg: No edema.      Left lower leg: No edema.   Skin:     General: Skin is warm and dry.      Findings: Lesion present.   Neurological:      General: No focal deficit present.      Mental Status: He is alert and oriented to person, place, and time. Mental status is at baseline.      Motor: No weakness.   Psychiatric:         Mood and Affect: Mood normal.         Behavior: Behavior normal.         Thought Content: Thought content normal.             Significant Labs: All pertinent labs  within the past 24 hours have been reviewed.    Significant Imaging: I have reviewed all pertinent imaging results/findings within the past 24 hours.    Assessment/Plan:      * Acute renal failure superimposed on chronic kidney disease  Appreciate input from nephrology  In setting of acute UTI and IVVD  Resumed gentle hydration on Zosyn/vanc but those have been stopped  He did not require oral NaHCO3  U/s is w/o obstruction, masses, etc        MRSA infection    As per ID    Pyogenic arthritis of left shoulder region  As above      Abscess of left shoulder  As discussed under limb swelling, status post multiple washouts on 06/21/2024, 06/24/2024, 06/26/2024,6/27/24  Need antibiotics until 08/03/2024  Wound VAC in place.  Continue antibiotics.  Following cultures.  Pending possible LTAC placement.    Osteomyelitis of toe    ID and podiatry following, appreciate help  Appreciate input all consultants  Podiatry amputated 2nd toe 07/01.  Need antibiotics until 08/08/2024      Skin tear of left upper extremity  Local care, healing  Doubt this is a portal of entry    Swelling of limb, left  U/s w/o DVT  Appreciate help from Dr Bernal, ID, wound care, podiatry et al  Pt is already on OAC, holding for now pending shoulder procedure and toe amputation  Arm is elevated on pillow  Not red/painful, no compartment syndrome  D/w pt/wife      Debility  Multifactorial process  Pt not able to manage his own care and wife cannot adequately care for him at this time  PT/OT  SNF or possibly LTAC placement        Severe sepsis  In setting of acute UTI, MRSA OM right 2nd toe, and infected left shoulder hardware  BCX negative  Urine culture negative  Will need antibiotics until 08/03/2024 per ID    Hyperkalemia  Resolved  in setting of acute on chronic renal failure  Lokelma given x 1 on admit  Avoid ACE/ARB, K+ replacement  tele          Atrial fibrillation with rapid ventricular response  Chronic AF w/ acute RVR, resolved  Required dilt  drip on admission but off since 6/15  Tele some PVC/bigem/trigem  TSH 1.8 in March  Monitor lytes  No etoh/drug use  TTE from March 2024:    Left Ventricle: The left ventricle is normal in size. Normal wall thickness. Mild global hypokinesis present. There is mildly reduced systolic function with a visually estimated ejection fraction of 45 - 50%. There is normal diastolic function.    Right Ventricle: Normal right ventricular cavity size. Wall thickness is normal. Right ventricle wall motion  is normal. Systolic function is normal.    Left Atrium: Left atrium is moderately dilated.    IVC/SVC: Normal venous pressure at 3 mmHg.  OAC has been on hold d/t possible need for shoulder surgery - has been on prophylaxis dose of LovenoxResume Eliquis as soon as feasible postop      History of Guillain-[place] syndrome  No acute issues  However, pt has impaired mobility and is acutely weakened from his sepsis/UTI/AF RVR  PT/OT working w/ pt  Wife cannot manage his care at home currently  SNF assessment underway, may need LTAC         Type 2 diabetes mellitus  A1c 6.3%  SSI      Hypertension  Home meds as appropriate    MARA (obstructive sleep apnea)  Continue CPAP HS and w/ naps      Morbid obesity  Body mass index is 42.16 kg/m².  Morbid obesity complicates all aspects of disease management from diagnostic modalities to treatment  Weight loss encouraged and health benefits explained to patient  He has been working on weight loss at home w/ reduced caloric intake          VTE Risk Mitigation (From admission, onward)           Ordered     Reason for No Pharmacological VTE Prophylaxis  Once        Question:  Reasons:  Answer:  Already adequately anticoagulated on oral Anticoagulants    06/14/24 1438     IP VTE HIGH RISK PATIENT  Once         06/14/24 1438     Place sequential compression device  Until discontinued         06/14/24 1438                    Discharge Planning   KAMILA: 7/5/2024     Code Status: Full Code   Is the  patient medically ready for discharge?:     Reason for patient still in hospital (select all that apply): Patient trending condition  Discharge Plan A: Long-term acute care facility (LTAC)                  Rosi Vo MD, MD  Department of Hospital Medicine   [facility]/Surg

## 2024-07-02 NOTE — SUBJECTIVE & OBJECTIVE
Interval History: Patient states pain currently controlled. No new complaints. Sleeps a lot during the day, not sleeping at night.    Review of Systems   Constitutional:  Negative for activity change, appetite change, chills and fever.   HENT:  Negative for congestion, ear pain, nosebleeds and sinus pain.    Eyes:  Negative for discharge and itching.   Respiratory:  Negative for apnea, cough, chest tightness and shortness of breath.    Cardiovascular:  Negative for chest pain, palpitations and leg swelling.   Gastrointestinal:  Negative for abdominal distention, abdominal pain and vomiting.   Genitourinary:  Negative for difficulty urinating, dysuria, flank pain and frequency.   Musculoskeletal:  Positive for arthralgias. Negative for back pain, joint swelling and myalgias.   Skin:  Positive for wound. Negative for color change, pallor and rash.   Neurological:  Negative for dizziness, weakness, light-headedness and headaches.   Psychiatric/Behavioral:  Negative for agitation, behavioral problems, confusion and suicidal ideas.      Objective:     Vital Signs (Most Recent):  Temp: 98.7 °F (37.1 °C) (07/02/24 0812)  Pulse: 85 (07/02/24 0915)  Resp: (!) 22 (07/02/24 0957)  BP: (!) 140/91 (07/02/24 0812)  SpO2: 95 % (07/02/24 0915) Vital Signs (24h Range):  Temp:  [96.8 °F (36 °C)-98.7 °F (37.1 °C)] 98.7 °F (37.1 °C)  Pulse:  [] 85  Resp:  [16-22] 22  SpO2:  [92 %-96 %] 95 %  BP: (110-152)/(56-91) 140/91     Weight: (!) 141 kg (310 lb 13.6 oz)  Body mass index is 42.16 kg/m².    Intake/Output Summary (Last 24 hours) at 7/2/2024 1353  Last data filed at 7/2/2024 0639  Gross per 24 hour   Intake --   Output 1100 ml   Net -1100 ml         Physical Exam  Vitals reviewed.   Constitutional:       General: He is not in acute distress.     Appearance: Normal appearance. He is normal weight. He is not ill-appearing.   HENT:      Head: Normocephalic and atraumatic.      Nose: Nose normal.      Mouth/Throat:      Mouth:  Mucous membranes are moist.      Pharynx: Oropharynx is clear.   Eyes:      General: No scleral icterus.     Extraocular Movements: Extraocular movements intact.      Conjunctiva/sclera: Conjunctivae normal.      Pupils: Pupils are equal, round, and reactive to light.   Cardiovascular:      Rate and Rhythm: Normal rate and regular rhythm.      Pulses: Normal pulses.      Heart sounds: Normal heart sounds. No murmur heard.     No gallop.   Pulmonary:      Effort: Pulmonary effort is normal. No respiratory distress.      Breath sounds: Normal breath sounds. No wheezing, rhonchi or rales.   Chest:      Chest wall: No tenderness.   Abdominal:      General: Abdomen is flat. There is no distension.      Palpations: Abdomen is soft.      Tenderness: There is no abdominal tenderness.   Musculoskeletal:         General: No swelling or tenderness.      Cervical back: Normal range of motion and neck supple. No rigidity or tenderness.      Right lower leg: No edema.      Left lower leg: No edema.   Skin:     General: Skin is warm and dry.      Findings: Lesion present.   Neurological:      General: No focal deficit present.      Mental Status: He is alert and oriented to person, place, and time. Mental status is at baseline.      Motor: No weakness.   Psychiatric:         Mood and Affect: Mood normal.         Behavior: Behavior normal.         Thought Content: Thought content normal.             Significant Labs: All pertinent labs within the past 24 hours have been reviewed.    Significant Imaging: I have reviewed all pertinent imaging results/findings within the past 24 hours.

## 2024-07-02 NOTE — PT/OT/SLP PROGRESS
Physical Therapy Treatment    Patient Name:  Roosevelt Moser   MRN:  [id number]    Recommendations:     Discharge Recommendations: Moderate Intensity Therapy  Discharge Equipment Recommendations: none  Barriers to discharge: Decreased caregiver support    Assessment:     Roosevelt Moser is a 73 y.o. male admitted with a medical diagnosis of Acute renal failure superimposed on chronic kidney disease.  He presents with the following impairments/functional limitations: weakness, impaired endurance, impaired functional mobility, gait instability, impaired balance, decreased upper extremity function, decreased lower extremity function, decreased ROM, impaired cardiopulmonary response to activity, orthopedic precautions .    Pt seen 2x today. Pt tolerated OOB x 2 hours. Pt completed thera ex while seated in chair . Pt requiring max assist x 3 to stand and return back to bed. Pt soiled again and assisted nursing with hygiene care. Repositioned HOB. Anxious when lying flat or on side.  Mod intensity.    Rehab Prognosis: Fair; patient would benefit from acute skilled PT services to address these deficits and reach maximum level of function.    Recent Surgery: Procedure(s) (LRB):  AMPUTATION, TOE (Right) 1 Day Post-Op    Plan:     During this hospitalization, patient to be seen 6 x/week to address the identified rehab impairments via therapeutic activities, therapeutic exercises, neuromuscular re-education and progress toward the following goals:    Plan of Care Expires:  07/15/24    Subjective     Chief Complaint: can't breath  when lying flat SAT 95% with encouragement to calm pt  Patient/Family Comments/goals: none  Pain/Comfort:  Pain Rating 1: 0/10      Objective:     Communicated with nurse Cummins prior to session.  Patient found up in chair with bed alarm, PureWick, PICC line, peripheral IV upon PT entry to room.     General Precautions: Standard, fall, contact  Orthopedic Precautions: RLE partial weight bearing, LUE  weight bearing as tolerated  Braces:  (cam boot R)  Respiratory Status: Room air     Functional Mobility:  Bed Mobility:     Scooting: maximal assistance and of 2 persons  Sit to Supine: maximal assistance and of 2 persons  Transfers:     Sit to Stand:  maximal assistance and of 2/3 persons with no AD      AM-PAC 6 CLICK MOBILITY  Turning over in bed (including adjusting bedclothes, sheets and blankets)?: 2  Sitting down on and standing up from a chair with arms (e.g., wheelchair, bedside commode, etc.): 2  Moving from lying on back to sitting on the side of the bed?: 2  Moving to and from a bed to a chair (including a wheelchair)?: 2  Need to walk in hospital room?: 1  Climbing 3-5 steps with a railing?: 1  Basic Mobility Total Score: 10       Treatment & Education:  Patient was educated on the importance of OOB activity and functional mobility to negate negative effects of prolonged bed rest during hospitalization, safe transfers and ambulation, and D/C planning   Thera ex with AP,LAQ/marches and abd/add x 10-20 reps  Chair to bed transfers assist x 3-4 personnel    Patient left HOB elevated with all lines intact, call button in reach, chair alarm on, and nurse Doreen present..    GOALS:   Multidisciplinary Problems       Physical Therapy Goals          Problem: Physical Therapy    Goal Priority Disciplines Outcome Goal Variances Interventions   Physical Therapy Goal     PT, PT/OT Progressing     Description: Goals to be met by: 7/15/24     Patient will increase functional independence with mobility by performin. Supine to sit with mod assist  2. Sit to stand transfer with mod assist  3. Bed to chair transfer with mod assist using Rolling Walker  4. Gait  x 10 feet with moderate Assistance using Rolling Walker.   5. Lower extremity exercise program x20 reps    R cam walker boot/PWB and LUE WBAT with walker                           Time Tracking:     PT Received On: 24  PT Start Time: 1421     PT  Stop Time: 1439  PT Total Time (min): 18 min     Billable Minutes: Therapeutic Activity 18    Treatment Type: Treatment  PT/PTA: PT     Number of PTA visits since last PT visit: 0     07/02/2024

## 2024-07-02 NOTE — NURSING
Report received.  Patient in bed.  Awake and alert. Bed in the lowest position and locked.  Call light and valuables within reach.  Will continue to monitor.

## 2024-07-02 NOTE — PLAN OF CARE
07/01/24 1955   Patient Assessment/Suction   Level of Consciousness (AVPU) responds to voice   Respiratory Effort Normal;Unlabored   Rhythm/Pattern, Respiratory tachypneic   Cough Frequency no cough   PRE-TX-O2   Device (Oxygen Therapy) CPAP  (pt on home cpap)   Oxygen Concentration (%) 21   SpO2 95 %   Pulse Oximetry Type Intermittent   Aerosol Therapy   $ Aerosol Therapy Charges PRN treatment not required   Incentive Spirometer   $ Incentive Spirometer Charges unable to perform  (pt asleep on home cpap at this time)   Ready to Wean/Extubation Screen   FIO2<=50 (chart decimal) 0.21

## 2024-07-02 NOTE — PROGRESS NOTES
Interval note 7/2:  Visited patient and his wife yesterday and today during meal rounds.  He had a right toe amputation done 7/1; feeling better today but appetite remains fair.  His wife is diligent with getting him to drink the Catarino, Prosource and Novasource renal nutritional supplements 2-3 x daily.  He is still awaiting LTAC placement.  He received IV lasix today and is on ss insulin aspart.  May need lantus as well.  Change 1500 calorie diet to Cardiac Consistent carbohydrate.     RD was not consulted but was identified through LOS.     Pt admitted with MARBIN on CKD. His medical Hx includes:  PMH includes: atrial fibrillation on eliquis, HTN, DMII, obstructive sleep apnea, HLD, ORIF of left humerus and guillian[place] who presented to the emergency room for weakness and multiple falls.  He had washout/removal of infected hardware on 6/27 pyogenic arthritis of left shoulder.  In addition, he is morbidly obese and has osteomyelitis of toe and skin tear to left upper arm.     Diet Rx:  Renal Consistent carbohydrate supplemented with Novasource Renal all meals, ProSource protein supplement ( substituted for Proteinex) and Catarino BID to promote wound healing.   Intake has been ranging 50-75% of meals/nourishments  Current wt:  310#; BMI = 42     MedS: IV rocephin and cubicin; epoetin, ferrous sulfate; insulin glargine; ss insulin aspart; toprol and magic mouthwash.  Pt will need 6 weeks of IV abx.  Labs:     Latest Reference Range & Units Most Recent   Hemoglobin A1C External 4.5 - 6.2 % 6.3 (H)  6/14/24 15:39   (H): Data is abnormally high    Latest Reference Range & Units Most Recent   Hemoglobin 14.0 - 18.0 g/dL 8.3 (L)  6/28/24 04:03   Hematocrit 40.0 - 54.0 % 26.7 (L)  6/28/24 04:03   (L): Data is abnormally low    Latest Reference Range & Units Most Recent   Albumin 3.5 - 5.2 g/dL 1.4 (L)  6/28/24 04:03   Prealbumin 20 - 43 mg/dL 5 (L)  6/18/24 02:33   (L): Data is abnormally low    Latest Reference Range & Units  Most Recent   BUN 8 - 23 mg/dL 48 (H)  6/28/24 04:03   Creatinine 0.5 - 1.4 mg/dL 1.4  6/28/24 04:03   (H): Data is abnormally high     DC plans are to [facility] over the weekend for completion of antibiotics, wound care and Rehab services.  Recommend continuing diet Rx with current nutritional supplements. Will F/U on 7/1 if still admitted.

## 2024-07-02 NOTE — PROGRESS NOTES
[facility]/Surg  Podiatry  Progress Note    Patient Name: Roosevelt Moser  MRN: [id number]  Admission Date: 6/14/2024  Hospital Length of Stay: 18 days  Attending Physician: Rosi Vo MD  Primary Care Provider: Miguel Angel Enriquez PA-C     Subjective:     Interval History: patient resting in bed. No family bedside. Surgical dressing in tact with no strike through bleeding.     Follow-up For: Procedure(s) (LRB):  AMPUTATION, TOE (Right)    Post-Operative Day: 1 Day Post-Op    Scheduled Meds:   (Magic mouthwash) 1:1:1 diphenhydrAMINE(Benadryl) 12.5mg/5ml liq, aluminum & magnesium hydroxide-simethicone (Maalox), LIDOcaine viscous 2%  10 mL Swish & Spit QID    cefTRIAXone (Rocephin) IV (PEDS and ADULTS)  2 g Intravenous Q24H    DAPTOmycin (CUBICIN) IV (PEDS and ADULTS)  800 mg Intravenous Q24H    epoetin leonel-epbx  100 Units/kg Subcutaneous Q7 Days    ferrous sulfate  1 tablet Oral Daily    insulin glargine U-100  15 Units Subcutaneous Daily    LIDOcaine  1 patch Transdermal Q24H    metoprolol succinate  100 mg Oral QHS    potassium chloride  20 mEq Oral BID    sodium chloride 0.9%  10 mL Intravenous Q6H     Continuous Infusions:   electrolyte-S (pH 7.4)   Intravenous Continuous        electrolyte-S (pH 7.4)   Intravenous Continuous         PRN Meds:  Current Facility-Administered Medications:     albuterol-ipratropium, 3 mL, Nebulization, Q6H PRN    aluminum & magnesium hydroxide-simethicone, 15 mL, Oral, QID PRN    dextrose 10%, 12.5 g, Intravenous, PRN    dextrose 10%, 25 g, Intravenous, PRN    fentaNYL, 25 mcg, Intravenous, Q5 Min PRN    glucagon (human recombinant), 1 mg, Intramuscular, PRN    glucose, 16 g, Oral, PRN    glucose, 24 g, Oral, PRN    HYDROcodone-acetaminophen, 1 tablet, Oral, Q4H PRN    HYDROcodone-acetaminophen, 1 tablet, Oral, Q4H PRN    insulin aspart U-100, 0-10 Units, Subcutaneous, QID (AC + HS) PRN    metoprolol, 5 mg, Intravenous, Q5 Min PRN    naloxone, 0.02 mg, Intravenous,  PRN    ondansetron, 8 mg, Oral, Q8H PRN    ondansetron, 4 mg, Intravenous, Once PRN    oxyCODONE, 5 mg, Oral, Once PRN    promethazine, 25 mg, Oral, Q6H PRN    simethicone, 1 tablet, Oral, QID PRN    sodium chloride 0.9%, 10 mL, Intravenous, Q12H PRN    Flushing PICC/Midline Protocol, , , Until Discontinued **AND** sodium chloride 0.9%, 10 mL, Intravenous, Q6H **AND** sodium chloride 0.9%, 10 mL, Intravenous, PRN    sodium chloride 0.9%, 10 mL, Intravenous, Q12H PRN    sodium chloride 0.9%, 10 mL, Intravenous, PRN    traMADoL, 50 mg, Oral, Q4H PRN    Review of Systems  Objective:     Vital Signs (Most Recent):  Temp: 98.7 °F (37.1 °C) (07/02/24 0812)  Pulse: 85 (07/02/24 0915)  Resp: 18 (07/02/24 0915)  BP: (!) 140/91 (07/02/24 0812)  SpO2: 95 % (07/02/24 0915) Vital Signs (24h Range):  Temp:  [96.8 °F (36 °C)-98.7 °F (37.1 °C)] 98.7 °F (37.1 °C)  Pulse:  [] 85  Resp:  [14-28] 18  SpO2:  [92 %-98 %] 95 %  BP: (110-159)/(51-91) 140/91     Weight: (!) 141 kg (310 lb 13.6 oz)  Body mass index is 42.16 kg/m².    Foot Exam  Did not disturb surgical dressing    Laboratory:  All pertinent labs reviewed within the last 24 hours.    Diagnostic Results:  I have reviewed all pertinent imaging results/findings within the past 24 hours.  Assessment/Plan:     ID  Osteomyelitis of toe  Patient can weight bear only while wearing the camboot, until the sutures are removed in approximately 2- 3 weeks.    Recommend dressing changes to consist of adaptic followed by gauze followed by lightly wrapped kerlix- dressing only needs to be changed every 3-4 days.    Catarino and protein drinks daily.          Yocasta Mancil, DPM  Podiatry  [facility]/Surg

## 2024-07-02 NOTE — ASSESSMENT & PLAN NOTE
Hospital day 18:    Status post repeat irrigation and debridement (3rd washout) and hardware removal of left shoulder for suspected infection.  Wound VAC removal    Patient is approximately 3 months status post open reduction internal fixation of a left proximal humerus fracture which lost reduction.    1. Patient is doing well postoperatively.  His pain is well managed.    2.  Intraoperative cultures from irrigation and debridement 6/27, gram stain showed few WBCs, no organisms.  Aerobic preliminary report shows no growth and anaerobic cultures no growth.      Preoperative and intraoperative cultures from 06/21 and 6/24 showed many WBCs but have demonstrated no bacterial growth to date.  As well as cultures from 06/19 showed no growth.      3.  Wound VAC was discontinued intraoperatively 6/27  4.  Sling as needed for comfort of the left upper extremity. Patient can do very light activity with the left upper extremity focusing more on elbow, hand and wrist.  Would not do dedicated therapy for the shoulder.  Patient could weightbear through the extremity if needed with a walker.  5.  No plan for additional orthopedic intervention for the left shoulder at this time  6. Consult case management as the patient will likely benefit from placement for IV antibiotics as well as his generalized deconditioned state.  7. Continue Infectious Disease recommendations but no laboratory findings suggestive of left shoulder infection.  8.  Stable from an orthopedic standpoint.  Stable for transfer or discharge from an orthopedic standpoint.    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

## 2024-07-02 NOTE — ASSESSMENT & PLAN NOTE
ID and podiatry following, appreciate help  Appreciate input all consultants  Podiatry amputated 2nd toe 07/01.  Need antibiotics until 08/08/2024

## 2024-07-02 NOTE — PLAN OF CARE
[facility] denied LTAC placement  Per [facility] with [facility], they can meet this pt's needs & accept for SNF. Anayeli will submit for [employer] auth.     Hopeful for dc tomorrow to  SNF       07/02/24 4660   Discharge Reassessment   Assessment Type Discharge Planning Reassessment   Discharge Plan A Skilled Nursing Facility   Discharge Plan B Skilled Nursing Facility   DME Needed Upon Discharge  none   Transition of Care Barriers None   Why the patient remains in the hospital Insurance issues   Post-Acute Status   Post-Acute Authorization Placement   Post-Acute Placement Status Pending payor review/awaiting authorization (if required)

## 2024-07-02 NOTE — CONSULTS
Wound care re-consulted on 6/26/24 and Dr. Cunningham assessed and completed wound care consult at that time for moisture associated skin damage to bilateral buttocks and to treat with [facility]. Wound care continues to follow this patient throughout hospital stay.

## 2024-07-02 NOTE — SUBJECTIVE & OBJECTIVE
Subjective:     Interval History: patient resting in bed. No family bedside. Surgical dressing in tact with no strike through bleeding.     Follow-up For: Procedure(s) (LRB):  AMPUTATION, TOE (Right)    Post-Operative Day: 1 Day Post-Op    Scheduled Meds:   (Magic mouthwash) 1:1:1 diphenhydrAMINE(Benadryl) 12.5mg/5ml liq, aluminum & magnesium hydroxide-simethicone (Maalox), LIDOcaine viscous 2%  10 mL Swish & Spit QID    cefTRIAXone (Rocephin) IV (PEDS and ADULTS)  2 g Intravenous Q24H    DAPTOmycin (CUBICIN) IV (PEDS and ADULTS)  800 mg Intravenous Q24H    epoetin leonel-epbx  100 Units/kg Subcutaneous Q7 Days    ferrous sulfate  1 tablet Oral Daily    insulin glargine U-100  15 Units Subcutaneous Daily    LIDOcaine  1 patch Transdermal Q24H    metoprolol succinate  100 mg Oral QHS    potassium chloride  20 mEq Oral BID    sodium chloride 0.9%  10 mL Intravenous Q6H     Continuous Infusions:   electrolyte-S (pH 7.4)   Intravenous Continuous        electrolyte-S (pH 7.4)   Intravenous Continuous         PRN Meds:  Current Facility-Administered Medications:     albuterol-ipratropium, 3 mL, Nebulization, Q6H PRN    aluminum & magnesium hydroxide-simethicone, 15 mL, Oral, QID PRN    dextrose 10%, 12.5 g, Intravenous, PRN    dextrose 10%, 25 g, Intravenous, PRN    fentaNYL, 25 mcg, Intravenous, Q5 Min PRN    glucagon (human recombinant), 1 mg, Intramuscular, PRN    glucose, 16 g, Oral, PRN    glucose, 24 g, Oral, PRN    HYDROcodone-acetaminophen, 1 tablet, Oral, Q4H PRN    HYDROcodone-acetaminophen, 1 tablet, Oral, Q4H PRN    insulin aspart U-100, 0-10 Units, Subcutaneous, QID (AC + HS) PRN    metoprolol, 5 mg, Intravenous, Q5 Min PRN    naloxone, 0.02 mg, Intravenous, PRN    ondansetron, 8 mg, Oral, Q8H PRN    ondansetron, 4 mg, Intravenous, Once PRN    oxyCODONE, 5 mg, Oral, Once PRN    promethazine, 25 mg, Oral, Q6H PRN    simethicone, 1 tablet, Oral, QID PRN    sodium chloride 0.9%, 10 mL, Intravenous, Q12H PRN     Flushing PICC/Midline Protocol, , , Until Discontinued **AND** sodium chloride 0.9%, 10 mL, Intravenous, Q6H **AND** sodium chloride 0.9%, 10 mL, Intravenous, PRN    sodium chloride 0.9%, 10 mL, Intravenous, Q12H PRN    sodium chloride 0.9%, 10 mL, Intravenous, PRN    traMADoL, 50 mg, Oral, Q4H PRN    Review of Systems  Objective:     Vital Signs (Most Recent):  Temp: 98.7 °F (37.1 °C) (07/02/24 0812)  Pulse: 85 (07/02/24 0915)  Resp: 18 (07/02/24 0915)  BP: (!) 140/91 (07/02/24 0812)  SpO2: 95 % (07/02/24 0915) Vital Signs (24h Range):  Temp:  [96.8 °F (36 °C)-98.7 °F (37.1 °C)] 98.7 °F (37.1 °C)  Pulse:  [] 85  Resp:  [14-28] 18  SpO2:  [92 %-98 %] 95 %  BP: (110-159)/(51-91) 140/91     Weight: (!) 141 kg (310 lb 13.6 oz)  Body mass index is 42.16 kg/m².    Foot Exam  Did not disturb surgical dressing    Laboratory:  All pertinent labs reviewed within the last 24 hours.    Diagnostic Results:  I have reviewed all pertinent imaging results/findings within the past 24 hours.

## 2024-07-02 NOTE — PLAN OF CARE
Pt is not medically ready for dc today. Check cxr and consider diuresis per ID. Upper CM leaders to follow up with [facility] regarding [employer] auth.   Reached out to  SNF to see if they can accommodate his needs if ltac falls through  CM continues to follow       07/02/24 1025   Discharge Reassessment   Assessment Type Discharge Planning Reassessment   Did the patient's condition or plan change since previous assessment? Yes   Discharge Plan discussed with: Patient;Spouse/sig other   Communicated KAMILA with patient/caregiver Yes   Discharge Plan A Long-term acute care facility (LTAC)   Discharge Plan B Skilled Nursing Facility   Why the patient remains in the hospital Requires continued medical care   Post-Acute Status   Post-Acute Authorization Placement   Post-Acute Placement Status Pending payor review/awaiting authorization (if required)

## 2024-07-02 NOTE — PROGRESS NOTES
Nephrology Progress Note        Patient Name: Roosevelt Moser  MRN: [id number]    Patient Class: IP- Inpatient   Admission Date: 6/14/2024  Length of Stay: 18 days  Date of Service: 7/2/2024    Attending Physician: Rosi Vo MD  Primary Care Provider: Miguel Angel Enriquez PA-C    Reason for Consult: marbin/hyperkalemia    SUBJECTIVE:     HPI: 73M with h/o DM, HTN, AF, GBS and recent shoulder surgery was brought to ER by EMS with recurrent falls in the last 24h. Reports decreased UO but no fever, cough, SOB, dysuria. Upon admission, noted to be in MARBIN with sCr 5, baseline 1 month ago is 1, hyperkalemia with K 6, acidosis with CO2 12, hypoalbuminemia with albumin 1.6. Lactate notably 2.1. Procal 20. A1c 9.5 in 3/2024. UA with hematuria and pyuria, urine Cx pending. Notably on Apixaban. WBC in blood elevated to 20. Plt 540. RP US ordered. Afebrile, normotensive, received IVF and abx. Lokelma, ca gluconate given bicarb gtt ordered. Straight cath in ER with only 100cc urine out.    6/15  AFVSS.   UOP 1200cc plus 2 unmeasured   6/16  AFVSS.  2150 cc uop.  No distress  6/17 VSS, on RA, UOP 1.5L- updated family at bedside  6/18 VSS, on RA, UOP 1.3L  6/19 VSS, on RA, UOP 1L, with osteo R toe- s/p debridement- not interested in amputation- antbx adjusted  6/20 HR , BP stable, on 2L NC, UOP 1.2L, went for procedure, wife reports LE edema  6/21 HR 90-110s, -140s mostly, on RA, UOP 1.6L, to OR for shoulder washout and hardware removal today  6/22 VSS s/p incision and drainage for infection from left shoulder and removal of deep hardware including lizabeth and screws.  6/23 VSS. Consider resuming diuretics tomorrow.  6/24  AFVSS.  1200 cc uop plus multiple unmeasured.  He is very thirsty.    6/25  POD 1 s/p irrigation and debridementof left shoulder abscess.  VSS  2 liter uop  6/26 AFVSS. 2250 cc uop  6/27  AFVSS.  In th OR  6/28 POD 1 s/p left shoulder irrigation and debridement.  2050 cc uop  6/29  one shift UOP recorded,  "800cc.  VSS, renal function improving.  States he feels "sick" today, denies nausea, says had diarrhea after breakfast.  No other complaints.  6/30  VSS, renal function improved.  Sleeping soundly, on bipap.  Updated family member at bedside.  7/1 VSS. Treat hypokalemia, monitor Mg as well. Agree with toe amputation.  7/2 VSS. S/p right 2nd toe amputation POD1. Consider IV Mg supplement if Mg still low today.    ASSESSMENT/PLAN:     MARBIN due to ATN due to sepsis due to UTI and/or PNA  CKD stage 2 with diabetic proteinuria and severe hypoalbuminemia  HTN  DM poorly controlled A1c 9.5  Hypokalemia/Hypomagnesemia  SHPT  Anemia  Hypoalbuminemia  Edema    - renal function is improving- nonoliguric  - dose meds for CrCl 10-50  - about 1g proteinuria- low alb is nutrition/acute phase reactant  - hold hydralazine  - hold metformin- use insulin  - treat hypokalemia, added oral KCL and non-renal diet.  - monitor Mg and replete as needed  - H/H stable  - optimize protein intake  --strict I/Os   --iron stores low. Ferritin too high for iv iron, on oral supplement  --getting erythropoietin stimulating agent weekly    Thank you for allowing us to participate in the care of your patient!   We will follow the patient and provide recommendations as needed.         Laboratory:  Recent Labs   Lab 06/30/24  0459 07/01/24  0523 07/02/24  0345    143 145   K 3.6 3.2* 3.6    107 107   CO2 24 25 24   BUN 41* 38* 46*   CREATININE 1.1 1.1 1.2   * 149* 201*       Recent Labs   Lab 06/30/24  0459 07/01/24  0523 07/02/24  0345   CALCIUM 9.0 8.9 9.2   ALBUMIN 1.5* 1.5* 1.6*   MG  --  1.5*  --              Recent Labs   Lab 06/29/24  1129 06/29/24  1630 06/29/24  2036 06/30/24  0710 06/30/24  1105 06/30/24  1631 06/30/24  2053 07/01/24  1722 07/01/24  2112 07/02/24  0742   POCTGLUCOSE 288* 193* 123* 138* 203* 231* 250* 240* 200* 179*       Recent Labs   Lab 07/31/23  0955 03/19/24  0830 06/14/24  1539   Hemoglobin A1C 8.8 H 9.5 " H 6.3 H       Recent Labs   Lab 06/30/24  0459 07/01/24  0523 07/02/24  0345   WBC 15.03* 13.54* 14.35*   HGB 8.3* 8.2* 9.0*   HCT 27.6* 26.3* 29.7*   * 568* 489*   MCV 84 82 84   MCHC 30.1* 31.2* 30.3*   MONO 12.4  1.9* 12.0  1.6* 12.2  1.8*   EOSINOPHIL 3.7 1.5 4.9       Recent Labs   Lab 06/30/24  0459 07/01/24  0523 07/02/24  0345   BILITOT 0.3 0.3 0.3   PROT 6.7 6.7 7.1   ALBUMIN 1.5* 1.5* 1.6*   ALKPHOS 137* 134 143*   ALT 24 19 18   AST 27 24 26       Recent Labs   Lab 12/16/22  1238 03/08/24  1034 03/25/24  1022 06/14/24  1337   Color, UA Yellow Yellow Yellow [place] A   Appearance, UA Clear Clear Hazy A Cloudy A   pH, UA 6.0 5.0 6.0 6.0   Specific Justin, UA 1.020 1.010 1.020 1.020   Protein, UA 1+ A Trace A 1+ A 2+ A   Glucose, UA 1+ A 3+ A Negative Trace A   Ketones, UA Negative Negative Negative Negative   Urobilinogen, UA  --  Negative Negative Negative   Bilirubin (UA) Negative Negative Negative Negative   Occult Blood UA 3+ A 2+ A 2+ A 3+ A   Nitrite, UA Negative Negative Negative Negative   RBC, UA 20 H 13 H >100 H >100 H   WBC, UA 81 H 3 18 H >100 H   Bacteria Rare None Rare Many A   Hyaline Casts, UA 0  --  0 0             Microbiology Results (last 7 days)       Procedure Component Value Units Date/Time    Aerobic culture [[id number]] Collected: 06/27/24 0733    Order Status: Completed Specimen: Incision site from Shoulder, Left Updated: 07/01/24 0704     Aerobic Bacterial Culture No growth    Culture, Anaerobe [[id number]] Collected: 06/27/24 0733    Order Status: Completed Specimen: Incision site from Shoulder, Left Updated: 06/29/24 1441     Anaerobic Culture No anaerobes isolated    Fungus culture [[id number]] Collected: 06/20/24 1204    Order Status: Completed Specimen: Abscess from Shoulder, Left Updated: 06/29/24 0444     Fungus (Mycology) Culture No fungal growth to date    Fungus culture [[id number]] Collected: 06/21/24 1647    Order Status: Completed Specimen: Abscess from  Shoulder, Left Updated: 06/29/24 0444     Fungus (Mycology) Culture No fungal growth to date    Gram stain [[id number]] Collected: 06/27/24 0733    Order Status: Completed Specimen: Incision site from Shoulder, Left Updated: 06/28/24 1516     Gram Stain Result Few WBC's      No organisms seen    Aerobic culture [[id number]] Collected: 06/24/24 1622    Order Status: Completed Specimen: Incision site from Shoulder, Left Updated: 06/28/24 1035     Aerobic Bacterial Culture No growth    Blood culture [[id number]] Collected: 06/23/24 0659    Order Status: Completed Specimen: Blood from Peripheral, Hand, Right Updated: 06/28/24 1032     Blood Culture, Routine No growth after 5 days.    Culture, Anaerobe [[id number]] Collected: 06/24/24 1622    Order Status: Completed Specimen: Incision site from Shoulder, Left Updated: 06/27/24 1357     Anaerobic Culture No anaerobes isolated            Review of patient's allergies indicates:  No Known Allergies    Outpatient meds:  No current facility-administered medications on file prior to encounter.     Current Outpatient Medications on File Prior to Encounter   Medication Sig Dispense Refill    apixaban (ELIQUIS) 5 mg Tab Take 1 tablet (5 mg total) by mouth 2 (two) times daily. 60 tablet 1    atorvastatin (LIPITOR) 10 MG tablet Take 1 tablet (10 mg total) by mouth once daily. 90 tablet 3    co-enzyme Q-10 30 mg capsule Take 30 mg by mouth once daily.      doxycycline (VIBRAMYCIN) 100 MG Cap Take 100 mg by mouth 2 (two) times daily.      ergocalciferol, vitamin D2, (VITAMIN D ORAL) Take 1 tablet by mouth once daily.      glimepiride (AMARYL) 2 MG tablet Take 1 tablet (2 mg total) by mouth before breakfast. 90 tablet 3    hydrALAZINE (APRESOLINE) 25 MG tablet Take 1 tablet (25 mg total) by mouth every 12 (twelve) hours. 60 tablet 3    metFORMIN (GLUCOPHAGE-XR) 500 MG ER 24hr tablet Take 2 tablets (1,000 mg total) by mouth 2 (two) times daily with meals. 360 tablet 3    metoprolol  succinate (TOPROL-XL) 100 MG 24 hr tablet Take 1 tablet (100 mg total) by mouth once daily. (Patient taking differently: Take 100 mg by mouth nightly.) 90 tablet 3       Scheduled meds:   (Magic mouthwash) 1:1:1 diphenhydrAMINE(Benadryl) 12.5mg/5ml liq, aluminum & magnesium hydroxide-simethicone (Maalox), LIDOcaine viscous 2%  10 mL Swish & Spit QID    cefTRIAXone (Rocephin) IV (PEDS and ADULTS)  2 g Intravenous Q24H    DAPTOmycin (CUBICIN) IV (PEDS and ADULTS)  800 mg Intravenous Q24H    epoetin leonel-epbx  100 Units/kg Subcutaneous Q7 Days    ferrous sulfate  1 tablet Oral Daily    insulin glargine U-100  15 Units Subcutaneous Daily    LIDOcaine  1 patch Transdermal Q24H    metoprolol succinate  100 mg Oral QHS    potassium chloride  20 mEq Oral BID    sodium chloride 0.9%  10 mL Intravenous Q6H       Infusions:   electrolyte-S (pH 7.4)   Intravenous Continuous        electrolyte-S (pH 7.4)   Intravenous Continuous               PRN meds:    Current Facility-Administered Medications:     albuterol-ipratropium, 3 mL, Nebulization, Q6H PRN    aluminum & magnesium hydroxide-simethicone, 15 mL, Oral, QID PRN    dextrose 10%, 12.5 g, Intravenous, PRN    dextrose 10%, 25 g, Intravenous, PRN    fentaNYL, 25 mcg, Intravenous, Q5 Min PRN    glucagon (human recombinant), 1 mg, Intramuscular, PRN    glucose, 16 g, Oral, PRN    glucose, 24 g, Oral, PRN    HYDROcodone-acetaminophen, 1 tablet, Oral, Q4H PRN    HYDROcodone-acetaminophen, 1 tablet, Oral, Q4H PRN    insulin aspart U-100, 0-10 Units, Subcutaneous, QID (AC + HS) PRN    metoprolol, 5 mg, Intravenous, Q5 Min PRN    naloxone, 0.02 mg, Intravenous, PRN    ondansetron, 8 mg, Oral, Q8H PRN    ondansetron, 4 mg, Intravenous, Once PRN    oxyCODONE, 5 mg, Oral, Once PRN    promethazine, 25 mg, Oral, Q6H PRN    simethicone, 1 tablet, Oral, QID PRN    sodium chloride 0.9%, 10 mL, Intravenous, Q12H PRN    Flushing PICC/Midline Protocol, , , Until Discontinued **AND** sodium  chloride 0.9%, 10 mL, Intravenous, Q6H **AND** sodium chloride 0.9%, 10 mL, Intravenous, PRN    sodium chloride 0.9%, 10 mL, Intravenous, Q12H PRN    sodium chloride 0.9%, 10 mL, Intravenous, PRN    traMADoL, 50 mg, Oral, Q4H PRN    Past Medical History:   Diagnosis Date    A-fib 2024    Diabetes mellitus, type 2 2021    Guillain-[place] 10/2003    Hyperlipidemia     Hypertension 2016    Sleep apnea      Past Surgical History:   Procedure Laterality Date    ARTHROTOMY OF SHOULDER Left 6/21/2024    Procedure: ARTHROTOMY, SHOULDER;  Surgeon: Roman Paulson MD;  Location: [facility] OR;  Service: Orthopedics;  Laterality: Left;    IRRIGATION AND DEBRIDEMENT OF UPPER EXTREMITY Left 6/24/2024    Procedure: IRRIGATION AND DEBRIDEMENT, UPPER EXTREMITY;  Surgeon: Nathan Cooper MD;  Location: [facility] OR;  Service: Orthopedics;  Laterality: Left;    IRRIGATION AND DEBRIDEMENT OF UPPER EXTREMITY Left 6/27/2024    Procedure: IRRIGATION AND DEBRIDEMENT, UPPER EXTREMITY;  Surgeon: Nathan Cooper MD;  Location: [facility] OR;  Service: Orthopedics;  Laterality: Left;    OPEN REDUCTION AND INTERNAL FIXATION (ORIF) OF FRACTURE OF PROXIMAL HUMERUS Left 3/25/2024    Procedure: ORIF, FRACTURE, HUMERUS, PROXIMAL/IM HANNA, LEFT;  Surgeon: Nathan Cooper MD;  Location: [facility] OR;  Service: Orthopedics;  Laterality: Left;  Accumed, [employer] Small Frag set Avelino verified 3/22/24 ark     No family history on file.  Social History     Tobacco Use    Smoking status: Never    Smokeless tobacco: Never   Substance Use Topics    Alcohol use: Yes     Alcohol/week: 0.0 standard drinks of alcohol     Comment: social    Drug use: No       OBJECTIVE:     Vital Signs and IO:  Temp:  [96.8 °F (36 °C)-98.7 °F (37.1 °C)]   Pulse:  []   Resp:  [14-28]   BP: (110-159)/(51-91)   SpO2:  [92 %-98 %]   I/O last 3 completed shifts:  In: 320 [P.O.:270; I.V.:50]  Out: 2600 [Urine:2600]  Wt Readings from Last 5 Encounters:   06/28/24 (!) 141 kg (310 lb 13.6 oz)    06/04/24 132.5 kg (292 lb 1.8 oz)   05/07/24 132.5 kg (292 lb 1.8 oz)   04/08/24 132.5 kg (292 lb 3.2 oz)   03/26/24 (!) 136.1 kg (300 lb 0.7 oz)     Body mass index is 42.16 kg/m².    Physical Exam  Constitutional:       General: Patient is not in acute distress.     Appearance: Patient is well-developed. She is not diaphoretic.   HENT:      Head: Normocephalic and atraumatic.      Mouth/Throat: Mucous membranes are moist.   Eyes:      General: No scleral icterus.     Pupils: Pupils are equal, round, and reactive to light.   Cardiovascular:      Rate and Rhythm: Normal rate and regular rhythm.   Pulmonary:      Effort: Pulmonary effort is normal. No respiratory distress.      Breath sounds: No stridor.   Abdominal:      General: There is no distension.      Palpations: Abdomen is soft.   Musculoskeletal:         General: No deformity. Normal range of motion.      Cervical back: Neck supple.   Skin:     General: Skin is warm and dry.      Findings: No rash present. No erythema.   Neurological:      Mental Status: Patient is alert and oriented to person, place, and time.      Cranial Nerves: No cranial nerve deficit.   Psychiatric:         Behavior: Behavior normal.          Patient care time was spent personally by me on the following activities:     Obtaining a history.  Examination of patient.  Providing medical care at the patients bedside.  Developing a treatment plan with patient or surrogate and bedside caregivers.  Ordering and reviewing laboratory studies, radiographic studies, pulse oximetry.  Ordering and performing treatments and interventions.  Evaluation of patient's response to treatment.  Discussions with consultants while on the unit and immediately available to the patient.  Re-evaluation of the patient's condition.  Documentation in the medical record.     Anjum Mcmullen MD    [facility]  [address] [place] [address]  [place] [address]    [phone] - tel  [phone] - fax    7/2/2024

## 2024-07-03 PROBLEM — J90 PLEURAL EFFUSION: Status: ACTIVE | Noted: 2024-07-03

## 2024-07-03 LAB
ABO GROUP BLD: NORMAL
ALBUMIN SERPL BCP-MCNC: 1.5 G/DL (ref 3.5–5.2)
ALLENS TEST: ABNORMAL
ALP SERPL-CCNC: 116 U/L (ref 55–135)
ALT SERPL W/O P-5'-P-CCNC: 17 U/L (ref 10–44)
ANION GAP SERPL CALC-SCNC: 10 MMOL/L (ref 8–16)
AST SERPL-CCNC: 19 U/L (ref 10–40)
BASOPHILS # BLD AUTO: 0.09 K/UL (ref 0–0.2)
BASOPHILS NFR BLD: 0.7 % (ref 0–1.9)
BILIRUB SERPL-MCNC: 0.3 MG/DL (ref 0.1–1)
BLD GP AB SCN CELLS X3 SERPL QL: NORMAL
BUN SERPL-MCNC: 50 MG/DL (ref 8–23)
CALCIUM SERPL-MCNC: 8.9 MG/DL (ref 8.7–10.5)
CHLORIDE SERPL-SCNC: 107 MMOL/L (ref 95–110)
CO2 SERPL-SCNC: 25 MMOL/L (ref 23–29)
CREAT SERPL-MCNC: 1.2 MG/DL (ref 0.5–1.4)
CRP SERPL-MCNC: 187.3 MG/L (ref 0–8.2)
DELSYS: ABNORMAL
DIFFERENTIAL METHOD BLD: ABNORMAL
EOSINOPHIL # BLD AUTO: 0.3 K/UL (ref 0–0.5)
EOSINOPHIL NFR BLD: 2.6 % (ref 0–8)
ERYTHROCYTE [DISTWIDTH] IN BLOOD BY AUTOMATED COUNT: 18.2 % (ref 11.5–14.5)
ERYTHROCYTE [SEDIMENTATION RATE] IN BLOOD BY WESTERGREN METHOD: 32 MM/H
EST. GFR  (NO RACE VARIABLE): >60 ML/MIN/1.73 M^2
GLUCOSE SERPL-MCNC: 176 MG/DL (ref 70–110)
GLUCOSE SERPL-MCNC: 206 MG/DL (ref 70–110)
GLUCOSE SERPL-MCNC: 215 MG/DL (ref 70–110)
HCO3 UR-SCNC: 26.5 MMOL/L (ref 24–28)
HCT VFR BLD AUTO: 25.3 % (ref 40–54)
HGB BLD-MCNC: 7.8 G/DL (ref 14–18)
IMM GRANULOCYTES # BLD AUTO: 0.16 K/UL (ref 0–0.04)
IMM GRANULOCYTES NFR BLD AUTO: 1.3 % (ref 0–0.5)
LYMPHOCYTES # BLD AUTO: 2.3 K/UL (ref 1–4.8)
LYMPHOCYTES NFR BLD: 18.1 % (ref 18–48)
MCH RBC QN AUTO: 25.5 PG (ref 27–31)
MCHC RBC AUTO-ENTMCNC: 30.8 G/DL (ref 32–36)
MCV RBC AUTO: 83 FL (ref 82–98)
MODE: ABNORMAL
MONOCYTES # BLD AUTO: 1.6 K/UL (ref 0.3–1)
MONOCYTES NFR BLD: 12.6 % (ref 4–15)
NEUTROPHILS # BLD AUTO: 8.1 K/UL (ref 1.8–7.7)
NEUTROPHILS NFR BLD: 64.7 % (ref 38–73)
NRBC BLD-RTO: 0 /100 WBC
PCO2 BLDA: 34.4 MMHG (ref 35–45)
PH SMN: 7.5 [PH] (ref 7.35–7.45)
PLATELET # BLD AUTO: 418 K/UL (ref 150–450)
PLATELET BLD QL SMEAR: ABNORMAL
PMV BLD AUTO: 11.1 FL (ref 9.2–12.9)
PO2 BLDA: 72 MMHG (ref 80–100)
POC BE: 3 MMOL/L
POC SATURATED O2: 96 % (ref 95–100)
POC TCO2: 28 MMOL/L (ref 23–27)
POCT GLUCOSE: 211 MG/DL (ref 70–110)
POTASSIUM SERPL-SCNC: 4 MMOL/L (ref 3.5–5.1)
PROT SERPL-MCNC: 6.5 G/DL (ref 6–8.4)
RBC # BLD AUTO: 3.06 M/UL (ref 4.6–6.2)
RH BLD: NORMAL
SAMPLE: ABNORMAL
SITE: ABNORMAL
SODIUM SERPL-SCNC: 142 MMOL/L (ref 136–145)
SP02: 97
SPECIMEN OUTDATE: NORMAL
TSH SERPL DL<=0.005 MIU/L-ACNC: 2.68 UIU/ML (ref 0.34–5.6)
WBC # BLD AUTO: 12.51 K/UL (ref 3.9–12.7)

## 2024-07-03 PROCEDURE — 25000003 PHARM REV CODE 250: Performed by: STUDENT IN AN ORGANIZED HEALTH CARE EDUCATION/TRAINING PROGRAM

## 2024-07-03 PROCEDURE — 63600175 PHARM REV CODE 636 W HCPCS: Mod: JZ,JG | Performed by: STUDENT IN AN ORGANIZED HEALTH CARE EDUCATION/TRAINING PROGRAM

## 2024-07-03 PROCEDURE — 25000003 PHARM REV CODE 250: Performed by: INTERNAL MEDICINE

## 2024-07-03 PROCEDURE — 99223 1ST HOSP IP/OBS HIGH 75: CPT | Mod: ,,, | Performed by: INTERNAL MEDICINE

## 2024-07-03 PROCEDURE — P9016 RBC LEUKOCYTES REDUCED: HCPCS | Performed by: THORACIC SURGERY (CARDIOTHORACIC VASCULAR SURGERY)

## 2024-07-03 PROCEDURE — 97530 THERAPEUTIC ACTIVITIES: CPT

## 2024-07-03 PROCEDURE — 94799 UNLISTED PULMONARY SVC/PX: CPT

## 2024-07-03 PROCEDURE — 25000003 PHARM REV CODE 250: Performed by: PODIATRIST

## 2024-07-03 PROCEDURE — 87040 BLOOD CULTURE FOR BACTERIA: CPT | Performed by: INTERNAL MEDICINE

## 2024-07-03 PROCEDURE — 36415 COLL VENOUS BLD VENIPUNCTURE: CPT | Performed by: INTERNAL MEDICINE

## 2024-07-03 PROCEDURE — 63600175 PHARM REV CODE 636 W HCPCS: Performed by: ORTHOPAEDIC SURGERY

## 2024-07-03 PROCEDURE — 25000003 PHARM REV CODE 250: Performed by: ORTHOPAEDIC SURGERY

## 2024-07-03 PROCEDURE — 94799 UNLISTED PULMONARY SVC/PX: CPT | Mod: XB

## 2024-07-03 PROCEDURE — 63600175 PHARM REV CODE 636 W HCPCS: Performed by: INTERNAL MEDICINE

## 2024-07-03 PROCEDURE — 99233 SBSQ HOSP IP/OBS HIGH 50: CPT | Mod: ,,, | Performed by: INTERNAL MEDICINE

## 2024-07-03 PROCEDURE — 82803 BLOOD GASES ANY COMBINATION: CPT

## 2024-07-03 PROCEDURE — 86900 BLOOD TYPING SEROLOGIC ABO: CPT | Performed by: THORACIC SURGERY (CARDIOTHORACIC VASCULAR SURGERY)

## 2024-07-03 PROCEDURE — 86920 COMPATIBILITY TEST SPIN: CPT | Performed by: THORACIC SURGERY (CARDIOTHORACIC VASCULAR SURGERY)

## 2024-07-03 PROCEDURE — 36415 COLL VENOUS BLD VENIPUNCTURE: CPT | Performed by: ORTHOPAEDIC SURGERY

## 2024-07-03 PROCEDURE — 20000000 HC ICU ROOM

## 2024-07-03 PROCEDURE — A4216 STERILE WATER/SALINE, 10 ML: HCPCS | Performed by: INTERNAL MEDICINE

## 2024-07-03 PROCEDURE — 99900035 HC TECH TIME PER 15 MIN (STAT)

## 2024-07-03 PROCEDURE — 86901 BLOOD TYPING SEROLOGIC RH(D): CPT | Performed by: THORACIC SURGERY (CARDIOTHORACIC VASCULAR SURGERY)

## 2024-07-03 PROCEDURE — 86850 RBC ANTIBODY SCREEN: CPT | Performed by: THORACIC SURGERY (CARDIOTHORACIC VASCULAR SURGERY)

## 2024-07-03 PROCEDURE — 86140 C-REACTIVE PROTEIN: CPT | Performed by: INTERNAL MEDICINE

## 2024-07-03 PROCEDURE — 97110 THERAPEUTIC EXERCISES: CPT

## 2024-07-03 PROCEDURE — 85025 COMPLETE CBC W/AUTO DIFF WBC: CPT | Performed by: ORTHOPAEDIC SURGERY

## 2024-07-03 PROCEDURE — 94761 N-INVAS EAR/PLS OXIMETRY MLT: CPT | Mod: XB

## 2024-07-03 PROCEDURE — 36415 COLL VENOUS BLD VENIPUNCTURE: CPT | Performed by: HOSPITALIST

## 2024-07-03 PROCEDURE — 36430 TRANSFUSION BLD/BLD COMPNT: CPT

## 2024-07-03 PROCEDURE — 80053 COMPREHEN METABOLIC PANEL: CPT | Performed by: ORTHOPAEDIC SURGERY

## 2024-07-03 PROCEDURE — 99900031 HC PATIENT EDUCATION (STAT)

## 2024-07-03 PROCEDURE — 36600 WITHDRAWAL OF ARTERIAL BLOOD: CPT

## 2024-07-03 PROCEDURE — 25000003 PHARM REV CODE 250

## 2024-07-03 PROCEDURE — 63600175 PHARM REV CODE 636 W HCPCS

## 2024-07-03 PROCEDURE — A4216 STERILE WATER/SALINE, 10 ML: HCPCS | Performed by: ORTHOPAEDIC SURGERY

## 2024-07-03 PROCEDURE — 84443 ASSAY THYROID STIM HORMONE: CPT | Performed by: INTERNAL MEDICINE

## 2024-07-03 RX ORDER — FUROSEMIDE 10 MG/ML
20 INJECTION INTRAMUSCULAR; INTRAVENOUS ONCE
Status: COMPLETED | OUTPATIENT
Start: 2024-07-03 | End: 2024-07-03

## 2024-07-03 RX ORDER — HYDROCODONE BITARTRATE AND ACETAMINOPHEN 500; 5 MG/1; MG/1
TABLET ORAL
Status: DISCONTINUED | OUTPATIENT
Start: 2024-07-03 | End: 2024-07-07 | Stop reason: HOSPADM

## 2024-07-03 RX ORDER — IPRATROPIUM BROMIDE AND ALBUTEROL SULFATE 2.5; .5 MG/3ML; MG/3ML
3 SOLUTION RESPIRATORY (INHALATION) EVERY 4 HOURS PRN
Status: DISCONTINUED | OUTPATIENT
Start: 2024-07-03 | End: 2024-07-07 | Stop reason: HOSPADM

## 2024-07-03 RX ADMIN — LIDOCAINE HYDROCHLORIDE 10 ML: 20 SOLUTION ORAL; TOPICAL at 12:07

## 2024-07-03 RX ADMIN — FUROSEMIDE 20 MG: 10 INJECTION, SOLUTION INTRAMUSCULAR; INTRAVENOUS at 08:07

## 2024-07-03 RX ADMIN — CEFTAROLINE FOSAMIL 600 MG: 600 POWDER, FOR SOLUTION INTRAVENOUS at 04:07

## 2024-07-03 RX ADMIN — MEROPENEM 1 G: 1 INJECTION, POWDER, FOR SOLUTION INTRAVENOUS at 09:07

## 2024-07-03 RX ADMIN — LIDOCAINE 5% 1 PATCH: 700 PATCH TOPICAL at 04:07

## 2024-07-03 RX ADMIN — HYDROCODONE BITARTRATE AND ACETAMINOPHEN 1 TABLET: 10; 325 TABLET ORAL at 07:07

## 2024-07-03 RX ADMIN — MEROPENEM 1 G: 1 INJECTION, POWDER, FOR SOLUTION INTRAVENOUS at 03:07

## 2024-07-03 RX ADMIN — INSULIN GLARGINE 15 UNITS: 100 INJECTION, SOLUTION SUBCUTANEOUS at 08:07

## 2024-07-03 RX ADMIN — CEFTAROLINE FOSAMIL 600 MG: 600 POWDER, FOR SOLUTION INTRAVENOUS at 11:07

## 2024-07-03 RX ADMIN — CEFTRIAXONE SODIUM 2 G: 2 INJECTION, POWDER, FOR SOLUTION INTRAMUSCULAR; INTRAVENOUS at 12:07

## 2024-07-03 RX ADMIN — HYDROCODONE BITARTRATE AND ACETAMINOPHEN 1 TABLET: 10; 325 TABLET ORAL at 08:07

## 2024-07-03 RX ADMIN — SODIUM CHLORIDE, PRESERVATIVE FREE 10 ML: 5 INJECTION INTRAVENOUS at 05:07

## 2024-07-03 RX ADMIN — FUROSEMIDE 20 MG: 10 INJECTION, SOLUTION INTRAMUSCULAR; INTRAVENOUS at 10:07

## 2024-07-03 RX ADMIN — POTASSIUM CHLORIDE 20 MEQ: 1500 TABLET, EXTENDED RELEASE ORAL at 08:07

## 2024-07-03 RX ADMIN — LIDOCAINE HYDROCHLORIDE 10 ML: 20 SOLUTION ORAL; TOPICAL at 04:07

## 2024-07-03 RX ADMIN — MICAFUNGIN SODIUM 100 MG: 100 INJECTION, POWDER, LYOPHILIZED, FOR SOLUTION INTRAVENOUS at 03:07

## 2024-07-03 RX ADMIN — SODIUM CHLORIDE, PRESERVATIVE FREE 10 ML: 5 INJECTION INTRAVENOUS at 11:07

## 2024-07-03 RX ADMIN — LIDOCAINE HYDROCHLORIDE 10 ML: 20 SOLUTION ORAL; TOPICAL at 08:07

## 2024-07-03 RX ADMIN — FERROUS SULFATE TAB 325 MG (65 MG ELEMENTAL FE) 1 EACH: 325 (65 FE) TAB at 08:07

## 2024-07-03 RX ADMIN — METOPROLOL SUCCINATE 100 MG: 50 TABLET, FILM COATED, EXTENDED RELEASE ORAL at 08:07

## 2024-07-03 RX ADMIN — INSULIN ASPART 1 UNITS: 100 INJECTION, SOLUTION INTRAVENOUS; SUBCUTANEOUS at 08:07

## 2024-07-03 RX ADMIN — FUROSEMIDE 20 MG: 10 INJECTION, SOLUTION INTRAMUSCULAR; INTRAVENOUS at 12:07

## 2024-07-03 RX ADMIN — INSULIN ASPART 4 UNITS: 100 INJECTION, SOLUTION INTRAVENOUS; SUBCUTANEOUS at 04:07

## 2024-07-03 NOTE — PT/OT/SLP PROGRESS
Occupational Therapy      Patient Name:  Roosevelt Moser   MRN:  [id number]    Patient not seen today secondary to Other (Comment) (OT attempting treatment at 12:24. Nursing hold - patient recently moved to step down unit.). Will follow-up 7/4/2024.    7/3/2024

## 2024-07-03 NOTE — NURSING
Witnessed conversation between Dr Vo and pt's wife re: results of stat CT chest and need to transfer to [facility] for surgical evaluation. Pt's wife agreeable with plan. Verified with pt's wife that last intake was at 0800- pt drank supplement and juice, has only had sips of water since.

## 2024-07-03 NOTE — ASSESSMENT & PLAN NOTE
Hospital day 19:    Status post repeat irrigation and debridement (3rd washout) and hardware removal of left shoulder for suspected infection.  Wound VAC removal    Patient is approximately 3 months status post open reduction internal fixation of a left proximal humerus fracture which lost reduction.    1. Patient is doing well postoperatively.  His pain is well managed.    2.  Intraoperative cultures from irrigation and debridement 6/27, gram stain showed few WBCs, no organisms.  Aerobic preliminary report shows no growth and anaerobic cultures no growth.      Preoperative and intraoperative cultures from 06/21 and 6/24 showed many WBCs but have demonstrated no bacterial growth to date.  As well as cultures from 06/19 showed no growth.      3.  Wound VAC was discontinued intraoperatively 6/27  4.  Sling as needed for comfort of the left upper extremity. Patient can do very light activity with the left upper extremity focusing more on elbow, hand and wrist.  Would not do dedicated therapy for the shoulder.  Patient could weightbear through the extremity if needed with a walker.  5.  No plan for additional orthopedic intervention for the left shoulder at this time  6. Consult case management as the patient will likely benefit from placement for IV antibiotics as well as his generalized deconditioned state.  7. Continue Infectious Disease recommendations but no laboratory findings suggestive of left shoulder infection.  8.  Stable from an orthopedic standpoint.  Stable for transfer or discharge from an orthopedic standpoint.    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

## 2024-07-03 NOTE — PT/OT/SLP PROGRESS
"Physical Therapy Treatment    Patient Name:  Roosevelt Moser   MRN:  [id number]    Recommendations:     Discharge Recommendations: Moderate Intensity Therapy  Discharge Equipment Recommendations: none  Barriers to discharge: Decreased caregiver support    Assessment:     Roosevelt Moser is a 73 y.o. male admitted with a medical diagnosis of Acute renal failure superimposed on chronic kidney disease.  He presents with the following impairments/functional limitations: weakness, impaired endurance, impaired functional mobility, gait instability, impaired balance, decreased upper extremity function, decreased lower extremity function, decreased ROM, impaired cardiopulmonary response to activity, orthopedic precautions .    Pt seen supine in bed with spouse present. Pt stated of increased SOB today. Nurse Fisher applied O2 NC 3L then mask then switched to personal CPAP. SAT fluctuating 89-96% with -120. Pt anxious. Pt calms down for thera ex. Increased agitation when needing to roll or to lie flat for diaper change. Resource nurse Odsesa at bedside to evaluate situation of increased SOB and L chest mass.  Pt requiring multiple rests and extra, extra time to mobilize.    Rehab Prognosis: Fair; patient would benefit from acute skilled PT services to address these deficits and reach maximum level of function.    Recent Surgery: Procedure(s) (LRB):  AMPUTATION, TOE (Right) 2 Days Post-Op    Plan:     During this hospitalization, patient to be seen 6 x/week to address the identified rehab impairments via therapeutic activities, therapeutic exercises, neuromuscular re-education and progress toward the following goals:    Plan of Care Expires:  07/15/24    Subjective     Chief Complaint: " can't breath" increased agitation and anxiety when rolled for diaper change  Patient/Family Comments/goals: none  Pain/Comfort:  Pain Rating 1: 0/10      Objective:     Communicated with nurse Fisher prior to session.  Patient found HOB " elevated with bed alarm, PureWick, PICC line, peripheral IV upon PT entry to room.     General Precautions: Standard, fall, respiratory, contact  Orthopedic Precautions: RLE partial weight bearing, LUE weight bearing as tolerated  Braces:  (cam boot R)  Respiratory Status:  CPAP     Functional Mobility:  Bed Mobility:     Rolling Left:  maximal assistance and of 2 persons  Scooting: maximal assistance, total assistance, and of 2 persons      AM-PAC 6 CLICK MOBILITY          Treatment & Education:  Patient was educated on the importance of OOB activity and functional mobility to negate negative effects of prolonged bed rest during hospitalization, safe transfers and ambulation, and D/C planning   Thera ex with AP,QS/GS,assisted abd/add and leg raises x 10-20 reps  SAT 96% on 3 liters o2 NC  Pt switched to mask then to personal CPAP as he c/o increased SOB  Pt unable to progress OOB today      Patient left HOB elevated with all lines intact, call button in reach, bed alarm on, and nurse Natalia and resource nurse andrew present..    GOALS:   Multidisciplinary Problems       Physical Therapy Goals          Problem: Physical Therapy    Goal Priority Disciplines Outcome Goal Variances Interventions   Physical Therapy Goal     PT, PT/OT Progressing     Description: Goals to be met by: 7/15/24     Patient will increase functional independence with mobility by performin. Supine to sit with mod assist  2. Sit to stand transfer with mod assist  3. Bed to chair transfer with mod assist using Rolling Walker  4. Gait  x 10 feet with moderate Assistance using Rolling Walker.   5. Lower extremity exercise program x20 reps    R cam walker boot/PWB and LUE WBAT with walker                           Time Tracking:     PT Received On: 24  PT Start Time: 917     PT Stop Time: 1007  PT Total Time (min): 50 min     Billable Minutes: Therapeutic Activity 15 and Therapeutic Exercise 15    Treatment Type: Treatment  PT/PTA: PT      Number of PTA visits since last PT visit: 0     07/03/2024

## 2024-07-03 NOTE — PROGRESS NOTES
[facility]/Surg  Orthopedics  Progress Note    Patient Name: Roosevelt Moser  MRN: [id number]  Admission Date: 6/14/2024  Hospital Length of Stay: 19 days  Attending Provider: Rosi Vo MD  Primary Care Provider: Miguel Angel Enriquez PA-C  Follow-up For: Procedure(s) (LRB):  AMPUTATION, TOE (Right)    Post-Operative Day: 2 Days Post-Op  Subjective:     Principal Problem:Acute renal failure superimposed on chronic kidney disease    Principal Orthopedic Problem:   Status post open reduction internal fixation left proximal humerus with septic joint.  Subsequent hardware removal with multiple irrigation and debridements.    Interval History:      Review of patient's allergies indicates:  No Known Allergies    Current Facility-Administered Medications   Medication    (Magic mouthwash) 1:1:1 diphenhydrAMINE(Benadryl) 12.5mg/5ml liq, aluminum & magnesium hydroxide-simethicone (Maalox), LIDOcaine viscous 2%    albuterol-ipratropium 2.5 mg-0.5 mg/3 mL nebulizer solution 3 mL    aluminum & magnesium hydroxide-simethicone 400-400-40 mg/5 mL suspension 15 mL    cefTRIAXone (ROCEPHIN) 2 g in dextrose 5 % in water (D5W) 100 mL IVPB (MB+)    DAPTOmycin (CUBICIN) 800 mg in 0.9% NaCl SolP 50 mL IVPB    dextrose 10% bolus 125 mL 125 mL    dextrose 10% bolus 250 mL 250 mL    epoetin leonel-epbx injection 14,100 Units    fentaNYL 50 mcg/mL injection 25 mcg    ferrous sulfate tablet 1 each    furosemide injection 20 mg    glucagon (human recombinant) injection 1 mg    glucose chewable tablet 16 g    glucose chewable tablet 24 g    HYDROcodone-acetaminophen  mg per tablet 1 tablet    HYDROcodone-acetaminophen 5-325 mg per tablet 1 tablet    insulin aspart U-100 pen 0-10 Units    insulin glargine U-100 (Lantus) pen 15 Units    LIDOcaine 5 % patch 1 patch    magnesium sulfate 2g in water 50mL IVPB (premix)    metoprolol injection 5 mg    metoprolol succinate (TOPROL-XL) 24 hr tablet 100 mg    naloxone 0.4 mg/mL  injection 0.02 mg    ondansetron disintegrating tablet 8 mg    ondansetron injection 4 mg    oxyCODONE immediate release tablet 5 mg    potassium bicarbonate disintegrating tablet 35 mEq    potassium bicarbonate disintegrating tablet 60 mEq    potassium chloride SA CR tablet 20 mEq    potassium, sodium phosphates 280-160-250 mg packet 2 packet    potassium, sodium phosphates 280-160-250 mg packet 2 packet    potassium, sodium phosphates 280-160-250 mg packet 2 packet    promethazine tablet 25 mg    simethicone chewable tablet 80 mg    sodium chloride 0.9% flush 10 mL    sodium chloride 0.9% flush 10 mL    And    sodium chloride 0.9% flush 10 mL    sodium chloride 0.9% flush 10 mL    sodium chloride 0.9% flush 10 mL    traMADoL tablet 50 mg     Objective:     Vital Signs (Most Recent):  Temp: 97.3 °F (36.3 °C) (07/03/24 0336)  Pulse: 95 (07/03/24 0336)  Resp: 18 (07/03/24 0336)  BP: 110/71 (07/03/24 0336)  SpO2: 96 % (07/03/24 0336) Vital Signs (24h Range):  Temp:  [97.3 °F (36.3 °C)-98.7 °F (37.1 °C)] 97.3 °F (36.3 °C)  Pulse:  [] 95  Resp:  [18-22] 18  SpO2:  [93 %-97 %] 96 %  BP: (110-161)/(61-91) 110/71     Weight: (!) 141 kg (310 lb 13.6 oz)  Height: 6' (182.9 cm)  Body mass index is 42.16 kg/m².      Intake/Output Summary (Last 24 hours) at 7/3/2024 0657  Last data filed at 7/3/2024 0440  Gross per 24 hour   Intake 360 ml   Output 1500 ml   Net -1140 ml        General    Constitutional: He appears well-developed and well-nourished.   Pulmonary/Chest: Effort normal.             Left Shoulder Exam     Inspection/Observation   Bruising: absent  Deformity: absent    Tenderness   The patient is experiencing no tenderness.      Comments:    No change in physical exam from yesterday.  Dressing to left shoulder is clean dry and intact.  Surgical incision is healing well without wound dehiscence or drainage.  There is no erythema or ecchymosis.  Soft tissue swelling into the left hand is improved from earlier in  the week.         Significant Labs: None    Significant Imaging: None  Assessment/Plan:     Abscess of left shoulder  Hospital day 19:    Status post repeat irrigation and debridement (3rd washout) and hardware removal of left shoulder for suspected infection.  Wound VAC removal    Patient is approximately 3 months status post open reduction internal fixation of a left proximal humerus fracture which lost reduction.    1. Patient is doing well postoperatively.  His pain is well managed.    2.  Intraoperative cultures from irrigation and debridement 6/27, gram stain showed few WBCs, no organisms.  Aerobic preliminary report shows no growth and anaerobic cultures no growth.      Preoperative and intraoperative cultures from 06/21 and 6/24 showed many WBCs but have demonstrated no bacterial growth to date.  As well as cultures from 06/19 showed no growth.      3.  Wound VAC was discontinued intraoperatively 6/27  4.  Sling as needed for comfort of the left upper extremity. Patient can do very light activity with the left upper extremity focusing more on elbow, hand and wrist.  Would not do dedicated therapy for the shoulder.  Patient could weightbear through the extremity if needed with a walker.  5.  No plan for additional orthopedic intervention for the left shoulder at this time  6. Consult case management as the patient will likely benefit from placement for IV antibiotics as well as his generalized deconditioned state.  7. Continue Infectious Disease recommendations but no laboratory findings suggestive of left shoulder infection.  8.  Stable from an orthopedic standpoint.  Stable for transfer or discharge from an orthopedic standpoint.    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      Swelling of limb, left  History of left proximal humerus fracture with open reduction internal fixation.            Hansel Tate,  SILVIO  Orthopedics  [facility]/Surg

## 2024-07-03 NOTE — CONSULTS
Please see my prior note      Alessandro Mayo MD  [facility] Pulmonary/Critical Care  07/03/2024

## 2024-07-03 NOTE — CARE UPDATE
This patient has been transferred to my service in 3rd floor ICU.  I reviewed the CT chest images and noted large fluid collection in the left anterior chest wall with invasion of the pleural space and the mediastinum.  There was also bony destruction of adjacent ribs.  I did not see any radiologic evidence of direct extension from the left shoulder.  There was no pulmonary infiltrate, although there was a large pleural effusion.  All of that has me postulating that the abscess arose from hematogenous osteomyelitis of one or more ribs.  The culprit organism being Staphylococcus aureus.  However, blood cultures drawn 6/14 and 21 had no growth.  Getting repeat B Cx today.    Will consult with Cardiothoracic surgeon for help with management.  The infection is being covered with daptomycin, meropenem, and micafungin.    The patient has respiratory failure is likely due to atelectasis, pleural effusion, and inadequate ventilation secondary to pain.  I will support the patient with noninvasive positive pressure and monitor his work of breathing and saturations.    LAURIE Trevino MD

## 2024-07-03 NOTE — CARE UPDATE
07/03/24 0710   Patient Assessment/Suction   Level of Consciousness (AVPU) alert   Respiratory Effort Normal;Unlabored   Expansion/Accessory Muscles/Retractions no use of accessory muscles;no retractions;expansion symmetric   All Lung Fields Breath Sounds Anterior:;Lateral:;clear   LLL Breath Sounds diminished   RLL Breath Sounds diminished   Rhythm/Pattern, Respiratory unlabored   Cough Frequency no cough   PRE-TX-O2   Device (Oxygen Therapy) room air  (Home CPAP @ bedside)   SpO2 95 %   Pulse Oximetry Type Intermittent   $ Pulse Oximetry - Multiple Charge Pulse Oximetry - Multiple   Pulse 95   Resp 20   Positioning HOB elevated 30 degrees   Aerosol Therapy   $ Aerosol Therapy Charges PRN treatment not required   Incentive Spirometer   $ Incentive Spirometer Charges done with encouragement   Incentive Spirometer Predicted Level (mL) 2200   Administration (IS) mouthpiece utilized   Number of Repetitions (IS) 10   Level Incentive Spirometer (mL) 9000   Patient Tolerance (IS) good;no adverse signs/symptoms present   Education   $ Education Bronchodilator;15 min

## 2024-07-03 NOTE — PROGRESS NOTES
Nephrology Progress Note        Patient Name: Roosevelt Moser  MRN: [id number]    Patient Class: IP- Inpatient   Admission Date: 6/14/2024  Length of Stay: 19 days  Date of Service: 7/3/2024    Attending Physician: Rosi Vo MD  Primary Care Provider: Miguel Angel Enriquez PA-C    Reason for Consult: marbin/hyperkalemia    SUBJECTIVE:     HPI: 73M with h/o DM, HTN, AF, GBS and recent shoulder surgery was brought to ER by EMS with recurrent falls in the last 24h. Reports decreased UO but no fever, cough, SOB, dysuria. Upon admission, noted to be in MARBIN with sCr 5, baseline 1 month ago is 1, hyperkalemia with K 6, acidosis with CO2 12, hypoalbuminemia with albumin 1.6. Lactate notably 2.1. Procal 20. A1c 9.5 in 3/2024. UA with hematuria and pyuria, urine Cx pending. Notably on Apixaban. WBC in blood elevated to 20. Plt 540. RP US ordered. Afebrile, normotensive, received IVF and abx. Lokelma, ca gluconate given bicarb gtt ordered. Straight cath in ER with only 100cc urine out.    6/15  AFVSS.   UOP 1200cc plus 2 unmeasured   6/16  AFVSS.  2150 cc uop.  No distress  6/17 VSS, on RA, UOP 1.5L- updated family at bedside  6/18 VSS, on RA, UOP 1.3L  6/19 VSS, on RA, UOP 1L, with osteo R toe- s/p debridement- not interested in amputation- antbx adjusted  6/20 HR , BP stable, on 2L NC, UOP 1.2L, went for procedure, wife reports LE edema  6/21 HR 90-110s, -140s mostly, on RA, UOP 1.6L, to OR for shoulder washout and hardware removal today  6/22 VSS s/p incision and drainage for infection from left shoulder and removal of deep hardware including lizabeth and screws.  6/23 VSS. Consider resuming diuretics tomorrow.  6/24  AFVSS.  1200 cc uop plus multiple unmeasured.  He is very thirsty.    6/25  POD 1 s/p irrigation and debridementof left shoulder abscess.  VSS  2 liter uop  6/26 AFVSS. 2250 cc uop  6/27  AFVSS.  In th OR  6/28 POD 1 s/p left shoulder irrigation and debridement.  2050 cc uop  6/29  one shift UOP recorded,  "800cc.  VSS, renal function improving.  States he feels "sick" today, denies nausea, says had diarrhea after breakfast.  No other complaints.  6/30  VSS, renal function improved.  Sleeping soundly, on bipap.  Updated family member at bedside.  7/1 VSS. Treat hypokalemia, monitor Mg as well. Agree with toe amputation.  7/2 VSS. S/p right 2nd toe amputation POD1. Consider IV Mg supplement if Mg still low today.  7/3 VSS. Left upper abdominal bulge is new, getting CT abdomen to evaluate. K and Mg better, monitor.    ASSESSMENT/PLAN:     MARBIN due to ATN due to sepsis due to UTI and/or PNA  CKD stage 2 with diabetic proteinuria and severe hypoalbuminemia  HTN  DM poorly controlled A1c 9.5  Hypokalemia/Hypomagnesemia  SHPT  Anemia  Hypoalbuminemia  Edema    - renal function is improving- nonoliguric  - dose meds for CrCl 10-50  - about 1g proteinuria- low alb is nutrition/acute phase reactant  - hold hydralazine  - hold metformin- use insulin  - added oral KCL and non-renal diet.  - monitor Mg and replete as needed.  - H/H stable  - optimize protein intake  --strict I/Os   --iron stores low. Ferritin too high for iv iron, on oral supplement  --getting erythropoietin stimulating agent weekly    Thank you for allowing us to participate in the care of your patient!   We will follow the patient and provide recommendations as needed.         Laboratory:  Recent Labs   Lab 07/01/24  0523 07/02/24  0345 07/03/24  0302    145 142   K 3.2* 3.6 4.0    107 107   CO2 25 24 25   BUN 38* 46* 50*   CREATININE 1.1 1.2 1.2   * 201* 215*       Recent Labs   Lab 07/01/24  0523 07/02/24  0345 07/03/24  0302   CALCIUM 8.9 9.2 8.9   ALBUMIN 1.5* 1.6* 1.5*   MG 1.5* 1.6  --              Recent Labs   Lab 06/30/24  0710 06/30/24  1105 06/30/24  1631 06/30/24  2053 07/01/24  1722 07/01/24  2112 07/02/24  0742 07/02/24  1126 07/02/24  1631 07/02/24 2030   POCTGLUCOSE 138* 203* 231* 250* 240* 200* 179* 294* 208* 232*       Recent " Labs   Lab 07/31/23  0955 03/19/24  0830 06/14/24  1539   Hemoglobin A1C 8.8 H 9.5 H 6.3 H       Recent Labs   Lab 07/01/24  0523 07/02/24  0345 07/03/24  0302   WBC 13.54* 14.35* 12.51   HGB 8.2* 9.0* 7.8*   HCT 26.3* 29.7* 25.3*   * 489* 418   MCV 82 84 83   MCHC 31.2* 30.3* 30.8*   MONO 12.0  1.6* 12.2  1.8* 12.6  1.6*   EOSINOPHIL 1.5 4.9 2.6       Recent Labs   Lab 07/01/24  0523 07/02/24  0345 07/03/24  0302   BILITOT 0.3 0.3 0.3   PROT 6.7 7.1 6.5   ALBUMIN 1.5* 1.6* 1.5*   ALKPHOS 134 143* 116   ALT 19 18 17   AST 24 26 19       Recent Labs   Lab 12/16/22  1238 03/08/24  1034 03/25/24  1022 06/14/24  1337   Color, UA Yellow Yellow Yellow [place] A   Appearance, UA Clear Clear Hazy A Cloudy A   pH, UA 6.0 5.0 6.0 6.0   Specific [place], UA 1.020 1.010 1.020 1.020   Protein, UA 1+ A Trace A 1+ A 2+ A   Glucose, UA 1+ A 3+ A Negative Trace A   Ketones, UA Negative Negative Negative Negative   Urobilinogen, UA  --  Negative Negative Negative   Bilirubin (UA) Negative Negative Negative Negative   Occult Blood UA 3+ A 2+ A 2+ A 3+ A   Nitrite, UA Negative Negative Negative Negative   RBC, UA 20 H 13 H >100 H >100 H   WBC, UA 81 H 3 18 H >100 H   Bacteria Rare None Rare Many A   Hyaline Casts, UA 0  --  0 0             Microbiology Results (last 7 days)       Procedure Component Value Units Date/Time    Aerobic culture [[id number]] Collected: 06/27/24 0733    Order Status: Completed Specimen: Incision site from Shoulder, Left Updated: 07/01/24 0704     Aerobic Bacterial Culture No growth    Culture, Anaerobe [[id number]] Collected: 06/27/24 0733    Order Status: Completed Specimen: Incision site from Shoulder, Left Updated: 06/29/24 1441     Anaerobic Culture No anaerobes isolated    Fungus culture [[id number]] Collected: 06/20/24 1204    Order Status: Completed Specimen: Abscess from Shoulder, Left Updated: 06/29/24 0444     Fungus (Mycology) Culture No fungal growth to date    Fungus culture [[id number]]  Collected: 06/21/24 1645    Order Status: Completed Specimen: Abscess from Shoulder, Left Updated: 06/29/24 0444     Fungus (Mycology) Culture No fungal growth to date    Gram stain [[id number]] Collected: 06/27/24 0733    Order Status: Completed Specimen: Incision site from Shoulder, Left Updated: 06/28/24 1516     Gram Stain Result Few WBC's      No organisms seen    Aerobic culture [[id number]] Collected: 06/24/24 1622    Order Status: Completed Specimen: Incision site from Shoulder, Left Updated: 06/28/24 1035     Aerobic Bacterial Culture No growth    Blood culture [[id number]] Collected: 06/23/24 0659    Order Status: Completed Specimen: Blood from Peripheral, Hand, Right Updated: 06/28/24 1032     Blood Culture, Routine No growth after 5 days.    Culture, Anaerobe [[id number]] Collected: 06/24/24 1622    Order Status: Completed Specimen: Incision site from Shoulder, Left Updated: 06/27/24 1357     Anaerobic Culture No anaerobes isolated            Review of patient's allergies indicates:  No Known Allergies    Outpatient meds:  No current facility-administered medications on file prior to encounter.     Current Outpatient Medications on File Prior to Encounter   Medication Sig Dispense Refill    apixaban (ELIQUIS) 5 mg Tab Take 1 tablet (5 mg total) by mouth 2 (two) times daily. 60 tablet 1    atorvastatin (LIPITOR) 10 MG tablet Take 1 tablet (10 mg total) by mouth once daily. 90 tablet 3    co-enzyme Q-10 30 mg capsule Take 30 mg by mouth once daily.      doxycycline (VIBRAMYCIN) 100 MG Cap Take 100 mg by mouth 2 (two) times daily.      ergocalciferol, vitamin D2, (VITAMIN D ORAL) Take 1 tablet by mouth once daily.      glimepiride (AMARYL) 2 MG tablet Take 1 tablet (2 mg total) by mouth before breakfast. 90 tablet 3    hydrALAZINE (APRESOLINE) 25 MG tablet Take 1 tablet (25 mg total) by mouth every 12 (twelve) hours. 60 tablet 3    metFORMIN (GLUCOPHAGE-XR) 500 MG ER 24hr tablet Take 2 tablets (1,000 mg  total) by mouth 2 (two) times daily with meals. 360 tablet 3    metoprolol succinate (TOPROL-XL) 100 MG 24 hr tablet Take 1 tablet (100 mg total) by mouth once daily. (Patient taking differently: Take 100 mg by mouth nightly.) 90 tablet 3       Scheduled meds:   (Magic mouthwash) 1:1:1 diphenhydrAMINE(Benadryl) 12.5mg/5ml liq, aluminum & magnesium hydroxide-simethicone (Maalox), LIDOcaine viscous 2%  10 mL Swish & Spit QID    cefTRIAXone (Rocephin) IV (PEDS and ADULTS)  2 g Intravenous Q24H    DAPTOmycin (CUBICIN) IV (PEDS and ADULTS)  800 mg Intravenous Q24H    epoetin leonel-epbx  100 Units/kg Subcutaneous Q7 Days    ferrous sulfate  1 tablet Oral Daily    furosemide (LASIX) injection  20 mg Intravenous Q12H    insulin glargine U-100  15 Units Subcutaneous Daily    LIDOcaine  1 patch Transdermal Q24H    metoprolol succinate  100 mg Oral QHS    potassium chloride  20 mEq Oral BID    sodium chloride 0.9%  10 mL Intravenous Q6H       Infusions:            PRN meds:    Current Facility-Administered Medications:     albuterol-ipratropium, 3 mL, Nebulization, Q6H PRN    aluminum & magnesium hydroxide-simethicone, 15 mL, Oral, QID PRN    dextrose 10%, 12.5 g, Intravenous, PRN    dextrose 10%, 25 g, Intravenous, PRN    fentaNYL, 25 mcg, Intravenous, Q5 Min PRN    glucagon (human recombinant), 1 mg, Intramuscular, PRN    glucose, 16 g, Oral, PRN    glucose, 24 g, Oral, PRN    HYDROcodone-acetaminophen, 1 tablet, Oral, Q4H PRN    HYDROcodone-acetaminophen, 1 tablet, Oral, Q4H PRN    insulin aspart U-100, 0-10 Units, Subcutaneous, QID (AC + HS) PRN    magnesium sulfate IVPB, 2 g, Intravenous, Daily PRN    metoprolol, 5 mg, Intravenous, Q5 Min PRN    naloxone, 0.02 mg, Intravenous, PRN    ondansetron, 8 mg, Oral, Q8H PRN    ondansetron, 4 mg, Intravenous, Once PRN    oxyCODONE, 5 mg, Oral, Once PRN    potassium bicarbonate, 35 mEq, Oral, PRN    potassium bicarbonate, 60 mEq, Oral, PRN    potassium, sodium phosphates, 2 packet,  Oral, PRN    potassium, sodium phosphates, 2 packet, Oral, PRN    potassium, sodium phosphates, 2 packet, Oral, PRN    promethazine, 25 mg, Oral, Q6H PRN    simethicone, 1 tablet, Oral, QID PRN    sodium chloride 0.9%, 10 mL, Intravenous, Q12H PRN    Flushing PICC/Midline Protocol, , , Until Discontinued **AND** sodium chloride 0.9%, 10 mL, Intravenous, Q6H **AND** sodium chloride 0.9%, 10 mL, Intravenous, PRN    sodium chloride 0.9%, 10 mL, Intravenous, Q12H PRN    sodium chloride 0.9%, 10 mL, Intravenous, PRN    traMADoL, 50 mg, Oral, Q4H PRN    Past Medical History:   Diagnosis Date    A-fib 2024    Diabetes mellitus, type 2 2021    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    Hyperlipidemia     Hypertension 2016    Sleep apnea      Past Surgical History:   Procedure Laterality Date    ARTHROTOMY OF SHOULDER Left 6/21/2024    Procedure: ARTHROTOMY, SHOULDER;  Surgeon: Roman Paulson MD;  Location: [facility] OR;  Service: Orthopedics;  Laterality: Left;    IRRIGATION AND DEBRIDEMENT OF UPPER EXTREMITY Left 6/24/2024    Procedure: IRRIGATION AND DEBRIDEMENT, UPPER EXTREMITY;  Surgeon: Nathan Cooper MD;  Location: [facility] OR;  Service: Orthopedics;  Laterality: Left;    IRRIGATION AND DEBRIDEMENT OF UPPER EXTREMITY Left 6/27/2024    Procedure: IRRIGATION AND DEBRIDEMENT, UPPER EXTREMITY;  Surgeon: Nathan Cooper MD;  Location: [facility] OR;  Service: Orthopedics;  Laterality: Left;    OPEN REDUCTION AND INTERNAL FIXATION (ORIF) OF FRACTURE OF PROXIMAL HUMERUS Left 3/25/2024    Procedure: ORIF, FRACTURE, HUMERUS, PROXIMAL/IM HANNA, LEFT;  Surgeon: Nathan Cooper MD;  Location: [facility] OR;  Service: Orthopedics;  Laterality: Left;  Accumed, Synthes Small Frag set Avelino verified 3/22/24 ark    TOE AMPUTATION Right 7/1/2024    Procedure: AMPUTATION, TOE;  Surgeon: Stevie Zhu DPM;  Location: [facility] OR;  Service: Podiatry;  Laterality: Right;     No family history on file.  Social History     Tobacco Use    Smoking status: Never    Smokeless  tobacco: Never   Substance Use Topics    Alcohol use: Yes     Alcohol/week: 0.0 standard drinks of alcohol     Comment: social    Drug use: No       OBJECTIVE:     Vital Signs and IO:  Temp:  [97.3 °F (36.3 °C)-98.7 °F (37.1 °C)]   Pulse:  []   Resp:  [18-22]   BP: (110-161)/(61-91)   SpO2:  [93 %-97 %]   I/O last 3 completed shifts:  In: 360 [P.O.:360]  Out: 2600 [Urine:2600]  Wt Readings from Last 5 Encounters:   06/28/24 (!) 141 kg (310 lb 13.6 oz)   06/04/24 132.5 kg (292 lb 1.8 oz)   05/07/24 132.5 kg (292 lb 1.8 oz)   04/08/24 132.5 kg (292 lb 3.2 oz)   03/26/24 (!) 136.1 kg (300 lb 0.7 oz)     Body mass index is 42.16 kg/m².    Physical Exam  Constitutional:       General: Patient is not in acute distress.     Appearance: Patient is well-developed. She is not diaphoretic.   HENT:      Head: Normocephalic and atraumatic.      Mouth/Throat: Mucous membranes are moist.   Eyes:      General: No scleral icterus.     Pupils: Pupils are equal, round, and reactive to light.   Cardiovascular:      Rate and Rhythm: Normal rate and regular rhythm.   Pulmonary:      Effort: Pulmonary effort is normal. No respiratory distress.      Breath sounds: No stridor.   Abdominal:      General: There is no distension.      Palpations: Abdomen is soft.   Musculoskeletal:         General: No deformity. Normal range of motion.      Cervical back: Neck supple.   Skin:     General: Skin is warm and dry.      Findings: No rash present. No erythema.   Neurological:      Mental Status: Patient is alert and oriented to person, place, and time.      Cranial Nerves: No cranial nerve deficit.   Psychiatric:         Behavior: Behavior normal.          Patient care time was spent personally by me on the following activities:     Obtaining a history.  Examination of patient.  Providing medical care at the patients bedside.  Developing a treatment plan with patient or surrogate and bedside caregivers.  Ordering and reviewing laboratory  studies, radiographic studies, pulse oximetry.  Ordering and performing treatments and interventions.  Evaluation of patient's response to treatment.  Discussions with consultants while on the unit and immediately available to the patient.  Re-evaluation of the patient's condition.  Documentation in the medical record.     Anjum Mcmullen MD    [facility]  [address] [place] [address]  [place] [id number]    [phone] - tel  [phone] - fax    7/3/2024

## 2024-07-03 NOTE — SUBJECTIVE & OBJECTIVE
Principal Problem:Acute renal failure superimposed on chronic kidney disease    Principal Orthopedic Problem:   Status post open reduction internal fixation left proximal humerus with septic joint.  Subsequent hardware removal with multiple irrigation and debridements.    Interval History:      Review of patient's allergies indicates:  No Known Allergies    Current Facility-Administered Medications   Medication    (Magic mouthwash) 1:1:1 diphenhydrAMINE(Benadryl) 12.5mg/5ml liq, aluminum & magnesium hydroxide-simethicone (Maalox), LIDOcaine viscous 2%    albuterol-ipratropium 2.5 mg-0.5 mg/3 mL nebulizer solution 3 mL    aluminum & magnesium hydroxide-simethicone 400-400-40 mg/5 mL suspension 15 mL    cefTRIAXone (ROCEPHIN) 2 g in dextrose 5 % in water (D5W) 100 mL IVPB (MB+)    DAPTOmycin (CUBICIN) 800 mg in 0.9% NaCl SolP 50 mL IVPB    dextrose 10% bolus 125 mL 125 mL    dextrose 10% bolus 250 mL 250 mL    epoetin leonel-epbx injection 14,100 Units    fentaNYL 50 mcg/mL injection 25 mcg    ferrous sulfate tablet 1 each    furosemide injection 20 mg    glucagon (human recombinant) injection 1 mg    glucose chewable tablet 16 g    glucose chewable tablet 24 g    HYDROcodone-acetaminophen  mg per tablet 1 tablet    HYDROcodone-acetaminophen 5-325 mg per tablet 1 tablet    insulin aspart U-100 pen 0-10 Units    insulin glargine U-100 (Lantus) pen 15 Units    LIDOcaine 5 % patch 1 patch    magnesium sulfate 2g in water 50mL IVPB (premix)    metoprolol injection 5 mg    metoprolol succinate (TOPROL-XL) 24 hr tablet 100 mg    naloxone 0.4 mg/mL injection 0.02 mg    ondansetron disintegrating tablet 8 mg    ondansetron injection 4 mg    oxyCODONE immediate release tablet 5 mg    potassium bicarbonate disintegrating tablet 35 mEq    potassium bicarbonate disintegrating tablet 60 mEq    potassium chloride SA CR tablet 20 mEq    potassium, sodium phosphates 280-160-250 mg packet 2 packet    potassium, sodium phosphates  280-160-250 mg packet 2 packet    potassium, sodium phosphates 280-160-250 mg packet 2 packet    promethazine tablet 25 mg    simethicone chewable tablet 80 mg    sodium chloride 0.9% flush 10 mL    sodium chloride 0.9% flush 10 mL    And    sodium chloride 0.9% flush 10 mL    sodium chloride 0.9% flush 10 mL    sodium chloride 0.9% flush 10 mL    traMADoL tablet 50 mg     Objective:     Vital Signs (Most Recent):  Temp: 97.3 °F (36.3 °C) (07/03/24 0336)  Pulse: 95 (07/03/24 0336)  Resp: 18 (07/03/24 0336)  BP: 110/71 (07/03/24 0336)  SpO2: 96 % (07/03/24 0336) Vital Signs (24h Range):  Temp:  [97.3 °F (36.3 °C)-98.7 °F (37.1 °C)] 97.3 °F (36.3 °C)  Pulse:  [] 95  Resp:  [18-22] 18  SpO2:  [93 %-97 %] 96 %  BP: (110-161)/(61-91) 110/71     Weight: (!) 141 kg (310 lb 13.6 oz)  Height: 6' (182.9 cm)  Body mass index is 42.16 kg/m².      Intake/Output Summary (Last 24 hours) at 7/3/2024 0639  Last data filed at 7/3/2024 0440  Gross per 24 hour   Intake 360 ml   Output 1500 ml   Net -1140 ml        General    Constitutional: He appears well-developed and well-nourished.   Pulmonary/Chest: Effort normal.             Left Shoulder Exam     Inspection/Observation   Bruising: absent  Deformity: absent    Tenderness   The patient is experiencing no tenderness.      Comments:    No change in physical exam from yesterday.  Dressing to left shoulder is clean dry and intact.  Surgical incision is healing well without wound dehiscence or drainage.  There is no erythema or ecchymosis.  Soft tissue swelling into the left hand is improved from earlier in the week.         Significant Labs: None    Significant Imaging: None

## 2024-07-03 NOTE — CARE UPDATE
Latest Reference Range & Units 07/03/24 12:16   POC PH 7.35 - 7.45  7.495 (H)   POC PCO2 35 - 45 mmHg 34.4 (L)   POC PO2 80 - 100 mmHg 72 (L)   POC HCO3 24 - 28 mmol/L 26.5   POC SATURATED O2 95 - 100 % 96   Sample  ARTERIAL   POC TCO2 23 - 27 mmol/L 28 (H)   POC BE -2 to 2 mmol/L 3 (H)   DelSys  CPAP/BiPAP   Site  RR   Mode  SPONT   Rate  32   (H): Data is abnormally high  (L): Data is abnormally low    Patient was on RA   Dr Vo notified

## 2024-07-03 NOTE — PLAN OF CARE
Plan of care reviewed with patient. Verbalized understanding. PICC line intact and patent. Purewick in place and draining. Patient refuses to wear tele monitor. Glucose monitored and covered as needed with sliding scale insulin. No complaint of pain during shift. Triad applied to sacral redness. C-pap in place. Safety maintained. Call light in reach and instructed to call for assistance. Will continue to monitor.

## 2024-07-03 NOTE — PLAN OF CARE
Problem: Adult Inpatient Plan of Care  Goal: Plan of Care Review  Outcome: Progressing  Goal: Patient-Specific Goal (Individualized)  Outcome: Progressing  Goal: Absence of Hospital-Acquired Illness or Injury  Outcome: Progressing  Goal: Optimal Comfort and Wellbeing  Outcome: Progressing  Goal: Readiness for Transition of Care  Outcome: Progressing     Problem: Diabetes Comorbidity  Goal: Blood Glucose Level Within Targeted Range  Outcome: Progressing     Problem: Acute Kidney Injury/Impairment  Goal: Fluid and Electrolyte Balance  Outcome: Progressing  Goal: Improved Oral Intake  Outcome: Progressing  Goal: Effective Renal Function  Outcome: Progressing     Problem: Sepsis/Septic Shock  Goal: Optimal Coping  Outcome: Progressing  Goal: Absence of Bleeding  Outcome: Progressing  Goal: Blood Glucose Level Within Targeted Range  Outcome: Progressing  Goal: Absence of Infection Signs and Symptoms  Outcome: Progressing  Goal: Optimal Nutrition Intake  Outcome: Progressing     Problem: Skin Injury Risk Increased  Goal: Skin Health and Integrity  Outcome: Progressing     Problem: Bariatric Environmental Safety  Goal: Safety Maintained with Care  Outcome: Progressing     Problem: Wound  Goal: Optimal Coping  Outcome: Progressing  Goal: Optimal Functional Ability  Outcome: Progressing  Goal: Absence of Infection Signs and Symptoms  Outcome: Progressing  Goal: Improved Oral Intake  Outcome: Progressing  Goal: Optimal Pain Control and Function  Outcome: Progressing  Goal: Skin Health and Integrity  Outcome: Progressing  Goal: Optimal Wound Healing  Outcome: Progressing     Problem: Infection  Goal: Absence of Infection Signs and Symptoms  Outcome: Progressing     Problem: Fall Injury Risk  Goal: Absence of Fall and Fall-Related Injury  Outcome: Progressing   Plan of care reviewed with patient. Patient verbalized complete understanding. Two hour patient rounding maintained throughout shift. All fall precautions maintained.  Bed in lowest position, locked, call light within reach. Side rails up x 2. Slip resistant socks maintained. Needs attended to.

## 2024-07-03 NOTE — CARE UPDATE
07/03/24 1450   Patient Assessment/Suction   Level of Consciousness (AVPU) alert   Respiratory Effort Unlabored   Expansion/Accessory Muscles/Retractions no use of accessory muscles   All Lung Fields Breath Sounds diminished   Rhythm/Pattern, Respiratory depth regular;pattern regular   Cough Frequency no cough   PRE-TX-O2   Device (Oxygen Therapy) CPAP  (home cpap)   SpO2 (!) 92 %   Pulse Oximetry Type Continuous   $ Pulse Oximetry - Multiple Charge Pulse Oximetry - Multiple   Pulse 110   Resp (!) 35   Aerosol Therapy   $ Aerosol Therapy Charges PRN treatment not required   Incentive Spirometer   $ Incentive Spirometer Charges done with encouragement   Incentive Spirometer Predicted Level (mL) 2200   Administration (IS) mouthpiece utilized   Number of Repetitions (IS) 10   Level Incentive Spirometer (mL) 1000   Patient Tolerance (IS) good   Preset CPAP/BiPAP Settings   Mode Of Delivery   (home cpap)   Respiratory Evaluation   $ Care Plan Tech Time 15 min

## 2024-07-03 NOTE — SUBJECTIVE & OBJECTIVE
Interval History: Patient states pain currently controlled. Somewhat short of breath. No cough.     Review of Systems   Constitutional:  Negative for activity change, appetite change, chills and fever.   HENT:  Negative for congestion, ear pain, nosebleeds and sinus pain.    Eyes:  Negative for discharge and itching.   Respiratory:  Positive for shortness of breath. Negative for apnea, cough and chest tightness.    Cardiovascular:  Negative for chest pain, palpitations and leg swelling.   Gastrointestinal:  Negative for abdominal distention, abdominal pain and vomiting.   Genitourinary:  Negative for difficulty urinating, dysuria, flank pain and frequency.   Musculoskeletal:  Positive for arthralgias. Negative for back pain, joint swelling and myalgias.   Skin:  Positive for wound. Negative for color change, pallor and rash.   Neurological:  Negative for dizziness, weakness, light-headedness and headaches.   Psychiatric/Behavioral:  Negative for agitation, behavioral problems, confusion and suicidal ideas.      Objective:     Vital Signs (Most Recent):  Temp: 97.3 °F (36.3 °C) (07/03/24 0336)  Pulse: 95 (07/03/24 0710)  Resp: 20 (07/03/24 0710)  BP: 110/71 (07/03/24 0336)  SpO2: 95 % (07/03/24 0710) Vital Signs (24h Range):  Temp:  [97.3 °F (36.3 °C)-98.7 °F (37.1 °C)] 97.3 °F (36.3 °C)  Pulse:  [] 95  Resp:  [18-22] 20  SpO2:  [93 %-97 %] 95 %  BP: (110-161)/(61-91) 110/71     Weight: (!) 141 kg (310 lb 13.6 oz)  Body mass index is 42.16 kg/m².    Intake/Output Summary (Last 24 hours) at 7/3/2024 0804  Last data filed at 7/3/2024 0440  Gross per 24 hour   Intake 360 ml   Output 1500 ml   Net -1140 ml         Physical Exam  Vitals reviewed.   Constitutional:       General: He is not in acute distress.     Appearance: Normal appearance. He is normal weight. He is not ill-appearing.   HENT:      Head: Normocephalic and atraumatic.      Nose: Nose normal.      Mouth/Throat:      Mouth: Mucous membranes are moist.       Pharynx: Oropharynx is clear.   Eyes:      General: No scleral icterus.     Extraocular Movements: Extraocular movements intact.      Conjunctiva/sclera: Conjunctivae normal.      Pupils: Pupils are equal, round, and reactive to light.   Cardiovascular:      Rate and Rhythm: Normal rate and regular rhythm.      Pulses: Normal pulses.      Heart sounds: Normal heart sounds. No murmur heard.     No gallop.   Pulmonary:      Effort: Pulmonary effort is normal. No respiratory distress.      Breath sounds: Normal breath sounds. No wheezing, rhonchi or rales.   Chest:      Chest wall: No tenderness.   Abdominal:      General: Abdomen is flat. There is no distension.      Palpations: Abdomen is soft.      Tenderness: There is no abdominal tenderness.   Musculoskeletal:         General: No swelling or tenderness.      Cervical back: Normal range of motion and neck supple. No rigidity or tenderness.      Right lower leg: No edema.      Left lower leg: No edema.   Skin:     General: Skin is warm and dry.      Findings: No lesion.      Comments: Surgical dressing on foot clean and dry.   Neurological:      General: No focal deficit present.      Mental Status: He is alert and oriented to person, place, and time. Mental status is at baseline.      Motor: No weakness.   Psychiatric:         Mood and Affect: Mood normal.         Behavior: Behavior normal.         Thought Content: Thought content normal.             Significant Labs: All pertinent labs within the past 24 hours have been reviewed.    Significant Imaging: I have reviewed all pertinent imaging results/findings within the past 24 hours.

## 2024-07-03 NOTE — ASSESSMENT & PLAN NOTE
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

## 2024-07-03 NOTE — NURSING
Nurses Note -- 4 Eyes      7/3/2024   2:37 PM      Skin assessed during: Admit      [] No Altered Skin Integrity Present    []Prevention Measures Documented      [x] Yes- Altered Skin Integrity Present or Discovered   [] LDA Added if Not in Epic (Describe Wound)   [] New Altered Skin Integrity was Present on Admit and Documented in LDA   [] Wound Image Taken    Wound Care Consulted? Yes    Attending Nurse:  Sherry Escoto RN    Second RN/Staff Member:  Sirena Witt RN

## 2024-07-03 NOTE — PLAN OF CARE
07/02/24 1954   Patient Assessment/Suction   Level of Consciousness (AVPU) alert   Respiratory Effort Normal;Unlabored   Rhythm/Pattern, Respiratory pattern regular   Cough Frequency no cough   PRE-TX-O2   Device (Oxygen Therapy) room air   SpO2 (!) 93 %   Pulse Oximetry Type Intermittent   Aerosol Therapy   $ Aerosol Therapy Charges PRN treatment not required   Incentive Spirometer   $ Incentive Spirometer Charges done with encouragement   Administration (IS) instruction provided, follow-up   Number of Repetitions (IS) 10   Level Incentive Spirometer (mL) 800   Patient Tolerance (IS) no adverse signs/symptoms present   Preset CPAP/BiPAP Settings   Mode Of Delivery other (see comments)  (pt placed on home cpap post assessment)

## 2024-07-03 NOTE — PROGRESS NOTES
[facility]/Surg   Department of Infectious Disease  Progress Note        PATIENT NAME: Roosevelt Moser  MRN: [id number]  TODAY'S DATE: 07/03/2024  ADMIT DATE: 6/14/2024  LOS: 19 days    CHIEF COMPLAINT: Weakness (Pt brought to ED via EMS with complaint of weakness and falling, pt advised EMS his urine is very dark.  )      PRINCIPLE PROBLEM: Acute renal failure superimposed on chronic kidney disease    INTERVAL HISTORY      06/19/2024: Seen and evaluated at bedside.  States left upper extremity pain better.  Having improved movement at the wrist and forearm.  Seen by Orthopedic surgery PA yesterday.  Notes reviewed.  Blood cultures remain negative.      06/20/2024:  Oral anticoagulants on hold to allow left shoulder arthrocentesis.  No other acute issues overnight.  Culture from right 3rd toe growing Staphylococcus aureus.  Blood culture remain negative.  Had aspiration left shoulder today that yielded purulent material.  Synovial fluid studies in progress.    06/21/2024:  Seen by Orthopedic surgery Service again yesterday.  For I and D today.  All cultures so far negative.  ASO titer normal.    06/29/2024:  Events of last 9 days noted.  He had I and D left shoulder with removal of humerus hardware 06/21/2024.  Had 2nd I&D 06/24/2024 and 3rd I&D 06/27/2024.  All cultures were negative with negative Gram stains.    06/30/2024:  No acute issues overnight.  Tolerating antibiotics okay.    07/02/2024:  He had right 2nd toe amputation yesterday 07/01/2024.  No other acute issues overnight.    07/03/2024:  He was noted to have left chest wall swelling today.  CT chest has demonstrated a 17 cm anterior and left chest wall abscess that extends into the mediastinum and peritoneal with partial destruction of left 6th, 7th and 8th anterior ribs.  Also showed bilateral pleural effusion worse on the left with compressive atelectasis/collapse of left lower lobe.  X-ray left shoulder and left humerus 07/02/2024 show  left femoral fracture with displacement.    Antibiotics (From admission, onward)      Start     Stop Route Frequency Ordered    06/24/24 1630  DAPTOmycin (CUBICIN) 800 mg in 0.9% NaCl SolP 50 mL IVPB         -- IV Every 24 hours (non-standard times) 06/24/24 1526    06/19/24 1045  cefTRIAXone (ROCEPHIN) 2 g in dextrose 5 % in water (D5W) 100 mL IVPB (MB+)         -- IV Every 24 hours (non-standard times) 06/19/24 0932    06/17/24 2100  mupirocin 2 % ointment         06/22/24 2059 Nasl 2 times daily 06/17/24 1544          Antifungals (From admission, onward)      Start     Stop Route Frequency Ordered    06/19/24 1045  clotrimazole megha 10 mg         06/29/24 0959 Oral 5 times daily 06/19/24 0932           Antivirals (From admission, onward)      None            ASSESSMENT and PLAN      1. Left upper extremity acute bacterial skin and skin structure infection with septic arthritis and humerus osteomyelitis.  He has had multiple I and D with removal of humerus hardware.  All cultures were negative most likely because of antibiotics received prior to surgery and cultures.  This has clinically improved.  Plan to treat for 6 weeks through 08/08/2024 for this indication.      2. Right 3rd toe osteomyelitis.  He previously received?  Dalbavancin x2 doses 1 month prior to hospitalization. Ulcer healed.  Antibiotics as above.    3. MRSA Right 2nd toe tip with possible early osteomyelitis of the distal tuft.  Toe was amputated 07/01/2024 as per patient's preference.  Surgically dealt with.      4. Left humerus fracture status post ORIF.  Hardware has been removed this hospitalization.  He is now left with a displaced fracture of the humeral head.  He will most likely require ORIF.  Defer to orthopedic surgeon.      6. Debility.  This is going to be a big challenge.  Patient of very motivated.  Long discussion with wife at bedside who recognizes this as well.  Hopefully, he will benefit from LTAC and maybe inpatient rehab  post discharge.    7. Left chest wall abscess with extension to the mediastinum on peritoneal extension associated with osteomyelitis of left 6th, 7th and 8th anterior wrist.  Of note, this developed while patient was on daptomycin and ceftriaxone.  He will need I&D to be performed by CVT +- General surgeon.  For now we will DC ceftriaxone.  Start Meropenem and micafungin.  Continue daptomycin for now. Reassess with abscess culture results.    8. Bilateral pleural effusion with left lower lobe collapse.  Diuresis and management as per hospitalist.      RECOMMENDATIONS:    DC ceftriaxone   Check blood culture x2 sets   Start ertapenem 1 g Q 8 hours and micafungin 100 mg Q 24 hours  Plan to transfer to [facility] for surgical intervention  And consider diuresis for bilateral pleural effusion.    Discussed with hospitalist.  Also discussed with patient and his wife at bedside.  Please send Epic secure chat with any questions.     SUBJECTIVE    Roosevelt Moser is a 73 y.o. male with history of diabetes mellitus, hypertension and previous going bowel syndrome.  He fell on 03/04/2024 and sustained a displaced comminuted fracture of the left humerus.  Seen by orthopedic surgeon with plan for ORIF which was deferred because of new AFib.  Admitted 03/09/2024 for rate control and ultimately discharged back home 03/12/2024.  Later had left humerus ORIF 03/25/2024 as a day procedure and was discharged home.       According to his wife, he developed pain and swelling of the 3rd toe and was with an ulcer.  It appears an x-ray of the foot was unremarkable.  Received 2 doses of an antibiotic that I presume to be dalbavancin 1 week apart in early April.     In the last 2 weeks he has continued to decline.  He also has reduction in urine output.  Fell twice at home on 06/13/2024 and wife activated EMS and he was brought to the hospital.  Received IV fluid for low normal blood pressure.  Also noted to have MARBIN with creatinine 5.0 and WBC  was 21 K.  UA was abnormal with> 100 WBC,> 100 RBC, 3+ LE and positive nitrite.  He has been managed conservatively for MARBIN and for dehydration.     X-ray of left shoulder and left upper extremity 06/17/2024 documented gas in soft tissue.  MRI right foot documented osteomyelitis of the 3rd toe.  I was asked to see to assist with his care.  ESR 95,  milligram/liter.     Antibiotics   Ceftriaxone:  06/14/2024-06/17/2024, 06/19/2024-  Zosyn:  06/17/2024-06/18/2024   Vancomycin:  06/18/2024-06/23/2024  Clindamycin:  06/18/2024-06/22/2024  Daptomycin:  06/24/2024-     Cultures   Blood culture 06/14/2024:  NGTD   Urine culture 06/14/2024: NGTD  Right second toe culture 06/18/2024: MRSA  Left shoulder synovial fluid culture 06/20/2024:  In progress    Review of Systems  Negative except as stated above in Interval History     OBJECTIVE   Temp:  [97.3 °F (36.3 °C)-98.4 °F (36.9 °C)] 97.3 °F (36.3 °C)  Pulse:  [] 110  Resp:  [18-20] 20  SpO2:  [93 %-97 %] 95 %  BP: (110-161)/(61-80) 145/67  Temp:  [97.3 °F (36.3 °C)-98.4 °F (36.9 °C)]   Temp: 97.3 °F (36.3 °C) (07/03/24 0336)  Pulse: 110 (07/03/24 1011)  Resp: 20 (07/03/24 1011)  BP: (!) 145/67 (07/03/24 1011)  SpO2: 95 % (07/03/24 1011)    Intake/Output Summary (Last 24 hours) at 7/3/2024 1144  Last data filed at 7/3/2024 0440  Gross per 24 hour   Intake 240 ml   Output 1500 ml   Net -1260 ml       Physical Exam  General:  Obese elderly man lying quietly in bed.  Resting comfortably.   Chest:  Swelling left chest that is fluctuant.  Nontender.  No erythema or induration.  Right foot: Dressing in place  Respiratory:  On CPAP.  Clear to auscultation anteriorly   Left upper extremity:  Clean dry arthroscopic incisions on shoulder.  Resolved erythema of upper extremity resolved edema.  CVS: S1 and 2 heard   Abdomen: Full, soft, nontender, no palpable organomegaly   Skin:  No rash appreciated    VAD:  None  ISOLATION:  None    WOUNDS: Right 2nd toe amputation stump  "wound.    Significant Labs: All pertinent labs within the past 24 hours have been reviewed.    CBC LAST 7 DAYS  Recent Labs   Lab 06/27/24  0424 06/28/24  0403 06/29/24  0520 06/30/24  0459 07/01/24  0523 07/02/24  0345 07/03/24  0302   WBC 17.46* 15.46* 16.36* 15.03* 13.54* 14.35* 12.51   RBC 3.14* 3.20* 3.45* 3.28* 3.19* 3.54* 3.06*   HGB 8.2* 8.3* 8.7* 8.3* 8.2* 9.0* 7.8*   HCT 25.7* 26.7* 28.5* 27.6* 26.3* 29.7* 25.3*   MCV 82 83 83 84 82 84 83   MCH 26.1* 25.9* 25.2* 25.3* 25.7* 25.4* 25.5*   MCHC 31.9* 31.1* 30.5* 30.1* 31.2* 30.3* 30.8*   RDW 18.1* 17.9* 17.9* 18.0* 18.1* 18.2* 18.2*   * 654* 603* 620* 568* 489* 418   MPV 9.5 9.3 9.2 9.8 9.4 11.5 11.1   GRAN 63.6  11.1* 59.9  9.3* 60.9  10.0* 64.2  9.7* 66.5  9.0* 63.5  9.1* 64.7  8.1*   LYMPH 18.0  3.1 20.1  3.1 18.9  3.1 17.2*  2.6 17.7*  2.4 16.4*  2.4 18.1  2.3   MONO 10.8  1.9* 11.2  1.7* 11.4  1.9* 12.4  1.9* 12.0  1.6* 12.2  1.8* 12.6  1.6*   BASO 0.14 0.13 0.18 0.17 0.13 0.16 0.09   NRBC 0 0 0 0 0 0 0       CHEMISTRY LAST 7 DAYS  Recent Labs   Lab 06/27/24  0424 06/28/24  0403 06/29/24  0520 06/30/24  0459 07/01/24  0523 07/02/24  0345 07/03/24  0302    143 141 142 143 145 142   K 3.6 3.6 4.0 3.6 3.2* 3.6 4.0    106 105 106 107 107 107   CO2 26 26 25 24 25 24 25   ANIONGAP 12 11 11 12 11 14 10   BUN 54* 48* 48* 41* 38* 46* 50*   CREATININE 1.4 1.4 1.3 1.1 1.1 1.2 1.2   * 171* 178* 139* 149* 201* 215*   CALCIUM 8.7 8.8 8.8 9.0 8.9 9.2 8.9   MG  --   --   --   --  1.5* 1.6  --    ALBUMIN 1.4* 1.4* 1.5* 1.5* 1.5* 1.6* 1.5*   PROT 6.1 6.6 6.6 6.7 6.7 7.1 6.5   ALKPHOS 176* 155* 153* 137* 134 143* 116   ALT 30 24 23 24 19 18 17   AST 51* 30 31 27 24 26 19   BILITOT 0.3 0.3 0.3 0.3 0.3 0.3 0.3       Estimated Creatinine Clearance: 81.1 mL/min (based on SCr of 1.2 mg/dL).    INFLAMMATORY/PROCAL  LAST 7 DAYS  Recent Labs   Lab 06/27/24 0424 07/03/24  0302   .2* 187.3*     No results found for: "ESR"  CRP "   Date Value Ref Range Status   07/03/2024 187.3 (H) 0.0 - 8.2 mg/L Final   06/27/2024 180.2 (H) 0.0 - 8.2 mg/L Final   06/26/2024 192.2 (H) 0.0 - 8.2 mg/L Final   06/25/2024 219.3 (H) 0.0 - 8.2 mg/L Final   06/24/2024 204.5 (H) 0.0 - 8.2 mg/L Final   06/23/2024 205.0 (H) 0.0 - 8.2 mg/L Final   06/18/2024 319.0 (H) 0.0 - 8.2 mg/L Final       PRIOR  MICROBIOLOGY:    Susceptibility data from last 90 days.  Collected Specimen Info Organism Ceftriaxone Clindamycin Erythromycin Oxacillin Penicillin Tetracycline Trimeth/Sulfa Vancomycin   06/23/24 Blood from Peripheral, Hand, Right No growth after 5 days.           06/18/24 Wound from Toe, Right Foot Methicillin resistant Staphylococcus aureus  R  S  R  R  R  S  S  S   06/14/24 Blood from Peripheral, Antecubital, Right No growth after 5 days.           06/14/24 Blood from Peripheral, Hand, Right No growth after 5 days.               LAST 7 DAYS MICROBIOLOGY   Microbiology Results (last 7 days)       Procedure Component Value Units Date/Time    Aerobic culture [[id number]] Collected: 06/27/24 0733    Order Status: Completed Specimen: Incision site from Shoulder, Left Updated: 07/01/24 0704     Aerobic Bacterial Culture No growth    Culture, Anaerobe [[id number]] Collected: 06/27/24 0733    Order Status: Completed Specimen: Incision site from Shoulder, Left Updated: 06/29/24 1441     Anaerobic Culture No anaerobes isolated    Fungus culture [[id number]] Collected: 06/20/24 1204    Order Status: Completed Specimen: Abscess from Shoulder, Left Updated: 06/29/24 0444     Fungus (Mycology) Culture No fungal growth to date    Fungus culture [[id number]] Collected: 06/21/24 1645    Order Status: Completed Specimen: Abscess from Shoulder, Left Updated: 06/29/24 0444     Fungus (Mycology) Culture No fungal growth to date    Gram stain [[id number]] Collected: 06/27/24 0733    Order Status: Completed Specimen: Incision site from Shoulder, Left Updated: 06/28/24 1517     Gram Stain  Result Few WBC's      No organisms seen    Aerobic culture [[id number]] Collected: 06/24/24 1622    Order Status: Completed Specimen: Incision site from Shoulder, Left Updated: 06/28/24 1035     Aerobic Bacterial Culture No growth    Blood culture [[id number]] Collected: 06/23/24 0659    Order Status: Completed Specimen: Blood from Peripheral, Hand, Right Updated: 06/28/24 1032     Blood Culture, Routine No growth after 5 days.    Culture, Anaerobe [[id number]] Collected: 06/24/24 1622    Order Status: Completed Specimen: Incision site from Shoulder, Left Updated: 06/27/24 1357     Anaerobic Culture No anaerobes isolated              CURRENT/PREVIOUS VISIT EKG  Results for orders placed or performed during the hospital encounter of 06/14/24   EKG 12-lead    Collection Time: 06/14/24 12:16 PM   Result Value Ref Range    QRS Duration 106 ms    [facility] QTC Calculation 443 ms    Narrative    Test Reason : R53.1,    Vent. Rate : 120 BPM     Atrial Rate : 159 BPM     P-R Int : 000 ms          QRS Dur : 106 ms      QT Int : 314 ms       P-R-T Axes : 000 -54 093 degrees     QTc Int : 443 ms    Atrial fibrillation with rapid ventricular response  Left axis deviation  Low voltage QRS  Inferior infarct ,age undetermined  Cannot rule out Anterior infarct ,age undetermined  Abnormal ECG  When compared with ECG of 25-MAR-2024 09:14,  Vent. rate has increased BY  46 BPM  Minimal criteria for Anterior infarct are now Present  Confirmed by Jesús HATHAWAY, Hansel AMIN ([id number]) on 6/20/2024 8:43:02 PM    Referred By: [email or web address]   SELF           Confirmed By:Hansel Espinosa MD     Significant Imaging: I have reviewed all relevant and available imaging results/findings within the past 24 hours.    I spent a total of >35 minutes on the day of the visit.This includes face to face time and non-face to face time preparing to see the patient (eg, review of tests), obtaining and/or reviewing separately obtained history, documenting clinical information in  the electronic or other health record, independently interpreting results and communicating results to the patient/family/caregiver, or care coordinator.    Vasquez Calvin MD  Date of Service: 07/03/2024      This note was created using [employer] voice recognition software that occasionally misinterpreted phrases or words.

## 2024-07-03 NOTE — NURSING
"Pt with tachypnea, home cpap in place for sensation of feeling short of breath. Noted to have softball sized soft swelling to lower anterior chest wall- pt denies pain with palpation, no redness or crepitus noted. Lungs with coarse sounds throughout, left base more diminished than right base. Wife at bedside stating that swelling is new from today- "that's how his shoulder started". O2 sat noted 93-95% with cpap in place. Dr Vo notified.  "

## 2024-07-03 NOTE — PROGRESS NOTES
[facility] Medicine  Progress Note    Patient Name: Roosevelt Moser  MRN: [id number]  Patient Class: IP- Inpatient   Admission Date: 6/14/2024  Length of Stay: 19 days  Attending Physician: Rosi Vo MD  Primary Care Provider: Miguel Angel Enriquez PA-C        Subjective:     Principal Problem:Acute renal failure superimposed on chronic kidney disease        HPI:  Roosevelt Moser is a 73 year old male with a previous medical history of atrial fibrillation on eliquis, HTN, DMII, obstructive sleep apnea, HLD, ORIF of left humerus and guillian-[place]ton who presented to the emergency room for weakness and multiple falls. Per wife of the patient he has had progressive weakness over the past week along with two falls one at 0300 Thursday and the other early morning Friday. Both time she had to activate EMS to pick patient up off floor due to weakness. Patient refused transport to hospital on both occasions. Today he slid out of his chair and was unable to get up without assistance and at this point the wife activated EMS to transport patient to the hospital. She endorses that two days ago she noticed that the patient had stopped urinating as he normally does. The patient concurs that he has noticed that his urine has decreased over the past two days and that it is dark. Patient denies dysuria or incomplete bladder emptying. Bladder scan showed zero upon admit and urine sample was obtained via straight cath in ED. Patient denies decreased PO intake and keeps a bottle of water with him at all times. Initial ED work-up showed a creatinine of 5 and potassium of 6. Urine studies positive for infection and WBC 20.81 with procalcitonin at 19.75. Blood cultures obtained and patient given 1 gram of rocephin and 1000ml of normal saline. Patient noted to bein afib with RVR and 20mg of diltiazem was administered IV which resulted in low blood pressure and 1 gram of calcium gluconate was administered. Patient  admitted by hospital medicine for further evaluation and management     Overview/Hospital Course:  Patient presented after fall.  He was found to have osteo of his toe and abscesses left shoulder.  He underwent washouts with surgery on his shoulder.  They cleared him on 06/28/2024. The patient was noted to have osteomyelitis of second right toe. Seen by podiatry, who performed amputation on 07/01. Patient noted to have dyspnea. CXR revealed bilateral pleural effusions. IV lasix started. Pending snf placement at this time.  Patient will need extended antibiotics per ID recommendations.       Interval History: Patient states pain currently controlled. Somewhat short of breath. No cough.     Review of Systems   Constitutional:  Negative for activity change, appetite change, chills and fever.   HENT:  Negative for congestion, ear pain, nosebleeds and sinus pain.    Eyes:  Negative for discharge and itching.   Respiratory:  Positive for shortness of breath. Negative for apnea, cough and chest tightness.    Cardiovascular:  Negative for chest pain, palpitations and leg swelling.   Gastrointestinal:  Negative for abdominal distention, abdominal pain and vomiting.   Genitourinary:  Negative for difficulty urinating, dysuria, flank pain and frequency.   Musculoskeletal:  Positive for arthralgias. Negative for back pain, joint swelling and myalgias.   Skin:  Positive for wound. Negative for color change, pallor and rash.   Neurological:  Negative for dizziness, weakness, light-headedness and headaches.   Psychiatric/Behavioral:  Negative for agitation, behavioral problems, confusion and suicidal ideas.      Objective:     Vital Signs (Most Recent):  Temp: 97.3 °F (36.3 °C) (07/03/24 0336)  Pulse: 95 (07/03/24 0710)  Resp: 20 (07/03/24 0710)  BP: 110/71 (07/03/24 0336)  SpO2: 95 % (07/03/24 0710) Vital Signs (24h Range):  Temp:  [97.3 °F (36.3 °C)-98.7 °F (37.1 °C)] 97.3 °F (36.3 °C)  Pulse:  [] 95  Resp:  [18-22]  20  SpO2:  [93 %-97 %] 95 %  BP: (110-161)/(61-91) 110/71     Weight: (!) 141 kg (310 lb 13.6 oz)  Body mass index is 42.16 kg/m².    Intake/Output Summary (Last 24 hours) at 7/3/2024 0804  Last data filed at 7/3/2024 0440  Gross per 24 hour   Intake 360 ml   Output 1500 ml   Net -1140 ml         Physical Exam  Vitals reviewed.   Constitutional:       General: He is not in acute distress.     Appearance: Normal appearance. He is normal weight. He is not ill-appearing.   HENT:      Head: Normocephalic and atraumatic.      Nose: Nose normal.      Mouth/Throat:      Mouth: Mucous membranes are moist.      Pharynx: Oropharynx is clear.   Eyes:      General: No scleral icterus.     Extraocular Movements: Extraocular movements intact.      Conjunctiva/sclera: Conjunctivae normal.      Pupils: Pupils are equal, round, and reactive to light.   Cardiovascular:      Rate and Rhythm: Normal rate and regular rhythm.      Pulses: Normal pulses.      Heart sounds: Normal heart sounds. No murmur heard.     No gallop.   Pulmonary:      Effort: Pulmonary effort is normal. No respiratory distress.      Breath sounds: Normal breath sounds. No wheezing, rhonchi or rales.   Chest:      Chest wall: No tenderness.   Abdominal:      General: Abdomen is flat. There is no distension.      Palpations: Abdomen is soft.      Tenderness: There is no abdominal tenderness.   Musculoskeletal:         General: No swelling or tenderness.      Cervical back: Normal range of motion and neck supple. No rigidity or tenderness.      Right lower leg: No edema.      Left lower leg: No edema.   Skin:     General: Skin is warm and dry.      Findings: No lesion.      Comments: Surgical dressing on foot clean and dry.   Neurological:      General: No focal deficit present.      Mental Status: He is alert and oriented to person, place, and time. Mental status is at baseline.      Motor: No weakness.   Psychiatric:         Mood and Affect: Mood normal.          Behavior: Behavior normal.         Thought Content: Thought content normal.             Significant Labs: All pertinent labs within the past 24 hours have been reviewed.    Significant Imaging: I have reviewed all pertinent imaging results/findings within the past 24 hours.    Assessment/Plan:      * Acute renal failure superimposed on chronic kidney disease  Appreciate input from nephrology  In setting of acute UTI and IVVD  Resumed gentle hydration on Zosyn/vanc but those have been stopped  He did not require oral NaHCO3  U/s is w/o obstruction, masses, etc        Pleural effusion  Continue IV lasix      MRSA infection    As per ID    Pyogenic arthritis of left shoulder region  As above      Abscess of left shoulder  As discussed under limb swelling, status post multiple washouts on 06/21/2024, 06/24/2024, 06/26/2024,6/27/24  Need antibiotics until 08/03/2024  Wound VAC in place.  Continue antibiotics.  Following cultures.  Pending possible LTAC placement.    Osteomyelitis of toe    ID and podiatry following, appreciate help  Appreciate input all consultants  Podiatry amputated 2nd toe 07/01.  Need antibiotics until 08/08/2024      Skin tear of left upper extremity  Local care, healing  Doubt this is a portal of entry    Swelling of limb, left  U/s w/o DVT  Appreciate help from Dr Bernal, ID, wound care, podiatry et al  Pt is already on OAC, holding for now pending shoulder procedure and toe amputation  Arm is elevated on pillow  Not red/painful, no compartment syndrome  D/w pt/wife      Debility  Multifactorial process  Pt not able to manage his own care and wife cannot adequately care for him at this time  PT/OT  SNF or possibly LTAC placement        Severe sepsis  In setting of acute UTI, MRSA OM right 2nd toe, and infected left shoulder hardware  BCX negative  Urine culture negative  Will need antibiotics until 08/08/2024 per ID    Hyperkalemia  Resolved  in setting of acute on chronic renal failure  Lokelma  given x 1 on admit  Avoid ACE/ARB, K+ replacement  tele          Atrial fibrillation with rapid ventricular response  Chronic AF w/ acute RVR, resolved  Required dilt drip on admission but off since 6/15  Tele some PVC/bigem/trigem  TSH 1.8 in March  Monitor lytes  No etoh/drug use  TTE from March 2024:    Left Ventricle: The left ventricle is normal in size. Normal wall thickness. Mild global hypokinesis present. There is mildly reduced systolic function with a visually estimated ejection fraction of 45 - 50%. There is normal diastolic function.    Right Ventricle: Normal right ventricular cavity size. Wall thickness is normal. Right ventricle wall motion  is normal. Systolic function is normal.    Left Atrium: Left atrium is moderately dilated.    IVC/SVC: Normal venous pressure at 3 mmHg.  OAC has been on hold d/t possible need for shoulder surgery - has been on prophylaxis dose of LovenoxResume Eliquis as soon as feasible postop      History of Guillain-Calliham syndrome  No acute issues  However, pt has impaired mobility and is acutely weakened from his sepsis/UTI/AF RVR  PT/OT working w/ pt  Wife cannot manage his care at home currently  SNF assessment underway, may need LTAC         Type 2 diabetes mellitus  A1c 6.3%  SSI      Hypertension  Home meds as appropriate    MARA (obstructive sleep apnea)  Continue CPAP HS and w/ naps      Morbid obesity  Body mass index is 42.16 kg/m².  Morbid obesity complicates all aspects of disease management from diagnostic modalities to treatment  Weight loss encouraged and health benefits explained to patient  He has been working on weight loss at home w/ reduced caloric intake          VTE Risk Mitigation (From admission, onward)           Ordered     Reason for No Pharmacological VTE Prophylaxis  Once        Question:  Reasons:  Answer:  Already adequately anticoagulated on oral Anticoagulants    06/14/24 1438     IP VTE HIGH RISK PATIENT  Once         06/14/24 1438     Place  sequential compression device  Until discontinued         06/14/24 1438                    Discharge Planning   KAMILA: 7/5/2024     Code Status: Full Code   Is the patient medically ready for discharge?:     Reason for patient still in hospital (select all that apply): Patient trending condition  Discharge Plan A: Skilled Nursing Facility                  Rosi Vo MD, MD  Department of Hospital Medicine   [facility]/Surg

## 2024-07-03 NOTE — CARE UPDATE
Patient with dyspnea and mild tachycardia. Noted to have area of soft tissue swelling distal to left breast. CT chest ordered to r/o abscess. Patient transferred to stepdown for closer monitoring.

## 2024-07-03 NOTE — CONSULTS
Pulmonary/Critical Care Consult      Patient name: Roosevelt Moser  MRN: [id number]  Date: 07/03/2024    Admit Date: 6/14/2024  Consult Requested By: Philip Trevino MD    Reason for Consult: Pleural effusion, chest wall abscess    HPI:    7/3/2024 - Pt has been at [facility] for several weeks with toe osteomyelitis and shoulder abscess and has been treated.  Today he was noted to have left chest wall swelling and CT chest shows chest wall abscess with extension into chest and mediastinum and rib destruction.  He also has pleural effusions.  He was transferred here for possible surgical intervention and I am told that surgery feels that we also need CT surgery involved.  CT surgery is in a case at another facility and we are waiting to hear from him.  Another option may be transfer to [facility] for further management.  He feels OK, is wearing CpAP (a chronic treatment), denies any acute respiratory issues.  He admits to should and chest discomfort.  He denies fever.  He denies any history of chronic lung disease, he has a ho MARA and also Guillain Fairlee many years ago.      Review of Systems    Review of Systems   Constitutional:  Positive for malaise/fatigue. Negative for chills, diaphoresis, fever and weight loss.   HENT:  Negative for congestion.    Eyes:  Negative for pain.   Respiratory:  Negative for cough, hemoptysis, sputum production, shortness of breath, wheezing and stridor.    Cardiovascular:  Positive for chest pain. Negative for palpitations, orthopnea, claudication, leg swelling and PND.   Gastrointestinal:  Negative for abdominal pain, constipation, diarrhea, heartburn, nausea and vomiting.   Genitourinary:  Negative for dysuria, frequency and urgency.   Musculoskeletal:  Positive for joint pain. Negative for falls and myalgias.   Neurological:  Positive for weakness. Negative for sensory change and focal weakness.   Psychiatric/Behavioral:  Negative for depression. The patient is not nervous/anxious.         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    Hyperlipidemia     Hypertension 2016    Sleep apnea        Past Surgical History    Past Surgical History:   Procedure Laterality Date    ARTHROTOMY OF SHOULDER Left 6/21/2024    Procedure: ARTHROTOMY, SHOULDER;  Surgeon: Roman Paulson MD;  Location: [facility] OR;  Service: Orthopedics;  Laterality: Left;    IRRIGATION AND DEBRIDEMENT OF UPPER EXTREMITY Left 6/24/2024    Procedure: IRRIGATION AND DEBRIDEMENT, UPPER EXTREMITY;  Surgeon: Nathan Cooper MD;  Location: [facility] OR;  Service: Orthopedics;  Laterality: Left;    IRRIGATION AND DEBRIDEMENT OF UPPER EXTREMITY Left 6/27/2024    Procedure: IRRIGATION AND DEBRIDEMENT, UPPER EXTREMITY;  Surgeon: Nathan Cooper MD;  Location: [facility] OR;  Service: Orthopedics;  Laterality: Left;    OPEN REDUCTION AND INTERNAL FIXATION (ORIF) OF FRACTURE OF PROXIMAL HUMERUS Left 3/25/2024    Procedure: ORIF, FRACTURE, HUMERUS, PROXIMAL/IM HANNA, LEFT;  Surgeon: Nathan Cooper MD;  Location: [facility] OR;  Service: Orthopedics;  Laterality: Left;  Accumed, Synthes Small Frag set Avelino verified 3/22/24 ark    TOE AMPUTATION Right 7/1/2024    Procedure: AMPUTATION, TOE;  Surgeon: Stevie Zhu DPM;  Location: [facility] OR;  Service: Podiatry;  Laterality: Right;       Medications (scheduled):      (Magic mouthwash) 1:1:1 diphenhydrAMINE(Benadryl) 12.5mg/5ml liq, aluminum & magnesium hydroxide-simethicone (Maalox), LIDOcaine viscous 2%  10 mL Swish & Spit QID    ceftaroline (Teflaro) IV (PEDS and ADULTS)  600 mg Intravenous Q8H    epoetin leonel-epbx  100 Units/kg Subcutaneous Q7 Days    ferrous sulfate  1 tablet Oral Daily    furosemide (LASIX) injection  20 mg Intravenous Q12H    insulin glargine U-100  15 Units Subcutaneous Daily    LIDOcaine  1 patch Transdermal Q24H    meropenem IV (PEDS and ADULTS)  1 g Intravenous Q8H    metoprolol succinate  100  mg Oral QHS    micafungin (MYCAMINE) IVPB  100 mg Intravenous Q24H    potassium chloride  20 mEq Oral BID    sodium chloride 0.9%  10 mL Intravenous Q6H       Medications (infusions):         Medications (prn):       Current Facility-Administered Medications:     albuterol-ipratropium, 3 mL, Nebulization, Q4H PRN    aluminum & magnesium hydroxide-simethicone, 15 mL, Oral, QID PRN    dextrose 10%, 12.5 g, Intravenous, PRN    dextrose 10%, 25 g, Intravenous, PRN    fentaNYL, 25 mcg, Intravenous, Q5 Min PRN    glucagon (human recombinant), 1 mg, Intramuscular, PRN    glucose, 16 g, Oral, PRN    glucose, 24 g, Oral, PRN    HYDROcodone-acetaminophen, 1 tablet, Oral, Q4H PRN    HYDROcodone-acetaminophen, 1 tablet, Oral, Q4H PRN    insulin aspart U-100, 0-10 Units, Subcutaneous, QID (AC + HS) PRN    magnesium sulfate IVPB, 2 g, Intravenous, Daily PRN    metoprolol, 5 mg, Intravenous, Q5 Min PRN    naloxone, 0.02 mg, Intravenous, PRN    ondansetron, 8 mg, Oral, Q8H PRN    ondansetron, 4 mg, Intravenous, Once PRN    oxyCODONE, 5 mg, Oral, Once PRN    potassium bicarbonate, 35 mEq, Oral, PRN    potassium bicarbonate, 60 mEq, Oral, PRN    potassium, sodium phosphates, 2 packet, Oral, PRN    potassium, sodium phosphates, 2 packet, Oral, PRN    potassium, sodium phosphates, 2 packet, Oral, PRN    promethazine, 25 mg, Oral, Q6H PRN    simethicone, 1 tablet, Oral, QID PRN    sodium chloride 0.9%, 10 mL, Intravenous, Q12H PRN    Flushing PICC/Midline Protocol, , , Until Discontinued **AND** sodium chloride 0.9%, 10 mL, Intravenous, Q6H **AND** sodium chloride 0.9%, 10 mL, Intravenous, PRN    sodium chloride 0.9%, 10 mL, Intravenous, Q12H PRN    sodium chloride 0.9%, 10 mL, Intravenous, PRN    traMADoL, 50 mg, Oral, Q4H PRN    Family History: No family history on file.    Social History: Tobacco:   Social History     Tobacco Use   Smoking Status Never   Smokeless Tobacco Never                                EtOH:   Social History      Substance and Sexual Activity   Alcohol Use Yes    Alcohol/week: 0.0 standard drinks of alcohol    Comment: social                                Drugs:   Social History     Substance and Sexual Activity   Drug Use No       Physical Exam    Vital signs:  Temp:  [97.3 °F (36.3 °C)-98.9 °F (37.2 °C)]   Pulse:  []   Resp:  [18-45]   BP: (105-151)/(60-80)   SpO2:  [91 %-98 %]     Intake/Output:   Intake/Output Summary (Last 24 hours) at 7/3/2024 1759  Last data filed at 7/3/2024 1651  Gross per 24 hour   Intake 1393.61 ml   Output 1900 ml   Net -506.39 ml        BMI: Estimated body mass index is 42.16 kg/m² as calculated from the following:    Height as of this encounter: 6' (1.829 m).    Weight as of this encounter: 141 kg (310 lb 13.6 oz).    Physical Exam  Vitals and nursing note reviewed.   Constitutional:       General: He is not in acute distress.     Appearance: Normal appearance. He is obese. He is ill-appearing. He is not toxic-appearing or diaphoretic.      Comments: Wearing CPAP   HENT:      Head: Normocephalic and atraumatic.      Right Ear: External ear normal.      Left Ear: External ear normal.      Nose: Nose normal.      Mouth/Throat:      Mouth: Mucous membranes are moist.      Pharynx: Oropharynx is clear. No oropharyngeal exudate.   Eyes:      General: No scleral icterus.        Right eye: No discharge.         Left eye: No discharge.      Extraocular Movements: Extraocular movements intact.      Conjunctiva/sclera: Conjunctivae normal.      Pupils: Pupils are equal, round, and reactive to light.   Neck:      Vascular: No carotid bruit.   Cardiovascular:      Rate and Rhythm: Normal rate and regular rhythm.      Pulses: Normal pulses.      Heart sounds: Normal heart sounds. No murmur heard.     No friction rub. No gallop.      Comments: + chest wall abscess  Pulmonary:      Effort: Pulmonary effort is normal. No respiratory distress.      Breath sounds: Normal breath sounds. No stridor. No  "wheezing, rhonchi or rales.      Comments: Decreased in the dependent areas  Chest:      Chest wall: No tenderness.   Abdominal:      General: Bowel sounds are normal. There is no distension.      Tenderness: There is no abdominal tenderness. There is no guarding.      Comments: obese   Musculoskeletal:         General: No swelling. Normal range of motion.      Cervical back: Normal range of motion and neck supple. No rigidity or tenderness.      Right lower leg: No edema.      Left lower leg: No edema.      Comments: Surgical dressing on foot   Lymphadenopathy:      Cervical: No cervical adenopathy.   Skin:     General: Skin is warm and dry.      Capillary Refill: Capillary refill takes less than 2 seconds.      Coloration: Skin is not jaundiced.      Findings: No bruising.   Neurological:      General: No focal deficit present.      Mental Status: He is alert. Mental status is at baseline.      Cranial Nerves: No cranial nerve deficit.      Sensory: No sensory deficit.      Motor: No weakness.      Comments: Oriented x 2   Psychiatric:         Mood and Affect: Mood normal.         Behavior: Behavior normal.         Thought Content: Thought content normal.         Judgment: Judgment normal.      Comments: Calm          Laboratory    Recent Labs   Lab 07/03/24  0302   WBC 12.51   RBC 3.06*   HGB 7.8*   HCT 25.3*      MCV 83   MCH 25.5*   MCHC 30.8*       Recent Labs   Lab 07/03/24  0302   CALCIUM 8.9   ALBUMIN 1.5*   PROT 6.5      K 4.0   CO2 25      BUN 50*   CREATININE 1.2   ALKPHOS 116   ALT 17   AST 19   BILITOT 0.3       No results for input(s): "PT", "INR", "APTT" in the last 24 hours.    No results for input(s): "CPK", "CPKMB", "TROPONINI", "MB" in the last 24 hours.    Additional labs: reviewed    Microbiology:       Microbiology Results (last 7 days)       Procedure Component Value Units Date/Time    Blood culture [[id number]] Collected: 07/03/24 1332    Order Status: Sent Specimen: Blood " from Peripheral, Hand, Left     Aerobic culture [[id number]] Collected: 06/27/24 0733    Order Status: Completed Specimen: Incision site from Shoulder, Left Updated: 07/01/24 0704     Aerobic Bacterial Culture No growth    Culture, Anaerobe [[id number]] Collected: 06/27/24 0733    Order Status: Completed Specimen: Incision site from Shoulder, Left Updated: 06/29/24 1441     Anaerobic Culture No anaerobes isolated    Fungus culture [[id number]] Collected: 06/20/24 1204    Order Status: Completed Specimen: Abscess from Shoulder, Left Updated: 06/29/24 0444     Fungus (Mycology) Culture No fungal growth to date    Fungus culture [[id number]] Collected: 06/21/24 1645    Order Status: Completed Specimen: Abscess from Shoulder, Left Updated: 06/29/24 0444     Fungus (Mycology) Culture No fungal growth to date    Gram stain [[id number]] Collected: 06/27/24 0733    Order Status: Completed Specimen: Incision site from Shoulder, Left Updated: 06/28/24 1516     Gram Stain Result Few WBC's      No organisms seen    Aerobic culture [[id number]] Collected: 06/24/24 1622    Order Status: Completed Specimen: Incision site from Shoulder, Left Updated: 06/28/24 1035     Aerobic Bacterial Culture No growth    Blood culture [[id number]] Collected: 06/23/24 0659    Order Status: Completed Specimen: Blood from Peripheral, Hand, Right Updated: 06/28/24 1032     Blood Culture, Routine No growth after 5 days.    Culture, Anaerobe [[id number]] Collected: 06/24/24 1622    Order Status: Completed Specimen: Incision site from Shoulder, Left Updated: 06/27/24 1357     Anaerobic Culture No anaerobes isolated            Radiology    CT Chest Without Contrast  Narrative: EXAMINATION:  CT CHEST WITHOUT CONTRAST    CLINICAL HISTORY:  Dyspnea, chronic, chest wall or pleura disease suspected;Patient with possible chest wall abscess left side;    TECHNIQUE:  Low dose axial images, sagittal and coronal reformations were obtained from the thoracic inlet  to the lung bases. Contrast was not administered.    COMPARISON:  Chest x-ray of July 2, 2024    FINDINGS:  There is a large abscess of the anterior and left anterior chest wall 17 cm in length, 17 cm transversely and 8.8 cm front to back.  This is centered around the anterior 6th 7th and 8th ribs which are partially destroyed.  There is extension into the anterior mediastinum and anterior peritoneum.  Multiple air bubbles are noted.    The patient is seen to have a moderate left pleural effusion and complete atelectasis of the left lower lobe.  There is a small to moderate right pleural effusion and mild atelectasis at the right lung base.  Otherwise intrapulmonary mass is not seen.  The heart and great vessels are of normal size and contour.  Impression: Large abscess of the anterior central and left chest wall and abdominal wall measuring 17 cm transversely.  This extends into the mediastinum and peritoneum with partial destruction of the anterior 6th 7th and 8th ribs.    Complete atelectasis of the left lower lobe with a moderate left pleural effusion.  Mild atelectasis right lung base with a small to moderate right pleural effusion.    Electronically signed by: Milind Barrios MD  Date:    07/03/2024  Time:    12:16        Additional Studies    reviewed    Ventilator Information    Oxygen Concentration (%):  [21] 21             Recent Labs     07/03/24  1216   PH 7.495*   PCO2 34.4*   PO2 72*   HCO3 26.5   POCSATURATED 96   BE 3*         Impression    Active Hospital Problems    Diagnosis  POA    *Acute renal failure superimposed on chronic kidney disease [N17.9, N18.9]  Yes    Pleural effusion [J90]  No    MRSA infection [A49.02]  Yes    Pyogenic arthritis of left shoulder region [M00.9]  Yes    Osteomyelitis of toe [M86.9]  Yes    Abscess of left shoulder [L02.414]  Yes    Debility [R53.81]  Yes    Swelling of limb, left [M79.89]  Yes    Skin tear of left upper extremity [S41.112A]  Yes    Hyperkalemia  [E87.5]  Yes    Severe sepsis [A41.9, R65.20]  Yes    Atrial fibrillation with rapid ventricular response [I48.91]  Yes    History of Guillain-[place] syndrome [Z86.69]  Not Applicable    Type 2 diabetes mellitus [E11.9]  Yes    MARA (obstructive sleep apnea) [G47.33]  Yes    Morbid obesity [E66.01]  Yes    Hypertension [I10]  Yes      Resolved Hospital Problems   No resolved problems to display.       Plan    Respiratory status stable at this time  CPAP as needed, O2 as able  Broad spectrum antibiotics  Needs surgical intervention - await CT surgery evaluation - it is possible that he may need to be transferred to [facility]  I suspect he will need extensive debridement, tissue removal, drain, chest tubes  Check TSH  Watch volume status  Continue DM treatment  Continue diuresis - his I/O are negative  High risk for decompensation    Thank you for this consult.  I will follow with you while the patient is hospitalized.        Alessandro Mayo MD  [facility] Pulmonary/Critical Care  07/03/2024

## 2024-07-03 NOTE — CARE UPDATE
Patient with large intrathoracic abscess involving mediastinum. Will require CT surgery. With hypoxia, tachycardic. Will transfer to ICU at [facility].

## 2024-07-03 NOTE — PLAN OF CARE
Problem: Physical Therapy  Goal: Physical Therapy Goal  Description: Goals to be met by: 7/15/24     Patient will increase functional independence with mobility by performin. Supine to sit with mod assist  2. Sit to stand transfer with mod assist  3. Bed to chair transfer with mod assist using Rolling Walker  4. Gait  x 10 feet with moderate Assistance using Rolling Walker.   5. Lower extremity exercise program x20 reps    R cam walker boot/PWB and LUE WBAT with walker      Outcome: Progressing   Thera ex in supine. Pt c/o increased SOB and requiring O2. Mass L chest noted. Nursing aware, Max assist x2 for bed mobility. Mod intensity

## 2024-07-04 ENCOUNTER — ANESTHESIA EVENT (OUTPATIENT)
Dept: SURGERY | Facility: HOSPITAL | Age: 74
End: 2024-07-04
Payer: MEDICARE

## 2024-07-04 ENCOUNTER — ANESTHESIA (OUTPATIENT)
Dept: SURGERY | Facility: HOSPITAL | Age: 74
End: 2024-07-04
Payer: MEDICARE

## 2024-07-04 PROBLEM — L02.213 CHEST WALL ABSCESS: Status: ACTIVE | Noted: 2024-07-04

## 2024-07-04 LAB
ALBUMIN SERPL BCP-MCNC: 2.3 G/DL (ref 3.5–5.2)
ALP SERPL-CCNC: 101 U/L (ref 55–135)
ALT SERPL W/O P-5'-P-CCNC: 12 U/L (ref 10–44)
ANION GAP SERPL CALC-SCNC: 10 MMOL/L (ref 8–16)
APPEARANCE FLD: ABNORMAL
AST SERPL-CCNC: 14 U/L (ref 10–40)
BASOPHILS # BLD AUTO: 0.09 K/UL (ref 0–0.2)
BASOPHILS # BLD AUTO: 0.09 K/UL (ref 0–0.2)
BASOPHILS NFR BLD: 0.7 % (ref 0–1.9)
BASOPHILS NFR BLD: 0.7 % (ref 0–1.9)
BILIRUB SERPL-MCNC: 0.7 MG/DL (ref 0.1–1)
BODY FLD TYPE: ABNORMAL
BODY FLUID SOURCE, LDH: NORMAL
BODY FLUID SOURCE: NORMAL
BUN SERPL-MCNC: 43 MG/DL (ref 8–23)
CALCIUM SERPL-MCNC: 8 MG/DL (ref 8.7–10.5)
CHLORIDE SERPL-SCNC: 104 MMOL/L (ref 95–110)
CK SERPL-CCNC: <10 U/L (ref 20–200)
CO2 SERPL-SCNC: 27 MMOL/L (ref 23–29)
COLOR FLD: ABNORMAL
CREAT SERPL-MCNC: 1.1 MG/DL (ref 0.5–1.4)
DIFFERENTIAL METHOD BLD: ABNORMAL
DIFFERENTIAL METHOD BLD: ABNORMAL
EOSINOPHIL # BLD AUTO: 0.1 K/UL (ref 0–0.5)
EOSINOPHIL # BLD AUTO: 0.5 K/UL (ref 0–0.5)
EOSINOPHIL NFR BLD: 0.8 % (ref 0–8)
EOSINOPHIL NFR BLD: 3.8 % (ref 0–8)
ERYTHROCYTE [DISTWIDTH] IN BLOOD BY AUTOMATED COUNT: 17.3 % (ref 11.5–14.5)
ERYTHROCYTE [DISTWIDTH] IN BLOOD BY AUTOMATED COUNT: 17.3 % (ref 11.5–14.5)
EST. GFR  (NO RACE VARIABLE): >60 ML/MIN/1.73 M^2
GLUCOSE SERPL-MCNC: 155 MG/DL (ref 70–110)
GLUCOSE SERPL-MCNC: 184 MG/DL (ref 70–110)
GLUCOSE SERPL-MCNC: 197 MG/DL (ref 70–110)
GLUCOSE SERPL-MCNC: 214 MG/DL (ref 70–110)
GLUCOSE SERPL-MCNC: 232 MG/DL (ref 70–110)
HCT VFR BLD AUTO: 26.6 % (ref 40–54)
HCT VFR BLD AUTO: 26.6 % (ref 40–54)
HGB BLD-MCNC: 8.2 G/DL (ref 14–18)
HGB BLD-MCNC: 8.3 G/DL (ref 14–18)
IMM GRANULOCYTES # BLD AUTO: 0.1 K/UL (ref 0–0.04)
IMM GRANULOCYTES # BLD AUTO: 0.19 K/UL (ref 0–0.04)
IMM GRANULOCYTES NFR BLD AUTO: 0.7 % (ref 0–0.5)
IMM GRANULOCYTES NFR BLD AUTO: 1.4 % (ref 0–0.5)
LDH FLD L TO P-CCNC: 161 U/L
LDH SERPL L TO P-CCNC: 215 U/L (ref 110–260)
LYMPHOCYTES # BLD AUTO: 1.8 K/UL (ref 1–4.8)
LYMPHOCYTES # BLD AUTO: 2.4 K/UL (ref 1–4.8)
LYMPHOCYTES NFR BLD: 13.3 % (ref 18–48)
LYMPHOCYTES NFR BLD: 17.8 % (ref 18–48)
LYMPHOCYTES NFR FLD MANUAL: 72 %
MAGNESIUM SERPL-MCNC: 1.5 MG/DL (ref 1.6–2.6)
MAGNESIUM SERPL-MCNC: 1.7 MG/DL (ref 1.6–2.6)
MCH RBC QN AUTO: 25.2 PG (ref 27–31)
MCH RBC QN AUTO: 25.5 PG (ref 27–31)
MCHC RBC AUTO-ENTMCNC: 30.8 G/DL (ref 32–36)
MCHC RBC AUTO-ENTMCNC: 31.2 G/DL (ref 32–36)
MCV RBC AUTO: 82 FL (ref 82–98)
MCV RBC AUTO: 82 FL (ref 82–98)
MONOCYTES # BLD AUTO: 1.2 K/UL (ref 0.3–1)
MONOCYTES # BLD AUTO: 1.7 K/UL (ref 0.3–1)
MONOCYTES NFR BLD: 12.9 % (ref 4–15)
MONOCYTES NFR BLD: 9.1 % (ref 4–15)
MONOS+MACROS NFR FLD MANUAL: 14 %
NEUTROPHILS # BLD AUTO: 10.2 K/UL (ref 1.8–7.7)
NEUTROPHILS # BLD AUTO: 8.6 K/UL (ref 1.8–7.7)
NEUTROPHILS NFR BLD: 63.4 % (ref 38–73)
NEUTROPHILS NFR BLD: 75.4 % (ref 38–73)
NEUTROPHILS NFR FLD MANUAL: 12 %
NRBC BLD-RTO: 0 /100 WBC
NRBC BLD-RTO: 0 /100 WBC
OTHER CELLS FLD MANUAL: 2 %
PH, BODY FLUID: 7.63 (ref 7.6–7.64)
PLATELET # BLD AUTO: 457 K/UL (ref 150–450)
PLATELET # BLD AUTO: 485 K/UL (ref 150–450)
PMV BLD AUTO: 9.6 FL (ref 9.2–12.9)
PMV BLD AUTO: 9.9 FL (ref 9.2–12.9)
POTASSIUM SERPL-SCNC: 3.9 MMOL/L (ref 3.5–5.1)
POTASSIUM SERPL-SCNC: 4.1 MMOL/L (ref 3.5–5.1)
PROT FLD-MCNC: 3.2 G/DL
PROT SERPL-MCNC: 6.2 G/DL (ref 6–8.4)
RBC # BLD AUTO: 3.25 M/UL (ref 4.6–6.2)
RBC # BLD AUTO: 3.26 M/UL (ref 4.6–6.2)
SODIUM SERPL-SCNC: 141 MMOL/L (ref 136–145)
SPECIMEN SOURCE: NORMAL
WBC # BLD AUTO: 13.49 K/UL (ref 3.9–12.7)
WBC # BLD AUTO: 13.56 K/UL (ref 3.9–12.7)
WBC # FLD: 1108 /CU MM

## 2024-07-04 PROCEDURE — 63600175 PHARM REV CODE 636 W HCPCS: Mod: JZ,JG | Performed by: FAMILY MEDICINE

## 2024-07-04 PROCEDURE — 85025 COMPLETE CBC W/AUTO DIFF WBC: CPT | Performed by: INTERNAL MEDICINE

## 2024-07-04 PROCEDURE — 25000003 PHARM REV CODE 250: Performed by: THORACIC SURGERY (CARDIOTHORACIC VASCULAR SURGERY)

## 2024-07-04 PROCEDURE — C1729 CATH, DRAINAGE: HCPCS | Performed by: THORACIC SURGERY (CARDIOTHORACIC VASCULAR SURGERY)

## 2024-07-04 PROCEDURE — 0W9B3ZZ DRAINAGE OF LEFT PLEURAL CAVITY, PERCUTANEOUS APPROACH: ICD-10-PCS | Performed by: THORACIC SURGERY (CARDIOTHORACIC VASCULAR SURGERY)

## 2024-07-04 PROCEDURE — 37000008 HC ANESTHESIA 1ST 15 MINUTES: Performed by: THORACIC SURGERY (CARDIOTHORACIC VASCULAR SURGERY)

## 2024-07-04 PROCEDURE — 63600175 PHARM REV CODE 636 W HCPCS: Performed by: INTERNAL MEDICINE

## 2024-07-04 PROCEDURE — 25000003 PHARM REV CODE 250: Performed by: NURSE ANESTHETIST, CERTIFIED REGISTERED

## 2024-07-04 PROCEDURE — 99900031 HC PATIENT EDUCATION (STAT)

## 2024-07-04 PROCEDURE — 80053 COMPREHEN METABOLIC PANEL: CPT | Performed by: INTERNAL MEDICINE

## 2024-07-04 PROCEDURE — 84132 ASSAY OF SERUM POTASSIUM: CPT | Performed by: INTERNAL MEDICINE

## 2024-07-04 PROCEDURE — 85025 COMPLETE CBC W/AUTO DIFF WBC: CPT | Mod: 91 | Performed by: INTERNAL MEDICINE

## 2024-07-04 PROCEDURE — 83986 ASSAY PH BODY FLUID NOS: CPT | Performed by: THORACIC SURGERY (CARDIOTHORACIC VASCULAR SURGERY)

## 2024-07-04 PROCEDURE — 83615 LACTATE (LD) (LDH) ENZYME: CPT | Performed by: THORACIC SURGERY (CARDIOTHORACIC VASCULAR SURGERY)

## 2024-07-04 PROCEDURE — 94761 N-INVAS EAR/PLS OXIMETRY MLT: CPT

## 2024-07-04 PROCEDURE — 87102 FUNGUS ISOLATION CULTURE: CPT | Performed by: THORACIC SURGERY (CARDIOTHORACIC VASCULAR SURGERY)

## 2024-07-04 PROCEDURE — 87070 CULTURE OTHR SPECIMN AEROBIC: CPT | Performed by: THORACIC SURGERY (CARDIOTHORACIC VASCULAR SURGERY)

## 2024-07-04 PROCEDURE — 63600175 PHARM REV CODE 636 W HCPCS: Mod: JZ,JG | Performed by: STUDENT IN AN ORGANIZED HEALTH CARE EDUCATION/TRAINING PROGRAM

## 2024-07-04 PROCEDURE — 37000009 HC ANESTHESIA EA ADD 15 MINS: Performed by: THORACIC SURGERY (CARDIOTHORACIC VASCULAR SURGERY)

## 2024-07-04 PROCEDURE — 87070 CULTURE OTHR SPECIMN AEROBIC: CPT | Mod: 59 | Performed by: THORACIC SURGERY (CARDIOTHORACIC VASCULAR SURGERY)

## 2024-07-04 PROCEDURE — 83735 ASSAY OF MAGNESIUM: CPT | Performed by: HOSPITALIST

## 2024-07-04 PROCEDURE — 25000003 PHARM REV CODE 250: Performed by: INTERNAL MEDICINE

## 2024-07-04 PROCEDURE — 87205 SMEAR GRAM STAIN: CPT | Mod: 59 | Performed by: THORACIC SURGERY (CARDIOTHORACIC VASCULAR SURGERY)

## 2024-07-04 PROCEDURE — 89051 BODY FLUID CELL COUNT: CPT | Performed by: FAMILY MEDICINE

## 2024-07-04 PROCEDURE — 25000003 PHARM REV CODE 250: Performed by: HOSPITALIST

## 2024-07-04 PROCEDURE — 99233 SBSQ HOSP IP/OBS HIGH 50: CPT | Mod: ,,, | Performed by: STUDENT IN AN ORGANIZED HEALTH CARE EDUCATION/TRAINING PROGRAM

## 2024-07-04 PROCEDURE — 87205 SMEAR GRAM STAIN: CPT | Performed by: THORACIC SURGERY (CARDIOTHORACIC VASCULAR SURGERY)

## 2024-07-04 PROCEDURE — 87075 CULTR BACTERIA EXCEPT BLOOD: CPT | Performed by: FAMILY MEDICINE

## 2024-07-04 PROCEDURE — 82962 GLUCOSE BLOOD TEST: CPT

## 2024-07-04 PROCEDURE — 84157 ASSAY OF PROTEIN OTHER: CPT | Performed by: THORACIC SURGERY (CARDIOTHORACIC VASCULAR SURGERY)

## 2024-07-04 PROCEDURE — 36000704 HC OR TIME LEV I 1ST 15 MIN: Performed by: THORACIC SURGERY (CARDIOTHORACIC VASCULAR SURGERY)

## 2024-07-04 PROCEDURE — 87075 CULTR BACTERIA EXCEPT BLOOD: CPT | Mod: 59 | Performed by: THORACIC SURGERY (CARDIOTHORACIC VASCULAR SURGERY)

## 2024-07-04 PROCEDURE — 87102 FUNGUS ISOLATION CULTURE: CPT | Mod: 59 | Performed by: STUDENT IN AN ORGANIZED HEALTH CARE EDUCATION/TRAINING PROGRAM

## 2024-07-04 PROCEDURE — 83615 LACTATE (LD) (LDH) ENZYME: CPT | Mod: 91 | Performed by: HOSPITALIST

## 2024-07-04 PROCEDURE — 63600175 PHARM REV CODE 636 W HCPCS: Performed by: HOSPITALIST

## 2024-07-04 PROCEDURE — A4216 STERILE WATER/SALINE, 10 ML: HCPCS | Performed by: INTERNAL MEDICINE

## 2024-07-04 PROCEDURE — 87116 MYCOBACTERIA CULTURE: CPT | Performed by: FAMILY MEDICINE

## 2024-07-04 PROCEDURE — 20000000 HC ICU ROOM

## 2024-07-04 PROCEDURE — 87116 MYCOBACTERIA CULTURE: CPT | Mod: 59 | Performed by: THORACIC SURGERY (CARDIOTHORACIC VASCULAR SURGERY)

## 2024-07-04 PROCEDURE — 87206 SMEAR FLUORESCENT/ACID STAI: CPT | Mod: 91 | Performed by: FAMILY MEDICINE

## 2024-07-04 PROCEDURE — 63600175 PHARM REV CODE 636 W HCPCS: Performed by: THORACIC SURGERY (CARDIOTHORACIC VASCULAR SURGERY)

## 2024-07-04 PROCEDURE — 36000705 HC OR TIME LEV I EA ADD 15 MIN: Performed by: THORACIC SURGERY (CARDIOTHORACIC VASCULAR SURGERY)

## 2024-07-04 PROCEDURE — 99900035 HC TECH TIME PER 15 MIN (STAT)

## 2024-07-04 PROCEDURE — 0W9F0ZZ DRAINAGE OF ABDOMINAL WALL, OPEN APPROACH: ICD-10-PCS | Performed by: THORACIC SURGERY (CARDIOTHORACIC VASCULAR SURGERY)

## 2024-07-04 PROCEDURE — 94799 UNLISTED PULMONARY SVC/PX: CPT | Mod: XB

## 2024-07-04 PROCEDURE — 63600175 PHARM REV CODE 636 W HCPCS: Performed by: NURSE ANESTHETIST, CERTIFIED REGISTERED

## 2024-07-04 PROCEDURE — 82550 ASSAY OF CK (CPK): CPT | Performed by: INTERNAL MEDICINE

## 2024-07-04 PROCEDURE — 87206 SMEAR FLUORESCENT/ACID STAI: CPT | Performed by: THORACIC SURGERY (CARDIOTHORACIC VASCULAR SURGERY)

## 2024-07-04 PROCEDURE — 25000003 PHARM REV CODE 250: Performed by: STUDENT IN AN ORGANIZED HEALTH CARE EDUCATION/TRAINING PROGRAM

## 2024-07-04 RX ORDER — FENTANYL CITRATE 50 UG/ML
INJECTION, SOLUTION INTRAMUSCULAR; INTRAVENOUS
Status: DISCONTINUED | OUTPATIENT
Start: 2024-07-04 | End: 2024-07-04

## 2024-07-04 RX ORDER — VASOPRESSIN 20 [USP'U]/ML
INJECTION, SOLUTION INTRAMUSCULAR; SUBCUTANEOUS
Status: DISCONTINUED | OUTPATIENT
Start: 2024-07-04 | End: 2024-07-04

## 2024-07-04 RX ORDER — FAMOTIDINE 10 MG/ML
INJECTION INTRAVENOUS
Status: DISCONTINUED | OUTPATIENT
Start: 2024-07-04 | End: 2024-07-04

## 2024-07-04 RX ORDER — ONDANSETRON HYDROCHLORIDE 2 MG/ML
INJECTION, SOLUTION INTRAVENOUS
Status: DISCONTINUED | OUTPATIENT
Start: 2024-07-04 | End: 2024-07-04

## 2024-07-04 RX ORDER — SODIUM CHLORIDE, SODIUM LACTATE, POTASSIUM CHLORIDE, CALCIUM CHLORIDE 600; 310; 30; 20 MG/100ML; MG/100ML; MG/100ML; MG/100ML
INJECTION, SOLUTION INTRAVENOUS CONTINUOUS PRN
Status: DISCONTINUED | OUTPATIENT
Start: 2024-07-04 | End: 2024-07-04

## 2024-07-04 RX ORDER — ACETAMINOPHEN 10 MG/ML
INJECTION, SOLUTION INTRAVENOUS
Status: DISCONTINUED | OUTPATIENT
Start: 2024-07-04 | End: 2024-07-04

## 2024-07-04 RX ORDER — PHENYLEPHRINE HYDROCHLORIDE 10 MG/ML
INJECTION INTRAVENOUS
Status: DISCONTINUED | OUTPATIENT
Start: 2024-07-04 | End: 2024-07-04

## 2024-07-04 RX ORDER — PROPOFOL 10 MG/ML
VIAL (ML) INTRAVENOUS
Status: DISCONTINUED | OUTPATIENT
Start: 2024-07-04 | End: 2024-07-04

## 2024-07-04 RX ORDER — LIDOCAINE HYDROCHLORIDE 20 MG/ML
INJECTION, SOLUTION EPIDURAL; INFILTRATION; INTRACAUDAL; PERINEURAL
Status: DISCONTINUED | OUTPATIENT
Start: 2024-07-04 | End: 2024-07-04

## 2024-07-04 RX ADMIN — LIDOCAINE HYDROCHLORIDE 50 MG: 20 INJECTION, SOLUTION INTRAVENOUS at 10:07

## 2024-07-04 RX ADMIN — POTASSIUM CHLORIDE 20 MEQ: 1500 TABLET, EXTENDED RELEASE ORAL at 08:07

## 2024-07-04 RX ADMIN — INSULIN ASPART 4 UNITS: 100 INJECTION, SOLUTION INTRAVENOUS; SUBCUTANEOUS at 06:07

## 2024-07-04 RX ADMIN — ACETAMINOPHEN 675 MG: 10 INJECTION, SOLUTION INTRAVENOUS at 10:07

## 2024-07-04 RX ADMIN — MEROPENEM 1 G: 1 INJECTION, POWDER, FOR SOLUTION INTRAVENOUS at 01:07

## 2024-07-04 RX ADMIN — VASOPRESSIN 2 UNITS: 20 INJECTION INTRAVENOUS at 10:07

## 2024-07-04 RX ADMIN — MAGNESIUM SULFATE HEPTAHYDRATE 2 G: 40 INJECTION, SOLUTION INTRAVENOUS at 04:07

## 2024-07-04 RX ADMIN — FAMOTIDINE 20 MG: 10 INJECTION, SOLUTION INTRAVENOUS at 10:07

## 2024-07-04 RX ADMIN — METOPROLOL SUCCINATE 100 MG: 50 TABLET, FILM COATED, EXTENDED RELEASE ORAL at 08:07

## 2024-07-04 RX ADMIN — INSULIN ASPART 1 UNITS: 100 INJECTION, SOLUTION INTRAVENOUS; SUBCUTANEOUS at 08:07

## 2024-07-04 RX ADMIN — ALTEPLASE 2 MG: 2.2 INJECTION, POWDER, LYOPHILIZED, FOR SOLUTION INTRAVENOUS at 02:07

## 2024-07-04 RX ADMIN — CEFTAROLINE FOSAMIL 600 MG: 600 POWDER, FOR SOLUTION INTRAVENOUS at 03:07

## 2024-07-04 RX ADMIN — SODIUM CHLORIDE, SODIUM LACTATE, POTASSIUM CHLORIDE, AND CALCIUM CHLORIDE: .6; .31; .03; .02 INJECTION, SOLUTION INTRAVENOUS at 10:07

## 2024-07-04 RX ADMIN — SODIUM CHLORIDE, PRESERVATIVE FREE 10 ML: 5 INJECTION INTRAVENOUS at 11:07

## 2024-07-04 RX ADMIN — MAGNESIUM SULFATE HEPTAHYDRATE 2 G: 40 INJECTION, SOLUTION INTRAVENOUS at 09:07

## 2024-07-04 RX ADMIN — MEROPENEM 1 G: 1 INJECTION, POWDER, FOR SOLUTION INTRAVENOUS at 05:07

## 2024-07-04 RX ADMIN — MICAFUNGIN SODIUM 100 MG: 100 INJECTION, POWDER, LYOPHILIZED, FOR SOLUTION INTRAVENOUS at 03:07

## 2024-07-04 RX ADMIN — FENTANYL CITRATE 25 MCG: 50 INJECTION, SOLUTION INTRAMUSCULAR; INTRAVENOUS at 10:07

## 2024-07-04 RX ADMIN — SODIUM CHLORIDE, PRESERVATIVE FREE 10 ML: 5 INJECTION INTRAVENOUS at 05:07

## 2024-07-04 RX ADMIN — PHENYLEPHRINE HYDROCHLORIDE 200 MCG: 10 INJECTION INTRAVENOUS at 10:07

## 2024-07-04 RX ADMIN — HYDROCODONE BITARTRATE AND ACETAMINOPHEN 1 TABLET: 10; 325 TABLET ORAL at 05:07

## 2024-07-04 RX ADMIN — CEFTAROLINE FOSAMIL 600 MG: 600 POWDER, FOR SOLUTION INTRAVENOUS at 08:07

## 2024-07-04 RX ADMIN — PROPOFOL 120 MG: 10 INJECTION, EMULSION INTRAVENOUS at 10:07

## 2024-07-04 RX ADMIN — INSULIN GLARGINE 15 UNITS: 100 INJECTION, SOLUTION SUBCUTANEOUS at 09:07

## 2024-07-04 RX ADMIN — CEFTAROLINE FOSAMIL 600 MG: 600 POWDER, FOR SOLUTION INTRAVENOUS at 11:07

## 2024-07-04 RX ADMIN — ONDANSETRON 4 MG: 2 INJECTION INTRAMUSCULAR; INTRAVENOUS at 10:07

## 2024-07-04 RX ADMIN — INSULIN ASPART 2 UNITS: 100 INJECTION, SOLUTION INTRAVENOUS; SUBCUTANEOUS at 01:07

## 2024-07-04 RX ADMIN — MEROPENEM 1 G: 1 INJECTION, POWDER, FOR SOLUTION INTRAVENOUS at 09:07

## 2024-07-04 NOTE — PLAN OF CARE
Patient found lying in bed on nasal cannula, awake. Orientated, calm, follows commands.     The bed is low and alarm on. Clinical alarms reviewed and armed. Monitor strip printed and reviewed - afib 100s / 110s (later down to 80s), asymptomatic. Turning q2h, lines and extremities padded, float heels on boots bilat, mepilexes in place bilat heels and sacrum, triad cream applied to sacrum, placed on waffle overlay. SCDs in place / on.    What had been previously described as moisture dermatitis to the buttocks / sacrum is clearly pressure ulcers that I took updated pictures of. These were present on arrival from the other facility ([facility]). I reconsult wound care but they are not here today , for July 4th. Offloading.    He is voiding fco urine per male purewick, about 500ml my shift. The lasix has been stopped.    This morning dr suggs with patient and family's consent did left thoracentesis removed about 1500ml cloudy serosanguinous fluid , walked it down hand delivered to lab. The patient tolerated it with a little difficulty, c/o some nausea, pain, sob. This morning he was quite orthopneic and short of breath with activity but was much improved later in the day after all interventions and is now on room air (was on 4lpm).    When he came back from surgery both picc lumens were quite sluggish so discussed with md, did cathflo, dwelled for an hour, aspirated cathflo and blood, now flushing and aspirates easily.  He went to surgery and came back to the same room. I received him from pacu nurse. At the time he was mildly confused waking up from anesthesia and was picking at wires / lines, but this quickly resolved. The hemovac / accordion is in place left upper abdomen. It is draining a moderate amount of opaque creamy tan-red-brown drainage. Gauze dressing and opsite over insertion. There is an island bandage medially that is clean and dry.    The BP is a little marginal today 90s/ low 100s systolic      shaka wanted to see patient's wounds so we looked together and she undid the dressing to the right foot and the left shoulder, and we took pictures of everything and I redid the xeroform +gauze +ace to right foot and xeroform and abd pad / tape to left shoulder. Those are looking ok.      His fingerstick is a little high low 200s and given supplemental insulin.    He has some general edema but most noted to left arm (s/p hardware removal, broken shoulder), I elevated it on pillows. He has pain to move the arm and generally doesn't but is wiggling his fingers.    He was restarted back on diet but has low appetite and just had a few bites, although encouraged to drink rogelio + proT gold +nepro.    Encourage pulmonary toilet, IS.      He had moderate loose brown stool, incontinent. Pericare done.    Nurses Note -- 4 Eyes      7/4/2024   0800      Skin assessed during: Daily Assessment      [] No Altered Skin Integrity Present    []Prevention Measures Documented      [x] Yes- Altered Skin Integrity Present or Discovered   [x] LDA Added if Not in Epic (Describe Wound)   [x] New Altered Skin Integrity was Present on Admit and Documented in LDA   [x] Wound Image Taken    Wound Care Consulted? Yes    Attending Nurse: stefani Ceballos RN/Staff Member:connor. Dr matt.

## 2024-07-04 NOTE — CONSULTS
Date: July 3, 2024    Consult request received appreciated.      The patient was examined.  His medical record was reviewed.  Unfortunately the [facility] radiology imaging program is not working and I can not review today's CT scan of his chest.    I discussed with Ricki Pires and Maria Esther, the patient's clinical course.    Impression:  The patient is a 73-year-old obese diabetic male who was transferred to [facility] from [facility] after the patient was noted to have swelling in his anterior chest wall.  A subsequent CT scan of the chest revealed a large abscess of his anterior chest wall, slightly left of midline with partial destruction of the 6th, 7th and 8th ribs.  The abscess extends into the anterior mediastinum and anterior abdominal wall.  Multiple air bubbles were noted.  He also has bilateral pleural effusions, left greater than right.  The patient is currently not septic.  He has been hospitalized at [facility] after undergoing recent left shoulder hardware removal with multiple irrigation and debridements.  He has also undergone removal of right 3rd toe.  He is anemic with a hemoglobin of 7.8.  He is in chronic atrial fibrillation and according to his wife, refuses anticoagulation  He has a distant history of Guillain-Tyler.      Plan:  We will attempt ultrasound-guided thoracentesis tomorrow followed by incision and drainage of chest wall abscess.  My intention will be to drain the abscess with minimal disruption of surgical planes.  I suspect he will require additional operative intervention including Plastic Surgery flap coverage, which is not available at [facility].

## 2024-07-04 NOTE — NURSING
Nurses Note -- 4 Eyes      7/3/2024   9:02 PM      Skin assessed during: Q Shift Change      [] No Altered Skin Integrity Present    []Prevention Measures Documented      [x] Yes- Altered Skin Integrity Present or Discovered   [x] LDA Added if Not in Epic (Describe Wound)   [x] New Altered Skin Integrity was Present on Admit and Documented in LDA   [x] Wound Image Taken    Wound Care Consulted? Yes    Attending Nurse:  Evi Ceballos RN/Staff Member: LIA

## 2024-07-04 NOTE — CARE UPDATE
07/04/24 0815   Patient Assessment/Suction   Level of Consciousness (AVPU) alert   Respiratory Effort Unlabored   Expansion/Accessory Muscles/Retractions no use of accessory muscles   All Lung Fields Breath Sounds diminished   Rhythm/Pattern, Respiratory depth regular;pattern regular   Cough Frequency no cough   PRE-TX-O2   Device (Oxygen Therapy) room air   SpO2 (!) 94 %   Pulse Oximetry Type Continuous   $ Pulse Oximetry - Multiple Charge Pulse Oximetry - Multiple   Pulse 102   Resp (!) 40   BP (!) 143/67   Aerosol Therapy   $ Aerosol Therapy Charges PRN treatment not required   Incentive Spirometer   $ Incentive Spirometer Charges done with encouragement   Incentive Spirometer Predicted Level (mL) 2200   Administration (IS) proper technique demonstrated   Number of Repetitions (IS) 10   Level Incentive Spirometer (mL) 750   Patient Tolerance (IS) fair   Preset CPAP/BiPAP Settings   Mode Of Delivery   (home cpap)   Education   $ Education Bronchodilator;15 min   Respiratory Evaluation   $ Care Plan Tech Time 15 min

## 2024-07-04 NOTE — SUBJECTIVE & OBJECTIVE
Principal Problem:Acute renal failure superimposed on chronic kidney disease    Principal Orthopedic Problem: failed, septic L proximal humerus ORIF    Interval History:      Review of patient's allergies indicates:  No Known Allergies    Current Facility-Administered Medications   Medication    (Magic mouthwash) 1:1:1 diphenhydrAMINE(Benadryl) 12.5mg/5ml liq, aluminum & magnesium hydroxide-simethicone (Maalox), LIDOcaine viscous 2%    0.9%  NaCl infusion (for blood administration)    0.9%  NaCl infusion (for blood administration)    albuterol-ipratropium 2.5 mg-0.5 mg/3 mL nebulizer solution 3 mL    aluminum & magnesium hydroxide-simethicone 400-400-40 mg/5 mL suspension 15 mL    ceftaroline (TEFLARO) 600 mg in dextrose 5 % 50 mL IVPB (ready to mix)    dextrose 10% bolus 125 mL 125 mL    dextrose 10% bolus 250 mL 250 mL    epoetin leonel-epbx injection 14,100 Units    ferrous sulfate tablet 1 each    glucagon (human recombinant) injection 1 mg    glucose chewable tablet 16 g    glucose chewable tablet 24 g    HYDROcodone-acetaminophen  mg per tablet 1 tablet    HYDROcodone-acetaminophen 5-325 mg per tablet 1 tablet    insulin aspart U-100 pen 0-10 Units    insulin glargine U-100 (Lantus) pen 15 Units    LIDOcaine 5 % patch 1 patch    magnesium sulfate 2g in water 50mL IVPB (premix)    meropenem 1 g in sodium chloride 0.9 % 100 mL IVPB (ready to mix system)    metoprolol injection 5 mg    metoprolol succinate (TOPROL-XL) 24 hr tablet 100 mg    micafungin 100 mg in 0.9% NaCl 100 mL IVPB (MB+)    naloxone 0.4 mg/mL injection 0.02 mg    ondansetron disintegrating tablet 8 mg    potassium bicarbonate disintegrating tablet 35 mEq    potassium bicarbonate disintegrating tablet 60 mEq    potassium chloride SA CR tablet 20 mEq    potassium, sodium phosphates 280-160-250 mg packet 2 packet    potassium, sodium phosphates 280-160-250 mg packet 2 packet    potassium, sodium phosphates 280-160-250 mg packet 2 packet     promethazine tablet 25 mg    simethicone chewable tablet 80 mg    sodium chloride 0.9% flush 10 mL    sodium chloride 0.9% flush 10 mL    And    sodium chloride 0.9% flush 10 mL    sodium chloride 0.9% flush 10 mL    traMADoL tablet 50 mg     Objective:     Vital Signs (Most Recent):  Temp: 100.1 °F (37.8 °C) (07/04/24 0800)  Pulse: 106 (07/04/24 0600)  Resp: 20 (07/04/24 0600)  BP: (!) 106/58 (07/04/24 0600)  SpO2: 96 % (07/04/24 0600) Vital Signs (24h Range):  Temp:  [97.3 °F (36.3 °C)-100.1 °F (37.8 °C)] 100.1 °F (37.8 °C)  Pulse:  [] 106  Resp:  [20-45] 20  SpO2:  [91 %-100 %] 96 %  BP: ()/(53-90) 106/58     Weight: 133.8 kg (294 lb 15.6 oz)  Height: 6' (182.9 cm)  Body mass index is 40.01 kg/m².      Intake/Output Summary (Last 24 hours) at 7/4/2024 0826  Last data filed at 7/4/2024 0639  Gross per 24 hour   Intake 2357.61 ml   Output 2450 ml   Net -92.39 ml        General    Constitutional: He is oriented to person, place, and time. He appears well-developed and well-nourished.   Pulmonary/Chest: Effort normal.   Neurological: He is alert and oriented to person, place, and time.   Psychiatric: He has a normal mood and affect. His behavior is normal. Judgment and thought content normal.             Left Shoulder Exam     Inspection/Observation   Swelling: absent  Bruising: absent  Scars: present  Deformity: absent    Tenderness   The patient is experiencing no tenderness.      Comments:  Skin L shoulder C/D/I.  NVI L UE.  Incisions healing well without wound dehiscnce or drainage. No further ortho surgery on L shoulder this admission.  Will ultimately need TSA.         Significant Labs: None    Significant Imaging: None

## 2024-07-04 NOTE — PROGRESS NOTES
Nephrology Progress Note        Patient Name: Roosevelt Moser  MRN: [id number]    Patient Class: IP- Inpatient   Admission Date: 6/14/2024  Length of Stay: 20 days  Date of Service: 7/4/2024    Attending Physician: Randa Hannon MD  Primary Care Provider: Miguel Angel Enriquez PA-C    Reason for Consult: marbin/hyperkalemia    SUBJECTIVE:     HPI: 73M with h/o DM, HTN, AF, GBS and recent shoulder surgery was brought to ER by EMS with recurrent falls in the last 24h. Reports decreased UO but no fever, cough, SOB, dysuria. Upon admission, noted to be in MARBIN with sCr 5, baseline 1 month ago is 1, hyperkalemia with K 6, acidosis with CO2 12, hypoalbuminemia with albumin 1.6. Lactate notably 2.1. Procal 20. A1c 9.5 in 3/2024. UA with hematuria and pyuria, urine Cx pending. Notably on Apixaban. WBC in blood elevated to 20. Plt 540. RP US ordered. Afebrile, normotensive, received IVF and abx. Lokelma, ca gluconate given bicarb gtt ordered. Straight cath in ER with only 100cc urine out.    6/15  AFVSS.   UOP 1200cc plus 2 unmeasured   6/16  AFVSS.  2150 cc uop.  No distress  6/17 VSS, on RA, UOP 1.5L- updated family at bedside  6/18 VSS, on RA, UOP 1.3L  6/19 VSS, on RA, UOP 1L, with osteo R toe- s/p debridement- not interested in amputation- antbx adjusted  6/20 HR , BP stable, on 2L NC, UOP 1.2L, went for procedure, wife reports LE edema  6/21 HR 90-110s, -140s mostly, on RA, UOP 1.6L, to OR for shoulder washout and hardware removal today  6/22 VSS s/p incision and drainage for infection from left shoulder and removal of deep hardware including lizabeth and screws.  6/23 VSS. Consider resuming diuretics tomorrow.  6/24  AFVSS.  1200 cc uop plus multiple unmeasured.  He is very thirsty.    6/25  POD 1 s/p irrigation and debridementof left shoulder abscess.  VSS  2 liter uop  6/26 AFVSS. 2250 cc uop  6/27  AFVSS.  In th OR  6/28 POD 1 s/p left shoulder irrigation and debridement.  2050 cc uop  6/29  one shift UOP  "recorded, 800cc.  VSS, renal function improving.  States he feels "sick" today, denies nausea, says had diarrhea after breakfast.  No other complaints.  6/30  VSS, renal function improved.  Sleeping soundly, on bipap.  Updated family member at bedside.  7/1 VSS. Treat hypokalemia, monitor Mg as well. Agree with toe amputation.  7/2 VSS. S/p right 2nd toe amputation POD1. Consider IV Mg supplement if Mg still low today.  7/3 VSS. Left upper abdominal bulge is new, getting CT abdomen to evaluate. K and Mg better, monitor.    7/4  Transferred to [facility] icu yesterday.  IN OR currently.  Tmax 100.1, intermittent hypotension.  2450 cc uop    ASSESSMENT/PLAN:     MARBIN due to ATN due to sepsis due to UTI and/or PNA  CKD stage 2 with diabetic proteinuria and severe hypoalbuminemia  HTN  DM poorly controlled A1c 9.5  Hypokalemia/Hypomagnesemia  SHPT  Anemia  Hypoalbuminemia  Edema  Septic L proximal humerus ORIF    - renal function is improving- nonoliguric  - dose meds for CrCl 10-50  - about 1g proteinuria- low alb is nutrition/acute phase reactant  - hold hydralazine  - hold metformin- use insulin  - added oral KCL and non-renal diet.  - monitor Mg and replete as needed.  - H/H stable  - optimize protein intake  --strict I/Os   --iron stores low. Ferritin too high for iv iron, on oral supplement  --getting erythropoietin stimulating agent weekly    Thank you for allowing us to participate in the care of your patient!   We will follow the patient and provide recommendations as needed.         Laboratory:  Recent Labs   Lab 07/02/24  0345 07/03/24  0302 07/04/24  0345    142 141   K 3.6 4.0 3.9    107 104   CO2 24 25 27   BUN 46* 50* 43*   CREATININE 1.2 1.2 1.1   * 215* 155*       Recent Labs   Lab 07/01/24  0523 07/02/24  0345 07/03/24  0302 07/04/24  0345   CALCIUM 8.9 9.2 8.9 8.0*   ALBUMIN 1.5* 1.6* 1.5* 2.3*   MG 1.5* 1.6  --  1.5*             Recent Labs   Lab 06/30/24  1105 06/30/24  1633 " 06/30/24  2053 07/01/24  1722 07/01/24  2112 07/02/24  0742 07/02/24  1126 07/02/24  1631 07/02/24  2030 07/03/24  0811   POCTGLUCOSE 203* 231* 250* 240* 200* 179* 294* 208* 232* 211*       Recent Labs   Lab 07/31/23  0955 03/19/24  0830 06/14/24  1539   Hemoglobin A1C 8.8 H 9.5 H 6.3 H       Recent Labs   Lab 07/02/24  0345 07/03/24  0302 07/04/24  0345   WBC 14.35* 12.51 13.49*   HGB 9.0* 7.8* 8.3*   HCT 29.7* 25.3* 26.6*   * 418 485*   MCV 84 83 82   MCHC 30.3* 30.8* 31.2*   MONO 12.2  1.8* 12.6  1.6* 12.9  1.7*   EOSINOPHIL 4.9 2.6 3.8       Recent Labs   Lab 07/02/24  0345 07/03/24  0302 07/04/24  0345   BILITOT 0.3 0.3 0.7   PROT 7.1 6.5 6.2   ALBUMIN 1.6* 1.5* 2.3*   ALKPHOS 143* 116 101   ALT 18 17 12   AST 26 19 14       Recent Labs   Lab 12/16/22  1238 03/08/24  1034 03/25/24  1022 06/14/24  1337   Color, UA Yellow Yellow Yellow [place] A   Appearance, UA Clear Clear Hazy A Cloudy A   pH, UA 6.0 5.0 6.0 6.0   Specific [place], UA 1.020 1.010 1.020 1.020   Protein, UA 1+ A Trace A 1+ A 2+ A   Glucose, UA 1+ A 3+ A Negative Trace A   Ketones, UA Negative Negative Negative Negative   Urobilinogen, UA  --  Negative Negative Negative   Bilirubin (UA) Negative Negative Negative Negative   Occult Blood UA 3+ A 2+ A 2+ A 3+ A   Nitrite, UA Negative Negative Negative Negative   RBC, UA 20 H 13 H >100 H >100 H   WBC, UA 81 H 3 18 H >100 H   Bacteria Rare None Rare Many A   Hyaline Casts, UA 0  --  0 0       Recent Labs   Lab 07/03/24  1216   POC PH 7.495 H   POC PCO2 34.4 L   POC HCO3 26.5   POC PO2 72 L   POC SATURATED O2 96   POC BE 3 H   Sample ARTERIAL       Microbiology Results (last 7 days)       Procedure Component Value Units Date/Time    Fungus culture [[id number]] Collected: 07/04/24 0859    Order Status: Sent Specimen: Pleural Fluid Updated: 07/04/24 1010    Culture, Body Fluid (Aerobic) w/ GS [[id number]] Collected: 07/04/24 0846    Order Status: Sent Specimen: Body Fluid from Pleural Fluid  Updated: 07/04/24 0856    AFB Culture & Smear [[id number]] Collected: 07/04/24 0805    Order Status: No result Specimen: Pleural Fluid Updated: 07/04/24 0856    Culture, Anaerobic [[id number]] Collected: 07/04/24 0804    Order Status: No result Specimen: Pleural Fluid Updated: 07/04/24 0855    Culture, Anaerobic [[id number]]     Order Status: Completed Specimen: Pleural Fluid     AFB Culture & Smear [[id number]]     Order Status: Completed Specimen: Pleural Fluid     Fungus culture [[id number]] Collected: 06/21/24 1645    Order Status: Completed Specimen: Abscess from Shoulder, Left Updated: 07/04/24 0444     Fungus (Mycology) Culture No fungal growth to date      No fungal growth to date    Fungus culture [[id number]] Collected: 06/20/24 1204    Order Status: Completed Specimen: Abscess from Shoulder, Left Updated: 07/04/24 0444     Fungus (Mycology) Culture No fungal growth to date      No fungal growth to date    Blood culture [[id number]] Collected: 07/03/24 1332    Order Status: Completed Specimen: Blood from Peripheral, Hand, Left Updated: 07/04/24 0317     Blood Culture, Routine No Growth to date    Aerobic culture [[id number]] Collected: 06/27/24 0733    Order Status: Completed Specimen: Incision site from Shoulder, Left Updated: 07/01/24 0704     Aerobic Bacterial Culture No growth    Culture, Anaerobe [[id number]] Collected: 06/27/24 0733    Order Status: Completed Specimen: Incision site from Shoulder, Left Updated: 06/29/24 1441     Anaerobic Culture No anaerobes isolated    Gram stain [[id number]] Collected: 06/27/24 0733    Order Status: Completed Specimen: Incision site from Shoulder, Left Updated: 06/28/24 1516     Gram Stain Result Few WBC's      No organisms seen    Aerobic culture [[id number]] Collected: 06/24/24 1622    Order Status: Completed Specimen: Incision site from Shoulder, Left Updated: 06/28/24 1035     Aerobic Bacterial Culture No growth    Blood culture [[id number]] Collected:  06/23/24 0659    Order Status: Completed Specimen: Blood from Peripheral, Hand, Right Updated: 06/28/24 1032     Blood Culture, Routine No growth after 5 days.    Culture, Anaerobe [[id number]] Collected: 06/24/24 1622    Order Status: Completed Specimen: Incision site from Shoulder, Left Updated: 06/27/24 1357     Anaerobic Culture No anaerobes isolated            Review of patient's allergies indicates:  No Known Allergies    Outpatient meds:  No current facility-administered medications on file prior to encounter.     Current Outpatient Medications on File Prior to Encounter   Medication Sig Dispense Refill    apixaban (ELIQUIS) 5 mg Tab Take 1 tablet (5 mg total) by mouth 2 (two) times daily. 60 tablet 1    atorvastatin (LIPITOR) 10 MG tablet Take 1 tablet (10 mg total) by mouth once daily. 90 tablet 3    co-enzyme Q-10 30 mg capsule Take 30 mg by mouth once daily.      doxycycline (VIBRAMYCIN) 100 MG Cap Take 100 mg by mouth 2 (two) times daily.      ergocalciferol, vitamin D2, (VITAMIN D ORAL) Take 1 tablet by mouth once daily.      glimepiride (AMARYL) 2 MG tablet Take 1 tablet (2 mg total) by mouth before breakfast. 90 tablet 3    hydrALAZINE (APRESOLINE) 25 MG tablet Take 1 tablet (25 mg total) by mouth every 12 (twelve) hours. 60 tablet 3    metFORMIN (GLUCOPHAGE-XR) 500 MG ER 24hr tablet Take 2 tablets (1,000 mg total) by mouth 2 (two) times daily with meals. 360 tablet 3    metoprolol succinate (TOPROL-XL) 100 MG 24 hr tablet Take 1 tablet (100 mg total) by mouth once daily. (Patient taking differently: Take 100 mg by mouth nightly.) 90 tablet 3       Scheduled meds:   (Magic mouthwash) 1:1:1 diphenhydrAMINE(Benadryl) 12.5mg/5ml liq, aluminum & magnesium hydroxide-simethicone (Maalox), LIDOcaine viscous 2%  10 mL Swish & Spit QID    ceftaroline (Teflaro) IV (PEDS and ADULTS)  600 mg Intravenous Q8H    epoetin leonel-epbx  100 Units/kg Subcutaneous Q7 Days    ferrous sulfate  1 tablet Oral Daily    insulin  glargine U-100  15 Units Subcutaneous Daily    LIDOcaine  1 patch Transdermal Q24H    meropenem IV (PEDS and ADULTS)  1 g Intravenous Q8H    metoprolol succinate  100 mg Oral QHS    micafungin (MYCAMINE) IVPB  100 mg Intravenous Q24H    potassium chloride  20 mEq Oral BID    sodium chloride 0.9%  10 mL Intravenous Q6H       Infusions:            PRN meds:    Current Facility-Administered Medications:     0.9%  NaCl infusion (for blood administration), , Intravenous, Q24H PRN    0.9%  NaCl infusion (for blood administration), , Intravenous, Q24H PRN    albuterol-ipratropium, 3 mL, Nebulization, Q4H PRN    aluminum & magnesium hydroxide-simethicone, 15 mL, Oral, QID PRN    dextrose 10%, 12.5 g, Intravenous, PRN    dextrose 10%, 25 g, Intravenous, PRN    glucagon (human recombinant), 1 mg, Intramuscular, PRN    glucose, 16 g, Oral, PRN    glucose, 24 g, Oral, PRN    HYDROcodone-acetaminophen, 1 tablet, Oral, Q4H PRN    HYDROcodone-acetaminophen, 1 tablet, Oral, Q4H PRN    insulin aspart U-100, 0-10 Units, Subcutaneous, QID (AC + HS) PRN    magnesium sulfate IVPB, 2 g, Intravenous, Daily PRN    metoprolol, 5 mg, Intravenous, Q5 Min PRN    naloxone, 0.02 mg, Intravenous, PRN    ondansetron, 8 mg, Oral, Q8H PRN    potassium bicarbonate, 35 mEq, Oral, PRN    potassium bicarbonate, 60 mEq, Oral, PRN    potassium, sodium phosphates, 2 packet, Oral, PRN    potassium, sodium phosphates, 2 packet, Oral, PRN    potassium, sodium phosphates, 2 packet, Oral, PRN    promethazine, 25 mg, Oral, Q6H PRN    simethicone, 1 tablet, Oral, QID PRN    sodium chloride 0.9%, 10 mL, Intravenous, Q12H PRN    Flushing PICC/Midline Protocol, , , Until Discontinued **AND** sodium chloride 0.9%, 10 mL, Intravenous, Q6H **AND** sodium chloride 0.9%, 10 mL, Intravenous, PRN    sodium chloride 0.9%, 10 mL, Intravenous, Q12H PRN    traMADoL, 50 mg, Oral, Q4H PRN    Past Medical History:   Diagnosis Date    A-fib 2024    Diabetes mellitus, type 2 2021     Jj 10/2003    Hyperlipidemia     Hypertension 2016    Sleep apnea      Past Surgical History:   Procedure Laterality Date    ARTHROTOMY OF SHOULDER Left 6/21/2024    Procedure: ARTHROTOMY, SHOULDER;  Surgeon: Roman Paulson MD;  Location: [facility] OR;  Service: Orthopedics;  Laterality: Left;    IRRIGATION AND DEBRIDEMENT OF UPPER EXTREMITY Left 6/24/2024    Procedure: IRRIGATION AND DEBRIDEMENT, UPPER EXTREMITY;  Surgeon: Nathan Cooper MD;  Location: [facility] OR;  Service: Orthopedics;  Laterality: Left;    IRRIGATION AND DEBRIDEMENT OF UPPER EXTREMITY Left 6/27/2024    Procedure: IRRIGATION AND DEBRIDEMENT, UPPER EXTREMITY;  Surgeon: Nathan Cooper MD;  Location: [facility] OR;  Service: Orthopedics;  Laterality: Left;    OPEN REDUCTION AND INTERNAL FIXATION (ORIF) OF FRACTURE OF PROXIMAL HUMERUS Left 3/25/2024    Procedure: ORIF, FRACTURE, HUMERUS, PROXIMAL/IM HANNA, LEFT;  Surgeon: Nathan Cooper MD;  Location: [facility] OR;  Service: Orthopedics;  Laterality: Left;  Accumed, Synthes Small Frag set Avelino verified 3/22/24 ark    TOE AMPUTATION Right 7/1/2024    Procedure: AMPUTATION, TOE;  Surgeon: Stevie Zhu DPM;  Location: [facility];  Service: Podiatry;  Laterality: Right;     No family history on file.  Social History     Tobacco Use    Smoking status: Never    Smokeless tobacco: Never   Substance Use Topics    Alcohol use: Yes     Alcohol/week: 0.0 standard drinks of alcohol     Comment: social    Drug use: No       OBJECTIVE:     Vital Signs and IO:  Temp:  [97.3 °F (36.3 °C)-100.1 °F (37.8 °C)]   Pulse:  []   Resp:  [20-50]   BP: ()/(51-90)   SpO2:  [91 %-100 %]   I/O last 3 completed shifts:  In: 2357.6 [P.O.:260; I.V.:50; Blood:354; IV Piggyback:1693.6]  Out: 3650 [Urine:3650]  Wt Readings from Last 5 Encounters:   07/04/24 133.8 kg (294 lb 15.6 oz)   06/04/24 132.5 kg (292 lb 1.8 oz)   05/07/24 132.5 kg (292 lb 1.8 oz)   04/08/24 132.5 kg (292 lb 3.2 oz)   03/26/24 (!) 136.1 kg (300 lb  0.7 oz)     Body mass index is 40.01 kg/m².    Physical Exam  Constitutional:       General: Patient is not in acute distress.     Appearance: Patient is well-developed. She is not diaphoretic.   HENT:      Head: Normocephalic and atraumatic.      Mouth/Throat: Mucous membranes are moist.   Eyes:      General: No scleral icterus.     Pupils: Pupils are equal, round, and reactive to light.   Cardiovascular:      Rate and Rhythm: Normal rate and regular rhythm.   Pulmonary:      Effort: Pulmonary effort is normal. No respiratory distress.      Breath sounds: No stridor.   Abdominal:      General: There is no distension.      Palpations: Abdomen is soft.   Musculoskeletal:         General: No deformity. Normal range of motion.      Cervical back: Neck supple.   Skin:     General: Skin is warm and dry.      Findings: No rash present. No erythema.   Neurological:      Mental Status: Patient is alert and oriented to person, place, and time.      Cranial Nerves: No cranial nerve deficit.   Psychiatric:         Behavior: Behavior normal.          Patient care time was spent personally by me on the following activities:     Obtaining a history.  Examination of patient.  Providing medical care at the patients bedside.  Developing a treatment plan with patient or surrogate and bedside caregivers.  Ordering and reviewing laboratory studies, radiographic studies, pulse oximetry.  Ordering and performing treatments and interventions.  Evaluation of patient's response to treatment.  Discussions with consultants while on the unit and immediately available to the patient.  Re-evaluation of the patient's condition.  Documentation in the medical record.     Jay Talavera MD    [facility]  [address] [place] [id number]    [phone] - tel  [phone] - fax    7/4/2024

## 2024-07-04 NOTE — ASSESSMENT & PLAN NOTE
Pt taken to OR for I&D of a chest wall abscess pm 7/4/24.  He had a thoracentesis of 1550 cc. Nephro on  board for MARBIN. Cont Teflaro, meropenem, and micagungin per ID recs. Wound cx pending

## 2024-07-04 NOTE — ANESTHESIA PREPROCEDURE EVALUATION
07/04/2024  Roosevelt Moser is a 73 y.o., male.    Patient Active Problem List   Diagnosis    Localized osteoarthrosis not specified whether primary or secondary, lower leg    Morbid obesity    MARA (obstructive sleep apnea)    Hypertension    Type 2 diabetes mellitus    Polyneuropathy in diabetes(357.2)    Mixed hyperlipidemia    History of Guillain-Alberta syndrome    Atrial fibrillation with rapid ventricular response    Abnormal EKG    Non-compliance    Acute renal failure superimposed on chronic kidney disease    Hyperkalemia    Severe sepsis    Debility    Swelling of limb, left    Skin tear of left upper extremity    Osteomyelitis of toe    Abscess of left shoulder    Pyogenic arthritis of left shoulder region    MRSA infection    Skin ulcer of toe of right foot with necrosis of bone    Sepsis    Pleural effusion       Past Surgical History:   Procedure Laterality Date    ARTHROTOMY OF SHOULDER Left 6/21/2024    Procedure: ARTHROTOMY, SHOULDER;  Surgeon: Roman Paulson MD;  Location: [facility] OR;  Service: Orthopedics;  Laterality: Left;    IRRIGATION AND DEBRIDEMENT OF UPPER EXTREMITY Left 6/24/2024    Procedure: IRRIGATION AND DEBRIDEMENT, UPPER EXTREMITY;  Surgeon: Nathan Cooper MD;  Location: [facility] OR;  Service: Orthopedics;  Laterality: Left;    IRRIGATION AND DEBRIDEMENT OF UPPER EXTREMITY Left 6/27/2024    Procedure: IRRIGATION AND DEBRIDEMENT, UPPER EXTREMITY;  Surgeon: Nathan Cooper MD;  Location: [facility] OR;  Service: Orthopedics;  Laterality: Left;    OPEN REDUCTION AND INTERNAL FIXATION (ORIF) OF FRACTURE OF PROXIMAL HUMERUS Left 3/25/2024    Procedure: ORIF, FRACTURE, HUMERUS, PROXIMAL/IM HANNA, LEFT;  Surgeon: Nathan Cooper MD;  Location: [facility];  Service: Orthopedics;  Laterality: Left;  Accumed, Synthes Small Frag set Avelino verified 3/22/24 ark    TOE AMPUTATION Right 7/1/2024     Procedure: AMPUTATION, TOE;  Surgeon: Stevie Zhu DPM;  Location: [facility];  Service: Podiatry;  Laterality: Right;        Tobacco Use:  The patient  reports that he has never smoked. He has never used smokeless tobacco.     Results for orders placed or performed during the hospital encounter of 06/14/24   EKG 12-lead    Collection Time: 06/14/24 12:16 PM   Result Value Ref Range    QRS Duration 106 ms    OHS QTC Calculation 443 ms    Narrative    Test Reason : R53.1,    Vent. Rate : 120 BPM     Atrial Rate : 159 BPM     P-R Int : 000 ms          QRS Dur : 106 ms      QT Int : 314 ms       P-R-T Axes : 000 -54 093 degrees     QTc Int : 443 ms    Atrial fibrillation with rapid ventricular response  Left axis deviation  Low voltage QRS  Inferior infarct ,age undetermined  Cannot rule out Anterior infarct ,age undetermined  Abnormal ECG  When compared with ECG of 25-MAR-2024 09:14,  Vent. rate has increased BY  46 BPM  Minimal criteria for Anterior infarct are now Present  Confirmed by Jesús HATHAWAY, Hansel AMIN ([id number]) on 6/20/2024 8:43:02 PM    Referred By: [email or web address]   SELF           Confirmed By:Hansel Espinosa MD        Imaging Results              US Retroperitoneal Complete (Final result)  Result time 06/14/24 17:54:43      Final result by Cass Cutler MD (06/14/24 17:54:43)                   Impression:      No hydronephrosis.  Elevated resistive index right kidney suggesting intrinsic renal disease.      Electronically signed by: Cass Cutler MD  Date:    06/14/2024  Time:    17:54               Narrative:    EXAMINATION:  US RETROPERITONEAL COMPLETE    CLINICAL HISTORY:  acute renal failure;    TECHNIQUE:  Ultrasound of the kidneys and urinary bladder was performed including color flow and Doppler evaluation of the kidneys.    COMPARISON:  None.    FINDINGS:  Right kidney: The right kidney measures 12 cm. No cortical thinning. No loss of corticomedullary distinction. Resistive index measures 7 2.  No  mass. No renal stone. No hydronephrosis.    Left kidney: The left kidney measures 12.9 cm. No cortical thinning. No loss of corticomedullary distinction. Resistive index measures 0.67.  No mass. No renal stone. No hydronephrosis.    The region the urinary bladder is image but bladder decompressed at the time of the exam.                                       Radiology (Final result)  Result time 06/18/24 14:26:33      Final result by Unknown User (06/18/24 14:26:33)                                         Lab Results   Component Value Date    WBC 13.49 (H) 07/04/2024    HGB 8.3 (L) 07/04/2024    HCT 26.6 (L) 07/04/2024    MCV 82 07/04/2024     (H) 07/04/2024     BMP  Lab Results   Component Value Date     07/04/2024    K 3.9 07/04/2024     07/04/2024    CO2 27 07/04/2024    BUN 43 (H) 07/04/2024    CREATININE 1.1 07/04/2024    CALCIUM 8.0 (L) 07/04/2024    ANIONGAP 10 07/04/2024     (H) 07/04/2024     (H) 07/03/2024     (H) 07/02/2024       Results for orders placed during the hospital encounter of 06/14/24    Echo    Interpretation Summary    Extremely limited visualization of all cardiac structures.  No clinically significant determination can be made based on this echocardiogram.  If cardiac concerns persist, consider alternative cardiac imaging like RICKY for further evaluation.    Left Ventricle: Left ventricle was not well visualized due to poor sonic window. The left ventricle is normal in size. Unable to assess wall motion. There is normal systolic function with a visually estimated ejection fraction of 55 - 60%.    Right Ventricle: Right ventricle was not well visualized due to poor acoustic window.    Left Atrium: Left atrium was not well visualized.    Mitral Valve: There is no stenosis. The mean pressure gradient across the mitral valve is 2 mmHg at a heart rate of  bpm. There is mild regurgitation.    IVC/SVC: Elevated venous pressure at 15 mmHg.            Pre-op  Assessment    I have reviewed the Patient Summary Reports.     I have reviewed the Nursing Notes. I have reviewed the NPO Status.   I have reviewed the Medications.     Review of Systems  Anesthesia Hx:  No problems with previous Anesthesia             Denies Family Hx of Anesthesia complications.    Denies Personal Hx of Anesthesia complications.                    Social:  Non-Smoker       Hematology/Oncology:  Hematology Normal                                     EENT/Dental:  EENT/Dental Normal           Cardiovascular:     Hypertension    Dysrhythmias atrial fibrillation                                   Pulmonary:        Sleep Apnea Compliant with CPAP          Education provided regarding risk of obstructive sleep apnea            Renal/:  Chronic Renal Disease, ARF, CKD                Hepatic/GI:  Hepatic/GI Normal                 Musculoskeletal:  Musculoskeletal Normal    Chest wall abscess.            Neurological:    Neuromuscular Disease, ([place] syndrome)             Peripheral Neuropathy                          Endocrine:  Diabetes           Psych:  Psychiatric Normal                    Physical Exam  General: Well nourished    Airway:  Mallampati: III   Mouth Opening: Normal  TM Distance: Normal  Tongue: Normal  Neck ROM: Normal ROM    Dental:  Intact    Heart:  Rate: Normal  Rhythm: Irregularly Irregular        Anesthesia Plan  Type of Anesthesia, risks & benefits discussed:    Anesthesia Type: Gen Supraglottic Airway  Intra-op Monitoring Plan: Standard ASA Monitors  Post Op Pain Control Plan: IV/PO Opioids PRN and multimodal analgesia  Induction:  IV  Airway Plan: Video  Informed Consent: Informed consent signed with the Patient and all parties understand the risks and agree with anesthesia plan.  All questions answered.   ASA Score: 3  Anesthesia Plan Notes: LMA  Sleep apnea precautions, give reduce fentanyl, awake extubation, and sitting up positioning.  Multimodal analgesia with  ofirmev 1000mg, and local by surgeon.  PONV prophylaxis with Pepcid 20 mg IV, and Zofran 4 mg IV.            Ready For Surgery From Anesthesia Perspective.     .

## 2024-07-04 NOTE — OP NOTE
"Date of Procedure: July 4, 2024      Pre-Procedure Diagnosis: Bilateral Pleural Effusions      Post- Procedure Diagnosis: Bilateral Pleural Effusions      Procedure: Ultrasound Guided Left Thoracentesis with Aspiration of 1550 cc of thin Sero-Sanguinous Fluid      Surgeon: Gus Escobedo MD      Assistant: None      Anesthesia: 10 cc of 1% Local Lidocaine Infiltrate      Estimated Blood Loss: 1 cc      Indication for Procedure:         Mr. Moser is a 73-year-old obese diabetic male who was transferred to [facility] from [facility] after he was noted to have swelling of his anterior chest wall.  A CT scan of the chest revealed a large abscess of his anterior chest wall, slightly left of midline with partial destruction of the 6th, 7th and 8th ribs.  The abscess extends into the anterior mediastinum and anterior abdominal wall.  Multiple gas bubbles were noted.  He also has bilateral pleural effusions, left greater than right.  He is in moderate respiratory distress.  The patient has been hospitalized at [facility] after undergoing recent left shoulder hardware removal with multiple irrigations and debridements.  He has also undergone was right thid toe.  He is anemic, but received a unit of packed red blood cells overnight.  He has chronic atrial fibrillation for which he refuses to take anticoagulation.  He has a history of [place] disease.  In preparation for undergoing incision and drainage of his chest wall abscess and to optimize his risk of anesthesia, ultrasound-guided thoracentesis will be performed this morning.        Procedure in Detail:         The patient was placed in a sitting upright position at bedside and a " time-out " was performed, confirming the patient's identity and planned procedure.  Portable ultrasound was used to evaluate both pleural spaces.  There was a large left pleural effusion present.  The ideal drainage " location was marked.  Evaluation of the right pleural space revealed a moderate-sized pleural effusion which appeared somewhat loculated.  A potential drainage site was marked.  The patient's back was clipped of hair.  Beginning on the left side, the left posterior chest was prepped with ChloraPrep solution and draped in a sterile standard fashion.  At the site of the marking, 10 cc of 1% local lidocaine infiltrate was injected for local anesthesia.  A small skin incision was made with a #11 Blade knife.  The thoracentesis needle and catheter were introduced into the left pleural space with minimal difficulty.  Once a positive tap was achieved, the catheter was advanced and the needle was withdrawn.  The catheter was connected to collection tubing and a collection bag.  A total of 1550 cc of thin serosanguineous fluid was aspirated.  A portion of this fluid will be forwarded for culture and cell count studies.  As the thoracentesis proceeded, the patient complained of increasing pain.  This is consistent with a trapped lung.  When no additional fluid could be removed, the catheter was withdrawn and a Band-Aid placed over the puncture site.  In view of the patient's discomfort and loculated appearance of the right pleural effusion, the decision was made not to proceed with right thoracentesis.

## 2024-07-04 NOTE — ASSESSMENT & PLAN NOTE
IV lasix started and stopped due to MARBIN.   Pulm on board  Repeat cxr: Hazy opacities of the lung bases likely reflect small pleural effusions and/or atelectasis.

## 2024-07-04 NOTE — PROGRESS NOTES
Pending sale to [facility]  Progress Note    Patient Name: Roosevelt Moser  MRN: [id number]  Patient Class: IP- Inpatient   Admission Date: 6/14/2024  Length of Stay: 20 days  Attending Physician: Randa Hannon MD  Primary Care Provider: Miguel Angel Enriquez PA-C        Subjective:     Principal Problem:Acute renal failure superimposed on chronic kidney disease        HPI:  Roosevelt Moser is a 73 year old male with a previous medical history of atrial fibrillation on eliquis, HTN, DMII, obstructive sleep apnea, HLD, ORIF of left humerus and guillian-[place] who presented to the emergency room for weakness and multiple falls. Per wife of the patient he has had progressive weakness over the past week along with two falls one at 0300 Thursday and the other early morning Friday. Both time she had to activate EMS to pick patient up off floor due to weakness. Patient refused transport to hospital on both occasions. Today he slid out of his chair and was unable to get up without assistance and at this point the wife activated EMS to transport patient to the hospital. She endorses that two days ago she noticed that the patient had stopped urinating as he normally does. The patient concurs that he has noticed that his urine has decreased over the past two days and that it is dark. Patient denies dysuria or incomplete bladder emptying. Bladder scan showed zero upon admit and urine sample was obtained via straight cath in ED. Patient denies decreased PO intake and keeps a bottle of water with him at all times. Initial ED work-up showed a creatinine of 5 and potassium of 6. Urine studies positive for infection and WBC 20.81 with procalcitonin at 19.75. Blood cultures obtained and patient given 1 gram of rocephin and 1000ml of normal saline. Patient noted to bein afib with RVR and 20mg of diltiazem was administered IV which resulted in low blood pressure and 1 gram of calcium gluconate was administered. Patient  admitted by hospital medicine for further evaluation and management     Overview/Hospital Course:  Patient presented after fall.  He was found to have osteo of his toe and abscesses left shoulder.  He underwent washouts with surgery on his shoulder. Status post repeat irrigation and debridement (3rd washout) and hardware removal of left shoulder for suspected infection. Wound VAC was discontinued intraoperatively 6/27. They cleared him on 06/28/2024. The patient was noted to have osteomyelitis of second right toe. Seen by podiatry, who performed amputation on 07/01. Patient noted to have dyspnea. CXR revealed bilateral pleural effusions. IV lasix started and stopped due to MARBIN. Pending snf placement at this time.  Patient will need extended antibiotics per ID recommendations. Pt taken to OR for I&D of a chest wall abscess. He had a thoracentesis of 1550 cc. Nephro on  board for MARBIN. Cont Teflaro, meropenem, and micagungin per ID recs. Wound cx pending      Interval History: Patient states pain currently controlled.     Review of Systems   Constitutional:  Negative for activity change, appetite change, chills and fever.   HENT:  Negative for congestion, ear pain, nosebleeds and sinus pain.    Eyes:  Negative for discharge and itching.   Respiratory:  Positive for shortness of breath. Negative for apnea, cough and chest tightness.    Cardiovascular:  Negative for chest pain, palpitations and leg swelling.   Gastrointestinal:  Negative for abdominal distention, abdominal pain and vomiting.   Genitourinary:  Negative for difficulty urinating, dysuria, flank pain and frequency.   Musculoskeletal:  Positive for arthralgias. Negative for back pain, joint swelling and myalgias.   Skin:  Positive for wound. Negative for color change, pallor and rash.   Neurological:  Negative for dizziness, weakness, light-headedness and headaches.   Psychiatric/Behavioral:  Negative for agitation, behavioral problems, confusion and suicidal  ideas.      Objective:     Vital Signs (Most Recent):  Temp: 98.2 °F (36.8 °C) (07/04/24 1245)  Pulse: 92 (07/04/24 1337)  Resp: 18 (07/04/24 1337)  BP: (!) 104/59 (07/04/24 0945)  SpO2: (!) 94 % (07/04/24 1337) Vital Signs (24h Range):  Temp:  [98.2 °F (36.8 °C)-100.1 °F (37.8 °C)] 98.2 °F (36.8 °C)  Pulse:  [] 92  Resp:  [18-50] 18  SpO2:  [93 %-100 %] 94 %  BP: ()/(51-90) 104/59     Weight: 133.8 kg (294 lb 15.6 oz)  Body mass index is 40.01 kg/m².    Intake/Output Summary (Last 24 hours) at 7/4/2024 1526  Last data filed at 7/4/2024 1434  Gross per 24 hour   Intake 1821.5 ml   Output 3040 ml   Net -1218.5 ml         Physical Exam  Vitals reviewed.   Constitutional:       General: He is not in acute distress.     Appearance: Normal appearance. He is normal weight. He is not ill-appearing.   HENT:      Head: Normocephalic and atraumatic.      Nose: Nose normal.      Mouth/Throat:      Mouth: Mucous membranes are moist.      Pharynx: Oropharynx is clear.   Eyes:      General: No scleral icterus.     Extraocular Movements: Extraocular movements intact.      Conjunctiva/sclera: Conjunctivae normal.      Pupils: Pupils are equal, round, and reactive to light.   Cardiovascular:      Rate and Rhythm: Normal rate and regular rhythm.      Pulses: Normal pulses.      Heart sounds: Normal heart sounds. No murmur heard.     No gallop.   Pulmonary:      Effort: Pulmonary effort is normal. No respiratory distress.      Breath sounds: Normal breath sounds. No wheezing, rhonchi or rales.   Chest:      Chest wall: No tenderness.   Abdominal:      General: Abdomen is flat. There is no distension.      Palpations: Abdomen is soft.      Tenderness: There is no abdominal tenderness.   Musculoskeletal:         General: No swelling or tenderness.      Cervical back: Normal range of motion and neck supple. No rigidity or tenderness.      Right lower leg: No edema.      Left lower leg: No edema.   Skin:     General: Skin is  warm and dry.      Findings: No lesion.      Comments: Surgical dressing on foot clean and dry.   Neurological:      General: No focal deficit present.      Mental Status: He is alert and oriented to person, place, and time. Mental status is at baseline.      Motor: No weakness.   Psychiatric:         Mood and Affect: Mood normal.         Behavior: Behavior normal.         Thought Content: Thought content normal.             Significant Labs: All pertinent labs within the past 24 hours have been reviewed.    Significant Imaging: I have reviewed all pertinent imaging results/findings within the past 24 hours.    Assessment/Plan:      * Acute renal failure superimposed on chronic kidney disease  Appreciate input from nephrology  In setting of acute UTI and IVVD  Resumed gentle hydration on Zosyn/vanc but those have been stopped  He did not require oral NaHCO3  U/s is w/o obstruction, masses, etc        Chest wall abscess   Pt taken to OR for I&D of a chest wall abscess pm 7/4/24.  He had a thoracentesis of 1550 cc. Nephro on  board for MARBIN. Cont Teflaro, meropenem, and micagungin per ID recs. Wound cx pending    Pleural effusion  IV lasix started and stopped due to MARBIN.   Pulm on board  Repeat cxr: Hazy opacities of the lung bases likely reflect small pleural effusions and/or atelectasis.       MRSA infection    As per ID    Pyogenic arthritis of left shoulder region  As above      Abscess of left shoulder  As discussed under limb swelling, status post multiple washouts on 06/21/2024, 06/24/2024, 06/26/2024,6/27/24  Need antibiotics until 08/03/2024  Wound VAC in place.  Continue antibiotics.  Following cultures.  Pending possible LTAC placement.    Osteomyelitis of toe    ID and podiatry following, appreciate help  Appreciate input all consultants  Podiatry amputated 2nd toe 07/01.  Need antibiotics until 08/08/2024      Skin tear of left upper extremity  Local care, healing  Doubt this is a portal of  entry    Swelling of limb, left  U/s w/o DVT  Appreciate help from Dr Bernal, ID, wound care, podiatry et al  Pt is already on OAC, holding for now pending shoulder procedure and toe amputation  Arm is elevated on pillow  Not red/painful, no compartment syndrome  D/w pt/wife      Debility  Multifactorial process  Pt not able to manage his own care and wife cannot adequately care for him at this time  PT/OT  SNF or possibly LTAC placement        Severe sepsis  In setting of acute UTI, MRSA OM right 2nd toe, and infected left shoulder hardware  BCX negative  Urine culture negative  Will need antibiotics until 08/08/2024 per ID    Hyperkalemia  Resolved  in setting of acute on chronic renal failure  Lokelma given x 1 on admit  Avoid ACE/ARB, K+ replacement  tele          Atrial fibrillation with rapid ventricular response  Chronic AF w/ acute RVR, resolved  Required dilt drip on admission but off since 6/15  Tele some PVC/bigem/trigem  TSH 1.8 in March  Monitor lytes  No etoh/drug use  TTE from March 2024:    Left Ventricle: The left ventricle is normal in size. Normal wall thickness. Mild global hypokinesis present. There is mildly reduced systolic function with a visually estimated ejection fraction of 45 - 50%. There is normal diastolic function.    Right Ventricle: Normal right ventricular cavity size. Wall thickness is normal. Right ventricle wall motion  is normal. Systolic function is normal.    Left Atrium: Left atrium is moderately dilated.    IVC/SVC: Normal venous pressure at 3 mmHg.  OAC has been on hold d/t possible need for shoulder surgery - has been on prophylaxis dose of LovenoxResume Eliquis as soon as feasible postop      History of Guillain-[place] syndrome  No acute issues  However, pt has impaired mobility and is acutely weakened from his sepsis/UTI/AF RVR  PT/OT working w/ pt  Wife cannot manage his care at home currently  SNF assessment underway, may need LTAC         Type 2 diabetes  mellitus  A1c 6.3%  SSI      Hypertension  Home meds as appropriate    MARA (obstructive sleep apnea)  Continue CPAP HS and w/ naps      Morbid obesity  Body mass index is 42.16 kg/m².  Morbid obesity complicates all aspects of disease management from diagnostic modalities to treatment  Weight loss encouraged and health benefits explained to patient  He has been working on weight loss at home w/ reduced caloric intake          VTE Risk Mitigation (From admission, onward)           Ordered     Reason for No Pharmacological VTE Prophylaxis  Once        Question:  Reasons:  Answer:  Already adequately anticoagulated on oral Anticoagulants    06/14/24 1438     IP VTE HIGH RISK PATIENT  Once         06/14/24 1438     Place sequential compression device  Until discontinued         06/14/24 1438                    Discharge Planning   KAMILA:      Code Status: Full Code   Is the patient medically ready for discharge?:     Reason for patient still in hospital (select all that apply): Treatment  Discharge Plan A: Skilled Nursing Facility            Critical care time spent on the evaluation and treatment of severe organ dysfunction, review of pertinent labs and imaging studies, discussions with consulting providers and discussions with patient/family: >30 minutes.      Randa Hannon MD  Department of Hospital Medicine   [facility]

## 2024-07-04 NOTE — NURSING
Pt lying in bed in no acute distress. Repositioned pt and gave CHG bath. Pt is NPO for surgical procedure in am. One unit of PRBC's infusing per md order. Pt tolerating well. Pt voiding without complications. Call light within reach and bed alarm on. Will continue current plan of care.

## 2024-07-04 NOTE — PROGRESS NOTES
[facility]  Orthopedics  Progress Note    Patient Name: Roosevelt Moser  MRN: [id number]  Admission Date: 6/14/2024  Hospital Length of Stay: 20 days  Attending Provider: Randa Hannon MD  Primary Care Provider: Miguel Angel Enriquez PA-C  Follow-up For: Procedure(s) (LRB):  AMPUTATION, TOE (Right)    Post-Operative Day: 3 Days Post-Op  Subjective:     Principal Problem:Acute renal failure superimposed on chronic kidney disease    Principal Orthopedic Problem: failed, septic L proximal humerus ORIF    Interval History:      Review of patient's allergies indicates:  No Known Allergies    Current Facility-Administered Medications   Medication    (Magic mouthwash) 1:1:1 diphenhydrAMINE(Benadryl) 12.5mg/5ml liq, aluminum & magnesium hydroxide-simethicone (Maalox), LIDOcaine viscous 2%    0.9%  NaCl infusion (for blood administration)    0.9%  NaCl infusion (for blood administration)    albuterol-ipratropium 2.5 mg-0.5 mg/3 mL nebulizer solution 3 mL    aluminum & magnesium hydroxide-simethicone 400-400-40 mg/5 mL suspension 15 mL    ceftaroline (TEFLARO) 600 mg in dextrose 5 % 50 mL IVPB (ready to mix)    dextrose 10% bolus 125 mL 125 mL    dextrose 10% bolus 250 mL 250 mL    epoetin leonel-epbx injection 14,100 Units    ferrous sulfate tablet 1 each    glucagon (human recombinant) injection 1 mg    glucose chewable tablet 16 g    glucose chewable tablet 24 g    HYDROcodone-acetaminophen  mg per tablet 1 tablet    HYDROcodone-acetaminophen 5-325 mg per tablet 1 tablet    insulin aspart U-100 pen 0-10 Units    insulin glargine U-100 (Lantus) pen 15 Units    LIDOcaine 5 % patch 1 patch    magnesium sulfate 2g in water 50mL IVPB (premix)    meropenem 1 g in sodium chloride 0.9 % 100 mL IVPB (ready to mix system)    metoprolol injection 5 mg    metoprolol succinate (TOPROL-XL) 24 hr tablet 100 mg    micafungin 100 mg in 0.9% NaCl 100 mL IVPB (MB+)    naloxone 0.4 mg/mL injection 0.02 mg    ondansetron  disintegrating tablet 8 mg    potassium bicarbonate disintegrating tablet 35 mEq    potassium bicarbonate disintegrating tablet 60 mEq    potassium chloride SA CR tablet 20 mEq    potassium, sodium phosphates 280-160-250 mg packet 2 packet    potassium, sodium phosphates 280-160-250 mg packet 2 packet    potassium, sodium phosphates 280-160-250 mg packet 2 packet    promethazine tablet 25 mg    simethicone chewable tablet 80 mg    sodium chloride 0.9% flush 10 mL    sodium chloride 0.9% flush 10 mL    And    sodium chloride 0.9% flush 10 mL    sodium chloride 0.9% flush 10 mL    traMADoL tablet 50 mg     Objective:     Vital Signs (Most Recent):  Temp: 100.1 °F (37.8 °C) (07/04/24 0800)  Pulse: 106 (07/04/24 0600)  Resp: 20 (07/04/24 0600)  BP: (!) 106/58 (07/04/24 0600)  SpO2: 96 % (07/04/24 0600) Vital Signs (24h Range):  Temp:  [97.3 °F (36.3 °C)-100.1 °F (37.8 °C)] 100.1 °F (37.8 °C)  Pulse:  [] 106  Resp:  [20-45] 20  SpO2:  [91 %-100 %] 96 %  BP: ()/(53-90) 106/58     Weight: 133.8 kg (294 lb 15.6 oz)  Height: 6' (182.9 cm)  Body mass index is 40.01 kg/m².      Intake/Output Summary (Last 24 hours) at 7/4/2024 0826  Last data filed at 7/4/2024 0639  Gross per 24 hour   Intake 2357.61 ml   Output 2450 ml   Net -92.39 ml        General    Constitutional: He is oriented to person, place, and time. He appears well-developed and well-nourished.   Pulmonary/Chest: Effort normal.   Neurological: He is alert and oriented to person, place, and time.   Psychiatric: He has a normal mood and affect. His behavior is normal. Judgment and thought content normal.             Left Shoulder Exam     Inspection/Observation   Swelling: absent  Bruising: absent  Scars: present  Deformity: absent    Tenderness   The patient is experiencing no tenderness.      Comments:  Skin L shoulder C/D/I.  NVI L UE.  Incisions healing well without wound dehiscnce or drainage. No further ortho surgery on L shoulder this admission.   Will ultimately need TSA.         Significant Labs: None    Significant Imaging: None  Assessment/Plan:     Abscess of left shoulder  Hospital day 20:    Status post repeat irrigation and debridement (3rd washout) and hardware removal of left shoulder for suspected infection.  Wound VAC removal    Patient is approximately 3 months status post open reduction internal fixation of a left proximal humerus fracture which lost reduction.    1. His pain is well managed.    2.  Intraoperative cultures from irrigation and debridement 6/27, gram stain showed few WBCs, no organisms.  Aerobic preliminary report shows no growth and anaerobic cultures no growth.      Preoperative and intraoperative cultures from 06/21 and 6/24 showed many WBCs but have demonstrated no bacterial growth to date.  As well as cultures from 06/19 showed no growth.      3.  Wound VAC was discontinued intraoperatively 6/27  4.  Sling as needed for comfort of the left upper extremity. Patient can do very light activity with the left upper extremity focusing more on elbow, hand and wrist.  Would not do dedicated therapy for the shoulder.  Patient could weightbear through the extremity if needed with a walker.  5.  No plan for additional orthopedic intervention for the left shoulder at this time  6. Consult case management as the patient will likely benefit from placement for IV antibiotics as well as his generalized deconditioned state.  7. Continue Infectious Disease recommendations but no laboratory findings suggestive of left shoulder infection.  8.  Stable from an orthopedic standpoint, unfortunately he has regressed from an overall medical perspective now with abscess in chest/mediastinum. Recent thoracentesis. Pending I&D.    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      Swelling of limb, left  History of left proximal humerus fracture with open reduction internal  fixation.            Hansel Tate PA-C  Orthopedics  [facility]

## 2024-07-04 NOTE — NURSING
Pt lying in bed in no acute distress. Repositioned pt. No complaints. Pt is NPO for surgical procedure in am. Call light within reach and bed alarm on. Will continue current plan of care.

## 2024-07-04 NOTE — SUBJECTIVE & OBJECTIVE
Interval History: Patient states pain currently controlled.     Review of Systems   Constitutional:  Negative for activity change, appetite change, chills and fever.   HENT:  Negative for congestion, ear pain, nosebleeds and sinus pain.    Eyes:  Negative for discharge and itching.   Respiratory:  Positive for shortness of breath. Negative for apnea, cough and chest tightness.    Cardiovascular:  Negative for chest pain, palpitations and leg swelling.   Gastrointestinal:  Negative for abdominal distention, abdominal pain and vomiting.   Genitourinary:  Negative for difficulty urinating, dysuria, flank pain and frequency.   Musculoskeletal:  Positive for arthralgias. Negative for back pain, joint swelling and myalgias.   Skin:  Positive for wound. Negative for color change, pallor and rash.   Neurological:  Negative for dizziness, weakness, light-headedness and headaches.   Psychiatric/Behavioral:  Negative for agitation, behavioral problems, confusion and suicidal ideas.      Objective:     Vital Signs (Most Recent):  Temp: 98.2 °F (36.8 °C) (07/04/24 1245)  Pulse: 92 (07/04/24 1337)  Resp: 18 (07/04/24 1337)  BP: (!) 104/59 (07/04/24 0945)  SpO2: (!) 94 % (07/04/24 1337) Vital Signs (24h Range):  Temp:  [98.2 °F (36.8 °C)-100.1 °F (37.8 °C)] 98.2 °F (36.8 °C)  Pulse:  [] 92  Resp:  [18-50] 18  SpO2:  [93 %-100 %] 94 %  BP: ()/(51-90) 104/59     Weight: 133.8 kg (294 lb 15.6 oz)  Body mass index is 40.01 kg/m².    Intake/Output Summary (Last 24 hours) at 7/4/2024 1526  Last data filed at 7/4/2024 1434  Gross per 24 hour   Intake 1821.5 ml   Output 3040 ml   Net -1218.5 ml         Physical Exam  Vitals reviewed.   Constitutional:       General: He is not in acute distress.     Appearance: Normal appearance. He is normal weight. He is not ill-appearing.   HENT:      Head: Normocephalic and atraumatic.      Nose: Nose normal.      Mouth/Throat:      Mouth: Mucous membranes are moist.      Pharynx:  Oropharynx is clear.   Eyes:      General: No scleral icterus.     Extraocular Movements: Extraocular movements intact.      Conjunctiva/sclera: Conjunctivae normal.      Pupils: Pupils are equal, round, and reactive to light.   Cardiovascular:      Rate and Rhythm: Normal rate and regular rhythm.      Pulses: Normal pulses.      Heart sounds: Normal heart sounds. No murmur heard.     No gallop.   Pulmonary:      Effort: Pulmonary effort is normal. No respiratory distress.      Breath sounds: Normal breath sounds. No wheezing, rhonchi or rales.   Chest:      Chest wall: No tenderness.   Abdominal:      General: Abdomen is flat. There is no distension.      Palpations: Abdomen is soft.      Tenderness: There is no abdominal tenderness.   Musculoskeletal:         General: No swelling or tenderness.      Cervical back: Normal range of motion and neck supple. No rigidity or tenderness.      Right lower leg: No edema.      Left lower leg: No edema.   Skin:     General: Skin is warm and dry.      Findings: No lesion.      Comments: Surgical dressing on foot clean and dry.   Neurological:      General: No focal deficit present.      Mental Status: He is alert and oriented to person, place, and time. Mental status is at baseline.      Motor: No weakness.   Psychiatric:         Mood and Affect: Mood normal.         Behavior: Behavior normal.         Thought Content: Thought content normal.             Significant Labs: All pertinent labs within the past 24 hours have been reviewed.    Significant Imaging: I have reviewed all pertinent imaging results/findings within the past 24 hours.

## 2024-07-04 NOTE — OP NOTE
Date of Operation: July 4, 2024      Pre-Operative Diagnosis: Chest Wall Abscess      Post-Operative Diagnosis: Chest Wall Abscess      Operation: Incision and Drainage of Chest Wall Abscess      Surgeon: Gus Escobedo MD      Assistant: None      Anesthesia: General      Estimated Blood Loss: 15 cc      Indication for Procedure:         The patient is a 73-year-old obese diabetic male who was transferred to [facility] yesterday from [facility] after he was noted to have swelling of his anterior chest wall.  A subsequent CT scan of the chest revealed a large abscess of his anterior chest wall, slightly left of midline with partial destruction of the 6th, 7th and 8th ribs.  The abscess extended into the anterior mediastinum and anterior abdominal wall.  Multiple air bubbles were noted.  He also had bilateral pleural effusions, left greater than right.  The patient had been hospitalized at [facility] after undergoing recent left shoulder hardware removal with multiple irrigation and debridements.  He is also undergone removal of a right toe.  He was anemic.  He is in chronic atrial fibrillation, but refuses anticoagulation.  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  In preparation for today's planned chest wall abscess drainage, he has undergone transfusion of one unit of packed red blood cells and ultrasound-guided left-sided thoracentesis with aspiration of 1500 cc of serosanguineous fluid.      Procedure in Detail:         The patient was transferred directly from the Intensive Care Unit to the Operating Theater and placed on the operating table in the supine position.  Care was taken to minimize movement of his left arm and shoulder.  EKG, pulse oximetry monitoring line and a noninvasive blood pressure cuff were applied.  The patient underwent induction, placement of a laryngeal mask airway, and administration of general  "anesthesia.  The patient's arms were secured by his side.  His chest and upper abdomen were clipped of hair.  The operative field was prepped with Hibiclens scrub solution, followed by Betadine, followed by alcohol and then skin .  The operative field was draped in a sterile standard fashion with an [place] drape.  A " time-out " was performed, confirming the patient's identity and planned procedure.           Through a small incision at the seventh intercostal space along the anterior axillary line, blunt dissection was continued to the anterior chest wall abscess.  Once the abscess was punctured, creamy purulent fluid began to drain.  A SOAK ([employer] toolKit) suction catheter was advanced into the abscess cavity and loculations were carefully broken up, with the intent of avoiding entering into the abdominal cavity or irritating the underlying pericardium.  The fluid was forwarded for Gram stain, culture, anaerobic culture, AFB stain and fungal culture.  Approximately 250 cc of this creamy purulent fluid was initially drain.  The abscess cavity was then irrigated and suctioned with a total of 2 L of vancomycin based saline irrigation.  The fluid effluent appeared significantly thinner.  A large Hemovac drain was inserted through a 5 millimeter incision overlying the center of the abscess and directed through the aforementioned more lateral incision.  The drain was secured with #2 silk suture.  The 5 millimeter incision at the center of the abscess was closed with 3.0 Vicryl and skin staples.  The drain was placed to Hemovac suction.  Sterile dressings were applied.  The patient's laryngeal mask airway was removed in the Operating Theater and he was prepared for transport to the Intensive Care Unit in stable condition for further care.                        "

## 2024-07-04 NOTE — NURSING
Spoke to pt.'s wife Erica this am. Pt.'s spouse is requesting a time for the pt.'s surgical procedure today. Charge nurse Hilda Esteban RN stated that pt was not on the am surgical list for Dr. Escobedo at this time. Explained this to pt.'s wife and informed pt as well. However, orders are in epic in regards to surgical procedure from Dr. Escobedo for today. Pt has been NPO since midnight and was transfused one unit of PRBC's last night in anticipation for procedure today. Surgical supplies are at bedside. Lastly, consents are on the chart signed by pt and Dr. Escobedo. Surgical check list has been started in [facility]. I will ask am nurse to follow-up as to the timing of the procedure today with Dr. Escobedo and inform pt and spouse. Pt.'s call light within reach and bed alarm on. Will continue current treatment plan.

## 2024-07-04 NOTE — NURSING
Pt lying in bed in no acute distress. Repositioned pt. See ongoing assessment and charted vital signs in epic. See telemetry strip in chart. Family present in room. Dr. Escobedo present in room assessing pt. Pt will be NPO for surgical procedure in am. Procedure explained in detail by Dr. Escobedo. Pt and spouse state understanding. All questions answered by Dr. Escobedo. See surgical consents in chart. Dr. Escobedo wishes to give pt 1 unit of PRBC's prior to surgical procedure in am. Will type and screen pt and transfuse blood when prepared by blood bank. See blood transfusion consent in chart. Pt is wearing his own CPAP machine and tolerating well. Call light within reach and bed alarm on. Will continue current plan of care.

## 2024-07-04 NOTE — ASSESSMENT & PLAN NOTE
Hospital day 20:    Status post repeat irrigation and debridement (3rd washout) and hardware removal of left shoulder for suspected infection.  Wound VAC removal    Patient is approximately 3 months status post open reduction internal fixation of a left proximal humerus fracture which lost reduction.    1. His pain is well managed.    2.  Intraoperative cultures from irrigation and debridement 6/27, gram stain showed few WBCs, no organisms.  Aerobic preliminary report shows no growth and anaerobic cultures no growth.      Preoperative and intraoperative cultures from 06/21 and 6/24 showed many WBCs but have demonstrated no bacterial growth to date.  As well as cultures from 06/19 showed no growth.      3.  Wound VAC was discontinued intraoperatively 6/27  4.  Sling as needed for comfort of the left upper extremity. Patient can do very light activity with the left upper extremity focusing more on elbow, hand and wrist.  Would not do dedicated therapy for the shoulder.  Patient could weightbear through the extremity if needed with a walker.  5.  No plan for additional orthopedic intervention for the left shoulder at this time  6. Consult case management as the patient will likely benefit from placement for IV antibiotics as well as his generalized deconditioned state.  7. Continue Infectious Disease recommendations but no laboratory findings suggestive of left shoulder infection.  8.  Stable from an orthopedic standpoint, unfortunately he has regressed from an overall medical perspective now with abscess in chest/mediastinum. Recent thoracentesis. Pending I&D.    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

## 2024-07-04 NOTE — PT/OT/SLP PROGRESS
Occupational Therapy      Patient Name:  Roosevelt Moser   MRN:  [id number]    Patient not seen today secondary to  (Pending procedure.). Will follow-up tomorrow.    7/4/2024

## 2024-07-04 NOTE — TRANSFER OF CARE
Anesthesia Transfer of Care Note    Patient: Roosevelt Moser    Procedure(s) Performed: Procedure(s) (LRB):  INCISION AND DRAINAGE, ABSCESS (N/A)    Patient location: PACU    Anesthesia Type: general    Transport from OR: Transported from OR on 2-3 L/min O2 by NC with adequate spontaneous ventilation    Post pain: adequate analgesia    Post assessment: no apparent anesthetic complications    Post vital signs: stable    Level of consciousness: awake    Nausea/Vomiting: no nausea/vomiting    Complications: none    Transfer of care protocol was followed    Last vitals: Visit Vitals  BP (!) 104/59   Pulse 89   Temp 37.8 °C (100.1 °F) (Oral)   Resp (!) 24   Ht 6' (1.829 m)   Wt 133.8 kg (294 lb 15.6 oz)   SpO2 100%   BMI 40.01 kg/m²

## 2024-07-04 NOTE — PROGRESS NOTES
[facility]/Surg   Department of Infectious Disease  Progress Note    PATIENT NAME: Roosevelt Moser  MRN: [id number]  TODAY'S DATE: 07/04/2024  ADMIT DATE: 6/14/2024  LOS: 20 days  CHIEF COMPLAINT: Weakness (Pt brought to ED via EMS with complaint of weakness and falling, pt advised EMS his urine is very dark.  )    PRINCIPLE PROBLEM: Acute renal failure superimposed on chronic kidney disease      INTERVAL HISTORY     7/4 (Pires) Transferred to [facility] for CT surgery evaluation for left chest wall abscess.  Discussed with wife, yesterday morning while he was being assessed by nursing, a protruded area on his left chest was noted, patient was complaining of chest pain which prompted CT scan that revealed a 17 cm anterior and left chest wall abscess extending into the mediastinum with partial destruction of left 6th, 7th and 8th anterior ribs.  Patient seen examined at bedside, seen by CT surgery earlier today, status post left thoracentesis, 1.500 cc of serosanguineous fluid drained.  Fluid set for cell count, Gram stain and cultures including AFB and fungal.  Plan today for drainage of chest wall abscess in the OR.  He is awake, alert, wife at bedside, breathing comfortable on room air.  Labile BP, afebrile.  Adequate urinary output, last bowel movement 7/3.  Labs reviewed, leukocytosis 13.4, no left shift, H&H 8.3/26.6, platelet count 485.  Stable electrolytes, creatinine and liver function normal.  Will check CRP tomorrow.  Baseline CPK less than 10.    07/03/2024:  He was noted to have left chest wall swelling today.  CT chest has demonstrated a 17 cm anterior and left chest wall abscess that extends into the mediastinum and peritoneal with partial destruction of left 6th, 7th and 8th anterior ribs.  Also showed bilateral pleural effusion worse on the left with compressive atelectasis/collapse of left lower lobe.  X-ray left shoulder and left humerus 07/02/2024 show left femoral fracture with  displacement.     07/02/2024:  He had right 2nd toe amputation yesterday 07/01/2024.  No other acute issues overnight.     06/30/2024: No acute issues overnight. Tolerating antibiotics okay.     06/29/2024: Events of last 9 days noted. He had I and D left shoulder with removal of humerus hardware 06/21/2024. Had 2nd I&D     6/28/24 (Pranay):  Patient seen and examined at bedside, wife present.  He is lying in bed, wife reports he has a little more alert and interactive compared to prior.  Patient awaiting authorization to go to facility to continue IV antibiotics.  He went for 3rd washout yesterday and as per discussion with ortho surgeon tissue look much better, healthier, no pus or fluid collections identified.  Hemodynamically stable, T-max 98.4°, afebrile.  Breathing comfortable on room air.  Adequate urinary output, having regular bowel movements.  PICC line in place since 06/14.  Labs reviewed, leukocytosis down to 15.4, no left shift, H&H 8.3/26.7, platelet count 654, reactive thrombocytosis.  Stable electrolytes, MARBIN improved, creatinine 1.4, creatinine clearance 69.5 mL/min, normal LFTs, CPK stable. 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     06/27/24@ (Naomi): Patient is lying in bed awake and alert with his wife at bedside.  He was status post washout of his left shoulder.  Per Orthopedic surgery lifting much better with no pus or fluid collections and looked clean and healthy.   He has no complaints.  No diarrhea with a bowel movement today, no nausea vomiting, states he has a fair appetite, no fevers or chills.   Much improved mouth dryness.  T-max 98.6° in last 24 hours.   WBC 17.46, platelets 703, H&H 8.2/25.7, no left shift, no bands, BUN/ CR 54/1.4 with estimated creatinine clearance 69.4.  6/24 cultures with no  growth, 6/21 cultures no growth.  6/20 cultures negative.  6/18 culture from right 2nd toe with MRSA.  CRP trending down at 180, BNP is elevated at 456 and total CK is 8.    6/26:  Patient seen and examined, wife present.  He is awake, alert, NPO for 3rd washout of left shoulder.  He reports he is feeling better, awaiting surgery to have something to eat after procedure.  Left shoulder with wound VAC place draining significant amounts of bloody purulent fluid.  Hypertensive, afebrile.  Adequate urinary output, had 2 bowel movements in the last 24 hours.  Significant upper extremity edema decreasing, persistent bilateral lower extremity pitting edema.  Labs reviewed, leukocytosis 17.8, no left shift, H&H 8.2/25.8, platelet count 694, reactive thrombocytosis.  Stable electrolytes, creatinine down to 1.4, MARBIN improved, creatinine clearance 69.4 mL/min, normal LFTs, CRP down to 192.2.  Micro reviewed, OR cultures from 06/21 no growth to date, pending final.    6/25:  Patient seen and examined, wife present.  Patient lying in bed, has wound VAC placed to left shoulder.  Discussed with Ortho, very difficult situation in the setting of nonunion infected fracture.  As per operation note: After removing packing there was a large collection of bloody brownish fluid.  Irrigated.  Incision was extended 5 cm distally.  Pulse down the wound with 12 L of fluid.  Cultures were taken.  After extensive washout able to pass through the abscess cavity and a wound VAC was placed into cavity.  Evidence of no union or whatsoever.  The patient has an infected nonunion discussed with Hospital Medicine as well, plan for serial washouts in the OR for source control.  Slightly hypertensive, tachycardic, afebrile.  Adequate urinary output, had 2 bowel movements in the last 24 hours.  He did not get steroids preop, last dose dexamethasone 8 mg once on 06/21.  Labs reviewed, leukocytosis 20.1, no left shift, H&H 8.6/27.1, MCV 81, platelet count  "720, reactive thrombocytosis.  Stable electrolytes, MARBIN, creatinine down to 1.6, creatinine clearance 60.6 mL/min, improving, normal LFTs, CRP remains taylor high 219.3, procalcitonin continues to fall 0.7.  We do not need to check this daily.  Echo unable to fully visualize oral valves.    6/24/24 (Pranay):  Patient seen and examined, wife present.  He is NPO for procedure today by Ortho, I&D and washout of left shoulder.  Patient awake, alert.  Hypertensive, T-max 99.5°, currently afebrile.  Adequate urinary output, had a bowel movement in the last 24 hours.  Labs reviewed, leukocytosis 18.1, no left shift, H&H 9.1/28.3, MCV 80, reactive thrombocytosis 721.  Sed rate extremely high 128, .  Stable electrolytes, MARBIN creatinine trending down 1.8, clearance 53.9 mL/min, baseline CPK 30.  Procalcitonin down to 0.97.  Last vancomycin random level 13.5.    06/23/2024.  Sleeping -- I did not disturb Afebrile.  T-max 99.5° WBC 21.3--19.8--17.  CRP is quite elevated at 205.  .  Procalcitonin 1.39.  Vancomycin random 18.  Cultures from shoulder no growth to date.    Gram stain yesterday was read as Gram-positive cocci--today it is changed to  "no organism"  GOing for another procedure tomorrow    06/22/2024.  Very pleasant.  So is his wife.  Patient is Afebrile.  He has swelling over the left arm, slightly improved.  Pain is well-controlled.    WBC 19--21--21.3--19.8.  He did receive dexamethasone yesterday by anesthesia, at the time of left shoulder surgery.  He is on ceftriaxone clindamycin and vancomycin.  Cultures reviewed:  right 2nd toe wound had grown MRSA.  Left intraop cultures no growth to date.  Left shoulder intraop, gram stain are showing Gram-positive cocci.  He is trying to move his legs in bed, but he is still  weak.  Wife and patient are able to do IV antibiotics at home, but all things considered, it is very cumbersome for them;  they are interested in placement, which I agree, it is the right " course of action.    06/21/2024:  Seen by Orthopedic surgery Service again yesterday.  For I and D today.  All cultures so far negative.  ASO titer normal.    06/20/2024:  Oral anticoagulants on hold to allow left shoulder arthrocentesis.  No other acute issues overnight.  Culture from right 3rd toe growing Staphylococcus aureus.  Blood culture remain negative.  Had aspiration left shoulder today that yielded purulent material.  Synovial fluid studies in progress.    06/19/2024: Seen and evaluated at bedside.  States left upper extremity pain better.  Having improved movement at the wrist and forearm.  Seen by Orthopedic surgery PA yesterday.  Notes reviewed.  Blood cultures remain negative.        Antibiotics (From admission, onward)      Start     Stop Route Frequency Ordered    07/04/24 0600  meropenem 1 g in sodium chloride 0.9 % 100 mL IVPB (ready to mix system)         -- IV Every 8 hours (non-standard times) 07/04/24 0257    07/03/24 1530  ceftaroline (TEFLARO) 600 mg in dextrose 5 % 50 mL IVPB (ready to mix)         -- IV Every 8 hours (non-standard times) 07/03/24 1511    06/17/24 2100  mupirocin 2 % ointment         06/22/24 2059 Nasl 2 times daily 06/17/24 1544          Antifungals (From admission, onward)      Start     Stop Route Frequency Ordered    07/03/24 1415  micafungin 100 mg in 0.9% NaCl 100 mL IVPB (MB+)         -- IV Every 24 hours (non-standard times) 07/03/24 1300    06/19/24 1045  clotrimazole megha 10 mg         06/29/24 0959 Oral 5 times daily 06/19/24 0932           Antivirals (From admission, onward)      None            ASSESSMENT and PLAN     Left chest wall abscess status post left thoracentesis 1500cc serosanguineous fluid drained 7/4    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 Right 3rd toe osteomyelitis s/p Dalvance x 2 doses & 2nd  distal phalanx s/p I&D at bedside, s/p 2nd toe amputation 7/1   X-ray with bony erosion of the 3rd DIP representing osteomyelitis   MRI osteomyelitis involving the middle distal phalanges of the 3rd toe.  Very slight destruction of the tip of the tuft of the distal phalanx of the 2nd toe.  Wound cultures 2nd R toe 6/18 MRSA, vanco JESSENIA 2      4. PMHx:  Diabetes, last A1c 6.3%, PID, CHF EF 50%     RECOMMENDATIONS:    Adequate source control   Plan for I&D and washout in the OR for left chest wall abscess  Please send deep tissue for Gram stain and cultures including AFB and fungal  Follow left pleural fluid study cell count, Gram stain, cultures   LDH added on   Follow cultures   Continue Teflaro 600 mg IV q.8   Meropenem 1 g IV q.8   Micafungin 100 mg IV daily   Will guide antibiotic therapy based on culture results  Wound care to all affected areas    D/w patient, wife at bedside, ICU nursing, Dr Escobedo/Thoracic Surgery, Dr Obregon/Pulmonary    Please send Epic secure chat with any questions.      SUBJECTIVE      Review of Systems  Review of systems obtained and negative except as stated above in Interval History     OBJECTIVE   Temp:  [97.3 °F (36.3 °C)-100.1 °F (37.8 °C)] 100.1 °F (37.8 °C)  Pulse:  [] 102  Resp:  [20-45] 40  SpO2:  [91 %-100 %] 94 %  BP: ()/(53-90) 143/67  Temp:  [97.3 °F (36.3 °C)-100.1 °F (37.8 °C)]   Temp: 100.1 °F (37.8 °C) (07/04/24 0800)  Pulse: 102 (07/04/24 0815)  Resp: (!) 40 (07/04/24 0815)  BP: (!) 143/67 (07/04/24 0815)  SpO2: (!) 94 % (07/04/24 0815)    Intake/Output Summary  (Last 24 hours) at 7/4/2024 0854  Last data filed at 7/4/2024 0639  Gross per 24 hour   Intake 2357.61 ml   Output 2450 ml   Net -92.39 ml       Physical Exam  General: Morbidly obese  male, awake, alert, lying in bed awake and alert, he is in no distress, pleasant and conversant.  Eyes: Eyes with no icterus or injection. Vision grossly normal  Ears: Hearing grossly normal.  Nose: Nares patent  Mouth: Moist mucous membranes, dentition is fair.  Oropharynx with resolving aphthous ulcers.  Neck: Supple  Cardiovascular: Regular rate and rhythm, no murmurs, left chest with bulky lesion, no redness noted, slightly tender to palpation   Respiratory:  Poor inspiratory effort, decreased breath sounds b/l L>R  Gastrointestinal:  Soft and obese with active bowel sounds, no tenderness to palpation, no distention.  Genitourinary:  PureWick in place.  No suprapubic tenderness.  Musculoskeletal:  Except for left shoulder, moves all extremities with good strength.    Skin:  Right foot s/p amputation of 2nd toe, left shoulder with stitches in place, no redness noted   Neuro:   Oriented, follows commands.  Psych: Good mood, normal affect.     WOUNDS:    7/4:            6/27 6/25:      6/24:                            Significant Labs: All pertinent labs within the past 24 hours have been reviewed.    CBC LAST 7 DAYS  Recent Labs   Lab 06/28/24  0403 06/29/24  0520 06/30/24  0459 07/01/24  0523 07/02/24  0345 07/03/24  0302 07/04/24  0345   WBC 15.46* 16.36* 15.03* 13.54* 14.35* 12.51 13.49*   RBC 3.20* 3.45* 3.28* 3.19* 3.54* 3.06* 3.26*   HGB 8.3* 8.7* 8.3* 8.2* 9.0* 7.8* 8.3*   HCT 26.7* 28.5* 27.6* 26.3* 29.7* 25.3* 26.6*   MCV 83 83 84 82 84 83 82   MCH 25.9* 25.2* 25.3* 25.7* 25.4* 25.5* 25.5*   MCHC 31.1* 30.5* 30.1* 31.2* 30.3* 30.8* 31.2*   RDW 17.9* 17.9* 18.0* 18.1* 18.2* 18.2* 17.3*   * 603* 620* 568* 489* 418 485*   MPV 9.3 9.2 9.8 9.4 11.5 11.1 9.6   GRAN 59.9  9.3* 60.9  10.0* 64.2  9.7* 66.5   9.0* 63.5  9.1* 64.7  8.1* 63.4  8.6*   LYMPH 20.1  3.1 18.9  3.1 17.2*  2.6 17.7*  2.4 16.4*  2.4 18.1  2.3 17.8*  2.4   MONO 11.2  1.7* 11.4  1.9* 12.4  1.9* 12.0  1.6* 12.2  1.8* 12.6  1.6* 12.9  1.7*   BASO 0.13 0.18 0.17 0.13 0.16 0.09 0.09   NRBC 0 0 0 0 0 0 0       CHEMISTRY LAST 7 DAYS  Recent Labs   Lab 06/28/24  0403 06/29/24  0520 06/30/24  0459 07/01/24  0523 07/02/24  0345 07/03/24  0302 07/03/24  1216 07/04/24  0345    141 142 143 145 142  --  141   K 3.6 4.0 3.6 3.2* 3.6 4.0  --  3.9    105 106 107 107 107  --  104   CO2 26 25 24 25 24 25  --  27   ANIONGAP 11 11 12 11 14 10  --  10   BUN 48* 48* 41* 38* 46* 50*  --  43*   CREATININE 1.4 1.3 1.1 1.1 1.2 1.2  --  1.1   * 178* 139* 149* 201* 215*  --  155*   CALCIUM 8.8 8.8 9.0 8.9 9.2 8.9  --  8.0*   PH  --   --   --   --   --   --  7.495*  --    MG  --   --   --  1.5* 1.6  --   --  1.5*   ALBUMIN 1.4* 1.5* 1.5* 1.5* 1.6* 1.5*  --  2.3*   PROT 6.6 6.6 6.7 6.7 7.1 6.5  --  6.2   ALKPHOS 155* 153* 137* 134 143* 116  --  101   ALT 24 23 24 19 18 17  --  12   AST 30 31 27 24 26 19  --  14   BILITOT 0.3 0.3 0.3 0.3 0.3 0.3  --  0.7       Estimated Creatinine Clearance: 85.9 mL/min (based on SCr of 1.1 mg/dL).    INFLAMMATORY/PROCAL    Lab Results   Component Value Date    .3 (H) 07/03/2024    .2 (H) 06/27/2024    .2 (H) 06/26/2024    .3 (H) 06/25/2024    .5 (H) 06/24/2024    .0 (H) 06/23/2024    .0 (H) 06/18/2024          Component Ref Range & Units 2 d ago   Body Fluid Type  Abscess fluid, left shoulder   Fluid Appearance  Cloudy   Fluid Color  Pink   WBC, Body Fluid /cu mm SEE COMMENT   Comment: Reference ranges for body fluids not established.  Correlate clinically.  Test not performed  Cell count not performed on abscess fluid, see differential.   Segs, Fluid % 79   Lymphs, Fluid % 14   Monocytes/Macrophages, Fluid % 7        PRIOR  MICROBIOLOGY:    Susceptibility  data from last 90 days.  Collected Specimen Info Organism Ceftriaxone Clindamycin Erythromycin Oxacillin Penicillin Tetracycline Trimeth/Sulfa Vancomycin   06/23/24 Blood from Peripheral, Hand, Right No growth after 5 days.           06/18/24 Wound from Toe, Right Foot Methicillin resistant Staphylococcus aureus  R  S  R  R  R  S  S  S   06/14/24 Blood from Peripheral, Antecubital, Right No growth after 5 days.           06/14/24 Blood from Peripheral, Hand, Right No growth after 5 days.               LAST 7 DAYS MICROBIOLOGY   Microbiology Results (last 7 days)       Procedure Component Value Units Date/Time    Culture, Anaerobic [[id number]]     Order Status: Completed Specimen: Pleural Fluid     AFB Culture & Smear [[id number]]     Order Status: Completed Specimen: Pleural Fluid     Fungus culture [[id number]]     Order Status: No result Specimen: Pleural Fluid     Culture, Body Fluid (Aerobic) w/ GS [[id number]] Collected: 07/04/24 0846    Order Status: Sent Specimen: Body Fluid from Pleural Fluid Updated: 07/04/24 0846    Fungus culture [[id number]] Collected: 06/21/24 1645    Order Status: Completed Specimen: Abscess from Shoulder, Left Updated: 07/04/24 0444     Fungus (Mycology) Culture No fungal growth to date      No fungal growth to date    Fungus culture [[id number]] Collected: 06/20/24 1204    Order Status: Completed Specimen: Abscess from Shoulder, Left Updated: 07/04/24 0444     Fungus (Mycology) Culture No fungal growth to date      No fungal growth to date    Blood culture [[id number]] Collected: 07/03/24 1332    Order Status: Completed Specimen: Blood from Peripheral, Hand, Left Updated: 07/04/24 0317     Blood Culture, Routine No Growth to date    Aerobic culture [[id number]] Collected: 06/27/24 0733    Order Status: Completed Specimen: Incision site from Shoulder, Left Updated: 07/01/24 0704     Aerobic Bacterial Culture No growth    Culture, Anaerobe [[id number]] Collected: 06/27/24 0733     Order Status: Completed Specimen: Incision site from Shoulder, Left Updated: 06/29/24 1441     Anaerobic Culture No anaerobes isolated    Gram stain [[id number]] Collected: 06/27/24 0733    Order Status: Completed Specimen: Incision site from Shoulder, Left Updated: 06/28/24 1516     Gram Stain Result Few WBC's      No organisms seen    Aerobic culture [[id number]] Collected: 06/24/24 1622    Order Status: Completed Specimen: Incision site from Shoulder, Left Updated: 06/28/24 1035     Aerobic Bacterial Culture No growth    Blood culture [[id number]] Collected: 06/23/24 0659    Order Status: Completed Specimen: Blood from Peripheral, Hand, Right Updated: 06/28/24 1032     Blood Culture, Routine No growth after 5 days.    Culture, Anaerobe [[id number]] Collected: 06/24/24 1622    Order Status: Completed Specimen: Incision site from Shoulder, Left Updated: 06/27/24 1357     Anaerobic Culture No anaerobes isolated              CURRENT/PREVIOUS VISIT EKG  Results for orders placed or performed during the hospital encounter of 06/14/24   EKG 12-lead    Collection Time: 06/14/24 12:16 PM   Result Value Ref Range    QRS Duration 106 ms    [facility] QTC Calculation 443 ms    Narrative    Test Reason : R53.1,    Vent. Rate : 120 BPM     Atrial Rate : 159 BPM     P-R Int : 000 ms          QRS Dur : 106 ms      QT Int : 314 ms       P-R-T Axes : 000 -54 093 degrees     QTc Int : 443 ms    Atrial fibrillation with rapid ventricular response  Left axis deviation  Low voltage QRS  Inferior infarct ,age undetermined  Cannot rule out Anterior infarct ,age undetermined  Abnormal ECG  When compared with ECG of 25-MAR-2024 09:14,  Vent. rate has increased BY  46 BPM  Minimal criteria for Anterior infarct are now Present  Confirmed by Jesús HATHAWAY, Hansel AMIN ([id number]) on 6/20/2024 8:43:02 PM    Referred By: [email or web address]   SELF           Confirmed By:Hansel Espinosa MD       CT chest 7/3/24:    Impression:    Large abscess of the anterior central and  left chest wall and abdominal wall measuring 17 cm transversely.  This extends into the mediastinum and peritoneum with partial destruction of the anterior 6th 7th and 8th ribs.  Complete atelectasis of the left lower lobe with a moderate left pleural effusion.  Mild atelectasis right lung base with a small to moderate right pleural effusion.    X-ray R foot:  FINDINGS:  There is a small region of bony erosion involving the distal 3rd digit.  There is soft tissue injury of the distal 2nd digit with thickening of the nail.  There is no evidence fracture.    Impression:    There is bony erosion of the 3rd D IP possibly representing osteomyelitis.    ECHO:     Extremely limited visualization of all cardiac structures.  No clinically significant determination can be made based on this echocardiogram.  If cardiac concerns persist, consider alternative cardiac imaging like RICKY for further evaluation.    Left Ventricle: Left ventricle was not well visualized due to poor sonic window. The left ventricle is normal in size. Unable to assess wall motion. There is normal systolic function with a visually estimated ejection fraction of 55 - 60%.    Right Ventricle: Right ventricle was not well visualized due to poor acoustic window.    Left Atrium: Left atrium was not well visualized.    Mitral Valve: There is no stenosis. The mean pressure gradient across the mitral valve is 2 mmHg at a heart rate of  bpm. There is mild regurgitation.    IVC/SVC: Elevated venous pressure at 15 mmHg.    Significant Imaging: I have reviewed all relevant and available imaging results/findings within the past 24 hours.    06/18/2024.  CT left shoulder   1. Subacute left humerus fracture post internal fixation.  There is incomplete bony bridging across the fracture site, with persistent angulation between the dominant bony fragments as hardware is only partially engaged (see discussion).  2. Severe arthropathy of the glenohumeral joint with multiple  intra-articular bodies, some interposed.  3. Large left joint effusion and adjacent soft tissue focal fluid collections containing gas and concerning for gas-forming infection.  4. Moderate size left pleural effusion.  5. Left basilar airspace disease is presumably atelectasis with pneumonia as a less favored consideration.  This report was flagged in Epic as abnormal.    X-ray on 06/17, prior to removal of hardware.      I spent a total of 55 minutes on the day of the visit.This includes face to face time and non-face to face time preparing to see the patient (eg, review of tests), obtaining and/or reviewing separately obtained history, documenting clinical information in the electronic or other health record, independently interpreting results and communicating results to the patient/family/caregiver, or care coordinator.    Capri Wesley MD  Date of Service: 07/04/2024      This note was created using [employer] voice recognition software that occasionally misinterpreted phrases or words.

## 2024-07-05 ENCOUNTER — ANESTHESIA (OUTPATIENT)
Dept: SURGERY | Facility: HOSPITAL | Age: 74
DRG: 463 | End: 2024-07-05
Payer: MEDICARE

## 2024-07-05 ENCOUNTER — ANESTHESIA EVENT (OUTPATIENT)
Dept: SURGERY | Facility: HOSPITAL | Age: 74
DRG: 463 | End: 2024-07-05
Payer: MEDICARE

## 2024-07-05 LAB
ALBUMIN SERPL BCP-MCNC: 2.2 G/DL (ref 3.5–5.2)
ALP SERPL-CCNC: 97 U/L (ref 55–135)
ALT SERPL W/O P-5'-P-CCNC: 14 U/L (ref 10–44)
ANION GAP SERPL CALC-SCNC: 7 MMOL/L (ref 8–16)
AST SERPL-CCNC: 19 U/L (ref 10–40)
BASOPHILS # BLD AUTO: 0.09 K/UL (ref 0–0.2)
BASOPHILS NFR BLD: 0.8 % (ref 0–1.9)
BILIRUB SERPL-MCNC: 0.4 MG/DL (ref 0.1–1)
BUN SERPL-MCNC: 41 MG/DL (ref 8–23)
CALCIUM SERPL-MCNC: 7.9 MG/DL (ref 8.7–10.5)
CHLORIDE SERPL-SCNC: 103 MMOL/L (ref 95–110)
CO2 SERPL-SCNC: 27 MMOL/L (ref 23–29)
CREAT SERPL-MCNC: 1.3 MG/DL (ref 0.5–1.4)
DIFFERENTIAL METHOD BLD: ABNORMAL
EOSINOPHIL # BLD AUTO: 0.3 K/UL (ref 0–0.5)
EOSINOPHIL NFR BLD: 2.8 % (ref 0–8)
ERYTHROCYTE [DISTWIDTH] IN BLOOD BY AUTOMATED COUNT: 17.2 % (ref 11.5–14.5)
EST. GFR  (NO RACE VARIABLE): 58.4 ML/MIN/1.73 M^2
GLUCOSE SERPL-MCNC: 150 MG/DL (ref 70–110)
GLUCOSE SERPL-MCNC: 158 MG/DL (ref 70–110)
GLUCOSE SERPL-MCNC: 162 MG/DL (ref 70–110)
GLUCOSE SERPL-MCNC: 181 MG/DL (ref 70–110)
GLUCOSE SERPL-MCNC: 186 MG/DL (ref 70–110)
GRAM STN SPEC: NORMAL
GRAM STN SPEC: NORMAL
HCO3 UR-SCNC: 26.3 MMOL/L (ref 24–28)
HCO3 UR-SCNC: 27.4 MMOL/L (ref 24–28)
HCT VFR BLD AUTO: 26.3 % (ref 40–54)
HCT VFR BLD CALC: 25 %PCV (ref 36–54)
HCT VFR BLD CALC: 28 %PCV (ref 36–54)
HGB BLD-MCNC: 8.2 G/DL (ref 14–18)
IMM GRANULOCYTES # BLD AUTO: 0.11 K/UL (ref 0–0.04)
IMM GRANULOCYTES NFR BLD AUTO: 1 % (ref 0–0.5)
LYMPHOCYTES # BLD AUTO: 1.8 K/UL (ref 1–4.8)
LYMPHOCYTES NFR BLD: 15.6 % (ref 18–48)
MAGNESIUM SERPL-MCNC: 2 MG/DL (ref 1.6–2.6)
MCH RBC QN AUTO: 25.5 PG (ref 27–31)
MCHC RBC AUTO-ENTMCNC: 31.2 G/DL (ref 32–36)
MCV RBC AUTO: 82 FL (ref 82–98)
MONOCYTES # BLD AUTO: 1.1 K/UL (ref 0.3–1)
MONOCYTES NFR BLD: 9.4 % (ref 4–15)
NEUTROPHILS # BLD AUTO: 8 K/UL (ref 1.8–7.7)
NEUTROPHILS NFR BLD: 70.4 % (ref 38–73)
NRBC BLD-RTO: 0 /100 WBC
PCO2 BLDA: 41.2 MMHG (ref 35–45)
PCO2 BLDA: 50.9 MMHG (ref 35–45)
PH SMN: 7.34 [PH] (ref 7.35–7.45)
PH SMN: 7.41 [PH] (ref 7.35–7.45)
PLATELET # BLD AUTO: 443 K/UL (ref 150–450)
PMV BLD AUTO: 9.8 FL (ref 9.2–12.9)
PO2 BLDA: 369 MMHG (ref 80–100)
PO2 BLDA: 392 MMHG (ref 80–100)
POC BE: 2 MMOL/L
POC BE: 2 MMOL/L
POC IONIZED CALCIUM: 1.12 MMOL/L (ref 1.06–1.42)
POC IONIZED CALCIUM: 1.13 MMOL/L (ref 1.06–1.42)
POC SATURATED O2: 100 % (ref 95–100)
POC SATURATED O2: 100 % (ref 95–100)
POC TCO2: 28 MMOL/L (ref 23–27)
POC TCO2: 29 MMOL/L (ref 23–27)
POTASSIUM BLD-SCNC: 4.4 MMOL/L (ref 3.5–5.1)
POTASSIUM BLD-SCNC: 4.6 MMOL/L (ref 3.5–5.1)
POTASSIUM SERPL-SCNC: 4.1 MMOL/L (ref 3.5–5.1)
PROT SERPL-MCNC: 6.1 G/DL (ref 6–8.4)
RBC # BLD AUTO: 3.21 M/UL (ref 4.6–6.2)
SAMPLE: ABNORMAL
SAMPLE: ABNORMAL
SODIUM BLD-SCNC: 138 MMOL/L (ref 136–145)
SODIUM BLD-SCNC: 138 MMOL/L (ref 136–145)
SODIUM SERPL-SCNC: 137 MMOL/L (ref 136–145)
WBC # BLD AUTO: 11.32 K/UL (ref 3.9–12.7)

## 2024-07-05 PROCEDURE — C1729 CATH, DRAINAGE: HCPCS | Performed by: THORACIC SURGERY (CARDIOTHORACIC VASCULAR SURGERY)

## 2024-07-05 PROCEDURE — 82962 GLUCOSE BLOOD TEST: CPT

## 2024-07-05 PROCEDURE — 87205 SMEAR GRAM STAIN: CPT | Performed by: THORACIC SURGERY (CARDIOTHORACIC VASCULAR SURGERY)

## 2024-07-05 PROCEDURE — 36000710: Performed by: THORACIC SURGERY (CARDIOTHORACIC VASCULAR SURGERY)

## 2024-07-05 PROCEDURE — 25000003 PHARM REV CODE 250: Performed by: INTERNAL MEDICINE

## 2024-07-05 PROCEDURE — D9220A PRA ANESTHESIA: Mod: CRNA,,, | Performed by: NURSE ANESTHETIST, CERTIFIED REGISTERED

## 2024-07-05 PROCEDURE — 94761 N-INVAS EAR/PLS OXIMETRY MLT: CPT

## 2024-07-05 PROCEDURE — 87070 CULTURE OTHR SPECIMN AEROBIC: CPT | Performed by: THORACIC SURGERY (CARDIOTHORACIC VASCULAR SURGERY)

## 2024-07-05 PROCEDURE — 37000008 HC ANESTHESIA 1ST 15 MINUTES: Performed by: THORACIC SURGERY (CARDIOTHORACIC VASCULAR SURGERY)

## 2024-07-05 PROCEDURE — 25000003 PHARM REV CODE 250: Performed by: HOSPITALIST

## 2024-07-05 PROCEDURE — 63600175 PHARM REV CODE 636 W HCPCS: Mod: JG | Performed by: STUDENT IN AN ORGANIZED HEALTH CARE EDUCATION/TRAINING PROGRAM

## 2024-07-05 PROCEDURE — 83735 ASSAY OF MAGNESIUM: CPT | Performed by: FAMILY MEDICINE

## 2024-07-05 PROCEDURE — 36000711: Performed by: THORACIC SURGERY (CARDIOTHORACIC VASCULAR SURGERY)

## 2024-07-05 PROCEDURE — 80053 COMPREHEN METABOLIC PANEL: CPT | Performed by: INTERNAL MEDICINE

## 2024-07-05 PROCEDURE — 63600175 PHARM REV CODE 636 W HCPCS: Mod: JZ,EC,JG | Performed by: INTERNAL MEDICINE

## 2024-07-05 PROCEDURE — 20000000 HC ICU ROOM

## 2024-07-05 PROCEDURE — 99900031 HC PATIENT EDUCATION (STAT)

## 2024-07-05 PROCEDURE — D9220A PRA ANESTHESIA: Mod: ANES,,, | Performed by: ANESTHESIOLOGY

## 2024-07-05 PROCEDURE — 85025 COMPLETE CBC W/AUTO DIFF WBC: CPT | Performed by: INTERNAL MEDICINE

## 2024-07-05 PROCEDURE — 27000221 HC OXYGEN, UP TO 24 HOURS

## 2024-07-05 PROCEDURE — 87075 CULTR BACTERIA EXCEPT BLOOD: CPT | Performed by: THORACIC SURGERY (CARDIOTHORACIC VASCULAR SURGERY)

## 2024-07-05 PROCEDURE — 99233 SBSQ HOSP IP/OBS HIGH 50: CPT | Mod: ,,, | Performed by: STUDENT IN AN ORGANIZED HEALTH CARE EDUCATION/TRAINING PROGRAM

## 2024-07-05 PROCEDURE — 63600175 PHARM REV CODE 636 W HCPCS: Performed by: HOSPITALIST

## 2024-07-05 PROCEDURE — 99900035 HC TECH TIME PER 15 MIN (STAT)

## 2024-07-05 PROCEDURE — 0BNK4ZZ RELEASE RIGHT LUNG, PERCUTANEOUS ENDOSCOPIC APPROACH: ICD-10-PCS | Performed by: THORACIC SURGERY (CARDIOTHORACIC VASCULAR SURGERY)

## 2024-07-05 PROCEDURE — 99291 CRITICAL CARE FIRST HOUR: CPT | Mod: ,,, | Performed by: INTERNAL MEDICINE

## 2024-07-05 PROCEDURE — 97110 THERAPEUTIC EXERCISES: CPT

## 2024-07-05 PROCEDURE — 37000009 HC ANESTHESIA EA ADD 15 MINS: Performed by: THORACIC SURGERY (CARDIOTHORACIC VASCULAR SURGERY)

## 2024-07-05 PROCEDURE — 63600175 PHARM REV CODE 636 W HCPCS: Performed by: NURSE ANESTHETIST, CERTIFIED REGISTERED

## 2024-07-05 PROCEDURE — 25000003 PHARM REV CODE 250: Performed by: NURSE ANESTHETIST, CERTIFIED REGISTERED

## 2024-07-05 PROCEDURE — A4216 STERILE WATER/SALINE, 10 ML: HCPCS | Performed by: INTERNAL MEDICINE

## 2024-07-05 PROCEDURE — 63600175 PHARM REV CODE 636 W HCPCS: Mod: JZ,JG | Performed by: THORACIC SURGERY (CARDIOTHORACIC VASCULAR SURGERY)

## 2024-07-05 PROCEDURE — 63600175 PHARM REV CODE 636 W HCPCS: Performed by: INTERNAL MEDICINE

## 2024-07-05 RX ORDER — SODIUM CHLORIDE 9 MG/ML
INJECTION, SOLUTION INTRAVENOUS CONTINUOUS
Status: DISCONTINUED | OUTPATIENT
Start: 2024-07-05 | End: 2024-07-07 | Stop reason: HOSPADM

## 2024-07-05 RX ORDER — MIDAZOLAM HYDROCHLORIDE 1 MG/ML
INJECTION INTRAMUSCULAR; INTRAVENOUS
Status: DISCONTINUED | OUTPATIENT
Start: 2024-07-05 | End: 2024-07-05

## 2024-07-05 RX ORDER — FENTANYL CITRATE 50 UG/ML
INJECTION, SOLUTION INTRAMUSCULAR; INTRAVENOUS
Status: DISCONTINUED | OUTPATIENT
Start: 2024-07-05 | End: 2024-07-05

## 2024-07-05 RX ORDER — PHENYLEPHRINE HCL IN 0.9% NACL 1 MG/10 ML
SYRINGE (ML) INTRAVENOUS
Status: DISCONTINUED | OUTPATIENT
Start: 2024-07-05 | End: 2024-07-05

## 2024-07-05 RX ORDER — ROCURONIUM BROMIDE 10 MG/ML
INJECTION, SOLUTION INTRAVENOUS
Status: DISCONTINUED | OUTPATIENT
Start: 2024-07-05 | End: 2024-07-05

## 2024-07-05 RX ORDER — BUPIVACAINE HYDROCHLORIDE 5 MG/ML
INJECTION, SOLUTION EPIDURAL; INTRACAUDAL
Status: DISCONTINUED | OUTPATIENT
Start: 2024-07-05 | End: 2024-07-05 | Stop reason: HOSPADM

## 2024-07-05 RX ORDER — LIDOCAINE HYDROCHLORIDE 20 MG/ML
INJECTION, SOLUTION EPIDURAL; INFILTRATION; INTRACAUDAL; PERINEURAL
Status: DISCONTINUED | OUTPATIENT
Start: 2024-07-05 | End: 2024-07-05

## 2024-07-05 RX ORDER — ONDANSETRON HYDROCHLORIDE 2 MG/ML
INJECTION, SOLUTION INTRAVENOUS
Status: DISCONTINUED | OUTPATIENT
Start: 2024-07-05 | End: 2024-07-05

## 2024-07-05 RX ORDER — LIDOCAINE HYDROCHLORIDE 20 MG/ML
JELLY TOPICAL
Status: DISCONTINUED | OUTPATIENT
Start: 2024-07-05 | End: 2024-07-05

## 2024-07-05 RX ORDER — KETAMINE HCL IN 0.9 % NACL 50 MG/5 ML
SYRINGE (ML) INTRAVENOUS
Status: DISCONTINUED | OUTPATIENT
Start: 2024-07-05 | End: 2024-07-05

## 2024-07-05 RX ORDER — SUCCINYLCHOLINE CHLORIDE 20 MG/ML
INJECTION INTRAMUSCULAR; INTRAVENOUS
Status: DISCONTINUED | OUTPATIENT
Start: 2024-07-05 | End: 2024-07-05

## 2024-07-05 RX ADMIN — LIDOCAINE HYDROCHLORIDE 1 EACH: 20 JELLY TOPICAL at 05:07

## 2024-07-05 RX ADMIN — FENTANYL CITRATE 50 MCG: 50 INJECTION, SOLUTION INTRAMUSCULAR; INTRAVENOUS at 05:07

## 2024-07-05 RX ADMIN — MEROPENEM 1 G: 1 INJECTION, POWDER, FOR SOLUTION INTRAVENOUS at 10:07

## 2024-07-05 RX ADMIN — Medication 10 MG: at 06:07

## 2024-07-05 RX ADMIN — POTASSIUM CHLORIDE 20 MEQ: 1500 TABLET, EXTENDED RELEASE ORAL at 09:07

## 2024-07-05 RX ADMIN — SODIUM CHLORIDE, PRESERVATIVE FREE 10 ML: 5 INJECTION INTRAVENOUS at 05:07

## 2024-07-05 RX ADMIN — SODIUM CHLORIDE: 9 INJECTION, SOLUTION INTRAVENOUS at 11:07

## 2024-07-05 RX ADMIN — CEFTAROLINE FOSAMIL 600 MG: 600 POWDER, FOR SOLUTION INTRAVENOUS at 07:07

## 2024-07-05 RX ADMIN — LIDOCAINE HYDROCHLORIDE 100 MG: 20 INJECTION, SOLUTION INTRAVENOUS at 05:07

## 2024-07-05 RX ADMIN — FENTANYL CITRATE 50 MCG: 50 INJECTION, SOLUTION INTRAMUSCULAR; INTRAVENOUS at 06:07

## 2024-07-05 RX ADMIN — Medication 120 MG: at 05:07

## 2024-07-05 RX ADMIN — ONDANSETRON 4 MG: 2 INJECTION INTRAMUSCULAR; INTRAVENOUS at 04:07

## 2024-07-05 RX ADMIN — CEFTAROLINE FOSAMIL 600 MG: 600 POWDER, FOR SOLUTION INTRAVENOUS at 04:07

## 2024-07-05 RX ADMIN — SUGAMMADEX 400 MG: 100 INJECTION, SOLUTION INTRAVENOUS at 06:07

## 2024-07-05 RX ADMIN — FERROUS SULFATE TAB 325 MG (65 MG ELEMENTAL FE) 1 EACH: 325 (65 FE) TAB at 08:07

## 2024-07-05 RX ADMIN — MEROPENEM 1 G: 1 INJECTION, POWDER, FOR SOLUTION INTRAVENOUS at 05:07

## 2024-07-05 RX ADMIN — INSULIN ASPART 2 UNITS: 100 INJECTION, SOLUTION INTRAVENOUS; SUBCUTANEOUS at 08:07

## 2024-07-05 RX ADMIN — MIDAZOLAM HYDROCHLORIDE 1 MG: 1 INJECTION, SOLUTION INTRAMUSCULAR; INTRAVENOUS at 04:07

## 2024-07-05 RX ADMIN — MEROPENEM 1 G: 1 INJECTION, POWDER, FOR SOLUTION INTRAVENOUS at 01:07

## 2024-07-05 RX ADMIN — PHENYLEPHRINE HYDROCHLORIDE 0.3 MCG/KG/MIN: 10 INJECTION INTRAVENOUS at 05:07

## 2024-07-05 RX ADMIN — METOPROLOL SUCCINATE 100 MG: 50 TABLET, FILM COATED, EXTENDED RELEASE ORAL at 09:07

## 2024-07-05 RX ADMIN — Medication 15 MG: at 06:07

## 2024-07-05 RX ADMIN — Medication 15 MG: at 04:07

## 2024-07-05 RX ADMIN — SODIUM CHLORIDE, SODIUM LACTATE, POTASSIUM CHLORIDE, AND CALCIUM CHLORIDE: .6; .31; .03; .02 INJECTION, SOLUTION INTRAVENOUS at 04:07

## 2024-07-05 RX ADMIN — HYDROCODONE BITARTRATE AND ACETAMINOPHEN 1 TABLET: 5; 325 TABLET ORAL at 06:07

## 2024-07-05 RX ADMIN — MICAFUNGIN SODIUM 100 MG: 100 INJECTION, POWDER, LYOPHILIZED, FOR SOLUTION INTRAVENOUS at 03:07

## 2024-07-05 RX ADMIN — INSULIN GLARGINE 15 UNITS: 100 INJECTION, SOLUTION SUBCUTANEOUS at 08:07

## 2024-07-05 RX ADMIN — ROCURONIUM BROMIDE 10 MG: 10 INJECTION, SOLUTION INTRAVENOUS at 05:07

## 2024-07-05 RX ADMIN — Medication 100 MCG: at 05:07

## 2024-07-05 RX ADMIN — POTASSIUM CHLORIDE 20 MEQ: 1500 TABLET, EXTENDED RELEASE ORAL at 08:07

## 2024-07-05 RX ADMIN — SODIUM CHLORIDE: 9 INJECTION, SOLUTION INTRAVENOUS at 09:07

## 2024-07-05 RX ADMIN — EPOETIN ALFA-EPBX 14100 UNITS: 10000 INJECTION, SOLUTION INTRAVENOUS; SUBCUTANEOUS at 08:07

## 2024-07-05 NOTE — NURSING
Pt. Seen for wound f/u.  Dressing changed to the Lt. Shoulder as ordered. Accordian drain present full of purluent drainage as well as the tubing. Pt.is returning to surgery again today.  Rt. Foot toe incision is looking much better and dressing changed.    Pt.s emerson. Buttocks are beginning to look worse.  He was repostioned, already on waffle mattress.  He has been for multiple procedures lately and should be turned frequently throughout.

## 2024-07-05 NOTE — ANESTHESIA PROCEDURE NOTES
Arterial line    Diagnosis: Loculated right pleural effusion    Patient location during procedure: done in OR  Timeout: 7/5/2024 5:22 PM  Procedure end time: 7/5/2024 5:30 PM    Staffing  Authorizing Provider: Khoa Potts MD  Performing Provider: Khoa Potts MD    Staffing  Performed by: Khoa Potts MD  Authorized by: Khoa Potts MD    Anesthesiologist was present at the time of the procedure.    Preanesthetic Checklist  Completed: patient identified, IV checked, site marked, risks and benefits discussed, surgical consent, monitors and equipment checked, pre-op evaluation, timeout performed and anesthesia consent givenArterial line  Skin Prep: chlorhexidine gluconate  Local Infiltration: none  Orientation: left  Location: radial    Catheter Size: 20 G  Catheter placement by Ultrasound guidance. Heme positive aspiration all ports.   Vessel Caliber: medium, patent, compressibility normal  Vascular Doppler:  not done  Needle advanced into vessel with real time Ultrasound guidance.  Sterile sheath used.Insertion Attempts: 2  Assessment  Dressing: sutured in place and taped and tegaderm  Patient: Tolerated well

## 2024-07-05 NOTE — PLAN OF CARE
Patient found lying in bed on nasal cannula, awake. Orientated, calm, follows commands. Could not be encouraged to work with PT today.     The bed is low and alarm on. Clinical alarms reviewed and armed. Monitor strip printed and reviewed - afib 80s, asymptomatic. Turning q2h, lines and extremities padded, float heels on boots bilat, mepilexes in place bilat heels and sacrum, triad cream applied to sacrum, placed on waffle overlay. SCDs in place / on.     What had been previously described as moisture dermatitis to the buttocks / sacrum is clearly pressure ulcers that I took updated pictures of with woc rn today. These were present on arrival from the other facility ([facility]). offloading.    He is voiding fco urine per male purewick, about 600ml my shift. i changed the appliance and pericare done because some had been unmeasured. I had shaved the area and applied skin protectant. area dried and wick with sheet and powder applied.. The lasix has been stopped since yesterday.     yesterday dr suggs with patient and family's consent did left thoracentesis removed about 1500ml cloudy serosanguinous fluid , walked it down hand delivered to lab. The patient tolerated it with a little difficulty, c/o some nausea, pain, sob. This morning he was quite orthopneic and short of breath with activity but was much improved later in the day after all interventions.     Yesterday: when he came back from surgery both picc lumens were quite sluggish so discussed with md, did cathflo, dwelled for an hour, aspirated cathflo and blood, now flushing and aspirates easily.  He went to surgery and came back to the same room. I received him from pacu nurse. At the time he was mildly confused waking up from anesthesia and was picking at wires / lines, but this quickly resolved. The hemovac / accordion is in place left upper abdomen. It is draining a moderate amount of opaque creamy tan-red-brown drainage. Gauze dressing and opsite over  insertion. There is an island bandage medially that is clean and dry.     The BP is a little marginal today 90s. At one spurious occasion was 70s systolic although alert and improved after fluids started.     yesterday Dr matt wanted to see patient's wounds so we looked together and she undid the dressing to the right foot and the left shoulder, and we took pictures of everything and I redid the xeroform +gauze +ace to right foot and xeroform and abd pad / tape to left shoulder.     Today with woc rn redid dressing to toe, left shoulder, triad to buttocks. updated pictures. The split gauze around hemovac had some superficial drainage and area was cleaned and changed. With the wife, it was thought that perhaps the area around the shoulder had a swollen area / indurated. it was my impression that it may have just been the shoulder itself pressing up. There was no fluctuation to it or redness or drainage from the incision. However i relayed concerns to hospitalist and orthopedic services, picture taken. I have been hard turning him with several pillows and pad lines / extremities. Elevate left arm which remains with some swelling, encourage range of motion of elbow / hand / fingers.     The bed is low and alarm on. Clinical alarms reviewed and armed. Monitor strip printed and reviewed - afib. Turning q2h, lines and extremities padded, scds in place/ on.mepilex bilat heels / sacrum (changed), in heel boots bilat.     Fingerstick achs.    He has some general edema but most noted to left arm (s/p hardware removal, broken shoulder), I elevated it on pillows. He has pain to move the arm and generally doesn't but is wiggling his fingers.    Did not want to have any breakfast but drank the nepro. Thereafter made npo for surgery. perseverating about wanting to drink water.     Encourage pulmonary toilet, IS.     He had moderate loose brown stool, incontinent. Pericare done.    went off to [facility], received to room 2203 with  simba rn, report given at bedside. patient waking up from anesthesia - appropriate. breathing well (chest xray done). Chest tube to suction with just occasional very small air bubble ~1. with tidaling in fluid in the tubing, serosanguinous.    Thank you.    Nurses Note -- 4 Eyes      7/5/2024   0800      Skin assessed during: Daily Assessment      [] No Altered Skin Integrity Present    []Prevention Measures Documented      [x] Yes- Altered Skin Integrity Present or Discovered   [x] LDA Added if Not in Epic (Describe Wound)   [x] New Altered Skin Integrity was Present on Admit and Documented in LDA   [x] Wound Image Taken    Wound Care Consulted? Yes    Attending Nurse: stefani Ceballos RN/Staff Member:  cris chirinos rn

## 2024-07-05 NOTE — PROGRESS NOTES
[facility] Medicine  Progress Note    Patient Name: Roosevelt Moser  MRN: [id number]  Patient Class: IP- Inpatient   Admission Date: 6/14/2024  Length of Stay: 21 days  Attending Physician: Randa Hannon MD  Primary Care Provider: Miguel Angel Enriquez PA-C        Subjective:     Principal Problem:Acute renal failure superimposed on chronic kidney disease        HPI:  Roosevelt Moser is a 73 year old male with a previous medical history of atrial fibrillation on eliquis, HTN, DMII, obstructive sleep apnea, HLD, ORIF of left humerus and guillian-Samson who presented to the emergency room for weakness and multiple falls. Per wife of the patient he has had progressive weakness over the past week along with two falls one at 0300 Thursday and the other early morning Friday. Both time she had to activate EMS to pick patient up off floor due to weakness. Patient refused transport to hospital on both occasions. Today he slid out of his chair and was unable to get up without assistance and at this point the wife activated EMS to transport patient to the hospital. She endorses that two days ago she noticed that the patient had stopped urinating as he normally does. The patient concurs that he has noticed that his urine has decreased over the past two days and that it is dark. Patient denies dysuria or incomplete bladder emptying. Bladder scan showed zero upon admit and urine sample was obtained via straight cath in ED. Patient denies decreased PO intake and keeps a bottle of water with him at all times. Initial ED work-up showed a creatinine of 5 and potassium of 6. Urine studies positive for infection and WBC 20.81 with procalcitonin at 19.75. Blood cultures obtained and patient given 1 gram of rocephin and 1000ml of normal saline. Patient noted to bein afib with RVR and 20mg of diltiazem was administered IV which resulted in low blood pressure and 1 gram of calcium gluconate was administered. Patient  admitted by hospital medicine for further evaluation and management     Overview/Hospital Course:  Patient presented after fall.  He was found to have osteo of his toe and abscesses left shoulder.  He underwent washouts with surgery on his shoulder. Status post repeat irrigation and debridement (3rd washout) and hardware removal of left shoulder for suspected infection. Wound VAC was discontinued intraoperatively 6/27. They cleared him on 06/28/2024. The patient was noted to have osteomyelitis of second right toe. Seen by podiatry, who performed amputation on 07/01. Patient noted to have dyspnea. CXR revealed bilateral pleural effusions. IV lasix started and stopped due to MARBIN. Pending snf placement at this time. Pt taken to OR for I&D of a chest wall abscess on 7/4/24. He had a thoracentesis of 1550 cc.  Dr. Escobedo states that the abscess extended down to the rectus abdominis muscles and to the pericardium but there was no pericardial effusion.  The patient also has a loculated right effusion.  As TNK and dornase are not a good option for lysing the adhesions, a VATS with mechanical lysis and drainage is necessary. Nephro on  board for MARBIN. Cont Teflaro, meropenem, and micagungin per ID recs. Wound cx pending    The patient is going to need to transfer to a facility that has thoracic surgery and reconstructive plastic surgery. The patient has osteomyelitis of multiple ribs which will need further debridement and then a flap placed. Unfortunately, [facility] do not have this capability at this facility.       Interval History: Patient states pain currently controlled. Pt NPO for VATS?? Per nursing staff    Review of Systems   Constitutional:  Negative for activity change, appetite change, chills and fever.   HENT:  Negative for congestion, ear pain, nosebleeds and sinus pain.    Eyes:  Negative for discharge and itching.   Respiratory:  Positive for shortness of breath. Negative for apnea, cough and chest tightness.     Cardiovascular:  Negative for chest pain, palpitations and leg swelling.   Gastrointestinal:  Negative for abdominal distention, abdominal pain and vomiting.   Genitourinary:  Negative for difficulty urinating, dysuria, flank pain and frequency.   Musculoskeletal:  Positive for arthralgias. Negative for back pain, joint swelling and myalgias.   Skin:  Positive for wound. Negative for color change, pallor and rash.   Neurological:  Negative for dizziness, weakness, light-headedness and headaches.   Psychiatric/Behavioral:  Negative for agitation, behavioral problems, confusion and suicidal ideas.      Objective:     Vital Signs (Most Recent):  Temp: 98.7 °F (37.1 °C) (07/05/24 1230)  Pulse: 93 (07/05/24 0715)  Resp: (!) 24 (07/05/24 0715)  BP: (!) 105/51 (07/05/24 0700)  SpO2: (!) 92 % (07/05/24 0715) Vital Signs (24h Range):  Temp:  [98.1 °F (36.7 °C)-99.8 °F (37.7 °C)] 98.7 °F (37.1 °C)  Pulse:  [] 93  Resp:  [20-41] 24  SpO2:  [80 %-98 %] 92 %  BP: ()/(45-80) 105/51     Weight: 133.7 kg (294 lb 12.1 oz)  Body mass index is 39.98 kg/m².    Intake/Output Summary (Last 24 hours) at 7/5/2024 1514  Last data filed at 7/5/2024 1055  Gross per 24 hour   Intake 1710 ml   Output 1630 ml   Net 80 ml         Physical Exam  Vitals reviewed.   Constitutional:       General: He is not in acute distress.     Appearance: Normal appearance. He is normal weight. He is not ill-appearing.   HENT:      Head: Normocephalic and atraumatic.      Nose: Nose normal.      Mouth/Throat:      Mouth: Mucous membranes are moist.      Pharynx: Oropharynx is clear.   Eyes:      General: No scleral icterus.     Extraocular Movements: Extraocular movements intact.      Conjunctiva/sclera: Conjunctivae normal.      Pupils: Pupils are equal, round, and reactive to light.   Cardiovascular:      Rate and Rhythm: Normal rate and regular rhythm.      Pulses: Normal pulses.      Heart sounds: Normal heart sounds. No murmur heard.     No  gallop.   Pulmonary:      Effort: Pulmonary effort is normal. No respiratory distress.      Breath sounds: Normal breath sounds. No wheezing, rhonchi or rales.   Chest:      Chest wall: No tenderness.   Abdominal:      General: Abdomen is flat. There is no distension.      Palpations: Abdomen is soft.      Tenderness: There is no abdominal tenderness.   Musculoskeletal:         General: No swelling or tenderness.      Cervical back: Normal range of motion and neck supple. No rigidity or tenderness.      Right lower leg: No edema.      Left lower leg: No edema.   Skin:     General: Skin is warm and dry.      Findings: No lesion.      Comments: Surgical dressing on foot clean and dry.   Neurological:      General: No focal deficit present.      Mental Status: He is alert and oriented to person, place, and time. Mental status is at baseline.      Motor: No weakness.   Psychiatric:         Mood and Affect: Mood normal.         Behavior: Behavior normal.         Thought Content: Thought content normal.             Significant Labs: All pertinent labs within the past 24 hours have been reviewed.    Significant Imaging: I have reviewed all pertinent imaging results/findings within the past 24 hours.    Assessment/Plan:      * Acute renal failure superimposed on chronic kidney disease  Appreciate input from nephrology  In setting of acute UTI and IVVD  Resumed gentle hydration on Zosyn/vanc but those have been stopped  He did not require oral NaHCO3  U/s is w/o obstruction, masses, etc        Chest wall abscess  Pt taken to OR for I&D of a chest wall abscess on 7/4/24. He had a thoracentesis of 1550 cc.  Dr. Escobedo states that the abscess extended down to the rectus abdominis muscles and to the pericardium but there was no pericardial effusion.  The patient also has a loculated right effusion.  As TNK and dornase are not a good option for lysing the adhesions, a VATS with mechanical lysis and drainage is necessary. Nephro  on  board for MARBIN. Cont Teflaro, meropenem, and micagungin per ID recs. Wound cx pending    The patient is going to need to transfer to a facility that has thoracic surgery and reconstructive plastic surgery. The patient has osteomyelitis of multiple ribs which will need further debridement and then a flap placed. Unfortunately, [facility] do not have this capability at this facility.     Pleural effusion  IV lasix started and stopped due to MARBIN.   Pulm on board  Repeat cxr: Hazy opacities of the lung bases likely reflect small pleural effusions and/or atelectasis.       MRSA infection    As per ID    Pyogenic arthritis of left shoulder region  As above      Abscess of left shoulder  As discussed under limb swelling, status post multiple washouts on 06/21/2024, 06/24/2024, 06/26/2024,6/27/24  Need antibiotics until 08/03/2024  Wound VAC in place.  Continue antibiotics.  Following cultures.  Pending possible LTAC placement.    Osteomyelitis of toe    ID and podiatry following, appreciate help  Appreciate input all consultants  Podiatry amputated 2nd toe 07/01.  Need antibiotics until 08/08/2024      Skin tear of left upper extremity  Local care, healing  Doubt this is a portal of entry    Swelling of limb, left  U/s w/o DVT  Appreciate help from Dr Bernal, ID, wound care, podiatry et al  Pt is already on OAC, holding for now pending shoulder procedure and toe amputation  Arm is elevated on pillow  Not red/painful, no compartment syndrome  D/w pt/wife      Debility  Multifactorial process  Pt not able to manage his own care and wife cannot adequately care for him at this time  PT/OT        Severe sepsis  In setting of acute UTI, MRSA OM right 2nd toe, and infected left shoulder hardware  BCX negative  Urine culture negative  Will need antibiotics until 08/08/2024 per ID    Hyperkalemia  Resolved  in setting of acute on chronic renal failure  Lokelma given x 1 on admit  Avoid ACE/ARB, K+ replacement  tele          Atrial  fibrillation with rapid ventricular response  Chronic AF w/ acute RVR, resolved  Required dilt drip on admission but off since 6/15  Tele some PVC/bigem/trigem  TSH 1.8 in March  Monitor lytes  No etoh/drug use  TTE from March 2024:    Left Ventricle: The left ventricle is normal in size. Normal wall thickness. Mild global hypokinesis present. There is mildly reduced systolic function with a visually estimated ejection fraction of 45 - 50%. There is normal diastolic function.    Right Ventricle: Normal right ventricular cavity size. Wall thickness is normal. Right ventricle wall motion  is normal. Systolic function is normal.    Left Atrium: Left atrium is moderately dilated.    IVC/SVC: Normal venous pressure at 3 mmHg.  OAC has been on hold d/t possible need for shoulder surgery - has been on prophylaxis dose of LovenoxResume Eliquis as soon as feasible postop      History of Guillain-Ona syndrome  No acute issues  However, pt has impaired mobility and is acutely weakened from his sepsis/UTI/AF RVR  PT/OT working w/ pt  Wife cannot manage his care at home currently  SNF assessment underway, may need LTAC         Type 2 diabetes mellitus  A1c 6.3%  SSI      Hypertension  Home meds as appropriate    MARA (obstructive sleep apnea)  Continue CPAP HS and w/ naps      Morbid obesity  Body mass index is 42.16 kg/m².  Morbid obesity complicates all aspects of disease management from diagnostic modalities to treatment  Weight loss encouraged and health benefits explained to patient  He has been working on weight loss at home w/ reduced caloric intake          VTE Risk Mitigation (From admission, onward)           Ordered     Reason for No Pharmacological VTE Prophylaxis  Once        Question:  Reasons:  Answer:  Already adequately anticoagulated on oral Anticoagulants    06/14/24 1438     IP VTE HIGH RISK PATIENT  Once         06/14/24 1438     Place sequential compression device  Until discontinued         06/14/24 1438                     Discharge Planning   KAMILA:      Code Status: Full Code   Is the patient medically ready for discharge?:     Reason for patient still in hospital (select all that apply): Treatment  Discharge Plan A: Skilled Nursing Facility            Critical care time spent on the evaluation and treatment of severe organ dysfunction, review of pertinent labs and imaging studies, discussions with consulting providers and discussions with patient/family: >30 minutes.      Randa Hannon MD  Department of Hospital Medicine   [facility]

## 2024-07-05 NOTE — PT/OT/SLP PROGRESS
-- DO NOT REPLY / DO NOT REPLY ALL --  -- Message is from the [facility]--    Patient is requesting a medication refill - the medication was not listed on the patient's active medication list.    Prescribing Provider: Sandie Egan MD    RX Name:  Klonopin  Dose:  100 mg   Instruction(s):  As needed     Duration: 90 days    Pharmacy  [facility] - [place] [id number]    Patient confirmed the above pharmacy as correct?  Yes    Does this request need an existing or new prescription at a pharmacy to be sent to a new pharmacy location?   No    Caller Information       Type Contact Phone    12/10/2021 12:06 PM CST Phone (Incoming) Wilda Gann (Self) [phone] (H)          Alternative phone number: n/a    Turnaround time given to caller:   \"This message will be sent to [state Provider's name]. The clinical team will fulfill your request as soon as they review your message.\"   Physical Therapy      Patient Name:  Roosevelt Moser   MRN:  [id number]    Patient not seen today secondary to Patient unwilling to participate. Will follow-up 7/6/24.

## 2024-07-05 NOTE — NURSING
Nurses Note -- 4 Eyes      7/4/2024   7:50 PM      Skin assessed during: Q Shift Change      [] No Altered Skin Integrity Present    []Prevention Measures Documented      [x] Yes- Altered Skin Integrity Present or Discovered   [x] LDA Added if Not in Epic (Describe Wound)   [x] New Altered Skin Integrity was Present on Admit and Documented in LDA   [x] Wound Image Taken    Wound Care Consulted? Yes    Attending Nurse:  Evi Ceballos RN/Staff Member:  BERTO

## 2024-07-05 NOTE — NURSING
Pt lying in bed in no acute distress. Surgical dressings clean, dry and intact. Hemovac drain intact and functional. See documented output in epic. Call light within reach and bed alarm on. Will continue current treatment plan.

## 2024-07-05 NOTE — NURSING
Pt lying in bed in no acute distress. Repositioned pt and gave CHG bath. Surgical incisions clean, dry and intact. Hemovac drain intact and functioning. See charted output in epic. No complaints. Pt has home CPAP at bedside for sleep and is currently on room air and tolerating well. Call light within reach and bed alarm on. Will continue current plan of care.

## 2024-07-05 NOTE — ANESTHESIA POSTPROCEDURE EVALUATION
Anesthesia Post Evaluation    Patient: Roosevelt Moser    Procedure(s) Performed: Procedure(s) (LRB):  INCISION AND DRAINAGE, ABSCESS (N/A)    Final Anesthesia Type: general      Patient location during evaluation: ICU  Patient participation: Yes- Able to Participate  Level of consciousness: awake and alert  Post-procedure vital signs: reviewed and stable  Pain management: adequate  Airway patency: patent    PONV status at discharge: No PONV  Anesthetic complications: no      Cardiovascular status: blood pressure returned to baseline and stable  Respiratory status: unassisted and room air  Hydration status: euvolemic  Follow-up not needed.              Vitals Value Taken Time   /63 07/04/24 2020   Temp 37.1 °C (98.7 °F) 07/04/24 1952   Pulse 93 07/04/24 2024   Resp 21 07/04/24 2024   SpO2 96 % 07/04/24 2024   Vitals shown include unfiled device data.      No case tracking events are documented in the log.      Pain/Duane Score: Pain Rating Prior to Med Admin: 10 (7/4/2024  5:11 AM)  Pain Rating Post Med Admin: 0 (7/4/2024  6:10 AM)  Duane Score: 10 (7/4/2024  7:01 PM)

## 2024-07-05 NOTE — NURSING
Pt lying in bed in no acute distress. Repositioned pt. Surgical dressings clean, dry and intact. Hemovac drain intact and functional. See documented output in epic. Call light within reach and bed alarm on. Will continue current treatment plan.

## 2024-07-05 NOTE — SUBJECTIVE & OBJECTIVE
Interval History: Patient states pain currently controlled. Pt NPO for VATS?? Per nursing staff    Review of Systems   Constitutional:  Negative for activity change, appetite change, chills and fever.   HENT:  Negative for congestion, ear pain, nosebleeds and sinus pain.    Eyes:  Negative for discharge and itching.   Respiratory:  Positive for shortness of breath. Negative for apnea, cough and chest tightness.    Cardiovascular:  Negative for chest pain, palpitations and leg swelling.   Gastrointestinal:  Negative for abdominal distention, abdominal pain and vomiting.   Genitourinary:  Negative for difficulty urinating, dysuria, flank pain and frequency.   Musculoskeletal:  Positive for arthralgias. Negative for back pain, joint swelling and myalgias.   Skin:  Positive for wound. Negative for color change, pallor and rash.   Neurological:  Negative for dizziness, weakness, light-headedness and headaches.   Psychiatric/Behavioral:  Negative for agitation, behavioral problems, confusion and suicidal ideas.      Objective:     Vital Signs (Most Recent):  Temp: 98.7 °F (37.1 °C) (07/05/24 1230)  Pulse: 93 (07/05/24 0715)  Resp: (!) 24 (07/05/24 0715)  BP: (!) 105/51 (07/05/24 0700)  SpO2: (!) 92 % (07/05/24 0715) Vital Signs (24h Range):  Temp:  [98.1 °F (36.7 °C)-99.8 °F (37.7 °C)] 98.7 °F (37.1 °C)  Pulse:  [] 93  Resp:  [20-41] 24  SpO2:  [80 %-98 %] 92 %  BP: ()/(45-80) 105/51     Weight: 133.7 kg (294 lb 12.1 oz)  Body mass index is 39.98 kg/m².    Intake/Output Summary (Last 24 hours) at 7/5/2024 1514  Last data filed at 7/5/2024 1055  Gross per 24 hour   Intake 1710 ml   Output 1630 ml   Net 80 ml         Physical Exam  Vitals reviewed.   Constitutional:       General: He is not in acute distress.     Appearance: Normal appearance. He is normal weight. He is not ill-appearing.   HENT:      Head: Normocephalic and atraumatic.      Nose: Nose normal.      Mouth/Throat:      Mouth: Mucous membranes are  moist.      Pharynx: Oropharynx is clear.   Eyes:      General: No scleral icterus.     Extraocular Movements: Extraocular movements intact.      Conjunctiva/sclera: Conjunctivae normal.      Pupils: Pupils are equal, round, and reactive to light.   Cardiovascular:      Rate and Rhythm: Normal rate and regular rhythm.      Pulses: Normal pulses.      Heart sounds: Normal heart sounds. No murmur heard.     No gallop.   Pulmonary:      Effort: Pulmonary effort is normal. No respiratory distress.      Breath sounds: Normal breath sounds. No wheezing, rhonchi or rales.   Chest:      Chest wall: No tenderness.   Abdominal:      General: Abdomen is flat. There is no distension.      Palpations: Abdomen is soft.      Tenderness: There is no abdominal tenderness.   Musculoskeletal:         General: No swelling or tenderness.      Cervical back: Normal range of motion and neck supple. No rigidity or tenderness.      Right lower leg: No edema.      Left lower leg: No edema.   Skin:     General: Skin is warm and dry.      Findings: No lesion.      Comments: Surgical dressing on foot clean and dry.   Neurological:      General: No focal deficit present.      Mental Status: He is alert and oriented to person, place, and time. Mental status is at baseline.      Motor: No weakness.   Psychiatric:         Mood and Affect: Mood normal.         Behavior: Behavior normal.         Thought Content: Thought content normal.             Significant Labs: All pertinent labs within the past 24 hours have been reviewed.    Significant Imaging: I have reviewed all pertinent imaging results/findings within the past 24 hours.

## 2024-07-05 NOTE — CARE UPDATE
07/04/24 2020   Patient Assessment/Suction   Level of Consciousness (AVPU) alert   Respiratory Effort Unlabored;Normal   Expansion/Accessory Muscles/Retractions no use of accessory muscles;no retractions;expansion symmetric   All Lung Fields Breath Sounds diminished   Rhythm/Pattern, Respiratory unlabored;pattern regular;no shortness of breath reported   Cough Frequency no cough   PRE-TX-O2   Device (Oxygen Therapy) CPAP  (home unit)   SpO2 (!) 94 %   Pulse Oximetry Type Continuous   Pulse 91   Resp (!) 23   /63   Aerosol Therapy   $ Aerosol Therapy Charges PRN treatment not required   Daily Review of Necessity (SVN) completed   Respiratory Treatment Status (SVN) PRN treatment not required

## 2024-07-05 NOTE — PT/OT/SLP PROGRESS
Occupational Therapy   Treatment    Name: Roosevelt Moser  MRN: [id number]  Admitting Diagnosis:  Acute renal failure superimposed on chronic kidney disease  1 Day Post-Op    Recommendations:     Discharge Recommendations: Moderate Intensity Therapy  Discharge Equipment Recommendations:  other (see comments) (TBD)  Barriers to discharge:   (Increased physcial assistance with ADLs and functionl mobility.)    Assessment:     Roosevelt Moser is a 73 y.o. male with a medical diagnosis of Acute renal failure superimposed on chronic kidney disease.  He presents with general weakness. Patient declined EOB or OOB activity, but agreeable to BUE/BLE therex bed level. Performance deficits affecting function are weakness, impaired endurance, impaired self care skills, impaired functional mobility, gait instability, impaired balance, decreased upper extremity function, decreased lower extremity function, decreased safety awareness, orthopedic precautions, impaired cardiopulmonary response to activity.     Rehab Prognosis:  Fair; patient would benefit from acute skilled OT services to address these deficits and reach maximum level of function.       Plan:     Patient to be seen 5 x/week to address the above listed problems via self-care/home management, therapeutic activities, therapeutic exercises  Plan of Care Expires: 07/13/24  Plan of Care Reviewed with: patient, spouse    Subjective     Chief Complaint: General weakness  Patient/Family Comments/goals: Improved functional mobility and ADL independence.   Pain/Comfort:  Pain Rating 1: 0/10  Pain Rating Post-Intervention 1: 0/10    Objective:     Communicated with: nurse prior to session.  Patient found HOB elevated with telemetry, PICC line, PureWick upon OT entry to room.    General Precautions: Standard, fall    Orthopedic Precautions:RLE partial weight bearing (LUE WBAT elbow/wrist/digits.. Can use LUE to hold walker to ambulate.)  Braces:  (RLE [place])  Respiratory Status: Nasal  cannula, flow 3 1/2  L/min     Occupational Performance:     BUE/BLE Therex:    Performed LUE/BLE therex x10 reps with minimal assist bed level.    Performed RUE elbow/wrist and digit ROM with maximal assistance bed level.    [facility] 6 Click ADL:      Treatment & Education:  Patient in agreement to working on unsupported sitting EOB next session.     Patient left HOB elevated with all lines intact, call button in reach, bed alarm on, and spouse present    GOALS:   Multidisciplinary Problems       Occupational Therapy Goals          Problem: Occupational Therapy    Goal Priority Disciplines Outcome Interventions   Occupational Therapy Goal     OT, PT/OT Progressing    Description: Goals to be met by: 7/13/24     Patient will increase functional independence with ADLs by performing:    Feeding with [place].  UE Dressing with Moderate Assistance.  LE Dressing with Moderate Assistance.  Grooming while seated with Set-up Assistance.  Toileting from bedside commode with Moderate Assistance for hygiene and clothing management.   Bathing from  shower chair/bench with Moderate Assistance.  Toilet transfer to bedside commode with Minimal Assistance.  Increased strength and functional activity tolerance for ADL's/IADL's                         Time Tracking:     OT Date of Treatment: 07/05/24  OT Start Time: 0935  OT Stop Time: 0947  OT Total Time (min): 12 min    Billable Minutes:Therapeutic Exercise 12    OT/BAR: OT          7/5/2024

## 2024-07-05 NOTE — PROGRESS NOTES
Nephrology Progress Note        Patient Name: Roosevelt Moser  MRN: [id number]    Patient Class: IP- Inpatient   Admission Date: 6/14/2024  Length of Stay: 21 days  Date of Service: 7/5/2024    Attending Physician: Randa Hannon MD  Primary Care Provider: Miguel Angel Enriquez PA-C    Reason for Consult: marbin/hyperkalemia    SUBJECTIVE:     HPI: 73M with h/o DM, HTN, AF, GBS and recent shoulder surgery was brought to ER by EMS with recurrent falls in the last 24h. Reports decreased UO but no fever, cough, SOB, dysuria. Upon admission, noted to be in MARBIN with sCr 5, baseline 1 month ago is 1, hyperkalemia with K 6, acidosis with CO2 12, hypoalbuminemia with albumin 1.6. Lactate notably 2.1. Procal 20. A1c 9.5 in 3/2024. UA with hematuria and pyuria, urine Cx pending. Notably on Apixaban. WBC in blood elevated to 20. Plt 540. RP US ordered. Afebrile, normotensive, received IVF and abx. Lokelma, ca gluconate given bicarb gtt ordered. Straight cath in ER with only 100cc urine out.    6/15  AFVSS.   UOP 1200cc plus 2 unmeasured   6/16  AFVSS.  2150 cc uop.  No distress  6/17 VSS, on RA, UOP 1.5L- updated family at bedside  6/18 VSS, on RA, UOP 1.3L  6/19 VSS, on RA, UOP 1L, with osteo R toe- s/p debridement- not interested in amputation- antbx adjusted  6/20 HR , BP stable, on 2L NC, UOP 1.2L, went for procedure, wife reports LE edema  6/21 HR 90-110s, -140s mostly, on RA, UOP 1.6L, to OR for shoulder washout and hardware removal today  6/22 VSS s/p incision and drainage for infection from left shoulder and removal of deep hardware including lizabeth and screws.  6/23 VSS. Consider resuming diuretics tomorrow.  6/24  AFVSS.  1200 cc uop plus multiple unmeasured.  He is very thirsty.    6/25  POD 1 s/p irrigation and debridementof left shoulder abscess.  VSS  2 liter uop  6/26 AFVSS. 2250 cc uop  6/27  AFVSS.  In th OR  6/28 POD 1 s/p left shoulder irrigation and debridement.  2050 cc uop  6/29  one shift UOP  "recorded, 800cc.  VSS, renal function improving.  States he feels "sick" today, denies nausea, says had diarrhea after breakfast.  No other complaints.  6/30  VSS, renal function improved.  Sleeping soundly, on bipap.  Updated family member at bedside.  7/1 VSS. Treat hypokalemia, monitor Mg as well. Agree with toe amputation.  7/2 VSS. S/p right 2nd toe amputation POD1. Consider IV Mg supplement if Mg still low today.  7/3 VSS. Left upper abdominal bulge is new, getting CT abdomen to evaluate. K and Mg better, monitor.    7/4  Transferred to [facility] icu yesterday.  IN OR currently.  Tmax 100.1, intermittent hypotension.  2450 cc uop  7/5  angry about being NPO for VATS.  No nausea, chest pain, or sob.  Intermittent hypotension, afebrile.  1650 cc UOP    ASSESSMENT/PLAN:     MARBIN due to ATN due to sepsis due to UTI and/or PNA  CKD stage 2 with diabetic proteinuria and severe hypoalbuminemia  HTN  DM poorly controlled A1c 9.5  Hypokalemia/Hypomagnesemia  SHPT  Anemia  Hypoalbuminemia  Edema  Septic L proximal humerus ORIF    - renal function is improving- nonoliguric  - dose meds for CrCl 10-50  - about 1g proteinuria- low alb is nutrition/acute phase reactant  - hold hydralazine  - hold metformin- use insulin  - added oral KCL and non-renal diet.  - monitor Mg and replete as needed.  - H/H stable  - optimize protein intake  --strict I/Os   --iron stores low. Ferritin too high for iv iron, on oral supplement  --getting erythropoietin stimulating agent weekly  --start ns 75 cc/hour due to pt being NPO    Thank you for allowing us to participate in the care of your patient!   We will follow the patient and provide recommendations as needed.         Laboratory:  Recent Labs   Lab 07/03/24  0302 07/04/24  0345 07/04/24  1602 07/05/24  0312    141  --  137   K 4.0 3.9 4.1 4.1    104  --  103   CO2 25 27  --  27   BUN 50* 43*  --  41*   CREATININE 1.2 1.1  --  1.3   * 155*  --  186*       Recent Labs   Lab " 07/03/24  0302 07/04/24  0345 07/04/24  1602 07/05/24  0312   CALCIUM 8.9 8.0*  --  7.9*   ALBUMIN 1.5* 2.3*  --  2.2*   MG  --  1.5* 1.7 2.0             Recent Labs   Lab 06/30/24  1105 06/30/24  1631 06/30/24  2053 07/01/24  1722 07/01/24  2112 07/02/24  0742 07/02/24  1126 07/02/24  1631 07/02/24  2030 07/03/24  0811   POCTGLUCOSE 203* 231* 250* 240* 200* 179* 294* 208* 232* 211*       Recent Labs   Lab 07/31/23  0955 03/19/24  0830 06/14/24  1539   Hemoglobin A1C 8.8 H 9.5 H 6.3 H       Recent Labs   Lab 07/04/24  0345 07/04/24  1602 07/05/24  0312   WBC 13.49* 13.56* 11.32   HGB 8.3* 8.2* 8.2*   HCT 26.6* 26.6* 26.3*   * 457* 443   MCV 82 82 82   MCHC 31.2* 30.8* 31.2*   MONO 12.9  1.7* 9.1  1.2* 9.4  1.1*   EOSINOPHIL 3.8 0.8 2.8       Recent Labs   Lab 07/03/24  0302 07/04/24  0345 07/05/24  0312   BILITOT 0.3 0.7 0.4   PROT 6.5 6.2 6.1   ALBUMIN 1.5* 2.3* 2.2*   ALKPHOS 116 101 97   ALT 17 12 14   AST 19 14 19       Recent Labs   Lab 12/16/22  1238 03/08/24  1034 03/25/24  1022 06/14/24  1337   Color, UA Yellow Yellow Yellow [place] A   Appearance, UA Clear Clear Hazy A Cloudy A   pH, UA 6.0 5.0 6.0 6.0   Specific [place], UA 1.020 1.010 1.020 1.020   Protein, UA 1+ A Trace A 1+ A 2+ A   Glucose, UA 1+ A 3+ A Negative Trace A   Ketones, UA Negative Negative Negative Negative   Urobilinogen, UA  --  Negative Negative Negative   Bilirubin (UA) Negative Negative Negative Negative   Occult Blood UA 3+ A 2+ A 2+ A 3+ A   Nitrite, UA Negative Negative Negative Negative   RBC, UA 20 H 13 H >100 H >100 H   WBC, UA 81 H 3 18 H >100 H   Bacteria Rare None Rare Many A   Hyaline Casts, UA 0  --  0 0       Recent Labs   Lab 07/03/24  1216   POC PH 7.495 H   POC PCO2 34.4 L   POC HCO3 26.5   POC PO2 72 L   POC SATURATED O2 96   POC BE 3 H   Sample ARTERIAL       Microbiology Results (last 7 days)       Procedure Component Value Units Date/Time    Culture, Body Fluid (Aerobic) w/ GS [[id number]] Collected:  07/04/24 0846    Order Status: Completed Specimen: Pleural Fluid Updated: 07/05/24 0904     AEROBIC CULTURE - FLUID No growth     Gram Stain Result Moderate WBC's      No organisms seen    Narrative:      Left Pleural fluid    Aerobic culture [[id number]] Collected: 07/04/24 1113    Order Status: Completed Specimen: Abscess from Chest, Left Updated: 07/05/24 0903     Aerobic Bacterial Culture No growth    Blood culture [[id number]] Collected: 07/03/24 1332    Order Status: Completed Specimen: Blood from Peripheral, Hand, Left Updated: 07/04/24 2032     Blood Culture, Routine No Growth to date      No Growth to date    Gram stain [[id number]] Collected: 07/04/24 1113    Order Status: Completed Specimen: Abscess from Chest, Left Updated: 07/04/24 1928     Gram Stain Result Moderate WBC's      No organisms seen    Narrative:      Chest, Left    Fungus culture [[id number]] Collected: 07/04/24 1113    Order Status: Sent Specimen: Abscess from Chest, Left Updated: 07/04/24 1129    AFB Culture & Smear [[id number]] Collected: 07/04/24 1113    Order Status: Sent Specimen: Abscess from Chest, Left Updated: 07/04/24 1128    Culture, Anaerobe [[id number]] Collected: 07/04/24 1113    Order Status: Sent Specimen: Abscess from Chest, Left Updated: 07/04/24 1128    Fungus culture [[id number]] Collected: 07/04/24 0859    Order Status: Sent Specimen: Pleural Fluid Updated: 07/04/24 1010    AFB Culture & Smear [[id number]] Collected: 07/04/24 0805    Order Status: No result Specimen: Pleural Fluid Updated: 07/04/24 0856    Culture, Anaerobic [[id number]] Collected: 07/04/24 0804    Order Status: No result Specimen: Pleural Fluid Updated: 07/04/24 0855    Culture, Anaerobic [[id number]]     Order Status: Completed Specimen: Pleural Fluid     AFB Culture & Smear [[id number]]     Order Status: Completed Specimen: Pleural Fluid     Fungus culture [[id number]] Collected: 06/21/24 1645    Order Status: Completed Specimen: Abscess from  Shoulder, Left Updated: 07/04/24 0444     Fungus (Mycology) Culture No fungal growth to date      No fungal growth to date    Fungus culture [[id number]] Collected: 06/20/24 1204    Order Status: Completed Specimen: Abscess from Shoulder, Left Updated: 07/04/24 0444     Fungus (Mycology) Culture No fungal growth to date      No fungal growth to date    Aerobic culture [[id number]] Collected: 06/27/24 0733    Order Status: Completed Specimen: Incision site from Shoulder, Left Updated: 07/01/24 0704     Aerobic Bacterial Culture No growth    Culture, Anaerobe [[id number]] Collected: 06/27/24 0733    Order Status: Completed Specimen: Incision site from Shoulder, Left Updated: 06/29/24 1441     Anaerobic Culture No anaerobes isolated    Gram stain [[id number]] Collected: 06/27/24 0733    Order Status: Completed Specimen: Incision site from Shoulder, Left Updated: 06/28/24 1516     Gram Stain Result Few WBC's      No organisms seen            Review of patient's allergies indicates:  No Known Allergies    Outpatient meds:  No current facility-administered medications on file prior to encounter.     Current Outpatient Medications on File Prior to Encounter   Medication Sig Dispense Refill    apixaban (ELIQUIS) 5 mg Tab Take 1 tablet (5 mg total) by mouth 2 (two) times daily. 60 tablet 1    atorvastatin (LIPITOR) 10 MG tablet Take 1 tablet (10 mg total) by mouth once daily. 90 tablet 3    co-enzyme Q-10 30 mg capsule Take 30 mg by mouth once daily.      doxycycline (VIBRAMYCIN) 100 MG Cap Take 100 mg by mouth 2 (two) times daily.      ergocalciferol, vitamin D2, (VITAMIN D ORAL) Take 1 tablet by mouth once daily.      glimepiride (AMARYL) 2 MG tablet Take 1 tablet (2 mg total) by mouth before breakfast. 90 tablet 3    hydrALAZINE (APRESOLINE) 25 MG tablet Take 1 tablet (25 mg total) by mouth every 12 (twelve) hours. 60 tablet 3    metFORMIN (GLUCOPHAGE-XR) 500 MG ER 24hr tablet Take 2 tablets (1,000 mg total) by mouth 2  (two) times daily with meals. 360 tablet 3    metoprolol succinate (TOPROL-XL) 100 MG 24 hr tablet Take 1 tablet (100 mg total) by mouth once daily. (Patient taking differently: Take 100 mg by mouth nightly.) 90 tablet 3       Scheduled meds:   (Magic mouthwash) 1:1:1 diphenhydrAMINE(Benadryl) 12.5mg/5ml liq, aluminum & magnesium hydroxide-simethicone (Maalox), LIDOcaine viscous 2%  10 mL Swish & Spit QID    ceftaroline (Teflaro) IV (PEDS and ADULTS)  600 mg Intravenous Q8H    epoetin leonel-epbx  100 Units/kg Subcutaneous Q7 Days    ferrous sulfate  1 tablet Oral Daily    insulin glargine U-100  15 Units Subcutaneous Daily    LIDOcaine  1 patch Transdermal Q24H    meropenem IV (PEDS and ADULTS)  1 g Intravenous Q8H    metoprolol succinate  100 mg Oral QHS    micafungin (MYCAMINE) IVPB  100 mg Intravenous Q24H    potassium chloride  20 mEq Oral BID    sodium chloride 0.9%  10 mL Intravenous Q6H       Infusions:   0.9% NaCl   Intravenous Continuous                 PRN meds:    Current Facility-Administered Medications:     0.9%  NaCl infusion (for blood administration), , Intravenous, Q24H PRN    0.9%  NaCl infusion (for blood administration), , Intravenous, Q24H PRN    albuterol-ipratropium, 3 mL, Nebulization, Q4H PRN    aluminum & magnesium hydroxide-simethicone, 15 mL, Oral, QID PRN    dextrose 10%, 12.5 g, Intravenous, PRN    dextrose 10%, 25 g, Intravenous, PRN    glucagon (human recombinant), 1 mg, Intramuscular, PRN    glucose, 16 g, Oral, PRN    glucose, 24 g, Oral, PRN    HYDROcodone-acetaminophen, 1 tablet, Oral, Q4H PRN    HYDROcodone-acetaminophen, 1 tablet, Oral, Q4H PRN    insulin aspart U-100, 0-10 Units, Subcutaneous, QID (AC + HS) PRN    magnesium sulfate IVPB, 2 g, Intravenous, Daily PRN    metoprolol, 5 mg, Intravenous, Q5 Min PRN    naloxone, 0.02 mg, Intravenous, PRN    ondansetron, 8 mg, Oral, Q8H PRN    potassium bicarbonate, 35 mEq, Oral, PRN    potassium bicarbonate, 60 mEq, Oral, PRN     potassium, sodium phosphates, 2 packet, Oral, PRN    potassium, sodium phosphates, 2 packet, Oral, PRN    potassium, sodium phosphates, 2 packet, Oral, PRN    promethazine, 25 mg, Oral, Q6H PRN    simethicone, 1 tablet, Oral, QID PRN    sodium chloride 0.9%, 10 mL, Intravenous, Q12H PRN    Flushing PICC/Midline Protocol, , , Until Discontinued **AND** sodium chloride 0.9%, 10 mL, Intravenous, Q6H **AND** sodium chloride 0.9%, 10 mL, Intravenous, PRN    sodium chloride 0.9%, 10 mL, Intravenous, Q12H PRN    traMADoL, 50 mg, Oral, Q4H PRN    Past Medical History:   Diagnosis Date    A-fib 2024    Diabetes mellitus, type 2 2021    Guillain-Letona 10/2003    Hyperlipidemia     Hypertension 2016    Sleep apnea      Past Surgical History:   Procedure Laterality Date    ARTHROTOMY OF SHOULDER Left 6/21/2024    Procedure: ARTHROTOMY, SHOULDER;  Surgeon: Roman Paulson MD;  Location: [facility] OR;  Service: Orthopedics;  Laterality: Left;    IRRIGATION AND DEBRIDEMENT OF UPPER EXTREMITY Left 6/24/2024    Procedure: IRRIGATION AND DEBRIDEMENT, UPPER EXTREMITY;  Surgeon: Nathan Cooper MD;  Location: [facility] OR;  Service: Orthopedics;  Laterality: Left;    IRRIGATION AND DEBRIDEMENT OF UPPER EXTREMITY Left 6/27/2024    Procedure: IRRIGATION AND DEBRIDEMENT, UPPER EXTREMITY;  Surgeon: Nathan Cooper MD;  Location: [facility] OR;  Service: Orthopedics;  Laterality: Left;    OPEN REDUCTION AND INTERNAL FIXATION (ORIF) OF FRACTURE OF PROXIMAL HUMERUS Left 3/25/2024    Procedure: ORIF, FRACTURE, HUMERUS, PROXIMAL/IM HANNA, LEFT;  Surgeon: Nathan Cooper MD;  Location: [facility] OR;  Service: Orthopedics;  Laterality: Left;  Accumed, Synthes Small Frag set Avelino verified 3/22/24 ark    TOE AMPUTATION Right 7/1/2024    Procedure: AMPUTATION, TOE;  Surgeon: Stevie Zhu DPM;  Location: [facility] OR;  Service: Podiatry;  Laterality: Right;     No family history on file.  Social History     Tobacco Use    Smoking status: Never    Smokeless tobacco:  Never   Substance Use Topics    Alcohol use: Yes     Alcohol/week: 0.0 standard drinks of alcohol     Comment: social    Drug use: No       OBJECTIVE:     Vital Signs and IO:  Temp:  [98.1 °F (36.7 °C)-99.8 °F (37.7 °C)]   Pulse:  []   Resp:  [18-41]   BP: ()/(45-80)   SpO2:  [77 %-100 %]   I/O last 3 completed shifts:  In: 3231.5 [P.O.:1320; I.V.:690; Blood:354; IV Piggyback:867.5]  Out: 4060 [Urine:3700; Drains:360]  Wt Readings from Last 5 Encounters:   07/05/24 133.7 kg (294 lb 12.1 oz)   06/04/24 132.5 kg (292 lb 1.8 oz)   05/07/24 132.5 kg (292 lb 1.8 oz)   04/08/24 132.5 kg (292 lb 3.2 oz)   03/26/24 (!) 136.1 kg (300 lb 0.7 oz)     Body mass index is 39.98 kg/m².    Physical Exam  Constitutional:       General: Patient is not in acute distress.     Appearance: Patient is well-developed. She is not diaphoretic.   HENT:      Head: Normocephalic and atraumatic.      Mouth/Throat: Mucous membranes are moist.   Eyes:      General: No scleral icterus.     Pupils: Pupils are equal, round, and reactive to light.   Cardiovascular:      Rate and Rhythm: Normal rate and regular rhythm.   Pulmonary:      Effort: Pulmonary effort is normal. No respiratory distress.      Breath sounds: No stridor.   Abdominal:      General: There is no distension.      Palpations: Abdomen is soft.   Musculoskeletal:         General: No deformity. Normal range of motion.      Cervical back: Neck supple.   Skin:     General: Skin is warm and dry.      Findings: No rash present. No erythema.   Neurological:      Mental Status: Patient is alert and oriented to person, place, and time.      Cranial Nerves: No cranial nerve deficit.   Psychiatric:         Behavior: Behavior normal.          Patient care time was spent personally by me on the following activities:     Obtaining a history.  Examination of patient.  Providing medical care at the patients bedside.  Developing a treatment plan with patient or surrogate and bedside  caregivers.  Ordering and reviewing laboratory studies, radiographic studies, pulse oximetry.  Ordering and performing treatments and interventions.  Evaluation of patient's response to treatment.  Discussions with consultants while on the unit and immediately available to the patient.  Re-evaluation of the patient's condition.  Documentation in the medical record.     Jay Talavera MD    [facility]  [address] [place] [address]  [place] [address]    [phone] - tel  [phone] - fax    7/5/2024

## 2024-07-05 NOTE — ASSESSMENT & PLAN NOTE
Last seen-03/19/21  Next visit-07/21/21   Multifactorial process  Pt not able to manage his own care and wife cannot adequately care for him at this time  PT/OT

## 2024-07-05 NOTE — ANESTHESIA PREPROCEDURE EVALUATION
07/05/2024  Roosevelt Moser is a 73 y.o., male.    Patient Active Problem List   Diagnosis    Localized osteoarthrosis not specified whether primary or secondary, lower leg    Morbid obesity    MARA (obstructive sleep apnea)    Hypertension    Type 2 diabetes mellitus    Polyneuropathy in diabetes(357.2)    Mixed hyperlipidemia    History of Guillain-Buford syndrome    Atrial fibrillation with rapid ventricular response    Abnormal EKG    Non-compliance    Acute renal failure superimposed on chronic kidney disease    Hyperkalemia    Severe sepsis    Debility    Swelling of limb, left    Skin tear of left upper extremity    Osteomyelitis of toe    Abscess of left shoulder    Pyogenic arthritis of left shoulder region    MRSA infection    Skin ulcer of toe of right foot with necrosis of bone    Sepsis    Pleural effusion    Chest wall abscess       Past Surgical History:   Procedure Laterality Date    ARTHROTOMY OF SHOULDER Left 6/21/2024    Procedure: ARTHROTOMY, SHOULDER;  Surgeon: Roman Paulson MD;  Location: [facility] OR;  Service: Orthopedics;  Laterality: Left;    IRRIGATION AND DEBRIDEMENT OF UPPER EXTREMITY Left 6/24/2024    Procedure: IRRIGATION AND DEBRIDEMENT, UPPER EXTREMITY;  Surgeon: Nathan Cooper MD;  Location: [facility] OR;  Service: Orthopedics;  Laterality: Left;    IRRIGATION AND DEBRIDEMENT OF UPPER EXTREMITY Left 6/27/2024    Procedure: IRRIGATION AND DEBRIDEMENT, UPPER EXTREMITY;  Surgeon: Nathan Cooper MD;  Location: [facility] OR;  Service: Orthopedics;  Laterality: Left;    OPEN REDUCTION AND INTERNAL FIXATION (ORIF) OF FRACTURE OF PROXIMAL HUMERUS Left 3/25/2024    Procedure: ORIF, FRACTURE, HUMERUS, PROXIMAL/IM HANNA, LEFT;  Surgeon: Nathan Cooper MD;  Location: [facility] OR;  Service: Orthopedics;  Laterality: Left;  Accumed, Synthes Small Frag set Avelino verified 3/22/24 ark    TOE AMPUTATION  Right 7/1/2024    Procedure: AMPUTATION, TOE;  Surgeon: Stevie Zhu DPM;  Location: [facility];  Service: Podiatry;  Laterality: Right;        Tobacco Use:  The patient  reports that he has never smoked. He has never used smokeless tobacco.     Results for orders placed or performed during the hospital encounter of 06/14/24   EKG 12-lead    Collection Time: 06/14/24 12:16 PM   Result Value Ref Range    QRS Duration 106 ms    OHS QTC Calculation 443 ms    Narrative    Test Reason : R53.1,    Vent. Rate : 120 BPM     Atrial Rate : 159 BPM     P-R Int : 000 ms          QRS Dur : 106 ms      QT Int : 314 ms       P-R-T Axes : 000 -54 093 degrees     QTc Int : 443 ms    Atrial fibrillation with rapid ventricular response  Left axis deviation  Low voltage QRS  Inferior infarct ,age undetermined  Cannot rule out Anterior infarct ,age undetermined  Abnormal ECG  When compared with ECG of 25-MAR-2024 09:14,  Vent. rate has increased BY  46 BPM  Minimal criteria for Anterior infarct are now Present  Confirmed by Jesús HATHAWAY, Hansel AMIN ([id number]) on 6/20/2024 8:43:02 PM    Referred By: [email or web address]   SELF           Confirmed By:Hansel Espinosa MD        Imaging Results              US Retroperitoneal Complete (Final result)  Result time 06/14/24 17:54:43      Final result by Cass Cutler MD (06/14/24 17:54:43)                   Impression:      No hydronephrosis.  Elevated resistive index right kidney suggesting intrinsic renal disease.      Electronically signed by: Cass Cutler MD  Date:    06/14/2024  Time:    17:54               Narrative:    EXAMINATION:  US RETROPERITONEAL COMPLETE    CLINICAL HISTORY:  acute renal failure;    TECHNIQUE:  Ultrasound of the kidneys and urinary bladder was performed including color flow and Doppler evaluation of the kidneys.    COMPARISON:  None.    FINDINGS:  Right kidney: The right kidney measures 12 cm. No cortical thinning. No loss of corticomedullary distinction. Resistive index  measures 7 2.  No mass. No renal stone. No hydronephrosis.    Left kidney: The left kidney measures 12.9 cm. No cortical thinning. No loss of corticomedullary distinction. Resistive index measures 0.67.  No mass. No renal stone. No hydronephrosis.    The region the urinary bladder is image but bladder decompressed at the time of the exam.                                       Radiology (Final result)  Result time 06/18/24 14:26:33      Final result by Unknown User (06/18/24 14:26:33)                                         Lab Results   Component Value Date    WBC 11.32 07/05/2024    HGB 8.2 (L) 07/05/2024    HCT 26.3 (L) 07/05/2024    MCV 82 07/05/2024     07/05/2024     BMP  Lab Results   Component Value Date     07/05/2024    K 4.1 07/05/2024     07/05/2024    CO2 27 07/05/2024    BUN 41 (H) 07/05/2024    CREATININE 1.3 07/05/2024    CALCIUM 7.9 (L) 07/05/2024    ANIONGAP 7 (L) 07/05/2024     (H) 07/05/2024     (H) 07/04/2024     (H) 07/03/2024       Results for orders placed during the hospital encounter of 06/14/24    Echo    Interpretation Summary    Extremely limited visualization of all cardiac structures.  No clinically significant determination can be made based on this echocardiogram.  If cardiac concerns persist, consider alternative cardiac imaging like RICKY for further evaluation.    Left Ventricle: Left ventricle was not well visualized due to poor sonic window. The left ventricle is normal in size. Unable to assess wall motion. There is normal systolic function with a visually estimated ejection fraction of 55 - 60%.    Right Ventricle: Right ventricle was not well visualized due to poor acoustic window.    Left Atrium: Left atrium was not well visualized.    Mitral Valve: There is no stenosis. The mean pressure gradient across the mitral valve is 2 mmHg at a heart rate of  bpm. There is mild regurgitation.    IVC/SVC: Elevated venous pressure at 15  mmHg.            Pre-op Assessment    I have reviewed the Patient Summary Reports.     I have reviewed the Nursing Notes. I have reviewed the NPO Status.   I have reviewed the Medications.     Review of Systems  Anesthesia Hx:  No problems with previous Anesthesia             Denies Family Hx of Anesthesia complications.    Denies Personal Hx of Anesthesia complications.                    Social:  Non-Smoker       Hematology/Oncology:  Hematology Normal                                     EENT/Dental:  EENT/Dental Normal           Cardiovascular:     Hypertension    Dysrhythmias atrial fibrillation          Sepsis due to multiple infections                         Pulmonary:        Sleep Apnea, CPAP Bilateral pleural effusions.  1500 cc drained per left thoracentesis 07/04/2024.  Patient has loculated right pleural effusion that will require VATS.          Education provided regarding risk of obstructive sleep apnea            Renal/:  Chronic Renal Disease, ARF, CKD                Hepatic/GI:  Hepatic/GI Normal                 Musculoskeletal:  Musculoskeletal Normal    17 cm Chest wall abscess incised and drained.  Patient has osteomyelitis of a few left ribs.  Patient has incompletely healed left humeral fracture.  Hardware removed last month due to infection.            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      Peripheral Neuropathy                          Endocrine:  Diabetes           Psych:  Psychiatric Normal                    Physical Exam  General: Well nourished, Cooperative, Alert and Oriented    Airway:  Mallampati: II / I  Mouth Opening: Normal  TM Distance: Normal  Tongue: Large  Neck ROM: Normal ROM    Dental:  Intact    Heart:  Rate: Normal  Rhythm: Regular Rhythm  Sounds: Normal        Anesthesia Plan  Type of  Anesthesia, risks & benefits discussed:    Anesthesia Type: Gen ETT  Intra-op Monitoring Plan: Standard ASA Monitors  Post Op Pain Control Plan: IV/PO Opioids PRN and multimodal analgesia  Induction:  IV  Airway Plan: Video  Informed Consent: Informed consent signed with the Patient and all parties understand the risks and agree with anesthesia plan.  All questions answered.   ASA Score: 4  Anesthesia Plan Notes: GETA, 41 Fr left PHYLLIS, etomidate, succinylcholine  Arterial line (left radial to detect any vascular compromise during positioning in light of left humeral fracture)  One good IV in addition to patient's PICC line.  Sleep apnea precautions: No Versed, minimize opioids, extubate awake and sitting up, sugammadex 400 mg  Multimodal analgesia:  IV acetaminophen, ketamine, local per surgeon  Antiemetics:  Zofran, Pepcid  Avoid Decadron due to infections                Ready For Surgery From Anesthesia Perspective.     .

## 2024-07-05 NOTE — PROGRESS NOTES
[facility]  Adult Nutrition   Progress Note (Initial Assessment)     SUMMARY     Recommendations  Recommendation/Intervention: 1, Recommend diet be resumed as medically able. Recommend also resuming ONS.  Goals: 1. Diet to be resumed.   2. Oral intake to meet at least 50% EEN/EPN by follow up.  Nutrition Goal Status: new  Communication of RD Recs: reviewed with RN    Nutritional Diagnosis (PES Statement)   Inadequate oral intake related to NPO status as evidence by NPO and plans for procedure.     Dietitian Rounds Brief  Pt transferred to [facility]. S/p Ultrasound Guided Left Thoracentesis with Aspiration of 1550 cc of thin Sero-Sanguinous Fluid. Plans for sx today, pt NPO. When on diet patient ate a few bites and some nepro per RN.     Reason for Assessment  Reason For Assessment: RD follow-up  Relevant Medical History: Localized osteoarthrosis not specified whether primary or secondary, lower leg   Morbid obesity   MARA (obstructive sleep apnea)   Hypertension   Type 2 diabetes mellitus   Polyneuropathy in diabetes(357.2)   Mixed hyperlipidemia   History of Guillain-Wing syndrome   Atrial fibrillation with rapid ventricular response   Abnormal EKG   Non-compliance   Acute renal failure superimposed on chronic kidney disease   Hyperkalemia   Severe sepsis   Debility   Swelling of limb, left   Skin tear of left upper extremity   Osteomyelitis of toe   Abscess of left shoulder   Pyogenic arthritis of left shoulder region   MRSA infection   Skin ulcer of toe of right foot with necrosis of bone   Sepsis   Pleural effusion   Chest wall abscess  Interdisciplinary Rounds: did not attend (no rounds)  Nutrition Discharge Planning: Diabetic diet    Nutrition Risk Screen  Nutrition Risk Screen: no indicators present       Wound 06/21/24 Incision Left upper Arm-Wound Image: Images linked       Wound 06/25/24 2000 Pressure Injury midline Buttocks-Wound Image: Images linked       Wound 06/14/24 1600 Diabetic Ulcer Right  anterior Toe, second #1-Wound Image: Images linked       Wound 06/14/24 1600 Skin Tear Left lower;proximal;posterior Elbow #2-Wound Image: Images linked       Wound 06/14/24 1600 Moisture associated dermatitis anterior;midline Pelvis #3-Wound Image: Images linked       Wound 06/14/24 1600 Abrasion(s) midline Umbilical area #4-Wound Image: Images linked  MST Score: 0  Have you recently lost weight without trying?: No  Weight loss score: 0  Have you been eating poorly because of a decreased appetite?: No  Appetite score: 0       Nutrition Related Social Determinants of Health: SDOH: Unable to assess at this time.    Food Insecurity: Not on file        Nutrition/Diet History  Spiritual, Cultural Beliefs, [employer]ian Practices, Values that Affect Care: no  Food Allergies: NKFA  Factors Affecting Nutritional Intake: NPO    Anthropometrics  Temp: 98.7 °F (37.1 °C)  Height Method: Stated  Height: 6' (182.9 cm)  Height (inches): 72 in  Weight Method: Bed Scale  Weight: 133.7 kg (294 lb 12.1 oz)  Weight (lb): 294.76 lb  Ideal Body Weight (IBW), Male: 178 lb  % Ideal Body Weight, Male (lb): 173.6 %  BMI (Calculated): 40  BMI Grade: greater than 40 - morbid obesity       Weight History:  Wt Readings from Last 10 Encounters:   07/05/24 133.7 kg (294 lb 12.1 oz)   06/04/24 132.5 kg (292 lb 1.8 oz)   05/07/24 132.5 kg (292 lb 1.8 oz)   04/08/24 132.5 kg (292 lb 3.2 oz)   03/26/24 (!) 136.1 kg (300 lb 0.7 oz)   03/25/24 136.1 kg (300 lb)   03/19/24 (!) 142 kg (313 lb)   03/19/24 (!) 142.2 kg (313 lb 7.9 oz)   03/19/24 (!) 142.2 kg (313 lb 7.9 oz)   03/12/24 (!) 142.2 kg (313 lb 7.9 oz)       Lab/Procedures/Meds: Pertinent Labs Reviewed    Clinical Chemistry:  Recent Labs   Lab 07/03/24  0302 07/04/24  0345 07/04/24  1602 07/05/24  0312    141  --  137   K 4.0 3.9   < > 4.1    104  --  103   CO2 25 27  --  27   * 155*  --  186*   BUN 50* 43*  --  41*   CREATININE 1.2 1.1  --  1.3   CALCIUM 8.9 8.0*  --  7.9*   PROT  6.5 6.2  --  6.1   ALBUMIN 1.5* 2.3*  --  2.2*   BILITOT 0.3 0.7  --  0.4   ALKPHOS 116 101  --  97   AST 19 14  --  19   ALT 17 12  --  14   ANIONGAP 10 10  --  7*   MG  --  1.5*   < > 2.0    < > = values in this interval not displayed.       CBC:   Recent Labs   Lab 07/05/24  0312   WBC 11.32   RBC 3.21*   HGB 8.2*   HCT 26.3*      MCV 82   MCH 25.5*   MCHC 31.2*         Cardiac Profile:  Recent Labs   Lab 07/01/24  0523 07/04/24  0345   CPK 9* <10*       Inflammatory Labs:  Recent Labs   Lab 07/03/24  0302   .3*       Diabetes:  Recent Labs   Lab 07/02/24  1631 07/02/24  2030 07/03/24  0811   POCTGLUCOSE 208* 232* 211*       Thyroid & Parathyroid:  Recent Labs   Lab 07/03/24  1919   TSH 2.683     Medications: Pertinent Medications reviewed    Scheduled Meds:   (Magic mouthwash) 1:1:1 diphenhydrAMINE(Benadryl) 12.5mg/5ml liq, aluminum & magnesium hydroxide-simethicone (Maalox), LIDOcaine viscous 2%  10 mL Swish & Spit QID    ceftaroline (Teflaro) IV (PEDS and ADULTS)  600 mg Intravenous Q8H    epoetin leonel-epbx  100 Units/kg Subcutaneous Q7 Days    ferrous sulfate  1 tablet Oral Daily    insulin glargine U-100  15 Units Subcutaneous Daily    LIDOcaine  1 patch Transdermal Q24H    meropenem IV (PEDS and ADULTS)  1 g Intravenous Q8H    metoprolol succinate  100 mg Oral QHS    micafungin (MYCAMINE) IVPB  100 mg Intravenous Q24H    potassium chloride  20 mEq Oral BID    sodium chloride 0.9%  10 mL Intravenous Q6H       Continuous Infusions:   0.9% NaCl   Intravenous Continuous 75 mL/hr at 07/05/24 1124 New Bag at 07/05/24 1124       PRN Meds:.  Current Facility-Administered Medications:     0.9%  NaCl infusion (for blood administration), , Intravenous, Q24H PRN    0.9%  NaCl infusion (for blood administration), , Intravenous, Q24H PRN    albuterol-ipratropium, 3 mL, Nebulization, Q4H PRN    aluminum & magnesium hydroxide-simethicone, 15 mL, Oral, QID PRN    dextrose 10%, 12.5 g, Intravenous, PRN     dextrose 10%, 25 g, Intravenous, PRN    glucagon (human recombinant), 1 mg, Intramuscular, PRN    glucose, 16 g, Oral, PRN    glucose, 24 g, Oral, PRN    HYDROcodone-acetaminophen, 1 tablet, Oral, Q4H PRN    HYDROcodone-acetaminophen, 1 tablet, Oral, Q4H PRN    insulin aspart U-100, 0-10 Units, Subcutaneous, QID (AC + HS) PRN    magnesium sulfate IVPB, 2 g, Intravenous, Daily PRN    metoprolol, 5 mg, Intravenous, Q5 Min PRN    naloxone, 0.02 mg, Intravenous, PRN    ondansetron, 8 mg, Oral, Q8H PRN    potassium bicarbonate, 35 mEq, Oral, PRN    potassium bicarbonate, 60 mEq, Oral, PRN    potassium, sodium phosphates, 2 packet, Oral, PRN    potassium, sodium phosphates, 2 packet, Oral, PRN    potassium, sodium phosphates, 2 packet, Oral, PRN    promethazine, 25 mg, Oral, Q6H PRN    simethicone, 1 tablet, Oral, QID PRN    sodium chloride 0.9%, 10 mL, Intravenous, Q12H PRN    Flushing PICC/Midline Protocol, , , Until Discontinued **AND** sodium chloride 0.9%, 10 mL, Intravenous, Q6H **AND** sodium chloride 0.9%, 10 mL, Intravenous, PRN    sodium chloride 0.9%, 10 mL, Intravenous, Q12H PRN    traMADoL, 50 mg, Oral, Q4H PRN    Estimated/Assessed Needs  Weight Used For Calorie Calculations: 133.7 kg (294 lb 12.1 oz)  Energy Calorie Requirements (kcal): 2006 - 2674 (15 - 20 kcal/kg)  Energy Need Method: Kcal/kg  Protein Requirements: 122 - 162 (1.5 - 2 g/kg IBW)  Weight Used For Protein Calculations: 81 kg (178 lb 9.2 oz) (IBW)     Estimated Fluid Requirement Method: RDA Method  RDA Method (mL): 2006  CHO Requirement: 251 - 334 (50% EEN)    Nutrition Prescription Ordered  Current Diet Order: NPO    Evaluation of Received Nutrient/Fluid Intake  Energy Calories Required: not meeting needs  Protein Required: not meeting needs  Fluid Required: meeting needs  Tolerance: other (see comments) (NPO)     Intake/Output Summary (Last 24 hours) at 7/5/2024 1650  Last data filed at 7/5/2024 1124  Gross per 24 hour   Intake 1710 ml    Output 1640 ml   Net 70 ml      % Intake of Estimated Energy Needs: 0%  % Meal Intake: NPO    Nutrition Risk  Level of Risk/Frequency of Follow-up: high     Monitor and Evaluation  Food and Nutrient Intake: energy intake, food and beverage intake  Food and Nutrient Adminstration: diet order  Knowledge/Beliefs/Attitudes: beliefs and attitudes, food and nutrition knowledge/skill  Physical Activity and Function: nutrition-related ADLs and IADLs, factors affecting access to physical activity  Anthropometric Measurements: weight, weight change, body mass index  Biochemical Data, Medical Tests and Procedures: electrolyte and renal panel, lipid profile, gastrointestinal profile, glucose/endocrine profile, inflammatory profile  Nutrition-Focused Physical Findings: overall appearance     Nutrition Follow-Up  RD Follow-up?: Yes    Kenzie Obregon RD 07/05/2024 4:51 PM

## 2024-07-05 NOTE — PROGRESS NOTES
Pulmonary/Critical Care Progress Note      Patient name: Roosevelt Moser  MRN: [id number]  Date: 07/05/2024    Admit Date: 6/14/2024  Consult Requested By: Randa Hannon MD    Reason for Consult: Pleural effusion, chest wall abscess    HPI:    7/3/2024 - Pt has been at [facility] for several weeks with toe osteomyelitis and shoulder abscess and has been treated.  Today he was noted to have left chest wall swelling and CT chest shows chest wall abscess with extension into chest and mediastinum and rib destruction.  He also has pleural effusions.  He was transferred here for possible surgical intervention and I am told that surgery feels that we also need CT surgery involved.  CT surgery is in a case at another facility and we are waiting to hear from him.  Another option may be transfer to [facility] for further management.  He feels OK, is wearing CpAP (a chronic treatment), denies any acute respiratory issues.  He admits to should and chest discomfort.  He denies fever.  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        7/5 the patient underwent a left thoracentesis followed by an I&D of his chest wall abscess.  Dr. Escobedo states that the abscess extended down to the rectus abdominis muscles and to the pericardium but there was no pericardial effusion.  The patient also has a loculated right effusion.  As TNK and dornase are not a good option for lysing the adhesions, a VATS with mechanical lysis and drainage is necessary.  The patient is distressed that he is NPO.  The patient refused to participate with PT, even refusing to sit on the side of the bed.    The patient is going to need to transfer to a facility that has thoracic surgery and reconstructive plastic surgery.  The patient has osteomyelitis of multiple ribs which will need further debridement and then a flap placed.  We do not have this ability at this facility.    Review of Systems    Review of  Systems   Constitutional:  Positive for malaise/fatigue. Negative for chills, diaphoresis, fever and weight loss.        The patient is distressed that he is NPO.   HENT:  Negative for congestion.    Eyes:  Negative for pain.   Respiratory:  Negative for cough, hemoptysis, sputum production, shortness of breath, wheezing and stridor.    Cardiovascular:  Positive for chest pain. Negative for palpitations, orthopnea, claudication, leg swelling and PND.   Gastrointestinal:  Negative for abdominal pain, constipation, diarrhea, heartburn, nausea and vomiting.   Genitourinary:  Negative for dysuria, frequency and urgency.   Musculoskeletal:  Positive for joint pain. Negative for falls and myalgias.        The left shoulder is painful.  It is also edematous.   Neurological:  Positive for weakness. Negative for sensory change and focal weakness.   Psychiatric/Behavioral:  Negative for depression. The patient is not nervous/anxious.        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    Hyperlipidemia     Hypertension 2016    Sleep apnea        Past Surgical History    Past Surgical History:   Procedure Laterality Date    ARTHROTOMY OF SHOULDER Left 6/21/2024    Procedure: ARTHROTOMY, SHOULDER;  Surgeon: Roman Paulson MD;  Location: [facility] OR;  Service: Orthopedics;  Laterality: Left;    IRRIGATION AND DEBRIDEMENT OF UPPER EXTREMITY Left 6/24/2024    Procedure: IRRIGATION AND DEBRIDEMENT, UPPER EXTREMITY;  Surgeon: Nathan Cooper MD;  Location: [facility] OR;  Service: Orthopedics;  Laterality: Left;    IRRIGATION AND DEBRIDEMENT OF UPPER EXTREMITY Left 6/27/2024    Procedure: IRRIGATION AND DEBRIDEMENT, UPPER EXTREMITY;  Surgeon: Nathan Cooper MD;  Location: [facility] OR;  Service: Orthopedics;  Laterality: Left;    OPEN REDUCTION AND INTERNAL FIXATION (ORIF) OF FRACTURE OF PROXIMAL HUMERUS Left 3/25/2024    Procedure: ORIF, FRACTURE,  HUMERUS, PROXIMAL/IM HANNA, LEFT;  Surgeon: Nathan Cooper MD;  Location: [facility] OR;  Service: Orthopedics;  Laterality: Left;  Accumed, Synthes Small Frag set Avelino verified 3/22/24 ark    TOE AMPUTATION Right 7/1/2024    Procedure: AMPUTATION, TOE;  Surgeon: Stevie Zhu DPM;  Location: [facility] OR;  Service: Podiatry;  Laterality: Right;       Medications (scheduled):      (Magic mouthwash) 1:1:1 diphenhydrAMINE(Benadryl) 12.5mg/5ml liq, aluminum & magnesium hydroxide-simethicone (Maalox), LIDOcaine viscous 2%  10 mL Swish & Spit QID    ceftaroline (Teflaro) IV (PEDS and ADULTS)  600 mg Intravenous Q8H    epoetin leonel-epbx  100 Units/kg Subcutaneous Q7 Days    ferrous sulfate  1 tablet Oral Daily    insulin glargine U-100  15 Units Subcutaneous Daily    LIDOcaine  1 patch Transdermal Q24H    meropenem IV (PEDS and ADULTS)  1 g Intravenous Q8H    metoprolol succinate  100 mg Oral QHS    micafungin (MYCAMINE) IVPB  100 mg Intravenous Q24H    potassium chloride  20 mEq Oral BID    sodium chloride 0.9%  10 mL Intravenous Q6H       Medications (infusions):      0.9% NaCl   Intravenous Continuous 75 mL/hr at 07/05/24 1124 New Bag at 07/05/24 1124       Medications (prn):       Current Facility-Administered Medications:     0.9%  NaCl infusion (for blood administration), , Intravenous, Q24H PRN    0.9%  NaCl infusion (for blood administration), , Intravenous, Q24H PRN    albuterol-ipratropium, 3 mL, Nebulization, Q4H PRN    aluminum & magnesium hydroxide-simethicone, 15 mL, Oral, QID PRN    dextrose 10%, 12.5 g, Intravenous, PRN    dextrose 10%, 25 g, Intravenous, PRN    glucagon (human recombinant), 1 mg, Intramuscular, PRN    glucose, 16 g, Oral, PRN    glucose, 24 g, Oral, PRN    HYDROcodone-acetaminophen, 1 tablet, Oral, Q4H PRN    HYDROcodone-acetaminophen, 1 tablet, Oral, Q4H PRN    insulin aspart U-100, 0-10 Units, Subcutaneous, QID (AC + HS) PRN    magnesium sulfate IVPB, 2 g, Intravenous, Daily PRN    metoprolol, 5  mg, Intravenous, Q5 Min PRN    naloxone, 0.02 mg, Intravenous, PRN    ondansetron, 8 mg, Oral, Q8H PRN    potassium bicarbonate, 35 mEq, Oral, PRN    potassium bicarbonate, 60 mEq, Oral, PRN    potassium, sodium phosphates, 2 packet, Oral, PRN    potassium, sodium phosphates, 2 packet, Oral, PRN    potassium, sodium phosphates, 2 packet, Oral, PRN    promethazine, 25 mg, Oral, Q6H PRN    simethicone, 1 tablet, Oral, QID PRN    sodium chloride 0.9%, 10 mL, Intravenous, Q12H PRN    Flushing PICC/Midline Protocol, , , Until Discontinued **AND** sodium chloride 0.9%, 10 mL, Intravenous, Q6H **AND** sodium chloride 0.9%, 10 mL, Intravenous, PRN    sodium chloride 0.9%, 10 mL, Intravenous, Q12H PRN    traMADoL, 50 mg, Oral, Q4H PRN    Family History: No family history on file.    Social History: Tobacco:   Social History     Tobacco Use   Smoking Status Never   Smokeless Tobacco Never                                EtOH:   Social History     Substance and Sexual Activity   Alcohol Use Yes    Alcohol/week: 0.0 standard drinks of alcohol    Comment: social                                Drugs:   Social History     Substance and Sexual Activity   Drug Use No       Physical Exam    Vital signs:  Temp:  [98.1 °F (36.7 °C)-99.8 °F (37.7 °C)]   Pulse:  []   Resp:  [20-41]   BP: ()/(45-80)   SpO2:  [80 %-98 %]     Intake/Output:   Intake/Output Summary (Last 24 hours) at 7/5/2024 1439  Last data filed at 7/5/2024 1055  Gross per 24 hour   Intake 1710 ml   Output 1630 ml   Net 80 ml        BMI: Estimated body mass index is 39.98 kg/m² as calculated from the following:    Height as of this encounter: 6' (1.829 m).    Weight as of this encounter: 133.7 kg (294 lb 12.1 oz).    Physical Exam  Vitals and nursing note reviewed.   Constitutional:       General: He is not in acute distress.     Appearance: Normal appearance. He is obese. He is ill-appearing. He is not toxic-appearing or diaphoretic.      Comments: Wearing CPAP    HENT:      Head: Normocephalic and atraumatic.      Right Ear: External ear normal.      Left Ear: External ear normal.      Nose: Nose normal.      Mouth/Throat:      Mouth: Mucous membranes are moist.      Pharynx: Oropharynx is clear. No oropharyngeal exudate.   Eyes:      General: No scleral icterus.        Right eye: No discharge.         Left eye: No discharge.      Extraocular Movements: Extraocular movements intact.      Conjunctiva/sclera: Conjunctivae normal.      Pupils: Pupils are equal, round, and reactive to light.   Neck:      Vascular: No carotid bruit.   Cardiovascular:      Rate and Rhythm: Normal rate and regular rhythm.      Pulses: Normal pulses.      Heart sounds: Normal heart sounds. No murmur heard.     No friction rub. No gallop.      Comments: Left thoracic lateral wall has a Hemovac drain draining a pink brown pus.  Some pus is coming around the drain.  There is a smaller dressing lower on the thoracic wall.  Pulmonary:      Effort: Pulmonary effort is normal. No respiratory distress.      Breath sounds: Normal breath sounds. No stridor. No wheezing, rhonchi or rales.      Comments: Decreased in the dependent areas  Chest:      Chest wall: No tenderness.   Abdominal:      General: Bowel sounds are normal. There is no distension.      Tenderness: There is no abdominal tenderness. There is no guarding.      Comments: obese   Musculoskeletal:         General: No swelling. Normal range of motion.      Cervical back: Normal range of motion and neck supple. No rigidity or tenderness.      Right lower leg: No edema.      Left lower leg: No edema.      Comments: Surgical dressing on foot, right 2nd toe has been amputated  Hardware from the left shoulder has been removed, that shoulder is dressed.  Double-lumen PICC on the right   Lymphadenopathy:      Cervical: No cervical adenopathy.   Skin:     General: Skin is warm and dry.      Capillary Refill: Capillary refill takes less than 2 seconds.       Coloration: Skin is not jaundiced.      Findings: No bruising.   Neurological:      General: No focal deficit present.      Mental Status: He is alert. Mental status is at baseline.      Cranial Nerves: No cranial nerve deficit.      Sensory: No sensory deficit.      Motor: No weakness.      Comments: Oriented x 2   Psychiatric:         Mood and Affect: Mood normal.         Behavior: Behavior normal.         Thought Content: Thought content normal.         Judgment: Judgment normal.      Comments: Patient yelling that he will not do physical therapy         Laboratory    Recent Labs   Lab 07/05/24  0312   WBC 11.32   RBC 3.21*   HGB 8.2*   HCT 26.3*      MCV 82   MCH 25.5*   MCHC 31.2*       Recent Labs   Lab 07/05/24 0312   CALCIUM 7.9*   ALBUMIN 2.2*   PROT 6.1      K 4.1   CO2 27      BUN 41*   CREATININE 1.3   ALKPHOS 97   ALT 14   AST 19   BILITOT 0.4         Microbiology:       Microbiology Results (last 7 days)       Procedure Component Value Units Date/Time    Culture, Body Fluid (Aerobic) w/ GS [[id number]] Collected: 07/04/24 0846    Order Status: Completed Specimen: Pleural Fluid Updated: 07/05/24 0904     AEROBIC CULTURE - FLUID No growth     Gram Stain Result Moderate WBC's      No organisms seen    Narrative:      Left Pleural fluid    Aerobic culture [[id number]] Collected: 07/04/24 1113    Order Status: Completed Specimen: Abscess from Chest, Left Updated: 07/05/24 0903     Aerobic Bacterial Culture No growth    Blood culture [[id number]] Collected: 07/03/24 1332    Order Status: Completed Specimen: Blood from Peripheral, Hand, Left Updated: 07/04/24 2032     Blood Culture, Routine No Growth to date      No Growth to date    Gram stain [[id number]] Collected: 07/04/24 1113    Order Status: Completed Specimen: Abscess from Chest, Left Updated: 07/04/24 1928     Gram Stain Result Moderate WBC's      No organisms seen    Narrative:      Chest, Left    Fungus culture [[id number]]  Collected: 07/04/24 1113    Order Status: Sent Specimen: Abscess from Chest, Left Updated: 07/04/24 1129    AFB Culture & Smear [[id number]] Collected: 07/04/24 1113    Order Status: Sent Specimen: Abscess from Chest, Left Updated: 07/04/24 1128    Culture, Anaerobe [[id number]] Collected: 07/04/24 1113    Order Status: Sent Specimen: Abscess from Chest, Left Updated: 07/04/24 1128    Fungus culture [[id number]] Collected: 07/04/24 0859    Order Status: Sent Specimen: Pleural Fluid Updated: 07/04/24 1010    AFB Culture & Smear [[id number]] Collected: 07/04/24 0805    Order Status: No result Specimen: Pleural Fluid Updated: 07/04/24 0856    Culture, Anaerobic [[id number]] Collected: 07/04/24 0804    Order Status: No result Specimen: Pleural Fluid Updated: 07/04/24 0855    Culture, Anaerobic [[id number]]     Order Status: Completed Specimen: Pleural Fluid     AFB Culture & Smear [[id number]]     Order Status: Completed Specimen: Pleural Fluid     Fungus culture [[id number]] Collected: 06/21/24 1645    Order Status: Completed Specimen: Abscess from Shoulder, Left Updated: 07/04/24 0444     Fungus (Mycology) Culture No fungal growth to date      No fungal growth to date    Fungus culture [[id number]] Collected: 06/20/24 1204    Order Status: Completed Specimen: Abscess from Shoulder, Left Updated: 07/04/24 0444     Fungus (Mycology) Culture No fungal growth to date      No fungal growth to date    Aerobic culture [[id number]] Collected: 06/27/24 0733    Order Status: Completed Specimen: Incision site from Shoulder, Left Updated: 07/01/24 0704     Aerobic Bacterial Culture No growth    Culture, Anaerobe [[id number]] Collected: 06/27/24 0733    Order Status: Completed Specimen: Incision site from Shoulder, Left Updated: 06/29/24 1441     Anaerobic Culture No anaerobes isolated    Gram stain [[id number]] Collected: 06/27/24 0733    Order Status: Completed Specimen: Incision site from Shoulder, Left Updated: 06/28/24 1516      Gram Stain Result Few WBC's      No organisms seen            Radiology    X-Ray Chest AP Portable  Narrative: EXAMINATION:  XR CHEST AP PORTABLE    CLINICAL HISTORY:  Pleural effusions;    FINDINGS:  Portable chest radiograph obtained in 2 images at 08:24 hours is compared to 07/04/2024, and shows right PICC unchanged in position.  The cardiomediastinal silhouette and pulmonary vasculature are stable.    There are scattered opacities in both lungs unchanged, with hazy densities at the lung bases reflecting pleural effusions, obscuring the diaphragm.  No new pleural or parenchymal abnormality.  Impression: No significant interval change.    Electronically signed by: Herberth Siddiqui  Date:    07/05/2024  Time:    08:48        Additional Studies    reviewed    Ventilator Information                  Recent Labs     07/03/24  1216   PH 7.495*   PCO2 34.4*   PO2 72*   HCO3 26.5   POCSATURATED 96   BE 3*         Impression    Active Hospital Problems    Diagnosis  POA    *Acute renal failure superimposed on chronic kidney disease [N17.9, N18.9]  Yes    Chest wall abscess [L02.213]  No    Pleural effusion [J90]  No    MRSA infection [A49.02]  Yes    Pyogenic arthritis of left shoulder region [M00.9]  Yes    Osteomyelitis of toe [M86.9]  Yes    Abscess of left shoulder [L02.414]  Yes    Debility [R53.81]  Yes    Swelling of limb, left [M79.89]  Yes    Skin tear of left upper extremity [S41.112A]  Yes    Hyperkalemia [E87.5]  Yes    Severe sepsis [A41.9, R65.20]  Yes    Atrial fibrillation with rapid ventricular response [I48.91]  Yes    History of Guillain-[place] syndrome [Z86.69]  Not Applicable    Type 2 diabetes mellitus [E11.9]  Yes    MARA (obstructive sleep apnea) [G47.33]  Yes    Morbid obesity [E66.01]  Yes    Hypertension [I10]  Yes      Resolved Hospital Problems   No resolved problems to display.   Infected left shoulder with hardware  - hardware removed  Large chest wall abscess  - extending down to the rectus  abdominis  - with 3 ribs involved  - contiguous with the pericardium but no obvious pericardial effusion  - ID following, cultures are negative at this time  Bilateral pleural effusions  - status post thoracentesis on the left  - fluid exudative with a lymphocytic predominance, stains and cultures negative at this time  - for VATS on the right side for loculated effusion this afternoon  Type 2 diabetes with osteomyelitis of the 2nd toe   - now resected  - MRSA  Obstructive sleep apnea  - patient using his home CPAP  Morbid obesity/moderate hypoalbuminemia  Anemia  Diabetes mellitus  - continue Accu-Cheks and sliding scale    Patient is going to need further debridement of the ribs and then a flap to cover the defect.  This will require reconstructive plastic surgeons as well as thoracic surgeons and we do not have that combination of physicians here.  Have requested transfer to [facility] for this to happen.    Discussed with nursing and Respiratory and Cardiothoracic surgery and physical therapy and the patient in his wife and infectious disease    Critical care time spent reviewing the chart, examining the patient, reviewing the labs, reviewing the radiological findings, discussing care with nursing, physicians, and respiratory and creating the note and  has been greater than 35 minutes

## 2024-07-05 NOTE — ASSESSMENT & PLAN NOTE
Pt taken to OR for I&D of a chest wall abscess on 7/4/24. He had a thoracentesis of 1550 cc.  Dr. Escobedo states that the abscess extended down to the rectus abdominis muscles and to the pericardium but there was no pericardial effusion.  The patient also has a loculated right effusion.  As TNK and dornase are not a good option for lysing the adhesions, a VATS with mechanical lysis and drainage is necessary. Nephro on  board for MARBIN. Cont Teflaro, meropenem, and micagungin per ID recs. Wound cx pending    The patient is going to need to transfer to a facility that has thoracic surgery and reconstructive plastic surgery. The patient has osteomyelitis of multiple ribs which will need further debridement and then a flap placed. Unfortunately, [facility] do not have this capability at this facility.

## 2024-07-05 NOTE — PROGRESS NOTES
".[facility]   Department of Infectious Disease  Progress Note    PATIENT NAME: Roosevelt Moser  MRN: [id number]  TODAY'S DATE: 07/05/2024  ADMIT DATE: 6/14/2024  LOS: 21 days  CHIEF COMPLAINT: Weakness (Pt brought to ED via EMS with complaint of weakness and falling, pt advised EMS his urine is very dark.  )    PRINCIPLE PROBLEM: Acute renal failure superimposed on chronic kidney disease      INTERVAL HISTORY     7/5:  Patient seen and examined at bedside, wife present.  He is awake, alert, with low BP, about to start gentle IV fluids.  He had chest wall abscess blunt dissection yesterday performed by thoracic surgery.  As per operation note: Once the abscess was punctured, creamy purulent fluid began to drain.  A suction catheter was advanced into the abscess cavity and loculations were carefully broken up, with the intent of avoiding entering into the abdominal cavity or irritating the underlying pericardium.  All of the fluid was forward for Gram stain and cultures.  Approximately 250 cc of the creamy purulent fluid was initially drained.  Then abscess cavity was then irrigated and suctioned with a total of 2 L of vancomycin based saline irrigation.  A large Hemovac drain was inserted through a 5 mm incision overlying the center of the abscess".  Left pleural fluid from yes cell count with 1100 WBCs, 72% lymphocytes, , pH 7.6.  Light's criteria consistent with exudative effusion.  G stain moderate WBCs, no organisms seen.  Micro from left chest wall abscess Gram stain with moderate WBCs, no organisms seen, both left pleural effusion and left chest wall abscess no growth to date, pending final.  Discussed with Pulmonary/Dr. Obregon, plan to transfer patient to [facility] for higher level of care, patient is planned to have VATS to right lung today he will likely need plastic surgery to help his left chest wall abscess.    7/4 (Pranay) Transferred to [facility] for CT surgery evaluation for " left chest wall abscess.  Discussed with wife, yesterday morning while he was being assessed by nursing, a protruded area on his left chest was noted, patient was complaining of chest pain which prompted CT scan that revealed a 17 cm anterior and left chest wall abscess extending into the mediastinum with partial destruction of left 6th, 7th and 8th anterior ribs.  Patient seen examined at bedside, seen by CT surgery earlier today, status post left thoracentesis, 1.500 cc of serosanguineous fluid drained.  Fluid set for cell count, Gram stain and cultures including AFB and fungal.  Plan today for drainage of chest wall abscess in the OR.  He is awake, alert, wife at bedside, breathing comfortable on room air.  Labile BP, afebrile.  Adequate urinary output, last bowel movement 7/3.  Labs reviewed, leukocytosis 13.4, no left shift, H&H 8.3/26.6, platelet count 485.  Stable electrolytes, creatinine and liver function normal.  Will check CRP tomorrow.  Baseline CPK less than 10.    07/03/2024:  He was noted to have left chest wall swelling today.  CT chest has demonstrated a 17 cm anterior and left chest wall abscess that extends into the mediastinum and peritoneal with partial destruction of left 6th, 7th and 8th anterior ribs.  Also showed bilateral pleural effusion worse on the left with compressive atelectasis/collapse of left lower lobe.  X-ray left shoulder and left humerus 07/02/2024 show left femoral fracture with displacement.     07/02/2024:  He had right 2nd toe amputation yesterday 07/01/2024.  No other acute issues overnight.     06/30/2024: No acute issues overnight. Tolerating antibiotics okay.     06/29/2024: Events of last 9 days noted. He had I and D left shoulder with removal of humerus hardware 06/21/2024. Had 2nd I&D     6/28/24 (Pranay):  Patient seen and examined at bedside, wife present.  He is lying in bed, wife reports he has a little more alert and interactive compared to prior.  Patient  awaiting authorization to go to facility to continue IV antibiotics.  He went for 3rd washout yesterday and as per discussion with ortho surgeon tissue look much better, healthier, no pus or fluid collections identified.  Hemodynamically stable, T-max 98.4°, afebrile.  Breathing comfortable on room air.  Adequate urinary output, having regular bowel movements.  PICC line in place since 06/14.  Labs reviewed, leukocytosis down to 15.4, no left shift, H&H 8.3/26.7, platelet count 654, reactive thrombocytosis.  Stable electrolytes, MARBIN improved, creatinine 1.4, creatinine clearance 69.5 mL/min, normal LFTs, CPK stable. 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     06/27/24@ (Naomi): Patient is lying in bed awake and alert with his wife at bedside.  He was status post washout of his left shoulder.  Per Orthopedic surgery lifting much better with no pus or fluid collections and looked clean and healthy.   He has no complaints.  No diarrhea with a bowel movement today, no nausea vomiting, states he has a fair appetite, no fevers or chills.   Much improved mouth dryness.  T-max 98.6° in last 24 hours.   WBC 17.46, platelets 703, H&H 8.2/25.7, no left shift, no bands, BUN/ CR 54/1.4 with estimated creatinine clearance 69.4.  6/24 cultures with no growth, 6/21 cultures no growth.  6/20 cultures negative.  6/18 culture from right 2nd toe with MRSA.  CRP trending down at 180, BNP is elevated at 456 and total CK is 8.    6/26:  Patient seen and examined, wife present.  He is awake, alert, NPO for 3rd washout of left shoulder.  He reports he is feeling better, awaiting surgery to have something to eat after procedure.  Left shoulder with wound VAC place draining significant amounts of bloody purulent fluid.  Hypertensive, afebrile.  Adequate  urinary output, had 2 bowel movements in the last 24 hours.  Significant upper extremity edema decreasing, persistent bilateral lower extremity pitting edema.  Labs reviewed, leukocytosis 17.8, no left shift, H&H 8.2/25.8, platelet count 694, reactive thrombocytosis.  Stable electrolytes, creatinine down to 1.4, MARBIN improved, creatinine clearance 69.4 mL/min, normal LFTs, CRP down to 192.2.  Micro reviewed, OR cultures from 06/21 no growth to date, pending final.    6/25:  Patient seen and examined, wife present.  Patient lying in bed, has wound VAC placed to left shoulder.  Discussed with Ortho, very difficult situation in the setting of nonunion infected fracture.  As per operation note: After removing packing there was a large collection of bloody brownish fluid.  Irrigated.  Incision was extended 5 cm distally.  Pulse down the wound with 12 L of fluid.  Cultures were taken.  After extensive washout able to pass through the abscess cavity and a wound VAC was placed into cavity.  Evidence of no union or whatsoever.  The patient has an infected nonunion discussed with Hospital Medicine as well, plan for serial washouts in the OR for source control.  Slightly hypertensive, tachycardic, afebrile.  Adequate urinary output, had 2 bowel movements in the last 24 hours.  He did not get steroids preop, last dose dexamethasone 8 mg once on 06/21.  Labs reviewed, leukocytosis 20.1, no left shift, H&H 8.6/27.1, MCV 81, platelet count 720, reactive thrombocytosis.  Stable electrolytes, MARBIN, creatinine down to 1.6, creatinine clearance 60.6 mL/min, improving, normal LFTs, CRP remains taylor high 219.3, procalcitonin continues to fall 0.7.  We do not need to check this daily.  Echo unable to fully visualize oral valves.    6/24/24 (Pranay):  Patient seen and examined, wife present.  He is NPO for procedure today by Ortho, I&D and washout of left shoulder.  Patient awake, alert.  Hypertensive, T-max 99.5°, currently afebrile.   "Adequate urinary output, had a bowel movement in the last 24 hours.  Labs reviewed, leukocytosis 18.1, no left shift, H&H 9.1/28.3, MCV 80, reactive thrombocytosis 721.  Sed rate extremely high 128, .  Stable electrolytes, MARBIN creatinine trending down 1.8, clearance 53.9 mL/min, baseline CPK 30.  Procalcitonin down to 0.97.  Last vancomycin random level 13.5.    06/23/2024.  Sleeping -- I did not disturb Afebrile.  T-max 99.5° WBC 21.3--19.8--17.  CRP is quite elevated at 205.  .  Procalcitonin 1.39.  Vancomycin random 18.  Cultures from shoulder no growth to date.    Gram stain yesterday was read as Gram-positive cocci--today it is changed to  "no organism"  GOing for another procedure tomorrow    06/22/2024.  Very pleasant.  So is his wife.  Patient is Afebrile.  He has swelling over the left arm, slightly improved.  Pain is well-controlled.    WBC 19--21--21.3--19.8.  He did receive dexamethasone yesterday by anesthesia, at the time of left shoulder surgery.  He is on ceftriaxone clindamycin and vancomycin.  Cultures reviewed:  right 2nd toe wound had grown MRSA.  Left intraop cultures no growth to date.  Left shoulder intraop, gram stain are showing Gram-positive cocci.  He is trying to move his legs in bed, but he is still  weak.  Wife and patient are able to do IV antibiotics at home, but all things considered, it is very cumbersome for them;  they are interested in placement, which I agree, it is the right course of action.    06/21/2024:  Seen by Orthopedic surgery Service again yesterday.  For I and D today.  All cultures so far negative.  ASO titer normal.    06/20/2024:  Oral anticoagulants on hold to allow left shoulder arthrocentesis.  No other acute issues overnight.  Culture from right 3rd toe growing Staphylococcus aureus.  Blood culture remain negative.  Had aspiration left shoulder today that yielded purulent material.  Synovial fluid studies in progress.    06/19/2024: Seen and " evaluated at bedside.  States left upper extremity pain better.  Having improved movement at the wrist and forearm.  Seen by Orthopedic surgery PA yesterday.  Notes reviewed.  Blood cultures remain negative.        Antibiotics (From admission, onward)      Start     Stop Route Frequency Ordered    07/04/24 0600  meropenem 1 g in sodium chloride 0.9 % 100 mL IVPB (ready to mix system)         -- IV Every 8 hours (non-standard times) 07/04/24 0257    07/03/24 1530  ceftaroline (TEFLARO) 600 mg in dextrose 5 % 50 mL IVPB (ready to mix)         -- IV Every 8 hours (non-standard times) 07/03/24 1511    06/17/24 2100  mupirocin 2 % ointment         06/22/24 2059 Nasl 2 times daily 06/17/24 1544          Antifungals (From admission, onward)      Start     Stop Route Frequency Ordered    07/03/24 1415  micafungin 100 mg in 0.9% NaCl 100 mL IVPB (MB+)         -- IV Every 24 hours (non-standard times) 07/03/24 1300    06/19/24 1045  clotrimazole megha 10 mg         06/29/24 0959 Oral 5 times daily 06/19/24 0932           Antivirals (From admission, onward)      None            ASSESSMENT and PLAN     Left chest wall abscess status post left thoracentesis 1500cc serosanguineous fluid drained 7/4 & I&D drainage with chest tube placement 7/4  Cell count 1100 WBCs, 72% lymphocytes, , pH 7.6 - Light's criteria consistent with exudative effusion  L pleural fluid G stain OR WBCs, no organisms seen  OR left chest wall abscess Gram stain with moderate WBCs, no organisms seen, both left pleural effusion and left chest wall abscess no growth to date, pending final.      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 Right 3rd toe osteomyelitis s/p Dalvance x 2 doses & 2nd  distal phalanx s/p I&D at bedside, s/p 2nd toe amputation 7/1   X-ray with bony erosion of the 3rd DIP representing osteomyelitis   MRI osteomyelitis involving the middle distal phalanges of the 3rd toe.  Very slight destruction of the tip of the tuft of the distal phalanx of the 2nd toe.  Wound cultures 2nd R toe 6/18 MRSA, vanco JESSENIA 2      4. PMHx:  Diabetes, last A1c 6.3%, PID, CHF EF 50%     RECOMMENDATIONS:    Follow pleural and left chest wall abscess cultures   VATS today  Please send deep tissue for Gram stain and cultures including AFB and fungal  Continue Teflaro 600 mg IV q.8   Meropenem 1 g IV q.8   Micafungin 100 mg IV daily will likely DC tomorrow  Will guide antibiotic therapy based on culture results  Wound care to all affected areas    D/w patient, wife at bedside, ICU nursing, Dr Escobedo/Thoracic Surgery, Dr Obregon/Pulmonary    Please send Epic secure chat with any questions.      SUBJECTIVE      Review of Systems  Review of systems obtained and negative except as stated above in Interval History     OBJECTIVE   Temp:  [98.1 °F (36.7 °C)-99.8 °F (37.7 °C)] 99.8 °F (37.7 °C)  Pulse:  [] 93  Resp:  [18-41] 24  SpO2:  [77 %-100 %] 92 %  BP: ()/(45-80) 105/51  Temp:  [98.1 °F (36.7 °C)-99.8 °F (37.7 °C)]   Temp: 99.8 °F (37.7 °C) (07/05/24 0748)  Pulse: 93 (07/05/24 0715)  Resp: (!) 24 (07/05/24 0715)  BP: (!) 105/51 (07/05/24 0700)  SpO2: (!) 92 % (07/05/24 0715)    Intake/Output Summary (Last 24 hours) at 7/5/2024 0907  Last data filed at 7/5/2024 0748  Gross per 24 hour   Intake 2277.5 ml   Output 1820 ml   Net 457.5 ml       Physical Exam  General: Morbidly obese  male, awake, alert, lying in bed  awake and alert, he is in no distress, pleasant and conversant.  Eyes: Eyes with no icterus or injection. Vision grossly normal  Ears: Hearing grossly normal.  Nose: Nares patent  Mouth: Moist mucous membranes, dentition is fair.  Oropharynx with resolved aphthous ulcers.  Neck: Supple  Cardiovascular: Regular rate and rhythm, no murmurs, left chest with Hemovac, purulent fluid draining, decreased size of prior bulky skin area, less tender to palpation  Respiratory:  Poor inspiratory effort, decreased breath sounds b/l L>R  Gastrointestinal:  Soft and obese with active bowel sounds, no tenderness to palpation, no distention.  Genitourinary:  PureWick in place.  No suprapubic tenderness.  Musculoskeletal:  Except for left shoulder, moves all extremities with good strength.    Skin:  Right foot s/p amputation of 2nd toe, left shoulder with stitches in place, no redness noted   Neuro:   Oriented, follows commands.  Psych: Good mood, normal affect.     WOUNDS:    7/5:            7/4:            6/27 6/25:      6/24:                            Significant Labs: All pertinent labs within the past 24 hours have been reviewed.    CBC LAST 7 DAYS  Recent Labs   Lab 06/30/24  0459 07/01/24  0523 07/02/24  0345 07/03/24  0302 07/04/24  0345 07/04/24  1602 07/05/24  0312   WBC 15.03* 13.54* 14.35* 12.51 13.49* 13.56* 11.32   RBC 3.28* 3.19* 3.54* 3.06* 3.26* 3.25* 3.21*   HGB 8.3* 8.2* 9.0* 7.8* 8.3* 8.2* 8.2*   HCT 27.6* 26.3* 29.7* 25.3* 26.6* 26.6* 26.3*   MCV 84 82 84 83 82 82 82   MCH 25.3* 25.7* 25.4* 25.5* 25.5* 25.2* 25.5*   MCHC 30.1* 31.2* 30.3* 30.8* 31.2* 30.8* 31.2*   RDW 18.0* 18.1* 18.2* 18.2* 17.3* 17.3* 17.2*   * 568* 489* 418 485* 457* 443   MPV 9.8 9.4 11.5 11.1 9.6 9.9 9.8   GRAN 64.2  9.7* 66.5  9.0* 63.5  9.1* 64.7  8.1* 63.4  8.6* 75.4*  10.2* 70.4  8.0*   LYMPH 17.2*  2.6 17.7*  2.4 16.4*  2.4 18.1  2.3 17.8*  2.4 13.3*  1.8 15.6*  1.8   MONO 12.4  1.9* 12.0  1.6* 12.2   1.8* 12.6  1.6* 12.9  1.7* 9.1  1.2* 9.4  1.1*   BASO 0.17 0.13 0.16 0.09 0.09 0.09 0.09   NRBC 0 0 0 0 0 0 0       CHEMISTRY LAST 7 DAYS  Recent Labs   Lab 06/29/24  0520 06/30/24  0459 07/01/24  0523 07/02/24  0345 07/03/24  0302 07/03/24  1216 07/04/24  0345 07/04/24  1602 07/05/24  0312    142 143 145 142  --  141  --  137   K 4.0 3.6 3.2* 3.6 4.0  --  3.9 4.1 4.1    106 107 107 107  --  104  --  103   CO2 25 24 25 24 25  --  27  --  27   ANIONGAP 11 12 11 14 10  --  10  --  7*   BUN 48* 41* 38* 46* 50*  --  43*  --  41*   CREATININE 1.3 1.1 1.1 1.2 1.2  --  1.1  --  1.3   * 139* 149* 201* 215*  --  155*  --  186*   CALCIUM 8.8 9.0 8.9 9.2 8.9  --  8.0*  --  7.9*   PH  --   --   --   --   --  7.495*  --   --   --    MG  --   --  1.5* 1.6  --   --  1.5* 1.7 2.0   ALBUMIN 1.5* 1.5* 1.5* 1.6* 1.5*  --  2.3*  --  2.2*   PROT 6.6 6.7 6.7 7.1 6.5  --  6.2  --  6.1   ALKPHOS 153* 137* 134 143* 116  --  101  --  97   ALT 23 24 19 18 17  --  12  --  14   AST 31 27 24 26 19  --  14  --  19   BILITOT 0.3 0.3 0.3 0.3 0.3  --  0.7  --  0.4       Estimated Creatinine Clearance: 72.6 mL/min (based on SCr of 1.3 mg/dL).    INFLAMMATORY/PROCAL    Lab Results   Component Value Date    .3 (H) 07/03/2024    .2 (H) 06/27/2024    .2 (H) 06/26/2024    .3 (H) 06/25/2024    .5 (H) 06/24/2024    .0 (H) 06/23/2024    .0 (H) 06/18/2024          Component Ref Range & Units 2 d ago   Body Fluid Type  Abscess fluid, left shoulder   Fluid Appearance  Cloudy   Fluid Color  Pink   WBC, Body Fluid /cu mm SEE COMMENT   Comment: Reference ranges for body fluids not established.  Correlate clinically.  Test not performed  Cell count not performed on abscess fluid, see differential.   Segs, Fluid % 79   Lymphs, Fluid % 14   Monocytes/Macrophages, Fluid % 7        PRIOR  MICROBIOLOGY:    Susceptibility data from last 90 days.  Collected Specimen Info Organism Ceftriaxone  Clindamycin Erythromycin Oxacillin Penicillin Tetracycline Trimeth/Sulfa Vancomycin   06/23/24 Blood from Peripheral, Hand, Right No growth after 5 days.           06/18/24 Wound from Toe, Right Foot Methicillin resistant Staphylococcus aureus  R  S  R  R  R  S  S  S   06/14/24 Blood from Peripheral, Antecubital, Right No growth after 5 days.           06/14/24 Blood from Peripheral, Hand, Right No growth after 5 days.               LAST 7 DAYS MICROBIOLOGY   Microbiology Results (last 7 days)       Procedure Component Value Units Date/Time    Culture, Body Fluid (Aerobic) w/ GS [[id number]] Collected: 07/04/24 0846    Order Status: Completed Specimen: Pleural Fluid Updated: 07/05/24 0904     AEROBIC CULTURE - FLUID No growth     Gram Stain Result Moderate WBC's      No organisms seen    Narrative:      Left Pleural fluid    Aerobic culture [[id number]] Collected: 07/04/24 1113    Order Status: Completed Specimen: Abscess from Chest, Left Updated: 07/05/24 0903     Aerobic Bacterial Culture No growth    Blood culture [[id number]] Collected: 07/03/24 1332    Order Status: Completed Specimen: Blood from Peripheral, Hand, Left Updated: 07/04/24 2032     Blood Culture, Routine No Growth to date      No Growth to date    Gram stain [[id number]] Collected: 07/04/24 1113    Order Status: Completed Specimen: Abscess from Chest, Left Updated: 07/04/24 1928     Gram Stain Result Moderate WBC's      No organisms seen    Narrative:      Chest, Left    Fungus culture [[id number]] Collected: 07/04/24 1113    Order Status: Sent Specimen: Abscess from Chest, Left Updated: 07/04/24 1129    AFB Culture & Smear [[id number]] Collected: 07/04/24 1113    Order Status: Sent Specimen: Abscess from Chest, Left Updated: 07/04/24 1128    Culture, Anaerobe [[id number]] Collected: 07/04/24 1113    Order Status: Sent Specimen: Abscess from Chest, Left Updated: 07/04/24 1128    Fungus culture [[id number]] Collected: 07/04/24 0859    Order Status:  Sent Specimen: Pleural Fluid Updated: 07/04/24 1010    AFB Culture & Smear [[id number]] Collected: 07/04/24 0805    Order Status: No result Specimen: Pleural Fluid Updated: 07/04/24 0856    Culture, Anaerobic [[id number]] Collected: 07/04/24 0804    Order Status: No result Specimen: Pleural Fluid Updated: 07/04/24 0855    Culture, Anaerobic [[id number]]     Order Status: Completed Specimen: Pleural Fluid     AFB Culture & Smear [[id number]]     Order Status: Completed Specimen: Pleural Fluid     Fungus culture [[id number]] Collected: 06/21/24 1645    Order Status: Completed Specimen: Abscess from Shoulder, Left Updated: 07/04/24 0444     Fungus (Mycology) Culture No fungal growth to date      No fungal growth to date    Fungus culture [[id number]] Collected: 06/20/24 1204    Order Status: Completed Specimen: Abscess from Shoulder, Left Updated: 07/04/24 0444     Fungus (Mycology) Culture No fungal growth to date      No fungal growth to date    Aerobic culture [[id number]] Collected: 06/27/24 0733    Order Status: Completed Specimen: Incision site from Shoulder, Left Updated: 07/01/24 0704     Aerobic Bacterial Culture No growth    Culture, Anaerobe [[id number]] Collected: 06/27/24 0733    Order Status: Completed Specimen: Incision site from Shoulder, Left Updated: 06/29/24 1441     Anaerobic Culture No anaerobes isolated    Gram stain [[id number]] Collected: 06/27/24 0733    Order Status: Completed Specimen: Incision site from Shoulder, Left Updated: 06/28/24 1516     Gram Stain Result Few WBC's      No organisms seen    Aerobic culture [[id number]] Collected: 06/24/24 1622    Order Status: Completed Specimen: Incision site from Shoulder, Left Updated: 06/28/24 1035     Aerobic Bacterial Culture No growth    Blood culture [[id number]] Collected: 06/23/24 0659    Order Status: Completed Specimen: Blood from Peripheral, Hand, Right Updated: 06/28/24 1032     Blood Culture, Routine No growth after 5 days.               CURRENT/PREVIOUS VISIT EKG  Results for orders placed or performed during the hospital encounter of 06/14/24   EKG 12-lead    Collection Time: 06/14/24 12:16 PM   Result Value Ref Range    QRS Duration 106 ms    [facility] QTC Calculation 443 ms    Narrative    Test Reason : R53.1,    Vent. Rate : 120 BPM     Atrial Rate : 159 BPM     P-R Int : 000 ms          QRS Dur : 106 ms      QT Int : 314 ms       P-R-T Axes : 000 -54 093 degrees     QTc Int : 443 ms    Atrial fibrillation with rapid ventricular response  Left axis deviation  Low voltage QRS  Inferior infarct ,age undetermined  Cannot rule out Anterior infarct ,age undetermined  Abnormal ECG  When compared with ECG of 25-MAR-2024 09:14,  Vent. rate has increased BY  46 BPM  Minimal criteria for Anterior infarct are now Present  Confirmed by Jesús HATHAWAY, Hansel AMIN ([id number]) on 6/20/2024 8:43:02 PM    Referred By: [email or web address]   SELF           Confirmed By:Hansel Espinosa MD       CT chest 7/3/24:    Impression:    Large abscess of the anterior central and left chest wall and abdominal wall measuring 17 cm transversely.  This extends into the mediastinum and peritoneum with partial destruction of the anterior 6th 7th and 8th ribs.  Complete atelectasis of the left lower lobe with a moderate left pleural effusion.  Mild atelectasis right lung base with a small to moderate right pleural effusion.    X-ray R foot:  FINDINGS:  There is a small region of bony erosion involving the distal 3rd digit.  There is soft tissue injury of the distal 2nd digit with thickening of the nail.  There is no evidence fracture.    Impression:    There is bony erosion of the 3rd D IP possibly representing osteomyelitis.    ECHO:     Extremely limited visualization of all cardiac structures.  No clinically significant determination can be made based on this echocardiogram.  If cardiac concerns persist, consider alternative cardiac imaging like RICKY for further evaluation.    Left Ventricle:  Left ventricle was not well visualized due to poor sonic window. The left ventricle is normal in size. Unable to assess wall motion. There is normal systolic function with a visually estimated ejection fraction of 55 - 60%.    Right Ventricle: Right ventricle was not well visualized due to poor acoustic window.    Left Atrium: Left atrium was not well visualized.    Mitral Valve: There is no stenosis. The mean pressure gradient across the mitral valve is 2 mmHg at a heart rate of  bpm. There is mild regurgitation.    IVC/SVC: Elevated venous pressure at 15 mmHg.    Significant Imaging: I have reviewed all relevant and available imaging results/findings within the past 24 hours.    06/18/2024.  CT left shoulder   1. Subacute left humerus fracture post internal fixation.  There is incomplete bony bridging across the fracture site, with persistent angulation between the dominant bony fragments as hardware is only partially engaged (see discussion).  2. Severe arthropathy of the glenohumeral joint with multiple intra-articular bodies, some interposed.  3. Large left joint effusion and adjacent soft tissue focal fluid collections containing gas and concerning for gas-forming infection.  4. Moderate size left pleural effusion.  5. Left basilar airspace disease is presumably atelectasis with pneumonia as a less favored consideration.  This report was flagged in Epic as abnormal.    X-ray on 06/17, prior to removal of hardware.      I spent a total of 55 minutes on the day of the visit.This includes face to face time and non-face to face time preparing to see the patient (eg, review of tests), obtaining and/or reviewing separately obtained history, documenting clinical information in the electronic or other health record, independently interpreting results and communicating results to the patient/family/caregiver, or care coordinator.    Capri Wesley MD  Date of Service: 07/05/2024      This note was created  using M Modal voice recognition software that occasionally misinterpreted phrases or words.

## 2024-07-06 PROBLEM — M79.89 SWELLING OF LIMB, LEFT: Status: RESOLVED | Noted: 2024-06-16 | Resolved: 2024-07-06

## 2024-07-06 LAB
ALBUMIN SERPL BCP-MCNC: 2.1 G/DL (ref 3.5–5.2)
ALP SERPL-CCNC: 91 U/L (ref 55–135)
ALT SERPL W/O P-5'-P-CCNC: 12 U/L (ref 10–44)
ANION GAP SERPL CALC-SCNC: 7 MMOL/L (ref 8–16)
AST SERPL-CCNC: 17 U/L (ref 10–40)
BACTERIA SPEC ANAEROBE CULT: NORMAL
BACTERIA SPEC ANAEROBE CULT: NORMAL
BASOPHILS # BLD AUTO: 0.05 K/UL (ref 0–0.2)
BASOPHILS NFR BLD: 0.4 % (ref 0–1.9)
BILIRUB SERPL-MCNC: 0.3 MG/DL (ref 0.1–1)
BUN SERPL-MCNC: 31 MG/DL (ref 8–23)
CALCIUM SERPL-MCNC: 7.7 MG/DL (ref 8.7–10.5)
CHLORIDE SERPL-SCNC: 106 MMOL/L (ref 95–110)
CO2 SERPL-SCNC: 26 MMOL/L (ref 23–29)
CREAT SERPL-MCNC: 1.2 MG/DL (ref 0.5–1.4)
DIFFERENTIAL METHOD BLD: ABNORMAL
EOSINOPHIL # BLD AUTO: 0.5 K/UL (ref 0–0.5)
EOSINOPHIL NFR BLD: 4 % (ref 0–8)
ERYTHROCYTE [DISTWIDTH] IN BLOOD BY AUTOMATED COUNT: 17.2 % (ref 11.5–14.5)
EST. GFR  (NO RACE VARIABLE): >60 ML/MIN/1.73 M^2
GLUCOSE SERPL-MCNC: 154 MG/DL (ref 70–110)
GLUCOSE SERPL-MCNC: 159 MG/DL (ref 70–110)
GLUCOSE SERPL-MCNC: 183 MG/DL (ref 70–110)
GLUCOSE SERPL-MCNC: 188 MG/DL (ref 70–110)
GLUCOSE SERPL-MCNC: 245 MG/DL (ref 70–110)
HCT VFR BLD AUTO: 26.1 % (ref 40–54)
HGB BLD-MCNC: 8.1 G/DL (ref 14–18)
IMM GRANULOCYTES # BLD AUTO: 0.13 K/UL (ref 0–0.04)
IMM GRANULOCYTES NFR BLD AUTO: 1.1 % (ref 0–0.5)
LYMPHOCYTES # BLD AUTO: 1.7 K/UL (ref 1–4.8)
LYMPHOCYTES NFR BLD: 14.3 % (ref 18–48)
MCH RBC QN AUTO: 25.6 PG (ref 27–31)
MCHC RBC AUTO-ENTMCNC: 31 G/DL (ref 32–36)
MCV RBC AUTO: 83 FL (ref 82–98)
MONOCYTES # BLD AUTO: 1.1 K/UL (ref 0.3–1)
MONOCYTES NFR BLD: 9.8 % (ref 4–15)
NEUTROPHILS # BLD AUTO: 8.2 K/UL (ref 1.8–7.7)
NEUTROPHILS NFR BLD: 70.4 % (ref 38–73)
NRBC BLD-RTO: 0 /100 WBC
PLATELET # BLD AUTO: 439 K/UL (ref 150–450)
PMV BLD AUTO: 9.8 FL (ref 9.2–12.9)
POTASSIUM SERPL-SCNC: 4.2 MMOL/L (ref 3.5–5.1)
PROT SERPL-MCNC: 5.9 G/DL (ref 6–8.4)
RBC # BLD AUTO: 3.16 M/UL (ref 4.6–6.2)
SODIUM SERPL-SCNC: 139 MMOL/L (ref 136–145)
WBC # BLD AUTO: 11.62 K/UL (ref 3.9–12.7)

## 2024-07-06 PROCEDURE — 25000003 PHARM REV CODE 250: Performed by: INTERNAL MEDICINE

## 2024-07-06 PROCEDURE — A4216 STERILE WATER/SALINE, 10 ML: HCPCS | Performed by: INTERNAL MEDICINE

## 2024-07-06 PROCEDURE — 99233 SBSQ HOSP IP/OBS HIGH 50: CPT | Mod: ,,, | Performed by: STUDENT IN AN ORGANIZED HEALTH CARE EDUCATION/TRAINING PROGRAM

## 2024-07-06 PROCEDURE — 27000221 HC OXYGEN, UP TO 24 HOURS

## 2024-07-06 PROCEDURE — 94799 UNLISTED PULMONARY SVC/PX: CPT

## 2024-07-06 PROCEDURE — 63600175 PHARM REV CODE 636 W HCPCS: Mod: JG | Performed by: STUDENT IN AN ORGANIZED HEALTH CARE EDUCATION/TRAINING PROGRAM

## 2024-07-06 PROCEDURE — 99900031 HC PATIENT EDUCATION (STAT)

## 2024-07-06 PROCEDURE — 99233 SBSQ HOSP IP/OBS HIGH 50: CPT | Mod: ,,, | Performed by: INTERNAL MEDICINE

## 2024-07-06 PROCEDURE — 80053 COMPREHEN METABOLIC PANEL: CPT | Performed by: INTERNAL MEDICINE

## 2024-07-06 PROCEDURE — 99232 SBSQ HOSP IP/OBS MODERATE 35: CPT | Mod: ,,, | Performed by: PODIATRIST

## 2024-07-06 PROCEDURE — 85025 COMPLETE CBC W/AUTO DIFF WBC: CPT | Performed by: INTERNAL MEDICINE

## 2024-07-06 PROCEDURE — 97530 THERAPEUTIC ACTIVITIES: CPT

## 2024-07-06 PROCEDURE — 25000003 PHARM REV CODE 250: Performed by: HOSPITALIST

## 2024-07-06 PROCEDURE — 63600175 PHARM REV CODE 636 W HCPCS: Performed by: HOSPITALIST

## 2024-07-06 PROCEDURE — 36569 INSJ PICC 5 YR+ W/O IMAGING: CPT

## 2024-07-06 PROCEDURE — 94761 N-INVAS EAR/PLS OXIMETRY MLT: CPT

## 2024-07-06 PROCEDURE — 20000000 HC ICU ROOM

## 2024-07-06 RX ADMIN — CEFTAROLINE FOSAMIL 600 MG: 600 POWDER, FOR SOLUTION INTRAVENOUS at 04:07

## 2024-07-06 RX ADMIN — SODIUM CHLORIDE: 9 INJECTION, SOLUTION INTRAVENOUS at 05:07

## 2024-07-06 RX ADMIN — LIDOCAINE HYDROCHLORIDE 10 ML: 20 SOLUTION ORAL; TOPICAL at 05:07

## 2024-07-06 RX ADMIN — METOPROLOL SUCCINATE 100 MG: 50 TABLET, FILM COATED, EXTENDED RELEASE ORAL at 09:07

## 2024-07-06 RX ADMIN — MEROPENEM 1 G: 1 INJECTION, POWDER, FOR SOLUTION INTRAVENOUS at 09:07

## 2024-07-06 RX ADMIN — INSULIN ASPART 1 UNITS: 100 INJECTION, SOLUTION INTRAVENOUS; SUBCUTANEOUS at 09:07

## 2024-07-06 RX ADMIN — LIDOCAINE HYDROCHLORIDE 10 ML: 20 SOLUTION ORAL; TOPICAL at 01:07

## 2024-07-06 RX ADMIN — HYDROCODONE BITARTRATE AND ACETAMINOPHEN 1 TABLET: 10; 325 TABLET ORAL at 09:07

## 2024-07-06 RX ADMIN — HYDROCODONE BITARTRATE AND ACETAMINOPHEN 1 TABLET: 10; 325 TABLET ORAL at 05:07

## 2024-07-06 RX ADMIN — SODIUM CHLORIDE, PRESERVATIVE FREE 10 ML: 5 INJECTION INTRAVENOUS at 09:07

## 2024-07-06 RX ADMIN — MEROPENEM 1 G: 1 INJECTION, POWDER, FOR SOLUTION INTRAVENOUS at 02:07

## 2024-07-06 RX ADMIN — MEROPENEM 1 G: 1 INJECTION, POWDER, FOR SOLUTION INTRAVENOUS at 05:07

## 2024-07-06 RX ADMIN — LIDOCAINE HYDROCHLORIDE 10 ML: 20 SOLUTION ORAL; TOPICAL at 09:07

## 2024-07-06 RX ADMIN — LIDOCAINE 5% 1 PATCH: 700 PATCH TOPICAL at 05:07

## 2024-07-06 RX ADMIN — INSULIN ASPART 2 UNITS: 100 INJECTION, SOLUTION INTRAVENOUS; SUBCUTANEOUS at 05:07

## 2024-07-06 RX ADMIN — SODIUM CHLORIDE, PRESERVATIVE FREE 10 ML: 5 INJECTION INTRAVENOUS at 05:07

## 2024-07-06 RX ADMIN — POTASSIUM CHLORIDE 20 MEQ: 1500 TABLET, EXTENDED RELEASE ORAL at 09:07

## 2024-07-06 RX ADMIN — HYDROCODONE BITARTRATE AND ACETAMINOPHEN 1 TABLET: 10; 325 TABLET ORAL at 12:07

## 2024-07-06 RX ADMIN — INSULIN GLARGINE 15 UNITS: 100 INJECTION, SOLUTION SUBCUTANEOUS at 09:07

## 2024-07-06 RX ADMIN — FERROUS SULFATE TAB 325 MG (65 MG ELEMENTAL FE) 1 EACH: 325 (65 FE) TAB at 09:07

## 2024-07-06 RX ADMIN — SODIUM CHLORIDE, PRESERVATIVE FREE 10 ML: 5 INJECTION INTRAVENOUS at 01:07

## 2024-07-06 RX ADMIN — CEFTAROLINE FOSAMIL 600 MG: 600 POWDER, FOR SOLUTION INTRAVENOUS at 09:07

## 2024-07-06 RX ADMIN — CEFTAROLINE FOSAMIL 600 MG: 600 POWDER, FOR SOLUTION INTRAVENOUS at 12:07

## 2024-07-06 RX ADMIN — SODIUM CHLORIDE, PRESERVATIVE FREE 10 ML: 5 INJECTION INTRAVENOUS at 12:07

## 2024-07-06 RX ADMIN — INSULIN ASPART 4 UNITS: 100 INJECTION, SOLUTION INTRAVENOUS; SUBCUTANEOUS at 11:07

## 2024-07-06 NOTE — PROGRESS NOTES
[facility]  Orthopedics  Progress Note    Patient Name: Roosevelt Moser  MRN: [id number]  Admission Date: 6/14/2024  Hospital Length of Stay: 22 days  Attending Provider: Randa Hannon MD  Primary Care Provider: Miguel Angel Enriquez PA-C  Follow-up For: Procedure(s) (LRB):  VATS, WITH DECORTICATION, LUNG (Right)    Post-Operative Day: 1 Day Post-Op  Subjective:     Principal Problem:Acute renal failure superimposed on chronic kidney disease    Principal Orthopedic Problem:  Left proximal humerus fracture    Interval History:  No orthopedic change    Review of patient's allergies indicates:  No Known Allergies    Current Facility-Administered Medications   Medication    (Magic mouthwash) 1:1:1 diphenhydrAMINE(Benadryl) 12.5mg/5ml liq, aluminum & magnesium hydroxide-simethicone (Maalox), LIDOcaine viscous 2%    0.9%  NaCl infusion (for blood administration)    0.9%  NaCl infusion (for blood administration)    0.9%  NaCl infusion    albuterol-ipratropium 2.5 mg-0.5 mg/3 mL nebulizer solution 3 mL    aluminum & magnesium hydroxide-simethicone 400-400-40 mg/5 mL suspension 15 mL    ceftaroline (TEFLARO) 600 mg in dextrose 5 % 50 mL IVPB (ready to mix)    dextrose 10% bolus 125 mL 125 mL    dextrose 10% bolus 250 mL 250 mL    epoetin leonel-epbx injection 14,100 Units    ferrous sulfate tablet 1 each    glucagon (human recombinant) injection 1 mg    glucose chewable tablet 16 g    glucose chewable tablet 24 g    HYDROcodone-acetaminophen  mg per tablet 1 tablet    HYDROcodone-acetaminophen 5-325 mg per tablet 1 tablet    insulin aspart U-100 pen 0-10 Units    insulin glargine U-100 (Lantus) pen 15 Units    LIDOcaine 5 % patch 1 patch    magnesium sulfate 2g in water 50mL IVPB (premix)    meropenem 1 g in sodium chloride 0.9 % 100 mL IVPB (ready to mix system)    metoprolol injection 5 mg    metoprolol succinate (TOPROL-XL) 24 hr tablet 100 mg    micafungin 100 mg in 0.9% NaCl 100 mL IVPB (MB+)     naloxone 0.4 mg/mL injection 0.02 mg    ondansetron disintegrating tablet 8 mg    potassium bicarbonate disintegrating tablet 35 mEq    potassium bicarbonate disintegrating tablet 60 mEq    potassium chloride SA CR tablet 20 mEq    potassium, sodium phosphates 280-160-250 mg packet 2 packet    potassium, sodium phosphates 280-160-250 mg packet 2 packet    potassium, sodium phosphates 280-160-250 mg packet 2 packet    promethazine tablet 25 mg    simethicone chewable tablet 80 mg    sodium chloride 0.9% flush 10 mL    sodium chloride 0.9% flush 10 mL    And    sodium chloride 0.9% flush 10 mL    sodium chloride 0.9% flush 10 mL    traMADoL tablet 50 mg     Objective:     Vital Signs (Most Recent):  Temp: 98 °F (36.7 °C) (07/05/24 2330)  Pulse: 80 (07/06/24 0600)  Resp: (!) 36 (07/06/24 0600)  BP: 102/68 (07/06/24 0600)  SpO2: (!) 92 % (07/06/24 0600) Vital Signs (24h Range):  Temp:  [97.8 °F (36.6 °C)-99.8 °F (37.7 °C)] 98 °F (36.7 °C)  Pulse:  [] 80  Resp:  [17-57] 36  SpO2:  [86 %-100 %] 92 %  BP: ()/(42-69) 102/68  Arterial Line BP: (102-135)/(46-77) 107/77     Weight: 133.7 kg (294 lb 12.1 oz)  Height: 6' (182.9 cm)  Body mass index is 39.98 kg/m².      Intake/Output Summary (Last 24 hours) at 7/6/2024 0718  Last data filed at 7/6/2024 0626  Gross per 24 hour   Intake 2018.75 ml   Output 1805 ml   Net 213.75 ml        General    Nursing note and vitals reviewed.  Constitutional: He is oriented to person, place, and time. He appears well-developed and well-nourished. No distress.   HENT:   Head: Normocephalic and atraumatic.   Nose: Nose normal.   Eyes: EOM are normal.   Pulmonary/Chest: Effort normal. No respiratory distress.   Neurological: He is alert and oriented to person, place, and time.   Psychiatric: He has a normal mood and affect. His behavior is normal. Thought content normal.             Left Shoulder Exam     Inspection/Observation   Swelling: absent  Deformity: present    Tenderness    The patient is experiencing no tenderness.     Range of Motion   Extension:  abnormal   Forward Flexion:  abnormal   Forward Elevation: abnormal  Adduction: abnormal  External Rotation 90 degrees: abnormal  Internal rotation 90 degrees:  abnormal     Other   Sensation: normal     Comments:  Dressing to the left shoulder, clean dry and intact.  ABD pad with Xeroform gauze.  No evidence of wound failure dehiscence.    No erythema, no ecchymosis, no signs and symptoms of infection, no fluctuance.      Vascular Exam       Left Pulses      Radial:                    2+      Capillary Refill  Left Hand: normal capillary refill           Significant Labs:   Recent Lab Results  (Last 5 results in the past 24 hours)        07/06/24  0534   07/05/24  1907   07/05/24  1836   07/05/24  1809   07/05/24  1540        Albumin 2.1               ALP 91               ALT 12               Anion Gap 7               AST 17               Baso # 0.05               Basophil % 0.4               BILIRUBIN TOTAL 0.3  Comment: For infants and newborns, interpretation of results should be based  on gestational age, weight and in agreement with clinical  observations.    Premature Infant recommended reference ranges:  Up to 24 hours.............<8.0 mg/dL  Up to 48 hours............<12.0 mg/dL  3-5 days..................<15.0 mg/dL  6-29 days.................<15.0 mg/dL                 BUN 31               Calcium 7.7               Chloride 106               CO2 26               Creatinine 1.2               Differential Method Automated               eGFR >60.0               Eos # 0.5               Eos % 4.0               Glucose 154               GRAM STAIN   Few WBC's  [P]                No organisms seen  [P]             Gran # (ANC) 8.2               Gran % 70.4               Hematocrit 26.1               Hemoglobin 8.1               Immature Grans (Abs) 0.13  Comment: Mild elevation in immature granulocytes is non specific and   can be  seen in a variety of conditions including stress response,   acute inflammation, trauma and pregnancy. Correlation with other   laboratory and clinical findings is essential.                 Immature Granulocytes 1.1               Lymph # 1.7               Lymph % 14.3               MCH 25.6               MCHC 31.0               MCV 83               Mono # 1.1               Mono % 9.8               MPV 9.8               nRBC 0               Platelet Count 439               POC BE     2   2         POC Glucose     162   150   158       POC HCO3     27.4   26.3         POC Hematocrit     28   25         POC Ionized Calcium     1.13   1.12         POC PCO2     50.9   41.2         POC PH     7.339   7.413         POC PO2     392   369         Potassium, Blood Gas     4.6   4.4         POC SATURATED O2     100   100         Sodium, Blood Gas     138   138         POC TCO2     29   28         Potassium 4.2               PROTEIN TOTAL 5.9               RBC 3.16               RDW 17.2               Sample     ARTERIAL   ARTERIAL         Sodium 139               WBC 11.62                                       [P] - Preliminary Result               Significant Imaging: None  Assessment/Plan:     Abscess of left shoulder  Hospital day 22:    Status post repeat irrigation and debridement (X3) and hardware removal of left shoulder for suspected infection.  Patient is approximately 3 months status post ORIF of a left proximal humerus fracture which lost reduction.    1. His pain is well managed.    2. Preoperative and intraoperative cultures from 06/21, 6/24 and 6/27 showed many WBCs but have NOT demonstrated any bacterial growth to date.  As well as cultures from 06/19 showed no growth.    3. Wound VAC was discontinued intraoperatively 6/27  4. Sling as needed for comfort of the left upper extremity. Patient can do very light activity with the left upper extremity focusing more on elbow, hand and wrist.  Would not do dedicated  therapy for the shoulder.  Patient could weightbear through the extremity if needed with a walker.  5. No plan for additional orthopedic intervention for the left shoulder at this time.  6. Consult case management as the patient will likely benefit from placement for IV antibiotics as well as his generalized deconditioned state.  7. Continue Infectious Disease recommendations but no laboratory findings suggestive of left shoulder infection.  8. Stable from an orthopedic standpoint, unfortunately he has regressed from an overall medical perspective.   9. H/o right 2nd toe osteomyelitis, status post amputation with podiatry.    Patient now with abscess in chest/mediastinum. Recent thoracentesis. Pending transfer to higher level of care.    We will continue to follow       Swelling of limb, left  History of left proximal humerus fracture with open reduction internal fixation.            KAREN Avina  Orthopedics  [facility]

## 2024-07-06 NOTE — RESPIRATORY THERAPY
07/05/24 1950   Patient Assessment/Suction   Level of Consciousness (AVPU) responds to voice   Respiratory Effort Normal;Unlabored   Expansion/Accessory Muscles/Retractions no retractions;no use of accessory muscles   All Lung Fields Breath Sounds diminished   Rhythm/Pattern, Respiratory pattern regular;unlabored   Cough Frequency infrequent   Cough Type nonproductive   Skin Integrity   $ Wound Care Tech Time 15 min   Area Observed Left;Right;Behind ear;Cheek;Bridge of nose;Nares   Skin Appearance without discoloration   PRE-TX-O2   Device (Oxygen Therapy) nasal cannula   Flow (L/min) (Oxygen Therapy) 3   SpO2 99 %   Pulse Oximetry Type Continuous   $ Pulse Oximetry - Multiple Charge Pulse Oximetry - Multiple   Pulse 79   Resp (!) 21   Aerosol Therapy   $ Aerosol Therapy Charges PRN treatment not required   Respiratory Treatment Status (SVN) PRN treatment not required   Preset CPAP/BiPAP Settings   Mode Of Delivery Standby  (Home cpap)   Education   $ Education Bronchodilator;DME BiPAP;15 min

## 2024-07-06 NOTE — PROGRESS NOTES
Nephrology Progress Note        Patient Name: Roosevelt Moser  MRN: [id number]    Patient Class: IP- Inpatient   Admission Date: 6/14/2024  Length of Stay: 22 days  Date of Service: 7/6/2024    Attending Physician: Nabil Paz MD  Primary Care Provider: Miguel Angel Enriquez PA-C    Reason for Consult: marbin/hyperkalemia    SUBJECTIVE:     HPI: 73M with h/o DM, HTN, AF, GBS and recent shoulder surgery was brought to ER by EMS with recurrent falls in the last 24h. Reports decreased UO but no fever, cough, SOB, dysuria. Upon admission, noted to be in MARBIN with sCr 5, baseline 1 month ago is 1, hyperkalemia with K 6, acidosis with CO2 12, hypoalbuminemia with albumin 1.6. Lactate notably 2.1. Procal 20. A1c 9.5 in 3/2024. UA with hematuria and pyuria, urine Cx pending. Notably on Apixaban. WBC in blood elevated to 20. Plt 540. RP US ordered. Afebrile, normotensive, received IVF and abx. Lokelma, ca gluconate given bicarb gtt ordered. Straight cath in ER with only 100cc urine out.    6/15  AFVSS.   UOP 1200cc plus 2 unmeasured   6/16  AFVSS.  2150 cc uop.  No distress  6/17 VSS, on RA, UOP 1.5L- updated family at bedside  6/18 VSS, on RA, UOP 1.3L  6/19 VSS, on RA, UOP 1L, with osteo R toe- s/p debridement- not interested in amputation- antbx adjusted  6/20 HR , BP stable, on 2L NC, UOP 1.2L, went for procedure, wife reports LE edema  6/21 HR 90-110s, -140s mostly, on RA, UOP 1.6L, to OR for shoulder washout and hardware removal today  6/22 VSS s/p incision and drainage for infection from left shoulder and removal of deep hardware including lizabeth and screws.  6/23 VSS. Consider resuming diuretics tomorrow.  6/24  AFVSS.  1200 cc uop plus multiple unmeasured.  He is very thirsty.    6/25  POD 1 s/p irrigation and debridementof left shoulder abscess.  VSS  2 liter uop  6/26 AFVSS. 2250 cc uop  6/27  AFVSS.  In th OR  6/28 POD 1 s/p left shoulder irrigation and debridement.  2050 cc uop  6/29  one shift UOP recorded,  "800cc.  VSS, renal function improving.  States he feels "sick" today, denies nausea, says had diarrhea after breakfast.  No other complaints.  6/30  VSS, renal function improved.  Sleeping soundly, on bipap.  Updated family member at bedside.  7/1 VSS. Treat hypokalemia, monitor Mg as well. Agree with toe amputation.  7/2 VSS. S/p right 2nd toe amputation POD1. Consider IV Mg supplement if Mg still low today.  7/3 VSS. Left upper abdominal bulge is new, getting CT abdomen to evaluate. K and Mg better, monitor.    7/4  Transferred to [facility] icu yesterday.  IN OR currently.  Tmax 100.1, intermittent hypotension.  2450 cc uop  7/5  angry about being NPO for VATS.  No nausea, chest pain, or sob.  Intermittent hypotension, afebrile.  1650 cc UOP  7/6  POD 1 Right Video Assisted Thoracoscopy.  Hypotensive.  Afebrile.  1450 cc uop    ASSESSMENT/PLAN:     MARBIN due to ATN due to sepsis due to UTI and/or PNA  CKD stage 2 with diabetic proteinuria and severe hypoalbuminemia  HTN  DM poorly controlled A1c 9.5  Hypokalemia/Hypomagnesemia  SHPT  Anemia  Hypoalbuminemia  Edema  Septic L proximal humerus ORIF    - renal function is improving- nonoliguric  - dose meds for CrCl 10-50  - about 1g proteinuria- low alb is nutrition/acute phase reactant  - hold hydralazine  - hold metformin- use insulin  - H/H stable  - optimize protein intake  --strict I/Os   --iron stores low. Ferritin too high for iv iron, on oral supplement  --getting erythropoietin stimulating agent weekly  -continue ns 75 cc/hour      Thank you for allowing us to participate in the care of your patient!   We will follow the patient and provide recommendations as needed.         Laboratory:  Recent Labs   Lab 07/04/24  0345 07/04/24  1602 07/05/24  0312 07/06/24  0534     --  137 139   K 3.9 4.1 4.1 4.2     --  103 106   CO2 27  --  27 26   BUN 43*  --  41* 31*   CREATININE 1.1  --  1.3 1.2   *  --  186* 154*       Recent Labs   Lab 07/04/24  0345 " 07/04/24  1602 07/05/24  0312 07/06/24  0534   CALCIUM 8.0*  --  7.9* 7.7*   ALBUMIN 2.3*  --  2.2* 2.1*   MG 1.5* 1.7 2.0  --              Recent Labs   Lab 06/30/24  1105 06/30/24  1631 06/30/24  2053 07/01/24  1722 07/01/24  2112 07/02/24  0742 07/02/24  1126 07/02/24  1631 07/02/24  2030 07/03/24  0811   POCTGLUCOSE 203* 231* 250* 240* 200* 179* 294* 208* 232* 211*       Recent Labs   Lab 07/31/23  0955 03/19/24  0830 06/14/24  1539   Hemoglobin A1C 8.8 H 9.5 H 6.3 H       Recent Labs   Lab 07/04/24  1602 07/05/24  0312 07/05/24  1809 07/05/24  1836 07/06/24  0534   WBC 13.56* 11.32  --   --  11.62   HGB 8.2* 8.2*  --   --  8.1*   HCT 26.6* 26.3* 25* 28* 26.1*   * 443  --   --  439   MCV 82 82  --   --  83   MCHC 30.8* 31.2*  --   --  31.0*   MONO 9.1  1.2* 9.4  1.1*  --   --  9.8  1.1*   EOSINOPHIL 0.8 2.8  --   --  4.0       Recent Labs   Lab 07/04/24  0345 07/05/24  0312 07/06/24  0534   BILITOT 0.7 0.4 0.3   PROT 6.2 6.1 5.9*   ALBUMIN 2.3* 2.2* 2.1*   ALKPHOS 101 97 91   ALT 12 14 12   AST 14 19 17       Recent Labs   Lab 12/16/22  1238 03/08/24  1034 03/25/24  1022 06/14/24  1337   Color, UA Yellow Yellow Yellow [place] A   Appearance, UA Clear Clear Hazy A Cloudy A   pH, UA 6.0 5.0 6.0 6.0   Specific [place], UA 1.020 1.010 1.020 1.020   Protein, UA 1+ A Trace A 1+ A 2+ A   Glucose, UA 1+ A 3+ A Negative Trace A   Ketones, UA Negative Negative Negative Negative   Urobilinogen, UA  --  Negative Negative Negative   Bilirubin (UA) Negative Negative Negative Negative   Occult Blood UA 3+ A 2+ A 2+ A 3+ A   Nitrite, UA Negative Negative Negative Negative   RBC, UA 20 H 13 H >100 H >100 H   WBC, UA 81 H 3 18 H >100 H   Bacteria Rare None Rare Many A   Hyaline Casts, UA 0  --  0 0       Recent Labs   Lab 07/03/24  1216 07/05/24  1809 07/05/24  1836   POC PH 7.495 H 7.413 7.339 L   POC PCO2 34.4 L 41.2 50.9 H   POC HCO3 26.5 26.3 27.4   POC PO2 72 L 369 H 392 H   POC SATURATED O2 96 100 100   POC BE 3 H  2 2   Sample ARTERIAL ARTERIAL ARTERIAL       Microbiology Results (last 7 days)       Procedure Component Value Units Date/Time    Culture, Anaerobic [[id number]] Collected: 07/04/24 0804    Order Status: Completed Specimen: Pleural Fluid Updated: 07/06/24 1121     Anaerobic Culture No anaerobes isolated    Narrative:      Pleural Fluid    Aerobic culture [[id number]] Collected: 07/05/24 1907    Order Status: Completed Specimen: Pleural Fluid Updated: 07/06/24 0911     Aerobic Bacterial Culture No growth    Narrative:      Right pleural fluid    Culture, Body Fluid (Aerobic) w/ GS [[id number]] Collected: 07/04/24 0846    Order Status: Completed Specimen: Pleural Fluid Updated: 07/06/24 0902     AEROBIC CULTURE - FLUID No growth     Gram Stain Result Moderate WBC's      No organisms seen    Narrative:      Left Pleural fluid    Aerobic culture [[id number]] Collected: 07/04/24 1113    Order Status: Completed Specimen: Abscess from Chest, Left Updated: 07/06/24 0902     Aerobic Bacterial Culture No growth    Gram stain [[id number]] Collected: 07/05/24 1907    Order Status: Completed Specimen: Pleural Fluid Updated: 07/05/24 2052     Gram Stain Result Few WBC's      No organisms seen    Narrative:      Right pleural fluid    Blood culture [[id number]] Collected: 07/03/24 1332    Order Status: Completed Specimen: Blood from Peripheral, Hand, Left Updated: 07/05/24 2032     Blood Culture, Routine No Growth to date      No Growth to date      No Growth to date    Culture, Anaerobic [[id number]] Collected: 07/05/24 1907    Order Status: Sent Specimen: Pleural Fluid Updated: 07/05/24 1941    Gram stain [[id number]] Collected: 07/04/24 1113    Order Status: Completed Specimen: Abscess from Chest, Left Updated: 07/05/24 1607     Gram Stain Result Moderate WBC's      No organisms seen    Narrative:      Chest, Left    Fungus culture [[id number]] Collected: 07/04/24 1113    Order Status: Sent Specimen: Abscess from Chest, Left  Updated: 07/04/24 1129    AFB Culture & Smear [[id number]] Collected: 07/04/24 1113    Order Status: Sent Specimen: Abscess from Chest, Left Updated: 07/04/24 1128    Culture, Anaerobe [[id number]] Collected: 07/04/24 1113    Order Status: Sent Specimen: Abscess from Chest, Left Updated: 07/04/24 1128    Fungus culture [[id number]] Collected: 07/04/24 0859    Order Status: Sent Specimen: Pleural Fluid Updated: 07/04/24 1010    AFB Culture & Smear [[id number]] Collected: 07/04/24 0805    Order Status: No result Specimen: Pleural Fluid Updated: 07/04/24 0856    Culture, Anaerobic [[id number]]     Order Status: Completed Specimen: Pleural Fluid     AFB Culture & Smear [[id number]]     Order Status: Completed Specimen: Pleural Fluid     Fungus culture [[id number]] Collected: 06/21/24 1645    Order Status: Completed Specimen: Abscess from Shoulder, Left Updated: 07/04/24 0444     Fungus (Mycology) Culture No fungal growth to date      No fungal growth to date    Fungus culture [[id number]] Collected: 06/20/24 1204    Order Status: Completed Specimen: Abscess from Shoulder, Left Updated: 07/04/24 0444     Fungus (Mycology) Culture No fungal growth to date      No fungal growth to date    Aerobic culture [[id number]] Collected: 06/27/24 0733    Order Status: Completed Specimen: Incision site from Shoulder, Left Updated: 07/01/24 0704     Aerobic Bacterial Culture No growth    Culture, Anaerobe [[id number]] Collected: 06/27/24 0733    Order Status: Completed Specimen: Incision site from Shoulder, Left Updated: 06/29/24 1441     Anaerobic Culture No anaerobes isolated            Review of patient's allergies indicates:  No Known Allergies    Outpatient meds:  No current facility-administered medications on file prior to encounter.     Current Outpatient Medications on File Prior to Encounter   Medication Sig Dispense Refill    apixaban (ELIQUIS) 5 mg Tab Take 1 tablet (5 mg total) by mouth 2 (two) times daily. 60 tablet 1     atorvastatin (LIPITOR) 10 MG tablet Take 1 tablet (10 mg total) by mouth once daily. 90 tablet 3    co-enzyme Q-10 30 mg capsule Take 30 mg by mouth once daily.      doxycycline (VIBRAMYCIN) 100 MG Cap Take 100 mg by mouth 2 (two) times daily.      ergocalciferol, vitamin D2, (VITAMIN D ORAL) Take 1 tablet by mouth once daily.      glimepiride (AMARYL) 2 MG tablet Take 1 tablet (2 mg total) by mouth before breakfast. 90 tablet 3    hydrALAZINE (APRESOLINE) 25 MG tablet Take 1 tablet (25 mg total) by mouth every 12 (twelve) hours. 60 tablet 3    metFORMIN (GLUCOPHAGE-XR) 500 MG ER 24hr tablet Take 2 tablets (1,000 mg total) by mouth 2 (two) times daily with meals. 360 tablet 3    metoprolol succinate (TOPROL-XL) 100 MG 24 hr tablet Take 1 tablet (100 mg total) by mouth once daily. (Patient taking differently: Take 100 mg by mouth nightly.) 90 tablet 3       Scheduled meds:   (Magic mouthwash) 1:1:1 diphenhydrAMINE(Benadryl) 12.5mg/5ml liq, aluminum & magnesium hydroxide-simethicone (Maalox), LIDOcaine viscous 2%  10 mL Swish & Spit QID    ceftaroline (Teflaro) IV (PEDS and ADULTS)  600 mg Intravenous Q8H    epoetin leonel-epbx  100 Units/kg Subcutaneous Q7 Days    ferrous sulfate  1 tablet Oral Daily    insulin glargine U-100  15 Units Subcutaneous Daily    LIDOcaine  1 patch Transdermal Q24H    meropenem IV (PEDS and ADULTS)  1 g Intravenous Q8H    metoprolol succinate  100 mg Oral QHS    micafungin (MYCAMINE) IVPB  100 mg Intravenous Q24H    potassium chloride  20 mEq Oral BID    sodium chloride 0.9%  10 mL Intravenous Q6H       Infusions:   0.9% NaCl   Intravenous Continuous 75 mL/hr at 07/06/24 0516 New Bag at 07/06/24 0516             PRN meds:    Current Facility-Administered Medications:     0.9%  NaCl infusion (for blood administration), , Intravenous, Q24H PRN    0.9%  NaCl infusion (for blood administration), , Intravenous, Q24H PRN    albuterol-ipratropium, 3 mL, Nebulization, Q4H PRN    aluminum & magnesium  hydroxide-simethicone, 15 mL, Oral, QID PRN    dextrose 10%, 12.5 g, Intravenous, PRN    dextrose 10%, 25 g, Intravenous, PRN    glucagon (human recombinant), 1 mg, Intramuscular, PRN    glucose, 16 g, Oral, PRN    glucose, 24 g, Oral, PRN    HYDROcodone-acetaminophen, 1 tablet, Oral, Q4H PRN    HYDROcodone-acetaminophen, 1 tablet, Oral, Q4H PRN    insulin aspart U-100, 0-10 Units, Subcutaneous, QID (AC + HS) PRN    magnesium sulfate IVPB, 2 g, Intravenous, Daily PRN    metoprolol, 5 mg, Intravenous, Q5 Min PRN    naloxone, 0.02 mg, Intravenous, PRN    ondansetron, 8 mg, Oral, Q8H PRN    potassium bicarbonate, 35 mEq, Oral, PRN    potassium bicarbonate, 60 mEq, Oral, PRN    potassium, sodium phosphates, 2 packet, Oral, PRN    potassium, sodium phosphates, 2 packet, Oral, PRN    potassium, sodium phosphates, 2 packet, Oral, PRN    promethazine, 25 mg, Oral, Q6H PRN    simethicone, 1 tablet, Oral, QID PRN    sodium chloride 0.9%, 10 mL, Intravenous, Q12H PRN    Flushing PICC/Midline Protocol, , , Until Discontinued **AND** sodium chloride 0.9%, 10 mL, Intravenous, Q6H **AND** sodium chloride 0.9%, 10 mL, Intravenous, PRN    sodium chloride 0.9%, 10 mL, Intravenous, Q12H PRN    traMADoL, 50 mg, Oral, Q4H PRN    Past Medical History:   Diagnosis Date    A-fib 2024    Diabetes mellitus, type 2 2021    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    Hyperlipidemia     Hypertension 2016    Sleep apnea      Past Surgical History:   Procedure Laterality Date    ARTHROTOMY OF SHOULDER Left 6/21/2024    Procedure: ARTHROTOMY, SHOULDER;  Surgeon: Roman Paulson MD;  Location: [facility] OR;  Service: Orthopedics;  Laterality: Left;    IRRIGATION AND DEBRIDEMENT OF UPPER EXTREMITY Left 6/24/2024    Procedure: IRRIGATION AND DEBRIDEMENT, UPPER EXTREMITY;  Surgeon: Nathan Cooper MD;  Location: [facility] OR;  Service: Orthopedics;  Laterality: Left;    IRRIGATION AND DEBRIDEMENT OF UPPER EXTREMITY Left 6/27/2024    Procedure: IRRIGATION AND DEBRIDEMENT,  UPPER EXTREMITY;  Surgeon: Nathan Cooper MD;  Location: [facility] OR;  Service: Orthopedics;  Laterality: Left;    OPEN REDUCTION AND INTERNAL FIXATION (ORIF) OF FRACTURE OF PROXIMAL HUMERUS Left 3/25/2024    Procedure: ORIF, FRACTURE, HUMERUS, PROXIMAL/IM HANNA, LEFT;  Surgeon: Nathan Cooper MD;  Location: [facility] OR;  Service: Orthopedics;  Laterality: Left;  Accumed, Synthes Small Frag set Avelino verified 3/22/24 ark    TOE AMPUTATION Right 7/1/2024    Procedure: AMPUTATION, TOE;  Surgeon: Stevie Zhu DPM;  Location: [facility] OR;  Service: Podiatry;  Laterality: Right;     No family history on file.  Social History     Tobacco Use    Smoking status: Never    Smokeless tobacco: Never   Substance Use Topics    Alcohol use: Yes     Alcohol/week: 0.0 standard drinks of alcohol     Comment: social    Drug use: No       OBJECTIVE:     Vital Signs and IO:  Temp:  [97.8 °F (36.6 °C)-98.7 °F (37.1 °C)]   Pulse:  [72-90]   Resp:  [17-41]   BP: ()/(50-69)   SpO2:  [89 %-100 %]   Arterial Line BP: ()/(46-77)   I/O last 3 completed shifts:  In: 2778.8 [P.O.:860; I.V.:618.8; IV Piggyback:1300]  Out: 3185 [Urine:2600; Drains:425; Chest Tube:160]  Wt Readings from Last 5 Encounters:   07/05/24 133.7 kg (294 lb 12.1 oz)   06/04/24 132.5 kg (292 lb 1.8 oz)   05/07/24 132.5 kg (292 lb 1.8 oz)   04/08/24 132.5 kg (292 lb 3.2 oz)   03/26/24 (!) 136.1 kg (300 lb 0.7 oz)     Body mass index is 39.98 kg/m².    Physical Exam  Constitutional:       General: Patient is not in acute distress.     Appearance: Patient is well-developed. She is not diaphoretic.   HENT:      Head: Normocephalic and atraumatic.      Mouth/Throat: Mucous membranes are moist.   Eyes:      General: No scleral icterus.     Pupils: Pupils are equal, round, and reactive to light.   Cardiovascular:      Rate and Rhythm: Normal rate and regular rhythm.   Pulmonary:      Effort: Pulmonary effort is normal. No respiratory distress.      Breath sounds: No stridor.    Abdominal:      General: There is no distension.      Palpations: Abdomen is soft.   Musculoskeletal:         General: No deformity. Normal range of motion.      Cervical back: Neck supple.   Skin:     General: Skin is warm and dry.      Findings: No rash present. No erythema.   Neurological:      Mental Status: Patient is alert and oriented to person, place, and time.      Cranial Nerves: No cranial nerve deficit.   Psychiatric:         Behavior: Behavior normal.          Patient care time was spent personally by me on the following activities:     Obtaining a history.  Examination of patient.  Providing medical care at the patients bedside.  Developing a treatment plan with patient or surrogate and bedside caregivers.  Ordering and reviewing laboratory studies, radiographic studies, pulse oximetry.  Ordering and performing treatments and interventions.  Evaluation of patient's response to treatment.  Discussions with consultants while on the unit and immediately available to the patient.  Re-evaluation of the patient's condition.  Documentation in the medical record.     Jay Talavera MD    [facility]  [address] [place] [id number]    [phone] - tel  [phone] - fax    7/6/2024

## 2024-07-06 NOTE — ASSESSMENT & PLAN NOTE
Hospital day 22:    Status post repeat irrigation and debridement (X3) and hardware removal of left shoulder for suspected infection.  Patient is approximately 3 months status post ORIF of a left proximal humerus fracture which lost reduction.    1. His pain is well managed.    2. Preoperative and intraoperative cultures from 06/21, 6/24 and 6/27 showed many WBCs but have NOT demonstrated any bacterial growth to date.  As well as cultures from 06/19 showed no growth.    3. Wound VAC was discontinued intraoperatively 6/27  4. Sling as needed for comfort of the left upper extremity. Patient can do very light activity with the left upper extremity focusing more on elbow, hand and wrist.  Would not do dedicated therapy for the shoulder.  Patient could weightbear through the extremity if needed with a walker.  5. No plan for additional orthopedic intervention for the left shoulder at this time.  6. Consult case management as the patient will likely benefit from placement for IV antibiotics as well as his generalized deconditioned state.  7. Continue Infectious Disease recommendations but no laboratory findings suggestive of left shoulder infection.  8. Stable from an orthopedic standpoint, unfortunately he has regressed from an overall medical perspective.   9. H/o right 2nd toe osteomyelitis, status post amputation with podiatry.    Patient now with abscess in chest/mediastinum. Recent thoracentesis. Pending transfer to higher level of care.    We will continue to follow, updated images of the left shoulder ordered today

## 2024-07-06 NOTE — ASSESSMENT & PLAN NOTE
No acute issues  However, pt has impaired mobility and is acutely weakened from his sepsis/UTI/AF RVR and need LTAC placement

## 2024-07-06 NOTE — PT/OT/SLP PROGRESS
Physical Therapy Treatment    Patient Name:  Roosevelt Moser   MRN:  [id number]    Recommendations:     Discharge Recommendations: Moderate Intensity Therapy  Discharge Equipment Recommendations: to be determined by next level of care  Barriers to discharge:  medical status    Assessment:     Roosevelt Moser is a 73 y.o. male admitted with a medical diagnosis of Acute renal failure superimposed on chronic kidney disease.  He presents with the following impairments/functional limitations: weakness, impaired endurance, impaired self care skills, impaired functional mobility, gait instability, decreased upper extremity function, decreased lower extremity function, impaired cardiopulmonary response to activity, orthopedic precautions.     Pt presented with HOB elevated he was pleasant and consented to PT. Pt required Max A x2 for bed mobility. Care was taken for movement  of L shoulder.  Pt attempted t/f to stand x2  with Max A x2 but was unable to clear his bottom off  the bed. His L hand was manually placed on RW. Upon return to supine pt rolled to the R and L with Max A x2 for adjusting of his bedding. Pt had min/mod fatigue during PT session, but he remained  calm and pleasant.    Rehab Prognosis: Fair; patient would benefit from acute skilled PT services to address these deficits and reach maximum level of function.    Recent Surgery: Procedure(s) (LRB):  VATS, WITH DECORTICATION, LUNG (Right) 1 Day Post-Op    Plan:     During this hospitalization, patient to be seen 6 x/week to address the identified rehab impairments via therapeutic activities, therapeutic exercises, neuromuscular re-education and progress toward the following goals:    Plan of Care Expires:  07/15/24    Subjective     Chief Complaint: Pt's need for additional surgery/surgeries   Patient/Family Comments/goals: Return home with wife  Pain/Comfort:  Pain Rating 1: 0/10      Objective:     Communicated with nurse  prior to session.  Patient found supine with  telemetry, PICC line, PureWick , chest tube, upon PT entry to room.     General Precautions: Standard, fall  Orthopedic Precautions: RLE partial weight bearing, LUE partial weight bearing  Braces:  (cam boot)  Respiratory Status: Nasal cannula, flow 1 L/min     Functional Mobility:  Bed Mobility:     Rolling Left:  maximal assistance and of 2 persons  Rolling Right: maximal assistance and of 2 persons  Supine to Sit: maximal assistance and of 2 persons  Sit to Supine: maximal assistance and of 2 persons  Balance: unsupported sitting for  20 minutes       AM-PAC 6 CLICK MOBILITY          Treatment & Education:  Pt was educated on safety, use of  call light, PT POC/DC recommendation.     Patient left HOB elevated with all lines intact, call button in reach, bed alarm on, and his wife  present..    GOALS:   Multidisciplinary Problems       Physical Therapy Goals          Problem: Physical Therapy    Goal Priority Disciplines Outcome Goal Variances Interventions   Physical Therapy Goal     PT, PT/OT Progressing     Description: Goals to be met by: 7/15/24     Patient will increase functional independence with mobility by performin. Supine to sit with mod assist  2. Sit to stand transfer with mod assist  3. Bed to chair transfer with mod assist using Rolling Walker  4. Gait  x 10 feet with moderate Assistance using Rolling Walker.   5. Lower extremity exercise program x20 reps    R cam walker boot/PWB and LUE WBAT with walker                           Time Tracking:     PT Received On: 24  PT Start Time: 1038     PT Stop Time: 1134  PT Total Time (min): 56 min     Billable Minutes: Therapeutic Activity 56 minutes    Treatment Type: Treatment  PT/PTA: PT     Number of PTA visits since last PT visit: 0     2024

## 2024-07-06 NOTE — PROGRESS NOTES
".[facility]/Surg   Department of Infectious Disease  Progress Note    PATIENT NAME: Roosevelt Moser  MRN: [id number]  TODAY'S DATE: 07/06/2024  ADMIT DATE: 6/14/2024  LOS: 22 days  CHIEF COMPLAINT: Weakness (Pt brought to ED via EMS with complaint of weakness and falling, pt advised EMS his urine is very dark.  )    PRINCIPLE PROBLEM: Acute renal failure superimposed on chronic kidney disease      INTERVAL HISTORY     7/6:  Patient seen and examined, wife present.  He is about to stand up with physical therapy.  Status post VATS right lung yesterday, as per operation note: Some loculations broken up.  Twenty cc of fluid aspirated sent for cultures.  Breaking up of adhesions along the posterior gutter down to the diaphragm.  Once all the pleural fluid was suctioned, and all loculations broken up, chest tube was placed to Pleur-evac suction".  Hemodynamically stable, on AFib, afebrile.  Adequate urinary output, has had a couple bowel movements in the last 24 hours.  Labs reviewed, white count 11.6, no left shift, H&H 8.1/26.1, platelet count 439.  Stable electrolytes, creatinine 1.2, creatinine clearance 78.7 mL/min, , normal LFTs, will check CRP and procalcitonin tomorrow.  Discussed with Pulmonary/critical Care, awaiting transfer to higher level of care given multiple chest tubes.  He will likely need a repeat CT chest in the next 24 hours.    7/5:  Patient seen and examined at bedside, wife present.  He is awake, alert, with low BP, about to start gentle IV fluids.  He had chest wall abscess blunt dissection yesterday performed by thoracic surgery.  As per operation note: Once the abscess was punctured, creamy purulent fluid began to drain.  A suction catheter was advanced into the abscess cavity and loculations were carefully broken up, with the intent of avoiding entering into the abdominal cavity or irritating the underlying pericardium.  All of the fluid was forward for Gram stain and cultures.  " "Approximately 250 cc of the creamy purulent fluid was initially drained.  Then abscess cavity was then irrigated and suctioned with a total of 2 L of vancomycin based saline irrigation.  A large Hemovac drain was inserted through a 5 mm incision overlying the center of the abscess".  Left pleural fluid from yes cell count with 1100 WBCs, 72% lymphocytes, , pH 7.6.  Light's criteria consistent with exudative effusion.  G stain moderate WBCs, no organisms seen.  Micro from left chest wall abscess Gram stain with moderate WBCs, no organisms seen, both left pleural effusion and left chest wall abscess no growth to date, pending final.  Discussed with Pulmonary/Dr. Obregon, plan to transfer patient to [facility] for higher level of care, patient is planned to have VATS to right lung today he will likely need plastic surgery to help his left chest wall abscess.    7/4 (Pires) Transferred to [facility] for CT surgery evaluation for left chest wall abscess.  Discussed with wife, yesterday morning while he was being assessed by nursing, a protruded area on his left chest was noted, patient was complaining of chest pain which prompted CT scan that revealed a 17 cm anterior and left chest wall abscess extending into the mediastinum with partial destruction of left 6th, 7th and 8th anterior ribs.  Patient seen examined at bedside, seen by CT surgery earlier today, status post left thoracentesis, 1.500 cc of serosanguineous fluid drained.  Fluid set for cell count, Gram stain and cultures including AFB and fungal.  Plan today for drainage of chest wall abscess in the OR.  He is awake, alert, wife at bedside, breathing comfortable on room air.  Labile BP, afebrile.  Adequate urinary output, last bowel movement 7/3.  Labs reviewed, leukocytosis 13.4, no left shift, H&H 8.3/26.6, platelet count 485.  Stable electrolytes, creatinine and liver function normal.  Will check CRP tomorrow.  Baseline CPK less than 10.    07/03/2024: "  He was noted to have left chest wall swelling today.  CT chest has demonstrated a 17 cm anterior and left chest wall abscess that extends into the mediastinum and peritoneal with partial destruction of left 6th, 7th and 8th anterior ribs.  Also showed bilateral pleural effusion worse on the left with compressive atelectasis/collapse of left lower lobe.  X-ray left shoulder and left humerus 07/02/2024 show left femoral fracture with displacement.     07/02/2024:  He had right 2nd toe amputation yesterday 07/01/2024.  No other acute issues overnight.     06/30/2024: No acute issues overnight. Tolerating antibiotics okay.     06/29/2024: Events of last 9 days noted. He had I and D left shoulder with removal of humerus hardware 06/21/2024. Had 2nd I&D     6/28/24 (Pranay):  Patient seen and examined at bedside, wife present.  He is lying in bed, wife reports he has a little more alert and interactive compared to prior.  Patient awaiting authorization to go to facility to continue IV antibiotics.  He went for 3rd washout yesterday and as per discussion with ortho surgeon tissue look much better, healthier, no pus or fluid collections identified.  Hemodynamically stable, T-max 98.4°, afebrile.  Breathing comfortable on room air.  Adequate urinary output, having regular bowel movements.  PICC line in place since 06/14.  Labs reviewed, leukocytosis down to 15.4, no left shift, H&H 8.3/26.7, platelet count 654, reactive thrombocytosis.  Stable electrolytes, MARBIN improved, creatinine 1.4, creatinine clearance 69.5 mL/min, normal LFTs, CPK stable. 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     06/27/24@ (Naomi): Patient is lying in bed awake and alert with his wife at bedside.  He was status post washout of his left shoulder.  Per Orthopedic  surgery lifting much better with no pus or fluid collections and looked clean and healthy.   He has no complaints.  No diarrhea with a bowel movement today, no nausea vomiting, states he has a fair appetite, no fevers or chills.   Much improved mouth dryness.  T-max 98.6° in last 24 hours.   WBC 17.46, platelets 703, H&H 8.2/25.7, no left shift, no bands, BUN/ CR 54/1.4 with estimated creatinine clearance 69.4.  6/24 cultures with no growth, 6/21 cultures no growth.  6/20 cultures negative.  6/18 culture from right 2nd toe with MRSA.  CRP trending down at 180, BNP is elevated at 456 and total CK is 8.    6/26:  Patient seen and examined, wife present.  He is awake, alert, NPO for 3rd washout of left shoulder.  He reports he is feeling better, awaiting surgery to have something to eat after procedure.  Left shoulder with wound VAC place draining significant amounts of bloody purulent fluid.  Hypertensive, afebrile.  Adequate urinary output, had 2 bowel movements in the last 24 hours.  Significant upper extremity edema decreasing, persistent bilateral lower extremity pitting edema.  Labs reviewed, leukocytosis 17.8, no left shift, H&H 8.2/25.8, platelet count 694, reactive thrombocytosis.  Stable electrolytes, creatinine down to 1.4, MARBIN improved, creatinine clearance 69.4 mL/min, normal LFTs, CRP down to 192.2.  Micro reviewed, OR cultures from 06/21 no growth to date, pending final.    6/25:  Patient seen and examined, wife present.  Patient lying in bed, has wound VAC placed to left shoulder.  Discussed with Ortho, very difficult situation in the setting of nonunion infected fracture.  As per operation note: After removing packing there was a large collection of bloody brownish fluid.  Irrigated.  Incision was extended 5 cm distally.  Pulse down the wound with 12 L of fluid.  Cultures were taken.  After extensive washout able to pass through the abscess cavity and a wound VAC was placed into cavity.  Evidence of  "no union or whatsoever.  The patient has an infected nonunion discussed with Hospital Medicine as well, plan for serial washouts in the OR for source control.  Slightly hypertensive, tachycardic, afebrile.  Adequate urinary output, had 2 bowel movements in the last 24 hours.  He did not get steroids preop, last dose dexamethasone 8 mg once on 06/21.  Labs reviewed, leukocytosis 20.1, no left shift, H&H 8.6/27.1, MCV 81, platelet count 720, reactive thrombocytosis.  Stable electrolytes, MARBIN, creatinine down to 1.6, creatinine clearance 60.6 mL/min, improving, normal LFTs, CRP remains taylor high 219.3, procalcitonin continues to fall 0.7.  We do not need to check this daily.  Echo unable to fully visualize oral valves.    6/24/24 (Pranay):  Patient seen and examined, wife present.  He is NPO for procedure today by Ortho, I&D and washout of left shoulder.  Patient awake, alert.  Hypertensive, T-max 99.5°, currently afebrile.  Adequate urinary output, had a bowel movement in the last 24 hours.  Labs reviewed, leukocytosis 18.1, no left shift, H&H 9.1/28.3, MCV 80, reactive thrombocytosis 721.  Sed rate extremely high 128, .  Stable electrolytes, MARBIN creatinine trending down 1.8, clearance 53.9 mL/min, baseline CPK 30.  Procalcitonin down to 0.97.  Last vancomycin random level 13.5.    06/23/2024.  Sleeping -- I did not disturb Afebrile.  T-max 99.5° WBC 21.3--19.8--17.  CRP is quite elevated at 205.  .  Procalcitonin 1.39.  Vancomycin random 18.  Cultures from shoulder no growth to date.    Gram stain yesterday was read as Gram-positive cocci--today it is changed to  "no organism"  GOing for another procedure tomorrow    06/22/2024.  Very pleasant.  So is his wife.  Patient is Afebrile.  He has swelling over the left arm, slightly improved.  Pain is well-controlled.    WBC 19--21--21.3--19.8.  He did receive dexamethasone yesterday by anesthesia, at the time of left shoulder surgery.  He is on ceftriaxone " clindamycin and vancomycin.  Cultures reviewed:  right 2nd toe wound had grown MRSA.  Left intraop cultures no growth to date.  Left shoulder intraop, gram stain are showing Gram-positive cocci.  He is trying to move his legs in bed, but he is still  weak.  Wife and patient are able to do IV antibiotics at home, but all things considered, it is very cumbersome for them;  they are interested in placement, which I agree, it is the right course of action.    06/21/2024:  Seen by Orthopedic surgery Service again yesterday.  For I and D today.  All cultures so far negative.  ASO titer normal.    06/20/2024:  Oral anticoagulants on hold to allow left shoulder arthrocentesis.  No other acute issues overnight.  Culture from right 3rd toe growing Staphylococcus aureus.  Blood culture remain negative.  Had aspiration left shoulder today that yielded purulent material.  Synovial fluid studies in progress.    06/19/2024: Seen and evaluated at bedside.  States left upper extremity pain better.  Having improved movement at the wrist and forearm.  Seen by Orthopedic surgery PA yesterday.  Notes reviewed.  Blood cultures remain negative.        Antibiotics (From admission, onward)      Start     Stop Route Frequency Ordered    07/04/24 0600  meropenem 1 g in sodium chloride 0.9 % 100 mL IVPB (ready to mix system)         -- IV Every 8 hours (non-standard times) 07/04/24 0257    07/03/24 1530  ceftaroline (TEFLARO) 600 mg in dextrose 5 % 50 mL IVPB (ready to mix)         -- IV Every 8 hours (non-standard times) 07/03/24 1511    06/17/24 2100  mupirocin 2 % ointment         06/22/24 2059 Nasl 2 times daily 06/17/24 1544          Antifungals (From admission, onward)      Start     Stop Route Frequency Ordered    07/03/24 1415  micafungin 100 mg in 0.9% NaCl 100 mL IVPB (MB+)         -- IV Every 24 hours (non-standard times) 07/03/24 1300    06/19/24 1045  clotrimazole megha 10 mg         06/29/24 0959 Oral 5 times daily 06/19/24 0932            Antivirals (From admission, onward)      None            ASSESSMENT and PLAN     Left chest wall abscess status post left thoracentesis 1500cc serosanguineous fluid drained 7/4 & I&D drainage with chest tube placement 7/4 & loculated effusions right chest s/p VATS and chest tube 7/5  Cell count 1100 WBCs, 72% lymphocytes, , pH 7.6 - Light's criteria consistent with exudative effusion  L pleural fluid 7/4 G stain OR WBCs, no organisms seen  OR left chest wall abscess 7/4 Gram stain with moderate WBCs, no organisms seen, both left pleural effusion and left chest wall abscess no growth to date, pending final.  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 of deep hardware 6/21 - all screws and nails removed, s/p 2nd washout 6/24 & 3rd washout 6/27  -->128, -->204.5-->219.3-->192-->180, trending down  OR cultures 6/21 no growth  Blood cultures 6/14 x2 sets no growth 6/22 x 1 no growth to date, pending final  OR cultures 6/24 g stain no organisms, cultures negative   Baseline CPK 30   Left Shoulder washouts on 6/21 ( abundant pus in  shoulder, hardware removal), 6/24 ( bloody brownish fluid) and 6/27 ( no fluid collection, healthy red and beefy tissue)  Left shoulder x-ray: Small foci of soft tissue gas projecting over the apical edge of the distracted humeral shaft.     3. Right 3rd toe osteomyelitis s/p Dalvance x 2 doses & 2nd  distal phalanx s/p I&D at bedside, s/p 2nd toe amputation 7/1   X-ray with bony erosion of the 3rd DIP representing osteomyelitis   MRI osteomyelitis involving the middle distal phalanges of the 3rd toe.  Very slight destruction of the tip of the tuft of the distal phalanx of the 2nd toe.  Wound cultures 2nd R toe 6/18 MRSA, vanco JESSENIA 2      4. PMHx:  Diabetes, last A1c 6.3%, PID, CHF EF 50%     RECOMMENDATIONS:    Follow OR cultures  Awaiting transfer to higher level of  care  Agreed with repeating CT chest  Will discuss with Ortho left shoulder findings, consider CT shoulder  Continue Teflaro 600 mg IV q.8   Meropenem 1 g IV q.8   Stop micafungin IV  Will guide antibiotic therapy based on culture results  Wound care to all affected areas    D/w patient, wife at bedside, ICU nursing, Dr Obregon/Pulmonary, Ortho/PA    Please send Epic secure chat with any questions.      SUBJECTIVE      Review of Systems  Review of systems obtained and negative except as stated above in Interval History     OBJECTIVE   Temp:  [97.8 °F (36.6 °C)-98.7 °F (37.1 °C)] 98 °F (36.7 °C)  Pulse:  [] 79  Resp:  [17-57] 20  SpO2:  [86 %-100 %] 96 %  BP: ()/(42-69) 95/51  Arterial Line BP: ()/(46-77) 89/69  Temp:  [97.8 °F (36.6 °C)-98.7 °F (37.1 °C)]   Temp: 98 °F (36.7 °C) (07/06/24 0330)  Pulse: 79 (07/06/24 0700)  Resp: 20 (07/06/24 0700)  BP: (!) 95/51 (07/06/24 0700)  SpO2: 96 % (07/06/24 0700)    Intake/Output Summary (Last 24 hours) at 7/6/2024 0857  Last data filed at 7/6/2024 0626  Gross per 24 hour   Intake 1618.75 ml   Output 1595 ml   Net 23.75 ml       Physical Exam  General: Morbidly obese  male, awake, alert, sitting in bed awake and alert, he is in no distress, pleasant and conversant.  Eyes: Eyes with no icterus or injection. Vision grossly normal  Ears: Hearing grossly normal.  Nose: Nares patent  Mouth: Moist mucous membranes, dentition is fair.  Oropharynx with resolved aphthous ulcers.  Neck: Supple  Cardiovascular: Regular rate and rhythm, no murmurs, left chest with Hemovac, purulent fluid draining, decreased size of prior bulky skin area, less tender to palpation  Respiratory: Better inspiratory effort, decreased breath sounds b/l L>R - left hemovac draining alexander pus, right chest tube serosanguineous fluid   Gastrointestinal:  Soft and obese with active bowel sounds, no tenderness to palpation, no distention.  Genitourinary:  PureWick in place.  No suprapubic  tenderness.  Musculoskeletal:  Except for left shoulder, moves all extremities with good strength.    Skin:  Right foot s/p amputation of 2nd toe, left shoulder with stitches in place, no redness noted   Neuro:   Oriented, follows commands.  Psych: Good mood, normal affect.     WOUNDS:      7/5:            7/4:            6/27 6/25:      6/24:                            Significant Labs: All pertinent labs within the past 24 hours have been reviewed.    CBC LAST 7 DAYS  Recent Labs   Lab 07/01/24  0523 07/02/24  0345 07/03/24  0302 07/04/24  0345 07/04/24  1602 07/05/24  0312 07/05/24  1809 07/05/24  1836 07/06/24  0534   WBC 13.54* 14.35* 12.51 13.49* 13.56* 11.32  --   --  11.62   RBC 3.19* 3.54* 3.06* 3.26* 3.25* 3.21*  --   --  3.16*   HGB 8.2* 9.0* 7.8* 8.3* 8.2* 8.2*  --   --  8.1*   HCT 26.3* 29.7* 25.3* 26.6* 26.6* 26.3* 25* 28* 26.1*   MCV 82 84 83 82 82 82  --   --  83   MCH 25.7* 25.4* 25.5* 25.5* 25.2* 25.5*  --   --  25.6*   MCHC 31.2* 30.3* 30.8* 31.2* 30.8* 31.2*  --   --  31.0*   RDW 18.1* 18.2* 18.2* 17.3* 17.3* 17.2*  --   --  17.2*   * 489* 418 485* 457* 443  --   --  439   MPV 9.4 11.5 11.1 9.6 9.9 9.8  --   --  9.8   GRAN 66.5  9.0* 63.5  9.1* 64.7  8.1* 63.4  8.6* 75.4*  10.2* 70.4  8.0*  --   --  70.4  8.2*   LYMPH 17.7*  2.4 16.4*  2.4 18.1  2.3 17.8*  2.4 13.3*  1.8 15.6*  1.8  --   --  14.3*  1.7   MONO 12.0  1.6* 12.2  1.8* 12.6  1.6* 12.9  1.7* 9.1  1.2* 9.4  1.1*  --   --  9.8  1.1*   BASO 0.13 0.16 0.09 0.09 0.09 0.09  --   --  0.05   NRBC 0 0 0 0 0 0  --   --  0       CHEMISTRY LAST 7 DAYS  Recent Labs   Lab 06/30/24  0459 07/01/24  0523 07/02/24  0345 07/03/24  0302 07/03/24  1216 07/04/24  0345 07/04/24  1602 07/05/24  0312 07/05/24  1809 07/05/24  1836 07/06/24  0534    143 145 142  --  141  --  137  --   --  139   K 3.6 3.2* 3.6 4.0  --  3.9 4.1 4.1  --   --  4.2    107 107 107  --  104  --  103  --   --  106   CO2 24 25 24 25  --  27   --  27  --   --  26   ANIONGAP 12 11 14 10  --  10  --  7*  --   --  7*   BUN 41* 38* 46* 50*  --  43*  --  41*  --   --  31*   CREATININE 1.1 1.1 1.2 1.2  --  1.1  --  1.3  --   --  1.2   * 149* 201* 215*  --  155*  --  186*  --   --  154*   CALCIUM 9.0 8.9 9.2 8.9  --  8.0*  --  7.9*  --   --  7.7*   PH  --   --   --   --  7.495*  --   --   --  7.413 7.339*  --    MG  --  1.5* 1.6  --   --  1.5* 1.7 2.0  --   --   --    ALBUMIN 1.5* 1.5* 1.6* 1.5*  --  2.3*  --  2.2*  --   --  2.1*   PROT 6.7 6.7 7.1 6.5  --  6.2  --  6.1  --   --  5.9*   ALKPHOS 137* 134 143* 116  --  101  --  97  --   --  91   ALT 24 19 18 17  --  12  --  14  --   --  12   AST 27 24 26 19  --  14  --  19  --   --  17   BILITOT 0.3 0.3 0.3 0.3  --  0.7  --  0.4  --   --  0.3       Estimated Creatinine Clearance: 78.7 mL/min (based on SCr of 1.2 mg/dL).    INFLAMMATORY/PROCAL    Lab Results   Component Value Date    .3 (H) 07/03/2024    .2 (H) 06/27/2024    .2 (H) 06/26/2024    .3 (H) 06/25/2024    .5 (H) 06/24/2024    .0 (H) 06/23/2024    .0 (H) 06/18/2024          Component Ref Range & Units 2 d ago   Body Fluid Type  Abscess fluid, left shoulder   Fluid Appearance  Cloudy   Fluid Color  Pink   WBC, Body Fluid /cu mm SEE COMMENT   Comment: Reference ranges for body fluids not established.  Correlate clinically.  Test not performed  Cell count not performed on abscess fluid, see differential.   Segs, Fluid % 79   Lymphs, Fluid % 14   Monocytes/Macrophages, Fluid % 7        PRIOR  MICROBIOLOGY:    Susceptibility data from last 90 days.  Collected Specimen Info Organism Ceftriaxone Clindamycin Erythromycin Oxacillin Penicillin Tetracycline Trimeth/Sulfa Vancomycin   06/23/24 Blood from Peripheral, Hand, Right No growth after 5 days.           06/18/24 Wound from Toe, Right Foot Methicillin resistant Staphylococcus aureus  R  S  R  R  R  S  S  S   06/14/24 Blood from Peripheral, Antecubital,  Right No growth after 5 days.           06/14/24 Blood from Peripheral, Hand, Right No growth after 5 days.               LAST 7 DAYS MICROBIOLOGY   Microbiology Results (last 7 days)       Procedure Component Value Units Date/Time    Gram stain [[id number]] Collected: 07/05/24 1907    Order Status: Completed Specimen: Pleural Fluid Updated: 07/05/24 2052     Gram Stain Result Few WBC's      No organisms seen    Narrative:      Right pleural fluid    Blood culture [[id number]] Collected: 07/03/24 1332    Order Status: Completed Specimen: Blood from Peripheral, Hand, Left Updated: 07/05/24 2032     Blood Culture, Routine No Growth to date      No Growth to date      No Growth to date    Aerobic culture [[id number]] Collected: 07/05/24 1907    Order Status: Sent Specimen: Pleural Fluid Updated: 07/05/24 1941    Culture, Anaerobic [[id number]] Collected: 07/05/24 1907    Order Status: Sent Specimen: Pleural Fluid Updated: 07/05/24 1941    Culture, Body Fluid (Aerobic) w/ GS [[id number]] Collected: 07/04/24 0846    Order Status: Completed Specimen: Pleural Fluid Updated: 07/05/24 1607     AEROBIC CULTURE - FLUID No growth     Gram Stain Result Moderate WBC's      No organisms seen    Narrative:      Left Pleural fluid    Gram stain [[id number]] Collected: 07/04/24 1113    Order Status: Completed Specimen: Abscess from Chest, Left Updated: 07/05/24 1607     Gram Stain Result Moderate WBC's      No organisms seen    Narrative:      Chest, Left    Aerobic culture [[id number]] Collected: 07/04/24 1113    Order Status: Completed Specimen: Abscess from Chest, Left Updated: 07/05/24 0903     Aerobic Bacterial Culture No growth    Fungus culture [[id number]] Collected: 07/04/24 1113    Order Status: Sent Specimen: Abscess from Chest, Left Updated: 07/04/24 1129    AFB Culture & Smear [[id number]] Collected: 07/04/24 1113    Order Status: Sent Specimen: Abscess from Chest, Left Updated: 07/04/24 1128    Culture, Anaerobe  [[id number]] Collected: 07/04/24 1113    Order Status: Sent Specimen: Abscess from Chest, Left Updated: 07/04/24 1128    Fungus culture [[id number]] Collected: 07/04/24 0859    Order Status: Sent Specimen: Pleural Fluid Updated: 07/04/24 1010    AFB Culture & Smear [[id number]] Collected: 07/04/24 0805    Order Status: No result Specimen: Pleural Fluid Updated: 07/04/24 0856    Culture, Anaerobic [[id number]] Collected: 07/04/24 0804    Order Status: No result Specimen: Pleural Fluid Updated: 07/04/24 0855    Culture, Anaerobic [[id number]]     Order Status: Completed Specimen: Pleural Fluid     AFB Culture & Smear [[id number]]     Order Status: Completed Specimen: Pleural Fluid     Fungus culture [[id number]] Collected: 06/21/24 1645    Order Status: Completed Specimen: Abscess from Shoulder, Left Updated: 07/04/24 0444     Fungus (Mycology) Culture No fungal growth to date      No fungal growth to date    Fungus culture [[id number]] Collected: 06/20/24 1204    Order Status: Completed Specimen: Abscess from Shoulder, Left Updated: 07/04/24 0444     Fungus (Mycology) Culture No fungal growth to date      No fungal growth to date    Aerobic culture [[id number]] Collected: 06/27/24 0733    Order Status: Completed Specimen: Incision site from Shoulder, Left Updated: 07/01/24 0704     Aerobic Bacterial Culture No growth    Culture, Anaerobe [[id number]] Collected: 06/27/24 0733    Order Status: Completed Specimen: Incision site from Shoulder, Left Updated: 06/29/24 1441     Anaerobic Culture No anaerobes isolated              CURRENT/PREVIOUS VISIT EKG  Results for orders placed or performed during the hospital encounter of 06/14/24   EKG 12-lead    Collection Time: 06/14/24 12:16 PM   Result Value Ref Range    QRS Duration 106 ms    [facility] QTC Calculation 443 ms    Narrative    Test Reason : R53.1,    Vent. Rate : 120 BPM     Atrial Rate : 159 BPM     P-R Int : 000 ms          QRS Dur : 106 ms      QT Int : 314 ms        P-R-T Axes : 000 -54 093 degrees     QTc Int : 443 ms    Atrial fibrillation with rapid ventricular response  Left axis deviation  Low voltage QRS  Inferior infarct ,age undetermined  Cannot rule out Anterior infarct ,age undetermined  Abnormal ECG  When compared with ECG of 25-MAR-2024 09:14,  Vent. rate has increased BY  46 BPM  Minimal criteria for Anterior infarct are now Present  Confirmed by Jesús HATHAWAY, Hansel AMIN ([id number]) on 6/20/2024 8:43:02 PM    Referred By: [email or web address]   SELF           Confirmed By:Hansel Espinosa MD       CT chest 7/3/24:    Impression:    Large abscess of the anterior central and left chest wall and abdominal wall measuring 17 cm transversely.  This extends into the mediastinum and peritoneum with partial destruction of the anterior 6th 7th and 8th ribs.  Complete atelectasis of the left lower lobe with a moderate left pleural effusion.  Mild atelectasis right lung base with a small to moderate right pleural effusion.    X-ray R foot:  FINDINGS:  There is a small region of bony erosion involving the distal 3rd digit.  There is soft tissue injury of the distal 2nd digit with thickening of the nail.  There is no evidence fracture.    Impression:    There is bony erosion of the 3rd D IP possibly representing osteomyelitis.    ECHO:     Extremely limited visualization of all cardiac structures.  No clinically significant determination can be made based on this echocardiogram.  If cardiac concerns persist, consider alternative cardiac imaging like RICKY for further evaluation.    Left Ventricle: Left ventricle was not well visualized due to poor sonic window. The left ventricle is normal in size. Unable to assess wall motion. There is normal systolic function with a visually estimated ejection fraction of 55 - 60%.    Right Ventricle: Right ventricle was not well visualized due to poor acoustic window.    Left Atrium: Left atrium was not well visualized.    Mitral Valve: There is no stenosis. The  mean pressure gradient across the mitral valve is 2 mmHg at a heart rate of  bpm. There is mild regurgitation.    IVC/SVC: Elevated venous pressure at 15 mmHg.    Significant Imaging: I have reviewed all relevant and available imaging results/findings within the past 24 hours.    06/18/2024.  CT left shoulder   1. Subacute left humerus fracture post internal fixation.  There is incomplete bony bridging across the fracture site, with persistent angulation between the dominant bony fragments as hardware is only partially engaged (see discussion).  2. Severe arthropathy of the glenohumeral joint with multiple intra-articular bodies, some interposed.  3. Large left joint effusion and adjacent soft tissue focal fluid collections containing gas and concerning for gas-forming infection.  4. Moderate size left pleural effusion.  5. Left basilar airspace disease is presumably atelectasis with pneumonia as a less favored consideration.  This report was flagged in Epic as abnormal.    X-ray on 06/17, prior to removal of hardware.      Left shoulder x-ray 7/6:  The glenoid rim appears well maintained.  Mild AC joint osteoarthritis.  Left lung is clear.  Small foci of soft tissue gas projecting over the apical edge of the distracted humeral shaft.   1. Fracture through the surgical neck of the humerus with lateral distraction of the distal component and overlapping segments on the order of 2.5 cm.   2. Overall, no appreciable change upon comparison.     I spent a total of 55 minutes on the day of the visit.This includes face to face time and non-face to face time preparing to see the patient (eg, review of tests), obtaining and/or reviewing separately obtained history, documenting clinical information in the electronic or other health record, independently interpreting results and communicating results to the patient/family/caregiver, or care coordinator.    Capri Wesley MD  Date of Service: 07/06/2024      This  note was created using [employer] voice recognition software that occasionally misinterpreted phrases or words.

## 2024-07-06 NOTE — TRANSFER OF CARE
Anesthesia Transfer of Care Note    Patient: Roosevelt Moser    Procedure(s) Performed: Procedure(s) (LRB):  VATS, WITH DECORTICATION, LUNG (Right)    Patient location: ICU    Anesthesia Type: general    Transport from OR: Transported from OR on 6-10 L/min O2 by face mask with adequate spontaneous ventilation    Post pain: adequate analgesia    Post assessment: no apparent anesthetic complications and tolerated procedure well    Post vital signs: stable    Level of consciousness: confused and awake    Nausea/Vomiting: no nausea/vomiting    Complications: none    Transfer of care protocol was followedComments: AAXO3 SV, exchanging well.  To ICU in ICU bed on monitors , VSS upon arrival. Report to RN       Last vitals: Visit Vitals  BP (!) 105/51   Pulse 93   Temp 37.1 °C (98.7 °F) (Oral)   Resp (!) 24   Ht 6' (1.829 m)   Wt 133.7 kg (294 lb 12.1 oz)   SpO2 (!) 92%   BMI 39.98 kg/m²

## 2024-07-06 NOTE — ASSESSMENT & PLAN NOTE
At the admission In setting of acute UTI, MRSA OM right 2nd toe, and infected left shoulder hardware and currently resolved   BCX negative  Urine culture negative  Will need antibiotics until 08/08/2024 per ID

## 2024-07-06 NOTE — ASSESSMENT & PLAN NOTE
Patient can weight bear only while wearing the camboot, until the sutures are removed in approximately 2- 3 weeks.    Recommend dressing changes to consist of adaptic followed by gauze followed by lightly wrapped kerlix- dressing needs to be changed every 3-4 days.    Catarino and protein drinks daily.

## 2024-07-06 NOTE — PROGRESS NOTES
[facility]  Progress Note    Patient Name: Roosevelt Moser  MRN: [id number]  Patient Class: IP- Inpatient   Admission Date: 6/14/2024  Length of Stay: 22 days  Attending Physician: Nabil Paz MD  Primary Care Provider: Miguel Angel Enriquez PA-C        Subjective:     Principal Problem:Acute renal failure superimposed on chronic kidney disease        HPI:  Roosevelt Moser is a 73 year old male with a previous medical history of atrial fibrillation on eliquis, HTN, DMII, obstructive sleep apnea, HLD, ORIF of left humerus and guillian-[place] who presented to the emergency room for weakness and multiple falls. Per wife of the patient he has had progressive weakness over the past week along with two falls one at 0300 Thursday and the other early morning Friday. Both time she had to activate EMS to pick patient up off floor due to weakness. Patient refused transport to hospital on both occasions. Today he slid out of his chair and was unable to get up without assistance and at this point the wife activated EMS to transport patient to the hospital. She endorses that two days ago she noticed that the patient had stopped urinating as he normally does. The patient concurs that he has noticed that his urine has decreased over the past two days and that it is dark. Patient denies dysuria or incomplete bladder emptying. Bladder scan showed zero upon admit and urine sample was obtained via straight cath in ED. Patient denies decreased PO intake and keeps a bottle of water with him at all times. Initial ED work-up showed a creatinine of 5 and potassium of 6. Urine studies positive for infection and WBC 20.81 with procalcitonin at 19.75. Blood cultures obtained and patient given 1 gram of rocephin and 1000ml of normal saline. Patient noted to bein afib with RVR and 20mg of diltiazem was administered IV which resulted in low blood pressure and 1 gram of calcium gluconate was administered. Patient admitted  by hospital medicine for further evaluation and management     Overview/Hospital Course:  Patient presented after fall.  He was found to have osteo of his toe and abscesses left shoulder.  He underwent washouts with surgery on his shoulder. Status post repeat irrigation and debridement (3rd washout) and hardware removal of left shoulder for suspected infection. Wound VAC was discontinued intraoperatively 6/27. They cleared him on 06/28/2024. The patient was noted to have osteomyelitis of second right toe. Seen by podiatry, who performed amputation on 07/01. Patient noted to have dyspnea. CXR revealed bilateral pleural effusions. IV lasix started and stopped due to MARBIN. Pending snf placement at this time. Pt taken to OR for I&D of a chest wall abscess on 7/4/24. He had a thoracentesis of 1550 cc.  Dr. Escobedo states that the abscess extended down to the rectus abdominis muscles and to the pericardium but there was no pericardial effusion.  The patient also has a loculated right effusion and s/p VATS with mechanical lysis and drainage with chest tube placement was Cont Teflaro, meropenem, and micagungin per ID recs.     The patient is going to need to transfer to a facility that has thoracic surgery and reconstructive plastic surgery. The patient has osteomyelitis of multiple ribs which will need further debridement and then a flap placed. Unfortunately, [facility] do not have this capability at this facility.       Interval History:   Seen patient and d/w RN   Unsure about transfer initiation   S/p VATS and doing well . Chest tube +  Shoulder wound reviewed . Plans reviewed   Patient is not in distress any kind     Review of Systems  Objective:     Vital Signs (Most Recent):  Temp: 98 °F (36.7 °C) (07/06/24 0330)  Pulse: 79 (07/06/24 0700)  Resp: 20 (07/06/24 0700)  BP: (!) 95/51 (07/06/24 0700)  SpO2: 96 % (07/06/24 0700) Vital Signs (24h Range):  Temp:  [97.8 °F (36.6 °C)-98.7 °F (37.1 °C)] 98 °F (36.7 °C)  Pulse:  [72-90]  79  Resp:  [17-45] 20  SpO2:  [89 %-100 %] 96 %  BP: ()/(42-69) 95/51  Arterial Line BP: ()/(46-77) 89/69     Weight: 133.7 kg (294 lb 12.1 oz)  Body mass index is 39.98 kg/m².    Intake/Output Summary (Last 24 hours) at 7/6/2024 0948  Last data filed at 7/6/2024 0626  Gross per 24 hour   Intake 1618.75 ml   Output 1595 ml   Net 23.75 ml         Physical Exam  Constitutional:       General: He is not in acute distress.     Appearance: He is well-developed.   HENT:      Head: Normocephalic.      Mouth/Throat:      Mouth: Mucous membranes are moist.   Eyes:      Pupils: Pupils are equal, round, and reactive to light.   Cardiovascular:      Rate and Rhythm: Normal rate and regular rhythm.   Pulmonary:      Effort: Pulmonary effort is normal. No respiratory distress.      Comments: Decreased more on right chest   Abdominal:      General: There is no distension.      Tenderness: There is no abdominal tenderness.   Musculoskeletal:         General: Normal range of motion.      Cervical back: Neck supple.   Skin:     General: Skin is warm.      Findings: No rash.   Neurological:      General: No focal deficit present.      Mental Status: He is alert and oriented to person, place, and time.   Psychiatric:         Mood and Affect: Mood normal.             Significant Labs: All pertinent labs within the past 24 hours have been reviewed.  CBC:   Recent Labs   Lab 07/04/24  1602 07/05/24  0312 07/05/24  1809 07/05/24  1836 07/06/24  0534   WBC 13.56* 11.32  --   --  11.62   HGB 8.2* 8.2*  --   --  8.1*   HCT 26.6* 26.3* 25* 28* 26.1*   * 443  --   --  439     CMP:   Recent Labs   Lab 07/04/24  1602 07/05/24  0312 07/06/24  0534   NA  --  137 139   K 4.1 4.1 4.2   CL  --  103 106   CO2  --  27 26   GLU  --  186* 154*   BUN  --  41* 31*   CREATININE  --  1.3 1.2   CALCIUM  --  7.9* 7.7*   PROT  --  6.1 5.9*   ALBUMIN  --  2.2* 2.1*   BILITOT  --  0.4 0.3   ALKPHOS  --  97 91   AST  --  19 17   ALT  --  14 12  "  ANIONGAP  --  7* 7*     Cardiac Markers: No results for input(s): "CKMB", "MYOGLOBIN", "BNP", "TROPISTAT" in the last 48 hours.    Significant Imaging: I have reviewed all pertinent imaging results/findings within the past 24 hours.    Assessment/Plan:      * Acute renal failure superimposed on chronic kidney disease  Resolved now   S/p gentle hydration on Zosyn/vanc but those have been stopped  Renal US neg for obstruction         Chest wall abscess  S/p  I&D of a chest wall abscess on 7/4/24. He had a thoracentesis of 1550 cc. Abscess extended down to the rectus abdominis muscles and to the pericardium but there was no pericardial effusion.  The patient also has a loculated right effusion s/p VATS and chest tube  Continue antibiotics     As per chart review, The patient is going to need to transfer to a facility that has thoracic surgery and reconstructive plastic surgery. The patient has osteomyelitis of multiple ribs which will need further debridement and then a flap placed. Unfortunately, [facility] do not have this capability at this facility.   No transfer order or not in process of transfer    Pleural effusion  IV lasix started and stopped due to MARBIN.   S/p Right VATS with chest tube   Follow CT surgery       MRSA infection  aware  As per ID    Pyogenic arthritis of left shoulder region  As above s/p multiple I and D . Extend into thoracic cavity       Abscess of left shoulder   status post multiple washouts on 06/21/2024, 06/24/2024, 06/26/2024,6/27/24  Need antibiotics until 08/03/2024  Wound VAC in place.  Continue antibiotics.  Following cultures.  Pending possible LTAC placement/possible transfer for reconstructive plastic surgery ?    Osteomyelitis of toe    Podiatry amputated 2nd toe 07/01.  Need antibiotics until 08/08/2024      Skin tear of left upper extremity  Local care, healing      Debility  Need LTAC placement   PT/OT        Severe sepsis  At the admission In setting of acute UTI, MRSA OM right " 2nd toe, and infected left shoulder hardware and currently resolved   BCX negative  Urine culture negative  Will need antibiotics until 08/08/2024 per ID    Hyperkalemia  Resolved  in setting of acute on chronic renal failure          Atrial fibrillation with rapid ventricular response  Chronic AF w/ acute RVR, resolved s/p cardizem drip     TTE from March 2024:    Left Ventricle: The left ventricle is normal in size. Normal wall thickness. Mild global hypokinesis present. There is mildly reduced systolic function with a visually estimated ejection fraction of 45 - 50%. There is normal diastolic function.    Right Ventricle: Normal right ventricular cavity size. Wall thickness is normal. Right ventricle wall motion  is normal. Systolic function is normal.    Left Atrium: Left atrium is moderately dilated.    IVC/SVC: Normal venous pressure at 3 mmHg.    NOAC has been on hold d/t possible need for shoulder surgery - has been on prophylaxis dose of LovenoxResume Eliquis as soon as feasible postop after clear by surgeon       History of Guillain-Bailey syndrome  No acute issues  However, pt has impaired mobility and is acutely weakened from his sepsis/UTI/AF RVR and need LTAC placement         Type 2 diabetes mellitus  A1c 6.3%  SSI      Hypertension  Home meds as appropriate    MARA (obstructive sleep apnea)  Continue CPAP HS and w/ naps      Morbid obesity  Body mass index is 39.98 kg/m².        VTE Risk Mitigation (From admission, onward)           Ordered     Reason for No Pharmacological VTE Prophylaxis  Once        Question:  Reasons:  Answer:  Already adequately anticoagulated on oral Anticoagulants    06/14/24 1438     IP VTE HIGH RISK PATIENT  Once         06/14/24 1438     Place sequential compression device  Until discontinued         06/14/24 1438                    Discharge Planning   KAMILA:      Code Status: Full Code   Is the patient medically ready for discharge?:     Reason for patient still in hospital  (select all that apply): Treatment  Discharge Plan A: Skilled Nursing Facility            Critical care time spent on the evaluation and treatment of severe organ dysfunction, review of pertinent labs and imaging studies, discussions with consulting providers and discussions with patient/family: 33 minutes.      Nabil Paz MD  Department of Hospital Medicine   [facility]

## 2024-07-06 NOTE — PLAN OF CARE
Problem: Adult Inpatient Plan of Care  Goal: Plan of Care Review  Outcome: Progressing  Flowsheets (Taken 7/6/2024 0233)  Plan of Care Reviewed With: patient  Goal: Absence of Hospital-Acquired Illness or Injury  Outcome: Progressing  Intervention: Identify and Manage Fall Risk  Flowsheets (Taken 7/6/2024 0233)  Safety Promotion/Fall Prevention:   assistive device/personal item within reach   bed alarm set   lighting adjusted   pulse ox     Problem: Fall Injury Risk  Goal: Absence of Fall and Fall-Related Injury  Outcome: Progressing

## 2024-07-06 NOTE — SUBJECTIVE & OBJECTIVE
Principal Problem:Acute renal failure superimposed on chronic kidney disease    Principal Orthopedic Problem:  Left proximal humerus fracture    Interval History:  No orthopedic change    Review of patient's allergies indicates:  No Known Allergies    Current Facility-Administered Medications   Medication    (Magic mouthwash) 1:1:1 diphenhydrAMINE(Benadryl) 12.5mg/5ml liq, aluminum & magnesium hydroxide-simethicone (Maalox), LIDOcaine viscous 2%    0.9%  NaCl infusion (for blood administration)    0.9%  NaCl infusion (for blood administration)    0.9%  NaCl infusion    albuterol-ipratropium 2.5 mg-0.5 mg/3 mL nebulizer solution 3 mL    aluminum & magnesium hydroxide-simethicone 400-400-40 mg/5 mL suspension 15 mL    ceftaroline (TEFLARO) 600 mg in dextrose 5 % 50 mL IVPB (ready to mix)    dextrose 10% bolus 125 mL 125 mL    dextrose 10% bolus 250 mL 250 mL    epoetin leonel-epbx injection 14,100 Units    ferrous sulfate tablet 1 each    glucagon (human recombinant) injection 1 mg    glucose chewable tablet 16 g    glucose chewable tablet 24 g    HYDROcodone-acetaminophen  mg per tablet 1 tablet    HYDROcodone-acetaminophen 5-325 mg per tablet 1 tablet    insulin aspart U-100 pen 0-10 Units    insulin glargine U-100 (Lantus) pen 15 Units    LIDOcaine 5 % patch 1 patch    magnesium sulfate 2g in water 50mL IVPB (premix)    meropenem 1 g in sodium chloride 0.9 % 100 mL IVPB (ready to mix system)    metoprolol injection 5 mg    metoprolol succinate (TOPROL-XL) 24 hr tablet 100 mg    micafungin 100 mg in 0.9% NaCl 100 mL IVPB (MB+)    naloxone 0.4 mg/mL injection 0.02 mg    ondansetron disintegrating tablet 8 mg    potassium bicarbonate disintegrating tablet 35 mEq    potassium bicarbonate disintegrating tablet 60 mEq    potassium chloride SA CR tablet 20 mEq    potassium, sodium phosphates 280-160-250 mg packet 2 packet    potassium, sodium phosphates 280-160-250 mg packet 2 packet    potassium, sodium phosphates  280-160-250 mg packet 2 packet    promethazine tablet 25 mg    simethicone chewable tablet 80 mg    sodium chloride 0.9% flush 10 mL    sodium chloride 0.9% flush 10 mL    And    sodium chloride 0.9% flush 10 mL    sodium chloride 0.9% flush 10 mL    traMADoL tablet 50 mg     Objective:     Vital Signs (Most Recent):  Temp: 98 °F (36.7 °C) (07/05/24 2330)  Pulse: 80 (07/06/24 0600)  Resp: (!) 36 (07/06/24 0600)  BP: 102/68 (07/06/24 0600)  SpO2: (!) 92 % (07/06/24 0600) Vital Signs (24h Range):  Temp:  [97.8 °F (36.6 °C)-99.8 °F (37.7 °C)] 98 °F (36.7 °C)  Pulse:  [] 80  Resp:  [17-57] 36  SpO2:  [86 %-100 %] 92 %  BP: ()/(42-69) 102/68  Arterial Line BP: (102-135)/(46-77) 107/77     Weight: 133.7 kg (294 lb 12.1 oz)  Height: 6' (182.9 cm)  Body mass index is 39.98 kg/m².      Intake/Output Summary (Last 24 hours) at 7/6/2024 0718  Last data filed at 7/6/2024 0626  Gross per 24 hour   Intake 2018.75 ml   Output 1805 ml   Net 213.75 ml        General    Nursing note and vitals reviewed.  Constitutional: He is oriented to person, place, and time. He appears well-developed and well-nourished. No distress.   HENT:   Head: Normocephalic and atraumatic.   Nose: Nose normal.   Eyes: EOM are normal.   Pulmonary/Chest: Effort normal. No respiratory distress.   Neurological: He is alert and oriented to person, place, and time.   Psychiatric: He has a normal mood and affect. His behavior is normal. Thought content normal.             Left Shoulder Exam     Inspection/Observation   Swelling: absent  Deformity: present    Tenderness   The patient is experiencing no tenderness.     Range of Motion   Extension:  abnormal   Forward Flexion:  abnormal   Forward Elevation: abnormal  Adduction: abnormal  External Rotation 90 degrees: abnormal  Internal rotation 90 degrees:  abnormal     Other   Sensation: normal     Comments:  Dressing to the left shoulder, clean dry and intact.  ABD pad with Xeroform gauze.  No evidence  of wound failure dehiscence.    No erythema, no ecchymosis, no signs and symptoms of infection, no fluctuance.      Vascular Exam       Left Pulses      Radial:                    2+      Capillary Refill  Left Hand: normal capillary refill           Significant Labs:   Recent Lab Results  (Last 5 results in the past 24 hours)        07/06/24  0534   07/05/24  1907   07/05/24  1836   07/05/24  1809   07/05/24  1540        Albumin 2.1               ALP 91               ALT 12               Anion Gap 7               AST 17               Baso # 0.05               Basophil % 0.4               BILIRUBIN TOTAL 0.3  Comment: For infants and newborns, interpretation of results should be based  on gestational age, weight and in agreement with clinical  observations.    Premature Infant recommended reference ranges:  Up to 24 hours.............<8.0 mg/dL  Up to 48 hours............<12.0 mg/dL  3-5 days..................<15.0 mg/dL  6-29 days.................<15.0 mg/dL                 BUN 31               Calcium 7.7               Chloride 106               CO2 26               Creatinine 1.2               Differential Method Automated               eGFR >60.0               Eos # 0.5               Eos % 4.0               Glucose 154               GRAM STAIN   Few WBC's  [P]                No organisms seen  [P]             Gran # (ANC) 8.2               Gran % 70.4               Hematocrit 26.1               Hemoglobin 8.1               Immature Grans (Abs) 0.13  Comment: Mild elevation in immature granulocytes is non specific and   can be seen in a variety of conditions including stress response,   acute inflammation, trauma and pregnancy. Correlation with other   laboratory and clinical findings is essential.                 Immature Granulocytes 1.1               Lymph # 1.7               Lymph % 14.3               MCH 25.6               MCHC 31.0               MCV 83               Mono # 1.1               Mono % 9.8                MPV 9.8               nRBC 0               Platelet Count 439               POC BE     2   2         POC Glucose     162   150   158       POC HCO3     27.4   26.3         POC Hematocrit     28   25         POC Ionized Calcium     1.13   1.12         POC PCO2     50.9   41.2         POC PH     7.339   7.413         POC PO2     392   369         Potassium, Blood Gas     4.6   4.4         POC SATURATED O2     100   100         Sodium, Blood Gas     138   138         POC TCO2     29   28         Potassium 4.2               PROTEIN TOTAL 5.9               RBC 3.16               RDW 17.2               Sample     ARTERIAL   ARTERIAL         Sodium 139               WBC 11.62                                       [P] - Preliminary Result               Significant Imaging: None

## 2024-07-06 NOTE — PROGRESS NOTES
Follow-up progress note from visit this morning, July 6th.      I have personally reviewed the x-ray of the left shoulder.  Comparison to images taken on July 2nd.  No significant interval change in the appearance of the left proximal humerus fracture.  Again noted are multiple gas foci.  Taking into account variations of technique with the x-ray I do not appreciate any significant change or worsening of the presence of gas within the left shoulder.    This was discussed with infectious disease.  Based on the patient's history of chronic kidney disease and is current deteriorated state, advanced imaging and administration of IV contrast is contraindicated.    We will continue to monitor with serial x-rays looking for expanding gas.

## 2024-07-06 NOTE — SUBJECTIVE & OBJECTIVE
Subjective:     Interval History: patient resting in bed. Wife bedside. No signs of dehiscence to right foot surgical site.     Follow-up For: Procedure(s) (LRB):  VATS, WITH DECORTICATION, LUNG (Right)    Post-Operative Day: 1 Day Post-Op    Scheduled Meds:   (Magic mouthwash) 1:1:1 diphenhydrAMINE(Benadryl) 12.5mg/5ml liq, aluminum & magnesium hydroxide-simethicone (Maalox), LIDOcaine viscous 2%  10 mL Swish & Spit QID    ceftaroline (Teflaro) IV (PEDS and ADULTS)  600 mg Intravenous Q8H    epoetin leonel-epbx  100 Units/kg Subcutaneous Q7 Days    ferrous sulfate  1 tablet Oral Daily    insulin glargine U-100  15 Units Subcutaneous Daily    LIDOcaine  1 patch Transdermal Q24H    meropenem IV (PEDS and ADULTS)  1 g Intravenous Q8H    metoprolol succinate  100 mg Oral QHS    potassium chloride  20 mEq Oral BID    sodium chloride 0.9%  10 mL Intravenous Q6H     Continuous Infusions:   0.9% NaCl   Intravenous Continuous 75 mL/hr at 07/06/24 0516 New Bag at 07/06/24 0516     PRN Meds:  Current Facility-Administered Medications:     0.9%  NaCl infusion (for blood administration), , Intravenous, Q24H PRN    0.9%  NaCl infusion (for blood administration), , Intravenous, Q24H PRN    albuterol-ipratropium, 3 mL, Nebulization, Q4H PRN    aluminum & magnesium hydroxide-simethicone, 15 mL, Oral, QID PRN    dextrose 10%, 12.5 g, Intravenous, PRN    dextrose 10%, 25 g, Intravenous, PRN    glucagon (human recombinant), 1 mg, Intramuscular, PRN    glucose, 16 g, Oral, PRN    glucose, 24 g, Oral, PRN    HYDROcodone-acetaminophen, 1 tablet, Oral, Q4H PRN    HYDROcodone-acetaminophen, 1 tablet, Oral, Q4H PRN    insulin aspart U-100, 0-10 Units, Subcutaneous, QID (AC + HS) PRN    magnesium sulfate IVPB, 2 g, Intravenous, Daily PRN    metoprolol, 5 mg, Intravenous, Q5 Min PRN    naloxone, 0.02 mg, Intravenous, PRN    ondansetron, 8 mg, Oral, Q8H PRN    potassium bicarbonate, 35 mEq, Oral, PRN    potassium bicarbonate, 60 mEq, Oral,  PRN    potassium, sodium phosphates, 2 packet, Oral, PRN    potassium, sodium phosphates, 2 packet, Oral, PRN    potassium, sodium phosphates, 2 packet, Oral, PRN    promethazine, 25 mg, Oral, Q6H PRN    simethicone, 1 tablet, Oral, QID PRN    sodium chloride 0.9%, 10 mL, Intravenous, Q12H PRN    Flushing PICC/Midline Protocol, , , Until Discontinued **AND** sodium chloride 0.9%, 10 mL, Intravenous, Q6H **AND** sodium chloride 0.9%, 10 mL, Intravenous, PRN    sodium chloride 0.9%, 10 mL, Intravenous, Q12H PRN    traMADoL, 50 mg, Oral, Q4H PRN    Review of Systems  Objective:     Vital Signs (Most Recent):  Temp: 97.8 °F (36.6 °C) (07/06/24 1101)  Pulse: 102 (07/06/24 1101)  Resp: (!) 22 (07/06/24 1248)  BP: 107/63 (07/06/24 1101)  SpO2: (!) 89 % (07/06/24 1101) Vital Signs (24h Range):  Temp:  [97.8 °F (36.6 °C)-98.2 °F (36.8 °C)] 97.8 °F (36.6 °C)  Pulse:  [] 102  Resp:  [17-41] 22  SpO2:  [89 %-100 %] 89 %  BP: ()/(50-69) 107/63  Arterial Line BP: ()/(46-77) 136/65     Weight: 133.7 kg (294 lb 12.1 oz)  Body mass index is 39.98 kg/m².    Foot Exam        New picture taken did not load  Laboratory:  All pertinent labs reviewed within the last 24 hours.    Diagnostic Results:  I have reviewed all pertinent imaging results/findings within the past 24 hours.

## 2024-07-06 NOTE — ASSESSMENT & PLAN NOTE
Resolved now   S/p gentle hydration on Zosyn/vanc but those have been stopped  Renal US neg for obstruction

## 2024-07-06 NOTE — PROGRESS NOTES
[facility]  Podiatry  Progress Note    Patient Name: Roosevelt Moser  MRN: [id number]  Admission Date: 6/14/2024  Hospital Length of Stay: 22 days  Attending Physician: Nabil Paz MD  Primary Care Provider: Miguel Angel Enriquez PA-C     Subjective:     Interval History: patient resting in bed. Wife bedside. No signs of dehiscence to right foot surgical site.     Follow-up For: Procedure(s) (LRB):  VATS, WITH DECORTICATION, LUNG (Right)    Post-Operative Day: 1 Day Post-Op    Scheduled Meds:   (Magic mouthwash) 1:1:1 diphenhydrAMINE(Benadryl) 12.5mg/5ml liq, aluminum & magnesium hydroxide-simethicone (Maalox), LIDOcaine viscous 2%  10 mL Swish & Spit QID    ceftaroline (Teflaro) IV (PEDS and ADULTS)  600 mg Intravenous Q8H    epoetin leonel-epbx  100 Units/kg Subcutaneous Q7 Days    ferrous sulfate  1 tablet Oral Daily    insulin glargine U-100  15 Units Subcutaneous Daily    LIDOcaine  1 patch Transdermal Q24H    meropenem IV (PEDS and ADULTS)  1 g Intravenous Q8H    metoprolol succinate  100 mg Oral QHS    potassium chloride  20 mEq Oral BID    sodium chloride 0.9%  10 mL Intravenous Q6H     Continuous Infusions:   0.9% NaCl   Intravenous Continuous 75 mL/hr at 07/06/24 0516 New Bag at 07/06/24 0516     PRN Meds:  Current Facility-Administered Medications:     0.9%  NaCl infusion (for blood administration), , Intravenous, Q24H PRN    0.9%  NaCl infusion (for blood administration), , Intravenous, Q24H PRN    albuterol-ipratropium, 3 mL, Nebulization, Q4H PRN    aluminum & magnesium hydroxide-simethicone, 15 mL, Oral, QID PRN    dextrose 10%, 12.5 g, Intravenous, PRN    dextrose 10%, 25 g, Intravenous, PRN    glucagon (human recombinant), 1 mg, Intramuscular, PRN    glucose, 16 g, Oral, PRN    glucose, 24 g, Oral, PRN    HYDROcodone-acetaminophen, 1 tablet, Oral, Q4H PRN    HYDROcodone-acetaminophen, 1 tablet, Oral, Q4H PRN    insulin aspart U-100, 0-10 Units, Subcutaneous, QID (AC + HS) PRN    magnesium  sulfate IVPB, 2 g, Intravenous, Daily PRN    metoprolol, 5 mg, Intravenous, Q5 Min PRN    naloxone, 0.02 mg, Intravenous, PRN    ondansetron, 8 mg, Oral, Q8H PRN    potassium bicarbonate, 35 mEq, Oral, PRN    potassium bicarbonate, 60 mEq, Oral, PRN    potassium, sodium phosphates, 2 packet, Oral, PRN    potassium, sodium phosphates, 2 packet, Oral, PRN    potassium, sodium phosphates, 2 packet, Oral, PRN    promethazine, 25 mg, Oral, Q6H PRN    simethicone, 1 tablet, Oral, QID PRN    sodium chloride 0.9%, 10 mL, Intravenous, Q12H PRN    Flushing PICC/Midline Protocol, , , Until Discontinued **AND** sodium chloride 0.9%, 10 mL, Intravenous, Q6H **AND** sodium chloride 0.9%, 10 mL, Intravenous, PRN    sodium chloride 0.9%, 10 mL, Intravenous, Q12H PRN    traMADoL, 50 mg, Oral, Q4H PRN    Review of Systems  Objective:     Vital Signs (Most Recent):  Temp: 97.8 °F (36.6 °C) (07/06/24 1101)  Pulse: 102 (07/06/24 1101)  Resp: (!) 22 (07/06/24 1248)  BP: 107/63 (07/06/24 1101)  SpO2: (!) 89 % (07/06/24 1101) Vital Signs (24h Range):  Temp:  [97.8 °F (36.6 °C)-98.2 °F (36.8 °C)] 97.8 °F (36.6 °C)  Pulse:  [] 102  Resp:  [17-41] 22  SpO2:  [89 %-100 %] 89 %  BP: ()/(50-69) 107/63  Arterial Line BP: ()/(46-77) 136/65     Weight: 133.7 kg (294 lb 12.1 oz)  Body mass index is 39.98 kg/m².    Foot Exam        New picture taken did not load  Laboratory:  All pertinent labs reviewed within the last 24 hours.    Diagnostic Results:  I have reviewed all pertinent imaging results/findings within the past 24 hours.    Assessment/Plan:     ID  Osteomyelitis of toe  Patient can weight bear only while wearing the camboot, until the sutures are removed in approximately 2- 3 weeks.    Recommend dressing changes to consist of adaptic followed by gauze followed by lightly wrapped kerlix- dressing needs to be changed every 3-4 days.    Catarino and protein drinks daily.          Yocasta Zhu DPM  Podiatry  [facility]  [facility]

## 2024-07-06 NOTE — ASSESSMENT & PLAN NOTE
Chronic AF w/ acute RVR, resolved s/p cardizem drip     TTE from March 2024:    Left Ventricle: The left ventricle is normal in size. Normal wall thickness. Mild global hypokinesis present. There is mildly reduced systolic function with a visually estimated ejection fraction of 45 - 50%. There is normal diastolic function.    Right Ventricle: Normal right ventricular cavity size. Wall thickness is normal. Right ventricle wall motion  is normal. Systolic function is normal.    Left Atrium: Left atrium is moderately dilated.    IVC/SVC: Normal venous pressure at 3 mmHg.    NOAC has been on hold d/t possible need for shoulder surgery - has been on prophylaxis dose of LovenoxResume Eliquis as soon as feasible postop after clear by surgeon

## 2024-07-06 NOTE — PROGRESS NOTES
Pulmonary/Critical Care Progress Note      Patient name: Roosevelt Moser  MRN: [id number]  Date: 07/06/2024    Admit Date: 6/14/2024  Consult Requested By: Nabil Paz MD    Reason for Consult: Pleural effusion, chest wall abscess    HPI:    7/3/2024 - Pt has been at [facility] for several weeks with toe osteomyelitis and shoulder abscess and has been treated.  Today he was noted to have left chest wall swelling and CT chest shows chest wall abscess with extension into chest and mediastinum and rib destruction.  He also has pleural effusions.  He was transferred here for possible surgical intervention and I am told that surgery feels that we also need CT surgery involved.  CT surgery is in a case at another facility and we are waiting to hear from him.  Another option may be transfer to [facility] for further management.  He feels OK, is wearing CpAP (a chronic treatment), denies any acute respiratory issues.  He admits to should and chest discomfort.  He denies fever.  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        7/5 the patient underwent a left thoracentesis followed by an I&D of his chest wall abscess.  Dr. Escobedo states that the abscess extended down to the rectus abdominis muscles and to the pericardium but there was no pericardial effusion.  The patient also has a loculated right effusion.  As TNK and dornase are not a good option for lysing the adhesions, a VATS with mechanical lysis and drainage is necessary.  The patient is distressed that he is NPO.  The patient refused to participate with PT, even refusing to sit on the side of the bed.    The patient is going to need to transfer to a facility that has thoracic surgery and reconstructive plastic surgery.  The patient has osteomyelitis of multiple ribs which will need further debridement and then a flap placed.  We do not have this ability at this facility.    7/6 patient had an uneventful VATS  yesterday.  He has serosanguineous drainage and no leak.    Review of Systems    Review of Systems   Constitutional:  Positive for malaise/fatigue. Negative for chills, diaphoresis, fever and weight loss.   HENT:  Negative for congestion.    Eyes:  Negative for pain.   Respiratory:  Negative for cough, hemoptysis, sputum production, shortness of breath, wheezing and stridor.    Cardiovascular:  Positive for chest pain. Negative for palpitations, orthopnea, claudication, leg swelling and PND.   Gastrointestinal:  Negative for abdominal pain, constipation, diarrhea, heartburn, nausea and vomiting.   Genitourinary:  Negative for dysuria, frequency and urgency.   Musculoskeletal:  Positive for joint pain. Negative for falls and myalgias.        The left shoulder is painful.  It is also edematous.   Neurological:  Positive for weakness. Negative for sensory change and focal weakness.   Psychiatric/Behavioral:  Negative for depression. The patient is not nervous/anxious.        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    Hyperlipidemia     Hypertension 2016    Sleep apnea        Past Surgical History    Past Surgical History:   Procedure Laterality Date    ARTHROTOMY OF SHOULDER Left 6/21/2024    Procedure: ARTHROTOMY, SHOULDER;  Surgeon: Roman Paulson MD;  Location: [facility] OR;  Service: Orthopedics;  Laterality: Left;    IRRIGATION AND DEBRIDEMENT OF UPPER EXTREMITY Left 6/24/2024    Procedure: IRRIGATION AND DEBRIDEMENT, UPPER EXTREMITY;  Surgeon: Nathan Cooper MD;  Location: [facility] OR;  Service: Orthopedics;  Laterality: Left;    IRRIGATION AND DEBRIDEMENT OF UPPER EXTREMITY Left 6/27/2024    Procedure: IRRIGATION AND DEBRIDEMENT, UPPER EXTREMITY;  Surgeon: Nathan Cooper MD;  Location: [facility] OR;  Service: Orthopedics;  Laterality: Left;    OPEN REDUCTION AND INTERNAL FIXATION (ORIF) OF FRACTURE OF PROXIMAL HUMERUS  Left 3/25/2024    Procedure: ORIF, FRACTURE, HUMERUS, PROXIMAL/IM HANNA, LEFT;  Surgeon: Nathan Cooper MD;  Location: [facility] OR;  Service: Orthopedics;  Laterality: Left;  Accumed, Synthes Small Frag set Avelino verified 3/22/24 ark    TOE AMPUTATION Right 7/1/2024    Procedure: AMPUTATION, TOE;  Surgeon: Stevie Zhu DPM;  Location: [facility] OR;  Service: Podiatry;  Laterality: Right;       Medications (scheduled):      (Magic mouthwash) 1:1:1 diphenhydrAMINE(Benadryl) 12.5mg/5ml liq, aluminum & magnesium hydroxide-simethicone (Maalox), LIDOcaine viscous 2%  10 mL Swish & Spit QID    ceftaroline (Teflaro) IV (PEDS and ADULTS)  600 mg Intravenous Q8H    epoetin leonel-epbx  100 Units/kg Subcutaneous Q7 Days    ferrous sulfate  1 tablet Oral Daily    insulin glargine U-100  15 Units Subcutaneous Daily    LIDOcaine  1 patch Transdermal Q24H    meropenem IV (PEDS and ADULTS)  1 g Intravenous Q8H    metoprolol succinate  100 mg Oral QHS    micafungin (MYCAMINE) IVPB  100 mg Intravenous Q24H    potassium chloride  20 mEq Oral BID    sodium chloride 0.9%  10 mL Intravenous Q6H       Medications (infusions):      0.9% NaCl   Intravenous Continuous 75 mL/hr at 07/06/24 0516 New Bag at 07/06/24 0516       Medications (prn):       Current Facility-Administered Medications:     0.9%  NaCl infusion (for blood administration), , Intravenous, Q24H PRN    0.9%  NaCl infusion (for blood administration), , Intravenous, Q24H PRN    albuterol-ipratropium, 3 mL, Nebulization, Q4H PRN    aluminum & magnesium hydroxide-simethicone, 15 mL, Oral, QID PRN    dextrose 10%, 12.5 g, Intravenous, PRN    dextrose 10%, 25 g, Intravenous, PRN    glucagon (human recombinant), 1 mg, Intramuscular, PRN    glucose, 16 g, Oral, PRN    glucose, 24 g, Oral, PRN    HYDROcodone-acetaminophen, 1 tablet, Oral, Q4H PRN    HYDROcodone-acetaminophen, 1 tablet, Oral, Q4H PRN    insulin aspart U-100, 0-10 Units, Subcutaneous, QID (AC + HS) PRN    magnesium sulfate IVPB,  2 g, Intravenous, Daily PRN    metoprolol, 5 mg, Intravenous, Q5 Min PRN    naloxone, 0.02 mg, Intravenous, PRN    ondansetron, 8 mg, Oral, Q8H PRN    potassium bicarbonate, 35 mEq, Oral, PRN    potassium bicarbonate, 60 mEq, Oral, PRN    potassium, sodium phosphates, 2 packet, Oral, PRN    potassium, sodium phosphates, 2 packet, Oral, PRN    potassium, sodium phosphates, 2 packet, Oral, PRN    promethazine, 25 mg, Oral, Q6H PRN    simethicone, 1 tablet, Oral, QID PRN    sodium chloride 0.9%, 10 mL, Intravenous, Q12H PRN    Flushing PICC/Midline Protocol, , , Until Discontinued **AND** sodium chloride 0.9%, 10 mL, Intravenous, Q6H **AND** sodium chloride 0.9%, 10 mL, Intravenous, PRN    sodium chloride 0.9%, 10 mL, Intravenous, Q12H PRN    traMADoL, 50 mg, Oral, Q4H PRN    Family History: No family history on file.    Social History: Tobacco:   Social History     Tobacco Use   Smoking Status Never   Smokeless Tobacco Never                                EtOH:   Social History     Substance and Sexual Activity   Alcohol Use Yes    Alcohol/week: 0.0 standard drinks of alcohol    Comment: social                                Drugs:   Social History     Substance and Sexual Activity   Drug Use No       Physical Exam    Vital signs:  Temp:  [97.8 °F (36.6 °C)-98.7 °F (37.1 °C)]   Pulse:  [72-90]   Resp:  [17-45]   BP: ()/(42-69)   SpO2:  [89 %-100 %]   Arterial Line BP: ()/(46-77)     Intake/Output:   Intake/Output Summary (Last 24 hours) at 7/6/2024 1033  Last data filed at 7/6/2024 0626  Gross per 24 hour   Intake 1618.75 ml   Output 1595 ml   Net 23.75 ml        BMI: Estimated body mass index is 39.98 kg/m² as calculated from the following:    Height as of this encounter: 6' (1.829 m).    Weight as of this encounter: 133.7 kg (294 lb 12.1 oz).    Physical Exam  Vitals and nursing note reviewed.   Constitutional:       General: He is not in acute distress.     Appearance: Normal appearance. He is obese. He  is ill-appearing. He is not toxic-appearing or diaphoretic.      Comments: Wearing CPAP   HENT:      Head: Normocephalic and atraumatic.      Right Ear: External ear normal.      Left Ear: External ear normal.      Nose: Nose normal.      Mouth/Throat:      Mouth: Mucous membranes are moist.      Pharynx: Oropharynx is clear. No oropharyngeal exudate.   Eyes:      General: No scleral icterus.        Right eye: No discharge.         Left eye: No discharge.      Extraocular Movements: Extraocular movements intact.      Conjunctiva/sclera: Conjunctivae normal.      Pupils: Pupils are equal, round, and reactive to light.   Neck:      Vascular: No carotid bruit.   Cardiovascular:      Rate and Rhythm: Normal rate and regular rhythm.      Pulses: Normal pulses.      Heart sounds: Normal heart sounds. No murmur heard.     No friction rub. No gallop.      Comments: Left thoracic lateral wall has a Hemovac drain draining a pink brown pus.  Some pus is coming around the drain.  There is a small dressing over the epigastrium  Pulmonary:      Effort: Pulmonary effort is normal. No respiratory distress.      Breath sounds: Normal breath sounds. No stridor. No wheezing, rhonchi or rales.      Comments: There are crackles in the right base.  Chest:      Chest wall: No tenderness.   Abdominal:      General: Bowel sounds are normal. There is no distension.      Tenderness: There is no abdominal tenderness. There is no guarding.      Comments: obese   Musculoskeletal:         General: No swelling. Normal range of motion.      Cervical back: Normal range of motion and neck supple. No rigidity or tenderness.      Right lower leg: No edema.      Left lower leg: No edema.      Comments: Surgical dressing on foot, right 2nd toe has been amputated  Hardware from the left shoulder has been removed, that shoulder is dressed.  Double-lumen PICC on the right   Lymphadenopathy:      Cervical: No cervical adenopathy.   Skin:     General: Skin is  warm and dry.      Capillary Refill: Capillary refill takes less than 2 seconds.      Coloration: Skin is not jaundiced.      Findings: No bruising.   Neurological:      General: No focal deficit present.      Mental Status: He is alert. Mental status is at baseline.      Cranial Nerves: No cranial nerve deficit.      Sensory: No sensory deficit.      Motor: No weakness.      Comments: Oriented x 2   Psychiatric:         Mood and Affect: Mood normal.         Behavior: Behavior normal.         Thought Content: Thought content normal.         Judgment: Judgment normal.         Laboratory    Recent Labs   Lab 07/06/24  0534   WBC 11.62   RBC 3.16*   HGB 8.1*   HCT 26.1*      MCV 83   MCH 25.6*   MCHC 31.0*       Recent Labs   Lab 07/06/24  0534   CALCIUM 7.7*   ALBUMIN 2.1*   PROT 5.9*      K 4.2   CO2 26      BUN 31*   CREATININE 1.2   ALKPHOS 91   ALT 12   AST 17   BILITOT 0.3         Microbiology:       Microbiology Results (last 7 days)       Procedure Component Value Units Date/Time    Aerobic culture [[id number]] Collected: 07/05/24 1907    Order Status: Completed Specimen: Pleural Fluid Updated: 07/06/24 0911     Aerobic Bacterial Culture No growth    Narrative:      Right pleural fluid    Culture, Body Fluid (Aerobic) w/ GS [[id number]] Collected: 07/04/24 0846    Order Status: Completed Specimen: Pleural Fluid Updated: 07/06/24 0902     AEROBIC CULTURE - FLUID No growth     Gram Stain Result Moderate WBC's      No organisms seen    Narrative:      Left Pleural fluid    Aerobic culture [[id number]] Collected: 07/04/24 1113    Order Status: Completed Specimen: Abscess from Chest, Left Updated: 07/06/24 0902     Aerobic Bacterial Culture No growth    Gram stain [[id number]] Collected: 07/05/24 1907    Order Status: Completed Specimen: Pleural Fluid Updated: 07/05/24 2052     Gram Stain Result Few WBC's      No organisms seen    Narrative:      Right pleural fluid    Blood culture [[id number]]  Collected: 07/03/24 1332    Order Status: Completed Specimen: Blood from Peripheral, Hand, Left Updated: 07/05/24 2032     Blood Culture, Routine No Growth to date      No Growth to date      No Growth to date    Culture, Anaerobic [[id number]] Collected: 07/05/24 1907    Order Status: Sent Specimen: Pleural Fluid Updated: 07/05/24 1941    Gram stain [[id number]] Collected: 07/04/24 1113    Order Status: Completed Specimen: Abscess from Chest, Left Updated: 07/05/24 1607     Gram Stain Result Moderate WBC's      No organisms seen    Narrative:      Chest, Left    Fungus culture [[id number]] Collected: 07/04/24 1113    Order Status: Sent Specimen: Abscess from Chest, Left Updated: 07/04/24 1129    AFB Culture & Smear [[id number]] Collected: 07/04/24 1113    Order Status: Sent Specimen: Abscess from Chest, Left Updated: 07/04/24 1128    Culture, Anaerobe [[id number]] Collected: 07/04/24 1113    Order Status: Sent Specimen: Abscess from Chest, Left Updated: 07/04/24 1128    Fungus culture [[id number]] Collected: 07/04/24 0859    Order Status: Sent Specimen: Pleural Fluid Updated: 07/04/24 1010    AFB Culture & Smear [[id number]] Collected: 07/04/24 0805    Order Status: No result Specimen: Pleural Fluid Updated: 07/04/24 0856    Culture, Anaerobic [[id number]] Collected: 07/04/24 0804    Order Status: No result Specimen: Pleural Fluid Updated: 07/04/24 0855    Culture, Anaerobic [[id number]]     Order Status: Completed Specimen: Pleural Fluid     AFB Culture & Smear [[id number]]     Order Status: Completed Specimen: Pleural Fluid     Fungus culture [[id number]] Collected: 06/21/24 1645    Order Status: Completed Specimen: Abscess from Shoulder, Left Updated: 07/04/24 0444     Fungus (Mycology) Culture No fungal growth to date      No fungal growth to date    Fungus culture [[id number]] Collected: 06/20/24 1204    Order Status: Completed Specimen: Abscess from Shoulder, Left Updated: 07/04/24 0444     Fungus (Mycology)  Culture No fungal growth to date      No fungal growth to date    Aerobic culture [[id number]] Collected: 06/27/24 0733    Order Status: Completed Specimen: Incision site from Shoulder, Left Updated: 07/01/24 0704     Aerobic Bacterial Culture No growth    Culture, Anaerobe [[id number]] Collected: 06/27/24 0733    Order Status: Completed Specimen: Incision site from Shoulder, Left Updated: 06/29/24 1441     Anaerobic Culture No anaerobes isolated            Radiology    X-Ray Shoulder 2 or More Views Left  Narrative: EXAMINATION:  XR SHOULDER COMPLETE 2 OR MORE VIEWS LEFT    CLINICAL HISTORY:  History of displaced left proximal humerus fracture;    TECHNIQUE:  Two or three views of the right shoulder were performed.    COMPARISON:  07/02/2024    FINDINGS:  Again redemonstrated is a fracture through the surgical neck of the humerus with evidence of the humeral shaft component being distracted laterally and the head component showing evidence of mild medial distraction with overlapping of the fracture fragments on the order of 2.5 cm.  Humeral head slightly distracted inferiorly.  Numerous intra-articular loose bodies are observed projected over the humeral head.  The glenoid rim appears well maintained.  Mild AC joint osteoarthritis.  Left lung is clear.  Small foci of soft tissue gas projecting over the apical edge of the distracted humeral shaft.  Impression: 1. Fracture through the surgical neck of the humerus with lateral distraction of the distal component and overlapping segments on the order of 2.5 cm.  2. Overall, no appreciable change upon comparison.    Electronically signed by: Jake Engle MD  Date:    07/06/2024  Time:    08:23  X-Ray Chest AP Portable  Narrative: EXAMINATION:  XR CHEST AP PORTABLE    CLINICAL HISTORY:  post VATS right;    TECHNIQUE:  Single frontal view of the chest was performed.    COMPARISON:  07/05/2024    FINDINGS:  Cardiopericardial silhouette appears prominent size with equal  size parameters as compared to comparison study.  Right thoracostomy tube has its tip projecting in the right apex, though no definitive pneumothorax is observed.  Right-sided PICC line catheter has its tip projecting over the superior vena cava.  Diffuse vascular prominence with upward distribution of flow is on the basis of mild pulmonary edema/congestive heart failure.  No evidence of pleural effusion.  Chronic degenerative spondylosis changes of the thoracic spine.  Impression: 1.  Manifestations of mild congestive heart failure bordering on mild pulmonary edema.    2.  Right-sided thoracostomy tube terminating within the right apex.  No definitive pneumothorax.    Electronically signed by: Jake Engle MD  Date:    07/06/2024  Time:    08:21      Oxygen Information       Patient is wearing his home CPAP           Recent Labs     07/05/24  1836   PH 7.339*   PCO2 50.9*   PO2 392*   HCO3 27.4   POCSATURATED 100   BE 2         Impression    Active Hospital Problems    Diagnosis  POA    *Acute renal failure superimposed on chronic kidney disease [N17.9, N18.9]  Yes    Chest wall abscess [L02.213]  No    Pleural effusion [J90]  No    MRSA infection [A49.02]  Yes    Pyogenic arthritis of left shoulder region [M00.9]  Yes    Osteomyelitis of toe [M86.9]  Yes    Abscess of left shoulder [L02.414]  Yes    Debility [R53.81]  Yes    Skin tear of left upper extremity [S41.112A]  Yes    Hyperkalemia [E87.5]  Yes    Severe sepsis [A41.9, R65.20]  Yes    Atrial fibrillation with rapid ventricular response [I48.91]  Yes    History of Guillain-[place] syndrome [Z86.69]  Not Applicable    Type 2 diabetes mellitus [E11.9]  Yes    MARA (obstructive sleep apnea) [G47.33]  Yes    Morbid obesity [E66.01]  Yes    Hypertension [I10]  Yes      Resolved Hospital Problems    Diagnosis Date Resolved POA    Swelling of limb, left [M79.89] 07/06/2024 Yes   Infected left shoulder with hardware  - hardware removed  Large chest wall abscess  -  extending down to the rectus abdominis  - with 3 ribs involved  - contiguous with the pericardium but no obvious pericardial effusion  - ID following, cultures are negative at this time  - patient will need debridement of the infected ribs and then plastic surgery to swing a flap.  I have spoken with the transfer center and the plastic surgeon on-call and Dr. Echavarria.  He wishes to speak with Dr. Escobedo.  Once they have spoken hopefully we will have some answers as to when he would transfer.  Bilateral pleural effusions  - status post thoracentesis on the left and VATS on the right  - fluid exudative with a lymphocytic predominance, stains and cultures negative at this time  Type 2 diabetes with osteomyelitis of the 2nd toe   - now resected  - MRSA  Obstructive sleep apnea  - patient using his home CPAP  Morbid obesity/moderate hypoalbuminemia  Anemia  Diabetes mellitus  - continue Accu-Cheks and sliding scale    Patient is going to need further debridement of the ribs and then a flap to cover the defect.  This will require reconstructive plastic surgeons as well as thoracic surgeons and we do not have that combination of physicians here.  Have requested transfer to [facility] for this to happen.  Awaiting Dr. Bansal and Dr. Escobedo to speak to determine when this should happen.    Discussed with nursing and Respiratory and Cardiothoracic surgery and the patient in his wife.    Patient is stable to move out of the ICU.

## 2024-07-06 NOTE — ASSESSMENT & PLAN NOTE
S/p  I&D of a chest wall abscess on 7/4/24. He had a thoracentesis of 1550 cc. Abscess extended down to the rectus abdominis muscles and to the pericardium but there was no pericardial effusion.  The patient also has a loculated right effusion s/p VATS and chest tube  Continue antibiotics     As per chart review, The patient is going to need to transfer to a facility that has thoracic surgery and reconstructive plastic surgery. The patient has osteomyelitis of multiple ribs which will need further debridement and then a flap placed. Unfortunately, [facility] do not have this capability at this facility.   No transfer order or not in process of transfer

## 2024-07-06 NOTE — OP NOTE
Date of Operation: July 5, 2024      Pre-Operative Diagnosis: Loculated Right Pleural Effusion      Post-Operative Diagnosis: Loculated Right Pleural Effusion      Operation: Right Video Assisted Thoracoscopy      Surgeon: Gus Escobedo MD      Assistant: None      Anesthesia: General      Estimated Blood Loss: 5 cc      Indication for Procedure:         The patient is a 73-year-old obese diabetic male who is currently hospitalized at [facility] after removal of infected left shoulder orthopedic hardware, incision and drainage of a large chest wall abscess, and now has a loculated right pleural effusion seen on ultrasound evaluation of his right chest.  The decision has been made to proceed with thorascopic evacuation of his right loculated pleural effusion.      Procedure in Detail:         The patient was transferred from the Intensive Care Unit to the Operating Theater and placed on the operating table in the supine position.  EKG, pulse oximetry monitoring line and a noninvasive blood pressure cuff were applied.  EKG, pulse oximetry line and a noninvasive blood pressure cuff were applied.  The patient underwent induction, intubation and administration of general anesthesia via double-lumen endotracheal tube.  Appropriate positioning of the double-lumen endotracheal tube was confirmed by the Anesthesia Service.  A left radial arterial line was inserted using sterile technique by the Anesthesia Service.  Sequential compression devices were on the lower extremities.  Care was taken to avoid any mishandling of his left arm and shoulder.  A soft roll was placed behind the patient's right back, tilting him towards the left slightly.  His right arm was carefully secured away from the right chest operative field.  The patient's right chest and lower abdomen were clipped of hair, prepped with Hibiclens scrub solution, followed by Betadine, followed by alcohol and then skin .  The operative  "field was draped in a sterile standard fashion with an Ioban drape.  A " time-out " was performed, confirming the patient's identity and planned procedure.         The right lung was selectively deflated with a suction catheter down the right-sided endotracheal tube.  At the level of the seventh intercostal space along the posterior axillary line, a two centimeter incision was made and blunt dissection was continued into the right pleural cavity.  Introduction of a gloved finger broke up some loculations in the immediate area.  A suction cannula was introduced and twenty millimeters of fluid was aspirated for Gram stain and culture.  A 10 mm thoracoscope was introduced into the pleural space, breaking up adhesions, mostly along the posterior gutter the chest down to the diaphragm.  There was significant pleuritis evident, but I did not encounter any obvious pus.  Once all the pleural fluid had been suctioned and the loculations broken up, a size 28 Romanian chest tube was inserted through the same incision and directed along the posterior gutter the chest.  It was secured the skin with #2 silk suture, and later placed to Pleur-evac suction.  A sterile dressing was placed around the chest tube site.  The patient's lung was aggressively ventilated for maximal aeration.  The patient was then allowed to awaken from anesthesia in the Operating Theater.  He was extubated, then transferred to the Intensive Care Unit in stable condition for further care.             "

## 2024-07-06 NOTE — PROGRESS NOTES
Ct surg post op 5  Pt alert in bed , very pleasant, no co of pain, breathing fair, chun mostly liq diet, does not eat much solids. Unable to stand to move to chair at this time, Has not walked in 3 months?  Exam confined to right and left chest pathology.  Right pleural drain is clear and minimal with no air leak  Left anterior chest hemovac shows good resolution of size , color and pain of abscess of chest wall. Happy that this has worked so well, Plastic surgery is planned per Dr Escobedo at another facility. I am unsure of these details but again the abscess is very improved per the family. A repeat ct scan may be in order to see what is happening below the ribs at this point.  Pt encouraged to work with his IS as his cxr shows some diffuse patchy congestion.  Total protein is low at 5.3 and pt is encouraged  to consume more meat products.

## 2024-07-06 NOTE — SUBJECTIVE & OBJECTIVE
Interval History:   Seen patient and d/w RN   Unsure about transfer initiation   S/p VATS and doing well . Chest tube +  Shoulder wound reviewed . Plans reviewed   Patient is not in distress any kind     Review of Systems  Objective:     Vital Signs (Most Recent):  Temp: 98 °F (36.7 °C) (07/06/24 0330)  Pulse: 79 (07/06/24 0700)  Resp: 20 (07/06/24 0700)  BP: (!) 95/51 (07/06/24 0700)  SpO2: 96 % (07/06/24 0700) Vital Signs (24h Range):  Temp:  [97.8 °F (36.6 °C)-98.7 °F (37.1 °C)] 98 °F (36.7 °C)  Pulse:  [72-90] 79  Resp:  [17-45] 20  SpO2:  [89 %-100 %] 96 %  BP: ()/(42-69) 95/51  Arterial Line BP: ()/(46-77) 89/69     Weight: 133.7 kg (294 lb 12.1 oz)  Body mass index is 39.98 kg/m².    Intake/Output Summary (Last 24 hours) at 7/6/2024 0948  Last data filed at 7/6/2024 0626  Gross per 24 hour   Intake 1618.75 ml   Output 1595 ml   Net 23.75 ml         Physical Exam  Constitutional:       General: He is not in acute distress.     Appearance: He is well-developed.   HENT:      Head: Normocephalic.      Mouth/Throat:      Mouth: Mucous membranes are moist.   Eyes:      Pupils: Pupils are equal, round, and reactive to light.   Cardiovascular:      Rate and Rhythm: Normal rate and regular rhythm.   Pulmonary:      Effort: Pulmonary effort is normal. No respiratory distress.      Comments: Decreased more on right chest   Abdominal:      General: There is no distension.      Tenderness: There is no abdominal tenderness.   Musculoskeletal:         General: Normal range of motion.      Cervical back: Neck supple.   Skin:     General: Skin is warm.      Findings: No rash.   Neurological:      General: No focal deficit present.      Mental Status: He is alert and oriented to person, place, and time.   Psychiatric:         Mood and Affect: Mood normal.             Significant Labs: All pertinent labs within the past 24 hours have been reviewed.  CBC:   Recent Labs   Lab 07/04/24  1602 07/05/24  0312  "07/05/24  1809 07/05/24  1836 07/06/24  0534   WBC 13.56* 11.32  --   --  11.62   HGB 8.2* 8.2*  --   --  8.1*   HCT 26.6* 26.3* 25* 28* 26.1*   * 443  --   --  439     CMP:   Recent Labs   Lab 07/04/24  1602 07/05/24  0312 07/06/24  0534   NA  --  137 139   K 4.1 4.1 4.2   CL  --  103 106   CO2  --  27 26   GLU  --  186* 154*   BUN  --  41* 31*   CREATININE  --  1.3 1.2   CALCIUM  --  7.9* 7.7*   PROT  --  6.1 5.9*   ALBUMIN  --  2.2* 2.1*   BILITOT  --  0.4 0.3   ALKPHOS  --  97 91   AST  --  19 17   ALT  --  14 12   ANIONGAP  --  7* 7*     Cardiac Markers: No results for input(s): "CKMB", "MYOGLOBIN", "BNP", "TROPISTAT" in the last 48 hours.    Significant Imaging: I have reviewed all pertinent imaging results/findings within the past 24 hours.  "

## 2024-07-06 NOTE — ASSESSMENT & PLAN NOTE
IV lasix started and stopped due to MARBIN.   S/p Right VATS with chest tube   Follow CT surgery

## 2024-07-06 NOTE — ASSESSMENT & PLAN NOTE
status post multiple washouts on 06/21/2024, 06/24/2024, 06/26/2024,6/27/24  Need antibiotics until 08/03/2024  Wound VAC in place.  Continue antibiotics.  Following cultures.  Pending possible LTAC placement/possible transfer for reconstructive plastic surgery ?

## 2024-07-07 ENCOUNTER — HOSPITAL ENCOUNTER (INPATIENT)
Facility: HOSPITAL | Age: 74
LOS: 18 days | Discharge: NURSING, REHAB OR LONG-TERM CARE | DRG: 871 | End: 2024-07-25
Attending: INTERNAL MEDICINE | Admitting: STUDENT IN AN ORGANIZED HEALTH CARE EDUCATION/TRAINING PROGRAM
Payer: MEDICARE

## 2024-07-07 VITALS
TEMPERATURE: 97 F | OXYGEN SATURATION: 95 % | HEART RATE: 82 BPM | HEIGHT: 72 IN | BODY MASS INDEX: 39.92 KG/M2 | SYSTOLIC BLOOD PRESSURE: 134 MMHG | DIASTOLIC BLOOD PRESSURE: 60 MMHG | RESPIRATION RATE: 24 BRPM | WEIGHT: 294.75 LBS

## 2024-07-07 DIAGNOSIS — L02.213 CHEST WALL ABSCESS: ICD-10-CM

## 2024-07-07 DIAGNOSIS — R65.20 SEVERE SEPSIS: ICD-10-CM

## 2024-07-07 DIAGNOSIS — M86.00 ACUTE HEMATOGENOUS OSTEOMYELITIS, UNSPECIFIED SITE: Primary | ICD-10-CM

## 2024-07-07 DIAGNOSIS — A41.9 SEVERE SEPSIS: ICD-10-CM

## 2024-07-07 DIAGNOSIS — M86.9 OSTEOMYELITIS: ICD-10-CM

## 2024-07-07 DIAGNOSIS — R07.9 CHEST PAIN: ICD-10-CM

## 2024-07-07 PROBLEM — B37.0 ORAL THRUSH: Status: ACTIVE | Noted: 2024-07-07

## 2024-07-07 LAB
ACID FAST MOD KINY STN SPEC: NORMAL
ALBUMIN SERPL BCP-MCNC: 2.1 G/DL (ref 3.5–5.2)
ALP SERPL-CCNC: 97 U/L (ref 55–135)
ALT SERPL W/O P-5'-P-CCNC: 17 U/L (ref 10–44)
ANION GAP SERPL CALC-SCNC: 7 MMOL/L (ref 8–16)
AST SERPL-CCNC: 25 U/L (ref 10–40)
BASOPHILS # BLD AUTO: 0.08 K/UL (ref 0–0.2)
BASOPHILS NFR BLD: 0.7 % (ref 0–1.9)
BILIRUB SERPL-MCNC: 0.3 MG/DL (ref 0.1–1)
BLD PROD TYP BPU: NORMAL
BLOOD UNIT EXPIRATION DATE: NORMAL
BLOOD UNIT TYPE CODE: 6200
BLOOD UNIT TYPE: NORMAL
BUN SERPL-MCNC: 33 MG/DL (ref 8–23)
CALCIUM SERPL-MCNC: 7.8 MG/DL (ref 8.7–10.5)
CHLORIDE SERPL-SCNC: 104 MMOL/L (ref 95–110)
CO2 SERPL-SCNC: 26 MMOL/L (ref 23–29)
CODING SYSTEM: NORMAL
CREAT SERPL-MCNC: 1.1 MG/DL (ref 0.5–1.4)
CROSSMATCH INTERPRETATION: NORMAL
CRP SERPL-MCNC: >16 MG/DL
DIFFERENTIAL METHOD BLD: ABNORMAL
DISPENSE STATUS: NORMAL
EOSINOPHIL # BLD AUTO: 0.8 K/UL (ref 0–0.5)
EOSINOPHIL NFR BLD: 7.3 % (ref 0–8)
ERYTHROCYTE [DISTWIDTH] IN BLOOD BY AUTOMATED COUNT: 17.3 % (ref 11.5–14.5)
EST. GFR  (NO RACE VARIABLE): >60 ML/MIN/1.73 M^2
GLUCOSE SERPL-MCNC: 130 MG/DL (ref 70–110)
GLUCOSE SERPL-MCNC: 189 MG/DL (ref 70–110)
GRAM STN SPEC: NORMAL
GRAM STN SPEC: NORMAL
HCT VFR BLD AUTO: 26.2 % (ref 40–54)
HGB BLD-MCNC: 8.1 G/DL (ref 14–18)
IMM GRANULOCYTES # BLD AUTO: 0.12 K/UL (ref 0–0.04)
IMM GRANULOCYTES NFR BLD AUTO: 1 % (ref 0–0.5)
LYMPHOCYTES # BLD AUTO: 1.9 K/UL (ref 1–4.8)
LYMPHOCYTES NFR BLD: 16.2 % (ref 18–48)
MCH RBC QN AUTO: 26 PG (ref 27–31)
MCHC RBC AUTO-ENTMCNC: 30.9 G/DL (ref 32–36)
MCV RBC AUTO: 84 FL (ref 82–98)
MONOCYTES # BLD AUTO: 1.1 K/UL (ref 0.3–1)
MONOCYTES NFR BLD: 9.7 % (ref 4–15)
NEUTROPHILS # BLD AUTO: 7.5 K/UL (ref 1.8–7.7)
NEUTROPHILS NFR BLD: 65.1 % (ref 38–73)
NRBC BLD-RTO: 0 /100 WBC
NUM UNITS TRANS PACKED RBC: NORMAL
PLATELET # BLD AUTO: 389 K/UL (ref 150–450)
PMV BLD AUTO: 9.8 FL (ref 9.2–12.9)
POCT GLUCOSE: 168 MG/DL (ref 70–110)
POTASSIUM SERPL-SCNC: 4.5 MMOL/L (ref 3.5–5.1)
PROCALCITONIN SERPL IA-MCNC: 0.57 NG/ML (ref 0–0.5)
PROT SERPL-MCNC: 5.8 G/DL (ref 6–8.4)
RBC # BLD AUTO: 3.12 M/UL (ref 4.6–6.2)
SODIUM SERPL-SCNC: 137 MMOL/L (ref 136–145)
WBC # BLD AUTO: 11.58 K/UL (ref 3.9–12.7)

## 2024-07-07 PROCEDURE — 63600175 PHARM REV CODE 636 W HCPCS: Performed by: HOSPITALIST

## 2024-07-07 PROCEDURE — 25000003 PHARM REV CODE 250: Performed by: INTERNAL MEDICINE

## 2024-07-07 PROCEDURE — 94761 N-INVAS EAR/PLS OXIMETRY MLT: CPT | Mod: HCNC

## 2024-07-07 PROCEDURE — 25000003 PHARM REV CODE 250: Performed by: HOSPITALIST

## 2024-07-07 PROCEDURE — 27000221 HC OXYGEN, UP TO 24 HOURS: Mod: HCNC

## 2024-07-07 PROCEDURE — 27000221 HC OXYGEN, UP TO 24 HOURS

## 2024-07-07 PROCEDURE — 94799 UNLISTED PULMONARY SVC/PX: CPT

## 2024-07-07 PROCEDURE — 5A09357 ASSISTANCE WITH RESPIRATORY VENTILATION, LESS THAN 24 CONSECUTIVE HOURS, CONTINUOUS POSITIVE AIRWAY PRESSURE: ICD-10-PCS | Performed by: STUDENT IN AN ORGANIZED HEALTH CARE EDUCATION/TRAINING PROGRAM

## 2024-07-07 PROCEDURE — 99900031 HC PATIENT EDUCATION (STAT)

## 2024-07-07 PROCEDURE — 94660 CPAP INITIATION&MGMT: CPT | Mod: HCNC

## 2024-07-07 PROCEDURE — 63600175 PHARM REV CODE 636 W HCPCS: Mod: JZ,JG,HCNC | Performed by: STUDENT IN AN ORGANIZED HEALTH CARE EDUCATION/TRAINING PROGRAM

## 2024-07-07 PROCEDURE — 20600001 HC STEP DOWN PRIVATE ROOM: Mod: HCNC

## 2024-07-07 PROCEDURE — 85025 COMPLETE CBC W/AUTO DIFF WBC: CPT | Performed by: INTERNAL MEDICINE

## 2024-07-07 PROCEDURE — A4216 STERILE WATER/SALINE, 10 ML: HCPCS | Mod: HCNC | Performed by: STUDENT IN AN ORGANIZED HEALTH CARE EDUCATION/TRAINING PROGRAM

## 2024-07-07 PROCEDURE — 84145 PROCALCITONIN (PCT): CPT | Performed by: STUDENT IN AN ORGANIZED HEALTH CARE EDUCATION/TRAINING PROGRAM

## 2024-07-07 PROCEDURE — 99233 SBSQ HOSP IP/OBS HIGH 50: CPT | Mod: ,,, | Performed by: INTERNAL MEDICINE

## 2024-07-07 PROCEDURE — 27000207 HC ISOLATION: Mod: HCNC

## 2024-07-07 PROCEDURE — 63600175 PHARM REV CODE 636 W HCPCS: Mod: JG | Performed by: STUDENT IN AN ORGANIZED HEALTH CARE EDUCATION/TRAINING PROGRAM

## 2024-07-07 PROCEDURE — 94761 N-INVAS EAR/PLS OXIMETRY MLT: CPT

## 2024-07-07 PROCEDURE — 99223 1ST HOSP IP/OBS HIGH 75: CPT | Mod: HCNC,,, | Performed by: STUDENT IN AN ORGANIZED HEALTH CARE EDUCATION/TRAINING PROGRAM

## 2024-07-07 PROCEDURE — 99900035 HC TECH TIME PER 15 MIN (STAT): Mod: HCNC

## 2024-07-07 PROCEDURE — 86140 C-REACTIVE PROTEIN: CPT | Performed by: STUDENT IN AN ORGANIZED HEALTH CARE EDUCATION/TRAINING PROGRAM

## 2024-07-07 PROCEDURE — A4216 STERILE WATER/SALINE, 10 ML: HCPCS | Performed by: INTERNAL MEDICINE

## 2024-07-07 PROCEDURE — 99233 SBSQ HOSP IP/OBS HIGH 50: CPT | Mod: ,,, | Performed by: STUDENT IN AN ORGANIZED HEALTH CARE EDUCATION/TRAINING PROGRAM

## 2024-07-07 PROCEDURE — 80053 COMPREHEN METABOLIC PANEL: CPT | Performed by: INTERNAL MEDICINE

## 2024-07-07 PROCEDURE — 25000003 PHARM REV CODE 250: Mod: HCNC | Performed by: STUDENT IN AN ORGANIZED HEALTH CARE EDUCATION/TRAINING PROGRAM

## 2024-07-07 RX ORDER — HYDROCODONE BITARTRATE AND ACETAMINOPHEN 10; 325 MG/1; MG/1
1 TABLET ORAL EVERY 4 HOURS PRN
Status: DISCONTINUED | OUTPATIENT
Start: 2024-07-07 | End: 2024-07-25 | Stop reason: HOSPADM

## 2024-07-07 RX ORDER — SODIUM CHLORIDE 9 MG/ML
INJECTION, SOLUTION INTRAVENOUS CONTINUOUS
Status: DISCONTINUED | OUTPATIENT
Start: 2024-07-07 | End: 2024-07-12

## 2024-07-07 RX ORDER — NALOXONE HCL 0.4 MG/ML
0.02 VIAL (ML) INJECTION
Status: DISCONTINUED | OUTPATIENT
Start: 2024-07-07 | End: 2024-07-25 | Stop reason: HOSPADM

## 2024-07-07 RX ORDER — LANOLIN ALCOHOL/MO/W.PET/CERES
1 CREAM (GRAM) TOPICAL DAILY
Status: DISCONTINUED | OUTPATIENT
Start: 2024-07-08 | End: 2024-07-25 | Stop reason: HOSPADM

## 2024-07-07 RX ORDER — LIDOCAINE 50 MG/G
1 PATCH TOPICAL
Status: DISCONTINUED | OUTPATIENT
Start: 2024-07-07 | End: 2024-07-25 | Stop reason: HOSPADM

## 2024-07-07 RX ORDER — INSULIN ASPART 100 [IU]/ML
0-10 INJECTION, SOLUTION INTRAVENOUS; SUBCUTANEOUS
Status: DISCONTINUED | OUTPATIENT
Start: 2024-07-07 | End: 2024-07-25 | Stop reason: HOSPADM

## 2024-07-07 RX ORDER — GLUCAGON 1 MG
1 KIT INJECTION
Status: DISCONTINUED | OUTPATIENT
Start: 2024-07-07 | End: 2024-07-25 | Stop reason: HOSPADM

## 2024-07-07 RX ORDER — IPRATROPIUM BROMIDE AND ALBUTEROL SULFATE 2.5; .5 MG/3ML; MG/3ML
3 SOLUTION RESPIRATORY (INHALATION) EVERY 4 HOURS PRN
Status: DISCONTINUED | OUTPATIENT
Start: 2024-07-07 | End: 2024-07-25 | Stop reason: HOSPADM

## 2024-07-07 RX ORDER — INSULIN GLARGINE 100 [IU]/ML
15 INJECTION, SOLUTION SUBCUTANEOUS DAILY
Status: DISCONTINUED | OUTPATIENT
Start: 2024-07-08 | End: 2024-07-25 | Stop reason: HOSPADM

## 2024-07-07 RX ORDER — SODIUM CHLORIDE 0.9 % (FLUSH) 0.9 %
10 SYRINGE (ML) INJECTION EVERY 6 HOURS
Status: DISCONTINUED | OUTPATIENT
Start: 2024-07-07 | End: 2024-07-25 | Stop reason: HOSPADM

## 2024-07-07 RX ORDER — SODIUM CHLORIDE 0.9 % (FLUSH) 0.9 %
10 SYRINGE (ML) INJECTION
Status: DISCONTINUED | OUTPATIENT
Start: 2024-07-07 | End: 2024-07-25 | Stop reason: HOSPADM

## 2024-07-07 RX ORDER — METOPROLOL SUCCINATE 100 MG/1
100 TABLET, EXTENDED RELEASE ORAL NIGHTLY
Status: DISCONTINUED | OUTPATIENT
Start: 2024-07-07 | End: 2024-07-17

## 2024-07-07 RX ORDER — IBUPROFEN 200 MG
16 TABLET ORAL
Status: DISCONTINUED | OUTPATIENT
Start: 2024-07-07 | End: 2024-07-25 | Stop reason: HOSPADM

## 2024-07-07 RX ORDER — HYDROCODONE BITARTRATE AND ACETAMINOPHEN 5; 325 MG/1; MG/1
1 TABLET ORAL EVERY 4 HOURS PRN
Status: DISCONTINUED | OUTPATIENT
Start: 2024-07-07 | End: 2024-07-25 | Stop reason: HOSPADM

## 2024-07-07 RX ORDER — TRAMADOL HYDROCHLORIDE 50 MG/1
50 TABLET ORAL EVERY 4 HOURS PRN
Status: DISCONTINUED | OUTPATIENT
Start: 2024-07-07 | End: 2024-07-25 | Stop reason: HOSPADM

## 2024-07-07 RX ORDER — PROMETHAZINE HYDROCHLORIDE 12.5 MG/1
25 TABLET ORAL EVERY 6 HOURS PRN
Status: DISCONTINUED | OUTPATIENT
Start: 2024-07-07 | End: 2024-07-25 | Stop reason: HOSPADM

## 2024-07-07 RX ORDER — SIMETHICONE 80 MG
1 TABLET,CHEWABLE ORAL 4 TIMES DAILY PRN
Status: DISCONTINUED | OUTPATIENT
Start: 2024-07-07 | End: 2024-07-25 | Stop reason: HOSPADM

## 2024-07-07 RX ORDER — ONDANSETRON 8 MG/1
8 TABLET, ORALLY DISINTEGRATING ORAL EVERY 8 HOURS PRN
Status: DISCONTINUED | OUTPATIENT
Start: 2024-07-07 | End: 2024-07-25 | Stop reason: HOSPADM

## 2024-07-07 RX ORDER — SODIUM CHLORIDE 0.9 % (FLUSH) 0.9 %
10 SYRINGE (ML) INJECTION EVERY 12 HOURS PRN
Status: DISCONTINUED | OUTPATIENT
Start: 2024-07-07 | End: 2024-07-25 | Stop reason: HOSPADM

## 2024-07-07 RX ORDER — IBUPROFEN 200 MG
24 TABLET ORAL
Status: DISCONTINUED | OUTPATIENT
Start: 2024-07-07 | End: 2024-07-25 | Stop reason: HOSPADM

## 2024-07-07 RX ORDER — ALUMINUM HYDROXIDE, MAGNESIUM HYDROXIDE, AND SIMETHICONE 2400; 240; 2400 MG/30ML; MG/30ML; MG/30ML
15 SUSPENSION ORAL 4 TIMES DAILY PRN
Status: DISCONTINUED | OUTPATIENT
Start: 2024-07-07 | End: 2024-07-25 | Stop reason: HOSPADM

## 2024-07-07 RX ADMIN — SODIUM CHLORIDE, PRESERVATIVE FREE 10 ML: 5 INJECTION INTRAVENOUS at 12:07

## 2024-07-07 RX ADMIN — CEFTAROLINE FOSAMIL 600 MG: 600 POWDER, FOR SOLUTION INTRAVENOUS at 11:07

## 2024-07-07 RX ADMIN — METOPROLOL SUCCINATE 100 MG: 100 TABLET, EXTENDED RELEASE ORAL at 08:07

## 2024-07-07 RX ADMIN — FERROUS SULFATE TAB 325 MG (65 MG ELEMENTAL FE) 1 EACH: 325 (65 FE) TAB at 09:07

## 2024-07-07 RX ADMIN — DIPHENHYDRAMINE HYDROCHLORIDE 10 ML: 25 SOLUTION ORAL at 09:07

## 2024-07-07 RX ADMIN — SODIUM CHLORIDE, PRESERVATIVE FREE 10 ML: 5 INJECTION INTRAVENOUS at 06:07

## 2024-07-07 RX ADMIN — DIPHENHYDRAMINE HYDROCHLORIDE 10 ML: 25 SOLUTION ORAL at 04:07

## 2024-07-07 RX ADMIN — MEROPENEM 1 G: 1 INJECTION, POWDER, FOR SOLUTION INTRAVENOUS at 06:07

## 2024-07-07 RX ADMIN — HYDROCODONE BITARTRATE AND ACETAMINOPHEN 1 TABLET: 10; 325 TABLET ORAL at 09:07

## 2024-07-07 RX ADMIN — HYDROCODONE BITARTRATE AND ACETAMINOPHEN 1 TABLET: 10; 325 TABLET ORAL at 01:07

## 2024-07-07 RX ADMIN — SODIUM CHLORIDE: 9 INJECTION, SOLUTION INTRAVENOUS at 04:07

## 2024-07-07 RX ADMIN — CEFTAROLINE FOSAMIL 600 MG: 600 POWDER, FOR SOLUTION INTRAVENOUS at 12:07

## 2024-07-07 RX ADMIN — Medication 10 ML: at 06:07

## 2024-07-07 RX ADMIN — INSULIN GLARGINE 15 UNITS: 100 INJECTION, SOLUTION SUBCUTANEOUS at 09:07

## 2024-07-07 RX ADMIN — LIDOCAINE 5% 1 PATCH: 700 PATCH TOPICAL at 04:07

## 2024-07-07 RX ADMIN — MEROPENEM 1 G: 1 INJECTION INTRAVENOUS at 04:07

## 2024-07-07 RX ADMIN — LIDOCAINE HYDROCHLORIDE 10 ML: 20 SOLUTION ORAL; TOPICAL at 09:07

## 2024-07-07 RX ADMIN — CEFTAROLINE FOSAMIL 600 MG: 600 POWDER, FOR SOLUTION INTRAVENOUS at 08:07

## 2024-07-07 RX ADMIN — HYDROCODONE BITARTRATE AND ACETAMINOPHEN 1 TABLET: 10; 325 TABLET ORAL at 08:07

## 2024-07-07 RX ADMIN — HYDROCODONE BITARTRATE AND ACETAMINOPHEN 1 TABLET: 10; 325 TABLET ORAL at 04:07

## 2024-07-07 RX ADMIN — INSULIN ASPART 2 UNITS: 100 INJECTION, SOLUTION INTRAVENOUS; SUBCUTANEOUS at 12:07

## 2024-07-07 RX ADMIN — CEFTAROLINE FOSAMIL 600 MG: 600 POWDER, FOR SOLUTION INTRAVENOUS at 04:07

## 2024-07-07 NOTE — PLAN OF CARE
Pt transferred to [facility] for increased level of care. Report called to [facility]. Education and Care Plan reviewed with Patient and Spouse. Verbalization of understanding expressed.  Problem: Adult Inpatient Plan of Care  Goal: Plan of Care Review  Outcome: Adequate for Care Transition  Goal: Patient-Specific Goal (Individualized)  Outcome: Adequate for Care Transition  Goal: Absence of Hospital-Acquired Illness or Injury  Outcome: Adequate for Care Transition  Goal: Optimal Comfort and Wellbeing  Outcome: Adequate for Care Transition  Goal: Readiness for Transition of Care  Outcome: Adequate for Care Transition     Problem: Diabetes Comorbidity  Goal: Blood Glucose Level Within Targeted Range  Outcome: Adequate for Care Transition     Problem: Acute Kidney Injury/Impairment  Goal: Fluid and Electrolyte Balance  Outcome: Adequate for Care Transition  Goal: Improved Oral Intake  Outcome: Adequate for Care Transition  Goal: Effective Renal Function  Outcome: Adequate for Care Transition     Problem: Sepsis/Septic Shock  Goal: Optimal Coping  Outcome: Adequate for Care Transition  Goal: Absence of Bleeding  Outcome: Adequate for Care Transition  Goal: Blood Glucose Level Within Targeted Range  Outcome: Adequate for Care Transition  Goal: Absence of Infection Signs and Symptoms  Outcome: Adequate for Care Transition  Goal: Optimal Nutrition Intake  Outcome: Adequate for Care Transition     Problem: Skin Injury Risk Increased  Goal: Skin Health and Integrity  Outcome: Adequate for Care Transition     Problem: Bariatric Environmental Safety  Goal: Safety Maintained with Care  Outcome: Adequate for Care Transition     Problem: Wound  Goal: Optimal Coping  Outcome: Adequate for Care Transition  Goal: Optimal Functional Ability  Outcome: Adequate for Care Transition  Goal: Absence of Infection Signs and Symptoms  Outcome: Adequate for Care Transition  Goal: Improved Oral Intake  Outcome: Adequate for  Care Transition  Goal: Optimal Pain Control and Function  Outcome: Adequate for Care Transition  Goal: Skin Health and Integrity  Outcome: Adequate for Care Transition  Goal: Optimal Wound Healing  Outcome: Adequate for Care Transition     Problem: Infection  Goal: Absence of Infection Signs and Symptoms  Outcome: Adequate for Care Transition     Problem: Fall Injury Risk  Goal: Absence of Fall and Fall-Related Injury  Outcome: Adequate for Care Transition

## 2024-07-07 NOTE — CONSULTS
[facility]  Thoracic Surgery  Consult Note    Patient Name: Roosevelt Moser  MRN: [id number]  Code Status: Full Code  Admission Date: 7/7/2024  Hospital Length of Stay: 0 days  Consult Requesting Physician: Roopa Rowan MD  Consulting Physician: Lucio Bansal MD  Primary Care Provider: Miguel Angel Enriquez PA-C    Inpatient consult to Cardiothoracic Surgery  Consult performed by: Yovanny Mike MD  Consult ordered by: Roopa Rowan MD        Subjective:     Reason for Consult: Chest wall abscess    History of Present Illness: Mr. Moser is a pleasant 73 yoM with PMH including Afib on eliquis, HTN, T2DM and MARA who is transferred from [place] for a chest wall abscess with concern for osteomyelitis of the anterior left 6-8th ribs. He originally presented to the [facility] after a fall resulting in a left humeral fracture for which he underwent ORIF in March. He represented in June with a left shoulder abscess and underwent multiple washouts and removal of the ORIF hardware. His hospital stay was complicated by bilateral effusions and a left chest wall abscess with extension into the epigastrium and mediastinum with concern for osteomyelitis of the left 6-8th ribs. He underwent I+D of the abscess through a small left anterior chest incision with drain placement on 7/4, thoracentesis of the left pleural effusion on 7/4, and right-sided VATs with washout and chest tube placement on 7/5. Interestingly, cultures from the pleural fluid and chest wall abscess cavity have been negative despite the abscess cavity fluid being described as creamy pus by the [facility] op report. The only positive cultures so far have been from those collected by podiatry during a toe amp which were positive for MRSA.    He was [place] [facility] [place] potential thoracic and plastic surgery interventions if more invasive debridements or resections were required with reconstruction.    Upon arrival here he states that over the past couple days he has been  feeling much better. He states that the pain and swelling associated with the chest wall abscess is resolved. He denies any fever or chills. He states that the dyspnea he was experiencing with the prior pleural effusions has resolved, despite remaining on 2-3 L/min NC. He is not on home oxygen.     Current Facility-Administered Medications on File Prior to Encounter   Medication    [DISCONTINUED] (Magic mouthwash) 1:1:1 diphenhydrAMINE(Benadryl) 12.5mg/5ml liq, aluminum & magnesium hydroxide-simethicone (Maalox), LIDOcaine viscous 2%    [DISCONTINUED] 0.9%  NaCl infusion (for blood administration)    [DISCONTINUED] 0.9%  NaCl infusion (for blood administration)    [DISCONTINUED] 0.9%  NaCl infusion    [DISCONTINUED] albuterol-ipratropium 2.5 mg-0.5 mg/3 mL nebulizer solution 3 mL    [DISCONTINUED] aluminum & magnesium hydroxide-simethicone 400-400-40 mg/5 mL suspension 15 mL    [DISCONTINUED] ceftaroline (TEFLARO) 600 mg in dextrose 5 % 50 mL IVPB (ready to mix)    [DISCONTINUED] dextrose 10% bolus 125 mL 125 mL    [DISCONTINUED] dextrose 10% bolus 250 mL 250 mL    [DISCONTINUED] epoetin leonel-epbx injection 14,100 Units    [DISCONTINUED] ferrous sulfate tablet 1 each    [DISCONTINUED] glucagon (human recombinant) injection 1 mg    [DISCONTINUED] glucose chewable tablet 16 g    [DISCONTINUED] glucose chewable tablet 24 g    [DISCONTINUED] HYDROcodone-acetaminophen  mg per tablet 1 tablet    [DISCONTINUED] HYDROcodone-acetaminophen 5-325 mg per tablet 1 tablet    [DISCONTINUED] insulin aspart U-100 pen 0-10 Units    [DISCONTINUED] insulin glargine U-100 (Lantus) pen 15 Units    [DISCONTINUED] LIDOcaine 5 % patch 1 patch    [DISCONTINUED] magnesium sulfate 2g in water 50mL IVPB (premix)    [DISCONTINUED] meropenem 1 g in sodium chloride 0.9 % 100 mL IVPB (ready to mix system)    [DISCONTINUED] metoprolol injection 5 mg    [DISCONTINUED] metoprolol succinate (TOPROL-XL) 24 hr tablet 100 mg    [DISCONTINUED]  naloxone 0.4 mg/mL injection 0.02 mg    [DISCONTINUED] ondansetron disintegrating tablet 8 mg    [DISCONTINUED] potassium bicarbonate disintegrating tablet 35 mEq    [DISCONTINUED] potassium bicarbonate disintegrating tablet 60 mEq    [DISCONTINUED] potassium chloride SA CR tablet 20 mEq    [DISCONTINUED] potassium, sodium phosphates 280-160-250 mg packet 2 packet    [DISCONTINUED] potassium, sodium phosphates 280-160-250 mg packet 2 packet    [DISCONTINUED] potassium, sodium phosphates 280-160-250 mg packet 2 packet    [DISCONTINUED] promethazine tablet 25 mg    [DISCONTINUED] simethicone chewable tablet 80 mg    [DISCONTINUED] sodium chloride 0.9% flush 10 mL    [DISCONTINUED] sodium chloride 0.9% flush 10 mL    [DISCONTINUED] sodium chloride 0.9% flush 10 mL    [DISCONTINUED] sodium chloride 0.9% flush 10 mL    [DISCONTINUED] traMADoL tablet 50 mg     Current Outpatient Medications on File Prior to Encounter   Medication Sig    apixaban (ELIQUIS) 5 mg Tab Take 1 tablet (5 mg total) by mouth 2 (two) times daily.    atorvastatin (LIPITOR) 10 MG tablet Take 1 tablet (10 mg total) by mouth once daily.    co-enzyme Q-10 30 mg capsule Take 30 mg by mouth once daily.    doxycycline (VIBRAMYCIN) 100 MG Cap Take 100 mg by mouth 2 (two) times daily.    ergocalciferol, vitamin D2, (VITAMIN D ORAL) Take 1 tablet by mouth once daily.    glimepiride (AMARYL) 2 MG tablet Take 1 tablet (2 mg total) by mouth before breakfast.    hydrALAZINE (APRESOLINE) 25 MG tablet Take 1 tablet (25 mg total) by mouth every 12 (twelve) hours.    metFORMIN (GLUCOPHAGE-XR) 500 MG ER 24hr tablet Take 2 tablets (1,000 mg total) by mouth 2 (two) times daily with meals.    metoprolol succinate (TOPROL-XL) 100 MG 24 hr tablet Take 1 tablet (100 mg total) by mouth once daily. 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    Hyperlipidemia     Hypertension 2016    Sleep apnea      Past Surgical History:   Procedure Laterality Date    ARTHROTOMY OF SHOULDER Left 6/21/2024    Procedure: ARTHROTOMY, SHOULDER;  Surgeon: Roman Paulson MD;  Location: [facility] OR;  Service: Orthopedics;  Laterality: Left;    IRRIGATION AND DEBRIDEMENT OF UPPER EXTREMITY Left 6/24/2024    Procedure: IRRIGATION AND DEBRIDEMENT, UPPER EXTREMITY;  Surgeon: Nathan Cooper MD;  Location: [facility] OR;  Service: Orthopedics;  Laterality: Left;    IRRIGATION AND DEBRIDEMENT OF UPPER EXTREMITY Left 6/27/2024    Procedure: IRRIGATION AND DEBRIDEMENT, UPPER EXTREMITY;  Surgeon: Nathan Cooper MD;  Location: [facility] OR;  Service: Orthopedics;  Laterality: Left;    OPEN REDUCTION AND INTERNAL FIXATION (ORIF) OF FRACTURE OF PROXIMAL HUMERUS Left 3/25/2024    Procedure: ORIF, FRACTURE, HUMERUS, PROXIMAL/IM HANNA, LEFT;  Surgeon: Nathan Cooper MD;  Location: [facility] OR;  Service: Orthopedics;  Laterality: Left;  Accumed, S[employer] Small Frag set Avelino verified 3/22/24 ark    TOE AMPUTATION Right 7/1/2024    Procedure: AMPUTATION, TOE;  Surgeon: Stevie Zhu DPM;  Location: [facility];  Service: Podiatry;  Laterality: Right;     Family History    None       Tobacco Use    Smoking status: Never    Smokeless tobacco: Never   Substance and Sexual Activity    Alcohol use: Yes     Alcohol/week: 0.0 standard drinks of alcohol     Comment: social    Drug use: No    Sexual activity: Yes     Review of Systems   Constitutional:  Negative for chills and fever.   Respiratory:  Negative for cough and shortness of breath.    Cardiovascular:  Negative for chest pain.   Gastrointestinal:  Negative for abdominal pain, constipation, diarrhea, nausea and vomiting.   Genitourinary:  Negative for dysuria.   Neurological:  Negative for dizziness and light-headedness.     Objective:     Vital Signs (Most Recent):  Temp: 98.4 °F (36.9 °C)  (07/07/24 1515)  Pulse: 85 (07/07/24 1640)  Resp: 18 (07/07/24 1515)  BP: (!) 168/70 (07/07/24 1515)  SpO2: 95 % (07/07/24 1515) Vital Signs (24h Range):  Temp:  [97.2 °F (36.2 °C)-99.6 °F (37.6 °C)] 98.4 °F (36.9 °C)  Pulse:  [] 85  Resp:  [12-38] 18  SpO2:  [91 %-98 %] 95 %  BP: (110-168)/(52-94) 168/70        There is no height or weight on file to calculate BMI.      Intake/Output - Last 3 Shifts         07/05 0700 07/06 0659 07/06 0700 07/07 0659 07/07 0700 07/08 0659    Urine   250    Total Output   250    Net   -250                   SpO2: 95 %        Physical Exam  Vitals reviewed.   Constitutional:       Appearance: Normal appearance.   Cardiovascular:      Rate and Rhythm: Normal rate.   Pulmonary:      Effort: Pulmonary effort is normal. No respiratory distress.      Comments: Right chest tube to water seal with serous output, tidaling, no air leak.  Abdominal:      Palpations: Abdomen is soft.      Tenderness: There is no abdominal tenderness.   Musculoskeletal:      Comments: Left chest wall drain with seropurulent output. Minimal chest wall crepitus. No significant underlying fluctuance appreciated. No overlying skin changes. No tenderness to palpation of the chest wall.   Skin:     General: Skin is warm.   Neurological:      General: No focal deficit present.      Mental Status: He is alert and oriented to person, place, and time.            Significant Labs:  CBC:   Recent Labs   Lab 07/07/24  0353   WBC 11.58   RBC 3.12*   HGB 8.1*   HCT 26.2*      MCV 84   MCH 26.0*   MCHC 30.9*     CMP:   Recent Labs   Lab 07/07/24  0353   *   CALCIUM 7.8*   ALBUMIN 2.1*   PROT 5.8*      K 4.5   CO2 26      BUN 33*   CREATININE 1.1   ALKPHOS 97   ALT 17   AST 25   BILITOT 0.3       Significant Diagnostics:  I have reviewed and interpreted all pertinent imaging results/findings within the past 24 hours.    VTE Risk Mitigation (From admission, onward)           Ordered     Place  sequential compression device  Until discontinued         07/07/24 7880                    Assessment/Plan:     Chest wall abscess  73 yoM with left chest wall abscess and concern for left 6-8 rib osteomyelitis transferred for potential thoracic and plastic surgery intervention. Subjectively he is feeling much better s/p incision and drainage of chest wall abscess, thoracentesis of left pleural effusion, and VATS with right chest tube placement for right pleural effusion at [facility]. Afebrile, vital signs stable, lab work relatively benign.    - Repeat CT chest and abdomen with IV contrast to re evaluate chest wall abscess extension and potential osteomyelitis of the ribs  - Follow up ID recommendations  - Right chest tube to water seal  - Left chest accordion drain to negative pressure  - Ok for a diet, NPO at midnight  - Thoracic surgery to follow        Thank you for your consult. I will follow-up with patient. Please contact us if you have any additional questions.    Yovanny Mike MD  Thoracic Surgery  [facility]

## 2024-07-07 NOTE — ASSESSMENT & PLAN NOTE
status post multiple washouts on 06/21/2024, 06/24/2024, 06/26/2024,6/27/24  Need antibiotics until 08/03/2024  Wound VAC in place.  Continue antibiotics.  Following cultures.  Pending possible LTAC placement/possible transfer for reconstructive plastic surgery   Patient was accepted . D/w transfer center  CRP still high

## 2024-07-07 NOTE — SUBJECTIVE & OBJECTIVE
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    Hyperlipidemia     Hypertension 2016    Sleep apnea        Past Surgical History:   Procedure Laterality Date    ARTHROTOMY OF SHOULDER Left 6/21/2024    Procedure: ARTHROTOMY, SHOULDER;  Surgeon: Roman Paulson MD;  Location: [facility] OR;  Service: Orthopedics;  Laterality: Left;    IRRIGATION AND DEBRIDEMENT OF UPPER EXTREMITY Left 6/24/2024    Procedure: IRRIGATION AND DEBRIDEMENT, UPPER EXTREMITY;  Surgeon: Nathan Cooper MD;  Location: [facility] OR;  Service: Orthopedics;  Laterality: Left;    IRRIGATION AND DEBRIDEMENT OF UPPER EXTREMITY Left 6/27/2024    Procedure: IRRIGATION AND DEBRIDEMENT, UPPER EXTREMITY;  Surgeon: Nathan Cooper MD;  Location: [facility] OR;  Service: Orthopedics;  Laterality: Left;    OPEN REDUCTION AND INTERNAL FIXATION (ORIF) OF FRACTURE OF PROXIMAL HUMERUS Left 3/25/2024    Procedure: ORIF, FRACTURE, HUMERUS, PROXIMAL/IM HANNA, LEFT;  Surgeon: Nathan Cooper MD;  Location: [facility] OR;  Service: Orthopedics;  Laterality: Left;  Accumed, [employer] Small Frag set Avelino verified 3/22/24 ark    TOE AMPUTATION Right 7/1/2024    Procedure: AMPUTATION, TOE;  Surgeon: Stevie Zhu DPM;  Location: [facility] OR;  Service: Podiatry;  Laterality: Right;       Review of patient's allergies indicates:  No Known Allergies    Current Facility-Administered Medications on File Prior to Encounter   Medication    [DISCONTINUED] (Magic mouthwash) 1:1:1 diphenhydrAMINE(Benadryl) 12.5mg/5ml liq, aluminum & magnesium hydroxide-simethicone (Maalox), LIDOcaine viscous 2%    [DISCONTINUED] 0.9%  NaCl infusion (for blood administration)    [DISCONTINUED] 0.9%  NaCl infusion (for blood administration)    [DISCONTINUED] 0.9%  NaCl infusion    [DISCONTINUED] albuterol-ipratropium 2.5 mg-0.5 mg/3 mL nebulizer solution 3 mL    [DISCONTINUED] aluminum & magnesium hydroxide-simethicone 400-400-40 mg/5 mL suspension 15  mL    [DISCONTINUED] ceftaroline (TEFLARO) 600 mg in dextrose 5 % 50 mL IVPB (ready to mix)    [DISCONTINUED] dextrose 10% bolus 125 mL 125 mL    [DISCONTINUED] dextrose 10% bolus 250 mL 250 mL    [DISCONTINUED] epoetin leonel-epbx injection 14,100 Units    [DISCONTINUED] ferrous sulfate tablet 1 each    [DISCONTINUED] glucagon (human recombinant) injection 1 mg    [DISCONTINUED] glucose chewable tablet 16 g    [DISCONTINUED] glucose chewable tablet 24 g    [DISCONTINUED] HYDROcodone-acetaminophen  mg per tablet 1 tablet    [DISCONTINUED] HYDROcodone-acetaminophen 5-325 mg per tablet 1 tablet    [DISCONTINUED] insulin aspart U-100 pen 0-10 Units    [DISCONTINUED] insulin glargine U-100 (Lantus) pen 15 Units    [DISCONTINUED] LIDOcaine 5 % patch 1 patch    [DISCONTINUED] magnesium sulfate 2g in water 50mL IVPB (premix)    [DISCONTINUED] meropenem 1 g in sodium chloride 0.9 % 100 mL IVPB (ready to mix system)    [DISCONTINUED] metoprolol injection 5 mg    [DISCONTINUED] metoprolol succinate (TOPROL-XL) 24 hr tablet 100 mg    [DISCONTINUED] naloxone 0.4 mg/mL injection 0.02 mg    [DISCONTINUED] ondansetron disintegrating tablet 8 mg    [DISCONTINUED] potassium bicarbonate disintegrating tablet 35 mEq    [DISCONTINUED] potassium bicarbonate disintegrating tablet 60 mEq    [DISCONTINUED] potassium chloride SA CR tablet 20 mEq    [DISCONTINUED] potassium, sodium phosphates 280-160-250 mg packet 2 packet    [DISCONTINUED] potassium, sodium phosphates 280-160-250 mg packet 2 packet    [DISCONTINUED] potassium, sodium phosphates 280-160-250 mg packet 2 packet    [DISCONTINUED] promethazine tablet 25 mg    [DISCONTINUED] simethicone chewable tablet 80 mg    [DISCONTINUED] sodium chloride 0.9% flush 10 mL    [DISCONTINUED] sodium chloride 0.9% flush 10 mL    [DISCONTINUED] sodium chloride 0.9% flush 10 mL    [DISCONTINUED] sodium chloride 0.9% flush 10 mL    [DISCONTINUED] traMADoL tablet 50 mg     Current Outpatient  Medications on File Prior to Encounter   Medication Sig    apixaban (ELIQUIS) 5 mg Tab Take 1 tablet (5 mg total) by mouth 2 (two) times daily.    atorvastatin (LIPITOR) 10 MG tablet Take 1 tablet (10 mg total) by mouth once daily.    co-enzyme Q-10 30 mg capsule Take 30 mg by mouth once daily.    doxycycline (VIBRAMYCIN) 100 MG Cap Take 100 mg by mouth 2 (two) times daily.    ergocalciferol, vitamin D2, (VITAMIN D ORAL) Take 1 tablet by mouth once daily.    glimepiride (AMARYL) 2 MG tablet Take 1 tablet (2 mg total) by mouth before breakfast.    hydrALAZINE (APRESOLINE) 25 MG tablet Take 1 tablet (25 mg total) by mouth every 12 (twelve) hours.    metFORMIN (GLUCOPHAGE-XR) 500 MG ER 24hr tablet Take 2 tablets (1,000 mg total) by mouth 2 (two) times daily with meals.    metoprolol succinate (TOPROL-XL) 100 MG 24 hr tablet Take 1 tablet (100 mg total) by mouth once daily. (Patient taking differently: Take 100 mg by mouth nightly.)     Family History    None       Tobacco Use    Smoking status: Never    Smokeless tobacco: Never   Substance and Sexual Activity    Alcohol use: Yes     Alcohol/week: 0.0 standard drinks of alcohol     Comment: social    Drug use: No    Sexual activity: Yes     Review of Systems   Constitutional:  Positive for appetite change. Negative for fever.   HENT:  Negative for congestion, sore throat and trouble swallowing.    Eyes:  Negative for visual disturbance.   Respiratory:  Positive for cough (dry). Negative for shortness of breath and wheezing.    Cardiovascular:  Negative for chest pain, palpitations and leg swelling.   Gastrointestinal:  Negative for abdominal pain, constipation, diarrhea, nausea and vomiting.   Genitourinary:  Negative for decreased urine volume and dysuria.   Musculoskeletal:  Negative for back pain.   Skin:  Positive for wound.   Neurological:  Negative for dizziness, light-headedness and headaches.   Psychiatric/Behavioral:  Negative for agitation, confusion and  decreased concentration.        Objective:     Vital Signs (Most Recent):    Vital Signs (24h Range):  Temp:  [97.2 °F (36.2 °C)-99.6 °F (37.6 °C)] 97.4 °F (36.3 °C)  Pulse:  [] 82  Resp:  [12-38] 24  SpO2:  [91 %-98 %] 95 %  BP: (110-144)/(52-94) 134/60        There is no height or weight on file to calculate BMI.     Physical Exam  Vitals and nursing note reviewed.   Constitutional:       General: He is not in acute distress.     Appearance: He is ill-appearing (chronically). He is not toxic-appearing or diaphoretic.   HENT:      Head: Normocephalic and atraumatic.      Nose: Nose normal.      Mouth/Throat:      Pharynx: Oropharynx is clear.   Eyes:      General: No scleral icterus.  Cardiovascular:      Rate and Rhythm: Normal rate and regular rhythm.      Pulses: Normal pulses.      Heart sounds: Normal heart sounds.   Pulmonary:      Effort: Pulmonary effort is normal.      Comments: No obvious rales or wheezes when auscultating anterior chest; unable to complete posterior auscultation due to limited mobility of patient  Chest:      Chest wall: No deformity or tenderness.      Comments: Right chest tube to water seal with serous output  Left chest wall drain with seropurulent output  Abdominal:      General: Bowel sounds are normal.      Palpations: Abdomen is soft.      Tenderness: There is no abdominal tenderness.   Musculoskeletal:      Cervical back: Normal range of motion and neck supple.      Right lower leg: No edema.      Left lower leg: No edema.   Skin:     General: Skin is warm.      Capillary Refill: Capillary refill takes less than 2 seconds.      Comments: Unable to examine posterior skin due to patient debility   Neurological:      Mental Status: He is alert and oriented to person, place, and time.   Psychiatric:         Behavior: Behavior normal.                Significant Labs: All pertinent labs within the past 24 hours have been reviewed.    Recent Results (from the past 24 hour(s))    POCT glucose    Collection Time: 07/06/24  5:13 PM   Result Value Ref Range    POC Glucose 188 (H) 70 - 110   POCT glucose    Collection Time: 07/06/24  9:25 PM   Result Value Ref Range    POC Glucose 159 (H) 70 - 110   Comprehensive metabolic panel    Collection Time: 07/07/24  3:53 AM   Result Value Ref Range    Sodium 137 136 - 145 mmol/L    Potassium 4.5 3.5 - 5.1 mmol/L    Chloride 104 95 - 110 mmol/L    CO2 26 23 - 29 mmol/L    Glucose 130 (H) 70 - 110 mg/dL    BUN 33 (H) 8 - 23 mg/dL    Creatinine 1.1 0.5 - 1.4 mg/dL    Calcium 7.8 (L) 8.7 - 10.5 mg/dL    Total Protein 5.8 (L) 6.0 - 8.4 g/dL    Albumin 2.1 (L) 3.5 - 5.2 g/dL    Total Bilirubin 0.3 0.1 - 1.0 mg/dL    Alkaline Phosphatase 97 55 - 135 U/L    AST 25 10 - 40 U/L    ALT 17 10 - 44 U/L    eGFR >60.0 >60 mL/min/1.73 m^2    Anion Gap 7 (L) 8 - 16 mmol/L   CBC auto differential    Collection Time: 07/07/24  3:53 AM   Result Value Ref Range    WBC 11.58 3.90 - 12.70 K/uL    RBC 3.12 (L) 4.60 - 6.20 M/uL    Hemoglobin 8.1 (L) 14.0 - 18.0 g/dL    Hematocrit 26.2 (L) 40.0 - 54.0 %    MCV 84 82 - 98 fL    MCH 26.0 (L) 27.0 - 31.0 pg    MCHC 30.9 (L) 32.0 - 36.0 g/dL    RDW 17.3 (H) 11.5 - 14.5 %    Platelets 389 150 - 450 K/uL    MPV 9.8 9.2 - 12.9 fL    Immature Granulocytes 1.0 (H) 0.0 - 0.5 %    Gran # (ANC) 7.5 1.8 - 7.7 K/uL    Immature Grans (Abs) 0.12 (H) 0.00 - 0.04 K/uL    Lymph # 1.9 1.0 - 4.8 K/uL    Mono # 1.1 (H) 0.3 - 1.0 K/uL    Eos # 0.8 (H) 0.0 - 0.5 K/uL    Baso # 0.08 0.00 - 0.20 K/uL    nRBC 0 0 /100 WBC    Gran % 65.1 38.0 - 73.0 %    Lymph % 16.2 (L) 18.0 - 48.0 %    Mono % 9.7 4.0 - 15.0 %    Eosinophil % 7.3 0.0 - 8.0 %    Basophil % 0.7 0.0 - 1.9 %    Differential Method Automated    Procalcitonin    Collection Time: 07/07/24  3:53 AM   Result Value Ref Range    Procalcitonin 0.575 (H) 0.000 - 0.500 ng/mL   C-Reactive Protein    Collection Time: 07/07/24  3:53 AM   Result Value Ref Range    CRP >16.00 (H) <1.00 mg/dL   POCT  glucose    Collection Time: 07/07/24 12:29 PM   Result Value Ref Range    POC Glucose 189 (H) 70 - 110   POCT glucose    Collection Time: 07/07/24  4:06 PM   Result Value Ref Range    POCT Glucose 168 (H) 70 - 110 mg/dL         Significant Imaging: I have reviewed all pertinent imaging results/findings within the past 24 hours.      See HPI

## 2024-07-07 NOTE — ASSESSMENT & PLAN NOTE
"This patient does have evidence of infective focus  My overall impression is sepsis.  Source: Skin and Soft Tissue (location toe)  Antibiotics given-   Antibiotics (72h ago, onward)      Start     Stop Route Frequency Ordered    07/07/24 1545  ceftaroline fosamiL (TEFLARO) 600 mg in D5W 50 mL IVPB (MB+)         -- IV Every 8 hours (non-standard times) 07/07/24 1532    07/07/24 1545  meropenem (MERREM) 1 g in 0.9% NaCl 100 mL IVPB (MB+)         -- IV Every 8 hours (non-standard times) 07/07/24 1532          Latest lactate reviewed-  No results for input(s): "LACTATE", "POCLAC" in the last 72 hours.  Organ dysfunction indicated by Acute kidney injury    Fluid challenge Not needed - patient is not hypotensive      Post- resuscitation assessment No - Post resuscitation assessment not needed       Will Not start Pressors- Levophed for MAP of 65  Source control achieved by: abx  "

## 2024-07-07 NOTE — PROGRESS NOTES
[facility] Medicine  Progress Note    Patient Name: Roosevelt Moser  MRN: [id number]  Patient Class: IP- Inpatient   Admission Date: 6/14/2024  Length of Stay: 23 days  Attending Physician: Nabil Paz MD  Primary Care Provider: Miguel Angel Enriquez PA-C        Subjective:     Principal Problem:Acute renal failure superimposed on chronic kidney disease        HPI:  Roosevelt Moser is a 73 year old male with a previous medical history of atrial fibrillation on eliquis, HTN, DMII, obstructive sleep apnea, HLD, ORIF of left humerus and guillian-[place] who presented to the emergency room for weakness and multiple falls. Per wife of the patient he has had progressive weakness over the past week along with two falls one at 0300 Thursday and the other early morning Friday. Both time she had to activate EMS to pick patient up off floor due to weakness. Patient refused transport to hospital on both occasions. Today he slid out of his chair and was unable to get up without assistance and at this point the wife activated EMS to transport patient to the hospital. She endorses that two days ago she noticed that the patient had stopped urinating as he normally does. The patient concurs that he has noticed that his urine has decreased over the past two days and that it is dark. Patient denies dysuria or incomplete bladder emptying. Bladder scan showed zero upon admit and urine sample was obtained via straight cath in ED. Patient denies decreased PO intake and keeps a bottle of water with him at all times. Initial ED work-up showed a creatinine of 5 and potassium of 6. Urine studies positive for infection and WBC 20.81 with procalcitonin at 19.75. Blood cultures obtained and patient given 1 gram of rocephin and 1000ml of normal saline. Patient noted to bein afib with RVR and 20mg of diltiazem was administered IV which resulted in low blood pressure and 1 gram of calcium gluconate was administered. Patient admitted  by hospital medicine for further evaluation and management     Overview/Hospital Course:  Patient presented after fall.  He was found to have osteo of his toe and abscesses left shoulder.  He underwent washouts with surgery on his shoulder. Status post repeat irrigation and debridement (3rd washout) and hardware removal of left shoulder for suspected infection. Wound VAC was discontinued intraoperatively 6/27. They cleared him on 06/28/2024. The patient was noted to have osteomyelitis of second right toe. Seen by podiatry, who performed amputation on 07/01. Patient noted to have dyspnea. CXR revealed bilateral pleural effusions. IV lasix started and stopped due to MARBIN. Pending snf placement at this time. Pt taken to OR for I&D of a chest wall abscess on 7/4/24. He had a thoracentesis of 1550 cc.  Dr. Escobedo states that the abscess extended down to the rectus abdominis muscles and to the pericardium but there was no pericardial effusion.  The patient also has a loculated right effusion and s/p VATS with mechanical lysis and drainage with chest tube placement was Cont Teflaro, meropenem, and micagungin per ID recs.     The patient is going to need to transfer to a facility that has thoracic surgery and reconstructive plastic surgery. The patient has osteomyelitis of multiple ribs which will need further debridement and then a flap placed. Unfortunately, [facility] do not have this capability at this facility.       Interval History:     No special complaint . Drains amount noted  No CP or SOB, toe wound appear clean    Review of Systems  Objective:     Vital Signs (Most Recent):  Temp: 97.5 °F (36.4 °C) (07/07/24 0301)  Pulse: 85 (07/07/24 0601)  Resp: (!) 28 (07/07/24 0902)  BP: 112/63 (07/07/24 0601)  SpO2: 95 % (07/07/24 0601) Vital Signs (24h Range):  Temp:  [97.5 °F (36.4 °C)-99.6 °F (37.6 °C)] 97.5 °F (36.4 °C)  Pulse:  [] 85  Resp:  [12-35] 28  SpO2:  [89 %-97 %] 95 %  BP: (107-144)/(58-83) 112/63  Arterial Line  "BP: (136)/(65) 136/65     Weight: 133.7 kg (294 lb 12.1 oz)  Body mass index is 39.98 kg/m².    Intake/Output Summary (Last 24 hours) at 7/7/2024 0943  Last data filed at 7/7/2024 0620  Gross per 24 hour   Intake 3844.58 ml   Output 2101 ml   Net 1743.58 ml         Physical Exam  Constitutional:       General: He is not in acute distress.     Appearance: He is well-developed.   HENT:      Head: Normocephalic.   Eyes:      Pupils: Pupils are equal, round, and reactive to light.   Cardiovascular:      Rate and Rhythm: Normal rate and regular rhythm.   Pulmonary:      Effort: Pulmonary effort is normal. No respiratory distress.   Abdominal:      General: There is no distension.      Tenderness: There is no abdominal tenderness.   Musculoskeletal:         General: Normal range of motion.      Cervical back: Neck supple.   Skin:     General: Skin is warm.      Findings: No rash.   Neurological:      Mental Status: He is alert and oriented to person, place, and time.             Significant Labs: All pertinent labs within the past 24 hours have been reviewed.  CBC:   Recent Labs   Lab 07/05/24  1836 07/06/24  0534 07/07/24  0353   WBC  --  11.62 11.58   HGB  --  8.1* 8.1*   HCT 28* 26.1* 26.2*   PLT  --  439 389     CMP:   Recent Labs   Lab 07/06/24  0534 07/07/24  0353    137   K 4.2 4.5    104   CO2 26 26   * 130*   BUN 31* 33*   CREATININE 1.2 1.1   CALCIUM 7.7* 7.8*   PROT 5.9* 5.8*   ALBUMIN 2.1* 2.1*   BILITOT 0.3 0.3   ALKPHOS 91 97   AST 17 25   ALT 12 17   ANIONGAP 7* 7*     Cardiac Markers: No results for input(s): "CKMB", "MYOGLOBIN", "BNP", "TROPISTAT" in the last 48 hours.    Significant Imaging: I have reviewed all pertinent imaging results/findings within the past 24 hours.    Assessment/Plan:      * Acute renal failure superimposed on chronic kidney disease  Resolved now   S/p gentle hydration on Zosyn/vanc but those have been stopped  Renal US neg for obstruction         Chest wall " abscess  S/p  I&D of a chest wall abscess on 7/4/24. He had a thoracentesis of 1550 cc. Abscess extended down to the rectus abdominis muscles and to the pericardium but there was no pericardial effusion.  The patient also has a loculated right effusion s/p VATS and chest tube  Continue antibiotics     As per chart review, The patient is going to need to transfer to a facility that has thoracic surgery and reconstructive plastic surgery. The patient has osteomyelitis of multiple ribs which will need further debridement and then a flap placed. Unfortunately, [facility] do not have this capability at this facility.   Patient was accepted by transfer [facility] plastic surgery team     Pleural effusion  IV lasix started and stopped due to MARBIN.   S/p Right VATS with chest tube   Follow CT surgery       MRSA infection  aware  As per ID    Pyogenic arthritis of left shoulder region  As above s/p multiple I and D . Extend into thoracic cavity   Patient was accepted by [facility] plastic surgery team for transfer       Abscess of left shoulder   status post multiple washouts on 06/21/2024, 06/24/2024, 06/26/2024,6/27/24  Need antibiotics until 08/03/2024  Wound VAC in place.  Continue antibiotics.  Following cultures.  Pending possible LTAC placement/possible transfer for reconstructive plastic surgery   Patient was accepted . D/w transfer center  CRP still high    Osteomyelitis of toe    Podiatry amputated 2nd toe 07/01.  Need antibiotics until 08/08/2024      Skin tear of left upper extremity  Local care, healing      Debility  Need LTAC placement   PT/OT        Severe sepsis  At the admission In setting of acute UTI, MRSA OM right 2nd toe, and infected left shoulder hardware and currently resolved   BCX negative  Urine culture negative  Will need antibiotics until 08/08/2024 per ID    Hyperkalemia  Resolved  in setting of acute on chronic renal failure          Atrial fibrillation with rapid ventricular response  Chronic AF w/ acute RVR,  resolved s/p cardizem drip     TTE from March 2024:    Left Ventricle: The left ventricle is normal in size. Normal wall thickness. Mild global hypokinesis present. There is mildly reduced systolic function with a visually estimated ejection fraction of 45 - 50%. There is normal diastolic function.    Right Ventricle: Normal right ventricular cavity size. Wall thickness is normal. Right ventricle wall motion  is normal. Systolic function is normal.    Left Atrium: Left atrium is moderately dilated.    IVC/SVC: Normal venous pressure at 3 mmHg.    NOAC has been on hold d/t possible need for shoulder surgery - has been on prophylaxis dose of LovenoxResume Eliquis as soon as feasible postop after clear by surgeon       History of Guillain-[place] syndrome  No acute issues  However, pt has impaired mobility and is acutely weakened from his sepsis/UTI/AF RVR and need LTAC placement         Type 2 diabetes mellitus  A1c 6.3%  SSI      Hypertension  Home meds as appropriate    MARA (obstructive sleep apnea)  Continue CPAP HS and w/ naps      Morbid obesity  Body mass index is 39.98 kg/m².        VTE Risk Mitigation (From admission, onward)           Ordered     Reason for No Pharmacological VTE Prophylaxis  Once        Question:  Reasons:  Answer:  Already adequately anticoagulated on oral Anticoagulants    06/14/24 1438     IP VTE HIGH RISK PATIENT  Once         06/14/24 1438     Place sequential compression device  Until discontinued         06/14/24 1438                    Discharge Planning   KAMILA:      Code Status: Full Code   Is the patient medically ready for discharge?:     Reason for patient still in hospital (select all that apply): Treatment  Discharge Plan A: Skilled Nursing Facility            Critical care time spent on the evaluation and treatment of severe organ dysfunction, review of pertinent labs and imaging studies, discussions with consulting providers and discussions with patient/family: 33  minutes.      Nabil Paz MD  Department of Hospital Medicine   [facility]

## 2024-07-07 NOTE — CARE UPDATE
07/06/24 1949   Patient Assessment/Suction   Level of Consciousness (AVPU) alert   Respiratory Effort Unlabored   Expansion/Accessory Muscles/Retractions expansion symmetric   All Lung Fields Breath Sounds coarse;diminished   Rhythm/Pattern, Respiratory depth regular;pattern regular   Cough Frequency infrequent   Cough Type good;nonproductive   PRE-TX-O2   Device (Oxygen Therapy) nasal cannula   $ Is the patient on Low Flow Oxygen? Yes   Flow (L/min) (Oxygen Therapy) 3   SpO2 95 %   Pulse Oximetry Type Continuous   $ Pulse Oximetry - Multiple Charge Pulse Oximetry - Multiple   Pulse 90   Resp 19   Incentive Spirometer   $ Incentive Spirometer Charges done with encouragement   Incentive Spirometer Predicted Level (mL) 1500   Administration (IS) instruction provided, follow-up   Number of Repetitions (IS) 10   Level Incentive Spirometer (mL) 1000   Patient Tolerance (IS) good;no adverse signs/symptoms present   Preset CPAP/BiPAP Settings   Mode Of Delivery Standby   Education   $ Education DME Oxygen;15 min

## 2024-07-07 NOTE — PROGRESS NOTES
".[facility]/Surg   Department of Infectious Disease  Progress Note    PATIENT NAME: Roosevelt Moser  MRN: [id number]  TODAY'S DATE: 07/07/2024  ADMIT DATE: 6/14/2024  LOS: 23 days  CHIEF COMPLAINT: Weakness (Pt brought to ED via EMS with complaint of weakness and falling, pt advised EMS his urine is very dark.  )    PRINCIPLE PROBLEM: Acute renal failure superimposed on chronic kidney disease      INTERVAL HISTORY     7/7:  Examined, he was accepted to go to [facility] for further management by Plastic surgery.  Hemodynamically stable, afebrile.  Wife at bedside.  Adequate urinary output, had a bowel movement in the last 24 hours.  Labs reviewed, white count 11.5, no left shift, H&H 8.1/26.2, platelet count 389.  Stable electrolytes, creatinine 1.1, normal LFTs, CRP greater than 16.  Procalcitonin down to 0.5, improving.  Micro reviewed, so far no growth on all OR cultures    7/6:  Patient seen and examined, wife present.  He is about to stand up with physical therapy.  Status post VATS right lung yesterday, as per operation note: Some loculations broken up.  Twenty cc of fluid aspirated sent for cultures.  Breaking up of adhesions along the posterior gutter down to the diaphragm.  Once all the pleural fluid was suctioned, and all loculations broken up, chest tube was placed to Pleur-evac suction".  Hemodynamically stable, on AFib, afebrile.  Adequate urinary output, has had a couple bowel movements in the last 24 hours.  Labs reviewed, white count 11.6, no left shift, H&H 8.1/26.1, platelet count 439.  Stable electrolytes, creatinine 1.2, creatinine clearance 78.7 mL/min, , normal LFTs, will check CRP and procalcitonin tomorrow.  Discussed with Pulmonary/critical Care, awaiting transfer to higher level of care given multiple chest tubes.  He will likely need a repeat CT chest in the next 24 hours.    7/5:  Patient seen and examined at bedside, wife present.  He is awake, alert, with low BP, about to " "start gentle IV fluids.  He had chest wall abscess blunt dissection yesterday performed by thoracic surgery.  As per operation note: Once the abscess was punctured, creamy purulent fluid began to drain.  A suction catheter was advanced into the abscess cavity and loculations were carefully broken up, with the intent of avoiding entering into the abdominal cavity or irritating the underlying pericardium.  All of the fluid was forward for Gram stain and cultures.  Approximately 250 cc of the creamy purulent fluid was initially drained.  Then abscess cavity was then irrigated and suctioned with a total of 2 L of vancomycin based saline irrigation.  A large Hemovac drain was inserted through a 5 mm incision overlying the center of the abscess".  Left pleural fluid from yes cell count with 1100 WBCs, 72% lymphocytes, , pH 7.6.  Light's criteria consistent with exudative effusion.  G stain moderate WBCs, no organisms seen.  Micro from left chest wall abscess Gram stain with moderate WBCs, no organisms seen, both left pleural effusion and left chest wall abscess no growth to date, pending final.  Discussed with Pulmonary/Dr. Obregon, plan to transfer patient to [facility] for higher level of care, patient is planned to have VATS to right lung today he will likely need plastic surgery to help his left chest wall abscess.    7/4 (Pires) Transferred to [facility] for CT surgery evaluation for left chest wall abscess.  Discussed with wife, yesterday morning while he was being assessed by nursing, a protruded area on his left chest was noted, patient was complaining of chest pain which prompted CT scan that revealed a 17 cm anterior and left chest wall abscess extending into the mediastinum with partial destruction of left 6th, 7th and 8th anterior ribs.  Patient seen examined at bedside, seen by CT surgery earlier today, status post left thoracentesis, 1.500 cc of serosanguineous fluid drained.  Fluid set for cell " count, Gram stain and cultures including AFB and fungal.  Plan today for drainage of chest wall abscess in the OR.  He is awake, alert, wife at bedside, breathing comfortable on room air.  Labile BP, afebrile.  Adequate urinary output, last bowel movement 7/3.  Labs reviewed, leukocytosis 13.4, no left shift, H&H 8.3/26.6, platelet count 485.  Stable electrolytes, creatinine and liver function normal.  Will check CRP tomorrow.  Baseline CPK less than 10.    07/03/2024:  He was noted to have left chest wall swelling today.  CT chest has demonstrated a 17 cm anterior and left chest wall abscess that extends into the mediastinum and peritoneal with partial destruction of left 6th, 7th and 8th anterior ribs.  Also showed bilateral pleural effusion worse on the left with compressive atelectasis/collapse of left lower lobe.  X-ray left shoulder and left humerus 07/02/2024 show left femoral fracture with displacement.     07/02/2024:  He had right 2nd toe amputation yesterday 07/01/2024.  No other acute issues overnight.     06/30/2024: No acute issues overnight. Tolerating antibiotics okay.     06/29/2024: Events of last 9 days noted. He had I and D left shoulder with removal of humerus hardware 06/21/2024. Had 2nd I&D     6/28/24 (Pranay):  Patient seen and examined at bedside, wife present.  He is lying in bed, wife reports he has a little more alert and interactive compared to prior.  Patient awaiting authorization to go to facility to continue IV antibiotics.  He went for 3rd washout yesterday and as per discussion with ortho surgeon tissue look much better, healthier, no pus or fluid collections identified.  Hemodynamically stable, T-max 98.4°, afebrile.  Breathing comfortable on room air.  Adequate urinary output, having regular bowel movements.  PICC line in place since 06/14.  Labs reviewed, leukocytosis down to 15.4, no left shift, H&H 8.3/26.7, platelet count 654, reactive thrombocytosis.  Stable electrolytes,  MARBIN improved, creatinine 1.4, creatinine clearance 69.5 mL/min, normal LFTs, CPK stable. 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     06/27/24@ (Naomi): Patient is lying in bed awake and alert with his wife at bedside.  He was status post washout of his left shoulder.  Per Orthopedic surgery lifting much better with no pus or fluid collections and looked clean and healthy.   He has no complaints.  No diarrhea with a bowel movement today, no nausea vomiting, states he has a fair appetite, no fevers or chills.   Much improved mouth dryness.  T-max 98.6° in last 24 hours.   WBC 17.46, platelets 703, H&H 8.2/25.7, no left shift, no bands, BUN/ CR 54/1.4 with estimated creatinine clearance 69.4.  6/24 cultures with no growth, 6/21 cultures no growth.  6/20 cultures negative.  6/18 culture from right 2nd toe with MRSA.  CRP trending down at 180, BNP is elevated at 456 and total CK is 8.    6/26:  Patient seen and examined, wife present.  He is awake, alert, NPO for 3rd washout of left shoulder.  He reports he is feeling better, awaiting surgery to have something to eat after procedure.  Left shoulder with wound VAC place draining significant amounts of bloody purulent fluid.  Hypertensive, afebrile.  Adequate urinary output, had 2 bowel movements in the last 24 hours.  Significant upper extremity edema decreasing, persistent bilateral lower extremity pitting edema.  Labs reviewed, leukocytosis 17.8, no left shift, H&H 8.2/25.8, platelet count 694, reactive thrombocytosis.  Stable electrolytes, creatinine down to 1.4, MARBIN improved, creatinine clearance 69.4 mL/min, normal LFTs, CRP down to 192.2.  Micro reviewed, OR cultures from 06/21 no growth to date, pending final.    6/25:  Patient seen and examined, wife present.  Patient lying in  bed, has wound VAC placed to left shoulder.  Discussed with Ortho, very difficult situation in the setting of nonunion infected fracture.  As per operation note: After removing packing there was a large collection of bloody brownish fluid.  Irrigated.  Incision was extended 5 cm distally.  Pulse down the wound with 12 L of fluid.  Cultures were taken.  After extensive washout able to pass through the abscess cavity and a wound VAC was placed into cavity.  Evidence of no union or whatsoever.  The patient has an infected nonunion discussed with Hospital Medicine as well, plan for serial washouts in the OR for source control.  Slightly hypertensive, tachycardic, afebrile.  Adequate urinary output, had 2 bowel movements in the last 24 hours.  He did not get steroids preop, last dose dexamethasone 8 mg once on 06/21.  Labs reviewed, leukocytosis 20.1, no left shift, H&H 8.6/27.1, MCV 81, platelet count 720, reactive thrombocytosis.  Stable electrolytes, MARBIN, creatinine down to 1.6, creatinine clearance 60.6 mL/min, improving, normal LFTs, CRP remains taylor high 219.3, procalcitonin continues to fall 0.7.  We do not need to check this daily.  Echo unable to fully visualize oral valves.    6/24/24 (Pranay):  Patient seen and examined, wife present.  He is NPO for procedure today by Ortho, I&D and washout of left shoulder.  Patient awake, alert.  Hypertensive, T-max 99.5°, currently afebrile.  Adequate urinary output, had a bowel movement in the last 24 hours.  Labs reviewed, leukocytosis 18.1, no left shift, H&H 9.1/28.3, MCV 80, reactive thrombocytosis 721.  Sed rate extremely high 128, .  Stable electrolytes, MARBIN creatinine trending down 1.8, clearance 53.9 mL/min, baseline CPK 30.  Procalcitonin down to 0.97.  Last vancomycin random level 13.5.    06/23/2024.  Sleeping -- I did not disturb Afebrile.  T-max 99.5° WBC 21.3--19.8--17.  CRP is quite elevated at 205.  .  Procalcitonin 1.39.  Vancomycin random  "18.  Cultures from shoulder no growth to date.    Gram stain yesterday was read as Gram-positive cocci--today it is changed to  "no organism"  GOing for another procedure tomorrow    06/22/2024.  Very pleasant.  So is his wife.  Patient is Afebrile.  He has swelling over the left arm, slightly improved.  Pain is well-controlled.    WBC 19--21--21.3--19.8.  He did receive dexamethasone yesterday by anesthesia, at the time of left shoulder surgery.  He is on ceftriaxone clindamycin and vancomycin.  Cultures reviewed:  right 2nd toe wound had grown MRSA.  Left intraop cultures no growth to date.  Left shoulder intraop, gram stain are showing Gram-positive cocci.  He is trying to move his legs in bed, but he is still  weak.  Wife and patient are able to do IV antibiotics at home, but all things considered, it is very cumbersome for them;  they are interested in placement, which I agree, it is the right course of action.    06/21/2024:  Seen by Orthopedic surgery Service again yesterday.  For I and D today.  All cultures so far negative.  ASO titer normal.    06/20/2024:  Oral anticoagulants on hold to allow left shoulder arthrocentesis.  No other acute issues overnight.  Culture from right 3rd toe growing Staphylococcus aureus.  Blood culture remain negative.  Had aspiration left shoulder today that yielded purulent material.  Synovial fluid studies in progress.    06/19/2024: Seen and evaluated at bedside.  States left upper extremity pain better.  Having improved movement at the wrist and forearm.  Seen by Orthopedic surgery PA yesterday.  Notes reviewed.  Blood cultures remain negative.        Antibiotics (From admission, onward)      Start     Stop Route Frequency Ordered    07/04/24 0600  meropenem 1 g in sodium chloride 0.9 % 100 mL IVPB (ready to mix system)         -- IV Every 8 hours (non-standard times) 07/04/24 0257    07/03/24 1530  ceftaroline (TEFLARO) 600 mg in dextrose 5 % 50 mL IVPB (ready to mix)      "    -- IV Every 8 hours (non-standard times) 07/03/24 1511    06/17/24 2100  mupirocin 2 % ointment         06/22/24 2059 Nasl 2 times daily 06/17/24 1544          Antifungals (From admission, onward)      Start     Stop Route Frequency Ordered    06/19/24 1045  clotrimazole megha 10 mg         06/29/24 0959 Oral 5 times daily 06/19/24 0932           Antivirals (From admission, onward)      None            ASSESSMENT and PLAN     Left chest wall abscess status post left thoracentesis 1500cc serosanguineous fluid drained 7/4 & I&D drainage with chest tube placement 7/4 & loculated effusions right chest s/p VATS and chest tube 7/5  Cell count 1100 WBCs, 72% lymphocytes, , pH 7.6 - Light's criteria consistent with exudative effusion  L pleural fluid 7/4 G stain OR WBCs, no organisms seen  OR left chest wall abscess 7/4 Gram stain with moderate WBCs, no organisms seen, both left pleural effusion and left chest wall abscess no growth to date, pending final.  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 of deep hardware 6/21 - all screws and nails removed, s/p 2nd washout 6/24 & 3rd washout 6/27  -->128, -->204.5-->219.3-->192-->180, trending down  OR cultures 6/21 no growth  Blood cultures 6/14 x2 sets no growth 6/22 x 1 no growth to date, pending final  OR cultures 6/24 g stain no organisms, cultures negative   Baseline CPK 30   Left Shoulder washouts on 6/21 ( abundant pus in  shoulder, hardware removal), 6/24 ( bloody brownish fluid) and 6/27 ( no fluid collection, healthy red and beefy tissue)  Left shoulder x-ray: Small foci of soft tissue gas projecting over the apical edge of the distracted humeral shaft.     3. Right 3rd toe osteomyelitis s/p Dalvance x 2 doses & 2nd  distal phalanx s/p I&D at bedside, s/p 2nd toe amputation 7/1   X-ray with bony erosion of the 3rd DIP representing osteomyelitis   MRI  osteomyelitis involving the middle distal phalanges of the 3rd toe.  Very slight destruction of the tip of the tuft of the distal phalanx of the 2nd toe.  Wound cultures 2nd R toe 6/18 MRSA, vanco JESSENIA 2      4. PMHx:  Diabetes, last A1c 6.3%, PID, CHF EF 50%     RECOMMENDATIONS:    Follow OR cultures  Awaiting transfer to higher level of care  Consider repeating CT left shoulder and chest to assess prior fluid collections  Continue Teflaro 600 mg IV q.8   Meropenem 1 g IV q.8   Above empirically for left shoulder infected nonunion, left chest wall abscess status post I&D and washout in the OR, right loculated empyema status post VATS  Wound care to all affected areas    D/w patient, wife at bedside, ICU nursing, Dr Obregon/Pulmonary=    Please send Epic secure chat with any questions.      SUBJECTIVE      Review of Systems  Review of systems obtained and negative except as stated above in Interval History     OBJECTIVE   Temp:  [97.5 °F (36.4 °C)-99.6 °F (37.6 °C)] 97.5 °F (36.4 °C)  Pulse:  [] 85  Resp:  [12-35] 28  SpO2:  [89 %-97 %] 95 %  BP: (107-144)/(58-83) 112/63  Arterial Line BP: (136)/(65) 136/65  Temp:  [97.5 °F (36.4 °C)-99.6 °F (37.6 °C)]   Temp: 97.5 °F (36.4 °C) (07/07/24 0301)  Pulse: 85 (07/07/24 0601)  Resp: (!) 28 (07/07/24 0902)  BP: 112/63 (07/07/24 0601)  SpO2: 95 % (07/07/24 0601)    Intake/Output Summary (Last 24 hours) at 7/7/2024 0940  Last data filed at 7/7/2024 0620  Gross per 24 hour   Intake 3844.58 ml   Output 2101 ml   Net 1743.58 ml       Physical Exam  General: Morbidly obese  male, awake, alert, sitting in bed awake and alert, he is in no distress, pleasant and conversant.  Eyes: Eyes with no icterus or injection. Vision grossly normal  Ears: Hearing grossly normal.  Nose: Nares patent  Mouth: Moist mucous membranes, dentition is fair.  Oropharynx with resolved aphthous ulcers.  Neck: Supple  Cardiovascular: Regular rate and rhythm, no murmurs, left chest with  Hemovac, purulent fluid draining, decreased size of prior bulky skin area, less tender to palpation  Respiratory: Better inspiratory effort, decreased breath sounds b/l L>R - left hemovac draining alexander pus, right chest tube serosanguineous fluid   Gastrointestinal:  Soft and obese with active bowel sounds, no tenderness to palpation, no distention.  Genitourinary:  PureWick in place.  No suprapubic tenderness.  Musculoskeletal:  Except for left shoulder, moves all extremities with good strength.    Skin:  Right foot s/p amputation of 2nd toe, left shoulder with stitches in place, no redness noted   Neuro:   Oriented, follows commands.  Psych: Good mood, normal affect.     WOUNDS:      7/5:            7/4:            6/27 6/25:      6/24:                            Significant Labs: All pertinent labs within the past 24 hours have been reviewed.    CBC LAST 7 DAYS  Recent Labs   Lab 07/02/24  0345 07/03/24  0302 07/04/24  0345 07/04/24  1602 07/05/24  0312 07/05/24  1809 07/05/24  1836 07/06/24  0534 07/07/24  0353   WBC 14.35* 12.51 13.49* 13.56* 11.32  --   --  11.62 11.58   RBC 3.54* 3.06* 3.26* 3.25* 3.21*  --   --  3.16* 3.12*   HGB 9.0* 7.8* 8.3* 8.2* 8.2*  --   --  8.1* 8.1*   HCT 29.7* 25.3* 26.6* 26.6* 26.3* 25* 28* 26.1* 26.2*   MCV 84 83 82 82 82  --   --  83 84   MCH 25.4* 25.5* 25.5* 25.2* 25.5*  --   --  25.6* 26.0*   MCHC 30.3* 30.8* 31.2* 30.8* 31.2*  --   --  31.0* 30.9*   RDW 18.2* 18.2* 17.3* 17.3* 17.2*  --   --  17.2* 17.3*   * 418 485* 457* 443  --   --  439 389   MPV 11.5 11.1 9.6 9.9 9.8  --   --  9.8 9.8   GRAN 63.5  9.1* 64.7  8.1* 63.4  8.6* 75.4*  10.2* 70.4  8.0*  --   --  70.4  8.2* 65.1  7.5   LYMPH 16.4*  2.4 18.1  2.3 17.8*  2.4 13.3*  1.8 15.6*  1.8  --   --  14.3*  1.7 16.2*  1.9   MONO 12.2  1.8* 12.6  1.6* 12.9  1.7* 9.1  1.2* 9.4  1.1*  --   --  9.8  1.1* 9.7  1.1*   BASO 0.16 0.09 0.09 0.09 0.09  --   --  0.05 0.08   NRBC 0 0 0 0 0  --   --  0  0       CHEMISTRY LAST 7 DAYS  Recent Labs   Lab 07/01/24  0523 07/02/24  0345 07/03/24  0302 07/03/24  1216 07/04/24  0345 07/04/24  1602 07/05/24  0312 07/05/24  1809 07/05/24  1836 07/06/24  0534 07/07/24  0353    145 142  --  141  --  137  --   --  139 137   K 3.2* 3.6 4.0  --  3.9 4.1 4.1  --   --  4.2 4.5    107 107  --  104  --  103  --   --  106 104   CO2 25 24 25  --  27  --  27  --   --  26 26   ANIONGAP 11 14 10  --  10  --  7*  --   --  7* 7*   BUN 38* 46* 50*  --  43*  --  41*  --   --  31* 33*   CREATININE 1.1 1.2 1.2  --  1.1  --  1.3  --   --  1.2 1.1   * 201* 215*  --  155*  --  186*  --   --  154* 130*   CALCIUM 8.9 9.2 8.9  --  8.0*  --  7.9*  --   --  7.7* 7.8*   PH  --   --   --  7.495*  --   --   --  7.413 7.339*  --   --    MG 1.5* 1.6  --   --  1.5* 1.7 2.0  --   --   --   --    ALBUMIN 1.5* 1.6* 1.5*  --  2.3*  --  2.2*  --   --  2.1* 2.1*   PROT 6.7 7.1 6.5  --  6.2  --  6.1  --   --  5.9* 5.8*   ALKPHOS 134 143* 116  --  101  --  97  --   --  91 97   ALT 19 18 17  --  12  --  14  --   --  12 17   AST 24 26 19  --  14  --  19  --   --  17 25   BILITOT 0.3 0.3 0.3  --  0.7  --  0.4  --   --  0.3 0.3       Estimated Creatinine Clearance: 85.9 mL/min (based on SCr of 1.1 mg/dL).    INFLAMMATORY/PROCAL    Lab Results   Component Value Date    CRP >16.00 (H) 07/07/2024    .3 (H) 07/03/2024    .2 (H) 06/27/2024    .2 (H) 06/26/2024    .3 (H) 06/25/2024    .5 (H) 06/24/2024    .0 (H) 06/23/2024    .0 (H) 06/18/2024          Component Ref Range & Units 2 d ago   Body Fluid Type  Abscess fluid, left shoulder   Fluid Appearance  Cloudy   Fluid Color  Pink   WBC, Body Fluid /cu mm SEE COMMENT   Comment: Reference ranges for body fluids not established.  Correlate clinically.  Test not performed  Cell count not performed on abscess fluid, see differential.   Segs, Fluid % 79   Lymphs, Fluid % 14   Monocytes/Macrophages, Fluid % 7         PRIOR  MICROBIOLOGY:    Susceptibility data from last 90 days.  Collected Specimen Info Organism Ceftriaxone Clindamycin Erythromycin Oxacillin Penicillin Tetracycline Trimeth/Sulfa Vancomycin   06/23/24 Blood from Peripheral, Hand, Right No growth after 5 days.           06/18/24 Wound from Toe, Right Foot Methicillin resistant Staphylococcus aureus  R  S  R  R  R  S  S  S   06/14/24 Blood from Peripheral, Antecubital, Right No growth after 5 days.           06/14/24 Blood from Peripheral, Hand, Right No growth after 5 days.               LAST 7 DAYS MICROBIOLOGY   Microbiology Results (last 7 days)       Procedure Component Value Units Date/Time    Gram stain [[id number]] Collected: 07/05/24 1907    Order Status: Completed Specimen: Pleural Fluid Updated: 07/07/24 0903     Gram Stain Result Few WBC's      No organisms seen    Narrative:      Right pleural fluid    AFB Culture & Smear [[id number]] Collected: 07/04/24 1113    Order Status: Completed Specimen: Abscess from Chest, Left Updated: 07/07/24 0846     AFB CULTURE STAIN No acid fast bacilli seen.     AFB CULTURE STAIN Testing performed by:     AFB CULTURE STAIN Lab [facility]     AFB CULTURE STAIN [address] [facility]     [place] [address]     AFB CULTURE STAIN Dr. Davis Jain MD    Narrative:      Chest, Left    AFB Culture & Smear [[id number]] Collected: 07/04/24 0805    Order Status: Completed Specimen: Pleural Fluid Updated: 07/07/24 0846     AFB CULTURE STAIN No acid fast bacilli seen.     AFB CULTURE STAIN Testing performed by:     AFB CULTURE STAIN Lab [facility]ppergl[facility]     AFB CULTURE STAIN [address] [facility]     [place] [address]     AFB CULTURE STAIN Dr. Davis Jain MD    Narrative:      Pleural Fluid    Culture, Body Fluid (Aerobic) w/ GS [[id number]] Collected: 07/04/24 0846    Order Status: Completed Specimen: Pleural Fluid Updated: 07/07/24 0803     AEROBIC CULTURE - FLUID No  growth     Gram Stain Result Moderate WBC's      No organisms seen    Narrative:      Left Pleural fluid    Aerobic culture [[id number]] Collected: 07/04/24 1113    Order Status: Completed Specimen: Abscess from Chest, Left Updated: 07/07/24 0803     Aerobic Bacterial Culture No growth    Aerobic culture [[id number]] Collected: 07/05/24 1907    Order Status: Completed Specimen: Pleural Fluid Updated: 07/07/24 0803     Aerobic Bacterial Culture No growth    Narrative:      Right pleural fluid    Blood culture [[id number]] Collected: 07/03/24 1332    Order Status: Completed Specimen: Blood from Peripheral, Hand, Left Updated: 07/06/24 2032     Blood Culture, Routine No Growth to date      No Growth to date      No Growth to date      No Growth to date    Culture, Anaerobe [[id number]] Collected: 07/04/24 1113    Order Status: Completed Specimen: Abscess from Chest, Left Updated: 07/06/24 1417     Anaerobic Culture No anaerobes isolated    Narrative:      Chest, Left    Culture, Anaerobic [[id number]] Collected: 07/04/24 0804    Order Status: Completed Specimen: Pleural Fluid Updated: 07/06/24 1121     Anaerobic Culture No anaerobes isolated    Narrative:      Pleural Fluid    Culture, Anaerobic [[id number]] Collected: 07/05/24 1907    Order Status: Sent Specimen: Pleural Fluid Updated: 07/05/24 1941    Gram stain [[id number]] Collected: 07/04/24 1113    Order Status: Completed Specimen: Abscess from Chest, Left Updated: 07/05/24 1607     Gram Stain Result Moderate WBC's      No organisms seen    Narrative:      Chest, Left    Fungus culture [[id number]] Collected: 07/04/24 1113    Order Status: Sent Specimen: Abscess from Chest, Left Updated: 07/04/24 1129    Fungus culture [[id number]] Collected: 07/04/24 0859    Order Status: Sent Specimen: Pleural Fluid Updated: 07/04/24 1010    Culture, Anaerobic [[id number]]     Order Status: Completed Specimen: Pleural Fluid     AFB Culture & Smear [[id number]]     Order Status:  Completed Specimen: Pleural Fluid     Fungus culture [[id number]] Collected: 06/21/24 1645    Order Status: Completed Specimen: Abscess from Shoulder, Left Updated: 07/04/24 0444     Fungus (Mycology) Culture No fungal growth to date      No fungal growth to date    Fungus culture [[id number]] Collected: 06/20/24 1204    Order Status: Completed Specimen: Abscess from Shoulder, Left Updated: 07/04/24 0444     Fungus (Mycology) Culture No fungal growth to date      No fungal growth to date    Aerobic culture [[id number]] Collected: 06/27/24 0733    Order Status: Completed Specimen: Incision site from Shoulder, Left Updated: 07/01/24 0704     Aerobic Bacterial Culture No growth              CURRENT/PREVIOUS VISIT EKG  Results for orders placed or performed during the hospital encounter of 06/14/24   EKG 12-lead    Collection Time: 06/14/24 12:16 PM   Result Value Ref Range    QRS Duration 106 ms    [facility] QTC Calculation 443 ms    Narrative    Test Reason : R53.1,    Vent. Rate : 120 BPM     Atrial Rate : 159 BPM     P-R Int : 000 ms          QRS Dur : 106 ms      QT Int : 314 ms       P-R-T Axes : 000 -54 093 degrees     QTc Int : 443 ms    Atrial fibrillation with rapid ventricular response  Left axis deviation  Low voltage QRS  Inferior infarct ,age undetermined  Cannot rule out Anterior infarct ,age undetermined  Abnormal ECG  When compared with ECG of 25-MAR-2024 09:14,  Vent. rate has increased BY  46 BPM  Minimal criteria for Anterior infarct are now Present  Confirmed by Jesús HATHAWAY, Hansel AMIN ([id number]) on 6/20/2024 8:43:02 PM    Referred By: [email or web address]   SELF           Confirmed By:Hansel Espinosa MD       CT chest 7/3/24:    Impression:    Large abscess of the anterior central and left chest wall and abdominal wall measuring 17 cm transversely.  This extends into the mediastinum and peritoneum with partial destruction of the anterior 6th 7th and 8th ribs.  Complete atelectasis of the left lower lobe with a moderate  left pleural effusion.  Mild atelectasis right lung base with a small to moderate right pleural effusion.    X-ray R foot:  FINDINGS:  There is a small region of bony erosion involving the distal 3rd digit.  There is soft tissue injury of the distal 2nd digit with thickening of the nail.  There is no evidence fracture.    Impression:    There is bony erosion of the 3rd D IP possibly representing osteomyelitis.    ECHO:     Extremely limited visualization of all cardiac structures.  No clinically significant determination can be made based on this echocardiogram.  If cardiac concerns persist, consider alternative cardiac imaging like RICKY for further evaluation.    Left Ventricle: Left ventricle was not well visualized due to poor sonic window. The left ventricle is normal in size. Unable to assess wall motion. There is normal systolic function with a visually estimated ejection fraction of 55 - 60%.    Right Ventricle: Right ventricle was not well visualized due to poor acoustic window.    Left Atrium: Left atrium was not well visualized.    Mitral Valve: There is no stenosis. The mean pressure gradient across the mitral valve is 2 mmHg at a heart rate of  bpm. There is mild regurgitation.    IVC/SVC: Elevated venous pressure at 15 mmHg.    Significant Imaging: I have reviewed all relevant and available imaging results/findings within the past 24 hours.    06/18/2024.  CT left shoulder   1. Subacute left humerus fracture post internal fixation.  There is incomplete bony bridging across the fracture site, with persistent angulation between the dominant bony fragments as hardware is only partially engaged (see discussion).  2. Severe arthropathy of the glenohumeral joint with multiple intra-articular bodies, some interposed.  3. Large left joint effusion and adjacent soft tissue focal fluid collections containing gas and concerning for gas-forming infection.  4. Moderate size left pleural effusion.  5. Left basilar  airspace disease is presumably atelectasis with pneumonia as a less favored consideration.  This report was flagged in Epic as abnormal.    X-ray on 06/17, prior to removal of hardware.      Left shoulder x-ray 7/6:  The glenoid rim appears well maintained.  Mild AC joint osteoarthritis.  Left lung is clear.  Small foci of soft tissue gas projecting over the apical edge of the distracted humeral shaft.   1. Fracture through the surgical neck of the humerus with lateral distraction of the distal component and overlapping segments on the order of 2.5 cm.   2. Overall, no appreciable change upon comparison.     I spent a total of 55 minutes on the day of the visit.This includes face to face time and non-face to face time preparing to see the patient (eg, review of tests), obtaining and/or reviewing separately obtained history, documenting clinical information in the electronic or other health record, independently interpreting results and communicating results to the patient/family/caregiver, or care coordinator.    Capri Wesley MD  Date of Service: 07/07/2024      This note was created using [employer] voice recognition software that occasionally misinterpreted phrases or words.

## 2024-07-07 NOTE — ASSESSMENT & PLAN NOTE
Patient with Long standing persistent (>12 months) atrial fibrillation which is controlled currently with Beta Blocker. Patient is currently in TBD.[id number] Score: 2.      Chronic AF w/ acute RVR at OSH, resolved s/p cardizem drip   NOAC has been on hold d/t possible need for surgery - has been on prophylaxis dose of Lovenox

## 2024-07-07 NOTE — PROGRESS NOTES
Pulmonary/Critical Care Progress Note      Patient name: Roosevelt Moser  MRN: [id number]  Date: 07/07/2024    Admit Date: 6/14/2024  Consult Requested By: Nabil Paz MD    Reason for Consult: Pleural effusion, chest wall abscess    HPI:    7/3/2024 - Pt has been at [facility] for several weeks with toe osteomyelitis and shoulder abscess and has been treated.  Today he was noted to have left chest wall swelling and CT chest shows chest wall abscess with extension into chest and mediastinum and rib destruction.  He also has pleural effusions.  He was transferred here for possible surgical intervention and I am told that surgery feels that we also need CT surgery involved.  CT surgery is in a case at another facility and we are waiting to hear from him.  Another option may be transfer to [facility] for further management.  He feels OK, is wearing CpAP (a chronic treatment), denies any acute respiratory issues.  He admits to should and chest discomfort.  He denies fever.  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        7/5 the patient underwent a left thoracentesis followed by an I&D of his chest wall abscess.  Dr. Escobedo states that the abscess extended down to the rectus abdominis muscles and to the pericardium but there was no pericardial effusion.  The patient also has a loculated right effusion.  As TNK and dornase are not a good option for lysing the adhesions, a VATS with mechanical lysis and drainage is necessary.  The patient is distressed that he is NPO.  The patient refused to participate with PT, even refusing to sit on the side of the bed.    The patient is going to need to transfer to a facility that has thoracic surgery and reconstructive plastic surgery.  The patient has osteomyelitis of multiple ribs which will need further debridement and then a flap placed.  We do not have this ability at this facility.    7/6 patient had an uneventful VATS  yesterday.  He has serosanguineous drainage and no leak.    7/7 the patient gives conflicting reports about whether or not he slept on his BiPAP last night.  His right thoracostomy tubes have drained about 150 cc in the last 14 hours.  His Hemovac on the left continues to drain 160 cc over the last 24 hours of purulent material.    Review of Systems    Review of Systems   Constitutional:  Positive for malaise/fatigue. Negative for chills, diaphoresis, fever and weight loss.   HENT:  Negative for congestion.    Eyes:  Negative for pain.   Respiratory:  Negative for cough, hemoptysis, sputum production, shortness of breath, wheezing and stridor.    Cardiovascular:  Positive for chest pain. Negative for palpitations, orthopnea, claudication, leg swelling and PND.   Gastrointestinal:  Negative for abdominal pain, constipation, diarrhea, heartburn, nausea and vomiting.   Genitourinary:  Negative for dysuria, frequency and urgency.   Musculoskeletal:  Positive for joint pain. Negative for falls and myalgias.        The left shoulder is painful.     Neurological:  Positive for weakness (the patient has not walked since June 14th when he fell.). Negative for sensory change and focal weakness.   Psychiatric/Behavioral:  Negative for depression. The patient is not nervous/anxious.        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    Hyperlipidemia     Hypertension 2016    Sleep apnea        Past Surgical History    Past Surgical History:   Procedure Laterality Date    ARTHROTOMY OF SHOULDER Left 6/21/2024    Procedure: ARTHROTOMY, SHOULDER;  Surgeon: Roman Paulson MD;  Location: [facility] OR;  Service: Orthopedics;  Laterality: Left;    IRRIGATION AND DEBRIDEMENT OF UPPER EXTREMITY Left 6/24/2024    Procedure: IRRIGATION AND DEBRIDEMENT, UPPER EXTREMITY;  Surgeon: Nathan Cooper MD;  Location: [facility] OR;  Service: Orthopedics;   Laterality: Left;    IRRIGATION AND DEBRIDEMENT OF UPPER EXTREMITY Left 6/27/2024    Procedure: IRRIGATION AND DEBRIDEMENT, UPPER EXTREMITY;  Surgeon: Nathan Cooper MD;  Location: [facility] OR;  Service: Orthopedics;  Laterality: Left;    OPEN REDUCTION AND INTERNAL FIXATION (ORIF) OF FRACTURE OF PROXIMAL HUMERUS Left 3/25/2024    Procedure: ORIF, FRACTURE, HUMERUS, PROXIMAL/IM HANNA, LEFT;  Surgeon: Nathan Cooper MD;  Location: [facility] OR;  Service: Orthopedics;  Laterality: Left;  Accumed, Synthes Small Frag set Avelino verified 3/22/24 ark    TOE AMPUTATION Right 7/1/2024    Procedure: AMPUTATION, TOE;  Surgeon: Stevie Zhu DPM;  Location: [facility] OR;  Service: Podiatry;  Laterality: Right;       Medications (scheduled):      (Magic mouthwash) 1:1:1 diphenhydrAMINE(Benadryl) 12.5mg/5ml liq, aluminum & magnesium hydroxide-simethicone (Maalox), LIDOcaine viscous 2%  10 mL Swish & Spit QID    ceftaroline (Teflaro) IV (PEDS and ADULTS)  600 mg Intravenous Q8H    epoetin leonel-epbx  100 Units/kg Subcutaneous Q7 Days    ferrous sulfate  1 tablet Oral Daily    insulin glargine U-100  15 Units Subcutaneous Daily    LIDOcaine  1 patch Transdermal Q24H    meropenem IV (PEDS and ADULTS)  1 g Intravenous Q8H    metoprolol succinate  100 mg Oral QHS    potassium chloride  20 mEq Oral BID    sodium chloride 0.9%  10 mL Intravenous Q6H       Medications (infusions):      0.9% NaCl   Intravenous Continuous 75 mL/hr at 07/06/24 0516 New Bag at 07/06/24 0516       Medications (prn):       Current Facility-Administered Medications:     0.9%  NaCl infusion (for blood administration), , Intravenous, Q24H PRN    0.9%  NaCl infusion (for blood administration), , Intravenous, Q24H PRN    albuterol-ipratropium, 3 mL, Nebulization, Q4H PRN    aluminum & magnesium hydroxide-simethicone, 15 mL, Oral, QID PRN    dextrose 10%, 12.5 g, Intravenous, PRN    dextrose 10%, 25 g, Intravenous, PRN    glucagon (human recombinant), 1 mg, Intramuscular, PRN     glucose, 16 g, Oral, PRN    glucose, 24 g, Oral, PRN    HYDROcodone-acetaminophen, 1 tablet, Oral, Q4H PRN    HYDROcodone-acetaminophen, 1 tablet, Oral, Q4H PRN    insulin aspart U-100, 0-10 Units, Subcutaneous, QID (AC + HS) PRN    magnesium sulfate IVPB, 2 g, Intravenous, Daily PRN    metoprolol, 5 mg, Intravenous, Q5 Min PRN    naloxone, 0.02 mg, Intravenous, PRN    ondansetron, 8 mg, Oral, Q8H PRN    potassium bicarbonate, 35 mEq, Oral, PRN    potassium bicarbonate, 60 mEq, Oral, PRN    potassium, sodium phosphates, 2 packet, Oral, PRN    potassium, sodium phosphates, 2 packet, Oral, PRN    potassium, sodium phosphates, 2 packet, Oral, PRN    promethazine, 25 mg, Oral, Q6H PRN    simethicone, 1 tablet, Oral, QID PRN    sodium chloride 0.9%, 10 mL, Intravenous, Q12H PRN    Flushing PICC/Midline Protocol, , , Until Discontinued **AND** sodium chloride 0.9%, 10 mL, Intravenous, Q6H **AND** sodium chloride 0.9%, 10 mL, Intravenous, PRN    sodium chloride 0.9%, 10 mL, Intravenous, Q12H PRN    traMADoL, 50 mg, Oral, Q4H PRN    Family History: No family history on file.    Social History: Tobacco:   Social History     Tobacco Use   Smoking Status Never   Smokeless Tobacco Never                                EtOH:   Social History     Substance and Sexual Activity   Alcohol Use Yes    Alcohol/week: 0.0 standard drinks of alcohol    Comment: social                                Drugs:   Social History     Substance and Sexual Activity   Drug Use No       Physical Exam    Vital signs:  Temp:  [97.5 °F (36.4 °C)-99.6 °F (37.6 °C)]   Pulse:  []   Resp:  [12-35]   BP: (107-144)/(58-83)   SpO2:  [89 %-97 %]   Arterial Line BP: (136)/(65)     Intake/Output:   Intake/Output Summary (Last 24 hours) at 7/7/2024 0905  Last data filed at 7/7/2024 0620  Gross per 24 hour   Intake 3844.58 ml   Output 2101 ml   Net 1743.58 ml        BMI: Estimated body mass index is 39.98 kg/m² as calculated from the following:    Height as of  this encounter: 6' (1.829 m).    Weight as of this encounter: 133.7 kg (294 lb 12.1 oz).    Physical Exam  Vitals and nursing note reviewed.   Constitutional:       General: He is not in acute distress.     Appearance: Normal appearance. He is obese. He is ill-appearing. He is not toxic-appearing or diaphoretic.   HENT:      Head: Normocephalic and atraumatic.      Right Ear: External ear normal.      Left Ear: External ear normal.      Ears:      Comments: The patient is hard-of-hearing     Nose: Nose normal.      Mouth/Throat:      Mouth: Mucous membranes are moist.      Pharynx: Oropharynx is clear. No oropharyngeal exudate.   Eyes:      General: No scleral icterus.        Right eye: No discharge.         Left eye: No discharge.      Extraocular Movements: Extraocular movements intact.      Conjunctiva/sclera: Conjunctivae normal.      Pupils: Pupils are equal, round, and reactive to light.   Neck:      Vascular: No carotid bruit.   Cardiovascular:      Rate and Rhythm: Normal rate and regular rhythm.      Pulses: Normal pulses.      Heart sounds: Normal heart sounds. No murmur heard.     No friction rub. No gallop.      Comments: Left thoracic lateral wall has a Hemovac drain draining a pink brown pus.  Some pus is coming around the drain.  There is a small dressing over the epigastrium.  There are thoracostomy tubes on the right draining serosanguineous fluid.  Pulmonary:      Effort: Pulmonary effort is normal. No respiratory distress.      Breath sounds: Normal breath sounds. No stridor. No wheezing, rhonchi or rales.      Comments: There are crackles in the right base.  Chest:      Chest wall: No tenderness.   Abdominal:      General: Bowel sounds are normal. There is no distension.      Tenderness: There is no abdominal tenderness. There is no guarding.      Comments: obese   Musculoskeletal:         General: No swelling. Normal range of motion.      Cervical back: Normal range of motion and neck supple. No  rigidity or tenderness.      Right lower leg: No edema.      Left lower leg: No edema.      Comments: Surgical dressing on foot, right 2nd toe has been amputated  Hardware from the left shoulder has been removed, that shoulder is dressed.  Double-lumen PICC on the right   Lymphadenopathy:      Cervical: No cervical adenopathy.   Skin:     General: Skin is warm and dry.      Capillary Refill: Capillary refill takes less than 2 seconds.      Coloration: Skin is not jaundiced.      Findings: No bruising.   Neurological:      General: No focal deficit present.      Mental Status: He is alert. Mental status is at baseline.      Cranial Nerves: No cranial nerve deficit.      Sensory: No sensory deficit.      Motor: No weakness.      Comments: Oriented x 2   Psychiatric:         Mood and Affect: Mood normal.         Behavior: Behavior normal.         Thought Content: Thought content normal.         Judgment: Judgment normal.         Laboratory    Recent Labs   Lab 07/07/24  0353   WBC 11.58   RBC 3.12*   HGB 8.1*   HCT 26.2*      MCV 84   MCH 26.0*   MCHC 30.9*       Recent Labs   Lab 07/07/24  0353   CALCIUM 7.8*   ALBUMIN 2.1*   PROT 5.8*      K 4.5   CO2 26      BUN 33*   CREATININE 1.1   ALKPHOS 97   ALT 17   AST 25   BILITOT 0.3         Microbiology:       Microbiology Results (last 7 days)       Procedure Component Value Units Date/Time    Gram stain [[id number]] Collected: 07/05/24 1907    Order Status: Completed Specimen: Pleural Fluid Updated: 07/07/24 0903     Gram Stain Result Few WBC's      No organisms seen    Narrative:      Right pleural fluid    AFB Culture & Smear [[id number]] Collected: 07/04/24 1113    Order Status: Completed Specimen: Abscess from Chest, Left Updated: 07/07/24 0846     AFB CULTURE STAIN No acid fast bacilli seen.     AFB CULTURE STAIN Testing performed by:     AFB CULTURE STAIN Lab [facility]     AFB CULTURE STAIN [address]     [place] [address]     AFB CULTURE STAIN Dr. Davis Jain MD    Narrative:      Chest, Left    AFB Culture & Smear [[id number]] Collected: 07/04/24 0805    Order Status: Completed Specimen: Pleural Fluid Updated: 07/07/24 0846     AFB CULTURE STAIN No acid fast bacilli seen.     AFB CULTURE STAIN Testing performed by:     AFB CULTURE STAIN [facility] [place]     AFB CULTURE STAIN [address] [facility]     [place] [address]     AFB CULTURE STAIN Dr. Davis Jain MD    Narrative:      Pleural Fluid    Culture, Body Fluid (Aerobic) w/ GS [[id number]] Collected: 07/04/24 0846    Order Status: Completed Specimen: Pleural Fluid Updated: 07/07/24 0803     AEROBIC CULTURE - FLUID No growth     Gram Stain Result Moderate WBC's      No organisms seen    Narrative:      Left Pleural fluid    Aerobic culture [[id number]] Collected: 07/04/24 1113    Order Status: Completed Specimen: Abscess from Chest, Left Updated: 07/07/24 0803     Aerobic Bacterial Culture No growth    Aerobic culture [[id number]] Collected: 07/05/24 1907    Order Status: Completed Specimen: Pleural Fluid Updated: 07/07/24 0803     Aerobic Bacterial Culture No growth    Narrative:      Right pleural fluid    Blood culture [[id number]] Collected: 07/03/24 1332    Order Status: Completed Specimen: Blood from Peripheral, Hand, Left Updated: 07/06/24 2032     Blood Culture, Routine No Growth to date      No Growth to date      No Growth to date      No Growth to date    Culture, Anaerobe [[id number]] Collected: 07/04/24 1113    Order Status: Completed Specimen: Abscess from Chest, Left Updated: 07/06/24 1417     Anaerobic Culture No anaerobes isolated    Narrative:      Chest, Left    Culture, Anaerobic [[id number]] Collected: 07/04/24 0804    Order Status: Completed Specimen: Pleural Fluid Updated: 07/06/24 1121     Anaerobic Culture No anaerobes isolated    Narrative:      Pleural Fluid    Culture, Anaerobic [[id number]]  Collected: 07/05/24 1907    Order Status: Sent Specimen: Pleural Fluid Updated: 07/05/24 1941    Gram stain [[id number]] Collected: 07/04/24 1113    Order Status: Completed Specimen: Abscess from Chest, Left Updated: 07/05/24 1607     Gram Stain Result Moderate WBC's      No organisms seen    Narrative:      Chest, Left    Fungus culture [[id number]] Collected: 07/04/24 1113    Order Status: Sent Specimen: Abscess from Chest, Left Updated: 07/04/24 1129    Fungus culture [[id number]] Collected: 07/04/24 0859    Order Status: Sent Specimen: Pleural Fluid Updated: 07/04/24 1010    Culture, Anaerobic [[id number]]     Order Status: Completed Specimen: Pleural Fluid     AFB Culture & Smear [[id number]]     Order Status: Completed Specimen: Pleural Fluid     Fungus culture [[id number]] Collected: 06/21/24 1645    Order Status: Completed Specimen: Abscess from Shoulder, Left Updated: 07/04/24 0444     Fungus (Mycology) Culture No fungal growth to date      No fungal growth to date    Fungus culture [[id number]] Collected: 06/20/24 1204    Order Status: Completed Specimen: Abscess from Shoulder, Left Updated: 07/04/24 0444     Fungus (Mycology) Culture No fungal growth to date      No fungal growth to date    Aerobic culture [[id number]] Collected: 06/27/24 0733    Order Status: Completed Specimen: Incision site from Shoulder, Left Updated: 07/01/24 0704     Aerobic Bacterial Culture No growth            Radiology    X-Ray Shoulder 2 or More Views Left  Narrative: EXAMINATION:  XR SHOULDER COMPLETE 2 OR MORE VIEWS LEFT    CLINICAL HISTORY:  History of displaced left proximal humerus fracture;    TECHNIQUE:  Two or three views of the right shoulder were performed.    COMPARISON:  07/02/2024    FINDINGS:  Again redemonstrated is a fracture through the surgical neck of the humerus with evidence of the humeral shaft component being distracted laterally and the head component showing evidence of mild medial distraction with  overlapping of the fracture fragments on the order of 2.5 cm.  Humeral head slightly distracted inferiorly.  Numerous intra-articular loose bodies are observed projected over the humeral head.  The glenoid rim appears well maintained.  Mild AC joint osteoarthritis.  Left lung is clear.  Small foci of soft tissue gas projecting over the apical edge of the distracted humeral shaft.  Impression: 1. Fracture through the surgical neck of the humerus with lateral distraction of the distal component and overlapping segments on the order of 2.5 cm.  2. Overall, no appreciable change upon comparison.    Electronically signed by: Jake Engle MD  Date:    07/06/2024  Time:    08:23  X-Ray Chest AP Portable  Narrative: EXAMINATION:  XR CHEST AP PORTABLE    CLINICAL HISTORY:  post VATS right;    TECHNIQUE:  Single frontal view of the chest was performed.    COMPARISON:  07/05/2024    FINDINGS:  Cardiopericardial silhouette appears prominent size with equal size parameters as compared to comparison study.  Right thoracostomy tube has its tip projecting in the right apex, though no definitive pneumothorax is observed.  Right-sided PICC line catheter has its tip projecting over the superior vena cava.  Diffuse vascular prominence with upward distribution of flow is on the basis of mild pulmonary edema/congestive heart failure.  No evidence of pleural effusion.  Chronic degenerative spondylosis changes of the thoracic spine.  Impression: 1.  Manifestations of mild congestive heart failure bordering on mild pulmonary edema.    2.  Right-sided thoracostomy tube terminating within the right apex.  No definitive pneumothorax.    Electronically signed by: Jake Engle MD  Date:    07/06/2024  Time:    08:21      Oxygen Information       Patient is wearing his home CPAP           Recent Labs     07/05/24  1836   PH 7.339*   PCO2 50.9*   PO2 392*   HCO3 27.4   POCSATURATED 100   BE 2         Impression    Active Hospital Problems     Diagnosis  POA    *Acute renal failure superimposed on chronic kidney disease [N17.9, N18.9]  Yes    Chest wall abscess [L02.213]  No    Pleural effusion [J90]  No    MRSA infection [A49.02]  Yes    Pyogenic arthritis of left shoulder region [M00.9]  Yes    Osteomyelitis of toe [M86.9]  Yes    Abscess of left shoulder [L02.414]  Yes    Debility [R53.81]  Yes    Skin tear of left upper extremity [S41.112A]  Yes    Hyperkalemia [E87.5]  Yes    Severe sepsis [A41.9, R65.20]  Yes    Atrial fibrillation with rapid ventricular response [I48.91]  Yes    History of Guillain-[place] syndrome [Z86.69]  Not Applicable    Type 2 diabetes mellitus [E11.9]  Yes    MARA (obstructive sleep apnea) [G47.33]  Yes    Morbid obesity [E66.01]  Yes    Hypertension [I10]  Yes      Resolved Hospital Problems    Diagnosis Date Resolved POA    Swelling of limb, left [M79.89] 07/06/2024 Yes   Infected left shoulder with hardware  - hardware removed  Large chest wall abscess  - extending down to the rectus abdominis  - with 3 ribs involved  - contiguous with the pericardium but no obvious pericardial effusion  - ID following, cultures are negative at this time  - patient will need debridement of the infected ribs and then plastic surgery to swing a flap.  I have spoken with the transfer center, and the plastic surgeon on-call, and Dr. Bansal.  Dr. Bansal accepted the patient yesterday.  We are awaiting transfer.  Bilateral pleural effusions  - status post thoracentesis on the left and VATS on the right  - fluid exudative with a lymphocytic predominance, stains and cultures negative at this time  Type 2 diabetes with osteomyelitis of the 2nd toe   - now resected  - MRSA  Obstructive sleep apnea  - patient using his home CPAP  Morbid obesity/moderate hypoalbuminemia  Anemia  Diabetes mellitus  - continue Accu-Cheks and sliding scale    Patient is going to need further debridement of the ribs and then a flap to cover the defect.  This will require  reconstructive plastic surgeons as well as thoracic surgeons and we do not have that combination of physicians here.  He has been accepted according to the transfer center, we are awaiting transfer.    Discussed with nursing and Respiratory and Cardiothoracic surgery and the patient and his wife.    Patient is stable to move out of the ICU.

## 2024-07-07 NOTE — NURSING
Nurses Note -- 4 Eyes      7/7/2024   07:01      Skin assessed during: Daily Assessment      [] No Altered Skin Integrity Present    [x]Prevention Measures Documented      [x] Yes- Altered Skin Integrity Present or Discovered   [x] LDA Added if Not in Epic (Describe Wound)   [x] New Altered Skin Integrity was Present on Admit and Documented in LDA   [x] Wound Image Taken    Wound Care Consulted? Yes    Attending Nurse:  Arely Ceballos RN/Staff Member:  Arely Barros RN

## 2024-07-07 NOTE — RESPIRATORY THERAPY
07/07/24 0830   Patient Assessment/Suction   Level of Consciousness (AVPU) alert   Respiratory Effort Normal;Unlabored   Expansion/Accessory Muscles/Retractions no retractions;no use of accessory muscles;expansion symmetric   All Lung Fields Breath Sounds Anterior:;Posterior:;diminished   ANNABELLE Breath Sounds diminished   LLL Breath Sounds diminished   RUL Breath Sounds diminished   RML Breath Sounds diminished   RLL Breath Sounds diminished   Rhythm/Pattern, Respiratory unlabored;pattern regular;depth regular;no shortness of breath reported   PRE-TX-O2   Device (Oxygen Therapy) nasal cannula   $ Is the patient on Low Flow Oxygen? Yes   Flow (L/min) (Oxygen Therapy) 3   SpO2 (!) 94 %   Pulse Oximetry Type Continuous   $ Pulse Oximetry - Multiple Charge Pulse Oximetry - Multiple   Pulse 89   Resp (!) 38   Incentive Spirometer   $ Incentive Spirometer Charges done with encouragement   Incentive Spirometer Predicted Level (mL) 1500   Administration (IS) instruction provided, follow-up   Number of Repetitions (IS) 6   Level Incentive Spirometer (mL) 1000   Patient Tolerance (IS) no adverse signs/symptoms present;good   Education   $ Education 15 min;Incentive Spirometry  (needs lots of instruction)

## 2024-07-07 NOTE — PROGRESS NOTES
[facility]  Orthopedics  Progress Note    Patient Name: Roosevelt Moser  MRN: [id number]  Admission Date: 6/14/2024  Hospital Length of Stay: 23 days  Attending Provider: Nabil Paz MD  Primary Care Provider: Miguel Angel Enriquez PA-C  Follow-up For: Procedure(s) (LRB):  VATS, WITH DECORTICATION, LUNG (Right)    Post-Operative Day: 2 Days Post-Op  Subjective:     Principal Problem:Acute renal failure superimposed on chronic kidney disease    Principal Orthopedic Problem:  Left proximal humerus fracture, status post irrigation and debridement left shoulder    Interval History:  No change    Review of patient's allergies indicates:  No Known Allergies    Current Facility-Administered Medications   Medication    (Magic mouthwash) 1:1:1 diphenhydrAMINE(Benadryl) 12.5mg/5ml liq, aluminum & magnesium hydroxide-simethicone (Maalox), LIDOcaine viscous 2%    0.9%  NaCl infusion (for blood administration)    0.9%  NaCl infusion (for blood administration)    0.9%  NaCl infusion    albuterol-ipratropium 2.5 mg-0.5 mg/3 mL nebulizer solution 3 mL    aluminum & magnesium hydroxide-simethicone 400-400-40 mg/5 mL suspension 15 mL    ceftaroline (TEFLARO) 600 mg in dextrose 5 % 50 mL IVPB (ready to mix)    dextrose 10% bolus 125 mL 125 mL    dextrose 10% bolus 250 mL 250 mL    epoetin leonel-epbx injection 14,100 Units    ferrous sulfate tablet 1 each    glucagon (human recombinant) injection 1 mg    glucose chewable tablet 16 g    glucose chewable tablet 24 g    HYDROcodone-acetaminophen  mg per tablet 1 tablet    HYDROcodone-acetaminophen 5-325 mg per tablet 1 tablet    insulin aspart U-100 pen 0-10 Units    insulin glargine U-100 (Lantus) pen 15 Units    LIDOcaine 5 % patch 1 patch    magnesium sulfate 2g in water 50mL IVPB (premix)    meropenem 1 g in sodium chloride 0.9 % 100 mL IVPB (ready to mix system)    metoprolol injection 5 mg    metoprolol succinate (TOPROL-XL) 24 hr tablet 100 mg    naloxone 0.4 mg/mL  injection 0.02 mg    ondansetron disintegrating tablet 8 mg    potassium bicarbonate disintegrating tablet 35 mEq    potassium bicarbonate disintegrating tablet 60 mEq    potassium chloride SA CR tablet 20 mEq    potassium, sodium phosphates 280-160-250 mg packet 2 packet    potassium, sodium phosphates 280-160-250 mg packet 2 packet    potassium, sodium phosphates 280-160-250 mg packet 2 packet    promethazine tablet 25 mg    simethicone chewable tablet 80 mg    sodium chloride 0.9% flush 10 mL    sodium chloride 0.9% flush 10 mL    And    sodium chloride 0.9% flush 10 mL    sodium chloride 0.9% flush 10 mL    traMADoL tablet 50 mg     Objective:     Vital Signs (Most Recent):  Temp: 97.5 °F (36.4 °C) (07/07/24 0301)  Pulse: 85 (07/07/24 0601)  Resp: (!) 26 (07/07/24 0601)  BP: 112/63 (07/07/24 0601)  SpO2: 95 % (07/07/24 0601) Vital Signs (24h Range):  Temp:  [97.5 °F (36.4 °C)-99.6 °F (37.6 °C)] 97.5 °F (36.4 °C)  Pulse:  [] 85  Resp:  [12-35] 26  SpO2:  [89 %-97 %] 95 %  BP: (107-144)/(58-83) 112/63  Arterial Line BP: (136)/(65) 136/65     Weight: 133.7 kg (294 lb 12.1 oz)  Height: 6' (182.9 cm)  Body mass index is 39.98 kg/m².      Intake/Output Summary (Last 24 hours) at 7/7/2024 0808  Last data filed at 7/7/2024 0620  Gross per 24 hour   Intake 4244.58 ml   Output 2101 ml   Net 2143.58 ml        General    Nursing note and vitals reviewed.  Constitutional: He is oriented to person, place, and time. He appears well-developed and well-nourished. No distress.   HENT:   Head: Normocephalic and atraumatic.   Nose: Nose normal.   Eyes: EOM are normal.   Pulmonary/Chest: Effort normal.   Neurological: He is alert and oriented to person, place, and time.   Psychiatric: He has a normal mood and affect. His behavior is normal. Thought content normal.             Left Shoulder Exam     Tenderness   The patient is experiencing no tenderness.     Range of Motion   Extension:  abnormal   Forward Flexion:  abnormal    Forward Elevation: abnormal  Adduction: abnormal  External Rotation 90 degrees: abnormal  Internal rotation 90 degrees:  abnormal     Other   Sensation: normal     Comments:  Dressings were changed since I observed his wound yesterday.  Sterile gauze and ABD pad with tape noted.  Dry.  No evidence of any drainage whatsoever on the dressing.      Vascular Exam       Left Pulses      Radial:                    2+      Capillary Refill  Left Hand: normal capillary refill           Significant Labs:   Recent Lab Results  (Last 5 results in the past 24 hours)        07/07/24  0353   07/06/24  2125   07/06/24  1713   07/06/24  1102   07/06/24  0939        Procalcitonin 0.575  Comment: The Procalcitonin test is intended to aid in the risk assessment of   critically ill patients on their first day of ICU admission for   progression   to severe sepsis and septic shock.    A result of <0.50 ng/mL is associated with a low risk of severe   sepsis   and/or septic shock.  This does not exclude localized infection.    A result between 0.50 ng/mL and 2.0 ng/mL should be interpreted with   consideration of the patient's history and is recommended to retest   within 6   to 24 hours.        A result of >2.0 ng/mL is associated with a high risk of severe   sepsis   and/or septic shock.    Procalcitonin may not be accurate among patients with   localized  infection, recent trauma or major surgery, immunosuppressed   state,  invasive fungal infection, renal dysfunction. Decisions   regarding  initiation or continuation of antibiotic therapy should not be   based  solely on procalcitonin levels.                 Albumin 2.1               ALP 97               ALT 17               Anion Gap 7               AST 25               Baso # 0.08               Basophil % 0.7               BILIRUBIN TOTAL 0.3  Comment: For infants and newborns, interpretation of results should be based  on gestational age, weight and in agreement with  clinical  observations.    Premature Infant recommended reference ranges:  Up to 24 hours.............<8.0 mg/dL  Up to 48 hours............<12.0 mg/dL  3-5 days..................<15.0 mg/dL  6-29 days.................<15.0 mg/dL                 BUN 33               Calcium 7.8               Chloride 104               CO2 26               Creatinine 1.1               CRP >16.00  Comment: CRP-Normal Application expected values:   <1.0        mg/dL   Normal Range  1.0 - 5.0  mg/dL   Indicates mild inflammation  5.0 - 10.0 mg/dL   Indicates severe inflammation  >10.0        mg/dL   Represents serious processes and   frequently         indicates the presence of a bacterial   infection.                  Differential Method Automated               eGFR >60.0               Eos # 0.8               Eos % 7.3               Glucose 130               Gran # (ANC) 7.5               Gran % 65.1               Hematocrit 26.2               Hemoglobin 8.1               Immature Grans (Abs) 0.12  Comment: Mild elevation in immature granulocytes is non specific and   can be seen in a variety of conditions including stress response,   acute inflammation, trauma and pregnancy. Correlation with other   laboratory and clinical findings is essential.                 Immature Granulocytes 1.0               Lymph # 1.9               Lymph % 16.2               MCH 26.0               MCHC 30.9               MCV 84               Mono # 1.1               Mono % 9.7               MPV 9.8               nRBC 0               Platelet Count 389               POC Glucose   159   188   245   183       Potassium 4.5               PROTEIN TOTAL 5.8               RBC 3.12               RDW 17.3               Sodium 137               WBC 11.58                                      Significant Imaging: I have reviewed all pertinent imaging results/findings.  Assessment/Plan:     Abscess of left shoulder  Hospital day 23:    Status post repeat irrigation and  debridement (X3) and hardware removal of left shoulder for suspected infection.  Patient is approximately 3 months status post ORIF of a left proximal humerus fracture which lost reduction.    1. His pain is well managed.    2. Preoperative and intraoperative cultures from 06/21, 6/24 and 6/27 showed many WBCs but have NOT demonstrated any bacterial growth to date.  As well as cultures from 06/19 showed no growth.    3. Wound VAC was discontinued intraoperatively 6/27  4. Sling as needed for comfort of the left upper extremity. Patient can do very light activity with the left upper extremity focusing more on elbow, hand and wrist.  Would not do dedicated therapy for the shoulder.  Patient could weightbear through the extremity if needed with a walker.  5. No plan for additional orthopedic intervention for the left shoulder at this time.  6. Consult case management as the patient will likely benefit from placement for IV antibiotics as well as his generalized deconditioned state.  7. Continue Infectious Disease recommendations but no laboratory findings suggestive of left shoulder infection.  8. Stable from an orthopedic standpoint, unfortunately he has regressed from an overall medical perspective.   9. H/o right 2nd toe osteomyelitis, status post amputation with podiatry.    Patient now with abscess in chest/mediastinum. Recent thoracentesis. Pending transfer to higher level of care.    Again, I have personally reviewed the x-ray of the left shoulder with comparison to images taken on July 2nd.  No significant interval change in the appearance of the left proximal humerus fracture.  Again noted are multiple gas foci.  Taking into account variations of technique with the x-ray I do not appreciate any significant change or worsening of the presence of gas within the left shoulder.     This was discussed with infectious disease yesterday. Based on the patient's history of chronic kidney disease and is current  deteriorated state, advanced imaging and administration of IV contrast is contraindicated.     We will continue to monitor with serial x-rays looking for expanding gas.          KAREN Avina  Orthopedics  Critical access hospital

## 2024-07-07 NOTE — ASSESSMENT & PLAN NOTE
Patient's FSGs are controlled on current medication regimen.  Last A1c reviewed-   Lab Results   Component Value Date    HGBA1C 6.3 (H) 06/14/2024     Most recent fingerstick glucose reviewed-   Recent Labs   Lab 07/07/24  1606   POCTGLUCOSE 168*     Current correctional scale  Low  Maintain anti-hyperglycemic dose as follows-   Antihyperglycemics (From admission, onward)      Start     Stop Route Frequency Ordered    07/08/24 0900  insulin glargine U-100 (Lantus) pen 15 Units         -- SubQ Daily 07/07/24 1532    07/07/24 1529  insulin aspart U-100 pen 0-10 Units         -- SubQ Before meals & nightly PRN 07/07/24 1532          Hold Oral hypoglycemics while patient is in the hospital.

## 2024-07-07 NOTE — HPI
Mr. Moser is a pleasant 73 yoM with PMH including Afib on eliquis, HTN, T2DM and MARA who is transferred from [place] for a chest wall abscess with concern for osteomyelitis of the anterior left 6-8th ribs. He originally presented to the [facility] after a fall resulting in a left humeral fracture for which he underwent ORIF in March. He represented in June with a left shoulder abscess and underwent multiple washouts and removal of the ORIF hardware. His hospital stay was complicated by bilateral effusions and a left chest wall abscess with extension into the epigastrium and mediastinum with concern for osteomyelitis of the left 6-8th ribs. He underwent I+D of the abscess through a small left anterior chest incision with drain placement on 7/4, thoracentesis of the left pleural effusion on 7/4, and right-sided VATs with washout and chest tube placement on 7/5. Interestingly, cultures from the pleural fluid and chest wall abscess cavity have been negative despite the abscess cavity fluid being described as creamy pus by the [facility] op report. The only positive cultures so far have been from those collected by podiatry during a toe amp which were positive for MRSA.    He was transferred to [facility] for potential thoracic and plastic surgery interventions if more invasive debridements or resections were required with reconstruction.    Upon arrival here he states that over the past couple days he has been feeling much better. He states that the pain and swelling associated with the chest wall abscess is resolved. He denies any fever or chills. He states that the dyspnea he was experiencing with the prior pleural effusions has resolved, despite remaining on 2-3 L/min NC. He is not on home oxygen.

## 2024-07-07 NOTE — ASSESSMENT & PLAN NOTE
73 yoM with left chest wall abscess and concern for left 6-8 rib osteomyelitis transferred for potential thoracic and plastic surgery intervention. Subjectively he is feeling much better s/p incision and drainage of chest wall abscess, thoracentesis of left pleural effusion, and VATS with right chest tube placement for right pleural effusion at [facility]. Afebrile, vital signs stable, lab work relatively benign.    - Repeat CT chest and abdomen with IV contrast to re evaluate chest wall abscess extension and potential osteomyelitis of the ribs  - Follow up ID recommendations  - Right chest tube to water seal  - Left chest accordion drain to negative pressure  - Ok for a diet, NPO at midnight  - Thoracic surgery to follow

## 2024-07-07 NOTE — ASSESSMENT & PLAN NOTE
Chronic, controlled. Latest blood pressure and vitals reviewed-     Temp:  [97.2 °F (36.2 °C)-99.6 °F (37.6 °C)]   Pulse:  []   Resp:  [12-38]   BP: (110-144)/(52-94)   SpO2:  [91 %-98 %] .   Home meds for hypertension were reviewed and noted below.   Hypertension Medications               hydrALAZINE (APRESOLINE) 25 MG tablet Take 1 tablet (25 mg total) by mouth every 12 (twelve) hours.    metoprolol succinate (TOPROL-XL) 100 MG 24 hr tablet Take 1 tablet (100 mg total) by mouth once daily.            While in the hospital, will manage blood pressure as follows; continue home metoprolol    Will utilize p.r.n. blood pressure medication only if patient's blood pressure greater than 180/110 and he develops symptoms such as worsening chest pain or shortness of breath.

## 2024-07-07 NOTE — NURSING
Nurses Note -- 4 Eyes      7/7/2024   4:17 AM      Skin assessed during: Daily Assessment      [] No Altered Skin Integrity Present    []Prevention Measures Documented      [x] Yes- Altered Skin Integrity Present or Discovered   [] LDA Added if Not in Epic (Describe Wound)   [] New Altered Skin Integrity was Present on Admit and Documented in LDA   [] Wound Image Taken    Wound Care Consulted? Yes    Attending Nurse:  Fiorella Ceballos RN/Staff Member:  Josefina HACKETT

## 2024-07-07 NOTE — DISCHARGE SUMMARY
[facility] Medicine  Discharge Summary      Patient Name: Roosevelt Moser  MRN: [id number]  JUANITA: [id number]  Patient Class: IP- Inpatient  Admission Date: 6/14/2024  Hospital Length of Stay: 23 days  Discharge Date and Time:  07/07/2024 4:34 PM  Attending Physician: No att. providers found   Discharging Provider: Nabil Paz MD  Primary Care Provider: Miguel Angel Enriquez PA-C    Primary Care Team: Networked reference to record PCT     HPI:   Roosevelt Moser is a 73 year old male with a previous medical history of atrial fibrillation on eliquis, HTN, DMII, obstructive sleep apnea, HLD, ORIF of left humerus and guillian-Dyer who presented to the emergency room for weakness and multiple falls. Per wife of the patient he has had progressive weakness over the past week along with two falls one at 0300 Thursday and the other early morning Friday. Both time she had to activate EMS to pick patient up off floor due to weakness. Patient refused transport to hospital on both occasions. Today he slid out of his chair and was unable to get up without assistance and at this point the wife activated EMS to transport patient to the hospital. She endorses that two days ago she noticed that the patient had stopped urinating as he normally does. The patient concurs that he has noticed that his urine has decreased over the past two days and that it is dark. Patient denies dysuria or incomplete bladder emptying. Bladder scan showed zero upon admit and urine sample was obtained via straight cath in ED. Patient denies decreased PO intake and keeps a bottle of water with him at all times. Initial ED work-up showed a creatinine of 5 and potassium of 6. Urine studies positive for infection and WBC 20.81 with procalcitonin at 19.75. Blood cultures obtained and patient given 1 gram of rocephin and 1000ml of normal saline. Patient noted to bein afib with RVR and 20mg of diltiazem was administered IV which resulted in low blood  pressure and 1 gram of calcium gluconate was administered. Patient admitted by hospital medicine for further evaluation and management     Procedure(s) (LRB):  VATS, WITH DECORTICATION, LUNG (Right)      Hospital Course:   Patient presented after fall.  He was found to have osteo of his toe and abscesses left shoulder.  He underwent washouts with surgery on his shoulder. Status post repeat irrigation and debridement (3rd washout) and hardware removal of left shoulder for suspected infection. Wound VAC was discontinued intraoperatively 6/27. They cleared him on 06/28/2024. The patient was noted to have osteomyelitis of second right toe. Seen by podiatry, who performed amputation on 07/01. Patient noted to have dyspnea. CXR revealed bilateral pleural effusions. IV lasix started and stopped due to MARBIN. Pending snf placement at this time. Pt taken to OR for I&D of a chest wall abscess on 7/4/24. He had a thoracentesis of 1550 cc.  Dr. Escobedo states that the abscess extended down to the rectus abdominis muscles and to the pericardium but there was no pericardial effusion.  The patient also has a loculated right effusion and s/p VATS with mechanical lysis and drainage with chest tube placement was done.  Cont Teflaro, meropenem, and micagungin per ID recs and continued treatment .   CT surgery think thatthe patient is going to need to transfer to a facility that has thoracic surgery and reconstructive plastic surgery. The patient has osteomyelitis of multiple ribs which will need further debridement and then a flap placed. Unfortunately, [facility] do not have this capability at this facility. And later transfer initiated and transferred to [facility] plastic surgery team in stable condition .       Goals of Care Treatment Preferences:  Code Status: Full Code      Consults:   Consults (From admission, onward)          Status Ordering Provider     Inpatient consult to Pulmonology  Once        Provider:  Alessandro Mayo MD     Completed LAURIE OVALLES     Inpatient consult to Cardiothoracic Surgery  Once        Provider:  Gus Escobedo MD    Completed LAURIE OVALLES     Inpatient consult to Orthopedic Surgery  Once        Provider:  Roman Paulson MD    Completed CONTRERAS ELIZABETH     Inpatient consult to Infectious Diseases  Once        Provider:  Vasquez Calvin MD    Completed NATE MAYO     Inpatient consult to Orthopedic Surgery  Once        Provider:  Nathan Cooper MD    Completed CONTRERAS ELIZABETH     Inpatient consult to PICC Line Nurse  Once        Provider:  (Not yet assigned)    Completed YAZMIN MCMULLEN     Inpatient consult to Social Work/Case Management  Once        Provider:  (Not yet assigned)    Completed PADMINI BISHOP     Inpatient consult to Nephrology  Once        Provider:  Yazmin Mcmullen MD    Completed PADMINI BISHOP            No new Assessment & Plan notes have been filed under this hospital service since the last note was generated.  Service: Hospital Medicine    Final Active Diagnoses:    Diagnosis Date Noted POA    PRINCIPAL PROBLEM:  Acute renal failure superimposed on chronic kidney disease [N17.9, N18.9] 06/14/2024 Yes    Chest wall abscess [L02.213] 07/04/2024 No    Loculated pleural effusion [J90] 07/03/2024 No    MRSA infection [A49.02] 06/22/2024 Yes    Pyogenic arthritis of left shoulder region [M00.9] 06/21/2024 Yes    Osteomyelitis of toe [M86.9] 06/18/2024 Yes    Abscess of left shoulder [L02.414] 06/18/2024 Yes    Debility [R53.81] 06/16/2024 Yes    Skin tear of left upper extremity [S41.112A] 06/16/2024 Yes    Hyperkalemia [E87.5] 06/14/2024 Yes    Severe sepsis [A41.9, R65.20] 06/14/2024 Yes    Atrial fibrillation with rapid ventricular response [I48.91] 10/03/2022 Yes    History of Guillain-Conyers syndrome [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    Specialty: Family Medicine  Why: after dc from [facility]  Contact information:  [address]  [place] [address]  [phone]               Yocasta Zhu DPM Follow up.    Specialties: Podiatry, Surgery, Wound Care  Contact information:  [address]  [address]  [place] [address]  [phone]               Capri Bach MD Follow up.    Specialty: Infectious Diseases  Contact information:  [address]  [address]  [place] [address]  [phone]               Nathan Cooper MD Follow up.    Specialties: Orthopedic Surgery, Surgery, Sports Medicine  Why: 7/9 @ 11:45  Contact information:  [address]  [address]  [place] [address]  [phone]                           Patient Instructions:   No discharge procedures on file.    Significant Diagnostic Studies: Labs: CMP   Recent Labs   Lab 07/06/24  0534 07/07/24  0353    137   K 4.2 4.5    104   CO2 26 26   * 130*   BUN 31* 33*   CREATININE 1.2 1.1   CALCIUM 7.7* 7.8*   PROT 5.9* 5.8*   ALBUMIN 2.1* 2.1*   BILITOT 0.3 0.3   ALKPHOS 91 97   AST 17 25   ALT 12 17   ANIONGAP 7* 7*    and CBC   Recent Labs   Lab 07/06/24  0534 07/07/24  0353   WBC 11.62 11.58   HGB 8.1* 8.1*   HCT 26.1* 26.2*    389       Pending Diagnostic Studies:       None           Medications:  Reconciled Home Medications:      Medication List        ASK your doctor about these medications      apixaban 5 mg Tab  Commonly known as: ELIQUIS  Take 1 tablet (5 mg total) by mouth 2 (two) times daily.     atorvastatin 10 MG tablet  Commonly known as:  LIPITOR  Take 1 tablet (10 mg total) by mouth once daily.     co-enzyme Q-10 30 mg capsule  Take 30 mg by mouth once daily.     doxycycline 100 MG Cap  Commonly known as: VIBRAMYCIN  Take 100 mg by mouth 2 (two) times daily.     glimepiride 2 MG tablet  Commonly known as: AMARYL  Take 1 tablet (2 mg total) by mouth before breakfast.     hydrALAZINE 25 MG tablet  Commonly known as: APRESOLINE  Take 1 tablet (25 mg total) by mouth every 12 (twelve) hours.     metFORMIN 500 MG ER 24hr tablet  Commonly known as: GLUCOPHAGE-XR  Take 2 tablets (1,000 mg total) by mouth 2 (two) times daily with meals.     metoprolol succinate 100 MG 24 hr tablet  Commonly known as: TOPROL-XL  Take 1 tablet (100 mg total) by mouth once daily.     VITAMIN D ORAL  Take 1 tablet by mouth once daily.              Indwelling Lines/Drains at time of discharge:   Lines/Drains/Airways       Peripherally Inserted Central Catheter Line  Duration             PICC Double Lumen 06/14/24 2115 right basilic 22 days              Drain  Duration             Male External Urinary Catheter 06/25/24 2000 11 days         Closed/Suction Drain 07/04/24 1125 Tube - 1 Inferior;Left Chest Accordion 19 Fr. 3 days         Chest Tube 07/05/24 1829 Tube - 1 Right Pleural 28 Fr. 1 day                  General: Patient resting comfortably in no acute distress. Appears as stated age. Calm  Eyes: EOM intact. No conjunctivae injection. No scleral icterus.  ENT: Hearing grossly intact. No discharge from ears. No nasal discharge.   CVS: RRR. No LE edema BL.  Lungs: CTA BL, no wheezing or crackles. Good breath sounds. No accessory muscle use. No acute respiratory distress  Neuro: non focal , Follows commands. Responds appropriately   Time spent on the discharge of patient: 34 minutes    Critical care time spent on the evaluation and treatment of severe organ dysfunction, review of pertinent labs and imaging studies, discussions with consulting providers and discussions with  patient/family: 34 minutes.     Nabil Paz MD  Department of Hospital Medicine  [facility]

## 2024-07-07 NOTE — CONSULTS
Plastic and Reconstructive Surgery   Consult Note    Date of Consultation:   07/07/2024    Reason for Consultation:  Chest wall abscess    History of Present Illness:  Patient is a 73 yoM with PMH including Afib on eliquis, HTN, T2DM and MARA who is transferred from [facility] for a chest wall abscess with concern for osteomyelitis of the anterior left 6-8th ribs. He originally presented to the [facility] after a fall resulting in a left humeral fracture for which he underwent ORIF in March. He represented in June with a left shoulder abscess and underwent multiple washouts and removal of the ORIF hardware. His hospital stay was complicated by bilateral effusions and a left chest wall abscess with extension into the epigastrium and mediastinum with concern for osteomyelitis of the left 6-8th ribs. He underwent I+D of the abscess through a small left anterior chest incision with drain placement on 7/4, thoracentesis of the left pleural effusion on 7/4, and right-sided VATs with washout and chest tube placement on 7/5. Interestingly, cultures from the pleural fluid and chest wall abscess cavity have been negative despite the abscess cavity fluid being described as creamy pus by the [facility] op report. The only positive cultures so far have been from those collected by podiatry during a toe amp which were positive for MRSA.     He was transferred to [facility] for potential thoracic and plastic surgery interventions if more invasive debridements or resections were required with reconstruction.     Upon arrival here he states that over the past couple days he has been feeling much better. He states that the pain and swelling associated with the chest wall abscess is resolved. He denies any fever or chills. He states that the dyspnea he was experiencing with the prior pleural effusions has resolved, despite remaining on 2-3 L/min NC. He is not on home oxygen.    Past Medical History:    has a past medical history of A-fib (2024), Diabetes  mellitus, type 2 (2021), Guillain-[place] (10/2003), Hyperlipidemia, Hypertension (2016), and Sleep apnea.    Past Surgical History:    has a past surgical history that includes Open reduction and internal fixation (ORIF) of fracture of proximal humerus (Left, 3/25/2024); Arthrotomy of shoulder (Left, 6/21/2024); Irrigation and debridement of upper extremity (Left, 6/24/2024); Irrigation and debridement of upper extremity (Left, 6/27/2024); and Toe amputation (Right, 7/1/2024).    Social History:  Social History     Tobacco Use    Smoking status: Never    Smokeless tobacco: Never   Substance Use Topics    Alcohol use: Yes     Alcohol/week: 0.0 standard drinks of alcohol     Comment: social     Social History     Substance and Sexual Activity   Drug Use No       Family History:  No family history on file.    Allergies:  Review of patient's allergies indicates:  No Known Allergies    Home Medications:  Prior to Admission medications    Medication Sig Start Date End Date Taking? Authorizing Provider   apixaban (ELIQUIS) 5 mg Tab Take 1 tablet (5 mg total) by mouth 2 (two) times daily. 3/26/24   Batool Austin FNP   atorvastatin (LIPITOR) 10 MG tablet Take 1 tablet (10 mg total) by mouth once daily. 3/19/24 3/19/25  Miguel Angel Enriquez PA-C   co-enzyme Q-10 30 mg capsule Take 30 mg by mouth once daily.    Provider, Historical   doxycycline (VIBRAMYCIN) 100 MG Cap Take 100 mg by mouth 2 (two) times daily.    Provider, Historical   ergocalciferol, vitamin D2, (VITAMIN D ORAL) Take 1 tablet by mouth once daily.    Provider, Historical   glimepiride (AMARYL) 2 MG tablet Take 1 tablet (2 mg total) by mouth before breakfast. 3/20/24 3/20/25  Miguel Angel Enriquez PA-C   hydrALAZINE (APRESOLINE) 25 MG tablet Take 1 tablet (25 mg total) by mouth every 12 (twelve) hours. 3/9/24 3/9/25  Batool Austin FNP   metFORMIN (GLUCOPHAGE-XR) 500 MG ER 24hr tablet Take 2 tablets (1,000 mg total) by mouth 2 (two) times daily with meals.  3/19/24 3/19/25  Miguel Angel Enriquez PA-C   metoprolol succinate (TOPROL-XL) 100 MG 24 hr tablet Take 1 tablet (100 mg total) by mouth once daily.  Patient taking differently: Take 100 mg by mouth nightly. 3/19/24 3/19/25  Miguel Angel Enriquez PA-C       Review of Systems:  Negative except for what is noted in HPI    Physical Exam:  VITAL SIGNS:   Vitals:    24 1515 24 1640 24 1700   BP: (!) 168/70     BP Location: Right leg     Patient Position: Lying     Pulse: 90 85    Resp: 18     Temp: 98.4 °F (36.9 °C)     TempSrc: Oral     SpO2: 95%  96%     TMAX: Temp (24hrs), Av.2 °F (36.8 °C), Min:97.2 °F (36.2 °C), Max:99.6 °F (37.6 °C)    Physical Exam  Vitals reviewed.   Constitutional:       Appearance: Normal appearance.   Cardiovascular:      Rate and Rhythm: Normal rate.   Pulmonary:      Effort: Pulmonary effort is normal. No respiratory distress.      Comments: Right chest tube to water seal with serous output, tidaling, no air leak.  Abdominal:      Palpations: Abdomen is soft.      Tenderness: There is no abdominal tenderness.   Musculoskeletal:      Comments: Left chest wall drain with seropurulent output. Minimal chest wall crepitus. No significant underlying fluctuance appreciated. No overlying skin changes. No tenderness to palpation of the chest wall.   Skin:     General: Skin is warm.   Neurological:      General: No focal deficit present.      Mental Status: He is alert and oriented to person, place, and time.         Diagnostic Data:  Recent Results (from the past 336 hour(s))   CBC auto differential    Collection Time: 24  3:53 AM   Result Value Ref Range    WBC 11.58 3.90 - 12.70 K/uL    Hemoglobin 8.1 (L) 14.0 - 18.0 g/dL    Hematocrit 26.2 (L) 40.0 - 54.0 %    Platelets 389 150 - 450 K/uL   CBC auto differential    Collection Time: 24  5:34 AM   Result Value Ref Range    WBC 11.62 3.90 - 12.70 K/uL    Hemoglobin 8.1 (L) 14.0 - 18.0 g/dL    Hematocrit 26.1 (L) 40.0 - 54.0  "%    Platelets 439 150 - 450 K/uL   CBC auto differential    Collection Time: 07/05/24  3:12 AM   Result Value Ref Range    WBC 11.32 3.90 - 12.70 K/uL    Hemoglobin 8.2 (L) 14.0 - 18.0 g/dL    Hematocrit 26.3 (L) 40.0 - 54.0 %    Platelets 443 150 - 450 K/uL     No results found for this or any previous visit (from the past 336 hour(s)).  Lab Results   Component Value Date    ALBUMIN 2.1 (L) 07/07/2024     Lab Results   Component Value Date    CRP >16.00 (H) 07/07/2024     Lab Results   Component Value Date    INR 1.1 03/25/2024     No results found for: "PTT"    Microbiology Results (last 7 days)       ** No results found for the last 168 hours. **            Assessment:  73 y.o.male with rib osteomyelitis    Plan:  - appreciate recommendations from thoracic surgery, will follow along with them and assist with coverage if they deem more invasive surgery necessary  - Will follow up CT imaging  - Plastic surgery will be available to assist whenever   -Rest of care per primary      Taiwo Phillips MD  Plastic Surgery Fellow    "

## 2024-07-07 NOTE — ASSESSMENT & PLAN NOTE
Patient with acute kidney injury/acute renal failure likely due to  due to ATN due to sepsis due to UTI and/or PNA  MARBIN is currently improving. Baseline creatinine  wnl  - Labs reviewed- Renal function/electrolytes with CrCl cannot be calculated (Unknown ideal weight.). according to latest data. Monitor urine output and serial BMP and adjust therapy as needed. Avoid nephrotoxins and renally dose meds for GFR listed above.

## 2024-07-07 NOTE — PLAN OF CARE
Problem: Adult Inpatient Plan of Care  Goal: Plan of Care Review  Outcome: Progressing  Flowsheets (Taken 7/7/2024 1749)  Plan of Care Reviewed With:   patient   spouse  Goal: Patient-Specific Goal (Individualized)  Outcome: Progressing  Goal: Absence of Hospital-Acquired Illness or Injury  Outcome: Progressing  Intervention: Identify and Manage Fall Risk  Flowsheets (Taken 7/7/2024 1749)  Safety Promotion/Fall Prevention:   assistive device/personal item within reach   pulse ox   nonskid shoes/socks when out of bed   medications reviewed   lighting adjusted  Intervention: Prevent Skin Injury  Flowsheets (Taken 7/7/2024 1749)  Body Position:   heels elevated   log-rolled  Skin Protection: incontinence pads utilized  Device Skin Pressure Protection: absorbent pad utilized/changed  Intervention: Prevent and Manage VTE (Venous Thromboembolism) Risk  Flowsheets (Taken 7/7/2024 1749)  VTE Prevention/Management: remove, assess skin, and reapply sequential compression device  Intervention: Prevent Infection  Flowsheets (Taken 7/7/2024 1749)  Infection Prevention:   cohorting utilized   environmental surveillance performed   personal protective equipment utilized   rest/sleep promoted   equipment surfaces disinfected   hand hygiene promoted   single patient room provided  Goal: Optimal Comfort and Wellbeing  Outcome: Progressing  Goal: Readiness for Transition of Care  Outcome: Progressing

## 2024-07-07 NOTE — ASSESSMENT & PLAN NOTE
As above s/p multiple I and D . Extend into thoracic cavity   [facility]

## 2024-07-07 NOTE — ASSESSMENT & PLAN NOTE
-status post left thoracentesis 1500cc serosanguineous fluid drained 7/4 & I&D drainage with chest tube placement 7/4 & loculated effusions right chest s/p VATS and chest tube 7/5

## 2024-07-07 NOTE — SUBJECTIVE & OBJECTIVE
Current Facility-Administered Medications on File Prior to Encounter   Medication    [DISCONTINUED] (Magic mouthwash) 1:1:1 diphenhydrAMINE(Benadryl) 12.5mg/5ml liq, aluminum & magnesium hydroxide-simethicone (Maalox), LIDOcaine viscous 2%    [DISCONTINUED] 0.9%  NaCl infusion (for blood administration)    [DISCONTINUED] 0.9%  NaCl infusion (for blood administration)    [DISCONTINUED] 0.9%  NaCl infusion    [DISCONTINUED] albuterol-ipratropium 2.5 mg-0.5 mg/3 mL nebulizer solution 3 mL    [DISCONTINUED] aluminum & magnesium hydroxide-simethicone 400-400-40 mg/5 mL suspension 15 mL    [DISCONTINUED] ceftaroline (TEFLARO) 600 mg in dextrose 5 % 50 mL IVPB (ready to mix)    [DISCONTINUED] dextrose 10% bolus 125 mL 125 mL    [DISCONTINUED] dextrose 10% bolus 250 mL 250 mL    [DISCONTINUED] epoetin leonel-epbx injection 14,100 Units    [DISCONTINUED] ferrous sulfate tablet 1 each    [DISCONTINUED] glucagon (human recombinant) injection 1 mg    [DISCONTINUED] glucose chewable tablet 16 g    [DISCONTINUED] glucose chewable tablet 24 g    [DISCONTINUED] HYDROcodone-acetaminophen  mg per tablet 1 tablet    [DISCONTINUED] HYDROcodone-acetaminophen 5-325 mg per tablet 1 tablet    [DISCONTINUED] insulin aspart U-100 pen 0-10 Units    [DISCONTINUED] insulin glargine U-100 (Lantus) pen 15 Units    [DISCONTINUED] LIDOcaine 5 % patch 1 patch    [DISCONTINUED] magnesium sulfate 2g in water 50mL IVPB (premix)    [DISCONTINUED] meropenem 1 g in sodium chloride 0.9 % 100 mL IVPB (ready to mix system)    [DISCONTINUED] metoprolol injection 5 mg    [DISCONTINUED] metoprolol succinate (TOPROL-XL) 24 hr tablet 100 mg    [DISCONTINUED] naloxone 0.4 mg/mL injection 0.02 mg    [DISCONTINUED] ondansetron disintegrating tablet 8 mg    [DISCONTINUED] potassium bicarbonate disintegrating tablet 35 mEq    [DISCONTINUED] potassium bicarbonate disintegrating tablet 60 mEq    [DISCONTINUED] potassium chloride SA CR tablet 20 mEq    [DISCONTINUED]  potassium, sodium phosphates 280-160-250 mg packet 2 packet    [DISCONTINUED] potassium, sodium phosphates 280-160-250 mg packet 2 packet    [DISCONTINUED] potassium, sodium phosphates 280-160-250 mg packet 2 packet    [DISCONTINUED] promethazine tablet 25 mg    [DISCONTINUED] simethicone chewable tablet 80 mg    [DISCONTINUED] sodium chloride 0.9% flush 10 mL    [DISCONTINUED] sodium chloride 0.9% flush 10 mL    [DISCONTINUED] sodium chloride 0.9% flush 10 mL    [DISCONTINUED] sodium chloride 0.9% flush 10 mL    [DISCONTINUED] traMADoL tablet 50 mg     Current Outpatient Medications on File Prior to Encounter   Medication Sig    apixaban (ELIQUIS) 5 mg Tab Take 1 tablet (5 mg total) by mouth 2 (two) times daily.    atorvastatin (LIPITOR) 10 MG tablet Take 1 tablet (10 mg total) by mouth once daily.    co-enzyme Q-10 30 mg capsule Take 30 mg by mouth once daily.    doxycycline (VIBRAMYCIN) 100 MG Cap Take 100 mg by mouth 2 (two) times daily.    ergocalciferol, vitamin D2, (VITAMIN D ORAL) Take 1 tablet by mouth once daily.    glimepiride (AMARYL) 2 MG tablet Take 1 tablet (2 mg total) by mouth before breakfast.    hydrALAZINE (APRESOLINE) 25 MG tablet Take 1 tablet (25 mg total) by mouth every 12 (twelve) hours.    metFORMIN (GLUCOPHAGE-XR) 500 MG ER 24hr tablet Take 2 tablets (1,000 mg total) by mouth 2 (two) times daily with meals.    metoprolol succinate (TOPROL-XL) 100 MG 24 hr tablet Take 1 tablet (100 mg total) by mouth once daily. (Patient taking differently: Take 100 mg by mouth nightly.)       Review of patient's allergies indicates:  No Known Allergies    Past Medical History:   Diagnosis Date    A-fib 2024    Diabetes mellitus, type 2 2021    Guillain-Byers 10/2003    Hyperlipidemia     Hypertension 2016    Sleep apnea      Past Surgical History:   Procedure Laterality Date    ARTHROTOMY OF SHOULDER Left 6/21/2024    Procedure: ARTHROTOMY, SHOULDER;  Surgeon: Roman Paulson MD;  Location: [facility]  OR;  Service: Orthopedics;  Laterality: Left;    IRRIGATION AND DEBRIDEMENT OF UPPER EXTREMITY Left 6/24/2024    Procedure: IRRIGATION AND DEBRIDEMENT, UPPER EXTREMITY;  Surgeon: Nathan Cooper MD;  Location: [facility] OR;  Service: Orthopedics;  Laterality: Left;    IRRIGATION AND DEBRIDEMENT OF UPPER EXTREMITY Left 6/27/2024    Procedure: IRRIGATION AND DEBRIDEMENT, UPPER EXTREMITY;  Surgeon: Nathan Cooper MD;  Location: [facility] OR;  Service: Orthopedics;  Laterality: Left;    OPEN REDUCTION AND INTERNAL FIXATION (ORIF) OF FRACTURE OF PROXIMAL HUMERUS Left 3/25/2024    Procedure: ORIF, FRACTURE, HUMERUS, PROXIMAL/IM HANNA, LEFT;  Surgeon: Nathan Cooper MD;  Location: [facility] OR;  Service: Orthopedics;  Laterality: Left;  Accumed, [employer] Small Frag set Avelino verified 3/22/24 ark    TOE AMPUTATION Right 7/1/2024    Procedure: AMPUTATION, TOE;  Surgeon: Stevie Zhu DPM;  Location: [facility] OR;  Service: Podiatry;  Laterality: Right;     Family History    None       Tobacco Use    Smoking status: Never    Smokeless tobacco: Never   Substance and Sexual Activity    Alcohol use: Yes     Alcohol/week: 0.0 standard drinks of alcohol     Comment: social    Drug use: No    Sexual activity: Yes     Review of Systems   Constitutional:  Negative for chills and fever.   Respiratory:  Negative for cough and shortness of breath.    Cardiovascular:  Negative for chest pain.   Gastrointestinal:  Negative for abdominal pain, constipation, diarrhea, nausea and vomiting.   Genitourinary:  Negative for dysuria.   Neurological:  Negative for dizziness and light-headedness.     Objective:     Vital Signs (Most Recent):  Temp: 98.4 °F (36.9 °C) (07/07/24 1515)  Pulse: 85 (07/07/24 1640)  Resp: 18 (07/07/24 1515)  BP: (!) 168/70 (07/07/24 1515)  SpO2: 95 % (07/07/24 1515) Vital Signs (24h Range):  Temp:  [97.2 °F (36.2 °C)-99.6 °F (37.6 °C)] 98.4 °F (36.9 °C)  Pulse:  [] 85  Resp:  [12-38] 18  SpO2:  [91 %-98 %] 95 %  BP: (110-168)/(52-94)  168/70        There is no height or weight on file to calculate BMI.      Intake/Output - Last 3 Shifts         07/05 0700 07/06 0659 07/06 0700 07/07 0659 07/07 0700 07/08 0659    Urine   250    Total Output   250    Net   -250                   SpO2: 95 %        Physical Exam  Vitals reviewed.   Constitutional:       Appearance: Normal appearance.   Cardiovascular:      Rate and Rhythm: Normal rate.   Pulmonary:      Effort: Pulmonary effort is normal. No respiratory distress.      Comments: Right chest tube to water seal with serous output, tidaling, no air leak.  Abdominal:      Palpations: Abdomen is soft.      Tenderness: There is no abdominal tenderness.   Musculoskeletal:      Comments: Left chest wall drain with seropurulent output. Minimal chest wall crepitus. No significant underlying fluctuance appreciated. No overlying skin changes. No tenderness to palpation of the chest wall.   Skin:     General: Skin is warm.   Neurological:      General: No focal deficit present.      Mental Status: He is alert and oriented to person, place, and time.            Significant Labs:  CBC:   Recent Labs   Lab 07/07/24  0353   WBC 11.58   RBC 3.12*   HGB 8.1*   HCT 26.2*      MCV 84   MCH 26.0*   MCHC 30.9*     CMP:   Recent Labs   Lab 07/07/24  0353   *   CALCIUM 7.8*   ALBUMIN 2.1*   PROT 5.8*      K 4.5   CO2 26      BUN 33*   CREATININE 1.1   ALKPHOS 97   ALT 17   AST 25   BILITOT 0.3       Significant Diagnostics:  I have reviewed and interpreted all pertinent imaging results/findings within the past 24 hours.    VTE Risk Mitigation (From admission, onward)           Ordered     Place sequential compression device  Until discontinued         07/07/24 9349

## 2024-07-07 NOTE — SUBJECTIVE & OBJECTIVE
Interval History:     No special complaint . Drains amount noted  No CP or SOB, toe wound appear clean    Review of Systems  Objective:     Vital Signs (Most Recent):  Temp: 97.5 °F (36.4 °C) (07/07/24 0301)  Pulse: 85 (07/07/24 0601)  Resp: (!) 28 (07/07/24 0902)  BP: 112/63 (07/07/24 0601)  SpO2: 95 % (07/07/24 0601) Vital Signs (24h Range):  Temp:  [97.5 °F (36.4 °C)-99.6 °F (37.6 °C)] 97.5 °F (36.4 °C)  Pulse:  [] 85  Resp:  [12-35] 28  SpO2:  [89 %-97 %] 95 %  BP: (107-144)/(58-83) 112/63  Arterial Line BP: (136)/(65) 136/65     Weight: 133.7 kg (294 lb 12.1 oz)  Body mass index is 39.98 kg/m².    Intake/Output Summary (Last 24 hours) at 7/7/2024 0943  Last data filed at 7/7/2024 0620  Gross per 24 hour   Intake 3844.58 ml   Output 2101 ml   Net 1743.58 ml         Physical Exam  Constitutional:       General: He is not in acute distress.     Appearance: He is well-developed.   HENT:      Head: Normocephalic.   Eyes:      Pupils: Pupils are equal, round, and reactive to light.   Cardiovascular:      Rate and Rhythm: Normal rate and regular rhythm.   Pulmonary:      Effort: Pulmonary effort is normal. No respiratory distress.   Abdominal:      General: There is no distension.      Tenderness: There is no abdominal tenderness.   Musculoskeletal:         General: Normal range of motion.      Cervical back: Neck supple.   Skin:     General: Skin is warm.      Findings: No rash.   Neurological:      Mental Status: He is alert and oriented to person, place, and time.             Significant Labs: All pertinent labs within the past 24 hours have been reviewed.  CBC:   Recent Labs   Lab 07/05/24  1836 07/06/24  0534 07/07/24  0353   WBC  --  11.62 11.58   HGB  --  8.1* 8.1*   HCT 28* 26.1* 26.2*   PLT  --  439 389     CMP:   Recent Labs   Lab 07/06/24  0534 07/07/24  0353    137   K 4.2 4.5    104   CO2 26 26   * 130*   BUN 31* 33*   CREATININE 1.2 1.1   CALCIUM 7.7* 7.8*   PROT 5.9* 5.8*  "  ALBUMIN 2.1* 2.1*   BILITOT 0.3 0.3   ALKPHOS 91 97   AST 17 25   ALT 12 17   ANIONGAP 7* 7*     Cardiac Markers: No results for input(s): "CKMB", "MYOGLOBIN", "BNP", "TROPISTAT" in the last 48 hours.    Significant Imaging: I have reviewed all pertinent imaging results/findings within the past 24 hours.  "

## 2024-07-07 NOTE — PROGRESS NOTES
Acute kidney injury has resolved.    Avoid non-essential nephrotoxic agents for a couple of weeks  Keep euvolemic  Keep mean arterial bp above 55    Thank your for the referral.  Please call if further assistance is needed    Jay Talavera MD  Nephrology  [facility] [place]  [phone]

## 2024-07-07 NOTE — ASSESSMENT & PLAN NOTE
Hospital day 23:    Status post repeat irrigation and debridement (X3) and hardware removal of left shoulder for suspected infection.  Patient is approximately 3 months status post ORIF of a left proximal humerus fracture which lost reduction.    1. His pain is well managed.    2. Preoperative and intraoperative cultures from 06/21, 6/24 and 6/27 showed many WBCs but have NOT demonstrated any bacterial growth to date.  As well as cultures from 06/19 showed no growth.    3. Wound VAC was discontinued intraoperatively 6/27  4. Sling as needed for comfort of the left upper extremity. Patient can do very light activity with the left upper extremity focusing more on elbow, hand and wrist.  Would not do dedicated therapy for the shoulder.  Patient could weightbear through the extremity if needed with a walker.  5. No plan for additional orthopedic intervention for the left shoulder at this time.  6. Consult case management as the patient will likely benefit from placement for IV antibiotics as well as his generalized deconditioned state.  7. Continue Infectious Disease recommendations but no laboratory findings suggestive of left shoulder infection.  8. Stable from an orthopedic standpoint, unfortunately he has regressed from an overall medical perspective.   9. H/o right 2nd toe osteomyelitis, status post amputation with podiatry.    Patient now with abscess in chest/mediastinum. Recent thoracentesis. Pending transfer to higher level of care.    Again, I have personally reviewed the x-ray of the left shoulder with comparison to images taken on July 2nd.  No significant interval change in the appearance of the left proximal humerus fracture.  Again noted are multiple gas foci.  Taking into account variations of technique with the x-ray I do not appreciate any significant change or worsening of the presence of gas within the left shoulder.     This was discussed with infectious disease yesterday. Based on the patient's  history of chronic kidney disease and is current deteriorated state, advanced imaging and administration of IV contrast is contraindicated.     We will continue to monitor with serial x-rays looking for expanding gas.

## 2024-07-07 NOTE — ASSESSMENT & PLAN NOTE
Chronic AF w/ acute RVR, resolved s/p cardizem drip     TTE from March 2024:    Left Ventricle: The left ventricle is normal in size. Normal wall thickness. Mild global hypokinesis present. There is mildly reduced systolic function with a visually estimated ejection fraction of 45 - 50%. There is normal diastolic function.    Right Ventricle: Normal right ventricular cavity size. Wall thickness is normal. Right ventricle wall motion  is normal. Systolic function is normal.    Left Atrium: Left atrium is moderately dilated.    IVC/SVC: Normal venous pressure at 3 mmHg.    NOAC has been on hold d/t possible need for shoulder surgery - has been on prophylaxis dose of LovenoxResume Eliquis as soon as feasible postop after clear by surgeon

## 2024-07-07 NOTE — ASSESSMENT & PLAN NOTE
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 and removal of deep hardware 6/21 - all screws and nails removed, s/p 2nd washout 6/24 & 3rd washout 6/27   -Left Shoulder washouts on 6/21 ( abundant pus in shoulder, hardware removal), 6/24 ( bloody brownish fluid) and 6/27 ( no fluid collection, healthy red and beefy tissue)   -Right 3rd toe osteomyelitis s/p Dalvance x 2 doses & 2nd distal phalanx s/p I&D at bedside, s/p 2nd toe amputation 7/1       - Presents as a transfer for thoracic surgery and Plastic surgery evaluation in setting of concern for "osteomyelitis of multiple ribs which will need further debridement and then a flap placed" in the setting of chest wall abscess  -arrives on ceftaroline and meropenem as per outside hospital ID recommendations  -notably, 7/3 blood cultures, 7/4 pleural fluid cultures, 7/4 abscess cultures, 755 pleural fluid cultures NGTD  -6/18 right toe wound culture with MRSA  -continue to follow OSH cultures  -ongoing wound care  -ID, Thoracic surgery and Plastic surgery consulted; consider Ortho consult for shoulder evaluation  -lost Orthopedic surgery eval 7/7 during which left shoulder deemed stable; recs for continue to monitor with serial x-rays looking for expanding gas.      -Sling as needed for comfort of the left upper extremity. Patient can do very light activity with the left upper extremity focusing more on elbow, hand and wrist. Would not do dedicated therapy for the shoulder. Patient could weightbear through the extremity if needed with a walker.   -per thoracic surgery, CT " chest and abdomen with IV contrast; right chest tube to water seal; left chest accordion drain to negative pressure; NPO at midnight

## 2024-07-07 NOTE — SUBJECTIVE & OBJECTIVE
Principal Problem:Acute renal failure superimposed on chronic kidney disease    Principal Orthopedic Problem:  Left proximal humerus fracture, status post irrigation and debridement left shoulder    Interval History:  No change    Review of patient's allergies indicates:  No Known Allergies    Current Facility-Administered Medications   Medication    (Magic mouthwash) 1:1:1 diphenhydrAMINE(Benadryl) 12.5mg/5ml liq, aluminum & magnesium hydroxide-simethicone (Maalox), LIDOcaine viscous 2%    0.9%  NaCl infusion (for blood administration)    0.9%  NaCl infusion (for blood administration)    0.9%  NaCl infusion    albuterol-ipratropium 2.5 mg-0.5 mg/3 mL nebulizer solution 3 mL    aluminum & magnesium hydroxide-simethicone 400-400-40 mg/5 mL suspension 15 mL    ceftaroline (TEFLARO) 600 mg in dextrose 5 % 50 mL IVPB (ready to mix)    dextrose 10% bolus 125 mL 125 mL    dextrose 10% bolus 250 mL 250 mL    epoetin leonel-epbx injection 14,100 Units    ferrous sulfate tablet 1 each    glucagon (human recombinant) injection 1 mg    glucose chewable tablet 16 g    glucose chewable tablet 24 g    HYDROcodone-acetaminophen  mg per tablet 1 tablet    HYDROcodone-acetaminophen 5-325 mg per tablet 1 tablet    insulin aspart U-100 pen 0-10 Units    insulin glargine U-100 (Lantus) pen 15 Units    LIDOcaine 5 % patch 1 patch    magnesium sulfate 2g in water 50mL IVPB (premix)    meropenem 1 g in sodium chloride 0.9 % 100 mL IVPB (ready to mix system)    metoprolol injection 5 mg    metoprolol succinate (TOPROL-XL) 24 hr tablet 100 mg    naloxone 0.4 mg/mL injection 0.02 mg    ondansetron disintegrating tablet 8 mg    potassium bicarbonate disintegrating tablet 35 mEq    potassium bicarbonate disintegrating tablet 60 mEq    potassium chloride SA CR tablet 20 mEq    potassium, sodium phosphates 280-160-250 mg packet 2 packet    potassium, sodium phosphates 280-160-250 mg packet 2 packet    potassium, sodium phosphates 280-160-250  mg packet 2 packet    promethazine tablet 25 mg    simethicone chewable tablet 80 mg    sodium chloride 0.9% flush 10 mL    sodium chloride 0.9% flush 10 mL    And    sodium chloride 0.9% flush 10 mL    sodium chloride 0.9% flush 10 mL    traMADoL tablet 50 mg     Objective:     Vital Signs (Most Recent):  Temp: 97.5 °F (36.4 °C) (07/07/24 0301)  Pulse: 85 (07/07/24 0601)  Resp: (!) 26 (07/07/24 0601)  BP: 112/63 (07/07/24 0601)  SpO2: 95 % (07/07/24 0601) Vital Signs (24h Range):  Temp:  [97.5 °F (36.4 °C)-99.6 °F (37.6 °C)] 97.5 °F (36.4 °C)  Pulse:  [] 85  Resp:  [12-35] 26  SpO2:  [89 %-97 %] 95 %  BP: (107-144)/(58-83) 112/63  Arterial Line BP: (136)/(65) 136/65     Weight: 133.7 kg (294 lb 12.1 oz)  Height: 6' (182.9 cm)  Body mass index is 39.98 kg/m².      Intake/Output Summary (Last 24 hours) at 7/7/2024 0808  Last data filed at 7/7/2024 0620  Gross per 24 hour   Intake 4244.58 ml   Output 2101 ml   Net 2143.58 ml        General    Nursing note and vitals reviewed.  Constitutional: He is oriented to person, place, and time. He appears well-developed and well-nourished. No distress.   HENT:   Head: Normocephalic and atraumatic.   Nose: Nose normal.   Eyes: EOM are normal.   Pulmonary/Chest: Effort normal.   Neurological: He is alert and oriented to person, place, and time.   Psychiatric: He has a normal mood and affect. His behavior is normal. Thought content normal.             Left Shoulder Exam     Tenderness   The patient is experiencing no tenderness.     Range of Motion   Extension:  abnormal   Forward Flexion:  abnormal   Forward Elevation: abnormal  Adduction: abnormal  External Rotation 90 degrees: abnormal  Internal rotation 90 degrees:  abnormal     Other   Sensation: normal     Comments:  Dressings were changed since I observed his wound yesterday.  Sterile gauze and ABD pad with tape noted.  Dry.  No evidence of any drainage whatsoever on the dressing.      Vascular Exam       Left  Pulses      Radial:                    2+      Capillary Refill  Left Hand: normal capillary refill           Significant Labs:   Recent Lab Results  (Last 5 results in the past 24 hours)        07/07/24  0353   07/06/24  2125   07/06/24  1713   07/06/24  1102   07/06/24  0939        Procalcitonin 0.575  Comment: The Procalcitonin test is intended to aid in the risk assessment of   critically ill patients on their first day of ICU admission for   progression   to severe sepsis and septic shock.    A result of <0.50 ng/mL is associated with a low risk of severe   sepsis   and/or septic shock.  This does not exclude localized infection.    A result between 0.50 ng/mL and 2.0 ng/mL should be interpreted with   consideration of the patient's history and is recommended to retest   within 6   to 24 hours.        A result of >2.0 ng/mL is associated with a high risk of severe   sepsis   and/or septic shock.    Procalcitonin may not be accurate among patients with   localized  infection, recent trauma or major surgery, immunosuppressed   state,  invasive fungal infection, renal dysfunction. Decisions   regarding  initiation or continuation of antibiotic therapy should not be   based  solely on procalcitonin levels.                 Albumin 2.1               ALP 97               ALT 17               Anion Gap 7               AST 25               Baso # 0.08               Basophil % 0.7               BILIRUBIN TOTAL 0.3  Comment: For infants and newborns, interpretation of results should be based  on gestational age, weight and in agreement with clinical  observations.    Premature Infant recommended reference ranges:  Up to 24 hours.............<8.0 mg/dL  Up to 48 hours............<12.0 mg/dL  3-5 days..................<15.0 mg/dL  6-29 days.................<15.0 mg/dL                 BUN 33               Calcium 7.8               Chloride 104               CO2 26               Creatinine 1.1               CRP  >16.00  Comment: CRP-Normal Application expected values:   <1.0        mg/dL   Normal Range  1.0 - 5.0  mg/dL   Indicates mild inflammation  5.0 - 10.0 mg/dL   Indicates severe inflammation  >10.0        mg/dL   Represents serious processes and   frequently         indicates the presence of a bacterial   infection.                  Differential Method Automated               eGFR >60.0               Eos # 0.8               Eos % 7.3               Glucose 130               Gran # (ANC) 7.5               Gran % 65.1               Hematocrit 26.2               Hemoglobin 8.1               Immature Grans (Abs) 0.12  Comment: Mild elevation in immature granulocytes is non specific and   can be seen in a variety of conditions including stress response,   acute inflammation, trauma and pregnancy. Correlation with other   laboratory and clinical findings is essential.                 Immature Granulocytes 1.0               Lymph # 1.9               Lymph % 16.2               MCH 26.0               MCHC 30.9               MCV 84               Mono # 1.1               Mono % 9.7               MPV 9.8               nRBC 0               Platelet Count 389               POC Glucose   159   188   245   183       Potassium 4.5               PROTEIN TOTAL 5.8               RBC 3.12               RDW 17.3               Sodium 137               WBC 11.58                                      Significant Imaging: I have reviewed all pertinent imaging results/findings.

## 2024-07-07 NOTE — ASSESSMENT & PLAN NOTE
No acute issues  However, pt has impaired mobility and is acutely weakened from his sepsis/UTI/AF RVR and need placement

## 2024-07-07 NOTE — H&P
"  [facility]  History & Physical    Patient Name: Roosevelt Moser  MRN: [id number]  Patient Class: IP- Inpatient  Admission Date: 7/7/2024  Attending Physician: Roopa Rowan MD   Primary Care Provider: Miguel Angel Enriquez PA-C         Patient information was obtained from patient, spouse/SO, past medical records, and ER records.     Subjective:     Principal Problem:Osteomyelitis of multiple sites    Chief Complaint: No chief complaint on file.       HPI: Roosevelt Moser is a 72yo M w/ A fib, HTN, T2DM, MARA, HLD, hx GBS, hx displaced comminuted fracture of the left humerus s/p fall 3/4/24 s/p ORIF 03/25/2024 who presents as a transfer for thoracic surgery and Plastic surgery evaluation in setting of "osteomyelitis of multiple ribs which will need further debridement and then a flap placed."    He was initially admitted to [facility] on 6/14 for progressive weakness, multiple falls, and decreased urine output.  He was admitted for management of MARBIN on CKD complicated by hyperkalemia, urosepsis, AFib with RVR.    "Creatinine improved during his stay. During his stay he was noted to have swelling of his left upper extremity. Imaging studies on June 17 showed concern for gas-forming infection along the left shoulder and air along the lateral left chest wall concerning for gas-forming organism. MRI of his right foot also showed evidence of toe osteomyelitis. He was seen by ID who adjusted antibiotics, and they also noted oral thrush. Podiatry evaluated him for the toe findings, and Orthopedic Surgery evaluated the shoulder abnormalities. Right toe wound grew MRSA. Oral anticoagulation was held, and Radiology aspirated fluid from his left shoulder on June 20. On June 21 he had left shoulder incision and drainage and removal of deep hardware including rods and screws. He underwent repeat irrigation and debridement of the left shoulder on June 24 with placement of a wound VAC. He had " "subsequent incision and drainage with removal of the wound VAC on June 27. He had amputation of his right 2nd toe by Podiatry on July 1. On July 3 he was noted to have dyspnea and mild tachycardia. CT chest showed a large abscess of the anterior central and left chest wall and abdominal wall with partial destruction of 3 ribs. He was seen by Cardiothoracic surgery. On July 4 he had left thoracentesis with aspiration of fluid. He subsequently had incision and drainage of the left chest wall abscess on July 4, and a drainage catheter was left in place. On July 5 he underwent right VATS for a loculated right pleural effusion, and a chest tube was left in place. He was transitioned to the ICU post-operatively. With concern for osteomyelitis of the ribs, concern is that he will need transfer to a facility with Thoracic Surgery and potentially Plastic Surgery along with higher level of care. Transfer center spoke with Thoracic Surgery at [facility]. Requesting transfer to Hospital Medicine at [facility] for Plastic Surgery and Thoracic Surgery evaluation. Transfer center spoke with Plastic Surgery on July 5, Thoracic Surgery on July 7. With his complicated medical presentation, will plan transfer to Hospital Medicine at [facility] in step-down status for further treatment. "    Prior to transfer, "He is awake and alert. He has a PICC line in place along with the right chest tube and left chest wall drain in place. He has no evidence of respiratory distress and is hemodynamically stable. Referring provider felt patient was stable for step-down status at present. He is currently on ceftaroline and meropenem. He is in contact isolation. July 7: Sodium 137, potassium 4.5, chloride 104, CO2 26, BUN 33, creatinine 1.1, glucose 130, albumin 2.1, AST 25, ALT 17, white blood cells 11.58, hemoglobin 8.1, hematocrit 26.2, platelets 389, procalcitonin 0.575, CRP greater than 16  VS:  Temperature 97.5°, pulse 85, " "respirations 20, blood pressure 112/63, O2 sats 95% "    Patient in no acute distress upon arrival.  Wife at bedside.  Patient denies any acute complaints.        Imaging history reviewed:  July 6: X-rays of the left shoulder had fracture through the surgical neck of the humerus with lateral distraction of the distal component and overlapping segments on the order of 2.5 cm.  Overall no appreciable change upon comparison with prior imaging.  -chest x-ray noted manifestations of mild congestive heart failure bordering on mild pulmonary edema.  Right-sided thoracostomy tube terminating within the right apex.  No definite pneumothorax.        July 5: Pleural fluid Gram stain with few WBCs and no organisms seen  -pH 7.339, pCO2 50.9, pO2 392     July 4:  CPK less than 10, serum   -AFB smear from left chest abscess had no AFB seen, left chest abscess aerobic and anaerobic cultures have no growth  -pleural fluid protein 3.2, white blood cells 1108 (11% segs, 74% lymphs), , pH 7.63, pleural fluid culture had no growth  -ultrasound-guided thoracentesis removed 1.4-1.5 L     July 3: Blood cultures with no growth to date  -CT chest showed large abscess of the anterior central and left chest wall and abdominal wall measuring 17 cm transversely.  This extends into the mediastinum and peritoneum with partial destruction of the anterior 6th, 7th, and 8th ribs.  Complete atelectasis of the left lower lobe with moderate left pleural effusion.  Mild atelectasis right lung base with a small-to-moderate right pleural effusion.     July 2: X-rays of the left shoulder noted displaced left proximal humerus fracture status post hardware removal without change in alignment.     June 24: ECHO with EF 55-60%.     June 20:  Interventional Radiology did percutaneous aspiration of the left shoulder fluid collection.  No drainage catheter was left in place.     June 18:  Right toe wound MRSA  -CT left shoulder had subacute left " humerus fracture post internal fixation.  Incomplete bony bridging across the fracture site with persistent angulation.  Severe arthropathy of the glenohumeral joint with multiple intra-articular bodies.  Large left joint effusion and adjacent soft tissue focal fluid collections containing gas and concerning for gas-forming infection.  Moderate size left pleural effusion.  Left basilar airspace disease.  -bilateral lower extremity Doppler arterial ultrasound had atherosclerotic plaque and calcification bilaterally without severe stenosis or occlusion identified on either side.     June 17: X-rays of the left humerus/shoulder had findings concerning for gas-forming infection along the left shoulder.  -x-rays of the left ribs showed air along the lateral chest wall soft tissue and axilla concerning for gas-forming infection.  -MRI of the right foot had findings consistent with osteomyelitis involving the middle distal phalanges of the 3rd toe.  Cellulitis.     June 14: Renal ultrasound had no hydronephrosis.  Elevated resistive index right kidney suggesting intrinsic renal disease.    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    Hyperlipidemia     Hypertension 2016    Sleep apnea        Past Surgical History:   Procedure Laterality Date    ARTHROTOMY OF SHOULDER Left 6/21/2024    Procedure: ARTHROTOMY, SHOULDER;  Surgeon: Roman Paulson MD;  Location: [facility] OR;  Service: Orthopedics;  Laterality: Left;    IRRIGATION AND DEBRIDEMENT OF UPPER EXTREMITY Left 6/24/2024    Procedure: IRRIGATION AND DEBRIDEMENT, UPPER EXTREMITY;  Surgeon: Nathan Cooper MD;  Location: [facility] OR;  Service: Orthopedics;  Laterality: Left;    IRRIGATION AND DEBRIDEMENT OF UPPER EXTREMITY Left 6/27/2024    Procedure: IRRIGATION AND DEBRIDEMENT, UPPER EXTREMITY;  Surgeon: Nathan Cooper MD;  Location: [facility] OR;  Service: Orthopedics;  Laterality: Left;    OPEN REDUCTION  AND INTERNAL FIXATION (ORIF) OF FRACTURE OF PROXIMAL HUMERUS Left 3/25/2024    Procedure: ORIF, FRACTURE, HUMERUS, PROXIMAL/IM HANNA, LEFT;  Surgeon: Nathan Cooper MD;  Location: [facility] OR;  Service: Orthopedics;  Laterality: Left;  Accumed, Synthes Small Frag set Avelino verified 3/22/24 ark    TOE AMPUTATION Right 7/1/2024    Procedure: AMPUTATION, TOE;  Surgeon: Stevie Zhu DPM;  Location: [facility] OR;  Service: Podiatry;  Laterality: Right;       Review of patient's allergies indicates:  No Known Allergies    Current Facility-Administered Medications on File Prior to Encounter   Medication    [DISCONTINUED] (Magic mouthwash) 1:1:1 diphenhydrAMINE(Benadryl) 12.5mg/5ml liq, aluminum & magnesium hydroxide-simethicone (Maalox), LIDOcaine viscous 2%    [DISCONTINUED] 0.9%  NaCl infusion (for blood administration)    [DISCONTINUED] 0.9%  NaCl infusion (for blood administration)    [DISCONTINUED] 0.9%  NaCl infusion    [DISCONTINUED] albuterol-ipratropium 2.5 mg-0.5 mg/3 mL nebulizer solution 3 mL    [DISCONTINUED] aluminum & magnesium hydroxide-simethicone 400-400-40 mg/5 mL suspension 15 mL    [DISCONTINUED] ceftaroline (TEFLARO) 600 mg in dextrose 5 % 50 mL IVPB (ready to mix)    [DISCONTINUED] dextrose 10% bolus 125 mL 125 mL    [DISCONTINUED] dextrose 10% bolus 250 mL 250 mL    [DISCONTINUED] epoetin leonel-epbx injection 14,100 Units    [DISCONTINUED] ferrous sulfate tablet 1 each    [DISCONTINUED] glucagon (human recombinant) injection 1 mg    [DISCONTINUED] glucose chewable tablet 16 g    [DISCONTINUED] glucose chewable tablet 24 g    [DISCONTINUED] HYDROcodone-acetaminophen  mg per tablet 1 tablet    [DISCONTINUED] HYDROcodone-acetaminophen 5-325 mg per tablet 1 tablet    [DISCONTINUED] insulin aspart U-100 pen 0-10 Units    [DISCONTINUED] insulin glargine U-100 (Lantus) pen 15 Units    [DISCONTINUED] LIDOcaine 5 % patch 1 patch    [DISCONTINUED] magnesium sulfate 2g in water 50mL IVPB (premix)     [DISCONTINUED] meropenem 1 g in sodium chloride 0.9 % 100 mL IVPB (ready to mix system)    [DISCONTINUED] metoprolol injection 5 mg    [DISCONTINUED] metoprolol succinate (TOPROL-XL) 24 hr tablet 100 mg    [DISCONTINUED] naloxone 0.4 mg/mL injection 0.02 mg    [DISCONTINUED] ondansetron disintegrating tablet 8 mg    [DISCONTINUED] potassium bicarbonate disintegrating tablet 35 mEq    [DISCONTINUED] potassium bicarbonate disintegrating tablet 60 mEq    [DISCONTINUED] potassium chloride SA CR tablet 20 mEq    [DISCONTINUED] potassium, sodium phosphates 280-160-250 mg packet 2 packet    [DISCONTINUED] potassium, sodium phosphates 280-160-250 mg packet 2 packet    [DISCONTINUED] potassium, sodium phosphates 280-160-250 mg packet 2 packet    [DISCONTINUED] promethazine tablet 25 mg    [DISCONTINUED] simethicone chewable tablet 80 mg    [DISCONTINUED] sodium chloride 0.9% flush 10 mL    [DISCONTINUED] sodium chloride 0.9% flush 10 mL    [DISCONTINUED] sodium chloride 0.9% flush 10 mL    [DISCONTINUED] sodium chloride 0.9% flush 10 mL    [DISCONTINUED] traMADoL tablet 50 mg     Current Outpatient Medications on File Prior to Encounter   Medication Sig    apixaban (ELIQUIS) 5 mg Tab Take 1 tablet (5 mg total) by mouth 2 (two) times daily.    atorvastatin (LIPITOR) 10 MG tablet Take 1 tablet (10 mg total) by mouth once daily.    co-enzyme Q-10 30 mg capsule Take 30 mg by mouth once daily.    doxycycline (VIBRAMYCIN) 100 MG Cap Take 100 mg by mouth 2 (two) times daily.    ergocalciferol, vitamin D2, (VITAMIN D ORAL) Take 1 tablet by mouth once daily.    glimepiride (AMARYL) 2 MG tablet Take 1 tablet (2 mg total) by mouth before breakfast.    hydrALAZINE (APRESOLINE) 25 MG tablet Take 1 tablet (25 mg total) by mouth every 12 (twelve) hours.    metFORMIN (GLUCOPHAGE-XR) 500 MG ER 24hr tablet Take 2 tablets (1,000 mg total) by mouth 2 (two) times daily with meals.    metoprolol succinate (TOPROL-XL) 100 MG 24 hr tablet Take 1  tablet (100 mg total) by mouth once daily. (Patient taking differently: Take 100 mg by mouth nightly.)     Family History    None       Tobacco Use    Smoking status: Never    Smokeless tobacco: Never   Substance and Sexual Activity    Alcohol use: Yes     Alcohol/week: 0.0 standard drinks of alcohol     Comment: social    Drug use: No    Sexual activity: Yes     Review of Systems   Constitutional:  Positive for appetite change. Negative for fever.   HENT:  Negative for congestion, sore throat and trouble swallowing.    Eyes:  Negative for visual disturbance.   Respiratory:  Positive for cough (dry). Negative for shortness of breath and wheezing.    Cardiovascular:  Negative for chest pain, palpitations and leg swelling.   Gastrointestinal:  Negative for abdominal pain, constipation, diarrhea, nausea and vomiting.   Genitourinary:  Negative for decreased urine volume and dysuria.   Musculoskeletal:  Negative for back pain.   Skin:  Positive for wound.   Neurological:  Negative for dizziness, light-headedness and headaches.   Psychiatric/Behavioral:  Negative for agitation, confusion and decreased concentration.        Objective:     Vital Signs (Most Recent):    Vital Signs (24h Range):  Temp:  [97.2 °F (36.2 °C)-99.6 °F (37.6 °C)] 97.4 °F (36.3 °C)  Pulse:  [] 82  Resp:  [12-38] 24  SpO2:  [91 %-98 %] 95 %  BP: (110-144)/(52-94) 134/60        There is no height or weight on file to calculate BMI.     Physical Exam  Vitals and nursing note reviewed.   Constitutional:       General: He is not in acute distress.     Appearance: He is ill-appearing (chronically). He is not toxic-appearing or diaphoretic.   HENT:      Head: Normocephalic and atraumatic.      Nose: Nose normal.      Mouth/Throat:      Pharynx: Oropharynx is clear.   Eyes:      General: No scleral icterus.  Cardiovascular:      Rate and Rhythm: Normal rate and regular rhythm.      Pulses: Normal pulses.      Heart sounds: Normal heart sounds.    Pulmonary:      Effort: Pulmonary effort is normal.      Comments: No obvious rales or wheezes when auscultating anterior chest; unable to complete posterior auscultation due to limited mobility of patient  Chest:      Chest wall: No deformity or tenderness.      Comments: Right chest tube to water seal with serous output  Left chest wall drain with seropurulent output  Abdominal:      General: Bowel sounds are normal.      Palpations: Abdomen is soft.      Tenderness: There is no abdominal tenderness.   Musculoskeletal:      Cervical back: Normal range of motion and neck supple.      Right lower leg: No edema.      Left lower leg: No edema.   Skin:     General: Skin is warm.      Capillary Refill: Capillary refill takes less than 2 seconds.      Comments: Unable to examine posterior skin due to patient debility   Neurological:      Mental Status: He is alert and oriented to person, place, and time.   Psychiatric:         Behavior: Behavior normal.                Significant Labs: All pertinent labs within the past 24 hours have been reviewed.    Recent Results (from the past 24 hour(s))   POCT glucose    Collection Time: 07/06/24  5:13 PM   Result Value Ref Range    POC Glucose 188 (H) 70 - 110   POCT glucose    Collection Time: 07/06/24  9:25 PM   Result Value Ref Range    POC Glucose 159 (H) 70 - 110   Comprehensive metabolic panel    Collection Time: 07/07/24  3:53 AM   Result Value Ref Range    Sodium 137 136 - 145 mmol/L    Potassium 4.5 3.5 - 5.1 mmol/L    Chloride 104 95 - 110 mmol/L    CO2 26 23 - 29 mmol/L    Glucose 130 (H) 70 - 110 mg/dL    BUN 33 (H) 8 - 23 mg/dL    Creatinine 1.1 0.5 - 1.4 mg/dL    Calcium 7.8 (L) 8.7 - 10.5 mg/dL    Total Protein 5.8 (L) 6.0 - 8.4 g/dL    Albumin 2.1 (L) 3.5 - 5.2 g/dL    Total Bilirubin 0.3 0.1 - 1.0 mg/dL    Alkaline Phosphatase 97 55 - 135 U/L    AST 25 10 - 40 U/L    ALT 17 10 - 44 U/L    eGFR >60.0 >60 mL/min/1.73 m^2    Anion Gap 7 (L) 8 - 16 mmol/L   CBC auto  differential    Collection Time: 07/07/24  3:53 AM   Result Value Ref Range    WBC 11.58 3.90 - 12.70 K/uL    RBC 3.12 (L) 4.60 - 6.20 M/uL    Hemoglobin 8.1 (L) 14.0 - 18.0 g/dL    Hematocrit 26.2 (L) 40.0 - 54.0 %    MCV 84 82 - 98 fL    MCH 26.0 (L) 27.0 - 31.0 pg    MCHC 30.9 (L) 32.0 - 36.0 g/dL    RDW 17.3 (H) 11.5 - 14.5 %    Platelets 389 150 - 450 K/uL    MPV 9.8 9.2 - 12.9 fL    Immature Granulocytes 1.0 (H) 0.0 - 0.5 %    Gran # (ANC) 7.5 1.8 - 7.7 K/uL    Immature Grans (Abs) 0.12 (H) 0.00 - 0.04 K/uL    Lymph # 1.9 1.0 - 4.8 K/uL    Mono # 1.1 (H) 0.3 - 1.0 K/uL    Eos # 0.8 (H) 0.0 - 0.5 K/uL    Baso # 0.08 0.00 - 0.20 K/uL    nRBC 0 0 /100 WBC    Gran % 65.1 38.0 - 73.0 %    Lymph % 16.2 (L) 18.0 - 48.0 %    Mono % 9.7 4.0 - 15.0 %    Eosinophil % 7.3 0.0 - 8.0 %    Basophil % 0.7 0.0 - 1.9 %    Differential Method Automated    Procalcitonin    Collection Time: 07/07/24  3:53 AM   Result Value Ref Range    Procalcitonin 0.575 (H) 0.000 - 0.500 ng/mL   C-Reactive Protein    Collection Time: 07/07/24  3:53 AM   Result Value Ref Range    CRP >16.00 (H) <1.00 mg/dL   POCT glucose    Collection Time: 07/07/24 12:29 PM   Result Value Ref Range    POC Glucose 189 (H) 70 - 110   POCT glucose    Collection Time: 07/07/24  4:06 PM   Result Value Ref Range    POCT Glucose 168 (H) 70 - 110 mg/dL         Significant Imaging: I have reviewed all pertinent imaging results/findings within the past 24 hours.      See HPI  Assessment/Plan:     * Osteomyelitis of multiple sites  Osteomyelitis of toe   MRSA infection   Skin ulcer of toe of right foot with necrosis of bone  Chest wall abscess  Loculated pleural effusion  Abscess of left shoulder  Pyogenic abscess of left shoulder region    -status post left thoracentesis 1500cc serosanguineous fluid drained 7/4 & I&D drainage with chest tube placement 7/4 & loculated effusions right chest s/p VATS and chest tube 7/5   -Left shoulder prosthetic joint infection s/p I&D and  "removal of deep hardware 6/21 - all screws and nails removed, s/p 2nd washout 6/24 & 3rd washout 6/27   -Left Shoulder washouts on 6/21 ( abundant pus in shoulder, hardware removal), 6/24 ( bloody brownish fluid) and 6/27 ( no fluid collection, healthy red and beefy tissue)   -Right 3rd toe osteomyelitis s/p Dalvance x 2 doses & 2nd distal phalanx s/p I&D at bedside, s/p 2nd toe amputation 7/1       - Presents as a transfer for thoracic surgery and Plastic surgery evaluation in setting of concern for "osteomyelitis of multiple ribs which will need further debridement and then a flap placed" in the setting of chest wall abscess  -arrives on ceftaroline and meropenem as per outside hospital ID recommendations  -notably, 7/3 blood cultures, 7/4 pleural fluid cultures, 7/4 abscess cultures, 755 pleural fluid cultures NGTD  -6/18 right toe wound culture with MRSA  -continue to follow OSH cultures  -ongoing wound care  -ID, Thoracic surgery and Plastic surgery consulted; consider Ortho consult for shoulder evaluation  -lost Orthopedic surgery eval 7/7 during which left shoulder deemed stable; recs for continue to monitor with serial x-rays looking for expanding gas.      -Sling as needed for comfort of the left upper extremity. Patient can do very light activity with the left upper extremity focusing more on elbow, hand and wrist. Would not do dedicated therapy for the shoulder. Patient could weightbear through the extremity if needed with a walker.   -per thoracic surgery, CT chest and abdomen with IV contrast; right chest tube to water seal; left chest accordion drain to negative pressure; NPO at midnight    Sepsis  This patient does have evidence of infective focus  My overall impression is sepsis.  Source: Skin and Soft Tissue (location toe)  Antibiotics given-   Antibiotics (72h ago, onward)      Start     Stop Route Frequency Ordered    07/07/24 2067  ceftaroline fosamiL (TEFLARO) 600 mg in D5W 50 mL IVPB (MB+)      " "   -- IV Every 8 hours (non-standard times) 07/07/24 1532    07/07/24 1545  meropenem (MERREM) 1 g in 0.9% NaCl 100 mL IVPB (MB+)         -- IV Every 8 hours (non-standard times) 07/07/24 1532          Latest lactate reviewed-  No results for input(s): "LACTATE", "POCLAC" in the last 72 hours.  Organ dysfunction indicated by Acute kidney injury    Fluid challenge Not needed - patient is not hypotensive      Post- resuscitation assessment No - Post resuscitation assessment not needed       Will Not start Pressors- Levophed for MAP of 65  Source control achieved by: abx    Acute renal failure superimposed on chronic kidney disease  Patient with acute kidney injury/acute renal failure likely due to  due to ATN due to sepsis due to UTI and/or PNA  MARBIN is currently improving. Baseline creatinine  wnl  - Labs reviewed- Renal function/electrolytes with CrCl cannot be calculated (Unknown ideal weight.). according to latest data. Monitor urine output and serial BMP and adjust therapy as needed. Avoid nephrotoxins and renally dose meds for GFR listed above.    Atrial fibrillation with rapid ventricular response  Patient with Long standing persistent (>12 months) atrial fibrillation which is controlled currently with Beta Blocker. Patient is currently in TBD.[id number] Score: 2.      Chronic AF w/ acute RVR at OSH, resolved s/p cardizem drip   NOAC has been on hold d/t possible need for surgery - has been on prophylaxis dose of Lovenox    Debility  Patient with Acute on chronic debility due to  multiple infections . Latest AMPAC and GEMS scores have not been reviewed. Evaluation for etiology is complete. Plan includes progressive mobility protocol initated, PT/OT consulted, and fall precautions in place.    History of Guillain-[place] syndrome  No acute issues  However, pt has impaired mobility and is acutely weakened from his sepsis/UTI/AF RVR and need placement       Type 2 diabetes mellitus  Patient's FSGs are controlled on " current medication regimen.  Last A1c reviewed-   Lab Results   Component Value Date    HGBA1C 6.3 (H) 06/14/2024     Most recent fingerstick glucose reviewed-   Recent Labs   Lab 07/07/24  1606   POCTGLUCOSE 168*     Current correctional scale  Low  Maintain anti-hyperglycemic dose as follows-   Antihyperglycemics (From admission, onward)      Start     Stop Route Frequency Ordered    07/08/24 0900  insulin glargine U-100 (Lantus) pen 15 Units         -- SubQ Daily 07/07/24 1532    07/07/24 1529  insulin aspart U-100 pen 0-10 Units         -- SubQ Before meals & nightly PRN 07/07/24 1532          Hold Oral hypoglycemics while patient is in the hospital.    Hypertension  Chronic, controlled. Latest blood pressure and vitals reviewed-     Temp:  [97.2 °F (36.2 °C)-99.6 °F (37.6 °C)]   Pulse:  []   Resp:  [12-38]   BP: (110-144)/(52-94)   SpO2:  [91 %-98 %] .   Home meds for hypertension were reviewed and noted below.   Hypertension Medications               hydrALAZINE (APRESOLINE) 25 MG tablet Take 1 tablet (25 mg total) by mouth every 12 (twelve) hours.    metoprolol succinate (TOPROL-XL) 100 MG 24 hr tablet Take 1 tablet (100 mg total) by mouth once daily.            While in the hospital, will manage blood pressure as follows; continue home metoprolol    Will utilize p.r.n. blood pressure medication only if patient's blood pressure greater than 180/110 and he develops symptoms such as worsening chest pain or shortness of breath.    MARA (obstructive sleep apnea)  Continue CPAP HS and w/ naps        Morbid obesity  There is no height or weight on file to calculate BMI. Morbid obesity complicates all aspects of disease management from diagnostic modalities to treatment. Weight loss encouraged and health benefits explained to patient.           VTE Risk Mitigation (From admission, onward)           Ordered     Place sequential compression device  Until discontinued         07/07/24 1532                                     Roopa Rowan MD  Department of Hospital Medicine  [facility]

## 2024-07-07 NOTE — PROGRESS NOTES
CT   phone note with nurse and chart review  Pt doing well, awaiting tf to plastic surgery  Vss afebrile, bp 127/80,  p 75, drainage mininmal  My presence was not indicated to be necessary today per the nurse, thanks.

## 2024-07-07 NOTE — ASSESSMENT & PLAN NOTE
S/p  I&D of a chest wall abscess on 7/4/24. He had a thoracentesis of 1550 cc. Abscess extended down to the rectus abdominis muscles and to the pericardium but there was no pericardial effusion.  The patient also has a loculated right effusion s/p VATS and chest tube  Continue antibiotics     As per chart review, The patient is going to need to transfer to a facility that has thoracic surgery and reconstructive plastic surgery. The patient has osteomyelitis of multiple ribs which will need further debridement and then a flap placed. Unfortunately, [facility] do not have this capability at this facility.   [place] [facility] [place]

## 2024-07-07 NOTE — HPI
"Roosevelt Moser is a 74yo M w/ A fib, HTN, T2DM, MARA, HLD, hx GBS, hx displaced comminuted fracture of the left humerus s/p fall 3/4/24 s/p ORIF 03/25/2024 who presents as a transfer for thoracic surgery and Plastic surgery evaluation in setting of "osteomyelitis of multiple ribs which will need further debridement and then a flap placed."    He was initially admitted to [facility] on 6/14 for progressive weakness, multiple falls, and decreased urine output.  He was admitted for management of MARBIN on CKD complicated by hyperkalemia, urosepsis, AFib with RVR.    "Creatinine improved during his stay. During his stay he was noted to have swelling of his left upper extremity. Imaging studies on June 17 showed concern for gas-forming infection along the left shoulder and air along the lateral left chest wall concerning for gas-forming organism. MRI of his right foot also showed evidence of toe osteomyelitis. He was seen by ID who adjusted antibiotics, and they also noted oral thrush. Podiatry evaluated him for the toe findings, and Orthopedic Surgery evaluated the shoulder abnormalities. Right toe wound grew MRSA. Oral anticoagulation was held, and Radiology aspirated fluid from his left shoulder on June 20. On June 21 he had left shoulder incision and drainage and removal of deep hardware including rods and screws. He underwent repeat irrigation and debridement of the left shoulder on June 24 with placement of a wound VAC. He had subsequent incision and drainage with removal of the wound VAC on June 27. He had amputation of his right 2nd toe by Podiatry on July 1. On July 3 he was noted to have dyspnea and mild tachycardia. CT chest showed a large abscess of the anterior central and left chest wall and abdominal wall with partial destruction of 3 ribs. He was seen by Cardiothoracic surgery. On July 4 he had left thoracentesis with aspiration of fluid. He subsequently had incision and drainage of the left " "chest wall abscess on July 4, and a drainage catheter was left in place. On July 5 he underwent right VATS for a loculated right pleural effusion, and a chest tube was left in place. He was transitioned to the ICU post-operatively. With concern for osteomyelitis of the ribs, concern is that he will need transfer to a facility with Thoracic Surgery and potentially Plastic Surgery along with higher level of care. Transfer center spoke with Thoracic Surgery at [facility]. Requesting transfer to Hospital Medicine at [facility] for Plastic Surgery and Thoracic Surgery evaluation. Transfer center spoke with Plastic Surgery on July 5, Thoracic Surgery on July 7. With his complicated medical presentation, will plan transfer to Hospital Medicine at [facility] in step-down status for further treatment. "    Prior to transfer, "He is awake and alert. He has a PICC line in place along with the right chest tube and left chest wall drain in place. He has no evidence of respiratory distress and is hemodynamically stable. Referring provider felt patient was stable for step-down status at present. He is currently on ceftaroline and meropenem. He is in contact isolation. July 7: Sodium 137, potassium 4.5, chloride 104, CO2 26, BUN 33, creatinine 1.1, glucose 130, albumin 2.1, AST 25, ALT 17, white blood cells 11.58, hemoglobin 8.1, hematocrit 26.2, platelets 389, procalcitonin 0.575, CRP greater than 16  VS:  Temperature 97.5°, pulse 85, respirations 20, blood pressure 112/63, O2 sats 95% "    Patient in no acute distress upon arrival.  Wife at bedside.  Patient denies any acute complaints.        Imaging history reviewed:  July 6: X-rays of the left shoulder had fracture through the surgical neck of the humerus with lateral distraction of the distal component and overlapping segments on the order of 2.5 cm.  Overall no appreciable change upon comparison with prior imaging.  -chest x-ray noted manifestations of mild " congestive heart failure bordering on mild pulmonary edema.  Right-sided thoracostomy tube terminating within the right apex.  No definite pneumothorax.        July 5: Pleural fluid Gram stain with few WBCs and no organisms seen  -pH 7.339, pCO2 50.9, pO2 392     July 4:  CPK less than 10, serum   -AFB smear from left chest abscess had no AFB seen, left chest abscess aerobic and anaerobic cultures have no growth  -pleural fluid protein 3.2, white blood cells 1108 (11% segs, 74% lymphs), , pH 7.63, pleural fluid culture had no growth  -ultrasound-guided thoracentesis removed 1.4-1.5 L     July 3: Blood cultures with no growth to date  -CT chest showed large abscess of the anterior central and left chest wall and abdominal wall measuring 17 cm transversely.  This extends into the mediastinum and peritoneum with partial destruction of the anterior 6th, 7th, and 8th ribs.  Complete atelectasis of the left lower lobe with moderate left pleural effusion.  Mild atelectasis right lung base with a small-to-moderate right pleural effusion.     July 2: X-rays of the left shoulder noted displaced left proximal humerus fracture status post hardware removal without change in alignment.     June 24: ECHO with EF 55-60%.     June 20:  Interventional Radiology did percutaneous aspiration of the left shoulder fluid collection.  No drainage catheter was left in place.     June 18:  Right toe wound MRSA  -CT left shoulder had subacute left humerus fracture post internal fixation.  Incomplete bony bridging across the fracture site with persistent angulation.  Severe arthropathy of the glenohumeral joint with multiple intra-articular bodies.  Large left joint effusion and adjacent soft tissue focal fluid collections containing gas and concerning for gas-forming infection.  Moderate size left pleural effusion.  Left basilar airspace disease.  -bilateral lower extremity Doppler arterial ultrasound had atherosclerotic plaque  and calcification bilaterally without severe stenosis or occlusion identified on either side.     June 17: X-rays of the left humerus/shoulder had findings concerning for gas-forming infection along the left shoulder.  -x-rays of the left ribs showed air along the lateral chest wall soft tissue and axilla concerning for gas-forming infection.  -MRI of the right foot had findings consistent with osteomyelitis involving the middle distal phalanges of the 3rd toe.  Cellulitis.     June 14: Renal ultrasound had no hydronephrosis.  Elevated resistive index right kidney suggesting intrinsic renal disease.

## 2024-07-07 NOTE — ASSESSMENT & PLAN NOTE
Patient with Acute on chronic debility due to  multiple infections . Latest AMPAC and GEMS scores have not been reviewed. Evaluation for etiology is complete. Plan includes progressive mobility protocol initated, PT/OT consulted, and fall precautions in place.

## 2024-07-08 PROBLEM — N28.1 CYST OF RIGHT KIDNEY: Status: ACTIVE | Noted: 2024-07-08

## 2024-07-08 PROBLEM — E04.1 THYROID NODULE: Status: ACTIVE | Noted: 2024-07-08

## 2024-07-08 PROBLEM — N20.0 LEFT RENAL STONE: Status: ACTIVE | Noted: 2024-07-08

## 2024-07-08 PROBLEM — R59.0 MEDIASTINAL LYMPHADENOPATHY: Status: ACTIVE | Noted: 2024-07-08

## 2024-07-08 PROBLEM — S42.309A HUMERUS FRACTURE: Status: ACTIVE | Noted: 2024-07-08

## 2024-07-08 PROBLEM — S42.412A LEFT SUPRACONDYLAR HUMERUS FRACTURE: Status: ACTIVE | Noted: 2024-07-08

## 2024-07-08 LAB
ALBUMIN SERPL BCP-MCNC: 1.2 G/DL (ref 3.5–5.2)
ALP SERPL-CCNC: 114 U/L (ref 55–135)
ALT SERPL W/O P-5'-P-CCNC: 16 U/L (ref 10–44)
ANION GAP SERPL CALC-SCNC: 6 MMOL/L (ref 8–16)
AST SERPL-CCNC: 21 U/L (ref 10–40)
BACTERIA BLD CULT: NORMAL
BACTERIA FLD AEROBE CULT: NO GROWTH
BACTERIA SPEC AEROBE CULT: NO GROWTH
BACTERIA SPEC ANAEROBE CULT: NORMAL
BASOPHILS # BLD AUTO: 0.12 K/UL (ref 0–0.2)
BASOPHILS NFR BLD: 0.9 % (ref 0–1.9)
BILIRUB SERPL-MCNC: 0.3 MG/DL (ref 0.1–1)
BUN SERPL-MCNC: 30 MG/DL (ref 8–23)
CALCIUM SERPL-MCNC: 8.6 MG/DL (ref 8.7–10.5)
CHLORIDE SERPL-SCNC: 106 MMOL/L (ref 95–110)
CK SERPL-CCNC: 7 U/L (ref 20–200)
CO2 SERPL-SCNC: 26 MMOL/L (ref 23–29)
CREAT SERPL-MCNC: 1.1 MG/DL (ref 0.5–1.4)
DIFFERENTIAL METHOD BLD: ABNORMAL
EOSINOPHIL # BLD AUTO: 0.9 K/UL (ref 0–0.5)
EOSINOPHIL NFR BLD: 7 % (ref 0–8)
ERYTHROCYTE [DISTWIDTH] IN BLOOD BY AUTOMATED COUNT: 17.2 % (ref 11.5–14.5)
EST. GFR  (NO RACE VARIABLE): >60 ML/MIN/1.73 M^2
GLUCOSE SERPL-MCNC: 122 MG/DL (ref 70–110)
GRAM STN SPEC: NORMAL
GRAM STN SPEC: NORMAL
HCT VFR BLD AUTO: 31.3 % (ref 40–54)
HGB BLD-MCNC: 9.2 G/DL (ref 14–18)
IMM GRANULOCYTES # BLD AUTO: 0.16 K/UL (ref 0–0.04)
IMM GRANULOCYTES NFR BLD AUTO: 1.2 % (ref 0–0.5)
LYMPHOCYTES # BLD AUTO: 1.9 K/UL (ref 1–4.8)
LYMPHOCYTES NFR BLD: 14.8 % (ref 18–48)
MAGNESIUM SERPL-MCNC: 1.8 MG/DL (ref 1.6–2.6)
MCH RBC QN AUTO: 24.6 PG (ref 27–31)
MCHC RBC AUTO-ENTMCNC: 29.4 G/DL (ref 32–36)
MCV RBC AUTO: 84 FL (ref 82–98)
MONOCYTES # BLD AUTO: 1.2 K/UL (ref 0.3–1)
MONOCYTES NFR BLD: 9.4 % (ref 4–15)
NEUTROPHILS # BLD AUTO: 8.7 K/UL (ref 1.8–7.7)
NEUTROPHILS NFR BLD: 66.7 % (ref 38–73)
NRBC BLD-RTO: 0 /100 WBC
PHOSPHATE SERPL-MCNC: 2.8 MG/DL (ref 2.7–4.5)
PLATELET # BLD AUTO: 464 K/UL (ref 150–450)
PMV BLD AUTO: 10 FL (ref 9.2–12.9)
POCT GLUCOSE: 112 MG/DL (ref 70–110)
POCT GLUCOSE: 131 MG/DL (ref 70–110)
POTASSIUM SERPL-SCNC: 4.6 MMOL/L (ref 3.5–5.1)
PROT SERPL-MCNC: 6.2 G/DL (ref 6–8.4)
RBC # BLD AUTO: 3.74 M/UL (ref 4.6–6.2)
SODIUM SERPL-SCNC: 138 MMOL/L (ref 136–145)
WBC # BLD AUTO: 13.01 K/UL (ref 3.9–12.7)

## 2024-07-08 PROCEDURE — A9585 GADOBUTROL INJECTION: HCPCS | Mod: HCNC | Performed by: STUDENT IN AN ORGANIZED HEALTH CARE EDUCATION/TRAINING PROGRAM

## 2024-07-08 PROCEDURE — 84100 ASSAY OF PHOSPHORUS: CPT | Mod: HCNC | Performed by: STUDENT IN AN ORGANIZED HEALTH CARE EDUCATION/TRAINING PROGRAM

## 2024-07-08 PROCEDURE — 27000221 HC OXYGEN, UP TO 24 HOURS: Mod: HCNC

## 2024-07-08 PROCEDURE — 97163 PT EVAL HIGH COMPLEX 45 MIN: CPT | Mod: HCNC

## 2024-07-08 PROCEDURE — 25500020 PHARM REV CODE 255: Mod: HCNC | Performed by: STUDENT IN AN ORGANIZED HEALTH CARE EDUCATION/TRAINING PROGRAM

## 2024-07-08 PROCEDURE — 85025 COMPLETE CBC W/AUTO DIFF WBC: CPT | Mod: HCNC | Performed by: STUDENT IN AN ORGANIZED HEALTH CARE EDUCATION/TRAINING PROGRAM

## 2024-07-08 PROCEDURE — 99223 1ST HOSP IP/OBS HIGH 75: CPT | Mod: HCNC,,, | Performed by: INTERNAL MEDICINE

## 2024-07-08 PROCEDURE — 82550 ASSAY OF CK (CPK): CPT | Mod: HCNC | Performed by: STUDENT IN AN ORGANIZED HEALTH CARE EDUCATION/TRAINING PROGRAM

## 2024-07-08 PROCEDURE — 97530 THERAPEUTIC ACTIVITIES: CPT | Mod: HCNC

## 2024-07-08 PROCEDURE — 27000207 HC ISOLATION: Mod: HCNC

## 2024-07-08 PROCEDURE — 99900035 HC TECH TIME PER 15 MIN (STAT): Mod: HCNC

## 2024-07-08 PROCEDURE — 36415 COLL VENOUS BLD VENIPUNCTURE: CPT | Mod: HCNC | Performed by: STUDENT IN AN ORGANIZED HEALTH CARE EDUCATION/TRAINING PROGRAM

## 2024-07-08 PROCEDURE — 94660 CPAP INITIATION&MGMT: CPT | Mod: HCNC

## 2024-07-08 PROCEDURE — 20600001 HC STEP DOWN PRIVATE ROOM: Mod: HCNC

## 2024-07-08 PROCEDURE — 83735 ASSAY OF MAGNESIUM: CPT | Mod: HCNC | Performed by: STUDENT IN AN ORGANIZED HEALTH CARE EDUCATION/TRAINING PROGRAM

## 2024-07-08 PROCEDURE — 25000003 PHARM REV CODE 250: Mod: HCNC | Performed by: STUDENT IN AN ORGANIZED HEALTH CARE EDUCATION/TRAINING PROGRAM

## 2024-07-08 PROCEDURE — 94761 N-INVAS EAR/PLS OXIMETRY MLT: CPT | Mod: HCNC

## 2024-07-08 PROCEDURE — 63600175 PHARM REV CODE 636 W HCPCS: Mod: JZ,JG,HCNC | Performed by: STUDENT IN AN ORGANIZED HEALTH CARE EDUCATION/TRAINING PROGRAM

## 2024-07-08 PROCEDURE — A4216 STERILE WATER/SALINE, 10 ML: HCPCS | Mod: HCNC | Performed by: STUDENT IN AN ORGANIZED HEALTH CARE EDUCATION/TRAINING PROGRAM

## 2024-07-08 PROCEDURE — 80053 COMPREHEN METABOLIC PANEL: CPT | Mod: HCNC | Performed by: STUDENT IN AN ORGANIZED HEALTH CARE EDUCATION/TRAINING PROGRAM

## 2024-07-08 RX ORDER — POLYETHYLENE GLYCOL 3350 17 G/17G
17 POWDER, FOR SOLUTION ORAL 2 TIMES DAILY PRN
Status: DISCONTINUED | OUTPATIENT
Start: 2024-07-08 | End: 2024-07-25 | Stop reason: HOSPADM

## 2024-07-08 RX ORDER — AMOXICILLIN 250 MG
1 CAPSULE ORAL 2 TIMES DAILY PRN
Status: DISCONTINUED | OUTPATIENT
Start: 2024-07-08 | End: 2024-07-25 | Stop reason: HOSPADM

## 2024-07-08 RX ORDER — GADOBUTROL 604.72 MG/ML
10 INJECTION INTRAVENOUS
Status: COMPLETED | OUTPATIENT
Start: 2024-07-08 | End: 2024-07-08

## 2024-07-08 RX ADMIN — HYDROCODONE BITARTRATE AND ACETAMINOPHEN 1 TABLET: 10; 325 TABLET ORAL at 03:07

## 2024-07-08 RX ADMIN — CEFTAROLINE FOSAMIL 600 MG: 600 POWDER, FOR SOLUTION INTRAVENOUS at 03:07

## 2024-07-08 RX ADMIN — CEFTAROLINE FOSAMIL 600 MG: 600 POWDER, FOR SOLUTION INTRAVENOUS at 10:07

## 2024-07-08 RX ADMIN — Medication 10 ML: at 06:07

## 2024-07-08 RX ADMIN — INSULIN GLARGINE 15 UNITS: 100 INJECTION, SOLUTION SUBCUTANEOUS at 10:07

## 2024-07-08 RX ADMIN — DIPHENHYDRAMINE HYDROCHLORIDE 10 ML: 25 SOLUTION ORAL at 05:07

## 2024-07-08 RX ADMIN — METOPROLOL SUCCINATE 100 MG: 100 TABLET, EXTENDED RELEASE ORAL at 09:07

## 2024-07-08 RX ADMIN — DIPHENHYDRAMINE HYDROCHLORIDE 10 ML: 25 SOLUTION ORAL at 10:07

## 2024-07-08 RX ADMIN — Medication 10 ML: at 12:07

## 2024-07-08 RX ADMIN — MEROPENEM 1 G: 1 INJECTION INTRAVENOUS at 10:07

## 2024-07-08 RX ADMIN — Medication 10 ML: at 03:07

## 2024-07-08 RX ADMIN — HYDROCODONE BITARTRATE AND ACETAMINOPHEN 1 TABLET: 10; 325 TABLET ORAL at 09:07

## 2024-07-08 RX ADMIN — HYDROCODONE BITARTRATE AND ACETAMINOPHEN 1 TABLET: 10; 325 TABLET ORAL at 04:07

## 2024-07-08 RX ADMIN — IOHEXOL 100 ML: 350 INJECTION, SOLUTION INTRAVENOUS at 09:07

## 2024-07-08 RX ADMIN — LIDOCAINE 5% 1 PATCH: 700 PATCH TOPICAL at 06:07

## 2024-07-08 RX ADMIN — FERROUS SULFATE TAB EC 325 MG (65 MG FE EQUIVALENT) 1 EACH: 325 (65 FE) TABLET DELAYED RESPONSE at 10:07

## 2024-07-08 RX ADMIN — MEROPENEM 1 G: 1 INJECTION INTRAVENOUS at 03:07

## 2024-07-08 RX ADMIN — MEROPENEM 1 G: 1 INJECTION INTRAVENOUS at 12:07

## 2024-07-08 RX ADMIN — SODIUM CHLORIDE: 9 INJECTION, SOLUTION INTRAVENOUS at 10:07

## 2024-07-08 RX ADMIN — GADOBUTROL 10 ML: 604.72 INJECTION INTRAVENOUS at 07:07

## 2024-07-08 RX ADMIN — DIPHENHYDRAMINE HYDROCHLORIDE 10 ML: 25 SOLUTION ORAL at 01:07

## 2024-07-08 NOTE — ASSESSMENT & PLAN NOTE
6/14 retroperitoneal ultrasound completed in setting of MARBIN without hydronephrosis   7/8 CT abdomen notable for 1.2 cm obstructing stone in left distal ureter with mild hydroureteronephrosis   Urology consulted

## 2024-07-08 NOTE — ASSESSMENT & PLAN NOTE
73 yoM with left chest wall abscess and concern for left 6-8 rib osteomyelitis transferred for potential thoracic and plastic surgery intervention. Subjectively he is feeling much better s/p incision and drainage of chest wall abscess, thoracentesis of left pleural effusion, and VATS with right chest tube placement for right pleural effusion at [facility]. Currently clinically stable.     - Repeat CT chest and abdomen with IV contrast to re evaluate chest wall abscess extension and potential osteomyelitis of the ribs  - Follow up ID recommendations  - Right chest tube to water seal  - Left chest accordion drain to negative pressure  - Ok for a diet  - Thoracic surgery to follow

## 2024-07-08 NOTE — ASSESSMENT & PLAN NOTE
Wound cultures 2nd R toe 6/18 positive for MRSA s/p toe amputation on 7/1    Proximal bone culture and biopsy of the clean margin on amputated toe performed by Podiatry  No further evidence of toe osteo, will not require prolonged course of abx for chronic osteo

## 2024-07-08 NOTE — PLAN OF CARE
Problem: Physical Therapy  Goal: Physical Therapy Goal  Description: Goals to be met by: 24     Patient will increase functional independence with mobility by performin. Supine to sit with Moderate Assistance  2. Sit to supine with Moderate Assistance  3. Rolling to Left and Right with Minimal Assistance.  4. Sit to stand transfer with Moderate Assistance  5. Bed to chair transfer with Maximum Assistance using LRAD  6. Gait  x 10 feet with Maximum Assistance using LRAD.   7. Lower extremity exercise program x10 reps per handout, with independence    DME Justifications (see above for complete DME recommendations)    Hospital Bed ·Patient requires a hospital bed for positioning of the body in ways that are not feasible with an ordinary bed. The patient requires special positioning for pain relief, limited mobility, and/or being unable to independently make changes in body position without the use of a hospital bed. Pillows and wedges will not be adequate for resolving these positional issues.    Outcome: Progressing

## 2024-07-08 NOTE — HPI
"73M w/ A fib, HTN, T2DM, MARA, HLD, hx GBS, hx displaced comminuted fracture of the left humerus after a fall, s/p ORIF this past March, who presents as a transfer for osteomyelitis of multiple ribs requiring debridement and flap placement. Initially presented to [place] on 6/14 for progressive weakness, multiple falls, and decreased urine output.  He was admitted for management of MARBIN, urosepsis, and AFib with RVR. During his stay he was noted to have swelling of his left upper extremity. Imaging studies on June 17 showed concern for gas-forming infection along the left shoulder and air along the lateral left chest wall.  MRI of his right foot also showed evidence of toe osteomyelitis (which grew MRSA). He was seen by ID who adjusted antibiotics, and they also noted oral thrush. IR aspirated fluid from left shoulder on June 20. On June 21 he had left shoulder I&D and removal of deep hardware. He underwent repeat irrigation and debridement of the left shoulder on June 24 with placement of a wound VAC. He had subsequent I&D with removal of the wound VAC on June 27. His right 2nd toe was amputated by Podiatry on July 1. Then, on July 3 he was noted to have dyspnea and mild tachycardia. CT chest showed a large abscess of the anterior central and left chest wall and abdominal wall with partial destruction of 3 ribs. He was seen by Cardiothoracic surgery. On July 4 he had left thoracentesis with aspiration of fluid. He subsequently had I&D of the left chest wall abscess on July 4, and a drainage catheter was left in place. On July 5 he underwent right VATS for a loculated right pleural effusion, and a chest tube was left in place. He was transitioned to the ICU post-operatively.  Transferred to [facility] for Thoracic and Plastics eval.     Prior to transfer, "He is awake and alert. He has a PICC line in place along with the right chest tube and left chest wall drain in place. He has no evidence of respiratory distress and is " "hemodynamically stable. Referring provider felt patient was stable for step-down status at present. He is currently on ceftaroline and meropenem. He is in contact isolation. July 7: Sodium 137, potassium 4.5, chloride 104, CO2 26, BUN 33, creatinine 1.1, glucose 130, albumin 2.1, AST 25, ALT 17, white blood cells 11.58, hemoglobin 8.1, hematocrit 26.2, platelets 389, procalcitonin 0.575, CRP greater than 16  VS:  Temperature 97.5°, pulse 85, respirations 20, blood pressure 112/63, O2 sats 95% "  "

## 2024-07-08 NOTE — ANESTHESIA POSTPROCEDURE EVALUATION
Anesthesia Post Evaluation    Patient: Roosevelt Moser    Procedure(s) Performed: Procedure(s) (LRB):  VATS, WITH DECORTICATION, LUNG (Right)    Final Anesthesia Type: general      Patient location during evaluation: ICU  Patient participation: Yes- Able to Participate  Level of consciousness: awake and alert  Post-procedure vital signs: reviewed and stable  Pain management: adequate  Airway patency: patent  MARA mitigation strategies: Extubation while patient is awake, Multimodal analgesia and Extubation and recovery carried out in lateral, semiupright, or other nonsupine position  PONV status at discharge: No PONV  Anesthetic complications: no      Cardiovascular status: stable  Respiratory status: unassisted and spontaneous ventilation  Hydration status: euvolemic  Follow-up not needed.              Vitals Value Taken Time   /59 07/08/24 0502   Temp 37.2 °C (98.9 °F) 07/08/24 0502   Pulse 76 07/08/24 1140   Resp 18 07/08/24 0502   SpO2 90 % 07/08/24 0502         No case tracking events are documented in the log.      Pain/Duane Score: Pain Rating Prior to Med Admin: 8 (7/8/2024  4:07 AM)  Pain Rating Post Med Admin: 2 (7/7/2024  9:48 PM)

## 2024-07-08 NOTE — SUBJECTIVE & OBJECTIVE
Past Medical History:   Diagnosis Date    A-fib 2024    Diabetes mellitus, type 2 2021    Guillain-Nine[place] 10/2003    Hyperlipidemia     Hypertension 2016    Sleep apnea        Past Surgical History:   Procedure Laterality Date    ARTHROTOMY OF SHOULDER Left 6/21/2024    Procedure: ARTHROTOMY, SHOULDER;  Surgeon: Roman Paulson MD;  Location: [facility];  Service: Orthopedics;  Laterality: Left;    INCISION AND DRAINAGE OF ABSCESS N/A 7/4/2024    Procedure: INCISION AND DRAINAGE, ABSCESS;  Surgeon: Gus Escobedo MD;  Location: [facility];  Service: General;  Laterality: N/A;  Abcess of anterior chest wall    IRRIGATION AND DEBRIDEMENT OF UPPER EXTREMITY Left 6/24/2024    Procedure: IRRIGATION AND DEBRIDEMENT, UPPER EXTREMITY;  Surgeon: Nathan Cooper MD;  Location: [facility] OR;  Service: Orthopedics;  Laterality: Left;    IRRIGATION AND DEBRIDEMENT OF UPPER EXTREMITY Left 6/27/2024    Procedure: IRRIGATION AND DEBRIDEMENT, UPPER EXTREMITY;  Surgeon: Nathan Cooper MD;  Location: [facility] OR;  Service: Orthopedics;  Laterality: Left;    OPEN REDUCTION AND INTERNAL FIXATION (ORIF) OF FRACTURE OF PROXIMAL HUMERUS Left 3/25/2024    Procedure: ORIF, FRACTURE, HUMERUS, PROXIMAL/IM HANNA, LEFT;  Surgeon: Nathan Cooper MD;  Location: [facility] OR;  Service: Orthopedics;  Laterality: Left;  Accumed, Synthes Small Frag set Avelino verified 3/22/24 ark    THORACOSCOPIC DECORTICATION OF LUNG Right 7/5/2024    Procedure: VATS, WITH DECORTICATION, LUNG;  Surgeon: Gus Escobedo MD;  Location: [facility];  Service: Cardiothoracic;  Laterality: Right;    TOE AMPUTATION Right 7/1/2024    Procedure: AMPUTATION, TOE;  Surgeon: Stevie Zhu DPM;  Location: [facility];  Service: Podiatry;  Laterality: Right;       Review of patient's allergies indicates:  No Known Allergies    Medications:  Medications Prior to Admission   Medication Sig    apixaban (ELIQUIS) 5 mg Tab Take 1 tablet (5 mg total) by mouth 2 (two) times daily.    atorvastatin  (LIPITOR) 10 MG tablet Take 1 tablet (10 mg total) by mouth once daily.    co-enzyme Q-10 30 mg capsule Take 30 mg by mouth once daily.    doxycycline (VIBRAMYCIN) 100 MG Cap Take 100 mg by mouth 2 (two) times daily.    ergocalciferol, vitamin D2, (VITAMIN D ORAL) Take 1 tablet by mouth once daily.    glimepiride (AMARYL) 2 MG tablet Take 1 tablet (2 mg total) by mouth before breakfast.    hydrALAZINE (APRESOLINE) 25 MG tablet Take 1 tablet (25 mg total) by mouth every 12 (twelve) hours.    metFORMIN (GLUCOPHAGE-XR) 500 MG ER 24hr tablet Take 2 tablets (1,000 mg total) by mouth 2 (two) times daily with meals.    metoprolol succinate (TOPROL-XL) 100 MG 24 hr tablet Take 1 tablet (100 mg total) by mouth once daily. (Patient taking differently: Take 100 mg by mouth nightly.)     Antibiotics (From admission, onward)      Start     Stop Route Frequency Ordered    07/07/24 1545  ceftaroline fosamiL (TEFLARO) 600 mg in D5W 50 mL IVPB (MB+)         -- IV Every 8 hours (non-standard times) 07/07/24 1532    07/07/24 1545  meropenem (MERREM) 1 g in 0.9% NaCl 100 mL IVPB (MB+)         -- IV Every 8 hours (non-standard times) 07/07/24 1532          Antifungals (From admission, onward)      None          Antivirals (From admission, onward)      None             Immunization History   Administered Date(s) Administered    PPD Test 06/15/2024       Family History    None       Social History     Socioeconomic History    Marital status:    Tobacco Use    Smoking status: Never    Smokeless tobacco: Never   Substance and Sexual Activity    Alcohol use: Yes     Alcohol/week: 0.0 standard drinks of alcohol     Comment: social    Drug use: No    Sexual activity: Yes     Social Determinants of Health     Financial Resource Strain: Low Risk  (7/8/2024)    Overall Financial Resource Strain (CARDIA)     Difficulty of Paying Living Expenses: Not very hard   Food Insecurity: No Food Insecurity (7/8/2024)    Hunger Vital Sign     Worried  About Running Out of Food in the Last Year: Never true     Ran Out of Food in the Last Year: Never true   Transportation Needs: No Transportation Needs (7/8/2024)    TRANSPORTATION NEEDS     Transportation : No   Physical Activity: Inactive (7/8/2024)    Exercise Vital Sign     Days of Exercise per Week: 0 days     Minutes of Exercise per Session: 0 min   Stress: No Stress Concern Present (7/8/2024)    [place] [employer] - Occupational Stress Questionnaire     Feeling of Stress : Only a little   Housing Stability: Low Risk  (7/8/2024)    Housing Stability Vital Sign     Unable to Pay for Housing in the Last Year: No     Homeless in the Last Year: No     Review of Systems   Constitutional:  Positive for appetite change. Negative for fever.   HENT:  Negative for congestion, sore throat and trouble swallowing.    Eyes:  Negative for visual disturbance.   Respiratory:  Positive for cough (dry). Negative for shortness of breath and wheezing.    Cardiovascular:  Negative for chest pain, palpitations and leg swelling.   Gastrointestinal:  Negative for abdominal pain, constipation, diarrhea, nausea and vomiting.   Genitourinary:  Negative for decreased urine volume and dysuria.   Musculoskeletal:  Negative for back pain.   Skin:  Positive for wound.   Neurological:  Negative for dizziness, light-headedness and headaches.   Psychiatric/Behavioral:  Negative for agitation, confusion and decreased concentration.      Objective:     Vital Signs (Most Recent):  Temp: 98.5 °F (36.9 °C) (07/08/24 1330)  Pulse: 80 (07/08/24 1531)  Resp: 16 (07/08/24 1528)  BP: (!) 108/56 (07/08/24 1330)  SpO2: (!) 91 % (07/08/24 1330) Vital Signs (24h Range):  Temp:  [97.9 °F (36.6 °C)-98.9 °F (37.2 °C)] 98.5 °F (36.9 °C)  Pulse:  [64-90] 80  Resp:  [16-22] 16  SpO2:  [90 %-96 %] 91 %  BP: (103-159)/(56-72) 108/56     Weight: 133 kg (293 lb 3.4 oz)  Body mass index is 39.77 kg/m².    Estimated Creatinine Clearance: 85.7 mL/min  (based on SCr of 1.1 mg/dL).     Physical Exam  Vitals and nursing note reviewed.   Constitutional:       General: He is not in acute distress.     Appearance: He is ill-appearing (chronically). He is not toxic-appearing or diaphoretic.   HENT:      Head: Normocephalic and atraumatic.      Nose: Nose normal.      Mouth/Throat:      Pharynx: Oropharynx is clear.   Eyes:      General: No scleral icterus.  Cardiovascular:      Rate and Rhythm: Normal rate and regular rhythm.      Pulses: Normal pulses.      Heart sounds: Normal heart sounds.   Pulmonary:      Effort: Pulmonary effort is normal.      Comments: No obvious rales or wheezes when auscultating anterior chest; unable to complete posterior auscultation due to limited mobility of patient  Chest:      Chest wall: No deformity or tenderness.      Comments: Right chest tube to water seal with serous output  Left chest wall drain with seropurulent output  Abdominal:      General: Bowel sounds are normal.      Palpations: Abdomen is soft.      Tenderness: There is no abdominal tenderness.   Musculoskeletal:      Cervical back: Normal range of motion and neck supple.      Right lower leg: No edema.      Left lower leg: No edema.   Skin:     General: Skin is warm.      Capillary Refill: Capillary refill takes less than 2 seconds.      Comments: Unable to examine posterior skin due to patient debility   Neurological:      Mental Status: He is alert and oriented to person, place, and time.   Psychiatric:         Behavior: Behavior normal.          Significant Labs: All pertinent labs within the past 24 hours have been reviewed.    Significant Imaging: I have reviewed all pertinent imaging results/findings within the past 24 hours.

## 2024-07-08 NOTE — ASSESSMENT & PLAN NOTE
Left chest wall abscess noted at [facility] w/ concern for osteomyelitis [place] [facility] [place]  Thoracentesis at OSH 7/4 w/ serosanguineous fluid, f/b I&D and chest tube placement; loculated effusions right chest s/p VATS and chest tube placement on 7/5  Light's criteria consistent with exudative effusion (Cell count 1100 WBCs, 72% lymphocytes, , pH 7.6)  No organisms seen on gram stain from L pleural fluid or chest wall abscess    - Continue to f/u cx  - Pending repeat CT A/P to assess abscess/osteo of ribs  - Continue Ceftaroline 600 mg IV q.8   - Continue Meropenem 1 g IV q.8   - Wound care to all affected areas  - Chest tube management per surgery

## 2024-07-08 NOTE — PROGRESS NOTES
.Plastic and Reconstructive Surgery   Progress Note    Subjective:    NAEO. Restign on bipap. States still painful. Drain with 150cc. CTS ordering CT today.    Objective:  Vital signs in last 24 hours:  Temp:  [97.2 °F (36.2 °C)-98.9 °F (37.2 °C)] 98.9 °F (37.2 °C)  Pulse:  [64-93] 77  Resp:  [17-38] 18  SpO2:  [90 %-98 %] 90 %  BP: (103-168)/(52-94) 103/59    Intake/Output last 3 shifts:  I/O last 3 completed shifts:  In: -   Out: 480 [Urine:250; Drains:80; Chest Tube:150]    Intake/Output this shift:  No intake/output data recorded.        Physical Exam:  VITAL SIGNS:   Vitals:    24 0217 24 0312 24 0407 24 0502   BP:    (!) 103/59   BP Location:    Right arm   Patient Position:    Lying   Pulse:  80  77   Resp:   17 18   Temp:    98.9 °F (37.2 °C)   TempSrc:    Axillary   SpO2: (!) 93%   (!) 90%   Weight:       Height:         TMAX: Temp (24hrs), Av.1 °F (36.7 °C), Min:97.2 °F (36.2 °C), Max:98.9 °F (37.2 °C)      General: Alert; No acute distress  Cardiovascular: Regular rate   Respiratory: Normal respiratory effort. Chest rise symmetric.   Abdomen: Soft, nontender, nondistended  Extremity: Moves all extremities equally.  Neurologic: No focal deficit. Speech normal  Left drain 130cc seropurelent      Scheduled Medications (Magic mouthwash) 1:1:1 diphenhydrAMINE(Benadryl) 12.5mg/5ml liq, aluminum & magnesium hydroxide-simethicone (Maalox), LIDOcaine viscous 2%, 10 mL, QID  ceftaroline (Teflaro) IV (PEDS and ADULTS), 600 mg, Q8H  ferrous sulfate, 1 tablet, Daily  glutamine, 0.5 g, Daily  insulin glargine U-100, 15 Units, Daily  LIDOcaine, 1 patch, Q24H  meropenem IV (PEDS and ADULTS), 1 g, Q8H  metoprolol succinate, 100 mg, QHS  sodium chloride 0.9%, 10 mL, Q6H        PRN Medications     Current Facility-Administered Medications:     albuterol-ipratropium, 3 mL, Nebulization, Q4H PRN    aluminum & magnesium hydroxide-simethicone, 15 mL, Oral, QID PRN    dextrose 10%, 12.5 g,  Intravenous, PRN    dextrose 10%, 25 g, Intravenous, PRN    glucagon (human recombinant), 1 mg, Intramuscular, PRN    glucose, 16 g, Oral, PRN    glucose, 24 g, Oral, PRN    HYDROcodone-acetaminophen, 1 tablet, Oral, Q4H PRN    HYDROcodone-acetaminophen, 1 tablet, Oral, Q4H PRN    insulin aspart U-100, 0-10 Units, Subcutaneous, QID (AC + HS) PRN    naloxone, 0.02 mg, Intravenous, PRN    ondansetron, 8 mg, Oral, Q8H PRN    promethazine, 25 mg, Oral, Q6H PRN    simethicone, 1 tablet, Oral, QID PRN    sodium chloride 0.9%, 10 mL, Intravenous, Q12H PRN    Flushing PICC/Midline Protocol, , , Until Discontinued **AND** sodium chloride 0.9%, 10 mL, Intravenous, Q6H **AND** sodium chloride 0.9%, 10 mL, Intravenous, PRN    traMADoL, 50 mg, Oral, Q4H PRN    Recent Labs:   Lab Results   Component Value Date    WBC 11.58 07/07/2024    HGB 8.1 (L) 07/07/2024    HCT 26.2 (L) 07/07/2024    MCV 84 07/07/2024     07/07/2024     Lab Results   Component Value Date     (H) 07/07/2024     07/07/2024    K 4.5 07/07/2024     07/07/2024    BUN 33 (H) 07/07/2024         Assessment: 73 y.o. y/o male w/ concern for left sided rib osteomyelitis      Plan  -defer to CTS for workup and possible operative intervention  - if need, we will be available for flap coverage  - rest of care per primary      Shaylee Chang MD- Fellow  Department of Plastic and Reconstructive Surgery

## 2024-07-08 NOTE — CONSULTS
[facility]  Wound Care    Patient Name:  Roosevelt Moser   MRN:  [id number]  Date: 7/8/2024  Diagnosis: Osteomyelitis of multiple sites    History:     Past Medical History:   Diagnosis Date    A-fib 2024    Diabetes mellitus, type 2 2021    Guillain-Orgas 10/2003    Hyperlipidemia     Hypertension 2016    Sleep apnea        Social History     Socioeconomic History    Marital status:    Tobacco Use    Smoking status: Never    Smokeless tobacco: Never   Substance and Sexual Activity    Alcohol use: Yes     Alcohol/week: 0.0 standard drinks of alcohol     Comment: social    Drug use: No    Sexual activity: Yes       Precautions:     Allergies as of 07/07/2024    (No Known Allergies)       [facility] Assessment Details/Treatment     Patient seen for inpatient wound care consult. Patient's spouse at bedside to give full history. Consult for buttocks, left shoulder, and right toe. Upon assessment, buttocks noted to have several wounds with unvisible wound bed covered in slough. Blanchable area of erythema noted on sacrum and scrotum indicative of moisture.     Midline abdomen noted to have two small staples where drain removed from previous facility.     Right toe amputation site clean, dry, intact with sutures in place. No active drainage or evidence of dehiscence. EHOB boots in use.     Right shoulder incision site clean, dry, and intact with sutures in place. No active drainage or evidence of dehiscence.         Reviewed chart for this encounter.  See flowsheet for findings.      Recommendations: All wounds POA, but skin integrity MARCIN consulted to monitor progression. MD orders for right toe and sacrum.     Wound care to right 2nd toe amputation site clean with wound cleanser and pat dry. Apply a cut piece of Urgotul and place over the incision site then cover gauze and wrap loosely with rolled gauze and secure with an ace wrap with no compression. Change twice a week on Mondays and Thursdays.     Cleanse bilateral  buttocks, sacrum and scrotum with soap and water and pat dry. Apply triad BID and PRN if soiled.    Cover left shoulder incision and abdominal staples with mepilex foam border dressing for protection from friction and shear.    Immerse bed for pressure redistribution and microclimate in use. EHOB boots in use. Nursing to maintain pressure injury prevention measures.        Discussed POC with patient and primary nurse.  See EMR for orders and patient education.    Bedside nursing to continue care and monitoring.  Bedside to maintain pressure injury prevention interventions.        07/08/24 0745   WOCN Assessment   WOCN Total Time (mins) 45   Visit Date 07/08/24   Visit Time 0745   Consult Type New   WOCN Speciality Wound   Intervention assessed;changed;applied;chart review;coordination of care;orders   Teaching on-going        Wound 06/14/24 1600 Moisture associated dermatitis anterior;midline Pelvis #3   Date First Assessed/Time First Assessed: 06/14/24 1600   Present on Original Admission: Yes  Primary Wound Type: Moisture associated dermatitis  Orientation: anterior;midline  Location: Pelvis  Wound Number: #3   Wound Image    Dressing Appearance Open to air   Drainage Amount None   Drainage Characteristics/Odor No odor   Appearance Pink;Red;Tan   Care Cleansed with:;Soap and water;Applied:;Skin Barrier        Wound 06/24/24 Incision Left Shoulder   Date First Assessed: 06/24/24   Primary Wound Type: Incision  Side: Left  Location: Shoulder  Additional Comments: WOUND VAC PLACED   Wound Image    Dressing Appearance Open to air;Dry;Intact;Clean   Drainage Amount None   Drainage Characteristics/Odor No odor   Appearance Intact;Pink   Dressing Applied;Foam   Dressing Change Due 07/10/24        Wound 06/14/24 1600 Diabetic Ulcer Right anterior Toe, second #1   Date First Assessed/Time First Assessed: 06/14/24 1600   Present on Original Admission: Yes  Primary Wound Type: Diabetic Ulcer  Side: Right  Orientation:  anterior  Location: Toe, second  Wound Number: #1   Wound Image    Dressing Appearance Open to air;Dry;Intact   Drainage Amount None   Drainage Characteristics/Odor No odor   Appearance [place];Tan   Care Other (see comments)                 Orders placed.   Hansel PEDERSENN, RN  07/08/2024

## 2024-07-08 NOTE — ASSESSMENT & PLAN NOTE
Patient with Long standing persistent (>12 months) atrial fibrillation which is controlled currently with Beta Blocker. Patient is currently in TBD.[id number] Score: 2.      Chronic AF w/ acute RVR at OSH, resolved s/p cardizem drip   NOAC has been on hold d/t possible need for surgery - has been on prophylaxis dose of Lovenox  Resume A/c pending any surgical intervention, consider lovenox vs hep gtt bridge

## 2024-07-08 NOTE — HOSPITAL COURSE
"Pt admitted to  as a transfer for thoracic surgery and Plastic surgery evaluation in setting of "osteomyelitis of multiple ribs which will need further debridement and then a flap placed." Thoracic Surgery, Plastic Surgery, ID, Ortho, Wound care, PT/OT consulted upon admission. Imaging ordered.  CT chest abdomen with IV contrast without evidence of osteomyelitis in ribs adjacent to I&D site of left chest wall abscess.; incidental finding of 1.2 cm obstructing stone in left distal ureter with mild hydroureteronephrosis.  Urology was consulted and CT renal stone study was completed; no acute intervention given no concern for infection around stone or MARBIN; follow-up outpatient unless decompensates.  MRI shoulder without contrast left notable for septic arthritis of left glenohumeral joint with acute osteomyelitis of the glenoid and proximal humerus in addition to known displaced fracture of proximal humeral metaphysis in addition to several large soft tissue abscesses.  Patient underwent left shoulder aspiration with Orthopedic surgery on 07/09, continuing antibiotics with no further intervention planned.  Right chest tube was removed by thoracic surgery on 07/09. Drain previously placed for L chest wall abscess removed when output became minimal (7/14). To continue on IV Ceftaroline 600 mg q8 for 6 weeks therapy (EOT 8/11). LTAC placement pending   "

## 2024-07-08 NOTE — ASSESSMENT & PLAN NOTE
Incidental finding on imagin.8 cm septated cystic lesion in the right kidney, which is indeterminate for malignancy. Further evaluation with nonemergent renal mass protocol CT or MRI is recommended.

## 2024-07-08 NOTE — PLAN OF CARE
[facility]  Discharge Assessment    Primary Care Provider: No primary care provider on file.     Discharge Assessment (most recent)       BRIEF DISCHARGE ASSESSMENT - 07/08/24 1140          Discharge Planning    Assessment Type Discharge Planning Brief Assessment     Resource/Environmental Concerns none     Support Systems Spouse/significant other     Current Living Arrangements home     Patient/Family Anticipates Transition to home with family     Patient/Family Anticipated Services at Transition none     DME Needed Upon Discharge  none     Discharge Plan A Long-term acute care facility (LTAC)     Discharge Plan B Skilled Nursing Facility        Physical Activity    On average, how many days per week do you engage in moderate to strenuous exercise (like a brisk walk)? 0 days     On average, how many minutes do you engage in exercise at this level? 0 min        Financial Resource Strain    How hard is it for you to pay for the very basics like food, housing, medical care, and heating? Not very hard        Housing Stability    In the last 12 months, was there a time when you were not able to pay the mortgage or rent on time? No     At any time in the past 12 months, were you homeless or living in a shelter (including now)? No        Transportation Needs    Has the lack of transportation kept you from medical appointments, meetings, work or from getting things needed for daily living? No        Food Insecurity    Within the past 12 months, you worried that your food would run out before you got the money to buy more. Never true     Within the past 12 months, the food you bought just didn't last and you didn't have money to get more. Never true        Stress    Do you feel stress - tense, restless, nervous, or anxious, or unable to sleep at night because your mind is troubled all the time - these days? Only a little        Social Isolation    How often do you feel lonely or isolated from those around you?  Rarely         Alcohol Use    Q1: How often do you have a drink containing alcohol? Patient declined     Q2: How many drinks containing alcohol do you have on a typical day when you are drinking? Patient declined     Q3: How often do you have six or more drinks on one occasion? Patient declined        Utilities    In the past 12 months has the electric, gas, oil, or water company threatened to shut off services in your home? No        Health Literacy    How often do you need to have someone help you when you read instructions, pamphlets, or other written material from your doctor or pharmacy? Rarely                   Info gathered from chart. Discharge Plan A and Plan B have been determined by review of patient's clinical status, future medical and therapeutic needs, and coverage/benefits for post-acute care in coordination with multidisciplinary team members.    Aleida Almonte LCSW  Case Management/[facility]  [phone]

## 2024-07-08 NOTE — ASSESSMENT & PLAN NOTE
"Osteomyelitis of toe   MRSA infection   Skin ulcer of toe of right foot with necrosis of bone  Chest wall abscess  Loculated pleural effusion  Abscess of left shoulder  Pyogenic abscess of left shoulder region  Mediastinal lymphadenopathy  Humerus fracture    -status post left thoracentesis 1500cc serosanguineous fluid drained 7/4 & I&D drainage with chest tube placement 7/4 & loculated effusions right chest s/p VATS and chest tube 7/5   -Left shoulder prosthetic joint infection s/p I&D and removal of deep hardware 6/21 - all screws and nails removed, s/p 2nd washout 6/24 & 3rd washout 6/27   -Left Shoulder washouts on 6/21 ( abundant pus in shoulder, hardware removal), 6/24 ( bloody brownish fluid) and 6/27 ( no fluid collection, healthy red and beefy tissue)   -Right 3rd toe osteomyelitis s/p Dalvance x 2 doses & 2nd distal phalanx s/p I&D at bedside, s/p 2nd toe amputation 7/1       - Presents as a transfer for thoracic surgery and Plastic surgery evaluation in setting of concern for "osteomyelitis of multiple ribs which will need further debridement and then a flap placed" in the setting of chest wall abscess  -arrives on ceftaroline and meropenem as per outside hospital ID recommendations  -notably, 7/3 blood cultures, 7/4 pleural fluid cultures, 7/4 abscess cultures, 755 pleural fluid cultures NGTD  -6/18 right toe wound culture with MRSA  -continue to follow OSH cultures  -ongoing wound care  -ID, Ortho, Thoracic surgery and Plastic surgery consultedn  -per thoracic surgery, CT chest and abdomen with IV contrast; right chest tube to water seal; left chest accordion drain to negative pressure  -per ortho:  MRI shoulder with without contrast left, CT shoulder without contrast left, CT 3D rendering  "

## 2024-07-08 NOTE — ASSESSMENT & PLAN NOTE
Patient's FSGs are controlled on current medication regimen.  Last A1c reviewed-   Lab Results   Component Value Date    HGBA1C 6.3 (H) 06/14/2024     Most recent fingerstick glucose reviewed-   Recent Labs   Lab 07/07/24  1606 07/08/24  1118   POCTGLUCOSE 168* 131*       Current correctional scale  Low  Maintain anti-hyperglycemic dose as follows-   Antihyperglycemics (From admission, onward)    Start     Stop Route Frequency Ordered    07/08/24 0900  insulin glargine U-100 (Lantus) pen 15 Units         -- SubQ Daily 07/07/24 1532    07/07/24 1529  insulin aspart U-100 pen 0-10 Units         -- SubQ Before meals & nightly PRN 07/07/24 1532        Hold Oral hypoglycemics while patient is in the hospital.

## 2024-07-08 NOTE — PLAN OF CARE
[facility] Plan of Care Note    Dx:   Osteomyelitis [M86.9]    Shift Events: Pt O2 sats 87% at beginning of shift. Respiratory therapist on floor. Added Os to patient's CPAP. Increased to 93%. At 0200 pt removed cpap unknowingly to this nurse. O2 sats dropped to 83%. Placed nasal cannula on pt, 3L. O2 sats now 95%.     Goals of Care: Goals of care ongoing and progressing.     Neuro: A/Ox4    Vital Signs: /72 (Patient Position: Lying)   Pulse 84   Temp 98.7 °F (37.1 °C) (Axillary)   Resp 18   Ht 6' (1.829 m)   Wt 133 kg (293 lb 3.4 oz)   SpO2 (!) 93%   BMI 39.77 kg/m²     Respiratory: 3L NC    Diet: Diet NPO Except for: Sips with Medication  Dietary nutrition supplements Boost Plus - Any flavor    Is patient tolerating current diet? NA    GTTS: NaCl 75ml/hr    Urine Output/Bowel Movement:   No intake/output data recorded.  Last Bowel Movement: 07/07/24 Purewick      Drains/Tubes/Tube Feeds (include total output/shift):   Chest tube to R chest to water seal, accordion drain to LUQ of abd       Lines: PICC a2ipsom to LUE      Accuchecks:NA    Skin: Sacral wx, accordion drain and chest tube incision site, shoulder incision    Fall Risk Score: High    Activity level? Bed rest, in need of pt/ot    Any scheduled procedures? NPO since midnight for possible procedure today. Unsure of what procedure-7/8    Any safety concerns? NA    Problem: Adult Inpatient Plan of Care  Goal: Plan of Care Review  Outcome: Progressing  Goal: Patient-Specific Goal (Individualized)  Outcome: Progressing  Goal: Absence of Hospital-Acquired Illness or Injury  Outcome: Progressing  Goal: Optimal Comfort and Wellbeing  Outcome: Progressing  Goal: Readiness for Transition of Care  Outcome: Progressing     Problem: Diabetes Comorbidity  Goal: Blood Glucose Level Within Targeted Range  Outcome: Progressing     Problem: Sepsis/Septic Shock  Goal: Optimal Coping  Outcome: Progressing  Goal: Absence of Bleeding  Outcome: Progressing  Goal: Blood  Glucose Level Within Targeted Range  Outcome: Progressing  Goal: Absence of Infection Signs and Symptoms  Outcome: Progressing  Goal: Optimal Nutrition Intake  Outcome: Progressing     Problem: Acute Kidney Injury/Impairment  Goal: Fluid and Electrolyte Balance  Outcome: Progressing  Goal: Improved Oral Intake  Outcome: Progressing  Goal: Effective Renal Function  Outcome: Progressing     Problem: Infection  Goal: Absence of Infection Signs and Symptoms  Outcome: Progressing     Problem: Wound  Goal: Optimal Coping  Outcome: Progressing  Goal: Optimal Functional Ability  Outcome: Progressing  Goal: Absence of Infection Signs and Symptoms  Outcome: Progressing  Goal: Improved Oral Intake  Outcome: Progressing  Goal: Optimal Pain Control and Function  Outcome: Progressing  Goal: Skin Health and Integrity  Outcome: Progressing  Goal: Optimal Wound Healing  Outcome: Progressing     Problem: Skin Injury Risk Increased  Goal: Skin Health and Integrity  Outcome: Progressing     Problem: Fall Injury Risk  Goal: Absence of Fall and Fall-Related Injury  Outcome: Progressing

## 2024-07-08 NOTE — ASSESSMENT & PLAN NOTE
Chronic, controlled. Latest blood pressure and vitals reviewed-     Temp:  [97.9 °F (36.6 °C)-98.9 °F (37.2 °C)]   Pulse:  [64-90]   Resp:  [17-20]   BP: (103-168)/(59-72)   SpO2:  [90 %-96 %] .   Home meds for hypertension were reviewed and noted below.   Hypertension Medications               hydrALAZINE (APRESOLINE) 25 MG tablet Take 1 tablet (25 mg total) by mouth every 12 (twelve) hours.    metoprolol succinate (TOPROL-XL) 100 MG 24 hr tablet Take 1 tablet (100 mg total) by mouth once daily.            While in the hospital, will manage blood pressure as follows; continue home metoprolol    Will utilize p.r.n. blood pressure medication only if patient's blood pressure greater than 180/110 and he develops symptoms such as worsening chest pain or shortness of breath.

## 2024-07-08 NOTE — PT/OT/SLP EVAL
Physical Therapy  Evaluation and Treatment    Patient Name:  Roosevelt Moser   MRN:  [id number]    Recent Surgery: * No surgery found *      Recommendations:     Discharge Recommendations:   Moderate Intensity Therapy  Discharge Equipment Recommendations: hospital bed, wheelchair   Barriers to discharge: None    Highest Level of Mobility: Supine to sitting EOB  Assistance Required: Total(A) x2 persons    Assessment:     Roosevelt Moser is a 73 y.o. male admitted with a medical diagnosis of Osteomyelitis of multiple sites. He presents with the following impairments/functional limitations:  weakness, pain, impaired cardiopulmonary response to activity, gait instability, impaired functional mobility, decreased lower extremity function, decreased upper extremity function, impaired self care skills, impaired sensation, impaired endurance    Pt met with HOB elevated, spouse present and agreeable to PT session. Pt reports his PLOF is mod(I) for mobility using a hurricane at baseline as an AD. Currently, he requires total(A) x2 persons when completed a supine to sitting EOB transfer. Patient required cueing for sequencing in order to complete bed mobility in a pain free manner as he is unable to move his LUE without pain. Patient unable to assist with mobility on this date 2/2 pain and global weakness. Despite pain and weakness, the patient remains motivated to participate in therapeutic activities in order to return to his PLOF. He is functioning well below his baseline at this time and is at a high risk for falling currently.    Pt would benefit from continued skilled acute PT 4/wk to address above listed functional deficits, provide patient/caregiver education, reduce fall risk, and maximize (I) and safety with functional mobility.     Rehab Prognosis: Good; patient would benefit from acute skilled PT services to address these deficits and reach maximum level of function.      Plan:     During this hospitalization, patient to be  "seen 4 x/week to address the identified rehab impairments via gait training, therapeutic activities, therapeutic exercises, neuromuscular re-education and progress toward the following goals:    Plan of Care Expires:  08/08/24    This plan of care has been discussed with the patient/caregiver, who was included in its development and is in agreement with the identified goals and treatment plan.     Subjective     Communicated with RN prior to session.  Patient agreeable to participate.     Chief Complaint: Pain  Patient/Family Comments/goals: "I want to lie down now."    Pain/Comfort:  Pain Rating 1:  (Unrated)  Location - Side 1: Left  Location 1: arm  Pain Addressed 1: Pre-medicate for activity, Reposition, Distraction, Cessation of Activity  Pain Rating Post-Intervention 1:  (Unrated)    Patients cultural, spiritual, Latter-day conflicts given the current situation: no    Patient's living environment is as follows:  Living Environment: Pt lives with his wife in a [facility] with a threshold to enter. Bathroom set-up: walk-in shower with built in seat/grab bars  Prior Level of Function: modified (I) for mobility and ADLs using hurricane as AD   DME used: walker, rolling, rollator  DME owned (not currently used): rolling walker  Upon discharge, patient will have assistance from: Spouse    Objective:     Patient found HOB elevated with telemetry, pulse ox (continuous), chest tube, peripheral IV, PureWick, hemovac upon PT entry to room.    General Precautions: Standard, fall   Orthopedic Precautions:N/A   Braces:  (Walking Boot)   BP (!) 103/59 (BP Location: Right arm, Patient Position: Lying)   Pulse 76   Temp 98.9 °F (37.2 °C) (Axillary)   Resp 18   Ht 6' (1.829 m)   Wt 133 kg (293 lb 3.4 oz)   SpO2 (!) 90%   BMI 39.77 kg/m²   Oxygen Device: Room air      Exams:    Cognition:  Patient is oriented to Person, Place, Time, Situation  Follows one-step commands  Insight to deficits/safety awareness: Intact    Gross Motor " Coordination: No deficits noted during functional mobility tasks     Postural examination/scapula alignment: Slouched posture    Lower Extremity Range of Motion:  Right Lower Extremity: WFL  Left Lower Extremity: WFL    Lower Extremity Strength    Right LE  Left LE    Hip Flexion: 3/5 Hip Flexion: 3/5   Knee Extension: 4/5 Knee Extension: 4/5   Knee Flexion: 4/5 Knee Flexion: 4/5   Ankle Dorsiflexion:  4/5 Ankle Dorsiflexion: 4/5   Ankle Plantarflexion: 4/5 Ankle Plantarflexion: 4/5        Sensation:   Light touch sensation: Intact BLEs    Functional Mobility:    Bed Mobility:  Supine to Sit: Total Assistance x2 persons on R side of bed  Sit to Supine: Total Assistance x2 persons  Rolling L: Total Assistance  Rolling R: Total Assistance  Scooting anteriorly to EOB to plant feet on floor: Total Assistance x2 persons  Scooting/Bridging in supine to HOB: Total Assistance x2 persons  Via drawsheet  Patient educated on how to complete bed mobility safely with UE/LE orthopedic precautions.               Balance:  Static Sit:   Total Assist at EOB x 10 minutes  Dynamic sit:  Total Assist   Patient with a significant posterior lean in sitting of which he was unable to correct despite cueing from therapy to utilize bed rail.        Therapeutic Activities/Exercises     Patient assisted with functional mobility as noted above  Discussed at length benefits of PT as well as d/c recommendations. Pt agreeable  Patient educated on the importance of early mobility, OOB to prevent functional decline during hospital stay  Patient was instructed to utilize staff assistance for mobility/transfers.  Patient is appropriate to transfer to [facility] via drawsheet and RN/PCT assist  Patient educated on PT POC and role of PT in acute care  White board updated to include patient's safest level of mobility with staff assistance, RN also updated    AM-PAC 6 CLICK MOBILITY  Turning over in bed (including adjusting bedclothes, sheets and  blankets)?: 2  Sitting down on and standing up from a chair with arms (e.g., wheelchair, bedside commode, etc.): 1  Moving from lying on back to sitting on the side of the bed?: 2  Moving to and from a bed to a chair (including a wheelchair)?: 1  Need to walk in hospital room?: 1  Climbing 3-5 steps with a railing?: 1  Basic Mobility Total Score: 8      Patient left HOB elevated with all lines intact, call button in reach, rn notified, and spouse present.      History/Goals:     PAST MEDICAL HISTORY:  Past Medical History:   Diagnosis Date    A-fib 2024    Diabetes mellitus, type 2 2021    Guillain-Leota 10/2003    Hyperlipidemia     Hypertension 2016    Sleep apnea        Past Surgical History:   Procedure Laterality Date    ARTHROTOMY OF SHOULDER Left 6/21/2024    Procedure: ARTHROTOMY, SHOULDER;  Surgeon: Roman Paulson MD;  Location: [facility] OR;  Service: Orthopedics;  Laterality: Left;    INCISION AND DRAINAGE OF ABSCESS N/A 7/4/2024    Procedure: INCISION AND DRAINAGE, ABSCESS;  Surgeon: Gus Escobedo MD;  Location: [facility] OR;  Service: General;  Laterality: N/A;  Abcess of anterior chest wall    IRRIGATION AND DEBRIDEMENT OF UPPER EXTREMITY Left 6/24/2024    Procedure: IRRIGATION AND DEBRIDEMENT, UPPER EXTREMITY;  Surgeon: Nathan Cooper MD;  Location: [facility] OR;  Service: Orthopedics;  Laterality: Left;    IRRIGATION AND DEBRIDEMENT OF UPPER EXTREMITY Left 6/27/2024    Procedure: IRRIGATION AND DEBRIDEMENT, UPPER EXTREMITY;  Surgeon: Nathan Cooper MD;  Location: [facility] OR;  Service: Orthopedics;  Laterality: Left;    OPEN REDUCTION AND INTERNAL FIXATION (ORIF) OF FRACTURE OF PROXIMAL HUMERUS Left 3/25/2024    Procedure: ORIF, FRACTURE, HUMERUS, PROXIMAL/IM HANNA, LEFT;  Surgeon: Nathan Cooper MD;  Location: [facility] OR;  Service: Orthopedics;  Laterality: Left;  Accumed, Synthes Small Frag set Avelino verified 3/22/24 ark    THORACOSCOPIC DECORTICATION OF LUNG Right 7/5/2024    Procedure: VATS, WITH  DECORTICATION, LUNG;  Surgeon: Gus Escobedo MD;  Location: [facility];  Service: Cardiothoracic;  Laterality: Right;    TOE AMPUTATION Right 2024    Procedure: AMPUTATION, TOE;  Surgeon: Stevie Zhu DPM;  [facility];  Service: Podiatry;  Laterality: Right;       GOALS:   Multidisciplinary Problems       Physical Therapy Goals          Problem: Physical Therapy    Goal Priority Disciplines Outcome Goal Variances Interventions   Physical Therapy Goal     PT, PT/OT Progressing     Description: Goals to be met by: 24     Patient will increase functional independence with mobility by performin. Supine to sit with Moderate Assistance  2. Sit to supine with Moderate Assistance  3. Rolling to Left and Right with Minimal Assistance.  4. Sit to stand transfer with Moderate Assistance  5. Bed to chair transfer with Maximum Assistance using LRAD  6. Gait  x 10 feet with Maximum Assistance using LRAD.   7. Lower extremity exercise program x10 reps per handout, with independence    DME Justifications (see above for complete DME recommendations)    Hospital Bed ·Patient requires a hospital bed for positioning of the body in ways that are not feasible with an ordinary bed. The patient requires special positioning for pain relief, limited mobility, and/or being unable to independently make changes in body position without the use of a hospital bed. Pillows and wedges will not be adequate for resolving these positional issues.                         Time Tracking:     PT Received On: 24  PT Start Time: 806     PT Stop Time: 841  PT Total Time (min): 35 min     Billable Minutes: Evaluation 10 and Therapeutic Activity 25      ROLA Olvera  2024  Pager# [phone]

## 2024-07-08 NOTE — ASSESSMENT & PLAN NOTE
Patient with acute kidney injury/acute renal failure likely due to  due to ATN due to sepsis due to UTI and/or PNA  MARBIN is currently improving. Baseline creatinine  wnl  - Labs reviewed- Renal function/electrolytes with Estimated Creatinine Clearance: 85.7 mL/min (based on SCr of 1.1 mg/dL). according to latest data. Monitor urine output and serial BMP and adjust therapy as needed. Avoid nephrotoxins and renally dose meds for GFR listed above.

## 2024-07-08 NOTE — PROGRESS NOTES
[facility]  Thoracic Surgery  Progress Note    Subjective:     History of Present Illness:  Mr. Moser is a pleasant 73 yoM with PMH including Afib on eliquis, HTN, T2DM and MARA who is transferred from [place] for a chest wall abscess with concern for osteomyelitis of the anterior left 6-8th ribs. He originally presented to the [facility] after a fall resulting in a left humeral fracture for which he underwent ORIF in March. He represented in June with a left shoulder abscess and underwent multiple washouts and removal of the ORIF hardware. His hospital stay was complicated by bilateral effusions and a left chest wall abscess with extension into the epigastrium and mediastinum with concern for osteomyelitis of the left 6-8th ribs. He underwent I+D of the abscess through a small left anterior chest incision with drain placement on 7/4, thoracentesis of the left pleural effusion on 7/4, and right-sided VATs with washout and chest tube placement on 7/5. Interestingly, cultures from the pleural fluid and chest wall abscess cavity have been negative despite the abscess cavity fluid being described as creamy pus by the [facility] op report. The only positive cultures so far have been from those collected by podiatry during a toe amp which were positive for MRSA.     He was transferred to [facility] for potential thoracic and plastic surgery interventions if more invasive debridements or resections were required with reconstruction.     Upon arrival here he states that over the past couple days he has been feeling much better. He states that the pain and swelling associated with the chest wall abscess is resolved. He denies any fever or chills. He states that the dyspnea he was experiencing with the prior pleural effusions has resolved, despite remaining on 2-3 L/min NC. He is not on home oxygen.     Post-Op Info:  * No surgery found *         Interval History: NAEON. Feeling well this morning aside from some left arm soreness. AVSS on 3 L  NC.  150 mL of serous output. CT scan pending.    Medications:  Continuous Infusions:   0.9% NaCl   Intravenous Continuous 75 mL/hr at 07/07/24 1613 New Bag at 07/07/24 1613     Scheduled Meds:   (Magic mouthwash) 1:1:1 diphenhydrAMINE(Benadryl) 12.5mg/5ml liq, aluminum & magnesium hydroxide-simethicone (Maalox), LIDOcaine viscous 2%  10 mL Swish & Spit QID    ceftaroline (Teflaro) IV (PEDS and ADULTS)  600 mg Intravenous Q8H    ferrous sulfate  1 tablet Oral Daily    glutamine  0.5 g Oral Daily    insulin glargine U-100  15 Units Subcutaneous Daily    LIDOcaine  1 patch Transdermal Q24H    meropenem IV (PEDS and ADULTS)  1 g Intravenous Q8H    metoprolol succinate  100 mg Oral QHS    sodium chloride 0.9%  10 mL Intravenous Q6H     PRN Meds:  Current Facility-Administered Medications:     albuterol-ipratropium, 3 mL, Nebulization, Q4H PRN    aluminum & magnesium hydroxide-simethicone, 15 mL, Oral, QID PRN    dextrose 10%, 12.5 g, Intravenous, PRN    dextrose 10%, 25 g, Intravenous, PRN    glucagon (human recombinant), 1 mg, Intramuscular, PRN    glucose, 16 g, Oral, PRN    glucose, 24 g, Oral, PRN    HYDROcodone-acetaminophen, 1 tablet, Oral, Q4H PRN    HYDROcodone-acetaminophen, 1 tablet, Oral, Q4H PRN    insulin aspart U-100, 0-10 Units, Subcutaneous, QID (AC + HS) PRN    naloxone, 0.02 mg, Intravenous, PRN    ondansetron, 8 mg, Oral, Q8H PRN    promethazine, 25 mg, Oral, Q6H PRN    simethicone, 1 tablet, Oral, QID PRN    sodium chloride 0.9%, 10 mL, Intravenous, Q12H PRN    Flushing PICC/Midline Protocol, , , Until Discontinued **AND** sodium chloride 0.9%, 10 mL, Intravenous, Q6H **AND** sodium chloride 0.9%, 10 mL, Intravenous, PRN    traMADoL, 50 mg, Oral, Q4H PRN     Review of patient's allergies indicates:  No Known Allergies  Objective:     Vital Signs (Most Recent):  Temp: 98.9 °F (37.2 °C) (07/08/24 0502)  Pulse: 77 (07/08/24 0502)  Resp: 18 (07/08/24 0502)  BP: (!) 103/59 (07/08/24 0502)  SpO2: (!) 90  % (07/08/24 0502) Vital Signs (24h Range):  Temp:  [97.2 °F (36.2 °C)-98.9 °F (37.2 °C)] 98.9 °F (37.2 °C)  Pulse:  [64-93] 77  Resp:  [17-38] 18  SpO2:  [90 %-98 %] 90 %  BP: (103-168)/(52-94) 103/59     Intake/Output - Last 3 Shifts         07/06 0700 07/07 0659 07/07 0700 07/08 0659    Urine (mL/kg/hr)  250    Drains  80    Chest Tube  150    Total Output  480    Net  -480                  SpO2: (!) 90 %        Physical Exam  Vitals reviewed.   Constitutional:       Appearance: Normal appearance.   Cardiovascular:      Rate and Rhythm: Normal rate and regular rhythm.   Pulmonary:      Effort: Pulmonary effort is normal. No respiratory distress.      Comments: Right chest tube to water seal. Tidaling, no air leak.   Abdominal:      Palpations: Abdomen is soft.      Tenderness: There is no abdominal tenderness.   Musculoskeletal:      Comments: Left chest wall accordion drain with seropurulent   Skin:     General: Skin is warm.   Neurological:      General: No focal deficit present.      Mental Status: He is alert and oriented to person, place, and time.            Significant Labs:  CBC:   Recent Labs   Lab 07/07/24  0353   WBC 11.58   RBC 3.12*   HGB 8.1*   HCT 26.2*      MCV 84   MCH 26.0*   MCHC 30.9*     CMP:   Recent Labs   Lab 07/07/24  0353   *   CALCIUM 7.8*   ALBUMIN 2.1*   PROT 5.8*      K 4.5   CO2 26      BUN 33*   CREATININE 1.1   ALKPHOS 97   ALT 17   AST 25   BILITOT 0.3       Significant Diagnostics:  I have reviewed all pertinent imaging results/findings within the past 24 hours.    VTE Risk Mitigation (From admission, onward)           Ordered     Place sequential compression device  Until discontinued         07/07/24 1532                  Assessment/Plan:     Chest wall abscess  73 yoM with left chest wall abscess and concern for left 6-8 rib osteomyelitis transferred for potential thoracic and plastic surgery intervention. Subjectively he is feeling much better s/p  incision and drainage of chest wall abscess, thoracentesis of left pleural effusion, and VATS with right chest tube placement for right pleural effusion at [facility]. Currently clinically stable.     - Repeat CT chest and abdomen with IV contrast to re evaluate chest wall abscess extension and potential osteomyelitis of the ribs  - Follow up ID recommendations  - Right chest tube to water seal  - Left chest accordion drain to negative pressure  - Ok for a diet  - Thoracic surgery to follow        Yovanny Mike MD  Thoracic Surgery  [facility]

## 2024-07-08 NOTE — SUBJECTIVE & OBJECTIVE
Interval History: NAEON. Feeling well this morning aside from some left arm soreness. AVSS on 3 L NC.  150 mL of serous output. CT scan pending.    Medications:  Continuous Infusions:   0.9% NaCl   Intravenous Continuous 75 mL/hr at 07/07/24 1613 New Bag at 07/07/24 1613     Scheduled Meds:   (Magic mouthwash) 1:1:1 diphenhydrAMINE(Benadryl) 12.5mg/5ml liq, aluminum & magnesium hydroxide-simethicone (Maalox), LIDOcaine viscous 2%  10 mL Swish & Spit QID    ceftaroline (Teflaro) IV (PEDS and ADULTS)  600 mg Intravenous Q8H    ferrous sulfate  1 tablet Oral Daily    glutamine  0.5 g Oral Daily    insulin glargine U-100  15 Units Subcutaneous Daily    LIDOcaine  1 patch Transdermal Q24H    meropenem IV (PEDS and ADULTS)  1 g Intravenous Q8H    metoprolol succinate  100 mg Oral QHS    sodium chloride 0.9%  10 mL Intravenous Q6H     PRN Meds:  Current Facility-Administered Medications:     albuterol-ipratropium, 3 mL, Nebulization, Q4H PRN    aluminum & magnesium hydroxide-simethicone, 15 mL, Oral, QID PRN    dextrose 10%, 12.5 g, Intravenous, PRN    dextrose 10%, 25 g, Intravenous, PRN    glucagon (human recombinant), 1 mg, Intramuscular, PRN    glucose, 16 g, Oral, PRN    glucose, 24 g, Oral, PRN    HYDROcodone-acetaminophen, 1 tablet, Oral, Q4H PRN    HYDROcodone-acetaminophen, 1 tablet, Oral, Q4H PRN    insulin aspart U-100, 0-10 Units, Subcutaneous, QID (AC + HS) PRN    naloxone, 0.02 mg, Intravenous, PRN    ondansetron, 8 mg, Oral, Q8H PRN    promethazine, 25 mg, Oral, Q6H PRN    simethicone, 1 tablet, Oral, QID PRN    sodium chloride 0.9%, 10 mL, Intravenous, Q12H PRN    Flushing PICC/Midline Protocol, , , Until Discontinued **AND** sodium chloride 0.9%, 10 mL, Intravenous, Q6H **AND** sodium chloride 0.9%, 10 mL, Intravenous, PRN    traMADoL, 50 mg, Oral, Q4H PRN     Review of patient's allergies indicates:  No Known Allergies  Objective:     Vital Signs (Most Recent):  Temp: 98.9 °F (37.2 °C) (07/08/24  0502)  Pulse: 77 (07/08/24 0502)  Resp: 18 (07/08/24 0502)  BP: (!) 103/59 (07/08/24 0502)  SpO2: (!) 90 % (07/08/24 0502) Vital Signs (24h Range):  Temp:  [97.2 °F (36.2 °C)-98.9 °F (37.2 °C)] 98.9 °F (37.2 °C)  Pulse:  [64-93] 77  Resp:  [17-38] 18  SpO2:  [90 %-98 %] 90 %  BP: (103-168)/(52-94) 103/59     Intake/Output - Last 3 Shifts         07/06 0700 07/07 0659 07/07 0700 07/08 0659    Urine (mL/kg/hr)  250    Drains  80    Chest Tube  150    Total Output  480    Net  -480                  SpO2: (!) 90 %        Physical Exam  Vitals reviewed.   Constitutional:       Appearance: Normal appearance.   Cardiovascular:      Rate and Rhythm: Normal rate and regular rhythm.   Pulmonary:      Effort: Pulmonary effort is normal. No respiratory distress.      Comments: Right chest tube to water seal. Tidaling, no air leak.   Abdominal:      Palpations: Abdomen is soft.      Tenderness: There is no abdominal tenderness.   Musculoskeletal:      Comments: Left chest wall accordion drain with seropurulent   Skin:     General: Skin is warm.   Neurological:      General: No focal deficit present.      Mental Status: He is alert and oriented to person, place, and time.            Significant Labs:  CBC:   Recent Labs   Lab 07/07/24  0353   WBC 11.58   RBC 3.12*   HGB 8.1*   HCT 26.2*      MCV 84   MCH 26.0*   MCHC 30.9*     CMP:   Recent Labs   Lab 07/07/24  0353   *   CALCIUM 7.8*   ALBUMIN 2.1*   PROT 5.8*      K 4.5   CO2 26      BUN 33*   CREATININE 1.1   ALKPHOS 97   ALT 17   AST 25   BILITOT 0.3       Significant Diagnostics:  I have reviewed all pertinent imaging results/findings within the past 24 hours.    VTE Risk Mitigation (From admission, onward)           Ordered     Place sequential compression device  Until discontinued         07/07/24 2562

## 2024-07-08 NOTE — PROGRESS NOTES
"[facility] Medicine  Progress Note    Patient Name: Roosevelt Moser  MRN: [id number]  Patient Class: IP- Inpatient   Admission Date: 7/7/2024  Length of Stay: 1 days  Attending Physician: Roopa Rowan MD  Primary Care Provider: Miriam, Primary Doctor        Subjective:     Principal Problem:Osteomyelitis of multiple sites        HPI:  Roosevelt Moser is a 74yo M w/ A fib, HTN, T2DM, MARA, HLD, hx GBS, hx displaced comminuted fracture of the left humerus s/p fall 3/4/24 s/p ORIF 03/25/2024 who presents as a transfer for thoracic surgery and Plastic surgery evaluation in setting of "osteomyelitis of multiple ribs which will need further debridement and then a flap placed."    He was initially admitted to [facility] on 6/14 for progressive weakness, multiple falls, and decreased urine output.  He was admitted for management of MARBIN on CKD complicated by hyperkalemia, urosepsis, AFib with RVR.    "Creatinine improved during his stay. During his stay he was noted to have swelling of his left upper extremity. Imaging studies on June 17 showed concern for gas-forming infection along the left shoulder and air along the lateral left chest wall concerning for gas-forming organism. MRI of his right foot also showed evidence of toe osteomyelitis. He was seen by ID who adjusted antibiotics, and they also noted oral thrush. Podiatry evaluated him for the toe findings, and Orthopedic Surgery evaluated the shoulder abnormalities. Right toe wound grew MRSA. Oral anticoagulation was held, and Radiology aspirated fluid from his left shoulder on June 20. On June 21 he had left shoulder incision and drainage and removal of deep hardware including rods and screws. He underwent repeat irrigation and debridement of the left shoulder on June 24 with placement of a wound VAC. He had subsequent incision and drainage with removal of the wound VAC on June 27. He had amputation of his right 2nd toe by Podiatry on July 1. On " "July 3 he was noted to have dyspnea and mild tachycardia. CT chest showed a large abscess of the anterior central and left chest wall and abdominal wall with partial destruction of 3 ribs. He was seen by Cardiothoracic surgery. On July 4 he had left thoracentesis with aspiration of fluid. He subsequently had incision and drainage of the left chest wall abscess on July 4, and a drainage catheter was left in place. On July 5 he underwent right VATS for a loculated right pleural effusion, and a chest tube was left in place. He was transitioned to the ICU post-operatively. With concern for osteomyelitis of the ribs, concern is that he will need transfer to a facility with Thoracic Surgery and potentially Plastic Surgery along with higher level of care. Transfer center spoke with Thoracic Surgery at [facility]. Requesting transfer to Hospital Medicine at [facility] for Plastic Surgery and Thoracic Surgery evaluation. Transfer center spoke with Plastic Surgery on July 5, Thoracic Surgery on July 7. With his complicated medical presentation, will plan transfer to Hospital Medicine at [facility] in step-down status for further treatment. "    Prior to transfer, "He is awake and alert. He has a PICC line in place along with the right chest tube and left chest wall drain in place. He has no evidence of respiratory distress and is hemodynamically stable. Referring provider felt patient was stable for step-down status at present. He is currently on ceftaroline and meropenem. He is in contact isolation. July 7: Sodium 137, potassium 4.5, chloride 104, CO2 26, BUN 33, creatinine 1.1, glucose 130, albumin 2.1, AST 25, ALT 17, white blood cells 11.58, hemoglobin 8.1, hematocrit 26.2, platelets 389, procalcitonin 0.575, CRP greater than 16  VS:  Temperature 97.5°, pulse 85, respirations 20, blood pressure 112/63, O2 sats 95% "    Patient in no acute distress upon arrival.  Wife at bedside.  Patient denies any acute " complaints.        Imaging history reviewed:  July 6: X-rays of the left shoulder had fracture through the surgical neck of the humerus with lateral distraction of the distal component and overlapping segments on the order of 2.5 cm.  Overall no appreciable change upon comparison with prior imaging.  -chest x-ray noted manifestations of mild congestive heart failure bordering on mild pulmonary edema.  Right-sided thoracostomy tube terminating within the right apex.  No definite pneumothorax.        July 5: Pleural fluid Gram stain with few WBCs and no organisms seen  -pH 7.339, pCO2 50.9, pO2 392     July 4:  CPK less than 10, serum   -AFB smear from left chest abscess had no AFB seen, left chest abscess aerobic and anaerobic cultures have no growth  -pleural fluid protein 3.2, white blood cells 1108 (11% segs, 74% lymphs), , pH 7.63, pleural fluid culture had no growth  -ultrasound-guided thoracentesis removed 1.4-1.5 L     July 3: Blood cultures with no growth to date  -CT chest showed large abscess of the anterior central and left chest wall and abdominal wall measuring 17 cm transversely.  This extends into the mediastinum and peritoneum with partial destruction of the anterior 6th, 7th, and 8th ribs.  Complete atelectasis of the left lower lobe with moderate left pleural effusion.  Mild atelectasis right lung base with a small-to-moderate right pleural effusion.     July 2: X-rays of the left shoulder noted displaced left proximal humerus fracture status post hardware removal without change in alignment.     June 24: ECHO with EF 55-60%.     June 20:  Interventional Radiology did percutaneous aspiration of the left shoulder fluid collection.  No drainage catheter was left in place.     June 18:  Right toe wound MRSA  -CT left shoulder had subacute left humerus fracture post internal fixation.  Incomplete bony bridging across the fracture site with persistent angulation.  Severe arthropathy of the  "glenohumeral joint with multiple intra-articular bodies.  Large left joint effusion and adjacent soft tissue focal fluid collections containing gas and concerning for gas-forming infection.  Moderate size left pleural effusion.  Left basilar airspace disease.  -bilateral lower extremity Doppler arterial ultrasound had atherosclerotic plaque and calcification bilaterally without severe stenosis or occlusion identified on either side.     June 17: X-rays of the left humerus/shoulder had findings concerning for gas-forming infection along the left shoulder.  -x-rays of the left ribs showed air along the lateral chest wall soft tissue and axilla concerning for gas-forming infection.  -MRI of the right foot had findings consistent with osteomyelitis involving the middle distal phalanges of the 3rd toe.  Cellulitis.     June 14: Renal ultrasound had no hydronephrosis.  Elevated resistive index right kidney suggesting intrinsic renal disease.    Overview/Hospital Course:  Pt admitted to  as a transfer for thoracic surgery and Plastic surgery evaluation in setting of "osteomyelitis of multiple ribs which will need further debridement and then a flap placed." Thoracic Surgery, Plastic Surgery, ID, Ortho, Wound care, PT/OT consulted upon admission. Imaging ordered.    Interval History:  Patient seen and examined at bedside. No acute events overnight.  Patient has no acute complaints this morning.          Review of Systems   Constitutional:  Positive for appetite change. Negative for fever.   HENT:  Negative for congestion, sore throat and trouble swallowing.    Eyes:  Negative for visual disturbance.   Respiratory:  Positive for cough (dry). Negative for shortness of breath and wheezing.    Cardiovascular:  Negative for chest pain, palpitations and leg swelling.   Gastrointestinal:  Negative for abdominal pain, constipation, diarrhea, nausea and vomiting.   Genitourinary:  Negative for decreased urine volume and dysuria. " "  Musculoskeletal:  Negative for back pain.   Skin:  Positive for wound.   Neurological:  Negative for dizziness, light-headedness and headaches.   Psychiatric/Behavioral:  Negative for agitation, confusion and decreased concentration.      Vitals:    07/07/24 1948 07/08/24 0001 07/08/24 0502 07/08/24 1330   BP: 118/64 112/72 (!) 103/59 (!) 108/56    07/08/24 1638 07/08/24 2106 07/08/24 2315 07/09/24 0404   BP: 110/67 137/61 (!) 100/53 113/60    07/09/24 0713 07/09/24 1111   BP: (!) 140/69 (!) 118/53     All labs and imaging in the past 24 hours reviewed.      Assessment/Plan:      * Osteomyelitis of multiple sites  Osteomyelitis of toe   MRSA infection   Skin ulcer of toe of right foot with necrosis of bone  Chest wall abscess  Loculated pleural effusion  Abscess of left shoulder  Pyogenic abscess of left shoulder region  Mediastinal lymphadenopathy  Humerus fracture    -status post left thoracentesis 1500cc serosanguineous fluid drained 7/4 & I&D drainage with chest tube placement 7/4 & loculated effusions right chest s/p VATS and chest tube 7/5   -Left shoulder prosthetic joint infection s/p I&D and removal of deep hardware 6/21 - all screws and nails removed, s/p 2nd washout 6/24 & 3rd washout 6/27   -Left Shoulder washouts on 6/21 ( abundant pus in shoulder, hardware removal), 6/24 ( bloody brownish fluid) and 6/27 ( no fluid collection, healthy red and beefy tissue)   -Right 3rd toe osteomyelitis s/p Dalvance x 2 doses & 2nd distal phalanx s/p I&D at bedside, s/p 2nd toe amputation 7/1       - Presents as a transfer for thoracic surgery and Plastic surgery evaluation in setting of concern for "osteomyelitis of multiple ribs which will need further debridement and then a flap placed" in the setting of chest wall abscess  -arrives on ceftaroline and meropenem as per outside hospital ID recommendations  -notably, 7/3 blood cultures, 7/4 pleural fluid cultures, 7/4 abscess cultures, 755 pleural fluid cultures " "NGTD  -6/18 right toe wound culture with MRSA  -continue to follow OSH cultures  -ongoing wound care  -ID, Ortho, Thoracic surgery and Plastic surgery consultedn  -per thoracic surgery, CT chest and abdomen with IV contrast; right chest tube to water seal; left chest accordion drain to negative pressure  -per ortho:  MRI shoulder with without contrast left, CT shoulder without contrast left, CT 3D rendering    Sepsis  This patient does have evidence of infective focus  My overall impression is sepsis.  Source: Skin and Soft Tissue (location toe)  Antibiotics given-   Antibiotics (72h ago, onward)      Start     Stop Route Frequency Ordered    07/07/24 1545  ceftaroline fosamiL (TEFLARO) 600 mg in D5W 50 mL IVPB (MB+)         -- IV Every 8 hours (non-standard times) 07/07/24 1532    07/07/24 1545  meropenem (MERREM) 1 g in 0.9% NaCl 100 mL IVPB (MB+)         -- IV Every 8 hours (non-standard times) 07/07/24 1532          Latest lactate reviewed-  No results for input(s): "LACTATE", "POCLAC" in the last 72 hours.  Organ dysfunction indicated by Acute kidney injury    Fluid challenge Not needed - patient is not hypotensive      Post- resuscitation assessment No - Post resuscitation assessment not needed       Will Not start Pressors- Levophed for MAP of 65  Source control achieved by: abx    Acute renal failure superimposed on chronic kidney disease  Patient with acute kidney injury/acute renal failure likely due to  due to ATN due to sepsis due to UTI and/or PNA  MARBIN is currently improving. Baseline creatinine  wnl  - Labs reviewed- Renal function/electrolytes with Estimated Creatinine Clearance: 85.7 mL/min (based on SCr of 1.1 mg/dL). according to latest data. Monitor urine output and serial BMP and adjust therapy as needed. Avoid nephrotoxins and renally dose meds for GFR listed above.    Atrial fibrillation with rapid ventricular response  Patient with Long standing persistent (>12 months) atrial fibrillation " which is controlled currently with Beta Blocker. Patient is currently in TBD.XURES2IQHa Score: 2.      Chronic AF w/ acute RVR at OSH, resolved s/p cardizem drip   NOAC has been on hold d/t possible need for surgery - has been on prophylaxis dose of Lovenox  Resume A/c pending any surgical intervention, consider lovenox vs hep gtt bridge    Debility  Patient with Acute on chronic debility due to  multiple infections . Latest AMPAC and GEMS scores have not been reviewed. Evaluation for etiology is complete. Plan includes progressive mobility protocol initated, PT/OT consulted, and fall precautions in place.    Thyroid nodule  Incidental finding on imagin.7 cm left thyroid nodule. Nonemergent thyroid ultrasound is recommended.       Left renal stone   retroperitoneal ultrasound completed in setting of MARBIN without hydronephrosis    CT abdomen notable for 1.2 cm obstructing stone in left distal ureter with mild hydroureteronephrosis   Urology consulted    Cyst of right kidney  Incidental finding on imagin.8 cm septated cystic lesion in the right kidney, which is indeterminate for malignancy. Further evaluation with nonemergent renal mass protocol CT or MRI is recommended.     History of Guillain-Kingston syndrome  No acute issues  However, pt has impaired mobility and is acutely weakened from his sepsis/UTI/AF RVR and need placement       Type 2 diabetes mellitus  Patient's FSGs are controlled on current medication regimen.  Last A1c reviewed-   Lab Results   Component Value Date    HGBA1C 6.3 (H) 2024     Most recent fingerstick glucose reviewed-   Recent Labs   Lab 24  1606 24  1118   POCTGLUCOSE 168* 131*       Current correctional scale  Low  Maintain anti-hyperglycemic dose as follows-   Antihyperglycemics (From admission, onward)      Start     Stop Route Frequency Ordered    24 0900  insulin glargine U-100 (Lantus) pen 15 Units         -- SubQ Daily 24 1532    24  1529  insulin aspart U-100 pen 0-10 Units         -- SubQ Before meals & nightly PRN 07/07/24 1532          Hold Oral hypoglycemics while patient is in the hospital.    Hypertension  Chronic, controlled. Latest blood pressure and vitals reviewed-     Temp:  [97.9 °F (36.6 °C)-98.9 °F (37.2 °C)]   Pulse:  [64-90]   Resp:  [17-20]   BP: (103-168)/(59-72)   SpO2:  [90 %-96 %] .   Home meds for hypertension were reviewed and noted below.   Hypertension Medications               hydrALAZINE (APRESOLINE) 25 MG tablet Take 1 tablet (25 mg total) by mouth every 12 (twelve) hours.    metoprolol succinate (TOPROL-XL) 100 MG 24 hr tablet Take 1 tablet (100 mg total) by mouth once daily.            While in the hospital, will manage blood pressure as follows; continue home metoprolol    Will utilize p.r.n. blood pressure medication only if patient's blood pressure greater than 180/110 and he develops symptoms such as worsening chest pain or shortness of breath.    MARA (obstructive sleep apnea)  Continue CPAP HS and w/ naps        Morbid obesity  There is no height or weight on file to calculate BMI. Morbid obesity complicates all aspects of disease management from diagnostic modalities to treatment. Weight loss encouraged and health benefits explained to patient.           VTE Risk Mitigation (From admission, onward)           Ordered     Place sequential compression device  Until discontinued         07/07/24 1532                    Discharge Planning   KAMILA: 7/12/2024     Code Status: Full Code   Is the patient medically ready for discharge?:     Reason for patient still in hospital (select all that apply): Patient trending condition, Treatment, Imaging, Consult recommendations, PT / OT recommendations, and Pending disposition  Discharge Plan A: Long-term acute care facility (LTAC)                  Roopa Rowan MD  Department of Hospital Medicine   Andres [facility]  [facility]

## 2024-07-08 NOTE — ASSESSMENT & PLAN NOTE
Incidental finding on imagin.7 cm left thyroid nodule. Nonemergent thyroid ultrasound is recommended.

## 2024-07-08 NOTE — CONSULTS
[facility]  Infectious Disease  Consult Note    Patient Name: Roosevelt Moser  MRN: [id number]  Admission Date: 7/7/2024  Hospital Length of Stay: 1 days  Attending Physician: Roopa Rowan MD  Primary Care Provider: Miriam, Primary Doctor     Isolation Status: Contact    Patient information was obtained from past medical records.      Inpatient consult to Infectious Diseases  Consult performed by: Jose Luis De Santiago MD  Consult ordered by: Roopa Rowan MD        Assessment/Plan:     ID  * Osteomyelitis of multiple sites  See chest wall abscess    Chest wall abscess  Left chest wall abscess noted at OSH w/ concern for osteomyelitis transferred to [facility] for thoracic and plastic surgery eval  Thoracentesis at OSH 7/4 w/ serosanguineous fluid, f/b I&D and chest tube placement; loculated effusions right chest s/p VATS and chest tube placement on 7/5  Light's criteria consistent with exudative effusion (Cell count 1100 WBCs, 72% lymphocytes, , pH 7.6)  No organisms seen on gram stain from L pleural fluid or chest wall abscess    - Continue to f/u cx  - Pending repeat CT A/P to assess abscess/osteo of ribs  - Continue Ceftaroline 600 mg IV q.8   - Continue Meropenem 1 g IV q.8   - Wound care to all affected areas  - Chest tube management per surgery        Osteomyelitis of toe  Wound cultures 2nd R toe 6/18 positive for MRSA s/p toe amputation on 7/1    Proximal bone culture and biopsy of the clean margin on amputated toe performed by Podiatry  No further evidence of toe osteo, will not require prolonged course of abx for chronic osteo        Thank you for your consult. I will follow-up with patient. Please contact us if you have any additional questions.    Jose Luis De Santiago MD  Infectious Disease  [facility]    Subjective:     Principal Problem: Osteomyelitis of multiple sites    HPI: 73M w/ A fib, HTN, T2DM, MARA, HLD, hx GBS, hx displaced comminuted fracture of the left humerus after a fall, s/p ORIF this past  "March, who presents as a transfer for osteomyelitis of multiple ribs requiring debridement and flap placement. Initially presented to [place] on 6/14 for progressive weakness, multiple falls, and decreased urine output.  He was admitted for management of MARBIN, urosepsis, and AFib with RVR. During his stay he was noted to have swelling of his left upper extremity. Imaging studies on June 17 showed concern for gas-forming infection along the left shoulder and air along the lateral left chest wall.  MRI of his right foot also showed evidence of toe osteomyelitis (which grew MRSA). He was seen by ID who adjusted antibiotics, and they also noted oral thrush. IR aspirated fluid from left shoulder on June 20. On June 21 he had left shoulder I&D and removal of deep hardware. He underwent repeat irrigation and debridement of the left shoulder on June 24 with placement of a wound VAC. He had subsequent I&D with removal of the wound VAC on June 27. His right 2nd toe was amputated by Podiatry on July 1. Then, on July 3 he was noted to have dyspnea and mild tachycardia. CT chest showed a large abscess of the anterior central and left chest wall and abdominal wall with partial destruction of 3 ribs. He was seen by Cardiothoracic surgery. On July 4 he had left thoracentesis with aspiration of fluid. He subsequently had I&D of the left chest wall abscess on July 4, and a drainage catheter was left in place. On July 5 he underwent right VATS for a loculated right pleural effusion, and a chest tube was left in place. He was transitioned to the ICU post-operatively.  [facility]acic and [facility]astics eval.     Prior to transfer, "He is awake and alert. He has a PICC line in place along with the right chest tube and left chest wall drain in place. He has no evidence of respiratory distress and is hemodynamically stable. Referring provider felt patient was stable for step-down status at present. He is currently on ceftaroline " "and meropenem. He is in contact isolation. 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    Hyperlipidemia     Hypertension 2016    Sleep apnea        Past Surgical History:   Procedure Laterality Date    ARTHROTOMY OF SHOULDER Left 6/21/2024    Procedure: ARTHROTOMY, SHOULDER;  Surgeon: Roman Paulson MD;  Location: [facility];  Service: Orthopedics;  Laterality: Left;    INCISION AND DRAINAGE OF ABSCESS N/A 7/4/2024    Procedure: INCISION AND DRAINAGE, ABSCESS;  Surgeon: Gus Escobedo MD;  Location: [facility];  Service: General;  Laterality: N/A;  Abcess of anterior chest wall    IRRIGATION AND DEBRIDEMENT OF UPPER EXTREMITY Left 6/24/2024    Procedure: IRRIGATION AND DEBRIDEMENT, UPPER EXTREMITY;  Surgeon: Nathan Cooper MD;  Location: [facility];  Service: Orthopedics;  Laterality: Left;    IRRIGATION AND DEBRIDEMENT OF UPPER EXTREMITY Left 6/27/2024    Procedure: IRRIGATION AND DEBRIDEMENT, UPPER EXTREMITY;  Surgeon: Nathan Cooper MD;  Location: [facility] OR;  Service: Orthopedics;  Laterality: Left;    OPEN REDUCTION AND INTERNAL FIXATION (ORIF) OF FRACTURE OF PROXIMAL HUMERUS Left 3/25/2024    Procedure: ORIF, FRACTURE, HUMERUS, PROXIMAL/IM HANNA, LEFT;  Surgeon: Nathan Cooper MD;  Location: [facility];  Service: Orthopedics;  Laterality: Left;  Accumed, Synthes Small Frag set Avelino verified 3/22/24 ark    THORACOSCOPIC DECORTICATION OF LUNG Right 7/5/2024    Procedure: VATS, WITH DECORTICATION, LUNG;  Surgeon: Gus Escobedo MD;  Location: [facility];  Service: Cardiothoracic;  Laterality: Right;    TOE AMPUTATION Right " 7/1/2024    Procedure: AMPUTATION, TOE;  Surgeon: Stevie Zhu DPM;  Location: [facility];  Service: Podiatry;  Laterality: Right;       Review of patient's allergies indicates:  No Known Allergies    Medications:  Medications Prior to Admission   Medication Sig    apixaban (ELIQUIS) 5 mg Tab Take 1 tablet (5 mg total) by mouth 2 (two) times daily.    atorvastatin (LIPITOR) 10 MG tablet Take 1 tablet (10 mg total) by mouth once daily.    co-enzyme Q-10 30 mg capsule Take 30 mg by mouth once daily.    doxycycline (VIBRAMYCIN) 100 MG Cap Take 100 mg by mouth 2 (two) times daily.    ergocalciferol, vitamin D2, (VITAMIN D ORAL) Take 1 tablet by mouth once daily.    glimepiride (AMARYL) 2 MG tablet Take 1 tablet (2 mg total) by mouth before breakfast.    hydrALAZINE (APRESOLINE) 25 MG tablet Take 1 tablet (25 mg total) by mouth every 12 (twelve) hours.    metFORMIN (GLUCOPHAGE-XR) 500 MG ER 24hr tablet Take 2 tablets (1,000 mg total) by mouth 2 (two) times daily with meals.    metoprolol succinate (TOPROL-XL) 100 MG 24 hr tablet Take 1 tablet (100 mg total) by mouth once daily. (Patient taking differently: Take 100 mg by mouth nightly.)     Antibiotics (From admission, onward)      Start     Stop Route Frequency Ordered    07/07/24 1545  ceftaroline fosamiL (TEFLARO) 600 mg in D5W 50 mL IVPB (MB+)         -- IV Every 8 hours (non-standard times) 07/07/24 1532    07/07/24 1545  meropenem (MERREM) 1 g in 0.9% NaCl 100 mL IVPB (MB+)         -- IV Every 8 hours (non-standard times) 07/07/24 1532          Antifungals (From admission, onward)      None          Antivirals (From admission, onward)      None             Immunization History   Administered Date(s) Administered    PPD Test 06/15/2024       Family History    None       Social History     Socioeconomic History    Marital status:    Tobacco Use    Smoking status: Never    Smokeless tobacco: Never   Substance and Sexual Activity    Alcohol use: Yes      Alcohol/week: 0.0 standard drinks of alcohol     Comment: social    Drug use: No    Sexual activity: Yes     Social Determinants of Health     Financial Resource Strain: Low Risk  (7/8/2024)    Overall Financial Resource Strain (CARDIA)     Difficulty of Paying Living Expenses: Not very hard   Food Insecurity: No Food Insecurity (7/8/2024)    Hunger Vital Sign     Worried About Running Out of Food in the Last Year: Never true     Ran Out of Food in the Last Year: Never true   Transportation Needs: No Transportation Needs (7/8/2024)    TRANSPORTATION NEEDS     Transportation : No   Physical Activity: Inactive (7/8/2024)    Exercise Vital Sign     Days of Exercise per Week: 0 days     Minutes of Exercise per Session: 0 min   Stress: No Stress Concern Present (7/8/2024)    Citizen of the [place] [employer] - Occupational Stress Questionnaire     Feeling of Stress : Only a little   Housing Stability: Low Risk  (7/8/2024)    Housing Stability Vital Sign     Unable to Pay for Housing in the Last Year: No     Homeless in the Last Year: No     Review of Systems   Constitutional:  Positive for appetite change. Negative for fever.   HENT:  Negative for congestion, sore throat and trouble swallowing.    Eyes:  Negative for visual disturbance.   Respiratory:  Positive for cough (dry). Negative for shortness of breath and wheezing.    Cardiovascular:  Negative for chest pain, palpitations and leg swelling.   Gastrointestinal:  Negative for abdominal pain, constipation, diarrhea, nausea and vomiting.   Genitourinary:  Negative for decreased urine volume and dysuria.   Musculoskeletal:  Negative for back pain.   Skin:  Positive for wound.   Neurological:  Negative for dizziness, light-headedness and headaches.   Psychiatric/Behavioral:  Negative for agitation, confusion and decreased concentration.      Objective:     Vital Signs (Most Recent):  Temp: 98.5 °F (36.9 °C) (07/08/24 1330)  Pulse: 80 (07/08/24 1531)  Resp: 16  (07/08/24 1528)  BP: (!) 108/56 (07/08/24 1330)  SpO2: (!) 91 % (07/08/24 1330) Vital Signs (24h Range):  Temp:  [97.9 °F (36.6 °C)-98.9 °F (37.2 °C)] 98.5 °F (36.9 °C)  Pulse:  [64-90] 80  Resp:  [16-22] 16  SpO2:  [90 %-96 %] 91 %  BP: (103-159)/(56-72) 108/56     Weight: 133 kg (293 lb 3.4 oz)  Body mass index is 39.77 kg/m².    Estimated Creatinine Clearance: 85.7 mL/min (based on SCr of 1.1 mg/dL).     Physical Exam  Vitals and nursing note reviewed.   Constitutional:       General: He is not in acute distress.     Appearance: He is ill-appearing (chronically). He is not toxic-appearing or diaphoretic.   HENT:      Head: Normocephalic and atraumatic.      Nose: Nose normal.      Mouth/Throat:      Pharynx: Oropharynx is clear.   Eyes:      General: No scleral icterus.  Cardiovascular:      Rate and Rhythm: Normal rate and regular rhythm.      Pulses: Normal pulses.      Heart sounds: Normal heart sounds.   Pulmonary:      Effort: Pulmonary effort is normal.      Comments: No obvious rales or wheezes when auscultating anterior chest; unable to complete posterior auscultation due to limited mobility of patient  Chest:      Chest wall: No deformity or tenderness.      Comments: Right chest tube to water seal with serous output  Left chest wall drain with seropurulent output  Abdominal:      General: Bowel sounds are normal.      Palpations: Abdomen is soft.      Tenderness: There is no abdominal tenderness.   Musculoskeletal:      Cervical back: Normal range of motion and neck supple.      Right lower leg: No edema.      Left lower leg: No edema.   Skin:     General: Skin is warm.      Capillary Refill: Capillary refill takes less than 2 seconds.      Comments: Unable to examine posterior skin due to patient debility   Neurological:      Mental Status: He is alert and oriented to person, place, and time.   Psychiatric:         Behavior: Behavior normal.          Significant Labs: All pertinent labs within the past  24 hours have been reviewed.    Significant Imaging: I have reviewed all pertinent imaging results/findings within the past 24 hours.

## 2024-07-08 NOTE — HPI
Mr. Moser is a pleasant 73 yoM with PMH including Afib on eliquis, HTN, T2DM and MARA who is transferred from [place] for a chest wall abscess with concern for osteomyelitis of the anterior left 6-8th ribs. He originally presented to the [facility] after a fall resulting in a left humeral fracture for which he underwent ORIF in March. He represented in June with a left shoulder abscess and underwent multiple washouts and removal of the ORIF hardware. His hospital stay was complicated by bilateral effusions and a left chest wall abscess with extension into the epigastrium and mediastinum with concern for osteomyelitis of the left 6-8th ribs. He underwent I+D of the abscess through a small left anterior chest incision with drain placement on 7/4, thoracentesis of the left pleural effusion on 7/4, and right-sided VATs with washout and chest tube placement on 7/5. Interestingly, cultures from the pleural fluid and chest wall abscess cavity have been negative despite the abscess cavity fluid being described as creamy pus by the [facility] op report. The only positive cultures so far have been from those collected by podiatry during a toe amp which were positive for MRSA.     [place] [facility] [place] interventions including more invasive debridements and reconstruction.    Urology consulted for distal L ureteral stone and concern for R UVJ stone.      On assessment, he is AFVSS, on CPAP. No recent urin obtained.  Last urine micro obtained on 6/14, was concerning for infection but urine culture with no growth.  Has been on IV abx since admission on 6/14 and is currently on meropenem and ceftaroline.  Wife at beside denies subjective fevers, chills.  WBC 13 (11).  Cr on admission at OSH on 6/14 was 5.3.  MARBIN resolved with IV fluids and is now at baseline of 1.1.     CTAP w/ IV contrast shows 2 cm collection of stones within the distal L ureter along with upstream hydroureteronephrosis.  There was  also noted to be concern for R staghorn calculus vs retained contrast within the collecting system.  There is no R hydronephrosis.  Of note, staghorn calculus not visualized on renal US obtained 6/14.  CTRSS ordered by urology unfortunately obtained after MRI w/ contrast but it appears that there is no R staghorn stone on the right, just retained contrast within the collecting system.      Has been off his home elliquis throughout admission.

## 2024-07-09 PROBLEM — N20.0 NEPHROLITHIASIS: Status: ACTIVE | Noted: 2024-07-09

## 2024-07-09 LAB
ALBUMIN SERPL BCP-MCNC: 1.2 G/DL (ref 3.5–5.2)
ALP SERPL-CCNC: 106 U/L (ref 55–135)
ALT SERPL W/O P-5'-P-CCNC: 13 U/L (ref 10–44)
ANION GAP SERPL CALC-SCNC: 12 MMOL/L (ref 8–16)
AST SERPL-CCNC: 24 U/L (ref 10–40)
BACTERIA SPEC AEROBE CULT: NO GROWTH
BASOPHILS # BLD AUTO: 0.09 K/UL (ref 0–0.2)
BASOPHILS NFR BLD: 0.8 % (ref 0–1.9)
BILIRUB SERPL-MCNC: 0.3 MG/DL (ref 0.1–1)
BUN SERPL-MCNC: 25 MG/DL (ref 8–23)
CALCIUM SERPL-MCNC: 8.5 MG/DL (ref 8.7–10.5)
CHLORIDE SERPL-SCNC: 108 MMOL/L (ref 95–110)
CO2 SERPL-SCNC: 19 MMOL/L (ref 23–29)
CREAT SERPL-MCNC: 1 MG/DL (ref 0.5–1.4)
DIFFERENTIAL METHOD BLD: ABNORMAL
EOSINOPHIL # BLD AUTO: 0.7 K/UL (ref 0–0.5)
EOSINOPHIL NFR BLD: 6.3 % (ref 0–8)
ERYTHROCYTE [DISTWIDTH] IN BLOOD BY AUTOMATED COUNT: 17.5 % (ref 11.5–14.5)
EST. GFR  (NO RACE VARIABLE): >60 ML/MIN/1.73 M^2
FUNGUS SPEC CULT: NORMAL
GLUCOSE SERPL-MCNC: 73 MG/DL (ref 70–110)
GRAM STN SPEC: NORMAL
GRAM STN SPEC: NORMAL
HCT VFR BLD AUTO: 28.4 % (ref 40–54)
HGB BLD-MCNC: 8.8 G/DL (ref 14–18)
IMM GRANULOCYTES # BLD AUTO: 0.13 K/UL (ref 0–0.04)
IMM GRANULOCYTES NFR BLD AUTO: 1.1 % (ref 0–0.5)
LYMPHOCYTES # BLD AUTO: 1.7 K/UL (ref 1–4.8)
LYMPHOCYTES NFR BLD: 14.3 % (ref 18–48)
MAGNESIUM SERPL-MCNC: 1.6 MG/DL (ref 1.6–2.6)
MCH RBC QN AUTO: 25.3 PG (ref 27–31)
MCHC RBC AUTO-ENTMCNC: 31 G/DL (ref 32–36)
MCV RBC AUTO: 82 FL (ref 82–98)
MONOCYTES # BLD AUTO: 1.1 K/UL (ref 0.3–1)
MONOCYTES NFR BLD: 9.4 % (ref 4–15)
NEUTROPHILS # BLD AUTO: 7.9 K/UL (ref 1.8–7.7)
NEUTROPHILS NFR BLD: 68.1 % (ref 38–73)
NRBC BLD-RTO: 0 /100 WBC
PHOSPHATE SERPL-MCNC: 3.1 MG/DL (ref 2.7–4.5)
PLATELET # BLD AUTO: 463 K/UL (ref 150–450)
PMV BLD AUTO: 10.2 FL (ref 9.2–12.9)
POCT GLUCOSE: 120 MG/DL (ref 70–110)
POCT GLUCOSE: 124 MG/DL (ref 70–110)
POCT GLUCOSE: 95 MG/DL (ref 70–110)
POTASSIUM SERPL-SCNC: 4.4 MMOL/L (ref 3.5–5.1)
PROT SERPL-MCNC: 5.9 G/DL (ref 6–8.4)
RBC # BLD AUTO: 3.48 M/UL (ref 4.6–6.2)
SODIUM SERPL-SCNC: 139 MMOL/L (ref 136–145)
WBC # BLD AUTO: 11.53 K/UL (ref 3.9–12.7)

## 2024-07-09 PROCEDURE — 87205 SMEAR GRAM STAIN: CPT | Mod: HCNC

## 2024-07-09 PROCEDURE — 94761 N-INVAS EAR/PLS OXIMETRY MLT: CPT | Mod: HCNC

## 2024-07-09 PROCEDURE — 85025 COMPLETE CBC W/AUTO DIFF WBC: CPT | Mod: HCNC | Performed by: STUDENT IN AN ORGANIZED HEALTH CARE EDUCATION/TRAINING PROGRAM

## 2024-07-09 PROCEDURE — 63600175 PHARM REV CODE 636 W HCPCS: Mod: HCNC | Performed by: STUDENT IN AN ORGANIZED HEALTH CARE EDUCATION/TRAINING PROGRAM

## 2024-07-09 PROCEDURE — 99900035 HC TECH TIME PER 15 MIN (STAT): Mod: HCNC

## 2024-07-09 PROCEDURE — 84100 ASSAY OF PHOSPHORUS: CPT | Mod: HCNC | Performed by: STUDENT IN AN ORGANIZED HEALTH CARE EDUCATION/TRAINING PROGRAM

## 2024-07-09 PROCEDURE — 80053 COMPREHEN METABOLIC PANEL: CPT | Mod: HCNC | Performed by: STUDENT IN AN ORGANIZED HEALTH CARE EDUCATION/TRAINING PROGRAM

## 2024-07-09 PROCEDURE — 87102 FUNGUS ISOLATION CULTURE: CPT | Mod: HCNC

## 2024-07-09 PROCEDURE — 87206 SMEAR FLUORESCENT/ACID STAI: CPT | Mod: HCNC

## 2024-07-09 PROCEDURE — 36569 INSJ PICC 5 YR+ W/O IMAGING: CPT | Mod: HCNC

## 2024-07-09 PROCEDURE — 27000221 HC OXYGEN, UP TO 24 HOURS: Mod: HCNC

## 2024-07-09 PROCEDURE — 87075 CULTR BACTERIA EXCEPT BLOOD: CPT | Mod: HCNC

## 2024-07-09 PROCEDURE — 27000207 HC ISOLATION: Mod: HCNC

## 2024-07-09 PROCEDURE — 97165 OT EVAL LOW COMPLEX 30 MIN: CPT | Mod: HCNC

## 2024-07-09 PROCEDURE — 83735 ASSAY OF MAGNESIUM: CPT | Mod: HCNC | Performed by: STUDENT IN AN ORGANIZED HEALTH CARE EDUCATION/TRAINING PROGRAM

## 2024-07-09 PROCEDURE — 97535 SELF CARE MNGMENT TRAINING: CPT | Mod: HCNC

## 2024-07-09 PROCEDURE — 97530 THERAPEUTIC ACTIVITIES: CPT | Mod: HCNC

## 2024-07-09 PROCEDURE — 25000003 PHARM REV CODE 250: Mod: HCNC | Performed by: STUDENT IN AN ORGANIZED HEALTH CARE EDUCATION/TRAINING PROGRAM

## 2024-07-09 PROCEDURE — 36415 COLL VENOUS BLD VENIPUNCTURE: CPT | Mod: HCNC | Performed by: STUDENT IN AN ORGANIZED HEALTH CARE EDUCATION/TRAINING PROGRAM

## 2024-07-09 PROCEDURE — 99233 SBSQ HOSP IP/OBS HIGH 50: CPT | Mod: HCNC,GC,, | Performed by: INTERNAL MEDICINE

## 2024-07-09 PROCEDURE — 87070 CULTURE OTHR SPECIMN AEROBIC: CPT | Mod: HCNC

## 2024-07-09 PROCEDURE — 20600001 HC STEP DOWN PRIVATE ROOM: Mod: HCNC

## 2024-07-09 PROCEDURE — A4216 STERILE WATER/SALINE, 10 ML: HCPCS | Mod: HCNC | Performed by: STUDENT IN AN ORGANIZED HEALTH CARE EDUCATION/TRAINING PROGRAM

## 2024-07-09 PROCEDURE — 87116 MYCOBACTERIA CULTURE: CPT | Mod: HCNC

## 2024-07-09 PROCEDURE — 0R9K3ZX DRAINAGE OF LEFT SHOULDER JOINT, PERCUTANEOUS APPROACH, DIAGNOSTIC: ICD-10-PCS | Performed by: ORTHOPAEDIC SURGERY

## 2024-07-09 PROCEDURE — 94660 CPAP INITIATION&MGMT: CPT | Mod: HCNC

## 2024-07-09 RX ORDER — POLYETHYLENE GLYCOL 3350 17 G/17G
17 POWDER, FOR SOLUTION ORAL DAILY
Status: DISCONTINUED | OUTPATIENT
Start: 2024-07-09 | End: 2024-07-25 | Stop reason: HOSPADM

## 2024-07-09 RX ORDER — ENOXAPARIN SODIUM 100 MG/ML
40 INJECTION SUBCUTANEOUS EVERY 24 HOURS
Status: DISCONTINUED | OUTPATIENT
Start: 2024-07-09 | End: 2024-07-17

## 2024-07-09 RX ORDER — ENOXAPARIN SODIUM 100 MG/ML
60 INJECTION SUBCUTANEOUS EVERY 24 HOURS
Status: DISCONTINUED | OUTPATIENT
Start: 2024-07-09 | End: 2024-07-09

## 2024-07-09 RX ADMIN — DIPHENHYDRAMINE HYDROCHLORIDE 10 ML: 25 SOLUTION ORAL at 01:07

## 2024-07-09 RX ADMIN — POLYETHYLENE GLYCOL 3350 17 G: 17 POWDER, FOR SOLUTION ORAL at 03:07

## 2024-07-09 RX ADMIN — FERROUS SULFATE TAB EC 325 MG (65 MG FE EQUIVALENT) 1 EACH: 325 (65 FE) TABLET DELAYED RESPONSE at 08:07

## 2024-07-09 RX ADMIN — ENOXAPARIN SODIUM 40 MG: 40 INJECTION SUBCUTANEOUS at 05:07

## 2024-07-09 RX ADMIN — INSULIN GLARGINE 15 UNITS: 100 INJECTION, SOLUTION SUBCUTANEOUS at 08:07

## 2024-07-09 RX ADMIN — CEFTAROLINE FOSAMIL 600 MG: 600 POWDER, FOR SOLUTION INTRAVENOUS at 12:07

## 2024-07-09 RX ADMIN — HYDROCODONE BITARTRATE AND ACETAMINOPHEN 1 TABLET: 10; 325 TABLET ORAL at 04:07

## 2024-07-09 RX ADMIN — MEROPENEM 1 G: 1 INJECTION INTRAVENOUS at 11:07

## 2024-07-09 RX ADMIN — Medication 10 ML: at 06:07

## 2024-07-09 RX ADMIN — CEFTAROLINE FOSAMIL 600 MG: 600 POWDER, FOR SOLUTION INTRAVENOUS at 11:07

## 2024-07-09 RX ADMIN — HYDROCODONE BITARTRATE AND ACETAMINOPHEN 1 TABLET: 10; 325 TABLET ORAL at 03:07

## 2024-07-09 RX ADMIN — LIDOCAINE 5% 1 PATCH: 700 PATCH TOPICAL at 05:07

## 2024-07-09 RX ADMIN — Medication 10 ML: at 12:07

## 2024-07-09 RX ADMIN — MEROPENEM 1 G: 1 INJECTION INTRAVENOUS at 08:07

## 2024-07-09 RX ADMIN — CEFTAROLINE FOSAMIL 600 MG: 600 POWDER, FOR SOLUTION INTRAVENOUS at 08:07

## 2024-07-09 RX ADMIN — MEROPENEM 1 G: 1 INJECTION INTRAVENOUS at 12:07

## 2024-07-09 RX ADMIN — DIPHENHYDRAMINE HYDROCHLORIDE 10 ML: 25 SOLUTION ORAL at 08:07

## 2024-07-09 RX ADMIN — Medication 10 ML: at 11:07

## 2024-07-09 RX ADMIN — MEROPENEM 1 G: 1 INJECTION INTRAVENOUS at 03:07

## 2024-07-09 RX ADMIN — SODIUM CHLORIDE: 9 INJECTION, SOLUTION INTRAVENOUS at 05:07

## 2024-07-09 RX ADMIN — CEFTAROLINE FOSAMIL 600 MG: 600 POWDER, FOR SOLUTION INTRAVENOUS at 03:07

## 2024-07-09 RX ADMIN — METOPROLOL SUCCINATE 100 MG: 100 TABLET, EXTENDED RELEASE ORAL at 09:07

## 2024-07-09 RX ADMIN — HYDROCODONE BITARTRATE AND ACETAMINOPHEN 1 TABLET: 10; 325 TABLET ORAL at 09:07

## 2024-07-09 NOTE — SUBJECTIVE & OBJECTIVE
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    Hyperlipidemia     Hypertension 2016    Sleep apnea        Past Surgical History:   Procedure Laterality Date    ARTHROTOMY OF SHOULDER Left 6/21/2024    Procedure: ARTHROTOMY, SHOULDER;  Surgeon: Roman Paulson MD;  Location: [facility];  Service: Orthopedics;  Laterality: Left;    INCISION AND DRAINAGE OF ABSCESS N/A 7/4/2024    Procedure: INCISION AND DRAINAGE, ABSCESS;  Surgeon: Gus Escobedo MD;  Location: [facility];  Service: General;  Laterality: N/A;  Abcess of anterior chest wall    IRRIGATION AND DEBRIDEMENT OF UPPER EXTREMITY Left 6/24/2024    Procedure: IRRIGATION AND DEBRIDEMENT, UPPER EXTREMITY;  Surgeon: Nathan Cooper MD;  Location: [facility];  Service: Orthopedics;  Laterality: Left;    IRRIGATION AND DEBRIDEMENT OF UPPER EXTREMITY Left 6/27/2024    Procedure: IRRIGATION AND DEBRIDEMENT, UPPER EXTREMITY;  Surgeon: Nathan Cooper MD;  Location: [facility];  Service: Orthopedics;  Laterality: Left;    OPEN REDUCTION AND INTERNAL FIXATION (ORIF) OF FRACTURE OF PROXIMAL HUMERUS Left 3/25/2024    Procedure: ORIF, FRACTURE, HUMERUS, PROXIMAL/IM HANNA, LEFT;  Surgeon: Nathan Cooper MD;  Location: [facility];  Service: Orthopedics;  Laterality: Left;  Accumed, Synthes Small Frag set Avelino verified 3/22/24 ark    THORACOSCOPIC DECORTICATION OF LUNG Right 7/5/2024    Procedure: VATS, WITH DECORTICATION, LUNG;  Surgeon: Gus Escobedo MD;  Location: [facility];  Service: Cardiothoracic;  Laterality: Right;    TOE AMPUTATION Right 7/1/2024    Procedure: AMPUTATION, TOE;  Surgeon: Stevie Zhu DPM;  Location: [facility];  Service: Podiatry;  Laterality: Right;       Review of patient's allergies indicates:  No Known Allergies    Family History    None         Tobacco Use    Smoking status: Never    Smokeless tobacco: Never   Substance and Sexual Activity    Alcohol use: Yes     Alcohol/week: 0.0  standard drinks of alcohol     Comment: social    Drug use: No    Sexual activity: Yes       Review of Systems  See HPI  Objective:     Temp:  [97.6 °F (36.4 °C)-98.9 °F (37.2 °C)] 97.6 °F (36.4 °C)  Pulse:  [64-87] 87  Resp:  [16-22] 20  SpO2:  [90 %-94 %] 90 %  BP: (103-112)/(56-72) 110/67  Weight: 133 kg (293 lb 3.4 oz)  Body mass index is 39.77 kg/m².           Drains       Drain  Duration             Male External Urinary Catheter 06/25/24 2000 13 days         Closed/Suction Drain 07/04/24 1125 Tube - 1 Inferior;Left Chest Accordion 19 Fr. 4 days         Chest Tube 07/05/24 1829 Tube - 1 Right Pleural 28 Fr. 3 days                     Physical Exam  Constitutional:       Appearance: He is obese.   Pulmonary:      Comments: CPAP. R Chest tube with serous output.  L chest drain SS.    Genitourinary:     Comments: Condom cath in place draining clear yellow urine          Significant Labs:    BMP:  Recent Labs   Lab 07/06/24  0534 07/07/24  0353 07/08/24  0653    137 138   K 4.2 4.5 4.6    104 106   CO2 26 26 26   BUN 31* 33* 30*   CREATININE 1.2 1.1 1.1   CALCIUM 7.7* 7.8* 8.6*       CBC:  Recent Labs   Lab 07/06/24  0534 07/07/24  0353 07/08/24  0653   WBC 11.62 11.58 13.01*   HGB 8.1* 8.1* 9.2*   HCT 26.1* 26.2* 31.3*    389 464*       All pertinent labs results from the past 24 hours have been reviewed.    Significant Imaging:  All pertinent imaging results/findings from the past 24 hours have been reviewed.

## 2024-07-09 NOTE — ASSESSMENT & PLAN NOTE
Patient's FSGs are controlled on current medication regimen.  Last A1c reviewed-   Lab Results   Component Value Date    HGBA1C 6.3 (H) 06/14/2024     Most recent fingerstick glucose reviewed-   Recent Labs   Lab 07/08/24  1611 07/09/24  0751 07/09/24  1148   POCTGLUCOSE 112* 95 124*       Current correctional scale  Low  Maintain anti-hyperglycemic dose as follows-   Antihyperglycemics (From admission, onward)    Start     Stop Route Frequency Ordered    07/08/24 0900  insulin glargine U-100 (Lantus) pen 15 Units         -- SubQ Daily 07/07/24 1532    07/07/24 1529  insulin aspart U-100 pen 0-10 Units         -- SubQ Before meals & nightly PRN 07/07/24 1532        Hold Oral hypoglycemics while patient is in the hospital.

## 2024-07-09 NOTE — CONSULTS
[facility]  Urology  Consult Note    Patient Name: Roosevelt Moser  MRN: [id number]  Admission Date: 7/7/2024  Hospital Length of Stay: 2   Code Status: Full Code   Attending Provider: Roopa Rowan MD   Consulting Provider: Rosi Schneider MD  Primary Care Physician: Miriam, Primary Doctor  Principal Problem:Osteomyelitis of multiple sites    Inpatient consult to Urology  Consult performed by: Rosi Schneider MD  Consult ordered by: Roopa Rowan MD  Reason for consult: distal L ureteral stone          Subjective:     HPI:  Mr. Moser is a pleasant 73 yoM with PMH including Afib on eliquis, HTN, T2DM and MARA who is transferred from [place] for a chest wall abscess with concern for osteomyelitis of the anterior left 6-8th ribs. He originally presented to the [facility] after a fall resulting in a left humeral fracture for which he underwent ORIF in March. He represented in June with a left shoulder abscess and underwent multiple washouts and removal of the ORIF hardware. His hospital stay was complicated by bilateral effusions and a left chest wall abscess with extension into the epigastrium and mediastinum with concern for osteomyelitis of the left 6-8th ribs. He underwent I+D of the abscess through a small left anterior chest incision with drain placement on 7/4, thoracentesis of the left pleural effusion on 7/4, and right-sided VATs with washout and chest tube placement on 7/5. Interestingly, cultures from the pleural fluid and chest wall abscess cavity have been negative despite the abscess cavity fluid being described as creamy pus by the [facility] op report. The only positive cultures so far have been from those collected by podiatry during a toe amp which were positive for MRSA.     [place] [facility] interventions including more invasive debridements and reconstruction.    Urology consulted for distal L ureteral stone and concern for R UVJ stone.      On assessment, he is AFVSS, on CPAP.  No recent urin obtained.  Last urine micro obtained on 6/14, was concerning for infection but urine culture with no growth.  Has been on IV abx since admission on 6/14 and is currently on meropenem and ceftaroline.  Wife at beside denies subjective fevers, chills.  WBC 13 (11).  Cr on admission at OSH on 6/14 was 5.3.  MARBIN resolved with IV fluids and is now at baseline of 1.1.     CTAP w/ IV contrast shows 2 cm collection of stones within the distal L ureter along with upstream hydroureteronephrosis.  There was also noted to be concern for R staghorn calculus vs retained contrast within the collecting system.  There is no R hydronephrosis.  Of note, staghorn calculus not visualized on renal US obtained 6/14.  CTRSS ordered by urology unfortunately obtained after MRI w/ contrast but it appears that there is no R staghorn stone on the right, just retained contrast within the collecting system.      Has been off his home elliquis throughout admission.      Past Medical History:   Diagnosis Date    A-fib 2024    Diabetes mellitus, type 2 2021    Guillain-Rowe 10/2003    Hyperlipidemia     Hypertension 2016    Sleep apnea        Past Surgical History:   Procedure Laterality Date    ARTHROTOMY OF SHOULDER Left 6/21/2024    Procedure: ARTHROTOMY, SHOULDER;  Surgeon: Roman Paulson MD;  Location: [facility] OR;  Service: Orthopedics;  Laterality: Left;    INCISION AND DRAINAGE OF ABSCESS N/A 7/4/2024    Procedure: INCISION AND DRAINAGE, ABSCESS;  Surgeon: Gus Escobedo MD;  Location: [facility] OR;  Service: General;  Laterality: N/A;  Abcess of anterior chest wall    IRRIGATION AND DEBRIDEMENT OF UPPER EXTREMITY Left 6/24/2024    Procedure: IRRIGATION AND DEBRIDEMENT, UPPER EXTREMITY;  Surgeon: Nathan Cooper MD;  Location: [facility] OR;  Service: Orthopedics;  Laterality: Left;    IRRIGATION AND DEBRIDEMENT OF UPPER EXTREMITY Left 6/27/2024    Procedure: IRRIGATION AND DEBRIDEMENT, UPPER EXTREMITY;  Surgeon: Nathan Cooper  MD;  Location: [facility];  Service: Orthopedics;  Laterality: Left;    OPEN REDUCTION AND INTERNAL FIXATION (ORIF) OF FRACTURE OF PROXIMAL HUMERUS Left 3/25/2024    Procedure: ORIF, FRACTURE, HUMERUS, PROXIMAL/IM HANNA, LEFT;  Surgeon: Nathan Cooper MD;  Location: [facility];  Service: Orthopedics;  Laterality: Left;  Accumed, Synthes Small Frag set Avelino verified 3/22/24 ark    THORACOSCOPIC DECORTICATION OF LUNG Right 7/5/2024    Procedure: VATS, WITH DECORTICATION, LUNG;  Surgeon: Gus Escobedo MD;  Location: [facility] OR;  Service: Cardiothoracic;  Laterality: Right;    TOE AMPUTATION Right 7/1/2024    Procedure: AMPUTATION, TOE;  Surgeon: Stevie Zhu DPM;  Location: [facility];  Service: Podiatry;  Laterality: Right;       Review of patient's allergies indicates:  No Known Allergies    Family History    None         Tobacco Use    Smoking status: Never    Smokeless tobacco: Never   Substance and Sexual Activity    Alcohol use: Yes     Alcohol/week: 0.0 standard drinks of alcohol     Comment: social    Drug use: No    Sexual activity: Yes       Review of Systems  See HPI  Objective:     Temp:  [97.6 °F (36.4 °C)-98.9 °F (37.2 °C)] 97.6 °F (36.4 °C)  Pulse:  [64-87] 87  Resp:  [16-22] 20  SpO2:  [90 %-94 %] 90 %  BP: (103-112)/(56-72) 110/67  Weight: 133 kg (293 lb 3.4 oz)  Body mass index is 39.77 kg/m².           Drains       Drain  Duration             Male External Urinary Catheter 06/25/24 2000 13 days         Closed/Suction Drain 07/04/24 1125 Tube - 1 Inferior;Left Chest Accordion 19 Fr. 4 days         Chest Tube 07/05/24 1829 Tube - 1 Right Pleural 28 Fr. 3 days                     Physical Exam  Constitutional:       Appearance: He is obese.   Pulmonary:      Comments: CPAP. R Chest tube with serous output.  L chest drain SS.    Genitourinary:     Comments: Condom cath in place draining clear yellow urine          Significant Labs:    BMP:  Recent Labs   Lab 07/06/24  0534 07/07/24  0353 07/08/24  0653   NA  139 137 138   K 4.2 4.5 4.6    104 106   CO2 26 26 26   BUN 31* 33* 30*   CREATININE 1.2 1.1 1.1   CALCIUM 7.7* 7.8* 8.6*       CBC:  Recent Labs   Lab 07/06/24  0534 07/07/24  0353 07/08/24  0653   WBC 11.62 11.58 13.01*   HGB 8.1* 8.1* 9.2*   HCT 26.1* 26.2* 31.3*    389 464*       All pertinent labs results from the past 24 hours have been reviewed.    Significant Imaging:  All pertinent imaging results/findings from the past 24 hours have been reviewed.                    Assessment and Plan:     Nephrolithiasis  73M w/ large distal L ureteral stone burden.    - ok to follow up outpatient for L ureteral stone.  No MARBIN or concern for infection.    - if patient clinically decompensates or becomes febrile, please reach out to urology for more urgent stone management.    - will reach out to CTS for dispo.  If patient has a prolonged inpatient course or discharges to a prolonged rehab, would consider inpatient stone management.    - Pain control and IV abx per primary.    - ok for diet from urology perspective          VTE Risk Mitigation (From admission, onward)           Ordered     Place sequential compression device  Until discontinued         07/07/24 2465                    Thank you for your consult.     Rosi Schneider MD  Urology  [facility]

## 2024-07-09 NOTE — ASSESSMENT & PLAN NOTE
"Osteomyelitis of toe   MRSA infection   Skin ulcer of toe of right foot with necrosis of bone  Chest wall abscess  Loculated pleural effusion  Abscess of left shoulder  Pyogenic abscess of left shoulder region  Mediastinal lymphadenopathy  Humerus fracture    -status post left thoracentesis 1500cc serosanguineous fluid drained 7/4 & I&D drainage with chest tube placement 7/4 & loculated effusions right chest s/p VATS and chest tube 7/5   -Left shoulder prosthetic joint infection s/p I&D and removal of deep hardware 6/21 - all screws and nails removed, s/p 2nd washout 6/24 & 3rd washout 6/27   -Left Shoulder washouts on 6/21 ( abundant pus in shoulder, hardware removal), 6/24 ( bloody brownish fluid) and 6/27 ( no fluid collection, healthy red and beefy tissue)   -Right 3rd toe osteomyelitis s/p Dalvance x 2 doses & 2nd distal phalanx s/p I&D at bedside, s/p 2nd toe amputation 7/1       - Presents as a transfer for thoracic surgery and Plastic surgery evaluation in setting of concern for "osteomyelitis of multiple ribs which will need further debridement and then a flap placed" in the setting of chest wall abscess  -arrives on ceftaroline and meropenem as per outside hospital ID recommendations  -notably, 7/3 blood cultures, 7/4 pleural fluid cultures, 7/4 abscess cultures, 755 pleural fluid cultures NGTD  -6/18 right toe wound culture with MRSA  -continue to follow OSH cultures  -ongoing wound care  -ID, Ortho, Thoracic surgery and Plastic surgery consultedn  -per thoracic surgery, CT chest and abdomen with IV contrast; right chest tube to water seal; left chest accordion drain to negative pressure  -per ortho:  MRI shoulder with without contrast left, CT shoulder without contrast left, CT 3D rendering  -thus far, no acute surgical intervention; remains on IV antibiotics pending Infectious Disease plan  "

## 2024-07-09 NOTE — NURSING
[facility] Plan of Care Note    Dx:   Osteomyelitis [M86.9]    Shift Events: chest tube removed per MD    Goals of Care: pain management, turn q2h, drain care    Neuro: alert, oriented to person and place    Vital Signs: BP (!) 118/53   Pulse 87   Temp 98.4 °F (36.9 °C)   Resp (!) 24   Ht 6' (1.829 m)   Wt 133 kg (293 lb 3.4 oz)   SpO2 96%   BMI 39.77 kg/m²     Respiratory: LS diminished bilaterally, Spo2 wanes 88-93% on 3-4L NC. Patient repeatedly taking off NC, redirectable    Cardiac: A-fib on telemetry, no ectopy    Diet: Diet diabetic Cardiac (Low Na/Chol), Renal; 2000 Calorie  Dietary nutrition supplements Catarino - Any flavor,Dietary nutrition supplements Promod Liquid Protein - Fruit Punch,Dietary nutrition supplements Boost Plus - Any flavor    Is patient tolerating current diet? Able to take small bites of lunch/dinner, more compliant with supplements    GTTS: NS running @ 75 ml/hr, IV antibx    Urine Output/Bowel Movement:   No intake/output data recorded.  Last Bowel Movement: 07/09/24      Drains/Tubes/Tube Feeds (include total output/shift):   No intake/output data recorded.      Lines: Double lumen PICC to R upper arm, purple lumen with +BR,   red lumen flushed without difficulty, positional with BR      Accuchecks:ACHS no coverage needed    Skin: stage 2 to buttocks, see wound care notes and media    Fall Risk Score: see flowsheets    Activity level? Rolling in bed, Max 4-6 assist    Any scheduled procedures? none    Any safety concerns? N/a    Other: n/a

## 2024-07-09 NOTE — PROGRESS NOTES
.Plastic and Reconstructive Surgery   Progress Note    Subjective:    NAEO. CT reviewed. F/u CTS recs. Ortho on board    Objective:  Vital signs in last 24 hours:  Temp:  [97.6 °F (36.4 °C)-98.7 °F (37.1 °C)] 98.4 °F (36.9 °C)  Pulse:  [78-88] 80  Resp:  [16-22] 20  SpO2:  [90 %-98 %] 93 %  BP: (100-137)/(53-67) 113/60    Intake/Output last 3 shifts:  I/O last 3 completed shifts:  In: -   Out: 1260 [Urine:900; Drains:120; Chest Tube:240]    Intake/Output this shift:  I/O this shift:  In: -   Out: 667 [Urine:550; Drains:110; Chest Tube:7]        Physical Exam:  VITAL SIGNS:   Vitals:    24 0012 24 0347 24 0404 24 0405   BP:   113/60    BP Location:   Left arm    Patient Position:   Lying    Pulse: 86 84 80    Resp: 18  18 20   Temp:   98.4 °F (36.9 °C)    TempSrc:   Oral    SpO2: 95%  (!) 93%    Weight:       Height:         TMAX: Temp (24hrs), Av.2 °F (36.8 °C), Min:97.6 °F (36.4 °C), Max:98.7 °F (37.1 °C)      General: Alert; No acute distress  Cardiovascular: Regular rate   Respiratory: Normal respiratory effort. Chest rise symmetric.   Abdomen: Soft, nontender, nondistended  Extremity: Moves all extremities equally.  Neurologic: No focal deficit. Speech normal  Left drain 130cc seropurelent  Right chest tube to waterseal      Scheduled Medications (Magic mouthwash) 1:1:1 diphenhydrAMINE(Benadryl) 12.5mg/5ml liq, aluminum & magnesium hydroxide-simethicone (Maalox), LIDOcaine viscous 2%, 10 mL, QID  ceftaroline (Teflaro) IV (PEDS and ADULTS), 600 mg, Q8H  ferrous sulfate, 1 tablet, Daily  glutamine, 0.5 g, Daily  insulin glargine U-100, 15 Units, Daily  LIDOcaine, 1 patch, Q24H  meropenem IV (PEDS and ADULTS), 1 g, Q8H  metoprolol succinate, 100 mg, QHS  sodium chloride 0.9%, 10 mL, Q6H        PRN Medications     Current Facility-Administered Medications:     albuterol-ipratropium, 3 mL, Nebulization, Q4H PRN    aluminum & magnesium hydroxide-simethicone, 15 mL, Oral, QID PRN    dextrose  10%, 12.5 g, Intravenous, PRN    dextrose 10%, 25 g, Intravenous, PRN    glucagon (human recombinant), 1 mg, Intramuscular, PRN    glucose, 16 g, Oral, PRN    glucose, 24 g, Oral, PRN    HYDROcodone-acetaminophen, 1 tablet, Oral, Q4H PRN    HYDROcodone-acetaminophen, 1 tablet, Oral, Q4H PRN    insulin aspart U-100, 0-10 Units, Subcutaneous, QID (AC + HS) PRN    naloxone, 0.02 mg, Intravenous, PRN    ondansetron, 8 mg, Oral, Q8H PRN    polyethylene glycol, 17 g, Oral, BID PRN    promethazine, 25 mg, Oral, Q6H PRN    senna-docusate 8.6-50 mg, 1 tablet, Oral, BID PRN    simethicone, 1 tablet, Oral, QID PRN    sodium chloride 0.9%, 10 mL, Intravenous, Q12H PRN    Flushing PICC/Midline Protocol, , , Until Discontinued **AND** sodium chloride 0.9%, 10 mL, Intravenous, Q6H **AND** sodium chloride 0.9%, 10 mL, Intravenous, PRN    traMADoL, 50 mg, Oral, Q4H PRN    Recent Labs:   Lab Results   Component Value Date    WBC 11.53 07/09/2024    HGB 8.8 (L) 07/09/2024    HCT 28.4 (L) 07/09/2024    MCV 82 07/09/2024     (H) 07/09/2024     Lab Results   Component Value Date    GLU 73 07/09/2024     07/09/2024    K 4.4 07/09/2024     07/09/2024    BUN 25 (H) 07/09/2024         Assessment: 73 y.o. y/o male w/ concern for left sided rib osteomyelitis      Plan  -defer to CTS for workup and possible operative intervention  - if needed, we will be available for flap coverage  - rest of care per primary      Shaylee Chang MD- Fellow  Department of Plastic and Reconstructive Surgery

## 2024-07-09 NOTE — ASSESSMENT & PLAN NOTE
73M w/ large distal L ureteral stone burden.    - ok to follow up outpatient for L ureteral stone.  No MARBIN or concern for infection.    - if patient clinically decompensates or becomes febrile, please reach out to urology for more urgent stone management.    - will reach out to CTS for dispo.  If patient has a prolonged inpatient course or discharges to a prolonged rehab, would consider inpatient stone management.    - Pain control and IV abx per primary.

## 2024-07-09 NOTE — ASSESSMENT & PLAN NOTE
Left chest wall abscess noted at OSH w/ concern for osteomyelitis transferred to [facility] for thoracic and plastic surgery eval  Thoracentesis at OSH 7/4 w/ serosanguineous fluid, f/b I&D and chest tube placement; loculated effusions right chest s/p VATS and chest tube placement on 7/5. Light's criteria consistent with exudative effusion (Cell count 1100 WBCs, 72% lymphocytes, , pH 7.6). No organisms seen on gram stain from L pleural fluid or chest wall abscess    Imaging from 7/8:     CT C/A/P:    - Residual collections in the left chest s/p I&D drainage; No evidence of osteo in adjacent ribs.     - Septated cystic lesion in right kidney, indeterminate for malignancy. Further evaluation with Renal mass protocol CT performed notable for obstructing stone with mild left sided hydroureteronephrosis     CT Shoulder:    - Large abscess vs hematoma at proximal humerus fracture site, c/f possible septic arthritis     MRI Shoulder:    - Septic arthritis of GH joint w/ acute osteo of the glenoid and proximal humerus; Several large soft tissue abscesses, Rim enhancing fluid collection w/in the medulla of the humeral shart (possible intraosseous abscess); myositis of deltoid & rotator cuff      Plan:    - Evaluated by CTS: Awaiting infectious/inflammatory process to resolve prior to surgery. Plans for potential ribs resection after source control is achieved. Plastics will be available for flap coverage   - Urology consulted for L ureteral stone: Okay to f/u OutPt; if pt decompensates --> urgent Uro consult for stone management  - Ortho consulted for Pyogenic arthritis of L shoulder: Deferring arthrocentesis (given improved inflammatory markers, no growth from I&D's), will monitor progress on abx     ID Recs:  - Continue to f/u cx  - Continue Ceftaroline 600 mg IV q.8   - Continue meropenem for now, will likely stop in the coming days

## 2024-07-09 NOTE — SUBJECTIVE & OBJECTIVE
Interval History:    ANTONELLA, states he is feeling well this am other than some pain that is prohibiting him from participating in PT/OT    Review of Systems   Constitutional:  Positive for appetite change. Negative for fever.   HENT:  Negative for congestion, sore throat and trouble swallowing.    Eyes:  Negative for visual disturbance.   Respiratory:  Positive for cough (dry). Negative for shortness of breath and wheezing.    Cardiovascular:  Negative for chest pain, palpitations and leg swelling.   Gastrointestinal:  Negative for abdominal pain, constipation, diarrhea, nausea and vomiting.   Genitourinary:  Negative for decreased urine volume and dysuria.   Musculoskeletal:  Negative for back pain.   Skin:  Positive for wound.   Neurological:  Negative for dizziness, light-headedness and headaches.   Psychiatric/Behavioral:  Negative for agitation, confusion and decreased concentration.      Objective:     Vital Signs (Most Recent):  Temp: 98.4 °F (36.9 °C) (07/09/24 1111)  Pulse: 80 (07/09/24 1119)  Resp: 18 (07/09/24 1111)  BP: (!) 118/53 (07/09/24 1111)  SpO2: (!) 94 % (07/09/24 1119) Vital Signs (24h Range):  Temp:  [97.6 °F (36.4 °C)-98.7 °F (37.1 °C)] 98.4 °F (36.9 °C)  Pulse:  [65-88] 80  Resp:  [16-20] 18  SpO2:  [90 %-98 %] 94 %  BP: (100-140)/(53-69) 118/53     Weight: 133 kg (293 lb 3.4 oz)  Body mass index is 39.77 kg/m².    Estimated Creatinine Clearance: 94.3 mL/min (based on SCr of 1 mg/dL).     Physical Exam  Vitals and nursing note reviewed.   Constitutional:       General: He is not in acute distress.     Appearance: He is ill-appearing (chronically). He is not toxic-appearing or diaphoretic.   HENT:      Head: Normocephalic and atraumatic.      Nose: Nose normal.      Mouth/Throat:      Pharynx: Oropharynx is clear.   Eyes:      General: No scleral icterus.  Cardiovascular:      Rate and Rhythm: Normal rate and regular rhythm.      Pulses: Normal pulses.      Heart sounds: Normal heart sounds.    Pulmonary:      Effort: Pulmonary effort is normal.      Comments: No obvious rales or wheezes when auscultating anterior chest; unable to complete posterior auscultation due to limited mobility of patient  Chest:      Chest wall: No deformity or tenderness.      Comments: Right chest tube to water seal with serous output  Left chest wall drain with seropurulent output  Abdominal:      General: Bowel sounds are normal.      Palpations: Abdomen is soft.      Tenderness: There is no abdominal tenderness.   Musculoskeletal:      Cervical back: Normal range of motion and neck supple.      Right lower leg: No edema.      Left lower leg: No edema.   Skin:     General: Skin is warm.      Capillary Refill: Capillary refill takes less than 2 seconds.      Comments: Unable to examine posterior skin due to patient debility   Neurological:      Mental Status: He is alert and oriented to person, place, and time.   Psychiatric:         Behavior: Behavior normal.          Significant Labs: All pertinent labs within the past 24 hours have been reviewed.    Significant Imaging: I have reviewed all pertinent imaging results/findings within the past 24 hours.

## 2024-07-09 NOTE — ASSESSMENT & PLAN NOTE
Patient with Long standing persistent (>12 months) atrial fibrillation which is controlled currently with Beta Blocker. Patient is currently in TBD.[id number] Score: 2.      Chronic AF w/ acute RVR at OSH, resolved s/p cardizem drip   NOAC has been on hold d/t possible need for surgery - has been on prophylaxis dose of Lovenox  Resume A/c pending any surgical intervention, consider lovenox vs hep gtt bridge

## 2024-07-09 NOTE — ASSESSMENT & PLAN NOTE
Patient with acute kidney injury/acute renal failure likely due to  due to ATN due to sepsis due to UTI and/or PNA  MARBIN is currently improving. Baseline creatinine  wnl  - Labs reviewed- Renal function/electrolytes with Estimated Creatinine Clearance: 94.3 mL/min (based on SCr of 1 mg/dL). according to latest data. Monitor urine output and serial BMP and adjust therapy as needed. Avoid nephrotoxins and renally dose meds for GFR listed above.

## 2024-07-09 NOTE — PROGRESS NOTES
Thoracic Surgery     Mr Moser is 73M with complicated infectious history and multiple sites of osteomyelitis. Most recently he developed an abscess of the anterior abdominal wall/lower chest that tracked into the anterior mediastinum and has resulted in osteo/rib destruction of at least the 6th and 7th rib.  At OSH he underwent right VATS and I&D of the abscess with VAC placement.Interval scan performed yesterday showed near resolution of the abscess.  The patient will likely need multiple ribs resected but with source control obtained, I feel its prudent to allow his acute infectious/inflammatory process to resolve prior to definitive surgery.      Plan:  Will plan for IV antibiotics, will confirm with Infectious Disease how long the course should be  Will coordinate w/ PRS our operative plan.  Will likely need LTACH placement. Will plan for Multidisciplinary visit with PRS staff with repeat imaging in several weeks     Note: Patient has severe shoulder pain during imaging studies. OK with cautious pain mgmt to facilitate important imaging    Lucio Bansal M.D.  [phone] (direct)  Thoracic Surgery   [facility]

## 2024-07-09 NOTE — ASSESSMENT & PLAN NOTE
Roosevelt Moser is a 73 y.o. male presenting with left shoulder septic arthritis s/p multiple I&Ds by Dr. Cooper at [facility]. On exam, he had 0/10 shoulder pain but he is unable to range the shoulder due to absence of shoulder joint after hardware removal. His last I&D was on 06/24. His inflammatory markers are down trending. Pt is currently admitted for Cardiothoracic and plastic surgery workup. Given his recent I&Ds, absence of clinical exam findings correlating with septic shoulder, improving inflammatory markers,  and absence of growth from previous I&D, we elected to defer shoulder arthrocentesis and to monitor patients progress on antibiotics.     - Antibiotics: Continue per ID  - ROM as tolerated LUE  - DVT Prophylaxis: SCDs at all times while in bed  - Pain control: multimodal pain management  - Will discuss further management with staff

## 2024-07-09 NOTE — NURSING
Pt arrived to the floor from CT and MRI at this time. A/Ox4. Placed on 4L nC. O2-84%. 95% post O2 placement. Chest tube had fluid in all chambers, new atrium placed. Pt had urine and stool occ. Cleaned, with new condom cath placed. PRN pain med administered. Cpap placed post med admin. Wife called for update.

## 2024-07-09 NOTE — PLAN OF CARE
[facility]  Discharge Reassessment    Primary Care Provider: No, Primary Doctor    Expected Discharge Date: 7/12/2024    Reassessment (most recent)       Discharge Reassessment - 07/09/24 1301          Discharge Reassessment    Assessment Type Discharge Planning Reassessment     Did the patient's condition or plan change since previous assessment? No     Discharge Plan discussed with: Patient     Communicated KAMILA with patient/caregiver Yes     Discharge Plan A Home with family     Discharge Plan B Home Health     Transition of Care Barriers None     Why the patient remains in the hospital Requires continued medical care                   Plan for [facility]. [facility].

## 2024-07-09 NOTE — HPI
Roosevelt Moser is a 72yo M w/ A fib, HTN, T2DM, MARA, HLD, hx GBS, hx displaced comminuted fracture of the left humerus s/p fall 3/4/24 s/p ORIF 03/25/2024 who presents as a transfer for thoracic surgery and Plastic surgery evaluation in setting of osteomyelitis of multiple ribs which will need further debridement and then a flap placed. Orthopedic suregry was consulted for evaluation of left shoulder. Pt had a prosthetic joint infection ad subsequent hardware removal and multiple I&Ds at [facility]. Last I&D of the shoulder was 14 days ago. Cultures from his multiple I&Ds are NGTD. On exam, he reports 0/10 left shoulder pain. He is unable to move the shoulder and elbow joints.

## 2024-07-09 NOTE — PT/OT/SLP PROGRESS
Physical Therapy Treatment    Patient Name:  Roosevelt Moser   MRN:  [id number]    Recommendations:     Discharge Recommendations: Moderate Intensity Therapy  Discharge Equipment Recommendations: hospital bed, wheelchair, lift device  Barriers to discharge: Inaccessible home and Decreased caregiver support    Assessment:     Roosevelt Moser is a 73 y.o. male admitted with a medical diagnosis of Osteomyelitis of multiple sites.  He presents with the following impairments/functional limitations: weakness, impaired endurance, impaired self care skills, impaired functional mobility, gait instability, impaired balance, decreased upper extremity function, decreased lower extremity function, decreased safety awareness, pain, orthopedic precautions.    Rehab Prognosis: Fair; patient would benefit from acute skilled PT services to address these deficits and reach maximum level of function.    Recent Surgery: * No surgery found *      Plan:     During this hospitalization, patient to be seen 4 x/week to address the identified rehab impairments via gait training, therapeutic activities, therapeutic exercises, neuromuscular re-education and progress toward the following goals:    Plan of Care Expires:  08/08/24    Subjective     Chief Complaint: pain in LUE  Patient/Family Comments/goals: return home  Pain/Comfort:  Pain Rating 1:  (not rated)  Location - Side 1: Left  Location 1: arm  Pain Addressed 1: Reposition, Distraction, Cessation of Activity  Pain Rating Post-Intervention 1: other (see comments) (not rated)      Objective:     Communicated with RN prior to session.  Patient found supine with PICC line, Condom Catheter, hemovac, chest tube, peripheral IV, telemetry, pulse ox (continuous), pressure relief boots upon PT entry to room.     General Precautions: Standard, fall  Orthopedic Precautions: LUE partial weight bearing  Braces:  (CAM/Walking Boot)  Respiratory Status:  CPAP while sleeping     Functional Mobility:  Bed  Mobility:     Rolling Left:  total assistance and of 2 persons  Rolling Right: total assistance and of 2 persons  Scooting: total assistance and of 2 persons  Supine to Sit: pt refused to attempt 2/2 LUE pain      AM-PAC 6 CLICK MOBILITY  Turning over in bed (including adjusting bedclothes, sheets and blankets)?: 1  Sitting down on and standing up from a chair with arms (e.g., wheelchair, bedside commode, etc.): 1  Moving from lying on back to sitting on the side of the bed?: 1  Moving to and from a bed to a chair (including a wheelchair)?: 1  Need to walk in hospital room?: 1  Climbing 3-5 steps with a railing?: 1  Basic Mobility Total Score: 6       Treatment & Education:  Patient educated on role of therapy, goals of session, and benefits of mobilizing.   Discussed PT plan of care during hospitalization.   Patient educated on calling for assistance.   Patient educated on how their diagnosis impacts their mobility within PT scope of practice.   Communication board up to date.  All questions answered within PT scope of practice.    Patient left HOB elevated with all lines intact, call button in reach, and pt's spouse present.     GOALS:   Multidisciplinary Problems       Physical Therapy Goals          Problem: Physical Therapy    Goal Priority Disciplines Outcome Goal Variances Interventions   Physical Therapy Goal     PT, PT/OT Progressing     Description: Goals to be met by: 24     Patient will increase functional independence with mobility by performin. Supine to sit with Moderate Assistance  2. Sit to supine with Moderate Assistance  3. Rolling to Left and Right with Minimal Assistance.  4. Sit to stand transfer with Moderate Assistance  5. Bed to chair transfer with Maximum Assistance using LRAD  6. Gait  x 10 feet with Maximum Assistance using LRAD.   7. Lower extremity exercise program x10 reps per handout, with independence    DME Justifications (see above for complete DME  recommendations)    Hospital Bed ·Patient requires a hospital bed for positioning of the body in ways that are not feasible with an ordinary bed. The patient requires special positioning for pain relief, limited mobility, and/or being unable to independently make changes in body position without the use of a hospital bed. Pillows and wedges will not be adequate for resolving these positional issues.                         Time Tracking:     PT Received On: 07/09/24  PT Start Time: 1352     PT Stop Time: 1417  PT Total Time (min): 25 min     Billable Minutes: Therapeutic Activity 25    Treatment Type: Treatment  PT/PTA: PT     Number of PTA visits since last PT visit: 0     07/09/2024

## 2024-07-09 NOTE — ASSESSMENT & PLAN NOTE
Nephrolithiasis    6/14 retroperitoneal ultrasound completed in setting of MARBIN without hydronephrosis   7/8 CT abdomen notable for 1.2 cm obstructing stone in left distal ureter with mild hydroureteronephrosis       Appreciate urology recommendations; given no concerns regarding MARBIN or infection relation to the stone, no acute surgical intervention  Follow-up outpatient; strain all urine

## 2024-07-09 NOTE — PLAN OF CARE
[facility] Plan of Care Note     Dx:   Osteomyelitis [M86.9]     Shift Events: Pt arrived to the floor from CT and MRI at this time. A/Ox4. Placed on 4L NC. O2-84%. 95% post O2 placement. Chest tube had fluid in all chambers, new atrium placed. Pt had urine and stool occ. Cleaned, with new condom cath placed. PRN pain med administered. Cpap placed post med admin. Wife called for update.       Goals of Care: Goals of care ongoing and progressing.      Neuro: A/Ox4     Vital Signs: VSS    Respiratory: 4L NC     Diet: Diet NPO Except for: Sips with Medication  Dietary nutrition supplements Boost Plus - Any flavor     Is patient tolerating current diet? NA     GTTS: NaCl 75ml/hr     Urine Output/Bowel Movement:   No intake/output data recorded.  Last Bowel Movement: 07/07/24 Purewick        Drains/Tubes/Tube Feeds (include total output/shift):   Chest tube to R chest to water seal, accordion drain to LUQ of abd         Lines: PICC [id number] to LUE        Accuchecks:NA     Skin: Sacral wx, accordion drain and chest tube incision site, shoulder incision     Fall Risk Score: High     Activity level? Bed rest, in need of pt/ot     Any scheduled procedures? NPO since midnight for possible procedure today. Unsure of what procedure-7/9     Any safety concerns? NA    Problem: Adult Inpatient Plan of Care  Goal: Plan of Care Review  Outcome: Progressing  Goal: Patient-Specific Goal (Individualized)  Outcome: Progressing  Goal: Absence of Hospital-Acquired Illness or Injury  Outcome: Progressing  Goal: Optimal Comfort and Wellbeing  Outcome: Progressing  Goal: Readiness for Transition of Care  Outcome: Progressing     Problem: Diabetes Comorbidity  Goal: Blood Glucose Level Within Targeted Range  Outcome: Progressing     Problem: Sepsis/Septic Shock  Goal: Optimal Coping  Outcome: Progressing  Goal: Absence of Bleeding  Outcome: Progressing  Goal: Blood Glucose Level Within Targeted Range  Outcome: Progressing  Goal: Absence of  Infection Signs and Symptoms  Outcome: Progressing  Goal: Optimal Nutrition Intake  Outcome: Progressing     Problem: Acute Kidney Injury/Impairment  Goal: Fluid and Electrolyte Balance  Outcome: Progressing  Goal: Improved Oral Intake  Outcome: Progressing  Goal: Effective Renal Function  Outcome: Progressing     Problem: Infection  Goal: Absence of Infection Signs and Symptoms  Outcome: Progressing     Problem: Wound  Goal: Optimal Coping  Outcome: Progressing  Goal: Optimal Functional Ability  Outcome: Progressing  Goal: Absence of Infection Signs and Symptoms  Outcome: Progressing  Goal: Improved Oral Intake  Outcome: Progressing  Goal: Optimal Pain Control and Function  Outcome: Progressing  Goal: Skin Health and Integrity  Outcome: Progressing  Goal: Optimal Wound Healing  Outcome: Progressing     Problem: Skin Injury Risk Increased  Goal: Skin Health and Integrity  Outcome: Progressing     Problem: Fall Injury Risk  Goal: Absence of Fall and Fall-Related Injury  Outcome: Progressing

## 2024-07-09 NOTE — SUBJECTIVE & OBJECTIVE
Interval History:  Patient seen and examined at bedside.  Wife at bedside.  No acute events overnight.  Patient has no acute complaints this morning.  Patient and wife updated regarding care plan.  She is asking about his therapy and nutrition needs.      Review of Systems   Constitutional:  Positive for appetite change. Negative for fever.   HENT:  Negative for congestion, sore throat and trouble swallowing.    Eyes:  Negative for visual disturbance.   Respiratory:  Positive for cough (dry). Negative for shortness of breath and wheezing.    Cardiovascular:  Negative for chest pain, palpitations and leg swelling.   Gastrointestinal:  Negative for abdominal pain, constipation, diarrhea, nausea and vomiting.   Genitourinary:  Negative for decreased urine volume and dysuria.   Musculoskeletal:  Negative for back pain.   Skin:  Positive for wound.   Neurological:  Negative for dizziness, light-headedness and headaches.   Psychiatric/Behavioral:  Negative for agitation, confusion and decreased concentration.        Objective:    Temp: 98.4 °F (36.9 °C) (07/09/24 1111)  Pulse: 80 (07/09/24 1119)  Resp: 18 (07/09/24 1111)  BP: (!) 118/53 (07/09/24 1111)  SpO2: (!) 94 % (07/09/24 1119)    Weight: 133 kg (293 lb 3.4 oz) (07/07/24 1757)    Body mass index is 39.77 kg/m².      Intake/Output Summary (Last 24 hours) at 7/9/2024 1340  Last data filed at 7/9/2024 0400  Gross per 24 hour   Intake --   Output 667 ml   Net -667 ml       Physical Exam  Vitals and nursing note reviewed.   Constitutional:       General: He is not in acute distress.     Appearance: He is ill-appearing (chronically). He is not toxic-appearing or diaphoretic.   HENT:      Head: Normocephalic and atraumatic.      Nose: Nose normal.      Mouth/Throat:      Pharynx: Oropharynx is clear.   Eyes:      General: No scleral icterus.  Cardiovascular:      Rate and Rhythm: Normal rate and regular rhythm.      Pulses: Normal pulses.      Heart sounds: Normal heart  sounds.   Pulmonary:      Effort: Pulmonary effort is normal.      Comments: No obvious rales or wheezes when auscultating anterior chest; unable to complete posterior auscultation due to limited mobility of patient  Chest:      Chest wall: No deformity or tenderness.      Comments: Right chest tube to water seal with serous output  Left chest wall drain with seropurulent output  Abdominal:      General: Bowel sounds are normal.      Palpations: Abdomen is soft.      Tenderness: There is no abdominal tenderness.   Musculoskeletal:      Cervical back: Normal range of motion and neck supple.      Right lower leg: No edema.      Left lower leg: No edema.   Skin:     General: Skin is warm.      Capillary Refill: Capillary refill takes less than 2 seconds.      Comments: Unable to examine posterior skin due to patient debility   Neurological:      Mental Status: He is alert and oriented to person, place, and time.   Psychiatric:         Behavior: Behavior normal.         Significant Labs: All pertinent labs within the past 24 hours have been reviewed.    Recent Results (from the past 24 hour(s))   POCT glucose    Collection Time: 07/08/24  4:11 PM   Result Value Ref Range    POCT Glucose 112 (H) 70 - 110 mg/dL   CBC Auto Differential    Collection Time: 07/09/24  3:27 AM   Result Value Ref Range    WBC 11.53 3.90 - 12.70 K/uL    RBC 3.48 (L) 4.60 - 6.20 M/uL    Hemoglobin 8.8 (L) 14.0 - 18.0 g/dL    Hematocrit 28.4 (L) 40.0 - 54.0 %    MCV 82 82 - 98 fL    MCH 25.3 (L) 27.0 - 31.0 pg    MCHC 31.0 (L) 32.0 - 36.0 g/dL    RDW 17.5 (H) 11.5 - 14.5 %    Platelets 463 (H) 150 - 450 K/uL    MPV 10.2 9.2 - 12.9 fL    Immature Granulocytes 1.1 (H) 0.0 - 0.5 %    Gran # (ANC) 7.9 (H) 1.8 - 7.7 K/uL    Immature Grans (Abs) 0.13 (H) 0.00 - 0.04 K/uL    Lymph # 1.7 1.0 - 4.8 K/uL    Mono # 1.1 (H) 0.3 - 1.0 K/uL    Eos # 0.7 (H) 0.0 - 0.5 K/uL    Baso # 0.09 0.00 - 0.20 K/uL    nRBC 0 0 /100 WBC    Gran % 68.1 38.0 - 73.0 %    Lymph  % 14.3 (L) 18.0 - 48.0 %    Mono % 9.4 4.0 - 15.0 %    Eosinophil % 6.3 0.0 - 8.0 %    Basophil % 0.8 0.0 - 1.9 %    Differential Method Automated    Comprehensive Metabolic Panel    Collection Time: 07/09/24  3:27 AM   Result Value Ref Range    Sodium 139 136 - 145 mmol/L    Potassium 4.4 3.5 - 5.1 mmol/L    Chloride 108 95 - 110 mmol/L    CO2 19 (L) 23 - 29 mmol/L    Glucose 73 70 - 110 mg/dL    BUN 25 (H) 8 - 23 mg/dL    Creatinine 1.0 0.5 - 1.4 mg/dL    Calcium 8.5 (L) 8.7 - 10.5 mg/dL    Total Protein 5.9 (L) 6.0 - 8.4 g/dL    Albumin 1.2 (L) 3.5 - 5.2 g/dL    Total Bilirubin 0.3 0.1 - 1.0 mg/dL    Alkaline Phosphatase 106 55 - 135 U/L    AST 24 10 - 40 U/L    ALT 13 10 - 44 U/L    eGFR >60.0 >60 mL/min/1.73 m^2    Anion Gap 12 8 - 16 mmol/L   Magnesium    Collection Time: 07/09/24  3:27 AM   Result Value Ref Range    Magnesium 1.6 1.6 - 2.6 mg/dL   Phosphorus    Collection Time: 07/09/24  3:27 AM   Result Value Ref Range    Phosphorus 3.1 2.7 - 4.5 mg/dL   POCT glucose    Collection Time: 07/09/24  7:51 AM   Result Value Ref Range    POCT Glucose 95 70 - 110 mg/dL   POCT glucose    Collection Time: 07/09/24 11:48 AM   Result Value Ref Range    POCT Glucose 124 (H) 70 - 110 mg/dL       Significant Imaging: I have reviewed all pertinent imaging results/findings within the past 24 hours.

## 2024-07-09 NOTE — CARE UPDATE
Orthopedics Care Update      Patient seen and examined at bedside.  After discussion with staff the decision was made to aspirate the left shoulder and fluid collections seen on MRI.      Procedure Note: Left shoulder aspiration  Verbalized consent was obtained from the patient prior to procedure start.  Risks, benefits, indications, and alternatives to treatment were explained.  Skin of the anterolateral shoulder just medial to his old incision site was sterilized with chlorhexidine gluconate solution.  An 18 gauge spinal needle was then inserted into the shoulder joint.  Using a 30 cc syringe aspiration was attempted.  The multiple attempts were made to redirect the needle within the joint and towards fluid collection seen on MRI, there was minimal fluid obtained.  Fluid obtained was consistent with 0.5-1 cc of coagulated blood with trace amounts serous fluid.  The needle was then removed and the aspiration site was covered with gauze and secured beneath his previous dressing.  Patient tolerated the procedure well and without complication.    Cultures and Gram stain pending.  Orthopedics will continue to follow and coordinate any possible operative intervention with CTS, though at this time aspiration results consistent with hematoma from fracture versus recent surgery.              Sebastian Powell MD/MPH  PGY-4  Department of Orthopaedic Surgery  [facility]

## 2024-07-09 NOTE — PT/OT/SLP EVAL
Occupational Therapy   Evaluation    Name: Roosevelt Moser  MRN: [id number]  Admitting Diagnosis: Osteomyelitis of multiple sites  Recent Surgery: * No surgery found *      Recommendations:     Discharge Recommendations: Moderate Intensity Therapy  Discharge Equipment Recommendations:  hospital bed, lift device, walker, rolling  Barriers to discharge:  None    Assessment:     Roosevelt Moser is a 73 y.o. male with a medical diagnosis of Osteomyelitis of multiple sites.  He presents with increase of pain of L shoulder pain after completing bed mobility, which then at that time refused to continue to the session. Performance deficits affecting function: weakness, impaired endurance, impaired self care skills, impaired functional mobility, gait instability, impaired balance, decreased lower extremity function, decreased upper extremity function, decreased coordination, decreased safety awareness, pain, decreased ROM, impaired skin, impaired coordination, orthopedic precautions, impaired cardiopulmonary response to activity.      Rehab Prognosis: Fair; patient would benefit from acute skilled OT services to address these deficits and reach maximum level of function.       Plan:     Patient to be seen 4 x/week to address the above listed problems via self-care/home management, therapeutic exercises, neuromuscular re-education, therapeutic activities  Plan of Care Expires: 08/08/24  Plan of Care Reviewed with: patient    Subjective     Chief Complaint: Pain at L shoulder   Patient/Family Comments/goals: Pt.reports he is not getting up his shoulder just hurts too bad to do anything else    Occupational Profile:  Living Environment: Lives with in a wife in a H with a threshold to enter. Bathroom set-up: walk-in shower with built in seat/grab bars   Previous level of function: modified (I) for mobility and ADLs using hurricane as AD. Pt. Suffer a humerus fracture 3/4/24 s/p  ORIF on 6/4/24 per spouse report.   Roles and Routines:  Retired  Equipment Used at Home: walker, rolling, rollator  Assistance upon Discharge: Spouse    Pain/Comfort:  Pain Rating 1:  (did not rate)  Location - Side 1: Left  Location - Orientation 1: generalized  Location 1: arm  Pain Addressed 1: Reposition, Distraction    Patients cultural, spiritual, Rastafari conflicts given the current situation: no    Objective:     Communicated with: Nurse prior to session.  Patient found HOB elevated with telemetry, chest tube, peripheral IV, Condom Catheter, pulse ox (continuous), CPAP, oxygen, pressure relief boots upon OT entry to room.    General Precautions: Standard, fall  Orthopedic Precautions: LUE partial weight bearing  Braces:  (CAM/Walking Boot)  Respiratory Status: Room air    Occupational Performance:    Bed Mobility:    Patient completed Rolling/Turning to Left with  total assistance and 2 persons  Patient completed Rolling/Turning to Right with total assistance and 2 persons    Functional Mobility/Transfers: Declined 2/2 to L Shoulder Pain     Activities of Daily Living:  Upper Body Dressing: total assistance don gown at bed level   Lower Body Dressing: total assistance don and doff socks and pressure relief boots at bed level    Cognitive/Visual Perceptual:  Cognitive/Psychosocial Skills:     -       Oriented to: Person, Place, and Situation   -       Follows Commands/attention:Follows multistep  commands  -       Communication: clear/fluent  -       Memory: No Deficits noted  -       Safety awareness/insight to disability: impaired   -       Mood/Affect/Coping skills/emotional control: Appropriate to situation    Physical Exam:   (Completed at bed level and assess functionally rather than formally)  Balance: MARGE  Dominant hand:    -       R hand  Upper Extremity Range of Motion:     -       Right Upper Extremity: WFL  -       Left Upper Extremity: MARGE to formally seated at EOB assess limited shoulder flexion/extension and Abd Adduction   Upper Extremity  Strength:    -       Right Upper Extremity: WFL  -       Left Upper Extremity: MARGE   Strength:    -       Right Upper Extremity: WFL  -       Left Upper Extremity: MARGE    AMPAC 6 Click ADL:  AMPAC Total Score: 12    Treatment & Education:  Pt. educated on the importance of EOB mobility to completed proper evaluation   Pt.educated and reviewed WB precautions  Pt.educted on the ne   Pt educated on role of occupational therapy, POC, and safety during ADLs and functional mobility. Pt and OT discussed importance of safe, continued mobility to optimize daily living skills. Pt verbalized understanding. Pt given instruction to call for medical staff/nurse for assistance.   Co-treatment with PT for maximal pt participation, safety, and activity tolerance     Patient left HOB elevated with all lines intact, call button in reach, MD notified, and spouse present    GOALS:   Multidisciplinary Problems       Occupational Therapy Goals          Problem: Occupational Therapy    Goal Priority Disciplines Outcome Interventions   Occupational Therapy Goal     OT, PT/OT Progressing    Description: Goals to be met by: 8/8/24     Patient will increase functional independence with ADLs by performing:    LE Dressing with Moderate Assistance.  Grooming while EOB with Supervision.  Toileting from toilet with Supervision for hygiene and clothing management.   Stand pivot transfers with Moderate Assistance.  Squat pivot transfers with Moderate Assistance.  Toilet transfer to bedside commode with Moderate Assistance.                         History:     Past Medical History:   Diagnosis Date    A-fib 2024    Diabetes mellitus, type 2 2021    Guillain-Newton 10/2003    Hyperlipidemia     Hypertension 2016    Sleep apnea          Past Surgical History:   Procedure Laterality Date    ARTHROTOMY OF SHOULDER Left 6/21/2024    Procedure: ARTHROTOMY, SHOULDER;  Surgeon: Roman Paulson MD;  [place] [facility] [place];  Service: Orthopedics;   Laterality: Left;    INCISION AND DRAINAGE OF ABSCESS N/A 7/4/2024    Procedure: INCISION AND DRAINAGE, ABSCESS;  Surgeon: Gus Escobedo MD;  Location: [facility];  Service: General;  Laterality: N/A;  Abcess of anterior chest wall    IRRIGATION AND DEBRIDEMENT OF UPPER EXTREMITY Left 6/24/2024    Procedure: IRRIGATION AND DEBRIDEMENT, UPPER EXTREMITY;  Surgeon: Nathan Cooper MD;  Location: [facility] OR;  Service: Orthopedics;  Laterality: Left;    IRRIGATION AND DEBRIDEMENT OF UPPER EXTREMITY Left 6/27/2024    Procedure: IRRIGATION AND DEBRIDEMENT, UPPER EXTREMITY;  Surgeon: Nathan Cooper MD;  Location: [facility] OR;  Service: Orthopedics;  Laterality: Left;    OPEN REDUCTION AND INTERNAL FIXATION (ORIF) OF FRACTURE OF PROXIMAL HUMERUS Left 3/25/2024    Procedure: ORIF, FRACTURE, HUMERUS, PROXIMAL/IM HANNA, LEFT;  Surgeon: Nathan Cooper MD;  Location: [facility];  Service: Orthopedics;  Laterality: Left;  Accumed, Synthes Small Frag set Avelino verified 3/22/24 ark    THORACOSCOPIC DECORTICATION OF LUNG Right 7/5/2024    Procedure: VATS, WITH DECORTICATION, LUNG;  Surgeon: Gus Escobedo MD;  Location: [facility];  Service: Cardiothoracic;  Laterality: Right;    TOE AMPUTATION Right 7/1/2024    Procedure: AMPUTATION, TOE;  Surgeon: Stevie Zhu DPM;  Location: [facility];  Service: Podiatry;  Laterality: Right;       Time Tracking:     OT Date of Treatment: 07/09/24  OT Start Time: 1352  OT Stop Time: 1417  OT Total Time (min): 25 min    Billable Minutes:Evaluation 10  Self Care/Home Management 15    7/9/2024

## 2024-07-09 NOTE — PROGRESS NOTES
[facility]  Infectious Disease  Progress Note    Patient Name: Roosevelt Moser  MRN: [id number]  Admission Date: 7/7/2024  Length of Stay: 2 days  Attending Physician: Roopa Rowan MD  Primary Care Provider: No, Primary Doctor    Isolation Status: Contact  Assessment/Plan:      ID  * Osteomyelitis of multiple sites  See chest wall abscess    Chest wall abscess  Left chest wall abscess noted at OSH w/ concern for osteomyelitis transferred to [facility] [place] for thoracic and plastic surgery eval  Thoracentesis at OSH 7/4 w/ serosanguineous fluid, f/b I&D and chest tube placement; loculated effusions right chest s/p VATS and chest tube placement on 7/5. Light's criteria consistent with exudative effusion (Cell count 1100 WBCs, 72% lymphocytes, , pH 7.6). No organisms seen on gram stain from L pleural fluid or chest wall abscess    Imaging from 7/8:     CT C/A/P:    - Residual collections in the left chest s/p I&D drainage; No evidence of osteo in adjacent ribs.     - Septated cystic lesion in right kidney, indeterminate for malignancy. Further evaluation with Renal mass protocol CT performed notable for obstructing stone with mild left sided hydroureteronephrosis     CT Shoulder:    - Large abscess vs hematoma at proximal humerus fracture site, c/f possible septic arthritis     MRI Shoulder:    - Septic arthritis of GH joint w/ acute osteo of the glenoid and proximal humerus; Several large soft tissue abscesses, Rim enhancing fluid collection w/in the medulla of the humeral shart (possible intraosseous abscess); myositis of deltoid & rotator cuff      Plan:    - Evaluated by CTS: Awaiting infectious/inflammatory process to resolve prior to surgery. Plans for potential ribs resection after source control is achieved. Plastics will be available for flap coverage   - Urology consulted for L ureteral stone: Okay to f/u OutPt; if pt decompensates --> urgent Uro consult for stone management  - Ortho consulted for Pyogenic  arthritis of L shoulder: Deferring arthrocentesis (given improved inflammatory markers, no growth from I&D's), will monitor progress on abx     ID Recs:  - Continue to f/u cx  - Continue Ceftaroline 600 mg IV q.8   - Continue meropenem for now, will likely stop in the coming days         Osteomyelitis of toe  Wound cultures 2nd R toe 6/18 positive for MRSA s/p toe amputation on 7/1    Proximal bone culture and biopsy of the clean margin on amputated toe performed by Podiatry  No further evidence of toe osteo, will not require prolonged course of abx for chronic osteo          Thank you for your consult. I will follow-up with patient. Please contact us if you have any additional questions.    Jose Luis De Santiago MD  Infectious Disease  [facility]  [facility]    Subjective:     Principal Problem:Osteomyelitis of multiple sites    HPI: 73M w/ A fib, HTN, T2DM, MARA, HLD, hx GBS, hx displaced comminuted fracture of the left humerus after a fall, s/p ORIF this past March, who presents as a transfer for osteomyelitis of multiple ribs requiring debridement and flap placement. Initially presented to [facility] [place] on 6/14 for progressive weakness, multiple falls, and decreased urine output.  He was admitted for management of MARBIN, urosepsis, and AFib with RVR. During his stay he was noted to have swelling of his left upper extremity. Imaging studies on June 17 showed concern for gas-forming infection along the left shoulder and air along the lateral left chest wall.  MRI of his right foot also showed evidence of toe osteomyelitis (which grew MRSA). He was seen by ID who adjusted antibiotics, and they also noted oral thrush. IR aspirated fluid from left shoulder on June 20. On June 21 he had left shoulder I&D and removal of deep hardware. He underwent repeat irrigation and debridement of the left shoulder on June 24 with placement of a wound VAC. He had subsequent I&D with removal of the wound VAC on June 27. His right 2nd toe was amputated  "by Podiatry on July 1. Then, on July 3 he was noted to have dyspnea and mild tachycardia. CT chest showed a large abscess of the anterior central and left chest wall and abdominal wall with partial destruction of 3 ribs. He was seen by Cardiothoracic surgery. On July 4 he had left thoracentesis with aspiration of fluid. He subsequently had I&D of the left chest wall abscess on July 4, and a drainage catheter was left in place. On July 5 he underwent right VATS for a loculated right pleural effusion, and a chest tube was left in place. He was transitioned to the ICU post-operatively.  Transferred [place] [facility] [place]acic and Plastics eval.     Prior to transfer, "He is awake and alert. He has a PICC line in place along with the right chest tube and left chest wall drain in place. He has no evidence of respiratory distress and is hemodynamically stable. Referring provider felt patient was stable for step-down status at present. He is currently on ceftaroline and meropenem. He is in contact isolation. July 7: Sodium 137, potassium 4.5, chloride 104, CO2 26, BUN 33, creatinine 1.1, glucose 130, albumin 2.1, AST 25, ALT 17, white blood cells 11.58, hemoglobin 8.1, hematocrit 26.2, platelets 389, procalcitonin 0.575, CRP greater than 16  VS:  Temperature 97.5°, pulse 85, respirations 20, blood pressure 112/63, O2 sats 95% "  Interval History:    NAEO, states he is feeling well this am other than some pain that is prohibiting him from participating in PT/OT    Review of Systems   Constitutional:  Positive for appetite change. Negative for fever.   HENT:  Negative for congestion, sore throat and trouble swallowing.    Eyes:  Negative for visual disturbance.   Respiratory:  Positive for cough (dry). Negative for shortness of breath and wheezing.    Cardiovascular:  Negative for chest pain, palpitations and leg swelling.   Gastrointestinal:  Negative for abdominal pain, constipation, diarrhea, nausea and vomiting.   Genitourinary: "  Negative for decreased urine volume and dysuria.   Musculoskeletal:  Negative for back pain.   Skin:  Positive for wound.   Neurological:  Negative for dizziness, light-headedness and headaches.   Psychiatric/Behavioral:  Negative for agitation, confusion and decreased concentration.      Objective:     Vital Signs (Most Recent):  Temp: 98.4 °F (36.9 °C) (07/09/24 1111)  Pulse: 80 (07/09/24 1119)  Resp: 18 (07/09/24 1111)  BP: (!) 118/53 (07/09/24 1111)  SpO2: (!) 94 % (07/09/24 1119) Vital Signs (24h Range):  Temp:  [97.6 °F (36.4 °C)-98.7 °F (37.1 °C)] 98.4 °F (36.9 °C)  Pulse:  [65-88] 80  Resp:  [16-20] 18  SpO2:  [90 %-98 %] 94 %  BP: (100-140)/(53-69) 118/53     Weight: 133 kg (293 lb 3.4 oz)  Body mass index is 39.77 kg/m².    Estimated Creatinine Clearance: 94.3 mL/min (based on SCr of 1 mg/dL).     Physical Exam  Vitals and nursing note reviewed.   Constitutional:       General: He is not in acute distress.     Appearance: He is ill-appearing (chronically). He is not toxic-appearing or diaphoretic.   HENT:      Head: Normocephalic and atraumatic.      Nose: Nose normal.      Mouth/Throat:      Pharynx: Oropharynx is clear.   Eyes:      General: No scleral icterus.  Cardiovascular:      Rate and Rhythm: Normal rate and regular rhythm.      Pulses: Normal pulses.      Heart sounds: Normal heart sounds.   Pulmonary:      Effort: Pulmonary effort is normal.      Comments: No obvious rales or wheezes when auscultating anterior chest; unable to complete posterior auscultation due to limited mobility of patient  Chest:      Chest wall: No deformity or tenderness.      Comments: Right chest tube to water seal with serous output  Left chest wall drain with seropurulent output  Abdominal:      General: Bowel sounds are normal.      Palpations: Abdomen is soft.      Tenderness: There is no abdominal tenderness.   Musculoskeletal:      Cervical back: Normal range of motion and neck supple.      Right lower leg: No  edema.      Left lower leg: No edema.   Skin:     General: Skin is warm.      Capillary Refill: Capillary refill takes less than 2 seconds.      Comments: Unable to examine posterior skin due to patient debility   Neurological:      Mental Status: He is alert and oriented to person, place, and time.   Psychiatric:         Behavior: Behavior normal.          Significant Labs: All pertinent labs within the past 24 hours have been reviewed.    Significant Imaging: I have reviewed all pertinent imaging results/findings within the past 24 hours.

## 2024-07-09 NOTE — CONSULTS
[facility]  Orthopedics  Consult Note    Patient Name: Roosevelt Moser  MRN: [id number]  Admission Date: 7/7/2024  Hospital Length of Stay: 2 days  Attending Provider: Roopa Rowan MD  Primary Care Provider: Miriam Primary Doctor    Patient information was obtained from patient and ER records.     Inpatient consult to Orthopedic Surgery  Consult performed by: Lamonte Gunn MD  Consult ordered by: Roopa Rowan MD        Subjective:     Principal Problem:Osteomyelitis of multiple sites    Chief Complaint: No chief complaint on file.       HPI: Roosevelt Moser is a 74yo M w/ A fib, HTN, T2DM, MARA, HLD, hx GBS, hx displaced comminuted fracture of the left humerus s/p fall 3/4/24 s/p ORIF 03/25/2024 who presents as a transfer for thoracic surgery and Plastic surgery evaluation in setting of osteomyelitis of multiple ribs which will need further debridement and then a flap placed. Orthopedic suregry was consulted for evaluation of left shoulder. Pt had a prosthetic joint infection ad subsequent hardware removal and multiple I&Ds at [facility]. Last I&D of the shoulder was 14 days ago. Cultures from his multiple I&Ds are NGTD. On exam, he reports 0/10 left shoulder pain. He is unable to move the shoulder and elbow joints.     Past Medical History:   Diagnosis Date    A-fib 2024    Diabetes mellitus, type 2 2021    Guillain-Felch 10/2003    Hyperlipidemia     Hypertension 2016    Sleep apnea        Past Surgical History:   Procedure Laterality Date    ARTHROTOMY OF SHOULDER Left 6/21/2024    Procedure: ARTHROTOMY, SHOULDER;  Surgeon: Roman Paulson MD;  Location: [facility] OR;  Service: Orthopedics;  Laterality: Left;    INCISION AND DRAINAGE OF ABSCESS N/A 7/4/2024    Procedure: INCISION AND DRAINAGE, ABSCESS;  Surgeon: Gus Escobedo MD;  Location: [facility] OR;  Service: General;  Laterality: N/A;  Abcess of anterior chest wall    IRRIGATION AND DEBRIDEMENT OF UPPER EXTREMITY Left 6/24/2024     Procedure: IRRIGATION AND DEBRIDEMENT, UPPER EXTREMITY;  Surgeon: Nathan Cooper MD;  Location: [facility] OR;  Service: Orthopedics;  Laterality: Left;    IRRIGATION AND DEBRIDEMENT OF UPPER EXTREMITY Left 6/27/2024    Procedure: IRRIGATION AND DEBRIDEMENT, UPPER EXTREMITY;  Surgeon: Nathan Cooper MD;  Location: [facility] OR;  Service: Orthopedics;  Laterality: Left;    OPEN REDUCTION AND INTERNAL FIXATION (ORIF) OF FRACTURE OF PROXIMAL HUMERUS Left 3/25/2024    Procedure: ORIF, FRACTURE, HUMERUS, PROXIMAL/IM HANNA, LEFT;  Surgeon: Nathan Cooper MD;  Location: [facility] OR;  Service: Orthopedics;  Laterality: Left;  Accumed, Synthes Small Frag set Avelino verified 3/22/24 ark    THORACOSCOPIC DECORTICATION OF LUNG Right 7/5/2024    Procedure: VATS, WITH DECORTICATION, LUNG;  Surgeon: Gus Escobedo MD;  Location: [facility] OR;  Service: Cardiothoracic;  Laterality: Right;    TOE AMPUTATION Right 7/1/2024    Procedure: AMPUTATION, TOE;  Surgeon: Stevie Zhu DPM;  Location: [facility] OR;  Service: Podiatry;  Laterality: Right;       Review of patient's allergies indicates:  No Known Allergies    Current Facility-Administered Medications   Medication    (Magic mouthwash) 1:1:1 diphenhydrAMINE(Benadryl) 12.5mg/5ml liq, aluminum & magnesium hydroxide-simethicone (Maalox), LIDOcaine viscous 2%    0.9%  NaCl infusion    albuterol-ipratropium 2.5 mg-0.5 mg/3 mL nebulizer solution 3 mL    aluminum & magnesium hydroxide-simethicone 400-400-40 mg/5 mL suspension 15 mL    ceftaroline fosamiL (TEFLARO) 600 mg in D5W 50 mL IVPB (MB+)    dextrose 10% bolus 125 mL 125 mL    dextrose 10% bolus 250 mL 250 mL    ferrous sulfate tablet 1 each    glucagon (human recombinant) injection 1 mg    glucose chewable tablet 16 g    glucose chewable tablet 24 g    glutamine ped powder Pack 0.5 g    HYDROcodone-acetaminophen  mg per tablet 1 tablet    HYDROcodone-acetaminophen 5-325 mg per tablet 1 tablet    insulin aspart U-100 pen 0-10 Units    insulin  glargine U-100 (Lantus) pen 15 Units    LIDOcaine 5 % patch 1 patch    meropenem (MERREM) 1 g in 0.9% NaCl 100 mL IVPB (MB+)    metoprolol succinate (TOPROL-XL) 24 hr tablet 100 mg    naloxone 0.4 mg/mL injection 0.02 mg    ondansetron disintegrating tablet 8 mg    polyethylene glycol packet 17 g    promethazine tablet 25 mg    senna-docusate 8.6-50 mg per tablet 1 tablet    simethicone chewable tablet 80 mg    sodium chloride 0.9% flush 10 mL    sodium chloride 0.9% flush 10 mL    And    sodium chloride 0.9% flush 10 mL    traMADoL tablet 50 mg     Family History    None       Tobacco Use    Smoking status: Never    Smokeless tobacco: Never   Substance and Sexual Activity    Alcohol use: Yes     Alcohol/week: 0.0 standard drinks of alcohol     Comment: social    Drug use: No    Sexual activity: Yes     Review of Systems   Constitutional: Negative for fever.   Respiratory:  Negative for cough and wheezing.      Objective:     Vital Signs (Most Recent):  Temp: 97.8 °F (36.6 °C) (07/08/24 2315)  Pulse: 86 (07/09/24 0012)  Resp: 18 (07/09/24 0012)  BP: (!) 100/53 (07/08/24 2315)  SpO2: 95 % (07/09/24 0012) Vital Signs (24h Range):  Temp:  [97.6 °F (36.4 °C)-98.9 °F (37.2 °C)] 97.8 °F (36.6 °C)  Pulse:  [77-88] 86  Resp:  [16-22] 18  SpO2:  [90 %-98 %] 95 %  BP: (100-137)/(53-67) 100/53     Weight: 133 kg (293 lb 3.4 oz)  Height: 6' (182.9 cm)  Body mass index is 39.77 kg/m².      Intake/Output Summary (Last 24 hours) at 7/9/2024 0318  Last data filed at 7/8/2024 0630  Gross per 24 hour   Intake --   Output 780 ml   Net -780 ml        Ortho/SPM Exam   Gen:  No acute distress, well-developed, well nourished.  CV:  Peripherally well-perfused. 2+ radial pulses, symmetric.  Respiratory:  Normal respiratory effort. No accessory muscle use.   Head/Neck:  Normocephalic.  Atraumatic. Sclera anicteric. TM. Neck supple.  Neuro: No FND. Awake. Alert. Oriented to person, place, time, and situation.  Abdomen: Soft,  "NTND.      MSK:  Right Upper Extremity  Inspection  - Skin intact throughout, no open wounds  - No swelling  - No ecchymosis, erythema, or signs of cellulitis  Palpation  - NonTTP throughout, no palpable abnormality   Range of motion  - AROM and PROM of the shoulder, elbow, wrist, and hand intact  Stability  - No evidence of joint dislocation or abnormal laxity   Neurovascular  - AIN/PIN/Radial/Median/Ulnar Nerves assessed in isolation without deficit  - Able to give thumbs up, make "OK" sign, cross IF/LF, abduct/adduct fingers, make fist  - SILT throughout  - Compartments soft  - Radial artery palpated  - Muscle tone normal    Left Upper Extremity  Inspection  - Presence of multilple nylon sutures over the shoulder.  - moderate swelling of the shoulder  Palpation  - NonTTP throughout, no palpable abnormality   Range of motion  - no AROM of the shoulder and elbow joint. AROM of the wrist and fingers preserved  Neurovascular  - AIN/PIN/Radial/Median/Ulnar Nerves assessed in isolation without deficit  - Able to give thumbs up, make "OK" sign, cross IF/LF, abduct/adduct fingers, make fist  - Compartments soft  - Radial artery palpated  - Muscle tone normal    Significant Labs: CBC:   Recent Labs   Lab 07/07/24  0353 07/08/24  0653   WBC 11.58 13.01*   HGB 8.1* 9.2*   HCT 26.2* 31.3*    464*     CMP:   Recent Labs   Lab 07/07/24  0353 07/08/24  0653    138   K 4.5 4.6    106   CO2 26 26   * 122*   BUN 33* 30*   CREATININE 1.1 1.1   CALCIUM 7.8* 8.6*   PROT 5.8* 6.2   ALBUMIN 2.1* 1.2*   BILITOT 0.3 0.3   ALKPHOS 97 114   AST 25 21   ALT 17 16   ANIONGAP 7* 6*     CRP:   Recent Labs   Lab 07/07/24  0353   CRP >16.00*     All pertinent labs within the past 24 hours have been reviewed.    Significant Imaging: I have reviewed and interpreted all pertinent imaging results/findings.  MRI concerning for multiple rim enhancing fluid collection in the shoulder  Assessment/Plan:     Pyogenic arthritis of " left shoulder region  Roosevelt Moser is a 73 y.o. male presenting with left shoulder septic arthritis s/p multiple I&Ds by Dr. Cooper at [facility]. On exam, he had 0/10 shoulder pain but he is unable to range the shoulder due to absence of shoulder joint after hardware removal. His last I&D was on 06/24. His inflammatory markers are down trending. Pt is currently admitted for Cardiothoracic and plastic surgery workup. Given his recent I&Ds, absence of clinical exam findings correlating with septic shoulder, improving inflammatory markers,  and absence of growth from previous I&D, we elected to defer shoulder arthrocentesis and to monitor patients progress on antibiotics.     - Antibiotics: Continue per ID  - ROM as tolerated LUE  - DVT Prophylaxis: SCDs at all times while in bed  - Pain control: multimodal pain management  - Will discuss further management with staff            Thank you for your consult.     Lamonte Gunn MD  Orthopedics  [facility]

## 2024-07-09 NOTE — CONSULTS
Inpatient consult to Physical Medicine Rehab  Consult performed by: Lorna Juarez NP  Consult ordered by: Roopa Rowan MD      Consult received.     Lorna Juarez NP  Physical Medicine & Rehabilitation   07/09/2024

## 2024-07-09 NOTE — PROGRESS NOTES
Urology Progress Note    73M w/ complex medical history and multiple comorbidities including a fib on elliquis, rib osteomyelitis and prosthetic joint infection.  Urology consulted for large distal L ureteral stone burden.      - ok to follow up outpatient for L ureteral stone.  No operative intervention planned for this admission.  No MARBIN or concern for infection.    - if patient clinically decompensates or becomes febrile, please reach out to urology for more urgent stone management.    - please strain all urine   - will arrange for close f/u in clinic with Dr. Mulligan to discuss left ureteroscopy with laser lithotripsy  - urology to sign off at this time.     Please call with further questions or concerns.    Rosi Schneider MD, PGY-3   [facility]

## 2024-07-09 NOTE — SUBJECTIVE & OBJECTIVE
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 OR;  Service: General;  Laterality: N/A;  Abcess of anterior chest wall    IRRIGATION AND DEBRIDEMENT OF UPPER EXTREMITY Left 6/24/2024    Procedure: IRRIGATION AND DEBRIDEMENT, UPPER EXTREMITY;  Surgeon: Nathan Cooper MD;  Location: [facility] OR;  Service: Orthopedics;  Laterality: Left;    IRRIGATION AND DEBRIDEMENT OF UPPER EXTREMITY Left 6/27/2024    Procedure: IRRIGATION AND DEBRIDEMENT, UPPER EXTREMITY;  Surgeon: Nathan Cooper MD;  Location: [facility] OR;  Service: Orthopedics;  Laterality: Left;    OPEN REDUCTION AND INTERNAL FIXATION (ORIF) OF FRACTURE OF PROXIMAL HUMERUS Left 3/25/2024    Procedure: ORIF, FRACTURE, HUMERUS, PROXIMAL/IM HANNA, LEFT;  Surgeon: Nathan Cooper MD;  Location: [facility] OR;  Service: Orthopedics;  Laterality: Left;  Accumed, [employer] Small Frag set Avelino verified 3/22/24 ark    THORACOSCOPIC DECORTICATION OF LUNG Right 7/5/2024    Procedure: VATS, WITH DECORTICATION, LUNG;  Surgeon: Gus Escobedo MD;  Location: [facility];  Service: Cardiothoracic;  Laterality: Right;    TOE AMPUTATION Right 7/1/2024    Procedure: AMPUTATION, TOE;  Surgeon: Stevie Zhu DPM;  Location: [facility] OR;  Service: Podiatry;  Laterality: Right;       Review of patient's allergies indicates:  No Known Allergies    Current Facility-Administered Medications   Medication    (Magic mouthwash) 1:1:1 diphenhydrAMINE(Benadryl) 12.5mg/5ml liq, aluminum & magnesium hydroxide-simethicone  (Maalox), LIDOcaine viscous 2%    0.9%  NaCl infusion    albuterol-ipratropium 2.5 mg-0.5 mg/3 mL nebulizer solution 3 mL    aluminum & magnesium hydroxide-simethicone 400-400-40 mg/5 mL suspension 15 mL    ceftaroline fosamiL (TEFLARO) 600 mg in D5W 50 mL IVPB (MB+)    dextrose 10% bolus 125 mL 125 mL    dextrose 10% bolus 250 mL 250 mL    ferrous sulfate tablet 1 each    glucagon (human recombinant) injection 1 mg    glucose chewable tablet 16 g    glucose chewable tablet 24 g    glutamine ped powder Pack 0.5 g    HYDROcodone-acetaminophen  mg per tablet 1 tablet    HYDROcodone-acetaminophen 5-325 mg per tablet 1 tablet    insulin aspart U-100 pen 0-10 Units    insulin glargine U-100 (Lantus) pen 15 Units    LIDOcaine 5 % patch 1 patch    meropenem (MERREM) 1 g in 0.9% NaCl 100 mL IVPB (MB+)    metoprolol succinate (TOPROL-XL) 24 hr tablet 100 mg    naloxone 0.4 mg/mL injection 0.02 mg    ondansetron disintegrating tablet 8 mg    polyethylene glycol packet 17 g    promethazine tablet 25 mg    senna-docusate 8.6-50 mg per tablet 1 tablet    simethicone chewable tablet 80 mg    sodium chloride 0.9% flush 10 mL    sodium chloride 0.9% flush 10 mL    And    sodium chloride 0.9% flush 10 mL    traMADoL tablet 50 mg     Family History    None       Tobacco Use    Smoking status: Never    Smokeless tobacco: Never   Substance and Sexual Activity    Alcohol use: Yes     Alcohol/week: 0.0 standard drinks of alcohol     Comment: social    Drug use: No    Sexual activity: Yes     Review of Systems   Constitutional: Negative for fever.   Respiratory:  Negative for cough and wheezing.      Objective:     Vital Signs (Most Recent):  Temp: 97.8 °F (36.6 °C) (07/08/24 2315)  Pulse: 86 (07/09/24 0012)  Resp: 18 (07/09/24 0012)  BP: (!) 100/53 (07/08/24 2315)  SpO2: 95 % (07/09/24 0012) Vital Signs (24h Range):  Temp:  [97.6 °F (36.4 °C)-98.9 °F (37.2 °C)] 97.8 °F (36.6 °C)  Pulse:  [77-88] 86  Resp:  [16-22] 18  SpO2:  [90  "%-98 %] 95 %  BP: (100-137)/(53-67) 100/53     Weight: 133 kg (293 lb 3.4 oz)  Height: 6' (182.9 cm)  Body mass index is 39.77 kg/m².      Intake/Output Summary (Last 24 hours) at 7/9/2024 0318  Last data filed at 7/8/2024 0630  Gross per 24 hour   Intake --   Output 780 ml   Net -780 ml        Ortho/SPM Exam   Gen:  No acute distress, well-developed, well nourished.  CV:  Peripherally well-perfused. 2+ radial pulses, symmetric.  Respiratory:  Normal respiratory effort. No accessory muscle use.   Head/Neck:  Normocephalic.  Atraumatic. Sclera anicteric. TM. Neck supple.  Neuro: No FND. Awake. Alert. Oriented to person, place, time, and situation.  Abdomen: Soft, NTND.      MSK:  Right Upper Extremity  Inspection  - Skin intact throughout, no open wounds  - No swelling  - No ecchymosis, erythema, or signs of cellulitis  Palpation  - NonTTP throughout, no palpable abnormality   Range of motion  - AROM and PROM of the shoulder, elbow, wrist, and hand intact  Stability  - No evidence of joint dislocation or abnormal laxity   Neurovascular  - AIN/PIN/Radial/Median/Ulnar Nerves assessed in isolation without deficit  - Able to give thumbs up, make "OK" sign, cross IF/LF, abduct/adduct fingers, make fist  - SILT throughout  - Compartments soft  - Radial artery palpated  - Muscle tone normal    Left Upper Extremity  Inspection  - Presence of multilple nylon sutures over the shoulder.  - moderate swelling of the shoulder  Palpation  - NonTTP throughout, no palpable abnormality   Range of motion  - no AROM of the shoulder and elbow joint. AROM of the wrist and fingers preserved  Neurovascular  - AIN/PIN/Radial/Median/Ulnar Nerves assessed in isolation without deficit  - Able to give thumbs up, make "OK" sign, cross IF/LF, abduct/adduct fingers, make fist  - Compartments soft  - Radial artery palpated  - Muscle tone normal    Significant Labs: CBC:   Recent Labs   Lab 07/07/24  0353 07/08/24  0653   WBC 11.58 13.01*   HGB 8.1* " 9.2*   HCT 26.2* 31.3*    464*     CMP:   Recent Labs   Lab 07/07/24  0353 07/08/24  0653    138   K 4.5 4.6    106   CO2 26 26   * 122*   BUN 33* 30*   CREATININE 1.1 1.1   CALCIUM 7.8* 8.6*   PROT 5.8* 6.2   ALBUMIN 2.1* 1.2*   BILITOT 0.3 0.3   ALKPHOS 97 114   AST 25 21   ALT 17 16   ANIONGAP 7* 6*     CRP:   Recent Labs   Lab 07/07/24  0353   CRP >16.00*     All pertinent labs within the past 24 hours have been reviewed.    Significant Imaging: I have reviewed and interpreted all pertinent imaging results/findings.  MRI concerning for multiple rim enhancing fluid collection in the shoulder

## 2024-07-09 NOTE — PLAN OF CARE
Problem: Occupational Therapy  Goal: Occupational Therapy Goal  Description: Goals to be met by: 8/8/24     Patient will increase functional independence with ADLs by performing:    LE Dressing with Moderate Assistance.  Grooming while EOB with Supervision.  Toileting from toilet with Supervision for hygiene and clothing management.   Stand pivot transfers with Moderate Assistance.  Squat pivot transfers with Moderate Assistance.  Toilet transfer to bedside commode with Moderate Assistance.    Outcome: Progressing

## 2024-07-09 NOTE — PROGRESS NOTES
"[facility] Medicine  Progress Note    Patient Name: Roosevelt Moser  MRN: [id number]  Patient Class: IP- Inpatient   Admission Date: 7/7/2024  Length of Stay: 2 days  Attending Physician: Roopa Rowan MD  Primary Care Provider: Miriam, Primary Doctor        Subjective:     Principal Problem:Osteomyelitis of multiple sites        HPI:  Roosevelt Moser is a 72yo M w/ A fib, HTN, T2DM, MARA, HLD, hx GBS, hx displaced comminuted fracture of the left humerus s/p fall 3/4/24 s/p ORIF 03/25/2024 who presents as a transfer for thoracic surgery and Plastic surgery evaluation in setting of "osteomyelitis of multiple ribs which will need further debridement and then a flap placed."    He was initially admitted to [facility] on 6/14 for progressive weakness, multiple falls, and decreased urine output.  He was admitted for management of MARBIN on CKD complicated by hyperkalemia, urosepsis, AFib with RVR.    "Creatinine improved during his stay. During his stay he was noted to have swelling of his left upper extremity. Imaging studies on June 17 showed concern for gas-forming infection along the left shoulder and air along the lateral left chest wall concerning for gas-forming organism. MRI of his right foot also showed evidence of toe osteomyelitis. He was seen by ID who adjusted antibiotics, and they also noted oral thrush. Podiatry evaluated him for the toe findings, and Orthopedic Surgery evaluated the shoulder abnormalities. Right toe wound grew MRSA. Oral anticoagulation was held, and Radiology aspirated fluid from his left shoulder on June 20. On June 21 he had left shoulder incision and drainage and removal of deep hardware including rods and screws. He underwent repeat irrigation and debridement of the left shoulder on June 24 with placement of a wound VAC. He had subsequent incision and drainage with removal of the wound VAC on June 27. He had amputation of his right 2nd toe by Podiatry on July 1. On " "July 3 he was noted to have dyspnea and mild tachycardia. CT chest showed a large abscess of the anterior central and left chest wall and abdominal wall with partial destruction of 3 ribs. He was seen by Cardiothoracic surgery. On July 4 he had left thoracentesis with aspiration of fluid. He subsequently had incision and drainage of the left chest wall abscess on July 4, and a drainage catheter was left in place. On July 5 he underwent right VATS for a loculated right pleural effusion, and a chest tube was left in place. He was transitioned to the ICU post-operatively. With concern for osteomyelitis of the ribs, concern is that he will need transfer to a facility with Thoracic Surgery and potentially Plastic Surgery along with higher level of care. Transfer center spoke with Thoracic Surgery at [facility]. Requesting transfer to Hospital Medicine at [facility] for Plastic Surgery and Thoracic Surgery evaluation. Transfer center spoke with Plastic Surgery on July 5, Thoracic Surgery on July 7. With his complicated medical presentation, will plan transfer to Hospital Medicine at [facility] in step-down status for further treatment. "    Prior to transfer, "He is awake and alert. He has a PICC line in place along with the right chest tube and left chest wall drain in place. He has no evidence of respiratory distress and is hemodynamically stable. Referring provider felt patient was stable for step-down status at present. He is currently on ceftaroline and meropenem. He is in contact isolation. July 7: Sodium 137, potassium 4.5, chloride 104, CO2 26, BUN 33, creatinine 1.1, glucose 130, albumin 2.1, AST 25, ALT 17, white blood cells 11.58, hemoglobin 8.1, hematocrit 26.2, platelets 389, procalcitonin 0.575, CRP greater than 16  VS:  Temperature 97.5°, pulse 85, respirations 20, blood pressure 112/63, O2 sats 95% "    Patient in no acute distress upon arrival.  Wife at bedside.  Patient denies any acute " complaints.        Imaging history reviewed:  July 6: X-rays of the left shoulder had fracture through the surgical neck of the humerus with lateral distraction of the distal component and overlapping segments on the order of 2.5 cm.  Overall no appreciable change upon comparison with prior imaging.  -chest x-ray noted manifestations of mild congestive heart failure bordering on mild pulmonary edema.  Right-sided thoracostomy tube terminating within the right apex.  No definite pneumothorax.        July 5: Pleural fluid Gram stain with few WBCs and no organisms seen  -pH 7.339, pCO2 50.9, pO2 392     July 4:  CPK less than 10, serum   -AFB smear from left chest abscess had no AFB seen, left chest abscess aerobic and anaerobic cultures have no growth  -pleural fluid protein 3.2, white blood cells 1108 (11% segs, 74% lymphs), , pH 7.63, pleural fluid culture had no growth  -ultrasound-guided thoracentesis removed 1.4-1.5 L     July 3: Blood cultures with no growth to date  -CT chest showed large abscess of the anterior central and left chest wall and abdominal wall measuring 17 cm transversely.  This extends into the mediastinum and peritoneum with partial destruction of the anterior 6th, 7th, and 8th ribs.  Complete atelectasis of the left lower lobe with moderate left pleural effusion.  Mild atelectasis right lung base with a small-to-moderate right pleural effusion.     July 2: X-rays of the left shoulder noted displaced left proximal humerus fracture status post hardware removal without change in alignment.     June 24: ECHO with EF 55-60%.     June 20:  Interventional Radiology did percutaneous aspiration of the left shoulder fluid collection.  No drainage catheter was left in place.     June 18:  Right toe wound MRSA  -CT left shoulder had subacute left humerus fracture post internal fixation.  Incomplete bony bridging across the fracture site with persistent angulation.  Severe arthropathy of the  "glenohumeral joint with multiple intra-articular bodies.  Large left joint effusion and adjacent soft tissue focal fluid collections containing gas and concerning for gas-forming infection.  Moderate size left pleural effusion.  Left basilar airspace disease.  -bilateral lower extremity Doppler arterial ultrasound had atherosclerotic plaque and calcification bilaterally without severe stenosis or occlusion identified on either side.     June 17: X-rays of the left humerus/shoulder had findings concerning for gas-forming infection along the left shoulder.  -x-rays of the left ribs showed air along the lateral chest wall soft tissue and axilla concerning for gas-forming infection.  -MRI of the right foot had findings consistent with osteomyelitis involving the middle distal phalanges of the 3rd toe.  Cellulitis.     June 14: Renal ultrasound had no hydronephrosis.  Elevated resistive index right kidney suggesting intrinsic renal disease.    Overview/Hospital Course:  Pt admitted to  as a transfer for thoracic surgery and Plastic surgery evaluation in setting of "osteomyelitis of multiple ribs which will need further debridement and then a flap placed." Thoracic Surgery, Plastic Surgery, ID, Ortho, Wound care, PT/OT consulted upon admission. Imaging ordered.    Interval History:  Patient seen and examined at bedside.  Wife at bedside.  No acute events overnight.  Patient has no acute complaints this morning.  Patient and wife updated regarding care plan.  She is asking about his therapy and nutrition needs.      Review of Systems   Constitutional:  Positive for appetite change. Negative for fever.   HENT:  Negative for congestion, sore throat and trouble swallowing.    Eyes:  Negative for visual disturbance.   Respiratory:  Positive for cough (dry). Negative for shortness of breath and wheezing.    Cardiovascular:  Negative for chest pain, palpitations and leg swelling.   Gastrointestinal:  Negative for abdominal " pain, constipation, diarrhea, nausea and vomiting.   Genitourinary:  Negative for decreased urine volume and dysuria.   Musculoskeletal:  Negative for back pain.   Skin:  Positive for wound.   Neurological:  Negative for dizziness, light-headedness and headaches.   Psychiatric/Behavioral:  Negative for agitation, confusion and decreased concentration.        Objective:    Temp: 98.4 °F (36.9 °C) (07/09/24 1111)  Pulse: 80 (07/09/24 1119)  Resp: 18 (07/09/24 1111)  BP: (!) 118/53 (07/09/24 1111)  SpO2: (!) 94 % (07/09/24 1119)    Weight: 133 kg (293 lb 3.4 oz) (07/07/24 1757)    Body mass index is 39.77 kg/m².      Intake/Output Summary (Last 24 hours) at 7/9/2024 1340  Last data filed at 7/9/2024 0400  Gross per 24 hour   Intake --   Output 667 ml   Net -667 ml       Physical Exam  Vitals and nursing note reviewed.   Constitutional:       General: He is not in acute distress.     Appearance: He is ill-appearing (chronically). He is not toxic-appearing or diaphoretic.   HENT:      Head: Normocephalic and atraumatic.      Nose: Nose normal.      Mouth/Throat:      Pharynx: Oropharynx is clear.   Eyes:      General: No scleral icterus.  Cardiovascular:      Rate and Rhythm: Normal rate and regular rhythm.      Pulses: Normal pulses.      Heart sounds: Normal heart sounds.   Pulmonary:      Effort: Pulmonary effort is normal.      Comments: No obvious rales or wheezes when auscultating anterior chest; unable to complete posterior auscultation due to limited mobility of patient  Chest:      Chest wall: No deformity or tenderness.      Comments: Right chest tube to water seal with serous output  Left chest wall drain with seropurulent output  Abdominal:      General: Bowel sounds are normal.      Palpations: Abdomen is soft.      Tenderness: There is no abdominal tenderness.   Musculoskeletal:      Cervical back: Normal range of motion and neck supple.      Right lower leg: No edema.      Left lower leg: No edema.    Skin:     General: Skin is warm.      Capillary Refill: Capillary refill takes less than 2 seconds.      Comments: Unable to examine posterior skin due to patient debility   Neurological:      Mental Status: He is alert and oriented to person, place, and time.   Psychiatric:         Behavior: Behavior normal.         Significant Labs: All pertinent labs within the past 24 hours have been reviewed.    Recent Results (from the past 24 hour(s))   POCT glucose    Collection Time: 07/08/24  4:11 PM   Result Value Ref Range    POCT Glucose 112 (H) 70 - 110 mg/dL   CBC Auto Differential    Collection Time: 07/09/24  3:27 AM   Result Value Ref Range    WBC 11.53 3.90 - 12.70 K/uL    RBC 3.48 (L) 4.60 - 6.20 M/uL    Hemoglobin 8.8 (L) 14.0 - 18.0 g/dL    Hematocrit 28.4 (L) 40.0 - 54.0 %    MCV 82 82 - 98 fL    MCH 25.3 (L) 27.0 - 31.0 pg    MCHC 31.0 (L) 32.0 - 36.0 g/dL    RDW 17.5 (H) 11.5 - 14.5 %    Platelets 463 (H) 150 - 450 K/uL    MPV 10.2 9.2 - 12.9 fL    Immature Granulocytes 1.1 (H) 0.0 - 0.5 %    Gran # (ANC) 7.9 (H) 1.8 - 7.7 K/uL    Immature Grans (Abs) 0.13 (H) 0.00 - 0.04 K/uL    Lymph # 1.7 1.0 - 4.8 K/uL    Mono # 1.1 (H) 0.3 - 1.0 K/uL    Eos # 0.7 (H) 0.0 - 0.5 K/uL    Baso # 0.09 0.00 - 0.20 K/uL    nRBC 0 0 /100 WBC    Gran % 68.1 38.0 - 73.0 %    Lymph % 14.3 (L) 18.0 - 48.0 %    Mono % 9.4 4.0 - 15.0 %    Eosinophil % 6.3 0.0 - 8.0 %    Basophil % 0.8 0.0 - 1.9 %    Differential Method Automated    Comprehensive Metabolic Panel    Collection Time: 07/09/24  3:27 AM   Result Value Ref Range    Sodium 139 136 - 145 mmol/L    Potassium 4.4 3.5 - 5.1 mmol/L    Chloride 108 95 - 110 mmol/L    CO2 19 (L) 23 - 29 mmol/L    Glucose 73 70 - 110 mg/dL    BUN 25 (H) 8 - 23 mg/dL    Creatinine 1.0 0.5 - 1.4 mg/dL    Calcium 8.5 (L) 8.7 - 10.5 mg/dL    Total Protein 5.9 (L) 6.0 - 8.4 g/dL    Albumin 1.2 (L) 3.5 - 5.2 g/dL    Total Bilirubin 0.3 0.1 - 1.0 mg/dL    Alkaline Phosphatase 106 55 - 135 U/L    AST 24  "10 - 40 U/L    ALT 13 10 - 44 U/L    eGFR >60.0 >60 mL/min/1.73 m^2    Anion Gap 12 8 - 16 mmol/L   Magnesium    Collection Time: 07/09/24  3:27 AM   Result Value Ref Range    Magnesium 1.6 1.6 - 2.6 mg/dL   Phosphorus    Collection Time: 07/09/24  3:27 AM   Result Value Ref Range    Phosphorus 3.1 2.7 - 4.5 mg/dL   POCT glucose    Collection Time: 07/09/24  7:51 AM   Result Value Ref Range    POCT Glucose 95 70 - 110 mg/dL   POCT glucose    Collection Time: 07/09/24 11:48 AM   Result Value Ref Range    POCT Glucose 124 (H) 70 - 110 mg/dL       Significant Imaging: I have reviewed all pertinent imaging results/findings within the past 24 hours.          Assessment/Plan:      * Osteomyelitis of multiple sites  Osteomyelitis of toe   MRSA infection   Skin ulcer of toe of right foot with necrosis of bone  Chest wall abscess  Loculated pleural effusion  Abscess of left shoulder  Pyogenic abscess of left shoulder region  Mediastinal lymphadenopathy  Humerus fracture    -status post left thoracentesis 1500cc serosanguineous fluid drained 7/4 & I&D drainage with chest tube placement 7/4 & loculated effusions right chest s/p VATS and chest tube 7/5   -Left shoulder prosthetic joint infection s/p I&D and removal of deep hardware 6/21 - all screws and nails removed, s/p 2nd washout 6/24 & 3rd washout 6/27   -Left Shoulder washouts on 6/21 ( abundant pus in shoulder, hardware removal), 6/24 ( bloody brownish fluid) and 6/27 ( no fluid collection, healthy red and beefy tissue)   -Right 3rd toe osteomyelitis s/p Dalvance x 2 doses & 2nd distal phalanx s/p I&D at bedside, s/p 2nd toe amputation 7/1       - Presents as a transfer for thoracic surgery and Plastic surgery evaluation in setting of concern for "osteomyelitis of multiple ribs which will need further debridement and then a flap placed" in the setting of chest wall abscess  -arrives on ceftaroline and meropenem as per outside hospital ID recommendations  -notably, 7/3 " "blood cultures, 7/4 pleural fluid cultures, 7/4 abscess cultures, 755 pleural fluid cultures NGTD  -6/18 right toe wound culture with MRSA  -continue to follow OSH cultures  -ongoing wound care  -ID, Ortho, Thoracic surgery and Plastic surgery consultedn  -per thoracic surgery, CT chest and abdomen with IV contrast; right chest tube to water seal; left chest accordion drain to negative pressure  -per ortho:  MRI shoulder with without contrast left, CT shoulder without contrast left, CT 3D rendering  -thus far, no acute surgical intervention; remains on IV antibiotics pending Infectious Disease plan    Sepsis  This patient does have evidence of infective focus  My overall impression is sepsis.  Source: Skin and Soft Tissue (location toe)  Antibiotics given-   Antibiotics (72h ago, onward)      Start     Stop Route Frequency Ordered    07/07/24 1545  ceftaroline fosamiL (TEFLARO) 600 mg in D5W 50 mL IVPB (MB+)         -- IV Every 8 hours (non-standard times) 07/07/24 1532    07/07/24 1545  meropenem (MERREM) 1 g in 0.9% NaCl 100 mL IVPB (MB+)         -- IV Every 8 hours (non-standard times) 07/07/24 1532          Latest lactate reviewed-  No results for input(s): "LACTATE", "POCLAC" in the last 72 hours.  Organ dysfunction indicated by Acute kidney injury    Fluid challenge Not needed - patient is not hypotensive      Post- resuscitation assessment No - Post resuscitation assessment not needed       Will Not start Pressors- Levophed for MAP of 65  Source control achieved by: abx    Acute renal failure superimposed on chronic kidney disease  Patient with acute kidney injury/acute renal failure likely due to  due to ATN due to sepsis due to UTI and/or PNA  MARBIN is currently improving. Baseline creatinine  wnl  - Labs reviewed- Renal function/electrolytes with Estimated Creatinine Clearance: 94.3 mL/min (based on SCr of 1 mg/dL). according to latest data. Monitor urine output and serial BMP and adjust therapy as needed. " Avoid nephrotoxins and renally dose meds for GFR listed above.    Atrial fibrillation with rapid ventricular response  Patient with Long standing persistent (>12 months) atrial fibrillation which is controlled currently with Beta Blocker. Patient is currently in TBD.[id number] Score: 2.      Chronic AF w/ acute RVR at OSH, resolved s/p cardizem drip   NOAC has been on hold d/t possible need for surgery - has been on prophylaxis dose of Lovenox  Resume A/c pending any surgical intervention, consider lovenox vs hep gtt bridge    Debility  Patient with Acute on chronic debility due to  multiple infections . Latest AMPAC and GEMS scores have not been reviewed. Evaluation for etiology is complete. Plan includes progressive mobility protocol initated, PT/OT consulted, and fall precautions in place.    Thyroid nodule  Incidental finding on imagin.7 cm left thyroid nodule. Nonemergent thyroid ultrasound is recommended.       Left renal stone  Nephrolithiasis     retroperitoneal ultrasound completed in setting of MARBIN without hydronephrosis    CT abdomen notable for 1.2 cm obstructing stone in left distal ureter with mild hydroureteronephrosis       Appreciate urology recommendations; given no concerns regarding MARBIN or infection relation to the stone, no acute surgical intervention  Follow-up outpatient; strain all urine    Cyst of right kidney  Incidental finding on imagin.8 cm septated cystic lesion in the right kidney, which is indeterminate for malignancy. Further evaluation with nonemergent renal mass protocol CT or MRI is recommended.       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  Last A1c reviewed-   Lab Results   Component Value Date    HGBA1C 6.3 (H) 2024     Most recent fingerstick glucose  reviewed-   Recent Labs   Lab 07/08/24  1611 07/09/24  0751 07/09/24  1148   POCTGLUCOSE 112* 95 124*       Current correctional scale  Low  Maintain anti-hyperglycemic dose as follows-   Antihyperglycemics (From admission, onward)      Start     Stop Route Frequency Ordered    07/08/24 0900  insulin glargine U-100 (Lantus) pen 15 Units         -- SubQ Daily 07/07/24 1532    07/07/24 1529  insulin aspart U-100 pen 0-10 Units         -- SubQ Before meals & nightly PRN 07/07/24 1532          Hold Oral hypoglycemics while patient is in the hospital.    Hypertension  Chronic, controlled. Latest blood pressure and vitals reviewed-     Temp:  [97.9 °F (36.6 °C)-98.9 °F (37.2 °C)]   Pulse:  [64-90]   Resp:  [17-20]   BP: (103-168)/(59-72)   SpO2:  [90 %-96 %] .   Home meds for hypertension were reviewed and noted below.   Hypertension Medications               hydrALAZINE (APRESOLINE) 25 MG tablet Take 1 tablet (25 mg total) by mouth every 12 (twelve) hours.    metoprolol succinate (TOPROL-XL) 100 MG 24 hr tablet Take 1 tablet (100 mg total) by mouth once daily.            While in the hospital, will manage blood pressure as follows; continue home metoprolol    Will utilize p.r.n. blood pressure medication only if patient's blood pressure greater than 180/110 and he develops symptoms such as worsening chest pain or shortness of breath.    MARA (obstructive sleep apnea)  Continue CPAP HS and w/ naps        Morbid obesity  There is no height or weight on file to calculate BMI. Morbid obesity complicates all aspects of disease management from diagnostic modalities to treatment. Weight loss encouraged and health benefits explained to patient.           VTE Risk Mitigation (From admission, onward)           Ordered     Place sequential compression device  Until discontinued         07/07/24 1532                    Discharge Planning   KAMILA: 7/12/2024     Code Status: Full Code   Is the patient medically ready for discharge?:      Reason for patient still in hospital (select all that apply): Patient trending condition, Consult recommendations, PT / OT recommendations, and Pending disposition  Discharge Plan A: Home with family                  Roopa Rowan MD  Department of Hospital Medicine   [facility]  [facility]

## 2024-07-10 LAB
ACID FAST MOD KINY STN SPEC: NORMAL
ALBUMIN SERPL BCP-MCNC: 1.2 G/DL (ref 3.5–5.2)
ALP SERPL-CCNC: 104 U/L (ref 55–135)
ALT SERPL W/O P-5'-P-CCNC: 11 U/L (ref 10–44)
ANION GAP SERPL CALC-SCNC: 10 MMOL/L (ref 8–16)
AST SERPL-CCNC: 17 U/L (ref 10–40)
BASOPHILS # BLD AUTO: 0.11 K/UL (ref 0–0.2)
BASOPHILS NFR BLD: 0.9 % (ref 0–1.9)
BILIRUB SERPL-MCNC: 0.3 MG/DL (ref 0.1–1)
BUN SERPL-MCNC: 23 MG/DL (ref 8–23)
CALCIUM SERPL-MCNC: 8.2 MG/DL (ref 8.7–10.5)
CHLORIDE SERPL-SCNC: 106 MMOL/L (ref 95–110)
CO2 SERPL-SCNC: 23 MMOL/L (ref 23–29)
CREAT SERPL-MCNC: 1 MG/DL (ref 0.5–1.4)
DIFFERENTIAL METHOD BLD: ABNORMAL
EOSINOPHIL # BLD AUTO: 0.8 K/UL (ref 0–0.5)
EOSINOPHIL NFR BLD: 6.1 % (ref 0–8)
ERYTHROCYTE [DISTWIDTH] IN BLOOD BY AUTOMATED COUNT: 17.2 % (ref 11.5–14.5)
EST. GFR  (NO RACE VARIABLE): >60 ML/MIN/1.73 M^2
GLUCOSE SERPL-MCNC: 105 MG/DL (ref 70–110)
HCT VFR BLD AUTO: 29.8 % (ref 40–54)
HGB BLD-MCNC: 8.9 G/DL (ref 14–18)
IMM GRANULOCYTES # BLD AUTO: 0.13 K/UL (ref 0–0.04)
IMM GRANULOCYTES NFR BLD AUTO: 1.1 % (ref 0–0.5)
LYMPHOCYTES # BLD AUTO: 1.6 K/UL (ref 1–4.8)
LYMPHOCYTES NFR BLD: 13 % (ref 18–48)
MAGNESIUM SERPL-MCNC: 1.6 MG/DL (ref 1.6–2.6)
MCH RBC QN AUTO: 24.7 PG (ref 27–31)
MCHC RBC AUTO-ENTMCNC: 29.9 G/DL (ref 32–36)
MCV RBC AUTO: 83 FL (ref 82–98)
MONOCYTES # BLD AUTO: 1.3 K/UL (ref 0.3–1)
MONOCYTES NFR BLD: 10.1 % (ref 4–15)
MYCOBACTERIUM SPEC QL CULT: NORMAL
NEUTROPHILS # BLD AUTO: 8.5 K/UL (ref 1.8–7.7)
NEUTROPHILS NFR BLD: 68.8 % (ref 38–73)
NRBC BLD-RTO: 0 /100 WBC
PHOSPHATE SERPL-MCNC: 2.8 MG/DL (ref 2.7–4.5)
PLATELET # BLD AUTO: 498 K/UL (ref 150–450)
PMV BLD AUTO: 9.9 FL (ref 9.2–12.9)
POCT GLUCOSE: 110 MG/DL (ref 70–110)
POCT GLUCOSE: 156 MG/DL (ref 70–110)
POCT GLUCOSE: 178 MG/DL (ref 70–110)
POCT GLUCOSE: 181 MG/DL (ref 70–110)
POTASSIUM SERPL-SCNC: 4 MMOL/L (ref 3.5–5.1)
PROT SERPL-MCNC: 5.8 G/DL (ref 6–8.4)
RBC # BLD AUTO: 3.61 M/UL (ref 4.6–6.2)
SODIUM SERPL-SCNC: 139 MMOL/L (ref 136–145)
WBC # BLD AUTO: 12.37 K/UL (ref 3.9–12.7)

## 2024-07-10 PROCEDURE — 94761 N-INVAS EAR/PLS OXIMETRY MLT: CPT | Mod: HCNC

## 2024-07-10 PROCEDURE — 20600001 HC STEP DOWN PRIVATE ROOM: Mod: HCNC

## 2024-07-10 PROCEDURE — 25000003 PHARM REV CODE 250: Mod: HCNC | Performed by: STUDENT IN AN ORGANIZED HEALTH CARE EDUCATION/TRAINING PROGRAM

## 2024-07-10 PROCEDURE — 83735 ASSAY OF MAGNESIUM: CPT | Mod: HCNC | Performed by: STUDENT IN AN ORGANIZED HEALTH CARE EDUCATION/TRAINING PROGRAM

## 2024-07-10 PROCEDURE — 85025 COMPLETE CBC W/AUTO DIFF WBC: CPT | Mod: HCNC | Performed by: STUDENT IN AN ORGANIZED HEALTH CARE EDUCATION/TRAINING PROGRAM

## 2024-07-10 PROCEDURE — C1751 CATH, INF, PER/CENT/MIDLINE: HCPCS | Mod: HCNC

## 2024-07-10 PROCEDURE — 27100171 HC OXYGEN HIGH FLOW UP TO 24 HOURS: Mod: HCNC

## 2024-07-10 PROCEDURE — 99900035 HC TECH TIME PER 15 MIN (STAT): Mod: HCNC

## 2024-07-10 PROCEDURE — 36569 INSJ PICC 5 YR+ W/O IMAGING: CPT | Mod: HCNC

## 2024-07-10 PROCEDURE — 36415 COLL VENOUS BLD VENIPUNCTURE: CPT | Mod: HCNC | Performed by: STUDENT IN AN ORGANIZED HEALTH CARE EDUCATION/TRAINING PROGRAM

## 2024-07-10 PROCEDURE — 97530 THERAPEUTIC ACTIVITIES: CPT | Mod: HCNC

## 2024-07-10 PROCEDURE — 97110 THERAPEUTIC EXERCISES: CPT | Mod: HCNC

## 2024-07-10 PROCEDURE — A4216 STERILE WATER/SALINE, 10 ML: HCPCS | Mod: HCNC | Performed by: STUDENT IN AN ORGANIZED HEALTH CARE EDUCATION/TRAINING PROGRAM

## 2024-07-10 PROCEDURE — 99222 1ST HOSP IP/OBS MODERATE 55: CPT | Mod: HCNC,,, | Performed by: NURSE PRACTITIONER

## 2024-07-10 PROCEDURE — 27000207 HC ISOLATION: Mod: HCNC

## 2024-07-10 PROCEDURE — 80053 COMPREHEN METABOLIC PANEL: CPT | Mod: HCNC | Performed by: STUDENT IN AN ORGANIZED HEALTH CARE EDUCATION/TRAINING PROGRAM

## 2024-07-10 PROCEDURE — 84100 ASSAY OF PHOSPHORUS: CPT | Mod: HCNC | Performed by: STUDENT IN AN ORGANIZED HEALTH CARE EDUCATION/TRAINING PROGRAM

## 2024-07-10 PROCEDURE — 63600175 PHARM REV CODE 636 W HCPCS: Mod: JZ,JG,HCNC | Performed by: STUDENT IN AN ORGANIZED HEALTH CARE EDUCATION/TRAINING PROGRAM

## 2024-07-10 PROCEDURE — 94660 CPAP INITIATION&MGMT: CPT | Mod: HCNC

## 2024-07-10 PROCEDURE — 99233 SBSQ HOSP IP/OBS HIGH 50: CPT | Mod: HCNC,GC,, | Performed by: INTERNAL MEDICINE

## 2024-07-10 RX ADMIN — SODIUM CHLORIDE: 9 INJECTION, SOLUTION INTRAVENOUS at 09:07

## 2024-07-10 RX ADMIN — ENOXAPARIN SODIUM 40 MG: 40 INJECTION SUBCUTANEOUS at 04:07

## 2024-07-10 RX ADMIN — HYDROCODONE BITARTRATE AND ACETAMINOPHEN 1 TABLET: 10; 325 TABLET ORAL at 09:07

## 2024-07-10 RX ADMIN — FERROUS SULFATE TAB EC 325 MG (65 MG FE EQUIVALENT) 1 EACH: 325 (65 FE) TABLET DELAYED RESPONSE at 08:07

## 2024-07-10 RX ADMIN — CEFTAROLINE FOSAMIL 600 MG: 600 POWDER, FOR SOLUTION INTRAVENOUS at 08:07

## 2024-07-10 RX ADMIN — INSULIN ASPART 2 UNITS: 100 INJECTION, SOLUTION INTRAVENOUS; SUBCUTANEOUS at 01:07

## 2024-07-10 RX ADMIN — INSULIN ASPART 2 UNITS: 100 INJECTION, SOLUTION INTRAVENOUS; SUBCUTANEOUS at 04:07

## 2024-07-10 RX ADMIN — METOPROLOL SUCCINATE 100 MG: 100 TABLET, EXTENDED RELEASE ORAL at 10:07

## 2024-07-10 RX ADMIN — CEFTAROLINE FOSAMIL 600 MG: 600 POWDER, FOR SOLUTION INTRAVENOUS at 11:07

## 2024-07-10 RX ADMIN — Medication 10 ML: at 12:07

## 2024-07-10 RX ADMIN — HYDROCODONE BITARTRATE AND ACETAMINOPHEN 1 TABLET: 10; 325 TABLET ORAL at 05:07

## 2024-07-10 RX ADMIN — HYDROCODONE BITARTRATE AND ACETAMINOPHEN 1 TABLET: 10; 325 TABLET ORAL at 10:07

## 2024-07-10 RX ADMIN — Medication 10 ML: at 05:07

## 2024-07-10 RX ADMIN — POLYETHYLENE GLYCOL 3350 17 G: 17 POWDER, FOR SOLUTION ORAL at 08:07

## 2024-07-10 RX ADMIN — LIDOCAINE 5% 1 PATCH: 700 PATCH TOPICAL at 04:07

## 2024-07-10 RX ADMIN — Medication 10 ML: at 06:07

## 2024-07-10 RX ADMIN — MEROPENEM 1 G: 1 INJECTION INTRAVENOUS at 11:07

## 2024-07-10 RX ADMIN — DIPHENHYDRAMINE HYDROCHLORIDE 10 ML: 25 SOLUTION ORAL at 01:07

## 2024-07-10 RX ADMIN — MEROPENEM 1 G: 1 INJECTION INTRAVENOUS at 08:07

## 2024-07-10 RX ADMIN — CEFTAROLINE FOSAMIL 600 MG: 600 POWDER, FOR SOLUTION INTRAVENOUS at 04:07

## 2024-07-10 RX ADMIN — INSULIN GLARGINE 15 UNITS: 100 INJECTION, SOLUTION SUBCUTANEOUS at 08:07

## 2024-07-10 RX ADMIN — DIPHENHYDRAMINE HYDROCHLORIDE 10 ML: 25 SOLUTION ORAL at 08:07

## 2024-07-10 RX ADMIN — DIPHENHYDRAMINE HYDROCHLORIDE 10 ML: 25 SOLUTION ORAL at 04:07

## 2024-07-10 RX ADMIN — Medication 10 ML: at 11:07

## 2024-07-10 RX ADMIN — MEROPENEM 1 G: 1 INJECTION INTRAVENOUS at 04:07

## 2024-07-10 NOTE — HOSPITAL COURSE
Per chart review,    PT- 07/17    Functional Mobility:  Bed Mobility:     Rolling Right: maximal assistance  Scooting to HOB via draw sheet: total assistance and of 2 persons  Supine to Sit: maximal assistance x2  Sit to Supine: maximal assistance    OT- 07/17    Bed Mobility:    Patient completed Supine to Sit with maximal assistance and 2 persons  Patient completed Sit to Supine with maximal assistance      Functional Mobility/Transfers:  Pt. Sat at EOB ~ 4-5 mins before coming sympathic with c/o not feel with HR in upper 30's see above     Activities of Daily Living:  Grooming: set up (A) oral care completed at bed level          PT- 07/09    Functional Mobility:  Bed Mobility:     Rolling Left:  total assistance and of 2 persons  Rolling Right: total assistance and of 2 persons  Scooting: total assistance and of 2 persons  Supine to Sit: pt refused to attempt 2/2 LUE pain.    OT- 07/09    Bed Mobility:    Patient completed Rolling/Turning to Left with  total assistance and 2 persons  Patient completed Rolling/Turning to Right with total assistance and 2 persons     Functional Mobility/Transfers: Declined 2/2 to L Shoulder Pain      Activities of Daily Living:  Upper Body Dressing: total assistance don gown at bed level   Lower Body Dressing: total assistance don and doff socks and pressure relief boots at bed level.

## 2024-07-10 NOTE — PROGRESS NOTES
Andres Sierra - [facility]  Infectious Disease  Progress Note    Patient Name: Roosevelt Moser  MRN: [id number]  Admission Date: 7/7/2024  Length of Stay: 3 days  Attending Physician: Roopa Rowan MD  Primary Care Provider: No, Primary Doctor    Isolation Status: Contact  Assessment/Plan:      ID  Chest wall abscess  Left chest wall abscess noted at OSH w/ concern for osteomyelitis transferred to [facility] for thoracic and plastic surgery eval  Thoracentesis at OSH 7/4 w/ serosanguineous fluid, f/b I&D and chest tube placement; loculated effusions right chest s/p VATS and chest tube placement on 7/5. Light's criteria consistent with exudative effusion (Cell count 1100 WBCs, 72% lymphocytes, , pH 7.6). No organisms seen on gram stain from L pleural fluid or chest wall abscess    Imaging from 7/8:     CT C/A/P:    - Residual collections in the left chest s/p I&D drainage; No evidence of osteo in adjacent ribs.     - Septated cystic lesion in right kidney, indeterminate for malignancy. Further evaluation with Renal mass protocol CT performed notable for obstructing stone with mild left sided hydroureteronephrosis     CT Shoulder:    - Large abscess vs hematoma at proximal humerus fracture site, c/f possible septic arthritis     MRI Shoulder:    - Septic arthritis of GH joint w/ acute osteo of the glenoid and proximal humerus; Several large soft tissue abscesses, Rim enhancing fluid collection w/in the medulla of the humeral shart (possible intraosseous abscess); myositis of deltoid & rotator cuff      Plan:    - Evaluated by CTS: Awaiting infectious/inflammatory process to resolve prior to surgery. Plans for potential ribs resection after source control is achieved. Plastics will be available for flap coverage   - Urology consulted for L ureteral stone: Okay to f/u OutPt; if pt decompensates --> urgent Uro consult for stone management  - Ortho consulted for Pyogenic arthritis of L shoulder: Arthrocentesis performed w/ gram stains  and culture in process, aspirated fluid more c/w hematoma > infection   - No plans for acute thoracic intervention, will arrange for follow up in clinic with MRI of the ribs    ID Recs:  - Aspirated L shoulder fluid gram stain and cx pending, will continue to f/u on all cx  - Continue Ceftaroline 600 mg IV q.8; will need estimated course of 6 weeks  - Will continue w/ meropenem for now              Anticipated Disposition: per primary     Thank you for your consult. I will follow-up with patient. Please contact us if you have any additional questions.    Jose Luis De Santiago MD  Infectious Disease  [facility]    Subjective:     Principal Problem:Osteomyelitis of multiple sites    HPI: 73M w/ A fib, HTN, T2DM, MARA, HLD, hx GBS, hx displaced comminuted fracture of the left humerus after a fall, s/p ORIF this past March, who presents as a transfer for osteomyelitis of multiple ribs requiring debridement and flap placement. Initially presented to [facility] on 6/14 for progressive weakness, multiple falls, and decreased urine output.  He was admitted for management of MARBIN, urosepsis, and AFib with RVR. During his stay he was noted to have swelling of his left upper extremity. Imaging studies on June 17 showed concern for gas-forming infection along the left shoulder and air along the lateral left chest wall.  MRI of his right foot also showed evidence of toe osteomyelitis (which grew MRSA). He was seen by ID who adjusted antibiotics, and they also noted oral thrush. IR aspirated fluid from left shoulder on June 20. On June 21 he had left shoulder I&D and removal of deep hardware. He underwent repeat irrigation and debridement of the left shoulder on June 24 with placement of a wound VAC. He had subsequent I&D with removal of the wound VAC on June 27. His right 2nd toe was amputated by Podiatry on July 1. Then, on July 3 he was noted to have dyspnea and mild tachycardia. CT chest showed a large abscess of the anterior  "central and left chest wall and abdominal wall with partial destruction of 3 ribs. He was seen by Cardiothoracic surgery. On July 4 he had left thoracentesis with aspiration of fluid. He subsequently had I&D of the left chest wall abscess on July 4, and a drainage catheter was left in place. On July 5 he underwent right VATS for a loculated right pleural effusion, and a chest tube was left in place. He was transitioned to the ICU post-operatively.  Transferred to [facility] for Thoracic and Plastics eval.     Prior to transfer, "He is awake and alert. He has a PICC line in place along with the right chest tube and left chest wall drain in place. He has no evidence of respiratory distress and is hemodynamically stable. Referring provider felt patient was stable for step-down status at present. He is currently on ceftaroline and meropenem. He is in contact isolation. July 7: Sodium 137, potassium 4.5, chloride 104, CO2 26, BUN 33, creatinine 1.1, glucose 130, albumin 2.1, AST 25, ALT 17, white blood cells 11.58, hemoglobin 8.1, hematocrit 26.2, platelets 389, procalcitonin 0.575, CRP greater than 16  VS:  Temperature 97.5°, pulse 85, respirations 20, blood pressure 112/63, O2 sats 95% "  Interval History:    NAEON. VSS, afeb. Chest tube removed yesterday by Thoracic. Ortho performed aspiration of shoulder Cultures and Gram stain pending though aspiration results more c/w hematoma (coagulated blood w/ tace amounts of serous fluid)    Review of Systems   Constitutional:  Positive for appetite change. Negative for fever.   HENT:  Negative for congestion, sore throat and trouble swallowing.    Eyes:  Negative for visual disturbance.   Respiratory:  Positive for cough (dry). Negative for shortness of breath and wheezing.    Cardiovascular:  Negative for chest pain, palpitations and leg swelling.   Gastrointestinal:  Negative for abdominal pain, constipation, diarrhea, nausea and vomiting.   Genitourinary:  Negative for decreased " urine volume and dysuria.   Musculoskeletal:  Negative for back pain.   Skin:  Positive for wound.   Neurological:  Negative for dizziness, light-headedness and headaches.   Psychiatric/Behavioral:  Negative for agitation, confusion and decreased concentration.      Objective:     Vital Signs (Most Recent):  Temp: 97.8 °F (36.6 °C) (07/10/24 1220)  Pulse: 77 (07/10/24 1220)  Resp: 20 (07/10/24 1220)  BP: (!) 100/56 (07/10/24 1220)  SpO2: 97 % (07/10/24 1220) Vital Signs (24h Range):  Temp:  [97.8 °F (36.6 °C)-98.8 °F (37.1 °C)] 97.8 °F (36.6 °C)  Pulse:  [65-91] 77  Resp:  [2-24] 20  SpO2:  [90 %-99 %] 97 %  BP: (100-145)/(56-81) 100/56     Weight: 133 kg (293 lb 3.4 oz)  Body mass index is 39.77 kg/m².    Estimated Creatinine Clearance: 94.3 mL/min (based on SCr of 1 mg/dL).     Physical Exam  Vitals and nursing note reviewed.   Constitutional:       General: He is not in acute distress.     Appearance: He is ill-appearing (chronically). He is not toxic-appearing or diaphoretic.   HENT:      Head: Normocephalic and atraumatic.      Nose: Nose normal.      Mouth/Throat:      Pharynx: Oropharynx is clear.   Eyes:      General: No scleral icterus.  Cardiovascular:      Rate and Rhythm: Normal rate and regular rhythm.      Pulses: Normal pulses.      Heart sounds: Normal heart sounds.   Pulmonary:      Effort: Pulmonary effort is normal.      Comments: No obvious rales or wheezes when auscultating anterior chest; unable to complete posterior auscultation due to limited mobility of patient  Chest:      Chest wall: No deformity or tenderness.      Comments: Left chest wall drain   Abdominal:      General: Bowel sounds are normal.      Palpations: Abdomen is soft.      Tenderness: There is no abdominal tenderness.   Musculoskeletal:      Cervical back: Normal range of motion and neck supple.      Right lower leg: No edema.      Left lower leg: No edema.   Skin:     General: Skin is warm.      Capillary Refill: Capillary  refill takes less than 2 seconds.   Neurological:      Mental Status: He is alert and oriented to person, place, and time.   Psychiatric:         Behavior: Behavior normal.          Significant Labs: All pertinent labs within the past 24 hours have been reviewed.    Significant Imaging: I have reviewed all pertinent imaging results/findings within the past 24 hours.

## 2024-07-10 NOTE — ASSESSMENT & PLAN NOTE
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 and removal of deep hardware 6/21 - all screws and nails removed, s/p 2nd washout 6/24 & 3rd washout 6/27   -Left Shoulder washouts on 6/21 ( abundant pus in shoulder, hardware removal), 6/24 ( bloody brownish fluid) and 6/27 ( no fluid collection, healthy red and beefy tissue)   -Right 3rd toe osteomyelitis s/p Dalvance x 2 doses & 2nd distal phalanx s/p I&D at bedside, s/p 2nd toe amputation 7/1       - Presents as a transfer for thoracic surgery and Plastic surgery evaluation in setting of concern for "osteomyelitis of multiple ribs which will need further debridement and then a flap placed" in the setting of chest wall abscess    -arrives on ceftaroline and meropenem as per outside hospital ID recommendations  -ID following here; pending final antibiotic recommendations  -notably, 7/3 blood cultures, 7/4 pleural fluid cultures, 7/4 abscess cultures, 755 pleural fluid cultures NGTD  -6/18 right toe wound culture with MRSA  -continue to follow OSH cultures  -ongoing wound care  -ID, Ortho, Thoracic surgery and Plastic surgery consulted and following; imaging findings per hospital course  -thoracic surgery removed right chest tube on 07/09; left chest accordion drain to negative pressure remains in place  -ortho surgery completed left shoulder aspiration 7/9 to assess shoulder abscess concern; cultures pending  -thus far, no acute surgical intervention per all surgical specialties  "

## 2024-07-10 NOTE — ASSESSMENT & PLAN NOTE
-S/P CT and VATs 07/05.  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  -s/p 2nd toe amputation 7/1.   -ID, Plastics, thoracic Sx following.  -Continue IV antibiotics as recommended by ID.

## 2024-07-10 NOTE — PT/OT/SLP PROGRESS
Occupational Therapy   Co-Treatment  Co-treatment performed due to patient's multiple deficits requiring two skilled therapists to appropriately and safely assess patient's strength and endurance while facilitating functional tasks in addition to accommodating for patient's activity tolerance.        Name: Roosevelt Moser  MRN: [id number]  Admitting Diagnosis:  Osteomyelitis of multiple sites       Recommendations:     Discharge Recommendations: Moderate Intensity Therapy  Discharge Equipment Recommendations:  hospital bed, wheelchair, lift device  Barriers to discharge:   (Increased assistance required for ADLs & functional mobility/transfers)    Assessment:     Roosevelt Moser is a 73 y.o. male with a medical diagnosis of Osteomyelitis of multiple sites.  He presents with the following performance deficits affecting function are weakness, impaired endurance, impaired self care skills, impaired functional mobility, gait instability, pain, decreased upper extremity function, decreased lower extremity function, decreased ROM, orthopedic precautions.     Pt agreeable to therapy but did not tolerate well. Pt is primarily limited by pain. However, compared to pt's performance during eval on 07/10/2024, pt demonstrated some improvement with bed mobility/transfers.    In summary, pt remains limited in ADLs, functional mobility, and functional transfers and is currently not performing tasks at [facility]. Pt would continue to benefit from skilled OT services to maximize functional independence with ADLs and functional mobility, reduce caregiver burden, and facilitate safe discharge in the least restrictive environment.      Rehab Prognosis:  Good; patient would benefit from acute skilled OT services to address these deficits and reach maximum level of function.       Plan:     Patient to be seen 4 x/week to address the above listed problems via self-care/home management, therapeutic activities  Plan of Care Expires: 08/08/24  Plan of  Care Reviewed with: patient    Subjective     Chief Complaint: c/o generalized pain  Patient/Family Comments/goals: pt agreeable to participate  Pain/Comfort:  Pain Rating 1: other (see comments) (pt did not rate pain)  Location - Orientation 1: generalized  Pain Addressed 1: Reposition, Distraction, Cessation of Activity    Objective:     Communicated with: RN prior to session.  Patient found HOB elevated with PICC line, Condom Catheter, hemovac, peripheral IV, telemetry, pulse ox (continuous) upon OT entry to room.    General Precautions: Standard, fall    Orthopedic Precautions:LUE partial weight bearing  Braces:  (CAM/Walking Boot)  Respiratory Status: Room air     Occupational Performance:     Bed Mobility:    Patient completed Scooting to EOB & HOB with maximal assistance and 2 persons  Patient completed Supine to Sit with maximal assistance and 2 persons  Patient completed Sit to Supine with maximal assistance and 2 persons     Functional Mobility/Transfers:  Patient agreed to attempt 1 Sit <> Stand Transfer but then quickly changed his mind and reported that he wanted to lay back down  Functional Mobility: pt declined    Activities of Daily Living:  Lower Body Dressing: total assistance to don/dof socks      [facility] 6 Click ADL: 11    Treatment & Education:  Therapeutic exercises: pt completed the following UE exercises to maintain/improve UE ROM, & overall functionality required for participation/independence with ADLs, IADLs & functional mobility/transfers   - AAROM L elbow flexion/extension   - AAROM L wrist flexion/extension  - Pt educated on importance of UE ROM of left elbow & wrist to maintain/promote functionality of LUE while maintaining precautions and minimizing pain  -Education on task modification to maximize safety and (I) during ADLs and mobility  -Education on importance of OOB activity to improve overall activity tolerance and promote recovery  -Pt educated to call for assistance and to  transfer with hospital staff only  Pt had no further questions & when asked whether there were any concerns pt reported none.     Patient left HOB elevated with all lines intact, call button in reach, RMN notified, and spouse present    GOALS:   Multidisciplinary Problems       Occupational Therapy Goals          Problem: Occupational Therapy    Goal Priority Disciplines Outcome Interventions   Occupational Therapy Goal     OT, PT/OT Progressing    Description: Goals to be met by: 8/8/24     Patient will increase functional independence with ADLs by performing:    LE Dressing with Moderate Assistance.  Grooming while EOB with Supervision.  Toileting from toilet with Supervision for hygiene and clothing management.   Stand pivot transfers with Moderate Assistance.  Squat pivot transfers with Moderate Assistance.  Toilet transfer to bedside commode with Moderate Assistance.                         Time Tracking:     OT Date of Treatment: 07/10/24  OT Start Time: 1037  OT Stop Time: 1102  OT Total Time (min): 25 min    Billable Minutes:Therapeutic Exercise 25    OT/BAR: OT          7/10/2024

## 2024-07-10 NOTE — PROGRESS NOTES
"[facility] Medicine  Progress Note    Patient Name: Roosevelt Moser  MRN: [id number]  Patient Class: IP- Inpatient   Admission Date: 7/7/2024  Length of Stay: 3 days  Attending Physician: Roopa Rowan MD  Primary Care Provider: Miriam, Primary Doctor        Subjective:     Principal Problem:Osteomyelitis of multiple sites        HPI:  Roosevelt Moser is a 72yo M w/ A fib, HTN, T2DM, MARA, HLD, hx GBS, hx displaced comminuted fracture of the left humerus s/p fall 3/4/24 s/p ORIF 03/25/2024 who presents as a transfer for thoracic surgery and Plastic surgery evaluation in setting of "osteomyelitis of multiple ribs which will need further debridement and then a flap placed."    He was initially admitted to [facility] on 6/14 for progressive weakness, multiple falls, and decreased urine output.  He was admitted for management of MARBIN on CKD complicated by hyperkalemia, urosepsis, AFib with RVR.    "Creatinine improved during his stay. During his stay he was noted to have swelling of his left upper extremity. Imaging studies on June 17 showed concern for gas-forming infection along the left shoulder and air along the lateral left chest wall concerning for gas-forming organism. MRI of his right foot also showed evidence of toe osteomyelitis. He was seen by ID who adjusted antibiotics, and they also noted oral thrush. Podiatry evaluated him for the toe findings, and Orthopedic Surgery evaluated the shoulder abnormalities. Right toe wound grew MRSA. Oral anticoagulation was held, and Radiology aspirated fluid from his left shoulder on June 20. On June 21 he had left shoulder incision and drainage and removal of deep hardware including rods and screws. He underwent repeat irrigation and debridement of the left shoulder on June 24 with placement of a wound VAC. He had subsequent incision and drainage with removal of the wound VAC on June 27. He had amputation of his right 2nd toe by Podiatry on July 1. On " "July 3 he was noted to have dyspnea and mild tachycardia. CT chest showed a large abscess of the anterior central and left chest wall and abdominal wall with partial destruction of 3 ribs. He was seen by Cardiothoracic surgery. On July 4 he had left thoracentesis with aspiration of fluid. He subsequently had incision and drainage of the left chest wall abscess on July 4, and a drainage catheter was left in place. On July 5 he underwent right VATS for a loculated right pleural effusion, and a chest tube was left in place. He was transitioned to the ICU post-operatively. With concern for osteomyelitis of the ribs, concern is that he will need transfer to a facility with Thoracic Surgery and potentially Plastic Surgery along with higher level of care. Transfer center spoke with Thoracic Surgery at [facility]. Requesting transfer to Hospital Medicine at [facility] for Plastic Surgery and Thoracic Surgery evaluation. Transfer center spoke with Plastic Surgery on July 5, Thoracic Surgery on July 7. With his complicated medical presentation, will plan transfer to Hospital Medicine at [facility] in step-down status for further treatment. "    Prior to transfer, "He is awake and alert. He has a PICC line in place along with the right chest tube and left chest wall drain in place. He has no evidence of respiratory distress and is hemodynamically stable. Referring provider felt patient was stable for step-down status at present. He is currently on ceftaroline and meropenem. He is in contact isolation. July 7: Sodium 137, potassium 4.5, chloride 104, CO2 26, BUN 33, creatinine 1.1, glucose 130, albumin 2.1, AST 25, ALT 17, white blood cells 11.58, hemoglobin 8.1, hematocrit 26.2, platelets 389, procalcitonin 0.575, CRP greater than 16  VS:  Temperature 97.5°, pulse 85, respirations 20, blood pressure 112/63, O2 sats 95% "    Patient in no acute distress upon arrival.  Wife at bedside.  Patient denies any acute " complaints.        Imaging history reviewed:  July 6: X-rays of the left shoulder had fracture through the surgical neck of the humerus with lateral distraction of the distal component and overlapping segments on the order of 2.5 cm.  Overall no appreciable change upon comparison with prior imaging.  -chest x-ray noted manifestations of mild congestive heart failure bordering on mild pulmonary edema.  Right-sided thoracostomy tube terminating within the right apex.  No definite pneumothorax.        July 5: Pleural fluid Gram stain with few WBCs and no organisms seen  -pH 7.339, pCO2 50.9, pO2 392     July 4:  CPK less than 10, serum   -AFB smear from left chest abscess had no AFB seen, left chest abscess aerobic and anaerobic cultures have no growth  -pleural fluid protein 3.2, white blood cells 1108 (11% segs, 74% lymphs), , pH 7.63, pleural fluid culture had no growth  -ultrasound-guided thoracentesis removed 1.4-1.5 L     July 3: Blood cultures with no growth to date  -CT chest showed large abscess of the anterior central and left chest wall and abdominal wall measuring 17 cm transversely.  This extends into the mediastinum and peritoneum with partial destruction of the anterior 6th, 7th, and 8th ribs.  Complete atelectasis of the left lower lobe with moderate left pleural effusion.  Mild atelectasis right lung base with a small-to-moderate right pleural effusion.     July 2: X-rays of the left shoulder noted displaced left proximal humerus fracture status post hardware removal without change in alignment.     June 24: ECHO with EF 55-60%.     June 20:  Interventional Radiology did percutaneous aspiration of the left shoulder fluid collection.  No drainage catheter was left in place.     June 18:  Right toe wound MRSA  -CT left shoulder had subacute left humerus fracture post internal fixation.  Incomplete bony bridging across the fracture site with persistent angulation.  Severe arthropathy of the  "glenohumeral joint with multiple intra-articular bodies.  Large left joint effusion and adjacent soft tissue focal fluid collections containing gas and concerning for gas-forming infection.  Moderate size left pleural effusion.  Left basilar airspace disease.  -bilateral lower extremity Doppler arterial ultrasound had atherosclerotic plaque and calcification bilaterally without severe stenosis or occlusion identified on either side.     June 17: X-rays of the left humerus/shoulder had findings concerning for gas-forming infection along the left shoulder.  -x-rays of the left ribs showed air along the lateral chest wall soft tissue and axilla concerning for gas-forming infection.  -MRI of the right foot had findings consistent with osteomyelitis involving the middle distal phalanges of the 3rd toe.  Cellulitis.     June 14: Renal ultrasound had no hydronephrosis.  Elevated resistive index right kidney suggesting intrinsic renal disease.    Overview/Hospital Course:  Pt admitted to  as a transfer for thoracic surgery and Plastic surgery evaluation in setting of "osteomyelitis of multiple ribs which will need further debridement and then a flap placed." Thoracic Surgery, Plastic Surgery, ID, Ortho, Wound care, PT/OT consulted upon admission. Imaging ordered.  CT chest abdomen with IV contrast without evidence of osteomyelitis in ribs adjacent to I&D site of left chest wall abscess.; incidental finding of 1.2 cm obstructing stone in left distal ureter with mild hydroureteronephrosis.  Urology was consulted and CT renal stone study was completed; no acute intervention given no concern for infection around stone or MARBIN; follow-up outpatient unless decompensates.  MRI shoulder without contrast left notable for septic arthritis of left glenohumeral joint with acute osteomyelitis of the glenoid and proximal humerus in addition to known displaced fracture of proximal humeral metaphysis in addition to several large soft " tissue abscesses.  Patient underwent left shoulder aspiration with Orthopedic surgery on 07/09.  Right chest tube was removed by thoracic surgery on 07/09.    Interval History:  Patient seen and examined at bedside.  Wife at bedside.  No acute events overnight.  Patient has no acute complaints this morning. R CT removed yesterday by thoracic surgery.  Left drain remains in place with seropurulent drainage.  Patient and wife updated regarding care plan.       Review of Systems   Constitutional:  Positive for appetite change. Negative for fever.   HENT:  Negative for congestion, sore throat and trouble swallowing.    Eyes:  Negative for visual disturbance.   Respiratory:  Positive for cough (dry). Negative for shortness of breath and wheezing.    Cardiovascular:  Negative for chest pain, palpitations and leg swelling.   Gastrointestinal:  Negative for abdominal pain, constipation, diarrhea, nausea and vomiting.   Genitourinary:  Negative for decreased urine volume and dysuria.   Musculoskeletal:  Negative for back pain.   Skin:  Positive for wound.   Neurological:  Negative for dizziness, light-headedness and headaches.   Psychiatric/Behavioral:  Negative for agitation, confusion and decreased concentration.        Objective:    Temp: 97.8 °F (36.6 °C) (07/10/24 1220)  Pulse: 77 (07/10/24 1220)  Resp: 20 (07/10/24 1220)  BP: (!) 100/56 (07/10/24 1220)  SpO2: 97 % (07/10/24 1220)    Weight: 133 kg (293 lb 3.4 oz) (07/07/24 1757)    Body mass index is 39.77 kg/m².      Intake/Output Summary (Last 24 hours) at 7/10/2024 1258  Last data filed at 7/10/2024 0632  Gross per 24 hour   Intake 1110 ml   Output 465 ml   Net 645 ml       Physical Exam  Vitals and nursing note reviewed.   Constitutional:       General: He is not in acute distress.     Appearance: He is ill-appearing (chronically). He is not toxic-appearing or diaphoretic.   HENT:      Head: Normocephalic and atraumatic.      Nose: Nose normal.      Mouth/Throat:       Pharynx: Oropharynx is clear.   Eyes:      General: No scleral icterus.  Cardiovascular:      Rate and Rhythm: Normal rate and regular rhythm.      Pulses: Normal pulses.      Heart sounds: Normal heart sounds.   Pulmonary:      Effort: Pulmonary effort is normal.      Comments: No obvious rales or wheezes when auscultating anterior chest; unable to complete posterior auscultation due to limited mobility of patient  Chest:      Chest wall: No deformity or tenderness.      Comments: Left chest wall drain with seropurulent output  Abdominal:      General: Bowel sounds are normal.      Palpations: Abdomen is soft.      Tenderness: There is no abdominal tenderness.   Musculoskeletal:      Cervical back: Normal range of motion and neck supple.      Right lower leg: No edema.      Left lower leg: No edema.   Skin:     General: Skin is warm.      Capillary Refill: Capillary refill takes less than 2 seconds.      Comments: Unable to examine posterior skin due to patient debility   Neurological:      Mental Status: He is alert and oriented to person, place, and time.   Psychiatric:         Behavior: Behavior normal.         Significant Labs: All pertinent labs within the past 24 hours have been reviewed.    Recent Results (from the past 24 hour(s))   Gram stain    Collection Time: 07/09/24  4:22 PM    Specimen: Shoulder, Left; Joint Fluid   Result Value Ref Range    Gram Stain Result Rare WBC's     Gram Stain Result No organisms seen    Culture, Anaerobic    Collection Time: 07/09/24  4:22 PM    Specimen: Shoulder, Left; Joint Fluid   Result Value Ref Range    Anaerobic Culture Culture in progress    Aerobic culture    Collection Time: 07/09/24  4:22 PM    Specimen: Shoulder, Left; Abscess   Result Value Ref Range    Aerobic Bacterial Culture No growth    POCT glucose    Collection Time: 07/09/24  9:12 PM   Result Value Ref Range    POCT Glucose 120 (H) 70 - 110 mg/dL   CBC Auto Differential    Collection Time: 07/10/24   2:58 AM   Result Value Ref Range    WBC 12.37 3.90 - 12.70 K/uL    RBC 3.61 (L) 4.60 - 6.20 M/uL    Hemoglobin 8.9 (L) 14.0 - 18.0 g/dL    Hematocrit 29.8 (L) 40.0 - 54.0 %    MCV 83 82 - 98 fL    MCH 24.7 (L) 27.0 - 31.0 pg    MCHC 29.9 (L) 32.0 - 36.0 g/dL    RDW 17.2 (H) 11.5 - 14.5 %    Platelets 498 (H) 150 - 450 K/uL    MPV 9.9 9.2 - 12.9 fL    Immature Granulocytes 1.1 (H) 0.0 - 0.5 %    Gran # (ANC) 8.5 (H) 1.8 - 7.7 K/uL    Immature Grans (Abs) 0.13 (H) 0.00 - 0.04 K/uL    Lymph # 1.6 1.0 - 4.8 K/uL    Mono # 1.3 (H) 0.3 - 1.0 K/uL    Eos # 0.8 (H) 0.0 - 0.5 K/uL    Baso # 0.11 0.00 - 0.20 K/uL    nRBC 0 0 /100 WBC    Gran % 68.8 38.0 - 73.0 %    Lymph % 13.0 (L) 18.0 - 48.0 %    Mono % 10.1 4.0 - 15.0 %    Eosinophil % 6.1 0.0 - 8.0 %    Basophil % 0.9 0.0 - 1.9 %    Differential Method Automated    Comprehensive Metabolic Panel    Collection Time: 07/10/24  2:58 AM   Result Value Ref Range    Sodium 139 136 - 145 mmol/L    Potassium 4.0 3.5 - 5.1 mmol/L    Chloride 106 95 - 110 mmol/L    CO2 23 23 - 29 mmol/L    Glucose 105 70 - 110 mg/dL    BUN 23 8 - 23 mg/dL    Creatinine 1.0 0.5 - 1.4 mg/dL    Calcium 8.2 (L) 8.7 - 10.5 mg/dL    Total Protein 5.8 (L) 6.0 - 8.4 g/dL    Albumin 1.2 (L) 3.5 - 5.2 g/dL    Total Bilirubin 0.3 0.1 - 1.0 mg/dL    Alkaline Phosphatase 104 55 - 135 U/L    AST 17 10 - 40 U/L    ALT 11 10 - 44 U/L    eGFR >60.0 >60 mL/min/1.73 m^2    Anion Gap 10 8 - 16 mmol/L   Magnesium    Collection Time: 07/10/24  2:58 AM   Result Value Ref Range    Magnesium 1.6 1.6 - 2.6 mg/dL   Phosphorus    Collection Time: 07/10/24  2:58 AM   Result Value Ref Range    Phosphorus 2.8 2.7 - 4.5 mg/dL   POCT glucose    Collection Time: 07/10/24  7:00 AM   Result Value Ref Range    POCT Glucose 110 70 - 110 mg/dL   POCT glucose    Collection Time: 07/10/24 12:22 PM   Result Value Ref Range    POCT Glucose 156 (H) 70 - 110 mg/dL       Significant Imaging: I have reviewed all pertinent imaging  "results/findings within the past 24 hours.          Assessment/Plan:      * Osteomyelitis of multiple sites  Skin ulcer of toe of right foot with necrosis of bone  Osteomyelitis of toe   MRSA infection     Chest wall abscess  Loculated pleural effusion  Mediastinal lymphadenopathy    Abscess of left shoulder  Pyogenic abscess of left shoulder region  Humerus fracture    OSH:  -status post left thoracentesis 1500cc serosanguineous fluid drained 7/4 & I&D drainage with chest tube placement 7/4 & loculated effusions right chest s/p VATS and chest tube 7/5   -Left shoulder prosthetic joint infection s/p I&D and removal of deep hardware 6/21 - all screws and nails removed, s/p 2nd washout 6/24 & 3rd washout 6/27   -Left Shoulder washouts on 6/21 ( abundant pus in shoulder, hardware removal), 6/24 ( bloody brownish fluid) and 6/27 ( no fluid collection, healthy red and beefy tissue)   -Right 3rd toe osteomyelitis s/p Dalvance x 2 doses & 2nd distal phalanx s/p I&D at bedside, s/p 2nd toe amputation 7/1       - Presents as a transfer for thoracic surgery and Plastic surgery evaluation in setting of concern for "osteomyelitis of multiple ribs which will need further debridement and then a flap placed" in the setting of chest wall abscess    -arrives on ceftaroline and meropenem as per outside hospital ID recommendations  -ID following here; pending final antibiotic recommendations  -notably, 7/3 blood cultures, 7/4 pleural fluid cultures, 7/4 abscess cultures, 755 pleural fluid cultures NGTD  -6/18 right toe wound culture with MRSA  -continue to follow OSH cultures  -ongoing wound care  -ID, Ortho, Thoracic surgery and Plastic surgery consulted and following; imaging findings per hospital course  -thoracic surgery removed right chest tube on 07/09; left chest accordion drain to negative pressure remains in place  -ortho surgery completed left shoulder aspiration 7/9 to assess shoulder abscess concern; cultures " "pending  -thus far, no acute surgical intervention per all surgical specialties    Sepsis  This patient does have evidence of infective focus  My overall impression is sepsis.  Source: Skin and Soft Tissue (location toe)  Antibiotics given-   Antibiotics (72h ago, onward)      Start     Stop Route Frequency Ordered    07/07/24 1545  ceftaroline fosamiL (TEFLARO) 600 mg in D5W 50 mL IVPB (MB+)         -- IV Every 8 hours (non-standard times) 07/07/24 1532    07/07/24 1545  meropenem (MERREM) 1 g in 0.9% NaCl 100 mL IVPB (MB+)         -- IV Every 8 hours (non-standard times) 07/07/24 1532          Latest lactate reviewed-  No results for input(s): "LACTATE", "POCLAC" in the last 72 hours.  Organ dysfunction indicated by Acute kidney injury    Fluid challenge Not needed - patient is not hypotensive      Post- resuscitation assessment No - Post resuscitation assessment not needed       Will Not start Pressors- Levophed for MAP of 65  Source control achieved by: abx    Acute renal failure superimposed on chronic kidney disease  Patient with acute kidney injury/acute renal failure likely due to  due to ATN due to sepsis due to UTI and/or PNA  MARBIN is currently improving. Baseline creatinine  wnl  - Labs reviewed- Renal function/electrolytes with Estimated Creatinine Clearance: 94.3 mL/min (based on SCr of 1 mg/dL). according to latest data. Monitor urine output and serial BMP and adjust therapy as needed. Avoid nephrotoxins and renally dose meds for GFR listed above.    Atrial fibrillation with rapid ventricular response  Patient with Long standing persistent (>12 months) atrial fibrillation which is controlled currently with Beta Blocker. Patient is currently in TBD.[id number] Score: 2.      Chronic AF w/ acute RVR at OSH, resolved s/p cardizem drip   NOAC has been on hold d/t possible need for surgery - has been on prophylaxis dose of Lovenox  Resume A/c pending any surgical intervention, consider lovenox vs hep gtt " bridge    Debility  Patient with Acute on chronic debility due to  multiple infections . Latest AMPAC and GEMS scores have not been reviewed. Evaluation for etiology is complete. Plan includes progressive mobility protocol initated, PT/OT consulted, and fall precautions in place.    Thyroid nodule  Incidental finding on imagin.7 cm left thyroid nodule. Nonemergent thyroid ultrasound is recommended.       Left renal stone  Nephrolithiasis     retroperitoneal ultrasound completed in setting of MARBIN without hydronephrosis    CT abdomen notable for 1.2 cm obstructing stone in left distal ureter with mild hydroureteronephrosis       Appreciate urology recommendations; given no concerns regarding MARBIN or infection relation to the stone, no acute surgical intervention  Follow-up outpatient; strain all urine    Cyst of right kidney  Incidental finding on imagin.8 cm septated cystic lesion in the right kidney, which is indeterminate for malignancy. Further evaluation with nonemergent renal mass protocol CT or MRI is recommended.       History of Guillain-[place] syndrome  No acute issues  However, pt has impaired mobility and is acutely weakened from his sepsis/UTI/AF RVR and need placement       Type 2 diabetes mellitus  Patient's FSGs are controlled on current medication regimen.  Last A1c reviewed-   Lab Results   Component Value Date    HGBA1C 6.3 (H) 2024     Most recent fingerstick glucose reviewed-   Recent Labs   Lab 24  2112 07/10/24  0700 07/10/24  1222   POCTGLUCOSE 120* 110 156*       Current correctional scale  Low  Maintain anti-hyperglycemic dose as follows-   Antihyperglycemics (From admission, onward)      Start     Stop Route Frequency Ordered    24 0900  insulin glargine U-100 (Lantus) pen 15 Units         -- SubQ Daily 24 1532    24 1529  insulin aspart U-100 pen 0-10 Units         -- SubQ Before meals & nightly PRN 24 1532          Hold Oral hypoglycemics  while patient is in the hospital.    Hypertension  Chronic, controlled. Latest blood pressure and vitals reviewed-     Temp:  [97.8 °F (36.6 °C)-98.8 °F (37.1 °C)]   Pulse:  [65-91]   Resp:  [2-24]   BP: (100-145)/(56-81)   SpO2:  [90 %-99 %] .   Home meds for hypertension were reviewed and noted below.   Hypertension Medications               hydrALAZINE (APRESOLINE) 25 MG tablet Take 1 tablet (25 mg total) by mouth every 12 (twelve) hours.    metoprolol succinate (TOPROL-XL) 100 MG 24 hr tablet Take 1 tablet (100 mg total) by mouth once daily.            While in the hospital, will manage blood pressure as follows; continue home metoprolol    Will utilize p.r.n. blood pressure medication only if patient's blood pressure greater than 180/110 and he develops symptoms such as worsening chest pain or shortness of breath.    MARA (obstructive sleep apnea)  Continue CPAP HS and w/ naps        Morbid obesity  There is no height or weight on file to calculate BMI. Morbid obesity complicates all aspects of disease management from diagnostic modalities to treatment. Weight loss encouraged and health benefits explained to patient.           VTE Risk Mitigation (From admission, onward)           Ordered     enoxaparin injection 40 mg  Every 24 hours         07/09/24 1358     Place sequential compression device  Until discontinued         07/07/24 1532                    Discharge Planning   KAMILA: 7/12/2024     Code Status: Full Code   Is the patient medically ready for discharge?:     Reason for patient still in hospital (select all that apply): Patient trending condition, Treatment, Consult recommendations, PT / OT recommendations, and Pending disposition  Discharge Plan A: Home with family                  Roopa Rowan MD  Department of Hospital Medicine   [facility]

## 2024-07-10 NOTE — ASSESSMENT & PLAN NOTE
Left chest wall abscess noted at OSH w/ concern for osteomyelitis [place] [facility] [place] eval  Thoracentesis at OSH 7/4 w/ serosanguineous fluid, f/b I&D and chest tube placement; loculated effusions right chest s/p VATS and chest tube placement on 7/5. Light's criteria consistent with exudative effusion (Cell count 1100 WBCs, 72% lymphocytes, , pH 7.6). No organisms seen on gram stain from L pleural fluid or chest wall abscess    Imaging from 7/8:     CT C/A/P:    - Residual collections in the left chest s/p I&D drainage; No evidence of osteo in adjacent ribs.     - Septated cystic lesion in right kidney, indeterminate for malignancy. Further evaluation with Renal mass protocol CT performed notable for obstructing stone with mild left sided hydroureteronephrosis     CT Shoulder:    - Large abscess vs hematoma at proximal humerus fracture site, c/f possible septic arthritis     MRI Shoulder:    - Septic arthritis of GH joint w/ acute osteo of the glenoid and proximal humerus; Several large soft tissue abscesses, Rim enhancing fluid collection w/in the medulla of the humeral shart (possible intraosseous abscess); myositis of deltoid & rotator cuff      Plan:    - Evaluated by CTS: Awaiting infectious/inflammatory process to resolve prior to surgery. Plans for potential ribs resection after source control is achieved. Plastics will be available for flap coverage   - Urology consulted for L ureteral stone: Okay to f/u OutPt; if pt decompensates --> urgent Uro consult for stone management  - Ortho consulted for Pyogenic arthritis of L shoulder: Arthrocentesis performed w/ gram stains and culture in process, aspirated fluid more c/w hematoma > infection   - No plans for acute thoracic intervention, will arrange for follow up in clinic with MRI of the ribs    ID Recs:  - Aspirated L shoulder fluid gram stain and cx pending, will continue to f/u on all cx  - Continue Ceftaroline 600 mg  IV q.8; will need estimated course of 6 weeks  - Will continue w/ meropenem for now

## 2024-07-10 NOTE — SUBJECTIVE & OBJECTIVE
Interval History: NAEON. Resting comfortably on room air. Chest tube removed yesterday. AVSS. No drain output documented, but fluid in the tubing clearing up.     Medications:  Continuous Infusions:   0.9% NaCl   Intravenous Continuous 75 mL/hr at 07/09/24 0510 New Bag at 07/09/24 0510     Scheduled Meds:   (Magic mouthwash) 1:1:1 diphenhydrAMINE(Benadryl) 12.5mg/5ml liq, aluminum & magnesium hydroxide-simethicone (Maalox), LIDOcaine viscous 2%  10 mL Swish & Spit QID    ceftaroline (Teflaro) IV (PEDS and ADULTS)  600 mg Intravenous Q8H    enoxparin  40 mg Subcutaneous Q24H (prophylaxis, 1700)    ferrous sulfate  1 tablet Oral Daily    glutamine  0.5 g Oral Daily    insulin glargine U-100  15 Units Subcutaneous Daily    LIDOcaine  1 patch Transdermal Q24H    meropenem IV (PEDS and ADULTS)  1 g Intravenous Q8H    metoprolol succinate  100 mg Oral QHS    polyethylene glycol  17 g Oral Daily    sodium chloride 0.9%  10 mL Intravenous Q6H     PRN Meds:  Current Facility-Administered Medications:     albuterol-ipratropium, 3 mL, Nebulization, Q4H PRN    aluminum & magnesium hydroxide-simethicone, 15 mL, Oral, QID PRN    dextrose 10%, 12.5 g, Intravenous, PRN    dextrose 10%, 25 g, Intravenous, PRN    glucagon (human recombinant), 1 mg, Intramuscular, PRN    glucose, 16 g, Oral, PRN    glucose, 24 g, Oral, PRN    HYDROcodone-acetaminophen, 1 tablet, Oral, Q4H PRN    HYDROcodone-acetaminophen, 1 tablet, Oral, Q4H PRN    insulin aspart U-100, 0-10 Units, Subcutaneous, QID (AC + HS) PRN    naloxone, 0.02 mg, Intravenous, PRN    ondansetron, 8 mg, Oral, Q8H PRN    polyethylene glycol, 17 g, Oral, BID PRN    promethazine, 25 mg, Oral, Q6H PRN    senna-docusate 8.6-50 mg, 1 tablet, Oral, BID PRN    simethicone, 1 tablet, Oral, QID PRN    sodium chloride 0.9%, 10 mL, Intravenous, Q12H PRN    Flushing PICC/Midline Protocol, , , Until Discontinued **AND** sodium chloride 0.9%, 10 mL, Intravenous, Q6H **AND** sodium chloride 0.9%,  10 mL, Intravenous, PRN    traMADoL, 50 mg, Oral, Q4H PRN     Review of patient's allergies indicates:  No Known Allergies  Objective:     Vital Signs (Most Recent):  Temp: 98.8 °F (37.1 °C) (07/10/24 0405)  Pulse: 75 (07/10/24 0450)  Resp: 19 (07/10/24 0530)  BP: (!) 145/71 (07/10/24 0405)  SpO2: 96 % (07/10/24 0450) Vital Signs (24h Range):  Temp:  [98.1 °F (36.7 °C)-98.8 °F (37.1 °C)] 98.8 °F (37.1 °C)  Pulse:  [65-91] 75  Resp:  [2-24] 19  SpO2:  [90 %-99 %] 96 %  BP: (107-145)/(53-81) 145/71     Intake/Output - Last 3 Shifts         07/08 0700 07/09 0659 07/09 0700  07/10 0659    Urine (mL/kg/hr) 550 (0.2)     Drains 110     Stool 0     Chest Tube 7     Total Output 667     Net -667           Urine Occurrence 3 x     Stool Occurrence 4 x             SpO2: 96 %        Physical Exam  Vitals reviewed.   Constitutional:       Appearance: Normal appearance.   Cardiovascular:      Rate and Rhythm: Normal rate and regular rhythm.   Pulmonary:      Effort: Pulmonary effort is normal. No respiratory distress.   Abdominal:      Palpations: Abdomen is soft.      Tenderness: There is no abdominal tenderness.   Musculoskeletal:      Comments: Left chest wall accordion drain with seropurulent output   Skin:     General: Skin is warm.   Neurological:      General: No focal deficit present.      Mental Status: He is alert and oriented to person, place, and time.            Significant Labs:  CBC:   Recent Labs   Lab 07/10/24  0258   WBC 12.37   RBC 3.61*   HGB 8.9*   HCT 29.8*   *   MCV 83   MCH 24.7*   MCHC 29.9*     CMP:   Recent Labs   Lab 07/10/24  0258      CALCIUM 8.2*   ALBUMIN 1.2*   PROT 5.8*      K 4.0   CO2 23      BUN 23   CREATININE 1.0   ALKPHOS 104   ALT 11   AST 17   BILITOT 0.3       Significant Diagnostics:  I have reviewed all pertinent imaging results/findings within the past 24 hours.    VTE Risk Mitigation (From admission, onward)           Ordered     enoxaparin injection 40 mg   Every 24 hours         07/09/24 1358     Place sequential compression device  Until discontinued         07/07/24 1555

## 2024-07-10 NOTE — SUBJECTIVE & OBJECTIVE
Interval History:    NAEON. VSS, afeb. Chest tube removed yesterday by Thoracic. Ortho performed aspiration of shoulder Cultures and Gram stain pending though aspiration results more c/w hematoma (coagulated blood w/ tace amounts of serous fluid)    Review of Systems   Constitutional:  Positive for appetite change. Negative for fever.   HENT:  Negative for congestion, sore throat and trouble swallowing.    Eyes:  Negative for visual disturbance.   Respiratory:  Positive for cough (dry). Negative for shortness of breath and wheezing.    Cardiovascular:  Negative for chest pain, palpitations and leg swelling.   Gastrointestinal:  Negative for abdominal pain, constipation, diarrhea, nausea and vomiting.   Genitourinary:  Negative for decreased urine volume and dysuria.   Musculoskeletal:  Negative for back pain.   Skin:  Positive for wound.   Neurological:  Negative for dizziness, light-headedness and headaches.   Psychiatric/Behavioral:  Negative for agitation, confusion and decreased concentration.      Objective:     Vital Signs (Most Recent):  Temp: 97.8 °F (36.6 °C) (07/10/24 1220)  Pulse: 77 (07/10/24 1220)  Resp: 20 (07/10/24 1220)  BP: (!) 100/56 (07/10/24 1220)  SpO2: 97 % (07/10/24 1220) Vital Signs (24h Range):  Temp:  [97.8 °F (36.6 °C)-98.8 °F (37.1 °C)] 97.8 °F (36.6 °C)  Pulse:  [65-91] 77  Resp:  [2-24] 20  SpO2:  [90 %-99 %] 97 %  BP: (100-145)/(56-81) 100/56     Weight: 133 kg (293 lb 3.4 oz)  Body mass index is 39.77 kg/m².    Estimated Creatinine Clearance: 94.3 mL/min (based on SCr of 1 mg/dL).     Physical Exam  Vitals and nursing note reviewed.   Constitutional:       General: He is not in acute distress.     Appearance: He is ill-appearing (chronically). He is not toxic-appearing or diaphoretic.   HENT:      Head: Normocephalic and atraumatic.      Nose: Nose normal.      Mouth/Throat:      Pharynx: Oropharynx is clear.   Eyes:      General: No scleral icterus.  Cardiovascular:      Rate and  Rhythm: Normal rate and regular rhythm.      Pulses: Normal pulses.      Heart sounds: Normal heart sounds.   Pulmonary:      Effort: Pulmonary effort is normal.      Comments: No obvious rales or wheezes when auscultating anterior chest; unable to complete posterior auscultation due to limited mobility of patient  Chest:      Chest wall: No deformity or tenderness.      Comments: Left chest wall drain   Abdominal:      General: Bowel sounds are normal.      Palpations: Abdomen is soft.      Tenderness: There is no abdominal tenderness.   Musculoskeletal:      Cervical back: Normal range of motion and neck supple.      Right lower leg: No edema.      Left lower leg: No edema.   Skin:     General: Skin is warm.      Capillary Refill: Capillary refill takes less than 2 seconds.   Neurological:      Mental Status: He is alert and oriented to person, place, and time.   Psychiatric:         Behavior: Behavior normal.          Significant Labs: All pertinent labs within the past 24 hours have been reviewed.    Significant Imaging: I have reviewed all pertinent imaging results/findings within the past 24 hours.

## 2024-07-10 NOTE — ASSESSMENT & PLAN NOTE
Chronic, controlled. Latest blood pressure and vitals reviewed-     Temp:  [97.8 °F (36.6 °C)-98.8 °F (37.1 °C)]   Pulse:  [65-91]   Resp:  [2-24]   BP: (100-145)/(56-81)   SpO2:  [90 %-99 %] .   Home meds for hypertension were reviewed and noted below.   Hypertension Medications               hydrALAZINE (APRESOLINE) 25 MG tablet Take 1 tablet (25 mg total) by mouth every 12 (twelve) hours.    metoprolol succinate (TOPROL-XL) 100 MG 24 hr tablet Take 1 tablet (100 mg total) by mouth once daily.            While in the hospital, will manage blood pressure as follows; continue home metoprolol    Will utilize p.r.n. blood pressure medication only if patient's blood pressure greater than 180/110 and he develops symptoms such as worsening chest pain or shortness of breath.

## 2024-07-10 NOTE — HPI
Mr. Moser is a pleasant 73 yoM with PMH including Afib on eliquis, HTN, T2DM and MARA who is transferred from [place] for a chest wall abscess with concern for osteomyelitis of the anterior left 6-8th ribs. He originally presented at OSH s/p a fall resulting in a left humeral fracture for which he underwent ORIF in March. He represented in June with a left shoulder abscess and underwent multiple washouts and removal of the ORIF hardware. Hospital course was complicated by bilateral effusions and a left chest wall abscess with extension into the epigastrium and mediastinum with concern for osteomyelitis of the left 6-8th ribs. Pt I+D of the abscess through a small left anterior chest incision with drain placement on 7/4, thoracentesis of the left pleural effusion on 7/4, and right-sided VATs with washout and chest tube placement on 7/5. Toe culture was pos for MRSA. However, Chest wall cavity abscess was negative. Pt was transferred to [facility] main campus for thoracic and plastic services due to concerns for Osteomyelitis of multiple ribs. Pt is being followed by Thoracic surgery, ID, Ortho, wound care. 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 Per ortho, s/p MRI. No acute intervention was  recommended. Per ID, pt is continuing on Ceftaroline and Meropenem. Pm &R was consulted to evaluate pt for post acute placement.       Functional History: Patient lives in  with  wife  in a single  story home with threshold  to enter.  Prior to admission, Pt was was Mod I with use of RW/ Cane  DME:  RW, rollator.

## 2024-07-10 NOTE — PLAN OF CARE
Problem: Adult Inpatient Plan of Care  Goal: Plan of Care Review  Outcome: Progressing  Goal: Patient-Specific Goal (Individualized)  Outcome: Progressing  Goal: Absence of Hospital-Acquired Illness or Injury  Outcome: Progressing  Goal: Optimal Comfort and Wellbeing  Outcome: Progressing  Goal: Readiness for Transition of Care  Outcome: Progressing     Problem: Diabetes Comorbidity  Goal: Blood Glucose Level Within Targeted Range  Outcome: Progressing     Problem: Sepsis/Septic Shock  Goal: Optimal Coping  Outcome: Progressing  Goal: Absence of Bleeding  Outcome: Progressing  Goal: Blood Glucose Level Within Targeted Range  Outcome: Progressing  Goal: Absence of Infection Signs and Symptoms  Outcome: Progressing  Goal: Optimal Nutrition Intake  Outcome: Progressing     Problem: Acute Kidney Injury/Impairment  Goal: Fluid and Electrolyte Balance  Outcome: Progressing  Goal: Improved Oral Intake  Outcome: Progressing  Goal: Effective Renal Function  Outcome: Progressing     Problem: Infection  Goal: Absence of Infection Signs and Symptoms  Outcome: Progressing     Problem: Wound  Goal: Optimal Coping  Outcome: Progressing  Goal: Optimal Functional Ability  Outcome: Progressing  Goal: Absence of Infection Signs and Symptoms  Outcome: Progressing  Goal: Improved Oral Intake  Outcome: Progressing  Goal: Optimal Pain Control and Function  Outcome: Progressing  Goal: Skin Health and Integrity  Outcome: Progressing  Goal: Optimal Wound Healing  Outcome: Progressing     Problem: Skin Injury Risk Increased  Goal: Skin Health and Integrity  Outcome: Progressing     Problem: Fall Injury Risk  Goal: Absence of Fall and Fall-Related Injury  Outcome: Progressing

## 2024-07-10 NOTE — PROGRESS NOTES
[facility] - [facility]  Thoracic Surgery  Progress Note    Subjective:     History of Present Illness:  Mr. Moser is a pleasant 73 yoM with PMH including Afib on eliquis, HTN, T2DM and MARA who is transferred from [place] for a chest wall abscess with concern for osteomyelitis of the anterior left 6-8th ribs. He originally presented to the [facility] after a fall resulting in a left humeral fracture for which he underwent ORIF in March. He represented in June with a left shoulder abscess and underwent multiple washouts and removal of the ORIF hardware. His hospital stay was complicated by bilateral effusions and a left chest wall abscess with extension into the epigastrium and mediastinum with concern for osteomyelitis of the left 6-8th ribs. He underwent I+D of the abscess through a small left anterior chest incision with drain placement on 7/4, thoracentesis of the left pleural effusion on 7/4, and right-sided VATs with washout and chest tube placement on 7/5. Interestingly, cultures from the pleural fluid and chest wall abscess cavity have been negative despite the abscess cavity fluid being described as creamy pus by the [facility] op report. The only positive cultures so far have been from those collected by podiatry during a toe amp which were positive for MRSA.     [place] [facility] [place] interventions if more invasive debridements or resections were required with reconstruction.     Upon arrival here he states that over the past couple days he has been feeling much better. He states that the pain and swelling associated with the chest wall abscess is resolved. He denies any fever or chills. He states that the dyspnea he was experiencing with the prior pleural effusions has resolved, despite remaining on 2-3 L/min NC. He is not on home oxygen.     Post-Op Info:  * No surgery found *         Interval History: NAEON. Resting comfortably on room air. Chest tube removed yesterday. AVSS. No  drain output documented, but fluid in the tubing clearing up.     Medications:  Continuous Infusions:   0.9% NaCl   Intravenous Continuous 75 mL/hr at 07/09/24 0510 New Bag at 07/09/24 0510     Scheduled Meds:   (Magic mouthwash) 1:1:1 diphenhydrAMINE(Benadryl) 12.5mg/5ml liq, aluminum & magnesium hydroxide-simethicone (Maalox), LIDOcaine viscous 2%  10 mL Swish & Spit QID    ceftaroline (Teflaro) IV (PEDS and ADULTS)  600 mg Intravenous Q8H    enoxparin  40 mg Subcutaneous Q24H (prophylaxis, 1700)    ferrous sulfate  1 tablet Oral Daily    glutamine  0.5 g Oral Daily    insulin glargine U-100  15 Units Subcutaneous Daily    LIDOcaine  1 patch Transdermal Q24H    meropenem IV (PEDS and ADULTS)  1 g Intravenous Q8H    metoprolol succinate  100 mg Oral QHS    polyethylene glycol  17 g Oral Daily    sodium chloride 0.9%  10 mL Intravenous Q6H     PRN Meds:  Current Facility-Administered Medications:     albuterol-ipratropium, 3 mL, Nebulization, Q4H PRN    aluminum & magnesium hydroxide-simethicone, 15 mL, Oral, QID PRN    dextrose 10%, 12.5 g, Intravenous, PRN    dextrose 10%, 25 g, Intravenous, PRN    glucagon (human recombinant), 1 mg, Intramuscular, PRN    glucose, 16 g, Oral, PRN    glucose, 24 g, Oral, PRN    HYDROcodone-acetaminophen, 1 tablet, Oral, Q4H PRN    HYDROcodone-acetaminophen, 1 tablet, Oral, Q4H PRN    insulin aspart U-100, 0-10 Units, Subcutaneous, QID (AC + HS) PRN    naloxone, 0.02 mg, Intravenous, PRN    ondansetron, 8 mg, Oral, Q8H PRN    polyethylene glycol, 17 g, Oral, BID PRN    promethazine, 25 mg, Oral, Q6H PRN    senna-docusate 8.6-50 mg, 1 tablet, Oral, BID PRN    simethicone, 1 tablet, Oral, QID PRN    sodium chloride 0.9%, 10 mL, Intravenous, Q12H PRN    Flushing PICC/Midline Protocol, , , Until Discontinued **AND** sodium chloride 0.9%, 10 mL, Intravenous, Q6H **AND** sodium chloride 0.9%, 10 mL, Intravenous, PRN    traMADoL, 50 mg, Oral, Q4H PRN     Review of patient's allergies  indicates:  No Known Allergies  Objective:     Vital Signs (Most Recent):  Temp: 98.8 °F (37.1 °C) (07/10/24 0405)  Pulse: 75 (07/10/24 0450)  Resp: 19 (07/10/24 0530)  BP: (!) 145/71 (07/10/24 0405)  SpO2: 96 % (07/10/24 0450) Vital Signs (24h Range):  Temp:  [98.1 °F (36.7 °C)-98.8 °F (37.1 °C)] 98.8 °F (37.1 °C)  Pulse:  [65-91] 75  Resp:  [2-24] 19  SpO2:  [90 %-99 %] 96 %  BP: (107-145)/(53-81) 145/71     Intake/Output - Last 3 Shifts         07/08 0700 07/09 0659 07/09 0700  07/10 0659    Urine (mL/kg/hr) 550 (0.2)     Drains 110     Stool 0     Chest Tube 7     Total Output 667     Net -667           Urine Occurrence 3 x     Stool Occurrence 4 x             SpO2: 96 %        Physical Exam  Vitals reviewed.   Constitutional:       Appearance: Normal appearance.   Cardiovascular:      Rate and Rhythm: Normal rate and regular rhythm.   Pulmonary:      Effort: Pulmonary effort is normal. No respiratory distress.   Abdominal:      Palpations: Abdomen is soft.      Tenderness: There is no abdominal tenderness.   Musculoskeletal:      Comments: Left chest wall accordion drain with seropurulent output   Skin:     General: Skin is warm.   Neurological:      General: No focal deficit present.      Mental Status: He is alert and oriented to person, place, and time.            Significant Labs:  CBC:   Recent Labs   Lab 07/10/24  0258   WBC 12.37   RBC 3.61*   HGB 8.9*   HCT 29.8*   *   MCV 83   MCH 24.7*   MCHC 29.9*     CMP:   Recent Labs   Lab 07/10/24  0258      CALCIUM 8.2*   ALBUMIN 1.2*   PROT 5.8*      K 4.0   CO2 23      BUN 23   CREATININE 1.0   ALKPHOS 104   ALT 11   AST 17   BILITOT 0.3       Significant Diagnostics:  I have reviewed all pertinent imaging results/findings within the past 24 hours.    VTE Risk Mitigation (From admission, onward)           Ordered     enoxaparin injection 40 mg  Every 24 hours         07/09/24 1358     Place sequential compression device  Until  discontinued         07/07/24 1532                  Assessment/Plan:     Chest wall abscess  73 yoM with left chest wall abscess and concern for left 6-8 rib osteomyelitis transferred for potential thoracic and plastic surgery intervention. Subjectively he is feeling much better s/p incision and drainage of chest wall abscess, thoracentesis of left pleural effusion, and VATS with right chest tube placement for right pleural effusion at [facility]. Imaging workup here revealed significant improvement in chest wall abscess size without apparent osteomyelitis of the ribs on CT, but with continued septic left shoulder joint. Right chest tube removed 7/9.     - No plans for acute thoracic intervention  - We will arrange for follow up in clinic with MRI of the ribs  - Strict I/Os of the left accordion drain   - Output now more clear, if volume low, we may be able to remove the drain in the next day or two  - Thoracic surgery to follow        Yovanyn Mike MD  Thoracic Surgery  [facility]

## 2024-07-10 NOTE — CONSULTS
[facility]  Physical Medicine & Rehab  Consult Note    Patient Name: Roosevelt Moser  MRN: [id number]  Admission Date: 7/7/2024  Hospital Length of Stay: 3 days  Attending Physician: Roopa Rowan MD   Consults  Subjective:     Principal Problem: Osteomyelitis of multiple sites    HPI: Mr. Moser is a pleasant 73 yoM with PMH including Afib on eliquis, HTN, T2DM and MARA who is transferred from [place] for a chest wall abscess with concern for osteomyelitis of the anterior left 6-8th ribs. He originally presented at OSH s/p a fall resulting in a left humeral fracture for which he underwent ORIF in March. He represented in June with a left shoulder abscess and underwent multiple washouts and removal of the ORIF hardware. Hospital course was complicated by bilateral effusions and a left chest wall abscess with extension into the epigastrium and mediastinum with concern for osteomyelitis of the left 6-8th ribs. Pt I+D of the abscess through a small left anterior chest incision with drain placement on 7/4, thoracentesis of the left pleural effusion on 7/4, and right-sided VATs with washout and chest tube placement on 7/5. Toe culture was pos for MRSA. However, Chest wall cavity abscess was negative. Pt was transferred to [facility] main campus for thoracic and plastic services due to concerns for Osteomyelitis of multiple ribs. Pt is being followed by Thoracic surgery, ID, Ortho, wound care. 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 Per ortho, s/p MRI. No acute intervention was  recommended. Per ID, pt is continuing on Ceftaroline and Meropenem. Pm &R was consulted to evaluate pt for post acute placement.       Functional History: Patient lives in  with  wife  in a single  story home with threshold  to enter.  Prior to admission, Pt was was Mod I with use of RW/ Cane  DME:  RW, rollator.       Hospital Course: Per chart review,    PT- 07/09    Functional  Mobility:  Bed Mobility:     Rolling Left:  total assistance and of 2 persons  Rolling Right: total assistance and of 2 persons  Scooting: total assistance and of 2 persons  Supine to Sit: pt refused to attempt 2/2 LUE pain.    OT- 07/09    Bed Mobility:    Patient completed Rolling/Turning to Left with  total assistance and 2 persons  Patient completed Rolling/Turning to Right with total assistance and 2 persons     Functional Mobility/Transfers: Declined 2/2 to L Shoulder Pain      Activities of Daily Living:  Upper Body Dressing: total assistance don gown at bed level   Lower Body Dressing: total assistance don and doff socks and pressure relief boots at bed level.    Past Medical History:   Diagnosis Date    A-fib 2024    Diabetes mellitus, type 2 2021    Guillain-Manville 10/2003    Hyperlipidemia     Hypertension 2016    Sleep apnea      Past Surgical History:   Procedure Laterality Date    ARTHROTOMY OF SHOULDER Left 6/21/2024    Procedure: ARTHROTOMY, SHOULDER;  Surgeon: Roman Paulson MD;  Location: [facility];  Service: Orthopedics;  Laterality: Left;    INCISION AND DRAINAGE OF ABSCESS N/A 7/4/2024    Procedure: INCISION AND DRAINAGE, ABSCESS;  Surgeon: Gus Escobedo MD;  Location: [facility] OR;  Service: General;  Laterality: N/A;  Abcess of anterior chest wall    IRRIGATION AND DEBRIDEMENT OF UPPER EXTREMITY Left 6/24/2024    Procedure: IRRIGATION AND DEBRIDEMENT, UPPER EXTREMITY;  Surgeon: Nathan Cooper MD;  Location: [facility] OR;  Service: Orthopedics;  Laterality: Left;    IRRIGATION AND DEBRIDEMENT OF UPPER EXTREMITY Left 6/27/2024    Procedure: IRRIGATION AND DEBRIDEMENT, UPPER EXTREMITY;  Surgeon: Nathan Cooper MD;  Location: [facility] OR;  Service: Orthopedics;  Laterality: Left;    OPEN REDUCTION AND INTERNAL FIXATION (ORIF) OF FRACTURE OF PROXIMAL HUMERUS Left 3/25/2024    Procedure: ORIF, FRACTURE, HUMERUS, PROXIMAL/IM HANNA, LEFT;  Surgeon: Nathan Cooper MD;  Location: [facility] OR;  Service:  Orthopedics;  Laterality: Left;  Accumed, Synthes Small Frag set Avelino verified 3/22/24 ark    THORACOSCOPIC DECORTICATION OF LUNG Right 7/5/2024    Procedure: VATS, WITH DECORTICATION, LUNG;  Surgeon: Gus Escobedo MD;  Location: [facility] OR;  Service: Cardiothoracic;  Laterality: Right;    TOE AMPUTATION Right 7/1/2024    Procedure: AMPUTATION, TOE;  Surgeon: Stevie Zhu DPM;  Location: [facility] OR;  Service: Podiatry;  Laterality: Right;     Review of patient's allergies indicates:  No Known Allergies    Scheduled Medications:    (Magic mouthwash) 1:1:1 diphenhydrAMINE(Benadryl) 12.5mg/5ml liq, aluminum & magnesium hydroxide-simethicone (Maalox), LIDOcaine viscous 2%  10 mL Swish & Spit QID    ceftaroline (Teflaro) IV (PEDS and ADULTS)  600 mg Intravenous Q8H    enoxparin  40 mg Subcutaneous Q24H (prophylaxis, 1700)    ferrous sulfate  1 tablet Oral Daily    insulin glargine U-100  15 Units Subcutaneous Daily    LIDOcaine  1 patch Transdermal Q24H    meropenem IV (PEDS and ADULTS)  1 g Intravenous Q8H    metoprolol succinate  100 mg Oral QHS    polyethylene glycol  17 g Oral Daily    sodium chloride 0.9%  10 mL Intravenous Q6H       PRN Medications:   Current Facility-Administered Medications:     albuterol-ipratropium, 3 mL, Nebulization, Q4H PRN    aluminum & magnesium hydroxide-simethicone, 15 mL, Oral, QID PRN    dextrose 10%, 12.5 g, Intravenous, PRN    dextrose 10%, 25 g, Intravenous, PRN    glucagon (human recombinant), 1 mg, Intramuscular, PRN    glucose, 16 g, Oral, PRN    glucose, 24 g, Oral, PRN    HYDROcodone-acetaminophen, 1 tablet, Oral, Q4H PRN    HYDROcodone-acetaminophen, 1 tablet, Oral, Q4H PRN    insulin aspart U-100, 0-10 Units, Subcutaneous, QID (AC + HS) PRN    naloxone, 0.02 mg, Intravenous, PRN    ondansetron, 8 mg, Oral, Q8H PRN    polyethylene glycol, 17 g, Oral, BID PRN    promethazine, 25 mg, Oral, Q6H PRN    senna-docusate 8.6-50 mg, 1 tablet, Oral, BID PRN    simethicone, 1 tablet,  Oral, QID PRN    sodium chloride 0.9%, 10 mL, Intravenous, Q12H PRN    Flushing PICC/Midline Protocol, , , Until Discontinued **AND** sodium chloride 0.9%, 10 mL, Intravenous, Q6H **AND** sodium chloride 0.9%, 10 mL, Intravenous, PRN    traMADoL, 50 mg, Oral, Q4H PRN    Family History    None       Tobacco Use    Smoking status: Never    Smokeless tobacco: Never   Substance and Sexual Activity    Alcohol use: Yes     Alcohol/week: 0.0 standard drinks of alcohol     Comment: social    Drug use: No    Sexual activity: Yes     Review of Systems   Constitutional:  Positive for activity change.   Musculoskeletal:  Positive for gait problem.   Skin:  Positive for wound.   Neurological:  Positive for weakness.   Psychiatric/Behavioral:  Positive for decreased concentration.      Objective:     Vital Signs (Most Recent):  Temp: 98.3 °F (36.8 °C) (07/10/24 0659)  Pulse: 82 (07/10/24 1058)  Resp: 19 (07/10/24 0909)  BP: (!) 109/56 (07/10/24 0659)  SpO2: 97 % (07/10/24 0659)    Vital Signs (24h Range):  Temp:  [98.1 °F (36.7 °C)-98.8 °F (37.1 °C)] 98.3 °F (36.8 °C)  Pulse:  [65-91] 82  Resp:  [2-24] 19  SpO2:  [90 %-99 %] 97 %  BP: (107-145)/(56-81) 109/56     Body mass index is 39.77 kg/m².     Physical Exam  Vitals and nursing note reviewed.   Abdominal:      General: There is no distension.      Palpations: Abdomen is soft.   Musculoskeletal:      Cervical back: Normal range of motion and neck supple.      Comments: Unable to lift BLE.    3/5 BUE strengths.    Skin:     General: Skin is warm and dry.      Capillary Refill: Capillary refill takes 2 to 3 seconds.   Neurological:      General: No focal deficit present.      Mental Status: He is alert.      GCS: GCS eye subscore is 4. GCS verbal subscore is 5. GCS motor subscore is 6.      Motor: Weakness present.      Gait: Gait abnormal.   Psychiatric:         Mood and Affect: Mood normal.         Behavior: Behavior normal.               Diagnostic Results: Labs:  Reviewed  Assessment/Plan:     PM&R Recommendation:     At this time, the PM&R team has reviewed this patient's ongoing medical case including inpatient diagnosis, medical history, clinical examination, labs, vitals, current social and functional history to provide the post-acute recommendation as follows:     RECOMMENDATIONS: long-term acute care hospital due to complexity of medical issues, once medically stable.       We will sign off.        Thank you for your consult.     Lorna Juarez NP  Department of Physical Medicine & Rehab  [facility]

## 2024-07-10 NOTE — ASSESSMENT & PLAN NOTE
73 yoM with left chest wall abscess and concern for left 6-8 rib osteomyelitis transferred for potential thoracic and plastic surgery intervention. Subjectively he is feeling much better s/p incision and drainage of chest wall abscess, thoracentesis of left pleural effusion, and VATS with right chest tube placement for right pleural effusion at [facility]. Imaging workup here revealed significant improvement in chest wall abscess size without apparent osteomyelitis of the ribs on CT, but with continued septic left shoulder joint. Right chest tube removed 7/9.     - No plans for acute thoracic intervention  - We will arrange for follow up in clinic with MRI of the ribs  - Strict I/Os of the left accordion drain   - Output now more clear, if volume low, we may be able to remove the drain in the next day or two  - Thoracic surgery to follow

## 2024-07-10 NOTE — ASSESSMENT & PLAN NOTE
Patient's FSGs are controlled on current medication regimen.  Last A1c reviewed-   Lab Results   Component Value Date    HGBA1C 6.3 (H) 06/14/2024     Most recent fingerstick glucose reviewed-   Recent Labs   Lab 07/09/24  2112 07/10/24  0700 07/10/24  1222   POCTGLUCOSE 120* 110 156*       Current correctional scale  Low  Maintain anti-hyperglycemic dose as follows-   Antihyperglycemics (From admission, onward)    Start     Stop Route Frequency Ordered    07/08/24 0900  insulin glargine U-100 (Lantus) pen 15 Units         -- SubQ Daily 07/07/24 1532    07/07/24 1529  insulin aspart U-100 pen 0-10 Units         -- SubQ Before meals & nightly PRN 07/07/24 1532        Hold Oral hypoglycemics while patient is in the hospital.

## 2024-07-10 NOTE — PT/OT/SLP PROGRESS
"Physical Therapy Co-Treatment  Co-eval performed to appropriately and safely assess patient's strength and endurance while facilitating functional tasks in addition to accommodating for patient's activity/pain tolerance.    Patient Name:  Roosevelt Moser   MRN:  [id number]    Recommendations:     Discharge Recommendations: Moderate Intensity Therapy  Discharge Equipment Recommendations: hospital bed, lift device  Barriers to discharge: Inaccessible home and Decreased caregiver support    Assessment:     Roosevelt Moser is a 73 y.o. male admitted with a medical diagnosis of Osteomyelitis of multiple sites.  He presents with the following impairments/functional limitations: weakness, impaired endurance, impaired self care skills, impaired functional mobility, gait instability, impaired balance, decreased coordination, decreased upper extremity function, decreased lower extremity function, decreased safety awareness, pain, decreased ROM, orthopedic precautions. Pt would benefit from a moderate intensity/frequency therapy for: Dynamic/static standing/sitting balance through skilled balance training, strengthening with the use of skilled therapeutic exercises interventions, and mobility through adaptive equipment training. Pt continues to benefit from a collaborative PT/OT program to improve quality of life and focus on recovery of impairments.      Rehab Prognosis: Good; patient would benefit from acute skilled PT services to address these deficits and reach maximum level of function.    Recent Surgery: * No surgery found *      Plan:     During this hospitalization, patient to be seen 4 x/week to address the identified rehab impairments via gait training, therapeutic activities, therapeutic exercises, neuromuscular re-education and progress toward the following goals:    Plan of Care Expires:  08/08/24    Subjective     Chief Complaint: impaired functional mobility  Patient/Family Comments/goals: "I can't do " "it"  Pain/Comfort:  Pain Rating 1:  (no pain at rest)  Location 1: arm  Pain Addressed 1: Reposition, Distraction  Pain Rating Post-Intervention 1: other (see comments) (not rated)      Objective:     Communicated with RN prior to session.  Patient found semi-supine with PICC line, Condom Catheter, hemovac upon PT entry to room.     General Precautions: Standard, fall  Orthopedic Precautions: LUE partial weight bearing, RLE partial weight bearing (LUE ROMAT)  Braces:  (CAM Boot)  Respiratory Status: Nasal cannula, flow 3.5 L/min     Functional Mobility:  Bed Mobility:     Supine to Sit: maximal assistance and of 2 persons  Sit to Supine: maximal assistance and of 2 persons  Scooting to Hob via draw sheet: total Ax2  Transfers:     Sit to Stand: pt agreed and the quickly changed his mind and declined despite education on importance of mobility and progression. Pt stating he couldn't do it today.  Balance:   Static Sitting: Tammie  Dynamic Sitting: modA  Static standing: impaired      AM-PAC 6 CLICK MOBILITY  Turning over in bed (including adjusting bedclothes, sheets and blankets)?: 1  Sitting down on and standing up from a chair with arms (e.g., wheelchair, bedside commode, etc.): 1  Moving from lying on back to sitting on the side of the bed?: 1  Moving to and from a bed to a chair (including a wheelchair)?: 1  Need to walk in hospital room?: 1  Climbing 3-5 steps with a railing?: 1  Basic Mobility Total Score: 6       Treatment & Education:  Patient educated on role of therapy, goals of session, and benefits of mobilizing.   Discussed PT plan of care during hospitalization.   Patient educated on calling for assistance.   Patient educated on how their diagnosis impacts their mobility within PT scope of practice.   Communication board up to date.  All questions answered within PT scope of practice.    Patient left HOB elevated with all lines intact, call button in reach, and pt's spouse present.    GOALS: "   Multidisciplinary Problems       Physical Therapy Goals          Problem: Physical Therapy    Goal Priority Disciplines Outcome Goal Variances Interventions   Physical Therapy Goal     PT, PT/OT Progressing     Description: Goals to be met by: 24     Patient will increase functional independence with mobility by performin. Supine to sit with Moderate Assistance  2. Sit to supine with Moderate Assistance  3. Rolling to Left and Right with Minimal Assistance.  4. Sit to stand transfer with Moderate Assistance  5. Bed to chair transfer with Maximum Assistance using LRAD  6. Gait  x 10 feet with Maximum Assistance using LRAD.   7. Lower extremity exercise program x10 reps per handout, with independence    DME Justifications (see above for complete DME recommendations)    Hospital Bed ·Patient requires a hospital bed for positioning of the body in ways that are not feasible with an ordinary bed. The patient requires special positioning for pain relief, limited mobility, and/or being unable to independently make changes in body position without the use of a hospital bed. Pillows and wedges will not be adequate for resolving these positional issues.                         Time Tracking:     PT Received On: 07/10/24  PT Start Time: 1040     PT Stop Time: 1058  PT Total Time (min): 18 min     Billable Minutes: Therapeutic Activity 18    Treatment Type: Treatment  PT/PTA: PT     Number of PTA visits since last PT visit: 0     07/10/2024

## 2024-07-10 NOTE — SUBJECTIVE & OBJECTIVE
Interval History:  Patient seen and examined at bedside.  Wife at bedside.  No acute events overnight.  Patient has no acute complaints this morning. R CT removed yesterday by thoracic surgery.  Left drain remains in place with seropurulent drainage.  Patient and wife updated regarding care plan.       Review of Systems   Constitutional:  Positive for appetite change. Negative for fever.   HENT:  Negative for congestion, sore throat and trouble swallowing.    Eyes:  Negative for visual disturbance.   Respiratory:  Positive for cough (dry). Negative for shortness of breath and wheezing.    Cardiovascular:  Negative for chest pain, palpitations and leg swelling.   Gastrointestinal:  Negative for abdominal pain, constipation, diarrhea, nausea and vomiting.   Genitourinary:  Negative for decreased urine volume and dysuria.   Musculoskeletal:  Negative for back pain.   Skin:  Positive for wound.   Neurological:  Negative for dizziness, light-headedness and headaches.   Psychiatric/Behavioral:  Negative for agitation, confusion and decreased concentration.        Objective:    Temp: 97.8 °F (36.6 °C) (07/10/24 1220)  Pulse: 77 (07/10/24 1220)  Resp: 20 (07/10/24 1220)  BP: (!) 100/56 (07/10/24 1220)  SpO2: 97 % (07/10/24 1220)    Weight: 133 kg (293 lb 3.4 oz) (07/07/24 1757)    Body mass index is 39.77 kg/m².      Intake/Output Summary (Last 24 hours) at 7/10/2024 1258  Last data filed at 7/10/2024 0632  Gross per 24 hour   Intake 1110 ml   Output 465 ml   Net 645 ml       Physical Exam  Vitals and nursing note reviewed.   Constitutional:       General: He is not in acute distress.     Appearance: He is ill-appearing (chronically). He is not toxic-appearing or diaphoretic.   HENT:      Head: Normocephalic and atraumatic.      Nose: Nose normal.      Mouth/Throat:      Pharynx: Oropharynx is clear.   Eyes:      General: No scleral icterus.  Cardiovascular:      Rate and Rhythm: Normal rate and regular rhythm.      Pulses:  Normal pulses.      Heart sounds: Normal heart sounds.   Pulmonary:      Effort: Pulmonary effort is normal.      Comments: No obvious rales or wheezes when auscultating anterior chest; unable to complete posterior auscultation due to limited mobility of patient  Chest:      Chest wall: No deformity or tenderness.      Comments: Left chest wall drain with seropurulent output  Abdominal:      General: Bowel sounds are normal.      Palpations: Abdomen is soft.      Tenderness: There is no abdominal tenderness.   Musculoskeletal:      Cervical back: Normal range of motion and neck supple.      Right lower leg: No edema.      Left lower leg: No edema.   Skin:     General: Skin is warm.      Capillary Refill: Capillary refill takes less than 2 seconds.      Comments: Unable to examine posterior skin due to patient debility   Neurological:      Mental Status: He is alert and oriented to person, place, and time.   Psychiatric:         Behavior: Behavior normal.         Significant Labs: All pertinent labs within the past 24 hours have been reviewed.    Recent Results (from the past 24 hour(s))   Gram stain    Collection Time: 07/09/24  4:22 PM    Specimen: Shoulder, Left; Joint Fluid   Result Value Ref Range    Gram Stain Result Rare WBC's     Gram Stain Result No organisms seen    Culture, Anaerobic    Collection Time: 07/09/24  4:22 PM    Specimen: Shoulder, Left; Joint Fluid   Result Value Ref Range    Anaerobic Culture Culture in progress    Aerobic culture    Collection Time: 07/09/24  4:22 PM    Specimen: Shoulder, Left; Abscess   Result Value Ref Range    Aerobic Bacterial Culture No growth    POCT glucose    Collection Time: 07/09/24  9:12 PM   Result Value Ref Range    POCT Glucose 120 (H) 70 - 110 mg/dL   CBC Auto Differential    Collection Time: 07/10/24  2:58 AM   Result Value Ref Range    WBC 12.37 3.90 - 12.70 K/uL    RBC 3.61 (L) 4.60 - 6.20 M/uL    Hemoglobin 8.9 (L) 14.0 - 18.0 g/dL    Hematocrit 29.8 (L)  40.0 - 54.0 %    MCV 83 82 - 98 fL    MCH 24.7 (L) 27.0 - 31.0 pg    MCHC 29.9 (L) 32.0 - 36.0 g/dL    RDW 17.2 (H) 11.5 - 14.5 %    Platelets 498 (H) 150 - 450 K/uL    MPV 9.9 9.2 - 12.9 fL    Immature Granulocytes 1.1 (H) 0.0 - 0.5 %    Gran # (ANC) 8.5 (H) 1.8 - 7.7 K/uL    Immature Grans (Abs) 0.13 (H) 0.00 - 0.04 K/uL    Lymph # 1.6 1.0 - 4.8 K/uL    Mono # 1.3 (H) 0.3 - 1.0 K/uL    Eos # 0.8 (H) 0.0 - 0.5 K/uL    Baso # 0.11 0.00 - 0.20 K/uL    nRBC 0 0 /100 WBC    Gran % 68.8 38.0 - 73.0 %    Lymph % 13.0 (L) 18.0 - 48.0 %    Mono % 10.1 4.0 - 15.0 %    Eosinophil % 6.1 0.0 - 8.0 %    Basophil % 0.9 0.0 - 1.9 %    Differential Method Automated    Comprehensive Metabolic Panel    Collection Time: 07/10/24  2:58 AM   Result Value Ref Range    Sodium 139 136 - 145 mmol/L    Potassium 4.0 3.5 - 5.1 mmol/L    Chloride 106 95 - 110 mmol/L    CO2 23 23 - 29 mmol/L    Glucose 105 70 - 110 mg/dL    BUN 23 8 - 23 mg/dL    Creatinine 1.0 0.5 - 1.4 mg/dL    Calcium 8.2 (L) 8.7 - 10.5 mg/dL    Total Protein 5.8 (L) 6.0 - 8.4 g/dL    Albumin 1.2 (L) 3.5 - 5.2 g/dL    Total Bilirubin 0.3 0.1 - 1.0 mg/dL    Alkaline Phosphatase 104 55 - 135 U/L    AST 17 10 - 40 U/L    ALT 11 10 - 44 U/L    eGFR >60.0 >60 mL/min/1.73 m^2    Anion Gap 10 8 - 16 mmol/L   Magnesium    Collection Time: 07/10/24  2:58 AM   Result Value Ref Range    Magnesium 1.6 1.6 - 2.6 mg/dL   Phosphorus    Collection Time: 07/10/24  2:58 AM   Result Value Ref Range    Phosphorus 2.8 2.7 - 4.5 mg/dL   POCT glucose    Collection Time: 07/10/24  7:00 AM   Result Value Ref Range    POCT Glucose 110 70 - 110 mg/dL   POCT glucose    Collection Time: 07/10/24 12:22 PM   Result Value Ref Range    POCT Glucose 156 (H) 70 - 110 mg/dL       Significant Imaging: I have reviewed all pertinent imaging results/findings within the past 24 hours.

## 2024-07-10 NOTE — ASSESSMENT & PLAN NOTE
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  -

## 2024-07-10 NOTE — SUBJECTIVE & OBJECTIVE
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    Hyperlipidemia     Hypertension 2016    Sleep apnea      Past Surgical History:   Procedure Laterality Date    ARTHROTOMY OF SHOULDER Left 6/21/2024    Procedure: ARTHROTOMY, SHOULDER;  Surgeon: Roman Paulson MD;  Location: [facility];  Service: Orthopedics;  Laterality: Left;    INCISION AND DRAINAGE OF ABSCESS N/A 7/4/2024    Procedure: INCISION AND DRAINAGE, ABSCESS;  Surgeon: Gus Escobedo MD;  Location: [facility] OR;  Service: General;  Laterality: N/A;  Abcess of anterior chest wall    IRRIGATION AND DEBRIDEMENT OF UPPER EXTREMITY Left 6/24/2024    Procedure: IRRIGATION AND DEBRIDEMENT, UPPER EXTREMITY;  Surgeon: Nathan Cooper MD;  Location: [facility] OR;  Service: Orthopedics;  Laterality: Left;    IRRIGATION AND DEBRIDEMENT OF UPPER EXTREMITY Left 6/27/2024    Procedure: IRRIGATION AND DEBRIDEMENT, UPPER EXTREMITY;  Surgeon: Nathan Cooper MD;  Location: [facility] OR;  Service: Orthopedics;  Laterality: Left;    OPEN REDUCTION AND INTERNAL FIXATION (ORIF) OF FRACTURE OF PROXIMAL HUMERUS Left 3/25/2024    Procedure: ORIF, FRACTURE, HUMERUS, PROXIMAL/IM HANNA, LEFT;  Surgeon: Nathan Cooper MD;  Location: [facility] OR;  Service: Orthopedics;  Laterality: Left;  Accumed, Synthes Small Frag set Avelino verified 3/22/24 ark    THORACOSCOPIC DECORTICATION OF LUNG Right 7/5/2024    Procedure: VATS, WITH DECORTICATION, LUNG;  Surgeon: Gus Escobedo MD;  Location: [facility];  Service: Cardiothoracic;  Laterality: Right;    TOE AMPUTATION Right 7/1/2024    Procedure: AMPUTATION, TOE;  Surgeon: Stevie Zhu DPM;  Location: [facility];  Service: Podiatry;  Laterality: Right;     Review of patient's allergies indicates:  No Known Allergies    Scheduled Medications:    (Magic mouthwash) 1:1:1 diphenhydrAMINE(Benadryl) 12.5mg/5ml liq, aluminum & magnesium hydroxide-simethicone (Maalox), LIDOcaine viscous 2%  10 mL  Swish & Spit QID    ceftaroline (Teflaro) IV (PEDS and ADULTS)  600 mg Intravenous Q8H    enoxparin  40 mg Subcutaneous Q24H (prophylaxis, 1700)    ferrous sulfate  1 tablet Oral Daily    insulin glargine U-100  15 Units Subcutaneous Daily    LIDOcaine  1 patch Transdermal Q24H    meropenem IV (PEDS and ADULTS)  1 g Intravenous Q8H    metoprolol succinate  100 mg Oral QHS    polyethylene glycol  17 g Oral Daily    sodium chloride 0.9%  10 mL Intravenous Q6H       PRN Medications:   Current Facility-Administered Medications:     albuterol-ipratropium, 3 mL, Nebulization, Q4H PRN    aluminum & magnesium hydroxide-simethicone, 15 mL, Oral, QID PRN    dextrose 10%, 12.5 g, Intravenous, PRN    dextrose 10%, 25 g, Intravenous, PRN    glucagon (human recombinant), 1 mg, Intramuscular, PRN    glucose, 16 g, Oral, PRN    glucose, 24 g, Oral, PRN    HYDROcodone-acetaminophen, 1 tablet, Oral, Q4H PRN    HYDROcodone-acetaminophen, 1 tablet, Oral, Q4H PRN    insulin aspart U-100, 0-10 Units, Subcutaneous, QID (AC + HS) PRN    naloxone, 0.02 mg, Intravenous, PRN    ondansetron, 8 mg, Oral, Q8H PRN    polyethylene glycol, 17 g, Oral, BID PRN    promethazine, 25 mg, Oral, Q6H PRN    senna-docusate 8.6-50 mg, 1 tablet, Oral, BID PRN    simethicone, 1 tablet, Oral, QID PRN    sodium chloride 0.9%, 10 mL, Intravenous, Q12H PRN    Flushing PICC/Midline Protocol, , , Until Discontinued **AND** sodium chloride 0.9%, 10 mL, Intravenous, Q6H **AND** sodium chloride 0.9%, 10 mL, Intravenous, PRN    traMADoL, 50 mg, Oral, Q4H PRN    Family History    None       Tobacco Use    Smoking status: Never    Smokeless tobacco: Never   Substance and Sexual Activity    Alcohol use: Yes     Alcohol/week: 0.0 standard drinks of alcohol     Comment: social    Drug use: No    Sexual activity: Yes     Review of Systems   Constitutional:  Positive for activity change.   Musculoskeletal:  Positive for gait problem.   Skin:  Positive for wound.    Neurological:  Positive for weakness.   Psychiatric/Behavioral:  Positive for decreased concentration.      Objective:     Vital Signs (Most Recent):  Temp: 98.3 °F (36.8 °C) (07/10/24 0659)  Pulse: 82 (07/10/24 1058)  Resp: 19 (07/10/24 0909)  BP: (!) 109/56 (07/10/24 0659)  SpO2: 97 % (07/10/24 0659)    Vital Signs (24h Range):  Temp:  [98.1 °F (36.7 °C)-98.8 °F (37.1 °C)] 98.3 °F (36.8 °C)  Pulse:  [65-91] 82  Resp:  [2-24] 19  SpO2:  [90 %-99 %] 97 %  BP: (107-145)/(56-81) 109/56     Body mass index is 39.77 kg/m².     Physical Exam  Vitals and nursing note reviewed.   Abdominal:      General: There is no distension.      Palpations: Abdomen is soft.   Musculoskeletal:      Cervical back: Normal range of motion and neck supple.      Comments: Unable to lift BLE.    3/5 BUE strengths.    Skin:     General: Skin is warm and dry.      Capillary Refill: Capillary refill takes 2 to 3 seconds.   Neurological:      General: No focal deficit present.      Mental Status: He is alert.      GCS: GCS eye subscore is 4. GCS verbal subscore is 5. GCS motor subscore is 6.      Motor: Weakness present.      Gait: Gait abnormal.   Psychiatric:         Mood and Affect: Mood normal.         Behavior: Behavior normal.               Diagnostic Results: Labs: Reviewed

## 2024-07-10 NOTE — ASSESSMENT & PLAN NOTE
Patient with Long standing persistent (>12 months) atrial fibrillation which is controlled currently with Beta Blocker. Patient is currently in TBD.[id number] Score: 2.      Chronic AF w/ acute RVR at OSH, resolved s/p cardizem drip   NOAC has been on hold d/t possible need for surgery - has been on prophylaxis dose of Lovenox  Resume A/c pending any surgical intervention, consider lovenox vs hep gtt bridge

## 2024-07-10 NOTE — NURSING
[facility] Plan of Care Note    Dx:   Osteomyelitis [M86.9]    Shift Events: na    Goals of Care: iv antibotics    Neuro: aaox3, confused on time and forgetful    Vital Signs: /68   Pulse 65   Temp 98.2 °F (36.8 °C)   Resp 17   Ht 6' (1.829 m)   Wt 133 kg (293 lb 3.4 oz)   SpO2 (!) 94%   BMI 39.77 kg/m²     Respiratory: 02/4l nc, cpap qhs    Diet: Diet diabetic Cardiac (Low Na/Chol), Renal; 2000 Calorie  Dietary nutrition supplements Catarino - Any flavor,Dietary nutrition supplements Promod Liquid Protein - Fruit Punch,Dietary nutrition supplements Boost Plus - Any flavor    Is patient tolerating current diet? yes    GTTS: na    Urine Output/Bowel Movement:   No intake/output data recorded.  Last Bowel Movement: 07/09/24      Drains/Tubes/Tube Feeds (include total output/shift):   No intake/output data recorded.      Lines: ns at 75- DL PIcc      Accuchecks:achs    Skin: accordin drain    Fall Risk Score: fall risk    Activity level? Bedbound max asssit    Any scheduled procedures? na    Any safety concerns? Skin fall risk    Other: na

## 2024-07-11 LAB
ALBUMIN SERPL BCP-MCNC: 1.3 G/DL (ref 3.5–5.2)
ALP SERPL-CCNC: 99 U/L (ref 55–135)
ALT SERPL W/O P-5'-P-CCNC: 9 U/L (ref 10–44)
ANION GAP SERPL CALC-SCNC: 8 MMOL/L (ref 8–16)
AST SERPL-CCNC: 13 U/L (ref 10–40)
BASOPHILS # BLD AUTO: 0.1 K/UL (ref 0–0.2)
BASOPHILS NFR BLD: 0.9 % (ref 0–1.9)
BILIRUB SERPL-MCNC: 0.3 MG/DL (ref 0.1–1)
BUN SERPL-MCNC: 33 MG/DL (ref 8–23)
CALCIUM SERPL-MCNC: 8.4 MG/DL (ref 8.7–10.5)
CHLORIDE SERPL-SCNC: 105 MMOL/L (ref 95–110)
CK SERPL-CCNC: <7 U/L (ref 20–200)
CO2 SERPL-SCNC: 24 MMOL/L (ref 23–29)
CREAT SERPL-MCNC: 0.9 MG/DL (ref 0.5–1.4)
DIFFERENTIAL METHOD BLD: ABNORMAL
EOSINOPHIL # BLD AUTO: 0.8 K/UL (ref 0–0.5)
EOSINOPHIL NFR BLD: 7 % (ref 0–8)
ERYTHROCYTE [DISTWIDTH] IN BLOOD BY AUTOMATED COUNT: 17.6 % (ref 11.5–14.5)
EST. GFR  (NO RACE VARIABLE): >60 ML/MIN/1.73 M^2
GLUCOSE SERPL-MCNC: 107 MG/DL (ref 70–110)
HCT VFR BLD AUTO: 30.8 % (ref 40–54)
HGB BLD-MCNC: 9.2 G/DL (ref 14–18)
IMM GRANULOCYTES # BLD AUTO: 0.18 K/UL (ref 0–0.04)
IMM GRANULOCYTES NFR BLD AUTO: 1.6 % (ref 0–0.5)
LYMPHOCYTES # BLD AUTO: 1.5 K/UL (ref 1–4.8)
LYMPHOCYTES NFR BLD: 12.8 % (ref 18–48)
MAGNESIUM SERPL-MCNC: 1.5 MG/DL (ref 1.6–2.6)
MCH RBC QN AUTO: 24.5 PG (ref 27–31)
MCHC RBC AUTO-ENTMCNC: 29.9 G/DL (ref 32–36)
MCV RBC AUTO: 82 FL (ref 82–98)
MONOCYTES # BLD AUTO: 1.1 K/UL (ref 0.3–1)
MONOCYTES NFR BLD: 9.4 % (ref 4–15)
NEUTROPHILS # BLD AUTO: 7.9 K/UL (ref 1.8–7.7)
NEUTROPHILS NFR BLD: 68.3 % (ref 38–73)
NRBC BLD-RTO: 0 /100 WBC
PHOSPHATE SERPL-MCNC: 2.9 MG/DL (ref 2.7–4.5)
PLATELET # BLD AUTO: 502 K/UL (ref 150–450)
PMV BLD AUTO: 10.3 FL (ref 9.2–12.9)
POCT GLUCOSE: 116 MG/DL (ref 70–110)
POCT GLUCOSE: 125 MG/DL (ref 70–110)
POCT GLUCOSE: 168 MG/DL (ref 70–110)
POTASSIUM SERPL-SCNC: 4.1 MMOL/L (ref 3.5–5.1)
PROT SERPL-MCNC: 5.9 G/DL (ref 6–8.4)
RBC # BLD AUTO: 3.75 M/UL (ref 4.6–6.2)
SODIUM SERPL-SCNC: 137 MMOL/L (ref 136–145)
WBC # BLD AUTO: 11.51 K/UL (ref 3.9–12.7)

## 2024-07-11 PROCEDURE — 99900035 HC TECH TIME PER 15 MIN (STAT): Mod: HCNC

## 2024-07-11 PROCEDURE — 83735 ASSAY OF MAGNESIUM: CPT | Mod: HCNC | Performed by: STUDENT IN AN ORGANIZED HEALTH CARE EDUCATION/TRAINING PROGRAM

## 2024-07-11 PROCEDURE — 97110 THERAPEUTIC EXERCISES: CPT | Mod: HCNC

## 2024-07-11 PROCEDURE — 27000207 HC ISOLATION: Mod: HCNC

## 2024-07-11 PROCEDURE — 80053 COMPREHEN METABOLIC PANEL: CPT | Mod: HCNC | Performed by: STUDENT IN AN ORGANIZED HEALTH CARE EDUCATION/TRAINING PROGRAM

## 2024-07-11 PROCEDURE — 25000003 PHARM REV CODE 250: Mod: HCNC | Performed by: STUDENT IN AN ORGANIZED HEALTH CARE EDUCATION/TRAINING PROGRAM

## 2024-07-11 PROCEDURE — 84100 ASSAY OF PHOSPHORUS: CPT | Mod: HCNC | Performed by: STUDENT IN AN ORGANIZED HEALTH CARE EDUCATION/TRAINING PROGRAM

## 2024-07-11 PROCEDURE — 82550 ASSAY OF CK (CPK): CPT | Mod: HCNC | Performed by: STUDENT IN AN ORGANIZED HEALTH CARE EDUCATION/TRAINING PROGRAM

## 2024-07-11 PROCEDURE — 99232 SBSQ HOSP IP/OBS MODERATE 35: CPT | Mod: HCNC,,, | Performed by: STUDENT IN AN ORGANIZED HEALTH CARE EDUCATION/TRAINING PROGRAM

## 2024-07-11 PROCEDURE — 27000221 HC OXYGEN, UP TO 24 HOURS: Mod: HCNC

## 2024-07-11 PROCEDURE — 36415 COLL VENOUS BLD VENIPUNCTURE: CPT | Mod: HCNC | Performed by: STUDENT IN AN ORGANIZED HEALTH CARE EDUCATION/TRAINING PROGRAM

## 2024-07-11 PROCEDURE — 85025 COMPLETE CBC W/AUTO DIFF WBC: CPT | Mod: HCNC | Performed by: STUDENT IN AN ORGANIZED HEALTH CARE EDUCATION/TRAINING PROGRAM

## 2024-07-11 PROCEDURE — 97112 NEUROMUSCULAR REEDUCATION: CPT | Mod: HCNC,CQ

## 2024-07-11 PROCEDURE — A4216 STERILE WATER/SALINE, 10 ML: HCPCS | Mod: HCNC | Performed by: STUDENT IN AN ORGANIZED HEALTH CARE EDUCATION/TRAINING PROGRAM

## 2024-07-11 PROCEDURE — 94660 CPAP INITIATION&MGMT: CPT | Mod: HCNC

## 2024-07-11 PROCEDURE — 97535 SELF CARE MNGMENT TRAINING: CPT | Mod: HCNC

## 2024-07-11 PROCEDURE — 63600175 PHARM REV CODE 636 W HCPCS: Mod: HCNC | Performed by: STUDENT IN AN ORGANIZED HEALTH CARE EDUCATION/TRAINING PROGRAM

## 2024-07-11 PROCEDURE — 97530 THERAPEUTIC ACTIVITIES: CPT | Mod: HCNC,CQ

## 2024-07-11 PROCEDURE — 94761 N-INVAS EAR/PLS OXIMETRY MLT: CPT | Mod: HCNC

## 2024-07-11 PROCEDURE — 20600001 HC STEP DOWN PRIVATE ROOM: Mod: HCNC

## 2024-07-11 RX ADMIN — DIPHENHYDRAMINE HYDROCHLORIDE 10 ML: 25 SOLUTION ORAL at 11:07

## 2024-07-11 RX ADMIN — HYDROCODONE BITARTRATE AND ACETAMINOPHEN 1 TABLET: 10; 325 TABLET ORAL at 11:07

## 2024-07-11 RX ADMIN — Medication 10 ML: at 06:07

## 2024-07-11 RX ADMIN — INSULIN GLARGINE 15 UNITS: 100 INJECTION, SOLUTION SUBCUTANEOUS at 08:07

## 2024-07-11 RX ADMIN — DIPHENHYDRAMINE HYDROCHLORIDE 10 ML: 25 SOLUTION ORAL at 08:07

## 2024-07-11 RX ADMIN — METOPROLOL SUCCINATE 100 MG: 100 TABLET, EXTENDED RELEASE ORAL at 09:07

## 2024-07-11 RX ADMIN — DIPHENHYDRAMINE HYDROCHLORIDE 10 ML: 25 SOLUTION ORAL at 09:07

## 2024-07-11 RX ADMIN — DIPHENHYDRAMINE HYDROCHLORIDE 10 ML: 25 SOLUTION ORAL at 04:07

## 2024-07-11 RX ADMIN — CEFTAROLINE FOSAMIL 600 MG: 600 POWDER, FOR SOLUTION INTRAVENOUS at 08:07

## 2024-07-11 RX ADMIN — ENOXAPARIN SODIUM 40 MG: 40 INJECTION SUBCUTANEOUS at 04:07

## 2024-07-11 RX ADMIN — CEFTAROLINE FOSAMIL 600 MG: 600 POWDER, FOR SOLUTION INTRAVENOUS at 04:07

## 2024-07-11 RX ADMIN — HYDROCODONE BITARTRATE AND ACETAMINOPHEN 1 TABLET: 10; 325 TABLET ORAL at 06:07

## 2024-07-11 RX ADMIN — Medication 10 ML: at 11:07

## 2024-07-11 RX ADMIN — CEFTAROLINE FOSAMIL 600 MG: 600 POWDER, FOR SOLUTION INTRAVENOUS at 11:07

## 2024-07-11 RX ADMIN — MEROPENEM 1 G: 1 INJECTION INTRAVENOUS at 08:07

## 2024-07-11 RX ADMIN — LIDOCAINE 5% 1 PATCH: 700 PATCH TOPICAL at 04:07

## 2024-07-11 RX ADMIN — FERROUS SULFATE TAB EC 325 MG (65 MG FE EQUIVALENT) 1 EACH: 325 (65 FE) TABLET DELAYED RESPONSE at 08:07

## 2024-07-11 NOTE — SUBJECTIVE & OBJECTIVE
Interval History: Seen this AM at bedside. No acute events overnight. Drain with serous ouput    Review of Systems  Objective:     Vital Signs (Most Recent):  Temp: 97.8 °F (36.6 °C) (07/11/24 0745)  Pulse: 82 (07/11/24 1113)  Resp: 18 (07/11/24 1117)  BP: 113/72 (07/11/24 0745)  SpO2: (!) 94 % (07/11/24 0745) Vital Signs (24h Range):  Temp:  [97.8 °F (36.6 °C)-98.1 °F (36.7 °C)] 97.8 °F (36.6 °C)  Pulse:  [69-89] 82  Resp:  [17-20] 18  SpO2:  [89 %-98 %] 94 %  BP: (105-130)/(59-79) 113/72     Weight: 133 kg (293 lb 3.4 oz)  Body mass index is 39.77 kg/m².    Intake/Output Summary (Last 24 hours) at 7/11/2024 1306  Last data filed at 7/11/2024 0518  Gross per 24 hour   Intake 1380 ml   Output 1855 ml   Net -475 ml         Physical Exam  Vitals and nursing note reviewed.   Constitutional:       General: He is not in acute distress.     Appearance: He is ill-appearing (chronically). He is not toxic-appearing or diaphoretic.   HENT:      Head: Normocephalic and atraumatic.      Nose: Nose normal.      Mouth/Throat:      Pharynx: Oropharynx is clear.   Eyes:      General: No scleral icterus.  Cardiovascular:      Rate and Rhythm: Normal rate and regular rhythm.      Pulses: Normal pulses.      Heart sounds: Normal heart sounds.   Pulmonary:      Effort: Pulmonary effort is normal.   Chest:      Chest wall: No deformity or tenderness.      Comments: Left chest wall drain with seropurulent output  Abdominal:      General: Bowel sounds are normal.      Palpations: Abdomen is soft.      Tenderness: There is no abdominal tenderness.   Musculoskeletal:      Cervical back: Normal range of motion and neck supple.      Right lower leg: No edema.      Left lower leg: No edema.   Skin:     General: Skin is warm.      Capillary Refill: Capillary refill takes less than 2 seconds.      Comments: Unable to examine posterior skin due to patient debility   Neurological:      Mental Status: He is alert and oriented to person, place, and  time.   Psychiatric:         Behavior: Behavior normal.             Significant Labs: All pertinent labs within the past 24 hours have been reviewed.    Significant Imaging: I have reviewed all pertinent imaging results/findings within the past 24 hours.

## 2024-07-11 NOTE — ASSESSMENT & PLAN NOTE
Roosevelt Moser is a 73 y.o. male presenting with left shoulder septic arthritis s/p multiple I&Ds by Dr. Cooper at [facility].     - Antibiotics: Continue per ID  - ROM as tolerated LUE  - No acute intervention at this time, continue to follow Cx, av alible to assist in OR if needed while admitted, will discuss further plans with staff

## 2024-07-11 NOTE — PROGRESS NOTES
"[facility] Medicine  Progress Note    Patient Name: Roosevelt Moser  MRN: [id number]  Patient Class: IP- Inpatient   Admission Date: 7/7/2024  Length of Stay: 4 days  Attending Physician: Abraham Plaza DO  Primary Care Provider: Miriam, Primary Doctor        Subjective:     Principal Problem:Osteomyelitis of multiple sites        HPI:  Roosevelt Moser is a 72yo M w/ A fib, HTN, T2DM, MARA, HLD, hx GBS, hx displaced comminuted fracture of the left humerus s/p fall 3/4/24 s/p ORIF 03/25/2024 who presents as a transfer for thoracic surgery and Plastic surgery evaluation in setting of "osteomyelitis of multiple ribs which will need further debridement and then a flap placed."    He was initially admitted to [facility] on 6/14 for progressive weakness, multiple falls, and decreased urine output.  He was admitted for management of MARBIN on CKD complicated by hyperkalemia, urosepsis, AFib with RVR.    "Creatinine improved during his stay. During his stay he was noted to have swelling of his left upper extremity. Imaging studies on June 17 showed concern for gas-forming infection along the left shoulder and air along the lateral left chest wall concerning for gas-forming organism. MRI of his right foot also showed evidence of toe osteomyelitis. He was seen by ID who adjusted antibiotics, and they also noted oral thrush. Podiatry evaluated him for the toe findings, and Orthopedic Surgery evaluated the shoulder abnormalities. Right toe wound grew MRSA. Oral anticoagulation was held, and Radiology aspirated fluid from his left shoulder on June 20. On June 21 he had left shoulder incision and drainage and removal of deep hardware including rods and screws. He underwent repeat irrigation and debridement of the left shoulder on June 24 with placement of a wound VAC. He had subsequent incision and drainage with removal of the wound VAC on June 27. He had amputation of his right 2nd toe by Podiatry on July 1. " "On July 3 he was noted to have dyspnea and mild tachycardia. CT chest showed a large abscess of the anterior central and left chest wall and abdominal wall with partial destruction of 3 ribs. He was seen by Cardiothoracic surgery. On July 4 he had left thoracentesis with aspiration of fluid. He subsequently had incision and drainage of the left chest wall abscess on July 4, and a drainage catheter was left in place. On July 5 he underwent right VATS for a loculated right pleural effusion, and a chest tube was left in place. He was transitioned to the ICU post-operatively. With concern for osteomyelitis of the ribs, concern is that he will need transfer to a facility with Thoracic Surgery and potentially Plastic Surgery along with higher level of care. Transfer center spoke with Thoracic Surgery at [facility]. Requesting transfer to Hospital Medicine at [facility] for Plastic Surgery and Thoracic Surgery evaluation. Transfer center spoke with Plastic Surgery on July 5, Thoracic Surgery on July 7. With his complicated medical presentation, will plan transfer to Hospital Medicine at [facility] in step-down status for further treatment. "    Prior to transfer, "He is awake and alert. He has a PICC line in place along with the right chest tube and left chest wall drain in place. He has no evidence of respiratory distress and is hemodynamically stable. Referring provider felt patient was stable for step-down status at present. He is currently on ceftaroline and meropenem. He is in contact isolation. July 7: Sodium 137, potassium 4.5, chloride 104, CO2 26, BUN 33, creatinine 1.1, glucose 130, albumin 2.1, AST 25, ALT 17, white blood cells 11.58, hemoglobin 8.1, hematocrit 26.2, platelets 389, procalcitonin 0.575, CRP greater than 16  VS:  Temperature 97.5°, pulse 85, respirations 20, blood pressure 112/63, O2 sats 95% "    Patient in no acute distress upon arrival.  Wife at bedside.  Patient denies any " acute complaints.        Imaging history reviewed:  July 6: X-rays of the left shoulder had fracture through the surgical neck of the humerus with lateral distraction of the distal component and overlapping segments on the order of 2.5 cm.  Overall no appreciable change upon comparison with prior imaging.  -chest x-ray noted manifestations of mild congestive heart failure bordering on mild pulmonary edema.  Right-sided thoracostomy tube terminating within the right apex.  No definite pneumothorax.        July 5: Pleural fluid Gram stain with few WBCs and no organisms seen  -pH 7.339, pCO2 50.9, pO2 392     July 4:  CPK less than 10, serum   -AFB smear from left chest abscess had no AFB seen, left chest abscess aerobic and anaerobic cultures have no growth  -pleural fluid protein 3.2, white blood cells 1108 (11% segs, 74% lymphs), , pH 7.63, pleural fluid culture had no growth  -ultrasound-guided thoracentesis removed 1.4-1.5 L     July 3: Blood cultures with no growth to date  -CT chest showed large abscess of the anterior central and left chest wall and abdominal wall measuring 17 cm transversely.  This extends into the mediastinum and peritoneum with partial destruction of the anterior 6th, 7th, and 8th ribs.  Complete atelectasis of the left lower lobe with moderate left pleural effusion.  Mild atelectasis right lung base with a small-to-moderate right pleural effusion.     July 2: X-rays of the left shoulder noted displaced left proximal humerus fracture status post hardware removal without change in alignment.     June 24: ECHO with EF 55-60%.     June 20:  Interventional Radiology did percutaneous aspiration of the left shoulder fluid collection.  No drainage catheter was left in place.     June 18:  Right toe wound MRSA  -CT left shoulder had subacute left humerus fracture post internal fixation.  Incomplete bony bridging across the fracture site with persistent angulation.  Severe arthropathy  "of the glenohumeral joint with multiple intra-articular bodies.  Large left joint effusion and adjacent soft tissue focal fluid collections containing gas and concerning for gas-forming infection.  Moderate size left pleural effusion.  Left basilar airspace disease.  -bilateral lower extremity Doppler arterial ultrasound had atherosclerotic plaque and calcification bilaterally without severe stenosis or occlusion identified on either side.     June 17: X-rays of the left humerus/shoulder had findings concerning for gas-forming infection along the left shoulder.  -x-rays of the left ribs showed air along the lateral chest wall soft tissue and axilla concerning for gas-forming infection.  -MRI of the right foot had findings consistent with osteomyelitis involving the middle distal phalanges of the 3rd toe.  Cellulitis.     June 14: Renal ultrasound had no hydronephrosis.  Elevated resistive index right kidney suggesting intrinsic renal disease.    Overview/Hospital Course:  Pt admitted to  as a transfer for thoracic surgery and Plastic surgery evaluation in setting of "osteomyelitis of multiple ribs which will need further debridement and then a flap placed." Thoracic Surgery, Plastic Surgery, ID, Ortho, Wound care, PT/OT consulted upon admission. Imaging ordered.  CT chest abdomen with IV contrast without evidence of osteomyelitis in ribs adjacent to I&D site of left chest wall abscess.; incidental finding of 1.2 cm obstructing stone in left distal ureter with mild hydroureteronephrosis.  Urology was consulted and CT renal stone study was completed; no acute intervention given no concern for infection around stone or MARBIN; follow-up outpatient unless decompensates.  MRI shoulder without contrast left notable for septic arthritis of left glenohumeral joint with acute osteomyelitis of the glenoid and proximal humerus in addition to known displaced fracture of proximal humeral metaphysis in addition to several large " soft tissue abscesses.  Patient underwent left shoulder aspiration with Orthopedic surgery on 07/09.  Right chest tube was removed by thoracic surgery on 07/09. Drain previously placed for L chest wall abscess to be removed if output remains serous and decreases    Interval History: Seen this AM at bedside. No acute events overnight. Drain with serous ouput    Review of Systems  Objective:     Vital Signs (Most Recent):  Temp: 97.8 °F (36.6 °C) (07/11/24 0745)  Pulse: 82 (07/11/24 1113)  Resp: 18 (07/11/24 1117)  BP: 113/72 (07/11/24 0745)  SpO2: (!) 94 % (07/11/24 0745) Vital Signs (24h Range):  Temp:  [97.8 °F (36.6 °C)-98.1 °F (36.7 °C)] 97.8 °F (36.6 °C)  Pulse:  [69-89] 82  Resp:  [17-20] 18  SpO2:  [89 %-98 %] 94 %  BP: (105-130)/(59-79) 113/72     Weight: 133 kg (293 lb 3.4 oz)  Body mass index is 39.77 kg/m².    Intake/Output Summary (Last 24 hours) at 7/11/2024 1306  Last data filed at 7/11/2024 0518  Gross per 24 hour   Intake 1380 ml   Output 1855 ml   Net -475 ml         Physical Exam  Vitals and nursing note reviewed.   Constitutional:       General: He is not in acute distress.     Appearance: He is ill-appearing (chronically). He is not toxic-appearing or diaphoretic.   HENT:      Head: Normocephalic and atraumatic.      Nose: Nose normal.      Mouth/Throat:      Pharynx: Oropharynx is clear.   Eyes:      General: No scleral icterus.  Cardiovascular:      Rate and Rhythm: Normal rate and regular rhythm.      Pulses: Normal pulses.      Heart sounds: Normal heart sounds.   Pulmonary:      Effort: Pulmonary effort is normal.   Chest:      Chest wall: No deformity or tenderness.      Comments: Left chest wall drain with seropurulent output  Abdominal:      General: Bowel sounds are normal.      Palpations: Abdomen is soft.      Tenderness: There is no abdominal tenderness.   Musculoskeletal:      Cervical back: Normal range of motion and neck supple.      Right lower leg: No edema.      Left lower leg:  "No edema.   Skin:     General: Skin is warm.      Capillary Refill: Capillary refill takes less than 2 seconds.      Comments: Unable to examine posterior skin due to patient debility   Neurological:      Mental Status: He is alert and oriented to person, place, and time.   Psychiatric:         Behavior: Behavior normal.             Significant Labs: All pertinent labs within the past 24 hours have been reviewed.    Significant Imaging: I have reviewed all pertinent imaging results/findings within the past 24 hours.    Assessment/Plan:      * Osteomyelitis of multiple sites  Skin ulcer of toe of right foot with necrosis of bone  Osteomyelitis of toe   MRSA infection     Chest wall abscess  Loculated pleural effusion  Mediastinal lymphadenopathy    Abscess of left shoulder  Pyogenic abscess of left shoulder region  Humerus fracture    OSH:  -status post left thoracentesis 1500cc serosanguineous fluid drained 7/4 & I&D drainage with chest tube placement 7/4 & loculated effusions right chest s/p VATS and chest tube 7/5   -Left shoulder prosthetic joint infection s/p I&D and removal of deep hardware 6/21 - all screws and nails removed, s/p 2nd washout 6/24 & 3rd washout 6/27   -Left Shoulder washouts on 6/21 ( abundant pus in shoulder, hardware removal), 6/24 ( bloody brownish fluid) and 6/27 ( no fluid collection, healthy red and beefy tissue)   -Right 3rd toe osteomyelitis s/p Dalvance x 2 doses & 2nd distal phalanx s/p I&D at bedside, s/p 2nd toe amputation 7/1       - Presents as a transfer for thoracic surgery and Plastic surgery evaluation in setting of concern for "osteomyelitis of multiple ribs which will need further debridement and then a flap placed" in the setting of chest wall abscess  Plan:  -arrives on ceftaroline and meropenem as per outside hospital ID recommendations  -ID following here; pending final antibiotic recommendations  -notably, 7/3 blood cultures, 7/4 pleural fluid cultures, 7/4 abscess " cultures, 755 pleural fluid cultures NGTD  - right toe wound culture with MRSA  -continue to follow OSH cultures  -ongoing wound care  -ID, Ortho, Thoracic surgery and Plastic surgery consulted and following; imaging findings per hospital course  -thoracic surgery removed right chest tube on ; left chest accordion drain to negative pressure remains in place   -If output decreases and remains serous the drain can be removed (can be d/c with drain if placement occurs before this)  -ortho surgery completed left shoulder aspiration  to assess shoulder abscess concern; cultures pending  -thus far, no acute surgical intervention per all surgical specialties    Acute renal failure superimposed on chronic kidney disease  Patient with acute kidney injury/acute renal failure likely due to  due to ATN due to sepsis due to UTI and/or PNA  MARBIN is currently improving. Baseline creatinine  wnl  - Labs reviewed- Renal function/electrolytes with Estimated Creatinine Clearance: 104.7 mL/min (based on SCr of 0.9 mg/dL). according to latest data. Monitor urine output and serial BMP and adjust therapy as needed. Avoid nephrotoxins and renally dose meds for GFR listed above.    Left renal stone  Nephrolithiasis     retroperitoneal ultrasound completed in setting of MARBIN without hydronephrosis    CT abdomen notable for 1.2 cm obstructing stone in left distal ureter with mild hydroureteronephrosis       Appreciate urology recommendations; given no concerns regarding MARBIN or infection relation to the stone, no acute surgical intervention  Follow-up outpatient; strain all urine    Thyroid nodule  Incidental finding on imagin.7 cm left thyroid nodule. Nonemergent thyroid ultrasound is recommended.       Cyst of right kidney  Incidental finding on imagin.8 cm septated cystic lesion in the right kidney, which is indeterminate for malignancy. Further evaluation with nonemergent renal mass protocol CT or MRI is  "recommended.       Sepsis  This patient does have evidence of infective focus  My overall impression is sepsis.  Source: Skin and Soft Tissue (location toe)  Antibiotics given-   Antibiotics (72h ago, onward)      Start     Stop Route Frequency Ordered    07/07/24 1545  ceftaroline fosamiL (TEFLARO) 600 mg in D5W 50 mL IVPB (MB+)         -- IV Every 8 hours (non-standard times) 07/07/24 1532    07/07/24 1545  meropenem (MERREM) 1 g in 0.9% NaCl 100 mL IVPB (MB+)         -- IV Every 8 hours (non-standard times) 07/07/24 1532          Latest lactate reviewed-  No results for input(s): "LACTATE", "POCLAC" in the last 72 hours.  Organ dysfunction indicated by Acute kidney injury    Fluid challenge Not needed - patient is not hypotensive      Post- resuscitation assessment No - Post resuscitation assessment not needed       Will Not start Pressors- Levophed for MAP of 65  Source control achieved by: abx    Debility  Patient with Acute on chronic debility due to  multiple infections . Latest AMPAC and GEMS scores have not been reviewed. Evaluation for etiology is complete. Plan includes progressive mobility protocol initated, PT/OT consulted, and fall precautions in place.    Atrial fibrillation with rapid ventricular response  Patient with Long standing persistent (>12 months) atrial fibrillation which is controlled currently with Beta Blocker. Patient is currently in TBD.[id number] Score: 2.      Chronic AF w/ acute RVR at OSH, resolved s/p cardizem drip   NOAC has been on hold d/t possible need for surgery - has been on prophylaxis dose of Lovenox  Resume A/c pending any surgical intervention, consider lovenox vs hep gtt bridge    History of Guillain-[place] syndrome  No acute issues  However, pt has impaired mobility and is acutely weakened from his sepsis/UTI/AF RVR and need placement       Type 2 diabetes mellitus  Patient's FSGs are controlled on current medication regimen.  Last A1c reviewed-   Lab Results "   Component Value Date    HGBA1C 6.3 (H) 06/14/2024     Most recent fingerstick glucose reviewed-   Recent Labs   Lab 07/10/24  1607 07/10/24  2002   POCTGLUCOSE 181* 178*     Current correctional scale  Low  Maintain anti-hyperglycemic dose as follows-   Antihyperglycemics (From admission, onward)      Start     Stop Route Frequency Ordered    07/08/24 0900  insulin glargine U-100 (Lantus) pen 15 Units         -- SubQ Daily 07/07/24 1532    07/07/24 1529  insulin aspart U-100 pen 0-10 Units         -- SubQ Before meals & nightly PRN 07/07/24 1532          Hold Oral hypoglycemics while patient is in the hospital.    Hypertension  Chronic, controlled. Latest blood pressure and vitals reviewed-     Temp:  [97.8 °F (36.6 °C)-98.8 °F (37.1 °C)]   Pulse:  [65-91]   Resp:  [2-24]   BP: (100-145)/(56-81)   SpO2:  [90 %-99 %] .   Home meds for hypertension were reviewed and noted below.   Hypertension Medications               hydrALAZINE (APRESOLINE) 25 MG tablet Take 1 tablet (25 mg total) by mouth every 12 (twelve) hours.    metoprolol succinate (TOPROL-XL) 100 MG 24 hr tablet Take 1 tablet (100 mg total) by mouth once daily.            While in the hospital, will manage blood pressure as follows; continue home metoprolol    Will utilize p.r.n. blood pressure medication only if patient's blood pressure greater than 180/110 and he develops symptoms such as worsening chest pain or shortness of breath.    MARA (obstructive sleep apnea)  Continue CPAP HS and w/ naps        Morbid obesity  There is no height or weight on file to calculate BMI. Morbid obesity complicates all aspects of disease management from diagnostic modalities to treatment. Weight loss encouraged and health benefits explained to patient.           VTE Risk Mitigation (From admission, onward)           Ordered     enoxaparin injection 40 mg  Every 24 hours         07/09/24 1358     Place sequential compression device  Until discontinued         07/07/24 1532                     Discharge Planning   KAMILA: 7/12/2024     Code Status: Full Code   Is the patient medically ready for discharge?:     Reason for patient still in hospital (select all that apply): Patient trending condition  Discharge Plan A: Home with family                  Abraham Plaza DO  Department of Hospital Medicine   Andres KEARNEY

## 2024-07-11 NOTE — PROGRESS NOTES
Clarification for note on 6/26/24  Consult on 6/17/24 patient was noted to have MAD of the BL buttocks. Buttocks at the time were red and difficult to discern whether the buttocks were MAD or possibly a stage one pressure ulcer. MAD was documented at the time. Patient was followed up on 6/26 and the redness developed into a stage 2 pressure ulcer. This would suggest that the redness was in fact a stage 1 pressure ulcer that developed into a stage 2 pressure ulcer from being bed-bound. While the documentation cannot be altered, the probability that the wound was a stage one pressure ulcer with MAD superimposed on the stage 1 pressure ulcer as it continued to stage 2

## 2024-07-11 NOTE — PROGRESS NOTES
This note has been moved to another encounter. If you have any questions, please contact HIM Chart Correction at [phone].

## 2024-07-11 NOTE — PT/OT/SLP PROGRESS
"Occupational Therapy   Treatment    Name: Roosevelt Moser  MRN: [id number]  Admitting Diagnosis:  Osteomyelitis of multiple sites       Recommendations:     Discharge Recommendations: Moderate Intensity Therapy  Discharge Equipment Recommendations:  hospital bed, lift device  Barriers to discharge:  Other (Comment) (increased skilled (A) required)    Assessment:     Roosevelt Moser is a 73 y.o. male with a medical diagnosis of Osteomyelitis of multiple sites.  He presents with performance deficits affecting function are weakness, impaired balance, decreased safety awareness, impaired endurance, impaired self care skills, impaired functional mobility, gait instability, decreased lower extremity function, decreased upper extremity function, decreased ROM, pain, orthopedic precautions. Pt required significant assistance for all of bed mobility this date. Pt able to stand for ~10 seconds with Max A x 2 persons. Pt will continue to benefit from skilled OT services to address impairments listed above to maximize independence with ADLs and functional mobility to ensure safe return to PLOF.     Rehab Prognosis:  Fair; patient would benefit from acute skilled OT services to address these deficits and reach maximum level of function.       Plan:     Patient to be seen 4 x/week to address the above listed problems via self-care/home management, therapeutic activities, therapeutic exercises, neuromuscular re-education  Plan of Care Expires: 08/08/24  Plan of Care Reviewed with: patient    Subjective     Chief Complaint: pain during mobility  Patient/Family Comments/goals: "It hurts, it hurts" "are we done yet?"  Pain/Comfort:  Pain Rating 1: 0/10  Location - Side 1: Left  Location - Orientation 1: generalized  Location 1: arm (during mobility)  Pain Addressed 1: Reposition, Distraction, Cessation of Activity  Pain Rating Post-Intervention 1: other (see comments) (unrated)    Objective:     Communicated with: RN prior to session.  " Patient found HOB elevated with PICC line, Condom Catheter, hemovac, oxygen upon OT entry to room.    General Precautions: Standard, fall    Orthopedic Precautions:LUE partial weight bearing, RLE partial weight bearing (LUE ROMAT)  Braces: N/A  Respiratory Status: Nasal cannula, flow 5 L/min     Occupational Performance:     Bed Mobility:    Patient completed Rolling/Turning to Left with  maximal assistance and 2 persons  Patient completed Rolling/Turning to Right with total assistance and 2 persons  Patient completed Scooting/Bridging with maximal assistance and 2 persons. Pt able to use RUE to pull bed rail to assist with scooting up in supine  Patient completed Supine to Sit with maximal assistance and 2 persons  Patient completed Sit to Supine with maximal assistance and 2 persons    Functional Mobility/Transfers:  Patient completed Sit <> Stand Transfer with maximal assistance and of 2 persons  with  hand-held assist   Functional Mobility: Pt able to stand briefly with Max A x 2 persons. Pt sat EOB ~10mins with Min A, blocking B knees to prevent sliding forward     Activities of Daily Living:  Upper Body Dressing: maximal assistance adjusting gown  Lower Body Dressing: total assistance donning socks  Toileting: total assistance wiping brendon rectal area after BM at bed level       [facility] 6 Click ADL: 10    Treatment & Education:  pt completed AAROM L elbow flexion/extension to maintain/improve UE ROM, & overall functionality required for participation/independence with ADLs, IADLs & functional mobility/transfers  Pt educated on   Role of OT and OT POC  Importance of UE ROM of left elbow & wrist to maintain/promote functionality of LUE while maintaining precautions and minimizing pain   Safe transfer techniques and proper body mechanics for fall prevention and improved independence with functional transfers  Importance of OOB/EOB activities to increase endurance and tolerance for increased participation in daily  ADLs    Utilizing the call bell to request for assistance with all functional mobility to ensure safety during hospital stay.    Pt verbalized understanding and all questions were addressed within the scope of OT.     Patient left HOB elevated with all lines intact, call button in reach, and RN notified    GOALS:   Multidisciplinary Problems       Occupational Therapy Goals          Problem: Occupational Therapy    Goal Priority Disciplines Outcome Interventions   Occupational Therapy Goal     OT, PT/OT Progressing    Description: Goals to be met by: 8/8/24     Patient will increase functional independence with ADLs by performing:    LE Dressing with Moderate Assistance.  Grooming while EOB with Supervision.  Toileting from toilet with Supervision for hygiene and clothing management.   Stand pivot transfers with Moderate Assistance.  Squat pivot transfers with Moderate Assistance.  Toilet transfer to bedside commode with Moderate Assistance.    DME Justification for Hospital Bed:  Patient requires a hospital bed for positioning of the body in ways that are not feasible with an ordinary bed. The patient requires special positioning for pain relief, limited mobility, and/or being unable to independently make changes in body position without the use of a hospital bed. Pillows and wedges will not be adequate for resolving these positional issues.                          Time Tracking:     OT Date of Treatment: 07/11/24  OT Start Time: 1026  OT Stop Time: 1057  OT Total Time (min): 31 min    Billable Minutes:Self Care/Home Management 16  Therapeutic Exercise 15    OT/BAR: OT          7/11/2024

## 2024-07-11 NOTE — NURSING
[facility] Plan of Care Note    Dx:   Osteomyelitis [M86.9]    Shift Events: none    Goals of Care: safety, drain care    Neuro: aaox2=-3    Vital Signs: /71   Pulse 89   Temp 97.8 °F (36.6 °C)   Resp 18   Ht 6' (1.829 m)   Wt 133 kg (293 lb 3.4 oz)   SpO2 (!) 89%   BMI 39.77 kg/m²     Respiratory: 02/4lnc cpap qhs    Diet: Diet diabetic Cardiac (Low Na/Chol), Renal; 2000 Calorie  Dietary nutrition supplements Catarino - Any flavor,Dietary nutrition supplements Promod Liquid Protein - Fruit Punch,Dietary nutrition supplements Boost Plus - Any flavor    Is patient tolerating current diet? 50%    GTTS: ns at 75    Urine Output/Bowel Movement:   I/O this shift:  In: 1260 [P.O.:360; I.V.:900]  Out: 1030 [Urine:1000; Other:30]  Last Bowel Movement: 07/10/24      Drains/Tubes/Tube Feeds (include total output/shift):   I/O this shift:  In: 1260 [P.O.:360; I.V.:900]  Out: 1030 [Urine:1000; Other:30]      Lines: dl picc      Accuchecks:ac hs    Skin: wnl/see wounds    Fall Risk Score: high    Activity level? Max assist bed bound    Any scheduled procedures? na    Any safety concerns? Falls      Other: na

## 2024-07-11 NOTE — PT/OT/SLP PROGRESS
Physical Therapy Co-Treatment  Co-treatment performed to appropriately and safely assess patient's strength and endurance while facilitating functional tasks in addition to accommodating for patient's activity/pain tolerance.     Patient Name:  Roosevelt Moser   MRN:  [id number]    Recommendations:     Discharge Recommendations: Moderate Intensity Therapy  Discharge Equipment Recommendations: hospital bed, lift device  Barriers to discharge:  increased skilled assistance required    Assessment:     Roosevelt Moser is a 73 y.o. male admitted with a medical diagnosis of Osteomyelitis of multiple sites.  He presents with the following impairments/functional limitations: weakness, impaired endurance, impaired self care skills, impaired functional mobility, gait instability, impaired balance, decreased coordination, decreased upper extremity function, decreased lower extremity function, decreased safety awareness, pain, decreased ROM, orthopedic precautions.    Pt tolerated session fairly well though requires significant assistance with all mobility (Max A x 2 and Total A x 2). Pt able to perform STS today from EOB with Max A x 2 and no AD. Pt stood for ~10 seconds though was unable to safely comply with RLE PWB. Continued co-treatment required at this time for patient and staff safety. Pt will continue to benefit from skilled PT services in order to further promote functional mobility, endurance, and BLE strengthening. Pt remains appropriate for PT treatment at this time.    Rehab Prognosis: Fair; patient would benefit from acute skilled PT services to address these deficits and reach maximum level of function.    Recent Surgery: * No surgery found *      Plan:     During this hospitalization, patient to be seen 4 x/week to address the identified rehab impairments via gait training, therapeutic activities, therapeutic exercises, neuromuscular re-education and progress toward the following goals:    Plan of Care Expires:   "08/08/24    Subjective     Chief Complaint: impaired functional mobility; LUE pain  Patient/Family Comments/goals: "I'll try."  Pain/Comfort:  Pain Rating 1: other (see comments) (unspecified when prompted)  Location - Side 1: Left  Location - Orientation 1: generalized  Location 1: arm  Pain Addressed 1: Reposition, Distraction, Cessation of Activity  Pain Rating Post-Intervention 1: 8/10      Objective:     Communicated with nursing prior to session.  Patient found HOB elevated with PICC line, Condom Catheter, hemovac, pulse ox (continuous), oxygen, peripheral IV upon PT entry to room.     General Precautions: Standard, fall  Orthopedic Precautions: LUE partial weight bearing, RLE partial weight bearing (LUE ROMAT)  Braces: N/A  Respiratory Status: Nasal cannula, flow 5 L/min     Functional Mobility:  Bed Mobility:     Rolling Left:  maximal assistance and of 2 persons  Rolling Right: total assistance and of 2 persons  Scooting: maximal assistance and of 2 persons; pt able to use RUE to pull bed rail to assist with scooting up in supine.   Supine to Sit: maximal assistance and of 2 persons  Sit to Supine: maximal assistance and of 2 persons  Transfers:     Sit to Stand:  maximal assistance and of 2 persons with no AD and hand-held assist  Balnce: static sitting at EOB with Min A ~10 min; PTA blocking BLE to prevent unsafe slippage/sliding towards EOB  Balance: static standing with Max A x 2 ~10 seconds; pt unable to maintain compliance with RLE PWB precautions     OT performing Total A pericare with pt in right sidelying (Max A x 2 to maintain positioning). Pt also completed AAROM L elbow flexion/extension to maintain/improve UE ROM, & overall function at EOB.    AM-PAC 6 CLICK MOBILITY  Turning over in bed (including adjusting bedclothes, sheets and blankets)?: 2  Sitting down on and standing up from a chair with arms (e.g., wheelchair, bedside commode, etc.): 2  Moving from lying on back to sitting on the side " of the bed?: 2  Moving to and from a bed to a chair (including a wheelchair)?: 1  Need to walk in hospital room?: 1  Climbing 3-5 steps with a railing?: 1  Basic Mobility Total Score: 9       Treatment & Education:    Patient educated on role of therapy, goals of session, and benefits of out of bed mobility.   Instructed on use of call button and importance of calling nursing staff for assistance with mobility.   Questions/concerns addressed within PTA scope of practice.  Pt verbalized understanding.    Patient left HOB elevated with all lines intact, call button in reach, nursing notified, and spouse present..    GOALS:   Multidisciplinary Problems       Physical Therapy Goals          Problem: Physical Therapy    Goal Priority Disciplines Outcome Goal Variances Interventions   Physical Therapy Goal     PT, PT/OT Progressing     Description: Goals to be met by: 24     Patient will increase functional independence with mobility by performin. Supine to sit with Moderate Assistance  2. Sit to supine with Moderate Assistance  3. Rolling to Left and Right with Minimal Assistance.  4. Sit to stand transfer with Moderate Assistance  5. Bed to chair transfer with Maximum Assistance using LRAD  6. Gait  x 10 feet with Maximum Assistance using LRAD.   7. Lower extremity exercise program x10 reps per handout, with independence    DME Justifications (see above for complete DME recommendations)    Hospital Bed ·Patient requires a hospital bed for positioning of the body in ways that are not feasible with an ordinary bed. The patient requires special positioning for pain relief, limited mobility, and/or being unable to independently make changes in body position without the use of a hospital bed. Pillows and wedges will not be adequate for resolving these positional issues.                         Time Tracking:     PT Received On: 24  PT Start Time: 1026     PT Stop Time: 1057  PT Total Time (min): 31 min      Billable Minutes: Therapeutic Activity 19 and Neuromuscular Re-education 12    Treatment Type: Treatment  PT/PTA: PTA     Number of PTA visits since last PT visit: 1 07/11/2024

## 2024-07-11 NOTE — PROGRESS NOTES
[facility]  Orthopedics  Progress Note    Patient Name: Roosevelt Moser  MRN: [id number]  Admission Date: 7/7/2024  Hospital Length of Stay: 4 days  Attending Provider: Abraham Plaza DO  Primary Care Provider: Miriam, Primary Doctor    Subjective:     Principal Problem:Osteomyelitis of multiple sites    Principal Orthopedic Problem: left shoulder infection with left proximal humerus fracture s/p ORIF 3/25/24, and HWR and I&D x 3 (most recent 6/27/24)    Interval History: Pt seen and examined at bedside. NAEON, VSS, AF. Pain controlled. Denies fevers, chills, chest pain, SOB, N/V/D.   Cx from shoulder NGTD.   Shoulder pain improving.     Review of patient's allergies indicates:  No Known Allergies    Current Facility-Administered Medications   Medication    (Magic mouthwash) 1:1:1 diphenhydrAMINE(Benadryl) 12.5mg/5ml liq, aluminum & magnesium hydroxide-simethicone (Maalox), LIDOcaine viscous 2%    0.9%  NaCl infusion    albuterol-ipratropium 2.5 mg-0.5 mg/3 mL nebulizer solution 3 mL    aluminum & magnesium hydroxide-simethicone 400-400-40 mg/5 mL suspension 15 mL    ceftaroline fosamiL (TEFLARO) 600 mg in D5W 50 mL IVPB (MB+)    dextrose 10% bolus 125 mL 125 mL    dextrose 10% bolus 250 mL 250 mL    enoxaparin injection 40 mg    ferrous sulfate tablet 1 each    glucagon (human recombinant) injection 1 mg    glucose chewable tablet 16 g    glucose chewable tablet 24 g    HYDROcodone-acetaminophen  mg per tablet 1 tablet    HYDROcodone-acetaminophen 5-325 mg per tablet 1 tablet    insulin aspart U-100 pen 0-10 Units    insulin glargine U-100 (Lantus) pen 15 Units    LIDOcaine 5 % patch 1 patch    meropenem (MERREM) 1 g in 0.9% NaCl 100 mL IVPB (MB+)    metoprolol succinate (TOPROL-XL) 24 hr tablet 100 mg    naloxone 0.4 mg/mL injection 0.02 mg    ondansetron disintegrating tablet 8 mg    polyethylene glycol packet 17 g    polyethylene glycol packet 17 g    promethazine tablet 25 mg    senna-docusate 8.6-50 mg per  tablet 1 tablet    simethicone chewable tablet 80 mg    sodium chloride 0.9% flush 10 mL    sodium chloride 0.9% flush 10 mL    And    sodium chloride 0.9% flush 10 mL    traMADoL tablet 50 mg     Objective:     Vital Signs (Most Recent):  Temp: 97.8 °F (36.6 °C) (07/11/24 0745)  Pulse: 82 (07/11/24 1113)  Resp: 18 (07/11/24 1117)  BP: 113/72 (07/11/24 0745)  SpO2: (!) 94 % (07/11/24 0745) Vital Signs (24h Range):  Temp:  [97.8 °F (36.6 °C)-98.1 °F (36.7 °C)] 97.8 °F (36.6 °C)  Pulse:  [69-89] 82  Resp:  [17-20] 18  SpO2:  [89 %-98 %] 94 %  BP: (100-130)/(56-79) 113/72     Weight: 133 kg (293 lb 3.4 oz)  Height: 6' (182.9 cm)  Body mass index is 39.77 kg/m².      Intake/Output Summary (Last 24 hours) at 7/11/2024 1159  Last data filed at 7/11/2024 0518  Gross per 24 hour   Intake 1380 ml   Output 1855 ml   Net -475 ml        Ortho/SPM Exam     Gen: NAD, WDWN  CV: peripherally well perfused  Resp: unlabored respirations, symmetric chest rise  Neck: TM  Neuro: CN 2-12 grossly intact. No FND.      MSK:  LUE:  AROM of hand and wrist intact, minimal ROM of elbow and shoulder secondary to known fracture and pain  Incision without signs of erythema, edema, drainage, dehiscence, necrotic tissue, or infection.  SILT  WWP, radial artery palpated    Significant Labs: All pertinent labs within the past 24 hours have been reviewed.    Significant Imaging: I have reviewed all pertinent imaging results/findings.  Assessment/Plan:     Pyogenic arthritis of left shoulder region  Roosevelt Moser is a 73 y.o. male presenting with left shoulder septic arthritis s/p multiple I&Ds by Dr. Cooper at [facility].     - Antibiotics: Continue per ID  - ROM as tolerated LUE  - No acute intervention at this time, continue to follow Cx, av alible to assist in OR if needed while admitted, will discuss further plans with staff              Sebastian Powell MD  Orthopedics  [facility]

## 2024-07-11 NOTE — ASSESSMENT & PLAN NOTE
Patient's FSGs are controlled on current medication regimen.  Last A1c reviewed-   Lab Results   Component Value Date    HGBA1C 6.3 (H) 06/14/2024     Most recent fingerstick glucose reviewed-   Recent Labs   Lab 07/10/24  1607 07/10/24  2002   POCTGLUCOSE 181* 178*     Current correctional scale  Low  Maintain anti-hyperglycemic dose as follows-   Antihyperglycemics (From admission, onward)      Start     Stop Route Frequency Ordered    07/08/24 0900  insulin glargine U-100 (Lantus) pen 15 Units         -- SubQ Daily 07/07/24 1532    07/07/24 1529  insulin aspart U-100 pen 0-10 Units         -- SubQ Before meals & nightly PRN 07/07/24 1532          Hold Oral hypoglycemics while patient is in the hospital.

## 2024-07-11 NOTE — SUBJECTIVE & OBJECTIVE
Interval History: NAEON. Resting comfortably on room air. AVSS. 30 mL of serous output from drain overnight.    Medications:  Continuous Infusions:   0.9% NaCl   Intravenous Continuous 75 mL/hr at 07/10/24 0909 New Bag at 07/10/24 0909     Scheduled Meds:   (Magic mouthwash) 1:1:1 diphenhydrAMINE(Benadryl) 12.5mg/5ml liq, aluminum & magnesium hydroxide-simethicone (Maalox), LIDOcaine viscous 2%  10 mL Swish & Spit QID    ceftaroline (Teflaro) IV (PEDS and ADULTS)  600 mg Intravenous Q8H    enoxparin  40 mg Subcutaneous Q24H (prophylaxis, 1700)    ferrous sulfate  1 tablet Oral Daily    insulin glargine U-100  15 Units Subcutaneous Daily    LIDOcaine  1 patch Transdermal Q24H    meropenem IV (PEDS and ADULTS)  1 g Intravenous Q8H    metoprolol succinate  100 mg Oral QHS    polyethylene glycol  17 g Oral Daily    sodium chloride 0.9%  10 mL Intravenous Q6H     PRN Meds:  Current Facility-Administered Medications:     albuterol-ipratropium, 3 mL, Nebulization, Q4H PRN    aluminum & magnesium hydroxide-simethicone, 15 mL, Oral, QID PRN    dextrose 10%, 12.5 g, Intravenous, PRN    dextrose 10%, 25 g, Intravenous, PRN    glucagon (human recombinant), 1 mg, Intramuscular, PRN    glucose, 16 g, Oral, PRN    glucose, 24 g, Oral, PRN    HYDROcodone-acetaminophen, 1 tablet, Oral, Q4H PRN    HYDROcodone-acetaminophen, 1 tablet, Oral, Q4H PRN    insulin aspart U-100, 0-10 Units, Subcutaneous, QID (AC + HS) PRN    naloxone, 0.02 mg, Intravenous, PRN    ondansetron, 8 mg, Oral, Q8H PRN    polyethylene glycol, 17 g, Oral, BID PRN    promethazine, 25 mg, Oral, Q6H PRN    senna-docusate 8.6-50 mg, 1 tablet, Oral, BID PRN    simethicone, 1 tablet, Oral, QID PRN    sodium chloride 0.9%, 10 mL, Intravenous, Q12H PRN    Flushing PICC/Midline Protocol, , , Until Discontinued **AND** sodium chloride 0.9%, 10 mL, Intravenous, Q6H **AND** sodium chloride 0.9%, 10 mL, Intravenous, PRN    traMADoL, 50 mg, Oral, Q4H PRN     Review of patient's  allergies indicates:  No Known Allergies  Objective:     Vital Signs (Most Recent):  Temp: 97.8 °F (36.6 °C) (07/11/24 0437)  Pulse: 89 (07/11/24 0437)  Resp: 18 (07/11/24 0600)  BP: 129/71 (07/11/24 0437)  SpO2: (!) 93 % (07/11/24 0600) Vital Signs (24h Range):  Temp:  [97.8 °F (36.6 °C)-98.1 °F (36.7 °C)] 97.8 °F (36.6 °C)  Pulse:  [69-89] 89  Resp:  [17-20] 18  SpO2:  [89 %-98 %] 93 %  BP: (100-130)/(56-79) 129/71     Intake/Output - Last 3 Shifts         07/09 0700  07/10 0659 07/10 0700  07/11 0659 07/11 0700 07/12 0659    P.O. 210 480     I.V. (mL/kg) 900 (6.8) 900 (6.8)     Total Intake(mL/kg) 1110 (8.3) 1380 (10.4)     Urine (mL/kg/hr) 425 (0.1) 2000 (0.6)     Emesis/NG output  0     Drains  100     Other 40 30     Stool 0 0     Chest Tube       Total Output 465 2130     Net +645 -750            Urine Occurrence 2 x 3 x     Stool Occurrence 1 x 6 x     Emesis Occurrence  0 x             SpO2: (!) 93 %        Physical Exam  Vitals reviewed.   Constitutional:       Appearance: Normal appearance.   Cardiovascular:      Rate and Rhythm: Normal rate and regular rhythm.   Pulmonary:      Effort: Pulmonary effort is normal. No respiratory distress.   Abdominal:      Palpations: Abdomen is soft.      Tenderness: There is no abdominal tenderness.   Musculoskeletal:      Comments: Left chest wall accordion drain with seropurulent output   Skin:     General: Skin is warm.   Neurological:      General: No focal deficit present.      Mental Status: He is alert and oriented to person, place, and time.            Significant Labs:  CBC:   Recent Labs   Lab 07/10/24  0258   WBC 12.37   RBC 3.61*   HGB 8.9*   HCT 29.8*   *   MCV 83   MCH 24.7*   MCHC 29.9*     CMP:   Recent Labs   Lab 07/10/24  0258      CALCIUM 8.2*   ALBUMIN 1.2*   PROT 5.8*      K 4.0   CO2 23      BUN 23   CREATININE 1.0   ALKPHOS 104   ALT 11   AST 17   BILITOT 0.3       Significant Diagnostics:  I have reviewed all pertinent  imaging results/findings within the past 24 hours.    VTE Risk Mitigation (From admission, onward)           Ordered     enoxaparin injection 40 mg  Every 24 hours         07/09/24 1358     Place sequential compression device  Until discontinued         07/07/24 6469

## 2024-07-11 NOTE — ASSESSMENT & PLAN NOTE
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 and removal of deep hardware 6/21 - all screws and nails removed, s/p 2nd washout 6/24 & 3rd washout 6/27   -Left Shoulder washouts on 6/21 ( abundant pus in shoulder, hardware removal), 6/24 ( bloody brownish fluid) and 6/27 ( no fluid collection, healthy red and beefy tissue)   -Right 3rd toe osteomyelitis s/p Dalvance x 2 doses & 2nd distal phalanx s/p I&D at bedside, s/p 2nd toe amputation 7/1       - Presents as a transfer for thoracic surgery and Plastic surgery evaluation in setting of concern for "osteomyelitis of multiple ribs which will need further debridement and then a flap placed" in the setting of chest wall abscess  Plan:  -arrives on ceftaroline and meropenem as per outside hospital ID recommendations  -ID following here; pending final antibiotic recommendations  -notably, 7/3 blood cultures, 7/4 pleural fluid cultures, 7/4 abscess cultures, 755 pleural fluid cultures NGTD  -6/18 right toe wound culture with MRSA  -continue to follow OSH cultures  -ongoing wound care  -ID, Ortho, Thoracic surgery and Plastic surgery consulted and following; imaging findings per hospital course  -thoracic surgery removed right chest tube on 07/09; left chest accordion drain to negative pressure remains in place   -If output decreases and remains serous the drain can be removed (can be d/c with drain if placement occurs before this)  -ortho surgery completed left shoulder aspiration 7/9 to assess shoulder abscess concern; " cultures pending  -thus far, no acute surgical intervention per all surgical specialties

## 2024-07-11 NOTE — SUBJECTIVE & OBJECTIVE
Principal Problem:Osteomyelitis of multiple sites    Principal Orthopedic Problem: left shoulder infection with left proximal humerus fracture s/p ORIF 3/25/24, and HWR and I&D x 3 (most recent 6/27/24)    Interval History: Pt seen and examined at bedside. NAEON, VSS, AF. Pain controlled. Denies fevers, chills, chest pain, SOB, N/V/D.   Cx from shoulder NGTD.   Shoulder pain improving.     Review of patient's allergies indicates:  No Known Allergies    Current Facility-Administered Medications   Medication    (Magic mouthwash) 1:1:1 diphenhydrAMINE(Benadryl) 12.5mg/5ml liq, aluminum & magnesium hydroxide-simethicone (Maalox), LIDOcaine viscous 2%    0.9%  NaCl infusion    albuterol-ipratropium 2.5 mg-0.5 mg/3 mL nebulizer solution 3 mL    aluminum & magnesium hydroxide-simethicone 400-400-40 mg/5 mL suspension 15 mL    ceftaroline fosamiL (TEFLARO) 600 mg in D5W 50 mL IVPB (MB+)    dextrose 10% bolus 125 mL 125 mL    dextrose 10% bolus 250 mL 250 mL    enoxaparin injection 40 mg    ferrous sulfate tablet 1 each    glucagon (human recombinant) injection 1 mg    glucose chewable tablet 16 g    glucose chewable tablet 24 g    HYDROcodone-acetaminophen  mg per tablet 1 tablet    HYDROcodone-acetaminophen 5-325 mg per tablet 1 tablet    insulin aspart U-100 pen 0-10 Units    insulin glargine U-100 (Lantus) pen 15 Units    LIDOcaine 5 % patch 1 patch    meropenem (MERREM) 1 g in 0.9% NaCl 100 mL IVPB (MB+)    metoprolol succinate (TOPROL-XL) 24 hr tablet 100 mg    naloxone 0.4 mg/mL injection 0.02 mg    ondansetron disintegrating tablet 8 mg    polyethylene glycol packet 17 g    polyethylene glycol packet 17 g    promethazine tablet 25 mg    senna-docusate 8.6-50 mg per tablet 1 tablet    simethicone chewable tablet 80 mg    sodium chloride 0.9% flush 10 mL    sodium chloride 0.9% flush 10 mL    And    sodium chloride 0.9% flush 10 mL    traMADoL tablet 50 mg     Objective:     Vital Signs (Most Recent):  Temp: 97.8  °F (36.6 °C) (07/11/24 0745)  Pulse: 82 (07/11/24 1113)  Resp: 18 (07/11/24 1117)  BP: 113/72 (07/11/24 0745)  SpO2: (!) 94 % (07/11/24 0745) Vital Signs (24h Range):  Temp:  [97.8 °F (36.6 °C)-98.1 °F (36.7 °C)] 97.8 °F (36.6 °C)  Pulse:  [69-89] 82  Resp:  [17-20] 18  SpO2:  [89 %-98 %] 94 %  BP: (100-130)/(56-79) 113/72     Weight: 133 kg (293 lb 3.4 oz)  Height: 6' (182.9 cm)  Body mass index is 39.77 kg/m².      Intake/Output Summary (Last 24 hours) at 7/11/2024 1159  Last data filed at 7/11/2024 0518  Gross per 24 hour   Intake 1380 ml   Output 1855 ml   Net -475 ml        Ortho/SPM Exam     Gen: NAD, WDWN  CV: peripherally well perfused  Resp: unlabored respirations, symmetric chest rise  Neck: TM  Neuro: CN 2-12 grossly intact. No FND.      MSK:  LUE:  AROM of hand and wrist intact, minimal ROM of elbow and shoulder secondary to known fracture and pain  Incision without signs of erythema, edema, drainage, dehiscence, necrotic tissue, or infection.  SILT  WWP, radial artery palpated    Significant Labs: All pertinent labs within the past 24 hours have been reviewed.    Significant Imaging: I have reviewed all pertinent imaging results/findings.

## 2024-07-11 NOTE — ASSESSMENT & PLAN NOTE
Patient with acute kidney injury/acute renal failure likely due to  due to ATN due to sepsis due to UTI and/or PNA  MARBIN is currently improving. Baseline creatinine  wnl  - Labs reviewed- Renal function/electrolytes with Estimated Creatinine Clearance: 104.7 mL/min (based on SCr of 0.9 mg/dL). according to latest data. Monitor urine output and serial BMP and adjust therapy as needed. Avoid nephrotoxins and renally dose meds for GFR listed above.

## 2024-07-11 NOTE — PROGRESS NOTES
[facility]  Thoracic Surgery  Progress Note    Subjective:     History of Present Illness:  Mr. Moser is a pleasant 73 yoM with PMH including Afib on eliquis, HTN, T2DM and MARA who is transferred from [place] for a chest wall abscess with concern for osteomyelitis of the anterior left 6-8th ribs. He originally presented to the [facility] after a fall resulting in a left humeral fracture for which he underwent ORIF in March. He represented in June with a left shoulder abscess and underwent multiple washouts and removal of the ORIF hardware. His hospital stay was complicated by bilateral effusions and a left chest wall abscess with extension into the epigastrium and mediastinum with concern for osteomyelitis of the left 6-8th ribs. He underwent I+D of the abscess through a small left anterior chest incision with drain placement on 7/4, thoracentesis of the left pleural effusion on 7/4, and right-sided VATs with washout and chest tube placement on 7/5. Interestingly, cultures from the pleural fluid and chest wall abscess cavity have been negative despite the abscess cavity fluid being described as creamy pus by the [facility] op report. The only positive cultures so far have been from those collected by podiatry during a toe amp which were positive for MRSA.     He was transferred to [facility] for potential thoracic and plastic surgery interventions if more invasive debridements or resections were required with reconstruction.     Upon arrival here he states that over the past couple days he has been feeling much better. He states that the pain and swelling associated with the chest wall abscess is resolved. He denies any fever or chills. He states that the dyspnea he was experiencing with the prior pleural effusions has resolved, despite remaining on 2-3 L/min NC. He is not on home oxygen.     Post-Op Info:  * No surgery found *         Interval History: NAEON. Resting comfortably on room air. AVSS. 30 mL of serous output from drain  overnight.    Medications:  Continuous Infusions:   0.9% NaCl   Intravenous Continuous 75 mL/hr at 07/10/24 0909 New Bag at 07/10/24 0909     Scheduled Meds:   (Magic mouthwash) 1:1:1 diphenhydrAMINE(Benadryl) 12.5mg/5ml liq, aluminum & magnesium hydroxide-simethicone (Maalox), LIDOcaine viscous 2%  10 mL Swish & Spit QID    ceftaroline (Teflaro) IV (PEDS and ADULTS)  600 mg Intravenous Q8H    enoxparin  40 mg Subcutaneous Q24H (prophylaxis, 1700)    ferrous sulfate  1 tablet Oral Daily    insulin glargine U-100  15 Units Subcutaneous Daily    LIDOcaine  1 patch Transdermal Q24H    meropenem IV (PEDS and ADULTS)  1 g Intravenous Q8H    metoprolol succinate  100 mg Oral QHS    polyethylene glycol  17 g Oral Daily    sodium chloride 0.9%  10 mL Intravenous Q6H     PRN Meds:  Current Facility-Administered Medications:     albuterol-ipratropium, 3 mL, Nebulization, Q4H PRN    aluminum & magnesium hydroxide-simethicone, 15 mL, Oral, QID PRN    dextrose 10%, 12.5 g, Intravenous, PRN    dextrose 10%, 25 g, Intravenous, PRN    glucagon (human recombinant), 1 mg, Intramuscular, PRN    glucose, 16 g, Oral, PRN    glucose, 24 g, Oral, PRN    HYDROcodone-acetaminophen, 1 tablet, Oral, Q4H PRN    HYDROcodone-acetaminophen, 1 tablet, Oral, Q4H PRN    insulin aspart U-100, 0-10 Units, Subcutaneous, QID (AC + HS) PRN    naloxone, 0.02 mg, Intravenous, PRN    ondansetron, 8 mg, Oral, Q8H PRN    polyethylene glycol, 17 g, Oral, BID PRN    promethazine, 25 mg, Oral, Q6H PRN    senna-docusate 8.6-50 mg, 1 tablet, Oral, BID PRN    simethicone, 1 tablet, Oral, QID PRN    sodium chloride 0.9%, 10 mL, Intravenous, Q12H PRN    Flushing PICC/Midline Protocol, , , Until Discontinued **AND** sodium chloride 0.9%, 10 mL, Intravenous, Q6H **AND** sodium chloride 0.9%, 10 mL, Intravenous, PRN    traMADoL, 50 mg, Oral, Q4H PRN     Review of patient's allergies indicates:  No Known Allergies  Objective:     Vital Signs (Most Recent):  Temp: 97.8 °F  (36.6 °C) (07/11/24 0437)  Pulse: 89 (07/11/24 0437)  Resp: 18 (07/11/24 0600)  BP: 129/71 (07/11/24 0437)  SpO2: (!) 93 % (07/11/24 0600) Vital Signs (24h Range):  Temp:  [97.8 °F (36.6 °C)-98.1 °F (36.7 °C)] 97.8 °F (36.6 °C)  Pulse:  [69-89] 89  Resp:  [17-20] 18  SpO2:  [89 %-98 %] 93 %  BP: (100-130)/(56-79) 129/71     Intake/Output - Last 3 Shifts         07/09 0700  07/10 0659 07/10 0700  07/11 0659 07/11 0700  07/12 0659    P.O. 210 480     I.V. (mL/kg) 900 (6.8) 900 (6.8)     Total Intake(mL/kg) 1110 (8.3) 1380 (10.4)     Urine (mL/kg/hr) 425 (0.1) 2000 (0.6)     Emesis/NG output  0     Drains  100     Other 40 30     Stool 0 0     Chest Tube       Total Output 465 2130     Net +645 -750            Urine Occurrence 2 x 3 x     Stool Occurrence 1 x 6 x     Emesis Occurrence  0 x             SpO2: (!) 93 %        Physical Exam  Vitals reviewed.   Constitutional:       Appearance: Normal appearance.   Cardiovascular:      Rate and Rhythm: Normal rate and regular rhythm.   Pulmonary:      Effort: Pulmonary effort is normal. No respiratory distress.   Abdominal:      Palpations: Abdomen is soft.      Tenderness: There is no abdominal tenderness.   Musculoskeletal:      Comments: Left chest wall accordion drain with seropurulent output   Skin:     General: Skin is warm.   Neurological:      General: No focal deficit present.      Mental Status: He is alert and oriented to person, place, and time.            Significant Labs:  CBC:   Recent Labs   Lab 07/10/24  0258   WBC 12.37   RBC 3.61*   HGB 8.9*   HCT 29.8*   *   MCV 83   MCH 24.7*   MCHC 29.9*     CMP:   Recent Labs   Lab 07/10/24  0258      CALCIUM 8.2*   ALBUMIN 1.2*   PROT 5.8*      K 4.0   CO2 23      BUN 23   CREATININE 1.0   ALKPHOS 104   ALT 11   AST 17   BILITOT 0.3       Significant Diagnostics:  I have reviewed all pertinent imaging results/findings within the past 24 hours.    VTE Risk Mitigation (From admission, onward)            Ordered     enoxaparin injection 40 mg  Every 24 hours         07/09/24 1358     Place sequential compression device  Until discontinued         07/07/24 1532                  Assessment/Plan:     Chest wall abscess  73 yoM with left chest wall abscess and concern for left 6-8 rib osteomyelitis transferred for potential thoracic and plastic surgery intervention. Subjectively he is feeling much better s/p incision and drainage of chest wall abscess, thoracentesis of left pleural effusion, and VATS with right chest tube placement for [facility] pleural eff[facility] at [facility]. Imaging workup here revealed significant improvement in chest wall abscess size without apparent osteomyelitis of the ribs on CT, but with continued septic left shoulder joint. Right chest tube removed 7/9.     - No plans for acute thoracic intervention  - We will arrange for follow up in clinic with MRI of the ribs  - Strict I/Os of the left accordion drain   - 30 mL of serous output overnight   - If output decreases and remains serous the drain can be removed   - If placement occurs before this, then patient can be discharged with the drain in place  - Thoracic surgery to follow        Yovanny Mike MD  Thoracic Surgery  [facility]

## 2024-07-11 NOTE — ASSESSMENT & PLAN NOTE
73 yoM with left chest wall abscess and concern for left 6-8 rib osteomyelitis transferred for potential thoracic and plastic surgery intervention. Subjectively he is feeling much better s/p incision and drainage of chest wall abscess, thoracentesis of left pleural effusion, and VATS with right chest tube placement for right pleural effusion at [facility]. Imaging workup here revealed significant improvement in chest wall abscess size without apparent osteomyelitis of the ribs on CT, but with continued septic left shoulder joint. Right chest tube removed 7/9.     - No plans for acute thoracic intervention  - We will arrange for follow up in clinic with MRI of the ribs  - Strict I/Os of the left accordion drain   - 30 mL of serous output overnight   - If output decreases and remains serous the drain can be removed   - If placement occurs before this, then patient can be discharged with the drain in place  - Thoracic surgery to follow

## 2024-07-12 PROBLEM — J96.01 ACUTE HYPOXEMIC RESPIRATORY FAILURE: Status: ACTIVE | Noted: 2024-07-12

## 2024-07-12 LAB
ALBUMIN SERPL BCP-MCNC: 1.3 G/DL (ref 3.5–5.2)
ALP SERPL-CCNC: 96 U/L (ref 55–135)
ALT SERPL W/O P-5'-P-CCNC: 9 U/L (ref 10–44)
ANION GAP SERPL CALC-SCNC: 7 MMOL/L (ref 8–16)
AST SERPL-CCNC: 14 U/L (ref 10–40)
BACTERIA SPEC AEROBE CULT: NO GROWTH
BASOPHILS # BLD AUTO: 0.1 K/UL (ref 0–0.2)
BASOPHILS NFR BLD: 1 % (ref 0–1.9)
BILIRUB SERPL-MCNC: 0.3 MG/DL (ref 0.1–1)
BUN SERPL-MCNC: 34 MG/DL (ref 8–23)
CALCIUM SERPL-MCNC: 8.3 MG/DL (ref 8.7–10.5)
CHLORIDE SERPL-SCNC: 104 MMOL/L (ref 95–110)
CO2 SERPL-SCNC: 24 MMOL/L (ref 23–29)
CREAT SERPL-MCNC: 1 MG/DL (ref 0.5–1.4)
DIFFERENTIAL METHOD BLD: ABNORMAL
EOSINOPHIL # BLD AUTO: 0.9 K/UL (ref 0–0.5)
EOSINOPHIL NFR BLD: 8.5 % (ref 0–8)
ERYTHROCYTE [DISTWIDTH] IN BLOOD BY AUTOMATED COUNT: 17.6 % (ref 11.5–14.5)
EST. GFR  (NO RACE VARIABLE): >60 ML/MIN/1.73 M^2
GLUCOSE SERPL-MCNC: 114 MG/DL (ref 70–110)
HCT VFR BLD AUTO: 28.7 % (ref 40–54)
HGB BLD-MCNC: 8.7 G/DL (ref 14–18)
IMM GRANULOCYTES # BLD AUTO: 0.14 K/UL (ref 0–0.04)
IMM GRANULOCYTES NFR BLD AUTO: 1.3 % (ref 0–0.5)
LYMPHOCYTES # BLD AUTO: 1.6 K/UL (ref 1–4.8)
LYMPHOCYTES NFR BLD: 15.1 % (ref 18–48)
MAGNESIUM SERPL-MCNC: 1.5 MG/DL (ref 1.6–2.6)
MCH RBC QN AUTO: 24.9 PG (ref 27–31)
MCHC RBC AUTO-ENTMCNC: 30.3 G/DL (ref 32–36)
MCV RBC AUTO: 82 FL (ref 82–98)
MONOCYTES # BLD AUTO: 1 K/UL (ref 0.3–1)
MONOCYTES NFR BLD: 9.3 % (ref 4–15)
NEUTROPHILS # BLD AUTO: 6.7 K/UL (ref 1.8–7.7)
NEUTROPHILS NFR BLD: 64.8 % (ref 38–73)
NRBC BLD-RTO: 0 /100 WBC
PHOSPHATE SERPL-MCNC: 3.1 MG/DL (ref 2.7–4.5)
PLATELET # BLD AUTO: 443 K/UL (ref 150–450)
PMV BLD AUTO: 10.1 FL (ref 9.2–12.9)
POCT GLUCOSE: 111 MG/DL (ref 70–110)
POCT GLUCOSE: 128 MG/DL (ref 70–110)
POCT GLUCOSE: 133 MG/DL (ref 70–110)
POCT GLUCOSE: 189 MG/DL (ref 70–110)
POTASSIUM SERPL-SCNC: 4.1 MMOL/L (ref 3.5–5.1)
PROT SERPL-MCNC: 5.9 G/DL (ref 6–8.4)
RBC # BLD AUTO: 3.5 M/UL (ref 4.6–6.2)
SODIUM SERPL-SCNC: 135 MMOL/L (ref 136–145)
WBC # BLD AUTO: 10.39 K/UL (ref 3.9–12.7)

## 2024-07-12 PROCEDURE — 27000207 HC ISOLATION: Mod: HCNC

## 2024-07-12 PROCEDURE — A4216 STERILE WATER/SALINE, 10 ML: HCPCS | Mod: HCNC | Performed by: STUDENT IN AN ORGANIZED HEALTH CARE EDUCATION/TRAINING PROGRAM

## 2024-07-12 PROCEDURE — 99900035 HC TECH TIME PER 15 MIN (STAT): Mod: HCNC

## 2024-07-12 PROCEDURE — 85025 COMPLETE CBC W/AUTO DIFF WBC: CPT | Mod: HCNC | Performed by: STUDENT IN AN ORGANIZED HEALTH CARE EDUCATION/TRAINING PROGRAM

## 2024-07-12 PROCEDURE — 63600175 PHARM REV CODE 636 W HCPCS: Mod: HCNC | Performed by: STUDENT IN AN ORGANIZED HEALTH CARE EDUCATION/TRAINING PROGRAM

## 2024-07-12 PROCEDURE — 27000221 HC OXYGEN, UP TO 24 HOURS: Mod: HCNC

## 2024-07-12 PROCEDURE — 27100171 HC OXYGEN HIGH FLOW UP TO 24 HOURS: Mod: HCNC

## 2024-07-12 PROCEDURE — 94660 CPAP INITIATION&MGMT: CPT | Mod: HCNC

## 2024-07-12 PROCEDURE — 99233 SBSQ HOSP IP/OBS HIGH 50: CPT | Mod: HCNC,,, | Performed by: INTERNAL MEDICINE

## 2024-07-12 PROCEDURE — 84100 ASSAY OF PHOSPHORUS: CPT | Mod: HCNC | Performed by: STUDENT IN AN ORGANIZED HEALTH CARE EDUCATION/TRAINING PROGRAM

## 2024-07-12 PROCEDURE — 97530 THERAPEUTIC ACTIVITIES: CPT | Mod: HCNC

## 2024-07-12 PROCEDURE — 25000003 PHARM REV CODE 250: Mod: HCNC | Performed by: STUDENT IN AN ORGANIZED HEALTH CARE EDUCATION/TRAINING PROGRAM

## 2024-07-12 PROCEDURE — 20600001 HC STEP DOWN PRIVATE ROOM: Mod: HCNC

## 2024-07-12 PROCEDURE — 80053 COMPREHEN METABOLIC PANEL: CPT | Mod: HCNC | Performed by: STUDENT IN AN ORGANIZED HEALTH CARE EDUCATION/TRAINING PROGRAM

## 2024-07-12 PROCEDURE — 83735 ASSAY OF MAGNESIUM: CPT | Mod: HCNC | Performed by: STUDENT IN AN ORGANIZED HEALTH CARE EDUCATION/TRAINING PROGRAM

## 2024-07-12 PROCEDURE — 94761 N-INVAS EAR/PLS OXIMETRY MLT: CPT | Mod: HCNC

## 2024-07-12 RX ORDER — MAGNESIUM SULFATE HEPTAHYDRATE 40 MG/ML
2 INJECTION, SOLUTION INTRAVENOUS ONCE
Status: COMPLETED | OUTPATIENT
Start: 2024-07-12 | End: 2024-07-12

## 2024-07-12 RX ORDER — FUROSEMIDE 10 MG/ML
40 INJECTION INTRAMUSCULAR; INTRAVENOUS ONCE
Status: COMPLETED | OUTPATIENT
Start: 2024-07-12 | End: 2024-07-12

## 2024-07-12 RX ADMIN — METOPROLOL SUCCINATE 100 MG: 100 TABLET, EXTENDED RELEASE ORAL at 08:07

## 2024-07-12 RX ADMIN — CEFTAROLINE FOSAMIL 600 MG: 600 POWDER, FOR SOLUTION INTRAVENOUS at 09:07

## 2024-07-12 RX ADMIN — FERROUS SULFATE TAB EC 325 MG (65 MG FE EQUIVALENT) 1 EACH: 325 (65 FE) TABLET DELAYED RESPONSE at 10:07

## 2024-07-12 RX ADMIN — DIPHENHYDRAMINE HYDROCHLORIDE 10 ML: 25 SOLUTION ORAL at 12:07

## 2024-07-12 RX ADMIN — ENOXAPARIN SODIUM 40 MG: 40 INJECTION SUBCUTANEOUS at 04:07

## 2024-07-12 RX ADMIN — HYDROCODONE BITARTRATE AND ACETAMINOPHEN 1 TABLET: 10; 325 TABLET ORAL at 02:07

## 2024-07-12 RX ADMIN — Medication 10 ML: at 12:07

## 2024-07-12 RX ADMIN — CEFTAROLINE FOSAMIL 600 MG: 600 POWDER, FOR SOLUTION INTRAVENOUS at 03:07

## 2024-07-12 RX ADMIN — DIPHENHYDRAMINE HYDROCHLORIDE 10 ML: 25 SOLUTION ORAL at 10:07

## 2024-07-12 RX ADMIN — LIDOCAINE 5% 1 PATCH: 700 PATCH TOPICAL at 04:07

## 2024-07-12 RX ADMIN — SODIUM CHLORIDE: 9 INJECTION, SOLUTION INTRAVENOUS at 06:07

## 2024-07-12 RX ADMIN — HYDROCODONE BITARTRATE AND ACETAMINOPHEN 1 TABLET: 10; 325 TABLET ORAL at 10:07

## 2024-07-12 RX ADMIN — HYDROCODONE BITARTRATE AND ACETAMINOPHEN 1 TABLET: 10; 325 TABLET ORAL at 06:07

## 2024-07-12 RX ADMIN — INSULIN ASPART 2 UNITS: 100 INJECTION, SOLUTION INTRAVENOUS; SUBCUTANEOUS at 12:07

## 2024-07-12 RX ADMIN — MAGNESIUM SULFATE 2 G: 2 INJECTION INTRAVENOUS at 10:07

## 2024-07-12 RX ADMIN — Medication 10 ML: at 05:07

## 2024-07-12 RX ADMIN — FUROSEMIDE 40 MG: 10 INJECTION, SOLUTION INTRAVENOUS at 12:07

## 2024-07-12 RX ADMIN — Medication 10 ML: at 04:07

## 2024-07-12 RX ADMIN — INSULIN GLARGINE 15 UNITS: 100 INJECTION, SOLUTION SUBCUTANEOUS at 10:07

## 2024-07-12 NOTE — ASSESSMENT & PLAN NOTE
Left chest wall abscess noted at OSH w/ concern for osteomyelitis transferred to [facility] for thoracic and plastic surgery eval  Thoracentesis at OSH 7/4 w/ serosanguineous fluid, f/b I&D and chest tube placement; loculated effusions right chest s/p VATS and chest tube placement on 7/5. Light's criteria consistent with exudative effusion (Cell count 1100 WBCs, 72% lymphocytes, , pH 7.6). No organisms seen on gram stain from L pleural fluid or chest wall abscess. Cultures are NGTD. Meropenem stopped yesterday - doing well.    Imaging from 7/8:     CT C/A/P:    - Residual collections in the left chest s/p I&D drainage; No evidence of osteo in adjacent ribs.     - Septated cystic lesion in right kidney, indeterminate for malignancy. Further evaluation with Renal mass protocol CT performed notable for obstructing stone with mild left sided hydroureteronephrosis     CT Shoulder:    - Large abscess vs hematoma at proximal humerus fracture site, c/f possible septic arthritis     MRI Shoulder:    - Septic arthritis of GH joint w/ acute osteo of the glenoid and proximal humerus; Several large soft tissue abscesses, Rim enhancing fluid collection w/in the medulla of the humeral shart (possible intraosseous abscess); myositis of deltoid & rotator cuff      Plan:    - Evaluated by CTS: Awaiting infectious/inflammatory process to resolve prior to surgery. Plans for potential ribs resection after source control is achieved. Plastics will be available for flap coverage   - Urology consulted for L ureteral stone: Okay to f/u OutPt; if pt decompensates --> urgent Uro consult for stone management  - Ortho consulted for Pyogenic arthritis of L shoulder: Arthrocentesis performed w/ gram stains and culture in process, aspirated fluid more c/w hematoma > infection   - No plans for acute thoracic intervention, will arrange for follow up in clinic with MRI of the ribs    ID Recs:  - Aspirated L shoulder fluid gram stain and cx pending,  will continue to f/u on all cx  - Continue Ceftaroline 600 mg IV q.8; will need estimated course of 6 weeks  - follow-up with Prabhjot ID after discharge

## 2024-07-12 NOTE — ASSESSMENT & PLAN NOTE
73 yoM with left chest wall abscess and concern for left 6-8 rib osteomyelitis transferred for potential thoracic and plastic surgery intervention. Subjectively he is feeling much better s/p incision and drainage of chest wall abscess, thoracentesis of left pleural effusion, and VATS with right chest tube placement [facility]. Imaging workup here revealed significant improvement in chest wall abscess size without apparent osteomyelitis of the ribs on CT, but with continued septic left shoulder joint. Right chest tube removed 7/9.     - Remove drain when output 30 mL or less  - No plans for acute thoracic intervention  - We will arrange for follow up in clinic with MRI of the ribs  - Thoracic surgery to follow

## 2024-07-12 NOTE — PROGRESS NOTES
[facility]  Infectious Disease  Progress Note    Patient Name: Roosevelt Moser  MRN: [id number]  Admission Date: 7/7/2024  Length of Stay: 5 days  Attending Physician: Abraham Plaza DO  Primary Care Provider: No, Primary Doctor    Isolation Status: Contact  Assessment/Plan:      ID  Chest wall abscess    Left chest wall abscess noted at OSH w/ concern for osteomyelitis transferred to [facility] for thoracic and plastic surgery eval  Thoracentesis at OSH 7/4 w/ serosanguineous fluid, f/b I&D and chest tube placement; loculated effusions right chest s/p VATS and chest tube placement on 7/5. Light's criteria consistent with exudative effusion (Cell count 1100 WBCs, 72% lymphocytes, , pH 7.6). No organisms seen on gram stain from L pleural fluid or chest wall abscess. Cultures are NGTD. Meropenem stopped yesterday - doing well.      Plan:    - Evaluated by CTS: Awaiting infectious/inflammatory process to resolve prior to surgery. Plans for potential ribs resection after source control is achieved. Plastics will be available for flap coverage   - Urology consulted for L ureteral stone: Okay to f/u OutPt; if pt decompensates --> urgent Uro consult for stone management  - Ortho consulted for Pyogenic arthritis of L shoulder: Arthrocentesis performed w/ gram stains and culture in process, aspirated fluid more c/w hematoma > infection   - No plans for acute thoracic intervention, will arrange for follow up in clinic with MRI of the ribs    ID Recs:  - Aspirated L shoulder fluid gram stain and cx pending, will continue to f/u on all cx  - Continue Ceftaroline 600 mg IV q.8; will need estimated course of 6 weeks  - follow-up with Prabhjot ID after discharge    Yovanny Denson MD  Infectious Disease  [facility]    Subjective:     Principal Problem:Osteomyelitis of multiple sites    HPI: 73M w/ A fib, HTN, T2DM, MARA, HLD, hx GBS, hx displaced comminuted fracture of the left humerus after a fall, s/p ORIF this past  "March, who presents as a transfer for osteomyelitis of multiple ribs requiring debridement and flap placement. Initially presented to [place] on 6/14 for progressive weakness, multiple falls, and decreased urine output.  He was admitted for management of MARBIN, urosepsis, and AFib with RVR. During his stay he was noted to have swelling of his left upper extremity. Imaging studies on June 17 showed concern for gas-forming infection along the left shoulder and air along the lateral left chest wall.  MRI of his right foot also showed evidence of toe osteomyelitis (which grew MRSA). He was seen by ID who adjusted antibiotics, and they also noted oral thrush. IR aspirated fluid from left shoulder on June 20. On June 21 he had left shoulder I&D and removal of deep hardware. He underwent repeat irrigation and debridement of the left shoulder on June 24 with placement of a wound VAC. He had subsequent I&D with removal of the wound VAC on June 27. His right 2nd toe was amputated by Podiatry on July 1. Then, on July 3 he was noted to have dyspnea and mild tachycardia. CT chest showed a large abscess of the anterior central and left chest wall and abdominal wall with partial destruction of 3 ribs. He was seen by Cardiothoracic surgery. On July 4 he had left thoracentesis with aspiration of fluid. He subsequently had I&D of the left chest wall abscess on July 4, and a drainage catheter was left in place. On July 5 he underwent right VATS for a loculated right pleural effusion, and a chest tube was left in place. He was transitioned to the ICU post-operatively.  Transferred to [facility] for Thoracic and Plastics eval.     Prior to transfer, "He is awake and alert. He has a PICC line in place along with the right chest tube and left chest wall drain in place. He has no evidence of respiratory distress and is hemodynamically stable. Referring provider felt patient was stable for step-down status at present. He is currently on ceftaroline " "and meropenem. He is in contact isolation. July 7: Sodium 137, potassium 4.5, chloride 104, CO2 26, BUN 33, creatinine 1.1, glucose 130, albumin 2.1, AST 25, ALT 17, white blood cells 11.58, hemoglobin 8.1, hematocrit 26.2, platelets 389, procalcitonin 0.575, CRP greater than 16  VS:  Temperature 97.5°, pulse 85, respirations 20, blood pressure 112/63, O2 sats 95% "  Interval History: Doing well. No fever or chills. Poor appetite. Worked with PT.    Review of Systems   All other systems reviewed and are negative.    Objective:     Vital Signs (Most Recent):  Temp: 98.2 °F (36.8 °C) (07/12/24 1609)  Pulse: 71 (07/12/24 1609)  Resp: 18 (07/12/24 1448)  BP: 115/65 (07/12/24 1609)  SpO2: 96 % (07/12/24 1609) Vital Signs (24h Range):  Temp:  [98 °F (36.7 °C)-98.7 °F (37.1 °C)] 98.2 °F (36.8 °C)  Pulse:  [63-82] 71  Resp:  [18] 18  SpO2:  [95 %-99 %] 96 %  BP: (108-123)/(57-70) 115/65     Weight: 133 kg (293 lb 3.4 oz)  Body mass index is 39.77 kg/m².    Estimated Creatinine Clearance: 94.3 mL/min (based on SCr of 1 mg/dL).     Physical Exam  Vitals and nursing note reviewed.   Constitutional:       General: He is not in acute distress.     Appearance: Normal appearance. He is obese. He is not ill-appearing, toxic-appearing or diaphoretic.   HENT:      Head: Normocephalic.   Eyes:      Extraocular Movements: Extraocular movements intact.      Pupils: Pupils are equal, round, and reactive to light.   Neurological:      General: No focal deficit present.      Mental Status: He is alert.   Psychiatric:         Mood and Affect: Mood normal.         Thought Content: Thought content normal.          Significant Labs: CBC:   Recent Labs   Lab 07/11/24  0552 07/12/24  0453   WBC 11.51 10.39   HGB 9.2* 8.7*   HCT 30.8* 28.7*   * 443     Microbiology Results (last 7 days)       Procedure Component Value Units Date/Time    Aerobic culture [[id number]] Collected: 07/09/24 1622    Order Status: Completed Specimen: Abscess from " Shoulder, Left Updated: 07/12/24 1031     Aerobic Bacterial Culture No growth    Culture, Anaerobic [[id number]] Collected: 07/09/24 1622    Order Status: Completed Specimen: Joint Fluid from Shoulder, Left Updated: 07/11/24 1253     Anaerobic Culture Culture in progress    AFB Culture & Smear [[id number]] Collected: 07/09/24 1622    Order Status: Completed Specimen: Joint Fluid from Shoulder, Left Updated: 07/10/24 2127     AFB Culture & Smear Culture in progress     AFB CULTURE STAIN No acid fast bacilli seen.    Gram stain [[id number]] Collected: 07/09/24 1622    Order Status: Completed Specimen: Joint Fluid from Shoulder, Left Updated: 07/09/24 1756     Gram Stain Result Rare WBC's      No organisms seen    Fungus culture [[id number]] Collected: 07/09/24 1622    Order Status: Sent Specimen: Joint Fluid from Shoulder, Left Updated: 07/09/24 1637            Significant Imaging: I have reviewed all pertinent imaging results/findings within the past 24 hours.    Imaging from 7/8:     CT C/A/P:    - Residual collections in the left chest s/p I&D drainage; No evidence of osteo in adjacent ribs.     - Septated cystic lesion in right kidney, indeterminate for malignancy. Further evaluation with Renal mass protocol CT performed notable for obstructing stone with mild left sided hydroureteronephrosis     CT Shoulder:    - Large abscess vs hematoma at proximal humerus fracture site, c/f possible septic arthritis     MRI Shoulder:    - Septic arthritis of GH joint w/ acute osteo of the glenoid and proximal humerus; Several large soft tissue abscesses, Rim enhancing fluid collection w/in the medulla of the humeral shart (possible intraosseous abscess); myositis of deltoid & rotator cuff

## 2024-07-12 NOTE — NURSING
..[facility] Plan of Care Note    Dx : Osteomyelitis of multiple sites     Shift Events : Kept pulling the leads and Spo2 probe. Refused CPAP. Reinforced R Flank dressing.    Goals of Care: Reorient patient, prevent further skin breakdown and safety.    Neuro: Disoriented to time, place and situation    Vital Signs:  WDL, except HR Afib    Respiratory:  3L NC    Diet: Diabetic cardiac , renal, 2000 calorie    Is patient tolerating current diet?  Yes    GTTS: NS at75ml/hr    Urine Output/Bowel Movement:  adequate, BMx1    Drains/Tubes/Tube Feeds (include total output/shift): Accordion drain LLQ    Lines: PICC BACILIO      Accuchecks: ACHS    Skin: Sacral wound, shoulder incision , right toe incision    Fall Risk Score: 24    Activity level? Complete assist    Any scheduled procedures?  None    Any safety concerns?  Fall Risk,     Other: Altered Skin

## 2024-07-12 NOTE — SUBJECTIVE & OBJECTIVE
Interval History: NAEON. Resting comfortably on room air. AVSS. 55 mL of serous output from drain.    Medications:  Continuous Infusions:      Scheduled Meds:   (Magic mouthwash) 1:1:1 diphenhydrAMINE(Benadryl) 12.5mg/5ml liq, aluminum & magnesium hydroxide-simethicone (Maalox), LIDOcaine viscous 2%  10 mL Swish & Spit QID    ceftaroline (Teflaro) IV (PEDS and ADULTS)  600 mg Intravenous Q8H    enoxparin  40 mg Subcutaneous Q24H (prophylaxis, 1700)    ferrous sulfate  1 tablet Oral Daily    insulin glargine U-100  15 Units Subcutaneous Daily    LIDOcaine  1 patch Transdermal Q24H    magnesium sulfate IVPB  2 g Intravenous Once    metoprolol succinate  100 mg Oral QHS    polyethylene glycol  17 g Oral Daily    sodium chloride 0.9%  10 mL Intravenous Q6H     PRN Meds:  Current Facility-Administered Medications:     albuterol-ipratropium, 3 mL, Nebulization, Q4H PRN    aluminum & magnesium hydroxide-simethicone, 15 mL, Oral, QID PRN    dextrose 10%, 12.5 g, Intravenous, PRN    dextrose 10%, 25 g, Intravenous, PRN    glucagon (human recombinant), 1 mg, Intramuscular, PRN    glucose, 16 g, Oral, PRN    glucose, 24 g, Oral, PRN    HYDROcodone-acetaminophen, 1 tablet, Oral, Q4H PRN    HYDROcodone-acetaminophen, 1 tablet, Oral, Q4H PRN    insulin aspart U-100, 0-10 Units, Subcutaneous, QID (AC + HS) PRN    naloxone, 0.02 mg, Intravenous, PRN    ondansetron, 8 mg, Oral, Q8H PRN    polyethylene glycol, 17 g, Oral, BID PRN    promethazine, 25 mg, Oral, Q6H PRN    senna-docusate 8.6-50 mg, 1 tablet, Oral, BID PRN    simethicone, 1 tablet, Oral, QID PRN    sodium chloride 0.9%, 10 mL, Intravenous, Q12H PRN    Flushing PICC/Midline Protocol, , , Until Discontinued **AND** sodium chloride 0.9%, 10 mL, Intravenous, Q6H **AND** sodium chloride 0.9%, 10 mL, Intravenous, PRN    traMADoL, 50 mg, Oral, Q4H PRN     Review of patient's allergies indicates:  No Known Allergies  Objective:     Vital Signs (Most Recent):  Temp: 98.4 °F (36.9  °C) (07/12/24 0349)  Pulse: 71 (07/12/24 0539)  Resp: 18 (07/12/24 0349)  BP: 120/70 (07/12/24 0349)  SpO2: 96 % (07/12/24 0539) Vital Signs (24h Range):  Temp:  [98 °F (36.7 °C)-98.7 °F (37.1 °C)] 98.4 °F (36.9 °C)  Pulse:  [60-82] 71  Resp:  [18] 18  SpO2:  [93 %-99 %] 96 %  BP: (108-120)/(57-70) 120/70     Intake/Output - Last 3 Shifts         07/10 0700  07/11 0659 07/11 0700 07/12 0659 07/12 0700 07/13 0659    P.O. 480 240     I.V. (mL/kg) 900 (6.8)      IV Piggyback  50     Total Intake(mL/kg) 1380 (10.4) 290 (2.2)     Urine (mL/kg/hr) 2000 (0.6) 2175 (0.7)     Emesis/NG output 0      Drains 100 55     Other 30      Stool 0      Total Output 2130 2230     Net -750 -1940            Urine Occurrence 3 x      Stool Occurrence 6 x      Emesis Occurrence 0 x              SpO2: 96 %        Physical Exam  Vitals reviewed.   Constitutional:       Appearance: Normal appearance.   Cardiovascular:      Rate and Rhythm: Normal rate and regular rhythm.   Pulmonary:      Effort: Pulmonary effort is normal. No respiratory distress.   Abdominal:      Palpations: Abdomen is soft.      Tenderness: There is no abdominal tenderness.   Musculoskeletal:      Comments: Left chest wall accordion drain with serous output   Skin:     General: Skin is warm.   Neurological:      General: No focal deficit present.      Mental Status: He is alert and oriented to person, place, and time.            Significant Labs:  CBC:   Recent Labs   Lab 07/12/24  0453   WBC 10.39   RBC 3.50*   HGB 8.7*   HCT 28.7*      MCV 82   MCH 24.9*   MCHC 30.3*     CMP:   Recent Labs   Lab 07/12/24 0453   *   CALCIUM 8.3*   ALBUMIN 1.3*   PROT 5.9*   *   K 4.1   CO2 24      BUN 34*   CREATININE 1.0   ALKPHOS 96   ALT 9*   AST 14   BILITOT 0.3       Significant Diagnostics:  I have reviewed all pertinent imaging results/findings within the past 24 hours.    VTE Risk Mitigation (From admission, onward)           Ordered     enoxaparin  injection 40 mg  Every 24 hours         07/09/24 1358     Place sequential compression device  Until discontinued         07/07/24 9920

## 2024-07-12 NOTE — PLAN OF CARE
Problem: Adult Inpatient Plan of Care  Goal: Plan of Care Review  Outcome: Progressing  Goal: Patient-Specific Goal (Individualized)  Outcome: Progressing  Goal: Readiness for Transition of Care  Outcome: Progressing     Problem: Diabetes Comorbidity  Goal: Blood Glucose Level Within Targeted Range  Outcome: Progressing     Problem: Wound  Goal: Optimal Coping  Outcome: Progressing  Goal: Optimal Pain Control and Function  Outcome: Progressing     Problem: Fall Injury Risk  Goal: Absence of Fall and Fall-Related Injury  Outcome: Progressing

## 2024-07-12 NOTE — ASSESSMENT & PLAN NOTE
-Ddx: decreased reserve from VATS vs volume overload (on fluids since 7/7)  -CXR noting blunting of CP angles  -7/12: Lasix 40mg IV x1, good response. Additional dose on 7/16

## 2024-07-12 NOTE — PROGRESS NOTES
"[facility] Medicine  Progress Note    Patient Name: Roosevelt Moser  MRN: [id number]  Patient Class: IP- Inpatient   Admission Date: 7/7/2024  Length of Stay: 5 days  Attending Physician: Abraham Plaza DO  Primary Care Provider: Miriam, Primary Doctor        Subjective:     Principal Problem:Osteomyelitis of multiple sites        HPI:  Roosevelt Moser is a 72yo M w/ A fib, HTN, T2DM, MARA, HLD, hx GBS, hx displaced comminuted fracture of the left humerus s/p fall 3/4/24 s/p ORIF 03/25/2024 who presents as a transfer for thoracic surgery and Plastic surgery evaluation in setting of "osteomyelitis of multiple ribs which will need further debridement and then a flap placed."    He was initially admitted to [facility] on 6/14 for progressive weakness, multiple falls, and decreased urine output.  He was admitted for management of MARBIN on CKD complicated by hyperkalemia, urosepsis, AFib with RVR.    "Creatinine improved during his stay. During his stay he was noted to have swelling of his left upper extremity. Imaging studies on June 17 showed concern for gas-forming infection along the left shoulder and air along the lateral left chest wall concerning for gas-forming organism. MRI of his right foot also showed evidence of toe osteomyelitis. He was seen by ID who adjusted antibiotics, and they also noted oral thrush. Podiatry evaluated him for the toe findings, and Orthopedic Surgery evaluated the shoulder abnormalities. Right toe wound grew MRSA. Oral anticoagulation was held, and Radiology aspirated fluid from his left shoulder on June 20. On June 21 he had left shoulder incision and drainage and removal of deep hardware including rods and screws. He underwent repeat irrigation and debridement of the left shoulder on June 24 with placement of a wound VAC. He had subsequent incision and drainage with removal of the wound VAC on June 27. He had amputation of his right 2nd toe by Podiatry on July 1. " "On July 3 he was noted to have dyspnea and mild tachycardia. CT chest showed a large abscess of the anterior central and left chest wall and abdominal wall with partial destruction of 3 ribs. He was seen by Cardiothoracic surgery. On July 4 he had left thoracentesis with aspiration of fluid. He subsequently had incision and drainage of the left chest wall abscess on July 4, and a drainage catheter was left in place. On July 5 he underwent right VATS for a loculated right pleural effusion, and a chest tube was left in place. He was transitioned to the ICU post-operatively. With concern for osteomyelitis of the ribs, concern is that he will need transfer to a facility with Thoracic Surgery and potentially Plastic Surgery along with higher level of care. Transfer center spoke with Thoracic Surgery at [facility]. Requesting transfer to Hospital Medicine at [facility] for Plastic Surgery and Thoracic Surgery evaluation. Transfer center spoke with Plastic Surgery on July 5, Thoracic Surgery on July 7. With his complicated medical presentation, will plan transfer to Hospital Medicine at [facility] in step-down status for further treatment. "    Prior to transfer, "He is awake and alert. He has a PICC line in place along with the right chest tube and left chest wall drain in place. He has no evidence of respiratory distress and is hemodynamically stable. Referring provider felt patient was stable for step-down status at present. He is currently on ceftaroline and meropenem. He is in contact isolation. July 7: Sodium 137, potassium 4.5, chloride 104, CO2 26, BUN 33, creatinine 1.1, glucose 130, albumin 2.1, AST 25, ALT 17, white blood cells 11.58, hemoglobin 8.1, hematocrit 26.2, platelets 389, procalcitonin 0.575, CRP greater than 16  VS:  Temperature 97.5°, pulse 85, respirations 20, blood pressure 112/63, O2 sats 95% "    Patient in no acute distress upon arrival.  Wife at bedside.  Patient denies any " acute complaints.        Imaging history reviewed:  July 6: X-rays of the left shoulder had fracture through the surgical neck of the humerus with lateral distraction of the distal component and overlapping segments on the order of 2.5 cm.  Overall no appreciable change upon comparison with prior imaging.  -chest x-ray noted manifestations of mild congestive heart failure bordering on mild pulmonary edema.  Right-sided thoracostomy tube terminating within the right apex.  No definite pneumothorax.        July 5: Pleural fluid Gram stain with few WBCs and no organisms seen  -pH 7.339, pCO2 50.9, pO2 392     July 4:  CPK less than 10, serum   -AFB smear from left chest abscess had no AFB seen, left chest abscess aerobic and anaerobic cultures have no growth  -pleural fluid protein 3.2, white blood cells 1108 (11% segs, 74% lymphs), , pH 7.63, pleural fluid culture had no growth  -ultrasound-guided thoracentesis removed 1.4-1.5 L     July 3: Blood cultures with no growth to date  -CT chest showed large abscess of the anterior central and left chest wall and abdominal wall measuring 17 cm transversely.  This extends into the mediastinum and peritoneum with partial destruction of the anterior 6th, 7th, and 8th ribs.  Complete atelectasis of the left lower lobe with moderate left pleural effusion.  Mild atelectasis right lung base with a small-to-moderate right pleural effusion.     July 2: X-rays of the left shoulder noted displaced left proximal humerus fracture status post hardware removal without change in alignment.     June 24: ECHO with EF 55-60%.     June 20:  Interventional Radiology did percutaneous aspiration of the left shoulder fluid collection.  No drainage catheter was left in place.     June 18:  Right toe wound MRSA  -CT left shoulder had subacute left humerus fracture post internal fixation.  Incomplete bony bridging across the fracture site with persistent angulation.  Severe arthropathy  "of the glenohumeral joint with multiple intra-articular bodies.  Large left joint effusion and adjacent soft tissue focal fluid collections containing gas and concerning for gas-forming infection.  Moderate size left pleural effusion.  Left basilar airspace disease.  -bilateral lower extremity Doppler arterial ultrasound had atherosclerotic plaque and calcification bilaterally without severe stenosis or occlusion identified on either side.     June 17: X-rays of the left humerus/shoulder had findings concerning for gas-forming infection along the left shoulder.  -x-rays of the left ribs showed air along the lateral chest wall soft tissue and axilla concerning for gas-forming infection.  -MRI of the right foot had findings consistent with osteomyelitis involving the middle distal phalanges of the 3rd toe.  Cellulitis.     June 14: Renal ultrasound had no hydronephrosis.  Elevated resistive index right kidney suggesting intrinsic renal disease.    Overview/Hospital Course:  Pt admitted to  as a transfer for thoracic surgery and Plastic surgery evaluation in setting of "osteomyelitis of multiple ribs which will need further debridement and then a flap placed." Thoracic Surgery, Plastic Surgery, ID, Ortho, Wound care, PT/OT consulted upon admission. Imaging ordered.  CT chest abdomen with IV contrast without evidence of osteomyelitis in ribs adjacent to I&D site of left chest wall abscess.; incidental finding of 1.2 cm obstructing stone in left distal ureter with mild hydroureteronephrosis.  Urology was consulted and CT renal stone study was completed; no acute intervention given no concern for infection around stone or MARBIN; follow-up outpatient unless decompensates.  MRI shoulder without contrast left notable for septic arthritis of left glenohumeral joint with acute osteomyelitis of the glenoid and proximal humerus in addition to known displaced fracture of proximal humeral metaphysis in addition to several large " soft tissue abscesses.  Patient underwent left shoulder aspiration with Orthopedic surgery on 07/09.  Right chest tube was removed by thoracic surgery on 07/09. Drain previously placed for L chest wall abscess to be removed if output remains serous and decreases    Interval History: Seen this AM at bedside. Apparently confused overnight per wife. Drain with serous ouput, 55cc recorded last 24 hr. Denies chest pain, shortness of breath    Review of Systems  Objective:     Vital Signs (Most Recent):  Temp: 98.2 °F (36.8 °C) (07/12/24 0838)  Pulse: 72 (07/12/24 0838)  Resp: 18 (07/12/24 1020)  BP: 123/68 (07/12/24 0838)  SpO2: 96 % (07/12/24 0838) Vital Signs (24h Range):  Temp:  [98 °F (36.7 °C)-98.7 °F (37.1 °C)] 98.2 °F (36.8 °C)  Pulse:  [60-82] 72  Resp:  [18] 18  SpO2:  [93 %-99 %] 96 %  BP: (108-123)/(57-70) 123/68     Weight: 133 kg (293 lb 3.4 oz)  Body mass index is 39.77 kg/m².    Intake/Output Summary (Last 24 hours) at 7/12/2024 1028  Last data filed at 7/12/2024 0339  Gross per 24 hour   Intake 290 ml   Output 2230 ml   Net -1940 ml         Physical Exam  Vitals and nursing note reviewed.   Constitutional:       General: He is not in acute distress.     Appearance: He is ill-appearing (chronically). He is not toxic-appearing or diaphoretic.   HENT:      Head: Normocephalic and atraumatic.      Nose: Nose normal.      Mouth/Throat:      Pharynx: Oropharynx is clear.   Eyes:      General: No scleral icterus.  Cardiovascular:      Rate and Rhythm: Normal rate and regular rhythm.      Pulses: Normal pulses.      Heart sounds: Normal heart sounds.   Pulmonary:      Effort: Pulmonary effort is normal.   Chest:      Chest wall: No deformity or tenderness.      Comments: Left chest wall drain with seropurulent output  Abdominal:      General: Bowel sounds are normal.      Palpations: Abdomen is soft.      Tenderness: There is no abdominal tenderness.   Musculoskeletal:      Cervical back: Normal range of motion  "and neck supple.      Right lower leg: No edema.      Left lower leg: No edema.   Skin:     General: Skin is warm.      Capillary Refill: Capillary refill takes less than 2 seconds.      Comments: Unable to examine posterior skin due to patient debility   Neurological:      Mental Status: He is alert and oriented to person, place, and time.   Psychiatric:         Behavior: Behavior normal.             Significant Labs: All pertinent labs within the past 24 hours have been reviewed.    Significant Imaging: I have reviewed all pertinent imaging results/findings within the past 24 hours.    Assessment/Plan:      * Osteomyelitis of multiple sites  Skin ulcer of toe of right foot with necrosis of bone  Osteomyelitis of toe   MRSA infection     Chest wall abscess  Loculated pleural effusion  Mediastinal lymphadenopathy    Abscess of left shoulder  Pyogenic abscess of left shoulder region  Humerus fracture    OSH:  -status post left thoracentesis 1500cc serosanguineous fluid drained 7/4 & I&D drainage with chest tube placement 7/4 & loculated effusions right chest s/p VATS and chest tube 7/5   -Left shoulder prosthetic joint infection s/p I&D and removal of deep hardware 6/21 - all screws and nails removed, s/p 2nd washout 6/24 & 3rd washout 6/27   -Left Shoulder washouts on 6/21 ( abundant pus in shoulder, hardware removal), 6/24 ( bloody brownish fluid) and 6/27 ( no fluid collection, healthy red and beefy tissue)   -Right 3rd toe osteomyelitis s/p Dalvance x 2 doses & 2nd distal phalanx s/p I&D at bedside, s/p 2nd toe amputation 7/1       - Presents as a transfer for thoracic surgery and Plastic surgery evaluation in setting of concern for "osteomyelitis of multiple ribs which will need further debridement and then a flap placed" in the setting of chest wall abscess  Plan:  -arrives on ceftaroline and meropenem as per outside hospital ID recommendations  -ID following here; pending final antibiotic " recommendations  -notably, 7/3 blood cultures,  pleural fluid cultures,  abscess cultures, 755 pleural fluid cultures NGTD  - right toe wound culture with MRSA  -continue to follow OSH cultures  -ongoing wound care  -ID, Ortho, Thoracic surgery and Plastic surgery consulted and following; imaging findings per hospital course  -thoracic surgery removed right chest tube on ; left chest accordion drain to negative pressure remains in place   -If output decreases and remains serous the drain can be removed (can be d/c with drain if placement occurs before this)  -ortho surgery completed left shoulder aspiration  to assess shoulder abscess concern; cultures pending  -thus far, no acute surgical intervention per all surgical specialties    Acute renal failure superimposed on chronic kidney disease  Patient with acute kidney injury/acute renal failure likely due to  due to ATN due to sepsis due to UTI and/or PNA  MARBIN is currently improving. Baseline creatinine  wnl  - Labs reviewed- Renal function/electrolytes with Estimated Creatinine Clearance: 104.7 mL/min (based on SCr of 0.9 mg/dL). according to latest data. Monitor urine output and serial BMP and adjust therapy as needed. Avoid nephrotoxins and renally dose meds for GFR listed above.    Left renal stone  Nephrolithiasis     retroperitoneal ultrasound completed in setting of MARBIN without hydronephrosis    CT abdomen notable for 1.2 cm obstructing stone in left distal ureter with mild hydroureteronephrosis       Appreciate urology recommendations; given no concerns regarding MARBIN or infection relation to the stone, no acute surgical intervention  Follow-up outpatient; strain all urine    Acute hypoxemic respiratory failure  -Ddx: decreased reserve from VATS vs volume overload (on fluids since )  -CXR noting blunting of CP angles  -Lasix 40mg IV x1, monitor response    Thyroid nodule  Incidental finding on imagin.7 cm left thyroid nodule.  "Nonemergent thyroid ultrasound is recommended.       Cyst of right kidney  Incidental finding on imagin.8 cm septated cystic lesion in the right kidney, which is indeterminate for malignancy. Further evaluation with nonemergent renal mass protocol CT or MRI is recommended.       Sepsis  This patient does have evidence of infective focus  My overall impression is sepsis.  Source: Skin and Soft Tissue (location toe)  Antibiotics given-   Antibiotics (72h ago, onward)      Start     Stop Route Frequency Ordered    24 1545  ceftaroline fosamiL (TEFLARO) 600 mg in D5W 50 mL IVPB (MB+)         -- IV Every 8 hours (non-standard times) 24 1532    24 1545  meropenem (MERREM) 1 g in 0.9% NaCl 100 mL IVPB (MB+)         -- IV Every 8 hours (non-standard times) 24 1532          Latest lactate reviewed-  No results for input(s): "LACTATE", "POCLAC" in the last 72 hours.  Organ dysfunction indicated by Acute kidney injury    Fluid challenge Not needed - patient is not hypotensive      Post- resuscitation assessment No - Post resuscitation assessment not needed       Will Not start Pressors- Levophed for MAP of 65  Source control achieved by: abx    Debility  Patient with Acute on chronic debility due to  multiple infections . Latest AMPAC and GEMS scores have not been reviewed. Evaluation for etiology is complete. Plan includes progressive mobility protocol initated, PT/OT consulted, and fall precautions in place.    Atrial fibrillation with rapid ventricular response  Patient with Long standing persistent (>12 months) atrial fibrillation which is controlled currently with Beta Blocker. Patient is currently in TBD.[id number] Score: 2.      Chronic AF w/ acute RVR at OSH, resolved s/p cardizem drip   NOAC has been on hold d/t possible need for surgery - has been on prophylaxis dose of Lovenox  Resume A/c pending any surgical intervention, consider lovenox vs hep gtt bridge    History of Guillain-Olmsted " syndrome  No acute issues  However, pt has impaired mobility and is acutely weakened from his sepsis/UTI/AF RVR and need placement       Type 2 diabetes mellitus  Patient's FSGs are controlled on current medication regimen.  Last A1c reviewed-   Lab Results   Component Value Date    HGBA1C 6.3 (H) 06/14/2024     Most recent fingerstick glucose reviewed-   Recent Labs   Lab 07/10/24  1607 07/10/24  2002   POCTGLUCOSE 181* 178*     Current correctional scale  Low  Maintain anti-hyperglycemic dose as follows-   Antihyperglycemics (From admission, onward)      Start     Stop Route Frequency Ordered    07/08/24 0900  insulin glargine U-100 (Lantus) pen 15 Units         -- SubQ Daily 07/07/24 1532    07/07/24 1529  insulin aspart U-100 pen 0-10 Units         -- SubQ Before meals & nightly PRN 07/07/24 1532          Hold Oral hypoglycemics while patient is in the hospital.    Hypertension  Chronic, controlled. Latest blood pressure and vitals reviewed-     Temp:  [97.8 °F (36.6 °C)-98.8 °F (37.1 °C)]   Pulse:  [65-91]   Resp:  [2-24]   BP: (100-145)/(56-81)   SpO2:  [90 %-99 %] .   Home meds for hypertension were reviewed and noted below.   Hypertension Medications               hydrALAZINE (APRESOLINE) 25 MG tablet Take 1 tablet (25 mg total) by mouth every 12 (twelve) hours.    metoprolol succinate (TOPROL-XL) 100 MG 24 hr tablet Take 1 tablet (100 mg total) by mouth once daily.            While in the hospital, will manage blood pressure as follows; continue home metoprolol    Will utilize p.r.n. blood pressure medication only if patient's blood pressure greater than 180/110 and he develops symptoms such as worsening chest pain or shortness of breath.    MARA (obstructive sleep apnea)  Continue CPAP HS and w/ naps        Morbid obesity  There is no height or weight on file to calculate BMI. Morbid obesity complicates all aspects of disease management from diagnostic modalities to treatment. Weight loss encouraged and  health benefits explained to patient.           VTE Risk Mitigation (From admission, onward)           Ordered     enoxaparin injection 40 mg  Every 24 hours         07/09/24 1358     Place sequential compression device  Until discontinued         07/07/24 1532                    Discharge Planning   KAMILA: 7/15/2024     Code Status: Full Code   Is the patient medically ready for discharge?:     Reason for patient still in hospital (select all that apply): Patient trending condition  Discharge Plan A: Home with family                  Abraham Plaza DO  Department of Hospital Medicine   [facility]

## 2024-07-12 NOTE — PROGRESS NOTES
[facility] - [facility]  Thoracic Surgery  Progress Note    Subjective:     History of Present Illness:  Mr. Moser is a pleasant 73 yoM with PMH including Afib on eliquis, HTN, T2DM and MARA who is transferred from [place] for a chest wall abscess with concern for osteomyelitis of the anterior left 6-8th ribs. He originally presented to the [facility] after a fall resulting in a left humeral fracture for which he underwent ORIF in March. He represented in June with a left shoulder abscess and underwent multiple washouts and removal of the ORIF hardware. His hospital stay was complicated by bilateral effusions and a left chest wall abscess with extension into the epigastrium and mediastinum with concern for osteomyelitis of the left 6-8th ribs. He underwent I+D of the abscess through a small left anterior chest incision with drain placement on 7/4, thoracentesis of the left pleural effusion on 7/4, and right-sided VATs with washout and chest tube placement on 7/5. Interestingly, cultures from the pleural fluid and chest wall abscess cavity have been negative despite the abscess cavity fluid being described as creamy pus by the [facility] op report. The only positive cultures so far have been from those collected by podiatry during a toe amp which were positive for MRSA.     He was transferred to [facility] for potential thoracic and plastic surgery interventions if more invasive debridements or resections were required with reconstruction.     Upon arrival here he states that over the past couple days he has been feeling much better. He states that the pain and swelling associated with the chest wall abscess is resolved. He denies any fever or chills. He states that the dyspnea he was experiencing with the prior pleural effusions has resolved, despite remaining on 2-3 L/min NC. He is not on home oxygen.     Post-Op Info:  * No surgery found *         Interval History: NAEON. Resting comfortably on room air. AVSS. 55 mL of serous output from  drain.    Medications:  Continuous Infusions:      Scheduled Meds:   (Magic mouthwash) 1:1:1 diphenhydrAMINE(Benadryl) 12.5mg/5ml liq, aluminum & magnesium hydroxide-simethicone (Maalox), LIDOcaine viscous 2%  10 mL Swish & Spit QID    ceftaroline (Teflaro) IV (PEDS and ADULTS)  600 mg Intravenous Q8H    enoxparin  40 mg Subcutaneous Q24H (prophylaxis, 1700)    ferrous sulfate  1 tablet Oral Daily    insulin glargine U-100  15 Units Subcutaneous Daily    LIDOcaine  1 patch Transdermal Q24H    magnesium sulfate IVPB  2 g Intravenous Once    metoprolol succinate  100 mg Oral QHS    polyethylene glycol  17 g Oral Daily    sodium chloride 0.9%  10 mL Intravenous Q6H     PRN Meds:  Current Facility-Administered Medications:     albuterol-ipratropium, 3 mL, Nebulization, Q4H PRN    aluminum & magnesium hydroxide-simethicone, 15 mL, Oral, QID PRN    dextrose 10%, 12.5 g, Intravenous, PRN    dextrose 10%, 25 g, Intravenous, PRN    glucagon (human recombinant), 1 mg, Intramuscular, PRN    glucose, 16 g, Oral, PRN    glucose, 24 g, Oral, PRN    HYDROcodone-acetaminophen, 1 tablet, Oral, Q4H PRN    HYDROcodone-acetaminophen, 1 tablet, Oral, Q4H PRN    insulin aspart U-100, 0-10 Units, Subcutaneous, QID (AC + HS) PRN    naloxone, 0.02 mg, Intravenous, PRN    ondansetron, 8 mg, Oral, Q8H PRN    polyethylene glycol, 17 g, Oral, BID PRN    promethazine, 25 mg, Oral, Q6H PRN    senna-docusate 8.6-50 mg, 1 tablet, Oral, BID PRN    simethicone, 1 tablet, Oral, QID PRN    sodium chloride 0.9%, 10 mL, Intravenous, Q12H PRN    Flushing PICC/Midline Protocol, , , Until Discontinued **AND** sodium chloride 0.9%, 10 mL, Intravenous, Q6H **AND** sodium chloride 0.9%, 10 mL, Intravenous, PRN    traMADoL, 50 mg, Oral, Q4H PRN     Review of patient's allergies indicates:  No Known Allergies  Objective:     Vital Signs (Most Recent):  Temp: 98.4 °F (36.9 °C) (07/12/24 0349)  Pulse: 71 (07/12/24 0539)  Resp: 18 (07/12/24 0349)  BP: 120/70  (07/12/24 2099)  SpO2: 96 % (07/12/24 0548) Vital Signs (24h Range):  Temp:  [98 °F (36.7 °C)-98.7 °F (37.1 °C)] 98.4 °F (36.9 °C)  Pulse:  [60-82] 71  Resp:  [18] 18  SpO2:  [93 %-99 %] 96 %  BP: (108-120)/(57-70) 120/70     Intake/Output - Last 3 Shifts         07/10 0700 07/11 0659 07/11 0700 07/12 0659 07/12 0700 07/13 0659    P.O. 480 240     I.V. (mL/kg) 900 (6.8)      IV Piggyback  50     Total Intake(mL/kg) 1380 (10.4) 290 (2.2)     Urine (mL/kg/hr) 2000 (0.6) 2175 (0.7)     Emesis/NG output 0      Drains 100 55     Other 30      Stool 0      Total Output 2130 2230     Net -750 -1940            Urine Occurrence 3 x      Stool Occurrence 6 x      Emesis Occurrence 0 x              SpO2: 96 %        Physical Exam  Vitals reviewed.   Constitutional:       Appearance: Normal appearance.   Cardiovascular:      Rate and Rhythm: Normal rate and regular rhythm.   Pulmonary:      Effort: Pulmonary effort is normal. No respiratory distress.   Abdominal:      Palpations: Abdomen is soft.      Tenderness: There is no abdominal tenderness.   Musculoskeletal:      Comments: Left chest wall accordion drain with serous output   Skin:     General: Skin is warm.   Neurological:      General: No focal deficit present.      Mental Status: He is alert and oriented to person, place, and time.            Significant Labs:  CBC:   Recent Labs   Lab 07/12/24  0453   WBC 10.39   RBC 3.50*   HGB 8.7*   HCT 28.7*      MCV 82   MCH 24.9*   MCHC 30.3*     CMP:   Recent Labs   Lab 07/12/24  0453   *   CALCIUM 8.3*   ALBUMIN 1.3*   PROT 5.9*   *   K 4.1   CO2 24      BUN 34*   CREATININE 1.0   ALKPHOS 96   ALT 9*   AST 14   BILITOT 0.3       Significant Diagnostics:  I have reviewed all pertinent imaging results/findings within the past 24 hours.    VTE Risk Mitigation (From admission, onward)           Ordered     enoxaparin injection 40 mg  Every 24 hours         07/09/24 1358     Place sequential compression  device  Until discontinued         07/07/24 4140                  Assessment/Plan:     Chest wall abscess  73 yoM with left chest wall abscess and concern for left 6-8 rib osteomyelitis transferred for potential thoracic and plastic surgery intervention. Subjectively he is feeling much better s/p incision and drainage of chest wall abscess, thoracentesis of left pleural effusion, and VATS with right chest tube placement [facility]. Imaging workup here revealed significant improvement in chest wall abscess size without apparent osteomyelitis of the ribs on CT, but with continued septic left shoulder joint. Right chest tube removed 7/9.     - Remove drain when output 30 mL or less  - No plans for acute thoracic intervention  - We will arrange for follow up in clinic with MRI of the ribs  - Thoracic surgery to follow        Yovanny Mike MD  Thoracic Surgery  [facility]

## 2024-07-12 NOTE — PT/OT/SLP PROGRESS
Physical Therapy Treatment    Patient Name:  Roosevelt Moser   MRN:  [id number]    Recommendations:     Discharge Recommendations: Moderate Intensity Therapy  Discharge Equipment Recommendations: lift device, hospital bed, wheelchair  Barriers to discharge: Inaccessible home and Decreased caregiver support    Assessment:     Roosevelt Moser is a 73 y.o. male admitted with a medical diagnosis of Osteomyelitis of multiple sites.  He presents with the following impairments/functional limitations: weakness, impaired endurance, impaired self care skills, impaired functional mobility, gait instability, impaired balance, decreased upper extremity function, decreased lower extremity function, pain, orthopedic precautions. Pt reporting pain in LUE with anterior lean of trunk in preparation to stand and declined standing attempt. Pt would benefit from a moderate intensity/frequency therapy for: Dynamic/static standing/sitting balance through skilled balance training, strengthening with the use of skilled therapeutic exercises interventions, and mobility through adaptive equipment training. Pt continues to benefit from a collaborative PT/OT program to improve quality of life and focus on recovery of impairments.      Rehab Prognosis: Good; patient would benefit from acute skilled PT services to address these deficits and reach maximum level of function.    Recent Surgery: * No surgery found *      Plan:     During this hospitalization, patient to be seen 4 x/week to address the identified rehab impairments via gait training, therapeutic activities, therapeutic exercises, neuromuscular re-education and progress toward the following goals:    Plan of Care Expires:  08/08/24    Subjective     Chief Complaint: LUE pain  Patient/Family Comments/goals: return home  Pain/Comfort:  Pain Rating 1:  (not rated)  Location - Side 1: Left  Location 1: arm (with movement)  Pain Addressed 1: Distraction, Cessation of Activity  Pain Rating  Post-Intervention 1: other (see comments) (not rated)      Objective:     Communicated with RN prior to session.  Patient found supine with PICC line, Condom Catheter, telemetry, peripheral IV, hemovac upon PT entry to room.     General Precautions: Standard, fall  Orthopedic Precautions: LUE partial weight bearing, RLE partial weight bearing  Braces: N/A  Respiratory Status: Nasal cannula, flow 4.5 L/min     Functional Mobility:  Bed Mobility:     Scooting: total assistance and of 2 persons  Supine to Sit: total assistance and of 2 persons  Sit to Supine: total assistance and of 2 persons  Transfers:     Sit to Stand: Pt reporting pain in LUE with anterior lean of trunk in preparation to stand and declined standing attempt.  Balance:   Static Sitting: Tammie-SBA  Dynamic Sitting: Tammie      AM-PAC 6 CLICK MOBILITY  Turning over in bed (including adjusting bedclothes, sheets and blankets)?: 1  Sitting down on and standing up from a chair with arms (e.g., wheelchair, bedside commode, etc.): 1  Moving from lying on back to sitting on the side of the bed?: 1  Moving to and from a bed to a chair (including a wheelchair)?: 1  Need to walk in hospital room?: 1  Climbing 3-5 steps with a railing?: 1  Basic Mobility Total Score: 6       Treatment & Education:  Patient educated on role of therapy, goals of session, and benefits of mobilizing.   Discussed PT plan of care during hospitalization.   Patient educated on calling for assistance.   Patient educated on how their diagnosis impacts their mobility within PT scope of practice.   Communication board up to date.  All questions answered within PT scope of practice.    Patient left HOB elevated with all lines intact, call button in reach, RN notified, and pt's spouse present..    GOALS:   Multidisciplinary Problems       Physical Therapy Goals          Problem: Physical Therapy    Goal Priority Disciplines Outcome Goal Variances Interventions   Physical Therapy Goal     PT,  PT/OT Progressing     Description: Goals to be met by: 24     Patient will increase functional independence with mobility by performin. Supine to sit with Moderate Assistance  2. Sit to supine with Moderate Assistance  3. Rolling to Left and Right with Minimal Assistance.  4. Sit to stand transfer with Moderate Assistance  5. Bed to chair transfer with Maximum Assistance using LRAD  6. Gait  x 10 feet with Maximum Assistance using LRAD.   7. Lower extremity exercise program x10 reps per handout, with independence    DME Justifications (see above for complete DME recommendations)    Hospital Bed ·Patient requires a hospital bed for positioning of the body in ways that are not feasible with an ordinary bed. The patient requires special positioning for pain relief, limited mobility, and/or being unable to independently make changes in body position without the use of a hospital bed. Pillows and wedges will not be adequate for resolving these positional issues.                         Time Tracking:     PT Received On: 24  PT Start Time: 1426     PT Stop Time: 1445  PT Total Time (min): 19 min     Billable Minutes: Therapeutic Activity 19    Treatment Type: Treatment  PT/PTA: PT     Number of PTA visits since last PT visit: 0     2024

## 2024-07-12 NOTE — SUBJECTIVE & OBJECTIVE
Interval History: Doing well. No fever or chills. Poor appetite. Worked with PT.    Review of Systems   All other systems reviewed and are negative.    Objective:     Vital Signs (Most Recent):  Temp: 98.2 °F (36.8 °C) (07/12/24 1609)  Pulse: 71 (07/12/24 1609)  Resp: 18 (07/12/24 1448)  BP: 115/65 (07/12/24 1609)  SpO2: 96 % (07/12/24 1609) Vital Signs (24h Range):  Temp:  [98 °F (36.7 °C)-98.7 °F (37.1 °C)] 98.2 °F (36.8 °C)  Pulse:  [63-82] 71  Resp:  [18] 18  SpO2:  [95 %-99 %] 96 %  BP: (108-123)/(57-70) 115/65     Weight: 133 kg (293 lb 3.4 oz)  Body mass index is 39.77 kg/m².    Estimated Creatinine Clearance: 94.3 mL/min (based on SCr of 1 mg/dL).     Physical Exam  Vitals and nursing note reviewed.   Constitutional:       General: He is not in acute distress.     Appearance: Normal appearance. He is obese. He is not ill-appearing, toxic-appearing or diaphoretic.   HENT:      Head: Normocephalic.   Eyes:      Extraocular Movements: Extraocular movements intact.      Pupils: Pupils are equal, round, and reactive to light.   Neurological:      General: No focal deficit present.      Mental Status: He is alert.   Psychiatric:         Mood and Affect: Mood normal.         Thought Content: Thought content normal.          Significant Labs: CBC:   Recent Labs   Lab 07/11/24  0552 07/12/24  0453   WBC 11.51 10.39   HGB 9.2* 8.7*   HCT 30.8* 28.7*   * 443     Microbiology Results (last 7 days)       Procedure Component Value Units Date/Time    Aerobic culture [[id number]] Collected: 07/09/24 1622    Order Status: Completed Specimen: Abscess from Shoulder, Left Updated: 07/12/24 1031     Aerobic Bacterial Culture No growth    Culture, Anaerobic [[id number]] Collected: 07/09/24 1622    Order Status: Completed Specimen: Joint Fluid from Shoulder, Left Updated: 07/11/24 1253     Anaerobic Culture Culture in progress    AFB Culture & Smear [[id number]] Collected: 07/09/24 1622    Order Status: Completed Specimen:  Joint Fluid from Shoulder, Left Updated: 07/10/24 2127     AFB Culture & Smear Culture in progress     AFB CULTURE STAIN No acid fast bacilli seen.    Gram stain [[id number]] Collected: 07/09/24 1622    Order Status: Completed Specimen: Joint Fluid from Shoulder, Left Updated: 07/09/24 1756     Gram Stain Result Rare WBC's      No organisms seen    Fungus culture [[id number]] Collected: 07/09/24 1622    Order Status: Sent Specimen: Joint Fluid from Shoulder, Left Updated: 07/09/24 1637            Significant Imaging: I have reviewed all pertinent imaging results/findings within the past 24 hours.

## 2024-07-13 PROBLEM — N18.2 CKD (CHRONIC KIDNEY DISEASE) STAGE 2, GFR 60-89 ML/MIN: Status: ACTIVE | Noted: 2024-07-13

## 2024-07-13 LAB
ALBUMIN SERPL BCP-MCNC: 1.4 G/DL (ref 3.5–5.2)
ALP SERPL-CCNC: 99 U/L (ref 55–135)
ALT SERPL W/O P-5'-P-CCNC: 7 U/L (ref 10–44)
ANION GAP SERPL CALC-SCNC: 8 MMOL/L (ref 8–16)
AST SERPL-CCNC: 14 U/L (ref 10–40)
BASOPHILS # BLD AUTO: 0.08 K/UL (ref 0–0.2)
BASOPHILS NFR BLD: 0.8 % (ref 0–1.9)
BILIRUB SERPL-MCNC: 0.3 MG/DL (ref 0.1–1)
BUN SERPL-MCNC: 29 MG/DL (ref 8–23)
CALCIUM SERPL-MCNC: 8.1 MG/DL (ref 8.7–10.5)
CHLORIDE SERPL-SCNC: 99 MMOL/L (ref 95–110)
CO2 SERPL-SCNC: 26 MMOL/L (ref 23–29)
CREAT SERPL-MCNC: 1 MG/DL (ref 0.5–1.4)
DIFFERENTIAL METHOD BLD: ABNORMAL
EOSINOPHIL # BLD AUTO: 1 K/UL (ref 0–0.5)
EOSINOPHIL NFR BLD: 9.2 % (ref 0–8)
ERYTHROCYTE [DISTWIDTH] IN BLOOD BY AUTOMATED COUNT: 17.7 % (ref 11.5–14.5)
EST. GFR  (NO RACE VARIABLE): >60 ML/MIN/1.73 M^2
FUNGUS SPEC CULT: NORMAL
GLUCOSE SERPL-MCNC: 95 MG/DL (ref 70–110)
HCT VFR BLD AUTO: 29.8 % (ref 40–54)
HGB BLD-MCNC: 9.2 G/DL (ref 14–18)
IMM GRANULOCYTES # BLD AUTO: 0.14 K/UL (ref 0–0.04)
IMM GRANULOCYTES NFR BLD AUTO: 1.3 % (ref 0–0.5)
LYMPHOCYTES # BLD AUTO: 1.9 K/UL (ref 1–4.8)
LYMPHOCYTES NFR BLD: 18 % (ref 18–48)
MAGNESIUM SERPL-MCNC: 1.7 MG/DL (ref 1.6–2.6)
MCH RBC QN AUTO: 24.9 PG (ref 27–31)
MCHC RBC AUTO-ENTMCNC: 30.9 G/DL (ref 32–36)
MCV RBC AUTO: 81 FL (ref 82–98)
MONOCYTES # BLD AUTO: 1.1 K/UL (ref 0.3–1)
MONOCYTES NFR BLD: 10.2 % (ref 4–15)
NEUTROPHILS # BLD AUTO: 6.4 K/UL (ref 1.8–7.7)
NEUTROPHILS NFR BLD: 60.5 % (ref 38–73)
NRBC BLD-RTO: 0 /100 WBC
PHOSPHATE SERPL-MCNC: 3.2 MG/DL (ref 2.7–4.5)
PLATELET # BLD AUTO: 435 K/UL (ref 150–450)
PMV BLD AUTO: 10 FL (ref 9.2–12.9)
POCT GLUCOSE: 109 MG/DL (ref 70–110)
POCT GLUCOSE: 117 MG/DL (ref 70–110)
POCT GLUCOSE: 128 MG/DL (ref 70–110)
POCT GLUCOSE: 149 MG/DL (ref 70–110)
POTASSIUM SERPL-SCNC: 3.9 MMOL/L (ref 3.5–5.1)
PROT SERPL-MCNC: 5.9 G/DL (ref 6–8.4)
RBC # BLD AUTO: 3.69 M/UL (ref 4.6–6.2)
SODIUM SERPL-SCNC: 133 MMOL/L (ref 136–145)
WBC # BLD AUTO: 10.59 K/UL (ref 3.9–12.7)

## 2024-07-13 PROCEDURE — 27000207 HC ISOLATION: Mod: HCNC

## 2024-07-13 PROCEDURE — 94660 CPAP INITIATION&MGMT: CPT | Mod: HCNC

## 2024-07-13 PROCEDURE — 94761 N-INVAS EAR/PLS OXIMETRY MLT: CPT | Mod: HCNC

## 2024-07-13 PROCEDURE — 25000003 PHARM REV CODE 250: Mod: HCNC | Performed by: STUDENT IN AN ORGANIZED HEALTH CARE EDUCATION/TRAINING PROGRAM

## 2024-07-13 PROCEDURE — 27000221 HC OXYGEN, UP TO 24 HOURS: Mod: HCNC

## 2024-07-13 PROCEDURE — 99900035 HC TECH TIME PER 15 MIN (STAT): Mod: HCNC

## 2024-07-13 PROCEDURE — 20600001 HC STEP DOWN PRIVATE ROOM: Mod: HCNC

## 2024-07-13 PROCEDURE — 85025 COMPLETE CBC W/AUTO DIFF WBC: CPT | Mod: HCNC | Performed by: STUDENT IN AN ORGANIZED HEALTH CARE EDUCATION/TRAINING PROGRAM

## 2024-07-13 PROCEDURE — 84100 ASSAY OF PHOSPHORUS: CPT | Mod: HCNC | Performed by: STUDENT IN AN ORGANIZED HEALTH CARE EDUCATION/TRAINING PROGRAM

## 2024-07-13 PROCEDURE — A4216 STERILE WATER/SALINE, 10 ML: HCPCS | Mod: HCNC | Performed by: STUDENT IN AN ORGANIZED HEALTH CARE EDUCATION/TRAINING PROGRAM

## 2024-07-13 PROCEDURE — 80053 COMPREHEN METABOLIC PANEL: CPT | Mod: HCNC | Performed by: STUDENT IN AN ORGANIZED HEALTH CARE EDUCATION/TRAINING PROGRAM

## 2024-07-13 PROCEDURE — 63600175 PHARM REV CODE 636 W HCPCS: Mod: HCNC | Performed by: STUDENT IN AN ORGANIZED HEALTH CARE EDUCATION/TRAINING PROGRAM

## 2024-07-13 PROCEDURE — 83735 ASSAY OF MAGNESIUM: CPT | Mod: HCNC | Performed by: STUDENT IN AN ORGANIZED HEALTH CARE EDUCATION/TRAINING PROGRAM

## 2024-07-13 RX ORDER — LOPERAMIDE HYDROCHLORIDE 2 MG/1
2 CAPSULE ORAL 4 TIMES DAILY PRN
Status: DISCONTINUED | OUTPATIENT
Start: 2024-07-13 | End: 2024-07-25 | Stop reason: HOSPADM

## 2024-07-13 RX ADMIN — LOPERAMIDE HYDROCHLORIDE 2 MG: 2 CAPSULE ORAL at 05:07

## 2024-07-13 RX ADMIN — HYDROCODONE BITARTRATE AND ACETAMINOPHEN 1 TABLET: 10; 325 TABLET ORAL at 03:07

## 2024-07-13 RX ADMIN — HYDROCODONE BITARTRATE AND ACETAMINOPHEN 1 TABLET: 10; 325 TABLET ORAL at 09:07

## 2024-07-13 RX ADMIN — CEFTAROLINE FOSAMIL 600 MG: 600 POWDER, FOR SOLUTION INTRAVENOUS at 05:07

## 2024-07-13 RX ADMIN — CEFTAROLINE FOSAMIL 600 MG: 600 POWDER, FOR SOLUTION INTRAVENOUS at 12:07

## 2024-07-13 RX ADMIN — Medication 10 ML: at 12:07

## 2024-07-13 RX ADMIN — Medication 10 ML: at 05:07

## 2024-07-13 RX ADMIN — DIPHENHYDRAMINE HYDROCHLORIDE 10 ML: 25 SOLUTION ORAL at 01:07

## 2024-07-13 RX ADMIN — INSULIN GLARGINE 15 UNITS: 100 INJECTION, SOLUTION SUBCUTANEOUS at 09:07

## 2024-07-13 RX ADMIN — HYDROCODONE BITARTRATE AND ACETAMINOPHEN 1 TABLET: 10; 325 TABLET ORAL at 05:07

## 2024-07-13 RX ADMIN — Medication 10 ML: at 06:07

## 2024-07-13 RX ADMIN — DIPHENHYDRAMINE HYDROCHLORIDE 10 ML: 25 SOLUTION ORAL at 05:07

## 2024-07-13 RX ADMIN — ENOXAPARIN SODIUM 40 MG: 40 INJECTION SUBCUTANEOUS at 05:07

## 2024-07-13 RX ADMIN — FERROUS SULFATE TAB EC 325 MG (65 MG FE EQUIVALENT) 1 EACH: 325 (65 FE) TABLET DELAYED RESPONSE at 09:07

## 2024-07-13 RX ADMIN — DIPHENHYDRAMINE HYDROCHLORIDE 10 ML: 25 SOLUTION ORAL at 09:07

## 2024-07-13 RX ADMIN — Medication 10 ML: at 11:07

## 2024-07-13 RX ADMIN — Medication 10 ML: at 01:07

## 2024-07-13 RX ADMIN — LIDOCAINE 5% 1 PATCH: 700 PATCH TOPICAL at 05:07

## 2024-07-13 RX ADMIN — CEFTAROLINE FOSAMIL 600 MG: 600 POWDER, FOR SOLUTION INTRAVENOUS at 09:07

## 2024-07-13 RX ADMIN — METOPROLOL SUCCINATE 100 MG: 100 TABLET, EXTENDED RELEASE ORAL at 09:07

## 2024-07-13 NOTE — PLAN OF CARE
[facility] Plan of Care Note  Diagnosis: osteomylitis of multiple sites    Shift Events: continues IV antibiotics, 20ml output from accordion drain this shift, 2 liquid Bms- imodium given.    Neuro: intermittent confusion    Vital Signs: WNL    Respiratory: 2L NC, CPAP QHS    Diet: diabetic, renal, cardiac + boost supp, protein supp, rogelio. LOW APPETITE    Urine Output: adequate    Bowel Movement: LBM today      Problem: Adult Inpatient Plan of Care  Goal: Plan of Care Review  Outcome: Progressing  Goal: Patient-Specific Goal (Individualized)  Outcome: Progressing  Goal: Absence of Hospital-Acquired Illness or Injury  Outcome: Progressing  Goal: Optimal Comfort and Wellbeing  Outcome: Progressing  Goal: Readiness for Transition of Care  Outcome: Progressing     Problem: Diabetes Comorbidity  Goal: Blood Glucose Level Within Targeted Range  Outcome: Progressing     Problem: Sepsis/Septic Shock  Goal: Optimal Coping  Outcome: Progressing  Goal: Absence of Bleeding  Outcome: Progressing  Goal: Blood Glucose Level Within Targeted Range  Outcome: Progressing  Goal: Absence of Infection Signs and Symptoms  Outcome: Progressing  Goal: Optimal Nutrition Intake  Outcome: Progressing     Problem: Acute Kidney Injury/Impairment  Goal: Fluid and Electrolyte Balance  Outcome: Progressing  Goal: Improved Oral Intake  Outcome: Progressing  Goal: Effective Renal Function  Outcome: Progressing     Problem: Infection  Goal: Absence of Infection Signs and Symptoms  Outcome: Progressing     Problem: Wound  Goal: Optimal Coping  Outcome: Progressing  Goal: Optimal Functional Ability  Outcome: Progressing  Goal: Absence of Infection Signs and Symptoms  Outcome: Progressing  Goal: Improved Oral Intake  Outcome: Progressing  Goal: Optimal Pain Control and Function  Outcome: Progressing  Goal: Skin Health and Integrity  Outcome: Progressing  Goal: Optimal Wound Healing  Outcome: Progressing     Problem: Skin Injury Risk Increased  Goal: Skin  Health and Integrity  Outcome: Progressing     Problem: Fall Injury Risk  Goal: Absence of Fall and Fall-Related Injury  Outcome: Progressing

## 2024-07-13 NOTE — PHYSICIAN QUERY
Please further specify the stage of chronic kidney disease (CKD):  Chronic Kidney Disease (CKD) stage 2 (eGFR 60-89)

## 2024-07-13 NOTE — PROGRESS NOTES
[facility] - [facility]  Thoracic Surgery  Progress Note    Subjective:     History of Present Illness:  Mr. Moser is a pleasant 73 yoM with PMH including Afib on eliquis, HTN, T2DM and MARA who is transferred from [place] for a chest wall abscess with concern for osteomyelitis of the anterior left 6-8th ribs. [facility] after a fall resulting in a left humeral fracture for which he underwent ORIF in March. He represented in June with a left shoulder abscess and underwent multiple washouts and removal of the ORIF hardware. His hospital stay was complicated by bilateral effusions and a left chest wall abscess with extension into the epigastrium and mediastinum with concern for osteomyelitis of the left 6-8th ribs. He underwent I+D of the abscess through a small left anterior chest incision with drain placement on 7/4, thoracentesis of the left pleural effusion on 7/4, and right-sided VATs with washout and chest tube placement on 7/5. Interestingly, cultures from the pleural fluid and chest wall abscess cavity have been negative despite the abscess cavity fluid being described as creamy pus by the [facility] op report. The only positive cultures so far have been from those collected by podiatry during a toe amp which were positive for MRSA.     [place] [facility] [place] and plastic surgery interventions if more invasive debridements or resections were required with reconstruction.     Upon arrival here he states that over the past couple days he has been feeling much better. He states that the pain and swelling associated with the chest wall abscess is resolved. He denies any fever or chills. He states that the dyspnea he was experiencing with the prior pleural effusions has resolved, despite remaining on 2-3 L/min NC. He is not on home oxygen.     Post-Op Info:  * No surgery found *         Interval History: No major changes. 40 ml output.     Medications:  Continuous  Infusions:  Scheduled Meds:   (Magic mouthwash) 1:1:1 diphenhydrAMINE(Benadryl) 12.5mg/5ml liq, aluminum & magnesium hydroxide-simethicone (Maalox), LIDOcaine viscous 2%  10 mL Swish & Spit QID    ceftaroline (Teflaro) IV (PEDS and ADULTS)  600 mg Intravenous Q8H    enoxparin  40 mg Subcutaneous Q24H (prophylaxis, 1700)    ferrous sulfate  1 tablet Oral Daily    insulin glargine U-100  15 Units Subcutaneous Daily    LIDOcaine  1 patch Transdermal Q24H    metoprolol succinate  100 mg Oral QHS    polyethylene glycol  17 g Oral Daily    sodium chloride 0.9%  10 mL Intravenous Q6H     PRN Meds:  Current Facility-Administered Medications:     albuterol-ipratropium, 3 mL, Nebulization, Q4H PRN    aluminum & magnesium hydroxide-simethicone, 15 mL, Oral, QID PRN    dextrose 10%, 12.5 g, Intravenous, PRN    dextrose 10%, 25 g, Intravenous, PRN    glucagon (human recombinant), 1 mg, Intramuscular, PRN    glucose, 16 g, Oral, PRN    glucose, 24 g, Oral, PRN    HYDROcodone-acetaminophen, 1 tablet, Oral, Q4H PRN    HYDROcodone-acetaminophen, 1 tablet, Oral, Q4H PRN    insulin aspart U-100, 0-10 Units, Subcutaneous, QID (AC + HS) PRN    naloxone, 0.02 mg, Intravenous, PRN    ondansetron, 8 mg, Oral, Q8H PRN    polyethylene glycol, 17 g, Oral, BID PRN    promethazine, 25 mg, Oral, Q6H PRN    senna-docusate 8.6-50 mg, 1 tablet, Oral, BID PRN    simethicone, 1 tablet, Oral, QID PRN    sodium chloride 0.9%, 10 mL, Intravenous, Q12H PRN    Flushing PICC/Midline Protocol, , , Until Discontinued **AND** sodium chloride 0.9%, 10 mL, Intravenous, Q6H **AND** sodium chloride 0.9%, 10 mL, Intravenous, PRN    traMADoL, 50 mg, Oral, Q4H PRN     Review of patient's allergies indicates:  No Known Allergies  Objective:     Vital Signs (Most Recent):  Temp: 98 °F (36.7 °C) (07/13/24 0732)  Pulse: 77 (07/13/24 0732)  Resp: 18 (07/13/24 0440)  BP: (!) 121/58 (07/13/24 0732)  SpO2: (!) 92 % (07/13/24 0732) Vital Signs (24h Range):  Temp:  [98 °F  (36.7 °C)-98.4 °F (36.9 °C)] 98 °F (36.7 °C)  Pulse:  [61-83] 77  Resp:  [18] 18  SpO2:  [92 %-97 %] 92 %  BP: (108-122)/(57-69) 121/58     Intake/Output - Last 3 Shifts         07/11 0700  07/12 0659 07/12 0700  07/13 0659 07/13 0700  07/14 0659    P.O. 240      I.V. (mL/kg)       IV Piggyback 50 150     Total Intake(mL/kg) 290 (2.2) 150 (1.1)     Urine (mL/kg/hr) 2175 (0.7) 3675 (1.2)     Emesis/NG output       Drains 55 40     Other       Stool  0     Total Output 2230 3715     Net -1940 -3565            Stool Occurrence  1 x             SpO2: (!) 92 %        Physical Exam  Vitals reviewed.   Constitutional:       Appearance: Normal appearance.   Cardiovascular:      Rate and Rhythm: Normal rate and regular rhythm.   Pulmonary:      Effort: Pulmonary effort is normal. No respiratory distress.   Abdominal:      Palpations: Abdomen is soft.      Tenderness: There is no abdominal tenderness.   Musculoskeletal:      Comments: Left chest wall accordion drain with serous output   Skin:     General: Skin is warm.   Neurological:      General: No focal deficit present.      Mental Status: He is alert and oriented to person, place, and time.            Significant Labs:  All pertinent labs from the last 24 hours have been reviewed.    Significant Diagnostics:  I have reviewed all pertinent imaging results/findings within the past 24 hours.    VTE Risk Mitigation (From admission, onward)           Ordered     enoxaparin injection 40 mg  Every 24 hours         07/09/24 1358     Place sequential compression device  Until discontinued         07/07/24 1532                  Assessment/Plan:     Chest wall abscess  73 yoM with left chest wall abscess and concern for left 6-8 rib osteomyelitis transferred for potential thoracic and plastic surgery intervention. Subjectively he is feeling much better s/p incision and drainage of chest wall abscess, thoracentesis of left pleural effusion, and VATS with right chest tube placement  [facility] at [facility]. Imaging workup here revealed significant improvement in chest wall abscess size without apparent osteomyelitis of the ribs on CT, but with continued septic left shoulder joint. Right chest tube removed 7/9.     - Remove drain when output 30 mL or less. Likely tomorrow.   - No plans for acute thoracic intervention  - We will arrange for follow up in clinic with MRI of the ribs  - Thoracic surgery to follow        Leonides Pearl MD  Thoracic Surgery  [facility]

## 2024-07-13 NOTE — SUBJECTIVE & OBJECTIVE
Interval History: No major changes. 40 ml output.     Medications:  Continuous Infusions:  Scheduled Meds:   (Magic mouthwash) 1:1:1 diphenhydrAMINE(Benadryl) 12.5mg/5ml liq, aluminum & magnesium hydroxide-simethicone (Maalox), LIDOcaine viscous 2%  10 mL Swish & Spit QID    ceftaroline (Teflaro) IV (PEDS and ADULTS)  600 mg Intravenous Q8H    enoxparin  40 mg Subcutaneous Q24H (prophylaxis, 1700)    ferrous sulfate  1 tablet Oral Daily    insulin glargine U-100  15 Units Subcutaneous Daily    LIDOcaine  1 patch Transdermal Q24H    metoprolol succinate  100 mg Oral QHS    polyethylene glycol  17 g Oral Daily    sodium chloride 0.9%  10 mL Intravenous Q6H     PRN Meds:  Current Facility-Administered Medications:     albuterol-ipratropium, 3 mL, Nebulization, Q4H PRN    aluminum & magnesium hydroxide-simethicone, 15 mL, Oral, QID PRN    dextrose 10%, 12.5 g, Intravenous, PRN    dextrose 10%, 25 g, Intravenous, PRN    glucagon (human recombinant), 1 mg, Intramuscular, PRN    glucose, 16 g, Oral, PRN    glucose, 24 g, Oral, PRN    HYDROcodone-acetaminophen, 1 tablet, Oral, Q4H PRN    HYDROcodone-acetaminophen, 1 tablet, Oral, Q4H PRN    insulin aspart U-100, 0-10 Units, Subcutaneous, QID (AC + HS) PRN    naloxone, 0.02 mg, Intravenous, PRN    ondansetron, 8 mg, Oral, Q8H PRN    polyethylene glycol, 17 g, Oral, BID PRN    promethazine, 25 mg, Oral, Q6H PRN    senna-docusate 8.6-50 mg, 1 tablet, Oral, BID PRN    simethicone, 1 tablet, Oral, QID PRN    sodium chloride 0.9%, 10 mL, Intravenous, Q12H PRN    Flushing PICC/Midline Protocol, , , Until Discontinued **AND** sodium chloride 0.9%, 10 mL, Intravenous, Q6H **AND** sodium chloride 0.9%, 10 mL, Intravenous, PRN    traMADoL, 50 mg, Oral, Q4H PRN     Review of patient's allergies indicates:  No Known Allergies  Objective:     Vital Signs (Most Recent):  Temp: 98 °F (36.7 °C) (07/13/24 0732)  Pulse: 77 (07/13/24 0732)  Resp: 18 (07/13/24 0440)  BP: (!) 121/58 (07/13/24  0732)  SpO2: (!) 92 % (07/13/24 0732) Vital Signs (24h Range):  Temp:  [98 °F (36.7 °C)-98.4 °F (36.9 °C)] 98 °F (36.7 °C)  Pulse:  [61-83] 77  Resp:  [18] 18  SpO2:  [92 %-97 %] 92 %  BP: (108-122)/(57-69) 121/58     Intake/Output - Last 3 Shifts         07/11 0700 07/12 0659 07/12 0700 07/13 0659 07/13 0700 07/14 0659    P.O. 240      I.V. (mL/kg)       IV Piggyback 50 150     Total Intake(mL/kg) 290 (2.2) 150 (1.1)     Urine (mL/kg/hr) 2175 (0.7) 3675 (1.2)     Emesis/NG output       Drains 55 40     Other       Stool  0     Total Output 2230 3715     Net -1940 -3565            Stool Occurrence  1 x             SpO2: (!) 92 %        Physical Exam  Vitals reviewed.   Constitutional:       Appearance: Normal appearance.   Cardiovascular:      Rate and Rhythm: Normal rate and regular rhythm.   Pulmonary:      Effort: Pulmonary effort is normal. No respiratory distress.   Abdominal:      Palpations: Abdomen is soft.      Tenderness: There is no abdominal tenderness.   Musculoskeletal:      Comments: Left chest wall accordion drain with serous output   Skin:     General: Skin is warm.   Neurological:      General: No focal deficit present.      Mental Status: He is alert and oriented to person, place, and time.            Significant Labs:  All pertinent labs from the last 24 hours have been reviewed.    Significant Diagnostics:  I have reviewed all pertinent imaging results/findings within the past 24 hours.    VTE Risk Mitigation (From admission, onward)           Ordered     enoxaparin injection 40 mg  Every 24 hours         07/09/24 1358     Place sequential compression device  Until discontinued         07/07/24 2762

## 2024-07-13 NOTE — PROGRESS NOTES
"[facility] Medicine  Progress Note    Patient Name: Roosevelt Moser  MRN: [id number]  Patient Class: IP- Inpatient   Admission Date: 7/7/2024  Length of Stay: 6 days  Attending Physician: Abraham Plaza DO  Primary Care Provider: Miriam, Primary Doctor        Subjective:     Principal Problem:Osteomyelitis of multiple sites        HPI:  Roosevelt Moser is a 74yo M w/ A fib, HTN, T2DM, MARA, HLD, hx GBS, hx displaced comminuted fracture of the left humerus s/p fall 3/4/24 s/p ORIF 03/25/2024 who presents as a transfer for thoracic surgery and Plastic surgery evaluation in setting of "osteomyelitis of multiple ribs which will need further debridement and then a flap placed."    He was initially admitted to [facility] on 6/14 for progressive weakness, multiple falls, and decreased urine output.  He was admitted for management of MARBIN on CKD complicated by hyperkalemia, urosepsis, AFib with RVR.    "Creatinine improved during his stay. During his stay he was noted to have swelling of his left upper extremity. Imaging studies on June 17 showed concern for gas-forming infection along the left shoulder and air along the lateral left chest wall concerning for gas-forming organism. MRI of his right foot also showed evidence of toe osteomyelitis. He was seen by ID who adjusted antibiotics, and they also noted oral thrush. Podiatry evaluated him for the toe findings, and Orthopedic Surgery evaluated the shoulder abnormalities. Right toe wound grew MRSA. Oral anticoagulation was held, and Radiology aspirated fluid from his left shoulder on June 20. On June 21 he had left shoulder incision and drainage and removal of deep hardware including rods and screws. He underwent repeat irrigation and debridement of the left shoulder on June 24 with placement of a wound VAC. He had subsequent incision and drainage with removal of the wound VAC on June 27. He had amputation of his right 2nd toe by Podiatry on July 1. " "On July 3 he was noted to have dyspnea and mild tachycardia. CT chest showed a large abscess of the anterior central and left chest wall and abdominal wall with partial destruction of 3 ribs. He was seen by Cardiothoracic surgery. On July 4 he had left thoracentesis with aspiration of fluid. He subsequently had incision and drainage of the left chest wall abscess on July 4, and a drainage catheter was left in place. On July 5 he underwent right VATS for a loculated right pleural effusion, and a chest tube was left in place. He was transitioned to the ICU post-operatively. With concern for osteomyelitis of the ribs, concern is that he will need transfer to a facility with Thoracic Surgery and potentially Plastic Surgery along with higher level of care. Transfer center spoke with Thoracic Surgery at [facility]. Requesting transfer to Hospital Medicine at [facility] for Plastic Surgery and Thoracic Surgery evaluation. Transfer center spoke with Plastic Surgery on July 5, Thoracic Surgery on July 7. With his complicated medical presentation, will plan transfer to Hospital Medicine at [facility] in step-down status for further treatment. "    Prior to transfer, "He is awake and alert. He has a PICC line in place along with the right chest tube and left chest wall drain in place. He has no evidence of respiratory distress and is hemodynamically stable. Referring provider felt patient was stable for step-down status at present. He is currently on ceftaroline and meropenem. He is in contact isolation. July 7: Sodium 137, potassium 4.5, chloride 104, CO2 26, BUN 33, creatinine 1.1, glucose 130, albumin 2.1, AST 25, ALT 17, white blood cells 11.58, hemoglobin 8.1, hematocrit 26.2, platelets 389, procalcitonin 0.575, CRP greater than 16  VS:  Temperature 97.5°, pulse 85, respirations 20, blood pressure 112/63, O2 sats 95% "    Patient in no acute distress upon arrival.  Wife at bedside.  Patient denies any " acute complaints.        Imaging history reviewed:  July 6: X-rays of the left shoulder had fracture through the surgical neck of the humerus with lateral distraction of the distal component and overlapping segments on the order of 2.5 cm.  Overall no appreciable change upon comparison with prior imaging.  -chest x-ray noted manifestations of mild congestive heart failure bordering on mild pulmonary edema.  Right-sided thoracostomy tube terminating within the right apex.  No definite pneumothorax.        July 5: Pleural fluid Gram stain with few WBCs and no organisms seen  -pH 7.339, pCO2 50.9, pO2 392     July 4:  CPK less than 10, serum   -AFB smear from left chest abscess had no AFB seen, left chest abscess aerobic and anaerobic cultures have no growth  -pleural fluid protein 3.2, white blood cells 1108 (11% segs, 74% lymphs), , pH 7.63, pleural fluid culture had no growth  -ultrasound-guided thoracentesis removed 1.4-1.5 L     July 3: Blood cultures with no growth to date  -CT chest showed large abscess of the anterior central and left chest wall and abdominal wall measuring 17 cm transversely.  This extends into the mediastinum and peritoneum with partial destruction of the anterior 6th, 7th, and 8th ribs.  Complete atelectasis of the left lower lobe with moderate left pleural effusion.  Mild atelectasis right lung base with a small-to-moderate right pleural effusion.     July 2: X-rays of the left shoulder noted displaced left proximal humerus fracture status post hardware removal without change in alignment.     June 24: ECHO with EF 55-60%.     June 20:  Interventional Radiology did percutaneous aspiration of the left shoulder fluid collection.  No drainage catheter was left in place.     June 18:  Right toe wound MRSA  -CT left shoulder had subacute left humerus fracture post internal fixation.  Incomplete bony bridging across the fracture site with persistent angulation.  Severe arthropathy  "of the glenohumeral joint with multiple intra-articular bodies.  Large left joint effusion and adjacent soft tissue focal fluid collections containing gas and concerning for gas-forming infection.  Moderate size left pleural effusion.  Left basilar airspace disease.  -bilateral lower extremity Doppler arterial ultrasound had atherosclerotic plaque and calcification bilaterally without severe stenosis or occlusion identified on either side.     June 17: X-rays of the left humerus/shoulder had findings concerning for gas-forming infection along the left shoulder.  -x-rays of the left ribs showed air along the lateral chest wall soft tissue and axilla concerning for gas-forming infection.  -MRI of the right foot had findings consistent with osteomyelitis involving the middle distal phalanges of the 3rd toe.  Cellulitis.     June 14: Renal ultrasound had no hydronephrosis.  Elevated resistive index right kidney suggesting intrinsic renal disease.    Overview/Hospital Course:  Pt admitted to  as a transfer for thoracic surgery and Plastic surgery evaluation in setting of "osteomyelitis of multiple ribs which will need further debridement and then a flap placed." Thoracic Surgery, Plastic Surgery, ID, Ortho, Wound care, PT/OT consulted upon admission. Imaging ordered.  CT chest abdomen with IV contrast without evidence of osteomyelitis in ribs adjacent to I&D site of left chest wall abscess.; incidental finding of 1.2 cm obstructing stone in left distal ureter with mild hydroureteronephrosis.  Urology was consulted and CT renal stone study was completed; no acute intervention given no concern for infection around stone or MARBIN; follow-up outpatient unless decompensates.  MRI shoulder without contrast left notable for septic arthritis of left glenohumeral joint with acute osteomyelitis of the glenoid and proximal humerus in addition to known displaced fracture of proximal humeral metaphysis in addition to several large " soft tissue abscesses.  Patient underwent left shoulder aspiration with Orthopedic surgery on 07/09.  Right chest tube was removed by thoracic surgery on 07/09. Drain previously placed for L chest wall abscess to be removed if output remains serous and decreases. To continue on IV Ceftaroline 600 mg q8 for anticipated 6 weeks therapy    Interval History: Seen this AM at bedside. Drain with serous ouput, 40cc recorded last 24 hr. Denies chest pain, shortness of breath    Review of Systems  Objective:     Vital Signs (Most Recent):  Temp: 98.2 °F (36.8 °C) (07/12/24 0838)  Pulse: 72 (07/12/24 0838)  Resp: 18 (07/12/24 1020)  BP: 123/68 (07/12/24 0838)  SpO2: 96 % (07/12/24 0838) Vital Signs (24h Range):  Temp:  [98 °F (36.7 °C)-98.7 °F (37.1 °C)] 98.2 °F (36.8 °C)  Pulse:  [60-82] 72  Resp:  [18] 18  SpO2:  [93 %-99 %] 96 %  BP: (108-123)/(57-70) 123/68     Weight: 133 kg (293 lb 3.4 oz)  Body mass index is 39.77 kg/m².    Intake/Output Summary (Last 24 hours) at 7/12/2024 1028  Last data filed at 7/12/2024 0339  Gross per 24 hour   Intake 290 ml   Output 2230 ml   Net -1940 ml         Physical Exam  Vitals and nursing note reviewed.   Constitutional:       General: He is not in acute distress.     Appearance: He is ill-appearing (chronically). He is not toxic-appearing or diaphoretic.   HENT:      Head: Normocephalic and atraumatic.      Nose: Nose normal.      Mouth/Throat:      Pharynx: Oropharynx is clear.   Eyes:      General: No scleral icterus.  Cardiovascular:      Rate and Rhythm: Normal rate and regular rhythm.      Pulses: Normal pulses.      Heart sounds: Normal heart sounds.   Pulmonary:      Effort: Pulmonary effort is normal.   Chest:      Chest wall: No deformity or tenderness.      Comments: Left chest wall drain with seropurulent output  Abdominal:      General: Bowel sounds are normal.      Palpations: Abdomen is soft.      Tenderness: There is no abdominal tenderness.   Musculoskeletal:       "Cervical back: Normal range of motion and neck supple.      Right lower leg: No edema.      Left lower leg: No edema.   Skin:     General: Skin is warm.      Capillary Refill: Capillary refill takes less than 2 seconds.      Comments: Unable to examine posterior skin due to patient debility   Neurological:      Mental Status: He is alert and oriented to person, place, and time.   Psychiatric:         Behavior: Behavior normal.             Significant Labs: All pertinent labs within the past 24 hours have been reviewed.    Significant Imaging: I have reviewed all pertinent imaging results/findings within the past 24 hours.    Assessment/Plan:      * Osteomyelitis of multiple sites  Skin ulcer of toe of right foot with necrosis of bone  Osteomyelitis of toe   MRSA infection     Chest wall abscess  Loculated pleural effusion  Mediastinal lymphadenopathy    Abscess of left shoulder  Pyogenic abscess of left shoulder region  Humerus fracture    OSH:  -status post left thoracentesis 1500cc serosanguineous fluid drained 7/4 & I&D drainage with chest tube placement 7/4 & loculated effusions right chest s/p VATS and chest tube 7/5   -Left shoulder prosthetic joint infection s/p I&D and removal of deep hardware 6/21 - all screws and nails removed, s/p 2nd washout 6/24 & 3rd washout 6/27   -Left Shoulder washouts on 6/21 ( abundant pus in shoulder, hardware removal), 6/24 ( bloody brownish fluid) and 6/27 ( no fluid collection, healthy red and beefy tissue)   -Right 3rd toe osteomyelitis s/p Dalvance x 2 doses & 2nd distal phalanx s/p I&D at bedside, s/p 2nd toe amputation 7/1       - Presents as a transfer for thoracic surgery and Plastic surgery evaluation in setting of concern for "osteomyelitis of multiple ribs which will need further debridement and then a flap placed" in the setting of chest wall abscess  -notably, 7/3 blood cultures, 7/4 pleural fluid cultures, 7/4 abscess cultures, 755 pleural fluid cultures " NGTD  -6/18 right toe wound culture with MRSA  Plan:  -ID on board, meropenem discontinued. Continue Ceftaroline 600 mg IV q.8; will need estimated course of 6 weeks   -continue to follow OSH cultures  -ongoing wound care  -ID, Ortho, Thoracic surgery and Plastic surgery consulted and following; imaging findings per hospital course  -thoracic surgery removed right chest tube on 07/09; left chest accordion drain to negative pressure remains in place   -If output decreases and remains serous the drain can be removed (can be d/c with drain if placement occurs before this)  -ortho surgery completed left shoulder aspiration 7/9 to assess shoulder abscess concern; cultures pending  -thus far, no acute surgical intervention per all surgical specialties    Acute renal failure superimposed on chronic kidney disease  Patient with acute kidney injury/acute renal failure likely due to  due to ATN due to sepsis due to UTI and/or PNA  MARBIN is currently improving. Baseline creatinine  wnl  - Labs reviewed- Renal function/electrolytes with Estimated Creatinine Clearance: 104.7 mL/min (based on SCr of 0.9 mg/dL). according to latest data. Monitor urine output and serial BMP and adjust therapy as needed. Avoid nephrotoxins and renally dose meds for GFR listed above.    Left renal stone  Nephrolithiasis    6/14 retroperitoneal ultrasound completed in setting of MARBIN without hydronephrosis   7/8 CT abdomen notable for 1.2 cm obstructing stone in left distal ureter with mild hydroureteronephrosis       Appreciate urology recommendations; given no concerns regarding MARBIN or infection relation to the stone, no acute surgical intervention  Follow-up outpatient; strain all urine    Acute hypoxemic respiratory failure  -Ddx: decreased reserve from VATS vs volume overload (on fluids since 7/7)  -CXR noting blunting of CP angles  -7/12: Lasix 40mg IV x1, good response    CKD (chronic kidney disease) stage 2, GFR 60-89 ml/min  Creatine stable for  "now. BMP reviewed- noted Estimated Creatinine Clearance: 94.3 mL/min (based on SCr of 1 mg/dL). according to latest data. Based on current GFR, CKD stage is stage 2 - GFR 60-89.  Monitor UOP and serial BMP and adjust therapy as needed. Renally dose meds. Avoid nephrotoxic medications and procedures.    Thyroid nodule  Incidental finding on imagin.7 cm left thyroid nodule. Nonemergent thyroid ultrasound is recommended.       Cyst of right kidney  Incidental finding on imagin.8 cm septated cystic lesion in the right kidney, which is indeterminate for malignancy. Further evaluation with nonemergent renal mass protocol CT or MRI is recommended.       Sepsis  This patient does have evidence of infective focus  My overall impression is sepsis.  Source: Skin and Soft Tissue (location toe)  Antibiotics given-   Antibiotics (72h ago, onward)      Start     Stop Route Frequency Ordered    24 1545  ceftaroline fosamiL (TEFLARO) 600 mg in D5W 50 mL IVPB (MB+)         -- IV Every 8 hours (non-standard times) 24 1532    24 1545  meropenem (MERREM) 1 g in 0.9% NaCl 100 mL IVPB (MB+)         -- IV Every 8 hours (non-standard times) 24 1532          Latest lactate reviewed-  No results for input(s): "LACTATE", "POCLAC" in the last 72 hours.  Organ dysfunction indicated by Acute kidney injury    Fluid challenge Not needed - patient is not hypotensive      Post- resuscitation assessment No - Post resuscitation assessment not needed       Will Not start Pressors- Levophed for MAP of 65  Source control achieved by: abx    Debility  Patient with Acute on chronic debility due to  multiple infections . Latest AMPAC and GEMS scores have not been reviewed. Evaluation for etiology is complete. Plan includes progressive mobility protocol initated, PT/OT consulted, and fall precautions in place.    Atrial fibrillation with rapid ventricular response  Patient with Long standing persistent (>12 months) atrial " fibrillation which is controlled currently with Beta Blocker. Patient is currently in TBD.[id number] Score: 2.      Chronic AF w/ acute RVR at OSH, resolved s/p cardizem drip   NOAC has been on hold d/t possible need for surgery - has been on prophylaxis dose of Lovenox  Resume A/c pending any surgical intervention, consider lovenox vs hep gtt bridge    History of Guillain-Naples syndrome  No acute issues  However, pt has impaired mobility and is acutely weakened from his sepsis/UTI/AF RVR and need placement       Type 2 diabetes mellitus  Patient's FSGs are controlled on current medication regimen.  Last A1c reviewed-   Lab Results   Component Value Date    HGBA1C 6.3 (H) 06/14/2024     Most recent fingerstick glucose reviewed-   Recent Labs   Lab 07/10/24  1607 07/10/24  2002   POCTGLUCOSE 181* 178*     Current correctional scale  Low  Maintain anti-hyperglycemic dose as follows-   Antihyperglycemics (From admission, onward)      Start     Stop Route Frequency Ordered    07/08/24 0900  insulin glargine U-100 (Lantus) pen 15 Units         -- SubQ Daily 07/07/24 1532    07/07/24 1529  insulin aspart U-100 pen 0-10 Units         -- SubQ Before meals & nightly PRN 07/07/24 1532          Hold Oral hypoglycemics while patient is in the hospital.    Hypertension  Chronic, controlled. Latest blood pressure and vitals reviewed-     Temp:  [97.8 °F (36.6 °C)-98.8 °F (37.1 °C)]   Pulse:  [65-91]   Resp:  [2-24]   BP: (100-145)/(56-81)   SpO2:  [90 %-99 %] .   Home meds for hypertension were reviewed and noted below.   Hypertension Medications               hydrALAZINE (APRESOLINE) 25 MG tablet Take 1 tablet (25 mg total) by mouth every 12 (twelve) hours.    metoprolol succinate (TOPROL-XL) 100 MG 24 hr tablet Take 1 tablet (100 mg total) by mouth once daily.            While in the hospital, will manage blood pressure as follows; continue home metoprolol    Will utilize p.r.n. blood pressure medication only if patient's blood  pressure greater than 180/110 and he develops symptoms such as worsening chest pain or shortness of breath.    MARA (obstructive sleep apnea)  Continue CPAP HS and w/ naps        Morbid obesity  There is no height or weight on file to calculate BMI. Morbid obesity complicates all aspects of disease management from diagnostic modalities to treatment. Weight loss encouraged and health benefits explained to patient.           VTE Risk Mitigation (From admission, onward)           Ordered     enoxaparin injection 40 mg  Every 24 hours         07/09/24 1358     Place sequential compression device  Until discontinued         07/07/24 1532                    Discharge Planning   KAMILA: 7/15/2024     Code Status: Full Code   Is the patient medically ready for discharge?:     Reason for patient still in hospital (select all that apply): Patient trending condition  Discharge Plan A: Home with family                  Abraham Plaza DO  Department of Hospital Medicine   Andres KEARNEY

## 2024-07-13 NOTE — NURSING
..[facility] Plan of Care Note     Dx : Osteomyelitis of multiple sites      Shift Events : Uneventful.     Goals of Care: Reorient patient, prevent further skin breakdown and safety.     Neuro: Disoriented to time, place and situation     Vital Signs:  WDL, except HR Afib     Respiratory:  3L NC     Diet: Diabetic cardiac , renal, 2000 calorie     Is patient tolerating current diet?  Yes     GTTS: None     Urine Output/Bowel Movement:  adequate, BMx1     Drains/Tubes/Tube Feeds (include total output/shift): Accordion drain LLQ     Lines: PICC BACILIO        Accuchecks: ACHS     Skin: Sacral wound, shoulder incision , right toe incision     Fall Risk Score: 24     Activity level? Complete assist     Any scheduled procedures?  None     Any safety concerns?  Fall Risk,      Other: Altered Skin

## 2024-07-13 NOTE — ASSESSMENT & PLAN NOTE
Creatine stable for now. BMP reviewed- noted Estimated Creatinine Clearance: 94.3 mL/min (based on SCr of 1 mg/dL). according to latest data. Based on current GFR, CKD stage is stage 2 - GFR 60-89.  Monitor UOP and serial BMP and adjust therapy as needed. Renally dose meds. Avoid nephrotoxic medications and procedures.

## 2024-07-13 NOTE — PLAN OF CARE
Problem: Adult Inpatient Plan of Care  Goal: Plan of Care Review  Outcome: Progressing  Goal: Patient-Specific Goal (Individualized)  Outcome: Progressing  Goal: Absence of Hospital-Acquired Illness or Injury  Outcome: Progressing  Goal: Optimal Comfort and Wellbeing  Outcome: Progressing  Goal: Readiness for Transition of Care  Outcome: Progressing     Problem: Diabetes Comorbidity  Goal: Blood Glucose Level Within Targeted Range  Outcome: Progressing     Problem: Wound  Goal: Absence of Infection Signs and Symptoms  Outcome: Progressing     Problem: Skin Injury Risk Increased  Goal: Skin Health and Integrity  Outcome: Progressing

## 2024-07-13 NOTE — ASSESSMENT & PLAN NOTE
73 yoM with left chest wall abscess and concern for left 6-8 rib osteomyelitis transferred for potential thoracic and plastic surgery intervention. Subjectively he is feeling much better s/p incision and drainage of chest wall abscess, thoracentesis of left pleural effusion, and VATS with right chest tube placement for right pleural effusion at [facility]. Imaging workup here revealed significant improvement in chest wall abscess size without apparent osteomyelitis of the ribs on CT, but with continued septic left shoulder joint. Right chest tube removed 7/9.     - Remove drain when output 30 mL or less. Likely tomorrow.   - No plans for acute thoracic intervention  - We will arrange for follow up in clinic with MRI of the ribs  - Thoracic surgery to follow

## 2024-07-13 NOTE — ASSESSMENT & PLAN NOTE
"Osteomyelitis of right second toe s/p amputation 7/1    Chest wall abscess  Loculated pleural effusion  Mediastinal lymphadenopathy    Pyogenic arthritis of left shoulder region  Humerus fracture    OSH:  -status post left thoracentesis 1500cc serosanguineous fluid drained 7/4 & I&D drainage with chest tube placement 7/4 & loculated effusions right chest s/p VATS and chest tube 7/5   -Left shoulder prosthetic joint infection s/p I&D and removal of deep hardware 6/21 - all screws and nails removed, s/p 2nd washout 6/24 & 3rd washout 6/27   -Left Shoulder washouts on 6/21 ( abundant pus in shoulder, hardware removal), 6/24 ( bloody brownish fluid) and 6/27 ( no fluid collection, healthy red and beefy tissue)   -Right 3rd toe osteomyelitis s/p Dalvance x 2 doses & 2nd distal phalanx s/p I&D at bedside, s/p 2nd toe amputation 7/1       - Presents as a transfer for thoracic surgery and Plastic surgery evaluation in setting of concern for "osteomyelitis of multiple ribs which will need further debridement and then a flap placed" in the setting of chest wall abscess  -notably, 7/3 blood cultures, 7/4 pleural fluid cultures, 7/4 abscess cultures, 755 pleural fluid cultures NGTD  -6/18 right toe wound culture with MRSA  Plan:  -ID: Continue Ceftaroline 600 mg IV q.8; will need course of 6 weeks (EOT 8/11)   -Discussed alternative antibiotic with ID due to difficult placment due to abx cost, however unable to replace  -Podiatry consulted to address toe wound/possible suture removal  -Thoracic surgery removed right chest tube on 07/09; left chest accordion drain removed 7/14   -Remove staples at follow up appt  -ongoing wound care  -ID, Ortho, Thoracic surgery and Plastic surgery consulted and following; imaging findings per hospital course  -ortho surgery completed left shoulder aspiration 7/9 to assess shoulder abscess concern, minimal fluid aspirated, cont antibiotics for pyogenic arthritis  -thus far, no acute surgical " intervention per all surgical specialties    7/18  Continue antibiotics per ID recommendations.  Follow up with  re disposition planning

## 2024-07-14 LAB
ALBUMIN SERPL BCP-MCNC: 1.4 G/DL (ref 3.5–5.2)
ALP SERPL-CCNC: 105 U/L (ref 55–135)
ALT SERPL W/O P-5'-P-CCNC: 9 U/L (ref 10–44)
ANION GAP SERPL CALC-SCNC: 9 MMOL/L (ref 8–16)
AST SERPL-CCNC: 14 U/L (ref 10–40)
BASOPHILS # BLD AUTO: 0.1 K/UL (ref 0–0.2)
BASOPHILS NFR BLD: 0.9 % (ref 0–1.9)
BILIRUB SERPL-MCNC: 0.3 MG/DL (ref 0.1–1)
BUN SERPL-MCNC: 31 MG/DL (ref 8–23)
CALCIUM SERPL-MCNC: 8.4 MG/DL (ref 8.7–10.5)
CHLORIDE SERPL-SCNC: 100 MMOL/L (ref 95–110)
CO2 SERPL-SCNC: 25 MMOL/L (ref 23–29)
CREAT SERPL-MCNC: 1.1 MG/DL (ref 0.5–1.4)
DIFFERENTIAL METHOD BLD: ABNORMAL
EOSINOPHIL # BLD AUTO: 1.3 K/UL (ref 0–0.5)
EOSINOPHIL NFR BLD: 12 % (ref 0–8)
ERYTHROCYTE [DISTWIDTH] IN BLOOD BY AUTOMATED COUNT: 17.4 % (ref 11.5–14.5)
EST. GFR  (NO RACE VARIABLE): >60 ML/MIN/1.73 M^2
GLUCOSE SERPL-MCNC: 81 MG/DL (ref 70–110)
HCT VFR BLD AUTO: 29.5 % (ref 40–54)
HGB BLD-MCNC: 8.9 G/DL (ref 14–18)
IMM GRANULOCYTES # BLD AUTO: 0.14 K/UL (ref 0–0.04)
IMM GRANULOCYTES NFR BLD AUTO: 1.3 % (ref 0–0.5)
LYMPHOCYTES # BLD AUTO: 1.9 K/UL (ref 1–4.8)
LYMPHOCYTES NFR BLD: 17.5 % (ref 18–48)
MAGNESIUM SERPL-MCNC: 1.7 MG/DL (ref 1.6–2.6)
MCH RBC QN AUTO: 24.7 PG (ref 27–31)
MCHC RBC AUTO-ENTMCNC: 30.2 G/DL (ref 32–36)
MCV RBC AUTO: 82 FL (ref 82–98)
MONOCYTES # BLD AUTO: 1.1 K/UL (ref 0.3–1)
MONOCYTES NFR BLD: 10.5 % (ref 4–15)
NEUTROPHILS # BLD AUTO: 6.1 K/UL (ref 1.8–7.7)
NEUTROPHILS NFR BLD: 57.8 % (ref 38–73)
NRBC BLD-RTO: 0 /100 WBC
PHOSPHATE SERPL-MCNC: 3.4 MG/DL (ref 2.7–4.5)
PLATELET # BLD AUTO: 419 K/UL (ref 150–450)
PMV BLD AUTO: 9.9 FL (ref 9.2–12.9)
POCT GLUCOSE: 141 MG/DL (ref 70–110)
POCT GLUCOSE: 99 MG/DL (ref 70–110)
POTASSIUM SERPL-SCNC: 4 MMOL/L (ref 3.5–5.1)
PROT SERPL-MCNC: 5.9 G/DL (ref 6–8.4)
RBC # BLD AUTO: 3.6 M/UL (ref 4.6–6.2)
SODIUM SERPL-SCNC: 134 MMOL/L (ref 136–145)
WBC # BLD AUTO: 10.57 K/UL (ref 3.9–12.7)

## 2024-07-14 PROCEDURE — 25000003 PHARM REV CODE 250: Mod: HCNC | Performed by: STUDENT IN AN ORGANIZED HEALTH CARE EDUCATION/TRAINING PROGRAM

## 2024-07-14 PROCEDURE — 63600175 PHARM REV CODE 636 W HCPCS: Mod: HCNC | Performed by: STUDENT IN AN ORGANIZED HEALTH CARE EDUCATION/TRAINING PROGRAM

## 2024-07-14 PROCEDURE — 25000003 PHARM REV CODE 250: Mod: HCNC | Performed by: INTERNAL MEDICINE

## 2024-07-14 PROCEDURE — 27100171 HC OXYGEN HIGH FLOW UP TO 24 HOURS: Mod: HCNC

## 2024-07-14 PROCEDURE — 80053 COMPREHEN METABOLIC PANEL: CPT | Mod: HCNC | Performed by: STUDENT IN AN ORGANIZED HEALTH CARE EDUCATION/TRAINING PROGRAM

## 2024-07-14 PROCEDURE — 27000207 HC ISOLATION: Mod: HCNC

## 2024-07-14 PROCEDURE — A4216 STERILE WATER/SALINE, 10 ML: HCPCS | Mod: HCNC | Performed by: STUDENT IN AN ORGANIZED HEALTH CARE EDUCATION/TRAINING PROGRAM

## 2024-07-14 PROCEDURE — 99900035 HC TECH TIME PER 15 MIN (STAT): Mod: HCNC

## 2024-07-14 PROCEDURE — 36415 COLL VENOUS BLD VENIPUNCTURE: CPT | Mod: HCNC | Performed by: STUDENT IN AN ORGANIZED HEALTH CARE EDUCATION/TRAINING PROGRAM

## 2024-07-14 PROCEDURE — 85025 COMPLETE CBC W/AUTO DIFF WBC: CPT | Mod: HCNC | Performed by: STUDENT IN AN ORGANIZED HEALTH CARE EDUCATION/TRAINING PROGRAM

## 2024-07-14 PROCEDURE — 84100 ASSAY OF PHOSPHORUS: CPT | Mod: HCNC | Performed by: STUDENT IN AN ORGANIZED HEALTH CARE EDUCATION/TRAINING PROGRAM

## 2024-07-14 PROCEDURE — 99233 SBSQ HOSP IP/OBS HIGH 50: CPT | Mod: HCNC,,, | Performed by: INTERNAL MEDICINE

## 2024-07-14 PROCEDURE — 94761 N-INVAS EAR/PLS OXIMETRY MLT: CPT | Mod: HCNC

## 2024-07-14 PROCEDURE — 94660 CPAP INITIATION&MGMT: CPT | Mod: HCNC

## 2024-07-14 PROCEDURE — 20600001 HC STEP DOWN PRIVATE ROOM: Mod: HCNC

## 2024-07-14 PROCEDURE — 83735 ASSAY OF MAGNESIUM: CPT | Mod: HCNC | Performed by: STUDENT IN AN ORGANIZED HEALTH CARE EDUCATION/TRAINING PROGRAM

## 2024-07-14 RX ORDER — MUPIROCIN 20 MG/G
OINTMENT TOPICAL 2 TIMES DAILY
Status: DISPENSED | OUTPATIENT
Start: 2024-07-14 | End: 2024-07-19

## 2024-07-14 RX ORDER — TALC
6 POWDER (GRAM) TOPICAL NIGHTLY PRN
Status: DISCONTINUED | OUTPATIENT
Start: 2024-07-14 | End: 2024-07-25 | Stop reason: HOSPADM

## 2024-07-14 RX ADMIN — Medication 10 ML: at 05:07

## 2024-07-14 RX ADMIN — FERROUS SULFATE TAB EC 325 MG (65 MG FE EQUIVALENT) 1 EACH: 325 (65 FE) TABLET DELAYED RESPONSE at 09:07

## 2024-07-14 RX ADMIN — HYDROCODONE BITARTRATE AND ACETAMINOPHEN 1 TABLET: 10; 325 TABLET ORAL at 09:07

## 2024-07-14 RX ADMIN — DIPHENHYDRAMINE HYDROCHLORIDE 10 ML: 25 SOLUTION ORAL at 09:07

## 2024-07-14 RX ADMIN — Medication 10 ML: at 11:07

## 2024-07-14 RX ADMIN — LOPERAMIDE HYDROCHLORIDE 2 MG: 2 CAPSULE ORAL at 09:07

## 2024-07-14 RX ADMIN — ENOXAPARIN SODIUM 40 MG: 40 INJECTION SUBCUTANEOUS at 04:07

## 2024-07-14 RX ADMIN — Medication 10 ML: at 12:07

## 2024-07-14 RX ADMIN — Medication 6 MG: at 08:07

## 2024-07-14 RX ADMIN — CEFTAROLINE FOSAMIL 600 MG: 600 POWDER, FOR SOLUTION INTRAVENOUS at 11:07

## 2024-07-14 RX ADMIN — CEFTAROLINE FOSAMIL 600 MG: 600 POWDER, FOR SOLUTION INTRAVENOUS at 12:07

## 2024-07-14 RX ADMIN — DIPHENHYDRAMINE HYDROCHLORIDE 10 ML: 25 SOLUTION ORAL at 12:07

## 2024-07-14 RX ADMIN — INSULIN GLARGINE 15 UNITS: 100 INJECTION, SOLUTION SUBCUTANEOUS at 09:07

## 2024-07-14 RX ADMIN — CEFTAROLINE FOSAMIL 600 MG: 600 POWDER, FOR SOLUTION INTRAVENOUS at 09:07

## 2024-07-14 RX ADMIN — LIDOCAINE 5% 1 PATCH: 700 PATCH TOPICAL at 04:07

## 2024-07-14 RX ADMIN — CEFTAROLINE FOSAMIL 600 MG: 600 POWDER, FOR SOLUTION INTRAVENOUS at 04:07

## 2024-07-14 RX ADMIN — MUPIROCIN: 20 OINTMENT TOPICAL at 08:07

## 2024-07-14 RX ADMIN — DIPHENHYDRAMINE HYDROCHLORIDE 10 ML: 25 SOLUTION ORAL at 04:07

## 2024-07-14 RX ADMIN — METOPROLOL SUCCINATE 100 MG: 100 TABLET, EXTENDED RELEASE ORAL at 08:07

## 2024-07-14 NOTE — ASSESSMENT & PLAN NOTE
Left chest wall abscess noted at OSH w/ concern for osteomyelitis transferred to [facility] [place] for thoracic and plastic surgery eval  Thoracentesis at OSH 7/4 w/ serosanguineous fluid, f/b I&D and chest tube placement; loculated effusions right chest s/p VATS and chest tube placement on 7/5. Light's criteria consistent with exudative effusion (Cell count 1100 WBCs, 72% lymphocytes, , pH 7.6). No organisms seen on gram stain from L pleural fluid or chest wall abscess. Cultures are NGTD.     Meropenem stopped Friday - doing well on ceftaroline alone. Chest tube removed today.      Plan:    - Evaluated by CTS: Plans for potential ribs resection after abx therapy. Plastics will be available for flap coverage   - Ortho consulted for Pyogenic arthritis of L shoulder: Arthrocentesis performed w/ gram stains and culture in process, aspirated fluid more c/w hematoma > infection   - No plans for acute thoracic intervention, will arrange for follow up in clinic with MRI of the ribs    ID Recs:  - Continue Ceftaroline 600 mg IV q.8; will need estimated course of 6 weeks (EOC: ~08/11/24)  - Follow-up with Prabhjot ID after discharge       Drysol Pregnancy And Lactation Text: This medication is considered safe during pregnancy and breast feeding.

## 2024-07-14 NOTE — PROGRESS NOTES
...[facility] Plan of Care Note    Dx:   Osteomyelitis [M86.9]    Shift Events: Confusion, Incontinence    Goals of Care: LTAC     Neuro: Alert and oriented times 3. Require frequent reminders.    Vital Signs: BP (!) 119/56   Pulse 66   Temp 98.5 °F (36.9 °C) (Oral)   Resp 18   Ht 6' (1.829 m)   Wt 133 kg (293 lb 3.4 oz)   SpO2 (!) 94%   BMI 39.77 kg/m²     Respiratory: 2LNC PRN, CPAP at night    Diet: Diet diabetic Cardiac (Low Na/Chol), Renal; 2000 Calorie  Dietary nutrition supplements Catarino - Any flavor,Dietary nutrition supplements Promod Liquid Protein - Fruit Punch,Dietary nutrition supplements Boost Plus - Any flavor    Is patient tolerating current diet? Yes    GTTS: No. See Mar for IV medications    Urine Output/Bowel Movement:   I/O this shift:  In: 240 [P.O.:240]  Out: 600 [Urine:600]  Last Bowel Movement: 07/13/24      Drains/Tubes/Tube Feeds (include total output/shift):   I/O this shift:  In: 240 [P.O.:240]  Out: 600 [Urine:600]      Lines: See Chart       Accuchecks:AC and HS no coverage needed this shift     Skin: Various wound and incision site see Chart    Fall Risk Score: See Chart    Activity level? BBB    Any scheduled procedures? No     Any safety concerns? Fall and noncompliant with wearing Oxygen, SPO2 monitoring and Telemonitoring.     Other:  None

## 2024-07-14 NOTE — PROGRESS NOTES
[facility]  Infectious Disease  Progress Note    Patient Name: Roosevelt Moser  MRN: [id number]  Admission Date: 7/7/2024  Length of Stay: 7 days  Attending Physician: Abraham Plaza DO  Primary Care Provider: No, Primary Doctor    Isolation Status: Contact    Assessment/Plan:      ID  Chest wall abscess  Left chest wall abscess noted at OSH w/ concern for osteomyelitis transferred to [facility] [place] for thoracic and plastic surgery eval  Thoracentesis at OSH 7/4 w/ serosanguineous fluid, f/b I&D and chest tube placement; loculated effusions right chest s/p VATS and chest tube placement on 7/5. Light's criteria consistent with exudative effusion (Cell count 1100 WBCs, 72% lymphocytes, , pH 7.6). No organisms seen on gram stain from L pleural fluid or chest wall abscess. Cultures are NGTD.     Meropenem stopped Friday - doing well on ceftaroline alone. Chest tube removed today.    Plan:  - Evaluated by CTS: Plans for potential ribs resection after abx therapy. Plastics will be available for flap coverage   - Ortho consulted for Pyogenic arthritis of L shoulder: Arthrocentesis performed w/ gram stains and culture in process, aspirated fluid more c/w hematoma > infection   - No plans for acute thoracic intervention, will arrange for follow up in clinic with MRI of the ribs    ID Recs:  - Continue Ceftaroline 600 mg IV q.8; will need estimated course of 6 weeks (EOC: ~08/11/24). Pending transfer to Rehab.  - Follow-up with Prabhjot ID after discharge    Thank you for your consult. I will sign off. Please contact us if you have any additional questions.    Yovanny Denson MD  Infectious Disease  [facility]    Subjective:     Principal Problem:Osteomyelitis of multiple sites    HPI: 73M w/ A fib, HTN, T2DM, MARA, HLD, hx GBS, hx displaced comminuted fracture of the left humerus after a fall, s/p ORIF this past March, who presents as a transfer for osteomyelitis of multiple ribs requiring debridement and flap  "placement. Initially presented to Center on 6[facility]14 for progressive weakness, multiple falls, and decreased urine output.  He was admitted for management of MARBIN, urosepsis, and AFib with RVR. During his stay he was noted to have swelling of his left upper extremity. Imaging studies on June 17 showed concern for gas-forming infection along the left shoulder and air along the lateral left chest wall.  MRI of his right foot also showed evidence of toe osteomyelitis (which grew MRSA). He was seen by ID who adjusted antibiotics, and they also noted oral thrush. IR aspirated fluid from left shoulder on June 20. On June 21 he had left shoulder I&D and removal of deep hardware. He underwent repeat irrigation and debridement of the left shoulder on June 24 with placement of a wound VAC. He had subsequent I&D with removal of the wound VAC on June 27. His right 2nd toe was amputated by Podiatry on July 1. Then, on July 3 he was noted to have dyspnea and mild tachycardia. CT chest showed a large abscess of the anterior central and left chest wall and abdominal wall with partial destruction of 3 ribs. He was seen by Cardiothoracic surgery. On July 4 he had left thoracentesis with aspiration of fluid. He subsequently had I&D of the left chest wall abscess on July 4, and a drainage catheter was left in place. On July 5 he underwent right VATS for a loculated right pleural effusion, and a chest tube was left in place. He was transitioned to the ICU post-operatively.  Transferred to [facility] [place] for Thoracic and Plastics eval.     Prior to transfer, "He is awake and alert. He has a PICC line in place along with the right chest tube and left chest wall drain in place. He has no evidence of respiratory distress and is hemodynamically stable. Referring provider felt patient was stable for step-down status at present. He is currently on ceftaroline and meropenem. He is in contact isolation. July 7: Sodium 137, potassium 4.5, chloride 104, CO2 26, " "BUN 33, creatinine 1.1, glucose 130, albumin 2.1, AST 25, ALT 17, white blood cells 11.58, hemoglobin 8.1, hematocrit 26.2, platelets 389, procalcitonin 0.575, CRP greater than 16  VS:  Temperature 97.5°, pulse 85, respirations 20, blood pressure 112/63, O2 sats 95% "  Interval History: Doing well. No complaints. Chest tube removed.    Review of Systems   Constitutional:  Negative for chills and fever.   All other systems reviewed and are negative.    Objective:     Vital Signs (Most Recent):  Temp: 98.8 °F (37.1 °C) (07/14/24 1205)  Pulse: 70 (07/14/24 1205)  Resp: 19 (07/14/24 1205)  BP: (!) 96/54 (07/14/24 1205)  SpO2: (!) 93 % (07/14/24 1205) Vital Signs (24h Range):  Temp:  [98 °F (36.7 °C)-98.8 °F (37.1 °C)] 98.8 °F (37.1 °C)  Pulse:  [66-82] 70  Resp:  [16-20] 19  SpO2:  [91 %-96 %] 93 %  BP: ()/(50-68) 96/54     Weight: 133 kg (293 lb 3.4 oz)  Body mass index is 39.77 kg/m².    Estimated Creatinine Clearance: 85.7 mL/min (based on SCr of 1.1 mg/dL).     Physical Exam  Vitals and nursing note reviewed.   Constitutional:       Appearance: Normal appearance.   HENT:      Head: Normocephalic and atraumatic.   Eyes:      Pupils: Pupils are equal, round, and reactive to light.   Neurological:      Mental Status: He is alert.   Psychiatric:         Mood and Affect: Mood normal.         Behavior: Behavior normal.         Thought Content: Thought content normal.         Judgment: Judgment normal.          Significant Labs:   Microbiology Results (last 7 days)       Procedure Component Value Units Date/Time    Aerobic culture [[id number]] Collected: 07/09/24 1622    Order Status: Completed Specimen: Abscess from Shoulder, Left Updated: 07/12/24 1031     Aerobic Bacterial Culture No growth    Culture, Anaerobic [[id number]] Collected: 07/09/24 1622    Order Status: Completed Specimen: Joint Fluid from Shoulder, Left Updated: 07/11/24 1253     Anaerobic Culture Culture in progress    AFB Culture & Smear " [[id number]] Collected: 07/09/24 1622    Order Status: Completed Specimen: Joint Fluid from Shoulder, Left Updated: 07/10/24 2127     AFB Culture & Smear Culture in progress     AFB CULTURE STAIN No acid fast bacilli seen.    Gram stain [[id number]] Collected: 07/09/24 1622    Order Status: Completed Specimen: Joint Fluid from Shoulder, Left Updated: 07/09/24 1756     Gram Stain Result Rare WBC's      No organisms seen    Fungus culture [[id number]] Collected: 07/09/24 1622    Order Status: Sent Specimen: Joint Fluid from Shoulder, Left Updated: 07/09/24 1637            Significant Imaging: None

## 2024-07-14 NOTE — PROGRESS NOTES
"[facility] Medicine  Progress Note    Patient Name: Roosevelt Moser  MRN: [id number]  Patient Class: IP- Inpatient   Admission Date: 7/7/2024  Length of Stay: 7 days  Attending Physician: Abraham Plaza DO  Primary Care Provider: Miriam, Primary Doctor        Subjective:     Principal Problem:Osteomyelitis of multiple sites        HPI:  Roosevelt Moser is a 72yo M w/ A fib, HTN, T2DM, MARA, HLD, hx GBS, hx displaced comminuted fracture of the left humerus s/p fall 3/4/24 s/p ORIF 03/25/2024 who presents as a transfer for thoracic surgery and Plastic surgery evaluation in setting of "osteomyelitis of multiple ribs which will need further debridement and then a flap placed."    He was initially admitted to [facility] on 6/14 for progressive weakness, multiple falls, and decreased urine output.  He was admitted for management of MARBIN on CKD complicated by hyperkalemia, urosepsis, AFib with RVR.    "Creatinine improved during his stay. During his stay he was noted to have swelling of his left upper extremity. Imaging studies on June 17 showed concern for gas-forming infection along the left shoulder and air along the lateral left chest wall concerning for gas-forming organism. MRI of his right foot also showed evidence of toe osteomyelitis. He was seen by ID who adjusted antibiotics, and they also noted oral thrush. Podiatry evaluated him for the toe findings, and Orthopedic Surgery evaluated the shoulder abnormalities. Right toe wound grew MRSA. Oral anticoagulation was held, and Radiology aspirated fluid from his left shoulder on June 20. On June 21 he had left shoulder incision and drainage and removal of deep hardware including rods and screws. He underwent repeat irrigation and debridement of the left shoulder on June 24 with placement of a wound VAC. He had subsequent incision and drainage with removal of the wound VAC on June 27. He had amputation of his right 2nd toe by Podiatry on July 1. " "On July 3 he was noted to have dyspnea and mild tachycardia. CT chest showed a large abscess of the anterior central and left chest wall and abdominal wall with partial destruction of 3 ribs. He was seen by Cardiothoracic surgery. On July 4 he had left thoracentesis with aspiration of fluid. He subsequently had incision and drainage of the left chest wall abscess on July 4, and a drainage catheter was left in place. On July 5 he underwent right VATS for a loculated right pleural effusion, and a chest tube was left in place. He was transitioned to the ICU post-operatively. With concern for osteomyelitis of the ribs, concern is that he will need transfer to a facility with Thoracic Surgery and potentially Plastic Surgery along with higher level of care. Transfer center spoke with Thoracic Surgery at [facility]. Requesting transfer to Hospital Medicine at [facility] for Plastic Surgery and Thoracic Surgery evaluation. Transfer center spoke with Plastic Surgery on July 5, Thoracic Surgery on July 7. With his complicated medical presentation, will plan transfer to Hospital Medicine at [facility] in step-down status for further treatment. "    Prior to transfer, "He is awake and alert. He has a PICC line in place along with the right chest tube and left chest wall drain in place. He has no evidence of respiratory distress and is hemodynamically stable. Referring provider felt patient was stable for step-down status at present. He is currently on ceftaroline and meropenem. He is in contact isolation. July 7: Sodium 137, potassium 4.5, chloride 104, CO2 26, BUN 33, creatinine 1.1, glucose 130, albumin 2.1, AST 25, ALT 17, white blood cells 11.58, hemoglobin 8.1, hematocrit 26.2, platelets 389, procalcitonin 0.575, CRP greater than 16  VS:  Temperature 97.5°, pulse 85, respirations 20, blood pressure 112/63, O2 sats 95% "    Patient in no acute distress upon arrival.  Wife at bedside.  Patient denies any " acute complaints.        Imaging history reviewed:  July 6: X-rays of the left shoulder had fracture through the surgical neck of the humerus with lateral distraction of the distal component and overlapping segments on the order of 2.5 cm.  Overall no appreciable change upon comparison with prior imaging.  -chest x-ray noted manifestations of mild congestive heart failure bordering on mild pulmonary edema.  Right-sided thoracostomy tube terminating within the right apex.  No definite pneumothorax.        July 5: Pleural fluid Gram stain with few WBCs and no organisms seen  -pH 7.339, pCO2 50.9, pO2 392     July 4:  CPK less than 10, serum   -AFB smear from left chest abscess had no AFB seen, left chest abscess aerobic and anaerobic cultures have no growth  -pleural fluid protein 3.2, white blood cells 1108 (11% segs, 74% lymphs), , pH 7.63, pleural fluid culture had no growth  -ultrasound-guided thoracentesis removed 1.4-1.5 L     July 3: Blood cultures with no growth to date  -CT chest showed large abscess of the anterior central and left chest wall and abdominal wall measuring 17 cm transversely.  This extends into the mediastinum and peritoneum with partial destruction of the anterior 6th, 7th, and 8th ribs.  Complete atelectasis of the left lower lobe with moderate left pleural effusion.  Mild atelectasis right lung base with a small-to-moderate right pleural effusion.     July 2: X-rays of the left shoulder noted displaced left proximal humerus fracture status post hardware removal without change in alignment.     June 24: ECHO with EF 55-60%.     June 20:  Interventional Radiology did percutaneous aspiration of the left shoulder fluid collection.  No drainage catheter was left in place.     June 18:  Right toe wound MRSA  -CT left shoulder had subacute left humerus fracture post internal fixation.  Incomplete bony bridging across the fracture site with persistent angulation.  Severe arthropathy  "of the glenohumeral joint with multiple intra-articular bodies.  Large left joint effusion and adjacent soft tissue focal fluid collections containing gas and concerning for gas-forming infection.  Moderate size left pleural effusion.  Left basilar airspace disease.  -bilateral lower extremity Doppler arterial ultrasound had atherosclerotic plaque and calcification bilaterally without severe stenosis or occlusion identified on either side.     June 17: X-rays of the left humerus/shoulder had findings concerning for gas-forming infection along the left shoulder.  -x-rays of the left ribs showed air along the lateral chest wall soft tissue and axilla concerning for gas-forming infection.  -MRI of the right foot had findings consistent with osteomyelitis involving the middle distal phalanges of the 3rd toe.  Cellulitis.     June 14: Renal ultrasound had no hydronephrosis.  Elevated resistive index right kidney suggesting intrinsic renal disease.    Overview/Hospital Course:  Pt admitted to  as a transfer for thoracic surgery and Plastic surgery evaluation in setting of "osteomyelitis of multiple ribs which will need further debridement and then a flap placed." Thoracic Surgery, Plastic Surgery, ID, Ortho, Wound care, PT/OT consulted upon admission. Imaging ordered.  CT chest abdomen with IV contrast without evidence of osteomyelitis in ribs adjacent to I&D site of left chest wall abscess.; incidental finding of 1.2 cm obstructing stone in left distal ureter with mild hydroureteronephrosis.  Urology was consulted and CT renal stone study was completed; no acute intervention given no concern for infection around stone or MARBIN; follow-up outpatient unless decompensates.  MRI shoulder without contrast left notable for septic arthritis of left glenohumeral joint with acute osteomyelitis of the glenoid and proximal humerus in addition to known displaced fracture of proximal humeral metaphysis in addition to several large " soft tissue abscesses.  Patient underwent left shoulder aspiration with Orthopedic surgery on 07/09.  Right chest tube was removed by thoracic surgery on 07/09. Drain previously placed for L chest wall abscess to be removed if output remains serous and decreases. To continue on IV Ceftaroline 600 mg q8 for anticipated 6 weeks therapy    Interval History: Seen this AM at bedside. L chest drain removed this AM. Denies chest pain, shortness of breath    Review of Systems  Objective:     Vital Signs (Most Recent):  Temp: 98.2 °F (36.8 °C) (07/12/24 0838)  Pulse: 72 (07/12/24 0838)  Resp: 18 (07/12/24 1020)  BP: 123/68 (07/12/24 0838)  SpO2: 96 % (07/12/24 0838) Vital Signs (24h Range):  Temp:  [98 °F (36.7 °C)-98.7 °F (37.1 °C)] 98.2 °F (36.8 °C)  Pulse:  [60-82] 72  Resp:  [18] 18  SpO2:  [93 %-99 %] 96 %  BP: (108-123)/(57-70) 123/68     Weight: 133 kg (293 lb 3.4 oz)  Body mass index is 39.77 kg/m².    Intake/Output Summary (Last 24 hours) at 7/12/2024 1028  Last data filed at 7/12/2024 0339  Gross per 24 hour   Intake 290 ml   Output 2230 ml   Net -1940 ml         Physical Exam  Vitals and nursing note reviewed.   Constitutional:       General: He is not in acute distress.     Appearance: He is ill-appearing (chronically). He is not toxic-appearing or diaphoretic.   HENT:      Head: Normocephalic and atraumatic.      Nose: Nose normal.      Mouth/Throat:      Pharynx: Oropharynx is clear.   Eyes:      General: No scleral icterus.  Cardiovascular:      Rate and Rhythm: Normal rate and regular rhythm.      Pulses: Normal pulses.      Heart sounds: Normal heart sounds.   Pulmonary:      Effort: Pulmonary effort is normal.   Chest:      Chest wall: No deformity or tenderness.      Comments: Left chest wall drain with seropurulent output  Abdominal:      General: Bowel sounds are normal.      Palpations: Abdomen is soft.      Tenderness: There is no abdominal tenderness.   Musculoskeletal:      Cervical back: Normal  "range of motion and neck supple.      Right lower leg: No edema.      Left lower leg: No edema.   Skin:     General: Skin is warm.      Capillary Refill: Capillary refill takes less than 2 seconds.      Comments: Unable to examine posterior skin due to patient debility   Neurological:      Mental Status: He is alert and oriented to person, place, and time.   Psychiatric:         Behavior: Behavior normal.             Significant Labs: All pertinent labs within the past 24 hours have been reviewed.    Significant Imaging: I have reviewed all pertinent imaging results/findings within the past 24 hours.    Assessment/Plan:      * Osteomyelitis of multiple sites  Skin ulcer of toe of right foot with necrosis of bone  Osteomyelitis of toe   MRSA infection     Chest wall abscess  Loculated pleural effusion  Mediastinal lymphadenopathy    Abscess of left shoulder  Pyogenic abscess of left shoulder region  Humerus fracture    OSH:  -status post left thoracentesis 1500cc serosanguineous fluid drained 7/4 & I&D drainage with chest tube placement 7/4 & loculated effusions right chest s/p VATS and chest tube 7/5   -Left shoulder prosthetic joint infection s/p I&D and removal of deep hardware 6/21 - all screws and nails removed, s/p 2nd washout 6/24 & 3rd washout 6/27   -Left Shoulder washouts on 6/21 ( abundant pus in shoulder, hardware removal), 6/24 ( bloody brownish fluid) and 6/27 ( no fluid collection, healthy red and beefy tissue)   -Right 3rd toe osteomyelitis s/p Dalvance x 2 doses & 2nd distal phalanx s/p I&D at bedside, s/p 2nd toe amputation 7/1       - Presents as a transfer for thoracic surgery and Plastic surgery evaluation in setting of concern for "osteomyelitis of multiple ribs which will need further debridement and then a flap placed" in the setting of chest wall abscess  -notably, 7/3 blood cultures, 7/4 pleural fluid cultures, 7/4 abscess cultures, 755 pleural fluid cultures NGTD  -6/18 right toe wound " culture with MRSA  Plan:  -ID on board, meropenem discontinued. Continue Ceftaroline 600 mg IV q.8; will need estimated course of 6 weeks   -continue to follow OSH cultures  -ongoing wound care  -ID, Ortho, Thoracic surgery and Plastic surgery consulted and following; imaging findings per hospital course  -thoracic surgery removed right chest tube on 07/09; left chest accordion drain removed 7/14  -ortho surgery completed left shoulder aspiration 7/9 to assess shoulder abscess concern; cultures pending  -thus far, no acute surgical intervention per all surgical specialties    Acute renal failure superimposed on chronic kidney disease  Patient with acute kidney injury/acute renal failure likely due to  due to ATN due to sepsis due to UTI and/or PNA  MARBIN is currently improving. Baseline creatinine  wnl  - Labs reviewed- Renal function/electrolytes with Estimated Creatinine Clearance: 104.7 mL/min (based on SCr of 0.9 mg/dL). according to latest data. Monitor urine output and serial BMP and adjust therapy as needed. Avoid nephrotoxins and renally dose meds for GFR listed above.    Left renal stone  Nephrolithiasis    6/14 retroperitoneal ultrasound completed in setting of MARBIN without hydronephrosis   7/8 CT abdomen notable for 1.2 cm obstructing stone in left distal ureter with mild hydroureteronephrosis       Appreciate urology recommendations; given no concerns regarding MARBIN or infection relation to the stone, no acute surgical intervention  Follow-up outpatient; strain all urine    Acute hypoxemic respiratory failure  -Ddx: decreased reserve from VATS vs volume overload (on fluids since 7/7)  -CXR noting blunting of CP angles  -7/12: Lasix 40mg IV x1, good response    CKD (chronic kidney disease) stage 2, GFR 60-89 ml/min  Creatine stable for now. BMP reviewed- noted Estimated Creatinine Clearance: 94.3 mL/min (based on SCr of 1 mg/dL). according to latest data. Based on current GFR, CKD stage is stage 2 - GFR  "60-89.  Monitor UOP and serial BMP and adjust therapy as needed. Renally dose meds. Avoid nephrotoxic medications and procedures.    Thyroid nodule  Incidental finding on imagin.7 cm left thyroid nodule. Nonemergent thyroid ultrasound is recommended.       Cyst of right kidney  Incidental finding on imagin.8 cm septated cystic lesion in the right kidney, which is indeterminate for malignancy. Further evaluation with nonemergent renal mass protocol CT or MRI is recommended.       Sepsis  This patient does have evidence of infective focus  My overall impression is sepsis.  Source: Skin and Soft Tissue (location toe)  Antibiotics given-   Antibiotics (72h ago, onward)      Start     Stop Route Frequency Ordered    24 1545  ceftaroline fosamiL (TEFLARO) 600 mg in D5W 50 mL IVPB (MB+)         -- IV Every 8 hours (non-standard times) 24 1532    24 1545  meropenem (MERREM) 1 g in 0.9% NaCl 100 mL IVPB (MB+)         -- IV Every 8 hours (non-standard times) 24 1532          Latest lactate reviewed-  No results for input(s): "LACTATE", "POCLAC" in the last 72 hours.  Organ dysfunction indicated by Acute kidney injury    Fluid challenge Not needed - patient is not hypotensive      Post- resuscitation assessment No - Post resuscitation assessment not needed       Will Not start Pressors- Levophed for MAP of 65  Source control achieved by: abx    Debility  Patient with Acute on chronic debility due to  multiple infections . Latest AMPAC and GEMS scores have not been reviewed. Evaluation for etiology is complete. Plan includes progressive mobility protocol initated, PT/OT consulted, and fall precautions in place.    Atrial fibrillation with rapid ventricular response  Patient with Long standing persistent (>12 months) atrial fibrillation which is controlled currently with Beta Blocker. Patient is currently in TBD.[id number] Score: 2.      Chronic AF w/ acute RVR at OSH, resolved s/p cardizem drip "   NOAC has been on hold d/t possible need for surgery - has been on prophylaxis dose of Lovenox  Resume A/c pending any surgical intervention, consider lovenox vs hep gtt bridge    History of Guillain-[place] syndrome  No acute issues  However, pt has impaired mobility and is acutely weakened from his sepsis/UTI/AF RVR and need placement       Type 2 diabetes mellitus  Patient's FSGs are controlled on current medication regimen.  Last A1c reviewed-   Lab Results   Component Value Date    HGBA1C 6.3 (H) 06/14/2024     Most recent fingerstick glucose reviewed-   Recent Labs   Lab 07/10/24  1607 07/10/24  2002   POCTGLUCOSE 181* 178*     Current correctional scale  Low  Maintain anti-hyperglycemic dose as follows-   Antihyperglycemics (From admission, onward)      Start     Stop Route Frequency Ordered    07/08/24 0900  insulin glargine U-100 (Lantus) pen 15 Units         -- SubQ Daily 07/07/24 1532    07/07/24 1529  insulin aspart U-100 pen 0-10 Units         -- SubQ Before meals & nightly PRN 07/07/24 1532          Hold Oral hypoglycemics while patient is in the hospital.    Hypertension  Chronic, controlled. Latest blood pressure and vitals reviewed-     Temp:  [97.8 °F (36.6 °C)-98.8 °F (37.1 °C)]   Pulse:  [65-91]   Resp:  [2-24]   BP: (100-145)/(56-81)   SpO2:  [90 %-99 %] .   Home meds for hypertension were reviewed and noted below.   Hypertension Medications               hydrALAZINE (APRESOLINE) 25 MG tablet Take 1 tablet (25 mg total) by mouth every 12 (twelve) hours.    metoprolol succinate (TOPROL-XL) 100 MG 24 hr tablet Take 1 tablet (100 mg total) by mouth once daily.            While in the hospital, will manage blood pressure as follows; continue home metoprolol    Will utilize p.r.n. blood pressure medication only if patient's blood pressure greater than 180/110 and he develops symptoms such as worsening chest pain or shortness of breath.    MARA (obstructive sleep apnea)  Continue CPAP HS and w/  naps        Morbid obesity  There is no height or weight on file to calculate BMI. Morbid obesity complicates all aspects of disease management from diagnostic modalities to treatment. Weight loss encouraged and health benefits explained to patient.           VTE Risk Mitigation (From admission, onward)           Ordered     enoxaparin injection 40 mg  Every 24 hours         07/09/24 1358     Place sequential compression device  Until discontinued         07/07/24 1532                    Discharge Planning   KAMILA: 7/15/2024     Code Status: Full Code   Is the patient medically ready for discharge?:     Reason for patient still in hospital (select all that apply): Patient trending condition  Discharge Plan A: Home with family                  Abraham Plaza DO  Department of Hospital Medicine   Andres KEARNEY

## 2024-07-14 NOTE — ASSESSMENT & PLAN NOTE
73 yoM with left chest wall abscess and concern for left 6-8 rib osteomyelitis transferred for potential thoracic and plastic surgery intervention. Subjectively he is feeling much better s/p incision and drainage of chest wall abscess, thoracentesis of left pleural effusion, and VATS with right chest tube placement for right pleural effusion at [facility]. Imaging workup here revealed significant improvement in chest wall abscess size without apparent osteomyelitis of the ribs on CT, but with continued septic left shoulder joint. Right chest tube removed 7/9.     - Removed left sided drain at bedside this am without issues. .   - No plans for acute thoracic intervention  - We will arrange for follow up in clinic with MRI of the ribs  - Thoracic surgery will sign off. Please call with questions.

## 2024-07-14 NOTE — PLAN OF CARE
Problem: Adult Inpatient Plan of Care  Goal: Plan of Care Review  Outcome: Progressing  Goal: Patient-Specific Goal (Individualized)  Outcome: Progressing  Goal: Absence of Hospital-Acquired Illness or Injury  Outcome: Progressing  Goal: Optimal Comfort and Wellbeing  Outcome: Progressing  Goal: Readiness for Transition of Care  Outcome: Progressing     Problem: Diabetes Comorbidity  Goal: Blood Glucose Level Within Targeted Range  Outcome: Progressing     Problem: Sepsis/Septic Shock  Goal: Optimal Coping  Outcome: Progressing  Goal: Absence of Bleeding  Outcome: Progressing  Goal: Blood Glucose Level Within Targeted Range  Outcome: Progressing  Goal: Absence of Infection Signs and Symptoms  Outcome: Progressing  Goal: Optimal Nutrition Intake  Outcome: Progressing     Problem: Acute Kidney Injury/Impairment  Goal: Improved Oral Intake  Outcome: Progressing     Problem: Infection  Goal: Absence of Infection Signs and Symptoms  Outcome: Progressing     Problem: Wound  Goal: Optimal Coping  Outcome: Progressing  Goal: Optimal Functional Ability  Outcome: Progressing  Goal: Absence of Infection Signs and Symptoms  Outcome: Progressing  Goal: Improved Oral Intake  Outcome: Progressing  Goal: Optimal Pain Control and Function  Outcome: Progressing  Goal: Skin Health and Integrity  Outcome: Progressing  Goal: Optimal Wound Healing  Outcome: Progressing     Problem: Skin Injury Risk Increased  Goal: Skin Health and Integrity  Outcome: Progressing     Problem: Fall Injury Risk  Goal: Absence of Fall and Fall-Related Injury  Outcome: Progressing

## 2024-07-14 NOTE — SUBJECTIVE & OBJECTIVE
Interval History: No major changes. Drain with 20cc serous output. No fevers. No white count.     Medications:  Continuous Infusions:  Scheduled Meds:   (Magic mouthwash) 1:1:1 diphenhydrAMINE(Benadryl) 12.5mg/5ml liq, aluminum & magnesium hydroxide-simethicone (Maalox), LIDOcaine viscous 2%  10 mL Swish & Spit QID    ceftaroline (Teflaro) IV (PEDS and ADULTS)  600 mg Intravenous Q8H    enoxparin  40 mg Subcutaneous Q24H (prophylaxis, 1700)    ferrous sulfate  1 tablet Oral Daily    insulin glargine U-100  15 Units Subcutaneous Daily    LIDOcaine  1 patch Transdermal Q24H    metoprolol succinate  100 mg Oral QHS    polyethylene glycol  17 g Oral Daily    sodium chloride 0.9%  10 mL Intravenous Q6H     PRN Meds:  Current Facility-Administered Medications:     albuterol-ipratropium, 3 mL, Nebulization, Q4H PRN    aluminum & magnesium hydroxide-simethicone, 15 mL, Oral, QID PRN    dextrose 10%, 12.5 g, Intravenous, PRN    dextrose 10%, 25 g, Intravenous, PRN    glucagon (human recombinant), 1 mg, Intramuscular, PRN    glucose, 16 g, Oral, PRN    glucose, 24 g, Oral, PRN    HYDROcodone-acetaminophen, 1 tablet, Oral, Q4H PRN    HYDROcodone-acetaminophen, 1 tablet, Oral, Q4H PRN    insulin aspart U-100, 0-10 Units, Subcutaneous, QID (AC + HS) PRN    loperamide, 2 mg, Oral, QID PRN    naloxone, 0.02 mg, Intravenous, PRN    ondansetron, 8 mg, Oral, Q8H PRN    polyethylene glycol, 17 g, Oral, BID PRN    promethazine, 25 mg, Oral, Q6H PRN    senna-docusate 8.6-50 mg, 1 tablet, Oral, BID PRN    simethicone, 1 tablet, Oral, QID PRN    sodium chloride 0.9%, 10 mL, Intravenous, Q12H PRN    Flushing PICC/Midline Protocol, , , Until Discontinued **AND** sodium chloride 0.9%, 10 mL, Intravenous, Q6H **AND** sodium chloride 0.9%, 10 mL, Intravenous, PRN    traMADoL, 50 mg, Oral, Q4H PRN     Review of patient's allergies indicates:  No Known Allergies  Objective:     Vital Signs (Most Recent):  Temp: 98.5 °F (36.9 °C) (07/14/24  0512)  Pulse: 66 (07/14/24 0512)  Resp: 18 (07/14/24 0512)  BP: (!) 119/56 (07/14/24 0512)  SpO2: (!) 94 % (07/14/24 0512) Vital Signs (24h Range):  Temp:  [97.3 °F (36.3 °C)-98.5 °F (36.9 °C)] 98.5 °F (36.9 °C)  Pulse:  [63-75] 66  Resp:  [16-20] 18  SpO2:  [92 %-98 %] 94 %  BP: ()/(52-68) 119/56     Weight: 133 kg (293 lb 3.4 oz)  Body mass index is 39.77 kg/m².    Intake/Output - Last 3 Shifts         07/12 0700  07/13 0659 07/13 0700 07/14 0659 07/14 0700  07/15 0659    P.O.  480     IV Piggyback 150      Total Intake(mL/kg) 150 (1.1) 480 (3.6)     Urine (mL/kg/hr) 3675 (1.2) 600 (0.2)     Drains 40 20     Stool 0 0     Total Output 3715 620     Net -3565 -140            Urine Occurrence  3 x     Stool Occurrence 1 x 1 x              Physical Exam  Vitals reviewed.   Constitutional:       Appearance: Normal appearance.   Cardiovascular:      Rate and Rhythm: Normal rate and regular rhythm.   Pulmonary:      Effort: Pulmonary effort is normal. No respiratory distress.   Abdominal:      Palpations: Abdomen is soft.      Tenderness: There is no abdominal tenderness.   Musculoskeletal:      Comments: Left chest wall accordion drain with serous output   Skin:     General: Skin is warm.   Neurological:      General: No focal deficit present.      Mental Status: He is alert and oriented to person, place, and time.          Significant Labs:  I have reviewed all pertinent lab results within the past 24 hours.    Significant Diagnostics:  I have reviewed all pertinent imaging results/findings within the past 24 hours.

## 2024-07-14 NOTE — PROGRESS NOTES
[facility]  General Surgery  Progress Note    Subjective:     History of Present Illness:  Mr. Moser is a pleasant 73 yoM with PMH including Afib on eliquis, HTN, T2DM and MARA who is transferred from [place] for a chest wall abscess with concern for osteomyelitis of the anterior left 6-8th ribs. He originally presented to the [facility] after a fall resulting in a left humeral fracture for which he underwent ORIF in March. He represented in June with a left shoulder abscess and underwent multiple washouts and removal of the ORIF hardware. His hospital stay was complicated by bilateral effusions and a left chest wall abscess with extension into the epigastrium and mediastinum with concern for osteomyelitis of the left 6-8th ribs. He underwent I+D of the abscess through a small left anterior chest incision with drain placement on 7/4, thoracentesis of the left pleural effusion on 7/4, and right-sided VATs with washout and chest tube placement on 7/5. Interestingly, cultures from the pleural fluid and chest wall abscess cavity have been negative despite the abscess cavity fluid being described as creamy pus by the [facility] op report. The only positive cultures so far have been from those collected by podiatry during a toe amp which were positive for MRSA.    He was transferred to [facility] for potential thoracic and plastic surgery interventions if more invasive debridements or resections were required with reconstruction.    Upon arrival here he states that over the past couple days he has been feeling much better. He states that the pain and swelling associated with the chest wall abscess is resolved. He denies any fever or chills. He states that the dyspnea he was experiencing with the prior pleural effusions has resolved, despite remaining on 2-3 L/min NC. He is not on home oxygen.     Post-Op Info:  * No surgery found *         Interval History: No major changes. Drain with 20cc serous output. No fevers. No white count.      Medications:  Continuous Infusions:  Scheduled Meds:   (Magic mouthwash) 1:1:1 diphenhydrAMINE(Benadryl) 12.5mg/5ml liq, aluminum & magnesium hydroxide-simethicone (Maalox), LIDOcaine viscous 2%  10 mL Swish & Spit QID    ceftaroline (Teflaro) IV (PEDS and ADULTS)  600 mg Intravenous Q8H    enoxparin  40 mg Subcutaneous Q24H (prophylaxis, 1700)    ferrous sulfate  1 tablet Oral Daily    insulin glargine U-100  15 Units Subcutaneous Daily    LIDOcaine  1 patch Transdermal Q24H    metoprolol succinate  100 mg Oral QHS    polyethylene glycol  17 g Oral Daily    sodium chloride 0.9%  10 mL Intravenous Q6H     PRN Meds:  Current Facility-Administered Medications:     albuterol-ipratropium, 3 mL, Nebulization, Q4H PRN    aluminum & magnesium hydroxide-simethicone, 15 mL, Oral, QID PRN    dextrose 10%, 12.5 g, Intravenous, PRN    dextrose 10%, 25 g, Intravenous, PRN    glucagon (human recombinant), 1 mg, Intramuscular, PRN    glucose, 16 g, Oral, PRN    glucose, 24 g, Oral, PRN    HYDROcodone-acetaminophen, 1 tablet, Oral, Q4H PRN    HYDROcodone-acetaminophen, 1 tablet, Oral, Q4H PRN    insulin aspart U-100, 0-10 Units, Subcutaneous, QID (AC + HS) PRN    loperamide, 2 mg, Oral, QID PRN    naloxone, 0.02 mg, Intravenous, PRN    ondansetron, 8 mg, Oral, Q8H PRN    polyethylene glycol, 17 g, Oral, BID PRN    promethazine, 25 mg, Oral, Q6H PRN    senna-docusate 8.6-50 mg, 1 tablet, Oral, BID PRN    simethicone, 1 tablet, Oral, QID PRN    sodium chloride 0.9%, 10 mL, Intravenous, Q12H PRN    Flushing PICC/Midline Protocol, , , Until Discontinued **AND** sodium chloride 0.9%, 10 mL, Intravenous, Q6H **AND** sodium chloride 0.9%, 10 mL, Intravenous, PRN    traMADoL, 50 mg, Oral, Q4H PRN     Review of patient's allergies indicates:  No Known Allergies  Objective:     Vital Signs (Most Recent):  Temp: 98.5 °F (36.9 °C) (07/14/24 0512)  Pulse: 66 (07/14/24 0512)  Resp: 18 (07/14/24 0512)  BP: (!) 119/56 (07/14/24 0512)  SpO2:  (!) 94 % (07/14/24 0512) Vital Signs (24h Range):  Temp:  [97.3 °F (36.3 °C)-98.5 °F (36.9 °C)] 98.5 °F (36.9 °C)  Pulse:  [63-75] 66  Resp:  [16-20] 18  SpO2:  [92 %-98 %] 94 %  BP: ()/(52-68) 119/56     Weight: 133 kg (293 lb 3.4 oz)  Body mass index is 39.77 kg/m².    Intake/Output - Last 3 Shifts         07/12 0700 07/13 0659 07/13 0700 07/14 0659 07/14 0700  07/15 0659    P.O.  480     IV Piggyback 150      Total Intake(mL/kg) 150 (1.1) 480 (3.6)     Urine (mL/kg/hr) 3675 (1.2) 600 (0.2)     Drains 40 20     Stool 0 0     Total Output 3715 620     Net -3565 -140            Urine Occurrence  3 x     Stool Occurrence 1 x 1 x              Physical Exam  Vitals reviewed.   Constitutional:       Appearance: Normal appearance.   Cardiovascular:      Rate and Rhythm: Normal rate and regular rhythm.   Pulmonary:      Effort: Pulmonary effort is normal. No respiratory distress.   Abdominal:      Palpations: Abdomen is soft.      Tenderness: There is no abdominal tenderness.   Musculoskeletal:      Comments: Left chest wall accordion drain with serous output   Skin:     General: Skin is warm.   Neurological:      General: No focal deficit present.      Mental Status: He is alert and oriented to person, place, and time.          Significant Labs:  I have reviewed all pertinent lab results within the past 24 hours.    Significant Diagnostics:  I have reviewed all pertinent imaging results/findings within the past 24 hours.    Assessment/Plan:     Chest wall abscess  73 yoM with left chest wall abscess and concern for left 6-8 rib osteomyelitis transferred for potential thoracic and plastic surgery intervention. Subjectively he is feeling much better s/p incision and drainage of chest wall abscess, thoracentesis of left pleural effusion, and VATS with right chest tube placement for right pleural effusion at [facility]. Imaging workup here revealed significant improvement in chest wall abscess size without apparent  osteomyelitis of the ribs on CT, but with continued septic left shoulder joint. Right chest tube removed 7/9.     - Removed left sided drain at bedside this am without issues. .   - No plans for acute thoracic intervention  - We will arrange for follow up in clinic with MRI of the ribs  - Thoracic surgery will sign off. Please call with questions.         Leonides Pearl MD  General Surgery  [facility]

## 2024-07-14 NOTE — SUBJECTIVE & OBJECTIVE
Interval History: Doing well. No complaints. Chest tube removed.    Review of Systems   Constitutional:  Negative for chills and fever.   All other systems reviewed and are negative.    Objective:     Vital Signs (Most Recent):  Temp: 98.8 °F (37.1 °C) (07/14/24 1205)  Pulse: 70 (07/14/24 1205)  Resp: 19 (07/14/24 1205)  BP: (!) 96/54 (07/14/24 1205)  SpO2: (!) 93 % (07/14/24 1205) Vital Signs (24h Range):  Temp:  [98 °F (36.7 °C)-98.8 °F (37.1 °C)] 98.8 °F (37.1 °C)  Pulse:  [66-82] 70  Resp:  [16-20] 19  SpO2:  [91 %-96 %] 93 %  BP: ()/(50-68) 96/54     Weight: 133 kg (293 lb 3.4 oz)  Body mass index is 39.77 kg/m².    Estimated Creatinine Clearance: 85.7 mL/min (based on SCr of 1.1 mg/dL).     Physical Exam  Vitals and nursing note reviewed.   Constitutional:       Appearance: Normal appearance.   HENT:      Head: Normocephalic and atraumatic.   Eyes:      Pupils: Pupils are equal, round, and reactive to light.   Neurological:      Mental Status: He is alert.   Psychiatric:         Mood and Affect: Mood normal.         Behavior: Behavior normal.         Thought Content: Thought content normal.         Judgment: Judgment normal.          Significant Labs:   Microbiology Results (last 7 days)       Procedure Component Value Units Date/Time    Aerobic culture [[id number]] Collected: 07/09/24 1622    Order Status: Completed Specimen: Abscess from Shoulder, Left Updated: 07/12/24 1031     Aerobic Bacterial Culture No growth    Culture, Anaerobic [[id number]] Collected: 07/09/24 1622    Order Status: Completed Specimen: Joint Fluid from Shoulder, Left Updated: 07/11/24 1253     Anaerobic Culture Culture in progress    AFB Culture & Smear [[id number]] Collected: 07/09/24 1622    Order Status: Completed Specimen: Joint Fluid from Shoulder, Left Updated: 07/10/24 2127     AFB Culture & Smear Culture in progress     AFB CULTURE STAIN No acid fast bacilli seen.    Gram stain [[id number]] Collected: 07/09/24 1629     Order Status: Completed Specimen: Joint Fluid from Shoulder, Left Updated: 07/09/24 1759     Gram Stain Result Rare WBC's      No organisms seen    Fungus culture [[id number]] Collected: 07/09/24 1622    Order Status: Sent Specimen: Joint Fluid from Shoulder, Left Updated: 07/09/24 1637            Significant Imaging: None

## 2024-07-15 DIAGNOSIS — L02.213 CHEST WALL ABSCESS: Primary | ICD-10-CM

## 2024-07-15 LAB
ANION GAP SERPL CALC-SCNC: 11 MMOL/L (ref 8–16)
BASOPHILS # BLD AUTO: 0.09 K/UL (ref 0–0.2)
BASOPHILS NFR BLD: 0.9 % (ref 0–1.9)
BUN SERPL-MCNC: 26 MG/DL (ref 8–23)
CALCIUM SERPL-MCNC: 8.5 MG/DL (ref 8.7–10.5)
CHLORIDE SERPL-SCNC: 100 MMOL/L (ref 95–110)
CK SERPL-CCNC: <7 U/L (ref 20–200)
CO2 SERPL-SCNC: 24 MMOL/L (ref 23–29)
CREAT SERPL-MCNC: 1.1 MG/DL (ref 0.5–1.4)
DIFFERENTIAL METHOD BLD: ABNORMAL
EOSINOPHIL # BLD AUTO: 1 K/UL (ref 0–0.5)
EOSINOPHIL NFR BLD: 9.5 % (ref 0–8)
ERYTHROCYTE [DISTWIDTH] IN BLOOD BY AUTOMATED COUNT: 17.9 % (ref 11.5–14.5)
EST. GFR  (NO RACE VARIABLE): >60 ML/MIN/1.73 M^2
GLUCOSE SERPL-MCNC: 113 MG/DL (ref 70–110)
HCT VFR BLD AUTO: 30.6 % (ref 40–54)
HGB BLD-MCNC: 9.5 G/DL (ref 14–18)
IMM GRANULOCYTES # BLD AUTO: 0.12 K/UL (ref 0–0.04)
IMM GRANULOCYTES NFR BLD AUTO: 1.2 % (ref 0–0.5)
LYMPHOCYTES # BLD AUTO: 1.6 K/UL (ref 1–4.8)
LYMPHOCYTES NFR BLD: 15.7 % (ref 18–48)
MAGNESIUM SERPL-MCNC: 1.7 MG/DL (ref 1.6–2.6)
MCH RBC QN AUTO: 24.8 PG (ref 27–31)
MCHC RBC AUTO-ENTMCNC: 31 G/DL (ref 32–36)
MCV RBC AUTO: 80 FL (ref 82–98)
MONOCYTES # BLD AUTO: 1.1 K/UL (ref 0.3–1)
MONOCYTES NFR BLD: 11.1 % (ref 4–15)
NEUTROPHILS # BLD AUTO: 6.2 K/UL (ref 1.8–7.7)
NEUTROPHILS NFR BLD: 61.6 % (ref 38–73)
NRBC BLD-RTO: 0 /100 WBC
PHOSPHATE SERPL-MCNC: 3.7 MG/DL (ref 2.7–4.5)
PLATELET # BLD AUTO: 382 K/UL (ref 150–450)
PMV BLD AUTO: 9.7 FL (ref 9.2–12.9)
POCT GLUCOSE: 146 MG/DL (ref 70–110)
POCT GLUCOSE: 185 MG/DL (ref 70–110)
POTASSIUM SERPL-SCNC: 4.2 MMOL/L (ref 3.5–5.1)
RBC # BLD AUTO: 3.83 M/UL (ref 4.6–6.2)
SODIUM SERPL-SCNC: 135 MMOL/L (ref 136–145)
WBC # BLD AUTO: 10.03 K/UL (ref 3.9–12.7)

## 2024-07-15 PROCEDURE — 82550 ASSAY OF CK (CPK): CPT | Performed by: STUDENT IN AN ORGANIZED HEALTH CARE EDUCATION/TRAINING PROGRAM

## 2024-07-15 PROCEDURE — 63600175 PHARM REV CODE 636 W HCPCS: Performed by: STUDENT IN AN ORGANIZED HEALTH CARE EDUCATION/TRAINING PROGRAM

## 2024-07-15 PROCEDURE — 85025 COMPLETE CBC W/AUTO DIFF WBC: CPT | Performed by: STUDENT IN AN ORGANIZED HEALTH CARE EDUCATION/TRAINING PROGRAM

## 2024-07-15 PROCEDURE — 36415 COLL VENOUS BLD VENIPUNCTURE: CPT | Performed by: STUDENT IN AN ORGANIZED HEALTH CARE EDUCATION/TRAINING PROGRAM

## 2024-07-15 PROCEDURE — A4216 STERILE WATER/SALINE, 10 ML: HCPCS | Performed by: STUDENT IN AN ORGANIZED HEALTH CARE EDUCATION/TRAINING PROGRAM

## 2024-07-15 PROCEDURE — 25000003 PHARM REV CODE 250: Performed by: STUDENT IN AN ORGANIZED HEALTH CARE EDUCATION/TRAINING PROGRAM

## 2024-07-15 PROCEDURE — 83735 ASSAY OF MAGNESIUM: CPT | Performed by: STUDENT IN AN ORGANIZED HEALTH CARE EDUCATION/TRAINING PROGRAM

## 2024-07-15 PROCEDURE — 84100 ASSAY OF PHOSPHORUS: CPT | Performed by: STUDENT IN AN ORGANIZED HEALTH CARE EDUCATION/TRAINING PROGRAM

## 2024-07-15 PROCEDURE — 20600001 HC STEP DOWN PRIVATE ROOM

## 2024-07-15 PROCEDURE — 80048 BASIC METABOLIC PNL TOTAL CA: CPT | Performed by: STUDENT IN AN ORGANIZED HEALTH CARE EDUCATION/TRAINING PROGRAM

## 2024-07-15 PROCEDURE — 27000207 HC ISOLATION

## 2024-07-15 PROCEDURE — 25000003 PHARM REV CODE 250: Mod: HCNC | Performed by: STUDENT IN AN ORGANIZED HEALTH CARE EDUCATION/TRAINING PROGRAM

## 2024-07-15 RX ADMIN — METOPROLOL SUCCINATE 100 MG: 100 TABLET, EXTENDED RELEASE ORAL at 08:07

## 2024-07-15 RX ADMIN — INSULIN GLARGINE 15 UNITS: 100 INJECTION, SOLUTION SUBCUTANEOUS at 09:07

## 2024-07-15 RX ADMIN — HYDROCODONE BITARTRATE AND ACETAMINOPHEN 1 TABLET: 10; 325 TABLET ORAL at 11:07

## 2024-07-15 RX ADMIN — CEFTAROLINE FOSAMIL 600 MG: 600 POWDER, FOR SOLUTION INTRAVENOUS at 03:07

## 2024-07-15 RX ADMIN — DIPHENHYDRAMINE HYDROCHLORIDE 10 ML: 25 SOLUTION ORAL at 08:07

## 2024-07-15 RX ADMIN — DIPHENHYDRAMINE HYDROCHLORIDE 10 ML: 25 SOLUTION ORAL at 05:07

## 2024-07-15 RX ADMIN — Medication 10 ML: at 05:07

## 2024-07-15 RX ADMIN — DIPHENHYDRAMINE HYDROCHLORIDE 10 ML: 25 SOLUTION ORAL at 01:07

## 2024-07-15 RX ADMIN — Medication 10 ML: at 11:07

## 2024-07-15 RX ADMIN — HYDROCODONE BITARTRATE AND ACETAMINOPHEN 1 TABLET: 10; 325 TABLET ORAL at 08:07

## 2024-07-15 RX ADMIN — MUPIROCIN: 20 OINTMENT TOPICAL at 08:07

## 2024-07-15 RX ADMIN — INSULIN ASPART 2 UNITS: 100 INJECTION, SOLUTION INTRAVENOUS; SUBCUTANEOUS at 04:07

## 2024-07-15 RX ADMIN — ENOXAPARIN SODIUM 40 MG: 40 INJECTION SUBCUTANEOUS at 05:07

## 2024-07-15 RX ADMIN — POLYETHYLENE GLYCOL 3350 17 G: 17 POWDER, FOR SOLUTION ORAL at 09:07

## 2024-07-15 RX ADMIN — LIDOCAINE 5% 1 PATCH: 700 PATCH TOPICAL at 05:07

## 2024-07-15 RX ADMIN — CEFTAROLINE FOSAMIL 600 MG: 600 POWDER, FOR SOLUTION INTRAVENOUS at 11:07

## 2024-07-15 RX ADMIN — MUPIROCIN: 20 OINTMENT TOPICAL at 09:07

## 2024-07-15 RX ADMIN — DIPHENHYDRAMINE HYDROCHLORIDE 10 ML: 25 SOLUTION ORAL at 09:07

## 2024-07-15 RX ADMIN — FERROUS SULFATE TAB EC 325 MG (65 MG FE EQUIVALENT) 1 EACH: 325 (65 FE) TABLET DELAYED RESPONSE at 09:07

## 2024-07-15 NOTE — PLAN OF CARE
Problem: Adult Inpatient Plan of Care  Goal: Plan of Care Review  7/15/2024 0613 by Shaista Tello RN  Outcome: Progressing  7/15/2024 0227 by Shaista Tello RN  Outcome: Progressing  Goal: Patient-Specific Goal (Individualized)  7/15/2024 0613 by Shaista Tello RN  Outcome: Progressing  7/15/2024 0227 by Shaista Tello RN  Outcome: Progressing  Goal: Absence of Hospital-Acquired Illness or Injury  7/15/2024 0613 by Shaista Tello RN  Outcome: Progressing  7/15/2024 0227 by Shaista Tello RN  Outcome: Progressing  Goal: Optimal Comfort and Wellbeing  7/15/2024 0613 by Shaista Tello RN  Outcome: Progressing  7/15/2024 0227 by Shaista Tello RN  Outcome: Progressing  Goal: Readiness for Transition of Care  7/15/2024 0613 by Shaista Tello RN  Outcome: Progressing  7/15/2024 0227 by Shaista Tello RN  Outcome: Progressing     Problem: Diabetes Comorbidity  Goal: Blood Glucose Level Within Targeted Range  7/15/2024 0613 by Shaista Tello RN  Outcome: Progressing  7/15/2024 0227 by Shaista Tello RN  Outcome: Progressing     Problem: Sepsis/Septic Shock  Goal: Optimal Coping  7/15/2024 0613 by Shaista Tello RN  Outcome: Progressing  7/15/2024 0227 by Shaista Tello RN  Outcome: Progressing  Goal: Absence of Bleeding  7/15/2024 0613 by Shaista Tello RN  Outcome: Progressing  7/15/2024 0227 by Shaista Tello RN  Outcome: Progressing  Goal: Blood Glucose Level Within Targeted Range  7/15/2024 0613 by Shaista Tello RN  Outcome: Progressing  7/15/2024 0227 by Shaista Tello RN  Outcome: Progressing  Goal: Absence of Infection Signs and Symptoms  7/15/2024 0613 by Shaista Tello RN  Outcome: Progressing  7/15/2024 0227 by Shaista Tello RN  Outcome: Progressing  Goal: Optimal Nutrition Intake  7/15/2024 0613 by Shaista Tello RN  Outcome: Progressing  7/15/2024 0227 by Shaista Tello, ROXANN  Outcome: Progressing     Problem: Acute Kidney Injury/Impairment  Goal: Fluid and Electrolyte Balance  7/15/2024 0613 by Shaista Tello,  RN  Outcome: Progressing  7/15/2024 0227 by Shaista Tello RN  Outcome: Progressing  Goal: Improved Oral Intake  7/15/2024 0613 by Shaista Tello, RN  Outcome: Progressing  7/15/2024 0227 by Shaista Tello RN  Outcome: Progressing  Goal: Effective Renal Function  7/15/2024 0613 by Shaista Tello, RN  Outcome: Progressing  7/15/2024 0227 by Shaista Tello RN  Outcome: Progressing     Problem: Infection  Goal: Absence of Infection Signs and Symptoms  7/15/2024 0613 by Shaista Tello RN  Outcome: Progressing  7/15/2024 0227 by Shaista Tello RN  Outcome: Progressing     Problem: Wound  Goal: Optimal Coping  7/15/2024 0613 by Shaista Tello RN  Outcome: Progressing  7/15/2024 0227 by Shaista Tello, RN  Outcome: Progressing  Goal: Optimal Functional Ability  7/15/2024 0613 by Shaista Tello, RN  Outcome: Progressing  7/15/2024 0227 by Shaista Tello, RN  Outcome: Progressing  Goal: Absence of Infection Signs and Symptoms  7/15/2024 0613 by Shaista Tello, RN  Outcome: Progressing  7/15/2024 0227 by Shaista Tello RN  Outcome: Progressing  Goal: Improved Oral Intake  7/15/2024 0613 by Shaista Tello, RN  Outcome: Progressing  7/15/2024 0227 by Shaista Tello, RN  Outcome: Progressing  Goal: Optimal Pain Control and Function  7/15/2024 0613 by Shaista Tello, RN  Outcome: Progressing  7/15/2024 0227 by Shaista Tello RN  Outcome: Progressing  Goal: Skin Health and Integrity  7/15/2024 0613 by Shaista Tello, RN  Outcome: Progressing  7/15/2024 0227 by Shaista Tello RN  Outcome: Progressing  Goal: Optimal Wound Healing  7/15/2024 0613 by Shaista Tello, RN  Outcome: Progressing  7/15/2024 0227 by Shaista Tello RN  Outcome: Progressing     Problem: Skin Injury Risk Increased  Goal: Skin Health and Integrity  7/15/2024 0613 by Shaista Tello RN  Outcome: Progressing  7/15/2024 0227 by Shaista Tello RN  Outcome: Progressing     Problem: Fall Injury Risk  Goal: Absence of Fall and Fall-Related Injury  7/15/2024 0613 by Shaista Tello,  RN  Outcome: Progressing  7/15/2024 0227 by Shaista Tello, RN  Outcome: Progressing

## 2024-07-15 NOTE — PROGRESS NOTES
...[facility] Plan of Care Note    Dx:   Osteomyelitis [M86.9]    Shift Events: Changed bed to a different bed, reorient patient    Goals of Care: discharge     Neuro: intermittent confusion, A&O x3     Vital Signs: BP (!) 115/52 (BP Location: Right arm, Patient Position: Lying)   Pulse 65   Temp 98 °F (36.7 °C) (Oral)   Resp 18   Ht 6' (1.829 m)   Wt 133 kg (293 lb 3.4 oz)   SpO2 (!) 92%   BMI 39.77 kg/m²     Respiratory: CPAP at night, patient remain on room air.     Diet: Diet diabetic Cardiac (Low Na/Chol), Renal; 2000 Calorie  Dietary nutrition supplements Catarino - Any flavor,Dietary nutrition supplements Promod Liquid Protein - Fruit Punch,Dietary nutrition supplements Boost Plus - Any flavor    Is patient tolerating current diet? Yes    GTTS: KVO N S @20ml/per our    Urine Output/Bowel Movement:   I/O this shift:  In: -   Out: 40 [Urine:40]  Last Bowel Movement: 07/13/24      Drains/Tubes/Tube Feeds (include total output/shift):   I/O this shift:  In: -   Out: 40 [Urine:40]      Lines: See chart    Accuchecks: Managed and controlled at ordered    Skin: See Chart     Fall Risk Score: See Chart    Activity level: Happy Birthday    Any scheduled procedures? Midline plaed at beds    Any safety concerns? no    Other: no

## 2024-07-15 NOTE — PLAN OF CARE
[facility]  Discharge Reassessment    Primary Care Provider: No, Primary Doctor    Expected Discharge Date: 7/15/2024    Reassessment (most recent)       Discharge Reassessment - 07/15/24 1202          Discharge Reassessment    Assessment Type Discharge Planning Reassessment     Did the patient's condition or plan change since previous assessment? No     Discharge Plan discussed with: Patient     Communicated KAMILA with patient/caregiver Yes     Discharge Plan A Long-term acute care facility (LTAC)     Discharge Plan B Skilled Nursing Facility     Transition of Care Barriers None     Why the patient remains in the hospital Requires continued medical care                   [facility] will submit for Auth today.

## 2024-07-15 NOTE — PROGRESS NOTES
"[facility] Medicine  Progress Note    Patient Name: Roosevelt Moser  MRN: [id number]  Patient Class: IP- Inpatient   Admission Date: 7/7/2024  Length of Stay: 8 days  Attending Physician: Abraham Plaza DO  Primary Care Provider: Miriam, Primary Doctor        Subjective:     Principal Problem:Osteomyelitis of multiple sites        HPI:  Roosevelt Moser is a 74yo M w/ A fib, HTN, T2DM, MARA, HLD, hx GBS, hx displaced comminuted fracture of the left humerus s/p fall 3/4/24 s/p ORIF 03/25/2024 who presents as a transfer for thoracic surgery and Plastic surgery evaluation in setting of "osteomyelitis of multiple ribs which will need further debridement and then a flap placed."    He was initially admitted to [facility] on 6/14 for progressive weakness, multiple falls, and decreased urine output.  He was admitted for management of MARBIN on CKD complicated by hyperkalemia, urosepsis, AFib with RVR.    "Creatinine improved during his stay. During his stay he was noted to have swelling of his left upper extremity. Imaging studies on June 17 showed concern for gas-forming infection along the left shoulder and air along the lateral left chest wall concerning for gas-forming organism. MRI of his right foot also showed evidence of toe osteomyelitis. He was seen by ID who adjusted antibiotics, and they also noted oral thrush. Podiatry evaluated him for the toe findings, and Orthopedic Surgery evaluated the shoulder abnormalities. Right toe wound grew MRSA. Oral anticoagulation was held, and Radiology aspirated fluid from his left shoulder on June 20. On June 21 he had left shoulder incision and drainage and removal of deep hardware including rods and screws. He underwent repeat irrigation and debridement of the left shoulder on June 24 with placement of a wound VAC. He had subsequent incision and drainage with removal of the wound VAC on June 27. He had amputation of his right 2nd toe by Podiatry on July 1. " "On July 3 he was noted to have dyspnea and mild tachycardia. CT chest showed a large abscess of the anterior central and left chest wall and abdominal wall with partial destruction of 3 ribs. He was seen by Cardiothoracic surgery. On July 4 he had left thoracentesis with aspiration of fluid. He subsequently had incision and drainage of the left chest wall abscess on July 4, and a drainage catheter was left in place. On July 5 he underwent right VATS for a loculated right pleural effusion, and a chest tube was left in place. He was transitioned to the ICU post-operatively. With concern for osteomyelitis of the ribs, concern is that he will need transfer to a facility with Thoracic Surgery and potentially Plastic Surgery along with higher level of care. Transfer center spoke with Thoracic Surgery at [facility]. Requesting transfer to Hospital Medicine at [facility] for Plastic Surgery and Thoracic Surgery evaluation. Transfer center spoke with Plastic Surgery on July 5, Thoracic Surgery on July 7. With his complicated medical presentation, will plan transfer to Hospital Medicine at [facility] in step-down status for further treatment. "    Prior to transfer, "He is awake and alert. He has a PICC line in place along with the right chest tube and left chest wall drain in place. He has no evidence of respiratory distress and is hemodynamically stable. Referring provider felt patient was stable for step-down status at present. He is currently on ceftaroline and meropenem. He is in contact isolation. July 7: Sodium 137, potassium 4.5, chloride 104, CO2 26, BUN 33, creatinine 1.1, glucose 130, albumin 2.1, AST 25, ALT 17, white blood cells 11.58, hemoglobin 8.1, hematocrit 26.2, platelets 389, procalcitonin 0.575, CRP greater than 16  VS:  Temperature 97.5°, pulse 85, respirations 20, blood pressure 112/63, O2 sats 95% "    Patient in no acute distress upon arrival.  Wife at bedside.  Patient denies any " acute complaints.        Imaging history reviewed:  July 6: X-rays of the left shoulder had fracture through the surgical neck of the humerus with lateral distraction of the distal component and overlapping segments on the order of 2.5 cm.  Overall no appreciable change upon comparison with prior imaging.  -chest x-ray noted manifestations of mild congestive heart failure bordering on mild pulmonary edema.  Right-sided thoracostomy tube terminating within the right apex.  No definite pneumothorax.        July 5: Pleural fluid Gram stain with few WBCs and no organisms seen  -pH 7.339, pCO2 50.9, pO2 392     July 4:  CPK less than 10, serum   -AFB smear from left chest abscess had no AFB seen, left chest abscess aerobic and anaerobic cultures have no growth  -pleural fluid protein 3.2, white blood cells 1108 (11% segs, 74% lymphs), , pH 7.63, pleural fluid culture had no growth  -ultrasound-guided thoracentesis removed 1.4-1.5 L     July 3: Blood cultures with no growth to date  -CT chest showed large abscess of the anterior central and left chest wall and abdominal wall measuring 17 cm transversely.  This extends into the mediastinum and peritoneum with partial destruction of the anterior 6th, 7th, and 8th ribs.  Complete atelectasis of the left lower lobe with moderate left pleural effusion.  Mild atelectasis right lung base with a small-to-moderate right pleural effusion.     July 2: X-rays of the left shoulder noted displaced left proximal humerus fracture status post hardware removal without change in alignment.     June 24: ECHO with EF 55-60%.     June 20:  Interventional Radiology did percutaneous aspiration of the left shoulder fluid collection.  No drainage catheter was left in place.     June 18:  Right toe wound MRSA  -CT left shoulder had subacute left humerus fracture post internal fixation.  Incomplete bony bridging across the fracture site with persistent angulation.  Severe arthropathy  "of the glenohumeral joint with multiple intra-articular bodies.  Large left joint effusion and adjacent soft tissue focal fluid collections containing gas and concerning for gas-forming infection.  Moderate size left pleural effusion.  Left basilar airspace disease.  -bilateral lower extremity Doppler arterial ultrasound had atherosclerotic plaque and calcification bilaterally without severe stenosis or occlusion identified on either side.     June 17: X-rays of the left humerus/shoulder had findings concerning for gas-forming infection along the left shoulder.  -x-rays of the left ribs showed air along the lateral chest wall soft tissue and axilla concerning for gas-forming infection.  -MRI of the right foot had findings consistent with osteomyelitis involving the middle distal phalanges of the 3rd toe.  Cellulitis.     June 14: Renal ultrasound had no hydronephrosis.  Elevated resistive index right kidney suggesting intrinsic renal disease.    Overview/Hospital Course:  Pt admitted to  as a transfer for thoracic surgery and Plastic surgery evaluation in setting of "osteomyelitis of multiple ribs which will need further debridement and then a flap placed." Thoracic Surgery, Plastic Surgery, ID, Ortho, Wound care, PT/OT consulted upon admission. Imaging ordered.  CT chest abdomen with IV contrast without evidence of osteomyelitis in ribs adjacent to I&D site of left chest wall abscess.; incidental finding of 1.2 cm obstructing stone in left distal ureter with mild hydroureteronephrosis.  Urology was consulted and CT renal stone study was completed; no acute intervention given no concern for infection around stone or MARBIN; follow-up outpatient unless decompensates.  MRI shoulder without contrast left notable for septic arthritis of left glenohumeral joint with acute osteomyelitis of the glenoid and proximal humerus in addition to known displaced fracture of proximal humeral metaphysis in addition to several large " soft tissue abscesses.  Patient underwent left shoulder aspiration with Orthopedic surgery on 07/09, continuing antibiotics with no further intervention planned.  Right chest tube was removed by thoracic surgery on 07/09. Drain previously placed for L chest wall abscess removed when output became minimal (7/14). To continue on IV Ceftaroline 600 mg q8 for 6 weeks therapy (EOT 8/11). LTAC placement pending    Interval History: Seen this AM at bedside. L chest drain removed yesterday AM. Denies chest pain, shortness of breath. LTAC placement pending    Review of Systems  Objective:     Vital Signs (Most Recent):  Temp: 98.2 °F (36.8 °C) (07/12/24 0838)  Pulse: 72 (07/12/24 0838)  Resp: 18 (07/12/24 1020)  BP: 123/68 (07/12/24 0838)  SpO2: 96 % (07/12/24 0838) Vital Signs (24h Range):  Temp:  [98 °F (36.7 °C)-98.7 °F (37.1 °C)] 98.2 °F (36.8 °C)  Pulse:  [60-82] 72  Resp:  [18] 18  SpO2:  [93 %-99 %] 96 %  BP: (108-123)/(57-70) 123/68     Weight: 133 kg (293 lb 3.4 oz)  Body mass index is 39.77 kg/m².    Intake/Output Summary (Last 24 hours) at 7/12/2024 1028  Last data filed at 7/12/2024 0339  Gross per 24 hour   Intake 290 ml   Output 2230 ml   Net -1940 ml         Physical Exam  Vitals and nursing note reviewed.   Constitutional:       General: He is not in acute distress.     Appearance: He is ill-appearing (chronically). He is not toxic-appearing or diaphoretic.   HENT:      Head: Normocephalic and atraumatic.      Nose: Nose normal.      Mouth/Throat:      Pharynx: Oropharynx is clear.   Eyes:      General: No scleral icterus.  Cardiovascular:      Rate and Rhythm: Normal rate and regular rhythm.      Pulses: Normal pulses.      Heart sounds: Normal heart sounds.   Pulmonary:      Effort: Pulmonary effort is normal.   Chest:      Chest wall: No deformity or tenderness.      Comments: Left chest wall drain with seropurulent output  Abdominal:      General: Bowel sounds are normal.      Palpations: Abdomen is soft.  "     Tenderness: There is no abdominal tenderness.   Musculoskeletal:      Cervical back: Normal range of motion and neck supple.      Right lower leg: No edema.      Left lower leg: No edema.   Skin:     General: Skin is warm.      Capillary Refill: Capillary refill takes less than 2 seconds.      Comments: Unable to examine posterior skin due to patient debility   Neurological:      Mental Status: He is alert and oriented to person, place, and time.   Psychiatric:         Behavior: Behavior normal.             Significant Labs: All pertinent labs within the past 24 hours have been reviewed.    Significant Imaging: I have reviewed all pertinent imaging results/findings within the past 24 hours.    Assessment/Plan:      * Osteomyelitis of multiple sites  Skin ulcer of toe of right foot with necrosis of bone  Osteomyelitis of toe   MRSA infection     Chest wall abscess  Loculated pleural effusion  Mediastinal lymphadenopathy    Pyogenic arthritis of left shoulder region  Humerus fracture    OSH:  -status post left thoracentesis 1500cc serosanguineous fluid drained 7/4 & I&D drainage with chest tube placement 7/4 & loculated effusions right chest s/p VATS and chest tube 7/5   -Left shoulder prosthetic joint infection s/p I&D and removal of deep hardware 6/21 - all screws and nails removed, s/p 2nd washout 6/24 & 3rd washout 6/27   -Left Shoulder washouts on 6/21 ( abundant pus in shoulder, hardware removal), 6/24 ( bloody brownish fluid) and 6/27 ( no fluid collection, healthy red and beefy tissue)   -Right 3rd toe osteomyelitis s/p Dalvance x 2 doses & 2nd distal phalanx s/p I&D at bedside, s/p 2nd toe amputation 7/1       - Presents as a transfer for thoracic surgery and Plastic surgery evaluation in setting of concern for "osteomyelitis of multiple ribs which will need further debridement and then a flap placed" in the setting of chest wall abscess  -notably, 7/3 blood cultures, 7/4 pleural fluid cultures, 7/4 " abscess cultures, 755 pleural fluid cultures NGTD  -6/18 right toe wound culture with MRSA  Plan:  -ID: Continue Ceftaroline 600 mg IV q.8; will need course of 6 weeks (EOT 8/11)  -ongoing wound care  -ID, Ortho, Thoracic surgery and Plastic surgery consulted and following; imaging findings per hospital course  -thoracic surgery removed right chest tube on 07/09; left chest accordion drain removed 7/14  -ortho surgery completed left shoulder aspiration 7/9 to assess shoulder abscess concern, minimal fluid aspirated, cont antibiotics for pyogenic arthritis  -thus far, no acute surgical intervention per all surgical specialties    Acute renal failure superimposed on chronic kidney disease  Patient with acute kidney injury/acute renal failure likely due to  due to ATN due to sepsis due to UTI and/or PNA  MARBIN is currently improving. Baseline creatinine  wnl  - Labs reviewed- Renal function/electrolytes with Estimated Creatinine Clearance: 104.7 mL/min (based on SCr of 0.9 mg/dL). according to latest data. Monitor urine output and serial BMP and adjust therapy as needed. Avoid nephrotoxins and renally dose meds for GFR listed above.    Left renal stone  Nephrolithiasis    6/14 retroperitoneal ultrasound completed in setting of MARBIN without hydronephrosis   7/8 CT abdomen notable for 1.2 cm obstructing stone in left distal ureter with mild hydroureteronephrosis       Appreciate urology recommendations; given no concerns regarding MARBIN or infection relation to the stone, no acute surgical intervention  Follow-up outpatient; strain all urine    Acute hypoxemic respiratory failure  -Ddx: decreased reserve from VATS vs volume overload (on fluids since 7/7)  -CXR noting blunting of CP angles  -7/12: Lasix 40mg IV x1, good response    CKD (chronic kidney disease) stage 2, GFR 60-89 ml/min  Creatine stable for now. BMP reviewed- noted Estimated Creatinine Clearance: 94.3 mL/min (based on SCr of 1 mg/dL). according to latest data.  "Based on current GFR, CKD stage is stage 2 - GFR 60-89.  Monitor UOP and serial BMP and adjust therapy as needed. Renally dose meds. Avoid nephrotoxic medications and procedures.    Thyroid nodule  Incidental finding on imagin.7 cm left thyroid nodule. Nonemergent thyroid ultrasound is recommended.       Cyst of right kidney  Incidental finding on imagin.8 cm septated cystic lesion in the right kidney, which is indeterminate for malignancy. Further evaluation with nonemergent renal mass protocol CT or MRI is recommended.       Sepsis  This patient does have evidence of infective focus  My overall impression is sepsis.  Source: Skin and Soft Tissue (location toe)  Antibiotics given-   Antibiotics (72h ago, onward)      Start     Stop Route Frequency Ordered    24 1545  ceftaroline fosamiL (TEFLARO) 600 mg in D5W 50 mL IVPB (MB+)         -- IV Every 8 hours (non-standard times) 24 1532    24 1545  meropenem (MERREM) 1 g in 0.9% NaCl 100 mL IVPB (MB+)         -- IV Every 8 hours (non-standard times) 24 1532          Latest lactate reviewed-  No results for input(s): "LACTATE", "POCLAC" in the last 72 hours.  Organ dysfunction indicated by Acute kidney injury    Fluid challenge Not needed - patient is not hypotensive      Post- resuscitation assessment No - Post resuscitation assessment not needed       Will Not start Pressors- Levophed for MAP of 65  Source control achieved by: abx    Debility  Patient with Acute on chronic debility due to  multiple infections . Latest AMPAC and GEMS scores have not been reviewed. Evaluation for etiology is complete. Plan includes progressive mobility protocol initated, PT/OT consulted, and fall precautions in place.    Atrial fibrillation with rapid ventricular response  Patient with Long standing persistent (>12 months) atrial fibrillation which is controlled currently with Beta Blocker. Patient is currently in TBD.[id number] Score: 2.      Chronic AF " w/ acute RVR at OSH, resolved s/p cardizem drip   NOAC has been on hold d/t possible need for surgery - has been on prophylaxis dose of Lovenox  Resume A/c pending any surgical intervention, consider lovenox vs hep gtt bridge    History of Guillain-Delaplane syndrome  No acute issues  However, pt has impaired mobility and is acutely weakened from his sepsis/UTI/AF RVR and need placement       Type 2 diabetes mellitus  Patient's FSGs are controlled on current medication regimen.  Last A1c reviewed-   Lab Results   Component Value Date    HGBA1C 6.3 (H) 06/14/2024     Most recent fingerstick glucose reviewed-   Recent Labs   Lab 07/10/24  1607 07/10/24  2002   POCTGLUCOSE 181* 178*     Current correctional scale  Low  Maintain anti-hyperglycemic dose as follows-   Antihyperglycemics (From admission, onward)      Start     Stop Route Frequency Ordered    07/08/24 0900  insulin glargine U-100 (Lantus) pen 15 Units         -- SubQ Daily 07/07/24 1532    07/07/24 1529  insulin aspart U-100 pen 0-10 Units         -- SubQ Before meals & nightly PRN 07/07/24 1532          Hold Oral hypoglycemics while patient is in the hospital.    Hypertension  Chronic, controlled. Latest blood pressure and vitals reviewed-     Temp:  [97.8 °F (36.6 °C)-98.8 °F (37.1 °C)]   Pulse:  [65-91]   Resp:  [2-24]   BP: (100-145)/(56-81)   SpO2:  [90 %-99 %] .   Home meds for hypertension were reviewed and noted below.   Hypertension Medications               hydrALAZINE (APRESOLINE) 25 MG tablet Take 1 tablet (25 mg total) by mouth every 12 (twelve) hours.    metoprolol succinate (TOPROL-XL) 100 MG 24 hr tablet Take 1 tablet (100 mg total) by mouth once daily.            While in the hospital, will manage blood pressure as follows; continue home metoprolol    Will utilize p.r.n. blood pressure medication only if patient's blood pressure greater than 180/110 and he develops symptoms such as worsening chest pain or shortness of breath.    MARA  (obstructive sleep apnea)  Continue CPAP HS and w/ naps        Morbid obesity  There is no height or weight on file to calculate BMI. Morbid obesity complicates all aspects of disease management from diagnostic modalities to treatment. Weight loss encouraged and health benefits explained to patient.           VTE Risk Mitigation (From admission, onward)           Ordered     enoxaparin injection 40 mg  Every 24 hours         07/09/24 1358     Place sequential compression device  Until discontinued         07/07/24 1532                    Discharge Planning   KAMILA: 7/17/2024     Code Status: Full Code   Is the patient medically ready for discharge?:     Reason for patient still in hospital (select all that apply): Patient trending condition  Discharge Plan A: Long-term acute care facility (LTAC)                  Abraham Plaza DO  Department of Hospital Medicine   Andres KEARNEY

## 2024-07-15 NOTE — NURSING
Xray tech at bedside to confirm picc placement verification. Xray tech called at 10:38AM r/t chest xray order.

## 2024-07-16 PROBLEM — Z98.890 POST-OPERATIVE STATE: Status: ACTIVE | Noted: 2024-07-16

## 2024-07-16 LAB
ANION GAP SERPL CALC-SCNC: 13 MMOL/L (ref 8–16)
BASOPHILS # BLD AUTO: 0.08 K/UL (ref 0–0.2)
BASOPHILS NFR BLD: 1 % (ref 0–1.9)
BUN SERPL-MCNC: 26 MG/DL (ref 8–23)
CALCIUM SERPL-MCNC: 8.5 MG/DL (ref 8.7–10.5)
CHLORIDE SERPL-SCNC: 100 MMOL/L (ref 95–110)
CO2 SERPL-SCNC: 21 MMOL/L (ref 23–29)
CREAT SERPL-MCNC: 1.1 MG/DL (ref 0.5–1.4)
DIFFERENTIAL METHOD BLD: ABNORMAL
EOSINOPHIL # BLD AUTO: 0.4 K/UL (ref 0–0.5)
EOSINOPHIL NFR BLD: 5 % (ref 0–8)
ERYTHROCYTE [DISTWIDTH] IN BLOOD BY AUTOMATED COUNT: 18 % (ref 11.5–14.5)
EST. GFR  (NO RACE VARIABLE): >60 ML/MIN/1.73 M^2
GLUCOSE SERPL-MCNC: 99 MG/DL (ref 70–110)
HCT VFR BLD AUTO: 31.6 % (ref 40–54)
HGB BLD-MCNC: 9.5 G/DL (ref 14–18)
IMM GRANULOCYTES # BLD AUTO: 0.1 K/UL (ref 0–0.04)
IMM GRANULOCYTES NFR BLD AUTO: 1.2 % (ref 0–0.5)
LYMPHOCYTES # BLD AUTO: 2 K/UL (ref 1–4.8)
LYMPHOCYTES NFR BLD: 23.6 % (ref 18–48)
MAGNESIUM SERPL-MCNC: 1.8 MG/DL (ref 1.6–2.6)
MCH RBC QN AUTO: 24.4 PG (ref 27–31)
MCHC RBC AUTO-ENTMCNC: 30.1 G/DL (ref 32–36)
MCV RBC AUTO: 81 FL (ref 82–98)
MONOCYTES # BLD AUTO: 0.8 K/UL (ref 0.3–1)
MONOCYTES NFR BLD: 9.4 % (ref 4–15)
NEUTROPHILS # BLD AUTO: 5 K/UL (ref 1.8–7.7)
NEUTROPHILS NFR BLD: 59.8 % (ref 38–73)
NRBC BLD-RTO: 0 /100 WBC
PHOSPHATE SERPL-MCNC: 4.2 MG/DL (ref 2.7–4.5)
PLATELET # BLD AUTO: 397 K/UL (ref 150–450)
PMV BLD AUTO: 9.7 FL (ref 9.2–12.9)
POCT GLUCOSE: 107 MG/DL (ref 70–110)
POCT GLUCOSE: 139 MG/DL (ref 70–110)
POCT GLUCOSE: 188 MG/DL (ref 70–110)
POCT GLUCOSE: 198 MG/DL (ref 70–110)
POCT GLUCOSE: 208 MG/DL (ref 70–110)
POTASSIUM SERPL-SCNC: 4.1 MMOL/L (ref 3.5–5.1)
RBC # BLD AUTO: 3.89 M/UL (ref 4.6–6.2)
SODIUM SERPL-SCNC: 134 MMOL/L (ref 136–145)
WBC # BLD AUTO: 8.36 K/UL (ref 3.9–12.7)

## 2024-07-16 PROCEDURE — 97530 THERAPEUTIC ACTIVITIES: CPT

## 2024-07-16 PROCEDURE — 63600175 PHARM REV CODE 636 W HCPCS: Performed by: STUDENT IN AN ORGANIZED HEALTH CARE EDUCATION/TRAINING PROGRAM

## 2024-07-16 PROCEDURE — 83735 ASSAY OF MAGNESIUM: CPT | Performed by: STUDENT IN AN ORGANIZED HEALTH CARE EDUCATION/TRAINING PROGRAM

## 2024-07-16 PROCEDURE — 85025 COMPLETE CBC W/AUTO DIFF WBC: CPT | Performed by: STUDENT IN AN ORGANIZED HEALTH CARE EDUCATION/TRAINING PROGRAM

## 2024-07-16 PROCEDURE — A4216 STERILE WATER/SALINE, 10 ML: HCPCS | Performed by: STUDENT IN AN ORGANIZED HEALTH CARE EDUCATION/TRAINING PROGRAM

## 2024-07-16 PROCEDURE — 25000003 PHARM REV CODE 250: Performed by: STUDENT IN AN ORGANIZED HEALTH CARE EDUCATION/TRAINING PROGRAM

## 2024-07-16 PROCEDURE — 97535 SELF CARE MNGMENT TRAINING: CPT

## 2024-07-16 PROCEDURE — 99223 1ST HOSP IP/OBS HIGH 75: CPT | Mod: ,,, | Performed by: PODIATRIST

## 2024-07-16 PROCEDURE — 27100171 HC OXYGEN HIGH FLOW UP TO 24 HOURS

## 2024-07-16 PROCEDURE — 97110 THERAPEUTIC EXERCISES: CPT

## 2024-07-16 PROCEDURE — 36415 COLL VENOUS BLD VENIPUNCTURE: CPT | Performed by: STUDENT IN AN ORGANIZED HEALTH CARE EDUCATION/TRAINING PROGRAM

## 2024-07-16 PROCEDURE — 20600001 HC STEP DOWN PRIVATE ROOM

## 2024-07-16 PROCEDURE — 27000221 HC OXYGEN, UP TO 24 HOURS

## 2024-07-16 PROCEDURE — 27000207 HC ISOLATION

## 2024-07-16 PROCEDURE — 94761 N-INVAS EAR/PLS OXIMETRY MLT: CPT

## 2024-07-16 PROCEDURE — 25000003 PHARM REV CODE 250: Performed by: INTERNAL MEDICINE

## 2024-07-16 PROCEDURE — 80048 BASIC METABOLIC PNL TOTAL CA: CPT | Performed by: STUDENT IN AN ORGANIZED HEALTH CARE EDUCATION/TRAINING PROGRAM

## 2024-07-16 PROCEDURE — 63600175 PHARM REV CODE 636 W HCPCS: Mod: JZ,JG | Performed by: STUDENT IN AN ORGANIZED HEALTH CARE EDUCATION/TRAINING PROGRAM

## 2024-07-16 PROCEDURE — 94660 CPAP INITIATION&MGMT: CPT

## 2024-07-16 PROCEDURE — 84100 ASSAY OF PHOSPHORUS: CPT | Performed by: STUDENT IN AN ORGANIZED HEALTH CARE EDUCATION/TRAINING PROGRAM

## 2024-07-16 PROCEDURE — 99900035 HC TECH TIME PER 15 MIN (STAT)

## 2024-07-16 RX ORDER — FUROSEMIDE 10 MG/ML
40 INJECTION INTRAMUSCULAR; INTRAVENOUS ONCE
Status: COMPLETED | OUTPATIENT
Start: 2024-07-16 | End: 2024-07-16

## 2024-07-16 RX ADMIN — DIPHENHYDRAMINE HYDROCHLORIDE 10 ML: 25 SOLUTION ORAL at 01:07

## 2024-07-16 RX ADMIN — HYDROCODONE BITARTRATE AND ACETAMINOPHEN 1 TABLET: 10; 325 TABLET ORAL at 05:07

## 2024-07-16 RX ADMIN — FERROUS SULFATE TAB EC 325 MG (65 MG FE EQUIVALENT) 1 EACH: 325 (65 FE) TABLET DELAYED RESPONSE at 07:07

## 2024-07-16 RX ADMIN — Medication 6 MG: at 09:07

## 2024-07-16 RX ADMIN — METOPROLOL SUCCINATE 100 MG: 100 TABLET, EXTENDED RELEASE ORAL at 09:07

## 2024-07-16 RX ADMIN — MUPIROCIN: 20 OINTMENT TOPICAL at 09:07

## 2024-07-16 RX ADMIN — INSULIN ASPART 1 UNITS: 100 INJECTION, SOLUTION INTRAVENOUS; SUBCUTANEOUS at 09:07

## 2024-07-16 RX ADMIN — LIDOCAINE 5% 1 PATCH: 700 PATCH TOPICAL at 04:07

## 2024-07-16 RX ADMIN — Medication 10 ML: at 11:07

## 2024-07-16 RX ADMIN — CEFTAROLINE FOSAMIL 600 MG: 600 POWDER, FOR SOLUTION INTRAVENOUS at 07:07

## 2024-07-16 RX ADMIN — FUROSEMIDE 40 MG: 10 INJECTION, SOLUTION INTRAVENOUS at 08:07

## 2024-07-16 RX ADMIN — Medication 10 ML: at 05:07

## 2024-07-16 RX ADMIN — LOPERAMIDE HYDROCHLORIDE 2 MG: 2 CAPSULE ORAL at 09:07

## 2024-07-16 RX ADMIN — ENOXAPARIN SODIUM 40 MG: 40 INJECTION SUBCUTANEOUS at 04:07

## 2024-07-16 RX ADMIN — DIPHENHYDRAMINE HYDROCHLORIDE 10 ML: 25 SOLUTION ORAL at 08:07

## 2024-07-16 RX ADMIN — DIPHENHYDRAMINE HYDROCHLORIDE 10 ML: 25 SOLUTION ORAL at 09:07

## 2024-07-16 RX ADMIN — LOPERAMIDE HYDROCHLORIDE 2 MG: 2 CAPSULE ORAL at 05:07

## 2024-07-16 RX ADMIN — Medication 10 ML: at 04:07

## 2024-07-16 RX ADMIN — CEFTAROLINE FOSAMIL 600 MG: 600 POWDER, FOR SOLUTION INTRAVENOUS at 11:07

## 2024-07-16 RX ADMIN — MUPIROCIN: 20 OINTMENT TOPICAL at 08:07

## 2024-07-16 RX ADMIN — DIPHENHYDRAMINE HYDROCHLORIDE 10 ML: 25 SOLUTION ORAL at 04:07

## 2024-07-16 RX ADMIN — HYDROCODONE BITARTRATE AND ACETAMINOPHEN 1 TABLET: 10; 325 TABLET ORAL at 07:07

## 2024-07-16 RX ADMIN — CEFTAROLINE FOSAMIL 600 MG: 600 POWDER, FOR SOLUTION INTRAVENOUS at 03:07

## 2024-07-16 RX ADMIN — INSULIN GLARGINE 15 UNITS: 100 INJECTION, SOLUTION SUBCUTANEOUS at 08:07

## 2024-07-16 NOTE — PROGRESS NOTES
"[facility] Medicine  Progress Note    Patient Name: Roosevelt Moser  MRN: [id number]  Patient Class: IP- Inpatient   Admission Date: 7/7/2024  Length of Stay: 9 days  Attending Physician: Abraham Plaza DO  Primary Care Provider: Miriam, Primary Doctor        Subjective:     Principal Problem:Osteomyelitis of multiple sites        HPI:  Roosevelt Moser is a 72yo M w/ A fib, HTN, T2DM, MARA, HLD, hx GBS, hx displaced comminuted fracture of the left humerus s/p fall 3/4/24 s/p ORIF 03/25/2024 who presents as a transfer for thoracic surgery and Plastic surgery evaluation in setting of "osteomyelitis of multiple ribs which will need further debridement and then a flap placed."    He was initially admitted to [facility] on 6/14 for progressive weakness, multiple falls, and decreased urine output.  He was admitted for management of MARBIN on CKD complicated by hyperkalemia, urosepsis, AFib with RVR.    "Creatinine improved during his stay. During his stay he was noted to have swelling of his left upper extremity. Imaging studies on June 17 showed concern for gas-forming infection along the left shoulder and air along the lateral left chest wall concerning for gas-forming organism. MRI of his right foot also showed evidence of toe osteomyelitis. He was seen by ID who adjusted antibiotics, and they also noted oral thrush. Podiatry evaluated him for the toe findings, and Orthopedic Surgery evaluated the shoulder abnormalities. Right toe wound grew MRSA. Oral anticoagulation was held, and Radiology aspirated fluid from his left shoulder on June 20. On June 21 he had left shoulder incision and drainage and removal of deep hardware including rods and screws. He underwent repeat irrigation and debridement of the left shoulder on June 24 with placement of a wound VAC. He had subsequent incision and drainage with removal of the wound VAC on June 27. He had amputation of his right 2nd toe by Podiatry on July 1. " "On July 3 he was noted to have dyspnea and mild tachycardia. CT chest showed a large abscess of the anterior central and left chest wall and abdominal wall with partial destruction of 3 ribs. He was seen by Cardiothoracic surgery. On July 4 he had left thoracentesis with aspiration of fluid. He subsequently had incision and drainage of the left chest wall abscess on July 4, and a drainage catheter was left in place. On July 5 he underwent right VATS for a loculated right pleural effusion, and a chest tube was left in place. He was transitioned to the ICU post-operatively. With concern for osteomyelitis of the ribs, concern is that he will need transfer to a facility with Thoracic Surgery and potentially Plastic Surgery along with higher level of care. Transfer center spoke with Thoracic Surgery at [facility]. Requesting transfer to Hospital Medicine at [facility] for Plastic Surgery and Thoracic Surgery evaluation. Transfer center spoke with Plastic Surgery on July 5, Thoracic Surgery on July 7. With his complicated medical presentation, will plan transfer to Hospital Medicine at [facility] in step-down status for further treatment. "    Prior to transfer, "He is awake and alert. He has a PICC line in place along with the right chest tube and left chest wall drain in place. He has no evidence of respiratory distress and is hemodynamically stable. Referring provider felt patient was stable for step-down status at present. He is currently on ceftaroline and meropenem. He is in contact isolation. July 7: Sodium 137, potassium 4.5, chloride 104, CO2 26, BUN 33, creatinine 1.1, glucose 130, albumin 2.1, AST 25, ALT 17, white blood cells 11.58, hemoglobin 8.1, hematocrit 26.2, platelets 389, procalcitonin 0.575, CRP greater than 16  VS:  Temperature 97.5°, pulse 85, respirations 20, blood pressure 112/63, O2 sats 95% "    Patient in no acute distress upon arrival.  Wife at bedside.  Patient denies any " acute complaints.        Imaging history reviewed:  July 6: X-rays of the left shoulder had fracture through the surgical neck of the humerus with lateral distraction of the distal component and overlapping segments on the order of 2.5 cm.  Overall no appreciable change upon comparison with prior imaging.  -chest x-ray noted manifestations of mild congestive heart failure bordering on mild pulmonary edema.  Right-sided thoracostomy tube terminating within the right apex.  No definite pneumothorax.        July 5: Pleural fluid Gram stain with few WBCs and no organisms seen  -pH 7.339, pCO2 50.9, pO2 392     July 4:  CPK less than 10, serum   -AFB smear from left chest abscess had no AFB seen, left chest abscess aerobic and anaerobic cultures have no growth  -pleural fluid protein 3.2, white blood cells 1108 (11% segs, 74% lymphs), , pH 7.63, pleural fluid culture had no growth  -ultrasound-guided thoracentesis removed 1.4-1.5 L     July 3: Blood cultures with no growth to date  -CT chest showed large abscess of the anterior central and left chest wall and abdominal wall measuring 17 cm transversely.  This extends into the mediastinum and peritoneum with partial destruction of the anterior 6th, 7th, and 8th ribs.  Complete atelectasis of the left lower lobe with moderate left pleural effusion.  Mild atelectasis right lung base with a small-to-moderate right pleural effusion.     July 2: X-rays of the left shoulder noted displaced left proximal humerus fracture status post hardware removal without change in alignment.     June 24: ECHO with EF 55-60%.     June 20:  Interventional Radiology did percutaneous aspiration of the left shoulder fluid collection.  No drainage catheter was left in place.     June 18:  Right toe wound MRSA  -CT left shoulder had subacute left humerus fracture post internal fixation.  Incomplete bony bridging across the fracture site with persistent angulation.  Severe arthropathy  "of the glenohumeral joint with multiple intra-articular bodies.  Large left joint effusion and adjacent soft tissue focal fluid collections containing gas and concerning for gas-forming infection.  Moderate size left pleural effusion.  Left basilar airspace disease.  -bilateral lower extremity Doppler arterial ultrasound had atherosclerotic plaque and calcification bilaterally without severe stenosis or occlusion identified on either side.     June 17: X-rays of the left humerus/shoulder had findings concerning for gas-forming infection along the left shoulder.  -x-rays of the left ribs showed air along the lateral chest wall soft tissue and axilla concerning for gas-forming infection.  -MRI of the right foot had findings consistent with osteomyelitis involving the middle distal phalanges of the 3rd toe.  Cellulitis.     June 14: Renal ultrasound had no hydronephrosis.  Elevated resistive index right kidney suggesting intrinsic renal disease.    Overview/Hospital Course:  Pt admitted to  as a transfer for thoracic surgery and Plastic surgery evaluation in setting of "osteomyelitis of multiple ribs which will need further debridement and then a flap placed." Thoracic Surgery, Plastic Surgery, ID, Ortho, Wound care, PT/OT consulted upon admission. Imaging ordered.  CT chest abdomen with IV contrast without evidence of osteomyelitis in ribs adjacent to I&D site of left chest wall abscess.; incidental finding of 1.2 cm obstructing stone in left distal ureter with mild hydroureteronephrosis.  Urology was consulted and CT renal stone study was completed; no acute intervention given no concern for infection around stone or MARBIN; follow-up outpatient unless decompensates.  MRI shoulder without contrast left notable for septic arthritis of left glenohumeral joint with acute osteomyelitis of the glenoid and proximal humerus in addition to known displaced fracture of proximal humeral metaphysis in addition to several large " soft tissue abscesses.  Patient underwent left shoulder aspiration with Orthopedic surgery on 07/09, continuing antibiotics with no further intervention planned.  Right chest tube was removed by thoracic surgery on 07/09. Drain previously placed for L chest wall abscess removed when output became minimal (7/14). To continue on IV Ceftaroline 600 mg q8 for 6 weeks therapy (EOT 8/11). LTAC placement pending    Interval History: Seen this AM at bedside. L chest drain removed on Sunday, doing well. Denies chest pain, shortness of breath. LTAC placement pending    Review of Systems  Objective:     Vital Signs (Most Recent):  Temp: 98.2 °F (36.8 °C) (07/12/24 0838)  Pulse: 72 (07/12/24 0838)  Resp: 18 (07/12/24 1020)  BP: 123/68 (07/12/24 0838)  SpO2: 96 % (07/12/24 0838) Vital Signs (24h Range):  Temp:  [98 °F (36.7 °C)-98.7 °F (37.1 °C)] 98.2 °F (36.8 °C)  Pulse:  [60-82] 72  Resp:  [18] 18  SpO2:  [93 %-99 %] 96 %  BP: (108-123)/(57-70) 123/68     Weight: 133 kg (293 lb 3.4 oz)  Body mass index is 39.77 kg/m².    Intake/Output Summary (Last 24 hours) at 7/12/2024 1028  Last data filed at 7/12/2024 0339  Gross per 24 hour   Intake 290 ml   Output 2230 ml   Net -1940 ml         Physical Exam  Vitals and nursing note reviewed.   Constitutional:       General: He is not in acute distress.     Appearance: He is ill-appearing (chronically). He is not toxic-appearing or diaphoretic.   HENT:      Head: Normocephalic and atraumatic.      Nose: Nose normal.      Mouth/Throat:      Pharynx: Oropharynx is clear.   Eyes:      General: No scleral icterus.  Cardiovascular:      Rate and Rhythm: Normal rate and regular rhythm.      Pulses: Normal pulses.      Heart sounds: Normal heart sounds.   Pulmonary:      Effort: Pulmonary effort is normal.   Chest:      Chest wall: No deformity or tenderness.      Comments: Left chest wall drain with seropurulent output  Abdominal:      General: Bowel sounds are normal.      Palpations: Abdomen  "is soft.      Tenderness: There is no abdominal tenderness.   Musculoskeletal:      Cervical back: Normal range of motion and neck supple.      Right lower leg: No edema.      Left lower leg: No edema.   Skin:     General: Skin is warm.      Capillary Refill: Capillary refill takes less than 2 seconds.      Comments: Unable to examine posterior skin due to patient debility   Neurological:      Mental Status: He is alert and oriented to person, place, and time.   Psychiatric:         Behavior: Behavior normal.             Significant Labs: All pertinent labs within the past 24 hours have been reviewed.    Significant Imaging: I have reviewed all pertinent imaging results/findings within the past 24 hours.    Assessment/Plan:      * Osteomyelitis of multiple sites  Osteomyelitis of right second toe s/p amputation 7/1    Chest wall abscess  Loculated pleural effusion  Mediastinal lymphadenopathy    Pyogenic arthritis of left shoulder region  Humerus fracture    OSH:  -status post left thoracentesis 1500cc serosanguineous fluid drained 7/4 & I&D drainage with chest tube placement 7/4 & loculated effusions right chest s/p VATS and chest tube 7/5   -Left shoulder prosthetic joint infection s/p I&D and removal of deep hardware 6/21 - all screws and nails removed, s/p 2nd washout 6/24 & 3rd washout 6/27   -Left Shoulder washouts on 6/21 ( abundant pus in shoulder, hardware removal), 6/24 ( bloody brownish fluid) and 6/27 ( no fluid collection, healthy red and beefy tissue)   -Right 3rd toe osteomyelitis s/p Dalvance x 2 doses & 2nd distal phalanx s/p I&D at bedside, s/p 2nd toe amputation 7/1       - Presents as a transfer for thoracic surgery and Plastic surgery evaluation in setting of concern for "osteomyelitis of multiple ribs which will need further debridement and then a flap placed" in the setting of chest wall abscess  -notably, 7/3 blood cultures, 7/4 pleural fluid cultures, 7/4 abscess cultures, 755 pleural fluid " cultures NGTD  -6/18 right toe wound culture with MRSA  Plan:  -ID: Continue Ceftaroline 600 mg IV q.8; will need course of 6 weeks (EOT 8/11)   -Discussed alternative antibiotic with ID due to difficult placment due to abx cost, however unable to replace  -7/16: podiatry consulted to address toe wound/possible suture removal  -ongoing wound care  -ID, Ortho, Thoracic surgery and Plastic surgery consulted and following; imaging findings per hospital course  -thoracic surgery removed right chest tube on 07/09; left chest accordion drain removed 7/14  -ortho surgery completed left shoulder aspiration 7/9 to assess shoulder abscess concern, minimal fluid aspirated, cont antibiotics for pyogenic arthritis  -thus far, no acute surgical intervention per all surgical specialties    Acute renal failure superimposed on chronic kidney disease  Patient with acute kidney injury/acute renal failure likely due to  due to ATN due to sepsis due to UTI and/or PNA  MARBIN is currently improving. Baseline creatinine  wnl  - Labs reviewed- Renal function/electrolytes with Estimated Creatinine Clearance: 104.7 mL/min (based on SCr of 0.9 mg/dL). according to latest data. Monitor urine output and serial BMP and adjust therapy as needed. Avoid nephrotoxins and renally dose meds for GFR listed above.    Left renal stone  Nephrolithiasis    6/14 retroperitoneal ultrasound completed in setting of MARBIN without hydronephrosis   7/8 CT abdomen notable for 1.2 cm obstructing stone in left distal ureter with mild hydroureteronephrosis       Appreciate urology recommendations; given no concerns regarding MARBIN or infection relation to the stone, no acute surgical intervention  Follow-up outpatient; strain all urine    Acute hypoxemic respiratory failure  -Ddx: decreased reserve from VATS vs volume overload (on fluids since 7/7)  -CXR noting blunting of CP angles  -7/12: Lasix 40mg IV x1, good response. Additional dose on 7/16    CKD (chronic kidney  "disease) stage 2, GFR 60-89 ml/min  Creatine stable for now. BMP reviewed- noted Estimated Creatinine Clearance: 94.3 mL/min (based on SCr of 1 mg/dL). according to latest data. Based on current GFR, CKD stage is stage 2 - GFR 60-89.  Monitor UOP and serial BMP and adjust therapy as needed. Renally dose meds. Avoid nephrotoxic medications and procedures.    Thyroid nodule  Incidental finding on imagin.7 cm left thyroid nodule. Nonemergent thyroid ultrasound is recommended.       Cyst of right kidney  Incidental finding on imagin.8 cm septated cystic lesion in the right kidney, which is indeterminate for malignancy. Further evaluation with nonemergent renal mass protocol CT or MRI is recommended.       Sepsis  This patient does have evidence of infective focus  My overall impression is sepsis.  Source: Skin and Soft Tissue (location toe)  Antibiotics given-   Antibiotics (72h ago, onward)      Start     Stop Route Frequency Ordered    24 1545  ceftaroline fosamiL (TEFLARO) 600 mg in D5W 50 mL IVPB (MB+)         -- IV Every 8 hours (non-standard times) 24 1532    24 1545  meropenem (MERREM) 1 g in 0.9% NaCl 100 mL IVPB (MB+)         -- IV Every 8 hours (non-standard times) 24 1532          Latest lactate reviewed-  No results for input(s): "LACTATE", "POCLAC" in the last 72 hours.  Organ dysfunction indicated by Acute kidney injury    Fluid challenge Not needed - patient is not hypotensive      Post- resuscitation assessment No - Post resuscitation assessment not needed       Will Not start Pressors- Levophed for MAP of 65  Source control achieved by: abx    Debility  Patient with Acute on chronic debility due to  multiple infections . Latest AMPAC and GEMS scores have not been reviewed. Evaluation for etiology is complete. Plan includes progressive mobility protocol initated, PT/OT consulted, and fall precautions in place.    Atrial fibrillation with rapid ventricular response  Patient " with Long standing persistent (>12 months) atrial fibrillation which is controlled currently with Beta Blocker. Patient is currently in TBD.[id number] Score: 2.      Chronic AF w/ acute RVR at OSH, resolved s/p cardizem drip   NOAC has been on hold d/t possible need for surgery - has been on prophylaxis dose of Lovenox  Resume A/c pending any surgical intervention, consider lovenox vs hep gtt bridge    History of Guillain-[place] syndrome  No acute issues  However, pt has impaired mobility and is acutely weakened from his sepsis/UTI/AF RVR and need placement       Type 2 diabetes mellitus  Patient's FSGs are controlled on current medication regimen.  Last A1c reviewed-   Lab Results   Component Value Date    HGBA1C 6.3 (H) 06/14/2024     Most recent fingerstick glucose reviewed-   Recent Labs   Lab 07/10/24  1607 07/10/24  2002   POCTGLUCOSE 181* 178*     Current correctional scale  Low  Maintain anti-hyperglycemic dose as follows-   Antihyperglycemics (From admission, onward)      Start     Stop Route Frequency Ordered    07/08/24 0900  insulin glargine U-100 (Lantus) pen 15 Units         -- SubQ Daily 07/07/24 1532    07/07/24 1529  insulin aspart U-100 pen 0-10 Units         -- SubQ Before meals & nightly PRN 07/07/24 1532          Hold Oral hypoglycemics while patient is in the hospital.    Hypertension  Chronic, controlled. Latest blood pressure and vitals reviewed-     Temp:  [97.8 °F (36.6 °C)-98.8 °F (37.1 °C)]   Pulse:  [65-91]   Resp:  [2-24]   BP: (100-145)/(56-81)   SpO2:  [90 %-99 %] .   Home meds for hypertension were reviewed and noted below.   Hypertension Medications               hydrALAZINE (APRESOLINE) 25 MG tablet Take 1 tablet (25 mg total) by mouth every 12 (twelve) hours.    metoprolol succinate (TOPROL-XL) 100 MG 24 hr tablet Take 1 tablet (100 mg total) by mouth once daily.            While in the hospital, will manage blood pressure as follows; continue home metoprolol    Will utilize p.r.n.  blood pressure medication only if patient's blood pressure greater than 180/110 and he develops symptoms such as worsening chest pain or shortness of breath.    MARA (obstructive sleep apnea)  Continue CPAP HS and w/ naps        Morbid obesity  There is no height or weight on file to calculate BMI. Morbid obesity complicates all aspects of disease management from diagnostic modalities to treatment. Weight loss encouraged and health benefits explained to patient.           VTE Risk Mitigation (From admission, onward)           Ordered     enoxaparin injection 40 mg  Every 24 hours         07/09/24 1358     Place sequential compression device  Until discontinued         07/07/24 1532                    Discharge Planning   KAMILA: 7/17/2024     Code Status: Full Code   Is the patient medically ready for discharge?:     Reason for patient still in hospital (select all that apply): Patient trending condition  Discharge Plan A: Long-term acute care facility (LTAC)                  Abraham Plaza DO  Department of Hospital Medicine   Andres KEARNEY

## 2024-07-16 NOTE — PLAN OF CARE
Problem: Physical Therapy  Goal: Physical Therapy Goal  Description: Goals to be met by: 24     Patient will increase functional independence with mobility by performin. Supine to sit with Moderate Assistance  2. Sit to supine with Moderate Assistance  3. Rolling to Left and Right with Minimal Assistance.  4. Sit to stand transfer with Moderate Assistance  5. Bed to chair transfer with Maximum Assistance using LRAD  6. Gait  x 10 feet with Maximum Assistance using LRAD.   7. Lower extremity exercise program x10 reps per handout, with independence    DME Justifications (see above for complete DME recommendations)    Hospital Bed ·Patient requires a hospital bed for positioning of the body in ways that are not feasible with an ordinary bed. The patient requires special positioning for pain relief, limited mobility, and/or being unable to independently make changes in body position without the use of a hospital bed. Pillows and wedges will not be adequate for resolving these positional issues.    Patient has a mobility limitation that significantly impairs their ability to participate in one or more mobility related activities of daily living in customary locations in the home. The mobility limitation cannot be sufficiently resolved by the use of a cane or walker. The use of a manual wheelchair will greatly improve the patient's ability to participate in MRADLs. The patient will use the wheelchair on a regular basis at home. They have expressed their willingness to use a manual wheelchair in the home, and have a caregiver who is available and willing to assist with the wheelchair if needed.   Outcome: Progressing

## 2024-07-16 NOTE — PLAN OF CARE
Andres KEARNEY  Discharge Reassessment    Primary Care Provider: No, Primary Doctor    Expected Discharge Date: 7/17/2024    Reassessment (most recent)       Discharge Reassessment - 07/16/24 1516          Discharge Reassessment    Assessment Type Discharge Planning Reassessment     Did the patient's condition or plan change since previous assessment? No     Discharge Plan discussed with: Patient     Communicated KAMILA with patient/caregiver Yes     Discharge Plan A Long-term acute care facility (LTAC)     Discharge Plan B Rehab     Transition of Care Barriers None     Why the patient remains in the hospital Requires continued medical care                   [facility] will submit for Auth.

## 2024-07-16 NOTE — PT/OT/SLP PROGRESS
Occupational Therapy   Co-Treatment  Co-treatment performed due to patient's multiple deficits requiring two skilled therapists to appropriately and safely assess patient's strength and endurance while facilitating functional tasks in addition to accommodating for patient's activity tolerance.        Name: Roosevelt Moser  MRN: [id number]  Admitting Diagnosis:  Osteomyelitis of multiple sites       Recommendations:     Discharge Recommendations: Moderate Intensity Therapy  Discharge Equipment Recommendations:  lift device, hospital bed, wheelchair  Barriers to discharge:  Other (Comment) (increased skilled (A) required)    Assessment:     Roosevelt Moser is a 73 y.o. male with a medical diagnosis of Osteomyelitis of multiple sites.  He presents with the following performance deficits affecting function are weakness, impaired endurance, impaired self care skills, impaired functional mobility, impaired balance, gait instability, decreased upper extremity function, decreased lower extremity function, pain, orthopedic precautions, decreased coordination, decreased ROM.     Pt agreeable to therapy and tolerated fairly. Pt demonstrated improvements with bed mobility evidenced by less assistance required. However, pt remains limited in ADLs, functional mobility, and functional transfers and is currently not performing tasks at [facility]. Pt would continue to benefit from skilled OT services to maximize functional independence with ADLs and functional mobility, reduce caregiver burden, and facilitate safe discharge in the least restrictive environment.      Rehab Prognosis:  Fair; patient would benefit from acute skilled OT services to address these deficits and reach maximum level of function.       Plan:     Patient to be seen 4 x/week to address the above listed problems via self-care/home management, therapeutic activities, therapeutic exercises  Plan of Care Expires: 08/08/24  Plan of Care Reviewed with: patient,  "spouse    Subjective     Chief Complaint: I have to lie back down  Patient/Family Comments/goals: "I need to have a bowel movement"  Pain/Comfort:  Pain Rating 1: other (see comments) (pt did not rate)  Location - Side 1: Left  Location - Orientation 1: generalized  Location 1: arm  Pain Addressed 1: Reposition, Distraction, Cessation of Activity  Pain Rating Post-Intervention 1:  (not rated)    Objective:     Communicated with: RN prior to session.  Patient found HOB elevated with PICC line, Condom Catheter, telemetry, peripheral IV, hemovac upon OT entry to room.    General Precautions: Standard, fall    Orthopedic Precautions:LUE partial weight bearing, RLE weight bearing as tolerated  Braces: N/A  Respiratory Status: Room air     Occupational Performance:     Bed Mobility:    Patient completed Scooting to EOB with maximal assistance and 2 persons  Pt required verbal cueing for sequencing 7 initiation/continuation   Patient completed Supine to Sit with maximal assistance  Patient completed Sit to Supine with contact guard assistance     Functional Mobility/Transfers:  Despite education regarding the importance of standing & OOB activity, pt adamantly refused and reported that he needed to lie back due to onset of a BM    Activities of Daily Living:  Grooming: maximum assistance to apply deodorant to B underarm  Pt's is significantly limited by LUE pain & weakness  Lower Body Dressing: total assistance to don/dof socks while seated EOB    AMPAC 6 Click ADL: 10    Treatment & Education:  Therapeutic exercises: pt completed the following UE exercises to maintain/improve UE ROM, strength and overall functionality of B UE required for participation/independence with ADLs, IADLs & functional mobility/transfers   - Gentle PROM of elbow & wrist flexion/extension x10 each (40 reps in total)    -Pt educated on hand placement for transfers  -Pt educated on weightbearing and surgical precautions with pt & spouse  -Education " on task modification to maximize safety and (I) during ADLs and mobility  -Education on importance of OOB activity to improve overall activity tolerance and promote recovery  -Pt educated to call for assistance and to transfer with hospital staff only  -Provided education regarding role of OT & POC   Pt had no further questions & when asked whether there were any concerns pt reported none.     Patient left HOB elevated with all lines intact, call button in reach, RN & PCT notified, and spouse present    GOALS:   Multidisciplinary Problems       Occupational Therapy Goals          Problem: Occupational Therapy    Goal Priority Disciplines Outcome Interventions   Occupational Therapy Goal     OT, PT/OT Progressing    Description: Goals to be met by: 8/8/24     Patient will increase functional independence with ADLs by performing:    LE Dressing with Moderate Assistance.  Grooming while EOB with Supervision.  Toileting from toilet with Supervision for hygiene and clothing management.   Stand pivot transfers with Moderate Assistance.  Squat pivot transfers with Moderate Assistance.  Toilet transfer to bedside commode with Moderate Assistance.    DME Justification for Hospital Bed:  Patient requires a hospital bed for positioning of the body in ways that are not feasible with an ordinary bed. The patient requires special positioning for pain relief, limited mobility, and/or being unable to independently make changes in body position without the use of a hospital bed. Pillows and wedges will not be adequate for resolving these positional issues.                          Time Tracking:     OT Date of Treatment: 07/16/24  OT Start Time: 1305  OT Stop Time: 1336  OT Total Time (min): 31 min    Billable Minutes:Self Care/Home Management 10  Therapeutic Exercise 21    OT/BAR: OT          7/16/2024

## 2024-07-16 NOTE — ASSESSMENT & PLAN NOTE
Assesment:  Podiatry consulted for evaluation of R 2nd digit amp site that was conducted by Dr Zhu at [facility] on 7/1/24.  Patient's surgical sites stable.  Sutures intact without dehiscence.  No surrounding erythema or edema noted.  Patient is healing appropriately.    Plan  - Physical exam findings discussed with the patient and his wife.  Discussed with the patient that his surgical site is stable and shows no signs of infection.  Sutures are intact without any openings and drainage.  Patient is healing appropriately.  - Recommend patient to follow up with Dr. Zhu in podiatry clinic upon discharge.  - Wound care orders placed  - WBAT  - Podiatry will continue to follow    Discharge Recommendations  - Patient to follow up in outpatient Podiatry clinic with Dr Zhu.  - Antibiotics per Primary  - HH/SNF to do dressings 3x a week as follows: to the R foot amp site dress sutures with xeroform, gauze, kirlex, and ACE wrap  - Weight bearing as tolerated  - Keep dressings clean, dry, and intact until follow-up appointment

## 2024-07-16 NOTE — HPI
73M with a PMHx of A fib, HTN, T2DM, MARA, HLD, hx GBS, hx displaced comminuted fracture of the L humerus from a fall s/p ORIF 3/25/24 who presents as a transfer for Thoracic Surgery and Plastic Surgery eval in setting of OM of multiple ribs which will need further debridement and then a flap placed.    Podiatry was consulted for evaluation of the R 2nd digit amputation site done by Dr. Zhu at [facility].  Patient's wife is also bedside and assists with answering medical history questions.  Wound Care team also bedside working on the patient's upper extremity wounds.  Patient said he had R second digit amputation on 7/1.  He reports no pain to his R foot.  He denies noticing any drainage from his surgical site.  Denies redness or swelling to foot.  Denies fevers, chills, nausea, vomiting.  Denies SOB or chest pain.  Denies any new pedal complaints.    VSS, Afebrile, WBC WBL.

## 2024-07-16 NOTE — PT/OT/SLP PROGRESS
Physical Therapy Co-Treatment  Co-treatment performed to appropriately and safely address patient's strength and endurance deficits while facilitating functional tasks in addition to accommodating for patient's activity/pain tolerance.    Patient Name:  Roosevelt Moser   MRN:  [id number]    Recommendations:     Discharge Recommendations: Moderate Intensity Therapy  Discharge Equipment Recommendations: hospital bed, lift device, wheelchair  Barriers to discharge: Inaccessible home and Decreased caregiver support    Assessment:     Roosevelt Moser is a 73 y.o. male admitted with a medical diagnosis of Osteomyelitis of multiple sites.  He presents with the following impairments/functional limitations: weakness, impaired endurance, impaired self care skills, impaired functional mobility, gait instability, impaired balance, decreased coordination, decreased upper extremity function, decreased lower extremity function, decreased safety awareness, pain, orthopedic precautions. Pt with improvements in mobility and tolerance this date. Pt mobilized to EOB with maxA, but was unable to stand due to needing to have a bowel movement. Pt would benefit from a moderate intensity/frequency therapy for: Dynamic/static standing/sitting balance through skilled balance training, strengthening with the use of skilled therapeutic exercises interventions, and mobility through adaptive equipment training. Pt continues to benefit from a collaborative PT/OT program to improve quality of life and focus on recovery of impairments.      Rehab Prognosis: Good; patient would benefit from acute skilled PT services to address these deficits and reach maximum level of function.    Recent Surgery: * No surgery found *      Plan:     During this hospitalization, patient to be seen 4 x/week to address the identified rehab impairments via gait training, therapeutic activities, therapeutic exercises, neuromuscular re-education and progress toward the following  "goals:    Plan of Care Expires:  08/08/24    Subjective     Chief Complaint: L shoulder pain  Patient/Family Comments/goals: L shoulder pain increased with anterior trunk lean while seated EOB  Pain/Comfort:  Pain Rating 1:  (not rated)  Location - Side 1: Left  Location 1:  (shldr/arm)  Pain Addressed 1: Reposition, Distraction  Pain Rating Post-Intervention 1: other (see comments) (not rated)      Objective:     Communicated with RN prior to session.  Patient found supine with PICC line, telemetry, Condom Catheter, oxygen upon PT entry to room.     General Precautions: Standard, fall  Orthopedic Precautions: LUE partial weight bearing, RLE weight bearing as tolerated  Braces: N/A  Respiratory Status: Nasal cannula, flow 3 L/min     Functional Mobility:  Bed Mobility:     Scooting: stand by assistance  Supine to Sit: maximal assistance  Sit to Supine: contact guard assistance  Transfers:     Sit to Stand:  deferred 2/2 patient reporting discomfort with condom cath that was also causing pain "on the back side" and then pt reporting needing to have a bowel movement  Balance:   Static Sitting: SBA  Dynamic Sitting: SBA-CGA  Static Standing: MARGE  Dynamic Standing: MARGE      AM-PAC 6 CLICK MOBILITY  Turning over in bed (including adjusting bedclothes, sheets and blankets)?: 3  Sitting down on and standing up from a chair with arms (e.g., wheelchair, bedside commode, etc.): 1  Moving from lying on back to sitting on the side of the bed?: 3  Moving to and from a bed to a chair (including a wheelchair)?: 1  Need to walk in hospital room?: 1  Climbing 3-5 steps with a railing?: 1  Basic Mobility Total Score: 10       Treatment & Education:  Patient also educated and instructed on therapeutic exercises. Therapeutic exercises included the following: Long Arc Quads, Seated marches (Single leg), Bilateral Heel raises, Bilateral Toe Raises, hamstring curls. Pt performed 1x10 on BLEs.  Patient educated on role of therapy, goals of " session, and benefits of mobilizing.   Discussed PT plan of care during hospitalization.   Patient educated on calling for assistance.   Patient educated on how their diagnosis impacts their mobility within PT scope of practice.   All questions answered within PT scope of practice.    Patient left HOB elevated with all lines intact, call button in reach, bed alarm on, PCT notified pt is having a bowel movements, and pt's spouse present.    GOALS:   Multidisciplinary Problems       Physical Therapy Goals          Problem: Physical Therapy    Goal Priority Disciplines Outcome Goal Variances Interventions   Physical Therapy Goal     PT, PT/OT Progressing     Description: Goals to be met by: 24     Patient will increase functional independence with mobility by performin. Supine to sit with Moderate Assistance  2. Sit to supine with Moderate Assistance  3. Rolling to Left and Right with Minimal Assistance.  4. Sit to stand transfer with Moderate Assistance  5. Bed to chair transfer with Maximum Assistance using LRAD  6. Gait  x 10 feet with Maximum Assistance using LRAD.   7. Lower extremity exercise program x10 reps per handout, with independence    DME Justifications (see above for complete DME recommendations)    Hospital Bed ·Patient requires a hospital bed for positioning of the body in ways that are not feasible with an ordinary bed. The patient requires special positioning for pain relief, limited mobility, and/or being unable to independently make changes in body position without the use of a hospital bed. Pillows and wedges will not be adequate for resolving these positional issues.    Patient has a mobility limitation that significantly impairs their ability to participate in one or more mobility related activities of daily living in customary locations in the home. The mobility limitation cannot be sufficiently resolved by the use of a cane or walker. The use of a manual wheelchair will greatly  improve the patient's ability to participate in MRADLs. The patient will use the wheelchair on a regular basis at home. They have expressed their willingness to use a manual wheelchair in the home, and have a caregiver who is available and willing to assist with the wheelchair if needed.                        Time Tracking:     PT Received On: 07/16/24  PT Start Time: 1302     PT Stop Time: 1336  PT Total Time (min): 34 min     Billable Minutes: Therapeutic Activity 10 and Therapeutic Exercise 15    Treatment Type: Treatment  PT/PTA: PT     Number of PTA visits since last PT visit: 0     07/16/2024

## 2024-07-16 NOTE — SUBJECTIVE & OBJECTIVE
Scheduled Meds:   (Magic mouthwash) 1:1:1 diphenhydrAMINE(Benadryl) 12.5mg/5ml liq, aluminum & magnesium hydroxide-simethicone (Maalox), LIDOcaine viscous 2%  10 mL Swish & Spit QID    ceftaroline (Teflaro) IV (PEDS and ADULTS)  600 mg Intravenous Q8H    enoxparin  40 mg Subcutaneous Q24H (prophylaxis, 1700)    ferrous sulfate  1 tablet Oral Daily    insulin glargine U-100  15 Units Subcutaneous Daily    LIDOcaine  1 patch Transdermal Q24H    metoprolol succinate  100 mg Oral QHS    mupirocin   Nasal BID    polyethylene glycol  17 g Oral Daily    sodium chloride 0.9%  10 mL Intravenous Q6H     Continuous Infusions:  PRN Meds:  Current Facility-Administered Medications:     albuterol-ipratropium, 3 mL, Nebulization, Q4H PRN    aluminum & magnesium hydroxide-simethicone, 15 mL, Oral, QID PRN    dextrose 10%, 12.5 g, Intravenous, PRN    dextrose 10%, 25 g, Intravenous, PRN    glucagon (human recombinant), 1 mg, Intramuscular, PRN    glucose, 16 g, Oral, PRN    glucose, 24 g, Oral, PRN    HYDROcodone-acetaminophen, 1 tablet, Oral, Q4H PRN    HYDROcodone-acetaminophen, 1 tablet, Oral, Q4H PRN    insulin aspart U-100, 0-10 Units, Subcutaneous, QID (AC + HS) PRN    loperamide, 2 mg, Oral, QID PRN    melatonin, 6 mg, Oral, Nightly PRN    naloxone, 0.02 mg, Intravenous, PRN    ondansetron, 8 mg, Oral, Q8H PRN    polyethylene glycol, 17 g, Oral, BID PRN    promethazine, 25 mg, Oral, Q6H PRN    senna-docusate 8.6-50 mg, 1 tablet, Oral, BID PRN    simethicone, 1 tablet, Oral, QID PRN    sodium chloride 0.9%, 10 mL, Intravenous, Q12H PRN    Flushing PICC/Midline Protocol, , , Until Discontinued **AND** sodium chloride 0.9%, 10 mL, Intravenous, Q6H **AND** sodium chloride 0.9%, 10 mL, Intravenous, PRN    traMADoL, 50 mg, Oral, Q4H PRN    Review of patient's allergies indicates:  No Known Allergies     Past Medical History:   Diagnosis Date    A-fib 2024    Diabetes mellitus, type 2 2021    Guillain-Dalton 10/2003    Hyperlipidemia      Hypertension 2016    Sleep apnea      Past Surgical History:   Procedure Laterality Date    ARTHROTOMY OF SHOULDER Left 6/21/2024    Procedure: ARTHROTOMY, SHOULDER;  Surgeon: Roman Paulson MD;  Location: [facility];  Service: Orthopedics;  Laterality: Left;    INCISION AND DRAINAGE OF ABSCESS N/A 7/4/2024    Procedure: INCISION AND DRAINAGE, ABSCESS;  Surgeon: Gus Escobedo MD;  Location: [facility];  Service: General;  Laterality: N/A;  Abcess of anterior chest wall    IRRIGATION AND DEBRIDEMENT OF UPPER EXTREMITY Left 6/24/2024    Procedure: IRRIGATION AND DEBRIDEMENT, UPPER EXTREMITY;  Surgeon: Nathan Cooper MD;  Location: [facility];  Service: Orthopedics;  Laterality: Left;    IRRIGATION AND DEBRIDEMENT OF UPPER EXTREMITY Left 6/27/2024    Procedure: IRRIGATION AND DEBRIDEMENT, UPPER EXTREMITY;  Surgeon: Nathan Cooper MD;  Location: [facility] OR;  Service: Orthopedics;  Laterality: Left;    OPEN REDUCTION AND INTERNAL FIXATION (ORIF) OF FRACTURE OF PROXIMAL HUMERUS Left 3/25/2024    Procedure: ORIF, FRACTURE, HUMERUS, PROXIMAL/IM HANNA, LEFT;  Surgeon: Nathan Cooper MD;  Location: [facility];  Service: Orthopedics;  Laterality: Left;  Accumed, Synthes Small Frag set Avelino verified 3/22/24 ark    THORACOSCOPIC DECORTICATION OF LUNG Right 7/5/2024    Procedure: VATS, WITH DECORTICATION, LUNG;  Surgeon: Gus Escobedo MD;  Location: [facility];  Service: Cardiothoracic;  Laterality: Right;    TOE AMPUTATION Right 7/1/2024    Procedure: AMPUTATION, TOE;  Surgeon: Stevie Zhu DPM;  Location: [facility];  Service: Podiatry;  Laterality: Right;       Family History    None       Tobacco Use    Smoking status: Never    Smokeless tobacco: Never   Substance and Sexual Activity    Alcohol use: Yes     Alcohol/week: 0.0 standard drinks of alcohol     Comment: social    Drug use: No    Sexual activity: Yes     Review of Systems   Constitutional:  Negative for fatigue and fever.   HENT: Negative.     Eyes: Negative.     Respiratory: Negative.     Cardiovascular: Negative.    Gastrointestinal: Negative.    Endocrine: Negative.    Genitourinary: Negative.    Musculoskeletal:  Negative for gait problem.   Skin:  Positive for color change and wound.        Reports amputation of R 2nd digit   Allergic/Immunologic: Negative.    Hematological: Negative.    Psychiatric/Behavioral: Negative.       Objective:     Vital Signs (Most Recent):  Temp: 99.2 °F (37.3 °C) (07/16/24 1532)  Pulse: 87 (07/16/24 1532)  Resp: 18 (07/16/24 1532)  BP: (!) 92/54 (07/16/24 1532)  SpO2: (!) 92 % (07/16/24 1532) Vital Signs (24h Range):  Temp:  [97.8 °F (36.6 °C)-99.2 °F (37.3 °C)] 99.2 °F (37.3 °C)  Pulse:  [59-87] 87  Resp:  [18-20] 18  SpO2:  [86 %-98 %] 92 %  BP: ()/(54-76) 92/54     Weight: 133 kg (293 lb 3.4 oz)  Body mass index is 39.77 kg/m².    Foot Exam    Right Foot/Ankle     Inspection and Palpation  Ecchymosis: none  Tenderness: none   Swelling: none   Skin Exam: skin changes; no drainage, no cellulitis and no abnormal color     Neurovascular  Dorsalis pedis: 1+  Posterior tibial: 1+  Saphenous nerve sensation: diminished  Tibial nerve sensation: diminished  Superficial peroneal nerve sensation: diminished  Deep peroneal nerve sensation: diminished  Sural nerve sensation: diminished    Comments  R foot:  S/p R second digit amputation.  Sutures intact without dehiscence.  No surrounding erythema or edema noted.  No tenderness to palpation.  No malodor noted.          Laboratory:  BMP:   Recent Labs   Lab 07/16/24  0236   GLU 99   *   K 4.1      CO2 21*   BUN 26*   CREATININE 1.1   CALCIUM 8.5*   MG 1.8     CBC:   Recent Labs   Lab 07/16/24  0236   WBC 8.36   RBC 3.89*   HGB 9.5*   HCT 31.6*      MCV 81*   MCH 24.4*   MCHC 30.1*     Diagnostic Results:  I have reviewed all pertinent imaging results/findings within the past 24 hours.    Clinical Findings:

## 2024-07-16 NOTE — PLAN OF CARE
Problem: Adult Inpatient Plan of Care  Goal: Plan of Care Review  Outcome: Progressing  Goal: Absence of Hospital-Acquired Illness or Injury  Outcome: Progressing  Goal: Readiness for Transition of Care  Outcome: Progressing     Problem: Diabetes Comorbidity  Goal: Blood Glucose Level Within Targeted Range  Outcome: Progressing     Problem: Sepsis/Septic Shock  Goal: Optimal Coping  Outcome: Progressing  Goal: Blood Glucose Level Within Targeted Range  Outcome: Progressing  Goal: Optimal Nutrition Intake  Outcome: Progressing     Problem: Acute Kidney Injury/Impairment  Goal: Effective Renal Function  Outcome: Progressing     Problem: Infection  Goal: Absence of Infection Signs and Symptoms  Outcome: Progressing

## 2024-07-16 NOTE — PHYSICIAN QUERY
Please clarify the Sepsis diagnosis for this encounter.  Sepsis secondary to left chest wall abscess

## 2024-07-16 NOTE — CONSULTS
[facility]  Podiatry  Consult Note    Patient Name: Roosevelt Moser  MRN: [id number]  Admission Date: 7/7/2024  Hospital Length of Stay: 9 days  Attending Physician: Abraham Plaza DO  Primary Care Provider: Miriam, Primary Doctor     Inpatient consult to Podiatry  Consult performed by: Sofia Hernandez DPM  Consult ordered by: Abraham Plaza DO  Reason for consult: see below        Subjective:     History of Present Illness:  73M with a PMHx of Afib, HTN, T2D, MARA, HLD, hx GBS, hx displaced comminuted fracture of the L humerus from a fall s/p ORIF 3/25/24 who pwas [place] Thoracic Surgery and Plastic Surgery evaluation in setting of OM of multiple ribs which will need further debridement and then a flap placed.    Podiatry was consulted for evaluation of the R 2nd digit amputation site done by Dr. Zhu at [place].  Patient's wife is also bedside and assists with answering medical history questions.  Wound Care team also bedside working on the patient's upper extremity wounds.  Patient said he had R second digit amputation on 7/1.  He reports no pain to his R foot.  He denies noticing any drainage from his surgical site.  Denies redness or swelling to foot.  Denies fevers, chills, nausea, or vomiting.  Denies SOB or chest pain.  Denies any new pedal complaints.    VSS, Afebrile, WBC WBL.     Scheduled Meds:   (Magic mouthwash) 1:1:1 diphenhydrAMINE(Benadryl) 12.5mg/5ml liq, aluminum & magnesium hydroxide-simethicone (Maalox), LIDOcaine viscous 2%  10 mL Swish & Spit QID    ceftaroline (Teflaro) IV (PEDS and ADULTS)  600 mg Intravenous Q8H    enoxparin  40 mg Subcutaneous Q24H (prophylaxis, 1700)    ferrous sulfate  1 tablet Oral Daily    insulin glargine U-100  15 Units Subcutaneous Daily    LIDOcaine  1 patch Transdermal Q24H    metoprolol succinate  100 mg Oral QHS    mupirocin   Nasal BID    polyethylene glycol  17 g Oral Daily    sodium chloride 0.9%  10 mL Intravenous Q6H      Continuous Infusions:  PRN Meds:  Current Facility-Administered Medications:     albuterol-ipratropium, 3 mL, Nebulization, Q4H PRN    aluminum & magnesium hydroxide-simethicone, 15 mL, Oral, QID PRN    dextrose 10%, 12.5 g, Intravenous, PRN    dextrose 10%, 25 g, Intravenous, PRN    glucagon (human recombinant), 1 mg, Intramuscular, PRN    glucose, 16 g, Oral, PRN    glucose, 24 g, Oral, PRN    HYDROcodone-acetaminophen, 1 tablet, Oral, Q4H PRN    HYDROcodone-acetaminophen, 1 tablet, Oral, Q4H PRN    insulin aspart U-100, 0-10 Units, Subcutaneous, QID (AC + HS) PRN    loperamide, 2 mg, Oral, QID PRN    melatonin, 6 mg, Oral, Nightly PRN    naloxone, 0.02 mg, Intravenous, PRN    ondansetron, 8 mg, Oral, Q8H PRN    polyethylene glycol, 17 g, Oral, BID PRN    promethazine, 25 mg, Oral, Q6H PRN    senna-docusate 8.6-50 mg, 1 tablet, Oral, BID PRN    simethicone, 1 tablet, Oral, QID PRN    sodium chloride 0.9%, 10 mL, Intravenous, Q12H PRN    Flushing PICC/Midline Protocol, , , Until Discontinued **AND** sodium chloride 0.9%, 10 mL, Intravenous, Q6H **AND** sodium chloride 0.9%, 10 mL, Intravenous, PRN    traMADoL, 50 mg, Oral, Q4H PRN    Review of patient's allergies indicates:  No Known Allergies     Past Medical History:   Diagnosis Date    A-fib 2024    Diabetes mellitus, type 2 2021    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 OR;  Service: Orthopedics;  Laterality: Left;    INCISION AND DRAINAGE OF ABSCESS N/A 7/4/2024    Procedure: INCISION AND DRAINAGE, ABSCESS;  Surgeon: Gus Escobedo MD;  Location: [facility] OR;  Service: General;  Laterality: N/A;  Abcess of anterior chest wall    IRRIGATION AND DEBRIDEMENT OF UPPER EXTREMITY Left 6/24/2024    Procedure: IRRIGATION AND DEBRIDEMENT, UPPER  EXTREMITY;  Surgeon: Nathan Cooper MD;  Location: [facility] OR;  Service: Orthopedics;  Laterality: Left;    IRRIGATION AND DEBRIDEMENT OF UPPER EXTREMITY Left 6/27/2024    Procedure: IRRIGATION AND DEBRIDEMENT, UPPER EXTREMITY;  Surgeon: Nathan Cooper MD;  Location: [facility] OR;  Service: Orthopedics;  Laterality: Left;    OPEN REDUCTION AND INTERNAL FIXATION (ORIF) OF FRACTURE OF PROXIMAL HUMERUS Left 3/25/2024    Procedure: ORIF, FRACTURE, HUMERUS, PROXIMAL/IM HANNA, LEFT;  Surgeon: Nathan Cooper MD;  Location: [facility] OR;  Service: Orthopedics;  Laterality: Left;  Accumed, Synthes Small Frag set Avelino verified 3/22/24 ark    THORACOSCOPIC DECORTICATION OF LUNG Right 7/5/2024    Procedure: VATS, WITH DECORTICATION, LUNG;  Surgeon: Gus Escobedo MD;  Location: [facility];  Service: Cardiothoracic;  Laterality: Right;    TOE AMPUTATION Right 7/1/2024    Procedure: AMPUTATION, TOE;  Surgeon: Stevie Zhu DPM;  Location: [facility];  Service: Podiatry;  Laterality: Right;       Family History    None       Tobacco Use    Smoking status: Never    Smokeless tobacco: Never   Substance and Sexual Activity    Alcohol use: Yes     Alcohol/week: 0.0 standard drinks of alcohol     Comment: social    Drug use: No    Sexual activity: Yes     Review of Systems   Constitutional:  Negative for fatigue and fever.   HENT: Negative.     Eyes: Negative.    Respiratory: Negative.     Cardiovascular: Negative.    Gastrointestinal: Negative.    Endocrine: Negative.    Genitourinary: Negative.    Musculoskeletal:  Negative for gait problem.   Skin:  Positive for color change and wound.        Reports amputation of R 2nd digit   Allergic/Immunologic: Negative.    Hematological: Negative.    Psychiatric/Behavioral: Negative.       Objective:     Vital Signs (Most Recent):  Temp: 99.2 °F (37.3 °C) (07/16/24 1532)  Pulse: 87 (07/16/24 1532)  Resp: 18 (07/16/24 1532)  BP: (!) 92/54 (07/16/24 1532)  SpO2: (!) 92 % (07/16/24 1532) Vital Signs (24h  Range):  Temp:  [97.8 °F (36.6 °C)-99.2 °F (37.3 °C)] 99.2 °F (37.3 °C)  Pulse:  [59-87] 87  Resp:  [18-20] 18  SpO2:  [86 %-98 %] 92 %  BP: ()/(54-76) 92/54     Weight: 133 kg (293 lb 3.4 oz)  Body mass index is 39.77 kg/m².    Foot Exam    Right Foot/Ankle   Inspection and Palpation  Ecchymosis: none  Tenderness: none   Swelling: none   Skin Exam: skin changes; no drainage, no cellulitis and no abnormal color     Neurovascular  Dorsalis pedis: 1+  Posterior tibial: 1+  Saphenous nerve sensation: diminished  Tibial nerve sensation: diminished  Superficial peroneal nerve sensation: diminished  Deep peroneal nerve sensation: diminished  Sural nerve sensation: diminished    Comments  R foot:  S/p R second digit amputation.  Sutures intact without dehiscence.  No surrounding erythema or edema noted.  No tenderness to palpation.  No malodor noted.    Laboratory:  BMP:   Recent Labs   Lab 07/16/24  0236   GLU 99   *   K 4.1      CO2 21*   BUN 26*   CREATININE 1.1   CALCIUM 8.5*   MG 1.8     CBC:   Recent Labs   Lab 07/16/24  0236   WBC 8.36   RBC 3.89*   HGB 9.5*   HCT 31.6*      MCV 81*   MCH 24.4*   MCHC 30.1*     Diagnostic Results:  I have reviewed all pertinent imaging results/findings within the past 24 hours.    Clinical Findings:        Assessment/Plan:     Palliative Care  Post-operative state  Assesment:  Podiatry consulted for evaluation of R 2nd digit amp site that was conducted by Dr Zhu at [facility] on 7/1/24.  Patient's surgical sites stable.  Sutures intact without dehiscence.  No surrounding erythema or edema noted.  Patient is healing appropriately.    Plan  - Physical exam findings discussed with the patient and his wife.  Discussed with the patient that his surgical site is stable and shows no signs of infection.  Sutures are intact without any openings and drainage.  Patient is healing appropriately.  - Discussed with the patient and is wife that sutures are not  ready to be removed.  - Recommend patient to follow up with Dr. Zhu in Podiatry clinic upon discharge.  - Wound care orders placed  - WBAT  - Podiatry will sign off.  Please reach out with any further questions.    Discharge Recommendations  - Patient to follow up in outpatient Podiatry clinic with Dr Zhu.  - Antibiotics per Primary  - HH/SNF to do dressings 2x a week as follows: To the R foot suture site dress with xeroform, gauze, kirlex, and ACE wrap  - Weight bearing as tolerated  - Keep dressings clean, dry, and intact until follow-up appointment     Thank you for your consult. I will sign off. Please contact us if you have any additional questions.    Sofia Hernandez DPM  Podiatry  [facility]

## 2024-07-17 ENCOUNTER — TELEPHONE (OUTPATIENT)
Dept: UROLOGY | Facility: CLINIC | Age: 74
End: 2024-07-17
Payer: MEDICARE

## 2024-07-17 PROBLEM — R00.1 BRADYCARDIA: Status: ACTIVE | Noted: 2024-07-17

## 2024-07-17 LAB
ANION GAP SERPL CALC-SCNC: 12 MMOL/L (ref 8–16)
BASOPHILS # BLD AUTO: 0.07 K/UL (ref 0–0.2)
BASOPHILS NFR BLD: 0.7 % (ref 0–1.9)
BUN SERPL-MCNC: 50 MG/DL (ref 8–23)
CALCIUM SERPL-MCNC: 8.4 MG/DL (ref 8.7–10.5)
CHLORIDE SERPL-SCNC: 98 MMOL/L (ref 95–110)
CO2 SERPL-SCNC: 22 MMOL/L (ref 23–29)
CREAT SERPL-MCNC: 1.2 MG/DL (ref 0.5–1.4)
DIFFERENTIAL METHOD BLD: ABNORMAL
EOSINOPHIL # BLD AUTO: 1.1 K/UL (ref 0–0.5)
EOSINOPHIL NFR BLD: 10 % (ref 0–8)
ERYTHROCYTE [DISTWIDTH] IN BLOOD BY AUTOMATED COUNT: 18 % (ref 11.5–14.5)
EST. GFR  (NO RACE VARIABLE): >60 ML/MIN/1.73 M^2
GLUCOSE SERPL-MCNC: 95 MG/DL (ref 70–110)
HCT VFR BLD AUTO: 29.5 % (ref 40–54)
HGB BLD-MCNC: 9.1 G/DL (ref 14–18)
IMM GRANULOCYTES # BLD AUTO: 0.13 K/UL (ref 0–0.04)
IMM GRANULOCYTES NFR BLD AUTO: 1.2 % (ref 0–0.5)
LYMPHOCYTES # BLD AUTO: 2.2 K/UL (ref 1–4.8)
LYMPHOCYTES NFR BLD: 21.1 % (ref 18–48)
MAGNESIUM SERPL-MCNC: 1.8 MG/DL (ref 1.6–2.6)
MCH RBC QN AUTO: 24.6 PG (ref 27–31)
MCHC RBC AUTO-ENTMCNC: 30.8 G/DL (ref 32–36)
MCV RBC AUTO: 80 FL (ref 82–98)
MONOCYTES # BLD AUTO: 1.4 K/UL (ref 0.3–1)
MONOCYTES NFR BLD: 13.4 % (ref 4–15)
NEUTROPHILS # BLD AUTO: 5.7 K/UL (ref 1.8–7.7)
NEUTROPHILS NFR BLD: 53.6 % (ref 38–73)
NRBC BLD-RTO: 0 /100 WBC
PHOSPHATE SERPL-MCNC: 3.7 MG/DL (ref 2.7–4.5)
PLATELET # BLD AUTO: 398 K/UL (ref 150–450)
PMV BLD AUTO: 10.3 FL (ref 9.2–12.9)
POCT GLUCOSE: 123 MG/DL (ref 70–110)
POCT GLUCOSE: 135 MG/DL (ref 70–110)
POCT GLUCOSE: 151 MG/DL (ref 70–110)
POCT GLUCOSE: 171 MG/DL (ref 70–110)
POCT GLUCOSE: 175 MG/DL (ref 70–110)
POTASSIUM SERPL-SCNC: 4.4 MMOL/L (ref 3.5–5.1)
RBC # BLD AUTO: 3.7 M/UL (ref 4.6–6.2)
SODIUM SERPL-SCNC: 132 MMOL/L (ref 136–145)
WBC # BLD AUTO: 10.55 K/UL (ref 3.9–12.7)

## 2024-07-17 PROCEDURE — 97535 SELF CARE MNGMENT TRAINING: CPT

## 2024-07-17 PROCEDURE — 97110 THERAPEUTIC EXERCISES: CPT

## 2024-07-17 PROCEDURE — 84100 ASSAY OF PHOSPHORUS: CPT | Performed by: STUDENT IN AN ORGANIZED HEALTH CARE EDUCATION/TRAINING PROGRAM

## 2024-07-17 PROCEDURE — 27000207 HC ISOLATION

## 2024-07-17 PROCEDURE — 85025 COMPLETE CBC W/AUTO DIFF WBC: CPT | Performed by: STUDENT IN AN ORGANIZED HEALTH CARE EDUCATION/TRAINING PROGRAM

## 2024-07-17 PROCEDURE — A4216 STERILE WATER/SALINE, 10 ML: HCPCS | Performed by: STUDENT IN AN ORGANIZED HEALTH CARE EDUCATION/TRAINING PROGRAM

## 2024-07-17 PROCEDURE — 20600001 HC STEP DOWN PRIVATE ROOM

## 2024-07-17 PROCEDURE — 36415 COLL VENOUS BLD VENIPUNCTURE: CPT | Performed by: STUDENT IN AN ORGANIZED HEALTH CARE EDUCATION/TRAINING PROGRAM

## 2024-07-17 PROCEDURE — 63600175 PHARM REV CODE 636 W HCPCS: Mod: JZ,JG | Performed by: STUDENT IN AN ORGANIZED HEALTH CARE EDUCATION/TRAINING PROGRAM

## 2024-07-17 PROCEDURE — 97112 NEUROMUSCULAR REEDUCATION: CPT

## 2024-07-17 PROCEDURE — 99900035 HC TECH TIME PER 15 MIN (STAT)

## 2024-07-17 PROCEDURE — 25000003 PHARM REV CODE 250: Performed by: STUDENT IN AN ORGANIZED HEALTH CARE EDUCATION/TRAINING PROGRAM

## 2024-07-17 PROCEDURE — 97530 THERAPEUTIC ACTIVITIES: CPT

## 2024-07-17 PROCEDURE — 94660 CPAP INITIATION&MGMT: CPT

## 2024-07-17 PROCEDURE — 27100171 HC OXYGEN HIGH FLOW UP TO 24 HOURS

## 2024-07-17 PROCEDURE — 83735 ASSAY OF MAGNESIUM: CPT | Performed by: STUDENT IN AN ORGANIZED HEALTH CARE EDUCATION/TRAINING PROGRAM

## 2024-07-17 PROCEDURE — 80048 BASIC METABOLIC PNL TOTAL CA: CPT | Performed by: STUDENT IN AN ORGANIZED HEALTH CARE EDUCATION/TRAINING PROGRAM

## 2024-07-17 PROCEDURE — 99222 1ST HOSP IP/OBS MODERATE 55: CPT | Mod: ,,, | Performed by: NURSE PRACTITIONER

## 2024-07-17 RX ADMIN — CEFTAROLINE FOSAMIL 600 MG: 600 POWDER, FOR SOLUTION INTRAVENOUS at 10:07

## 2024-07-17 RX ADMIN — Medication 10 ML: at 06:07

## 2024-07-17 RX ADMIN — LIDOCAINE 5% 1 PATCH: 700 PATCH TOPICAL at 06:07

## 2024-07-17 RX ADMIN — INSULIN ASPART 2 UNITS: 100 INJECTION, SOLUTION INTRAVENOUS; SUBCUTANEOUS at 12:07

## 2024-07-17 RX ADMIN — DIPHENHYDRAMINE HYDROCHLORIDE 10 ML: 25 SOLUTION ORAL at 06:07

## 2024-07-17 RX ADMIN — MUPIROCIN: 20 OINTMENT TOPICAL at 09:07

## 2024-07-17 RX ADMIN — MUPIROCIN: 20 OINTMENT TOPICAL at 10:07

## 2024-07-17 RX ADMIN — INSULIN GLARGINE 15 UNITS: 100 INJECTION, SOLUTION SUBCUTANEOUS at 12:07

## 2024-07-17 RX ADMIN — CEFTAROLINE FOSAMIL 600 MG: 600 POWDER, FOR SOLUTION INTRAVENOUS at 06:07

## 2024-07-17 RX ADMIN — DIPHENHYDRAMINE HYDROCHLORIDE 10 ML: 25 SOLUTION ORAL at 12:07

## 2024-07-17 RX ADMIN — APIXABAN 5 MG: 5 TABLET, FILM COATED ORAL at 09:07

## 2024-07-17 RX ADMIN — FERROUS SULFATE TAB EC 325 MG (65 MG FE EQUIVALENT) 1 EACH: 325 (65 FE) TABLET DELAYED RESPONSE at 10:07

## 2024-07-17 RX ADMIN — Medication 10 ML: at 12:07

## 2024-07-17 RX ADMIN — HYDROCODONE BITARTRATE AND ACETAMINOPHEN 1 TABLET: 10; 325 TABLET ORAL at 09:07

## 2024-07-17 NOTE — TELEPHONE ENCOUNTER
----- Message from Kalin Walls sent at 7/17/2024  9:14 AM CDT -----  Regarding: Call requested  Contact: [phone]  Hi, pt's wife called to request a call to discuss the pt being in the hospital and possibly changing the appt date. Pls call MoserShana (Spouse)  [phone] to discuss.    Thank you.

## 2024-07-17 NOTE — NURSING
Nurses Note -- 4 Eyes      7/17/2024   4:05 PM      Skin assessed during: Daily Assessment      [] No Altered Skin Integrity Present    []Prevention Measures Documented      [x] Yes- Altered Skin Integrity Present or Discovered   [] LDA Added if Not in Epic (Describe Wound)   [] New Altered Skin Integrity was Present on Admit and Documented in LDA   [] Wound Image Taken    Wound Care Consulted? Yes    Attending Nurse:  Reyna Ceballos RN/Staff Member:  Nadira AMIN

## 2024-07-17 NOTE — PT/OT/SLP PROGRESS
Physical Therapy Co-Treatment  Co-treatment performed to appropriately and safely address patient's strength and endurance deficits while facilitating functional tasks in addition to accommodating for patient's activity/pain tolerance.    Patient Name:  Roosevelt Moser   MRN:  [id number]    Recommendations:     Discharge Recommendations: Moderate Intensity Therapy  Discharge Equipment Recommendations: lift device, hospital bed, bedside commode, wheelchair  Barriers to discharge: Inaccessible home and Decreased caregiver support    Assessment:     Roosevelt Moser is a 73 y.o. male admitted with a medical diagnosis of Osteomyelitis of multiple sites.  He presents with the following impairments/functional limitations: weakness, impaired endurance, impaired self care skills, impaired functional mobility, gait instability, impaired balance, decreased coordination, decreased upper extremity function, decreased lower extremity function, decreased safety awareness, pain, orthopedic precautions, impaired coordination, impaired fine motor. Pt reporting feeling poorly while sitting EOB. Pulse ox placed on patient and HR trending 37-39 bpm. Pt returned to supine. Once in supine pt's HR increased and began trending in the upper 50s to mid 60's. RN and MD notified of pt's HR. Pt's O2 sats reading in the 90s during all readings.    Rehab Prognosis: Good; patient would benefit from acute skilled PT services to address these deficits and reach maximum level of function.    Recent Surgery: * No surgery found *      Plan:     During this hospitalization, patient to be seen 4 x/week to address the identified rehab impairments via gait training, therapeutic activities, therapeutic exercises, neuromuscular re-education and progress toward the following goals:    Plan of Care Expires:  08/08/24    Subjective     Chief Complaint: feeling poorly while sitting OEb  Patient/Family Comments/goals: pt reporting he had a bad night last night and was  having diarrhea and even vomited  Pain/Comfort:  Pain Rating 1:  (not rated)  Location - Side 1: Left  Location 1: arm  Pain Addressed 1: Reposition, Distraction  Pain Rating Post-Intervention 1: other (see comments)      Objective:     Communicated with RN prior to session.  Patient found supine with PICC line, telemetry, Condom Catheter upon PT entry to room.     General Precautions: Standard, fall  Orthopedic Precautions: LUE partial weight bearing, RLE weight bearing as tolerated  Braces: N/A  Respiratory Status: Room air, nasal cannula found in bed and on 3 L. Pt's spouse reports pt removes oxygen. Pt educated on importance of leaving O2 on.     Functional Mobility:  Bed Mobility:     Rolling Right: maximal assistance  Scooting to HOB via draw sheet: total assistance and of 2 persons  Supine to Sit: maximal assistance x2  Sit to Supine: maximal assistance  Transfers:     Sit to Stand:  pt declined 2/2 feeling poorly. Pulse ox placed on pt. Pt's HR trending 37-39 bpm. Pt returned to supine.  Balance:   Static Sitting: SBA  Dynamic Sitting: SBA  Static Standing: MARGE  Dynamic Standing: MARGE      AM-PAC 6 CLICK MOBILITY  Turning over in bed (including adjusting bedclothes, sheets and blankets)?: 2  Sitting down on and standing up from a chair with arms (e.g., wheelchair, bedside commode, etc.): 1  Moving from lying on back to sitting on the side of the bed?: 2  Moving to and from a bed to a chair (including a wheelchair)?: 1  Need to walk in hospital room?: 1  Climbing 3-5 steps with a railing?: 1  Basic Mobility Total Score: 8       Treatment & Education:  Patient also educated and instructed on therapeutic exercises. Therapeutic exercises included the following: Hip ABD, Heel slides, Straight Leg Raises, Ankle pumps, Bridging, and butt/glute squeezes. Hep delivered to patient's room later in the day.  Patient educated on role of therapy, goals of session, and benefits of mobilizing.   Discussed PT plan of care  during hospitalization.   Patient educated on calling for assistance.   Patient educated on how their diagnosis impacts their mobility within PT scope of practice.   Communication board up to date.  All questions answered within PT scope of practice.    Patient left HOB elevated with all lines intact, call button in reach, bed alarm on, RN and MD notified, and pt's spouse present..    GOALS:   Multidisciplinary Problems       Physical Therapy Goals          Problem: Physical Therapy    Goal Priority Disciplines Outcome Goal Variances Interventions   Physical Therapy Goal     PT, PT/OT Progressing     Description: Goals to be met by: 24     Patient will increase functional independence with mobility by performin. Supine to sit with Moderate Assistance  2. Sit to supine with Moderate Assistance  3. Rolling to Left and Right with Minimal Assistance.  4. Sit to stand transfer with Moderate Assistance  5. Bed to chair transfer with Maximum Assistance using LRAD  6. Gait  x 10 feet with Maximum Assistance using LRAD.   7. Lower extremity exercise program x10 reps per handout, with independence    DME Justifications (see above for complete DME recommendations)    Hospital Bed ·Patient requires a hospital bed for positioning of the body in ways that are not feasible with an ordinary bed. The patient requires special positioning for pain relief, limited mobility, and/or being unable to independently make changes in body position without the use of a hospital bed. Pillows and wedges will not be adequate for resolving these positional issues.    Patient has a mobility limitation that significantly impairs their ability to participate in one or more mobility related activities of daily living in customary locations in the home. The mobility limitation cannot be sufficiently resolved by the use of a cane or walker. The use of a manual wheelchair will greatly improve the patient's ability to participate in MRADLs. The  patient will use the wheelchair on a regular basis at home. They have expressed their willingness to use a manual wheelchair in the home, and have a caregiver who is available and willing to assist with the wheelchair if needed.                        Time Tracking:     PT Received On: 07/17/24  PT Start Time: 0923     PT Stop Time: 0952  PT Total Time (min): 29 min     Billable Minutes: Therapeutic Activity 15 and Therapeutic Exercise 14    Treatment Type: Treatment  PT/PTA: PT     Number of PTA visits since last PT visit: 0     07/17/2024

## 2024-07-17 NOTE — NURSING
...[facility] Plan of Care Note    Dx:   Osteomyelitis [M86.9]    Shift Events: Patient had diarrhea     Goals of Care:  discharge     Neuro: A&O x3    Vital Signs: BP (!) 112/59 (BP Location: Left arm, Patient Position: Lying)   Pulse 73   Temp 98.5 °F (36.9 °C) (Oral)   Resp 18   Ht 6' (1.829 m)   Wt 133 kg (293 lb 3.4 oz)   SpO2 (!) 92%   BMI 39.77 kg/m²     Respiratory: CPAP when sleeping. Patient on room air.  Patient noncompliant with wearing O2. Oxygen wnl for patient     Diet: Diet diabetic Cardiac (Low Na/Chol), Renal; 2000 Calorie  Dietary nutrition supplements Catarino - Any flavor,Dietary nutrition supplements Promod Liquid Protein - Fruit Punch,Dietary nutrition supplements Boost Plus - Any flavor    Is patient tolerating current diet? yes    GTTS: no    Urine Output/Bowel Movement:   I/O this shift:  In: 270 [P.O.:270]  Out: -   Last Bowel Movement: 07/16/24      Drains/Tubes/Tube Feeds (include total output/shift):   I/O this shift:  In: 270 [P.O.:270]  Out: -       Lines: see chart       Accuchecks: AC and HS     Skin: See flowsheet     Fall Risk Score: See flowsheet     Activity level? See flowsheet.  Patient full assist with transfer     Any scheduled procedures? No    Any safety concerns? No    Other: NA

## 2024-07-17 NOTE — PROGRESS NOTES
"[facility] Medicine  Progress Note    Patient Name: Roosevelt Moser  MRN: [id number]  Patient Class: IP- Inpatient   Admission Date: 7/7/2024  Length of Stay: 10 days  Attending Physician: Abraham Plaza DO  Primary Care Provider: Miriam, Primary Doctor        Subjective:     Principal Problem:Osteomyelitis of multiple sites        HPI:  Roosevelt Moser is a 72yo M w/ A fib, HTN, T2DM, MARA, HLD, hx GBS, hx displaced comminuted fracture of the left humerus s/p fall 3/4/24 s/p ORIF 03/25/2024 who presents as a transfer for thoracic surgery and Plastic surgery evaluation in setting of "osteomyelitis of multiple ribs which will need further debridement and then a flap placed."    He was initially admitted to [facility] on 6/14 for progressive weakness, multiple falls, and decreased urine output.  He was admitted for management of MARBIN on CKD complicated by hyperkalemia, urosepsis, AFib with RVR.    "Creatinine improved during his stay. During his stay he was noted to have swelling of his left upper extremity. Imaging studies on June 17 showed concern for gas-forming infection along the left shoulder and air along the lateral left chest wall concerning for gas-forming organism. MRI of his right foot also showed evidence of toe osteomyelitis. He was seen by ID who adjusted antibiotics, and they also noted oral thrush. Podiatry evaluated him for the toe findings, and Orthopedic Surgery evaluated the shoulder abnormalities. Right toe wound grew MRSA. Oral anticoagulation was held, and Radiology aspirated fluid from his left shoulder on June 20. On June 21 he had left shoulder incision and drainage and removal of deep hardware including rods and screws. He underwent repeat irrigation and debridement of the left shoulder on June 24 with placement of a wound VAC. He had subsequent incision and drainage with removal of the wound VAC on June 27. He had amputation of his right 2nd toe by Podiatry on July 1. " "On July 3 he was noted to have dyspnea and mild tachycardia. CT chest showed a large abscess of the anterior central and left chest wall and abdominal wall with partial destruction of 3 ribs. He was seen by Cardiothoracic surgery. On July 4 he had left thoracentesis with aspiration of fluid. He subsequently had incision and drainage of the left chest wall abscess on July 4, and a drainage catheter was left in place. On July 5 he underwent right VATS for a loculated right pleural effusion, and a chest tube was left in place. He was transitioned to the ICU post-operatively. With concern for osteomyelitis of the ribs, concern is that he will need transfer to a facility with Thoracic Surgery and potentially Plastic Surgery along with higher level of care. Transfer center spoke with Thoracic Surgery at [facility]. Requesting transfer to Hospital Medicine at [facility] for Plastic Surgery and Thoracic Surgery evaluation. Transfer center spoke with Plastic Surgery on July 5, Thoracic Surgery on July 7. With his complicated medical presentation, will plan transfer to Hospital Medicine at [facility] in step-down status for further treatment. "    Prior to transfer, "He is awake and alert. He has a PICC line in place along with the right chest tube and left chest wall drain in place. He has no evidence of respiratory distress and is hemodynamically stable. Referring provider felt patient was stable for step-down status at present. He is currently on ceftaroline and meropenem. He is in contact isolation. July 7: Sodium 137, potassium 4.5, chloride 104, CO2 26, BUN 33, creatinine 1.1, glucose 130, albumin 2.1, AST 25, ALT 17, white blood cells 11.58, hemoglobin 8.1, hematocrit 26.2, platelets 389, procalcitonin 0.575, CRP greater than 16  VS:  Temperature 97.5°, pulse 85, respirations 20, blood pressure 112/63, O2 sats 95% "    Patient in no acute distress upon arrival.  Wife at bedside.  Patient denies any " acute complaints.        Imaging history reviewed:  July 6: X-rays of the left shoulder had fracture through the surgical neck of the humerus with lateral distraction of the distal component and overlapping segments on the order of 2.5 cm.  Overall no appreciable change upon comparison with prior imaging.  -chest x-ray noted manifestations of mild congestive heart failure bordering on mild pulmonary edema.  Right-sided thoracostomy tube terminating within the right apex.  No definite pneumothorax.        July 5: Pleural fluid Gram stain with few WBCs and no organisms seen  -pH 7.339, pCO2 50.9, pO2 392     July 4:  CPK less than 10, serum   -AFB smear from left chest abscess had no AFB seen, left chest abscess aerobic and anaerobic cultures have no growth  -pleural fluid protein 3.2, white blood cells 1108 (11% segs, 74% lymphs), , pH 7.63, pleural fluid culture had no growth  -ultrasound-guided thoracentesis removed 1.4-1.5 L     July 3: Blood cultures with no growth to date  -CT chest showed large abscess of the anterior central and left chest wall and abdominal wall measuring 17 cm transversely.  This extends into the mediastinum and peritoneum with partial destruction of the anterior 6th, 7th, and 8th ribs.  Complete atelectasis of the left lower lobe with moderate left pleural effusion.  Mild atelectasis right lung base with a small-to-moderate right pleural effusion.     July 2: X-rays of the left shoulder noted displaced left proximal humerus fracture status post hardware removal without change in alignment.     June 24: ECHO with EF 55-60%.     June 20:  Interventional Radiology did percutaneous aspiration of the left shoulder fluid collection.  No drainage catheter was left in place.     June 18:  Right toe wound MRSA  -CT left shoulder had subacute left humerus fracture post internal fixation.  Incomplete bony bridging across the fracture site with persistent angulation.  Severe arthropathy  "of the glenohumeral joint with multiple intra-articular bodies.  Large left joint effusion and adjacent soft tissue focal fluid collections containing gas and concerning for gas-forming infection.  Moderate size left pleural effusion.  Left basilar airspace disease.  -bilateral lower extremity Doppler arterial ultrasound had atherosclerotic plaque and calcification bilaterally without severe stenosis or occlusion identified on either side.     June 17: X-rays of the left humerus/shoulder had findings concerning for gas-forming infection along the left shoulder.  -x-rays of the left ribs showed air along the lateral chest wall soft tissue and axilla concerning for gas-forming infection.  -MRI of the right foot had findings consistent with osteomyelitis involving the middle distal phalanges of the 3rd toe.  Cellulitis.     June 14: Renal ultrasound had no hydronephrosis.  Elevated resistive index right kidney suggesting intrinsic renal disease.    Overview/Hospital Course:  Pt admitted to  as a transfer for thoracic surgery and Plastic surgery evaluation in setting of "osteomyelitis of multiple ribs which will need further debridement and then a flap placed." Thoracic Surgery, Plastic Surgery, ID, Ortho, Wound care, PT/OT consulted upon admission. Imaging ordered.  CT chest abdomen with IV contrast without evidence of osteomyelitis in ribs adjacent to I&D site of left chest wall abscess.; incidental finding of 1.2 cm obstructing stone in left distal ureter with mild hydroureteronephrosis.  Urology was consulted and CT renal stone study was completed; no acute intervention given no concern for infection around stone or MARBIN; follow-up outpatient unless decompensates.  MRI shoulder without contrast left notable for septic arthritis of left glenohumeral joint with acute osteomyelitis of the glenoid and proximal humerus in addition to known displaced fracture of proximal humeral metaphysis in addition to several large " soft tissue abscesses.  Patient underwent left shoulder aspiration with Orthopedic surgery on 07/09, continuing antibiotics with no further intervention planned.  Right chest tube was removed by thoracic surgery on 07/09. Drain previously placed for L chest wall abscess removed when output became minimal (7/14). To continue on IV Ceftaroline 600 mg q8 for 6 weeks therapy (EOT 8/11). LTAC placement pending     Interval History: Seen this AM at bedside. Denies chest pain, shortness of breath. LTAC placement pending. Apparent episode of bradycardia into 30s when working with PT today. Denies any light headedness, syncope    Review of Systems  Objective:     Vital Signs (Most Recent):  Temp: 98.2 °F (36.8 °C) (07/12/24 0838)  Pulse: 72 (07/12/24 0838)  Resp: 18 (07/12/24 1020)  BP: 123/68 (07/12/24 0838)  SpO2: 96 % (07/12/24 0838) Vital Signs (24h Range):  Temp:  [98 °F (36.7 °C)-98.7 °F (37.1 °C)] 98.2 °F (36.8 °C)  Pulse:  [60-82] 72  Resp:  [18] 18  SpO2:  [93 %-99 %] 96 %  BP: (108-123)/(57-70) 123/68     Weight: 133 kg (293 lb 3.4 oz)  Body mass index is 39.77 kg/m².    Intake/Output Summary (Last 24 hours) at 7/12/2024 1028  Last data filed at 7/12/2024 0339  Gross per 24 hour   Intake 290 ml   Output 2230 ml   Net -1940 ml         Physical Exam  Vitals and nursing note reviewed.   Constitutional:       General: He is not in acute distress.     Appearance: He is ill-appearing (chronically). He is not toxic-appearing or diaphoretic.   HENT:      Head: Normocephalic and atraumatic.      Nose: Nose normal.      Mouth/Throat:      Pharynx: Oropharynx is clear.   Eyes:      General: No scleral icterus.  Cardiovascular:      Rate and Rhythm: Normal rate and regular rhythm.      Pulses: Normal pulses.      Heart sounds: Normal heart sounds.   Pulmonary:      Effort: Pulmonary effort is normal.   Chest:      Chest wall: No deformity or tenderness.   Abdominal:      General: Bowel sounds are normal.      Palpations:  "Abdomen is soft.      Tenderness: There is no abdominal tenderness.   Musculoskeletal:      Cervical back: Normal range of motion and neck supple.      Right lower leg: No edema.      Left lower leg: No edema.   Skin:     General: Skin is warm.      Capillary Refill: Capillary refill takes less than 2 seconds.      Comments: Unable to examine posterior skin due to patient debility   Neurological:      Mental Status: He is alert and oriented to person, place, and time.   Psychiatric:         Behavior: Behavior normal.             Significant Labs: All pertinent labs within the past 24 hours have been reviewed.    Significant Imaging: I have reviewed all pertinent imaging results/findings within the past 24 hours.    Assessment/Plan:      * Osteomyelitis of multiple sites  Osteomyelitis of right second toe s/p amputation 7/1    Chest wall abscess  Loculated pleural effusion  Mediastinal lymphadenopathy    Pyogenic arthritis of left shoulder region  Humerus fracture    OSH:  -status post left thoracentesis 1500cc serosanguineous fluid drained 7/4 & I&D drainage with chest tube placement 7/4 & loculated effusions right chest s/p VATS and chest tube 7/5   -Left shoulder prosthetic joint infection s/p I&D and removal of deep hardware 6/21 - all screws and nails removed, s/p 2nd washout 6/24 & 3rd washout 6/27   -Left Shoulder washouts on 6/21 ( abundant pus in shoulder, hardware removal), 6/24 ( bloody brownish fluid) and 6/27 ( no fluid collection, healthy red and beefy tissue)   -Right 3rd toe osteomyelitis s/p Dalvance x 2 doses & 2nd distal phalanx s/p I&D at bedside, s/p 2nd toe amputation 7/1       - Presents as a transfer for thoracic surgery and Plastic surgery evaluation in setting of concern for "osteomyelitis of multiple ribs which will need further debridement and then a flap placed" in the setting of chest wall abscess  -notably, 7/3 blood cultures, 7/4 pleural fluid cultures, 7/4 abscess cultures, 755 " pleural fluid cultures NGTD  -6/18 right toe wound culture with MRSA  Plan:  -ID: Continue Ceftaroline 600 mg IV q.8; will need course of 6 weeks (EOT 8/11)   -Discussed alternative antibiotic with ID due to difficult placment due to abx cost, however unable to replace  -Podiatry consulted to address toe wound/possible suture removal  -Thoracic surgery removed right chest tube on 07/09; left chest accordion drain removed 7/14   -Remove staples at follow up appt  -ongoing wound care  -ID, Ortho, Thoracic surgery and Plastic surgery consulted and following; imaging findings per hospital course  -ortho surgery completed left shoulder aspiration 7/9 to assess shoulder abscess concern, minimal fluid aspirated, cont antibiotics for pyogenic arthritis  -thus far, no acute surgical intervention per all surgical specialties    Acute renal failure superimposed on chronic kidney disease  Patient with acute kidney injury/acute renal failure likely due to  due to ATN due to sepsis due to UTI and/or PNA  MARBIN is currently improving. Baseline creatinine  wnl  - Labs reviewed- Renal function/electrolytes with Estimated Creatinine Clearance: 104.7 mL/min (based on SCr of 0.9 mg/dL). according to latest data. Monitor urine output and serial BMP and adjust therapy as needed. Avoid nephrotoxins and renally dose meds for GFR listed above.    Left renal stone  Nephrolithiasis    6/14 retroperitoneal ultrasound completed in setting of MARBIN without hydronephrosis   7/8 CT abdomen notable for 1.2 cm obstructing stone in left distal ureter with mild hydroureteronephrosis       Appreciate urology recommendations; given no concerns regarding MARBIN or infection relation to the stone, no acute surgical intervention  Follow-up outpatient; strain all urine    Acute hypoxemic respiratory failure  -Ddx: decreased reserve from VATS vs volume overload (on fluids since 7/7)  -CXR noting blunting of CP angles  -7/12: Lasix 40mg IV x1, good response.  "Additional dose on     Bradycardia  -Bradycardic episode noted during PT eval on , asymptomatic  -Rhythm: atrial fibrillation  -Hold tonight's metoprolol      CKD (chronic kidney disease) stage 2, GFR 60-89 ml/min  Creatine stable for now. BMP reviewed- noted Estimated Creatinine Clearance: 94.3 mL/min (based on SCr of 1 mg/dL). according to latest data. Based on current GFR, CKD stage is stage 2 - GFR 60-89.  Monitor UOP and serial BMP and adjust therapy as needed. Renally dose meds. Avoid nephrotoxic medications and procedures.    Thyroid nodule  Incidental finding on imagin.7 cm left thyroid nodule. Nonemergent thyroid ultrasound is recommended.       Cyst of right kidney  Incidental finding on imagin.8 cm septated cystic lesion in the right kidney, which is indeterminate for malignancy. Further evaluation with nonemergent renal mass protocol CT or MRI is recommended.       Sepsis  This patient does have evidence of infective focus  My overall impression is sepsis.  Source: Skin and Soft Tissue (location toe)  Antibiotics given-   Antibiotics (72h ago, onward)      Start     Stop Route Frequency Ordered    24 1545  ceftaroline fosamiL (TEFLARO) 600 mg in D5W 50 mL IVPB (MB+)         -- IV Every 8 hours (non-standard times) 24 1532    24 1545  meropenem (MERREM) 1 g in 0.9% NaCl 100 mL IVPB (MB+)         -- IV Every 8 hours (non-standard times) 24 1532          Latest lactate reviewed-  No results for input(s): "LACTATE", "POCLAC" in the last 72 hours.  Organ dysfunction indicated by Acute kidney injury    Fluid challenge Not needed - patient is not hypotensive      Post- resuscitation assessment No - Post resuscitation assessment not needed       Will Not start Pressors- Levophed for MAP of 65  Source control achieved by: abx    Debility  Patient with Acute on chronic debility due to  multiple infections . Latest AMPAC and GEMS scores have not been reviewed. Evaluation for " etiology is complete. Plan includes progressive mobility protocol initated, PT/OT consulted, and fall precautions in place.    Atrial fibrillation with rapid ventricular response  Patient with Long standing persistent (>12 months) atrial fibrillation which is controlled currently with Beta Blocker. Patient is currently in TBD.[id number] Score: 2.      Chronic AF w/ acute RVR at OSH, resolved s/p cardizem drip   NOAC has been on hold d/t possible need for surgery - has been on prophylaxis dose of Lovenox  Resumed anticoagulation given no plan for further surgical intervention     History of Guillain-Mansfield syndrome  No acute issues  However, pt has impaired mobility and is acutely weakened from his sepsis/UTI/AF RVR and need placement       Type 2 diabetes mellitus  Patient's FSGs are controlled on current medication regimen.  Last A1c reviewed-   Lab Results   Component Value Date    HGBA1C 6.3 (H) 06/14/2024     Most recent fingerstick glucose reviewed-   Recent Labs   Lab 07/10/24  1607 07/10/24  2002   POCTGLUCOSE 181* 178*     Current correctional scale  Low  Maintain anti-hyperglycemic dose as follows-   Antihyperglycemics (From admission, onward)      Start     Stop Route Frequency Ordered    07/08/24 0900  insulin glargine U-100 (Lantus) pen 15 Units         -- SubQ Daily 07/07/24 1532    07/07/24 1529  insulin aspart U-100 pen 0-10 Units         -- SubQ Before meals & nightly PRN 07/07/24 1532          Hold Oral hypoglycemics while patient is in the hospital.    Hypertension  Chronic, controlled. Latest blood pressure and vitals reviewed-     Temp:  [97.8 °F (36.6 °C)-98.8 °F (37.1 °C)]   Pulse:  [65-91]   Resp:  [2-24]   BP: (100-145)/(56-81)   SpO2:  [90 %-99 %] .   Home meds for hypertension were reviewed and noted below.   Hypertension Medications               hydrALAZINE (APRESOLINE) 25 MG tablet Take 1 tablet (25 mg total) by mouth every 12 (twelve) hours.    metoprolol succinate (TOPROL-XL) 100 MG 24  hr tablet Take 1 tablet (100 mg total) by mouth once daily.            While in the hospital, will manage blood pressure as follows; continue home metoprolol    Will utilize p.r.n. blood pressure medication only if patient's blood pressure greater than 180/110 and he develops symptoms such as worsening chest pain or shortness of breath.    MARA (obstructive sleep apnea)  Continue CPAP HS and w/ naps        Morbid obesity  There is no height or weight on file to calculate BMI. Morbid obesity complicates all aspects of disease management from diagnostic modalities to treatment. Weight loss encouraged and health benefits explained to patient.           VTE Risk Mitigation (From admission, onward)           Ordered     apixaban tablet 5 mg  2 times daily         07/17/24 1245     Place sequential compression device  Until discontinued         07/07/24 1532                    Discharge Planning   KAMILA: 7/17/2024     Code Status: Full Code   Is the patient medically ready for discharge?:     Reason for patient still in hospital (select all that apply): Patient trending condition  Discharge Plan A: Long-term acute care facility (LTAC)                  Abraham Plaza DO  Department of Hospital Medicine   Andres KEARNEY

## 2024-07-17 NOTE — TELEPHONE ENCOUNTER
Call was placed to patient's wife she stated they need to move the appointment to another day but didn't have a day in mind will call back on schedule at there convince.

## 2024-07-17 NOTE — PLAN OF CARE
Problem: Adult Inpatient Plan of Care  Goal: Plan of Care Review  Outcome: Progressing  Goal: Patient-Specific Goal (Individualized)  Outcome: Progressing  Goal: Absence of Hospital-Acquired Illness or Injury  Outcome: Progressing  Goal: Optimal Comfort and Wellbeing  Outcome: Progressing     Problem: Sepsis/Septic Shock  Goal: Absence of Bleeding  Outcome: Progressing  Goal: Blood Glucose Level Within Targeted Range  Outcome: Progressing     Problem: Acute Kidney Injury/Impairment  Goal: Fluid and Electrolyte Balance  Outcome: Progressing

## 2024-07-17 NOTE — PT/OT/SLP PROGRESS
Occupational Therapy   Co-Treatment    Name: Roosevelt Moser  MRN: [id number]  Admitting Diagnosis:  Osteomyelitis of multiple sites       Recommendations:     Discharge Recommendations: Moderate Intensity Therapy  Discharge Equipment Recommendations:  lift device, bedside commode, hospital bed  Barriers to discharge:  None    Assessment:     Roosevelt Moser is a 73 y.o. male with a medical diagnosis of Osteomyelitis of multiple sites.  He presents with increase c/o not feeling with and c/o nausea after sitting at EOB for ~4-5 mins. Vital monitored with pt. Satting at 95-98% with a HR of 37-39 bpm. Pt. Was immediately returned to supine with HOB elevated with vitals Satting at 95-98% with HR trending upward ranging from upper 50 to mid 60s bmp. Nurse and MD made   Performance deficits affecting function are weakness, impaired endurance, decreased upper extremity function, gait instability, decreased coordination, decreased safety awareness, pain, orthopedic precautions, decreased lower extremity function, impaired functional mobility.     Rehab Prognosis:  Good; patient would benefit from acute skilled OT services to address these deficits and reach maximum level of function.       Plan:     Patient to be seen 4 x/week to address the above listed problems via self-care/home management, therapeutic activities, therapeutic exercises, neuromuscular re-education  Plan of Care Expires: 08/08/24  Plan of Care Reviewed with: patient    Subjective     Chief Complaint: Not feeling well  Patient/Family Comments/goals: Pt. Reports he is not feel well, he is really not feel too good he wants to lay down  Pain/Comfort:  Pain Rating 1:  (did not rate)  Location - Side 1: Left  Location - Orientation 1: generalized  Location 1: arm  Pain Addressed 1: Reposition, Distraction    Objective:     Communicated with: Nurse prior to session.  Patient found HOB elevated with PICC line, telemetry, Condom Catheter, oxygen upon OT entry to  room.    General Precautions: Standard, fall    Orthopedic Precautions:LUE partial weight bearing, RLE weight bearing as tolerated  Braces: N/A  Respiratory Status: Room air     Occupational Performance:     Bed Mobility:    Patient completed Supine to Sit with maximal assistance and 2 persons  Patient completed Sit to Supine with maximal assistance     Functional Mobility/Transfers:  Pt. Sat at EOB ~ 4-5 mins before coming sympathic with c/o not feel with HR in upper 30's see above    Activities of Daily Living:  Grooming: set up (A) oral care completed at bed level     [facility] 6 Click ADL: 10    Treatment & Education:  AROM Elbow flexion and extension LUE  3 reps seated EOB  Pt.encouraged to continue AROM of L wrist hand and elbow   Educated on the importance of grooming and hygiene, postioning for skin integrity and maintaining strength in UE to engage in ADLs   Pt.educated on the benefits of continuing to engage in ADLs to mainintain strength and ROM of BUE  Pt educated on role of occupational therapy, POC, and safety during ADLs and functional mobility. Pt and OT discussed importance of safe, continued mobility to optimize daily living skills. Pt verbalized understanding. Pt given instruction to call for medical staff/nurse for assistance.   Co-treatment with PT for maximal pt participation, safety, and activity tolerance     Patient left HOB elevated with all lines intact, call button in reach, and nurse and MD  notified and spouse present     GOALS:   Multidisciplinary Problems       Occupational Therapy Goals          Problem: Occupational Therapy    Goal Priority Disciplines Outcome Interventions   Occupational Therapy Goal     OT, PT/OT Progressing    Description: Goals to be met by: 8/8/24     Patient will increase functional independence with ADLs by performing:    LE Dressing with Moderate Assistance.  Grooming while EOB with Supervision.  Toileting from toilet with Supervision for hygiene and clothing  management.   Stand pivot transfers with Moderate Assistance.  Squat pivot transfers with Moderate Assistance.  Toilet transfer to bedside commode with Moderate Assistance.    DME Justification for Hospital Bed:  Patient requires a hospital bed for positioning of the body in ways that are not feasible with an ordinary bed. The patient requires special positioning for pain relief, limited mobility, and/or being unable to independently make changes in body position without the use of a hospital bed. Pillows and wedges will not be adequate for resolving these positional issues.                          Time Tracking:     OT Date of Treatment: 07/17/24  OT Start Time: 0923  OT Stop Time: 0952  OT Total Time (min): 29 min    Billable Minutes:Self Care/Home Management 15  Neuromuscular Re-education 14    OT/BAR: OT          7/17/2024

## 2024-07-17 NOTE — PROGRESS NOTES
Pt seen for wound care follow up- multiple wounds. Pt awake, lying in bed; pt's wife at bedside. Pt and wife agreeable to wound care. See assessment below. While in the room, podiatry came in to evaluate pt's right foot/sutures. Pt does have intact sutures to left shoulder and intact staples to mid upper abdominal area. No s/s infection at either site. Pt's wounds to left and right lateral upper quadrants from chest tubes were cleaned and dressed. Pt required assistance with turning; buttocks have 2 open ulcerated areas at ischium/lower buttocks and denuded/red with dry skin component and possibly yeast all over buttocks/sacrum. Triad applied. Umbilicus wound is a dried scab; triad applied here.     Recs:   Continue with Triad to buttocks  Pt has heel lift boots; heels are blanchable redness.   Lateral upper quad wounds: clean with wound cleanser, loosely pack with silver alginate and cover with a silicone border foam every other day.   Contacted attending re: sutures to left shoulder and staples to mid abdominal area- if these should be removed. These were done at another hospital by other doctors/surgeons.     Wound care supplies at bedside.    Brooklynn Flores RN, [facility] Wound/Ostomy  7/17/24 07/16/24 1215   WOCN Assessment   WOCN Total Time (mins) 90   Visit Date 07/16/24   Visit Time 1215   Consult Type Follow Up   WOCN Speciality Wound   Wound pressure;surgical;moisture;shearing;yeast   Number of Wounds 7   Intervention assessed;changed;applied;chart review;coordination of care   Teaching on-going        Wound 06/14/24 1600 Abrasion(s) midline Umbilical area #4   Date First Assessed/Time First Assessed: 06/14/24 1600   Present on Original Admission: Yes  Primary Wound Type: Abrasion(s)  Orientation: midline  Location: Umbilical area  Wound Number: #4   Wound Image    Dressing Appearance Open to air;No dressing   Drainage Amount None   Drainage Characteristics/Odor No odor   Appearance  Pink;Red;Dry;Eschar;Other (see comments)  (scab)   Periwound Area Intact;Dry   Wound Edges Defined   Wound Length (cm) 0.7 cm   Wound Width (cm) 0.5 cm   Wound Depth (cm) 0.2 cm   Wound Volume (cm^3) 0.07 cm^3   Wound Surface Area (cm^2) 0.35 cm^2   Dressing Applied;Other (comment)  (triad)        Wound 06/24/24 Incision Left Shoulder   Date First Assessed: 06/24/24   Primary Wound Type: Incision  Side: Left  Location: Shoulder  Additional Comments: WOUND VAC PLACED   Wound Image    Dressing Appearance Open to air   Drainage Amount None   Drainage Characteristics/Odor No odor   Appearance Sutures intact;Other (see comments)  (no open wound- skin is WNL)   Periwound Area Intact;Dry   Wound Edges Approximated        Wound 06/25/24 2000 Pressure Injury midline Buttocks   Date First Assessed/Time First Assessed: 06/25/24 2000   Present on Original Admission: Yes  Primary Wound Type: Pressure Injury  Orientation: midline  Location: Buttocks   Wound Image    Dressing Appearance Open to air   Drainage Amount None   Drainage Characteristics/Odor No odor   Appearance Pink;Red;Yellow;Moist   Periwound Area Dry;Denuded;Pink   Wound Edges Irregular   Care Cleansed with:;Wound cleanser   Dressing Applied;Other (comment)  (triad wound cream)   Periwound Care Moisture barrier applied        Wound 07/01/24 Incision Right Toe, second   Date First Assessed: 07/01/24   Present on Original Admission: No  Primary Wound Type: Incision  Side: Right  Location: Toe, second  Closure Method: Other (see comments)  Additional Comments: adaptic,fluffs,cast padding,ace wrap   Wound Image    Dressing Appearance No dressing   Drainage Amount None   Drainage Characteristics/Odor No odor   Appearance Sutures intact   Periwound Area Dry;Pink   Wound Edges Approximated        Wound 07/04/24 1118 Incision Left lateral Upper quadrant   Date First Assessed/Time First Assessed: 07/04/24 1118   Present on Original Admission: No  Primary Wound Type: Incision   Side: Left  Orientation: lateral  Location: Upper quadrant   Wound Image    Dressing Appearance Intact;Dried drainage   Drainage Amount Scant   Drainage Characteristics/Odor Serosanguineous;No odor   Appearance Red;Yellow;Slough;Dry   Periwound Area Intact;Dry   Wound Edges Defined   Care Cleansed with:;Wound cleanser   Dressing Applied;Silver;Silicone;Foam        Wound 07/16/24 1215 Incision Right lateral Upper quadrant other (see comments)   Date First Assessed/Time First Assessed: 07/16/24 1215   Primary Wound Type: (c) Incision  Side: Right  Orientation: lateral  Location: Upper quadrant  Incision Type: (c) other (see comments)   Wound Image    Incision WDL ex   Dressing Appearance Dried drainage;Intact   Drainage Amount Scant   Drainage Characteristics/Odor Serosanguineous;No odor   Appearance Red;Maroon;Moist   Periwound Area Intact;Dry   Wound Edges Defined   Wound Length (cm) 0.7 cm   Wound Width (cm) 1.1 cm   Wound Depth (cm) 2.2 cm   Wound Volume (cm^3) 1.694 cm^3   Wound Surface Area (cm^2) 0.77 cm^2   Undermining (depth (cm)/location) 4.1cm from 12:00-4:00   Care Cleansed with:;Wound cleanser   Dressing Applied;Silver;Foam;Silicone   Packing packed with;other (see comment)  (silver alginate)   Packing Inserted  1

## 2024-07-17 NOTE — ASSESSMENT & PLAN NOTE
-Bradycardic episode noted during PT eval on 7/17, asymptomatic  -Rhythm: atrial fibrillation  -Hold tonight's metoprolol

## 2024-07-17 NOTE — ASSESSMENT & PLAN NOTE
Patient with Long standing persistent (>12 months) atrial fibrillation which is controlled currently with Beta Blocker. Patient is currently in TBD.[id number] Score: 2.      Chronic AF w/ acute RVR at OSH, resolved s/p cardizem drip   NOAC has been on hold d/t possible need for surgery - has been on prophylaxis dose of Lovenox  Resumed anticoagulation given no plan for further surgical intervention

## 2024-07-18 LAB
BACTERIA SPEC ANAEROBE CULT: NORMAL
CK SERPL-CCNC: 8 U/L (ref 20–200)
POCT GLUCOSE: 119 MG/DL (ref 70–110)
POCT GLUCOSE: 148 MG/DL (ref 70–110)
POCT GLUCOSE: 176 MG/DL (ref 70–110)
POCT GLUCOSE: 185 MG/DL (ref 70–110)

## 2024-07-18 PROCEDURE — 97530 THERAPEUTIC ACTIVITIES: CPT

## 2024-07-18 PROCEDURE — 97112 NEUROMUSCULAR REEDUCATION: CPT

## 2024-07-18 PROCEDURE — 82550 ASSAY OF CK (CPK): CPT | Performed by: STUDENT IN AN ORGANIZED HEALTH CARE EDUCATION/TRAINING PROGRAM

## 2024-07-18 PROCEDURE — 20600001 HC STEP DOWN PRIVATE ROOM

## 2024-07-18 PROCEDURE — 63600175 PHARM REV CODE 636 W HCPCS: Mod: JZ,JG | Performed by: STUDENT IN AN ORGANIZED HEALTH CARE EDUCATION/TRAINING PROGRAM

## 2024-07-18 PROCEDURE — 99232 SBSQ HOSP IP/OBS MODERATE 35: CPT | Mod: ,,, | Performed by: NURSE PRACTITIONER

## 2024-07-18 PROCEDURE — 25000003 PHARM REV CODE 250: Performed by: STUDENT IN AN ORGANIZED HEALTH CARE EDUCATION/TRAINING PROGRAM

## 2024-07-18 PROCEDURE — A4216 STERILE WATER/SALINE, 10 ML: HCPCS | Performed by: STUDENT IN AN ORGANIZED HEALTH CARE EDUCATION/TRAINING PROGRAM

## 2024-07-18 PROCEDURE — 36415 COLL VENOUS BLD VENIPUNCTURE: CPT | Performed by: STUDENT IN AN ORGANIZED HEALTH CARE EDUCATION/TRAINING PROGRAM

## 2024-07-18 PROCEDURE — 27000207 HC ISOLATION

## 2024-07-18 RX ADMIN — DIPHENHYDRAMINE HYDROCHLORIDE 10 ML: 25 SOLUTION ORAL at 10:07

## 2024-07-18 RX ADMIN — CEFTAROLINE FOSAMIL 600 MG: 600 POWDER, FOR SOLUTION INTRAVENOUS at 05:07

## 2024-07-18 RX ADMIN — DIPHENHYDRAMINE HYDROCHLORIDE 10 ML: 25 SOLUTION ORAL at 08:07

## 2024-07-18 RX ADMIN — DIPHENHYDRAMINE HYDROCHLORIDE 10 ML: 25 SOLUTION ORAL at 01:07

## 2024-07-18 RX ADMIN — LIDOCAINE 5% 1 PATCH: 700 PATCH TOPICAL at 05:07

## 2024-07-18 RX ADMIN — Medication 10 ML: at 05:07

## 2024-07-18 RX ADMIN — Medication 10 ML: at 12:07

## 2024-07-18 RX ADMIN — APIXABAN 5 MG: 5 TABLET, FILM COATED ORAL at 08:07

## 2024-07-18 RX ADMIN — CEFTAROLINE FOSAMIL 600 MG: 600 POWDER, FOR SOLUTION INTRAVENOUS at 03:07

## 2024-07-18 RX ADMIN — INSULIN ASPART 2 UNITS: 100 INJECTION, SOLUTION INTRAVENOUS; SUBCUTANEOUS at 12:07

## 2024-07-18 RX ADMIN — MUPIROCIN: 20 OINTMENT TOPICAL at 08:07

## 2024-07-18 RX ADMIN — LOPERAMIDE HYDROCHLORIDE 2 MG: 2 CAPSULE ORAL at 10:07

## 2024-07-18 RX ADMIN — DIPHENHYDRAMINE HYDROCHLORIDE 10 ML: 25 SOLUTION ORAL at 05:07

## 2024-07-18 RX ADMIN — CEFTAROLINE FOSAMIL 600 MG: 600 POWDER, FOR SOLUTION INTRAVENOUS at 10:07

## 2024-07-18 RX ADMIN — FERROUS SULFATE TAB EC 325 MG (65 MG FE EQUIVALENT) 1 EACH: 325 (65 FE) TABLET DELAYED RESPONSE at 08:07

## 2024-07-18 RX ADMIN — APIXABAN 5 MG: 5 TABLET, FILM COATED ORAL at 09:07

## 2024-07-18 RX ADMIN — INSULIN GLARGINE 15 UNITS: 100 INJECTION, SOLUTION SUBCUTANEOUS at 08:07

## 2024-07-18 NOTE — SUBJECTIVE & OBJECTIVE
Interval History:  No further reports of bradycardia.  No complaints or acute issues at this time.  Awaiting finalized dispo plan.    Review of Systems  Objective:     Vital Signs (Most Recent):  Temp: 97.7 °F (36.5 °C) (07/18/24 0736)  Pulse: 77 (07/18/24 0736)  Resp: 18 (07/18/24 0736)  BP: (!) 127/59 (07/18/24 0736)  SpO2: 97 % (07/18/24 0736) Vital Signs (24h Range):  Temp:  [97.7 °F (36.5 °C)-98.7 °F (37.1 °C)] 97.7 °F (36.5 °C)  Pulse:  [60-77] 77  Resp:  [18-19] 18  SpO2:  [92 %-97 %] 97 %  BP: ()/(54-65) 127/59     Weight: 133 kg (293 lb 3.4 oz)  Body mass index is 39.77 kg/m².    Intake/Output Summary (Last 24 hours) at 7/18/2024 1000  Last data filed at 7/18/2024 0400  Gross per 24 hour   Intake 540 ml   Output 2650 ml   Net -2110 ml         Physical Exam  Constitutional:       General: He is not in acute distress.     Appearance: He is obese.   HENT:      Head: Normocephalic.      Right Ear: External ear normal.      Left Ear: External ear normal.      Nose: Nose normal.   Cardiovascular:      Rate and Rhythm: Normal rate.      Heart sounds: Normal heart sounds.   Pulmonary:      Breath sounds: Normal breath sounds.   Abdominal:      Palpations: Abdomen is soft.      Tenderness: There is no abdominal tenderness.   Genitourinary:     Comments: Condom catheter  Musculoskeletal:      Comments: Debility   Skin:     Comments: Staples and dressing noted to anterior chest and left shoulder   Neurological:      Mental Status: He is alert and oriented to person, place, and time.

## 2024-07-18 NOTE — ASSESSMENT & PLAN NOTE
-S/P CT and VATs 07/05.  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  -s/p 2nd toe amputation 7/1.   -ID, Plastics, thoracic Sx following.  -Continue IV antibiotics as recommended by ID.   -07/18-Continue Ceftaroline till 08/11 as per ID.

## 2024-07-18 NOTE — SUBJECTIVE & OBJECTIVE
Interval History 7/18/2024:  Patient is seen for follow-up PM&R evaluation and recommendations:  Pt seen at bedside. 3 l O2 per NC in place. No distress noted.   HPI, Past Medical, Family, and Social History remains the same as documented in the initial encounter.    Scheduled Medications:    (Magic mouthwash) 1:1:1 diphenhydrAMINE(Benadryl) 12.5mg/5ml liq, aluminum & magnesium hydroxide-simethicone (Maalox), LIDOcaine viscous 2%  10 mL Swish & Spit QID    apixaban  5 mg Oral BID    ceftaroline (Teflaro) IV (PEDS and ADULTS)  600 mg Intravenous Q8H    ferrous sulfate  1 tablet Oral Daily    insulin glargine U-100  15 Units Subcutaneous Daily    LIDOcaine  1 patch Transdermal Q24H    mupirocin   Nasal BID    polyethylene glycol  17 g Oral Daily    sodium chloride 0.9%  10 mL Intravenous Q6H       Diagnostic Results: Labs: Reviewed    PRN Medications:   Current Facility-Administered Medications:     albuterol-ipratropium, 3 mL, Nebulization, Q4H PRN    aluminum & magnesium hydroxide-simethicone, 15 mL, Oral, QID PRN    dextrose 10%, 12.5 g, Intravenous, PRN    dextrose 10%, 25 g, Intravenous, PRN    glucagon (human recombinant), 1 mg, Intramuscular, PRN    glucose, 16 g, Oral, PRN    glucose, 24 g, Oral, PRN    HYDROcodone-acetaminophen, 1 tablet, Oral, Q4H PRN    HYDROcodone-acetaminophen, 1 tablet, Oral, Q4H PRN    insulin aspart U-100, 0-10 Units, Subcutaneous, QID (AC + HS) PRN    loperamide, 2 mg, Oral, QID PRN    melatonin, 6 mg, Oral, Nightly PRN    naloxone, 0.02 mg, Intravenous, PRN    ondansetron, 8 mg, Oral, Q8H PRN    polyethylene glycol, 17 g, Oral, BID PRN    promethazine, 25 mg, Oral, Q6H PRN    senna-docusate 8.6-50 mg, 1 tablet, Oral, BID PRN    simethicone, 1 tablet, Oral, QID PRN    sodium chloride 0.9%, 10 mL, Intravenous, Q12H PRN    Flushing PICC/Midline Protocol, , , Until Discontinued **AND** sodium chloride 0.9%, 10 mL, Intravenous, Q6H **AND** sodium chloride 0.9%, 10 mL, Intravenous, PRN     traMADoL, 50 mg, Oral, Q4H PRN    Review of Systems   Constitutional:  Positive for activity change.   Gastrointestinal:  Negative for abdominal distention.   Musculoskeletal:  Positive for gait problem.   Skin:  Positive for wound.   Neurological:  Positive for weakness.     Objective:     Vital Signs (Most Recent):  Temp: 98.6 °F (37 °C) (07/18/24 1126)  Pulse: 70 (07/18/24 1126)  Resp: 18 (07/18/24 1126)  BP: 111/64 (07/18/24 1126)  SpO2: (!) 92 % (07/18/24 1126)    Vital Signs (24h Range):  Temp:  [97.7 °F (36.5 °C)-98.7 °F (37.1 °C)] 98.6 °F (37 °C)  Pulse:  [60-77] 70  Resp:  [18] 18  SpO2:  [92 %-97 %] 92 %  BP: ()/(55-65) 111/64         Physical Exam  Vitals and nursing note reviewed.   Constitutional:       General: He is awake.      Appearance: Normal appearance. He is obese.   HENT:      Head: Normocephalic and atraumatic.   Pulmonary:      Effort: Pulmonary effort is normal. No respiratory distress.      Comments: 3 L O2 NC  Abdominal:      General: There is no distension.      Palpations: Abdomen is soft.   Musculoskeletal:      Cervical back: Normal range of motion and neck supple.      Comments: BLE 4/5     RUE 4/5. ROM intact     LUE Unable to lif up.  3/5   Skin:     General: Skin is warm and dry.      Capillary Refill: Capillary refill takes 2 to 3 seconds.      Comments: Right second toe amputation site with sutures, Left shoulder with sutures intact. No drainage.  Left chest wall 2 staples BAR   Neurological:      General: No focal deficit present.      Mental Status: He is alert and oriented to person, place, and time.      GCS: GCS eye subscore is 4. GCS verbal subscore is 5. GCS motor subscore is 6.      Motor: Weakness present.      Coordination: Coordination abnormal. Impaired rapid alternating movements.      Gait: Gait abnormal.   Psychiatric:         Attention and Perception: Attention and perception normal.         Mood and Affect: Mood normal.         Speech: Speech normal.          Behavior: Behavior normal. Behavior is cooperative.         Cognition and Memory: Cognition and memory normal.          NEUROLOGICAL EXAMINATION:     MENTAL STATUS   Oriented to person, place, and time.   Speech: speech is normal

## 2024-07-18 NOTE — NURSING
[facility] Plan of Care Note  Dx Osteomyelitis of multiple sites    Shift Events none    Goals of Care: IV ABX    Neuro: AAOX4    Vital Signs: see flowsheet    Respiratory: see flowsheet    Diet: diabetic/cardiac    Is patient tolerating current diet? yes    GTTS: no    Urine Output/Bowel Movement: see flowsheet    Drains/Tubes/Tube Feeds (include total output/shift): condom catheter    Lines: PICC      Accuchecks:yes    Skin: see flowsheet    Fall Risk Score: see flowsheet    Activity level? bedrest    Any scheduled procedures? no    Any safety concerns? no    Other: no

## 2024-07-18 NOTE — PT/OT/SLP PROGRESS
Physical Therapy Co-Treatment  Co-treatment performed due to patient's multiple deficits requiring two skilled therapists to appropriately and safely assess patient's strength and endurance while facilitating functional tasks in addition to accommodating for patient's activity tolerance.     Patient Name:  Roosevelt Moser   MRN:  [id number]    Recommendations:     Discharge Recommendations: Moderate Intensity Therapy  Discharge Equipment Recommendations: lift device, hospital bed, wheelchair, bedside commode  Barriers to discharge: Inaccessible home and Decreased caregiver support    Assessment:     Roosevelt Moser is a 73 y.o. male admitted with a medical diagnosis of Osteomyelitis of multiple sites.  He presents with the following impairments/functional limitations: weakness, impaired endurance, impaired self care skills, impaired functional mobility, gait instability, impaired balance, decreased coordination, decreased upper extremity function, decreased lower extremity function, decreased safety awareness, pain, orthopedic precautions, impaired fine motor, impaired coordination. Pt able to attempt stand this date, but was only able to complete a partial stand with maxAx2. After stand, and despite maximal encouragement pt adamantly declining additional attempt at stand. Pt reporting LUE discomfort from pt attempting to stand.    Pt would benefit from a moderate intensity/frequency therapy for: Dynamic/static standing/sitting balance through skilled balance training, strengthening with the use of skilled therapeutic exercises interventions, and mobility through adaptive equipment training. Pt can tolerate at least an hour of therapy a day and would continue to benefit from a collaborative PT/OT program to improve quality of life and focus on recovery of impairments.    Rehab Prognosis: Good; patient would benefit from acute skilled PT services to address these deficits and reach maximum level of function.    Recent  Surgery: * No surgery found *      Plan:     During this hospitalization, patient to be seen 4 x/week to address the identified rehab impairments via gait training, therapeutic activities, therapeutic exercises, neuromuscular re-education and progress toward the following goals:    Plan of Care Expires:  08/08/24    Subjective     Chief Complaint: LUE pain with attempt at stand  Patient/Family Comments/goals: return to PLOF  Pain/Comfort:  Pain Rating 1:  (no pain)  Location - Side 1: Left  Location 1: shoulder (with minimal movement)  Pain Addressed 1: Reposition, Distraction  Pain Rating Post-Intervention 1: other (see comments) (not rated)      Objective:     Communicated with RN prior to session.  Patient found supine with PICC line, telemetry, Condom Catheter upon PT entry to room.     General Precautions: Standard, fall  Orthopedic Precautions: LUE partial weight bearing, RLE weight bearing as tolerated  Braces: N/A  Respiratory Status: Room air, pt reported taking of nasal cannula when using is CPAP. Pt found sleeping with nasal cannula off and pt again educated on importance of not removing nasal cannula. Nasal cannula placed back on patient. Nasal cannula at 3 L/min (setting on wall).      Functional Mobility:  Bed Mobility:     Scooting: maximal assistance  Supine to Sit: maximal assistance and of 2 persons  Sit to Supine: minimum assistance  Transfers:     Sit to Stand:  maximal assistance and of 2 persons with no AD, minimal clearance achieved; support and assistance provided at RUE, R knee blocked, and R hip extension facilitated; L knee blocked and facilitation of L hip extension  Gait: MARGE  Balance:   Static Sitting: SBA  Dynamic Sitting: SBA  Static Standing: MARGE      AM-PAC 6 CLICK MOBILITY  Turning over in bed (including adjusting bedclothes, sheets and blankets)?: 2  Sitting down on and standing up from a chair with arms (e.g., wheelchair, bedside commode, etc.): 1  Moving from lying on back to  sitting on the side of the bed?: 2  Moving to and from a bed to a chair (including a wheelchair)?: 1  Need to walk in hospital room?: 1  Climbing 3-5 steps with a railing?: 1  Basic Mobility Total Score: 8       Treatment & Education:  Patient educated on role of therapy, goals of session, and benefits of mobilizing.  Pt educated on importance of participation and LUE pain/discomfort expected even with minimal movement.   Discussed PT plan of care during hospitalization.   Patient educated on calling for assistance.   Patient educated on how their diagnosis impacts their mobility within PT scope of practice.   Communication board up to date.  All questions answered within PT scope of practice.    Patient left HOB elevated with all lines intact, call button in reach, and pt's spouse present.    GOALS:   Multidisciplinary Problems       Physical Therapy Goals          Problem: Physical Therapy    Goal Priority Disciplines Outcome Goal Variances Interventions   Physical Therapy Goal     PT, PT/OT Progressing     Description: Goals to be met by: 24     Patient will increase functional independence with mobility by performin. Supine to sit with Moderate Assistance  2. Sit to supine with Moderate Assistance  3. Rolling to Left and Right with Minimal Assistance.  4. Sit to stand transfer with Moderate Assistance  5. Bed to chair transfer with Maximum Assistance using LRAD  6. Gait  x 10 feet with Maximum Assistance using LRAD.   7. Lower extremity exercise program x10 reps per handout, with independence    DME Justifications (see above for complete DME recommendations)    Hospital Bed ·Patient requires a hospital bed for positioning of the body in ways that are not feasible with an ordinary bed. The patient requires special positioning for pain relief, limited mobility, and/or being unable to independently make changes in body position without the use of a hospital bed. Pillows and wedges will not be adequate  for resolving these positional issues.    Patient has a mobility limitation that significantly impairs their ability to participate in one or more mobility related activities of daily living in customary locations in the home. The mobility limitation cannot be sufficiently resolved by the use of a cane or walker. The use of a manual wheelchair will greatly improve the patient's ability to participate in MRADLs. The patient will use the wheelchair on a regular basis at home. They have expressed their willingness to use a manual wheelchair in the home, and have a caregiver who is available and willing to assist with the wheelchair if needed.                        Time Tracking:     PT Received On: 07/18/24  PT Start Time: 1154     PT Stop Time: 1214  PT Total Time (min): 20 min     Billable Minutes: Therapeutic Activity 10    Treatment Type: Treatment  PT/PTA: PT     Number of PTA visits since last PT visit: 0     07/18/2024

## 2024-07-18 NOTE — ASSESSMENT & PLAN NOTE
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  -07/18- sutures in place. C/D/I.

## 2024-07-18 NOTE — NURSING
Received call from Tele that patient had a 7 beat run of Vtach, converted back on his own to controlled afib. Pt sleeping and assymptomatic. Resident on call with Hospital Med D notified. No further orders at this time.

## 2024-07-18 NOTE — ASSESSMENT & PLAN NOTE
-S/P VATs 07/05.  -S/P chest tube placement.  -S/P drain placement for left chest wall abscess. Now removed on 07/14.Stapes in place. Intact.

## 2024-07-18 NOTE — CONSULTS
Inpatient consult to Physical Medicine Rehab  Consult performed by: Lorna Juarez NP  Consult ordered by: Milind Londono MD      Consult received.     Lorna Juarez NP  Physical Medicine & Rehabilitation   07/18/2024

## 2024-07-18 NOTE — PLAN OF CARE
Andres KEARNEY  Discharge Reassessment    Primary Care Provider: No, Primary Doctor    Expected Discharge Date: 7/18/2024    Reassessment (most recent)       Discharge Reassessment - 07/18/24 1408          Discharge Reassessment    Assessment Type Discharge Planning Reassessment     Did the patient's condition or plan change since previous assessment? No     Discharge Plan discussed with: Patient     Communicated KAMILA with patient/caregiver Yes     Discharge Plan A Rehab     Discharge Plan B Skilled Nursing Facility     Transition of Care Barriers None     Why the patient remains in the hospital Requires continued medical care                   Patients insurance company denied LTAC.     [facility] has accepted and will submit for Auth today.

## 2024-07-18 NOTE — PLAN OF CARE
Problem: Adult Inpatient Plan of Care  Goal: Plan of Care Review  Outcome: Progressing  Goal: Patient-Specific Goal (Individualized)  Outcome: Progressing  Goal: Optimal Comfort and Wellbeing  Outcome: Progressing  Goal: Readiness for Transition of Care  Outcome: Progressing     Problem: Sepsis/Septic Shock  Goal: Absence of Bleeding  Outcome: Progressing  Goal: Absence of Infection Signs and Symptoms  Outcome: Progressing  Goal: Optimal Nutrition Intake  Outcome: Progressing

## 2024-07-18 NOTE — PROGRESS NOTES
"[facility] Medicine  Progress Note    Patient Name: Rooseevlt Moser  MRN: [id number]  Patient Class: IP- Inpatient   Admission Date: 7/7/2024  Length of Stay: 11 days  Attending Physician: Milind Londono MD  Primary Care Provider: Miriam, Primary Doctor        Subjective:     Principal Problem:Osteomyelitis of multiple sites        HPI:  Roosevelt Moser is a 74yo M w/ A fib, HTN, T2DM, MARA, HLD, hx GBS, hx displaced comminuted fracture of the left humerus s/p fall 3/4/24 s/p ORIF 03/25/2024 who presents as a transfer for thoracic surgery and Plastic surgery evaluation in setting of "osteomyelitis of multiple ribs which will need further debridement and then a flap placed."    He was initially admitted to [facility] on 6/14 for progressive weakness, multiple falls, and decreased urine output.  He was admitted for management of MARBIN on CKD complicated by hyperkalemia, urosepsis, AFib with RVR.    "Creatinine improved during his stay. During his stay he was noted to have swelling of his left upper extremity. Imaging studies on June 17 showed concern for gas-forming infection along the left shoulder and air along the lateral left chest wall concerning for gas-forming organism. MRI of his right foot also showed evidence of toe osteomyelitis. He was seen by ID who adjusted antibiotics, and they also noted oral thrush. Podiatry evaluated him for the toe findings, and Orthopedic Surgery evaluated the shoulder abnormalities. Right toe wound grew MRSA. Oral anticoagulation was held, and Radiology aspirated fluid from his left shoulder on June 20. On June 21 he had left shoulder incision and drainage and removal of deep hardware including rods and screws. He underwent repeat irrigation and debridement of the left shoulder on June 24 with placement of a wound VAC. He had subsequent incision and drainage with removal of the wound VAC on June 27. He had amputation of his right 2nd toe by Podiatry on July 1. On " "July 3 he was noted to have dyspnea and mild tachycardia. CT chest showed a large abscess of the anterior central and left chest wall and abdominal wall with partial destruction of 3 ribs. He was seen by Cardiothoracic surgery. On July 4 he had left thoracentesis with aspiration of fluid. He subsequently had incision and drainage of the left chest wall abscess on July 4, and a drainage catheter was left in place. On July 5 he underwent right VATS for a loculated right pleural effusion, and a chest tube was left in place. He was transitioned to the ICU post-operatively. With concern for osteomyelitis of the ribs, concern is that he will need transfer to a facility with Thoracic Surgery and potentially Plastic Surgery along with higher level of care. Transfer center spoke with Thoracic Surgery at [facility]. Requesting transfer to Hospital Medicine at [facility] for Plastic Surgery and Thoracic Surgery evaluation. Transfer center spoke with Plastic Surgery on July 5, Thoracic Surgery on July 7. With his complicated medical presentation, will plan transfer to Hospital Medicine at [facility] in step-down status for further treatment. "    Prior to transfer, "He is awake and alert. He has a PICC line in place along with the right chest tube and left chest wall drain in place. He has no evidence of respiratory distress and is hemodynamically stable. Referring provider felt patient was stable for step-down status at present. He is currently on ceftaroline and meropenem. He is in contact isolation. July 7: Sodium 137, potassium 4.5, chloride 104, CO2 26, BUN 33, creatinine 1.1, glucose 130, albumin 2.1, AST 25, ALT 17, white blood cells 11.58, hemoglobin 8.1, hematocrit 26.2, platelets 389, procalcitonin 0.575, CRP greater than 16  VS:  Temperature 97.5°, pulse 85, respirations 20, blood pressure 112/63, O2 sats 95% "    Patient in no acute distress upon arrival.  Wife at bedside.  Patient denies any acute " complaints.        Imaging history reviewed:  July 6: X-rays of the left shoulder had fracture through the surgical neck of the humerus with lateral distraction of the distal component and overlapping segments on the order of 2.5 cm.  Overall no appreciable change upon comparison with prior imaging.  -chest x-ray noted manifestations of mild congestive heart failure bordering on mild pulmonary edema.  Right-sided thoracostomy tube terminating within the right apex.  No definite pneumothorax.        July 5: Pleural fluid Gram stain with few WBCs and no organisms seen  -pH 7.339, pCO2 50.9, pO2 392     July 4:  CPK less than 10, serum   -AFB smear from left chest abscess had no AFB seen, left chest abscess aerobic and anaerobic cultures have no growth  -pleural fluid protein 3.2, white blood cells 1108 (11% segs, 74% lymphs), , pH 7.63, pleural fluid culture had no growth  -ultrasound-guided thoracentesis removed 1.4-1.5 L     July 3: Blood cultures with no growth to date  -CT chest showed large abscess of the anterior central and left chest wall and abdominal wall measuring 17 cm transversely.  This extends into the mediastinum and peritoneum with partial destruction of the anterior 6th, 7th, and 8th ribs.  Complete atelectasis of the left lower lobe with moderate left pleural effusion.  Mild atelectasis right lung base with a small-to-moderate right pleural effusion.     July 2: X-rays of the left shoulder noted displaced left proximal humerus fracture status post hardware removal without change in alignment.     June 24: ECHO with EF 55-60%.     June 20:  Interventional Radiology did percutaneous aspiration of the left shoulder fluid collection.  No drainage catheter was left in place.     June 18:  Right toe wound MRSA  -CT left shoulder had subacute left humerus fracture post internal fixation.  Incomplete bony bridging across the fracture site with persistent angulation.  Severe arthropathy of the  "glenohumeral joint with multiple intra-articular bodies.  Large left joint effusion and adjacent soft tissue focal fluid collections containing gas and concerning for gas-forming infection.  Moderate size left pleural effusion.  Left basilar airspace disease.  -bilateral lower extremity Doppler arterial ultrasound had atherosclerotic plaque and calcification bilaterally without severe stenosis or occlusion identified on either side.     June 17: X-rays of the left humerus/shoulder had findings concerning for gas-forming infection along the left shoulder.  -x-rays of the left ribs showed air along the lateral chest wall soft tissue and axilla concerning for gas-forming infection.  -MRI of the right foot had findings consistent with osteomyelitis involving the middle distal phalanges of the 3rd toe.  Cellulitis.     June 14: Renal ultrasound had no hydronephrosis.  Elevated resistive index right kidney suggesting intrinsic renal disease.    Overview/Hospital Course:  Pt admitted to  as a transfer for thoracic surgery and Plastic surgery evaluation in setting of "osteomyelitis of multiple ribs which will need further debridement and then a flap placed." Thoracic Surgery, Plastic Surgery, ID, Ortho, Wound care, PT/OT consulted upon admission. Imaging ordered.  CT chest abdomen with IV contrast without evidence of osteomyelitis in ribs adjacent to I&D site of left chest wall abscess.; incidental finding of 1.2 cm obstructing stone in left distal ureter with mild hydroureteronephrosis.  Urology was consulted and CT renal stone study was completed; no acute intervention given no concern for infection around stone or MARIBN; follow-up outpatient unless decompensates.  MRI shoulder without contrast left notable for septic arthritis of left glenohumeral joint with acute osteomyelitis of the glenoid and proximal humerus in addition to known displaced fracture of proximal humeral metaphysis in addition to several large soft " tissue abscesses.  Patient underwent left shoulder aspiration with Orthopedic surgery on 07/09, continuing antibiotics with no further intervention planned.  Right chest tube was removed by thoracic surgery on 07/09. Drain previously placed for L chest wall abscess removed when output became minimal (7/14). To continue on IV Ceftaroline 600 mg q8 for 6 weeks therapy (EOT 8/11). LTAC placement pending     Interval History:  No further reports of bradycardia.  No complaints or acute issues at this time.  Awaiting finalized dispo plan.    Review of Systems  Objective:     Vital Signs (Most Recent):  Temp: 97.7 °F (36.5 °C) (07/18/24 0736)  Pulse: 77 (07/18/24 0736)  Resp: 18 (07/18/24 0736)  BP: (!) 127/59 (07/18/24 0736)  SpO2: 97 % (07/18/24 0736) Vital Signs (24h Range):  Temp:  [97.7 °F (36.5 °C)-98.7 °F (37.1 °C)] 97.7 °F (36.5 °C)  Pulse:  [60-77] 77  Resp:  [18-19] 18  SpO2:  [92 %-97 %] 97 %  BP: ()/(54-65) 127/59     Weight: 133 kg (293 lb 3.4 oz)  Body mass index is 39.77 kg/m².    Intake/Output Summary (Last 24 hours) at 7/18/2024 1000  Last data filed at 7/18/2024 0400  Gross per 24 hour   Intake 540 ml   Output 2650 ml   Net -2110 ml         Physical Exam  Constitutional:       General: He is not in acute distress.     Appearance: He is obese.   HENT:      Head: Normocephalic.      Right Ear: External ear normal.      Left Ear: External ear normal.      Nose: Nose normal.   Cardiovascular:      Rate and Rhythm: Normal rate.      Heart sounds: Normal heart sounds.   Pulmonary:      Breath sounds: Normal breath sounds.   Abdominal:      Palpations: Abdomen is soft.      Tenderness: There is no abdominal tenderness.   Genitourinary:     Comments: Condom catheter  Musculoskeletal:      Comments: Debility   Skin:     Comments: Staples and dressing noted to anterior chest and left shoulder   Neurological:      Mental Status: He is alert and oriented to person, place, and time.          "      Assessment/Plan:      * Osteomyelitis of multiple sites  Osteomyelitis of right second toe s/p amputation 7/1    Chest wall abscess  Loculated pleural effusion  Mediastinal lymphadenopathy    Pyogenic arthritis of left shoulder region  Humerus fracture    OSH:  -status post left thoracentesis 1500cc serosanguineous fluid drained 7/4 & I&D drainage with chest tube placement 7/4 & loculated effusions right chest s/p VATS and chest tube 7/5   -Left shoulder prosthetic joint infection s/p I&D and removal of deep hardware 6/21 - all screws and nails removed, s/p 2nd washout 6/24 & 3rd washout 6/27   -Left Shoulder washouts on 6/21 ( abundant pus in shoulder, hardware removal), 6/24 ( bloody brownish fluid) and 6/27 ( no fluid collection, healthy red and beefy tissue)   -Right 3rd toe osteomyelitis s/p Dalvance x 2 doses & 2nd distal phalanx s/p I&D at bedside, s/p 2nd toe amputation 7/1       - Presents as a transfer for thoracic surgery and Plastic surgery evaluation in setting of concern for "osteomyelitis of multiple ribs which will need further debridement and then a flap placed" in the setting of chest wall abscess  -notably, 7/3 blood cultures, 7/4 pleural fluid cultures, 7/4 abscess cultures, 755 pleural fluid cultures NGTD  -6/18 right toe wound culture with MRSA  Plan:  -ID: Continue Ceftaroline 600 mg IV q.8; will need course of 6 weeks (EOT 8/11)   -Discussed alternative antibiotic with ID due to difficult placment due to abx cost, however unable to replace  -Podiatry consulted to address toe wound/possible suture removal  -Thoracic surgery removed right chest tube on 07/09; left chest accordion drain removed 7/14   -Remove staples at follow up appt  -ongoing wound care  -ID, Ortho, Thoracic surgery and Plastic surgery consulted and following; imaging findings per hospital course  -ortho surgery completed left shoulder aspiration 7/9 to assess shoulder abscess concern, minimal fluid aspirated, cont " antibiotics for pyogenic arthritis  -thus far, no acute surgical intervention per all surgical specialties      Continue antibiotics per ID recommendations.  Follow up with  re disposition planning    Bradycardia  -Bradycardic episode noted during PT eval on , asymptomatic  -Rhythm: atrial fibrillation  -Hold tonight's metoprolol      CKD (chronic kidney disease) stage 2, GFR 60-89 ml/min  Creatine stable for now. BMP reviewed- noted Estimated Creatinine Clearance: 94.3 mL/min (based on SCr of 1 mg/dL). according to latest data. Based on current GFR, CKD stage is stage 2 - GFR 60-89.  Monitor UOP and serial BMP and adjust therapy as needed. Renally dose meds. Avoid nephrotoxic medications and procedures.    Acute hypoxemic respiratory failure  -Ddx: decreased reserve from VATS vs volume overload (on fluids since )  -CXR noting blunting of CP angles  -: Lasix 40mg IV x1, good response. Additional dose on     Thyroid nodule  Incidental finding on imagin.7 cm left thyroid nodule. Nonemergent thyroid ultrasound is recommended.       Left renal stone  Nephrolithiasis     retroperitoneal ultrasound completed in setting of MARBIN without hydronephrosis    CT abdomen notable for 1.2 cm obstructing stone in left distal ureter with mild hydroureteronephrosis       Appreciate urology recommendations; given no concerns regarding MARBIN or infection relation to the stone, no acute surgical intervention  Follow-up outpatient; strain all urine    Cyst of right kidney  Incidental finding on imagin.8 cm septated cystic lesion in the right kidney, which is indeterminate for malignancy. Further evaluation with nonemergent renal mass protocol CT or MRI is recommended.       Sepsis  This patient does have evidence of infective focus  My overall impression is sepsis.  Source: Skin and Soft Tissue (location toe)  Antibiotics given-   Antibiotics (72h ago, onward)      Start     Stop Route Frequency  "Ordered    07/07/24 1545  ceftaroline fosamiL (TEFLARO) 600 mg in D5W 50 mL IVPB (MB+)         -- IV Every 8 hours (non-standard times) 07/07/24 1532    07/07/24 1545  meropenem (MERREM) 1 g in 0.9% NaCl 100 mL IVPB (MB+)         -- IV Every 8 hours (non-standard times) 07/07/24 1532          Latest lactate reviewed-  No results for input(s): "LACTATE", "POCLAC" in the last 72 hours.  Organ dysfunction indicated by Acute kidney injury    Fluid challenge Not needed - patient is not hypotensive      Post- resuscitation assessment No - Post resuscitation assessment not needed       Will Not start Pressors- Levophed for MAP of 65  Source control achieved by: abx    Debility  Patient with Acute on chronic debility due to  multiple infections . Latest AMPAC and GEMS scores have not been reviewed. Evaluation for etiology is complete. Plan includes progressive mobility protocol initated, PT/OT consulted, and fall precautions in place.    Acute renal failure superimposed on chronic kidney disease  Patient with acute kidney injury/acute renal failure likely due to  due to ATN due to sepsis due to UTI and/or PNA  MARBIN is currently improving. Baseline creatinine  wnl  - Labs reviewed- Renal function/electrolytes with Estimated Creatinine Clearance: 104.7 mL/min (based on SCr of 0.9 mg/dL). according to latest data. Monitor urine output and serial BMP and adjust therapy as needed. Avoid nephrotoxins and renally dose meds for GFR listed above.    Atrial fibrillation with rapid ventricular response  Patient with Long standing persistent (>12 months) atrial fibrillation which is controlled currently with Beta Blocker. Patient is currently in TBD.[id number] Score: 2.      Chronic AF w/ acute RVR at OSH, resolved s/p cardizem drip   NOAC has been on hold d/t possible need for surgery - has been on prophylaxis dose of Lovenox  Resumed anticoagulation given no plan for further surgical intervention     History of Guillain-Leland " syndrome  No acute issues  However, pt has impaired mobility and is acutely weakened from his sepsis/UTI/AF RVR and need placement       Type 2 diabetes mellitus  Patient's FSGs are controlled on current medication regimen.  Last A1c reviewed-   Lab Results   Component Value Date    HGBA1C 6.3 (H) 06/14/2024     Most recent fingerstick glucose reviewed-   Recent Labs   Lab 07/10/24  1607 07/10/24  2002   POCTGLUCOSE 181* 178*     Current correctional scale  Low  Maintain anti-hyperglycemic dose as follows-   Antihyperglycemics (From admission, onward)      Start     Stop Route Frequency Ordered    07/08/24 0900  insulin glargine U-100 (Lantus) pen 15 Units         -- SubQ Daily 07/07/24 1532    07/07/24 1529  insulin aspart U-100 pen 0-10 Units         -- SubQ Before meals & nightly PRN 07/07/24 1532          Hold Oral hypoglycemics while patient is in the hospital.    Hypertension  Chronic, controlled. Latest blood pressure and vitals reviewed-     Temp:  [97.8 °F (36.6 °C)-98.8 °F (37.1 °C)]   Pulse:  [65-91]   Resp:  [2-24]   BP: (100-145)/(56-81)   SpO2:  [90 %-99 %] .   Home meds for hypertension were reviewed and noted below.   Hypertension Medications               hydrALAZINE (APRESOLINE) 25 MG tablet Take 1 tablet (25 mg total) by mouth every 12 (twelve) hours.    metoprolol succinate (TOPROL-XL) 100 MG 24 hr tablet Take 1 tablet (100 mg total) by mouth once daily.            While in the hospital, will manage blood pressure as follows; continue home metoprolol    Will utilize p.r.n. blood pressure medication only if patient's blood pressure greater than 180/110 and he develops symptoms such as worsening chest pain or shortness of breath.    MARA (obstructive sleep apnea)  Continue CPAP HS and w/ naps        Morbid obesity  There is no height or weight on file to calculate BMI. Morbid obesity complicates all aspects of disease management from diagnostic modalities to treatment. Weight loss encouraged and  health benefits explained to patient.           VTE Risk Mitigation (From admission, onward)           Ordered     apixaban tablet 5 mg  2 times daily         07/17/24 1245     Place sequential compression device  Until discontinued         07/07/24 1532                    Discharge Planning   KAMLIA: 7/18/2024     Code Status: Full Code   Is the patient medically ready for discharge?:     Reason for patient still in hospital (select all that apply): Patient trending condition  Discharge Plan A: Long-term acute care facility (LTAC)                  Milind Londono MD  Department of Hospital Medicine   [facility]

## 2024-07-18 NOTE — ASSESSMENT & PLAN NOTE
-Continue Lovenox.  -Continue Lopressor.  -07/18- Monitor HR closely. Had bradycardia during PT session on 07/17.

## 2024-07-18 NOTE — ASSESSMENT & PLAN NOTE
-Appears very debilitated. Will need therapies.   -07/18- Appears improved on exam. Will need aggressive therapy with PT/OT.

## 2024-07-18 NOTE — NURSING
[facility] Plan of Care Note  Dx Final diagnoses:  [L02.213] Chest wall abscess  [M86.9] Osteomyelitis     Shift Events: No acute events throughout shift. Pain controlled. No appetite this shift but did drink Juvens/ protein shakes. Waffle mattress applied this shift. Wound care completed. Urine strained this shift with nothing noted.     Neuro: A&Ox4    Vital Signs: VSS    Respiratory: 2L nasal canula    Diet: Diabetic    Urine Output/Bowel Movement: adequate urine output // no bowel movement this shift

## 2024-07-18 NOTE — PROGRESS NOTES
Andres shadi [facility]  Physical Medicine & Rehab  Progress Note    Patient Name: Roosevelt Moser  MRN: [id number]  Admission Date: 7/7/2024  Length of Stay: 11 days  Attending Physician: Milind Londono MD    Subjective:     Principal Problem:Osteomyelitis of multiple sites    Hospital Course:   Per chart review,    PT- 07/17    Functional Mobility:  Bed Mobility:     Rolling Right: maximal assistance  Scooting to HOB via draw sheet: total assistance and of 2 persons  Supine to Sit: maximal assistance x2  Sit to Supine: maximal assistance    OT- 07/17    Bed Mobility:    Patient completed Supine to Sit with maximal assistance and 2 persons  Patient completed Sit to Supine with maximal assistance      Functional Mobility/Transfers:  Pt. Sat at EOB ~ 4-5 mins before coming sympathic with c/o not feel with HR in upper 30's see above     Activities of Daily Living:  Grooming: set up (A) oral care completed at bed level          PT- 07/09    Functional Mobility:  Bed Mobility:     Rolling Left:  total assistance and of 2 persons  Rolling Right: total assistance and of 2 persons  Scooting: total assistance and of 2 persons  Supine to Sit: pt refused to attempt 2/2 LUE pain.    OT- 07/09    Bed Mobility:    Patient completed Rolling/Turning to Left with  total assistance and 2 persons  Patient completed Rolling/Turning to Right with total assistance and 2 persons     Functional Mobility/Transfers: Declined 2/2 to L Shoulder Pain      Activities of Daily Living:  Upper Body Dressing: total assistance don gown at bed level   Lower Body Dressing: total assistance don and doff socks and pressure relief boots at bed level.    Interval History 7/18/2024:  Patient is seen for follow-up PM&R evaluation and recommendations:  Pt seen at bedside. 3 l O2 per NC in place. No distress noted.   HPI, Past Medical, Family, and Social History remains the same as documented in the initial encounter.    Scheduled Medications:    (Magic mouthwash) 1:1:1  diphenhydrAMINE(Benadryl) 12.5mg/5ml liq, aluminum & magnesium hydroxide-simethicone (Maalox), LIDOcaine viscous 2%  10 mL Swish & Spit QID    apixaban  5 mg Oral BID    ceftaroline (Teflaro) IV (PEDS and ADULTS)  600 mg Intravenous Q8H    ferrous sulfate  1 tablet Oral Daily    insulin glargine U-100  15 Units Subcutaneous Daily    LIDOcaine  1 patch Transdermal Q24H    mupirocin   Nasal BID    polyethylene glycol  17 g Oral Daily    sodium chloride 0.9%  10 mL Intravenous Q6H       Diagnostic Results: Labs: Reviewed    PRN Medications:   Current Facility-Administered Medications:     albuterol-ipratropium, 3 mL, Nebulization, Q4H PRN    aluminum & magnesium hydroxide-simethicone, 15 mL, Oral, QID PRN    dextrose 10%, 12.5 g, Intravenous, PRN    dextrose 10%, 25 g, Intravenous, PRN    glucagon (human recombinant), 1 mg, Intramuscular, PRN    glucose, 16 g, Oral, PRN    glucose, 24 g, Oral, PRN    HYDROcodone-acetaminophen, 1 tablet, Oral, Q4H PRN    HYDROcodone-acetaminophen, 1 tablet, Oral, Q4H PRN    insulin aspart U-100, 0-10 Units, Subcutaneous, QID (AC + HS) PRN    loperamide, 2 mg, Oral, QID PRN    melatonin, 6 mg, Oral, Nightly PRN    naloxone, 0.02 mg, Intravenous, PRN    ondansetron, 8 mg, Oral, Q8H PRN    polyethylene glycol, 17 g, Oral, BID PRN    promethazine, 25 mg, Oral, Q6H PRN    senna-docusate 8.6-50 mg, 1 tablet, Oral, BID PRN    simethicone, 1 tablet, Oral, QID PRN    sodium chloride 0.9%, 10 mL, Intravenous, Q12H PRN    Flushing PICC/Midline Protocol, , , Until Discontinued **AND** sodium chloride 0.9%, 10 mL, Intravenous, Q6H **AND** sodium chloride 0.9%, 10 mL, Intravenous, PRN    traMADoL, 50 mg, Oral, Q4H PRN    Review of Systems   Constitutional:  Positive for activity change.   Gastrointestinal:  Negative for abdominal distention.   Musculoskeletal:  Positive for gait problem.   Skin:  Positive for wound.   Neurological:  Positive for weakness.     Objective:     Vital Signs (Most  Recent):  Temp: 98.6 °F (37 °C) (07/18/24 1126)  Pulse: 70 (07/18/24 1126)  Resp: 18 (07/18/24 1126)  BP: 111/64 (07/18/24 1126)  SpO2: (!) 92 % (07/18/24 1126)    Vital Signs (24h Range):  Temp:  [97.7 °F (36.5 °C)-98.7 °F (37.1 °C)] 98.6 °F (37 °C)  Pulse:  [60-77] 70  Resp:  [18] 18  SpO2:  [92 %-97 %] 92 %  BP: ()/(55-65) 111/64         Physical Exam  Vitals and nursing note reviewed.   Constitutional:       General: He is awake.      Appearance: Normal appearance. He is obese.   HENT:      Head: Normocephalic and atraumatic.   Pulmonary:      Effort: Pulmonary effort is normal. No respiratory distress.      Comments: 3 L O2 NC  Abdominal:      General: There is no distension.      Palpations: Abdomen is soft.   Musculoskeletal:      Cervical back: Normal range of motion and neck supple.      Comments: BLE 4/5     RUE 4/5. ROM intact     LUE Unable to lif up.  3/5   Skin:     General: Skin is warm and dry.      Capillary Refill: Capillary refill takes 2 to 3 seconds.      Comments: Right second toe amputation site with sutures, Left shoulder with sutures intact. No drainage.  Left chest wall 2 staples BAR   Neurological:      General: No focal deficit present.      Mental Status: He is alert and oriented to person, place, and time.      GCS: GCS eye subscore is 4. GCS verbal subscore is 5. GCS motor subscore is 6.      Motor: Weakness present.      Coordination: Coordination abnormal. Impaired rapid alternating movements.      Gait: Gait abnormal.   Psychiatric:         Attention and Perception: Attention and perception normal.         Mood and Affect: Mood normal.         Speech: Speech normal.         Behavior: Behavior normal. Behavior is cooperative.         Cognition and Memory: Cognition and memory normal.          NEUROLOGICAL EXAMINATION:     MENTAL STATUS   Oriented to person, place, and time.   Speech: speech is normal       Assessment/Plan:      * Osteomyelitis of multiple sites  -S/P CT and  VATs 07/05.  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  -s/p 2nd toe amputation 7/1.   -ID, Plastics, thoracic Sx following.  -Continue IV antibiotics as recommended by ID.   -07/18-Continue Ceftaroline till 08/11 as per ID.     Chest wall abscess  -S/P CT per throacic surgery.   -S/P drain placement. Removal on 07/14.     Loculated pleural effusion  -S/P VATs 07/05.  -S/P chest tube placement.  -S/P drain placement for left chest wall abscess. Now removed on 07/14.Stapes in place. Intact.     Abscess of left shoulder  -S/P Left shoulder prosthetic joint infection s/p I&D and removal of deep hardware 6/21 - all screws and nails removed, s/p 2nd washout 6/24 & 3rd washout 6/27.  -07/18- sutures in place. C/D/I.     Osteomyelitis of toe  -S/P second toe amputation right foot on 07/01.  -Sutures in place. Clean. Dry Intact.    Debility  See hospital course for functional status.    Recommendations  -  Encourage mobility, OOB in chair, and early ambulation as appropriate  -  PT/OT evaluate and treat  -  Pain management  -  DVT prophylaxis if appropriate   -  Monitor for and prevent skin breakdown and pressure ulcers  Early mobility, repositioning/weight shifting every 20-30 minutes when sitting, turn patient every 2 hours, proper mattress/overlay and chair cushioning, pressure relief/heel protector boots     Acute renal failure superimposed on chronic kidney disease  -Monitor renal function panel.     Atrial fibrillation with rapid ventricular response  -Continue Lovenox.  -Continue Lopressor.  -07/18- Monitor HR closely. Had bradycardia during PT session on 07/17.    History of Guillain-[place] syndrome  -Appears very debilitated. Will need therapies.   -07/18- Appears improved on exam. Will need aggressive therapy with PT/OT.     MARA (obstructive sleep apnea)  -Continue CPAP use.   -07/18- Has 3 L O2 NC  in place. Monitor.     PM&R Recommendation:     At this time, the PM&R team has reviewed this patient's ongoing medical case including inpatient diagnosis, medical history, clinical examination, labs, vitals, current social and functional history to provide the post-acute recommendation as follows:     RECOMMENDATIONS: inpatient rehabilitation due to fair to good potential for improvement with therapies and need of physician oversight for management of ongoing active medical issues, once medically stable.     The patient will be admitted for comprehensive interdisciplinary inpatient rehabilitation to address the impairments due to his medical diagnosis of  Osteomyelitis of multiple sites. The patient will benefit from an inpatient rehabilitation program to promote functional recovery, implement compensatory strategies and will undergo assessment for needs for durable medical equipment for safe discharge to the community. This patient will benefit from a coordinated interdisciplinary rehabilitation program services that require close monitoring and treatment with 24-hour rehabilitative nursing and  physical/occupational therapies for 3 hours/day for 5 days/week. This interdisciplinary program will be performed under the direction of a physiatrist.        We will continue to follow.       Lorna Juarez NP  Department of Physical Medicine & Rehab   nAdres KEARNEY

## 2024-07-18 NOTE — PLAN OF CARE
Andres KEARNEY  Discharge Reassessment    Primary Care Provider: No, Primary Doctor    Expected Discharge Date: 7/18/2024    Reassessment (most recent)       Discharge Reassessment - 07/18/24 0931          Discharge Reassessment    Assessment Type Discharge Planning Reassessment     Did the patient's condition or plan change since previous assessment? No     Discharge Plan discussed with: Patient     Communicated KAMILA with patient/caregiver Yes     Discharge Plan A Long-term acute care facility (LTAC)     Discharge Plan B Rehab     Transition of Care Barriers None     Why the patient remains in the hospital Requires continued medical care                   Awaiting Auth for [facility].

## 2024-07-19 PROBLEM — L24.A2 IRRITANT CONTACT DERMATITIS DUE TO FECAL, URINARY OR DUAL INCONTINENCE: Status: ACTIVE | Noted: 2024-07-19

## 2024-07-19 LAB
ANION GAP SERPL CALC-SCNC: 11 MMOL/L (ref 8–16)
BASOPHILS # BLD AUTO: 0.07 K/UL (ref 0–0.2)
BASOPHILS NFR BLD: 0.7 % (ref 0–1.9)
BUN SERPL-MCNC: 50 MG/DL (ref 8–23)
CALCIUM SERPL-MCNC: 8.6 MG/DL (ref 8.7–10.5)
CHLORIDE SERPL-SCNC: 103 MMOL/L (ref 95–110)
CO2 SERPL-SCNC: 22 MMOL/L (ref 23–29)
CREAT SERPL-MCNC: 1.1 MG/DL (ref 0.5–1.4)
DIFFERENTIAL METHOD BLD: ABNORMAL
EOSINOPHIL # BLD AUTO: 0.7 K/UL (ref 0–0.5)
EOSINOPHIL NFR BLD: 7.1 % (ref 0–8)
ERYTHROCYTE [DISTWIDTH] IN BLOOD BY AUTOMATED COUNT: 18 % (ref 11.5–14.5)
EST. GFR  (NO RACE VARIABLE): >60 ML/MIN/1.73 M^2
GLUCOSE SERPL-MCNC: 111 MG/DL (ref 70–110)
HCT VFR BLD AUTO: 28.4 % (ref 40–54)
HGB BLD-MCNC: 8.9 G/DL (ref 14–18)
IMM GRANULOCYTES # BLD AUTO: 0.09 K/UL (ref 0–0.04)
IMM GRANULOCYTES NFR BLD AUTO: 0.9 % (ref 0–0.5)
LYMPHOCYTES # BLD AUTO: 2.2 K/UL (ref 1–4.8)
LYMPHOCYTES NFR BLD: 21.4 % (ref 18–48)
MAGNESIUM SERPL-MCNC: 1.8 MG/DL (ref 1.6–2.6)
MCH RBC QN AUTO: 24.5 PG (ref 27–31)
MCHC RBC AUTO-ENTMCNC: 31.3 G/DL (ref 32–36)
MCV RBC AUTO: 78 FL (ref 82–98)
MONOCYTES # BLD AUTO: 1.4 K/UL (ref 0.3–1)
MONOCYTES NFR BLD: 13.1 % (ref 4–15)
NEUTROPHILS # BLD AUTO: 5.9 K/UL (ref 1.8–7.7)
NEUTROPHILS NFR BLD: 56.8 % (ref 38–73)
NRBC BLD-RTO: 0 /100 WBC
PHOSPHATE SERPL-MCNC: 3.2 MG/DL (ref 2.7–4.5)
PLATELET # BLD AUTO: 383 K/UL (ref 150–450)
PMV BLD AUTO: 9.6 FL (ref 9.2–12.9)
POCT GLUCOSE: 126 MG/DL (ref 70–110)
POCT GLUCOSE: 144 MG/DL (ref 70–110)
POCT GLUCOSE: 177 MG/DL (ref 70–110)
POTASSIUM SERPL-SCNC: 4.1 MMOL/L (ref 3.5–5.1)
RBC # BLD AUTO: 3.63 M/UL (ref 4.6–6.2)
SODIUM SERPL-SCNC: 136 MMOL/L (ref 136–145)
WBC # BLD AUTO: 10.29 K/UL (ref 3.9–12.7)

## 2024-07-19 PROCEDURE — 63600175 PHARM REV CODE 636 W HCPCS: Mod: JZ,JG | Performed by: STUDENT IN AN ORGANIZED HEALTH CARE EDUCATION/TRAINING PROGRAM

## 2024-07-19 PROCEDURE — 84100 ASSAY OF PHOSPHORUS: CPT | Performed by: STUDENT IN AN ORGANIZED HEALTH CARE EDUCATION/TRAINING PROGRAM

## 2024-07-19 PROCEDURE — 36415 COLL VENOUS BLD VENIPUNCTURE: CPT | Performed by: STUDENT IN AN ORGANIZED HEALTH CARE EDUCATION/TRAINING PROGRAM

## 2024-07-19 PROCEDURE — A4216 STERILE WATER/SALINE, 10 ML: HCPCS | Performed by: STUDENT IN AN ORGANIZED HEALTH CARE EDUCATION/TRAINING PROGRAM

## 2024-07-19 PROCEDURE — 97110 THERAPEUTIC EXERCISES: CPT | Mod: CQ

## 2024-07-19 PROCEDURE — 80048 BASIC METABOLIC PNL TOTAL CA: CPT | Performed by: STUDENT IN AN ORGANIZED HEALTH CARE EDUCATION/TRAINING PROGRAM

## 2024-07-19 PROCEDURE — 20600001 HC STEP DOWN PRIVATE ROOM

## 2024-07-19 PROCEDURE — 27000207 HC ISOLATION

## 2024-07-19 PROCEDURE — 85025 COMPLETE CBC W/AUTO DIFF WBC: CPT | Performed by: STUDENT IN AN ORGANIZED HEALTH CARE EDUCATION/TRAINING PROGRAM

## 2024-07-19 PROCEDURE — 25000003 PHARM REV CODE 250: Performed by: STUDENT IN AN ORGANIZED HEALTH CARE EDUCATION/TRAINING PROGRAM

## 2024-07-19 PROCEDURE — 83735 ASSAY OF MAGNESIUM: CPT | Performed by: STUDENT IN AN ORGANIZED HEALTH CARE EDUCATION/TRAINING PROGRAM

## 2024-07-19 PROCEDURE — 97535 SELF CARE MNGMENT TRAINING: CPT

## 2024-07-19 RX ADMIN — DIPHENHYDRAMINE HYDROCHLORIDE 10 ML: 25 SOLUTION ORAL at 08:07

## 2024-07-19 RX ADMIN — INSULIN GLARGINE 15 UNITS: 100 INJECTION, SOLUTION SUBCUTANEOUS at 08:07

## 2024-07-19 RX ADMIN — INSULIN ASPART 2 UNITS: 100 INJECTION, SOLUTION INTRAVENOUS; SUBCUTANEOUS at 05:07

## 2024-07-19 RX ADMIN — Medication 10 ML: at 05:07

## 2024-07-19 RX ADMIN — FERROUS SULFATE TAB EC 325 MG (65 MG FE EQUIVALENT) 1 EACH: 325 (65 FE) TABLET DELAYED RESPONSE at 08:07

## 2024-07-19 RX ADMIN — DIPHENHYDRAMINE HYDROCHLORIDE 10 ML: 25 SOLUTION ORAL at 10:07

## 2024-07-19 RX ADMIN — MUPIROCIN: 20 OINTMENT TOPICAL at 08:07

## 2024-07-19 RX ADMIN — CEFTAROLINE FOSAMIL 600 MG: 600 POWDER, FOR SOLUTION INTRAVENOUS at 09:07

## 2024-07-19 RX ADMIN — APIXABAN 5 MG: 5 TABLET, FILM COATED ORAL at 10:07

## 2024-07-19 RX ADMIN — DIPHENHYDRAMINE HYDROCHLORIDE 10 ML: 25 SOLUTION ORAL at 01:07

## 2024-07-19 RX ADMIN — CEFTAROLINE FOSAMIL 600 MG: 600 POWDER, FOR SOLUTION INTRAVENOUS at 02:07

## 2024-07-19 RX ADMIN — CEFTAROLINE FOSAMIL 600 MG: 600 POWDER, FOR SOLUTION INTRAVENOUS at 06:07

## 2024-07-19 RX ADMIN — DIPHENHYDRAMINE HYDROCHLORIDE 10 ML: 25 SOLUTION ORAL at 04:07

## 2024-07-19 RX ADMIN — APIXABAN 5 MG: 5 TABLET, FILM COATED ORAL at 08:07

## 2024-07-19 RX ADMIN — Medication 10 ML: at 12:07

## 2024-07-19 NOTE — NURSING
[facility] Plan of Care Note  Dx Osteomyelitis of multiple sites    Shift Events none    Goals of Care: ABX therapy    Neuro: AAOX4    Vital Signs: see flowsheet    Respiratory: see flowsheet    Diet: diabetic/cardiac    Is patient tolerating current diet? yes    GTTS: no    Urine Output/Bowel Movement: see flowsheet    Drains/Tubes/Tube Feeds (include total output/shift): no    Lines: PICC      Accuchecks:yes    Skin: see flowsheet    Fall Risk Score: see flowsheet    Activity level? bedrest    Any scheduled procedures? no    Any safety concerns? no    Other: no

## 2024-07-19 NOTE — PT/OT/SLP PROGRESS
Occupational Therapy  Co -  Treatment with PT    Co-evaluation/treatment performed due to patient's multiple deficits requiring two skilled therapists to appropriately and safely assess patient's strength and endurance while facilitating functional tasks in addition to accommodating for patient's activity tolerance.       Name: Roosevelt Moser  MRN: [id number]  Admitting Diagnosis:  Osteomyelitis of multiple sites       Recommendations:     Discharge Recommendations: Moderate Intensity Therapy  Discharge Equipment Recommendations:  lift device, bedside commode, hospital bed  Barriers to discharge:  None    Assessment:     Roosevelt Moser is a 73 y.o. male with a medical diagnosis of Osteomyelitis of multiple sites.  He presents with performance deficits affecting function are weakness, impaired endurance, impaired functional mobility, impaired self care skills, gait instability, impaired balance, decreased lower extremity function, decreased safety awareness, decreased upper extremity function, pain, decreased ROM, impaired skin.     Pt engaged fairly in therapy this date, c/o difficult night prior 2* multiple loose bowels and expressed not feeling well enough to participate in EOB mobility or OOB mobility despite multiple attempts to educate/encourage patient.  Pt presents with increased pain at L shoulder with palpation and slight movement which impacts mobility.  Pt encountered soiled with urine, and required max A of 2 persons for bed mobility to change arlen pads, and encouraged to wipe self. Pt completed BLE TherEx with PT this date. Pt on pathway to receive post acute care at moderate level of assistance.     Rehab Prognosis:  Good; patient would benefit from acute skilled OT services to address these deficits and reach maximum level of function.       Plan:     Patient to be seen 4 x/week to address the above listed problems via self-care/home management, therapeutic activities, therapeutic exercises, neuromuscular  "re-education  Plan of Care Expires: 08/08/24  Plan of Care Reviewed with: patient, spouse    Subjective     Chief Complaint: "I just don't want to get up, it was a hard night" "I'm feeling OK now, but I just don't feel good about last night, so I don't feel good"   Patient/Family Comments/goals: Get better  Pain/Comfort:  Pain Rating 1:  (not rated)  Location - Side 1: Left  Location - Orientation 1: generalized  Location 1: shoulder (with minimal movement)  Pain Addressed 1: Distraction, Reposition  Pain Rating Post-Intervention 1:  (not rated)    Objective:     Communicated with: RN prior to session.  Patient found supine with PICC line, Condom Catheter, telemetry upon OT entry to room. Pt's wife Shana present for partial session.     General Precautions: Standard, fall    Orthopedic Precautions:LUE partial weight bearing, RLE weight bearing as tolerated  Braces: N/A  Respiratory Status: Nasal cannula, flow 2 L/min     Occupational Performance:     Bed Mobility:    Patient completed Rolling/Turning to Left with  maximal assistance and 2 persons  Patient completed Rolling/Turning to Right with maximal assistance and 2 persons  Patient completed Scooting/Bridging:   Laterally to MOB with total A of 2 persons using draw sheet  to HOB with bed in trendelenburg with total assistance of 2 persons    Functional Mobility/Transfers:  Declined this date    Activities of Daily Living:  Upper Body Dressing: total assistance managing gown to maximize coverage  Toileting: minimum assistance to wipe self with wet wipe, pt able to reach to R inguinal area with increased encouragement after initially stating he couldn't complete it.  Total A of 2 persons to replace soiled arlen pads with fresh arlen pads      [facility] 6 Click ADL: 13    Treatment & Education:  Pt educated on role of OT, POC, and goals for therapy.    POC was dicussed with patient/caregiver, who was included in its development and is in agreement with the identified " goals and treatment plan.   Patient and family aware of patient's deficits and therapy progression.   Increased time provided for therapeutic education re: importance of participation and counseling with discussion of health disposition.   Educated on importance of EOB/OOB mobility, maintaining routine, sitting up in chair, and maximizing independence with ADLs during admission   Pt completed ADLs and functional mobility for treatment session as noted above   Pt/caregiver verbalized understanding and expressed no further concerns/questions.  Updated communication board with level of assist required       Patient left HOB elevated with all lines intact, call button in reach, RN notified, and wife present    GOALS:   Multidisciplinary Problems       Occupational Therapy Goals          Problem: Occupational Therapy    Goal Priority Disciplines Outcome Interventions   Occupational Therapy Goal     OT, PT/OT Not Progressing    Description: Goals to be met by: 8/8/24     Patient will increase functional independence with ADLs by performing:    LE Dressing with Moderate Assistance.  Grooming while EOB with Supervision.  Toileting from toilet with Supervision for hygiene and clothing management.   Stand pivot transfers with Moderate Assistance.  Squat pivot transfers with Moderate Assistance.  Toilet transfer to bedside commode with Moderate Assistance.    DME Justification for Hospital Bed:  Patient requires a hospital bed for positioning of the body in ways that are not feasible with an ordinary bed. The patient requires special positioning for pain relief, limited mobility, and/or being unable to independently make changes in body position without the use of a hospital bed. Pillows and wedges will not be adequate for resolving these positional issues.                          Time Tracking:     OT Date of Treatment: 07/19/24  OT Start Time: 1116  OT Stop Time: 1144  OT Total Time (min): 28 min    Billable Minutes:Self  Care/Home Management 20  Therapeutic Activity 8    OT/BAR: OT          7/19/2024

## 2024-07-19 NOTE — PLAN OF CARE
Pt presented with self limiting behavior this date, despite encouragement politely declined to attempt EOB or OOB mobility.  Able to complete some bed mobility with encouragement to assist with soiled arlen pads.    Problem: Occupational Therapy  Goal: Occupational Therapy Goal  Description: Goals to be met by: 8/8/24     Patient will increase functional independence with ADLs by performing:    LE Dressing with Moderate Assistance.  Grooming while EOB with Supervision.  Toileting from toilet with Supervision for hygiene and clothing management.   Stand pivot transfers with Moderate Assistance.  Squat pivot transfers with Moderate Assistance.  Toilet transfer to bedside commode with Moderate Assistance.    DME Justification for Hospital Bed:  Patient requires a hospital bed for positioning of the body in ways that are not feasible with an ordinary bed. The patient requires special positioning for pain relief, limited mobility, and/or being unable to independently make changes in body position without the use of a hospital bed. Pillows and wedges will not be adequate for resolving these positional issues.     Outcome: Not Progressing

## 2024-07-19 NOTE — PROGRESS NOTES
"[facility] Medicine  Progress Note    Patient Name: Roosevelt Moser  MRN: [id number]  Patient Class: IP- Inpatient   Admission Date: 7/7/2024  Length of Stay: 12 days  Attending Physician: Milind Londono MD  Primary Care Provider: Miriam, Primary Doctor        Subjective:     Principal Problem:Osteomyelitis of multiple sites        HPI:  Roosevelt Moser is a 74yo M w/ A fib, HTN, T2DM, MARA, HLD, hx GBS, hx displaced comminuted fracture of the left humerus s/p fall 3/4/24 s/p ORIF 03/25/2024 who presents as a transfer for thoracic surgery and Plastic surgery evaluation in setting of "osteomyelitis of multiple ribs which will need further debridement and then a flap placed."    He was initially admitted to [facility] on 6/14 for progressive weakness, multiple falls, and decreased urine output.  He was admitted for management of MARBIN on CKD complicated by hyperkalemia, urosepsis, AFib with RVR.    "Creatinine improved during his stay. During his stay he was noted to have swelling of his left upper extremity. Imaging studies on June 17 showed concern for gas-forming infection along the left shoulder and air along the lateral left chest wall concerning for gas-forming organism. MRI of his right foot also showed evidence of toe osteomyelitis. He was seen by ID who adjusted antibiotics, and they also noted oral thrush. Podiatry evaluated him for the toe findings, and Orthopedic Surgery evaluated the shoulder abnormalities. Right toe wound grew MRSA. Oral anticoagulation was held, and Radiology aspirated fluid from his left shoulder on June 20. On June 21 he had left shoulder incision and drainage and removal of deep hardware including rods and screws. He underwent repeat irrigation and debridement of the left shoulder on June 24 with placement of a wound VAC. He had subsequent incision and drainage with removal of the wound VAC on June 27. He had amputation of his right 2nd toe by Podiatry on July 1. On " "July 3 he was noted to have dyspnea and mild tachycardia. CT chest showed a large abscess of the anterior central and left chest wall and abdominal wall with partial destruction of 3 ribs. He was seen by Cardiothoracic surgery. On July 4 he had left thoracentesis with aspiration of fluid. He subsequently had incision and drainage of the left chest wall abscess on July 4, and a drainage catheter was left in place. On July 5 he underwent right VATS for a loculated right pleural effusion, and a chest tube was left in place. He was transitioned to the ICU post-operatively. With concern for osteomyelitis of the ribs, concern is that he will need transfer to a facility with Thoracic Surgery and potentially Plastic Surgery along with higher level of care. Transfer center spoke with Thoracic Surgery at [facility]. Requesting transfer to Hospital Medicine at [facility] for Plastic Surgery and Thoracic Surgery evaluation. Transfer center spoke with Plastic Surgery on July 5, Thoracic Surgery on July 7. With his complicated medical presentation, will plan transfer to Hospital Medicine at [facility] in step-down status for further treatment. "    Prior to transfer, "He is awake and alert. He has a PICC line in place along with the right chest tube and left chest wall drain in place. He has no evidence of respiratory distress and is hemodynamically stable. Referring provider felt patient was stable for step-down status at present. He is currently on ceftaroline and meropenem. He is in contact isolation. July 7: Sodium 137, potassium 4.5, chloride 104, CO2 26, BUN 33, creatinine 1.1, glucose 130, albumin 2.1, AST 25, ALT 17, white blood cells 11.58, hemoglobin 8.1, hematocrit 26.2, platelets 389, procalcitonin 0.575, CRP greater than 16  VS:  Temperature 97.5°, pulse 85, respirations 20, blood pressure 112/63, O2 sats 95% "    Patient in no acute distress upon arrival.  Wife at bedside.  Patient denies any acute " complaints.        Imaging history reviewed:  July 6: X-rays of the left shoulder had fracture through the surgical neck of the humerus with lateral distraction of the distal component and overlapping segments on the order of 2.5 cm.  Overall no appreciable change upon comparison with prior imaging.  -chest x-ray noted manifestations of mild congestive heart failure bordering on mild pulmonary edema.  Right-sided thoracostomy tube terminating within the right apex.  No definite pneumothorax.        July 5: Pleural fluid Gram stain with few WBCs and no organisms seen  -pH 7.339, pCO2 50.9, pO2 392     July 4:  CPK less than 10, serum   -AFB smear from left chest abscess had no AFB seen, left chest abscess aerobic and anaerobic cultures have no growth  -pleural fluid protein 3.2, white blood cells 1108 (11% segs, 74% lymphs), , pH 7.63, pleural fluid culture had no growth  -ultrasound-guided thoracentesis removed 1.4-1.5 L     July 3: Blood cultures with no growth to date  -CT chest showed large abscess of the anterior central and left chest wall and abdominal wall measuring 17 cm transversely.  This extends into the mediastinum and peritoneum with partial destruction of the anterior 6th, 7th, and 8th ribs.  Complete atelectasis of the left lower lobe with moderate left pleural effusion.  Mild atelectasis right lung base with a small-to-moderate right pleural effusion.     July 2: X-rays of the left shoulder noted displaced left proximal humerus fracture status post hardware removal without change in alignment.     June 24: ECHO with EF 55-60%.     June 20:  Interventional Radiology did percutaneous aspiration of the left shoulder fluid collection.  No drainage catheter was left in place.     June 18:  Right toe wound MRSA  -CT left shoulder had subacute left humerus fracture post internal fixation.  Incomplete bony bridging across the fracture site with persistent angulation.  Severe arthropathy of the  "glenohumeral joint with multiple intra-articular bodies.  Large left joint effusion and adjacent soft tissue focal fluid collections containing gas and concerning for gas-forming infection.  Moderate size left pleural effusion.  Left basilar airspace disease.  -bilateral lower extremity Doppler arterial ultrasound had atherosclerotic plaque and calcification bilaterally without severe stenosis or occlusion identified on either side.     June 17: X-rays of the left humerus/shoulder had findings concerning for gas-forming infection along the left shoulder.  -x-rays of the left ribs showed air along the lateral chest wall soft tissue and axilla concerning for gas-forming infection.  -MRI of the right foot had findings consistent with osteomyelitis involving the middle distal phalanges of the 3rd toe.  Cellulitis.     June 14: Renal ultrasound had no hydronephrosis.  Elevated resistive index right kidney suggesting intrinsic renal disease.    Overview/Hospital Course:  Pt admitted to  as a transfer for thoracic surgery and Plastic surgery evaluation in setting of "osteomyelitis of multiple ribs which will need further debridement and then a flap placed." Thoracic Surgery, Plastic Surgery, ID, Ortho, Wound care, PT/OT consulted upon admission. Imaging ordered.  CT chest abdomen with IV contrast without evidence of osteomyelitis in ribs adjacent to I&D site of left chest wall abscess.; incidental finding of 1.2 cm obstructing stone in left distal ureter with mild hydroureteronephrosis.  Urology was consulted and CT renal stone study was completed; no acute intervention given no concern for infection around stone or MARBIN; follow-up outpatient unless decompensates.  MRI shoulder without contrast left notable for septic arthritis of left glenohumeral joint with acute osteomyelitis of the glenoid and proximal humerus in addition to known displaced fracture of proximal humeral metaphysis in addition to several large soft " tissue abscesses.  Patient underwent left shoulder aspiration with Orthopedic surgery on 07/09, continuing antibiotics with no further intervention planned.  Right chest tube was removed by thoracic surgery on 07/09. Drain previously placed for L chest wall abscess removed when output became minimal (7/14). To continue on IV Ceftaroline 600 mg q8 for 6 weeks therapy (EOT 8/11). LTAC placement pending     Interval History:  No complaints or acute issues.  Peer to peer for Monday    Review of Systems   Unable to perform ROS: Other     Objective:     Vital Signs (Most Recent):  Temp: 98.1 °F (36.7 °C) (07/19/24 0742)  Pulse: 77 (07/19/24 1508)  Resp: 20 (07/19/24 0742)  BP: 105/61 (07/19/24 1206)  SpO2: 96 % (07/19/24 1206) Vital Signs (24h Range):  Temp:  [98.1 °F (36.7 °C)-99 °F (37.2 °C)] 98.1 °F (36.7 °C)  Pulse:  [76-98] 77  Resp:  [18-20] 20  SpO2:  [94 %-97 %] 96 %  BP: (101-131)/(56-61) 105/61     Weight: 133 kg (293 lb 3.4 oz)  Body mass index is 39.77 kg/m².    Intake/Output Summary (Last 24 hours) at 7/19/2024 1523  Last data filed at 7/19/2024 1439  Gross per 24 hour   Intake 360 ml   Output 2275 ml   Net -1915 ml         Physical Exam  Constitutional:       General: He is not in acute distress.     Appearance: He is obese.   HENT:      Head: Normocephalic.      Right Ear: External ear normal.      Left Ear: External ear normal.      Nose: Nose normal.   Cardiovascular:      Rate and Rhythm: Normal rate.      Heart sounds: Normal heart sounds.   Pulmonary:      Breath sounds: Normal breath sounds.   Abdominal:      Palpations: Abdomen is soft.      Tenderness: There is no abdominal tenderness.   Genitourinary:     Comments: Condom catheter  Musculoskeletal:      Comments: Debility   Skin:     Comments: Staples and dressing noted to anterior chest and left shoulder   Neurological:      Mental Status: He is alert and oriented to person, place, and time.            Significant Labs: All pertinent labs within  "the past 24 hours have been reviewed.        Assessment/Plan:      * Osteomyelitis of multiple sites  Osteomyelitis of right second toe s/p amputation 7/1    Chest wall abscess  Loculated pleural effusion  Mediastinal lymphadenopathy    Pyogenic arthritis of left shoulder region  Humerus fracture    OSH:  -status post left thoracentesis 1500cc serosanguineous fluid drained 7/4 & I&D drainage with chest tube placement 7/4 & loculated effusions right chest s/p VATS and chest tube 7/5   -Left shoulder prosthetic joint infection s/p I&D and removal of deep hardware 6/21 - all screws and nails removed, s/p 2nd washout 6/24 & 3rd washout 6/27   -Left Shoulder washouts on 6/21 ( abundant pus in shoulder, hardware removal), 6/24 ( bloody brownish fluid) and 6/27 ( no fluid collection, healthy red and beefy tissue)   -Right 3rd toe osteomyelitis s/p Dalvance x 2 doses & 2nd distal phalanx s/p I&D at bedside, s/p 2nd toe amputation 7/1       - Presents as a transfer for thoracic surgery and Plastic surgery evaluation in setting of concern for "osteomyelitis of multiple ribs which will need further debridement and then a flap placed" in the setting of chest wall abscess  -notably, 7/3 blood cultures, 7/4 pleural fluid cultures, 7/4 abscess cultures, 755 pleural fluid cultures NGTD  -6/18 right toe wound culture with MRSA  Plan:  -ID: Continue Ceftaroline 600 mg IV q.8; will need course of 6 weeks (EOT 8/11)   -Discussed alternative antibiotic with ID due to difficult placment due to abx cost, however unable to replace  -Podiatry consulted to address toe wound/possible suture removal  -Thoracic surgery removed right chest tube on 07/09; left chest accordion drain removed 7/14   -Remove staples at follow up appt  -ongoing wound care  -ID, Ortho, Thoracic surgery and Plastic surgery consulted and following; imaging findings per hospital course  -ortho surgery completed left shoulder aspiration 7/9 to assess shoulder abscess " concern, minimal fluid aspirated, cont antibiotics for pyogenic arthritis  -thus far, no acute surgical intervention per all surgical specialties      Continue antibiotics per ID recommendations.  Follow up with  re disposition planning    Primary disposition at this time rehab, for peer to peer on Monday.  SNF referral was also sent by .  Continue management    Bradycardia  -Bradycardic episode noted during PT eval on , asymptomatic  -Rhythm: atrial fibrillation  -Hold tonight's metoprolol      CKD (chronic kidney disease) stage 2, GFR 60-89 ml/min  Creatine stable for now. BMP reviewed- noted Estimated Creatinine Clearance: 94.3 mL/min (based on SCr of 1 mg/dL). according to latest data. Based on current GFR, CKD stage is stage 2 - GFR 60-89.  Monitor UOP and serial BMP and adjust therapy as needed. Renally dose meds. Avoid nephrotoxic medications and procedures.    Acute hypoxemic respiratory failure  -Ddx: decreased reserve from VATS vs volume overload (on fluids since )  -CXR noting blunting of CP angles  -: Lasix 40mg IV x1, good response. Additional dose on     Thyroid nodule  Incidental finding on imagin.7 cm left thyroid nodule. Nonemergent thyroid ultrasound is recommended.       Left renal stone  Nephrolithiasis     retroperitoneal ultrasound completed in setting of MARBIN without hydronephrosis    CT abdomen notable for 1.2 cm obstructing stone in left distal ureter with mild hydroureteronephrosis       Appreciate urology recommendations; given no concerns regarding MARBIN or infection relation to the stone, no acute surgical intervention  Follow-up outpatient; strain all urine    Cyst of right kidney  Incidental finding on imagin.8 cm septated cystic lesion in the right kidney, which is indeterminate for malignancy. Further evaluation with nonemergent renal mass protocol CT or MRI is recommended.       Sepsis  This patient does have evidence of  "infective focus  My overall impression is sepsis.  Source: Skin and Soft Tissue (location toe)  Antibiotics given-   Antibiotics (72h ago, onward)      Start     Stop Route Frequency Ordered    07/07/24 1545  ceftaroline fosamiL (TEFLARO) 600 mg in D5W 50 mL IVPB (MB+)         -- IV Every 8 hours (non-standard times) 07/07/24 1532    07/07/24 1545  meropenem (MERREM) 1 g in 0.9% NaCl 100 mL IVPB (MB+)         -- IV Every 8 hours (non-standard times) 07/07/24 1532          Latest lactate reviewed-  No results for input(s): "LACTATE", "POCLAC" in the last 72 hours.  Organ dysfunction indicated by Acute kidney injury    Fluid challenge Not needed - patient is not hypotensive      Post- resuscitation assessment No - Post resuscitation assessment not needed       Will Not start Pressors- Levophed for MAP of 65  Source control achieved by: abx    Debility  Patient with Acute on chronic debility due to  multiple infections . Latest AMPAC and GEMS scores have not been reviewed. Evaluation for etiology is complete. Plan includes progressive mobility protocol initated, PT/OT consulted, and fall precautions in place.    Acute renal failure superimposed on chronic kidney disease  Patient with acute kidney injury/acute renal failure likely due to  due to ATN due to sepsis due to UTI and/or PNA  MARBIN is currently improving. Baseline creatinine  wnl  - Labs reviewed- Renal function/electrolytes with Estimated Creatinine Clearance: 104.7 mL/min (based on SCr of 0.9 mg/dL). according to latest data. Monitor urine output and serial BMP and adjust therapy as needed. Avoid nephrotoxins and renally dose meds for GFR listed above.    Atrial fibrillation with rapid ventricular response  Patient with Long standing persistent (>12 months) atrial fibrillation which is controlled currently with Beta Blocker. Patient is currently in TBD.[id number] Score: 2.      Chronic AF w/ acute RVR at OSH, resolved s/p cardizem drip   NOAC has been on hold " d/t possible need for surgery - has been on prophylaxis dose of Lovenox  Resumed anticoagulation given no plan for further surgical intervention     History of Guillain-[place] syndrome  No acute issues  However, pt has impaired mobility and is acutely weakened from his sepsis/UTI/AF RVR and need placement       Type 2 diabetes mellitus  Patient's FSGs are controlled on current medication regimen.  Last A1c reviewed-   Lab Results   Component Value Date    HGBA1C 6.3 (H) 06/14/2024     Most recent fingerstick glucose reviewed-   Recent Labs   Lab 07/10/24  1607 07/10/24  2002   POCTGLUCOSE 181* 178*     Current correctional scale  Low  Maintain anti-hyperglycemic dose as follows-   Antihyperglycemics (From admission, onward)      Start     Stop Route Frequency Ordered    07/08/24 0900  insulin glargine U-100 (Lantus) pen 15 Units         -- SubQ Daily 07/07/24 1532    07/07/24 1529  insulin aspart U-100 pen 0-10 Units         -- SubQ Before meals & nightly PRN 07/07/24 1532          Hold Oral hypoglycemics while patient is in the hospital.    Hypertension  Chronic, controlled. Latest blood pressure and vitals reviewed-     Temp:  [97.8 °F (36.6 °C)-98.8 °F (37.1 °C)]   Pulse:  [65-91]   Resp:  [2-24]   BP: (100-145)/(56-81)   SpO2:  [90 %-99 %] .   Home meds for hypertension were reviewed and noted below.   Hypertension Medications               hydrALAZINE (APRESOLINE) 25 MG tablet Take 1 tablet (25 mg total) by mouth every 12 (twelve) hours.    metoprolol succinate (TOPROL-XL) 100 MG 24 hr tablet Take 1 tablet (100 mg total) by mouth once daily.            While in the hospital, will manage blood pressure as follows; continue home metoprolol    Will utilize p.r.n. blood pressure medication only if patient's blood pressure greater than 180/110 and he develops symptoms such as worsening chest pain or shortness of breath.    MARA (obstructive sleep apnea)  Continue CPAP HS and w/ naps        Morbid obesity  There is no  height or weight on file to calculate BMI. Morbid obesity complicates all aspects of disease management from diagnostic modalities to treatment. Weight loss encouraged and health benefits explained to patient.           VTE Risk Mitigation (From admission, onward)           Ordered     apixaban tablet 5 mg  2 times daily         07/17/24 1245     Place sequential compression device  Until discontinued         07/07/24 1532                    Discharge Planning   KAMILA: 7/19/2024     Code Status: Full Code   Is the patient medically ready for discharge?:     Reason for patient still in hospital (select all that apply): Patient trending condition  Discharge Plan A: Home with family                  Milind Londono MD  Department of Hospital Medicine   [facility]

## 2024-07-19 NOTE — ASSESSMENT & PLAN NOTE
"Osteomyelitis of right second toe s/p amputation 7/1    Chest wall abscess  Loculated pleural effusion  Mediastinal lymphadenopathy    Pyogenic arthritis of left shoulder region  Humerus fracture    OSH:  -status post left thoracentesis 1500cc serosanguineous fluid drained 7/4 & I&D drainage with chest tube placement 7/4 & loculated effusions right chest s/p VATS and chest tube 7/5   -Left shoulder prosthetic joint infection s/p I&D and removal of deep hardware 6/21 - all screws and nails removed, s/p 2nd washout 6/24 & 3rd washout 6/27   -Left Shoulder washouts on 6/21 ( abundant pus in shoulder, hardware removal), 6/24 ( bloody brownish fluid) and 6/27 ( no fluid collection, healthy red and beefy tissue)   -Right 3rd toe osteomyelitis s/p Dalvance x 2 doses & 2nd distal phalanx s/p I&D at bedside, s/p 2nd toe amputation 7/1       - Presents as a transfer for thoracic surgery and Plastic surgery evaluation in setting of concern for "osteomyelitis of multiple ribs which will need further debridement and then a flap placed" in the setting of chest wall abscess  -notably, 7/3 blood cultures, 7/4 pleural fluid cultures, 7/4 abscess cultures, 755 pleural fluid cultures NGTD  -6/18 right toe wound culture with MRSA  Plan:  -ID: Continue Ceftaroline 600 mg IV q.8; will need course of 6 weeks (EOT 8/11)   -Discussed alternative antibiotic with ID due to difficult placment due to abx cost, however unable to replace  -Podiatry consulted to address toe wound/possible suture removal  -Thoracic surgery removed right chest tube on 07/09; left chest accordion drain removed 7/14   -Remove staples at follow up appt  -ongoing wound care  -ID, Ortho, Thoracic surgery and Plastic surgery consulted and following; imaging findings per hospital course  -ortho surgery completed left shoulder aspiration 7/9 to assess shoulder abscess concern, minimal fluid aspirated, cont antibiotics for pyogenic arthritis  -thus far, no acute surgical " "Patient was seen, course reviewed with the nursing staff and orthopedics and shelia with neurology staff.    Patient overall feels well.  Left arm pain is controlled.  She continues to note numbness involving both legs from the knees down to her feet in a circumferential pattern.  Is gradually getting better but she still has difficulty walking, feeling like \"I am working on marshmallows \"she denies back or thigh pain.    She denies bowel or bladder difficulties.    Vital signs stable  Alert, pleasant, fully oriented.  Lungs clear  CV regular rate and rhythm.  No murmurs  Abdomen soft no lower extremity edema  Decreased sensation in a circumferential pattern from both knees to both feet with left leg feeling more numb than right.  Strength in the legs is excellent.    Results for KORIN HOOKS (MRN [id number]) as of 9/27/2019 10:25   Ref. Range 9/27/2019 06:19   Sodium Latest Ref Range: 133 - 144 mmol/L 139   Potassium Latest Ref Range: 3.4 - 5.3 mmol/L 4.4   Chloride Latest Ref Range: 94 - 109 mmol/L 109   Carbon Dioxide Latest Ref Range: 20 - 32 mmol/L 26   Urea Nitrogen Latest Ref Range: 7 - 30 mg/dL 25   Creatinine Latest Ref Range: 0.52 - 1.04 mg/dL 0.78   GFR Estimate Latest Ref Range: >60 mL/min/1.73_m2 78   GFR Estimate If Black Latest Ref Range: >60 mL/min/1.73_m2 >90   Calcium Latest Ref Range: 8.5 - 10.1 mg/dL 7.5 (L)   Anion Gap Latest Ref Range: 3 - 14 mmol/L 4   Magnesium Latest Ref Range: 1.6 - 2.3 mg/dL 2.3   Glucose Latest Ref Range: 70 - 99 mg/dL 99   Hemoglobin Latest Ref Range: 11.7 - 15.7 g/dL 8.7 (L)     Intra-Op cultures negative thus far    Assessment    Status post resection infected left total shoulder arthroplasty with spacer placement..   She is receiving Rocephin per ID  Pain control is good.    Acute blood loss anemia, stable following transfusion yesterday.     Asthma stable.     Hypertension, stable on amlodipine and metoprolol     Persistent though improving bilateral lower " extremity numbness below both knees.  Etiology not clear though the distribution of her symptoms would suggest a proximal i.e. spine process.  Unclear if this could be related to positioning during surgery, perhaps superimposed upon pre-existing, asymptomatic spine disease.      Plan  Antibiotics per ID.  Continue therapies.  MR I of the lumbar spine  Postvoid bladder scan.         intervention per all surgical specialties    7/18  Continue antibiotics per ID recommendations.  Follow up with  re disposition planning  7/19  Primary disposition at this time rehab, for peer to peer on Monday.  SNF referral was also sent by .  Continue management

## 2024-07-19 NOTE — SUBJECTIVE & OBJECTIVE
Scheduled Meds:   (Magic mouthwash) 1:1:1 diphenhydrAMINE(Benadryl) 12.5mg/5ml liq, aluminum & magnesium hydroxide-simethicone (Maalox), LIDOcaine viscous 2%  10 mL Swish & Spit QID    apixaban  5 mg Oral BID    ceftaroline (Teflaro) IV (PEDS and ADULTS)  600 mg Intravenous Q8H    ferrous sulfate  1 tablet Oral Daily    insulin glargine U-100  15 Units Subcutaneous Daily    LIDOcaine  1 patch Transdermal Q24H    mupirocin   Nasal BID    polyethylene glycol  17 g Oral Daily    sodium chloride 0.9%  10 mL Intravenous Q6H     Continuous Infusions:  PRN Meds:  Current Facility-Administered Medications:     albuterol-ipratropium, 3 mL, Nebulization, Q4H PRN    aluminum & magnesium hydroxide-simethicone, 15 mL, Oral, QID PRN    dextrose 10%, 12.5 g, Intravenous, PRN    dextrose 10%, 25 g, Intravenous, PRN    glucagon (human recombinant), 1 mg, Intramuscular, PRN    glucose, 16 g, Oral, PRN    glucose, 24 g, Oral, PRN    HYDROcodone-acetaminophen, 1 tablet, Oral, Q4H PRN    HYDROcodone-acetaminophen, 1 tablet, Oral, Q4H PRN    insulin aspart U-100, 0-10 Units, Subcutaneous, QID (AC + HS) PRN    loperamide, 2 mg, Oral, QID PRN    melatonin, 6 mg, Oral, Nightly PRN    naloxone, 0.02 mg, Intravenous, PRN    ondansetron, 8 mg, Oral, Q8H PRN    polyethylene glycol, 17 g, Oral, BID PRN    promethazine, 25 mg, Oral, Q6H PRN    senna-docusate 8.6-50 mg, 1 tablet, Oral, BID PRN    simethicone, 1 tablet, Oral, QID PRN    sodium chloride 0.9%, 10 mL, Intravenous, Q12H PRN    Flushing PICC/Midline Protocol, , , Until Discontinued **AND** sodium chloride 0.9%, 10 mL, Intravenous, Q6H **AND** sodium chloride 0.9%, 10 mL, Intravenous, PRN    traMADoL, 50 mg, Oral, Q4H PRN    Review of patient's allergies indicates:  No Known Allergies     Past Medical History:   Diagnosis Date    A-fib 2024    Diabetes mellitus, type 2 2021    [place] 10/2003    Hyperlipidemia     Hypertension 2016    Sleep apnea      Past Surgical History:    Procedure Laterality Date    ARTHROTOMY OF SHOULDER Left 6/21/2024    Procedure: ARTHROTOMY, SHOULDER;  Surgeon: Roman Paulson MD;  Location: [facility] OR;  Service: Orthopedics;  Laterality: Left;    INCISION AND DRAINAGE OF ABSCESS N/A 7/4/2024    Procedure: INCISION AND DRAINAGE, ABSCESS;  Surgeon: Gus Escobedo MD;  Location: [facility];  Service: General;  Laterality: N/A;  Abcess of anterior chest wall    IRRIGATION AND DEBRIDEMENT OF UPPER EXTREMITY Left 6/24/2024    Procedure: IRRIGATION AND DEBRIDEMENT, UPPER EXTREMITY;  Surgeon: Nathan Cooper MD;  Location: [facility] OR;  Service: Orthopedics;  Laterality: Left;    IRRIGATION AND DEBRIDEMENT OF UPPER EXTREMITY Left 6/27/2024    Procedure: IRRIGATION AND DEBRIDEMENT, UPPER EXTREMITY;  Surgeon: Nathan Cooper MD;  Location: [facility] OR;  Service: Orthopedics;  Laterality: Left;    OPEN REDUCTION AND INTERNAL FIXATION (ORIF) OF FRACTURE OF PROXIMAL HUMERUS Left 3/25/2024    Procedure: ORIF, FRACTURE, HUMERUS, PROXIMAL/IM HANNA, LEFT;  Surgeon: Nathan Cooper MD;  Location: [facility];  Service: Orthopedics;  Laterality: Left;  Accumed, Synthes Small Frag set Avelino verified 3/22/24 ark    THORACOSCOPIC DECORTICATION OF LUNG Right 7/5/2024    Procedure: VATS, WITH DECORTICATION, LUNG;  Surgeon: Gus Escobedo MD;  Location: [facility];  Service: Cardiothoracic;  Laterality: Right;    TOE AMPUTATION Right 7/1/2024    Procedure: AMPUTATION, TOE;  Surgeon: Stevie Zhu DPM;  Location: [facility];  Service: Podiatry;  Laterality: Right;       Family History    None       Tobacco Use    Smoking status: Never    Smokeless tobacco: Never   Substance and Sexual Activity    Alcohol use: Yes     Alcohol/week: 0.0 standard drinks of alcohol     Comment: social    Drug use: No    Sexual activity: Yes     Review of Systems   Skin:  Positive for wound.       Objective:     Vital Signs (Most Recent):  Temp: 99 °F (37.2 °C) (07/19/24 0420)  Pulse: 76 (07/19/24 0420)  Resp: 18  (07/19/24 0420)  BP: (!) 110/56 (07/19/24 0420)  SpO2: 97 % (07/19/24 0420) Vital Signs (24h Range):  Temp:  [97.7 °F (36.5 °C)-99 °F (37.2 °C)] 99 °F (37.2 °C)  Pulse:  [68-92] 76  Resp:  [18-19] 18  SpO2:  [92 %-97 %] 97 %  BP: (109-131)/(56-64) 110/56     Weight: 133 kg (293 lb 3.4 oz)  Body mass index is 39.77 kg/m².     Physical Exam  Constitutional:       Appearance: Normal appearance.   Skin:     General: Skin is warm and dry.      Findings: Lesion present.   Neurological:      Mental Status: He is alert.          Laboratory:  All pertinent labs reviewed within the last 24 hours.    Diagnostic Results:  None

## 2024-07-19 NOTE — PLAN OF CARE
07/19/24 1433   Post-Acute Status   Post-Acute Authorization Placement   Post-Acute Placement Status Referrals Sent   Hospital Resources/Appts/Education Provided Provided patient/caregiver with written discharge plan information   Discharge Plan   Discharge Plan A Rehab   Discharge Plan B Skilled Nursing Facility       Met with patient to review discharge recommendation of SNF and is agreeable to plan    Patient/family provided list of facilities in-network with patient's payor plan. Providers that are owned, operated, or affiliated with [facility] are included on the list.     Notified that referral sent to below listed facilities from in-network list based on proximity to home/family support:     [facility] [place] Phone: [phone]    1 negative within 72 hours  * COVID-19: NOT able to accept COVID-19 positive patients [address] [facility] [place] [address] -  7/19/2024 16:32 (CT)  -  -  -  -       [facility] Phone: [phone]      * COVID-19: NOT able to accept COVID-19 positive patients [address] [place] [address] -  7/19/2024 16:32 (CT)  -  -  -  -       [facility] [place] Phone: [phone]    negative covid within 24 hours of discharge  * COVID-19: NOT able to accept COVID-19 positive patients,* COVID-19: Positive patients under care [address] [place] [address] [place] [address] -  7/19/2024 16:32 (CT)  -  -  -  -       [facility] Phone: [phone]    2 covid neg results, 1 72 hrs prior to actual dc  * COVID-19: Services not specified,* High-Risk Isolation Patients: Willing/Equipped to accept High-Risk Isolation Patients [address] [place] [address] -  7/19/2024 16:32 (CT)  -  -  -  -       [facility] Phone: [phone]    5  * COVID-19: NOT able to accept COVID-19 positive patients,* High-Risk Isolation Patients: Willing/Equipped to accept High-Risk Isolation Patients [address] [place] [address]  [place] [address] -  7/19/2024 16:32 (CT)  -  -  -  -       [facility] Phone: [phone]    Covid SS preferred. Testing preferred but not required.  * COVID-19: Willing/Equipped to accept COVID-19 positive patients,* High-Risk Isolation Patients: Willing/Equipped to accept High-Risk Isolation Patients [address] [place] [address] -  7/19/2024 16:32 (CT)  -  -  -  -       [facility] Phone: [phone]      * COVID-19: NOT able to accept COVID-19 positive patients [address] [place] [address] -  7/19/2024 16:32 (CT)  -  -  -  -       [facility] Phone: [phone]      * COVID-19: NOT able to accept COVID-19 positive patients [address] [place] [address] -  7/19/2024 16:32 (CT)  -  -  -  -         Patient/family instructed to identify preference.    Preferred Facility: (if more than 1, listed in order of descending preference)  [facility]     If an additional preferred facility not listed above is identified, additional referral to be sent. If above facilities unable to accept, will send additional referrals to in-network providers.

## 2024-07-19 NOTE — PT/OT/SLP PROGRESS
Physical Therapy Co-Treatment  Co-treatment performed due to patient's multiple deficits requiring two skilled therapists to appropriately and safely assess patient's strength and endurance while facilitating functional tasks in addition to accommodating for patient's activity tolerance.     Patient Name:  Roosevelt Moser   MRN:  [id number]    Recommendations:     Discharge Recommendations: Moderate Intensity Therapy  Discharge Equipment Recommendations: lift device, hospital bed, wheelchair, bedside commode  Barriers to discharge: Inaccessible home and Decreased caregiver support    Assessment:     Roosevelt Moser is a 73 y.o. male admitted with a medical diagnosis of Osteomyelitis of multiple sites.  He presents with the following impairments/functional limitations: weakness, impaired endurance, impaired self care skills, impaired functional mobility, gait instability, impaired balance, decreased coordination, decreased upper extremity function, decreased lower extremity function, decreased safety awareness, pain, orthopedic precautions, impaired fine motor, impaired coordination .Pt presented with self limiting behavior, pt pt adamantly declined OOB mobility  despite max  encouragement politely.pt only agreeable to therex in bed and bed mobility.  Patient remains appropriate for continued skilled services within the acute environment and goals remain appropriate.     Rehab Prognosis: Good; patient would benefit from acute skilled PT services to address these deficits and reach maximum level of function.    Recent Surgery: * No surgery found *      Plan:     During this hospitalization, patient to be seen 4 x/week to address the identified rehab impairments via gait training, therapeutic activities, therapeutic exercises, neuromuscular re-education and progress toward the following goals:    Plan of Care Expires:  08/08/24    Subjective     Chief Complaint: I can't get up I am so weak    Pain/Comfort:  Pain Rating 1:  (not  rated)  Location - Side 1: Left  Location - Orientation 1: generalized  Location 1: shoulder  Pain Addressed 1: Reposition, Distraction      Objective:     Communicated with RN prior to session.  Patient found HOB elevated with  (PICC line;  telemetry) upon PT entry to room.     General Precautions: Standard, fall  Orthopedic Precautions:  (LUE partial weight bearing; RLE weight bearing as tolerated)  Braces: N/A  Respiratory Status: Nasal cannula, flow 3 L/min     Functional Mobility:  Bed Mobility:  to change soiled bed pads  Rolling Left:  maximal assistance and of 2 persons  Rolling Right: total assistance and of 2 persons  Scooting: total assistance and of 2 persons to HOB      AM-PAC 6 CLICK MOBILITY  Turning over in bed (including adjusting bedclothes, sheets and blankets)?: 2  Sitting down on and standing up from a chair with arms (e.g., wheelchair, bedside commode, etc.): 1  Moving from lying on back to sitting on the side of the bed?: 2  Moving to and from a bed to a chair (including a wheelchair)?: 1  Need to walk in hospital room?: 1  Climbing 3-5 steps with a railing?: 1  Basic Mobility Total Score: 8       Treatment & Education:  Therapist provided instruction and educated of  patient on progress, safety,d/c,PT POC,   proper body mechanics, energy conservation, and fall prevention strategies during tasks listed above, on the effects of prolonged immobility and the importance of performing OOB activity and exercises to promote healing and reduce recovery time     Patient  facilitated therex  B SIA A/SAMMY AP, HS,, Hip Flexion, Hip Abd/Add. Patient required skilled PTA for instruction of exercises and appropriate cues to perform exercises safely, sequencing and appropriately.   Exercises performed to develop and maintain pt's strength, endurance and flexibility.  Updated white board with appropriate PT mobility information for medical team notification   Call nursing/pct to transfer to chair/use bathroom.  Pt stated understanding  Bedside table in front of patient and area set up for function, convenience, and safety. RN aware of patient's mobility needs and status. Questions/concerns addressed within PTA scope of practice, patient with no further questions. Time was provided for active listening, discussion of health disposition, and discussion of safe discharge. Pt?verbalized?agreement .    Patient left HOB elevated with all lines intact, call button in reach, and nsg notified..    GOALS:   Multidisciplinary Problems       Physical Therapy Goals          Problem: Physical Therapy    Goal Priority Disciplines Outcome Goal Variances Interventions   Physical Therapy Goal     PT, PT/OT Progressing     Description: Goals to be met by: 24     Patient will increase functional independence with mobility by performin. Supine to sit with Moderate Assistance  2. Sit to supine with Moderate Assistance  3. Rolling to Left and Right with Minimal Assistance.  4. Sit to stand transfer with Moderate Assistance  5. Bed to chair transfer with Maximum Assistance using LRAD  6. Gait  x 10 feet with Maximum Assistance using LRAD.   7. Lower extremity exercise program x10 reps per handout, with independence    DME Justifications (see above for complete DME recommendations)    Hospital Bed ·Patient requires a hospital bed for positioning of the body in ways that are not feasible with an ordinary bed. The patient requires special positioning for pain relief, limited mobility, and/or being unable to independently make changes in body position without the use of a hospital bed. Pillows and wedges will not be adequate for resolving these positional issues.    Patient has a mobility limitation that significantly impairs their ability to participate in one or more mobility related activities of daily living in customary locations in the home. The mobility limitation cannot be sufficiently resolved by the use of a cane or walker. The  use of a manual wheelchair will greatly improve the patient's ability to participate in MRADLs. The patient will use the wheelchair on a regular basis at home. They have expressed their willingness to use a manual wheelchair in the home, and have a caregiver who is available and willing to assist with the wheelchair if needed.                        Time Tracking:     PT Received On: 07/19/24  PT Start Time: 1117     PT Stop Time: 1144  PT Total Time (min): 27 min     Billable Minutes: Therapeutic Exercise 27    Treatment Type: Treatment  PT/PTA: PTA     Number of PTA visits since last PT visit: 1 07/19/2024

## 2024-07-19 NOTE — SUBJECTIVE & OBJECTIVE
Interval History:  No complaints or acute issues.  Peer to peer for Monday    Review of Systems   Unable to perform ROS: Other     Objective:     Vital Signs (Most Recent):  Temp: 98.1 °F (36.7 °C) (07/19/24 0742)  Pulse: 77 (07/19/24 1508)  Resp: 20 (07/19/24 0742)  BP: 105/61 (07/19/24 1206)  SpO2: 96 % (07/19/24 1206) Vital Signs (24h Range):  Temp:  [98.1 °F (36.7 °C)-99 °F (37.2 °C)] 98.1 °F (36.7 °C)  Pulse:  [76-98] 77  Resp:  [18-20] 20  SpO2:  [94 %-97 %] 96 %  BP: (101-131)/(56-61) 105/61     Weight: 133 kg (293 lb 3.4 oz)  Body mass index is 39.77 kg/m².    Intake/Output Summary (Last 24 hours) at 7/19/2024 1523  Last data filed at 7/19/2024 1439  Gross per 24 hour   Intake 360 ml   Output 2275 ml   Net -1915 ml         Physical Exam  Constitutional:       General: He is not in acute distress.     Appearance: He is obese.   HENT:      Head: Normocephalic.      Right Ear: External ear normal.      Left Ear: External ear normal.      Nose: Nose normal.   Cardiovascular:      Rate and Rhythm: Normal rate.      Heart sounds: Normal heart sounds.   Pulmonary:      Breath sounds: Normal breath sounds.   Abdominal:      Palpations: Abdomen is soft.      Tenderness: There is no abdominal tenderness.   Genitourinary:     Comments: Condom catheter  Musculoskeletal:      Comments: Debility   Skin:     Comments: Staples and dressing noted to anterior chest and left shoulder   Neurological:      Mental Status: He is alert and oriented to person, place, and time.            Significant Labs: All pertinent labs within the past 24 hours have been reviewed.

## 2024-07-19 NOTE — CONSULTS
"[facility]  Skin Integrity MARCIN  Consult Note    Patient Name: Roosevelt Moser  MRN: [id number]  Admission Date: 7/7/2024  Hospital Length of Stay: 12 days  Attending Physician: Milind Londono MD  Primary Care Provider: Miriam, Primary Doctor     Inpatient consult to Skin Integrity  Practitioner  Consult performed by: So Singh, NP  Consult ordered by: Roopa Rowan MD        Subjective:     History of Present Illness:  Roosevelt Moser is a 73 year old male w/ A fib, HTN, T2DM, MARA, HLD, hx GBS, hx displaced comminuted fracture of the left humerus s/p fall 3/4/24 s/p ORIF 03/25/2024 who presents as a transfer for thoracic surgery and Plastic surgery evaluation in setting of "osteomyelitis of multiple ribs which will need further debridement and then a flap placed."     He was initially admitted to [facility] on 6/14 for progressive weakness, multiple falls, and decreased urine output.  He was admitted for management of MARBIN on CKD complicated by hyperkalemia, urosepsis, AFib with RVR.     "Creatinine improved during his stay. During his stay he was noted to have swelling of his left upper extremity. Imaging studies on June 17 showed concern for gas-forming infection along the left shoulder and air along the lateral left chest wall concerning for gas-forming organism. MRI of his right foot also showed evidence of toe osteomyelitis. He was seen by ID who adjusted antibiotics, and they also noted oral thrush. Podiatry evaluated him for the toe findings, and Orthopedic Surgery evaluated the shoulder abnormalities. Right toe wound grew MRSA. Oral anticoagulation was held, and Radiology aspirated fluid from his left shoulder on June 20. On June 21 he had left shoulder incision and drainage and removal of deep hardware including rods and screws. He underwent repeat irrigation and debridement of the left shoulder on June 24 with placement of a wound VAC. He had subsequent incision and drainage with removal of " "the wound VAC on June 27. He had amputation of his right 2nd toe by Podiatry on July 1. On July 3 he was noted to have dyspnea and mild tachycardia. CT chest showed a large abscess of the anterior central and left chest wall and abdominal wall with partial destruction of 3 ribs. He was seen by Cardiothoracic surgery. On July 4 he had left thoracentesis with aspiration of fluid. He subsequently had incision and drainage of the left chest wall abscess on July 4, and a drainage catheter was left in place. On July 5 he underwent right VATS for a loculated right pleural effusion, and a chest tube was left in place. He was transitioned to the ICU post-operatively. With concern for osteomyelitis of the ribs, concern is that he will need transfer to a facility with Thoracic Surgery and potentially Plastic Surgery along with higher level of care. Transfer center spoke with Thoracic Surgery at [facility]. Requesting transfer to Hospital Medicine at [facility] for Plastic Surgery and Thoracic Surgery evaluation. Transfer center spoke with Plastic Surgery on July 5, Thoracic Surgery on July 7. With his complicated medical presentation, will plan transfer to Hospital Medicine at [facility] in step-down status for further treatment. "     Prior to transfer, "He is awake and alert. He has a PICC line in place along with the right chest tube and left chest wall drain in place. He has no evidence of respiratory distress and is hemodynamically stable. Referring provider felt patient was stable for step-down status at present. He is currently on ceftaroline and meropenem. He is in contact isolation. July 7: Sodium 137, potassium 4.5, chloride 104, CO2 26, BUN 33, creatinine 1.1, glucose 130, albumin 2.1, AST 25, ALT 17, white blood cells 11.58, hemoglobin 8.1, hematocrit 26.2, platelets 389, procalcitonin 0.575, CRP greater than 16  VS:  Temperature 97.5°, pulse 85, respirations 20, blood pressure 112/63, O2 sats 95% " ""     Patient in no acute distress upon arrival.  Wife at bedside.  Patient denies any acute complaints. Patient admitted to hospital medicine service for further management. Skin integrity MARCIN consulted for evaluation of skin injury.    Scheduled Meds:   (Magic mouthwash) 1:1:1 diphenhydrAMINE(Benadryl) 12.5mg/5ml liq, aluminum & magnesium hydroxide-simethicone (Maalox), LIDOcaine viscous 2%  10 mL Swish & Spit QID    apixaban  5 mg Oral BID    ceftaroline (Teflaro) IV (PEDS and ADULTS)  600 mg Intravenous Q8H    ferrous sulfate  1 tablet Oral Daily    insulin glargine U-100  15 Units Subcutaneous Daily    LIDOcaine  1 patch Transdermal Q24H    mupirocin   Nasal BID    polyethylene glycol  17 g Oral Daily    sodium chloride 0.9%  10 mL Intravenous Q6H     Continuous Infusions:  PRN Meds:  Current Facility-Administered Medications:     albuterol-ipratropium, 3 mL, Nebulization, Q4H PRN    aluminum & magnesium hydroxide-simethicone, 15 mL, Oral, QID PRN    dextrose 10%, 12.5 g, Intravenous, PRN    dextrose 10%, 25 g, Intravenous, PRN    glucagon (human recombinant), 1 mg, Intramuscular, PRN    glucose, 16 g, Oral, PRN    glucose, 24 g, Oral, PRN    HYDROcodone-acetaminophen, 1 tablet, Oral, Q4H PRN    HYDROcodone-acetaminophen, 1 tablet, Oral, Q4H PRN    insulin aspart U-100, 0-10 Units, Subcutaneous, QID (AC + HS) PRN    loperamide, 2 mg, Oral, QID PRN    melatonin, 6 mg, Oral, Nightly PRN    naloxone, 0.02 mg, Intravenous, PRN    ondansetron, 8 mg, Oral, Q8H PRN    polyethylene glycol, 17 g, Oral, BID PRN    promethazine, 25 mg, Oral, Q6H PRN    senna-docusate 8.6-50 mg, 1 tablet, Oral, BID PRN    simethicone, 1 tablet, Oral, QID PRN    sodium chloride 0.9%, 10 mL, Intravenous, Q12H PRN    Flushing PICC/Midline Protocol, , , Until Discontinued **AND** sodium chloride 0.9%, 10 mL, Intravenous, Q6H **AND** sodium chloride 0.9%, 10 mL, Intravenous, PRN    traMADoL, 50 mg, Oral, Q4H PRN    Review of patient's allergies " indicates:  No Known Allergies     Past Medical History:   Diagnosis Date    A-fib 2024    Diabetes mellitus, type 2 2021    Guillain-Montgomery 10/2003    Hyperlipidemia     Hypertension 2016    Sleep apnea      Past Surgical History:   Procedure Laterality Date    ARTHROTOMY OF SHOULDER Left 6/21/2024    Procedure: ARTHROTOMY, SHOULDER;  Surgeon: Roman Paulson MD;  Location: [facility];  Service: Orthopedics;  Laterality: Left;    INCISION AND DRAINAGE OF ABSCESS N/A 7/4/2024    Procedure: INCISION AND DRAINAGE, ABSCESS;  Surgeon: Gus Escobedo MD;  Location: [facility];  Service: General;  Laterality: N/A;  Abcess of anterior chest wall    IRRIGATION AND DEBRIDEMENT OF UPPER EXTREMITY Left 6/24/2024    Procedure: IRRIGATION AND DEBRIDEMENT, UPPER EXTREMITY;  Surgeon: Nathan Copoer MD;  Location: [facility] OR;  Service: Orthopedics;  Laterality: Left;    IRRIGATION AND DEBRIDEMENT OF UPPER EXTREMITY Left 6/27/2024    Procedure: IRRIGATION AND DEBRIDEMENT, UPPER EXTREMITY;  Surgeon: Nathan Cooper MD;  Location: [facility] OR;  Service: Orthopedics;  Laterality: Left;    OPEN REDUCTION AND INTERNAL FIXATION (ORIF) OF FRACTURE OF PROXIMAL HUMERUS Left 3/25/2024    Procedure: ORIF, FRACTURE, HUMERUS, PROXIMAL/IM HANNA, LEFT;  Surgeon: Nathan Cooper MD;  Location: [facility] OR;  Service: Orthopedics;  Laterality: Left;  Accumed, Synthes Small Frag set Avelino verified 3/22/24 ark    THORACOSCOPIC DECORTICATION OF LUNG Right 7/5/2024    Procedure: VATS, WITH DECORTICATION, LUNG;  Surgeon: Gus Escobedo MD;  Location: [facility];  Service: Cardiothoracic;  Laterality: Right;    TOE AMPUTATION Right 7/1/2024    Procedure: AMPUTATION, TOE;  Surgeon: Stevie Zhu DPM;  Location: [facility];  Service: Podiatry;  Laterality: Right;       Family History    None       Tobacco Use    Smoking status: Never    Smokeless tobacco: Never   Substance and Sexual Activity    Alcohol use: Yes     Alcohol/week: 0.0 standard drinks of alcohol      "Comment: social    Drug use: No    Sexual activity: Yes     Review of Systems   Skin:  Positive for wound.       Objective:     Vital Signs (Most Recent):  Temp: 99 °F (37.2 °C) (07/19/24 0420)  Pulse: 76 (07/19/24 0420)  Resp: 18 (07/19/24 0420)  BP: (!) 110/56 (07/19/24 0420)  SpO2: 97 % (07/19/24 0420) Vital Signs (24h Range):  Temp:  [97.7 °F (36.5 °C)-99 °F (37.2 °C)] 99 °F (37.2 °C)  Pulse:  [68-92] 76  Resp:  [18-19] 18  SpO2:  [92 %-97 %] 97 %  BP: (109-131)/(56-64) 110/56     Weight: 133 kg (293 lb 3.4 oz)  Body mass index is 39.77 kg/m².     Physical Exam  Constitutional:       Appearance: Normal appearance.   Skin:     General: Skin is warm and dry.      Findings: Lesion present.   Neurological:      Mental Status: He is alert.          Laboratory:  All pertinent labs reviewed within the last 24 hours.    Diagnostic Results:  None      Assessment/Plan:         MARCIN Skin Integrity Evaluation      Skin Integrity MARCIN evaluation of patient as part of the comprehensive skin care team.     He has been admitted for 10 days. Skin injury was noted on 6/25/24. POA yes.    Sacrum/buttocks        Derm  Irritant contact dermatitis due to fecal, urinary or dual incontinence  - consult received for evaluation of skin injury.  - pt presents as a transfer for thoracic surgery and Plastic surgery evaluation in setting of "osteomyelitis of multiple ribs which will need further debridement and then a flap placed."  - scattered areas of partial and full thickness tissue loss to buttocks and sacrum. Wounds present on admission. Likely related to moisture with a component of pressure also.  - continue Triad bid/prn.  - ruthie surface with waffle overlay in place.  - pillows for offloading/  - condom cath in place.  - turn q2h.  - nursing to maintain pressure injury prevention measures and continue wound care per orders.        Thank you for your consult. I will follow-up with patient. Please contact us if you have any " additional questions.      So Singh NP  Skin Integrity MARCIN  Andres Garciay - GISMELANIE

## 2024-07-19 NOTE — PLAN OF CARE
Andres Flores [facility]  Discharge Reassessment    Primary Care Provider: No, Primary Doctor    Expected Discharge Date: 7/19/2024    Reassessment (most recent)       Discharge Reassessment - 07/19/24 1130          Discharge Reassessment    Assessment Type Discharge Planning Reassessment     Did the patient's condition or plan change since previous assessment? No     Discharge Plan discussed with: Patient     Communicated KAMILA with patient/caregiver Yes     Discharge Plan A Rehab     Discharge Plan B Skilled Nursing Facility     Why the patient remains in the hospital Requires continued medical care                     Moser is being offered a predetermination p2p for Rehab due by 7/21 at 9am by calling Leeann at [phone] to schedule.     Notified Dr. Londono.     Placed SNF Referrals as back up plan.     [facility] next available day for peer to peer is Monday.

## 2024-07-19 NOTE — PLAN OF CARE
Problem: Adult Inpatient Plan of Care  Goal: Plan of Care Review  Outcome: Progressing  Goal: Patient-Specific Goal (Individualized)  Outcome: Progressing  Goal: Absence of Hospital-Acquired Illness or Injury  Outcome: Progressing  Goal: Optimal Comfort and Wellbeing  Outcome: Progressing  Goal: Readiness for Transition of Care  Outcome: Progressing     Problem: Diabetes Comorbidity  Goal: Blood Glucose Level Within Targeted Range  Outcome: Progressing     Problem: Sepsis/Septic Shock  Goal: Optimal Coping  Outcome: Progressing  Goal: Absence of Bleeding  Outcome: Progressing  Goal: Blood Glucose Level Within Targeted Range  Outcome: Progressing  Goal: Absence of Infection Signs and Symptoms  Outcome: Progressing  Goal: Optimal Nutrition Intake  Outcome: Progressing     Problem: Acute Kidney Injury/Impairment  Goal: Fluid and Electrolyte Balance  Outcome: Progressing  Goal: Improved Oral Intake  Outcome: Progressing  Goal: Effective Renal Function  Outcome: Progressing     Problem: Infection  Goal: Absence of Infection Signs and Symptoms  Outcome: Progressing     Problem: Wound  Goal: Optimal Coping  Outcome: Progressing  Goal: Optimal Functional Ability  Outcome: Progressing  Goal: Absence of Infection Signs and Symptoms  Outcome: Progressing  Goal: Improved Oral Intake  Outcome: Progressing  Goal: Optimal Pain Control and Function  Outcome: Progressing  Goal: Skin Health and Integrity  Outcome: Progressing  Goal: Optimal Wound Healing  Outcome: Progressing     Problem: Skin Injury Risk Increased  Goal: Skin Health and Integrity  Outcome: Progressing    [facility] Plan of Care Note    Dx:   Osteomyelitis [M86.9]    Shift Events: Pt turned Q2 hours as ordered, skin care administered as ordered per nursing staff.     Goals of Care: Maintain and promote skin integrity, support independence, prevent infection    Neuro: AOX4    Vital Signs: /60 (BP Location: Left arm, Patient Position: Lying)   Pulse 92   Temp 98.2  °F (36.8 °C) (Oral)   Resp 19   Ht 6' (1.829 m)   Wt 133 kg (293 lb 3.4 oz)   SpO2 96%   BMI 39.77 kg/m²     Respiratory: Room Air    Diet: Diet diabetic Cardiac (Low Na/Chol), Renal; 2000 Calorie  Dietary nutrition supplements Catarino - Any flavor,Dietary nutrition supplements Novasource Renal - Any flavor    Is patient tolerating current diet? Yes    GTTS: N/A    Urine Output/Bowel Movement:   I/O this shift:  In: -   Out: 200 [Urine:200]  Last Bowel Movement: 07/18/24      Drains/Tubes/Tube Feeds (include total output/shift):   I/O this shift:  In: -   Out: 200 [Urine:200]      Lines: Right arm Double Lumen Picc Line      Accuchecks:ACHS    Skin: Excoriation to coccyx area, barrier cream applied as needed.    Fall Risk Score: 45    Activity level? Q 2hr turns, Partial Weight bearing    Any scheduled procedures? No    Any safety concerns? Fall Precautions    Other: N/A

## 2024-07-19 NOTE — ASSESSMENT & PLAN NOTE
"- consult received for evaluation of skin injury.  - pt presents as a transfer for thoracic surgery and Plastic surgery evaluation in setting of "osteomyelitis of multiple ribs which will need further debridement and then a flap placed."  - scattered areas of partial and full thickness tissue loss to buttocks and sacrum. Wounds present on admission. Likely related to moisture with a component of pressure also.  - continue Triad bid/prn.  - ruthie surface with waffle overlay in place.  - pillows for offloading/  - condom cath in place.  - turn q2h.  - nursing to maintain pressure injury prevention measures and continue wound care per orders.  "

## 2024-07-19 NOTE — HPI
"Roosevelt Moser is a 73 year old male w/ A fib, HTN, T2DM, MARA, HLD, hx GBS, hx displaced comminuted fracture of the left humerus s/p fall 3/4/24 s/p ORIF 03/25/2024 who presents as a transfer for thoracic surgery and Plastic surgery evaluation in setting of "osteomyelitis of multiple ribs which will need further debridement and then a flap placed."     He was initially admitted to [facility] on 6/14 for progressive weakness, multiple falls, and decreased urine output.  He was admitted for management of MARBIN on CKD complicated by hyperkalemia, urosepsis, AFib with RVR.     "Creatinine improved during his stay. During his stay he was noted to have swelling of his left upper extremity. Imaging studies on June 17 showed concern for gas-forming infection along the left shoulder and air along the lateral left chest wall concerning for gas-forming organism. MRI of his right foot also showed evidence of toe osteomyelitis. He was seen by ID who adjusted antibiotics, and they also noted oral thrush. Podiatry evaluated him for the toe findings, and Orthopedic Surgery evaluated the shoulder abnormalities. Right toe wound grew MRSA. Oral anticoagulation was held, and Radiology aspirated fluid from his left shoulder on June 20. On June 21 he had left shoulder incision and drainage and removal of deep hardware including rods and screws. He underwent repeat irrigation and debridement of the left shoulder on June 24 with placement of a wound VAC. He had subsequent incision and drainage with removal of the wound VAC on June 27. He had amputation of his right 2nd toe by Podiatry on July 1. On July 3 he was noted to have dyspnea and mild tachycardia. CT chest showed a large abscess of the anterior central and left chest wall and abdominal wall with partial destruction of 3 ribs. He was seen by Cardiothoracic surgery. On July 4 he had left thoracentesis with aspiration of fluid. He subsequently had incision and drainage " "of the left chest wall abscess on July 4, and a drainage catheter was left in place. On July 5 he underwent right VATS for a loculated right pleural effusion, and a chest tube was left in place. He was transitioned to the ICU post-operatively. With concern for osteomyelitis of the ribs, concern is that he will need transfer to a facility with Thoracic Surgery and potentially Plastic Surgery along with higher level of care. Transfer center spoke with Thoracic Surgery at [facility]. Requesting transfer to Hospital Medicine at [facility] for Plastic Surgery and Thoracic Surgery evaluation. Transfer center spoke with Plastic Surgery on July 5, Thoracic Surgery on July 7. With his complicated medical presentation, will plan transfer to Hospital Medicine at [facility] in step-down status for further treatment. "     Prior to transfer, "He is awake and alert. He has a PICC line in place along with the right chest tube and left chest wall drain in place. He has no evidence of respiratory distress and is hemodynamically stable. Referring provider felt patient was stable for step-down status at present. He is currently on ceftaroline and meropenem. He is in contact isolation. July 7: Sodium 137, potassium 4.5, chloride 104, CO2 26, BUN 33, creatinine 1.1, glucose 130, albumin 2.1, AST 25, ALT 17, white blood cells 11.58, hemoglobin 8.1, hematocrit 26.2, platelets 389, procalcitonin 0.575, CRP greater than 16  VS:  Temperature 97.5°, pulse 85, respirations 20, blood pressure 112/63, O2 sats 95% "     Patient in no acute distress upon arrival.  Wife at bedside.  Patient denies any acute complaints. Patient admitted to hospital medicine service for further management. Skin integrity MARCIN consulted for evaluation of skin injury.  "

## 2024-07-20 PROBLEM — M86.00 ACUTE HEMATOGENOUS OSTEOMYELITIS: Status: ACTIVE | Noted: 2024-07-20

## 2024-07-20 LAB
POCT GLUCOSE: 118 MG/DL (ref 70–110)
POCT GLUCOSE: 161 MG/DL (ref 70–110)
POCT GLUCOSE: 174 MG/DL (ref 70–110)
POCT GLUCOSE: 184 MG/DL (ref 70–110)

## 2024-07-20 PROCEDURE — 25000003 PHARM REV CODE 250: Performed by: INTERNAL MEDICINE

## 2024-07-20 PROCEDURE — A4216 STERILE WATER/SALINE, 10 ML: HCPCS | Performed by: STUDENT IN AN ORGANIZED HEALTH CARE EDUCATION/TRAINING PROGRAM

## 2024-07-20 PROCEDURE — 99900035 HC TECH TIME PER 15 MIN (STAT)

## 2024-07-20 PROCEDURE — 27000221 HC OXYGEN, UP TO 24 HOURS

## 2024-07-20 PROCEDURE — 25000003 PHARM REV CODE 250: Performed by: STUDENT IN AN ORGANIZED HEALTH CARE EDUCATION/TRAINING PROGRAM

## 2024-07-20 PROCEDURE — 63600175 PHARM REV CODE 636 W HCPCS: Performed by: STUDENT IN AN ORGANIZED HEALTH CARE EDUCATION/TRAINING PROGRAM

## 2024-07-20 PROCEDURE — 94660 CPAP INITIATION&MGMT: CPT

## 2024-07-20 PROCEDURE — 20600001 HC STEP DOWN PRIVATE ROOM

## 2024-07-20 PROCEDURE — 27000207 HC ISOLATION

## 2024-07-20 RX ADMIN — DIPHENHYDRAMINE HYDROCHLORIDE 10 ML: 25 SOLUTION ORAL at 09:07

## 2024-07-20 RX ADMIN — DIPHENHYDRAMINE HYDROCHLORIDE 10 ML: 25 SOLUTION ORAL at 12:07

## 2024-07-20 RX ADMIN — APIXABAN 5 MG: 5 TABLET, FILM COATED ORAL at 09:07

## 2024-07-20 RX ADMIN — INSULIN GLARGINE 15 UNITS: 100 INJECTION, SOLUTION SUBCUTANEOUS at 09:07

## 2024-07-20 RX ADMIN — CEFTAROLINE FOSAMIL 600 MG: 600 POWDER, FOR SOLUTION INTRAVENOUS at 11:07

## 2024-07-20 RX ADMIN — Medication 6 MG: at 09:07

## 2024-07-20 RX ADMIN — CEFTAROLINE FOSAMIL 600 MG: 600 POWDER, FOR SOLUTION INTRAVENOUS at 03:07

## 2024-07-20 RX ADMIN — INSULIN ASPART 2 UNITS: 100 INJECTION, SOLUTION INTRAVENOUS; SUBCUTANEOUS at 02:07

## 2024-07-20 RX ADMIN — INSULIN ASPART 2 UNITS: 100 INJECTION, SOLUTION INTRAVENOUS; SUBCUTANEOUS at 06:07

## 2024-07-20 RX ADMIN — CEFTAROLINE FOSAMIL 600 MG: 600 POWDER, FOR SOLUTION INTRAVENOUS at 05:07

## 2024-07-20 RX ADMIN — Medication 10 ML: at 11:07

## 2024-07-20 RX ADMIN — DIPHENHYDRAMINE HYDROCHLORIDE 10 ML: 25 SOLUTION ORAL at 05:07

## 2024-07-20 RX ADMIN — Medication 10 ML: at 12:07

## 2024-07-20 RX ADMIN — FERROUS SULFATE TAB EC 325 MG (65 MG FE EQUIVALENT) 1 EACH: 325 (65 FE) TABLET DELAYED RESPONSE at 09:07

## 2024-07-20 RX ADMIN — LIDOCAINE 5% 1 PATCH: 700 PATCH TOPICAL at 05:07

## 2024-07-20 RX ADMIN — Medication 10 ML: at 07:07

## 2024-07-20 NOTE — PHYSICIAN QUERY
Due to the conflicting documentation please clarify the acute kidney injury diagnosis.     Ruled in

## 2024-07-20 NOTE — PLAN OF CARE
[facility] Plan of Care Note  Dx Osteomyelitis     Shift Events Patient encouraged out of bed to chair and assisted x 4 staff to sit on side of the bed and was able to stand  briefly and sat back onto the bed. Patient encouraged to continue ROM exercises daily. Wound care provided to buttocks and sacral area using recommended orders.     Goals of Care: Pain control, out of bed, ROM     Neuro: AAOx3    Vital Signs: intermittent tachycardia     Respiratory: Even and unlabored with no complaints of shortness of breath    Diet: Cardiac    Is patient tolerating current diet? No complaints of GI upset, nausea/vomiting, or diarrhea    Urine Output/Bowel Movement: Continent uses urinal with placement assistance. Incontinent of bowel.    Drains/Tubes/Tube Feeds (include total output/shift): N/A    Lines: Right upper arm double lumen PICC line    Accuchecks:AC/HS last blood sugar was 184 with sliding scale administered    Skin: Right food second toe amputation with sutures in place    Activity level? Maximum assist with transfers and bed mobility    Any scheduled procedures? MRI of the chest 7/31/24    Any safety concerns? Fall, aspiration

## 2024-07-20 NOTE — PLAN OF CARE
Problem: Adult Inpatient Plan of Care  Goal: Plan of Care Review  Outcome: Progressing  Goal: Patient-Specific Goal (Individualized)  Outcome: Progressing  Goal: Absence of Hospital-Acquired Illness or Injury  Outcome: Progressing  Goal: Optimal Comfort and Wellbeing  Outcome: Progressing  Goal: Readiness for Transition of Care  Outcome: Progressing     Problem: Diabetes Comorbidity  Goal: Blood Glucose Level Within Targeted Range  Outcome: Progressing     Problem: Sepsis/Septic Shock  Goal: Optimal Coping  Outcome: Progressing  Goal: Absence of Bleeding  Outcome: Progressing  Goal: Blood Glucose Level Within Targeted Range  Outcome: Progressing  Goal: Absence of Infection Signs and Symptoms  Outcome: Progressing  Goal: Optimal Nutrition Intake  Outcome: Progressing     Problem: Infection  Goal: Absence of Infection Signs and Symptoms  Outcome: Progressing     Problem: Wound  Goal: Optimal Coping  Outcome: Progressing  Goal: Optimal Functional Ability  Outcome: Progressing  Goal: Absence of Infection Signs and Symptoms  Outcome: Progressing  Goal: Improved Oral Intake  Outcome: Progressing  Goal: Optimal Pain Control and Function  Outcome: Progressing  Goal: Skin Health and Integrity  Outcome: Progressing  Goal: Optimal Wound Healing  Outcome: Progressing     Problem: Skin Injury Risk Increased  Goal: Skin Health and Integrity  Outcome: Progressing     Problem: Fall Injury Risk  Goal: Absence of Fall and Fall-Related Injury  Outcome: Progressing    [facility] Plan of Care Note    Dx:   Osteomyelitis [M86.9]    Shift Events: none    Goals of Care: Promote skin integrity, Encourage self care and independence    Neuro: AOX4    Vital Signs: BP (!) 100/55 (BP Location: Right arm, Patient Position: Lying)   Pulse 89   Temp 97.4 °F (36.3 °C) (Oral)   Resp 18   Ht 6' (1.829 m)   Wt 133 kg (293 lb 3.4 oz)   SpO2 98%   BMI 39.77 kg/m²     Respiratory: Room Air    Diet: Diet diabetic Cardiac (Low Na/Chol), Renal; 2000  Calorie  Dietary nutrition supplements Catarino - Any flavor,Dietary nutrition supplements Novasource Renal - Any flavor    Is patient tolerating current diet? Yes    GTTS: See MAR    Urine Output/Bowel Movement:   I/O this shift:  In: -   Out: 320 [Urine:320]  Last Bowel Movement: 07/19/24      Drains/Tubes/Tube Feeds (include total output/shift):   I/O this shift:  In: -   Out: 320 [Urine:320]      Lines: Double Lumen Picc      Accuchecks:No    Skin: Excoriation to coccyx    Fall Risk Score: See Flow sheet    Activity level? Partial weightbearing     Any scheduled procedures? No    Any safety concerns? Fall risk    Other: N/A

## 2024-07-20 NOTE — SUBJECTIVE & OBJECTIVE
Interval History:  No complaints or acute events    Review of Systems   All other systems reviewed and are negative.    Objective:     Vital Signs (Most Recent):  Temp: 97.9 °F (36.6 °C) (07/20/24 1221)  Pulse: 80 (07/20/24 1221)  Resp: 16 (07/20/24 1221)  BP: (!) 134/59 (07/20/24 1221)  SpO2: 99 % (07/20/24 1221) Vital Signs (24h Range):  Temp:  [96.7 °F (35.9 °C)-99 °F (37.2 °C)] 97.9 °F (36.6 °C)  Pulse:  [77-97] 80  Resp:  [16-20] 16  SpO2:  [96 %-99 %] 99 %  BP: (100-134)/(55-65) 134/59     Weight: 133 kg (293 lb 3.4 oz)  Body mass index is 39.77 kg/m².    Intake/Output Summary (Last 24 hours) at 7/20/2024 1334  Last data filed at 7/20/2024 0030  Gross per 24 hour   Intake 360 ml   Output 920 ml   Net -560 ml         Physical Exam  Constitutional:       General: He is not in acute distress.     Appearance: He is obese.   HENT:      Head: Normocephalic.      Right Ear: External ear normal.      Left Ear: External ear normal.      Nose: Nose normal.   Cardiovascular:      Rate and Rhythm: Normal rate.      Heart sounds: Normal heart sounds.   Pulmonary:      Breath sounds: Normal breath sounds.   Abdominal:      Palpations: Abdomen is soft.      Tenderness: There is no abdominal tenderness.   Genitourinary:     Comments: Condom catheter  Musculoskeletal:      Comments: Debility   Skin:     Comments: Staples and dressing noted to anterior chest and left shoulder   Neurological:      Mental Status: He is alert and oriented to person, place, and time.            Significant Labs: All pertinent labs within the past 24 hours have been reviewed.

## 2024-07-20 NOTE — PROGRESS NOTES
"[facility] Medicine  Progress Note    Patient Name: Roosevelt Moser  MRN: [id number]  Patient Class: IP- Inpatient   Admission Date: 7/7/2024  Length of Stay: 13 days  Attending Physician: Milind Londono MD  Primary Care Provider: Miriam, Primary Doctor        Subjective:     Principal Problem:Osteomyelitis of multiple sites        HPI:  Roosevelt Moser is a 74yo M w/ A fib, HTN, T2DM, MARA, HLD, hx GBS, hx displaced comminuted fracture of the left humerus s/p fall 3/4/24 s/p ORIF 03/25/2024 who presents as a transfer for thoracic surgery and Plastic surgery evaluation in setting of "osteomyelitis of multiple ribs which will need further debridement and then a flap placed."    He was initially admitted to [facility] on 6/14 for progressive weakness, multiple falls, and decreased urine output.  He was admitted for management of MARBIN on CKD complicated by hyperkalemia, urosepsis, AFib with RVR.    "Creatinine improved during his stay. During his stay he was noted to have swelling of his left upper extremity. Imaging studies on June 17 showed concern for gas-forming infection along the left shoulder and air along the lateral left chest wall concerning for gas-forming organism. MRI of his right foot also showed evidence of toe osteomyelitis. He was seen by ID who adjusted antibiotics, and they also noted oral thrush. Podiatry evaluated him for the toe findings, and Orthopedic Surgery evaluated the shoulder abnormalities. Right toe wound grew MRSA. Oral anticoagulation was held, and Radiology aspirated fluid from his left shoulder on June 20. On June 21 he had left shoulder incision and drainage and removal of deep hardware including rods and screws. He underwent repeat irrigation and debridement of the left shoulder on June 24 with placement of a wound VAC. He had subsequent incision and drainage with removal of the wound VAC on June 27. He had amputation of his right 2nd toe by Podiatry on July 1. On " "July 3 he was noted to have dyspnea and mild tachycardia. CT chest showed a large abscess of the anterior central and left chest wall and abdominal wall with partial destruction of 3 ribs. He was seen by Cardiothoracic surgery. On July 4 he had left thoracentesis with aspiration of fluid. He subsequently had incision and drainage of the left chest wall abscess on July 4, and a drainage catheter was left in place. On July 5 he underwent right VATS for a loculated right pleural effusion, and a chest tube was left in place. He was transitioned to the ICU post-operatively. With concern for osteomyelitis of the ribs, concern is that he will need transfer to a facility with Thoracic Surgery and potentially Plastic Surgery along with higher level of care. Transfer center spoke with Thoracic Surgery at [facility]. Requesting transfer to Hospital Medicine at [facility] for Plastic Surgery and Thoracic Surgery evaluation. Transfer center spoke with Plastic Surgery on July 5, Thoracic Surgery on July 7. With his complicated medical presentation, will plan transfer to Hospital Medicine at [facility] in step-down status for further treatment. "    Prior to transfer, "He is awake and alert. He has a PICC line in place along with the right chest tube and left chest wall drain in place. He has no evidence of respiratory distress and is hemodynamically stable. Referring provider felt patient was stable for step-down status at present. He is currently on ceftaroline and meropenem. He is in contact isolation. July 7: Sodium 137, potassium 4.5, chloride 104, CO2 26, BUN 33, creatinine 1.1, glucose 130, albumin 2.1, AST 25, ALT 17, white blood cells 11.58, hemoglobin 8.1, hematocrit 26.2, platelets 389, procalcitonin 0.575, CRP greater than 16  VS:  Temperature 97.5°, pulse 85, respirations 20, blood pressure 112/63, O2 sats 95% "    Patient in no acute distress upon arrival.  Wife at bedside.  Patient denies any acute " complaints.        Imaging history reviewed:  July 6: X-rays of the left shoulder had fracture through the surgical neck of the humerus with lateral distraction of the distal component and overlapping segments on the order of 2.5 cm.  Overall no appreciable change upon comparison with prior imaging.  -chest x-ray noted manifestations of mild congestive heart failure bordering on mild pulmonary edema.  Right-sided thoracostomy tube terminating within the right apex.  No definite pneumothorax.        July 5: Pleural fluid Gram stain with few WBCs and no organisms seen  -pH 7.339, pCO2 50.9, pO2 392     July 4:  CPK less than 10, serum   -AFB smear from left chest abscess had no AFB seen, left chest abscess aerobic and anaerobic cultures have no growth  -pleural fluid protein 3.2, white blood cells 1108 (11% segs, 74% lymphs), , pH 7.63, pleural fluid culture had no growth  -ultrasound-guided thoracentesis removed 1.4-1.5 L     July 3: Blood cultures with no growth to date  -CT chest showed large abscess of the anterior central and left chest wall and abdominal wall measuring 17 cm transversely.  This extends into the mediastinum and peritoneum with partial destruction of the anterior 6th, 7th, and 8th ribs.  Complete atelectasis of the left lower lobe with moderate left pleural effusion.  Mild atelectasis right lung base with a small-to-moderate right pleural effusion.     July 2: X-rays of the left shoulder noted displaced left proximal humerus fracture status post hardware removal without change in alignment.     June 24: ECHO with EF 55-60%.     June 20:  Interventional Radiology did percutaneous aspiration of the left shoulder fluid collection.  No drainage catheter was left in place.     June 18:  Right toe wound MRSA  -CT left shoulder had subacute left humerus fracture post internal fixation.  Incomplete bony bridging across the fracture site with persistent angulation.  Severe arthropathy of the  "glenohumeral joint with multiple intra-articular bodies.  Large left joint effusion and adjacent soft tissue focal fluid collections containing gas and concerning for gas-forming infection.  Moderate size left pleural effusion.  Left basilar airspace disease.  -bilateral lower extremity Doppler arterial ultrasound had atherosclerotic plaque and calcification bilaterally without severe stenosis or occlusion identified on either side.     June 17: X-rays of the left humerus/shoulder had findings concerning for gas-forming infection along the left shoulder.  -x-rays of the left ribs showed air along the lateral chest wall soft tissue and axilla concerning for gas-forming infection.  -MRI of the right foot had findings consistent with osteomyelitis involving the middle distal phalanges of the 3rd toe.  Cellulitis.     June 14: Renal ultrasound had no hydronephrosis.  Elevated resistive index right kidney suggesting intrinsic renal disease.    Overview/Hospital Course:  Pt admitted to  as a transfer for thoracic surgery and Plastic surgery evaluation in setting of "osteomyelitis of multiple ribs which will need further debridement and then a flap placed." Thoracic Surgery, Plastic Surgery, ID, Ortho, Wound care, PT/OT consulted upon admission. Imaging ordered.  CT chest abdomen with IV contrast without evidence of osteomyelitis in ribs adjacent to I&D site of left chest wall abscess.; incidental finding of 1.2 cm obstructing stone in left distal ureter with mild hydroureteronephrosis.  Urology was consulted and CT renal stone study was completed; no acute intervention given no concern for infection around stone or MARBIN; follow-up outpatient unless decompensates.  MRI shoulder without contrast left notable for septic arthritis of left glenohumeral joint with acute osteomyelitis of the glenoid and proximal humerus in addition to known displaced fracture of proximal humeral metaphysis in addition to several large soft " tissue abscesses.  Patient underwent left shoulder aspiration with Orthopedic surgery on 07/09, continuing antibiotics with no further intervention planned.  Right chest tube was removed by thoracic surgery on 07/09. Drain previously placed for L chest wall abscess removed when output became minimal (7/14). To continue on IV Ceftaroline 600 mg q8 for 6 weeks therapy (EOT 8/11). LTAC placement pending     Interval History:  No complaints or acute events    Review of Systems   All other systems reviewed and are negative.    Objective:     Vital Signs (Most Recent):  Temp: 97.9 °F (36.6 °C) (07/20/24 1221)  Pulse: 80 (07/20/24 1221)  Resp: 16 (07/20/24 1221)  BP: (!) 134/59 (07/20/24 1221)  SpO2: 99 % (07/20/24 1221) Vital Signs (24h Range):  Temp:  [96.7 °F (35.9 °C)-99 °F (37.2 °C)] 97.9 °F (36.6 °C)  Pulse:  [77-97] 80  Resp:  [16-20] 16  SpO2:  [96 %-99 %] 99 %  BP: (100-134)/(55-65) 134/59     Weight: 133 kg (293 lb 3.4 oz)  Body mass index is 39.77 kg/m².    Intake/Output Summary (Last 24 hours) at 7/20/2024 1334  Last data filed at 7/20/2024 0030  Gross per 24 hour   Intake 360 ml   Output 920 ml   Net -560 ml         Physical Exam  Constitutional:       General: He is not in acute distress.     Appearance: He is obese.   HENT:      Head: Normocephalic.      Right Ear: External ear normal.      Left Ear: External ear normal.      Nose: Nose normal.   Cardiovascular:      Rate and Rhythm: Normal rate.      Heart sounds: Normal heart sounds.   Pulmonary:      Breath sounds: Normal breath sounds.   Abdominal:      Palpations: Abdomen is soft.      Tenderness: There is no abdominal tenderness.   Genitourinary:     Comments: Condom catheter  Musculoskeletal:      Comments: Debility   Skin:     Comments: Staples and dressing noted to anterior chest and left shoulder   Neurological:      Mental Status: He is alert and oriented to person, place, and time.            Significant Labs: All pertinent labs within the past  "24 hours have been reviewed.      Assessment/Plan:      * Osteomyelitis of multiple sites  Osteomyelitis of right second toe s/p amputation 7/1    Chest wall abscess  Loculated pleural effusion  Mediastinal lymphadenopathy    Pyogenic arthritis of left shoulder region  Humerus fracture    OSH:  -status post left thoracentesis 1500cc serosanguineous fluid drained 7/4 & I&D drainage with chest tube placement 7/4 & loculated effusions right chest s/p VATS and chest tube 7/5   -Left shoulder prosthetic joint infection s/p I&D and removal of deep hardware 6/21 - all screws and nails removed, s/p 2nd washout 6/24 & 3rd washout 6/27   -Left Shoulder washouts on 6/21 ( abundant pus in shoulder, hardware removal), 6/24 ( bloody brownish fluid) and 6/27 ( no fluid collection, healthy red and beefy tissue)   -Right 3rd toe osteomyelitis s/p Dalvance x 2 doses & 2nd distal phalanx s/p I&D at bedside, s/p 2nd toe amputation 7/1       - Presents as a transfer for thoracic surgery and Plastic surgery evaluation in setting of concern for "osteomyelitis of multiple ribs which will need further debridement and then a flap placed" in the setting of chest wall abscess  -notably, 7/3 blood cultures, 7/4 pleural fluid cultures, 7/4 abscess cultures, 755 pleural fluid cultures NGTD  -6/18 right toe wound culture with MRSA  Plan:  -ID: Continue Ceftaroline 600 mg IV q.8; will need course of 6 weeks (EOT 8/11)   -Discussed alternative antibiotic with ID due to difficult placment due to abx cost, however unable to replace  -Podiatry consulted to address toe wound/possible suture removal  -Thoracic surgery removed right chest tube on 07/09; left chest accordion drain removed 7/14   -Remove staples at follow up appt  -ongoing wound care  -ID, Ortho, Thoracic surgery and Plastic surgery consulted and following; imaging findings per hospital course  -ortho surgery completed left shoulder aspiration 7/9 to assess shoulder abscess concern, minimal " fluid aspirated, cont antibiotics for pyogenic arthritis  -thus far, no acute surgical intervention per all surgical specialties      Continue antibiotics per ID recommendations.  Follow up with  re disposition planning    Primary disposition at this time rehab, for peer to peer on Monday.  SNF referral was also sent by .  Continue management    Bradycardia  -Bradycardic episode noted during PT eval on , asymptomatic  -Rhythm: atrial fibrillation  -Hold tonight's metoprolol      CKD (chronic kidney disease) stage 2, GFR 60-89 ml/min  Creatine stable for now. BMP reviewed- noted Estimated Creatinine Clearance: 94.3 mL/min (based on SCr of 1 mg/dL). according to latest data. Based on current GFR, CKD stage is stage 2 - GFR 60-89.  Monitor UOP and serial BMP and adjust therapy as needed. Renally dose meds. Avoid nephrotoxic medications and procedures.    Acute hypoxemic respiratory failure  -Ddx: decreased reserve from VATS vs volume overload (on fluids since )  -CXR noting blunting of CP angles  -: Lasix 40mg IV x1, good response. Additional dose on     Thyroid nodule  Incidental finding on imagin.7 cm left thyroid nodule. Nonemergent thyroid ultrasound is recommended.       Left renal stone  Nephrolithiasis     retroperitoneal ultrasound completed in setting of MARBIN without hydronephrosis    CT abdomen notable for 1.2 cm obstructing stone in left distal ureter with mild hydroureteronephrosis       Appreciate urology recommendations; given no concerns regarding MARBIN or infection relation to the stone, no acute surgical intervention  Follow-up outpatient; strain all urine    Cyst of right kidney  Incidental finding on imagin.8 cm septated cystic lesion in the right kidney, which is indeterminate for malignancy. Further evaluation with nonemergent renal mass protocol CT or MRI is recommended.       Sepsis  This patient does have evidence of infective focus  My  "overall impression is sepsis.  Source: Skin and Soft Tissue (location toe)  Antibiotics given-   Antibiotics (72h ago, onward)      Start     Stop Route Frequency Ordered    07/07/24 1545  ceftaroline fosamiL (TEFLARO) 600 mg in D5W 50 mL IVPB (MB+)         -- IV Every 8 hours (non-standard times) 07/07/24 1532    07/07/24 1545  meropenem (MERREM) 1 g in 0.9% NaCl 100 mL IVPB (MB+)         -- IV Every 8 hours (non-standard times) 07/07/24 1532          Latest lactate reviewed-  No results for input(s): "LACTATE", "POCLAC" in the last 72 hours.  Organ dysfunction indicated by Acute kidney injury    Fluid challenge Not needed - patient is not hypotensive      Post- resuscitation assessment No - Post resuscitation assessment not needed       Will Not start Pressors- Levophed for MAP of 65  Source control achieved by: abx    Debility  Patient with Acute on chronic debility due to  multiple infections . Latest AMPAC and GEMS scores have not been reviewed. Evaluation for etiology is complete. Plan includes progressive mobility protocol initated, PT/OT consulted, and fall precautions in place.    Acute renal failure superimposed on chronic kidney disease  Patient with acute kidney injury/acute renal failure likely due to  due to ATN due to sepsis due to UTI and/or PNA  MARBIN is currently improving. Baseline creatinine  wnl  - Labs reviewed- Renal function/electrolytes with Estimated Creatinine Clearance: 104.7 mL/min (based on SCr of 0.9 mg/dL). according to latest data. Monitor urine output and serial BMP and adjust therapy as needed. Avoid nephrotoxins and renally dose meds for GFR listed above.    Atrial fibrillation with rapid ventricular response  Patient with Long standing persistent (>12 months) atrial fibrillation which is controlled currently with Beta Blocker. Patient is currently in TBD.[id number] Score: 2.      Chronic AF w/ acute RVR at OSH, resolved s/p cardizem drip   NOAC has been on hold d/t possible need " for surgery - has been on prophylaxis dose of Lovenox  Resumed anticoagulation given no plan for further surgical intervention     History of Guillain-[place] syndrome  No acute issues  However, pt has impaired mobility and is acutely weakened from his sepsis/UTI/AF RVR and need placement       Type 2 diabetes mellitus  Patient's FSGs are controlled on current medication regimen.  Last A1c reviewed-   Lab Results   Component Value Date    HGBA1C 6.3 (H) 06/14/2024     Most recent fingerstick glucose reviewed-   Recent Labs   Lab 07/10/24  1607 07/10/24  2002   POCTGLUCOSE 181* 178*     Current correctional scale  Low  Maintain anti-hyperglycemic dose as follows-   Antihyperglycemics (From admission, onward)      Start     Stop Route Frequency Ordered    07/08/24 0900  insulin glargine U-100 (Lantus) pen 15 Units         -- SubQ Daily 07/07/24 1532    07/07/24 1529  insulin aspart U-100 pen 0-10 Units         -- SubQ Before meals & nightly PRN 07/07/24 1532          Hold Oral hypoglycemics while patient is in the hospital.    Hypertension  Chronic, controlled. Latest blood pressure and vitals reviewed-     Temp:  [97.8 °F (36.6 °C)-98.8 °F (37.1 °C)]   Pulse:  [65-91]   Resp:  [2-24]   BP: (100-145)/(56-81)   SpO2:  [90 %-99 %] .   Home meds for hypertension were reviewed and noted below.   Hypertension Medications               hydrALAZINE (APRESOLINE) 25 MG tablet Take 1 tablet (25 mg total) by mouth every 12 (twelve) hours.    metoprolol succinate (TOPROL-XL) 100 MG 24 hr tablet Take 1 tablet (100 mg total) by mouth once daily.            While in the hospital, will manage blood pressure as follows; continue home metoprolol    Will utilize p.r.n. blood pressure medication only if patient's blood pressure greater than 180/110 and he develops symptoms such as worsening chest pain or shortness of breath.    MARA (obstructive sleep apnea)  Continue CPAP HS and w/ naps        Morbid obesity  There is no height or weight  on file to calculate BMI. Morbid obesity complicates all aspects of disease management from diagnostic modalities to treatment. Weight loss encouraged and health benefits explained to patient.           VTE Risk Mitigation (From admission, onward)           Ordered     apixaban tablet 5 mg  2 times daily         07/17/24 1245     Place sequential compression device  Until discontinued         07/07/24 1532                    Discharge Planning   KAMILA: 7/19/2024     Code Status: Full Code   Is the patient medically ready for discharge?:     Reason for patient still in hospital (select all that apply): Patient trending condition  Discharge Plan A: Home with family                  Milind Londono MD  Department of Hospital Medicine   [facility]

## 2024-07-21 LAB
ANION GAP SERPL CALC-SCNC: 11 MMOL/L (ref 8–16)
BUN SERPL-MCNC: 49 MG/DL (ref 8–23)
CALCIUM SERPL-MCNC: 9.1 MG/DL (ref 8.7–10.5)
CHLORIDE SERPL-SCNC: 104 MMOL/L (ref 95–110)
CO2 SERPL-SCNC: 23 MMOL/L (ref 23–29)
CREAT SERPL-MCNC: 1.1 MG/DL (ref 0.5–1.4)
EST. GFR  (NO RACE VARIABLE): >60 ML/MIN/1.73 M^2
GLUCOSE SERPL-MCNC: 163 MG/DL (ref 70–110)
MAGNESIUM SERPL-MCNC: 1.7 MG/DL (ref 1.6–2.6)
POCT GLUCOSE: 149 MG/DL (ref 70–110)
POCT GLUCOSE: 154 MG/DL (ref 70–110)
POCT GLUCOSE: 170 MG/DL (ref 70–110)
POCT GLUCOSE: 180 MG/DL (ref 70–110)
POCT GLUCOSE: 216 MG/DL (ref 70–110)
POTASSIUM SERPL-SCNC: 3.8 MMOL/L (ref 3.5–5.1)
SODIUM SERPL-SCNC: 138 MMOL/L (ref 136–145)

## 2024-07-21 PROCEDURE — 83735 ASSAY OF MAGNESIUM: CPT | Performed by: INTERNAL MEDICINE

## 2024-07-21 PROCEDURE — 80048 BASIC METABOLIC PNL TOTAL CA: CPT | Performed by: INTERNAL MEDICINE

## 2024-07-21 PROCEDURE — 99900035 HC TECH TIME PER 15 MIN (STAT)

## 2024-07-21 PROCEDURE — 20600001 HC STEP DOWN PRIVATE ROOM

## 2024-07-21 PROCEDURE — A4216 STERILE WATER/SALINE, 10 ML: HCPCS | Performed by: STUDENT IN AN ORGANIZED HEALTH CARE EDUCATION/TRAINING PROGRAM

## 2024-07-21 PROCEDURE — 94761 N-INVAS EAR/PLS OXIMETRY MLT: CPT

## 2024-07-21 PROCEDURE — 27100171 HC OXYGEN HIGH FLOW UP TO 24 HOURS

## 2024-07-21 PROCEDURE — 25000003 PHARM REV CODE 250: Performed by: STUDENT IN AN ORGANIZED HEALTH CARE EDUCATION/TRAINING PROGRAM

## 2024-07-21 PROCEDURE — 27000207 HC ISOLATION

## 2024-07-21 PROCEDURE — 94660 CPAP INITIATION&MGMT: CPT

## 2024-07-21 PROCEDURE — 63600175 PHARM REV CODE 636 W HCPCS: Mod: JZ,JG | Performed by: STUDENT IN AN ORGANIZED HEALTH CARE EDUCATION/TRAINING PROGRAM

## 2024-07-21 RX ADMIN — FERROUS SULFATE TAB EC 325 MG (65 MG FE EQUIVALENT) 1 EACH: 325 (65 FE) TABLET DELAYED RESPONSE at 08:07

## 2024-07-21 RX ADMIN — INSULIN ASPART 2 UNITS: 100 INJECTION, SOLUTION INTRAVENOUS; SUBCUTANEOUS at 12:07

## 2024-07-21 RX ADMIN — Medication 10 ML: at 12:07

## 2024-07-21 RX ADMIN — INSULIN GLARGINE 15 UNITS: 100 INJECTION, SOLUTION SUBCUTANEOUS at 08:07

## 2024-07-21 RX ADMIN — Medication 10 ML: at 11:07

## 2024-07-21 RX ADMIN — DIPHENHYDRAMINE HYDROCHLORIDE 10 ML: 25 SOLUTION ORAL at 12:07

## 2024-07-21 RX ADMIN — APIXABAN 5 MG: 5 TABLET, FILM COATED ORAL at 08:07

## 2024-07-21 RX ADMIN — HYDROCODONE BITARTRATE AND ACETAMINOPHEN 1 TABLET: 10; 325 TABLET ORAL at 04:07

## 2024-07-21 RX ADMIN — DIPHENHYDRAMINE HYDROCHLORIDE 10 ML: 25 SOLUTION ORAL at 05:07

## 2024-07-21 RX ADMIN — CEFTAROLINE FOSAMIL 600 MG: 600 POWDER, FOR SOLUTION INTRAVENOUS at 09:07

## 2024-07-21 RX ADMIN — DIPHENHYDRAMINE HYDROCHLORIDE 10 ML: 25 SOLUTION ORAL at 08:07

## 2024-07-21 RX ADMIN — CEFTAROLINE FOSAMIL 600 MG: 600 POWDER, FOR SOLUTION INTRAVENOUS at 02:07

## 2024-07-21 RX ADMIN — Medication 10 ML: at 06:07

## 2024-07-21 RX ADMIN — INSULIN ASPART 4 UNITS: 100 INJECTION, SOLUTION INTRAVENOUS; SUBCUTANEOUS at 04:07

## 2024-07-21 RX ADMIN — LIDOCAINE 5% 1 PATCH: 700 PATCH TOPICAL at 04:07

## 2024-07-21 RX ADMIN — INSULIN ASPART 1 UNITS: 100 INJECTION, SOLUTION INTRAVENOUS; SUBCUTANEOUS at 08:07

## 2024-07-21 RX ADMIN — INSULIN ASPART 2 UNITS: 100 INJECTION, SOLUTION INTRAVENOUS; SUBCUTANEOUS at 08:07

## 2024-07-21 RX ADMIN — CEFTAROLINE FOSAMIL 600 MG: 600 POWDER, FOR SOLUTION INTRAVENOUS at 05:07

## 2024-07-21 RX ADMIN — POLYETHYLENE GLYCOL 3350 17 G: 17 POWDER, FOR SOLUTION ORAL at 08:07

## 2024-07-21 NOTE — PT/OT/SLP PROGRESS
"Occupational Therapy  A client care conference was completed by the OTR and the TORREZ prior to treatment by the OTR to discuss the patient's POC and current status.       Patient Name:  Roosevelt Moser   MRN:  [id number]  Time: 1125-1131am     Spoke to nurse prior to session.  Attempted OT session at 1125AM, two nurses present and family in room upon arrival. Pt supine in bed. "Pain in my ass," pt stated when therapist asked about his pain. Pt educated and instructed on OT POC and encouraged pt participation for bed mobility and possible transfers this date, however, he was very adamant about wanting to void first before doing anything else. Pt requested therapist to come back later and unable to make a second attempt 2/2 time constraint. Pt not seen today.  Will follow-up 7/22/2024.    SHAN Padilla  7/21/2024  "

## 2024-07-21 NOTE — PROGRESS NOTES
"[facility] Medicine  Progress Note    Patient Name: Roosevelt Moser  MRN: [id number]  Patient Class: IP- Inpatient   Admission Date: 7/7/2024  Length of Stay: 14 days  Attending Physician: Milind Londono MD  Primary Care Provider: Miriam, Primary Doctor        Subjective:     Principal Problem:Osteomyelitis of multiple sites        HPI:  Roosevelt Moser is a 74yo M w/ A fib, HTN, T2DM, MARA, HLD, hx GBS, hx displaced comminuted fracture of the left humerus s/p fall 3/4/24 s/p ORIF 03/25/2024 who presents as a transfer for thoracic surgery and Plastic surgery evaluation in setting of "osteomyelitis of multiple ribs which will need further debridement and then a flap placed."    He was initially admitted to [facility] on 6/14 for progressive weakness, multiple falls, and decreased urine output.  He was admitted for management of MARBIN on CKD complicated by hyperkalemia, urosepsis, AFib with RVR.    "Creatinine improved during his stay. During his stay he was noted to have swelling of his left upper extremity. Imaging studies on June 17 showed concern for gas-forming infection along the left shoulder and air along the lateral left chest wall concerning for gas-forming organism. MRI of his right foot also showed evidence of toe osteomyelitis. He was seen by ID who adjusted antibiotics, and they also noted oral thrush. Podiatry evaluated him for the toe findings, and Orthopedic Surgery evaluated the shoulder abnormalities. Right toe wound grew MRSA. Oral anticoagulation was held, and Radiology aspirated fluid from his left shoulder on June 20. On June 21 he had left shoulder incision and drainage and removal of deep hardware including rods and screws. He underwent repeat irrigation and debridement of the left shoulder on June 24 with placement of a wound VAC. He had subsequent incision and drainage with removal of the wound VAC on June 27. He had amputation of his right 2nd toe by Podiatry on July 1. On " "July 3 he was noted to have dyspnea and mild tachycardia. CT chest showed a large abscess of the anterior central and left chest wall and abdominal wall with partial destruction of 3 ribs. He was seen by Cardiothoracic surgery. On July 4 he had left thoracentesis with aspiration of fluid. He subsequently had incision and drainage of the left chest wall abscess on July 4, and a drainage catheter was left in place. On July 5 he underwent right VATS for a loculated right pleural effusion, and a chest tube was left in place. He was transitioned to the ICU post-operatively. With concern for osteomyelitis of the ribs, concern is that he will need transfer to a facility with Thoracic Surgery and potentially Plastic Surgery along with higher level of care. Transfer center spoke with Thoracic Surgery at [facility]. Requesting transfer to Hospital Medicine at [facility] for Plastic Surgery and Thoracic Surgery evaluation. Transfer center spoke with Plastic Surgery on July 5, Thoracic Surgery on July 7. With his complicated medical presentation, will plan transfer to Hospital Medicine at [facility] in step-down status for further treatment. "    Prior to transfer, "He is awake and alert. He has a PICC line in place along with the right chest tube and left chest wall drain in place. He has no evidence of respiratory distress and is hemodynamically stable. Referring provider felt patient was stable for step-down status at present. He is currently on ceftaroline and meropenem. He is in contact isolation. July 7: Sodium 137, potassium 4.5, chloride 104, CO2 26, BUN 33, creatinine 1.1, glucose 130, albumin 2.1, AST 25, ALT 17, white blood cells 11.58, hemoglobin 8.1, hematocrit 26.2, platelets 389, procalcitonin 0.575, CRP greater than 16  VS:  Temperature 97.5°, pulse 85, respirations 20, blood pressure 112/63, O2 sats 95% "    Patient in no acute distress upon arrival.  Wife at bedside.  Patient denies any acute " complaints.        Imaging history reviewed:  July 6: X-rays of the left shoulder had fracture through the surgical neck of the humerus with lateral distraction of the distal component and overlapping segments on the order of 2.5 cm.  Overall no appreciable change upon comparison with prior imaging.  -chest x-ray noted manifestations of mild congestive heart failure bordering on mild pulmonary edema.  Right-sided thoracostomy tube terminating within the right apex.  No definite pneumothorax.        July 5: Pleural fluid Gram stain with few WBCs and no organisms seen  -pH 7.339, pCO2 50.9, pO2 392     July 4:  CPK less than 10, serum   -AFB smear from left chest abscess had no AFB seen, left chest abscess aerobic and anaerobic cultures have no growth  -pleural fluid protein 3.2, white blood cells 1108 (11% segs, 74% lymphs), , pH 7.63, pleural fluid culture had no growth  -ultrasound-guided thoracentesis removed 1.4-1.5 L     July 3: Blood cultures with no growth to date  -CT chest showed large abscess of the anterior central and left chest wall and abdominal wall measuring 17 cm transversely.  This extends into the mediastinum and peritoneum with partial destruction of the anterior 6th, 7th, and 8th ribs.  Complete atelectasis of the left lower lobe with moderate left pleural effusion.  Mild atelectasis right lung base with a small-to-moderate right pleural effusion.     July 2: X-rays of the left shoulder noted displaced left proximal humerus fracture status post hardware removal without change in alignment.     June 24: ECHO with EF 55-60%.     June 20:  Interventional Radiology did percutaneous aspiration of the left shoulder fluid collection.  No drainage catheter was left in place.     June 18:  Right toe wound MRSA  -CT left shoulder had subacute left humerus fracture post internal fixation.  Incomplete bony bridging across the fracture site with persistent angulation.  Severe arthropathy of the  "glenohumeral joint with multiple intra-articular bodies.  Large left joint effusion and adjacent soft tissue focal fluid collections containing gas and concerning for gas-forming infection.  Moderate size left pleural effusion.  Left basilar airspace disease.  -bilateral lower extremity Doppler arterial ultrasound had atherosclerotic plaque and calcification bilaterally without severe stenosis or occlusion identified on either side.     June 17: X-rays of the left humerus/shoulder had findings concerning for gas-forming infection along the left shoulder.  -x-rays of the left ribs showed air along the lateral chest wall soft tissue and axilla concerning for gas-forming infection.  -MRI of the right foot had findings consistent with osteomyelitis involving the middle distal phalanges of the 3rd toe.  Cellulitis.     June 14: Renal ultrasound had no hydronephrosis.  Elevated resistive index right kidney suggesting intrinsic renal disease.    Overview/Hospital Course:  Pt admitted to  as a transfer for thoracic surgery and Plastic surgery evaluation in setting of "osteomyelitis of multiple ribs which will need further debridement and then a flap placed." Thoracic Surgery, Plastic Surgery, ID, Ortho, Wound care, PT/OT consulted upon admission. Imaging ordered.  CT chest abdomen with IV contrast without evidence of osteomyelitis in ribs adjacent to I&D site of left chest wall abscess.; incidental finding of 1.2 cm obstructing stone in left distal ureter with mild hydroureteronephrosis.  Urology was consulted and CT renal stone study was completed; no acute intervention given no concern for infection around stone or MARBIN; follow-up outpatient unless decompensates.  MRI shoulder without contrast left notable for septic arthritis of left glenohumeral joint with acute osteomyelitis of the glenoid and proximal humerus in addition to known displaced fracture of proximal humeral metaphysis in addition to several large soft " tissue abscesses.  Patient underwent left shoulder aspiration with Orthopedic surgery on 07/09, continuing antibiotics with no further intervention planned.  Right chest tube was removed by thoracic surgery on 07/09. Drain previously placed for L chest wall abscess removed when output became minimal (7/14). To continue on IV Ceftaroline 600 mg q8 for 6 weeks therapy (EOT 8/11). LTAC placement pending     Interval History:  Patient's wife brought his will indicating DNR.  No complaints or acute events    Review of Systems   Unable to perform ROS: Other     Objective:     Vital Signs (Most Recent):  Temp: 98.2 °F (36.8 °C) (07/21/24 1216)  Pulse: 103 (07/21/24 1216)  Resp: 16 (07/21/24 1216)  BP: (!) 176/77 (07/21/24 1216)  SpO2: 98 % (07/21/24 1216) Vital Signs (24h Range):  Temp:  [97.4 °F (36.3 °C)-98.6 °F (37 °C)] 98.2 °F (36.8 °C)  Pulse:  [] 103  Resp:  [16-18] 16  SpO2:  [91 %-98 %] 98 %  BP: (108-176)/(58-77) 176/77     Weight: 133 kg (293 lb 3.4 oz)  Body mass index is 39.77 kg/m².    Intake/Output Summary (Last 24 hours) at 7/21/2024 1444  Last data filed at 7/21/2024 0946  Gross per 24 hour   Intake --   Output 1350 ml   Net -1350 ml         Physical Exam  Constitutional:       General: He is not in acute distress.     Appearance: He is obese.   HENT:      Head: Normocephalic.      Right Ear: External ear normal.      Left Ear: External ear normal.      Nose: Nose normal.   Cardiovascular:      Rate and Rhythm: Normal rate.      Heart sounds: Normal heart sounds.   Pulmonary:      Breath sounds: Normal breath sounds.   Abdominal:      Palpations: Abdomen is soft.      Tenderness: There is no abdominal tenderness.   Genitourinary:     Comments: Condom catheter  Musculoskeletal:      Comments: Debility   Skin:     Comments: Staples and dressing noted to anterior chest and left shoulder   Neurological:      Mental Status: He is alert and oriented to person, place, and time.            Significant Labs:  "All pertinent labs within the past 24 hours have been reviewed.    Assessment/Plan:      * Osteomyelitis of multiple sites  Osteomyelitis of right second toe s/p amputation 7/1    Chest wall abscess  Loculated pleural effusion  Mediastinal lymphadenopathy    Pyogenic arthritis of left shoulder region  Humerus fracture    OSH:  -status post left thoracentesis 1500cc serosanguineous fluid drained 7/4 & I&D drainage with chest tube placement 7/4 & loculated effusions right chest s/p VATS and chest tube 7/5   -Left shoulder prosthetic joint infection s/p I&D and removal of deep hardware 6/21 - all screws and nails removed, s/p 2nd washout 6/24 & 3rd washout 6/27   -Left Shoulder washouts on 6/21 ( abundant pus in shoulder, hardware removal), 6/24 ( bloody brownish fluid) and 6/27 ( no fluid collection, healthy red and beefy tissue)   -Right 3rd toe osteomyelitis s/p Dalvance x 2 doses & 2nd distal phalanx s/p I&D at bedside, s/p 2nd toe amputation 7/1       - Presents as a transfer for thoracic surgery and Plastic surgery evaluation in setting of concern for "osteomyelitis of multiple ribs which will need further debridement and then a flap placed" in the setting of chest wall abscess  -notably, 7/3 blood cultures, 7/4 pleural fluid cultures, 7/4 abscess cultures, 755 pleural fluid cultures NGTD  -6/18 right toe wound culture with MRSA  Plan:  -ID: Continue Ceftaroline 600 mg IV q.8; will need course of 6 weeks (EOT 8/11)   -Discussed alternative antibiotic with ID due to difficult placment due to abx cost, however unable to replace  -Podiatry consulted to address toe wound/possible suture removal  -Thoracic surgery removed right chest tube on 07/09; left chest accordion drain removed 7/14   -Remove staples at follow up appt  -ongoing wound care  -ID, Ortho, Thoracic surgery and Plastic surgery consulted and following; imaging findings per hospital course  -ortho surgery completed left shoulder aspiration 7/9 to assess " shoulder abscess concern, minimal fluid aspirated, cont antibiotics for pyogenic arthritis  -thus far, no acute surgical intervention per all surgical specialties      Continue antibiotics per ID recommendations.  Follow up with  re disposition planning    Primary disposition at this time rehab, for peer to peer on Monday.  SNF referral was also sent by .  Continue management    Bradycardia  -Bradycardic episode noted during PT eval on , asymptomatic  -Rhythm: atrial fibrillation  -Hold tonight's metoprolol      CKD (chronic kidney disease) stage 2, GFR 60-89 ml/min  Creatine stable for now. BMP reviewed- noted Estimated Creatinine Clearance: 94.3 mL/min (based on SCr of 1 mg/dL). according to latest data. Based on current GFR, CKD stage is stage 2 - GFR 60-89.  Monitor UOP and serial BMP and adjust therapy as needed. Renally dose meds. Avoid nephrotoxic medications and procedures.    Acute hypoxemic respiratory failure  -Ddx: decreased reserve from VATS vs volume overload (on fluids since )  -CXR noting blunting of CP angles  -: Lasix 40mg IV x1, good response. Additional dose on     Thyroid nodule  Incidental finding on imagin.7 cm left thyroid nodule. Nonemergent thyroid ultrasound is recommended.       Left renal stone  Nephrolithiasis     retroperitoneal ultrasound completed in setting of MARBIN without hydronephrosis    CT abdomen notable for 1.2 cm obstructing stone in left distal ureter with mild hydroureteronephrosis       Appreciate urology recommendations; given no concerns regarding MARBIN or infection relation to the stone, no acute surgical intervention  Follow-up outpatient; strain all urine    Cyst of right kidney  Incidental finding on imagin.8 cm septated cystic lesion in the right kidney, which is indeterminate for malignancy. Further evaluation with nonemergent renal mass protocol CT or MRI is recommended.       Sepsis  This patient does have  "evidence of infective focus  My overall impression is sepsis.  Source: Skin and Soft Tissue (location toe)  Antibiotics given-   Antibiotics (72h ago, onward)      Start     Stop Route Frequency Ordered    07/07/24 1545  ceftaroline fosamiL (TEFLARO) 600 mg in D5W 50 mL IVPB (MB+)         -- IV Every 8 hours (non-standard times) 07/07/24 1532    07/07/24 1545  meropenem (MERREM) 1 g in 0.9% NaCl 100 mL IVPB (MB+)         -- IV Every 8 hours (non-standard times) 07/07/24 1532          Latest lactate reviewed-  No results for input(s): "LACTATE", "POCLAC" in the last 72 hours.  Organ dysfunction indicated by Acute kidney injury    Fluid challenge Not needed - patient is not hypotensive      Post- resuscitation assessment No - Post resuscitation assessment not needed       Will Not start Pressors- Levophed for MAP of 65  Source control achieved by: abx    Debility  Patient with Acute on chronic debility due to  multiple infections . Latest AMPAC and GEMS scores have not been reviewed. Evaluation for etiology is complete. Plan includes progressive mobility protocol initated, PT/OT consulted, and fall precautions in place.    Acute renal failure superimposed on chronic kidney disease  Patient with acute kidney injury/acute renal failure likely due to  due to ATN due to sepsis due to UTI and/or PNA  MARBIN is currently improving. Baseline creatinine  wnl  - Labs reviewed- Renal function/electrolytes with Estimated Creatinine Clearance: 104.7 mL/min (based on SCr of 0.9 mg/dL). according to latest data. Monitor urine output and serial BMP and adjust therapy as needed. Avoid nephrotoxins and renally dose meds for GFR listed above.    Atrial fibrillation with rapid ventricular response  Patient with Long standing persistent (>12 months) atrial fibrillation which is controlled currently with Beta Blocker. Patient is currently in TBD.[id number] Score: 2.      Chronic AF w/ acute RVR at OSH, resolved s/p cardizem drip   JOHANA has " been on hold d/t possible need for surgery - has been on prophylaxis dose of Lovenox  Resumed anticoagulation given no plan for further surgical intervention     History of Guillain-[place] syndrome  No acute issues  However, pt has impaired mobility and is acutely weakened from his sepsis/UTI/AF RVR and need placement       Type 2 diabetes mellitus  Patient's FSGs are controlled on current medication regimen.  Last A1c reviewed-   Lab Results   Component Value Date    HGBA1C 6.3 (H) 06/14/2024     Most recent fingerstick glucose reviewed-   Recent Labs   Lab 07/10/24  1607 07/10/24  2002   POCTGLUCOSE 181* 178*     Current correctional scale  Low  Maintain anti-hyperglycemic dose as follows-   Antihyperglycemics (From admission, onward)      Start     Stop Route Frequency Ordered    07/08/24 0900  insulin glargine U-100 (Lantus) pen 15 Units         -- SubQ Daily 07/07/24 1532    07/07/24 1529  insulin aspart U-100 pen 0-10 Units         -- SubQ Before meals & nightly PRN 07/07/24 1532          Hold Oral hypoglycemics while patient is in the hospital.    Hypertension  Chronic, controlled. Latest blood pressure and vitals reviewed-     Temp:  [97.8 °F (36.6 °C)-98.8 °F (37.1 °C)]   Pulse:  [65-91]   Resp:  [2-24]   BP: (100-145)/(56-81)   SpO2:  [90 %-99 %] .   Home meds for hypertension were reviewed and noted below.   Hypertension Medications               hydrALAZINE (APRESOLINE) 25 MG tablet Take 1 tablet (25 mg total) by mouth every 12 (twelve) hours.    metoprolol succinate (TOPROL-XL) 100 MG 24 hr tablet Take 1 tablet (100 mg total) by mouth once daily.            While in the hospital, will manage blood pressure as follows; continue home metoprolol    Will utilize p.r.n. blood pressure medication only if patient's blood pressure greater than 180/110 and he develops symptoms such as worsening chest pain or shortness of breath.    MARA (obstructive sleep apnea)  Continue CPAP HS and w/ naps        Morbid  obesity  There is no height or weight on file to calculate BMI. Morbid obesity complicates all aspects of disease management from diagnostic modalities to treatment. Weight loss encouraged and health benefits explained to patient.           VTE Risk Mitigation (From admission, onward)           Ordered     apixaban tablet 5 mg  2 times daily         07/17/24 1245     Place sequential compression device  Until discontinued         07/07/24 1532                    Discharge Planning   KAMILA: 7/19/2024     Code Status: DNR   Is the patient medically ready for discharge?:     Reason for patient still in hospital (select all that apply): Patient trending condition  Discharge Plan A: Home with family                  Milind Londono MD  Department of Hospital Medicine   [facility]

## 2024-07-21 NOTE — NURSING
.Nurses Note -- 4 Eyes      7/20/2024   10:49 PM      Skin assessed during: Q Shift Change      [x] No Altered Skin Integrity Present    [x]Prevention Measures Documented      [] Yes- Altered Skin Integrity Present or Discovered   [] LDA Added if Not in Epic (Describe Wound)   [] New Altered Skin Integrity was Present on Admit and Documented in LDA   [] Wound Image Taken    Wound Care Consulted? No    Attending Nurse:  Coleen Ceballos RN/Staff Member:

## 2024-07-21 NOTE — SUBJECTIVE & OBJECTIVE
Interval History:  Patient's wife brought his will indicating DNR.  No complaints or acute events    Review of Systems   Unable to perform ROS: Other     Objective:     Vital Signs (Most Recent):  Temp: 98.2 °F (36.8 °C) (07/21/24 1216)  Pulse: 103 (07/21/24 1216)  Resp: 16 (07/21/24 1216)  BP: (!) 176/77 (07/21/24 1216)  SpO2: 98 % (07/21/24 1216) Vital Signs (24h Range):  Temp:  [97.4 °F (36.3 °C)-98.6 °F (37 °C)] 98.2 °F (36.8 °C)  Pulse:  [] 103  Resp:  [16-18] 16  SpO2:  [91 %-98 %] 98 %  BP: (108-176)/(58-77) 176/77     Weight: 133 kg (293 lb 3.4 oz)  Body mass index is 39.77 kg/m².    Intake/Output Summary (Last 24 hours) at 7/21/2024 1444  Last data filed at 7/21/2024 0946  Gross per 24 hour   Intake --   Output 1350 ml   Net -1350 ml         Physical Exam  Constitutional:       General: He is not in acute distress.     Appearance: He is obese.   HENT:      Head: Normocephalic.      Right Ear: External ear normal.      Left Ear: External ear normal.      Nose: Nose normal.   Cardiovascular:      Rate and Rhythm: Normal rate.      Heart sounds: Normal heart sounds.   Pulmonary:      Breath sounds: Normal breath sounds.   Abdominal:      Palpations: Abdomen is soft.      Tenderness: There is no abdominal tenderness.   Genitourinary:     Comments: Condom catheter  Musculoskeletal:      Comments: Debility   Skin:     Comments: Staples and dressing noted to anterior chest and left shoulder   Neurological:      Mental Status: He is alert and oriented to person, place, and time.            Significant Labs: All pertinent labs within the past 24 hours have been reviewed.

## 2024-07-21 NOTE — PLAN OF CARE
[facility] Plan of Care Note  Dx: Osteomyelitis of multiple sites    Goals of Care: Pain control, infection prevention, maintain skin integrity, ROM exercises. Code status changed to DNR    Neuro: AAOx4    Vital Signs: Stable    Respiratory: Even and unlabored denies shortness of breath.    Diet: Cardiac regular consistency    Is patient tolerating current diet? Yes    Urine Output/Bowel Movement: Continent of urine with urinal and incontinent of bowel    Drains/Tubes/Tube Feeds (include total output/shift): N/A    Lines: Right upper arm PICC line double lumen    Accuchecks:AC/HS    Skin: Skin injury to bilateral buttocks and coccyx     Activity level? Moderate assist with bed mobility. Max assist with transfers    Any safety concerns? Fall precautions

## 2024-07-21 NOTE — NURSING
Patient is V-tach on telemetry. Notified attending physician and received new order for STAT CMP and magnesium level. Patient denies chest pains, shortness of breath, dizziness, or light headedness at this time. Skin remains warm and dry to touch. No acute distress noted at this time. Patient will be monitored frequently.

## 2024-07-22 LAB
ANION GAP SERPL CALC-SCNC: 11 MMOL/L (ref 8–16)
BASOPHILS # BLD AUTO: 0.09 K/UL (ref 0–0.2)
BASOPHILS NFR BLD: 0.9 % (ref 0–1.9)
BUN SERPL-MCNC: 49 MG/DL (ref 8–23)
CALCIUM SERPL-MCNC: 9 MG/DL (ref 8.7–10.5)
CHLORIDE SERPL-SCNC: 103 MMOL/L (ref 95–110)
CK SERPL-CCNC: 13 U/L (ref 20–200)
CO2 SERPL-SCNC: 24 MMOL/L (ref 23–29)
CREAT SERPL-MCNC: 1 MG/DL (ref 0.5–1.4)
DIFFERENTIAL METHOD BLD: ABNORMAL
EOSINOPHIL # BLD AUTO: 1 K/UL (ref 0–0.5)
EOSINOPHIL NFR BLD: 10.4 % (ref 0–8)
ERYTHROCYTE [DISTWIDTH] IN BLOOD BY AUTOMATED COUNT: 17.7 % (ref 11.5–14.5)
EST. GFR  (NO RACE VARIABLE): >60 ML/MIN/1.73 M^2
GLUCOSE SERPL-MCNC: 140 MG/DL (ref 70–110)
HCT VFR BLD AUTO: 30.4 % (ref 40–54)
HGB BLD-MCNC: 9.1 G/DL (ref 14–18)
IMM GRANULOCYTES # BLD AUTO: 0.09 K/UL (ref 0–0.04)
IMM GRANULOCYTES NFR BLD AUTO: 0.9 % (ref 0–0.5)
LYMPHOCYTES # BLD AUTO: 1.7 K/UL (ref 1–4.8)
LYMPHOCYTES NFR BLD: 17.3 % (ref 18–48)
MAGNESIUM SERPL-MCNC: 1.8 MG/DL (ref 1.6–2.6)
MCH RBC QN AUTO: 24 PG (ref 27–31)
MCHC RBC AUTO-ENTMCNC: 29.9 G/DL (ref 32–36)
MCV RBC AUTO: 80 FL (ref 82–98)
MONOCYTES # BLD AUTO: 1.4 K/UL (ref 0.3–1)
MONOCYTES NFR BLD: 13.8 % (ref 4–15)
NEUTROPHILS # BLD AUTO: 5.6 K/UL (ref 1.8–7.7)
NEUTROPHILS NFR BLD: 56.7 % (ref 38–73)
NRBC BLD-RTO: 0 /100 WBC
PHOSPHATE SERPL-MCNC: 3.5 MG/DL (ref 2.7–4.5)
PLATELET # BLD AUTO: 359 K/UL (ref 150–450)
PMV BLD AUTO: 9.7 FL (ref 9.2–12.9)
POCT GLUCOSE: 122 MG/DL (ref 70–110)
POCT GLUCOSE: 154 MG/DL (ref 70–110)
POCT GLUCOSE: 165 MG/DL (ref 70–110)
POCT GLUCOSE: 169 MG/DL (ref 70–110)
POTASSIUM SERPL-SCNC: 3.6 MMOL/L (ref 3.5–5.1)
RBC # BLD AUTO: 3.79 M/UL (ref 4.6–6.2)
SODIUM SERPL-SCNC: 138 MMOL/L (ref 136–145)
WBC # BLD AUTO: 9.86 K/UL (ref 3.9–12.7)

## 2024-07-22 PROCEDURE — 97530 THERAPEUTIC ACTIVITIES: CPT

## 2024-07-22 PROCEDURE — A4216 STERILE WATER/SALINE, 10 ML: HCPCS | Performed by: STUDENT IN AN ORGANIZED HEALTH CARE EDUCATION/TRAINING PROGRAM

## 2024-07-22 PROCEDURE — 25000003 PHARM REV CODE 250: Performed by: STUDENT IN AN ORGANIZED HEALTH CARE EDUCATION/TRAINING PROGRAM

## 2024-07-22 PROCEDURE — 27000207 HC ISOLATION

## 2024-07-22 PROCEDURE — 83735 ASSAY OF MAGNESIUM: CPT | Performed by: STUDENT IN AN ORGANIZED HEALTH CARE EDUCATION/TRAINING PROGRAM

## 2024-07-22 PROCEDURE — 63600175 PHARM REV CODE 636 W HCPCS: Mod: JZ,JG | Performed by: STUDENT IN AN ORGANIZED HEALTH CARE EDUCATION/TRAINING PROGRAM

## 2024-07-22 PROCEDURE — 82550 ASSAY OF CK (CPK): CPT | Performed by: STUDENT IN AN ORGANIZED HEALTH CARE EDUCATION/TRAINING PROGRAM

## 2024-07-22 PROCEDURE — 20600001 HC STEP DOWN PRIVATE ROOM

## 2024-07-22 PROCEDURE — 80048 BASIC METABOLIC PNL TOTAL CA: CPT | Performed by: STUDENT IN AN ORGANIZED HEALTH CARE EDUCATION/TRAINING PROGRAM

## 2024-07-22 PROCEDURE — 85025 COMPLETE CBC W/AUTO DIFF WBC: CPT | Performed by: STUDENT IN AN ORGANIZED HEALTH CARE EDUCATION/TRAINING PROGRAM

## 2024-07-22 PROCEDURE — 27000221 HC OXYGEN, UP TO 24 HOURS

## 2024-07-22 PROCEDURE — 84100 ASSAY OF PHOSPHORUS: CPT | Performed by: STUDENT IN AN ORGANIZED HEALTH CARE EDUCATION/TRAINING PROGRAM

## 2024-07-22 PROCEDURE — 94761 N-INVAS EAR/PLS OXIMETRY MLT: CPT

## 2024-07-22 PROCEDURE — 97110 THERAPEUTIC EXERCISES: CPT

## 2024-07-22 RX ADMIN — APIXABAN 5 MG: 5 TABLET, FILM COATED ORAL at 09:07

## 2024-07-22 RX ADMIN — DIPHENHYDRAMINE HYDROCHLORIDE 10 ML: 25 SOLUTION ORAL at 05:07

## 2024-07-22 RX ADMIN — DIPHENHYDRAMINE HYDROCHLORIDE 10 ML: 25 SOLUTION ORAL at 09:07

## 2024-07-22 RX ADMIN — INSULIN ASPART 2 UNITS: 100 INJECTION, SOLUTION INTRAVENOUS; SUBCUTANEOUS at 05:07

## 2024-07-22 RX ADMIN — FERROUS SULFATE TAB EC 325 MG (65 MG FE EQUIVALENT) 1 EACH: 325 (65 FE) TABLET DELAYED RESPONSE at 09:07

## 2024-07-22 RX ADMIN — Medication 10 ML: at 05:07

## 2024-07-22 RX ADMIN — HYDROCODONE BITARTRATE AND ACETAMINOPHEN 1 TABLET: 10; 325 TABLET ORAL at 02:07

## 2024-07-22 RX ADMIN — CEFTAROLINE FOSAMIL 600 MG: 600 POWDER, FOR SOLUTION INTRAVENOUS at 05:07

## 2024-07-22 RX ADMIN — INSULIN ASPART 2 UNITS: 100 INJECTION, SOLUTION INTRAVENOUS; SUBCUTANEOUS at 12:07

## 2024-07-22 RX ADMIN — CEFTAROLINE FOSAMIL 600 MG: 600 POWDER, FOR SOLUTION INTRAVENOUS at 01:07

## 2024-07-22 RX ADMIN — APIXABAN 5 MG: 5 TABLET, FILM COATED ORAL at 08:07

## 2024-07-22 RX ADMIN — INSULIN ASPART 1 UNITS: 100 INJECTION, SOLUTION INTRAVENOUS; SUBCUTANEOUS at 10:07

## 2024-07-22 RX ADMIN — LOPERAMIDE HYDROCHLORIDE 2 MG: 2 CAPSULE ORAL at 02:07

## 2024-07-22 RX ADMIN — DIPHENHYDRAMINE HYDROCHLORIDE 10 ML: 25 SOLUTION ORAL at 02:07

## 2024-07-22 RX ADMIN — CEFTAROLINE FOSAMIL 600 MG: 600 POWDER, FOR SOLUTION INTRAVENOUS at 09:07

## 2024-07-22 RX ADMIN — INSULIN GLARGINE 15 UNITS: 100 INJECTION, SOLUTION SUBCUTANEOUS at 09:07

## 2024-07-22 RX ADMIN — DIPHENHYDRAMINE HYDROCHLORIDE 10 ML: 25 SOLUTION ORAL at 08:07

## 2024-07-22 RX ADMIN — Medication 10 ML: at 12:07

## 2024-07-22 NOTE — PLAN OF CARE
Problem: Adult Inpatient Plan of Care  Goal: Plan of Care Review  Outcome: Progressing  Goal: Patient-Specific Goal (Individualized)  Outcome: Progressing  Goal: Absence of Hospital-Acquired Illness or Injury  Outcome: Progressing  Goal: Optimal Comfort and Wellbeing  Outcome: Progressing  Goal: Readiness for Transition of Care  Outcome: Progressing     Problem: Diabetes Comorbidity  Goal: Blood Glucose Level Within Targeted Range  Outcome: Progressing     Problem: Acute Kidney Injury/Impairment  Goal: Fluid and Electrolyte Balance  Outcome: Progressing  Goal: Improved Oral Intake  Outcome: Progressing  Goal: Effective Renal Function  Outcome: Progressing     Problem: Infection  Goal: Absence of Infection Signs and Symptoms  Outcome: Progressing     Problem: Wound  Goal: Optimal Coping  Outcome: Progressing  Goal: Optimal Functional Ability  Outcome: Progressing  Goal: Absence of Infection Signs and Symptoms  Outcome: Progressing  Goal: Improved Oral Intake  Outcome: Progressing  Goal: Optimal Pain Control and Function  Outcome: Progressing  Goal: Skin Health and Integrity  Outcome: Progressing  Goal: Optimal Wound Healing  Outcome: Progressing     Problem: Skin Injury Risk Increased  Goal: Skin Health and Integrity  Outcome: Progressing     Problem: Fall Injury Risk  Goal: Absence of Fall and Fall-Related Injury  Outcome: Progressing     Problem: Gas Exchange Impaired  Goal: Optimal Gas Exchange  Outcome: Progressing    PT AAOx4. CPAP in use at bedtime; 3L O2 per NC while awake. Telemetry intact; A. Fib. BACILIO DBL PICC; IV ABX. Remains on ADA, cardiac, renal diet. Blood glucose continues to be monitored before meals and at bedtime with scheduled and s/s insulin available if needed. Pt remains PWB to RLE and LUE. No pain voiced/expressed at this time. Bed in lowest position, wheels locked, bed alarm on, video monitor, side rails up x2 and call light within reach. No s/s of distress noted. Monitoring continues.

## 2024-07-22 NOTE — PT/OT/SLP PROGRESS
Occupational Therapy   Co-Treatment  Co-treatment performed due to patient's multiple deficits requiring two skilled therapists to appropriately and safely assess patient's strength and endurance while facilitating functional tasks in addition to accommodating for patient's activity tolerance.        Name: Roosevelt Moser  MRN: [id number]  Admitting Diagnosis:  Osteomyelitis of multiple sites       Recommendations:     Discharge Recommendations: Moderate Intensity Therapy  Discharge Equipment Recommendations:  lift device, hospital bed, wheelchair, bedside commode  Barriers to discharge:  None    Assessment:     Roosevelt Moser is a 73 y.o. male with a medical diagnosis of Osteomyelitis of multiple sites.  He presents with the following performance deficits affecting function are weakness, impaired endurance, impaired self care skills, impaired functional mobility, gait instability, impaired balance, decreased lower extremity function, decreased safety awareness, pain, decreased upper extremity function, decreased ROM, orthopedic precautions.     Pt agreeable to therapy but requires maximal encouragement to fully participate in all tasks. Pt was able to stand with moderate assistance and of 2 persons but quickly sat down and demanded that he needed to lay back down. Pt was asked to please describe the feeling but pt refuses and remains adamant on ending the session after standing one time. In addition, pt has been educated on the importance of UE ROM (elbow, wrist, & digits) with pt nodding & verbalizing understanding. Pt remains limited in ADLs, functional mobility, and functional transfers and is currently not performing tasks at [facility]. Pt would continue to benefit from skilled OT services to maximize functional independence with ADLs and functional mobility, reduce caregiver burden, and facilitate safe discharge in the least restrictive environment.      Rehab Prognosis:  Good; patient would benefit from acute skilled OT  "services to address these deficits and reach maximum level of function.       Plan:     Patient to be seen 4 x/week to address the above listed problems via self-care/home management, therapeutic activities, therapeutic exercises, neuromuscular re-education  Plan of Care Expires: 08/08/24  Plan of Care Reviewed with: patient, spouse    Subjective     Chief Complaint: shoulder pain  Patient/Family Comments/goals: "I need to lay back down"  Pain/Comfort:  Pain Rating 1: other (see comments) (pt did not rate)  Location - Orientation 1: generalized  Location 1: shoulder  Pain Addressed 1: Reposition, Distraction    Objective:     Communicated with: RN prior to session.  Patient found HOB elevated with PICC line upon OT entry to room.    General Precautions: Standard, fall    Orthopedic Precautions:N/A (LUE partial WB.)  Braces: N/A  Respiratory Status: Room air     Occupational Performance:     Bed Mobility:    Patient completed Supine to Sit with maximal assistance and 2 persons  Patient completed Sit to Supine with maximal assistance and 2 persons     Functional Mobility/Transfers:  Patient attempted 2 Sit <> Stand Transfers (from EOB)  1st STS was unsuccessful with Max Ax1   Pt was unable to rock fwd/bwd  2nd STS successful with moderate assistance and of 2 persons  with  no assistive device   With verbal & tactile cueing for hand placement & body positioning  Pt stood for less than 5 seconds    Functional Mobility: unable 2/2 generalized weakness & pain    Activities of Daily Living:  Pt reported no needs at this time      [facility] 6 Click ADL: 12    Treatment & Education:  Therapeutic exercises: pt completed the following UE exercises to maintain/improve UE ROM & overall functionality required for participation/independence with ADLs, IADLs & functional mobility/transfers   - Gentle PROM elbow flexion/extension (pain-free range) x8 reps   - AAROM wrist flexion/extension x10   - Digit flexion/extension (open & closed " fist)    -Education on task modification to maximize safety and (I) during ADLs and mobility  -Education on importance of OOB/UE activity to improve overall activity tolerance and promote recovery  -Pt educated to call for assistance and to transfer with hospital staff only.  Pt had no further questions & when asked whether there were any concerns pt reported none.     Patient left HOB elevated with all lines intact, call button in reach, RN notified, and spouse present    GOALS:   Multidisciplinary Problems       Occupational Therapy Goals          Problem: Occupational Therapy    Goal Priority Disciplines Outcome Interventions   Occupational Therapy Goal     OT, PT/OT Not Progressing    Description: Goals to be met by: 8/8/24     Patient will increase functional independence with ADLs by performing:    LE Dressing with Moderate Assistance.  Grooming while EOB with Supervision.  Toileting from toilet with Supervision for hygiene and clothing management.   Stand pivot transfers with Moderate Assistance.  Squat pivot transfers with Moderate Assistance.  Toilet transfer to bedside commode with Moderate Assistance.    DME Justification for Hospital Bed:  Patient requires a hospital bed for positioning of the body in ways that are not feasible with an ordinary bed. The patient requires special positioning for pain relief, limited mobility, and/or being unable to independently make changes in body position without the use of a hospital bed. Pillows and wedges will not be adequate for resolving these positional issues.                          Time Tracking:     OT Date of Treatment: 07/22/24  OT Start Time: 1427  OT Stop Time: 1450  OT Total Time (min): 23 min    Billable Minutes:Therapeutic Activity 23    OT/BAR: OT          7/22/2024

## 2024-07-22 NOTE — PLAN OF CARE
Problem: Physical Therapy  Goal: Physical Therapy Goal  Description: Goals to be met by: 24, goals extended to      Patient will increase functional independence with mobility by performin. Supine to sit with Moderate Assistance  2. Sit to supine with Moderate Assistance  3. Rolling to Left and Right with Minimal Assistance.  4. Sit to stand transfer with Moderate Assistance  5. Bed to chair transfer with Maximum Assistance using LRAD  6. Gait  x 10 feet with Maximum Assistance using LRAD.   7. Lower extremity exercise program x10 reps per handout, with independence    DME Justifications (see above for complete DME recommendations)    Hospital Bed ·Patient requires a hospital bed for positioning of the body in ways that are not feasible with an ordinary bed. The patient requires special positioning for pain relief, limited mobility, and/or being unable to independently make changes in body position without the use of a hospital bed. Pillows and wedges will not be adequate for resolving these positional issues.    Patient has a mobility limitation that significantly impairs their ability to participate in one or more mobility related activities of daily living in customary locations in the home. The mobility limitation cannot be sufficiently resolved by the use of a cane or walker. The use of a manual wheelchair will greatly improve the patient's ability to participate in MRADLs. The patient will use the wheelchair on a regular basis at home. They have expressed their willingness to use a manual wheelchair in the home, and have a caregiver who is available and willing to assist with the wheelchair if needed.   Outcome: Progressing    Patient making limited progress towards therapy goals, patient self-limiting with participation in therapy. Goals extended. Continue with plan of care.   Charleen Mas, PT  2024

## 2024-07-22 NOTE — SUBJECTIVE & OBJECTIVE
Interval History:  Insurance denied peer to peer.  No acute issues at this time.    Review of Systems   Unable to perform ROS: Other     Objective:     Vital Signs (Most Recent):  Temp: 98.5 °F (36.9 °C) (07/22/24 1117)  Pulse: 89 (07/22/24 1117)  Resp: 20 (07/22/24 1117)  BP: (!) 101/51 (07/22/24 1117)  SpO2: 95 % (07/22/24 1117) Vital Signs (24h Range):  Temp:  [97.8 °F (36.6 °C)-98.5 °F (36.9 °C)] 98.5 °F (36.9 °C)  Pulse:  [] 89  Resp:  [16-20] 20  SpO2:  [91 %-98 %] 95 %  BP: (101-176)/(51-77) 101/51     Weight: 133 kg (293 lb 3.4 oz)  Body mass index is 39.77 kg/m².    Intake/Output Summary (Last 24 hours) at 7/22/2024 1207  Last data filed at 7/22/2024 0930  Gross per 24 hour   Intake 1656.59 ml   Output 650 ml   Net 1006.59 ml           Physical Exam  Constitutional:       General: He is not in acute distress.     Appearance: He is obese.   HENT:      Head: Normocephalic.      Right Ear: External ear normal.      Left Ear: External ear normal.      Nose: Nose normal.   Cardiovascular:      Rate and Rhythm: Normal rate.      Heart sounds: Normal heart sounds.   Pulmonary:      Breath sounds: Normal breath sounds.   Abdominal:      Palpations: Abdomen is soft.      Tenderness: There is no abdominal tenderness.   Genitourinary:     Comments: Condom catheter  Musculoskeletal:      Comments: Debility   Skin:     Comments: Staples and dressing noted to anterior chest and left shoulder   Neurological:      Mental Status: He is alert and oriented to person, place, and time.            Significant Labs: All pertinent labs within the past 24 hours have been reviewed.

## 2024-07-22 NOTE — NURSING
During wound care, pt's wife at the bedside stated Dr Willingham told them on 7/16 to paint it daily with betadine and sutures to be removed 7/30. In patient orders differ from this. Info sent to hospitals med D.   Will leave R toe open to air with sutures and betadine noted. Sock replaced.

## 2024-07-22 NOTE — PROGRESS NOTES
Medical Nutrition Therapy      Reason for Assessment: LOS  Dx: Osteomyelitis of multiple sites   Medical Hx: A fib, HTN, T2DM, MARA, HLD     Interdisciplinary Rounds: Did not Attend  Discharge planning:    General Info: No s/s of malnutrition at this time. Noted w/ some wt flux, possibly d/t fluid status.     Current Diet:Diabetic/Cardiac/Renal w/ Catarino BID, Novasource Renal TID  % intake of meals: 75% IN     Ht: 6'  Wt: 133 kg  % IBW: 164.72%    Labs: Reviewed  Meds: Reviewed      Level of Risk: Low    Nutrition Dx: No nutrition diagnosis at this time:    Nanci Wynne RD, LDN

## 2024-07-22 NOTE — PROGRESS NOTES
"[facility] Medicine  Progress Note    Patient Name: Roosevelt Moser  MRN: [id number]  Patient Class: IP- Inpatient   Admission Date: 7/7/2024  Length of Stay: 15 days  Attending Physician: Milind Londono MD  Primary Care Provider: Miriam, Primary Doctor        Subjective:     Principal Problem:Osteomyelitis of multiple sites        HPI:  Roosevelt Moser is a 72yo M w/ A fib, HTN, T2DM, MARA, HLD, hx GBS, hx displaced comminuted fracture of the left humerus s/p fall 3/4/24 s/p ORIF 03/25/2024 who presents as a transfer for thoracic surgery and Plastic surgery evaluation in setting of "osteomyelitis of multiple ribs which will need further debridement and then a flap placed."    He was initially admitted to [facility] on 6/14 for progressive weakness, multiple falls, and decreased urine output.  He was admitted for management of MARBIN on CKD complicated by hyperkalemia, urosepsis, AFib with RVR.    "Creatinine improved during his stay. During his stay he was noted to have swelling of his left upper extremity. Imaging studies on June 17 showed concern for gas-forming infection along the left shoulder and air along the lateral left chest wall concerning for gas-forming organism. MRI of his right foot also showed evidence of toe osteomyelitis. He was seen by ID who adjusted antibiotics, and they also noted oral thrush. Podiatry evaluated him for the toe findings, and Orthopedic Surgery evaluated the shoulder abnormalities. Right toe wound grew MRSA. Oral anticoagulation was held, and Radiology aspirated fluid from his left shoulder on June 20. On June 21 he had left shoulder incision and drainage and removal of deep hardware including rods and screws. He underwent repeat irrigation and debridement of the left shoulder on June 24 with placement of a wound VAC. He had subsequent incision and drainage with removal of the wound VAC on June 27. He had amputation of his right 2nd toe by Podiatry on July 1. On " "July 3 he was noted to have dyspnea and mild tachycardia. CT chest showed a large abscess of the anterior central and left chest wall and abdominal wall with partial destruction of 3 ribs. He was seen by Cardiothoracic surgery. On July 4 he had left thoracentesis with aspiration of fluid. He subsequently had incision and drainage of the left chest wall abscess on July 4, and a drainage catheter was left in place. On July 5 he underwent right VATS for a loculated right pleural effusion, and a chest tube was left in place. He was transitioned to the ICU post-operatively. With concern for osteomyelitis of the ribs, concern is that he will need transfer to a facility with Thoracic Surgery and potentially Plastic Surgery along with higher level of care. Transfer center spoke with Thoracic Surgery at [facility]. Requesting transfer to Hospital Medicine at [facility] for Plastic Surgery and Thoracic Surgery evaluation. Transfer center spoke with Plastic Surgery on July 5, Thoracic Surgery on July 7. With his complicated medical presentation, will plan transfer to Hospital Medicine at [facility] in step-down status for further treatment. "    Prior to transfer, "He is awake and alert. He has a PICC line in place along with the right chest tube and left chest wall drain in place. He has no evidence of respiratory distress and is hemodynamically stable. Referring provider felt patient was stable for step-down status at present. He is currently on ceftaroline and meropenem. He is in contact isolation. July 7: Sodium 137, potassium 4.5, chloride 104, CO2 26, BUN 33, creatinine 1.1, glucose 130, albumin 2.1, AST 25, ALT 17, white blood cells 11.58, hemoglobin 8.1, hematocrit 26.2, platelets 389, procalcitonin 0.575, CRP greater than 16  VS:  Temperature 97.5°, pulse 85, respirations 20, blood pressure 112/63, O2 sats 95% "    Patient in no acute distress upon arrival.  Wife at bedside.  Patient denies any acute " complaints.        Imaging history reviewed:  July 6: X-rays of the left shoulder had fracture through the surgical neck of the humerus with lateral distraction of the distal component and overlapping segments on the order of 2.5 cm.  Overall no appreciable change upon comparison with prior imaging.  -chest x-ray noted manifestations of mild congestive heart failure bordering on mild pulmonary edema.  Right-sided thoracostomy tube terminating within the right apex.  No definite pneumothorax.        July 5: Pleural fluid Gram stain with few WBCs and no organisms seen  -pH 7.339, pCO2 50.9, pO2 392     July 4:  CPK less than 10, serum   -AFB smear from left chest abscess had no AFB seen, left chest abscess aerobic and anaerobic cultures have no growth  -pleural fluid protein 3.2, white blood cells 1108 (11% segs, 74% lymphs), , pH 7.63, pleural fluid culture had no growth  -ultrasound-guided thoracentesis removed 1.4-1.5 L     July 3: Blood cultures with no growth to date  -CT chest showed large abscess of the anterior central and left chest wall and abdominal wall measuring 17 cm transversely.  This extends into the mediastinum and peritoneum with partial destruction of the anterior 6th, 7th, and 8th ribs.  Complete atelectasis of the left lower lobe with moderate left pleural effusion.  Mild atelectasis right lung base with a small-to-moderate right pleural effusion.     July 2: X-rays of the left shoulder noted displaced left proximal humerus fracture status post hardware removal without change in alignment.     June 24: ECHO with EF 55-60%.     June 20:  Interventional Radiology did percutaneous aspiration of the left shoulder fluid collection.  No drainage catheter was left in place.     June 18:  Right toe wound MRSA  -CT left shoulder had subacute left humerus fracture post internal fixation.  Incomplete bony bridging across the fracture site with persistent angulation.  Severe arthropathy of the  "glenohumeral joint with multiple intra-articular bodies.  Large left joint effusion and adjacent soft tissue focal fluid collections containing gas and concerning for gas-forming infection.  Moderate size left pleural effusion.  Left basilar airspace disease.  -bilateral lower extremity Doppler arterial ultrasound had atherosclerotic plaque and calcification bilaterally without severe stenosis or occlusion identified on either side.     June 17: X-rays of the left humerus/shoulder had findings concerning for gas-forming infection along the left shoulder.  -x-rays of the left ribs showed air along the lateral chest wall soft tissue and axilla concerning for gas-forming infection.  -MRI of the right foot had findings consistent with osteomyelitis involving the middle distal phalanges of the 3rd toe.  Cellulitis.     June 14: Renal ultrasound had no hydronephrosis.  Elevated resistive index right kidney suggesting intrinsic renal disease.    Overview/Hospital Course:  Pt admitted to  as a transfer for thoracic surgery and Plastic surgery evaluation in setting of "osteomyelitis of multiple ribs which will need further debridement and then a flap placed." Thoracic Surgery, Plastic Surgery, ID, Ortho, Wound care, PT/OT consulted upon admission. Imaging ordered.  CT chest abdomen with IV contrast without evidence of osteomyelitis in ribs adjacent to I&D site of left chest wall abscess.; incidental finding of 1.2 cm obstructing stone in left distal ureter with mild hydroureteronephrosis.  Urology was consulted and CT renal stone study was completed; no acute intervention given no concern for infection around stone or MARBIN; follow-up outpatient unless decompensates.  MRI shoulder without contrast left notable for septic arthritis of left glenohumeral joint with acute osteomyelitis of the glenoid and proximal humerus in addition to known displaced fracture of proximal humeral metaphysis in addition to several large soft " tissue abscesses.  Patient underwent left shoulder aspiration with Orthopedic surgery on 07/09, continuing antibiotics with no further intervention planned.  Right chest tube was removed by thoracic surgery on 07/09. Drain previously placed for L chest wall abscess removed when output became minimal (7/14). To continue on IV Ceftaroline 600 mg q8 for 6 weeks therapy (EOT 8/11). LTAC placement pending     Interval History:  Insurance denied peer to peer.  No acute issues at this time.    Review of Systems   Unable to perform ROS: Other     Objective:     Vital Signs (Most Recent):  Temp: 98.5 °F (36.9 °C) (07/22/24 1117)  Pulse: 89 (07/22/24 1117)  Resp: 20 (07/22/24 1117)  BP: (!) 101/51 (07/22/24 1117)  SpO2: 95 % (07/22/24 1117) Vital Signs (24h Range):  Temp:  [97.8 °F (36.6 °C)-98.5 °F (36.9 °C)] 98.5 °F (36.9 °C)  Pulse:  [] 89  Resp:  [16-20] 20  SpO2:  [91 %-98 %] 95 %  BP: (101-176)/(51-77) 101/51     Weight: 133 kg (293 lb 3.4 oz)  Body mass index is 39.77 kg/m².    Intake/Output Summary (Last 24 hours) at 7/22/2024 1207  Last data filed at 7/22/2024 0930  Gross per 24 hour   Intake 1656.59 ml   Output 650 ml   Net 1006.59 ml           Physical Exam  Constitutional:       General: He is not in acute distress.     Appearance: He is obese.   HENT:      Head: Normocephalic.      Right Ear: External ear normal.      Left Ear: External ear normal.      Nose: Nose normal.   Cardiovascular:      Rate and Rhythm: Normal rate.      Heart sounds: Normal heart sounds.   Pulmonary:      Breath sounds: Normal breath sounds.   Abdominal:      Palpations: Abdomen is soft.      Tenderness: There is no abdominal tenderness.   Genitourinary:     Comments: Condom catheter  Musculoskeletal:      Comments: Debility   Skin:     Comments: Staples and dressing noted to anterior chest and left shoulder   Neurological:      Mental Status: He is alert and oriented to person, place, and time.            Significant Labs: All  "pertinent labs within the past 24 hours have been reviewed.      Assessment/Plan:      * Osteomyelitis of multiple sites  Osteomyelitis of right second toe s/p amputation 7/1    Chest wall abscess  Loculated pleural effusion  Mediastinal lymphadenopathy    Pyogenic arthritis of left shoulder region  Humerus fracture    OSH:  -status post left thoracentesis 1500cc serosanguineous fluid drained 7/4 & I&D drainage with chest tube placement 7/4 & loculated effusions right chest s/p VATS and chest tube 7/5   -Left shoulder prosthetic joint infection s/p I&D and removal of deep hardware 6/21 - all screws and nails removed, s/p 2nd washout 6/24 & 3rd washout 6/27   -Left Shoulder washouts on 6/21 ( abundant pus in shoulder, hardware removal), 6/24 ( bloody brownish fluid) and 6/27 ( no fluid collection, healthy red and beefy tissue)   -Right 3rd toe osteomyelitis s/p Dalvance x 2 doses & 2nd distal phalanx s/p I&D at bedside, s/p 2nd toe amputation 7/1       - Presents as a transfer for thoracic surgery and Plastic surgery evaluation in setting of concern for "osteomyelitis of multiple ribs which will need further debridement and then a flap placed" in the setting of chest wall abscess  -notably, 7/3 blood cultures, 7/4 pleural fluid cultures, 7/4 abscess cultures, 755 pleural fluid cultures NGTD  -6/18 right toe wound culture with MRSA  Plan:  -ID: Continue Ceftaroline 600 mg IV q.8; will need course of 6 weeks (EOT 8/11)   -Discussed alternative antibiotic with ID due to difficult placment due to abx cost, however unable to replace  -Podiatry consulted to address toe wound/possible suture removal  -Thoracic surgery removed right chest tube on 07/09; left chest accordion drain removed 7/14   -Remove staples at follow up appt  -ongoing wound care  -ID, Ortho, Thoracic surgery and Plastic surgery consulted and following; imaging findings per hospital course  -ortho surgery completed left shoulder aspiration 7/9 to assess " shoulder abscess concern, minimal fluid aspirated, cont antibiotics for pyogenic arthritis  -thus far, no acute surgical intervention per all surgical specialties      Continue antibiotics per ID recommendations.  Follow up with  re disposition planning    Primary disposition at this time rehab, for peer to peer on Monday.  SNF referral was also sent by .  Continue management    Bradycardia  -Bradycardic episode noted during PT eval on , asymptomatic  -Rhythm: atrial fibrillation  -Hold tonight's metoprolol      CKD (chronic kidney disease) stage 2, GFR 60-89 ml/min  Creatine stable for now. BMP reviewed- noted Estimated Creatinine Clearance: 94.3 mL/min (based on SCr of 1 mg/dL). according to latest data. Based on current GFR, CKD stage is stage 2 - GFR 60-89.  Monitor UOP and serial BMP and adjust therapy as needed. Renally dose meds. Avoid nephrotoxic medications and procedures.    Acute hypoxemic respiratory failure  -Ddx: decreased reserve from VATS vs volume overload (on fluids since )  -CXR noting blunting of CP angles  -: Lasix 40mg IV x1, good response. Additional dose on     Thyroid nodule  Incidental finding on imagin.7 cm left thyroid nodule. Nonemergent thyroid ultrasound is recommended.       Left renal stone  Nephrolithiasis     retroperitoneal ultrasound completed in setting of MARBIN without hydronephrosis    CT abdomen notable for 1.2 cm obstructing stone in left distal ureter with mild hydroureteronephrosis       Appreciate urology recommendations; given no concerns regarding MARBIN or infection relation to the stone, no acute surgical intervention  Follow-up outpatient; strain all urine    Cyst of right kidney  Incidental finding on imagin.8 cm septated cystic lesion in the right kidney, which is indeterminate for malignancy. Further evaluation with nonemergent renal mass protocol CT or MRI is recommended.       Sepsis  This patient does have  "evidence of infective focus  My overall impression is sepsis.  Source: Skin and Soft Tissue (location toe)  Antibiotics given-   Antibiotics (72h ago, onward)      Start     Stop Route Frequency Ordered    07/07/24 1545  ceftaroline fosamiL (TEFLARO) 600 mg in D5W 50 mL IVPB (MB+)         -- IV Every 8 hours (non-standard times) 07/07/24 1532    07/07/24 1545  meropenem (MERREM) 1 g in 0.9% NaCl 100 mL IVPB (MB+)         -- IV Every 8 hours (non-standard times) 07/07/24 1532          Latest lactate reviewed-  No results for input(s): "LACTATE", "POCLAC" in the last 72 hours.  Organ dysfunction indicated by Acute kidney injury    Fluid challenge Not needed - patient is not hypotensive      Post- resuscitation assessment No - Post resuscitation assessment not needed       Will Not start Pressors- Levophed for MAP of 65  Source control achieved by: abx    Debility  Patient with Acute on chronic debility due to  multiple infections . Latest AMPAC and GEMS scores have not been reviewed. Evaluation for etiology is complete. Plan includes progressive mobility protocol initated, PT/OT consulted, and fall precautions in place.    Acute renal failure superimposed on chronic kidney disease  Patient with acute kidney injury/acute renal failure likely due to  due to ATN due to sepsis due to UTI and/or PNA  MARBIN is currently improving. Baseline creatinine  wnl  - Labs reviewed- Renal function/electrolytes with Estimated Creatinine Clearance: 104.7 mL/min (based on SCr of 0.9 mg/dL). according to latest data. Monitor urine output and serial BMP and adjust therapy as needed. Avoid nephrotoxins and renally dose meds for GFR listed above.    Atrial fibrillation with rapid ventricular response  Patient with Long standing persistent (>12 months) atrial fibrillation which is controlled currently with Beta Blocker. Patient is currently in TBD.[id number] Score: 2.      Chronic AF w/ acute RVR at OSH, resolved s/p cardizem drip   JOHANA has " been on hold d/t possible need for surgery - has been on prophylaxis dose of Lovenox  Resumed anticoagulation given no plan for further surgical intervention     History of Guillain-[place] syndrome  No acute issues  However, pt has impaired mobility and is acutely weakened from his sepsis/UTI/AF RVR and need placement       Type 2 diabetes mellitus  Patient's FSGs are controlled on current medication regimen.  Last A1c reviewed-   Lab Results   Component Value Date    HGBA1C 6.3 (H) 06/14/2024     Most recent fingerstick glucose reviewed-   Recent Labs   Lab 07/10/24  1607 07/10/24  2002   POCTGLUCOSE 181* 178*     Current correctional scale  Low  Maintain anti-hyperglycemic dose as follows-   Antihyperglycemics (From admission, onward)      Start     Stop Route Frequency Ordered    07/08/24 0900  insulin glargine U-100 (Lantus) pen 15 Units         -- SubQ Daily 07/07/24 1532    07/07/24 1529  insulin aspart U-100 pen 0-10 Units         -- SubQ Before meals & nightly PRN 07/07/24 1532          Hold Oral hypoglycemics while patient is in the hospital.    Hypertension  Chronic, controlled. Latest blood pressure and vitals reviewed-     Temp:  [97.8 °F (36.6 °C)-98.8 °F (37.1 °C)]   Pulse:  [65-91]   Resp:  [2-24]   BP: (100-145)/(56-81)   SpO2:  [90 %-99 %] .   Home meds for hypertension were reviewed and noted below.   Hypertension Medications               hydrALAZINE (APRESOLINE) 25 MG tablet Take 1 tablet (25 mg total) by mouth every 12 (twelve) hours.    metoprolol succinate (TOPROL-XL) 100 MG 24 hr tablet Take 1 tablet (100 mg total) by mouth once daily.            While in the hospital, will manage blood pressure as follows; continue home metoprolol    Will utilize p.r.n. blood pressure medication only if patient's blood pressure greater than 180/110 and he develops symptoms such as worsening chest pain or shortness of breath.    MARA (obstructive sleep apnea)  Continue CPAP HS and w/ naps        Morbid  obesity  There is no height or weight on file to calculate BMI. Morbid obesity complicates all aspects of disease management from diagnostic modalities to treatment. Weight loss encouraged and health benefits explained to patient.           VTE Risk Mitigation (From admission, onward)           Ordered     apixaban tablet 5 mg  2 times daily         07/17/24 1245     Place sequential compression device  Until discontinued         07/07/24 1532                    Discharge Planning   KAMILA: 7/21/2024     Code Status: DNR   Is the patient medically ready for discharge?:     Reason for patient still in hospital (select all that apply): Patient trending condition  Discharge Plan A: Home with family                  Milind Londono MD  Department of Hospital Medicine   [facility]

## 2024-07-22 NOTE — PLAN OF CARE
[facility]  Discharge Reassessment    Primary Care Provider: No, Primary Doctor    Expected Discharge Date: 7/21/2024    Reassessment (most recent)       Discharge Reassessment - 07/22/24 1530          Discharge Reassessment    Assessment Type Discharge Planning Reassessment     Did the patient's condition or plan change since previous assessment? No     Discharge Plan discussed with: Patient     Communicated KAMILA with patient/caregiver Yes     Discharge Plan A Skilled Nursing Facility     Discharge Plan B Home Health     Why the patient remains in the hospital Requires continued medical care        Post-Acute Status    Post-Acute Authorization Placement                   Patients insurance company denied patient Rehab.     Plan for SNF.     SNF referrals sent.     I was told by multiple SNFs they would update me tomorrow if they can accept patient.     Will continue to follow.

## 2024-07-22 NOTE — PT/OT/SLP PROGRESS
Physical Therapy Treatment  Co-treatment with OT due to acuity of condition, level of skilled assist needed for assessment of safety with mobility.   Patient Name:  Roosevelt Moser   MRN:  [id number]    Recommendations:     Discharge Recommendations: Moderate Intensity Therapy  Discharge Equipment Recommendations: hospital bed, lift device, wheelchair, bedside commode  Barriers to discharge: Inaccessible home and Decreased caregiver support    Assessment:     Roosevelt Moser is a 73 y.o. male admitted with a medical diagnosis of Osteomyelitis of multiple sites.  He presents with the following impairments/functional limitations: weakness, impaired endurance, impaired self care skills, impaired functional mobility, gait instability, pain, decreased safety awareness, decreased lower extremity function, decreased upper extremity function, decreased coordination, decreased ROM, edema, impaired joint extensibility, orthopedic precautions. PT attempted session 2x earlier in the day, RN giving wound care then with pericare. Patient's RN and wife plus PT and OT providing maximal encouragement for patient to participate, educated on consequences of immobility and benefits of mobility. This session emphasized education on mechanics of sit to stand transfer. Two stand attempts from elevated bed height. On initial stand attempt, patient not actively rocking to initiate transfer in spite of practicing rocking mechanics prior to transfer. On second attempt with more elevated bed height, patient stood with moderate assistance x2, but would not let go of bed rail for support with R UE limiting his ability to fully extend through hips. Patient immediately returned to sitting and insisted on returning to supine in spite of encouragement to try additional stand, he would not elaborate on why he needed to return to supine. Patient currently demonstrates a need for moderate intensity therapy on a daily basis post acute secondary to a decline in  "functional status due to illness and injury     Rehab Prognosis: Fair; patient would benefit from acute skilled PT services to address these deficits and reach maximum level of function.    Recent Surgery: * No surgery found *      Plan:     During this hospitalization, patient to be seen 4 x/week to address the identified rehab impairments via gait training, therapeutic activities, therapeutic exercises, neuromuscular re-education and progress toward the following goals:    Plan of Care Expires:  08/08/24    Subjective     Chief Complaint: "You need to lie me back down right away"  Patient/Family Comments/goals: per wife "You have to stand up today. The nurses sat him on the edge of the bed over the weekend. I know he can do this"  Pain/Comfort:  Pain Rating 1:  (pain in R shoulder did not rate)  Location - Side 1: Right  Location - Orientation 1: generalized  Location 1: shoulder  Pain Addressed 1: Reposition, Distraction      Objective:     Communicated with RN prior to session.  Patient found HOB elevated with PICC line upon PT entry to room. Wife at bedside encouraging patient participation.     General Precautions: Standard, fall, hearing impaired  Orthopedic Precautions: RLE weight bearing as tolerated (L UE PWB (cleared for RW use per ortho note 7/6))  Braces: N/A  Respiratory Status: Nasal cannula, flow 2 L/min     Functional Mobility:    Bed Mobility  Rolling to R: maximum assistance   Supine to Sit on the R side:  maximum assistance x2, assist at trunk and LE  Sit to supine: moderate assistance x2  Scoot to HOB in supine: minimum assistance x2 with drawsheet, patient partially bridging and pulling up with R hand on headboard  Scoot to EOB in sitting: maximum assistance    Transfers Sit to Stand:   -1st rep from elevated bed height: maximum assistance x2, only slight weight shift anteriorly, patient with decreased anterior rocking to initiate transfer  -2nd rep from elevated bed height: moderate assistance " x2 R HHA, L assist at ischial tuberosity and gait belt, achieved 75% of stand- limited hip extension as he would not let go of R bed rail           AM-PAC 6 CLICK MOBILITY  Turning over in bed (including adjusting bedclothes, sheets and blankets)?: 2  Sitting down on and standing up from a chair with arms (e.g., wheelchair, bedside commode, etc.): 2  Moving from lying on back to sitting on the side of the bed?: 2  Moving to and from a bed to a chair (including a wheelchair)?: 1  Need to walk in hospital room?: 1  Climbing 3-5 steps with a railing?: 1  Basic Mobility Total Score: 9       Treatment & Education:  Patient and wife educated on:  -role of therapy  -goals of session  -PT POC  -benefits of out of bed mobility and consequences of immobility  -calling for staff assist to mobilize safely  Patient agreeable to mobilize with therapy.      Seated therex for LE strengthening and increasing activity tolerance:   -LAQ 10 reps ea, cued for full ROM and eccentric lowering  -Sit to stand reps for LE strengthening  -Anterior rocking of trunk to initiate sit to stand transfer, performed 5 isolated trunk rocks in preparation for standing  -Reviewed LE HEP in supine, patient and wife report he is compliant with HEP, unable to perform bridges 2/2 weakness    Patient requiring increased time,and encouragement and education for minimal participation with therapy. Patient's self limiting behavior limiting his progress towards his functional goals.     Patient left HOB elevated, CHANDA UE elevated and propped on pillows with all lines intact, call button in reach, bed alarm on, and wife present..    GOALS:   Multidisciplinary Problems       Physical Therapy Goals          Problem: Physical Therapy    Goal Priority Disciplines Outcome Goal Variances Interventions   Physical Therapy Goal     PT, PT/OT Progressing     Description: Goals to be met by: 7/22/24, goals extended to 8/2     Patient will increase functional independence  with mobility by performin. Supine to sit with Moderate Assistance  2. Sit to supine with Moderate Assistance  3. Rolling to Left and Right with Minimal Assistance.  4. Sit to stand transfer with Moderate Assistance  5. Bed to chair transfer with Maximum Assistance using LRAD  6. Gait  x 10 feet with Maximum Assistance using LRAD.   7. Lower extremity exercise program x10 reps per handout, with independence    DME Justifications (see above for complete DME recommendations)    Hospital Bed ·Patient requires a hospital bed for positioning of the body in ways that are not feasible with an ordinary bed. The patient requires special positioning for pain relief, limited mobility, and/or being unable to independently make changes in body position without the use of a hospital bed. Pillows and wedges will not be adequate for resolving these positional issues.    Patient has a mobility limitation that significantly impairs their ability to participate in one or more mobility related activities of daily living in customary locations in the home. The mobility limitation cannot be sufficiently resolved by the use of a cane or walker. The use of a manual wheelchair will greatly improve the patient's ability to participate in MRADLs. The patient will use the wheelchair on a regular basis at home. They have expressed their willingness to use a manual wheelchair in the home, and have a caregiver who is available and willing to assist with the wheelchair if needed.                        Time Tracking:     PT Received On: 24  PT Start Time: 1426     PT Stop Time: 1450  PT Total Time (min): 24 min     Billable Minutes: Therapeutic Exercise 24    Treatment Type: Treatment  PT/PTA: PT     Number of PTA visits since last PT visit: 0     2024

## 2024-07-22 NOTE — CARE UPDATE
Wound care orders updated. Paint R foot suture site with betadine and leave open to air dry BID. Thanks. Please reach out with any further questions.    Sofia Hernandez DPM   Podiatric Medicine & Surgery  [facility]

## 2024-07-23 LAB
POCT GLUCOSE: 128 MG/DL (ref 70–110)
POCT GLUCOSE: 150 MG/DL (ref 70–110)
POCT GLUCOSE: 152 MG/DL (ref 70–110)
POCT GLUCOSE: 206 MG/DL (ref 70–110)

## 2024-07-23 PROCEDURE — 94761 N-INVAS EAR/PLS OXIMETRY MLT: CPT

## 2024-07-23 PROCEDURE — 99900035 HC TECH TIME PER 15 MIN (STAT)

## 2024-07-23 PROCEDURE — 27000221 HC OXYGEN, UP TO 24 HOURS

## 2024-07-23 PROCEDURE — 97112 NEUROMUSCULAR REEDUCATION: CPT

## 2024-07-23 PROCEDURE — 27000207 HC ISOLATION

## 2024-07-23 PROCEDURE — 20600001 HC STEP DOWN PRIVATE ROOM

## 2024-07-23 PROCEDURE — 63600175 PHARM REV CODE 636 W HCPCS: Mod: JZ,JG | Performed by: STUDENT IN AN ORGANIZED HEALTH CARE EDUCATION/TRAINING PROGRAM

## 2024-07-23 PROCEDURE — 94660 CPAP INITIATION&MGMT: CPT

## 2024-07-23 PROCEDURE — A4216 STERILE WATER/SALINE, 10 ML: HCPCS | Performed by: STUDENT IN AN ORGANIZED HEALTH CARE EDUCATION/TRAINING PROGRAM

## 2024-07-23 PROCEDURE — 25000003 PHARM REV CODE 250: Performed by: STUDENT IN AN ORGANIZED HEALTH CARE EDUCATION/TRAINING PROGRAM

## 2024-07-23 PROCEDURE — 97530 THERAPEUTIC ACTIVITIES: CPT

## 2024-07-23 RX ADMIN — SIMETHICONE 80 MG: 80 TABLET, CHEWABLE ORAL at 11:07

## 2024-07-23 RX ADMIN — CEFTAROLINE FOSAMIL 600 MG: 600 POWDER, FOR SOLUTION INTRAVENOUS at 10:07

## 2024-07-23 RX ADMIN — INSULIN ASPART 4 UNITS: 100 INJECTION, SOLUTION INTRAVENOUS; SUBCUTANEOUS at 05:07

## 2024-07-23 RX ADMIN — APIXABAN 5 MG: 5 TABLET, FILM COATED ORAL at 08:07

## 2024-07-23 RX ADMIN — HYDROCODONE BITARTRATE AND ACETAMINOPHEN 1 TABLET: 10; 325 TABLET ORAL at 11:07

## 2024-07-23 RX ADMIN — FERROUS SULFATE TAB EC 325 MG (65 MG FE EQUIVALENT) 1 EACH: 325 (65 FE) TABLET DELAYED RESPONSE at 08:07

## 2024-07-23 RX ADMIN — DIPHENHYDRAMINE HYDROCHLORIDE 10 ML: 25 SOLUTION ORAL at 05:07

## 2024-07-23 RX ADMIN — CEFTAROLINE FOSAMIL 600 MG: 600 POWDER, FOR SOLUTION INTRAVENOUS at 02:07

## 2024-07-23 RX ADMIN — DIPHENHYDRAMINE HYDROCHLORIDE 10 ML: 25 SOLUTION ORAL at 08:07

## 2024-07-23 RX ADMIN — Medication 10 ML: at 01:07

## 2024-07-23 RX ADMIN — Medication 10 ML: at 07:07

## 2024-07-23 RX ADMIN — Medication 10 ML: at 12:07

## 2024-07-23 RX ADMIN — CEFTAROLINE FOSAMIL 600 MG: 600 POWDER, FOR SOLUTION INTRAVENOUS at 05:07

## 2024-07-23 RX ADMIN — DIPHENHYDRAMINE HYDROCHLORIDE 10 ML: 25 SOLUTION ORAL at 01:07

## 2024-07-23 RX ADMIN — INSULIN GLARGINE 15 UNITS: 100 INJECTION, SOLUTION SUBCUTANEOUS at 08:07

## 2024-07-23 RX ADMIN — Medication 10 ML: at 05:07

## 2024-07-23 NOTE — NURSING
[facility] Plan of Care Note    Dx:   Osteomyelitis [M86.9]  Chest wall abscess [L02.213]    Shift Events: none     Goals of Care: q2 turns, wound care, pain control     Neuro: A&Ox4     Vital Signs: /66   Pulse 80   Temp 98 °F (36.7 °C) (Oral)   Resp 17   Ht 6' (1.829 m)   Wt 133 kg (293 lb 3.4 oz)   SpO2 96%   BMI 39.77 kg/m²     Respiratory: 3L NC     Diet: Diet diabetic Cardiac (Low Na/Chol), Renal; 2000 Calorie  Dietary nutrition supplements Catarino - Any flavor,Dietary nutrition supplements [facility] Renal - Any flavor    Is patient tolerating current diet? Yes     GTTS: none     Urine Output/Bowel Movement:   I/O this shift:  In: 10 [I.V.:10]  Out: -   Last Bowel Movement: 07/22/24      Drains/Tubes/Tube Feeds (include total output/shift):   I/O this shift:  In: 10 [I.V.:10]  Out: -       Lines: PICC R basilic       Accuchecks:ACHS     Skin: see flowsheet     Fall Risk Score: see flowsheet     Activity level? Up x2     Any scheduled procedures? None     Any safety concerns? Fall risk, skin breakdown     Other: pt refusing q2 turns, wound care and CPAP this shift

## 2024-07-23 NOTE — PT/OT/SLP PROGRESS
Occupational Therapy   Co-Treatment    Name: Roosevelt Moser  MRN: [id number]  Admitting Diagnosis:  Osteomyelitis of multiple sites       Recommendations:     Discharge Recommendations: Moderate Intensity Therapy  Discharge Equipment Recommendations:  bedside commode, wheelchair, hospital bed, lift device  Barriers to discharge:  None    Assessment:     Roosevelt Moser is a 73 y.o. male with a medical diagnosis of Osteomyelitis of multiple sites.  He only completed bed mobility this session, stating he just wants to lay down and he does not feel well. Pt. Final reason for requesting to lay down was due to needing to have a BM. Pt. Vitals was measures while at EOB (see below). Performance deficits affecting function are weakness, impaired endurance, impaired self care skills, impaired functional mobility, gait instability, decreased upper extremity function, decreased safety awareness, pain, orthopedic precautions, decreased ROM.     Rehab Prognosis:  Good; patient would benefit from acute skilled OT services to address these deficits and reach maximum level of function.       Plan:     Patient to be seen 4 x/week to address the above listed problems via self-care/home management, therapeutic activities, therapeutic exercises, neuromuscular re-education  Plan of Care Expires: 08/08/24  Plan of Care Reviewed with: patient, spouse    Subjective     Chief Complaint: Not feeling well   Patient/Family Comments/goals: Pt.reports he need to have a BM  Pain/Comfort:  Pain Rating 1:  (did not rate)    Objective:     Communicated with: Nurse prior to session.  Patient found HOB elevated with PICC line upon OT entry to room.    General Precautions: Standard, fall, contact    Orthopedic Precautions:RLE weight bearing as tolerated (L UE PWB (cleared for RW use per ortho note 7/6))  Braces: N/A  Respiratory Status: Room air     Occupational Performance:     Bed Mobility:    Patient completed Rolling/Turning to Right with maximal  assistance  Patient completed Scooting/Bridging to HOB with maximal assistance  Patient completed Supine to Sit with maximal assistance and 2 persons  Patient completed Sit to Supine with total assistance and 2 persons     Functional Mobility/Transfers:  Pt.declined standing task at EOB   Pt. Sat at EOB ~4 mins for Min A- Mod A, occasionally resist to attempt to lay down  Nursing tech present to reported pt's vitals,     Vitals seated at EOB  BP seated at EOB: 116/70  MAP: 83  Pulse Rate: 113    Activities of Daily Living: Politely decline all ADLs, to have BM at bed level    [facility] 6 Click ADL: 9    Treatment & Education:  Pt.educated on orthostatic hypotension  Pt.educated on the importance of the having the HOB greater than 30 degrees to assist with orthostatic hypotension  Pt.educated on skin integrity and postioning   Pt.educated on the importance maximum his use of the RUE  Pt educated on role of occupational therapy, POC, and safety during ADLs and functional mobility. Pt and OT discussed importance of safe, continued mobility to optimize daily living skills. Pt verbalized understanding. Pt given instruction to call for medical staff/nurse for assistance.   Co-treatment with PT for maximal pt participation, safety, and activity tolerance     Patient left HOB elevated with all lines intact, call button in reach, nurse notified, and spouse present    GOALS:   Multidisciplinary Problems       Occupational Therapy Goals          Problem: Occupational Therapy    Goal Priority Disciplines Outcome Interventions   Occupational Therapy Goal     OT, PT/OT Progressing    Description: Goals to be met by: 8/8/24     Patient will increase functional independence with ADLs by performing:    LE Dressing with Moderate Assistance.  Grooming while EOB with Supervision.  Toileting from toilet with Supervision for hygiene and clothing management.   Stand pivot transfers with Moderate Assistance.  Squat pivot transfers with  Moderate Assistance.  Toilet transfer to bedside commode with Moderate Assistance.    DME Justification for Hospital Bed:  Patient requires a hospital bed for positioning of the body in ways that are not feasible with an ordinary bed. The patient requires special positioning for pain relief, limited mobility, and/or being unable to independently make changes in body position without the use of a hospital bed. Pillows and wedges will not be adequate for resolving these positional issues.     Patient has a mobility limitation that significantly impairs their ability to participate in one or more mobility related activities of daily living in customary locations in the home. The mobility limitation cannot be sufficiently resolved by the use of a cane or walker. The use of a manual wheelchair will greatly improve the patient's ability to participate in MRADLs. The patient will use the wheelchair on a regular basis at home. They have expressed their willingness to use a manual wheelchair in the home, and have a caregiver who is available and willing to assist with the wheelchair if needed.    Patient has a mobility limitation that significantly impairs their ability to participate in one or more mobility related activities of daily living, including toileting. This deficit can be resolved by using a bedside commode. Patient demonstrates mobility limitations that will cause them to be confined to one room at home without bathroom access for up to 30 days. Using a bedside commode will greatly improve the patient's ability to participate in MRADLs.                           Time Tracking:     OT Date of Treatment: 07/23/24  OT Start Time: 1445  OT Stop Time: 1508  OT Total Time (min): 23 min    Billable Minutes:Therapeutic Activity 18    OT/BAR: OT          7/23/2024

## 2024-07-23 NOTE — PLAN OF CARE
[facility]  [facility]    Primary Care Provider: No, Primary Doctor    Expected Discharge Date: 7/24/2024    Reassessment (most recent)       Discharge Reassessment - 07/23/24 1430          Discharge Reassessment    Assessment Type Discharge Planning Reassessment     Did the patient's condition or plan change since previous assessment? No     Discharge Plan discussed with: Patient     Communicated KAMILA with patient/caregiver Yes     Discharge Plan A Skilled Nursing Facility     Discharge Plan B Home with family     Transition of Care Barriers None     Why the patient remains in the hospital Requires continued medical care                   [facility] will submit for Auth today.     Patient and patients wife agreeable.

## 2024-07-23 NOTE — NURSING
"Pt refusing to wear CPAP tonight, stating he "does not want to wear it tonight." Pt educated on risks of not wearing CPAP and benefits of wearing CPAP. Pt verbalized understanding and continues to refuse.     Pt refusing turns at 2000, 2200 and now. Pt educated on risks of refusing and benefits of turning. Pt continued to refuse and verbalized understanding.   "

## 2024-07-23 NOTE — PLAN OF CARE
Problem: Physical Therapy  Goal: Physical Therapy Goal  Description: Goals to be met by: 24, goals extended to      Patient will increase functional independence with mobility by performin. Supine to sit with Moderate Assistance  2. Sit to supine with Moderate Assistance  3. Rolling to Left and Right with Minimal Assistance.  4. Sit to stand transfer with Moderate Assistance  5. Bed to chair transfer with Maximum Assistance using LRAD  6. Gait  x 10 feet with Maximum Assistance using LRAD.   7. Lower extremity exercise program x10 reps per handout, with independence    DME Justifications (see above for complete DME recommendations)    Hospital Bed ·Patient requires a hospital bed for positioning of the body in ways that are not feasible with an ordinary bed. The patient requires special positioning for pain relief, limited mobility, and/or being unable to independently make changes in body position without the use of a hospital bed. Pillows and wedges will not be adequate for resolving these positional issues.    Patient has a mobility limitation that significantly impairs their ability to participate in one or more mobility related activities of daily living in customary locations in the home. The mobility limitation cannot be sufficiently resolved by the use of a cane or walker. The use of a manual wheelchair will greatly improve the patient's ability to participate in MRADLs. The patient will use the wheelchair on a regular basis at home. They have expressed their willingness to use a manual wheelchair in the home, and have a caregiver who is available and willing to assist with the wheelchair if needed.     Outcome: Progressing

## 2024-07-23 NOTE — PLAN OF CARE
Problem: Occupational Therapy  Goal: Occupational Therapy Goal  Description: Goals to be met by: 8/8/24     Patient will increase functional independence with ADLs by performing:    LE Dressing with Moderate Assistance.  Grooming while EOB with Supervision.  Toileting from toilet with Supervision for hygiene and clothing management.   Stand pivot transfers with Moderate Assistance.  Squat pivot transfers with Moderate Assistance.  Toilet transfer to bedside commode with Moderate Assistance.    DME Justification for Hospital Bed:  Patient requires a hospital bed for positioning of the body in ways that are not feasible with an ordinary bed. The patient requires special positioning for pain relief, limited mobility, and/or being unable to independently make changes in body position without the use of a hospital bed. Pillows and wedges will not be adequate for resolving these positional issues.     Patient has a mobility limitation that significantly impairs their ability to participate in one or more mobility related activities of daily living in customary locations in the home. The mobility limitation cannot be sufficiently resolved by the use of a cane or walker. The use of a manual wheelchair will greatly improve the patient's ability to participate in MRADLs. The patient will use the wheelchair on a regular basis at home. They have expressed their willingness to use a manual wheelchair in the home, and have a caregiver who is available and willing to assist with the wheelchair if needed.    Patient has a mobility limitation that significantly impairs their ability to participate in one or more mobility related activities of daily living, including toileting. This deficit can be resolved by using a bedside commode. Patient demonstrates mobility limitations that will cause them to be confined to one room at home without bathroom access for up to 30 days. Using a bedside commode will greatly improve the patient's  ability to participate in MRADLs.      Outcome: Progressing

## 2024-07-23 NOTE — SUBJECTIVE & OBJECTIVE
Interval History:  No complaints or acute issues, awaiting placement    Review of Systems   Unable to perform ROS: Other     Objective:     Vital Signs (Most Recent):  Temp: 98 °F (36.7 °C) (07/23/24 0757)  Pulse: 87 (07/23/24 1134)  Resp: 14 (07/23/24 0815)  BP: 110/71 (07/23/24 1134)  SpO2: 98 % (07/23/24 1135) Vital Signs (24h Range):  Temp:  [97.6 °F (36.4 °C)-98 °F (36.7 °C)] 98 °F (36.7 °C)  Pulse:  [] 87  Resp:  [14-20] 14  SpO2:  [94 %-99 %] 98 %  BP: ()/(58-73) 110/71     Weight: 133 kg (293 lb 3.4 oz)  Body mass index is 39.77 kg/m².    Intake/Output Summary (Last 24 hours) at 7/23/2024 1238  Last data filed at 7/23/2024 0905  Gross per 24 hour   Intake 686.93 ml   Output 600 ml   Net 86.93 ml           Physical Exam  Constitutional:       General: He is not in acute distress.     Appearance: He is obese.   HENT:      Head: Normocephalic.      Right Ear: External ear normal.      Left Ear: External ear normal.      Nose: Nose normal.   Cardiovascular:      Rate and Rhythm: Normal rate.      Heart sounds: Normal heart sounds.   Pulmonary:      Breath sounds: Normal breath sounds.   Abdominal:      Palpations: Abdomen is soft.      Tenderness: There is no abdominal tenderness.   Genitourinary:     Comments: Condom catheter  Musculoskeletal:      Comments: Debility   Skin:     Comments: Staples and dressing noted to anterior chest and left shoulder   Neurological:      Mental Status: He is alert and oriented to person, place, and time.

## 2024-07-23 NOTE — PLAN OF CARE
Problem: Adult Inpatient Plan of Care  Goal: Plan of Care Review  Outcome: Progressing  Goal: Patient-Specific Goal (Individualized)  Outcome: Progressing  Goal: Absence of Hospital-Acquired Illness or Injury  Outcome: Progressing  Goal: Optimal Comfort and Wellbeing  Outcome: Progressing  Goal: Readiness for Transition of Care  Outcome: Progressing     Problem: Diabetes Comorbidity  Goal: Blood Glucose Level Within Targeted Range  Outcome: Progressing     Problem: Sepsis/Septic Shock  Goal: Optimal Coping  Outcome: Progressing  Goal: Absence of Bleeding  Outcome: Progressing  Goal: Blood Glucose Level Within Targeted Range  Outcome: Progressing  Goal: Absence of Infection Signs and Symptoms  Outcome: Progressing  Goal: Optimal Nutrition Intake  Outcome: Progressing     Problem: Acute Kidney Injury/Impairment  Goal: Fluid and Electrolyte Balance  Outcome: Progressing  Goal: Improved Oral Intake  Outcome: Progressing  Goal: Effective Renal Function  Outcome: Progressing     Problem: Infection  Goal: Absence of Infection Signs and Symptoms  Outcome: Progressing     Problem: Wound  Goal: Optimal Coping  Outcome: Progressing  Goal: Optimal Functional Ability  Outcome: Progressing  Goal: Absence of Infection Signs and Symptoms  Outcome: Progressing  Goal: Improved Oral Intake  Outcome: Progressing  Goal: Optimal Pain Control and Function  Outcome: Progressing  Goal: Skin Health and Integrity  Outcome: Progressing  Goal: Optimal Wound Healing  Outcome: Progressing     Problem: Skin Injury Risk Increased  Goal: Skin Health and Integrity  Outcome: Progressing     Problem: Fall Injury Risk  Goal: Absence of Fall and Fall-Related Injury  Outcome: Progressing     Problem: Gas Exchange Impaired  Goal: Optimal Gas Exchange  Outcome: Progressing

## 2024-07-23 NOTE — PT/OT/SLP PROGRESS
"Physical Therapy Co-Treatment    Patient Name:  Roosevelt Moser   MRN:  [id number]    Recommendations:     Discharge Recommendations: Moderate Intensity Therapy  Discharge Equipment Recommendations: bedside commode, wheelchair, hospital bed, lift device  Barriers to discharge: None    Assessment:     Roosevelt Moser is a 73 y.o. male admitted with a medical diagnosis of Osteomyelitis of multiple sites. Patient required increased motivation to participate in today's session, with fairly poor tolerance to completed activities this date secondary to endorsed s/s of dizziness & bowel incontinence. Patient demonstrates that they will greatly benefit from moderate intensity skilled physical therapy services post-acutely. Performance deficits impacting function include weakness, impaired functional mobility, impaired balance, decreased upper extremity function, decreased lower extremity function, decreased safety awareness, decreased ROM, orthopedic precautions.    Rehab Prognosis: Fair; patient would benefit from acute skilled PT services to address these deficits and reach maximum level of function.    Recent Surgery: * No surgery found *      Plan:     During this hospitalization, patient to be seen 4 x/week to address the identified rehab impairments via gait training, therapeutic activities, therapeutic exercises, neuromuscular re-education and progress toward the following goals:    Plan of Care Expires:  08/08/24    Subjective     Chief Complaint: Dizziness  Patient/Family Comments/goals: "Can I lay down now"  Pain/Comfort:  Pain Rating 1: Unrated  Pain Rating Post-Intervention 1: Unrated      Objective:     Communicated with Nsg prior to session.  Patient found supine with PICC line, blood pressure cuff upon PT entry to room.     General Precautions: Standard, fall, contact, diabetic   Orthopedic Precautions:RLE weight bearing as tolerated, LUE partial weight bearing   Braces: N/A   Body mass index is 39.77 kg/m².  Oxygen " Device: Room Air  Vitals: /60 (BP Location: Right arm, Patient Position: Lying)   Pulse 86   Temp 98.4 °F (36.9 °C) (Oral)   Resp 18   Ht 6' (1.829 m)   Wt 133 kg (293 lb 3.4 oz)   SpO2 (!) 94%   BMI 39.77 kg/m²      Functional Mobility:  Bed Mobility:     Rolling Right: Maximum Assistance with HOB Flat  Boosting: Maximum Assistancex2 with HOB Flat & positioned in trendelenburg  Supine>Sit: x1, Maximum Assistancex2 with HOB Flat  Sit>Supine: x1, Total Assistancex2 with HOB Flat  *VC/TC for task sequencing  *Facilitation of UE management, trunk management, LE management    Transfers: Pt declined     Balance:   Static Sitting: Minimal Assistance - Moderate Assistance  Total Time Sitting: ~4 mins EOB  *VC/TC for upright posturing, forward gaze, object focus, core engagement, corrective WS, LE placement, UE assistance  *Facilitation of corrective WS    Static Standing: Pt declined    AM-PAC 6 CLICK MOBILITY  Turning over in bed (including adjusting bedclothes, sheets and blankets)?: 2  Sitting down on and standing up from a chair with arms (e.g., wheelchair, bedside commode, etc.): 1  Moving from lying on back to sitting on the side of the bed?: 2  Moving to and from a bed to a chair (including a wheelchair)?: 1  Need to walk in hospital room?: 1  Climbing 3-5 steps with a railing?: 1  Basic Mobility Total Score: 8     Treatment & Education:  Patient Education Provided on:  The role of physical therapy and how the patient can benefit from skilled services  The negative effects of prolonged bed rest/sedentary behavior, along with the importance of OOB activity & patient participation with PT  Discussed relationship between immobility with endorsed s/s of lightheadedness/dizziness & orthostatic hypotension  Discussed importance of position changes via turning & elevation of HOB for pressure management & skin protection  The importance of contacting RN, via call light, for mobility throughout the day  Pt white  board updated with current therapists name and level of mobility assistance needed.     Patient Verbalized understanding of all topics touched on this date. All questions answered within the PT scope of practice    Patient left supine with all lines intact, call button in reach, RN notified, and spouse present.    GOALS:   Multidisciplinary Problems       Physical Therapy Goals          Problem: Physical Therapy    Goal Priority Disciplines Outcome Goal Variances Interventions   Physical Therapy Goal     PT, PT/OT Progressing     Description: Goals to be met by: 24, goals extended to      Patient will increase functional independence with mobility by performin. Supine to sit with Moderate Assistance  2. Sit to supine with Moderate Assistance  3. Rolling to Left and Right with Minimal Assistance.  4. Sit to stand transfer with Moderate Assistance  5. Bed to chair transfer with Maximum Assistance using LRAD  6. Gait  x 10 feet with Maximum Assistance using LRAD.   7. Lower extremity exercise program x10 reps per handout, with independence    DME Justifications (see above for complete DME recommendations)    Hospital Bed ·Patient requires a hospital bed for positioning of the body in ways that are not feasible with an ordinary bed. The patient requires special positioning for pain relief, limited mobility, and/or being unable to independently make changes in body position without the use of a hospital bed. Pillows and wedges will not be adequate for resolving these positional issues.    Patient has a mobility limitation that significantly impairs their ability to participate in one or more mobility related activities of daily living in customary locations in the home. The mobility limitation cannot be sufficiently resolved by the use of a cane or walker. The use of a manual wheelchair will greatly improve the patient's ability to participate in MRADLs. The patient will use the wheelchair on a regular  basis at home. They have expressed their willingness to use a manual wheelchair in the home, and have a caregiver who is available and willing to assist with the wheelchair if needed.                        Time Tracking:     PT Received On: 07/23/24  PT Start Time: 1445     PT Stop Time: 1508  PT Total Time (min): 23 min     Billable Minutes: Neuromuscular Re-education 14  + 9 mins    Treatment Type: Treatment  PT/PTA: PT     Number of PTA visits since last PT visit: 0     07/23/2024

## 2024-07-23 NOTE — PROGRESS NOTES
"[facility] Medicine  Progress Note    Patient Name: Roosevelt Moser  MRN: [id number]  Patient Class: IP- Inpatient   Admission Date: 7/7/2024  Length of Stay: 16 days  Attending Physician: Milind Londono MD  Primary Care Provider: Miriam, Primary Doctor        Subjective:     Principal Problem:Osteomyelitis of multiple sites        HPI:  Roosevelt Moser is a 74yo M w/ A fib, HTN, T2DM, MARA, HLD, hx GBS, hx displaced comminuted fracture of the left humerus s/p fall 3/4/24 s/p ORIF 03/25/2024 who presents as a transfer for thoracic surgery and Plastic surgery evaluation in setting of "osteomyelitis of multiple ribs which will need further debridement and then a flap placed."    He was initially admitted to [facility] on 6/14 for progressive weakness, multiple falls, and decreased urine output.  He was admitted for management of MARBIN on CKD complicated by hyperkalemia, urosepsis, AFib with RVR.    "Creatinine improved during his stay. During his stay he was noted to have swelling of his left upper extremity. Imaging studies on June 17 showed concern for gas-forming infection along the left shoulder and air along the lateral left chest wall concerning for gas-forming organism. MRI of his right foot also showed evidence of toe osteomyelitis. He was seen by ID who adjusted antibiotics, and they also noted oral thrush. Podiatry evaluated him for the toe findings, and Orthopedic Surgery evaluated the shoulder abnormalities. Right toe wound grew MRSA. Oral anticoagulation was held, and Radiology aspirated fluid from his left shoulder on June 20. On June 21 he had left shoulder incision and drainage and removal of deep hardware including rods and screws. He underwent repeat irrigation and debridement of the left shoulder on June 24 with placement of a wound VAC. He had subsequent incision and drainage with removal of the wound VAC on June 27. He had amputation of his right 2nd toe by Podiatry on July 1. On " "July 3 he was noted to have dyspnea and mild tachycardia. CT chest showed a large abscess of the anterior central and left chest wall and abdominal wall with partial destruction of 3 ribs. He was seen by Cardiothoracic surgery. On July 4 he had left thoracentesis with aspiration of fluid. He subsequently had incision and drainage of the left chest wall abscess on July 4, and a drainage catheter was left in place. On July 5 he underwent right VATS for a loculated right pleural effusion, and a chest tube was left in place. He was transitioned to the ICU post-operatively. With concern for osteomyelitis of the ribs, concern is that he will need transfer to a facility with Thoracic Surgery and potentially Plastic Surgery along with higher level of care. Transfer center spoke with Thoracic Surgery at [facility]. Requesting transfer to Hospital Medicine at [facility] for Plastic Surgery and Thoracic Surgery evaluation. Transfer center spoke with Plastic Surgery on July 5, Thoracic Surgery on July 7. With his complicated medical presentation, will plan transfer to Hospital Medicine at [facility] in step-down status for further treatment. "    Prior to transfer, "He is awake and alert. He has a PICC line in place along with the right chest tube and left chest wall drain in place. He has no evidence of respiratory distress and is hemodynamically stable. Referring provider felt patient was stable for step-down status at present. He is currently on ceftaroline and meropenem. He is in contact isolation. July 7: Sodium 137, potassium 4.5, chloride 104, CO2 26, BUN 33, creatinine 1.1, glucose 130, albumin 2.1, AST 25, ALT 17, white blood cells 11.58, hemoglobin 8.1, hematocrit 26.2, platelets 389, procalcitonin 0.575, CRP greater than 16  VS:  Temperature 97.5°, pulse 85, respirations 20, blood pressure 112/63, O2 sats 95% "    Patient in no acute distress upon arrival.  Wife at bedside.  Patient denies any acute " complaints.        Imaging history reviewed:  July 6: X-rays of the left shoulder had fracture through the surgical neck of the humerus with lateral distraction of the distal component and overlapping segments on the order of 2.5 cm.  Overall no appreciable change upon comparison with prior imaging.  -chest x-ray noted manifestations of mild congestive heart failure bordering on mild pulmonary edema.  Right-sided thoracostomy tube terminating within the right apex.  No definite pneumothorax.        July 5: Pleural fluid Gram stain with few WBCs and no organisms seen  -pH 7.339, pCO2 50.9, pO2 392     July 4:  CPK less than 10, serum   -AFB smear from left chest abscess had no AFB seen, left chest abscess aerobic and anaerobic cultures have no growth  -pleural fluid protein 3.2, white blood cells 1108 (11% segs, 74% lymphs), , pH 7.63, pleural fluid culture had no growth  -ultrasound-guided thoracentesis removed 1.4-1.5 L     July 3: Blood cultures with no growth to date  -CT chest showed large abscess of the anterior central and left chest wall and abdominal wall measuring 17 cm transversely.  This extends into the mediastinum and peritoneum with partial destruction of the anterior 6th, 7th, and 8th ribs.  Complete atelectasis of the left lower lobe with moderate left pleural effusion.  Mild atelectasis right lung base with a small-to-moderate right pleural effusion.     July 2: X-rays of the left shoulder noted displaced left proximal humerus fracture status post hardware removal without change in alignment.     June 24: ECHO with EF 55-60%.     June 20:  Interventional Radiology did percutaneous aspiration of the left shoulder fluid collection.  No drainage catheter was left in place.     June 18:  Right toe wound MRSA  -CT left shoulder had subacute left humerus fracture post internal fixation.  Incomplete bony bridging across the fracture site with persistent angulation.  Severe arthropathy of the  "glenohumeral joint with multiple intra-articular bodies.  Large left joint effusion and adjacent soft tissue focal fluid collections containing gas and concerning for gas-forming infection.  Moderate size left pleural effusion.  Left basilar airspace disease.  -bilateral lower extremity Doppler arterial ultrasound had atherosclerotic plaque and calcification bilaterally without severe stenosis or occlusion identified on either side.     June 17: X-rays of the left humerus/shoulder had findings concerning for gas-forming infection along the left shoulder.  -x-rays of the left ribs showed air along the lateral chest wall soft tissue and axilla concerning for gas-forming infection.  -MRI of the right foot had findings consistent with osteomyelitis involving the middle distal phalanges of the 3rd toe.  Cellulitis.     June 14: Renal ultrasound had no hydronephrosis.  Elevated resistive index right kidney suggesting intrinsic renal disease.    Overview/Hospital Course:  Pt admitted to  as a transfer for thoracic surgery and Plastic surgery evaluation in setting of "osteomyelitis of multiple ribs which will need further debridement and then a flap placed." Thoracic Surgery, Plastic Surgery, ID, Ortho, Wound care, PT/OT consulted upon admission. Imaging ordered.  CT chest abdomen with IV contrast without evidence of osteomyelitis in ribs adjacent to I&D site of left chest wall abscess.; incidental finding of 1.2 cm obstructing stone in left distal ureter with mild hydroureteronephrosis.  Urology was consulted and CT renal stone study was completed; no acute intervention given no concern for infection around stone or MARBIN; follow-up outpatient unless decompensates.  MRI shoulder without contrast left notable for septic arthritis of left glenohumeral joint with acute osteomyelitis of the glenoid and proximal humerus in addition to known displaced fracture of proximal humeral metaphysis in addition to several large soft " tissue abscesses.  Patient underwent left shoulder aspiration with Orthopedic surgery on 07/09, continuing antibiotics with no further intervention planned.  Right chest tube was removed by thoracic surgery on 07/09. Drain previously placed for L chest wall abscess removed when output became minimal (7/14). To continue on IV Ceftaroline 600 mg q8 for 6 weeks therapy (EOT 8/11). LTAC placement pending     Interval History:  No complaints or acute issues, awaiting placement    Review of Systems   Unable to perform ROS: Other     Objective:     Vital Signs (Most Recent):  Temp: 98 °F (36.7 °C) (07/23/24 0757)  Pulse: 87 (07/23/24 1134)  Resp: 14 (07/23/24 0815)  BP: 110/71 (07/23/24 1134)  SpO2: 98 % (07/23/24 1135) Vital Signs (24h Range):  Temp:  [97.6 °F (36.4 °C)-98 °F (36.7 °C)] 98 °F (36.7 °C)  Pulse:  [] 87  Resp:  [14-20] 14  SpO2:  [94 %-99 %] 98 %  BP: ()/(58-73) 110/71     Weight: 133 kg (293 lb 3.4 oz)  Body mass index is 39.77 kg/m².    Intake/Output Summary (Last 24 hours) at 7/23/2024 1238  Last data filed at 7/23/2024 0905  Gross per 24 hour   Intake 686.93 ml   Output 600 ml   Net 86.93 ml           Physical Exam  Constitutional:       General: He is not in acute distress.     Appearance: He is obese.   HENT:      Head: Normocephalic.      Right Ear: External ear normal.      Left Ear: External ear normal.      Nose: Nose normal.   Cardiovascular:      Rate and Rhythm: Normal rate.      Heart sounds: Normal heart sounds.   Pulmonary:      Breath sounds: Normal breath sounds.   Abdominal:      Palpations: Abdomen is soft.      Tenderness: There is no abdominal tenderness.   Genitourinary:     Comments: Condom catheter  Musculoskeletal:      Comments: Debility   Skin:     Comments: Staples and dressing noted to anterior chest and left shoulder   Neurological:      Mental Status: He is alert and oriented to person, place, and time.            Assessment/Plan:      * Osteomyelitis of multiple  "sites  Osteomyelitis of right second toe s/p amputation 7/1    Chest wall abscess  Loculated pleural effusion  Mediastinal lymphadenopathy    Pyogenic arthritis of left shoulder region  Humerus fracture    OSH:  -status post left thoracentesis 1500cc serosanguineous fluid drained 7/4 & I&D drainage with chest tube placement 7/4 & loculated effusions right chest s/p VATS and chest tube 7/5   -Left shoulder prosthetic joint infection s/p I&D and removal of deep hardware 6/21 - all screws and nails removed, s/p 2nd washout 6/24 & 3rd washout 6/27   -Left Shoulder washouts on 6/21 ( abundant pus in shoulder, hardware removal), 6/24 ( bloody brownish fluid) and 6/27 ( no fluid collection, healthy red and beefy tissue)   -Right 3rd toe osteomyelitis s/p Dalvance x 2 doses & 2nd distal phalanx s/p I&D at bedside, s/p 2nd toe amputation 7/1       - Presents as a transfer for thoracic surgery and Plastic surgery evaluation in setting of concern for "osteomyelitis of multiple ribs which will need further debridement and then a flap placed" in the setting of chest wall abscess  -notably, 7/3 blood cultures, 7/4 pleural fluid cultures, 7/4 abscess cultures, 755 pleural fluid cultures NGTD  -6/18 right toe wound culture with MRSA  Plan:  -ID: Continue Ceftaroline 600 mg IV q.8; will need course of 6 weeks (EOT 8/11)   -Discussed alternative antibiotic with ID due to difficult placment due to abx cost, however unable to replace  -Podiatry consulted to address toe wound/possible suture removal  -Thoracic surgery removed right chest tube on 07/09; left chest accordion drain removed 7/14   -Remove staples at follow up appt  -ongoing wound care  -ID, Ortho, Thoracic surgery and Plastic surgery consulted and following; imaging findings per hospital course  -ortho surgery completed left shoulder aspiration 7/9 to assess shoulder abscess concern, minimal fluid aspirated, cont antibiotics for pyogenic arthritis  -thus far, no acute " surgical intervention per all surgical specialties      Continue antibiotics per ID recommendations.  Follow up with  re disposition planning    Primary disposition at this time rehab, for peer to peer on Monday.  SNF referral was also sent by .  Continue management    Bradycardia  -Bradycardic episode noted during PT eval on , asymptomatic  -Rhythm: atrial fibrillation  -Hold tonight's metoprolol      CKD (chronic kidney disease) stage 2, GFR 60-89 ml/min  Creatine stable for now. BMP reviewed- noted Estimated Creatinine Clearance: 94.3 mL/min (based on SCr of 1 mg/dL). according to latest data. Based on current GFR, CKD stage is stage 2 - GFR 60-89.  Monitor UOP and serial BMP and adjust therapy as needed. Renally dose meds. Avoid nephrotoxic medications and procedures.    Acute hypoxemic respiratory failure  -Ddx: decreased reserve from VATS vs volume overload (on fluids since )  -CXR noting blunting of CP angles  -: Lasix 40mg IV x1, good response. Additional dose on     Thyroid nodule  Incidental finding on imagin.7 cm left thyroid nodule. Nonemergent thyroid ultrasound is recommended.       Left renal stone  Nephrolithiasis     retroperitoneal ultrasound completed in setting of MARBIN without hydronephrosis    CT abdomen notable for 1.2 cm obstructing stone in left distal ureter with mild hydroureteronephrosis       Appreciate urology recommendations; given no concerns regarding MARBIN or infection relation to the stone, no acute surgical intervention  Follow-up outpatient; strain all urine    Cyst of right kidney  Incidental finding on imagin.8 cm septated cystic lesion in the right kidney, which is indeterminate for malignancy. Further evaluation with nonemergent renal mass protocol CT or MRI is recommended.       Sepsis  This patient does have evidence of infective focus  My overall impression is sepsis.  Source: Skin and Soft Tissue (location  "toe)  Antibiotics given-   Antibiotics (72h ago, onward)      Start     Stop Route Frequency Ordered    07/07/24 1545  ceftaroline fosamiL (TEFLARO) 600 mg in D5W 50 mL IVPB (MB+)         -- IV Every 8 hours (non-standard times) 07/07/24 1532    07/07/24 1545  meropenem (MERREM) 1 g in 0.9% NaCl 100 mL IVPB (MB+)         -- IV Every 8 hours (non-standard times) 07/07/24 1532          Latest lactate reviewed-  No results for input(s): "LACTATE", "POCLAC" in the last 72 hours.  Organ dysfunction indicated by Acute kidney injury    Fluid challenge Not needed - patient is not hypotensive      Post- resuscitation assessment No - Post resuscitation assessment not needed       Will Not start Pressors- Levophed for MAP of 65  Source control achieved by: abx    Debility  Patient with Acute on chronic debility due to  multiple infections . Latest AMPAC and GEMS scores have not been reviewed. Evaluation for etiology is complete. Plan includes progressive mobility protocol initated, PT/OT consulted, and fall precautions in place.    Acute renal failure superimposed on chronic kidney disease  Patient with acute kidney injury/acute renal failure likely due to  due to ATN due to sepsis due to UTI and/or PNA  MARBIN is currently improving. Baseline creatinine  wnl  - Labs reviewed- Renal function/electrolytes with Estimated Creatinine Clearance: 104.7 mL/min (based on SCr of 0.9 mg/dL). according to latest data. Monitor urine output and serial BMP and adjust therapy as needed. Avoid nephrotoxins and renally dose meds for GFR listed above.    Atrial fibrillation with rapid ventricular response  Patient with Long standing persistent (>12 months) atrial fibrillation which is controlled currently with Beta Blocker. Patient is currently in TBD.[id number] Score: 2.      Chronic AF w/ acute RVR at OSH, resolved s/p cardizem drip   NOAC has been on hold d/t possible need for surgery - has been on prophylaxis dose of Lovenox  Resumed " anticoagulation given no plan for further surgical intervention     History of Guillain-[place] syndrome  No acute issues  However, pt has impaired mobility and is acutely weakened from his sepsis/UTI/AF RVR and need placement       Type 2 diabetes mellitus  Patient's FSGs are controlled on current medication regimen.  Last A1c reviewed-   Lab Results   Component Value Date    HGBA1C 6.3 (H) 06/14/2024     Most recent fingerstick glucose reviewed-   Recent Labs   Lab 07/10/24  1607 07/10/24  2002   POCTGLUCOSE 181* 178*     Current correctional scale  Low  Maintain anti-hyperglycemic dose as follows-   Antihyperglycemics (From admission, onward)      Start     Stop Route Frequency Ordered    07/08/24 0900  insulin glargine U-100 (Lantus) pen 15 Units         -- SubQ Daily 07/07/24 1532    07/07/24 1529  insulin aspart U-100 pen 0-10 Units         -- SubQ Before meals & nightly PRN 07/07/24 1532          Hold Oral hypoglycemics while patient is in the hospital.    Hypertension  Chronic, controlled. Latest blood pressure and vitals reviewed-     Temp:  [97.8 °F (36.6 °C)-98.8 °F (37.1 °C)]   Pulse:  [65-91]   Resp:  [2-24]   BP: (100-145)/(56-81)   SpO2:  [90 %-99 %] .   Home meds for hypertension were reviewed and noted below.   Hypertension Medications               hydrALAZINE (APRESOLINE) 25 MG tablet Take 1 tablet (25 mg total) by mouth every 12 (twelve) hours.    metoprolol succinate (TOPROL-XL) 100 MG 24 hr tablet Take 1 tablet (100 mg total) by mouth once daily.            While in the hospital, will manage blood pressure as follows; continue home metoprolol    Will utilize p.r.n. blood pressure medication only if patient's blood pressure greater than 180/110 and he develops symptoms such as worsening chest pain or shortness of breath.    MARA (obstructive sleep apnea)  Continue CPAP HS and w/ naps        Morbid obesity  There is no height or weight on file to calculate BMI. Morbid obesity complicates all aspects  of disease management from diagnostic modalities to treatment. Weight loss encouraged and health benefits explained to patient.           VTE Risk Mitigation (From admission, onward)           Ordered     apixaban tablet 5 mg  2 times daily         07/17/24 1245     Place sequential compression device  Until discontinued         07/07/24 1532                    Discharge Planning   KAMILA: 7/24/2024     Code Status: DNR   Is the patient medically ready for discharge?:     Reason for patient still in hospital (select all that apply): Patient trending condition  Discharge Plan A: Skilled Nursing Facility                  Milind Londono MD  Department of Hospital Medicine   [facility]

## 2024-07-24 LAB
ANION GAP SERPL CALC-SCNC: 11 MMOL/L (ref 8–16)
BASOPHILS # BLD AUTO: 0.1 K/UL (ref 0–0.2)
BASOPHILS NFR BLD: 1 % (ref 0–1.9)
BUN SERPL-MCNC: 46 MG/DL (ref 8–23)
CALCIUM SERPL-MCNC: 9.1 MG/DL (ref 8.7–10.5)
CHLORIDE SERPL-SCNC: 102 MMOL/L (ref 95–110)
CO2 SERPL-SCNC: 21 MMOL/L (ref 23–29)
CREAT SERPL-MCNC: 1.2 MG/DL (ref 0.5–1.4)
DIFFERENTIAL METHOD BLD: ABNORMAL
EOSINOPHIL # BLD AUTO: 0.8 K/UL (ref 0–0.5)
EOSINOPHIL NFR BLD: 8.2 % (ref 0–8)
ERYTHROCYTE [DISTWIDTH] IN BLOOD BY AUTOMATED COUNT: 17.7 % (ref 11.5–14.5)
EST. GFR  (NO RACE VARIABLE): >60 ML/MIN/1.73 M^2
GLUCOSE SERPL-MCNC: 140 MG/DL (ref 70–110)
HCT VFR BLD AUTO: 30.7 % (ref 40–54)
HGB BLD-MCNC: 9.4 G/DL (ref 14–18)
IMM GRANULOCYTES # BLD AUTO: 0.07 K/UL (ref 0–0.04)
IMM GRANULOCYTES NFR BLD AUTO: 0.7 % (ref 0–0.5)
LYMPHOCYTES # BLD AUTO: 1.5 K/UL (ref 1–4.8)
LYMPHOCYTES NFR BLD: 15.2 % (ref 18–48)
MAGNESIUM SERPL-MCNC: 1.6 MG/DL (ref 1.6–2.6)
MCH RBC QN AUTO: 24.1 PG (ref 27–31)
MCHC RBC AUTO-ENTMCNC: 30.6 G/DL (ref 32–36)
MCV RBC AUTO: 79 FL (ref 82–98)
MONOCYTES # BLD AUTO: 1.4 K/UL (ref 0.3–1)
MONOCYTES NFR BLD: 13.7 % (ref 4–15)
NEUTROPHILS # BLD AUTO: 6.2 K/UL (ref 1.8–7.7)
NEUTROPHILS NFR BLD: 61.2 % (ref 38–73)
NRBC BLD-RTO: 0 /100 WBC
PHOSPHATE SERPL-MCNC: 3.5 MG/DL (ref 2.7–4.5)
PLATELET # BLD AUTO: 348 K/UL (ref 150–450)
PMV BLD AUTO: 9.7 FL (ref 9.2–12.9)
POCT GLUCOSE: 164 MG/DL (ref 70–110)
POCT GLUCOSE: 204 MG/DL (ref 70–110)
POCT GLUCOSE: 219 MG/DL (ref 70–110)
POCT GLUCOSE: 220 MG/DL (ref 70–110)
POCT GLUCOSE: 248 MG/DL (ref 70–110)
POTASSIUM SERPL-SCNC: 3.7 MMOL/L (ref 3.5–5.1)
RBC # BLD AUTO: 3.9 M/UL (ref 4.6–6.2)
SODIUM SERPL-SCNC: 134 MMOL/L (ref 136–145)
WBC # BLD AUTO: 10.03 K/UL (ref 3.9–12.7)

## 2024-07-24 PROCEDURE — 94761 N-INVAS EAR/PLS OXIMETRY MLT: CPT

## 2024-07-24 PROCEDURE — 99900035 HC TECH TIME PER 15 MIN (STAT)

## 2024-07-24 PROCEDURE — 94660 CPAP INITIATION&MGMT: CPT

## 2024-07-24 PROCEDURE — 85025 COMPLETE CBC W/AUTO DIFF WBC: CPT | Performed by: STUDENT IN AN ORGANIZED HEALTH CARE EDUCATION/TRAINING PROGRAM

## 2024-07-24 PROCEDURE — 27000207 HC ISOLATION

## 2024-07-24 PROCEDURE — 36415 COLL VENOUS BLD VENIPUNCTURE: CPT | Performed by: STUDENT IN AN ORGANIZED HEALTH CARE EDUCATION/TRAINING PROGRAM

## 2024-07-24 PROCEDURE — A4216 STERILE WATER/SALINE, 10 ML: HCPCS | Performed by: STUDENT IN AN ORGANIZED HEALTH CARE EDUCATION/TRAINING PROGRAM

## 2024-07-24 PROCEDURE — 25000003 PHARM REV CODE 250: Performed by: STUDENT IN AN ORGANIZED HEALTH CARE EDUCATION/TRAINING PROGRAM

## 2024-07-24 PROCEDURE — 83735 ASSAY OF MAGNESIUM: CPT | Performed by: STUDENT IN AN ORGANIZED HEALTH CARE EDUCATION/TRAINING PROGRAM

## 2024-07-24 PROCEDURE — 27100171 HC OXYGEN HIGH FLOW UP TO 24 HOURS

## 2024-07-24 PROCEDURE — 94799 UNLISTED PULMONARY SVC/PX: CPT

## 2024-07-24 PROCEDURE — 63600175 PHARM REV CODE 636 W HCPCS: Mod: JZ,JG | Performed by: STUDENT IN AN ORGANIZED HEALTH CARE EDUCATION/TRAINING PROGRAM

## 2024-07-24 PROCEDURE — 20600001 HC STEP DOWN PRIVATE ROOM

## 2024-07-24 PROCEDURE — 80048 BASIC METABOLIC PNL TOTAL CA: CPT | Performed by: STUDENT IN AN ORGANIZED HEALTH CARE EDUCATION/TRAINING PROGRAM

## 2024-07-24 PROCEDURE — 84100 ASSAY OF PHOSPHORUS: CPT | Performed by: STUDENT IN AN ORGANIZED HEALTH CARE EDUCATION/TRAINING PROGRAM

## 2024-07-24 RX ADMIN — CEFTAROLINE FOSAMIL 600 MG: 600 POWDER, FOR SOLUTION INTRAVENOUS at 05:07

## 2024-07-24 RX ADMIN — CEFTAROLINE FOSAMIL 600 MG: 600 POWDER, FOR SOLUTION INTRAVENOUS at 02:07

## 2024-07-24 RX ADMIN — Medication 10 ML: at 11:07

## 2024-07-24 RX ADMIN — INSULIN ASPART 4 UNITS: 100 INJECTION, SOLUTION INTRAVENOUS; SUBCUTANEOUS at 11:07

## 2024-07-24 RX ADMIN — INSULIN ASPART 2 UNITS: 100 INJECTION, SOLUTION INTRAVENOUS; SUBCUTANEOUS at 09:07

## 2024-07-24 RX ADMIN — FERROUS SULFATE TAB EC 325 MG (65 MG FE EQUIVALENT) 1 EACH: 325 (65 FE) TABLET DELAYED RESPONSE at 09:07

## 2024-07-24 RX ADMIN — POLYETHYLENE GLYCOL 3350 34 G: 17 POWDER, FOR SOLUTION ORAL at 10:07

## 2024-07-24 RX ADMIN — INSULIN ASPART 4 UNITS: 100 INJECTION, SOLUTION INTRAVENOUS; SUBCUTANEOUS at 05:07

## 2024-07-24 RX ADMIN — DIPHENHYDRAMINE HYDROCHLORIDE 10 ML: 25 SOLUTION ORAL at 02:07

## 2024-07-24 RX ADMIN — APIXABAN 5 MG: 5 TABLET, FILM COATED ORAL at 08:07

## 2024-07-24 RX ADMIN — LIDOCAINE 5% 1 PATCH: 700 PATCH TOPICAL at 05:07

## 2024-07-24 RX ADMIN — INSULIN GLARGINE 15 UNITS: 100 INJECTION, SOLUTION SUBCUTANEOUS at 09:07

## 2024-07-24 RX ADMIN — DIPHENHYDRAMINE HYDROCHLORIDE 10 ML: 25 SOLUTION ORAL at 09:07

## 2024-07-24 RX ADMIN — Medication 10 ML: at 05:07

## 2024-07-24 RX ADMIN — Medication 10 ML: at 04:07

## 2024-07-24 RX ADMIN — DIPHENHYDRAMINE HYDROCHLORIDE 10 ML: 25 SOLUTION ORAL at 05:07

## 2024-07-24 RX ADMIN — APIXABAN 5 MG: 5 TABLET, FILM COATED ORAL at 09:07

## 2024-07-24 RX ADMIN — Medication 10 ML: at 12:07

## 2024-07-24 RX ADMIN — DIPHENHYDRAMINE HYDROCHLORIDE 10 ML: 25 SOLUTION ORAL at 08:07

## 2024-07-24 RX ADMIN — CEFTAROLINE FOSAMIL 600 MG: 600 POWDER, FOR SOLUTION INTRAVENOUS at 10:07

## 2024-07-24 RX ADMIN — INSULIN ASPART 2 UNITS: 100 INJECTION, SOLUTION INTRAVENOUS; SUBCUTANEOUS at 10:07

## 2024-07-24 NOTE — PROGRESS NOTES
"[facility]  Progress Note    Patient Name: Roosevelt Moser  MRN: [id number]  Patient Class: IP- Inpatient   Admission Date: 7/7/2024  Length of Stay: 17 days  Attending Physician: Milind Londono MD  Primary Care Provider: Miriam, Primary Doctor        Subjective:     Principal Problem:Osteomyelitis of multiple sites        HPI:  Roosevelt Moser is a 72yo M w/ A fib, HTN, T2DM, MARA, HLD, hx GBS, hx displaced comminuted fracture of the left humerus s/p fall 3/4/24 s/p ORIF 03/25/2024 who presents as a transfer for thoracic surgery and Plastic surgery evaluation in setting of "osteomyelitis of multiple ribs which will need further debridement and then a flap placed."    He was initially admitted to [facility] on 6/14 for progressive weakness, multiple falls, and decreased urine output.  He was admitted for management of MARBIN on CKD complicated by hyperkalemia, urosepsis, AFib with RVR.    "Creatinine improved during his stay. During his stay he was noted to have swelling of his left upper extremity. Imaging studies on June 17 showed concern for gas-forming infection along the left shoulder and air along the lateral left chest wall concerning for gas-forming organism. MRI of his right foot also showed evidence of toe osteomyelitis. He was seen by ID who adjusted antibiotics, and they also noted oral thrush. Podiatry evaluated him for the toe findings, and Orthopedic Surgery evaluated the shoulder abnormalities. Right toe wound grew MRSA. Oral anticoagulation was held, and Radiology aspirated fluid from his left shoulder on June 20. On June 21 he had left shoulder incision and drainage and removal of deep hardware including rods and screws. He underwent repeat irrigation and debridement of the left shoulder on June 24 with placement of a wound VAC. He had subsequent incision and drainage with removal of the wound VAC on June 27. He had amputation of his right 2nd toe by Podiatry on July 1. On " "July 3 he was noted to have dyspnea and mild tachycardia. CT chest showed a large abscess of the anterior central and left chest wall and abdominal wall with partial destruction of 3 ribs. He was seen by Cardiothoracic surgery. On July 4 he had left thoracentesis with aspiration of fluid. He subsequently had incision and drainage of the left chest wall abscess on July 4, and a drainage catheter was left in place. On July 5 he underwent right VATS for a loculated right pleural effusion, and a chest tube was left in place. He was transitioned to the ICU post-operatively. With concern for osteomyelitis of the ribs, concern is that he will need transfer to a facility with Thoracic Surgery and potentially Plastic Surgery along with higher level of care. Transfer center spoke with Thoracic Surgery at [facility]. Requesting transfer to Hospital Medicine at [facility] for Plastic Surgery and Thoracic Surgery evaluation. Transfer center spoke with Plastic Surgery on July 5, Thoracic Surgery on July 7. With his complicated medical presentation, will plan transfer to Hospital Medicine at [facility] in step-down status for further treatment. "    Prior to transfer, "He is awake and alert. He has a PICC line in place along with the right chest tube and left chest wall drain in place. He has no evidence of respiratory distress and is hemodynamically stable. Referring provider felt patient was stable for step-down status at present. He is currently on ceftaroline and meropenem. He is in contact isolation. July 7: Sodium 137, potassium 4.5, chloride 104, CO2 26, BUN 33, creatinine 1.1, glucose 130, albumin 2.1, AST 25, ALT 17, white blood cells 11.58, hemoglobin 8.1, hematocrit 26.2, platelets 389, procalcitonin 0.575, CRP greater than 16  VS:  Temperature 97.5°, pulse 85, respirations 20, blood pressure 112/63, O2 sats 95% "    Patient in no acute distress upon arrival.  Wife at bedside.  Patient denies any acute " complaints.        Imaging history reviewed:  July 6: X-rays of the left shoulder had fracture through the surgical neck of the humerus with lateral distraction of the distal component and overlapping segments on the order of 2.5 cm.  Overall no appreciable change upon comparison with prior imaging.  -chest x-ray noted manifestations of mild congestive heart failure bordering on mild pulmonary edema.  Right-sided thoracostomy tube terminating within the right apex.  No definite pneumothorax.        July 5: Pleural fluid Gram stain with few WBCs and no organisms seen  -pH 7.339, pCO2 50.9, pO2 392     July 4:  CPK less than 10, serum   -AFB smear from left chest abscess had no AFB seen, left chest abscess aerobic and anaerobic cultures have no growth  -pleural fluid protein 3.2, white blood cells 1108 (11% segs, 74% lymphs), , pH 7.63, pleural fluid culture had no growth  -ultrasound-guided thoracentesis removed 1.4-1.5 L     July 3: Blood cultures with no growth to date  -CT chest showed large abscess of the anterior central and left chest wall and abdominal wall measuring 17 cm transversely.  This extends into the mediastinum and peritoneum with partial destruction of the anterior 6th, 7th, and 8th ribs.  Complete atelectasis of the left lower lobe with moderate left pleural effusion.  Mild atelectasis right lung base with a small-to-moderate right pleural effusion.     July 2: X-rays of the left shoulder noted displaced left proximal humerus fracture status post hardware removal without change in alignment.     June 24: ECHO with EF 55-60%.     June 20:  Interventional Radiology did percutaneous aspiration of the left shoulder fluid collection.  No drainage catheter was left in place.     June 18:  Right toe wound MRSA  -CT left shoulder had subacute left humerus fracture post internal fixation.  Incomplete bony bridging across the fracture site with persistent angulation.  Severe arthropathy of the  "glenohumeral joint with multiple intra-articular bodies.  Large left joint effusion and adjacent soft tissue focal fluid collections containing gas and concerning for gas-forming infection.  Moderate size left pleural effusion.  Left basilar airspace disease.  -bilateral lower extremity Doppler arterial ultrasound had atherosclerotic plaque and calcification bilaterally without severe stenosis or occlusion identified on either side.     June 17: X-rays of the left humerus/shoulder had findings concerning for gas-forming infection along the left shoulder.  -x-rays of the left ribs showed air along the lateral chest wall soft tissue and axilla concerning for gas-forming infection.  -MRI of the right foot had findings consistent with osteomyelitis involving the middle distal phalanges of the 3rd toe.  Cellulitis.     June 14: Renal ultrasound had no hydronephrosis.  Elevated resistive index right kidney suggesting intrinsic renal disease.    Overview/Hospital Course:  Pt admitted to  as a transfer for thoracic surgery and Plastic surgery evaluation in setting of "osteomyelitis of multiple ribs which will need further debridement and then a flap placed." Thoracic Surgery, Plastic Surgery, ID, Ortho, Wound care, PT/OT consulted upon admission. Imaging ordered.  CT chest abdomen with IV contrast without evidence of osteomyelitis in ribs adjacent to I&D site of left chest wall abscess.; incidental finding of 1.2 cm obstructing stone in left distal ureter with mild hydroureteronephrosis.  Urology was consulted and CT renal stone study was completed; no acute intervention given no concern for infection around stone or MARBIN; follow-up outpatient unless decompensates.  MRI shoulder without contrast left notable for septic arthritis of left glenohumeral joint with acute osteomyelitis of the glenoid and proximal humerus in addition to known displaced fracture of proximal humeral metaphysis in addition to several large soft " tissue abscesses.  Patient underwent left shoulder aspiration with Orthopedic surgery on 07/09, continuing antibiotics with no further intervention planned.  Right chest tube was removed by thoracic surgery on 07/09. Drain previously placed for L chest wall abscess removed when output became minimal (7/14). To continue on IV Ceftaroline 600 mg q8 for 6 weeks therapy (EOT 8/11). LTAC placement pending     Interval History:  No complaints or acute issues, awaiting placement    Review of Systems   Unable to perform ROS: Other     Objective:     Vital Signs (Most Recent):  Temp: 98.2 °F (36.8 °C) (07/24/24 1146)  Pulse: 86 (07/24/24 1454)  Resp: 18 (07/24/24 1146)  BP: 131/61 (07/24/24 1146)  SpO2: 95 % (07/24/24 1300) Vital Signs (24h Range):  Temp:  [98.1 °F (36.7 °C)-98.7 °F (37.1 °C)] 98.2 °F (36.8 °C)  Pulse:  [] 86  Resp:  [18-20] 18  SpO2:  [92 %-97 %] 95 %  BP: ()/(54-71) 131/61     Weight: 133 kg (293 lb 3.4 oz)  Body mass index is 39.77 kg/m².    Intake/Output Summary (Last 24 hours) at 7/24/2024 1613  Last data filed at 7/23/2024 2234  Gross per 24 hour   Intake 841.32 ml   Output 850 ml   Net -8.68 ml           Physical Exam  Constitutional:       General: He is not in acute distress.     Appearance: He is obese.   HENT:      Head: Normocephalic.      Right Ear: External ear normal.      Left Ear: External ear normal.      Nose: Nose normal.   Cardiovascular:      Rate and Rhythm: Normal rate.      Heart sounds: Normal heart sounds.   Pulmonary:      Breath sounds: Normal breath sounds.   Abdominal:      Palpations: Abdomen is soft.      Tenderness: There is no abdominal tenderness.   Genitourinary:     Comments: Condom catheter  Musculoskeletal:      Comments: Debility   Skin:     Comments: Staples and dressing noted to anterior chest and left shoulder   Neurological:      Mental Status: He is alert and oriented to person, place, and time.         Significant Labs: All pertinent labs within the  "past 24 hours have been reviewed.      Assessment/Plan:      * Osteomyelitis of multiple sites  Osteomyelitis of right second toe s/p amputation 7/1    Chest wall abscess  Loculated pleural effusion  Mediastinal lymphadenopathy    Pyogenic arthritis of left shoulder region  Humerus fracture    OSH:  -status post left thoracentesis 1500cc serosanguineous fluid drained 7/4 & I&D drainage with chest tube placement 7/4 & loculated effusions right chest s/p VATS and chest tube 7/5   -Left shoulder prosthetic joint infection s/p I&D and removal of deep hardware 6/21 - all screws and nails removed, s/p 2nd washout 6/24 & 3rd washout 6/27   -Left Shoulder washouts on 6/21 ( abundant pus in shoulder, hardware removal), 6/24 ( bloody brownish fluid) and 6/27 ( no fluid collection, healthy red and beefy tissue)   -Right 3rd toe osteomyelitis s/p Dalvance x 2 doses & 2nd distal phalanx s/p I&D at bedside, s/p 2nd toe amputation 7/1       - Presents as a transfer for thoracic surgery and Plastic surgery evaluation in setting of concern for "osteomyelitis of multiple ribs which will need further debridement and then a flap placed" in the setting of chest wall abscess  -notably, 7/3 blood cultures, 7/4 pleural fluid cultures, 7/4 abscess cultures, 755 pleural fluid cultures NGTD  -6/18 right toe wound culture with MRSA  Plan:  -ID: Continue Ceftaroline 600 mg IV q.8; will need course of 6 weeks (EOT 8/11)   -Discussed alternative antibiotic with ID due to difficult placment due to abx cost, however unable to replace  -Podiatry consulted to address toe wound/possible suture removal  -Thoracic surgery removed right chest tube on 07/09; left chest accordion drain removed 7/14   -Remove staples at follow up appt  -ongoing wound care  -ID, Ortho, Thoracic surgery and Plastic surgery consulted and following; imaging findings per hospital course  -ortho surgery completed left shoulder aspiration 7/9 to assess shoulder abscess concern, " minimal fluid aspirated, cont antibiotics for pyogenic arthritis  -thus far, no acute surgical intervention per all surgical specialties      Continue antibiotics per ID recommendations.  Follow up with  re disposition planning    Primary disposition at this time rehab, for peer to peer on Monday.  SNF referral was also sent by .  Continue management    Bradycardia  -Bradycardic episode noted during PT eval on , asymptomatic  -Rhythm: atrial fibrillation  -Hold tonight's metoprolol      CKD (chronic kidney disease) stage 2, GFR 60-89 ml/min  Creatine stable for now. BMP reviewed- noted Estimated Creatinine Clearance: 94.3 mL/min (based on SCr of 1 mg/dL). according to latest data. Based on current GFR, CKD stage is stage 2 - GFR 60-89.  Monitor UOP and serial BMP and adjust therapy as needed. Renally dose meds. Avoid nephrotoxic medications and procedures.    Acute hypoxemic respiratory failure  -Ddx: decreased reserve from VATS vs volume overload (on fluids since )  -CXR noting blunting of CP angles  -: Lasix 40mg IV x1, good response. Additional dose on     Thyroid nodule  Incidental finding on imagin.7 cm left thyroid nodule. Nonemergent thyroid ultrasound is recommended.       Left renal stone  Nephrolithiasis     retroperitoneal ultrasound completed in setting of MARBIN without hydronephrosis    CT abdomen notable for 1.2 cm obstructing stone in left distal ureter with mild hydroureteronephrosis       Appreciate urology recommendations; given no concerns regarding MARBIN or infection relation to the stone, no acute surgical intervention  Follow-up outpatient; strain all urine    Cyst of right kidney  Incidental finding on imagin.8 cm septated cystic lesion in the right kidney, which is indeterminate for malignancy. Further evaluation with nonemergent renal mass protocol CT or MRI is recommended.       Sepsis  This patient does have evidence of infective  "focus  My overall impression is sepsis.  Source: Skin and Soft Tissue (location toe)  Antibiotics given-   Antibiotics (72h ago, onward)      Start     Stop Route Frequency Ordered    07/07/24 1545  ceftaroline fosamiL (TEFLARO) 600 mg in D5W 50 mL IVPB (MB+)         -- IV Every 8 hours (non-standard times) 07/07/24 1532    07/07/24 1545  meropenem (MERREM) 1 g in 0.9% NaCl 100 mL IVPB (MB+)         -- IV Every 8 hours (non-standard times) 07/07/24 1532          Latest lactate reviewed-  No results for input(s): "LACTATE", "POCLAC" in the last 72 hours.  Organ dysfunction indicated by Acute kidney injury    Fluid challenge Not needed - patient is not hypotensive      Post- resuscitation assessment No - Post resuscitation assessment not needed       Will Not start Pressors- Levophed for MAP of 65  Source control achieved by: abx    Debility  Patient with Acute on chronic debility due to  multiple infections . Latest AMPAC and GEMS scores have not been reviewed. Evaluation for etiology is complete. Plan includes progressive mobility protocol initated, PT/OT consulted, and fall precautions in place.    Acute renal failure superimposed on chronic kidney disease  Patient with acute kidney injury/acute renal failure likely due to  due to ATN due to sepsis due to UTI and/or PNA  MARBIN is currently improving. Baseline creatinine  wnl  - Labs reviewed- Renal function/electrolytes with Estimated Creatinine Clearance: 104.7 mL/min (based on SCr of 0.9 mg/dL). according to latest data. Monitor urine output and serial BMP and adjust therapy as needed. Avoid nephrotoxins and renally dose meds for GFR listed above.    Atrial fibrillation with rapid ventricular response  Patient with Long standing persistent (>12 months) atrial fibrillation which is controlled currently with Beta Blocker. Patient is currently in TBD.BRJWB2CJAb Score: 2.      Chronic AF w/ acute RVR at OSH, resolved s/p cardizem drip   NOAC has been on hold d/t " possible need for surgery - has been on prophylaxis dose of Lovenox  Resumed anticoagulation given no plan for further surgical intervention     History of Guillain-Hamilton syndrome  No acute issues  However, pt has impaired mobility and is acutely weakened from his sepsis/UTI/AF RVR and need placement       Type 2 diabetes mellitus  Patient's FSGs are controlled on current medication regimen.  Last A1c reviewed-   Lab Results   Component Value Date    HGBA1C 6.3 (H) 06/14/2024     Most recent fingerstick glucose reviewed-   Recent Labs   Lab 07/10/24  1607 07/10/24  2002   POCTGLUCOSE 181* 178*     Current correctional scale  Low  Maintain anti-hyperglycemic dose as follows-   Antihyperglycemics (From admission, onward)      Start     Stop Route Frequency Ordered    07/08/24 0900  insulin glargine U-100 (Lantus) pen 15 Units         -- SubQ Daily 07/07/24 1532    07/07/24 1529  insulin aspart U-100 pen 0-10 Units         -- SubQ Before meals & nightly PRN 07/07/24 1532          Hold Oral hypoglycemics while patient is in the hospital.    Hypertension  Chronic, controlled. Latest blood pressure and vitals reviewed-     Temp:  [97.8 °F (36.6 °C)-98.8 °F (37.1 °C)]   Pulse:  [65-91]   Resp:  [2-24]   BP: (100-145)/(56-81)   SpO2:  [90 %-99 %] .   Home meds for hypertension were reviewed and noted below.   Hypertension Medications               hydrALAZINE (APRESOLINE) 25 MG tablet Take 1 tablet (25 mg total) by mouth every 12 (twelve) hours.    metoprolol succinate (TOPROL-XL) 100 MG 24 hr tablet Take 1 tablet (100 mg total) by mouth once daily.            While in the hospital, will manage blood pressure as follows; continue home metoprolol    Will utilize p.r.n. blood pressure medication only if patient's blood pressure greater than 180/110 and he develops symptoms such as worsening chest pain or shortness of breath.    MARA (obstructive sleep apnea)  Continue CPAP HS and w/ naps        Morbid obesity  There is no  height or weight on file to calculate BMI. Morbid obesity complicates all aspects of disease management from diagnostic modalities to treatment. Weight loss encouraged and health benefits explained to patient.           VTE Risk Mitigation (From admission, onward)           Ordered     apixaban tablet 5 mg  2 times daily         07/17/24 1245     Place sequential compression device  Until discontinued         07/07/24 1532                    Discharge Planning   KAMILA: 7/25/2024     Code Status: DNR   Is the patient medically ready for discharge?:     Reason for patient still in hospital (select all that apply): Patient trending condition  Discharge Plan A: Skilled Nursing Facility                  Milind Londono MD  Department of Hospital Medicine   Andres Sierra  LESLIE

## 2024-07-24 NOTE — SUBJECTIVE & OBJECTIVE
Interval History:  No complaints or acute issues, awaiting placement    Review of Systems   Unable to perform ROS: Other     Objective:     Vital Signs (Most Recent):  Temp: 98.2 °F (36.8 °C) (07/24/24 1146)  Pulse: 86 (07/24/24 1454)  Resp: 18 (07/24/24 1146)  BP: 131/61 (07/24/24 1146)  SpO2: 95 % (07/24/24 1300) Vital Signs (24h Range):  Temp:  [98.1 °F (36.7 °C)-98.7 °F (37.1 °C)] 98.2 °F (36.8 °C)  Pulse:  [] 86  Resp:  [18-20] 18  SpO2:  [92 %-97 %] 95 %  BP: ()/(54-71) 131/61     Weight: 133 kg (293 lb 3.4 oz)  Body mass index is 39.77 kg/m².    Intake/Output Summary (Last 24 hours) at 7/24/2024 1613  Last data filed at 7/23/2024 2234  Gross per 24 hour   Intake 841.32 ml   Output 850 ml   Net -8.68 ml           Physical Exam  Constitutional:       General: He is not in acute distress.     Appearance: He is obese.   HENT:      Head: Normocephalic.      Right Ear: External ear normal.      Left Ear: External ear normal.      Nose: Nose normal.   Cardiovascular:      Rate and Rhythm: Normal rate.      Heart sounds: Normal heart sounds.   Pulmonary:      Breath sounds: Normal breath sounds.   Abdominal:      Palpations: Abdomen is soft.      Tenderness: There is no abdominal tenderness.   Genitourinary:     Comments: Condom catheter  Musculoskeletal:      Comments: Debility   Skin:     Comments: Staples and dressing noted to anterior chest and left shoulder   Neurological:      Mental Status: He is alert and oriented to person, place, and time.         Significant Labs: All pertinent labs within the past 24 hours have been reviewed.

## 2024-07-24 NOTE — PROGRESS NOTES
Pt seen for wound care follow up. Pt sleeping but easily woke to name being called. Pt said dressings were just changed but agreed to assessment of chest wounds; see below. Pt did not want to turn for sacral assessment as he said staff just turned him and applied cream. Wound care orders in place. Pt also mentioned he was being discharged tomorrow to a facility for continued care.     Brooklynn Flores RN, [facility] Wound/Ostomy  7/24/24 07/24/24 1750   WOCN Assessment   WOCN Total Time (mins) 30   Visit Date 07/24/24   Visit Time 1750   Consult Type Follow Up   WOCN Speciality Wound   Wound moisture;pressure;surgical;skin tear;shearing   Number of Wounds 5   Intervention assessed;chart review   Teaching on-going        Wound 07/04/24 1118 Incision Left lateral Upper quadrant   Date First Assessed/Time First Assessed: 07/04/24 1118   Present on Original Admission: No  Primary Wound Type: Incision  Side: Left  Orientation: lateral  Location: Upper quadrant   Wound Image    Dressing Appearance Clean;Intact;Moist drainage   Drainage Amount Scant   Drainage Characteristics/Odor Serosanguineous   Appearance Red;Slough;Yellow   Periwound Area Intact;Dry   Wound Edges Defined        Wound 07/16/24 1215 Incision Right lateral Upper quadrant other (see comments)   Date First Assessed/Time First Assessed: 07/16/24 1215   Primary Wound Type: (c) Incision  Side: Right  Orientation: lateral  Location: Upper quadrant  Incision Type: (c) other (see comments)   Wound Image    Dressing Appearance Intact;Clean;Moist drainage   Drainage Amount Scant   Drainage Characteristics/Odor Serosanguineous   Appearance Yellow;Red;Slough

## 2024-07-24 NOTE — PLAN OF CARE
Problem: Wound  Goal: Optimal Coping  Outcome: Progressing     Problem: Wound  Goal: Skin Health and Integrity  Outcome: Progressing     Problem: Fall Injury Risk  Goal: Absence of Fall and Fall-Related Injury  Outcome: Progressing

## 2024-07-24 NOTE — NURSING
[facility] Plan of Care Note    Dx:   Osteomyelitis [M86.9]  Chest wall abscess [L02.213]    Shift Events: none     Goals of Care: q2 turns, wound care, pain control     Neuro: A&Ox4     Vital Signs: BP (!) 101/55 (BP Location: Right arm, Patient Position: Lying)   Pulse 98   Temp 98.7 °F (37.1 °C) (Oral)   Resp 18   Ht 6' (1.829 m)   Wt 133 kg (293 lb 3.4 oz)   SpO2 97%   BMI 39.77 kg/m²     Respiratory: 3L NC     Diet: Diet diabetic Cardiac (Low Na/Chol), Renal; 2000 Calorie  Dietary nutrition supplements Catarino - Any flavor,Dietary nutrition supplements Novasource Renal - Any flavor    Is patient tolerating current diet? Yes     GTTS: none     Urine Output/Bowel Movement:   I/O this shift:  In: -   Out: 550 [Urine:550]  Last Bowel Movement: 07/23/24  Drains/Tubes/Tube Feeds (include total output/shift):   I/O this shift:  In: -   Out: 550 [Urine:550]  Lines: PICC R basilic   Accuchecks:ACHS   Skin: see flowsheet   Fall Risk Score: see flowsheet   Activity level? Up x2   Any scheduled procedures? None   Any safety concerns? Fall risk, skin breakdown

## 2024-07-25 VITALS
RESPIRATION RATE: 21 BRPM | DIASTOLIC BLOOD PRESSURE: 72 MMHG | HEART RATE: 78 BPM | TEMPERATURE: 97 F | BODY MASS INDEX: 39.71 KG/M2 | OXYGEN SATURATION: 93 % | SYSTOLIC BLOOD PRESSURE: 131 MMHG | HEIGHT: 72 IN | WEIGHT: 293.19 LBS

## 2024-07-25 LAB
CK SERPL-CCNC: <7 U/L (ref 20–200)
POCT GLUCOSE: 171 MG/DL (ref 70–110)
POCT GLUCOSE: 218 MG/DL (ref 70–110)
POCT GLUCOSE: 259 MG/DL (ref 70–110)

## 2024-07-25 PROCEDURE — 63600175 PHARM REV CODE 636 W HCPCS: Mod: JZ,JG | Performed by: STUDENT IN AN ORGANIZED HEALTH CARE EDUCATION/TRAINING PROGRAM

## 2024-07-25 PROCEDURE — A4216 STERILE WATER/SALINE, 10 ML: HCPCS | Performed by: STUDENT IN AN ORGANIZED HEALTH CARE EDUCATION/TRAINING PROGRAM

## 2024-07-25 PROCEDURE — 25000003 PHARM REV CODE 250: Performed by: STUDENT IN AN ORGANIZED HEALTH CARE EDUCATION/TRAINING PROGRAM

## 2024-07-25 PROCEDURE — 82550 ASSAY OF CK (CPK): CPT | Performed by: STUDENT IN AN ORGANIZED HEALTH CARE EDUCATION/TRAINING PROGRAM

## 2024-07-25 PROCEDURE — 36415 COLL VENOUS BLD VENIPUNCTURE: CPT | Performed by: STUDENT IN AN ORGANIZED HEALTH CARE EDUCATION/TRAINING PROGRAM

## 2024-07-25 RX ORDER — INSULIN ASPART 100 [IU]/ML
0-10 INJECTION, SOLUTION INTRAVENOUS; SUBCUTANEOUS
Start: 2024-07-25 | End: 2025-07-25

## 2024-07-25 RX ORDER — IPRATROPIUM BROMIDE AND ALBUTEROL SULFATE 2.5; .5 MG/3ML; MG/3ML
3 SOLUTION RESPIRATORY (INHALATION) EVERY 4 HOURS PRN
Start: 2024-07-25 | End: 2025-07-25

## 2024-07-25 RX ORDER — ALUMINUM HYDROXIDE, MAGNESIUM HYDROXIDE, AND SIMETHICONE 2400; 240; 2400 MG/30ML; MG/30ML; MG/30ML
15 SUSPENSION ORAL 4 TIMES DAILY PRN
Start: 2024-07-25 | End: 2025-07-25

## 2024-07-25 RX ORDER — AMOXICILLIN 250 MG
1 CAPSULE ORAL 2 TIMES DAILY PRN
Start: 2024-07-25

## 2024-07-25 RX ORDER — FERROUS SULFATE 325(65) MG
325 TABLET, DELAYED RELEASE (ENTERIC COATED) ORAL DAILY
Start: 2024-07-25

## 2024-07-25 RX ORDER — INSULIN GLARGINE 100 [IU]/ML
15 INJECTION, SOLUTION SUBCUTANEOUS DAILY
Start: 2024-07-26 | End: 2025-07-26

## 2024-07-25 RX ORDER — ONDANSETRON 8 MG/1
8 TABLET, ORALLY DISINTEGRATING ORAL EVERY 8 HOURS PRN
Start: 2024-07-25

## 2024-07-25 RX ADMIN — Medication 10 ML: at 12:07

## 2024-07-25 RX ADMIN — INSULIN ASPART 4 UNITS: 100 INJECTION, SOLUTION INTRAVENOUS; SUBCUTANEOUS at 12:07

## 2024-07-25 RX ADMIN — INSULIN ASPART 6 UNITS: 100 INJECTION, SOLUTION INTRAVENOUS; SUBCUTANEOUS at 04:07

## 2024-07-25 RX ADMIN — CEFTAROLINE FOSAMIL 600 MG: 600 POWDER, FOR SOLUTION INTRAVENOUS at 01:07

## 2024-07-25 RX ADMIN — FERROUS SULFATE TAB EC 325 MG (65 MG FE EQUIVALENT) 1 EACH: 325 (65 FE) TABLET DELAYED RESPONSE at 09:07

## 2024-07-25 RX ADMIN — APIXABAN 5 MG: 5 TABLET, FILM COATED ORAL at 09:07

## 2024-07-25 RX ADMIN — Medication 10 ML: at 05:07

## 2024-07-25 RX ADMIN — DIPHENHYDRAMINE HYDROCHLORIDE 10 ML: 25 SOLUTION ORAL at 09:07

## 2024-07-25 RX ADMIN — DIPHENHYDRAMINE HYDROCHLORIDE 10 ML: 25 SOLUTION ORAL at 12:07

## 2024-07-25 RX ADMIN — INSULIN ASPART 2 UNITS: 100 INJECTION, SOLUTION INTRAVENOUS; SUBCUTANEOUS at 09:07

## 2024-07-25 RX ADMIN — CEFTAROLINE FOSAMIL 600 MG: 600 POWDER, FOR SOLUTION INTRAVENOUS at 09:07

## 2024-07-25 RX ADMIN — INSULIN GLARGINE 15 UNITS: 100 INJECTION, SOLUTION SUBCUTANEOUS at 09:07

## 2024-07-25 NOTE — NURSING
, Julito, notified during rounds this morning that wife, Shana, at the bedside is requesting copies of paperwork she signed with him yesterday. Wife bedside now.

## 2024-07-25 NOTE — PLAN OF CARE
Andres shadi [facility]  Discharge Final Note    Primary Care Provider: No, Primary Doctor  Expected Discharge Date: 7/25/2024    Patient medically ready for discharge to [facility] (SNF).     Transportation by EMS.     Is family/patient aware of discharge: yes     Hospital follow up scheduled: yes       Final Discharge Note (most recent)       Final Note - 07/25/24 1433          Final Note    Assessment Type Final Discharge Note     Anticipated Discharge Disposition Skilled Nursing Facility     Hospital Resources/Appts/Education Provided Provided patient/caregiver with written discharge plan information                     Important Message from Medicare         Referral Info (most recent)       Referral Info    No documentation.                   Future Appointments   Date Time Provider Department Center   8/16/2024  9:00 AM [facility]     Julito Martell RN  Case Management  Ext: [phone]  07/25/2024

## 2024-07-25 NOTE — PT/OT/SLP PROGRESS
Physical Therapy      Patient Name:  Roosevelt Moser   MRN:  [id number]    Patient not seen today secondary to Patient unwilling to participate. Will follow-up as able. See OT note for details.

## 2024-07-25 NOTE — NURSING
[facility] Plan of Care Note    Dx:   Osteomyelitis [M86.9]  Chest wall abscess [L02.213]    Shift Events: none     Goals of Care: q2 turns, wound care, pain control     Neuro: A&Ox4     Vital Signs: BP (!) 92/57 (BP Location: Right arm, Patient Position: Lying)   Pulse 92   Temp 97.6 °F (36.4 °C) (Oral)   Resp 18   Ht 6' (1.829 m)   Wt 133 kg (293 lb 3.4 oz)   SpO2 (!) 93%   BMI 39.77 kg/m²     Respiratory: 2L NC     Diet: Diet diabetic Cardiac (Low Na/Chol), Renal; 2000 Calorie  Dietary nutrition supplements Catarino - Any flavor,Dietary nutrition supplements Novasource Renal - Any flavor    Is patient tolerating current diet? Yes     GTTS: none     Urine Output/Bowel Movement:   No intake/output data recorded.  Last Bowel Movement: 07/23/24  Drains/Tubes/Tube Feeds (include total output/shift):   No intake/output data recorded.  Lines: PICC R basilic   Accuchecks:ACHS   Skin: see flowsheet   Fall Risk Score: see flowsheet   Activity level? Up x2   Any scheduled procedures? None   Any safety concerns? Fall risk, skin breakdown

## 2024-07-25 NOTE — NURSING
PICC line saline locked and capped. Betadine painted on toe amp site. CNAs assisting cleaning patient for transport. AVS reviewed with patient's wife at bedside. All questions answered. Personal items gathered by Shana, wife, and will be delivered to the facility. She is going to follow EMS.     Report called to JONO Gloria at [facility] [phone].

## 2024-07-25 NOTE — DISCHARGE SUMMARY
"[facility] Medicine  Discharge Summary      Patient Name: Roosevelt Moser  MRN: [id number]  Admission Date: 7/7/2024  Hospital Length of Stay: 18 days  Discharge Date and Time:  07/25/2024 1:25 PM  Attending Physician: Milind Londono MD   Discharging Provider: Milind Londono MD  Discharge Provider Team: [facility] MED D  Primary Care Provider: Miriam, Primary Doctor        HPI: Roosevelt Moser is a 72yo M w/ A fib, HTN, T2DM, MARA, HLD, hx GBS, hx displaced comminuted fracture of the left humerus s/p fall 3/4/24 s/p ORIF 03/25/2024 who presents as a transfer for thoracic surgery and Plastic surgery evaluation in setting of "osteomyelitis of multiple ribs which will need further debridement and then a flap placed."     He was initially admitted to [facility] on 6/14 for progressive weakness, multiple falls, and decreased urine output.  He was admitted for management of MARBIN on CKD complicated by hyperkalemia, urosepsis, AFib with RVR.     "Creatinine improved during his stay. During his stay he was noted to have swelling of his left upper extremity. Imaging studies on June 17 showed concern for gas-forming infection along the left shoulder and air along the lateral left chest wall concerning for gas-forming organism. MRI of his right foot also showed evidence of toe osteomyelitis. He was seen by ID who adjusted antibiotics, and they also noted oral thrush. Podiatry evaluated him for the toe findings, and Orthopedic Surgery evaluated the shoulder abnormalities. Right toe wound grew MRSA. Oral anticoagulation was held, and Radiology aspirated fluid from his left shoulder on June 20. On June 21 he had left shoulder incision and drainage and removal of deep hardware including rods and screws. He underwent repeat irrigation and debridement of the left shoulder on June 24 with placement of a wound VAC. He had subsequent incision and drainage with removal of the wound VAC on June 27. He had amputation of his " "right 2nd toe by Podiatry on July 1. On July 3 he was noted to have dyspnea and mild tachycardia. CT chest showed a large abscess of the anterior central and left chest wall and abdominal wall with partial destruction of 3 ribs. He was seen by Cardiothoracic surgery. On July 4 he had left thoracentesis with aspiration of fluid. He subsequently had incision and drainage of the left chest wall abscess on July 4, and a drainage catheter was left in place. On July 5 he underwent right VATS for a loculated right pleural effusion, and a chest tube was left in place. He was transitioned to the ICU post-operatively. With concern for osteomyelitis of the ribs, concern is that he will need transfer to a facility with Thoracic Surgery and potentially Plastic Surgery along with higher level of care. Transfer center spoke with Thoracic Surgery at [facility]. Requesting transfer to Hospital Medicine at [facility] for Plastic Surgery and Thoracic Surgery evaluation. Transfer center spoke with Plastic Surgery on July 5, Thoracic Surgery on July 7. With his complicated medical presentation, will plan transfer to Hospital Medicine at [facility] in step-down status for further treatment. "     Prior to transfer, "He is awake and alert. He has a PICC line in place along with the right chest tube and left chest wall drain in place. He has no evidence of respiratory distress and is hemodynamically stable. Referring provider felt patient was stable for step-down status at present. He is currently on ceftaroline and meropenem. He is in contact isolation. July 7: Sodium 137, potassium 4.5, chloride 104, CO2 26, BUN 33, creatinine 1.1, glucose 130, albumin 2.1, AST 25, ALT 17, white blood cells 11.58, hemoglobin 8.1, hematocrit 26.2, platelets 389, procalcitonin 0.575, CRP greater than 16  VS:  Temperature 97.5°, pulse 85, respirations 20, blood pressure 112/63, O2 sats 95% "     Patient in no acute distress upon arrival.  " Wife at bedside.  Patient denies any acute complaints.           Imaging history reviewed:  July 6: X-rays of the left shoulder had fracture through the surgical neck of the humerus with lateral distraction of the distal component and overlapping segments on the order of 2.5 cm.  Overall no appreciable change upon comparison with prior imaging.  -chest x-ray noted manifestations of mild congestive heart failure bordering on mild pulmonary edema.  Right-sided thoracostomy tube terminating within the right apex.  No definite pneumothorax.        July 5: Pleural fluid Gram stain with few WBCs and no organisms seen  -pH 7.339, pCO2 50.9, pO2 392     July 4:  CPK less than 10, serum   -AFB smear from left chest abscess had no AFB seen, left chest abscess aerobic and anaerobic cultures have no growth  -pleural fluid protein 3.2, white blood cells 1108 (11% segs, 74% lymphs), , pH 7.63, pleural fluid culture had no growth  -ultrasound-guided thoracentesis removed 1.4-1.5 L     July 3: Blood cultures with no growth to date  -CT chest showed large abscess of the anterior central and left chest wall and abdominal wall measuring 17 cm transversely.  This extends into the mediastinum and peritoneum with partial destruction of the anterior 6th, 7th, and 8th ribs.  Complete atelectasis of the left lower lobe with moderate left pleural effusion.  Mild atelectasis right lung base with a small-to-moderate right pleural effusion.     July 2: X-rays of the left shoulder noted displaced left proximal humerus fracture status post hardware removal without change in alignment.     June 24: ECHO with EF 55-60%.     June 20:  Interventional Radiology did percutaneous aspiration of the left shoulder fluid collection.  No drainage catheter was left in place.     June 18:  Right toe wound MRSA  -CT left shoulder had subacute left humerus fracture post internal fixation.  Incomplete bony bridging across the fracture site with  "persistent angulation.  Severe arthropathy of the glenohumeral joint with multiple intra-articular bodies.  Large left joint effusion and adjacent soft tissue focal fluid collections containing gas and concerning for gas-forming infection.  Moderate size left pleural effusion.  Left basilar airspace disease.  -bilateral lower extremity Doppler arterial ultrasound had atherosclerotic plaque and calcification bilaterally without severe stenosis or occlusion identified on either side.     June 17: X-rays of the left humerus/shoulder had findings concerning for gas-forming infection along the left shoulder.  -x-rays of the left ribs showed air along the lateral chest wall soft tissue and axilla concerning for gas-forming infection.  -MRI of the right foot had findings consistent with osteomyelitis involving the middle distal phalanges of the 3rd toe.  Cellulitis.     June 14: Renal ultrasound had no hydronephrosis.  Elevated resistive index right kidney suggesting intrinsic renal disease.    * No surgery found *      Hospital Course: Pt admitted to  as a transfer for thoracic surgery and Plastic surgery evaluation in setting of "osteomyelitis of multiple ribs which will need further debridement and then a flap placed." Thoracic Surgery, Plastic Surgery, ID, Ortho, Wound care, PT/OT consulted upon admission. Imaging ordered. CT chest abdomen with IV contrast without evidence of osteomyelitis in ribs adjacent to I&D site of left chest wall abscess.; incidental finding of 1.2 cm obstructing stone in left distal ureter with mild hydroureteronephrosis. Urology was consulted and CT renal stone study was completed; no acute intervention given no concern for infection around stone or MARBIN; follow-up outpatient unless decompensates. MRI shoulder without contrast left notable for septic arthritis of left glenohumeral joint with acute osteomyelitis of the glenoid and proximal humerus in addition to known displaced fracture of " proximal humeral metaphysis in addition to several large soft tissue abscesses. Patient underwent left shoulder aspiration with Orthopedic surgery on 07/09, continuing antibiotics with no further intervention planned. Right chest tube was removed by thoracic surgery on 07/09. Drain previously placed for L chest wall abscess removed when output became minimal (7/14). To continue on IV Ceftaroline 600 mg q8 for 6 weeks therapy (EOT 8/11). Seen by therapy and patient was d/c to SNF. Referral given for various consult services for f/u       Physical Exam  Constitutional:       General: He is not in acute distress.     Appearance: He is obese.   HENT:      Head: Normocephalic.      Right Ear: External ear normal.      Left Ear: External ear normal.      Nose: Nose normal.   Cardiovascular:      Rate and Rhythm: Normal rate.      Heart sounds: Normal heart sounds.   Pulmonary:      Breath sounds: Normal breath sounds.   Abdominal:      Palpations: Abdomen is soft.      Tenderness: There is no abdominal tenderness.   Musculoskeletal:      Comments: Debility toe amputation to right foot with Betadine  Skin:     Comments: Staples and dressing noted to anterior chest and left shoulder.    Neurological:      Mental Status: He is alert and oriented to person, place, and time.     Consults:   Consults (From admission, onward)          Status Ordering Provider     Inpatient consult to Physical Medicine Rehab  Once        Provider:  (Not yet assigned)    Completed JOVANNY TORRES     Inpatient consult to Podiatry  Once        Provider:  (Not yet assigned)    Completed SANA BUNCH     Inpatient consult to Physical Medicine Rehab  Once        Provider:  (Not yet assigned)    Completed TEOFILO NAVARRETE     Inpatient consult to Urology  Once        Provider:  (Not yet assigned)    Completed TEOFILO NAVARRETE     Inpatient consult to Skin Integrity  Practitioner  Once        Provider:  So Singh NP    Completed TEOFILO NAVARRETE      Inpatient consult to Orthopedic Surgery  Once        Provider:  (Not yet assigned)    Completed BAHTESFAYE GANNONINE     Inpatient consult to Social Work/Case Management  Once        Provider:  (Not yet assigned)    Acknowledged TESFAYE NAVARRETEINE     Inpatient consult to Infectious Diseases  Once        Provider:  (Not yet assigned)    Completed BAHTERESSA TEOFILO     Inpatient consult to Plastic Surgery  Once        Provider:  (Not yet assigned)    Completed BAHTERESSA, TEOFILO     Inpatient consult to Cardiothoracic Surgery  Once        Provider:  (Not yet assigned)    Completed TEOFILO NAVARRETE            Final Active Diagnoses:    Diagnosis Date Noted POA    PRINCIPAL PROBLEM:  Osteomyelitis of multiple sites [M86.9] 07/07/2024 Yes    Acute hematogenous osteomyelitis [M86.00] 07/20/2024 Yes    Irritant contact dermatitis due to fecal, urinary or dual incontinence [L24.A2] 07/19/2024 Yes    Bradycardia [R00.1] 07/17/2024 Unknown    Post-operative state [Z98.890] 07/16/2024 Not Applicable    CKD (chronic kidney disease) stage 2, GFR 60-89 ml/min [N18.2] 07/13/2024 Yes    Acute hypoxemic respiratory failure [J96.01] 07/12/2024 No    Nephrolithiasis [N20.0] 07/09/2024 Yes    Mediastinal lymphadenopathy [R59.0] 07/08/2024 Yes    Cyst of right kidney [N28.1] 07/08/2024 Not Applicable    Left renal stone [N20.0] 07/08/2024 Yes    Thyroid nodule [E04.1] 07/08/2024 Yes    Humerus fracture [S42.309A] 07/08/2024 Yes    Chest wall abscess [L02.213] 07/04/2024 Yes    Loculated pleural effusion [J90] 07/03/2024 Yes    Skin ulcer of toe of right foot with necrosis of bone [L97.514] 07/01/2024 Yes    Sepsis [A41.9] 07/01/2024 Yes    MRSA infection [A49.02] 06/22/2024 Yes    Pyogenic arthritis of left shoulder region [M00.9] 06/21/2024 Yes    Osteomyelitis of toe [M86.9] 06/18/2024 Yes    Abscess of left shoulder [L02.414] 06/18/2024 Yes    Debility [R53.81] 06/16/2024 Yes    Acute renal failure superimposed on chronic kidney disease [N17.9, N18.9]  06/14/2024 Yes    History of Guillain-[place] syndrome [Z86.69] 10/03/2022 Not Applicable    Atrial fibrillation with rapid ventricular response [I48.91] 10/03/2022 Yes    Type 2 diabetes mellitus [E11.9] 07/22/2015 Yes    MARA (obstructive sleep apnea) [G47.33] 07/22/2015 Yes    Morbid obesity [E66.01] 07/22/2015 Yes    Hypertension [I10] 07/22/2015 Yes      Problems Resolved During this Admission:      Discharged Condition: fair    Disposition: Skilled Nursing Facility    Follow Up:    Patient Instructions:      Ambulatory referral/consult to Orthopedics   Standing Status: Future   Referral Priority: Routine Referral Type: Consultation   Requested Specialty: Orthopedic Surgery   Number of Visits Requested: 1     Ambulatory referral/consult to Infectious Disease   Standing Status: Future   Referral Priority: Routine Referral Type: Consultation   Referral Reason: Specialty Services Required   Requested Specialty: Infectious Diseases   Number of Visits Requested: 1     Ambulatory referral/consult to Podiatry   Standing Status: Future   Referral Priority: Routine Referral Type: Consultation   Referral Reason: Specialty Services Required   Requested Specialty: Podiatry   Number of Visits Requested: 1     Ambulatory referral/consult to Plastic Surgery   Standing Status: Future   Referral Priority: Routine Referral Type: Consultation   Referral Reason: Specialty Services Required   Requested Specialty: Plastic Surgery   Number of Visits Requested: 1     Ambulatory referral/consult to Cardiothoracic Surgery   Standing Status: Future   Referral Priority: Routine Referral Type: Consultation   Referral Reason: Specialty Services Required   Requested Specialty: Cardiothoracic Surgery   Number of Visits Requested: 1     Medications:  Reconciled Home Medications:      Medication List        START taking these medications      albuterol-ipratropium 2.5 mg-0.5 mg/3 mL nebulizer solution  Commonly known as: DUO-NEB  Take 3 mLs by  nebulization every 4 (four) hours as needed for Shortness of Breath. Rescue     aluminum & magnesium hydroxide-simethicone 400-400-40 mg/5 mL suspension  Commonly known as: MYLANTA MAX STRENGTH  Take 15 mLs by mouth 4 (four) times daily as needed.     D5W PgBk 50 mL with ceftaroline fosamiL 600 mg SolR 600 mg  Inject 600 mg into the vein every 8 (eight) hours. for 17 days     ferrous sulfate 325 (65 FE) MG EC tablet  Take 1 tablet (325 mg total) by mouth once daily.     insulin aspart U-100 100 unit/mL (3 mL) Inpn pen  Commonly known as: NovoLOG  Inject 0-10 Units into the skin before meals and at bedtime as needed (Hyperglycemia).     insulin glargine U-100 (Lantus) 100 unit/mL (3 mL) Inpn pen  Inject 15 Units into the skin once daily.  Start taking on: July 26, 2024     ondansetron 8 MG Tbdl  Commonly known as: ZOFRAN-ODT  Take 1 tablet (8 mg total) by mouth every 8 (eight) hours as needed.     senna-docusate 8.6-50 mg 8.6-50 mg per tablet  Commonly known as: PERICOLACE  Take 1 tablet by mouth 2 (two) times daily as needed for Constipation.            CONTINUE taking these medications      apixaban 5 mg Tab  Commonly known as: ELIQUIS  Take 1 tablet (5 mg total) by mouth 2 (two) times daily.     atorvastatin 10 MG tablet  Commonly known as: LIPITOR  Take 1 tablet (10 mg total) by mouth once daily.     co-enzyme Q-10 30 mg capsule  Take 30 mg by mouth once daily.     VITAMIN D ORAL  Take 1 tablet by mouth once daily.            STOP taking these medications      doxycycline 100 MG Cap  Commonly known as: VIBRAMYCIN     glimepiride 2 MG tablet  Commonly known as: AMARYL     hydrALAZINE 25 MG tablet  Commonly known as: APRESOLINE     metFORMIN 500 MG ER 24hr tablet  Commonly known as: GLUCOPHAGE-XR     metoprolol succinate 100 MG 24 hr tablet  Commonly known as: TOPROL-XL                  Pending Diagnostic Studies:       None          Indwelling Lines/Drains at time of discharge:   Lines/Drains/Airways        Peripherally Inserted Central Catheter Line  Duration             PICC Double Lumen 06/14/24 3544 right basilic 40 days                    Time spent on the discharge of patient: 40 minutes         Milind Londono MD  Department of Hospital Medicine  [facility]

## 2024-07-25 NOTE — PLAN OF CARE
NURSING HOME ORDERS    07/25/2024  [facility]  [address]  [facility] [address]  Dept: [phone]  Loc: [phone]     Admit to Nursing Home:  Skilled Nursing Facility    Diagnoses:  Active Hospital Problems    Diagnosis  POA    *Osteomyelitis of multiple sites [M86.9]  Yes    Acute hematogenous osteomyelitis [M86.00]  Yes    Irritant contact dermatitis due to fecal, urinary or dual incontinence [L24.A2]  Yes    Bradycardia [R00.1]  Unknown    Post-operative state [Z98.890]  Not Applicable    CKD (chronic kidney disease) stage 2, GFR 60-89 ml/min [N18.2]  Yes    Acute hypoxemic respiratory failure [J96.01]  No    Nephrolithiasis [N20.0]  Yes    Mediastinal lymphadenopathy [R59.0]  Yes    Cyst of right kidney [N28.1]  Not Applicable    Left renal stone [N20.0]  Yes    Thyroid nodule [E04.1]  Yes    Humerus fracture [S42.309A]  Yes    Chest wall abscess [L02.213]  Yes    Loculated pleural effusion [J90]  Yes    Skin ulcer of toe of right foot with necrosis of bone [L97.514]  Yes    Sepsis [A41.9]  Yes    MRSA infection [A49.02]  Yes    Pyogenic arthritis of left shoulder region [M00.9]  Yes    Osteomyelitis of toe [M86.9]  Yes    Abscess of left shoulder [L02.414]  Yes    Debility [R53.81]  Yes    Acute renal failure superimposed on chronic kidney disease [N17.9, N18.9]  Yes    History of Guillain-[place] syndrome [Z86.69]  Not Applicable    Atrial fibrillation with rapid ventricular response [I48.91]  Yes    Type 2 diabetes mellitus [E11.9]  Yes    MARA (obstructive sleep apnea) [G47.33]  Yes    Morbid obesity [E66.01]  Yes    Hypertension [I10]  Yes      Resolved Hospital Problems   No resolved problems to display.       Patient is homebound due to:  Osteomyelitis of multiple sites    Allergies:Review of patient's allergies indicates:  No Known Allergies    Vitals:  Routine    Diet: diabetic diet: 1800 calorie    Activities:   Activity as tolerated    Goals of Care  Treatment Preferences:  Code Status: DNR      Labs:  routine    Nursing Precautions:  Aspiration , Fall, and Pressure ulcer prevention    Consults:   PT to evaluate and treat- 5 times a week and OT to evaluate and treat- 5 times a week     Miscellaneous Care:    IV antibiotics Ceftaroline 600 mg IV q8h. End of treatment 08/11/24        Wound Care: Blain Right foot suture site with betadine and leave open to air dry BID.           Diabetes Care:  SN to perform and educate Diabetic management with blood glucose monitoring:      Medications: Discontinue all previous medication orders, if any. See new list below.     Medication List        START taking these medications      albuterol-ipratropium 2.5 mg-0.5 mg/3 mL nebulizer solution  Commonly known as: DUO-NEB  Take 3 mLs by nebulization every 4 (four) hours as needed for Shortness of Breath. Rescue     aluminum & magnesium hydroxide-simethicone 400-400-40 mg/5 mL suspension  Commonly known as: MYLANTA MAX STRENGTH  Take 15 mLs by mouth 4 (four) times daily as needed.     D5W PgBk 50 mL with ceftaroline fosamiL 600 mg SolR 600 mg  Inject 600 mg into the vein every 8 (eight) hours. for 17 days     ferrous sulfate 325 (65 FE) MG EC tablet  Take 1 tablet (325 mg total) by mouth once daily.     insulin aspart U-100 100 unit/mL (3 mL) Inpn pen  Commonly known as: NovoLOG  Inject 0-10 Units into the skin before meals and at bedtime as needed (Hyperglycemia).     insulin glargine U-100 (Lantus) 100 unit/mL (3 mL) Inpn pen  Inject 15 Units into the skin once daily.  Start taking on: July 26, 2024     ondansetron 8 MG Tbdl  Commonly known as: ZOFRAN-ODT  Take 1 tablet (8 mg total) by mouth every 8 (eight) hours as needed.     senna-docusate 8.6-50 mg 8.6-50 mg per tablet  Commonly known as: PERICOLACE  Take 1 tablet by mouth 2 (two) times daily as needed for Constipation.            CONTINUE taking these medications      apixaban 5 mg Tab  Commonly known as: ELIQUIS  Take 1  tablet (5 mg total) by mouth 2 (two) times daily.     atorvastatin 10 MG tablet  Commonly known as: LIPITOR  Take 1 tablet (10 mg total) by mouth once daily.     co-enzyme Q-10 30 mg capsule  Take 30 mg by mouth once daily.     VITAMIN D ORAL  Take 1 tablet by mouth once daily.            STOP taking these medications      doxycycline 100 MG Cap  Commonly known as: VIBRAMYCIN     glimepiride 2 MG tablet  Commonly known as: AMARYL     hydrALAZINE 25 MG tablet  Commonly known as: APRESOLINE     metFORMIN 500 MG ER 24hr tablet  Commonly known as: GLUCOPHAGE-XR     metoprolol succinate 100 MG 24 hr tablet  Commonly known as: TOPROL-XL                Immunizations Administered as of 7/25/2024       No immunizations on file.              Some patients may experience side effects after vaccination.  These may include fever, headache, muscle or joint aches.  Most symptoms resolve with 24-48 hours and do not require urgent medical evaluation unless they persist for more than 72 hours or symptoms are concerning for an unrelated medical condition.          _________________________________  Milidn Londono MD  07/25/2024

## 2024-07-25 NOTE — PROGRESS NOTES
"OT and PT attempted a co-treat on 7/25. Pt was asleep and pts wife present, woke him up for therapy. Pt refused top participate despite 15 minutes of OT/PT and wife expressing the importance of therapy and encouraging pt to attempt sitting EOB. Pt kept repeating 'Leave me alone!"  Pts wife began to cry and yell at her  in frustration at his apathy. OT/PT exited room.  "

## 2024-07-30 ENCOUNTER — TELEPHONE (OUTPATIENT)
Dept: PLASTIC SURGERY | Facility: CLINIC | Age: 74
End: 2024-07-30
Payer: MEDICARE

## 2024-07-30 LAB
FUNGUS SPEC CULT: NORMAL
FUNGUS SPEC CULT: NORMAL

## 2024-07-31 ENCOUNTER — TELEPHONE (OUTPATIENT)
Dept: UROLOGY | Facility: CLINIC | Age: 74
End: 2024-07-31
Payer: MEDICARE

## 2024-07-31 NOTE — TELEPHONE ENCOUNTER
----- Message from Lily Urbina MA sent at 7/31/2024  3:29 PM CDT -----    ----- Message -----  From: Sophia Guo  Sent: 7/31/2024   3:25 PM CDT  To: Lily Urbina MA; Isaak JOYA Staff    Missed Callback     Pt returning callback from missed call. Requesting to speak with somebody in #  office. Please call.      Caller: wife of Mr Crane [phone]  Reason for call: she is calling Lily back   Reason appt not scheduled

## 2024-07-31 NOTE — TELEPHONE ENCOUNTER
----- Message from Sophia Guo sent at 7/31/2024  2:33 PM CDT -----  PATIENTCALL     Pt wife is calling to speak with someone in provider office regarding she is concern that her  has a kidney he was scheduled to see the provider but, he is currently in a skill nursing facility and she wants to know when can he be seen. She is asking for a return call please call.  [phone] please call before 3:00

## 2024-07-31 NOTE — TELEPHONE ENCOUNTER
Call was placed to patient's wife he was successfully rescheduled with provider for this Monday @ 3:30 PM.

## 2024-07-31 NOTE — TELEPHONE ENCOUNTER
Call was placed back to patient's wife @ 3:01 PM. Unfortunately was in clinic couldn't call back before 3 PM. Office name and number provided as long with Office EXT.

## 2024-08-01 ENCOUNTER — TELEPHONE (OUTPATIENT)
Dept: UROLOGY | Facility: CLINIC | Age: 74
End: 2024-08-01
Payer: MEDICARE

## 2024-08-01 NOTE — TELEPHONE ENCOUNTER
----- Message from Verónica Sterling sent at 8/1/2024 11:35 AM CDT -----  Regarding: Wife called regarding appt for Monday at 3:30 states the date and time id good for them  Contact: [phone]  Type:  Patient Returning Call        Who Called: Buffy (wife)        Who Left Message for Patient:Lily        Does the patient know what this is regarding?Wife called regarding appt for Monday at 3:30 states the date and time id good for them. Please advise         Best Call Back Number:Telephone Information:  Mobile          [phone]            Additional Information:

## 2024-08-01 NOTE — TELEPHONE ENCOUNTER
Call placed back to wife she was informed that I move her husbands appointment to follow up on stone management with VALENTÍN

## 2024-08-02 ENCOUNTER — TELEPHONE (OUTPATIENT)
Dept: CARDIOTHORACIC SURGERY | Facility: CLINIC | Age: 74
End: 2024-08-02
Payer: MEDICARE

## 2024-08-02 NOTE — TELEPHONE ENCOUNTER
Called patient and let her know I would call her back early next week regarding appointments.    Julie Haase RN  [facility] Nurse Navigator [phone]

## 2024-08-05 ENCOUNTER — TELEPHONE (OUTPATIENT)
Dept: PODIATRY | Facility: CLINIC | Age: 74
End: 2024-08-05
Payer: MEDICARE

## 2024-08-05 ENCOUNTER — TELEPHONE (OUTPATIENT)
Dept: CARDIOTHORACIC SURGERY | Facility: CLINIC | Age: 74
End: 2024-08-05
Payer: MEDICARE

## 2024-08-05 ENCOUNTER — TELEPHONE (OUTPATIENT)
Dept: ORTHOPEDICS | Facility: CLINIC | Age: 74
End: 2024-08-05
Payer: MEDICARE

## 2024-08-05 DIAGNOSIS — L02.414 ABSCESS OF LEFT SHOULDER: Primary | ICD-10-CM

## 2024-08-06 ENCOUNTER — TELEPHONE (OUTPATIENT)
Dept: CARDIOTHORACIC SURGERY | Facility: CLINIC | Age: 74
End: 2024-08-06
Payer: MEDICARE

## 2024-08-06 ENCOUNTER — DOCUMENTATION ONLY (OUTPATIENT)
Dept: CARDIOTHORACIC SURGERY | Facility: CLINIC | Age: 74
End: 2024-08-06
Payer: MEDICARE

## 2024-08-07 ENCOUNTER — TELEPHONE (OUTPATIENT)
Dept: CARDIOTHORACIC SURGERY | Facility: CLINIC | Age: 74
End: 2024-08-07
Payer: MEDICARE

## 2024-08-08 ENCOUNTER — EXTERNAL HOME HEALTH (OUTPATIENT)
Dept: HOME HEALTH SERVICES | Facility: HOSPITAL | Age: 74
End: 2024-08-08
Payer: MEDICARE

## 2024-08-09 ENCOUNTER — TELEPHONE (OUTPATIENT)
Dept: CARDIOTHORACIC SURGERY | Facility: CLINIC | Age: 74
End: 2024-08-09
Payer: MEDICARE

## 2024-08-09 ENCOUNTER — TELEPHONE (OUTPATIENT)
Dept: INFECTIOUS DISEASES | Facility: CLINIC | Age: 74
End: 2024-08-09
Payer: MEDICARE

## 2024-08-09 NOTE — TELEPHONE ENCOUNTER
Patient does not have PO antibiotic orders   ELOISA Morales [facility]  8/09/24

## 2024-08-09 NOTE — TELEPHONE ENCOUNTER
Patient 's wife called  [phone]    Patient was seen at [facility]  then transferred to a facility  [facility]     Patient was advised he would need 6 weeks of   PO Antibiotics , but they have no orders for this     Please advise    ELOISA LEMON  8/09/24

## 2024-08-12 ENCOUNTER — TELEPHONE (OUTPATIENT)
Dept: UROLOGY | Facility: CLINIC | Age: 74
End: 2024-08-12
Payer: MEDICARE

## 2024-08-12 ENCOUNTER — TELEPHONE (OUTPATIENT)
Dept: UROLOGY | Facility: CLINIC | Age: 74
End: 2024-08-12

## 2024-08-12 ENCOUNTER — OFFICE VISIT (OUTPATIENT)
Dept: UROLOGY | Facility: CLINIC | Age: 74
End: 2024-08-12
Payer: MEDICARE

## 2024-08-12 VITALS
SYSTOLIC BLOOD PRESSURE: 131 MMHG | HEIGHT: 72 IN | BODY MASS INDEX: 39.71 KG/M2 | HEART RATE: 78 BPM | WEIGHT: 293.19 LBS | DIASTOLIC BLOOD PRESSURE: 72 MMHG

## 2024-08-12 DIAGNOSIS — N20.0 NEPHROLITHIASIS: Primary | ICD-10-CM

## 2024-08-12 PROBLEM — N17.9 ACUTE RENAL FAILURE SUPERIMPOSED ON CHRONIC KIDNEY DISEASE: Status: RESOLVED | Noted: 2024-06-14 | Resolved: 2024-08-12

## 2024-08-12 PROBLEM — L24.A2 IRRITANT CONTACT DERMATITIS DUE TO FECAL, URINARY OR DUAL INCONTINENCE: Status: RESOLVED | Noted: 2024-07-19 | Resolved: 2024-08-12

## 2024-08-12 PROBLEM — A41.9 SEPSIS: Status: RESOLVED | Noted: 2024-07-01 | Resolved: 2024-08-12

## 2024-08-12 PROBLEM — N18.9 ACUTE RENAL FAILURE SUPERIMPOSED ON CHRONIC KIDNEY DISEASE: Status: RESOLVED | Noted: 2024-06-14 | Resolved: 2024-08-12

## 2024-08-12 PROBLEM — E87.5 HYPERKALEMIA: Status: RESOLVED | Noted: 2024-06-14 | Resolved: 2024-08-12

## 2024-08-12 PROBLEM — J96.01 ACUTE HYPOXEMIC RESPIRATORY FAILURE: Status: RESOLVED | Noted: 2024-07-12 | Resolved: 2024-08-12

## 2024-08-12 PROBLEM — Z91.199 NON-COMPLIANCE: Status: RESOLVED | Noted: 2023-06-01 | Resolved: 2024-08-12

## 2024-08-12 PROBLEM — Z86.69 HISTORY OF GUILLAIN-BARRE SYNDROME: Status: RESOLVED | Noted: 2022-10-03 | Resolved: 2024-08-12

## 2024-08-12 PROBLEM — R94.31 ABNORMAL EKG: Status: RESOLVED | Noted: 2022-10-03 | Resolved: 2024-08-12

## 2024-08-12 PROBLEM — A49.02 MRSA INFECTION: Status: RESOLVED | Noted: 2024-06-22 | Resolved: 2024-08-12

## 2024-08-12 PROBLEM — Z98.890 POST-OPERATIVE STATE: Status: RESOLVED | Noted: 2024-07-16 | Resolved: 2024-08-12

## 2024-08-12 PROCEDURE — 1157F ADVNC CARE PLAN IN RCRD: CPT | Mod: CPTII,S$GLB,, | Performed by: UROLOGY

## 2024-08-12 PROCEDURE — 3078F DIAST BP <80 MM HG: CPT | Mod: CPTII,S$GLB,, | Performed by: UROLOGY

## 2024-08-12 PROCEDURE — 3075F SYST BP GE 130 - 139MM HG: CPT | Mod: CPTII,S$GLB,, | Performed by: UROLOGY

## 2024-08-12 PROCEDURE — 1101F PT FALLS ASSESS-DOCD LE1/YR: CPT | Mod: CPTII,S$GLB,, | Performed by: UROLOGY

## 2024-08-12 PROCEDURE — 1126F AMNT PAIN NOTED NONE PRSNT: CPT | Mod: CPTII,S$GLB,, | Performed by: UROLOGY

## 2024-08-12 PROCEDURE — 1160F RVW MEDS BY RX/DR IN RCRD: CPT | Mod: CPTII,S$GLB,, | Performed by: UROLOGY

## 2024-08-12 PROCEDURE — 1159F MED LIST DOCD IN RCRD: CPT | Mod: CPTII,S$GLB,, | Performed by: UROLOGY

## 2024-08-12 PROCEDURE — 3008F BODY MASS INDEX DOCD: CPT | Mod: CPTII,S$GLB,, | Performed by: UROLOGY

## 2024-08-12 PROCEDURE — 3044F HG A1C LEVEL LT 7.0%: CPT | Mod: CPTII,S$GLB,, | Performed by: UROLOGY

## 2024-08-12 PROCEDURE — 1111F DSCHRG MED/CURRENT MED MERGE: CPT | Mod: CPTII,S$GLB,, | Performed by: UROLOGY

## 2024-08-12 PROCEDURE — 99204 OFFICE O/P NEW MOD 45 MIN: CPT | Mod: S$GLB,,, | Performed by: UROLOGY

## 2024-08-12 PROCEDURE — 4010F ACE/ARB THERAPY RXD/TAKEN: CPT | Mod: CPTII,S$GLB,, | Performed by: UROLOGY

## 2024-08-12 PROCEDURE — 99999 PR PBB SHADOW E&M-EST. PATIENT-LVL IV: CPT | Mod: PBBFAC,HCNC,, | Performed by: UROLOGY

## 2024-08-12 PROCEDURE — 3288F FALL RISK ASSESSMENT DOCD: CPT | Mod: CPTII,S$GLB,, | Performed by: UROLOGY

## 2024-08-12 NOTE — TELEPHONE ENCOUNTER
Call placed to pt's wife, Shana. States pt is immobile, so he will be coming by ambulance to appt so they can assist her with his transfer; she will be with him.

## 2024-08-12 NOTE — PROGRESS NOTES
CHIEF COMPLAINT:    Mr. Moser is a 73 y.o. male presenting with nephrolithiasis.    PRESENTING ILLNESS:    Roosevelt Moser is a 73 y.o. male who was found to have a L ureteral stone while admitted for a chest wall abscess with concern for osteomyelitis.    He underwent a CT abd/pelvis which I interpreted independetnly.  There is a possible 2.5 cm distal L ureteral stone with proximal hydro.      Of note, he arrived > 30 minutes late for his appt today, and he is on stretcher.  He is non-ambulatory.    REVIEW OF SYSTEMS:    Roosevelt Moser denies headache, blurred vision, fever, nausea, vomiting, chills, abdominal pain, chest pain, sore throat, bleeding per rectum, cough, SOB, recent loss of consciousness, recent mental status changes, seizures, dizziness, or upper or lower extremity weakness.    STEVEN  1. 1  2. 0  3. 0  4. 0  5. 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 OR;  Service: Orthopedics;  Laterality: Left;    INCISION AND DRAINAGE OF ABSCESS N/A 7/4/2024    Procedure: INCISION AND DRAINAGE, ABSCESS;  Surgeon: Gus Escobedo MD;  Location: [facility] OR;  Service: General;  Laterality: N/A;  Abcess of anterior chest wall    IRRIGATION AND DEBRIDEMENT OF UPPER EXTREMITY Left 6/24/2024    Procedure: IRRIGATION AND DEBRIDEMENT, UPPER EXTREMITY;  Surgeon: Nathan Cooper MD;  Location: [facility] OR;  Service: Orthopedics;  Laterality: Left;    IRRIGATION AND DEBRIDEMENT OF UPPER EXTREMITY Left 6/27/2024    Procedure: IRRIGATION AND DEBRIDEMENT, UPPER EXTREMITY;  Surgeon: Nathan Cooper MD;  Location: [facility] OR;  Service: Orthopedics;  Laterality:  Left;    OPEN REDUCTION AND INTERNAL FIXATION (ORIF) OF FRACTURE OF PROXIMAL HUMERUS Left 3/25/2024    Procedure: ORIF, FRACTURE, HUMERUS, PROXIMAL/IM HANNA, LEFT;  Surgeon: Nathan Cooper MD;  Location: [facility] OR;  Service: Orthopedics;  Laterality: Left;  Accumed, Synthes Small Frag set Avelino verified 3/22/24 ark    THORACOSCOPIC DECORTICATION OF LUNG Right 7/5/2024    Procedure: VATS, WITH DECORTICATION, LUNG;  Surgeon: Gus Escobedo MD;  Location: [facility] OR;  Service: Cardiothoracic;  Laterality: Right;    TOE AMPUTATION Right 7/1/2024    Procedure: AMPUTATION, TOE;  Surgeon: Stevie Zhu DPM;  Location: [facility] OR;  Service: Podiatry;  Laterality: Right;       No family history on file.    Social History     Socioeconomic History    Marital status:    Tobacco Use    Smoking status: Never    Smokeless tobacco: Never   Substance and Sexual Activity    Alcohol use: Yes     Alcohol/week: 0.0 standard drinks of alcohol     Comment: social    Drug use: No    Sexual activity: Yes     Social Determinants of Health     Financial Resource Strain: Low Risk  (8/3/2024)    Overall Financial Resource Strain (CARDIA)     Difficulty of Paying Living Expenses: Not hard at all   Food Insecurity: No Food Insecurity (8/3/2024)    Hunger Vital Sign     Worried About Running Out of Food in the Last Year: Never true     Ran Out of Food in the Last Year: Never true   Transportation Needs: No Transportation Needs (7/8/2024)    TRANSPORTATION NEEDS     Transportation : No   Physical Activity: Inactive (8/3/2024)    Exercise Vital Sign     Days of Exercise per Week: 0 days     Minutes of Exercise per Session: 0 min   Stress: No Stress Concern Present (8/3/2024)    Citizen of [place] [employer] Occupational [employer] - Occupational Stress Questionnaire     Feeling of Stress : Not at all   Housing Stability: Low Risk  (8/3/2024)    Housing Stability Vital Sign     Unable to Pay for Housing in the Last Year: No     Homeless in the Last Year: No        Allergies:  Patient has no known allergies.    Medications:    Current Outpatient Medications:     albuterol-ipratropium (DUO-NEB) 2.5 mg-0.5 mg/3 mL nebulizer solution, Take 3 mLs by nebulization every 4 (four) hours as needed for Shortness of Breath. Rescue, Disp: , Rfl:     aluminum & magnesium hydroxide-simethicone (MYLANTA MAX STRENGTH) 400-400-40 mg/5 mL suspension, Take 15 mLs by mouth 4 (four) times daily as needed., Disp: , Rfl:     apixaban (ELIQUIS) 5 mg Tab, Take 1 tablet (5 mg total) by mouth 2 (two) times daily., Disp: 60 tablet, Rfl: 1    atorvastatin (LIPITOR) 10 MG tablet, Take 1 tablet (10 mg total) by mouth once daily., Disp: 90 tablet, Rfl: 3    co-enzyme Q-10 30 mg capsule, Take 30 mg by mouth once daily., Disp: , Rfl:     ergocalciferol, vitamin D2, (VITAMIN D ORAL), Take 1 tablet by mouth once daily., Disp: , Rfl:     ferrous sulfate 325 (65 FE) MG EC tablet, Take 1 tablet (325 mg total) by mouth once daily., Disp: , Rfl:     insulin aspart U-100 (NOVOLOG) 100 unit/mL (3 mL) InPn pen, Inject 0-10 Units into the skin before meals and at bedtime as needed (Hyperglycemia)., Disp: , Rfl:     insulin glargine U-100, Lantus, 100 unit/mL (3 mL) SubQ InPn pen, Inject 15 Units into the skin once daily., Disp: , Rfl:     ondansetron (ZOFRAN-ODT) 8 MG TbDL, Take 1 tablet (8 mg total) by mouth every 8 (eight) hours as needed., Disp: , Rfl:     senna-docusate 8.6-50 mg (PERICOLACE) 8.6-50 mg per tablet, Take 1 tablet by mouth 2 (two) times daily as needed for Constipation., Disp: , Rfl:     PHYSICAL EXAMINATION:    The patient generally appears in good health, is appropriately interactive, and is in no apparent distress.     Eyes: anicteric sclerae, moist conjunctivae; no lid-lag; PERRLA     HENT: Atraumatic; oropharynx clear with moist mucous membranes and no mucosal ulcerations;normal hard and soft palate.  No evidence of lymphadenopathy.    Neck: Trachea midline.  No thyromegaly.    Skin: No  "lesions.    Mental: Cooperative with normal affect.  Is oriented to time, place, and person.    Neuro: Grossly intact.    Chest: Normal inspiratory effort.   No accessory muscles.  No audible wheezes.  Respirations symmetric on inspiration and expiration.    Heart: Regular rhythm.      Abdomen:  Soft, non-tender. No masses or organomegaly. Bladder is not palpable. No evidence of flank discomfort. No evidence of inguinal hernia.    Extremities: No clubbing, cyanosis, or edema      LABS:      No results found for: "PSA", "PSADIAG", "PSATOTAL", "PSAFREE", "PSAFREEPCT"    IMPRESSION:    Encounter Diagnoses   Name Primary?    Nephrolithiasis Yes         PLAN:    1. Recommend ureteroscopy.  2. The risks and benefits of ureteroscopy were discussed with the patient in detail.  The risks include but are not limited to burning with urination, bleeding, infection, pain, incomplete fragmentation of the stone, need for further procedures, injury to the kidney, ureter, bladder and need for open surgery.  The patient was informed that they may require a ureteral stent and that stents can cause irritative voiding symptoms.  They also understand that ureteral stents are temporary and must be removed or exchanged in a timely fashion as they can calcify and make more stones and become difficult to remove. Alternative treatments were also discussed with the patient in detail to include ESWL, percutaneous treatment of the stone, open surgery or observation. Patient understands these risks and has agreed to proceed with surgery.     Copy to:       "

## 2024-08-12 NOTE — TELEPHONE ENCOUNTER
I called   [facility]  @ [phone] to let them know that the patient was scheduled for surgery on 18- and I was calling to set up his transportation and also to get there fax number with the patient instructions that the patient needs to follow prior to surgery. I spoke with KATY and she stated that gianna that takes care of this is already gone for the day. I told her I will call her in the morning when I'm about to fax over the surgery information.

## 2024-08-13 ENCOUNTER — TELEPHONE (OUTPATIENT)
Dept: FAMILY MEDICINE | Facility: CLINIC | Age: 74
End: 2024-08-13
Payer: MEDICARE

## 2024-08-13 ENCOUNTER — TELEPHONE (OUTPATIENT)
Dept: UROLOGY | Facility: CLINIC | Age: 74
End: 2024-08-13
Payer: MEDICARE

## 2024-08-13 ENCOUNTER — LAB VISIT (OUTPATIENT)
Dept: LAB | Facility: HOSPITAL | Age: 74
End: 2024-08-13
Payer: MEDICARE

## 2024-08-13 ENCOUNTER — TELEPHONE (OUTPATIENT)
Dept: PREADMISSION TESTING | Facility: HOSPITAL | Age: 74
End: 2024-08-13
Payer: MEDICARE

## 2024-08-13 ENCOUNTER — OFFICE VISIT (OUTPATIENT)
Dept: INFECTIOUS DISEASES | Facility: CLINIC | Age: 74
End: 2024-08-13
Payer: MEDICARE

## 2024-08-13 DIAGNOSIS — N18.2 CKD (CHRONIC KIDNEY DISEASE) STAGE 2, GFR 60-89 ML/MIN: ICD-10-CM

## 2024-08-13 DIAGNOSIS — G47.33 OSA (OBSTRUCTIVE SLEEP APNEA): ICD-10-CM

## 2024-08-13 DIAGNOSIS — M86.9 OSTEOMYELITIS OF TOE: ICD-10-CM

## 2024-08-13 DIAGNOSIS — N20.0 NEPHROLITHIASIS: ICD-10-CM

## 2024-08-13 DIAGNOSIS — T50.905A ADVERSE EFFECT OF DRUG, INITIAL ENCOUNTER: ICD-10-CM

## 2024-08-13 DIAGNOSIS — B37.0 ORAL THRUSH: ICD-10-CM

## 2024-08-13 DIAGNOSIS — I48.91 ATRIAL FIBRILLATION WITH RAPID VENTRICULAR RESPONSE: ICD-10-CM

## 2024-08-13 DIAGNOSIS — L02.213 CHEST WALL ABSCESS: Primary | ICD-10-CM

## 2024-08-13 DIAGNOSIS — I10 PRIMARY HYPERTENSION: ICD-10-CM

## 2024-08-13 DIAGNOSIS — E78.2 MIXED HYPERLIPIDEMIA: ICD-10-CM

## 2024-08-13 DIAGNOSIS — E66.01 MORBID OBESITY: ICD-10-CM

## 2024-08-13 DIAGNOSIS — E11.65 UNCONTROLLED TYPE 2 DIABETES MELLITUS WITH HYPERGLYCEMIA: ICD-10-CM

## 2024-08-13 LAB
ALBUMIN/CREAT UR: 278.7 UG/MG (ref 0–30)
CREAT UR-MCNC: 61 MG/DL (ref 23–375)
MICROALBUMIN UR DL<=1MG/L-MCNC: 170 UG/ML

## 2024-08-13 PROCEDURE — 82043 UR ALBUMIN QUANTITATIVE: CPT

## 2024-08-13 PROCEDURE — 99214 OFFICE O/P EST MOD 30 MIN: CPT | Mod: 95,,, | Performed by: STUDENT IN AN ORGANIZED HEALTH CARE EDUCATION/TRAINING PROGRAM

## 2024-08-13 PROCEDURE — 1157F ADVNC CARE PLAN IN RCRD: CPT | Mod: CPTII,95,, | Performed by: STUDENT IN AN ORGANIZED HEALTH CARE EDUCATION/TRAINING PROGRAM

## 2024-08-13 PROCEDURE — 1111F DSCHRG MED/CURRENT MED MERGE: CPT | Mod: CPTII,95,, | Performed by: STUDENT IN AN ORGANIZED HEALTH CARE EDUCATION/TRAINING PROGRAM

## 2024-08-13 PROCEDURE — 4010F ACE/ARB THERAPY RXD/TAKEN: CPT | Mod: CPTII,95,, | Performed by: STUDENT IN AN ORGANIZED HEALTH CARE EDUCATION/TRAINING PROGRAM

## 2024-08-13 PROCEDURE — 3044F HG A1C LEVEL LT 7.0%: CPT | Mod: CPTII,95,, | Performed by: STUDENT IN AN ORGANIZED HEALTH CARE EDUCATION/TRAINING PROGRAM

## 2024-08-13 PROCEDURE — 82570 ASSAY OF URINE CREATININE: CPT

## 2024-08-13 NOTE — TELEPHONE ENCOUNTER
Please schedule patient for preop clearance appointment. He has procedure scheduled for 08/21/2024. He also needs an ECA with an MD scheduled.

## 2024-08-13 NOTE — TELEPHONE ENCOUNTER
----- Message from Miguel Angel Enriquez PA-C sent at 8/13/2024  9:18 AM CDT -----  Regarding: RE: preop patient  Good morning,    Thanks for reaching out, I'm happy to help with this. Typically what I recommend is 3 days holding the eliquis to lower bleeding risk. He should also avoid NSAIDs and aspirin 7 days prior if he is taking either of these over the counter. If you need me to schedule a preop clearance with someone in our clinic please let me know and I'll have my staff work on scheduling it. Please let me know if there is anything else I can do to assist.     Best,    Miguel Angel  ----- Message -----  From: Kevin Kang RN  Sent: 8/13/2024   9:15 AM CDT  To: Miguel Angel Enriquez PA-C; Zoe Michelle Staff  Subject: preop patient                                    Good morning, Miguel Angel. Sending this to you as I don't know where else to try. Pt is scheduled for 08/21/24 CYSTOURETEROSCOPY WITH HOLMIUM LASER LITHOTRIPSY OF URETERAL CALCULUS AND STENT INSERTION  with Dr. Yaya Mulligan. Surgical clearance has been requested and I'm trying to see if we can get him cleared in preop clinic this coming week, but I was hoping you might be able to provide preop Eliquis instructions? I see no cardiologist in his chart and not sure where else to turn. Much appreciated.     Thank you,  Kevin Kang RN  [facility], [facility] [place]

## 2024-08-13 NOTE — TELEPHONE ENCOUNTER
I called the patients wife back to let her know that Dr Mulligan didn't put a order in for it and it's not in his notes so we can't do the urine cx. She stated that another dr had a order in so they used that order.

## 2024-08-13 NOTE — TELEPHONE ENCOUNTER
----- Message from Kevin Kang RN sent at 8/13/2024  9:27 AM CDT -----  Regarding: RE: preop patient  That would be great actually if someone in your clinic could see him for clearance. Let me know if that's able to happen. Either way really appreciate the eliquis inst and prompt response.  ----- Message -----  From: Miguel Angel Enriquez PA-C  Sent: 8/13/2024   9:21 AM CDT  To: Kevin Kang RN  Subject: RE: preop patient                                Good morning,    Thanks for reaching out, I'm happy to help with this. Typically what I recommend is 3 days holding the eliquis to lower bleeding risk. He should also avoid NSAIDs and aspirin 7 days prior if he is taking either of these over the counter. If you need me to schedule a preop clearance with someone in our clinic please let me know and I'll have my staff work on scheduling it. Please let me know if there is anything else I can do to assist.     Miguel Angel Sorto  ----- Message -----  From: Kevin Kang RN  Sent: 8/13/2024   9:15 AM CDT  To: Miguel Angel Enriquez PA-C; Zoe Michelle Staff  Subject: preop patient                                    Good morning, Miguel Angel. Sending this to you as I don't know where else to try. Pt is scheduled for 08/21/24 CYSTOURETEROSCOPY WITH HOLMIUM LASER LITHOTRIPSY OF URETERAL CALCULUS AND STENT INSERTION  with Dr. Yaya Mulligan. Surgical clearance has been requested and I'm trying to see if we can get him cleared in preop clinic this coming week, but I was hoping you might be able to provide preop Eliquis instructions? I see no cardiologist in his chart and not sure where else to turn. Much appreciated.     Thank you,  Kevin Kang RN  [facility], [facility] [place]

## 2024-08-13 NOTE — TELEPHONE ENCOUNTER
The patient's wife called and stated that she was able t get the urine sample that Dr Mulligan needed in clinic yesterday that the patient couldn't give at the moment. And she wanted to know where can she drop it off in [place]. I looked at the patients chart and Dr Mulligan didn't put in a order for a urine . I told her I would reach out to a RN to get some help and I would call her back.

## 2024-08-13 NOTE — PROGRESS NOTES
[facility]   Department of Infectious Disease  Virtual Visit Note        PATIENT NAME: Roosevelt Moser  YOB: 1950   MRN: [id number]  DATE OF VISIT: 08/13/2024    REASON FOR VISIT: No chief complaint on file.      HISTORY OF PRESENT ILLNESS     Roosevelt Moser is a 73 y.o. male , known to our service from recent complicated admission to [facility] status post transferred to [facility] for left shoulder prosthetic joint infection status post I&D and removal of deep hardware 6/21, all screws and nails removed status post serial washouts 6/21, 6/24, and 6/27 complicated by left chest wall abscess status post left thoracentesis chest tube placed 7/4 and VATS on 07/05.  He initially presented with right 3rd toe osteomyelitis status post 2 doses of dalbavancin status post amputation of 2nd toe on 07/01.  Cultures from distal grew MRSA.  He has past medical history of diabetes, peripheral arterial disease, CHF.  Patient was transferred to [facility], during hospital stay there, evaluated by Plastic surgery, no plans for acute thoracic intervention, plan to follow-up outpatient.    Patient is currently at a nursing facility in [place].  Wife answering questions via iPad.  Patient seen in the bed, awake, alert, but he says he is very hard of hearing.  Wife mentions since last week he developed a rash, seems like a drug rash.  Antibiotics were stopped yesterday and PICC line to be removed today.  Head of nursing running [facility] contacted over the phone, orders faxed over 4 labs including CBC with diff, CMP, CRP, sed rate, CPK, and UA with reflex to culture to be faxed to our office.    Patient's wife states the patient is scheduled to have a urological procedure/kidney stone removal on 08/20 at [facility] in addition to plastic surgery on 08/22.    She also states the patient has lost at least 65 lb since March.    Outdoor activities: Current at facility in [place] -  former smoker, no alcohol at the moment.   Travel: None  Implants:  Prior ORIF removed   Antibiotic history:  Mohit dced 8/12    Social History  Marital Status:   Alcohol History:  reports current alcohol use.  Tobacco History:  reports that he has never smoked. He has never used smokeless tobacco.  Drug History:  reports no history of drug use.    INTERVAL HISTORY     8/15: Virtual visit - see above.       ALLERGIES AND CURRENT MEDICATIONS     Review of patient's allergies indicates:  No Known Allergies    Current Outpatient Medications   Medication Sig Dispense Refill    albuterol-ipratropium (DUO-NEB) 2.5 mg-0.5 mg/3 mL nebulizer solution Take 3 mLs by nebulization every 4 (four) hours as needed for Shortness of Breath. Rescue      aluminum & magnesium hydroxide-simethicone (MYLANTA MAX STRENGTH) 400-400-40 mg/5 mL suspension Take 15 mLs by mouth 4 (four) times daily as needed.      apixaban (ELIQUIS) 5 mg Tab Take 1 tablet (5 mg total) by mouth 2 (two) times daily. 60 tablet 1    atorvastatin (LIPITOR) 10 MG tablet Take 1 tablet (10 mg total) by mouth once daily. 90 tablet 3    co-enzyme Q-10 30 mg capsule Take 30 mg by mouth once daily.      ergocalciferol, vitamin D2, (VITAMIN D ORAL) Take 1 tablet by mouth once daily.      ferrous sulfate 325 (65 FE) MG EC tablet Take 1 tablet (325 mg total) by mouth once daily.      insulin aspart U-100 (NOVOLOG) 100 unit/mL (3 mL) InPn pen Inject 0-10 Units into the skin before meals and at bedtime as needed (Hyperglycemia).      insulin glargine U-100, Lantus, 100 unit/mL (3 mL) SubQ InPn pen Inject 15 Units into the skin once daily.      ondansetron (ZOFRAN-ODT) 8 MG TbDL Take 1 tablet (8 mg total) by mouth every 8 (eight) hours as needed.      senna-docusate 8.6-50 mg (PERICOLACE) 8.6-50 mg per tablet Take 1 tablet by mouth 2 (two) times daily as needed for Constipation.       No current facility-administered medications for this visit.        MEDICAL/SURGICAL/FAMILY HISTORY     Past Surgical History:   Procedure Laterality Date    ARTHROTOMY OF SHOULDER Left 6/21/2024    Procedure: ARTHROTOMY, SHOULDER;  Surgeon: Roman Paulson MD;  Location: [facility];  Service: Orthopedics;  Laterality: Left;    INCISION AND DRAINAGE OF ABSCESS N/A 7/4/2024    Procedure: INCISION AND DRAINAGE, ABSCESS;  Surgeon: Gus Escobedo MD;  Location: [facility];  Service: General;  Laterality: N/A;  Abcess of anterior chest wall    IRRIGATION AND DEBRIDEMENT OF UPPER EXTREMITY Left 6/24/2024    Procedure: IRRIGATION AND DEBRIDEMENT, UPPER EXTREMITY;  Surgeon: Nathan Cooper MD;  Location: [facility] OR;  Service: Orthopedics;  Laterality: Left;    IRRIGATION AND DEBRIDEMENT OF UPPER EXTREMITY Left 6/27/2024    Procedure: IRRIGATION AND DEBRIDEMENT, UPPER EXTREMITY;  Surgeon: Nathan Cooper MD;  Location: [facility] OR;  Service: Orthopedics;  Laterality: Left;    OPEN REDUCTION AND INTERNAL FIXATION (ORIF) OF FRACTURE OF PROXIMAL HUMERUS Left 3/25/2024    Procedure: ORIF, FRACTURE, HUMERUS, PROXIMAL/IM HANNA, LEFT;  Surgeon: Nathan Cooper MD;  Location: [facility];  Service: Orthopedics;  Laterality: Left;  Accumed, Synthes Small Frag set Avelino verified 3/22/24 ark    THORACOSCOPIC DECORTICATION OF LUNG Right 7/5/2024    Procedure: VATS, WITH DECORTICATION, LUNG;  Surgeon: Gus Escobedo MD;  Location: [facility];  Service: Cardiothoracic;  Laterality: Right;    TOE AMPUTATION Right 7/1/2024    Procedure: AMPUTATION, TOE;  Surgeon: Stevie Zhu DPM;  Location: [facility];  Service: Podiatry;  Laterality: Right;     Past Medical History:   Diagnosis Date    A-fib 2024    Diabetes mellitus, type 2 2021    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     REVIEW OF SYSTEMS     Review of Systems  Constitutional:  Denies fevers, chills, night sweats, loss of  appetite.  HEENT: Denies visual changes,ear pain, sinus congestion, mouth pain or trouble swallowing, sore throat or  dental pain.  Neck: Denies neck pain or lumps.  Respiratory: Denies shortness of breath, coughing, wheezing or hemoptysis.  Cardiovascular:  Denies chest pain, palpitations or edema.  Gastrointestinal: Denies  nausea, vomiting, constipation or diarrhea.  Genitourinary:  Denies dysuria, frequency, urgency or hematuria   Musculoskeletal:  Denies joint pain or swelling, difficulty walking.    Skin:  Rash chest and abdomen  Neurologic:  Denies headaches or dizziness.    Psychiatric:  Denies changes in mood or behavior.    PHYSICAL EXAM     Vital Signs  Wt Readings from Last 3 Encounters:   08/12/24 133 kg (293 lb 3.4 oz)   07/07/24 133 kg (293 lb 3.4 oz)   07/05/24 133.7 kg (294 lb 12.1 oz)     Temp Readings from Last 3 Encounters:   07/25/24 96.7 °F (35.9 °C)   07/07/24 97.4 °F (36.3 °C) (Axillary)   03/25/24 98.4 °F (36.9 °C) (Skin)     BP Readings from Last 3 Encounters:   08/12/24 131/72   07/25/24 131/72   07/07/24 134/60     Pulse Readings from Last 3 Encounters:   08/12/24 78   07/25/24 78   07/07/24 82       Physical Exam - patient seen via iPad   General:  male, awake, alert, sitting in bed awake and alert, he is in no distress, pleasant and conversant.   Eyes: Eyes with no icterus or injection. Vision grossly normal  Ears: Hard of hearing   Nose: Nares patent  Mouth: Moist mucous membranes, dentition is fair . No ulcerations, erythema or exudates.  Genitourinary:  No suprapubic tenderness.  Musculoskeletal:  Lying in bed   Skin:  Rash chest and abdomen likely drug reaction, peeling of skin noted   Neuro:   Oriented, conversant, follows commands.  Psych: Good mood, normal affect.      WOUND     Seen via iPad Right chest wound with dressing in place, left chest with packing in place    VASCULAR ACCESS DEVICE     R PICC line to be removed today at facility     LABS AND DIAGNOSTICS  "      Significant Labs: I have reviewed all relevant and available labs and microbiology. .      CrCl cannot be calculated (Patient's most recent lab result is older than the maximum 7 days allowed.).    INFLAMMATORY/PROCAL  LAST 7 DAYS    No results found for: "ESR"  CRP   Date Value Ref Range Status   07/07/2024 >16.00 (H) <1.00 mg/dL Final     Comment:     CRP-Normal Application expected values:   <1.0        mg/dL   Normal Range  1.0 - 5.0  mg/dL   Indicates mild inflammation  5.0 - 10.0 mg/dL   Indicates severe inflammation  >10.0        mg/dL   Represents serious processes and   frequently         indicates the presence of a bacterial   infection.      07/03/2024 187.3 (H) 0.0 - 8.2 mg/L Final   06/27/2024 180.2 (H) 0.0 - 8.2 mg/L Final   06/26/2024 192.2 (H) 0.0 - 8.2 mg/L Final   06/25/2024 219.3 (H) 0.0 - 8.2 mg/L Final   06/24/2024 204.5 (H) 0.0 - 8.2 mg/L Final   06/23/2024 205.0 (H) 0.0 - 8.2 mg/L Final       PRIOR  MICROBIOLOGY:    Susceptibility data from last 90 days.  Collected Specimen Info Organism Ceftriaxone Clindamycin Erythromycin Oxacillin Penicillin Tetracycline Trimeth/Sulfa Vancomycin   07/09/24 Joint Fluid from Shoulder, Left Culture in progress           07/03/24 Blood from Peripheral, Hand, Left No growth after 5 days.           06/23/24 Blood from Peripheral, Hand, Right No growth after 5 days.           06/18/24 Wound from Toe, Right Foot Methicillin resistant Staphylococcus aureus  R  S  R  R  R  S  S  S   06/14/24 Blood from Peripheral, Antecubital, Right No growth after 5 days.           06/14/24 Blood from Peripheral, Hand, Right No growth after 5 days.               LAST 7 DAYS MICROBIOLOGY   Microbiology Results (last 7 days)       ** No results found for the last 168 hours. **            PATHOLOGY    7/4/24:  PLEURAL FLUID, NOT OTHERWISE SPECIFIED:   - MIXED INFLAMMATORY CELLS WITH PREDOMINANCE OF LYMPHOCYTES.   - NEGATIVE FOR MALIGNANCY    7/1/24:  RIGHT SECOND TOE:   - ULCER; " NEGATIVE FOR OSTEOMYELITIS     CURRENT/PREVIOUS VISIT EKG  Results for orders placed or performed during the hospital encounter of 06/14/24   EKG 12-lead    Collection Time: 06/14/24 12:16 PM   Result Value Ref Range    QRS Duration 106 ms    [facility] QTC Calculation 443 ms    Narrative    Test Reason : R53.1,    Vent. Rate : 120 BPM     Atrial Rate : 159 BPM     P-R Int : 000 ms          QRS Dur : 106 ms      QT Int : 314 ms       P-R-T Axes : 000 -54 093 degrees     QTc Int : 443 ms    Atrial fibrillation with rapid ventricular response  Left axis deviation  Low voltage QRS  Inferior infarct ,age undetermined  Cannot rule out Anterior infarct ,age undetermined  Abnormal ECG  When compared with ECG of 25-MAR-2024 09:14,  Vent. rate has increased BY  46 BPM  Minimal criteria for Anterior infarct are now Present  Confirmed by Jesús HATHAWAY, Hanesl AMIN ([id number]) on 6/20/2024 8:43:02 PM    Referred By: [email or web address]   SELF           Confirmed By:Hansel Espinosa MD       Significant Diagnostics: I have reviewed all relevant and available diagnostic results.    CT chest 7/3/24:     Impression:    Large abscess of the anterior central and left chest wall and abdominal wall measuring 17 cm transversely.  This extends into the mediastinum and peritoneum with partial destruction of the anterior 6th 7th and 8th ribs.  Complete atelectasis of the left lower lobe with a moderate left pleural effusion.  Mild atelectasis right lung base with a small to moderate right pleural effusion.     X-ray R foot:  FINDINGS:  There is a small region of bony erosion involving the distal 3rd digit.  There is soft tissue injury of the distal 2nd digit with thickening of the nail.  There is no evidence fracture.     Impression:    There is bony erosion of the 3rd D IP possibly representing osteomyelitis.     ECHO:     Extremely limited visualization of all cardiac structures.  No clinically significant determination can be made based on this echocardiogram.  If  cardiac concerns persist, consider alternative cardiac imaging like RICKY for further evaluation.    Left Ventricle: Left ventricle was not well visualized due to poor sonic window. The left ventricle is normal in size. Unable to assess wall motion. There is normal systolic function with a visually estimated ejection fraction of 55 - 60%.    Right Ventricle: Right ventricle was not well visualized due to poor acoustic window.    Left Atrium: Left atrium was not well visualized.    Mitral Valve: There is no stenosis. The mean pressure gradient across the mitral valve is 2 mmHg at a heart rate of  bpm. There is mild regurgitation.    IVC/SVC: Elevated venous pressure at 15 mmHg.     Significant Imaging: I have reviewed all relevant and available imaging results/findings within the past 24 hours.     06/18/2024.  CT left shoulder   1. Subacute left humerus fracture post internal fixation.  There is incomplete bony bridging across the fracture site, with persistent angulation between the dominant bony fragments as hardware is only partially engaged (see discussion).  2. Severe arthropathy of the glenohumeral joint with multiple intra-articular bodies, some interposed.  3. Large left joint effusion and adjacent soft tissue focal fluid collections containing gas and concerning for gas-forming infection.  4. Moderate size left pleural effusion.  5. Left basilar airspace disease is presumably atelectasis with pneumonia as a less favored consideration.  This report was flagged in Epic as abnormal.     X-ray on 06/17, prior to removal of hardware.       Left shoulder x-ray 7/6:  The glenoid rim appears well maintained.  Mild AC joint osteoarthritis.  Left lung is clear.  Small foci of soft tissue gas projecting over the apical edge of the distracted humeral shaft.   1. Fracture through the surgical neck of the humerus with lateral distraction of the distal component and overlapping segments on the order of 2.5 cm.        ASSESSMENT AND PLAN:     Kidney stones  Plan for procedure at [facility] 8/20    Left chest wall abscess status post left thoracentesis 1500cc serosanguineous fluid drained 7/4 & I&D drainage with chest tube placement 7/4 & loculated effusions right chest s/p VATS and chest tube 7/5  Cell count 1100 WBCs, 72% lymphocytes, , pH 7.6 - Light's criteria consistent with exudative effusion  L pleural fluid 7/4 G stain OR WBCs, no organisms seen  OR left chest wall abscess 7/4 Gram stain with moderate WBCs, no organisms seen, both left pleural effusion and left chest wall abscess no growth  Plan for surgery follow-up on 08/22  OR right chest wall abscess 7/5 Gram stain few WBCs, no organisms seen  Labs ordered to be faxed to the office  RTC in 2 weeks virtual visit     Left shoulder prosthetic joint infection s/p I&D and removal of deep hardware 6/21 - all screws and nails removed, s/p 2nd washout 6/24 & 3rd washout 6/27  -->128, -->204.5-->219.3-->192-->180, trending down  OR cultures 6/21 no growth  Blood cultures 6/14 x2 sets no growth 6/22 x 1 no growth to date, pending final  OR cultures 6/24 g stain no organisms, cultures negative  Baseline CPK 30           Left Shoulder washouts on 6/21 ( abundant pus in  shoulder, hardware removal), 6/24 ( bloody brownish fluid) and 6/27 ( no fluid collection, healthy red and beefy tissue)  Left shoulder x-ray: Small foci of soft tissue gas projecting over the apical edge of the distracted humeral shaft.     Right 3rd toe osteomyelitis s/p Dalvance x 2 doses & 2nd  distal phalanx s/p I&D at bedside, s/p 2nd toe amputation 7/1 - RESOLVED   X-ray with bony erosion of the 3rd DIP representing osteomyelitis  MRI osteomyelitis involving the middle distal phalanges of the 3rd toe.  Very slight destruction of the tip of the tuft of the distal phalanx of the 2nd toe.  Wound cultures 2nd R toe 6/18 MRSA, vanco JESSENIA 2    PMHx:  Diabetes, last A1c 6.3%, PID, CHF EF  50%      Oral thrush    CKD (chronic kidney disease) stage 2, GFR 60-89 ml/min    Adverse effect of drug, initial encounter    Chest wall abscess    Osteomyelitis of toe    MARA (obstructive sleep apnea)    Mixed hyperlipidemia    Atrial fibrillation with rapid ventricular response    Nephrolithiasis    Morbid obesity    Primary hypertension        RTC  2 weeks - virtual visit       I spent a total of 30 minutes on the day of the visit.  This includes non-face to face time preparing to see the patient (eg, review of tests), obtaining and/or reviewing separately obtained history, documenting clinical information in the electronic or other health record, independently interpreting results and communicating results to the patient/family/caregiver, or care coordinator.      Capri Wesley MD  Date of Service: 08/13/2024      This note was created using [employer]  voice recognition software that occasionally misinterpreted phrases or words.

## 2024-08-14 ENCOUNTER — TELEPHONE (OUTPATIENT)
Dept: INTERNAL MEDICINE | Facility: CLINIC | Age: 74
End: 2024-08-14
Payer: MEDICARE

## 2024-08-14 ENCOUNTER — DOCUMENTATION ONLY (OUTPATIENT)
Dept: INTERNAL MEDICINE | Facility: CLINIC | Age: 74
End: 2024-08-14

## 2024-08-14 ENCOUNTER — TELEPHONE (OUTPATIENT)
Dept: CARDIOTHORACIC SURGERY | Facility: CLINIC | Age: 74
End: 2024-08-14
Payer: MEDICARE

## 2024-08-14 DIAGNOSIS — L89.90 PRESSURE INJURY OF SKIN, UNSPECIFIED INJURY STAGE, UNSPECIFIED LOCATION: ICD-10-CM

## 2024-08-14 DIAGNOSIS — N20.0 NEPHROLITHIASIS: ICD-10-CM

## 2024-08-14 DIAGNOSIS — E87.1 HYPONATREMIA: ICD-10-CM

## 2024-08-14 DIAGNOSIS — G47.33 OSA (OBSTRUCTIVE SLEEP APNEA): ICD-10-CM

## 2024-08-14 DIAGNOSIS — N28.9 RENAL IMPAIRMENT: ICD-10-CM

## 2024-08-14 DIAGNOSIS — E78.2 MIXED HYPERLIPIDEMIA: Primary | ICD-10-CM

## 2024-08-14 DIAGNOSIS — F11.90 OPIOID USE: ICD-10-CM

## 2024-08-14 DIAGNOSIS — I48.91 ATRIAL FIBRILLATION, UNSPECIFIED TYPE: ICD-10-CM

## 2024-08-14 DIAGNOSIS — M86.9 OSTEOMYELITIS OF MULTIPLE SITES, UNSPECIFIED TYPE: ICD-10-CM

## 2024-08-14 DIAGNOSIS — Z86.69 HISTORY OF GUILLAIN-BARRE SYNDROME: ICD-10-CM

## 2024-08-14 DIAGNOSIS — D64.9 ANEMIA, UNSPECIFIED TYPE: ICD-10-CM

## 2024-08-14 DIAGNOSIS — M86.9 OSTEOMYELITIS OF TOE: ICD-10-CM

## 2024-08-14 DIAGNOSIS — I10 PRIMARY HYPERTENSION: ICD-10-CM

## 2024-08-14 PROBLEM — B37.0 ORAL THRUSH: Status: RESOLVED | Noted: 2024-07-07 | Resolved: 2024-08-14

## 2024-08-14 PROBLEM — A41.9 SEVERE SEPSIS: Status: RESOLVED | Noted: 2024-06-14 | Resolved: 2024-08-14

## 2024-08-14 PROBLEM — L97.514 SKIN ULCER OF TOE OF RIGHT FOOT WITH NECROSIS OF BONE: Status: RESOLVED | Noted: 2024-07-01 | Resolved: 2024-08-14

## 2024-08-14 PROBLEM — L02.213 CHEST WALL ABSCESS: Status: RESOLVED | Noted: 2024-07-04 | Resolved: 2024-08-14

## 2024-08-14 PROBLEM — I48.92 ATRIAL FIB/FLUTTER, TRANSIENT: Status: ACTIVE | Noted: 2022-10-03

## 2024-08-14 PROBLEM — R65.20 SEVERE SEPSIS: Status: RESOLVED | Noted: 2024-06-14 | Resolved: 2024-08-14

## 2024-08-14 PROBLEM — L02.414 ABSCESS OF LEFT SHOULDER: Status: RESOLVED | Noted: 2024-06-18 | Resolved: 2024-08-14

## 2024-08-14 PROBLEM — R00.1 BRADYCARDIA: Status: RESOLVED | Noted: 2024-07-17 | Resolved: 2024-08-14

## 2024-08-14 PROCEDURE — 99215 OFFICE O/P EST HI 40 MIN: CPT | Mod: S$GLB,,, | Performed by: HOSPITALIST

## 2024-08-14 RX ORDER — HYDROCODONE BITARTRATE AND ACETAMINOPHEN 5; 325 MG/1; MG/1
1 TABLET ORAL 4 TIMES DAILY PRN
COMMUNITY
Start: 2024-08-13

## 2024-08-14 NOTE — ASSESSMENT & PLAN NOTE
For his fracture on the left humerus  Suggested trying to minimize opioid use prior to surgery, if able to  Although given that he is not going to have a surgical incision on the skin, pain control may not be a problem  He is not constipated  Suggested care with opioid use due to sleep apnea

## 2024-08-14 NOTE — ASSESSMENT & PLAN NOTE
This is likely from anemia of chronic disease  No overt GI/ blood losses  No stomach upset/ ulcer   He does not get colonoscopy   Had long standing NSAID use for his knee pain  Discussed follow up     I suggest monitoring the hemoglobin in the perioperative period

## 2024-08-14 NOTE — TELEPHONE ENCOUNTER
Called [facility] to let them know that [facility] does not perform chest MRIs.  Pt rescheduled to next available MRI at [facility] location.  is gone for the day. Left message with Pippa for DON or  to call me back.    I also called to make sure patient will still be admitted as of 9/5.    Julie Haase RN  CTS Nurse Navigator [phone]

## 2024-08-14 NOTE — ASSESSMENT & PLAN NOTE
He had a prolonged hospitalization between June 14th through July 25th and currently recovering in a skilled nursing facility-presented with UTI  History of comminuted fracture of the left humerus s/p fall 3/4/24 s/p ORIF 03/25/2024     Osteomyelitis    As per the history that I have obtained from his wife, he went in for a left upper arm fracture, after slipping on a wet floor  As per the wife, he started with infection there     h/o left shoulder arthrotomy 6/21,    h/o I&D LUE 06/24/24 and 06/27/24 July 1st 2024- right 2nd toe amputation  7/4-Abscess of chest wall had I and D  7/5-Pleural effusion - VATS  He is under the care of Infectious diseases specialist and finish the antibiotic on the 12th of August    He does not have fever    He is having further evaluation for possible infected ribs

## 2024-08-14 NOTE — ASSESSMENT & PLAN NOTE
BMI 33.6- height of 6 ft and weight of 248 lb on August 8th  I suggested nutritious food intake    Weight related conditions     Known to have     HTN  Type 2 Diabetes   Hyperlipidemia   Sleep apnea    Fatty liver       Not troubled with / Not known to have       Gout     Fatty liver       Encouraged maintaining healthy weight for improved health

## 2024-08-14 NOTE — ASSESSMENT & PLAN NOTE
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 Oral Hypoglycemic agents are generally avoided during the hospital stay . If glucose is consistently elevated ,I suggest using basal ,prandial Insulin regimen to control the glucose , as elevated glucose can be associated with adverse surgical out comes. Please consider involving Hospital Medicine or Endocrinology ,if any help is needed with Glucose control. Patient will be instructed based on the pre op clinic guidelines  about adjustment of diabetic treatment (If applicable )  considering the NPO status for Surgery      I had educated that uncontrolled DM can cause post op complications,risk of infection, wound healing problem,increased length of stay in hospital and its associated complications.I suggest exercise as much as possible and follow diabetic diet     He seems to have been deconditioned from his prolonged hospitalization-and is working with the physical therapist

## 2024-08-14 NOTE — ASSESSMENT & PLAN NOTE
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

## 2024-08-14 NOTE — LETTER
August 14, 2024        Yaya Mulligan MD  [address] [facility] [address]             [facility]  [address] [facility] [address]  Phone: [phone]   Patient: Roosevelt Moser   MR Number: [id number]   YOB: 1950   Date of Visit: 8/14/2024       Dear Dr. Mulligan:    Thank you for referring Roosevelt Moser to me for evaluation. Below are the relevant portions of my assessment and plan of care.            If you have questions, please do not hesitate to call me. I look forward to following Roosevelt along with you.    Sincerely,      Mary Deleon MD [facility] Maria J. Suarez Bohorquez, MD Jordan M. Dozier, MD

## 2024-08-14 NOTE — OUTPATIENT SUBJECTIVE & OBJECTIVE
Outpatient Subjective & Objective     Chief complaint-Preoperative evaluation, Perioperative Medical management, complication reduction plan     Active cardiac conditions- none    Revised cardiac risk index predictors- insulin requiring diabetes mellitus    Functional capacity -Examples of physical activity prior to June of 2024 he was walking, , his mobility came down after he has had humerus fracture in March but prior to that he was independent, he was limited due to his knee problem for which he was going to have surgery for but he was able to hold a flight on a railing and take a flight of stairs you think  -- He can undertake all the above activities without  chest pain,chest tightness, Shortness of breath ,dizziness,lightheadedness making his exercise tolerance more,  than 4 Mets.       Review of Systems   Constitutional:  Negative for chills and fever.   HENT:          Sleep apnea   Eyes:         No unusual vision changes   Respiratory:          No cough , phlegm    No Hemoptysis   Cardiovascular:         As noted   Gastrointestinal:         Bowels- Regular   No overt GI/ blood losses   Endocrine:        Prednisone use > 20 mg daily for 3 weeks- none   Genitourinary:  Negative for dysuria.        No urinary hesitancy    Musculoskeletal:         As above      Skin:         As noted   Neurological:  Negative for syncope.        No unilateral weakness   Hematological:         Current use of Anticoagulants  Yes   Psychiatric/Behavioral:             No SI/HI   No diarrhea lately       No family history on file.      No anesthesia, bleeding, cardiac problems, PONV with previous surgeries/procedures.  Medications and Allergies reviewed in epic.   FH- No anesthesia,bleeding / venous thrombosis   in family      Physical Exam  There were no vitals taken for this visit.    Review of Medicine tests    EKG- I had independently reviewed the EKG from--June 2024   It was reported to be showing     Atrial fibrillation with  rapid ventricular response   Left axis deviation   Low voltage QRS   Inferior infarct ,age undetermined   Cannot rule out Anterior infarct ,age undetermined   Abnormal ECG   When compared with ECG of 25-MAR-2024 09:14,   Vent. rate has increased BY  46 BPM   Minimal criteria for Anterior infarct are now Present     Review of clinical lab tests:  Lab Results   Component Value Date    CREATININE 1.2 07/24/2024    HGB 9.4 (L) 07/24/2024     07/24/2024         Review of old records- Was done and information gathered regards to events leading to surgery and health conditions of significance in the perioperative period         Investigations  Lab and Imaging have been reviewed in epic.    June 24 th 2024    Extremely limited visualization of all cardiac structures.  No clinically significant determination can be made based on this echocardiogram.  If cardiac concerns persist, consider alternative cardiac imaging like RICKY for further evaluation.    Left Ventricle: Left ventricle was not well visualized due to poor sonic window. The left ventricle is normal in size. Unable to assess wall motion. There is normal systolic function with a visually estimated ejection fraction of 55 - 60%.    Right Ventricle: Right ventricle was not well visualized due to poor acoustic window.    Left Atrium: Left atrium was not well visualized.    Mitral Valve: There is no stenosis. The mean pressure gradient across the mitral valve is 2 mmHg at a heart rate of  bpm. There is mild regurgitation.    IVC/SVC: Elevated venous pressure at 15 mmHg.        Review of old records- Was done and information gathered regards to events leading to surgery and health conditions of significance in the perioperative period.    Outpatient Subjective & Objective

## 2024-08-14 NOTE — ASSESSMENT & PLAN NOTE
He has been having atrial fibrillation for about 2 years time  He has had fast AFib during preop evaluation in March prior to his humerus fracture surgery      Thrombo-embolic event within the last 3 months- none     - Restoration of normal sinus rhythm from atrial fibrillation or atrial flutter, either by cardioversion or ablation, within the last month- none     - Pulmonary vein isolation within the last 2 months- none     - Left atrial appendage thrombus within the last 3 months- none .    No contraindications to holding anticoagulation for surgery     Date of surgery - 8/20   Type of Anesthesia- General   Last day of  use pre op-   I have discussed with his wife that we normally hold apixaban 2 days prior to surgery but I believe the nursing unit have received the information about 3 day hold and we went along with 3 day hold  He is planning on taking the last dose of Eliquis on August 16th

## 2024-08-14 NOTE — PROGRESS NOTES
[facility]  Progress Note    Patient Name: Roosevelt Moser  MRN: [id number]  Date of Evaluation- 08/14/2024  PCP- No, Primary Doctor    Future cases for Ivan Moser [[id number]]       Case ID Status Date Time Jf Procedure Provider Location    [id number] [facility] 8/20/2024 10:15 AM 75 CYSTOURETEROSCOPY, WITH HOLMIUM LASER LITHOTRIPSY OF URETERAL CALCULUS AND STENT INSERTION Yaya Mulligan MD [[id number]] [facility] OR 1ST FLR            HPI:  History of present illness- I had the pleasure of meeting this pleasant 73 y.o. gentleman in the pre op clinic prior to his elective  Urological  surgery. The patient is new to me .  This visit was a virtual evaluation on the phone  Spoke to his wife Ms. Erica Moser    Virtual pre op evaluation   Audio only her preference  Location of the patient- [place]  Consent obtained for virtual evaluation     Each patient to whom he or she provides medical services by telemedicine is:  (1) informed of the relationship between the physician and patient and the respective role of any other health care provider with respect to management of the patient; and (2) notified that he or she may decline to receive medical services by telemedicine and may withdraw from such care at any time.    Chief complaint-Preoperative evaluation , Perioperative Medical management, complication reduction plan    I have obtained the history by speaking to the patient and by reviewing the electronic health records.    Events leading up to surgery / History of presenting illness -    Nephrolithiasis   As per chart-2.5 cm distal L ureteral stone with proximal hydro.     I have obtained information by speaking to his caring wife who is very involved in her care and is very knowledgeable  He was found to have the stone incidentally on imaging of his ribs    No pain that he has perceiving from this  No visible blood in the urine    He has had 2 urinary tract infections recently the 1st was in June of 2024 and she currently has 1  now-    He is not known to have elevated calcium or parathyroid problem, no gout or elevated uric acid       Relevant health conditions of significance for the perioperative period/ History of presenting illness -    He had a prolonged hospitalization between June 14th through July 20th and currently recovering in a skilled nursing facility-presented with UTI  History of comminuted fracture of the left humerus s/p fall 3/4/24 s/p ORIF 03/25/2024   A fib  Renal impairment - CKD  Apixaban  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)  Type 2 diabetes   HTN  Sleep apnea  BMI 33.6- height of 6 ft and weight of 248 lb on August 8th  Osteomyelitis- h/o left shoulder arthrotomy 6/21,    h/o I&D LUE 06/24/24 and 06/27/24 July 1st 2024- right 2nd toe amputation  7/4-Abscess of chest wall had I and D  7/5-Pleural effusion - VATS      Not known to have  Lung problem, Thyroid problem,  deep vein thrombosis, pulmonary embolism,  COVID infection, fatty liver , blood vessels stent , tobacco smoking, edema, mental health problems     Lives with wife   Single story house  Retired at age 63 Y- Was a   Pets- 4 Dogs   Children -None - by choice   Has help post op   Currently staying at a skilled nursing facility         Subjective     Chief complaint-Preoperative evaluation, Perioperative Medical management, complication reduction plan     Active cardiac conditions- none    Revised cardiac risk index predictors- insulin requiring diabetes mellitus    Functional capacity -Examples of physical activity prior to June of 2024 he was walking, , his mobility came down after he has had humerus fracture in March but prior to that he was independent, he was limited due to his knee problem for which he was going to have surgery for but he was able to hold a flight on a railing and take a flight of stairs you think  -- He can undertake all the above activities without  chest pain,chest  tightness, Shortness of breath ,dizziness,lightheadedness making his exercise tolerance more,  than 4 Mets.       Review of Systems   Constitutional:  Negative for chills and fever.   HENT:          Sleep apnea   Eyes:         No unusual vision changes   Respiratory:          No cough , phlegm    No Hemoptysis   Cardiovascular:         As noted   Gastrointestinal:         Bowels- Regular   No overt GI/ blood losses   Endocrine:        Prednisone use > 20 mg daily for 3 weeks- none   Genitourinary:  Negative for dysuria.        No urinary hesitancy    Musculoskeletal:         As above      Skin:         As noted   Neurological:  Negative for syncope.        No unilateral weakness   Hematological:         Current use of Anticoagulants  Yes   Psychiatric/Behavioral:             No SI/HI   No diarrhea lately       No family history on file.      No anesthesia, bleeding, cardiac problems, PONV with previous surgeries/procedures.  Medications and Allergies reviewed in epic.   FH- No anesthesia,bleeding / venous thrombosis   in family      Physical Exam  There were no vitals taken for this visit.    Review of Medicine tests    EKG- I had independently reviewed the EKG from--June 2024   It was reported to be showing     Atrial fibrillation with rapid ventricular response   Left axis deviation   Low voltage QRS   Inferior infarct ,age undetermined   Cannot rule out Anterior infarct ,age undetermined   Abnormal ECG   When compared with ECG of 25-MAR-2024 09:14,   Vent. rate has increased BY  46 BPM   Minimal criteria for Anterior infarct are now Present     Review of clinical lab tests:  Lab Results   Component Value Date    CREATININE 1.2 07/24/2024    HGB 9.4 (L) 07/24/2024     07/24/2024         Review of old records- Was done and information gathered regards to events leading to surgery and health conditions of significance in the perioperative period         Investigations  Lab and Imaging have been reviewed in  epic.    June 24 th 2024    Extremely limited visualization of all cardiac structures.  No clinically significant determination can be made based on this echocardiogram.  If cardiac concerns persist, consider alternative cardiac imaging like RICKY for further evaluation.    Left Ventricle: Left ventricle was not well visualized due to poor sonic window. The left ventricle is normal in size. Unable to assess wall motion. There is normal systolic function with a visually estimated ejection fraction of 55 - 60%.    Right Ventricle: Right ventricle was not well visualized due to poor acoustic window.    Left Atrium: Left atrium was not well visualized.    Mitral Valve: There is no stenosis. The mean pressure gradient across the mitral valve is 2 mmHg at a heart rate of  bpm. There is mild regurgitation.    IVC/SVC: Elevated venous pressure at 15 mmHg.        Review of old records- Was done and information gathered regards to events leading to surgery and health conditions of significance in the perioperative period.        Preoperative cardiac risk assessment-  The patient does not have any active cardiac conditions . Revised cardiac risk index predictors- 1---.Functional capacity is more than 4 Mets. 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)  He was on metformin prior to his hospitalization but currently he is on long-acting insulin once a day in the  morning time  I suggested to use 50% of the long-acting insulin on the morning of surgery  Suggested not to take the short-acting insulin on the morning of surgery    Hemoglobin A1c- 6.3 June 2024  Capillary glucose check-Yes  At the nursing facility    Diabetes Complications     Microvascular     Not known to have   Diabetes affecting the eyes, Kidneys   Tingling numbness of  feet since 2002 from Guillain-[place]  No reported open areas on the feet   Feet care suggested     Macrovascular     No stroke/ TIA  Not known to have CAD      Diabetes Mellitus-I suggest monitoring the glucose in the perioperative period ( Before meals and bed time,if the patient is on oral feeds or every 6 hourly ,if the patient is NPO )  Blood glucose target in hospitalized patients is 140-180. Oral Hypoglycemic agents are generally avoided during the hospital stay . If glucose is consistently elevated ,I suggest using basal ,prandial Insulin regimen to control the glucose , as elevated glucose can be associated with adverse surgical out comes. Please consider involving Hospital Medicine or Endocrinology ,if any help is needed with Glucose control. Patient will be instructed based on the pre op clinic guidelines  about adjustment of diabetic treatment (If applicable )  considering the NPO status for Surgery      I had educated that uncontrolled DM can cause post op complications,risk of infection, wound healing problem,increased length of stay in hospital and its associated complications.I suggest exercise as much as possible and follow diabetic diet     He seems to have been deconditioned from his prolonged hospitalization-and is working with the physical therapist            Mixed hyperlipidemia  HLD-I  suggest continuation of statin during the entire perioperative period.     Hypertension  He was taking blood pressure medication in the past but currently not taking any blood pressure medication  Blood pressure lately has been about  105/58  It was likely that the improved blood pressure is from him losing weight from his prolonged hospitalization  Not known to have adrenal problem, or pituitary problem    Atrial fibrillation  He has been having atrial fibrillation for about 2 years time  He has had fast AFib during preop evaluation in March prior to his humerus fracture surgery      Thrombo-embolic event within the last 3 months- none     - Restoration of normal sinus rhythm from atrial fibrillation or atrial flutter, either by cardioversion or ablation, within the last month- none     - Pulmonary vein isolation within the last 2 months- none     - Left atrial appendage thrombus within the last 3 months- none .    No contraindications to holding anticoagulation for surgery     Date of surgery - 8/20   Type of Anesthesia- General   Last day of  use pre op-   I have discussed with his wife that we normally hold apixaban 2 days prior to surgery but I believe the nursing unit have received the information about 3 day hold and we went along with 3 day hold  He is planning on taking the last dose of Eliquis on August 16th    Nephrolithiasis  For surgery    Renal impairment  He had kidney function problem in March of 2024  He was taking anti-inflammatory medication naproxen for a long time for arthritis of his knee for which he was going to get operated  The likely reason for his kidney function problem could to be from naproxen use for his knee arthritis     Renal function has been stable  Creatinine stable  Stages of CKD discussed  Deleterious effects NSAID's , Beneficial effects of Hydration discussed   Tylenol ( If not intolerant ) as needed for pain     I  suggest monitoring renal function, in put and out put status [facility]-operatively. I  suggest avoiding nephrotoxic medication including NSAIDs, COX2 inhibitors, intravenous contrast agent,avoiding hypotension to prevent further renal impairment.   Care with intravenous contrast discussed      Osteomyelitis of toe  Had surgery  MRSA    I have considered nasal Bactroban decolonization but given that he is not going to have an open surgery with the urological procedure that he is having, after discussing with his wife, I have deferred it at this time    Osteomyelitis of multiple sites   He had a prolonged hospitalization between June 14th through July 25th and currently recovering in a skilled nursing facility-presented with UTI  History of comminuted fracture of the left humerus s/p fall 3/4/24 s/p ORIF 03/25/2024     Osteomyelitis    As per the history that I have obtained from his wife, he went in for a left upper arm fracture, after slipping on a wet floor  As per the wife, he started with infection there     h/o left shoulder arthrotomy 6/21,    h/o I&D LUE 06/24/24 and 06/27/24 July 1st 2024- right 2nd toe amputation  7/4-Abscess of chest wall had I and D  7/5-Pleural effusion - VATS  He is under the care of Infectious diseases specialist and finish the antibiotic on the 12th of August    He does not have fever    He is having further evaluation for possible infected ribs        BMI 33.0-33.9,adult  BMI 33.6- height of 6 ft and weight of 248 lb on August 8th  I suggested nutritious food intake    Weight related conditions     Known to have     HTN  Type 2 Diabetes   Hyperlipidemia   Sleep apnea    Fatty liver       Not troubled with / Not known to have       Gout     Fatty liver       Encouraged maintaining healthy weight for improved health       Bed sore  He is getting wound care    Humerus fracture  Left   Waiting for the infection to clear    MARA (obstructive sleep apnea)  Sleep apnea   Uses CPAP.  Suggested bringing for hospital use .   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)  He avoids vaccinations due to Rose [place]  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    Opioid use  For his fracture on the left humerus  Suggested trying to minimize opioid use prior to surgery, if able to  Although given that he is not going to have a surgical incision on the skin, pain control may not be a problem  He is not constipated  Suggested care with opioid use due to sleep apnea    Anemia  This is likely from anemia of chronic disease  No overt GI/ blood losses  No stomach upset/ ulcer   He does not get colonoscopy   Had long standing NSAID use for his knee pain  Discussed follow up     I suggest monitoring the hemoglobin in the perioperative period     Hyponatremia  Mild   Not known to have adrenal problems  Suggest perioperative monitoring        Preventive perioperative care    Thromboembolic prophylaxis:  His risk factors for thrombosis include surgical procedure and age.I suggest  thromboembolic prophylaxis ( mechanical/pharmacological, weighing the risk benefits of pharmacological agent use considering brendon procedural bleeding )  during the perioperative period.I suggested being active in the post operative period.      Postoperative pulmonary complication prophylaxis-Risk factors for post operative pulmonary complications include age over 65 years and ASA class >2- I suggest incentive spirometry use, early ambulation, and end tidal carbon dioxide monitoring  Oral care     Renal complication prophylaxis-Risk factors for renal complications include diabetes mellitus and hypertension . I suggest keeping him optimallyl hydrated and avoidance/  minimizing the use of  NSAID's,ELI 2 Inhibitors ,IV contrast if possible in the perioperative period.      Surgical site Infection Prophylaxis-I  suggest appropriate antibiotic for Prophylaxis against Surgical site infections  Discussed antibacterial shower     In view of urological procedure the patient  is at risk of postoperative urinary retention.  I suggest avoidance / minimizing the of  Benzodiazepines,Anticholinergic medication,antihistamines ( Benadryl) , if possible in the perioperative period. I suggest using the minimum possible use of opioids for the minimum period of time in the perioperative period. Benadryl avoidance suggested      This visit was focused on Preoperative evaluation, Perioperative Medical management, complication reduction plans. I suggest that the patient follows up with primary care or relevant sub specialists for ongoing health care.       I appreciate the opportunity to be involved in this patients care. Please feel free to contact me if there were any questions about this consultation.    Patient is optimized    Mary PRAVEEN Deleon MD  Perioperative Medicine  [facility]       Patient/ care giver/ Family member was instructed to call and update me about any changes to health,  medication, office visits ,testing out side of the [facility] , hospitalizations between now and surgery      No history of COVID, no history of COVID vaccine due to [place]    COVID screening     No fever   No cough   No SOB  No sore throat   No loss of taste or smell   No muscle aches   No nausea, vomiting , diarrhea -    I suggest that he gets weighed when he presents to the hospital for appropriate dosing of his medications    I have discussed with his caring wife about limitation of virtual evaluation and being not being able to examine him for perioperative care  He went through multiple surgeries recently with no problems  Given that, I am letting him proceed with  the surgery with the wife's understanding about the limitation of not being able to do a physical examination    Messaged Infectious diseases provider    Checked for over-the-counter medication

## 2024-08-14 NOTE — PROGRESS NOTES
[facility]  Progress Note    Patient Name: Roosevelt Moser  MRN: [id number]  Date of Evaluation- 08/14/2024  PCP- No, Primary Doctor    Future cases for Ivan Moser [[id number]]       Case ID Status Date Time Jf Procedure Provider Location    [id number] [facility] 8/20/2024 10:15 AM 75 CYSTOURETEROSCOPY, WITH HOLMIUM LASER LITHOTRIPSY OF URETERAL CALCULUS AND STENT INSERTION Yaya Mulligan MD [[id number]] [facility] OR 1ST FLR            HPI:  Corresponded with ID MD    With regards to perioperative antibiotic use    Rocephin or a single dose of an aminoglycoside before the procedure

## 2024-08-14 NOTE — ASSESSMENT & PLAN NOTE
Sleep apnea   Uses CPAP.  Suggested bringing for hospital use .   Informed the risk of worsening sleep apnea in the perioperative period and suggest using CPAP use any time in 24 hrs ( day or night )for planned sleep     I suggest  caution with usage of medication that can cause respiratory suppression in the perioperative period    Avoidance of  supine sleep, weight gain , alcoholic beverages , care with , sedative , CNS depressant use indicated  since all of these can worsen MARA      With him losing weight, his sleep apnea perhaps as got better

## 2024-08-14 NOTE — ASSESSMENT & PLAN NOTE
Had surgery  MRSA    I have considered nasal Bactroban decolonization but given that he is not going to have an open surgery with the urological procedure that he is having, after discussing with his wife, I have deferred it at this time

## 2024-08-14 NOTE — ASSESSMENT & PLAN NOTE
He was taking blood pressure medication in the past but currently not taking any blood pressure medication  Blood pressure lately has been about 105/58  It was likely that the improved blood pressure is from him losing weight from his prolonged hospitalization  Not known to have adrenal problem, or pituitary problem

## 2024-08-14 NOTE — HPI
Corresponded with ANTHONY HATHAWAY    With regards to perioperative antibiotic use    Rocephin or a single dose of an aminoglycoside before the procedure

## 2024-08-14 NOTE — ASSESSMENT & PLAN NOTE
He had kidney function problem in March of 2024  He was taking anti-inflammatory medication naproxen for a long time for arthritis of his knee for which he was going to get operated  The likely reason for his kidney function problem could to be from naproxen use for his knee arthritis     Renal function has been stable  Creatinine stable  Stages of CKD discussed  Deleterious effects NSAID's , Beneficial effects of Hydration discussed   Tylenol ( If not intolerant ) as needed for pain     I  suggest monitoring renal function, in put and out put status brendon-operatively. I  suggest avoiding nephrotoxic medication including NSAIDs, COX2 inhibitors, intravenous contrast agent,avoiding hypotension to prevent further renal impairment.   Care with intravenous contrast discussed

## 2024-08-14 NOTE — HPI
History of present illness- I had the pleasure of meeting this pleasant 73 y.o. gentleman in the pre op clinic prior to his elective  Urological  surgery. The patient is new to me .  This visit was a virtual evaluation on the phone  Spoke to his wife Ms. Erica Moser    Virtual pre op evaluation   Audio only her preference  Location of the patient- [place]  Consent obtained for virtual evaluation     Each patient to whom he or she provides medical services by telemedicine is:  (1) informed of the relationship between the physician and patient and the respective role of any other health care provider with respect to management of the patient; and (2) notified that he or she may decline to receive medical services by telemedicine and may withdraw from such care at any time.    Chief complaint-Preoperative evaluation , Perioperative Medical management, complication reduction plan    I have obtained the history by speaking to the patient and by reviewing the electronic health records.    Events leading up to surgery / History of presenting illness -    Nephrolithiasis   As per chart-2.5 cm distal L ureteral stone with proximal hydro.     I have obtained information by speaking to his caring wife who is very involved in her care and is very knowledgeable  He was found to have the stone incidentally on imaging of his ribs    No pain that he has perceiving from this  No visible blood in the urine    He has had 2 urinary tract infections recently the 1st was in June of 2024 and she currently has 1 now-    He is not known to have elevated calcium or parathyroid problem, no gout or elevated uric acid       Relevant health conditions of significance for the perioperative period/ History of presenting illness -    He had a prolonged hospitalization between June 14th through July 20th and currently recovering in a skilled nursing facility-presented with UTI  History of comminuted fracture of the left humerus s/p fall 3/4/24 s/p ORIF  03/25/2024   A fib  Renal impairment - CKD  Apixaban  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)  Type 2 diabetes   HTN  Sleep apnea  BMI 33.6- height of 6 ft and weight of 248 lb on August 8th  Osteomyelitis- h/o left shoulder arthrotomy 6/21,    h/o I&D LUE 06/24/24 and 06/27/24 July 1st 2024- right 2nd toe amputation  7/4-Abscess of chest wall had I and D  7/5-Pleural effusion - VATS      Not known to have  Lung problem, Thyroid problem,  deep vein thrombosis, pulmonary embolism,  COVID infection, fatty liver , blood vessels stent , tobacco smoking, edema, mental health problems     Lives with wife   Single story house  Retired at age 63 Y- Was a   Pets- 4 Dogs   Children -None - by choice   Has help post op   Currently staying at a skilled nursing facility

## 2024-08-15 ENCOUNTER — TELEPHONE (OUTPATIENT)
Dept: UROLOGY | Facility: CLINIC | Age: 74
End: 2024-08-15
Payer: MEDICARE

## 2024-08-15 ENCOUNTER — TELEPHONE (OUTPATIENT)
Dept: INFECTIOUS DISEASES | Facility: CLINIC | Age: 74
End: 2024-08-15
Payer: MEDICARE

## 2024-08-15 NOTE — TELEPHONE ENCOUNTER
Left VM with clinic #. Pt's wife inquiring about urine sample- it has not resulted, ordered by another provider.

## 2024-08-15 NOTE — TELEPHONE ENCOUNTER
I spoke with patient's wife , Ms Saldana, who stated she will call Dr Mulligan's office and have them fax the urine results from the test he ordered .    [facility]ISA Morales [facility]  8/15/24

## 2024-08-15 NOTE — TELEPHONE ENCOUNTER
Mrs Moser called  [phone]    She stated patient submitted a urine specimen for  Urinalysis for Dr Mulligan ( Nephro ) on Tuesday 8/13/24.  \    She stated patient is to have kidney stone removal   On Tuesday 8/20/24 with Dr Mulligan .    She is requesting Dr Pires review the urinalysis results and advise her if patient is cleared to have the procedure.    I advised her , we do not see Dr Mulligan's test results   In his chart and she will need to request his office to   Fax them to our office.    [facility]ISA Morales [facility]  8/15/24

## 2024-08-16 ENCOUNTER — TELEPHONE (OUTPATIENT)
Dept: CARDIOTHORACIC SURGERY | Facility: CLINIC | Age: 74
End: 2024-08-16
Payer: MEDICARE

## 2024-08-16 ENCOUNTER — DOCUMENTATION ONLY (OUTPATIENT)
Dept: INTERNAL MEDICINE | Facility: CLINIC | Age: 74
End: 2024-08-16
Payer: MEDICARE

## 2024-08-16 ENCOUNTER — TELEPHONE (OUTPATIENT)
Dept: FAMILY MEDICINE | Facility: CLINIC | Age: 74
End: 2024-08-16
Payer: MEDICARE

## 2024-08-16 NOTE — HPI
Lab testing               With regards to C diff positive tests from August 5, 2024-he is currently not having any diarrhea since he has been off antibiotics

## 2024-08-16 NOTE — TELEPHONE ENCOUNTER
Called wife and updated her on MRI appointments.   Future Appointments   Date Time Provider Department Center   9/5/2024  1:00 PM OC  MRI1 [facility] MRI [place]   9/9/2024  9:00 AM Lucio Bansal MD [facility] THORAC [facility]   11/22/2024 10:20 AM Isabel Trevino NP [facility] UROLOG [facility]         ----- Message from Yana Marrero sent at 8/16/2024 11:27 AM CDT -----  Regarding: Advise  Contact: [phone]  MARCELLA STRINGER calling regarding Patient Advice (message) for # pt wife is calling to speak with nurse regarding pt MRI that was cancelled pls advise [phone]

## 2024-08-16 NOTE — PROGRESS NOTES
[facility]  Progress Note    Patient Name: Roosevelt Moser  MRN: [id number]  Date of Evaluation- 08/16/2024  PCP- No, Primary Doctor    Future cases for Ivan oMser [[id number]]       Case ID Status Date Time Jf Procedure Provider Location    [id number] [facility] 8/20/2024 10:15 AM 76 CYSTOURETEROSCOPY, WITH HOLMIUM LASER LITHOTRIPSY OF URETERAL CALCULUS AND STENT INSERTION Yaya Mulligan MD [[id number]] [facility] OR 1ST FLR            HPI:  Lab testing               With regards to C diff positive tests from August 5, 2024-he is currently not having any diarrhea since he has been off antibiotics

## 2024-08-16 NOTE — TELEPHONE ENCOUNTER
"Call placed to patient to assist with scheduling appointment for surgical clearance. Call answered by patient's wife (Shana) who states patient is currently admitted to [facility] in [place].  Mrs. Saldana declined to schedule appointment; states patient is immobile, and would need to take an ambulance from [facility] to complete office visit.  Mrs. Saldana stated "Patient was admitted to the hospital and then transferred to [facility].  They should have enough information in his record to clear him for surgery."  Will send follow up message to Mr. Calvertchet and ROXANN Kang for notification.  Patient's wife states she can be reached by the surgeon's staff at her mobile number if needed.    "

## 2024-08-16 NOTE — TELEPHONE ENCOUNTER
Norma Elise [place] Staff     ----- Message from Norma Elise sent at 8/16/2024  9:10 AM CDT -----  Contact: Shana- Pt wife  Type:  Patient Returning Call    Who Called:   Shana- Pt wife  Who Left Message for Patient:  Buffy Patel  Does the patient know what this is regarding?:   pt wife asking for office to call her back   Best Call Back Number:  [phone]  Additional Information:

## 2024-08-19 ENCOUNTER — TELEPHONE (OUTPATIENT)
Dept: UROLOGY | Facility: CLINIC | Age: 74
End: 2024-08-19
Payer: MEDICARE

## 2024-08-19 ENCOUNTER — TELEPHONE (OUTPATIENT)
Dept: CARDIOTHORACIC SURGERY | Facility: CLINIC | Age: 74
End: 2024-08-19
Payer: MEDICARE

## 2024-08-19 ENCOUNTER — DOCUMENTATION ONLY (OUTPATIENT)
Dept: INTERNAL MEDICINE | Facility: CLINIC | Age: 74
End: 2024-08-19
Payer: MEDICARE

## 2024-08-19 NOTE — TELEPHONE ENCOUNTER
Outgoing call placed to pt's wife, Mrs. Saldana, regarding next steps for stone management. Informed Dr. Mulligan was able to view Dr. Deleon's pre-op appt. Dr. Mulligan states evaluation for UTI with urine sample is needed before surgery. Pt has been scheduled for next available appt with MARCIN per MD on 8/26, appt added to waitlist in the event earlier appt is available. Pt's wife informed, verbalized understanding and agreed to appt, but states pt will probably not want to be catheterized. Wife encouraged to encourage fluids to pt before appt to increase ability to urinate to give sample; wife agreed.

## 2024-08-19 NOTE — PROGRESS NOTES
[facility]  Progress Note    Patient Name: Roosevelt Moser  MRN: [id number]  Date of Evaluation- 08/19/2024  PCP- No, Primary Doctor        HPI:  Corresponded with ID MD regarding C diff    Tried to contact infection control   Could not speak     For infection control stand point   Suggest his case to be the last case of the day in the operating room       Called and spoke to wife   Surgery cancelled for tomorrow   On antibiotic for c diff on 4-5/10  treatment   No diarrhea- having soft stool

## 2024-08-19 NOTE — TELEPHONE ENCOUNTER
Outgoing call placed to pt's wife regarding surgery. Per Dr. Mulligan, pt was unable to complete pre-op appt and have urine evaluated for UTI, so surgery needs to be canceled. Wife informed and states a pre-op appt was completed by Dr. Deleon, urinalysis done and faxed from [facility]- obtained in a cup after patient was cleaned with betadine. Mrs. Saldana was informed that Dr. Mulligan reviewed the fax, but states it cannot be determined if squamous cells are present in the sample/if it is valid, so it needs to be redone before surgery to ensure patient safety. Wife verbalized understanding; she was informed Dr. Mulligan would be given update on pre-op appt and urine sample; return call will be placed on next steps.

## 2024-08-19 NOTE — TELEPHONE ENCOUNTER
Call placed to pt regarding urinanalysis and surgery. Pt's wife states she is driving and will call back when she has parked; cannot hear well.

## 2024-08-19 NOTE — TELEPHONE ENCOUNTER
"Called pt to let her know orders have been sent to both [facility] and to [facility].      Julie Haase RN  CTS Nurse Navigator [phone]            ----- Message from Kimebrly Norris sent at 8/19/2024  2:14 PM CDT -----  Regarding: MRI Update       Name Of Caller:   Buffy (Pt's Spouse & POA)     Contact Preference?:   [phone]     Provider Name:   Rolf     Does patient feel the need to be seen today? No     What is the nature of the call?:   Pt's Wife states she found a location where her  can complete an MRI of the chest this week. She's requesting orders sent to [facility] on [address] in [place]. Their phone number is [phone]. She would also like a copy sent to [facility] at fax #: [phone].        Additional Notes:  "Thank you for all that you do for our patients  "

## 2024-08-19 NOTE — HPI
Corresponded with ID MD regarding C diff    Tried to contact infection control   Could not speak     For infection control stand point   Suggest his case to be the last case of the day in the operating room       Called and spoke to wife   Surgery cancelled for tomorrow   On antibiotic for c diff on 4-5/10  treatment   No diarrhea- having soft stool

## 2024-08-20 ENCOUNTER — TELEPHONE (OUTPATIENT)
Dept: INFECTIOUS DISEASES | Facility: CLINIC | Age: 74
End: 2024-08-20
Payer: MEDICARE

## 2024-08-20 NOTE — TELEPHONE ENCOUNTER
Patient's wife called ( Ms Saldana )  [phone]    ESTRELLA  She stated patient 's rash on arms & legs has cleared  Patient finished his abx on 8/12/24    Patient has a virtual appt. Scheduled with Dr Pires  For Wednesday 8/28/24 at 4:00 pm    [facility]  8/20/24

## 2024-08-22 ENCOUNTER — OFFICE VISIT (OUTPATIENT)
Dept: UROLOGY | Facility: CLINIC | Age: 74
End: 2024-08-22
Payer: MEDICARE

## 2024-08-22 VITALS
HEART RATE: 99 BPM | HEIGHT: 72 IN | SYSTOLIC BLOOD PRESSURE: 116 MMHG | WEIGHT: 249.13 LBS | DIASTOLIC BLOOD PRESSURE: 77 MMHG | BODY MASS INDEX: 33.74 KG/M2

## 2024-08-22 DIAGNOSIS — N20.0 NEPHROLITHIASIS: Primary | ICD-10-CM

## 2024-08-22 LAB
BACTERIA #/AREA URNS AUTO: ABNORMAL /HPF
BILIRUB SERPL-MCNC: NORMAL MG/DL
BILIRUB UR QL STRIP: NEGATIVE
BLOOD URINE, POC: NORMAL
CLARITY UR REFRACT.AUTO: ABNORMAL
CLARITY, POC UA: CLEAR
COLOR UR AUTO: ABNORMAL
COLOR, POC UA: NORMAL
GLUCOSE UR QL STRIP: NEGATIVE
GLUCOSE UR QL STRIP: NORMAL
HGB UR QL STRIP: ABNORMAL
HYALINE CASTS UR QL AUTO: 0 /LPF
KETONES UR QL STRIP: NEGATIVE
KETONES UR QL STRIP: NORMAL
LEUKOCYTE ESTERASE UR QL STRIP: ABNORMAL
LEUKOCYTE ESTERASE URINE, POC: NORMAL
MICROSCOPIC COMMENT: ABNORMAL
NITRITE UR QL STRIP: NEGATIVE
NITRITE, POC UA: NORMAL
PH UR STRIP: 6 [PH] (ref 5–8)
PH, POC UA: 6
PROT UR QL STRIP: ABNORMAL
PROTEIN, POC: NORMAL
RBC #/AREA URNS AUTO: >100 /HPF (ref 0–4)
SP GR UR STRIP: 1.01 (ref 1–1.03)
SPECIFIC GRAVITY, POC UA: 1.02
SQUAMOUS #/AREA URNS AUTO: 1 /HPF
URN SPEC COLLECT METH UR: ABNORMAL
UROBILINOGEN, POC UA: 0.2
WBC #/AREA URNS AUTO: 10 /HPF (ref 0–5)

## 2024-08-22 PROCEDURE — 99214 OFFICE O/P EST MOD 30 MIN: CPT | Mod: S$GLB,,,

## 2024-08-22 PROCEDURE — 3288F FALL RISK ASSESSMENT DOCD: CPT | Mod: CPTII,S$GLB,,

## 2024-08-22 PROCEDURE — 1101F PT FALLS ASSESS-DOCD LE1/YR: CPT | Mod: CPTII,S$GLB,,

## 2024-08-22 PROCEDURE — 3066F NEPHROPATHY DOC TX: CPT | Mod: CPTII,S$GLB,,

## 2024-08-22 PROCEDURE — 3044F HG A1C LEVEL LT 7.0%: CPT | Mod: CPTII,S$GLB,,

## 2024-08-22 PROCEDURE — 3060F POS MICROALBUMINURIA REV: CPT | Mod: CPTII,S$GLB,,

## 2024-08-22 PROCEDURE — 3074F SYST BP LT 130 MM HG: CPT | Mod: CPTII,S$GLB,,

## 2024-08-22 PROCEDURE — 1159F MED LIST DOCD IN RCRD: CPT | Mod: CPTII,S$GLB,,

## 2024-08-22 PROCEDURE — 3008F BODY MASS INDEX DOCD: CPT | Mod: CPTII,S$GLB,,

## 2024-08-22 PROCEDURE — 1111F DSCHRG MED/CURRENT MED MERGE: CPT | Mod: CPTII,S$GLB,,

## 2024-08-22 PROCEDURE — 99999 PR PBB SHADOW E&M-EST. PATIENT-LVL III: CPT | Mod: PBBFAC,,,

## 2024-08-22 PROCEDURE — 1126F AMNT PAIN NOTED NONE PRSNT: CPT | Mod: CPTII,S$GLB,,

## 2024-08-22 PROCEDURE — 81002 URINALYSIS NONAUTO W/O SCOPE: CPT | Mod: S$GLB,,,

## 2024-08-22 PROCEDURE — 1157F ADVNC CARE PLAN IN RCRD: CPT | Mod: CPTII,S$GLB,,

## 2024-08-22 PROCEDURE — 1160F RVW MEDS BY RX/DR IN RCRD: CPT | Mod: CPTII,S$GLB,,

## 2024-08-22 PROCEDURE — 3078F DIAST BP <80 MM HG: CPT | Mod: CPTII,S$GLB,,

## 2024-08-22 PROCEDURE — 4010F ACE/ARB THERAPY RXD/TAKEN: CPT | Mod: CPTII,S$GLB,,

## 2024-08-22 PROCEDURE — 81001 URINALYSIS AUTO W/SCOPE: CPT

## 2024-08-22 PROCEDURE — 87086 URINE CULTURE/COLONY COUNT: CPT

## 2024-08-22 NOTE — H&P (VIEW-ONLY)
CHIEF COMPLAINT:  UTI      HISTORY OF PRESENTING ILLINESS:  Roosevelt Moser is a 73 y.o. male who was found to have a L ureteral stone while admitted for a chest wall abscess with concern for osteomyelitis. He was seen in clinic with Dr. Mulligan 8/12/2024 and scheduled for cystoureteroscopy 8/20/2024 which has been canceled due to possible UTI. He is not symptomatic.      He underwent a CT abd/pelvis which I interpreted independetnly.  There is a possible 2.5 cm distal L ureteral stone with proximal hydro.       On stretcher, he is non-ambulatory.           REVIEW OF SYSTEMS:  Review of Systems   Constitutional:  Negative for chills and fever.   HENT:  Positive for hearing loss. Negative for sore throat.    Respiratory:  Negative for cough and shortness of breath.    Cardiovascular:  Negative for chest pain.   Gastrointestinal:  Negative for nausea and vomiting.   Genitourinary:  Negative for dysuria, flank pain, frequency and urgency.   Neurological:  Positive for weakness. Negative for dizziness.         PATIENT HISTORY:    Past Medical History:   Diagnosis Date    A-fib 2024    Diabetes mellitus, type 2 2021    Guillain-[place] 10/2003    History of Guillain-[place] syndrome 10/03/2022    Hyperlipidemia     Hypertension 2016    Sleep apnea        Past Surgical History:   Procedure Laterality Date    ARTHROTOMY OF SHOULDER Left 6/21/2024    Procedure: ARTHROTOMY, SHOULDER;  Surgeon: Roman Paulson MD;  Location: [facility] OR;  Service: Orthopedics;  Laterality: Left;    INCISION AND DRAINAGE OF ABSCESS N/A 7/4/2024    Procedure: INCISION AND DRAINAGE, ABSCESS;  Surgeon: Gus Escobedo MD;  Location: [facility] OR;  Service: General;  Laterality: N/A;  Abcess of anterior chest wall    IRRIGATION AND DEBRIDEMENT OF UPPER EXTREMITY Left 6/24/2024    Procedure: IRRIGATION AND DEBRIDEMENT, UPPER EXTREMITY;  Surgeon: Nathan Cooper MD;  Location: [facility] OR;  Service: Orthopedics;  Laterality: Left;    IRRIGATION AND DEBRIDEMENT  OF UPPER EXTREMITY Left 6/27/2024    Procedure: IRRIGATION AND DEBRIDEMENT, UPPER EXTREMITY;  Surgeon: Nathan Cooper MD;  Location: [facility] OR;  Service: Orthopedics;  Laterality: Left;    OPEN REDUCTION AND INTERNAL FIXATION (ORIF) OF FRACTURE OF PROXIMAL HUMERUS Left 3/25/2024    Procedure: ORIF, FRACTURE, HUMERUS, PROXIMAL/IM HANNA, LEFT;  Surgeon: Nathan Cooper MD;  Location: [facility] OR;  Service: Orthopedics;  Laterality: Left;  Accumed, Synthes Small Frag set Avelino verified 3/22/24 ark    THORACOSCOPIC DECORTICATION OF LUNG Right 7/5/2024    Procedure: VATS, WITH DECORTICATION, LUNG;  Surgeon: Gus Escobedo MD;  Location: [facility] OR;  Service: Cardiothoracic;  Laterality: Right;    TOE AMPUTATION Right 7/1/2024    Procedure: AMPUTATION, TOE;  Surgeon: Stevie Zhu DPM;  [facility];  Service: Podiatry;  Laterality: Right;       No family history on file.    Social History     Socioeconomic History    Marital status:    Tobacco Use    Smoking status: Never    Smokeless tobacco: Never   Substance and Sexual Activity    Alcohol use: Yes     Comment: Once a year    Drug use: No    Sexual activity: Yes     Social Determinants of Health     Financial Resource Strain: Low Risk  (8/3/2024)    Overall Financial Resource Strain (CARDIA)     Difficulty of Paying Living Expenses: Not hard at all   Food Insecurity: No Food Insecurity (8/3/2024)    Hunger Vital Sign     Worried About Running Out of Food in the Last Year: Never true     Ran Out of Food in the Last Year: Never true   Transportation Needs: No Transportation Needs (7/8/2024)    TRANSPORTATION NEEDS     Transportation : No   Physical Activity: Inactive (8/3/2024)    Exercise Vital Sign     Days of Exercise per Week: 0 days     Minutes of Exercise per Session: 0 min   Stress: No Stress Concern Present (8/3/2024)    [employer] [place] [employer] - Occupational Stress Questionnaire     Feeling of Stress : Not at all   Housing Stability: Low  Risk  (8/3/2024)    Housing Stability Vital Sign     Unable to Pay for Housing in the Last Year: No     Homeless in the Last Year: No       Allergies:  Patient has no known allergies.    Medications:    Current Outpatient Medications:     apixaban (ELIQUIS) 5 mg Tab, Take 1 tablet (5 mg total) by mouth 2 (two) times daily., Disp: 60 tablet, Rfl: 1    co-enzyme Q-10 30 mg capsule, Take 30 mg by mouth once daily., Disp: , Rfl:     ergocalciferol, vitamin D2, (VITAMIN D ORAL), Take 1 tablet by mouth once daily., Disp: , Rfl:     insulin glargine U-100, Lantus, 100 unit/mL (3 mL) SubQ InPn pen, Inject 15 Units into the skin once daily., Disp: , Rfl:     albuterol-ipratropium (DUO-NEB) 2.5 mg-0.5 mg/3 mL nebulizer solution, Take 3 mLs by nebulization every 4 (four) hours as needed for Shortness of Breath. Rescue, Disp: , Rfl:     aluminum & magnesium hydroxide-simethicone (MYLANTA MAX STRENGTH) 400-400-40 mg/5 mL suspension, Take 15 mLs by mouth 4 (four) times daily as needed., Disp: , Rfl:     atorvastatin (LIPITOR) 10 MG tablet, Take 1 tablet (10 mg total) by mouth once daily., Disp: 90 tablet, Rfl: 3    ferrous sulfate 325 (65 FE) MG EC tablet, Take 1 tablet (325 mg total) by mouth once daily., Disp: , Rfl:     HYDROcodone-acetaminophen (NORCO) 5-325 mg per tablet, Take 1 tablet by mouth 4 (four) times daily as needed., Disp: , Rfl:     insulin aspart U-100 (NOVOLOG) 100 unit/mL (3 mL) InPn pen, Inject 0-10 Units into the skin before meals and at bedtime as needed (Hyperglycemia)., Disp: , Rfl:     ondansetron (ZOFRAN-ODT) 8 MG TbDL, Take 1 tablet (8 mg total) by mouth every 8 (eight) hours as needed., Disp: , Rfl:     senna-docusate 8.6-50 mg (PERICOLACE) 8.6-50 mg per tablet, Take 1 tablet by mouth 2 (two) times daily as needed for Constipation. (Patient not taking: Reported on 8/14/2024), Disp: , Rfl:     PHYSICAL EXAMINATION:  Physical Exam  HENT:      Head: Normocephalic.      Right Ear: External ear normal.       Left Ear: External ear normal.      Nose: Nose normal.      Mouth/Throat:      Mouth: Mucous membranes are moist.   Pulmonary:      Effort: Pulmonary effort is normal. No respiratory distress.   Musculoskeletal:      Comments: On stretcher   Skin:     General: Skin is warm and dry.   Neurological:      General: No focal deficit present.      Mental Status: He is alert.   Psychiatric:         Mood and Affect: Mood normal.           LABS:  UA dip clean catch: blood +3, protein +2, leukocyte +1      Lab Results   Component Value Date    CREATININE 1.2 07/24/2024    EGFRNORACEVR >60.0 07/24/2024               IMPRESSION:    Encounter Diagnoses   Name Primary?    Nephrolithiasis Yes         Assessment:       1. Nephrolithiasis        Plan:   - Clean catch urine obtained for UA and culture. Dr. Mulligan aware. Will contact with results.    RTC to be scheduled     I spent 30 minutes with the patient of which more than half was spent in direct consultation with the patient in regards to our treatment and plan.  We addressed the office findings and recent labs; any possible need to go the ER today.   Education and recommendations of today's plan of care including home remedies and needed follow up with PCP.

## 2024-08-22 NOTE — PROGRESS NOTES
CHIEF COMPLAINT:  UTI      HISTORY OF PRESENTING ILLINESS:  Roosevelt Moser is a 73 y.o. male who was found to have a L ureteral stone while admitted for a chest wall abscess with concern for osteomyelitis. He was seen in clinic with Dr. Mulligan 8/12/2024 and scheduled for cystoureteroscopy 8/20/2024 which has been canceled due to possible UTI. He is not symptomatic.      He underwent a CT abd/pelvis which I interpreted independetnly.  There is a possible 2.5 cm distal L ureteral stone with proximal hydro.       On stretcher, he is non-ambulatory.           REVIEW OF SYSTEMS:  Review of Systems   Constitutional:  Negative for chills and fever.   HENT:  Positive for hearing loss. Negative for sore throat.    Respiratory:  Negative for cough and shortness of breath.    Cardiovascular:  Negative for chest pain.   Gastrointestinal:  Negative for nausea and vomiting.   Genitourinary:  Negative for dysuria, flank pain, frequency and urgency.   Neurological:  Positive for weakness. Negative for dizziness.         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    Hyperlipidemia     Hypertension 2016    Sleep apnea        Past Surgical History:   Procedure Laterality Date    ARTHROTOMY OF SHOULDER Left 6/21/2024    Procedure: ARTHROTOMY, SHOULDER;  Surgeon: Roman Paulson MD;  Location: [facility] OR;  Service: Orthopedics;  Laterality: Left;    INCISION AND DRAINAGE OF ABSCESS N/A 7/4/2024    Procedure: INCISION AND DRAINAGE, ABSCESS;  Surgeon: Gus Escobedo MD;  Location: [facility] OR;  Service: General;  Laterality: N/A;  Abcess of anterior chest wall    IRRIGATION AND DEBRIDEMENT OF UPPER EXTREMITY Left 6/24/2024    Procedure: IRRIGATION AND DEBRIDEMENT, UPPER EXTREMITY;  Surgeon: Nathan Cooper MD;  Location: [facility] OR;  Service: Orthopedics;  Laterality: Left;    IRRIGATION AND DEBRIDEMENT  OF UPPER EXTREMITY Left 6/27/2024    Procedure: IRRIGATION AND DEBRIDEMENT, UPPER EXTREMITY;  Surgeon: Nathan Cooper MD;  Location: [facility] OR;  Service: Orthopedics;  Laterality: Left;    OPEN REDUCTION AND INTERNAL FIXATION (ORIF) OF FRACTURE OF PROXIMAL HUMERUS Left 3/25/2024    Procedure: ORIF, FRACTURE, HUMERUS, PROXIMAL/IM HANNA, LEFT;  Surgeon: Nathan Cooper MD;  Location: [facility] OR;  Service: Orthopedics;  Laterality: Left;  Accumed, [employer] Small Frag set Avelino verified 3/22/24 ark    THORACOSCOPIC DECORTICATION OF LUNG Right 7/5/2024    Procedure: VATS, WITH DECORTICATION, LUNG;  Surgeon: Gus Escobedo MD;  Location: [facility] OR;  Service: Cardiothoracic;  Laterality: Right;    TOE AMPUTATION Right 7/1/2024    Procedure: AMPUTATION, TOE;  Surgeon: Stevie Zhu DPM;  Location: [facility];  Service: Podiatry;  Laterality: Right;       No family history on file.    Social History     Socioeconomic History    Marital status:    Tobacco Use    Smoking status: Never    Smokeless tobacco: Never   Substance and Sexual Activity    Alcohol use: Yes     Comment: Once a year    Drug use: No    Sexual activity: Yes     Social Determinants of Health     Financial Resource Strain: Low Risk  (8/3/2024)    Overall Financial Resource Strain (CARDIA)     Difficulty of Paying Living Expenses: Not hard at all   Food Insecurity: No Food Insecurity (8/3/2024)    Hunger Vital Sign     Worried About Running Out of Food in the Last Year: Never true     Ran Out of Food in the Last Year: Never true   Transportation Needs: No Transportation Needs (7/8/2024)    TRANSPORTATION NEEDS     Transportation : No   Physical Activity: Inactive (8/3/2024)    Exercise Vital Sign     Days of Exercise per Week: 0 days     Minutes of Exercise per Session: 0 min   Stress: No Stress Concern Present (8/3/2024)    [employer] [place] [employer] - Occupational Stress Questionnaire     Feeling of Stress : Not at all   Housing Stability: Low  Risk  (8/3/2024)    Housing Stability Vital Sign     Unable to Pay for Housing in the Last Year: No     Homeless in the Last Year: No       Allergies:  Patient has no known allergies.    Medications:    Current Outpatient Medications:     apixaban (ELIQUIS) 5 mg Tab, Take 1 tablet (5 mg total) by mouth 2 (two) times daily., Disp: 60 tablet, Rfl: 1    co-enzyme Q-10 30 mg capsule, Take 30 mg by mouth once daily., Disp: , Rfl:     ergocalciferol, vitamin D2, (VITAMIN D ORAL), Take 1 tablet by mouth once daily., Disp: , Rfl:     insulin glargine U-100, Lantus, 100 unit/mL (3 mL) SubQ InPn pen, Inject 15 Units into the skin once daily., Disp: , Rfl:     albuterol-ipratropium (DUO-NEB) 2.5 mg-0.5 mg/3 mL nebulizer solution, Take 3 mLs by nebulization every 4 (four) hours as needed for Shortness of Breath. Rescue, Disp: , Rfl:     aluminum & magnesium hydroxide-simethicone (MYLANTA MAX STRENGTH) 400-400-40 mg/5 mL suspension, Take 15 mLs by mouth 4 (four) times daily as needed., Disp: , Rfl:     atorvastatin (LIPITOR) 10 MG tablet, Take 1 tablet (10 mg total) by mouth once daily., Disp: 90 tablet, Rfl: 3    ferrous sulfate 325 (65 FE) MG EC tablet, Take 1 tablet (325 mg total) by mouth once daily., Disp: , Rfl:     HYDROcodone-acetaminophen (NORCO) 5-325 mg per tablet, Take 1 tablet by mouth 4 (four) times daily as needed., Disp: , Rfl:     insulin aspart U-100 (NOVOLOG) 100 unit/mL (3 mL) InPn pen, Inject 0-10 Units into the skin before meals and at bedtime as needed (Hyperglycemia)., Disp: , Rfl:     ondansetron (ZOFRAN-ODT) 8 MG TbDL, Take 1 tablet (8 mg total) by mouth every 8 (eight) hours as needed., Disp: , Rfl:     senna-docusate 8.6-50 mg (PERICOLACE) 8.6-50 mg per tablet, Take 1 tablet by mouth 2 (two) times daily as needed for Constipation. (Patient not taking: Reported on 8/14/2024), Disp: , Rfl:     PHYSICAL EXAMINATION:  Physical Exam  HENT:      Head: Normocephalic.      Right Ear: External ear normal.       Left Ear: External ear normal.      Nose: Nose normal.      Mouth/Throat:      Mouth: Mucous membranes are moist.   Pulmonary:      Effort: Pulmonary effort is normal. No respiratory distress.   Musculoskeletal:      Comments: On stretcher   Skin:     General: Skin is warm and dry.   Neurological:      General: No focal deficit present.      Mental Status: He is alert.   Psychiatric:         Mood and Affect: Mood normal.           LABS:  UA dip clean catch: blood +3, protein +2, leukocyte +1      Lab Results   Component Value Date    CREATININE 1.2 07/24/2024    EGFRNORACEVR >60.0 07/24/2024               IMPRESSION:    Encounter Diagnoses   Name Primary?    Nephrolithiasis Yes         Assessment:       1. Nephrolithiasis        Plan:   - Clean catch urine obtained for UA and culture. Dr. Mulligan aware. Will contact with results.    RTC to be scheduled     I spent 30 minutes with the patient of which more than half was spent in direct consultation with the patient in regards to our treatment and plan.  We addressed the office findings and recent labs; any possible need to go the ER today.   Education and recommendations of today's plan of care including home remedies and needed follow up with PCP.

## 2024-08-23 ENCOUNTER — TELEPHONE (OUTPATIENT)
Dept: CARDIOTHORACIC SURGERY | Facility: CLINIC | Age: 74
End: 2024-08-23
Payer: MEDICARE

## 2024-08-23 LAB
BACTERIA UR CULT: NORMAL
BACTERIA UR CULT: NORMAL

## 2024-08-23 NOTE — TELEPHONE ENCOUNTER
Returned call. Left message with call back number.    Julie Haase RN  CTS Nurse Navigator [phone]      ----- Message from Abhijeet Mari sent at 8/23/2024 11:17 AM CDT -----  Regarding: Callback  Contact: Savita/[facility] [phone]. [phone]  Dali calling from [facility] requesting a callback from nurse or provider. Please call back as soon as possible.

## 2024-08-23 NOTE — TELEPHONE ENCOUNTER
Spoke with Giselle at the imaging center. Pt has completed his MRI.    Julie Haase RN  CTS Nurse Navigator [phone]

## 2024-08-26 ENCOUNTER — PATIENT MESSAGE (OUTPATIENT)
Dept: UROLOGY | Facility: CLINIC | Age: 74
End: 2024-08-26
Payer: MEDICARE

## 2024-08-28 ENCOUNTER — TELEPHONE (OUTPATIENT)
Dept: UROLOGY | Facility: CLINIC | Age: 74
End: 2024-08-28
Payer: MEDICARE

## 2024-08-28 ENCOUNTER — OFFICE VISIT (OUTPATIENT)
Dept: INFECTIOUS DISEASES | Facility: CLINIC | Age: 74
End: 2024-08-28
Payer: MEDICARE

## 2024-08-28 DIAGNOSIS — M86.9 OSTEOMYELITIS OF TOE: ICD-10-CM

## 2024-08-28 DIAGNOSIS — N18.2 CKD (CHRONIC KIDNEY DISEASE) STAGE 2, GFR 60-89 ML/MIN: ICD-10-CM

## 2024-08-28 DIAGNOSIS — M86.9 OSTEOMYELITIS OF MULTIPLE SITES, UNSPECIFIED TYPE: ICD-10-CM

## 2024-08-28 DIAGNOSIS — B37.0 ORAL THRUSH: ICD-10-CM

## 2024-08-28 DIAGNOSIS — T50.905A ADVERSE EFFECT OF DRUG, INITIAL ENCOUNTER: ICD-10-CM

## 2024-08-28 DIAGNOSIS — E87.1 HYPONATREMIA: ICD-10-CM

## 2024-08-28 DIAGNOSIS — Z86.69 HISTORY OF GUILLAIN-BARRE SYNDROME: ICD-10-CM

## 2024-08-28 DIAGNOSIS — N20.0 NEPHROLITHIASIS: Primary | ICD-10-CM

## 2024-08-28 DIAGNOSIS — M00.012 STAPHYLOCOCCAL ARTHRITIS OF LEFT SHOULDER: Primary | ICD-10-CM

## 2024-08-28 DIAGNOSIS — N20.0 NEPHROLITHIASIS: ICD-10-CM

## 2024-08-28 PROCEDURE — 3066F NEPHROPATHY DOC TX: CPT | Mod: CPTII,95,, | Performed by: STUDENT IN AN ORGANIZED HEALTH CARE EDUCATION/TRAINING PROGRAM

## 2024-08-28 PROCEDURE — 1157F ADVNC CARE PLAN IN RCRD: CPT | Mod: CPTII,95,, | Performed by: STUDENT IN AN ORGANIZED HEALTH CARE EDUCATION/TRAINING PROGRAM

## 2024-08-28 PROCEDURE — 3044F HG A1C LEVEL LT 7.0%: CPT | Mod: CPTII,95,, | Performed by: STUDENT IN AN ORGANIZED HEALTH CARE EDUCATION/TRAINING PROGRAM

## 2024-08-28 PROCEDURE — 3060F POS MICROALBUMINURIA REV: CPT | Mod: CPTII,95,, | Performed by: STUDENT IN AN ORGANIZED HEALTH CARE EDUCATION/TRAINING PROGRAM

## 2024-08-28 PROCEDURE — 99214 OFFICE O/P EST MOD 30 MIN: CPT | Mod: 95,,, | Performed by: STUDENT IN AN ORGANIZED HEALTH CARE EDUCATION/TRAINING PROGRAM

## 2024-08-28 PROCEDURE — 4010F ACE/ARB THERAPY RXD/TAKEN: CPT | Mod: CPTII,95,, | Performed by: STUDENT IN AN ORGANIZED HEALTH CARE EDUCATION/TRAINING PROGRAM

## 2024-08-29 NOTE — PROGRESS NOTES
[facility]   Department of Infectious Disease  Virtual Visit Note        PATIENT NAME: Roosevelt Moser  YOB: 1950   MRN: [id number]  DATE OF VISIT: 08/28/2024    REASON FOR VISIT: No chief complaint on file.      HISTORY OF PRESENT ILLNESS     Roosevelt Moser is a 73 y.o. male , known to our service from recent complicated admission to [facility] status post transferred to [facility] for left shoulder prosthetic joint infection status post I&D and removal of deep hardware 6/21, all screws and nails removed status post serial washouts 6/21, 6/24, and 6/27 complicated by left chest wall abscess status post left thoracentesis chest tube placed 7/4 and VATS on 07/05.  He initially presented with right 3rd toe osteomyelitis status post 2 doses of dalbavancin status post amputation of 2nd toe on 07/01.  Cultures from distal grew MRSA.  He has past medical history of diabetes, peripheral arterial disease, CHF.  Patient was transferred to [facility], during hospital stay there, evaluated by Plastic surgery, no plans for acute thoracic intervention, plan to follow-up outpatient.    Patient is currently at a nursing facility in [place].  Wife answering questions via iPad.  Patient seen in the bed, awake, alert, but he says he is very hard of hearing.  Wife mentions since last week he developed a rash, seems like a drug rash.  Antibiotics were stopped yesterday and PICC line to be removed today.  Head of nursing running [facility] contacted over the phone, orders faxed over 4 labs including CBC with diff, CMP, CRP, sed rate, CPK, and UA with reflex to culture to be faxed to our office.    Patient's wife states the patient is scheduled to have a urological procedure/kidney stone removal on 08/20 at [facility] in addition to plastic surgery on 08/22.    She also states the patient has lost at least 65 lb since March.    Outdoor activities: Current at facility in [place] -  former smoker, no alcohol at the moment.   Travel: None  Implants:  Prior ORIF removed   Antibiotic history:  Mohit dced 8/12    Social History  Marital Status:   Alcohol History:  reports current alcohol use.  Tobacco History:  reports that he has never smoked. He has never used smokeless tobacco.  Drug History:  reports no history of drug use.    INTERVAL HISTORY     8/28:  Patient seen again via iPad, wife taking care of the called.  He is awake, alert, much more interactive compared to 2 weeks ago.  Wife mentions rash is improved, and the wounds in his chest are almost healed.  Patient had diarrhea early this month, the wife forgot to mention it to me last visit but he got tested at facility and he tested positive for C diff on 08/05, status post 10 days of oral vancomycin.  Wife and patient mentioned diarrhea is resolved, stool is a little more formed, he only went once today.  Patient with better appetite, so working with physical therapy.  I was contacted by Medicine/ at [facility] for ID clearance for kidney stone removal given active C diff.  Instructed that he needed to complete course, and diarrhea needed to be resolved before surgery.  We will get blood work done at the facility and once labs are received, will clear him for procedure.  Wife mentions he would also see Plastic surgery at [facility] for possible intervention in the future due to complicated history of left chest wall abscess from prior left shoulder surgery.  He was supposed to have an MRI done outpatient, pending results.    8/15: Virtual visit - see above.       ALLERGIES AND CURRENT MEDICATIONS     Review of patient's allergies indicates:  No Known Allergies    Current Outpatient Medications   Medication Sig Dispense Refill    albuterol-ipratropium (DUO-NEB) 2.5 mg-0.5 mg/3 mL nebulizer solution Take 3 mLs by nebulization every 4 (four) hours as needed for Shortness of Breath. Rescue      aluminum &  magnesium hydroxide-simethicone (MYLANTA MAX STRENGTH) 400-400-40 mg/5 mL suspension Take 15 mLs by mouth 4 (four) times daily as needed.      apixaban (ELIQUIS) 5 mg Tab Take 1 tablet (5 mg total) by mouth 2 (two) times daily. 60 tablet 1    atorvastatin (LIPITOR) 10 MG tablet Take 1 tablet (10 mg total) by mouth once daily. 90 tablet 3    co-enzyme Q-10 30 mg capsule Take 30 mg by mouth once daily.      ergocalciferol, vitamin D2, (VITAMIN D ORAL) Take 1 tablet by mouth once daily.      ferrous sulfate 325 (65 FE) MG EC tablet Take 1 tablet (325 mg total) by mouth once daily.      HYDROcodone-acetaminophen (NORCO) 5-325 mg per tablet Take 1 tablet by mouth 4 (four) times daily as needed.      insulin aspart U-100 (NOVOLOG) 100 unit/mL (3 mL) InPn pen Inject 0-10 Units into the skin before meals and at bedtime as needed (Hyperglycemia).      insulin glargine U-100, Lantus, 100 unit/mL (3 mL) SubQ InPn pen Inject 15 Units into the skin once daily.      ondansetron (ZOFRAN-ODT) 8 MG TbDL Take 1 tablet (8 mg total) by mouth every 8 (eight) hours as needed.      senna-docusate 8.6-50 mg (PERICOLACE) 8.6-50 mg per tablet Take 1 tablet by mouth 2 (two) times daily as needed for Constipation. (Patient not taking: Reported on 8/14/2024)       No current facility-administered medications for this visit.       MEDICAL/SURGICAL/FAMILY HISTORY     Past Surgical History:   Procedure Laterality Date    ARTHROTOMY OF SHOULDER Left 6/21/2024    Procedure: ARTHROTOMY, SHOULDER;  Surgeon: Roman Paulson MD;  Location: [facility] OR;  Service: Orthopedics;  Laterality: Left;    INCISION AND DRAINAGE OF ABSCESS N/A 7/4/2024    Procedure: INCISION AND DRAINAGE, ABSCESS;  Surgeon: Gus Escobedo MD;  Location: [facility] OR;  Service: General;  Laterality: N/A;  Abcess of anterior chest wall    IRRIGATION AND DEBRIDEMENT OF UPPER EXTREMITY Left 6/24/2024    Procedure: IRRIGATION AND DEBRIDEMENT, UPPER EXTREMITY;  Surgeon: Nathan Cooper  MD;  Location: [facility] OR;  Service: Orthopedics;  Laterality: Left;    IRRIGATION AND DEBRIDEMENT OF UPPER EXTREMITY Left 6/27/2024    Procedure: IRRIGATION AND DEBRIDEMENT, UPPER EXTREMITY;  Surgeon: Nathan Cooper MD;  Location: [facility] OR;  Service: Orthopedics;  Laterality: Left;    OPEN REDUCTION AND INTERNAL FIXATION (ORIF) OF FRACTURE OF PROXIMAL HUMERUS Left 3/25/2024    Procedure: ORIF, FRACTURE, HUMERUS, PROXIMAL/IM HANNA, LEFT;  Surgeon: Nathan Cooper MD;  Location: [facility] OR;  Service: Orthopedics;  Laterality: Left;  Accumed, Synthes Small Frag set Avelino verified 3/22/24 ark    THORACOSCOPIC DECORTICATION OF LUNG Right 7/5/2024    Procedure: VATS, WITH DECORTICATION, LUNG;  Surgeon: Gus Escobedo MD;  Location: [facility] OR;  Service: Cardiothoracic;  Laterality: Right;    TOE AMPUTATION Right 7/1/2024    Procedure: AMPUTATION, TOE;  Surgeon: Stevie Zhu DPM;  Location: [facility] OR;  Service: Podiatry;  Laterality: Right;     Past Medical History:   Diagnosis Date    A-fib 2024    Diabetes mellitus, type 2 2021    Guillain-Otto 10/2003    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     REVIEW OF SYSTEMS     Review of Systems  Constitutional:  Denies fevers, chills, night sweats, loss of appetite.  HEENT: Denies visual changes,ear pain, sinus congestion, mouth pain or trouble swallowing, sore throat or  dental pain.  Neck: Denies neck pain or lumps.  Respiratory: Denies shortness of breath, coughing, wheezing or hemoptysis.  Cardiovascular:  Denies chest pain, palpitations or edema.  Gastrointestinal: Denies  nausea, vomiting, constipation or diarrhea.  Genitourinary:  Denies dysuria, frequency, urgency or hematuria   Musculoskeletal:  Denies joint pain or swelling, difficulty walking.    Skin:  Rash chest and abdomen  Neurologic:  Denies headaches or dizziness.    Psychiatric:  Denies changes in mood or  "behavior.    PHYSICAL EXAM     Vital Signs  Wt Readings from Last 3 Encounters:   08/22/24 113 kg (249 lb 1.9 oz)   08/12/24 133 kg (293 lb 3.4 oz)   07/07/24 133 kg (293 lb 3.4 oz)     Temp Readings from Last 3 Encounters:   07/25/24 96.7 °F (35.9 °C)   07/07/24 97.4 °F (36.3 °C) (Axillary)   03/25/24 98.4 °F (36.9 °C) (Skin)     BP Readings from Last 3 Encounters:   08/22/24 116/77   08/12/24 131/72   07/25/24 131/72     Pulse Readings from Last 3 Encounters:   08/22/24 99   08/12/24 78   07/25/24 78       Physical Exam - patient seen via iPad   General:  male, awake, alert, sitting in bed awake and alert, he is in no distress, pleasant and conversant.   Eyes: Eyes with no icterus or injection. Vision grossly normal  Ears: Hard of hearing   Nose: Nares patent  Mouth: Moist mucous membranes, dentition is fair . No ulcerations, erythema or exudates.  Genitourinary:  No suprapubic tenderness.  Musculoskeletal:  Lying in bed   Skin:  Prior rash resolved, small healing wounds noted on left flank.  Neuro:   Oriented, conversant, follows commands.  Psych: Good mood, normal affect.      WOUND     Seen via iPad - looks comfortable, in no distress    VASCULAR ACCESS DEVICE     R PICC line remove at facility on 8/15    LABS AND DIAGNOSTICS       Significant Labs: I have reviewed all relevant and available labs and microbiology. .      CrCl cannot be calculated (Patient's most recent lab result is older than the maximum 7 days allowed.).    INFLAMMATORY/PROCAL  LAST 7 DAYS    No results found for: "ESR"  CRP   Date Value Ref Range Status   07/07/2024 >16.00 (H) <1.00 mg/dL Final     Comment:     CRP-Normal Application expected values:   <1.0        mg/dL   Normal Range  1.0 - 5.0  mg/dL   Indicates mild inflammation  5.0 - 10.0 mg/dL   Indicates severe inflammation  >10.0        mg/dL   Represents serious processes and   frequently         indicates the presence of a bacterial   infection.      07/03/2024 187.3 (H) " 0.0 - 8.2 mg/L Final   06/27/2024 180.2 (H) 0.0 - 8.2 mg/L Final   06/26/2024 192.2 (H) 0.0 - 8.2 mg/L Final   06/25/2024 219.3 (H) 0.0 - 8.2 mg/L Final   06/24/2024 204.5 (H) 0.0 - 8.2 mg/L Final   06/23/2024 205.0 (H) 0.0 - 8.2 mg/L Final       PRIOR  MICROBIOLOGY:    Susceptibility data from last 90 days.  Collected Specimen Info Organism Ceftriaxone Clindamycin Erythromycin Oxacillin Penicillin Tetracycline Trimeth/Sulfa Vancomycin   07/03/24 Blood from Peripheral, Hand, Left No growth after 5 days.           06/23/24 Blood from Peripheral, Hand, Right No growth after 5 days.           06/18/24 Wound from Toe, Right Foot Methicillin resistant Staphylococcus aureus  R  S  R  R  R  S  S  S   06/14/24 Blood from Peripheral, Antecubital, Right No growth after 5 days.           06/14/24 Blood from Peripheral, Hand, Right No growth after 5 days.               LAST 7 DAYS MICROBIOLOGY   UA from 08/01 negative for UTI - results on Media       PATHOLOGY    7/4/24:  PLEURAL FLUID, NOT OTHERWISE SPECIFIED:   - MIXED INFLAMMATORY CELLS WITH PREDOMINANCE OF LYMPHOCYTES.   - NEGATIVE FOR MALIGNANCY    7/1/24:  RIGHT SECOND TOE:   - ULCER; NEGATIVE FOR OSTEOMYELITIS     CURRENT/PREVIOUS VISIT EKG  Results for orders placed or performed during the hospital encounter of 06/14/24   EKG 12-lead    Collection Time: 06/14/24 12:16 PM   Result Value Ref Range    QRS Duration 106 ms    [facility] QTC Calculation 443 ms    Narrative    Test Reason : R53.1,    Vent. Rate : 120 BPM     Atrial Rate : 159 BPM     P-R Int : 000 ms          QRS Dur : 106 ms      QT Int : 314 ms       P-R-T Axes : 000 -54 093 degrees     QTc Int : 443 ms    Atrial fibrillation with rapid ventricular response  Left axis deviation  Low voltage QRS  Inferior infarct ,age undetermined  Cannot rule out Anterior infarct ,age undetermined  Abnormal ECG  When compared with ECG of 25-MAR-2024 09:14,  Vent. rate has increased BY  46 BPM  Minimal criteria for Anterior infarct  are now Present  Confirmed by Jesús HATHAWAY, Hansel AMIN ([id number]) on 6/20/2024 8:43:02 PM    Referred By: [email or web address]   SELF           Confirmed By:Hansel Espinosa MD       Significant Diagnostics: I have reviewed all relevant and available diagnostic results.    CT chest 7/3/24:  Impression:    Large abscess of the anterior central and left chest wall and abdominal wall measuring 17 cm transversely.  This extends into the mediastinum and peritoneum with partial destruction of the anterior 6th 7th and 8th ribs.  Complete atelectasis of the left lower lobe with a moderate left pleural effusion.  Mild atelectasis right lung base with a small to moderate right pleural effusion.     X-ray R foot:  FINDINGS:  There is a small region of bony erosion involving the distal 3rd digit.  There is soft tissue injury of the distal 2nd digit with thickening of the nail.  There is no evidence fracture.     Impression:    There is bony erosion of the 3rd D IP possibly representing osteomyelitis.     ECHO:     Extremely limited visualization of all cardiac structures.  No clinically significant determination can be made based on this echocardiogram.  If cardiac concerns persist, consider alternative cardiac imaging like RICKY for further evaluation.    Left Ventricle: Left ventricle was not well visualized due to poor sonic window. The left ventricle is normal in size. Unable to assess wall motion. There is normal systolic function with a visually estimated ejection fraction of 55 - 60%.    Right Ventricle: Right ventricle was not well visualized due to poor acoustic window.    Left Atrium: Left atrium was not well visualized.    Mitral Valve: There is no stenosis. The mean pressure gradient across the mitral valve is 2 mmHg at a heart rate of  bpm. There is mild regurgitation.    IVC/SVC: Elevated venous pressure at 15 mmHg.     Significant Imaging: I have reviewed all relevant and available imaging results/findings within the past 24 hours.      06/18/2024.  CT left shoulder   1. Subacute left humerus fracture post internal fixation.  There is incomplete bony bridging across the fracture site, with persistent angulation between the dominant bony fragments as hardware is only partially engaged (see discussion).  2. Severe arthropathy of the glenohumeral joint with multiple intra-articular bodies, some interposed.  3. Large left joint effusion and adjacent soft tissue focal fluid collections containing gas and concerning for gas-forming infection.  4. Moderate size left pleural effusion.  5. Left basilar airspace disease is presumably atelectasis with pneumonia as a less favored consideration.  This report was flagged in Epic as abnormal.     X-ray on 06/17, prior to removal of hardware.       Left shoulder x-ray 7/6:  The glenoid rim appears well maintained.  Mild AC joint osteoarthritis.  Left lung is clear.  Small foci of soft tissue gas projecting over the apical edge of the distracted humeral shaft.   1. Fracture through the surgical neck of the humerus with lateral distraction of the distal component and overlapping segments on the order of 2.5 cm.       ASSESSMENT AND PLAN:     C diff colitis 8/7/24 diagnosed at facility - improved   8/5/24 Stool C diff Toxin A + B positive s/p vancomycin PO 10 days  No need to repeat stool for cure  Patient with formed stool, no longer diarrhea, improved  Completed +8 weeks of IV antibiotics including 6 weeks plus of Teflaro   Currently off antibiotics, ESR and CRP trending down    Kidney stones  [place] [facility] [place]  Labs ordered to be done outpatient and be faxed to the office  Will give ID clearance once labs are completed for surgery scheduled for 9/11    Left chest wall abscess status post left thoracentesis 1500cc serosanguineous fluid drained 7/4 & I&D drainage with chest tube placement 7/4 & loculated effusions right chest s/p VATS and chest tube 7/5, tubes removed  Cell count 1100 WBCs, 72%  lymphocytes, , pH 7.6 - Light's criteria consistent with exudative effusion  L pleural fluid 7/4 G stain OR WBCs, no organisms seen  OR left chest wall abscess 7/4 Gram stain with moderate WBCs, no organisms seen, both left pleural effusion and left chest wall abscess no growth  OR right chest wall abscess 7/5 Gram stain few WBCs, no organisms seen - cultures no growth  [place] [facility]   RTC ID clinic/Dr. Calvin  in 4 weeks      Left shoulder prosthetic joint infection s/p I&D and removal of deep hardware 6/21 - all screws and nails removed, s/p 2nd washout 6/24 & 3rd washout 6/27  -->128, -->204.5-->219.3-->192-->180-->22, trending down  -->204-->192.2-->187-->160-->58.1, trending down  OR cultures 6/21 no growth  Blood cultures 6/14 x2 sets no growth 6/22 x 1 no growth to date, pending final  OR cultures 6/24 g stain no organisms, cultures negative  Baseline CPK 30           Left Shoulder washouts on 6/21 ( abundant pus in  shoulder, hardware removal), 6/24 ( bloody brownish fluid) and 6/27 ( no fluid collection, healthy red and beefy tissue)  Left shoulder x-ray: Small foci of soft tissue gas projecting over the apical edge of the distracted humeral shaft.     Right 3rd toe osteomyelitis s/p Dalvance x 2 doses & 2nd  distal phalanx s/p I&D at bedside, s/p 2nd toe amputation 7/1 - RESOLVED   X-ray with bony erosion of the 3rd DIP representing osteomyelitis  MRI osteomyelitis involving the middle distal phalanges of the 3rd toe.  Very slight destruction of the tip of the tuft of the distal phalanx of the 2nd toe.  Wound cultures 2nd R toe 6/18 MRSA, vanco JESSENIA 2    PMHx:  Diabetes, last A1c 6.3%, PID, CHF EF 50%      Staphylococcal arthritis of left shoulder  -     CBC W/ AUTO DIFFERENTIAL; Future; Expected date: 08/28/2024  -     Comprehensive Metabolic Panel; Future; Expected date: 08/28/2024  -     C-REACTIVE PROTEIN; Future; Expected date:  08/28/2024  -     Sedimentation rate; Future; Expected date: 08/28/2024    Oral thrush    CKD (chronic kidney disease) stage 2, GFR 60-89 ml/min    Adverse effect of drug, initial encounter    Osteomyelitis of toe  -     CBC W/ AUTO DIFFERENTIAL; Future; Expected date: 08/28/2024  -     Comprehensive Metabolic Panel; Future; Expected date: 08/28/2024  -     C-REACTIVE PROTEIN; Future; Expected date: 08/28/2024  -     Sedimentation rate; Future; Expected date: 08/28/2024    Nephrolithiasis    Osteomyelitis of multiple sites, unspecified type  -     CBC W/ AUTO DIFFERENTIAL; Future; Expected date: 08/28/2024  -     Comprehensive Metabolic Panel; Future; Expected date: 08/28/2024  -     C-REACTIVE PROTEIN; Future; Expected date: 08/28/2024  -     Sedimentation rate; Future; Expected date: 08/28/2024    Hyponatremia    History of Guillain-[place] syndrome        RTC  4 weeks - Dr Calvin virtual or in person      I spent a total of 30 minutes on the day of the visit.  This includes non-face to face time preparing to see the patient (eg, review of tests), obtaining and/or reviewing separately obtained history, documenting clinical information in the electronic or other health record, independently interpreting results and communicating results to the patient/family/caregiver, or care coordinator.      Capri Wesley MD  Date of Service: 08/28/2024      This note was created using [employer]  voice recognition software that occasionally misinterpreted phrases or words.

## 2024-09-03 ENCOUNTER — DOCUMENTATION ONLY (OUTPATIENT)
Dept: CARDIOTHORACIC SURGERY | Facility: CLINIC | Age: 74
End: 2024-09-03
Payer: MEDICARE

## 2024-09-03 NOTE — PROGRESS NOTES
Called [facility] to check on the status of imaging disc for MRI. Left message with call back number.    Julie Haase RN  CTS Nurse Navigator [phone]

## 2024-09-04 ENCOUNTER — TELEPHONE (OUTPATIENT)
Dept: CARDIOTHORACIC SURGERY | Facility: CLINIC | Age: 74
End: 2024-09-04
Payer: MEDICARE

## 2024-09-04 NOTE — TELEPHONE ENCOUNTER
Spoke with patient's spouse and explained we are waiting on images. As of now, we have not been able to get them electronically. We do have the report. Pt states if we are unable to get the images, she will bring a copy of the disc tomorrow.    We will send a the order for CBC to [facility] so that can be drawn.    No other questions at this time.    Julie Haase RN  CTS Nurse Navigator [phone]

## 2024-09-05 ENCOUNTER — TELEPHONE (OUTPATIENT)
Dept: CARDIOTHORACIC SURGERY | Facility: CLINIC | Age: 74
End: 2024-09-05
Payer: MEDICARE

## 2024-09-05 ENCOUNTER — DOCUMENTATION ONLY (OUTPATIENT)
Dept: CARDIOTHORACIC SURGERY | Facility: CLINIC | Age: 74
End: 2024-09-05
Payer: MEDICARE

## 2024-09-05 ENCOUNTER — HOSPITAL ENCOUNTER (OUTPATIENT)
Dept: RADIOLOGY | Facility: HOSPITAL | Age: 74
Discharge: HOME | End: 2024-09-05
Attending: PHYSICIAN ASSISTANT
Payer: MEDICARE

## 2024-09-05 DIAGNOSIS — L02.213 CHEST WALL ABSCESS: Primary | ICD-10-CM

## 2024-09-05 NOTE — TELEPHONE ENCOUNTER
Called pt's wife to let her know we do not have images updated to Epic yet. She states she will stop by imaging center to  discs.

## 2024-09-05 NOTE — PROGRESS NOTES
Received imaging discs from . Placed in Dr Bansal's hand for review.    Julie Haase RN  CTS Nurse Navigator [phone]

## 2024-09-05 NOTE — PROGRESS NOTES
Called [facility] to find out where to fax order for CBC and to let them know patient has appointment on Monday with Dr Bansal. Unable to reach anyone over the phone.    Called Jose J ([facility]) on his cell phone ([phone]). He asked me to send appointment slip to his fax, [phone]. He stated he pt just had CBC and will  fax the results. Provided fax number for thoracic office.    Julie Haase RN  CTS Nurse Navigator [phone]

## 2024-09-05 NOTE — TELEPHONE ENCOUNTER
Spoke with medical records and let them know patient's wife would be coming to  MRI and CT scans. They will burn the discs and have them ready for his wife.    Julie Haase RN  CTS Nurse Navigator [phone]

## 2024-09-05 NOTE — TELEPHONE ENCOUNTER
Returned patient's call and went to . Left detailed message to let her know Dr Bansal received and reviewed images. Pt should come in for scheduled appointment time Monday. Informed patient that appointment slip had been faxed to [facility] with confirmed receipt.   Stated that we no not need another CBC and can use the results of the last one [facility] has drawn and that results have been received and reviewed.

## 2024-09-06 ENCOUNTER — TELEPHONE (OUTPATIENT)
Dept: INTERNAL MEDICINE | Facility: CLINIC | Age: 74
End: 2024-09-06
Payer: MEDICARE

## 2024-09-06 NOTE — HPI
Called to follow up  Spoke to wife   Doing much better   No fever   No diarrhea    Having thoracic evaluation on September 9th    Will try to do an office evaluation on September 9th

## 2024-09-09 ENCOUNTER — OFFICE VISIT (OUTPATIENT)
Dept: INTERNAL MEDICINE | Facility: CLINIC | Age: 74
End: 2024-09-09
Payer: MEDICARE

## 2024-09-09 ENCOUNTER — OFFICE VISIT (OUTPATIENT)
Dept: CARDIOTHORACIC SURGERY | Facility: CLINIC | Age: 74
End: 2024-09-09
Payer: MEDICARE

## 2024-09-09 VITALS
SYSTOLIC BLOOD PRESSURE: 119 MMHG | HEIGHT: 73 IN | BODY MASS INDEX: 31.41 KG/M2 | OXYGEN SATURATION: 98 % | HEART RATE: 68 BPM | DIASTOLIC BLOOD PRESSURE: 76 MMHG | TEMPERATURE: 98 F | WEIGHT: 237 LBS | RESPIRATION RATE: 16 BRPM

## 2024-09-09 VITALS
OXYGEN SATURATION: 98 % | DIASTOLIC BLOOD PRESSURE: 72 MMHG | SYSTOLIC BLOOD PRESSURE: 116 MMHG | HEIGHT: 72 IN | WEIGHT: 237 LBS | HEART RATE: 90 BPM | BODY MASS INDEX: 32.1 KG/M2

## 2024-09-09 DIAGNOSIS — J90 LOCULATED PLEURAL EFFUSION: ICD-10-CM

## 2024-09-09 DIAGNOSIS — E87.1 HYPONATREMIA: ICD-10-CM

## 2024-09-09 DIAGNOSIS — I10 PRIMARY HYPERTENSION: ICD-10-CM

## 2024-09-09 DIAGNOSIS — M86.9 OSTEOMYELITIS OF MULTIPLE SITES, UNSPECIFIED TYPE: ICD-10-CM

## 2024-09-09 DIAGNOSIS — E11.42 DIABETIC POLYNEUROPATHY ASSOCIATED WITH TYPE 2 DIABETES MELLITUS: Primary | ICD-10-CM

## 2024-09-09 DIAGNOSIS — F11.90 OPIOID USE: ICD-10-CM

## 2024-09-09 DIAGNOSIS — G47.33 OSA (OBSTRUCTIVE SLEEP APNEA): ICD-10-CM

## 2024-09-09 DIAGNOSIS — Z86.69 HISTORY OF GUILLAIN-BARRE SYNDROME: ICD-10-CM

## 2024-09-09 DIAGNOSIS — D64.9 ANEMIA, UNSPECIFIED TYPE: ICD-10-CM

## 2024-09-09 DIAGNOSIS — M86.9 OSTEOMYELITIS OF TOE: ICD-10-CM

## 2024-09-09 DIAGNOSIS — I48.91 ATRIAL FIBRILLATION, UNSPECIFIED TYPE: ICD-10-CM

## 2024-09-09 DIAGNOSIS — N28.9 RENAL IMPAIRMENT: ICD-10-CM

## 2024-09-09 DIAGNOSIS — N20.0 NEPHROLITHIASIS: ICD-10-CM

## 2024-09-09 DIAGNOSIS — E78.2 MIXED HYPERLIPIDEMIA: ICD-10-CM

## 2024-09-09 DIAGNOSIS — L02.213 CHEST WALL ABSCESS: Primary | ICD-10-CM

## 2024-09-09 PROCEDURE — 1160F RVW MEDS BY RX/DR IN RCRD: CPT | Mod: CPTII,S$GLB,, | Performed by: HOSPITALIST

## 2024-09-09 PROCEDURE — 4010F ACE/ARB THERAPY RXD/TAKEN: CPT | Mod: CPTII,S$GLB,, | Performed by: HOSPITALIST

## 2024-09-09 PROCEDURE — 3008F BODY MASS INDEX DOCD: CPT | Mod: CPTII,S$GLB,, | Performed by: HOSPITALIST

## 2024-09-09 PROCEDURE — 3288F FALL RISK ASSESSMENT DOCD: CPT | Mod: CPTII,S$GLB,, | Performed by: HOSPITALIST

## 2024-09-09 PROCEDURE — 1101F PT FALLS ASSESS-DOCD LE1/YR: CPT | Mod: CPTII,S$GLB,, | Performed by: HOSPITALIST

## 2024-09-09 PROCEDURE — 99215 OFFICE O/P EST HI 40 MIN: CPT | Mod: S$GLB,,, | Performed by: HOSPITALIST

## 2024-09-09 PROCEDURE — 3044F HG A1C LEVEL LT 7.0%: CPT | Mod: CPTII,S$GLB,, | Performed by: HOSPITALIST

## 2024-09-09 PROCEDURE — 99999 PR PBB SHADOW E&M-EST. PATIENT-LVL III: CPT | Mod: PBBFAC,,, | Performed by: HOSPITALIST

## 2024-09-09 PROCEDURE — 3060F POS MICROALBUMINURIA REV: CPT | Mod: CPTII,S$GLB,, | Performed by: HOSPITALIST

## 2024-09-09 PROCEDURE — 1157F ADVNC CARE PLAN IN RCRD: CPT | Mod: CPTII,S$GLB,, | Performed by: HOSPITALIST

## 2024-09-09 PROCEDURE — 1159F MED LIST DOCD IN RCRD: CPT | Mod: CPTII,S$GLB,, | Performed by: HOSPITALIST

## 2024-09-09 PROCEDURE — 3078F DIAST BP <80 MM HG: CPT | Mod: CPTII,S$GLB,, | Performed by: HOSPITALIST

## 2024-09-09 PROCEDURE — 3066F NEPHROPATHY DOC TX: CPT | Mod: CPTII,S$GLB,, | Performed by: HOSPITALIST

## 2024-09-09 PROCEDURE — 1126F AMNT PAIN NOTED NONE PRSNT: CPT | Mod: CPTII,S$GLB,, | Performed by: HOSPITALIST

## 2024-09-09 PROCEDURE — 99999 PR PBB SHADOW E&M-EST. PATIENT-LVL III: CPT | Mod: PBBFAC,HCNC,, | Performed by: STUDENT IN AN ORGANIZED HEALTH CARE EDUCATION/TRAINING PROGRAM

## 2024-09-09 PROCEDURE — 3074F SYST BP LT 130 MM HG: CPT | Mod: CPTII,S$GLB,, | Performed by: HOSPITALIST

## 2024-09-09 NOTE — ASSESSMENT & PLAN NOTE
left shoulder prosthetic joint infection status post I&D and removal of deep hardware 6/21, all screws and nails removed status post serial washouts 6/21, 6/24, and 6/27 complicated by left chest wall abscess status post left thoracentesis chest tube placed 7/4 and VATS on 07/05. He initially presented with right 3rd toe osteomyelitis status post 2 doses of dalbavancin status post amputation of 2nd toe on 07/01. Cultures from distal grew MRSA. He has past medical history of diabetes, peripheral arterial disease, CHF. Patient was transferred to [facility], during hospital stay there, evaluated by Plastic surgery, no plans for acute thoracic intervention, plan to follow-up outpatient.

## 2024-09-09 NOTE — OUTPATIENT SUBJECTIVE & OBJECTIVE
"Outpatient Subjective & Objective     Chief complaint-Preoperative evaluation, Perioperative Medical management, complication reduction plan     Active cardiac conditions- none        No anesthesia, bleeding, cardiac problems with previous surgeries/procedures.  Medications and Allergies reviewed in epic.     Physical Exam  Blood pressure 119/76, pulse 68, temperature 97.9 °F (36.6 °C), temperature source Oral, resp. rate 16, height 6' 1" (1.854 m), weight 107.5 kg (237 lb), SpO2 98%.      Physical Exam  Constitutional- Vitals - Body mass index is 31.27 kg/m².,   Vitals:    09/09/24 1050   BP: 119/76   Pulse: 68   Resp: 16   Temp: 97.9 °F (36.6 °C)     General appearance-Conscious,Coherent  Eyes- No conjunctival icterus,pupils  round  and reactive to light   ENT-Oral cavity- moist    , Hearing grossly normal   Neck- No thyromegaly ,Trachea -central, No jugular venous distension,   No Carotid Bruit   Cardiovascular -Heart Sounds- A fib and  no murmur   , No gallop rhythm   Respiratory - Normal Respiratory Effort, Normal breath sounds,  no wheeze , and  no forced expiratory wheeze    Peripheral pitting pedal edema-- none , no calf pain   Gastrointestinal -Soft abdomen, No palpable masses, Non Tender,Liver,Spleen not palpable. No-- free fluid and shifting dullness  Musculoskeletal- No finger Clubbing. Strength grossly normal   Lymphatic-No Palpable cervical, axillary,Inguinal lymphadenopathy   Psychiatric - normal effect,Orientation  Rt Dorsalis pedis pulses-palpable    Lt Dorsalis pedis pulses- palpable   Rt Posterior tibial pulses -palpable   Left posterior tibial pulses -palpable   Miscellaneous -  no renal bruit and  examined on the chair    Investigations        Outpatient Subjective & Objective   "

## 2024-09-09 NOTE — PROGRESS NOTES
[facility]  Progress Note    Patient Name: Roosevelt Moser  MRN: [id number]  Date of Evaluation- 09/09/2024  PCP- No, Primary Doctor    Future cases for Ivan Moser [[id number]]       Case ID Status Date Time Jf Procedure Provider Location    [id number] [facility] 9/11/2024 11:35  CYSTOURETEROSCOPY, WITH HOLMIUM LASER LITHOTRIPSY OF URETERAL CALCULUS AND STENT INSERTION Yaya Mulligan MD [[id number]] [facility] OR 1ST FLR            HPI:  Came in for Physical exam   I did virtual evaluation in the past     History of present illness- I had the pleasure of meeting this pleasant 73 y.o. gentleman in the pre op clinic prior to his elective Urological surgery. The patient is new to me . Mr Love  was accompanied by wife Ms Saldana.    I have obtained the history by speaking to the patient and by reviewing the electronic health records.    Events leading up to surgery / History of presenting illness -    Nephrolithiasis   As per chart-2.5 cm distal L ureteral stone with proximal hydro.        No pain that he has perceiving from this  Had a pink urine which is getting better     He has had 2 urinary tract infections recently       He is not known to have elevated calcium or parathyroid problem, no gout or elevated uric acid        Relevant health conditions of significance for the perioperative period/ History of presenting illness -       He had a prolonged hospitalization between June 14th through July 25th and currently recovering in a skilled nursing facility-presented with UTI  History of comminuted fracture of the left humerus s/p fall 3/4/24 s/p ORIF 03/25/2024   A fib  Renal impairment - CKD  Apixaban  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 except 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)  Type 2 diabetes   HTN  Sleep apnea  Osteomyelitis- h/o left shoulder arthrotomy 6/21,    h/o I&D LUE 06/24/24 and 06/27/24 July 1st 2024- right 2nd toe amputation  7/4-Abscess of chest wall had I and D  7/5-Pleural effusion - VATS       "  Not known to have  Lung problem, Thyroid problem,  deep vein thrombosis, pulmonary embolism,  COVID infection, fatty liver , blood vessels stent , tobacco smoking, edema, mental health problems      Lives with wife   Single story house  Retired at age 63 Y- Was a   Pets- 4 Dogs   Children -None - by choice   Has help post op   Currently staying at a skilled nursing facility         No changes to health or medications since I saw             Subjective/ Objective:     Chief complaint-Preoperative evaluation, Perioperative Medical management, complication reduction plan     Active cardiac conditions- none        No anesthesia, bleeding, cardiac problems with previous surgeries/procedures.  Medications and Allergies reviewed in epic.     Physical Exam  Blood pressure 119/76, pulse 68, temperature 97.9 °F (36.6 °C), temperature source Oral, resp. rate 16, height 6' 1" (1.854 m), weight 107.5 kg (237 lb), SpO2 98%.      Physical Exam  Constitutional- Vitals - Body mass index is 31.27 kg/m².,   Vitals:    09/09/24 1050   BP: 119/76   Pulse: 68   Resp: 16   Temp: 97.9 °F (36.6 °C)     General appearance-Conscious,Coherent  Eyes- No conjunctival icterus,pupils  round  and reactive to light   ENT-Oral cavity- moist    , Hearing grossly normal   Neck- No thyromegaly ,Trachea -central, No jugular venous distension,   No Carotid Bruit   Cardiovascular -Heart Sounds- A fib and  no murmur   , No gallop rhythm   Respiratory - Normal Respiratory Effort, Normal breath sounds,  no wheeze , and  no forced expiratory wheeze    Peripheral pitting pedal edema-- none , no calf pain   Gastrointestinal -Soft abdomen, No palpable masses, Non Tender,Liver,Spleen not palpable. No-- free fluid and shifting dullness  Musculoskeletal- No finger Clubbing. 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)  He was on metformin prior to his hospitalization but currently he is on long-acting insulin once a day in the morning time  I suggested to use 50% of the long-acting insulin on the morning of surgery  Suggested not to take the short-acting insulin on the morning of surgery     Hemoglobin A1c- 5.6      Capillary glucose check-Yes-pre meal glucose 125  At the nursing facility     Diabetes Complications      Microvascular      Not known to have   Diabetes affecting the eyes, Kidneys   Tingling numbness of  feet since 2002 from Guillain-[place]  No reported open areas on the feet   Feet care suggested      Macrovascular      No stroke/ TIA  Not known to have CAD        Diabetes Mellitus-I suggest monitoring the glucose in the perioperative period ( Before meals and bed time,if the patient is on oral feeds or every 6 hourly ,if the patient is NPO )  Blood glucose target in hospitalized patients is 140-180. Oral Hypoglycemic agents are generally avoided during the hospital stay . If glucose is consistently elevated ,I suggest using basal ,prandial Insulin regimen to control the glucose , as elevated glucose can be associated with adverse surgical out comes. Please consider involving Hospital Medicine or Endocrinology ,if any help is needed with Glucose control. 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  -S/P chest tube placement.  -S/P drain placement for left chest wall abscess. Now removed on 07/14.Stapes in place. Intact.     Mixed hyperlipidemia  HLD-I suggest continuation of statin during the entire perioperative period.     Hypertension  He was taking blood pressure medication in the past but currently not taking any blood pressure medication  Blood pressure lately has been about 105/58  It was likely that the improved blood pressure is from him losing weight from his prolonged hospitalization  Not known to have adrenal problem, or pituitary problem    Atrial fibrillation  He has been having atrial fibrillation for about 2 years time  He has had fast AFib during preop evaluation in March prior to his humerus fracture surgery        Thrombo-embolic event within the last 3 months- none      - Restoration of normal sinus rhythm from atrial fibrillation or atrial flutter, either by cardioversion or ablation, within the last month- none      - Pulmonary vein isolation within the last 2 months- none      - Left atrial appendage thrombus within the last 3 months- none .     No contraindications to holding anticoagulation for surgery      Date of surgery - 9/11  Type of Anesthesia- General   Last took September 7    I have discussed with his wife that  we normally hold apixaban 2 days prior to surgery but I believe the nursing unit have received the information about 3 day hold and we went along with 3 day hold  He is planning on taking the last dose of Eliquis on August 16th    Renal impairment  He had kidney function problem in March of 2024  He was taking anti-inflammatory medication naproxen for a long time for arthritis of his knee for which he was going to get operated  The likely reason for his kidney function problem could to be from naproxen use for his knee arthritis      Renal function has been stable  Creatinine stable  Stages of CKD discussed  Deleterious effects NSAID's , Beneficial effects of Hydration discussed   Tylenol ( If not intolerant ) as needed for pain     I  suggest monitoring renal function, in put and out put status brendon-operatively. I  suggest avoiding nephrotoxic medication including NSAIDs, COX2 inhibitors, intravenous contrast agent,avoiding hypotension to prevent further renal impairment.   Care with intravenous contrast discussed     Nephrolithiasis  For surgery    Pyogenic arthritis of left shoulder region  left shoulder prosthetic joint infection status post I&D and removal of deep hardware 6/21, all screws and nails removed status post serial washouts 6/21, 6/24, and 6/27 complicated by left chest wall abscess status post left thoracentesis chest tube placed 7/4 and VATS on 07/05. He initially presented with right 3rd toe osteomyelitis status post 2 doses of dalbavancin status post amputation of 2nd toe on 07/01. Cultures from distal grew MRSA. He has past medical history of diabetes, peripheral arterial disease, CHF. Patient was transferred to [facility], during hospital stay there, evaluated by Plastic surgery, no plans for acute thoracic intervention, plan to follow-up outpatient.     Osteomyelitis of toe  Had surgery  MRSA     I have considered nasal Bactroban decolonization but given that he is not going to have  an open surgery with the urological procedure that he is having, after discussing with his wife, I have deferred it at this time                     Osteomyelitis of multiple sites   He had a prolonged hospitalization between June 14th through July 25th and currently recovering in a skilled nursing facility-presented with UTI  History of comminuted fracture of the left humerus s/p fall 3/4/24 s/p ORIF 03/25/2024      Osteomyelitis     As per the history that I have obtained from his wife, he went in for a left upper arm fracture, after slipping on a wet floor  As per the wife, he started with infection there      h/o left shoulder arthrotomy 6/21,    h/o I&D LUE 06/24/24 and 06/27/24 July 1st 2024- right 2nd toe amputation  7/4-Abscess of chest wall had I and D  7/5-Pleural effusion - VATS  He is under the care of Infectious diseases specialist and finish the antibiotic on the 12th of August     He does not have fever     He is having further evaluation for possible infected ribs       Anemia  This is likely from anemia of chronic disease  No overt GI/ blood losses  No stomach upset/ ulcer   He does not get colonoscopy   Had long standing NSAID use for his knee pain  Discussed follow up      I suggest monitoring the hemoglobin in the perioperative period    Hyponatremia  Mild   Not known to have adrenal problems  Suggest perioperative monitoring    Humerus fracture  Left   Waiting for the infection to clear    Bed sore  He is getting wound care    MARA (obstructive sleep apnea)  Sleep apnea   Uses CPAP.  Suggested bringing for hospital use .   Informed the risk of worsening sleep apnea in the perioperative period and suggest using CPAP use any time in 24 hrs ( day or night )for planned sleep      I suggest  caution with usage of medication that can cause respiratory suppression in the perioperative period     Avoidance of  supine sleep, weight gain , alcoholic beverages , care with , sedative , CNS depressant use  indicated  since all of these can worsen MARA       With him losing weight, his sleep apnea perhaps as got better    BMI 31.0-31.9,adult  He lost about 23 lb intentionally prior to becoming sick  He lost another 52 since he was sick and  He wants to lose another 30 lb  --    Corresponded with the Infectious diseases MD  As per correspondence from 8/28 th-  C diff was detected at the nursing home early August completed treatment with PO vancomycin for 10 days.  Considered treated and resolved.    We usually do not send test of cure for stool because we all have C diff as part of the GI glo. Once diarrhea is improved, it is consider resolved.     Complicated history of left chest wall abscess from prior left shoulder surgery.      Please see my virtual visit Note from the past    I have spent ---45--- minutes of time which includes, time spent to prepare to see the patient , obtaining history ,performing examination, counseling/Educating the patient , Documenting clinical information in the record    --    9/9- 1727    MRI Aug 2024        ----  9/13- 1825     Corresponded with the Infectious diseases care provider with regards to C diff precautions  It was felt that once the diarrhea is resolved no need for precautions.       Called and spoke to her  Hold Apixaban   Doing good

## 2024-09-09 NOTE — ASSESSMENT & PLAN NOTE
He has been having atrial fibrillation for about 2 years time  He has had fast AFib during preop evaluation in March prior to his humerus fracture surgery        Thrombo-embolic event within the last 3 months- none      - Restoration of normal sinus rhythm from atrial fibrillation or atrial flutter, either by cardioversion or ablation, within the last month- none      - Pulmonary vein isolation within the last 2 months- none      - Left atrial appendage thrombus within the last 3 months- none .     No contraindications to holding anticoagulation for surgery      Date of surgery - 9/11  Type of Anesthesia- General   Last took September 7    I have discussed with his wife that we normally hold apixaban 2 days prior to surgery but I believe the nursing unit have received the information about 3 day hold and we went along with 3 day hold  He is planning on taking the last dose of Eliquis on August 16th

## 2024-09-09 NOTE — ASSESSMENT & PLAN NOTE
He lost about 23 lb intentionally prior to becoming sick  He lost another 52 since he was sick and  He wants to lose another 30 lb

## 2024-09-09 NOTE — ASSESSMENT & PLAN NOTE
He was on metformin prior to his hospitalization but currently he is on long-acting insulin once a day in the morning time  I suggested to use 50% of the long-acting insulin on the morning of surgery  Suggested not to take the short-acting insulin on the morning of surgery     Hemoglobin A1c- 5.6      Capillary glucose check-Yes-pre meal glucose 125  At the nursing facility     Diabetes Complications      Microvascular      Not known to have   Diabetes affecting the eyes, Kidneys   Tingling numbness of  feet since 2002 from Guillain-[place]  No reported open areas on the feet   Feet care suggested      Macrovascular      No stroke/ TIA  Not known to have CAD        Diabetes Mellitus-I suggest monitoring the glucose in the perioperative period ( Before meals and bed time,if the patient is on oral feeds or every 6 hourly ,if the patient is NPO )  Blood glucose target in hospitalized patients is 140-180. Oral Hypoglycemic agents are generally avoided during the hospital stay . If glucose is consistently elevated ,I suggest using basal ,prandial Insulin regimen to control the glucose , as elevated glucose can be associated with adverse surgical out comes. Please consider involving Hospital Medicine or Endocrinology ,if any help is needed with Glucose control. Patient will be instructed based on the pre op clinic guidelines  about adjustment of diabetic treatment (If applicable )  considering the NPO status for Surgery       I had educated that uncontrolled DM can cause post op complications,risk of infection, wound healing problem,increased length of stay in hospital and its associated complications.I suggest exercise as much as possible and follow diabetic diet      He seems to have been deconditioned from his prolonged hospitalization-and is working with the physical therapist

## 2024-09-09 NOTE — HPI
Came in for Physical exam   I did virtual evaluation in the past     History of present illness- I had the pleasure of meeting this pleasant 73 y.o. gentleman in the pre op clinic prior to his elective Urological surgery. The patient is new to me . Mr Ivan  was accompanied by wife Ms Saldana.    I have obtained the history by speaking to the patient and by reviewing the electronic health records.    Events leading up to surgery / History of presenting illness -    Nephrolithiasis   As per chart-2.5 cm distal L ureteral stone with proximal hydro.        No pain that he has perceiving from this  Had a pink urine which is getting better     He has had 2 urinary tract infections recently       He is not known to have elevated calcium or parathyroid problem, no gout or elevated uric acid        Relevant health conditions of significance for the perioperative period/ History of presenting illness -       He had a prolonged hospitalization between June 14th through July 25th and currently recovering in a skilled nursing facility-presented with UTI  History of comminuted fracture of the left humerus s/p fall 3/4/24 s/p ORIF 03/25/2024   A fib  Renal impairment - CKD  Apixaban  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 (fully recovered except 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)  Type 2 diabetes   HTN  Sleep apnea  Osteomyelitis- h/o left shoulder arthrotomy 6/21,    h/o I&D LUE 06/24/24 and 06/27/24 July 1st 2024- right 2nd toe amputation  7/4-Abscess of chest wall had I and D  7/5-Pleural effusion - VATS        Not known to have  Lung problem, Thyroid problem,  deep vein thrombosis, pulmonary embolism,  COVID infection, fatty liver , blood vessels stent , tobacco smoking, edema, mental health problems      Lives with wife   Single story house  Retired at age 63 Y- Was a   Pets- 4 Dogs   Children -None - by choice   Has help post op   Currently staying at a skilled nursing facility         No changes to health or medications  since I saw

## 2024-09-09 NOTE — ASSESSMENT & PLAN NOTE
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

## 2024-09-09 NOTE — PROGRESS NOTES
Thoracic Surgery Follow-up Visit    73 yoM initially seen in July with left chest wall abscess. He underwent I&D and washout of the abscess at OSH on 07/04/24 but was transferred to [facility] out of concern for left 6-8 rib osteomyelitis and the need for potential thoracic and plastic surgery intervention.  Ultimately prior to discharge repeat CT showed extensive inflammation in the area but no residual fluid collection. Given that patient was clinically sable with no signs of sepesis but was severel deconditioned, the decision was to discharge to rehab with plans for interval reimmaging and possible definitve surgery following rehabilitation. Patient seen and evaluated today in clinic, transferred here from rehab. He looks much better. He is communicative, sitting upright in a wheel chair and feels well. He denies fever, chills, or chest pain/discomfort. Denies shortness of breath. We got a recent CBC and WBC was WNL.     On exam his wounds are well healed. There is no evidence of recurrent chest wall abscess. There are no sinus tracts/open wounds, purulence,  erythema or tenderness.    CT chest: 08/23/24 - media tab      MRI Chest:  08/23/24 - Media tab          A/P Review of Mr Mina imaging reveals that the bone involvement is less extensive then previously suspected. That said he does have a 7th rib that is quite destructed.  There is currently no sign of fluid collection/abscess.  We discussed the role of surgery to obtain definitive source control and prevent chronic osteomyelitis formation. The patient was adamant in not wanting surgery at this point given his difficult recovery and still being in rehab. While I agree, there may be a a role for debridement in the future.     - No plan for surgical intervention at this point. If surgery is required in the future it will not likely require plastics reconstruction  - Will touch place with infectious disease regarding antibiotic duration. From the time of surgery  and drainage of the abscess the patient had 5 weeks of antibiotics. But there may be a role for a prolonged antibiotic course as suppressive therapy to prevent development of chronic osteo  - F/u in 3 months with repeat CT chest    Lucio Bansal M.D.  [phone] (direct)  Thoracic Surgery   [facility]

## 2024-09-10 ENCOUNTER — TELEPHONE (OUTPATIENT)
Dept: INFECTIOUS DISEASES | Facility: CLINIC | Age: 74
End: 2024-09-10
Payer: MEDICARE

## 2024-09-10 ENCOUNTER — TELEPHONE (OUTPATIENT)
Dept: UROLOGY | Facility: CLINIC | Age: 74
End: 2024-09-10
Payer: MEDICARE

## 2024-09-10 ENCOUNTER — PATIENT MESSAGE (OUTPATIENT)
Dept: UROLOGY | Facility: CLINIC | Age: 74
End: 2024-09-10
Payer: MEDICARE

## 2024-09-10 NOTE — TELEPHONE ENCOUNTER
Call was placed back to patient and patients wife they informed me that they had spoken to some one with new surgery date and time as well with pre op instruction's I reread them back to patient's  wife she agreed and understood. Will reiterate them back in MyChart.

## 2024-09-10 NOTE — TELEPHONE ENCOUNTER
Patient's wife called  [phone] or   home  [phone]    She is requesting additional antibiotics for patient to   Start sooner than later     He had a MRI ordered by Dr Echavarria     Please advise     ELOISA Morales [facility]   9/10/24

## 2024-09-10 NOTE — TELEPHONE ENCOUNTER
I left a message for patient's wife , Ms Saldana , see Dr Calvin first , on 9/26/24, he will  Examine Mr Shanti and see what the next step is best .  J s [facility]  9/10/24

## 2024-09-13 ENCOUNTER — TELEPHONE (OUTPATIENT)
Dept: INFECTIOUS DISEASES | Facility: CLINIC | Age: 74
End: 2024-09-13
Payer: MEDICARE

## 2024-09-13 DIAGNOSIS — L02.213 CHEST WALL ABSCESS: Primary | ICD-10-CM

## 2024-09-13 NOTE — TELEPHONE ENCOUNTER
Wife called an is requesting a sooner appointment with you or antibiotics before she sees you on the 09/26/24 . What can we do please let me know how you want to proceed

## 2024-09-18 ENCOUNTER — TELEPHONE (OUTPATIENT)
Dept: UROLOGY | Facility: CLINIC | Age: 74
End: 2024-09-18
Payer: MEDICARE

## 2024-09-18 ENCOUNTER — OFFICE VISIT (OUTPATIENT)
Dept: INFECTIOUS DISEASES | Facility: CLINIC | Age: 74
End: 2024-09-18
Payer: MEDICARE

## 2024-09-18 DIAGNOSIS — R53.81 DEBILITY: ICD-10-CM

## 2024-09-18 DIAGNOSIS — M86.9 OSTEOMYELITIS, UNSPECIFIED SITE, UNSPECIFIED TYPE: ICD-10-CM

## 2024-09-18 DIAGNOSIS — L02.213 CHEST WALL ABSCESS: Primary | ICD-10-CM

## 2024-09-18 PROCEDURE — 4010F ACE/ARB THERAPY RXD/TAKEN: CPT | Mod: HCNC,CPTII,95, | Performed by: INTERNAL MEDICINE

## 2024-09-18 PROCEDURE — 99214 OFFICE O/P EST MOD 30 MIN: CPT | Mod: HCNC,95,, | Performed by: INTERNAL MEDICINE

## 2024-09-18 PROCEDURE — 3044F HG A1C LEVEL LT 7.0%: CPT | Mod: HCNC,CPTII,95, | Performed by: INTERNAL MEDICINE

## 2024-09-18 PROCEDURE — 3060F POS MICROALBUMINURIA REV: CPT | Mod: HCNC,CPTII,95, | Performed by: INTERNAL MEDICINE

## 2024-09-18 PROCEDURE — 1157F ADVNC CARE PLAN IN RCRD: CPT | Mod: HCNC,CPTII,95, | Performed by: INTERNAL MEDICINE

## 2024-09-18 PROCEDURE — 3066F NEPHROPATHY DOC TX: CPT | Mod: HCNC,CPTII,95, | Performed by: INTERNAL MEDICINE

## 2024-09-18 NOTE — TELEPHONE ENCOUNTER
"Spoke with with wife, Buffy Moser, to verify transportation will be able to have pt there in time, as wife previously stated transportation was set. Mrs Moser was concern because she was under the impression the location had changed.. then stated, "oh here's the instructions right here. I'm sorry I thought that had changed and no one told me."   "

## 2024-09-18 NOTE — PROGRESS NOTES
Subjective:      Reason for consult:     HPI: Roosevelt Moser is a 73 y.o. 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;  Service: Orthopedics;  Laterality: Left;    INCISION AND DRAINAGE OF ABSCESS N/A 7/4/2024    Procedure: INCISION AND DRAINAGE, ABSCESS;  Surgeon: Gus Escobedo MD;  Location: [facility];  Service: General;  Laterality: N/A;  Abcess of anterior chest wall    IRRIGATION AND DEBRIDEMENT OF UPPER EXTREMITY Left 6/24/2024    Procedure: IRRIGATION AND DEBRIDEMENT, UPPER EXTREMITY;  Surgeon: Nathan Cooper MD;  Location: [facility];  Service: Orthopedics;  Laterality: Left;    IRRIGATION AND DEBRIDEMENT OF UPPER EXTREMITY Left 6/27/2024    Procedure: IRRIGATION AND DEBRIDEMENT, UPPER EXTREMITY;  Surgeon: Nathan Cooper MD;  Location: [facility] OR;  Service: Orthopedics;  Laterality: Left;    OPEN REDUCTION AND INTERNAL FIXATION (ORIF) OF FRACTURE OF PROXIMAL HUMERUS Left 3/25/2024    Procedure: ORIF, FRACTURE, HUMERUS, PROXIMAL/IM HANNA, LEFT;  Surgeon: Nathan Cooper MD;  Location: [facility];  Service: Orthopedics;  Laterality: Left;  Accumed, Synthes Small Frag set Avelino verified 3/22/24 ark    THORACOSCOPIC DECORTICATION OF LUNG Right 7/5/2024    Procedure: VATS, WITH DECORTICATION, LUNG;  Surgeon: Gus Escobedo MD;  Location: [facility];  Service: Cardiothoracic;  Laterality: Right;    TOE AMPUTATION Right 7/1/2024    Procedure: AMPUTATION, TOE;  Surgeon: Stevie Zhu DPM;  Location: [facility];  Service: Podiatry;  Laterality: Right;      Social History     Tobacco Use    Smoking status:  Never    Smokeless tobacco: Never   Substance Use Topics    Alcohol use: Yes     Comment: Once a year     No family history on file.    Pertinent medications noted:     Review of Systems    Ability to give history is:   No chills, fever, sweats, weight loss  No change in vision, loss of vision or diplopia  No sinus congestion, purulent nasal discharge, post nasal drip or facial pain  No pain in mouth or throat. No problems with teeth, gums.   No chest pain, palpitations, syncope  No cough, sputum production, shortness of breath, dyspnea on exertion, pleurisy, hemoptysis  No nausea, vomiting, diarrhea, constipation, blood in stool, or focal abd pain. No dysphagia, odynophagia  No dysuria, hematuria, strangury, retention, incontinence, nocturia, prostatism,  No swelling of joints, redness of joints, injuries, or new focal pain  No unusual headaches, dizziness, vertigo, numbness, paresthesias, neuropathy, falls  No anxiety, depression, substance abuse, sleep disturbance  No diabetes, thyroid, hypogonadal conditions  No bleeding, lymphadenopathy, anemia, malignancy, unusual bruising  No new rashes, lesions, or wounds  No TB exposure  Outdoor activities:  Travel:   Implants:   Antibiotic History:       Objective:      There were no vitals taken for this visit. There is no height or weight on file to calculate BMI.  Physical Exam      General: Alert and attentive, cooperative and in no distress    Eyes: Pupils equal, round, reactive to light, anicteric, EOMI    Neck: Supple, non-tender, no thyromegaly or masses    ENT: EAC patent, TM normal, nares patent, no oral lesions, teeth in good condition, no thrush    Cardiovascular: Regular rate and rhythm, no murmurs, rubs, or gallop    Respiratory: Lungs clear without wheezes, rales, rubs or rhonci    Gastrointestinal:  Soft, bowel sounds normal,  no mass or organomegaly, no tenderness or distention    Genitourinary: no external genital lesions, no flank  tenderness    Integumentary: Skin without rashes, lesions, or wounds    Breasts:     Rectal/Anal: no external lesions, no internal lesions, prostate no nodules    Vascular: No peripheral edema or phlebitis, warm and well perfused    Musculoskeletal: Ambulates without difficulty, no acute arthritis, synovitis or myositis. Normal muscle bulk and strength    Lymphatic: No cervical, axillary, or inguinal LAD    Neurological: Normal LOC, cranial nerves, speech, reflexes, gait    Psychiatric: Normal mood, speech,  demeanor     Wound:    VAD:       General Labs reviewed:  Lab Results   Component Value Date    WBC 10.03 07/24/2024    RBC 3.90 (L) 07/24/2024    HGB 9.4 (L) 07/24/2024    HCT 30.7 (L) 07/24/2024    MCV 79 (L) 07/24/2024    MCH 24.1 (L) 07/24/2024    MCHC 30.6 (L) 07/24/2024    RDW 17.7 (H) 07/24/2024     07/24/2024    MPV 9.7 07/24/2024    GRAN 6.2 07/24/2024    GRAN 61.2 07/24/2024    LYMPH 1.5 07/24/2024    LYMPH 15.2 (L) 07/24/2024    MONO 1.4 (H) 07/24/2024    MONO 13.7 07/24/2024    EOS 0.8 (H) 07/24/2024    BASO 0.10 07/24/2024    EOSINOPHIL 8.2 (H) 07/24/2024    BASOPHIL 1.0 07/24/2024     CMP  Sodium   Date Value Ref Range Status   07/24/2024 134 (L) 136 - 145 mmol/L Final     Potassium   Date Value Ref Range Status   07/24/2024 3.7 3.5 - 5.1 mmol/L Final     Chloride   Date Value Ref Range Status   07/24/2024 102 95 - 110 mmol/L Final     CO2   Date Value Ref Range Status   07/24/2024 21 (L) 23 - 29 mmol/L Final     Glucose   Date Value Ref Range Status   07/24/2024 140 (H) 70 - 110 mg/dL Final     BUN   Date Value Ref Range Status   07/24/2024 46 (H) 8 - 23 mg/dL Final     Creatinine   Date Value Ref Range Status   07/24/2024 1.2 0.5 - 1.4 mg/dL Final     Calcium   Date Value Ref Range Status   07/24/2024 9.1 8.7 - 10.5 mg/dL Final     Total Protein   Date Value Ref Range Status   07/14/2024 5.9 (L) 6.0 - 8.4 g/dL Final     Albumin   Date Value Ref Range Status   07/14/2024 1.4 (L) 3.5 - 5.2  g/dL Final     Total Bilirubin   Date Value Ref Range Status   07/14/2024 0.3 0.1 - 1.0 mg/dL Final     Comment:     For infants and newborns, interpretation of results should be based  on gestational age, weight and in agreement with clinical  observations.    Premature Infant recommended reference ranges:  Up to 24 hours.............<8.0 mg/dL  Up to 48 hours............<12.0 mg/dL  3-5 days..................<15.0 mg/dL  6-29 days.................<15.0 mg/dL       Alkaline Phosphatase   Date Value Ref Range Status   07/14/2024 105 55 - 135 U/L Final     AST   Date Value Ref Range Status   07/14/2024 14 10 - 40 U/L Final     ALT   Date Value Ref Range Status   07/14/2024 9 (L) 10 - 44 U/L Final     Anion Gap   Date Value Ref Range Status   07/24/2024 11 8 - 16 mmol/L Final     eGFR   Date Value Ref Range Status   07/24/2024 >60.0 >60 mL/min/1.73 m^2 Final           Micro:  Microbiology Results (last 7 days)       ** No results found for the last 168 hours. **          Imaging Reviewed:          Assessment:       Problem List Items Addressed This Visit    None       Plan:           There are no diagnoses linked to this encounter.    This note was created using Dragon voice recognition software that occasionally misinterpreted phrases or words.  [facility] - [facility]   Department of Infectious Disease  Virtual Visit Note        PATIENT NAME: Roosevelt Moser  YOB: 1950   MRN: [id number]  DATE OF VISIT: 09/18/2024    REASON FOR VISIT: No chief complaint on file.      HISTORY OF PRESENT ILLNESS     Roosevelt Moser is a 73 y.o. male , known to our service from recent complicated admission to [facility] status post transferred to [facility] for left shoulder prosthetic joint infection status post I&D and removal of deep hardware 6/21, all screws and nails removed status post serial washouts 6/21, 6/24, and 6/27 complicated by left chest wall abscess status post left thoracentesis chest  tube placed 7/4 and VATS on 07/05.  He initially presented with right 3rd toe osteomyelitis status post 2 doses of dalbavancin status post amputation of 2nd toe on 07/01.  Cultures from distal grew MRSA.  He has past medical history of diabetes, peripheral arterial disease, CHF.  Patient was transferred to [facility], during hospital stay there, evaluated by Plastic surgery, no plans for acute thoracic intervention, plan to follow-up outpatient.    Patient is currently at a nursing facility in [place].  Wife answering questions via iPad.  Patient seen in the bed, awake, alert, but he says he is very hard of hearing.  Wife mentions since last week he developed a rash, seems like a drug rash.  Antibiotics were stopped yesterday and PICC line to be removed today.  Head of nursing bryant Walls contacted over the phone, orders faxed over 4 labs including CBC with diff, CMP, CRP, sed rate, CPK, and UA with reflex to culture to be faxed to our office.    Patient's wife states the patient is scheduled to have a urological procedure/kidney stone removal on 08/20 at [facility] in addition to plastic surgery on 08/22.    She also states the patient has lost at least 65 lb since March.    Outdoor activities: Current at facility in [place] - former smoker, no alcohol at the moment.   Travel: None  Implants:  Prior ORIF removed   Antibiotic history:  Mohit lees 8/12    Social History  Marital Status:   Alcohol History:  reports current alcohol use.  Tobacco History:  reports that he has never smoked. He has never used smokeless tobacco.  Drug History:  reports no history of drug use.    INTERVAL HISTORY     8/28:  Patient seen again via iPad, wife taking care of the called.  He is awake, alert, much more interactive compared to 2 weeks ago.  Wife mentions rash is improved, and the wounds in his chest are almost healed.  Patient had diarrhea early this month, the wife forgot to mention it to me last  visit but he got tested at facility and he tested positive for C diff on 08/05, status post 10 days of oral vancomycin.  Wife and patient mentioned diarrhea is resolved, stool is a little more formed, he only went once today.  Patient with better appetite, so working with physical therapy.  I was contacted by Medicine/ at [facility] for ID clearance for kidney stone removal given active C diff.  Instructed that he needed to complete course, and diarrhea needed to be resolved before surgery.  We will get blood work done at the facility and once labs are received, will clear him for procedure.  Wife mentions he would also see Plastic surgery at [facility] for possible intervention in the future due to complicated history of left chest wall abscess from prior left shoulder surgery.  He was supposed to have an MRI done outpatient, pending results.    8/15: Virtual visit - see above.       ALLERGIES AND CURRENT MEDICATIONS     Review of patient's allergies indicates:  No Known Allergies    Current Outpatient Medications   Medication Sig Dispense Refill    albuterol-ipratropium (DUO-NEB) 2.5 mg-0.5 mg/3 mL nebulizer solution Take 3 mLs by nebulization every 4 (four) hours as needed for Shortness of Breath. Rescue (Patient not taking: Reported on 9/9/2024)      aluminum & magnesium hydroxide-simethicone (MYLANTA MAX STRENGTH) 400-400-40 mg/5 mL suspension Take 15 mLs by mouth 4 (four) times daily as needed. (Patient not taking: Reported on 9/9/2024)      apixaban (ELIQUIS) 5 mg Tab Take 1 tablet (5 mg total) by mouth 2 (two) times daily. 60 tablet 1    atorvastatin (LIPITOR) 10 MG tablet Take 1 tablet (10 mg total) by mouth once daily. 90 tablet 3    co-enzyme Q-10 30 mg capsule Take 30 mg by mouth once daily.      ergocalciferol, vitamin D2, (VITAMIN D ORAL) Take 1 tablet by mouth once daily.      ferrous sulfate 325 (65 FE) MG EC tablet Take 1 tablet (325 mg total) by mouth once daily.       HYDROcodone-acetaminophen (NORCO) 5-325 mg per tablet Take 1 tablet by mouth 4 (four) times daily as needed.      insulin aspart U-100 (NOVOLOG) 100 unit/mL (3 mL) InPn pen Inject 0-10 Units into the skin before meals and at bedtime as needed (Hyperglycemia).      insulin glargine U-100, Lantus, 100 unit/mL (3 mL) SubQ InPn pen Inject 15 Units into the skin once daily.      ondansetron (ZOFRAN-ODT) 8 MG TbDL Take 1 tablet (8 mg total) by mouth every 8 (eight) hours as needed.      senna-docusate 8.6-50 mg (PERICOLACE) 8.6-50 mg per tablet Take 1 tablet by mouth 2 (two) times daily as needed for Constipation.       No current facility-administered medications for this visit.       MEDICAL/SURGICAL/FAMILY HISTORY     Past Surgical History:   Procedure Laterality Date    ARTHROTOMY OF SHOULDER Left 6/21/2024    Procedure: ARTHROTOMY, SHOULDER;  Surgeon: Roman Paulson MD;  Location: [facility] OR;  Service: Orthopedics;  Laterality: Left;    INCISION AND DRAINAGE OF ABSCESS N/A 7/4/2024    Procedure: INCISION AND DRAINAGE, ABSCESS;  Surgeon: Gus Escobedo MD;  Location: [facility] OR;  Service: General;  Laterality: N/A;  Abcess of anterior chest wall    IRRIGATION AND DEBRIDEMENT OF UPPER EXTREMITY Left 6/24/2024    Procedure: IRRIGATION AND DEBRIDEMENT, UPPER EXTREMITY;  Surgeon: Nathan Cooper MD;  Location: [facility] OR;  Service: Orthopedics;  Laterality: Left;    IRRIGATION AND DEBRIDEMENT OF UPPER EXTREMITY Left 6/27/2024    Procedure: IRRIGATION AND DEBRIDEMENT, UPPER EXTREMITY;  Surgeon: Nathan Cooper MD;  Location: [facility] OR;  Service: Orthopedics;  Laterality: Left;    OPEN REDUCTION AND INTERNAL FIXATION (ORIF) OF FRACTURE OF PROXIMAL HUMERUS Left 3/25/2024    Procedure: ORIF, FRACTURE, HUMERUS, PROXIMAL/IM HANNA, LEFT;  Surgeon: Nathan Cooper MD;  Location: [facility] OR;  Service: Orthopedics;  Laterality: Left;  Accumed, Synthes Small Frag set Avelino verified 3/22/24 ark    THORACOSCOPIC DECORTICATION OF LUNG Right  7/5/2024    Procedure: VATS, WITH DECORTICATION, LUNG;  Surgeon: Gus Escobedo MD;  Location: [facility] OR;  Service: Cardiothoracic;  Laterality: Right;    TOE AMPUTATION Right 7/1/2024    Procedure: AMPUTATION, TOE;  Surgeon: Stevie Zhu DPM;  Location: [facility] OR;  Service: Podiatry;  Laterality: Right;     Past Medical History:   Diagnosis Date    A-fib 2024    Diabetes mellitus, type 2 2021    Guillain-Guilford 10/2003    History of Guillain-Guilford syndrome 10/03/2022    Hyperlipidemia     Hypertension 2016    Sleep apnea      No family history on file.     REVIEW OF SYSTEMS     Review of Systems  Constitutional:  Denies fevers, chills, night sweats, loss of appetite.  HEENT: Denies visual changes,ear pain, sinus congestion, mouth pain or trouble swallowing, sore throat or  dental pain.  Neck: Denies neck pain or lumps.  Respiratory: Denies shortness of breath, coughing, wheezing or hemoptysis.  Cardiovascular:  Denies chest pain, palpitations or edema.  Gastrointestinal: Denies  nausea, vomiting, constipation or diarrhea.  Genitourinary:  Denies dysuria, frequency, urgency or hematuria   Musculoskeletal:  Denies joint pain or swelling, difficulty walking.    Skin:  Rash chest and abdomen  Neurologic:  Denies headaches or dizziness.    Psychiatric:  Denies changes in mood or behavior.    PHYSICAL EXAM     Vital Signs  Wt Readings from Last 3 Encounters:   09/09/24 107.5 kg (237 lb)   09/09/24 107.5 kg (237 lb)   08/22/24 113 kg (249 lb 1.9 oz)     Temp Readings from Last 3 Encounters:   09/09/24 97.9 °F (36.6 °C) (Oral)   07/25/24 96.7 °F (35.9 °C)   07/07/24 97.4 °F (36.3 °C) (Axillary)     BP Readings from Last 3 Encounters:   09/09/24 119/76   09/09/24 116/72   08/22/24 116/77     Pulse Readings from Last 3 Encounters:   09/09/24 68   09/09/24 90   08/22/24 99       Physical Exam - patient seen via iPad   General:  male, awake, alert, sitting in bed awake and alert, he is in no distress, pleasant and  "conversant.   Eyes: Eyes with no icterus or injection. Vision grossly normal  Ears: Hard of hearing   Nose: Nares patent  Mouth: Moist mucous membranes, dentition is fair . No ulcerations, erythema or exudates.  Genitourinary:  No suprapubic tenderness.  Musculoskeletal:  Lying in bed   Skin:  Prior rash resolved, small healing wounds noted on left flank.  Neuro:   Oriented, conversant, follows commands.  Psych: Good mood, normal affect.      WOUND     Seen via iPad - looks comfortable, in no distress    VASCULAR ACCESS DEVICE     R PICC line remove at facility on 8/15    LABS AND DIAGNOSTICS       Significant Labs: I have reviewed all relevant and available labs and microbiology. .      CrCl cannot be calculated (Patient's most recent lab result is older than the maximum 7 days allowed.).    INFLAMMATORY/PROCAL  LAST 7 DAYS    No results found for: "ESR"  CRP   Date Value Ref Range Status   07/07/2024 >16.00 (H) <1.00 mg/dL Final     Comment:     CRP-Normal Application expected values:   <1.0        mg/dL   Normal Range  1.0 - 5.0  mg/dL   Indicates mild inflammation  5.0 - 10.0 mg/dL   Indicates severe inflammation  >10.0        mg/dL   Represents serious processes and   frequently         indicates the presence of a bacterial   infection.      07/03/2024 187.3 (H) 0.0 - 8.2 mg/L Final   06/27/2024 180.2 (H) 0.0 - 8.2 mg/L Final   06/26/2024 192.2 (H) 0.0 - 8.2 mg/L Final   06/25/2024 219.3 (H) 0.0 - 8.2 mg/L Final   06/24/2024 204.5 (H) 0.0 - 8.2 mg/L Final   06/23/2024 205.0 (H) 0.0 - 8.2 mg/L Final       PRIOR  MICROBIOLOGY:    Susceptibility data from last 90 days.  Collected Specimen Info Organism   07/03/24 Blood from Peripheral, Hand, Left No growth after 5 days.   06/23/24 Blood from Peripheral, Hand, Right No growth after 5 days.       LAST 7 DAYS MICROBIOLOGY   UA from 08/01 negative for UTI - results on Media       PATHOLOGY    7/4/24:  PLEURAL FLUID, NOT OTHERWISE SPECIFIED:   - MIXED INFLAMMATORY " CELLS WITH PREDOMINANCE OF LYMPHOCYTES.   - NEGATIVE FOR MALIGNANCY    7/1/24:  RIGHT SECOND TOE:   - ULCER; NEGATIVE FOR OSTEOMYELITIS     CURRENT/PREVIOUS VISIT EKG  Results for orders placed or performed during the hospital encounter of 06/14/24   EKG 12-lead    Collection Time: 06/14/24 12:16 PM   Result Value Ref Range    QRS Duration 106 ms    [facility] QTC Calculation 443 ms    Narrative    Test Reason : R53.1,    Vent. Rate : 120 BPM     Atrial Rate : 159 BPM     P-R Int : 000 ms          QRS Dur : 106 ms      QT Int : 314 ms       P-R-T Axes : 000 -54 093 degrees     QTc Int : 443 ms    Atrial fibrillation with rapid ventricular response  Left axis deviation  Low voltage QRS  Inferior infarct ,age undetermined  Cannot rule out Anterior infarct ,age undetermined  Abnormal ECG  When compared with ECG of 25-MAR-2024 09:14,  Vent. rate has increased BY  46 BPM  Minimal criteria for Anterior infarct are now Present  Confirmed by Jesús HATHAWAY, Hansel AMIN ([id number]) on 6/20/2024 8:43:02 PM    Referred By: [email or web address]   SELF           Confirmed By:Hansel Espinoas MD       Significant Diagnostics: I have reviewed all relevant and available diagnostic results.    CT chest 7/3/24:  Impression:    Large abscess of the anterior central and left chest wall and abdominal wall measuring 17 cm transversely.  This extends into the mediastinum and peritoneum with partial destruction of the anterior 6th 7th and 8th ribs.  Complete atelectasis of the left lower lobe with a moderate left pleural effusion.  Mild atelectasis right lung base with a small to moderate right pleural effusion.     X-ray R foot:  FINDINGS:  There is a small region of bony erosion involving the distal 3rd digit.  There is soft tissue injury of the distal 2nd digit with thickening of the nail.  There is no evidence fracture.     Impression:    There is bony erosion of the 3rd D IP possibly representing osteomyelitis.     ECHO:     Extremely limited visualization of all  cardiac structures.  No clinically significant determination can be made based on this echocardiogram.  If cardiac concerns persist, consider alternative cardiac imaging like RICKY for further evaluation.    Left Ventricle: Left ventricle was not well visualized due to poor sonic window. The left ventricle is normal in size. Unable to assess wall motion. There is normal systolic function with a visually estimated ejection fraction of 55 - 60%.    Right Ventricle: Right ventricle was not well visualized due to poor acoustic window.    Left Atrium: Left atrium was not well visualized.    Mitral Valve: There is no stenosis. The mean pressure gradient across the mitral valve is 2 mmHg at a heart rate of  bpm. There is mild regurgitation.    IVC/SVC: Elevated venous pressure at 15 mmHg.     Significant Imaging: I have reviewed all relevant and available imaging results/findings within the past 24 hours.     06/18/2024.  CT left shoulder   1. Subacute left humerus fracture post internal fixation.  There is incomplete bony bridging across the fracture site, with persistent angulation between the dominant bony fragments as hardware is only partially engaged (see discussion).  2. Severe arthropathy of the glenohumeral joint with multiple intra-articular bodies, some interposed.  3. Large left joint effusion and adjacent soft tissue focal fluid collections containing gas and concerning for gas-forming infection.  4. Moderate size left pleural effusion.  5. Left basilar airspace disease is presumably atelectasis with pneumonia as a less favored consideration.  This report was flagged in Epic as abnormal.     X-ray on 06/17, prior to removal of hardware.       Left shoulder x-ray 7/6:  The glenoid rim appears well maintained.  Mild AC joint osteoarthritis.  Left lung is clear.  Small foci of soft tissue gas projecting over the apical edge of the distracted humeral shaft.   1. Fracture through the surgical neck of the humerus with  lateral distraction of the distal component and overlapping segments on the order of 2.5 cm.       ASSESSMENT AND PLAN:     C diff colitis 8/7/24 diagnosed at facility - improved   8/5/24 Stool C diff Toxin A + B positive s/p vancomycin PO 10 days  No need to repeat stool for cure  Patient with formed stool, no longer diarrhea, improved  Completed +8 weeks of IV antibiotics including 6 weeks plus of Teflaro   Currently off antibiotics, ESR and CRP trending down    Kidney stones  Plan for procedure at [facility] 9/11  Labs ordered to be done outpatient and be faxed to the office  Will give ID clearance once labs are completed for surgery scheduled for 9/11    Left chest wall abscess status post left thoracentesis 1500cc serosanguineous fluid drained 7/4 & I&D drainage with chest tube placement 7/4 & loculated effusions right chest s/p VATS and chest tube 7/5, tubes removed  Cell count 1100 WBCs, 72% lymphocytes, , pH 7.6 - Light's criteria consistent with exudative effusion  L pleural fluid 7/4 G stain OR WBCs, no organisms seen  OR left chest wall abscess 7/4 Gram stain with moderate WBCs, no organisms seen, both left pleural effusion and left chest wall abscess no growth  OR right chest wall abscess 7/5 Gram stain few WBCs, no organisms seen - cultures no growth  Following at [facility] with Thoracic Surgery awaiting MRI   [facility] ID clinic/Dr. Calvin  in 4 weeks      Left shoulder prosthetic joint infection s/p I&D and removal of deep hardware 6/21 - all screws and nails removed, s/p 2nd washout 6/24 & 3rd washout 6/27  -->128, -->204.5-->219.3-->192-->180-->22, trending down  -->204-->192.2-->187-->160-->58.1, trending down  OR cultures 6/21 no growth  Blood cultures 6/14 x2 sets no growth 6/22 x 1 no growth to date, pending final  OR cultures 6/24 g stain no organisms, cultures negative  Baseline CPK 30           Left Shoulder washouts on 6/21 ( abundant pus in  shoulder, hardware removal), 6/24 (  bloody brownish fluid) and 6/27 ( no fluid collection, healthy red and beefy tissue)  Left shoulder x-ray: Small foci of soft tissue gas projecting over the apical edge of the distracted humeral shaft.     Right 3rd toe osteomyelitis s/p Dalvance x 2 doses & 2nd  distal phalanx s/p I&D at bedside, s/p 2nd toe amputation 7/1 - RESOLVED   X-ray with bony erosion of the 3rd DIP representing osteomyelitis  MRI osteomyelitis involving the middle distal phalanges of the 3rd toe.  Very slight destruction of the tip of the tuft of the distal phalanx of the 2nd toe.  Wound cultures 2nd R toe 6/18 MRSA, vanco JESSENIA 2    PMHx:  Diabetes, last A1c 6.3%, PID, CHF EF 50%      There are no diagnoses linked to this encounter.      Fax order to [place] [phone]      No LOS data to display  This includes non-face to face time preparing to see the patient (eg, review of tests), obtaining and/or reviewing separately obtained history, documenting clinical information in the electronic or other health record, independently interpreting results and communicating results to the patient/family/caregiver, or care coordinator.      Vasquez Calvin MD  Date of Service: 09/18/2024      This note was created using [employer]  voice recognition software that occasionally misinterpreted phrases or words.

## 2024-09-19 ENCOUNTER — ANESTHESIA EVENT (OUTPATIENT)
Dept: SURGERY | Facility: HOSPITAL | Age: 74
End: 2024-09-19
Payer: MEDICARE

## 2024-09-19 ENCOUNTER — ANESTHESIA (OUTPATIENT)
Dept: SURGERY | Facility: HOSPITAL | Age: 74
End: 2024-09-19
Payer: MEDICARE

## 2024-09-19 ENCOUNTER — HOSPITAL ENCOUNTER (OUTPATIENT)
Facility: HOSPITAL | Age: 74
Discharge: HOME | End: 2024-09-19
Attending: UROLOGY | Admitting: UROLOGY
Payer: MEDICARE

## 2024-09-19 VITALS
BODY MASS INDEX: 31.14 KG/M2 | SYSTOLIC BLOOD PRESSURE: 139 MMHG | HEIGHT: 73 IN | OXYGEN SATURATION: 98 % | WEIGHT: 235 LBS | DIASTOLIC BLOOD PRESSURE: 73 MMHG | TEMPERATURE: 98 F | RESPIRATION RATE: 20 BRPM | HEART RATE: 93 BPM

## 2024-09-19 DIAGNOSIS — N20.1 URETERAL STONE: Primary | ICD-10-CM

## 2024-09-19 LAB
POCT GLUCOSE: 116 MG/DL (ref 70–110)
POCT GLUCOSE: 142 MG/DL (ref 70–110)
SPECIMEN SOURCE: NORMAL

## 2024-09-19 PROCEDURE — 25000003 PHARM REV CODE 250: Mod: HCNC

## 2024-09-19 PROCEDURE — 82365 CALCULUS SPECTROSCOPY: CPT | Mod: HCNC | Performed by: UROLOGY

## 2024-09-19 PROCEDURE — 63600175 PHARM REV CODE 636 W HCPCS: Mod: HCNC | Performed by: STUDENT IN AN ORGANIZED HEALTH CARE EDUCATION/TRAINING PROGRAM

## 2024-09-19 PROCEDURE — 94761 N-INVAS EAR/PLS OXIMETRY MLT: CPT | Mod: HCNC

## 2024-09-19 PROCEDURE — 52356 CYSTO/URETERO W/LITHOTRIPSY: CPT | Mod: HCNC,LT,, | Performed by: UROLOGY

## 2024-09-19 PROCEDURE — 25000003 PHARM REV CODE 250: Mod: HCNC | Performed by: STUDENT IN AN ORGANIZED HEALTH CARE EDUCATION/TRAINING PROGRAM

## 2024-09-19 PROCEDURE — 63600175 PHARM REV CODE 636 W HCPCS: Mod: HCNC

## 2024-09-19 RX ORDER — AMPICILLIN 1 G/1
INJECTION, POWDER, FOR SOLUTION INTRAMUSCULAR; INTRAVENOUS
Status: DISCONTINUED | OUTPATIENT
Start: 2024-09-19 | End: 2024-09-19

## 2024-09-19 RX ORDER — PROCHLORPERAZINE EDISYLATE 5 MG/ML
5 INJECTION INTRAMUSCULAR; INTRAVENOUS EVERY 30 MIN PRN
Status: DISCONTINUED | OUTPATIENT
Start: 2024-09-19 | End: 2024-09-19 | Stop reason: HOSPADM

## 2024-09-19 RX ORDER — ROCURONIUM BROMIDE 10 MG/ML
INJECTION, SOLUTION INTRAVENOUS
Status: DISCONTINUED | OUTPATIENT
Start: 2024-09-19 | End: 2024-09-19

## 2024-09-19 RX ORDER — TAMSULOSIN HYDROCHLORIDE 0.4 MG/1
0.4 CAPSULE ORAL DAILY
Qty: 7 CAPSULE | Refills: 0 | Status: SHIPPED | OUTPATIENT
Start: 2024-09-19 | End: 2024-09-26

## 2024-09-19 RX ORDER — FENTANYL CITRATE 50 UG/ML
INJECTION, SOLUTION INTRAMUSCULAR; INTRAVENOUS
Status: DISCONTINUED | OUTPATIENT
Start: 2024-09-19 | End: 2024-09-19

## 2024-09-19 RX ORDER — GLUCAGON 1 MG
1 KIT INJECTION
Status: DISCONTINUED | OUTPATIENT
Start: 2024-09-19 | End: 2024-09-19 | Stop reason: HOSPADM

## 2024-09-19 RX ORDER — PROPOFOL 10 MG/ML
VIAL (ML) INTRAVENOUS
Status: DISCONTINUED | OUTPATIENT
Start: 2024-09-19 | End: 2024-09-19

## 2024-09-19 RX ORDER — HALOPERIDOL 5 MG/ML
0.5 INJECTION INTRAMUSCULAR EVERY 10 MIN PRN
Status: DISCONTINUED | OUTPATIENT
Start: 2024-09-19 | End: 2024-09-19 | Stop reason: HOSPADM

## 2024-09-19 RX ORDER — ONDANSETRON HYDROCHLORIDE 2 MG/ML
INJECTION, SOLUTION INTRAVENOUS
Status: DISCONTINUED | OUTPATIENT
Start: 2024-09-19 | End: 2024-09-19

## 2024-09-19 RX ORDER — SUCCINYLCHOLINE CHLORIDE 20 MG/ML
INJECTION INTRAMUSCULAR; INTRAVENOUS
Status: DISCONTINUED | OUTPATIENT
Start: 2024-09-19 | End: 2024-09-19

## 2024-09-19 RX ORDER — FENTANYL CITRATE 50 UG/ML
25 INJECTION, SOLUTION INTRAMUSCULAR; INTRAVENOUS EVERY 5 MIN PRN
Status: DISCONTINUED | OUTPATIENT
Start: 2024-09-19 | End: 2024-09-19 | Stop reason: HOSPADM

## 2024-09-19 RX ORDER — LIDOCAINE HYDROCHLORIDE 20 MG/ML
INJECTION, SOLUTION EPIDURAL; INFILTRATION; INTRACAUDAL; PERINEURAL
Status: DISCONTINUED | OUTPATIENT
Start: 2024-09-19 | End: 2024-09-19

## 2024-09-19 RX ORDER — PHENYLEPHRINE HYDROCHLORIDE 10 MG/ML
INJECTION INTRAVENOUS
Status: DISCONTINUED | OUTPATIENT
Start: 2024-09-19 | End: 2024-09-19

## 2024-09-19 RX ADMIN — LIDOCAINE HYDROCHLORIDE 100 MG: 20 INJECTION, SOLUTION EPIDURAL; INFILTRATION; INTRACAUDAL; PERINEURAL at 08:09

## 2024-09-19 RX ADMIN — GENTAMICIN SULFATE 320 MG: 40 INJECTION, SOLUTION INTRAMUSCULAR; INTRAVENOUS at 08:09

## 2024-09-19 RX ADMIN — ONDANSETRON 4 MG: 2 INJECTION INTRAMUSCULAR; INTRAVENOUS at 09:09

## 2024-09-19 RX ADMIN — PHENYLEPHRINE HYDROCHLORIDE 100 MCG: 10 INJECTION INTRAVENOUS at 08:09

## 2024-09-19 RX ADMIN — PHENYLEPHRINE HYDROCHLORIDE 0.2 MCG/KG/MIN: 10 INJECTION INTRAVENOUS at 08:09

## 2024-09-19 RX ADMIN — SUGAMMADEX 200 MG: 100 INJECTION, SOLUTION INTRAVENOUS at 09:09

## 2024-09-19 RX ADMIN — ROCURONIUM BROMIDE 10 MG: 10 INJECTION, SOLUTION INTRAVENOUS at 08:09

## 2024-09-19 RX ADMIN — FENTANYL CITRATE 50 MCG: 50 INJECTION, SOLUTION INTRAMUSCULAR; INTRAVENOUS at 08:09

## 2024-09-19 RX ADMIN — SODIUM CHLORIDE: 0.9 INJECTION, SOLUTION INTRAVENOUS at 08:09

## 2024-09-19 RX ADMIN — AMPICILLIN 1 G: 1 INJECTION, POWDER, FOR SOLUTION INTRAMUSCULAR; INTRAVENOUS at 08:09

## 2024-09-19 RX ADMIN — PROPOFOL 200 MG: 10 INJECTION, EMULSION INTRAVENOUS at 08:09

## 2024-09-19 RX ADMIN — SUCCINYLCHOLINE 140 MG: 20 INJECTION, SOLUTION INTRAMUSCULAR; INTRAVENOUS at 08:09

## 2024-09-19 RX ADMIN — FENTANYL CITRATE 25 MCG: 50 INJECTION, SOLUTION INTRAMUSCULAR; INTRAVENOUS at 08:09

## 2024-09-19 RX ADMIN — PHENYLEPHRINE HYDROCHLORIDE 200 MCG: 10 INJECTION INTRAVENOUS at 08:09

## 2024-09-19 RX ADMIN — ROCURONIUM BROMIDE 20 MG: 10 INJECTION, SOLUTION INTRAVENOUS at 08:09

## 2024-09-19 NOTE — INTERVAL H&P NOTE
The patient has been examined and the H&P has been reviewed:    I concur with the findings and no changes have occurred since H&P was written.    Surgery risks, benefits and alternative options discussed and understood by patient/family.    Eliquis held.     Urine dipstick - negative for all components.      Patient Active Problem List   Diagnosis    Localized osteoarthrosis not specified whether primary or secondary, lower leg    MARA (obstructive sleep apnea)    Hypertension    Type 2 diabetes mellitus    Polyneuropathy in diabetes(357.2)    Mixed hyperlipidemia    Atrial fibrillation    Debility    Skin tear of left upper extremity    Osteomyelitis of toe    Pyogenic arthritis of left shoulder region    Loculated pleural effusion    Osteomyelitis of multiple sites    Mediastinal lymphadenopathy    Cyst of right kidney    BMI 31.0-31.9,adult    Humerus fracture    Nephrolithiasis    Renal impairment    Acute hematogenous osteomyelitis    Bed sore    History of Guillain-[place] syndrome    Opioid use    Anemia    Hyponatremia

## 2024-09-19 NOTE — ANESTHESIA PROCEDURE NOTES
Intubation    Date/Time: 9/19/2024 8:04 AM    Performed by: April Patterson CRNA  Authorized by: Lelo Rey MD    Intubation:     Induction:  Intravenous    Intubated:  Postinduction    Mask Ventilation:  Easy mask    Attempts:  1    Attempted By:  CRNA    Method of Intubation:  Video laryngoscopy    Blade:  Blunt 3    Laryngeal View Grade: Grade I - full view of cords      Difficult Airway Encountered?: No      Complications:  None    Airway Device:  Oral endotracheal tube    Airway Device Size:  8.0    Style/Cuff Inflation:  Cuffed    Inflation Amount (mL):  6    Tube secured:  22    Secured at:  The lips    Placement Verified By:  Capnometry    Complicating Factors:  None    Findings Post-Intubation:  BS equal bilateral and atraumatic/condition of teeth unchanged

## 2024-09-19 NOTE — ANESTHESIA POSTPROCEDURE EVALUATION
Anesthesia Post Evaluation    Patient: Roosevelt Moser    Procedure(s) Performed: Procedure(s) (LRB):  URETEROSCOPY (Left)  CYSTOSCOPY  LITHOTRIPSY, USING LASER (Left)  EXTRACTION - STONE (Left)  INSERTION, STENT, URETER (Left)    Final Anesthesia Type: general      Patient location during evaluation: PACU  Patient participation: Yes- Able to Participate  Level of consciousness: awake and alert  Post-procedure vital signs: reviewed and stable  Pain management: adequate  Airway patency: patent    PONV status at discharge: No PONV  Anesthetic complications: no      Cardiovascular status: blood pressure returned to baseline  Respiratory status: room air  Hydration status: euvolemic  Follow-up not needed.              Vitals Value Taken Time   /70 09/19/24 1044   Temp 36.6 °C (97.9 °F) 09/19/24 0941   Pulse 98 09/19/24 1048   Resp 25 09/19/24 1048   SpO2 99 % 09/19/24 1048   Vitals shown include unfiled device data.      Event Time   Out of Recovery 09:45:00         Pain/Duane Score: Duane Score: 10 (9/19/2024 10:15 AM)

## 2024-09-19 NOTE — PROGRESS NOTES
Pt in possession of all belongings.  IV removed.  VSS; no signs of distress.  Pt to be escorted back to living facility by [facility].  Discharge instructions and medications given to pt/spouse and explained by RN; pt verbalized understanding.  Pt and wife agreed with care and discharge.

## 2024-09-19 NOTE — DISCHARGE INSTRUCTIONS
Please restart eliquis tomorrow, 9/20, if urine remains clear. If there is blood in your urine wait an additional day to restart eliquis.     Please pull your stent with strings at 7 am the morning of Tuesday (9/24) . Standing in the shower, remove the tape, pull the strings with a steady, gentle force in one motion until the entire stent is removed.    What to Expect After a Cystoscopy  For the next 24-48 hours, you may feel a mild burning when you urinate. This burning is normal and expected. Drink plenty of water to dilute the urine to help relieve the burning sensation. You may also see a small amount of blood in your urine after the procedure.    Unless you are already taking antibiotics, you may be given an antibiotic after the test to prevent infection.    Signs and Symptoms to Report  Call the [facility] at [phone] if you develop any of the following:  Fever of 101 degrees or higher  Chills or persistent bleeding  Inability to urinate

## 2024-09-19 NOTE — BRIEF OP NOTE
[facility] - [facility] (1st Fl)  Brief Operative Note    Surgery Date: 9/19/2024     Surgeons and Role:     * Yaya Mulligan MD - Primary     * Jatin Dyer MD - Resident - Assisting        Pre-op Diagnosis:  Nephrolithiasis [N20.0]    Post-op Diagnosis:  Post-Op Diagnosis Codes:     * Nephrolithiasis [N20.0]    Procedure(s) (LRB):  URETEROSCOPY (Left)  CYSTOSCOPY  LITHOTRIPSY, USING LASER (Left)  EXTRACTION - STONE (Left)  INSERTION, STENT, URETER (Left)    Anesthesia: General    Operative Findings: Left URS, LL, stent with strings    Estimated Blood Loss: minimal         Specimens:   Specimen (24h ago, onward)      None              Discharge Note    OUTCOME: Patient tolerated treatment/procedure well without complication and is now ready for discharge.    DISPOSITION: Home or Self Care    FINAL DIAGNOSIS:  Left ureteral stone    FOLLOWUP: In clinic    DISCHARGE INSTRUCTIONS:    Discharge Procedure Orders   Diet Adult Regular     Lifting restrictions     Notify your health care provider if you experience any of the following:  temperature >100.4     Notify your health care provider if you experience any of the following:  persistent nausea and vomiting or diarrhea     Notify your health care provider if you experience any of the following:  severe uncontrolled pain     Notify your health care provider if you experience any of the following:  redness, tenderness, or signs of infection (pain, swelling, redness, odor or green/yellow discharge around incision site)     Notify your health care provider if you experience any of the following:  difficulty breathing or increased cough     Notify your health care provider if you experience any of the following:  severe persistent headache     Notify your health care provider if you experience any of the following:  worsening rash     Notify your health care provider if you experience any of the following:  persistent dizziness, light-headedness, or visual disturbances      Notify your health care provider if you experience any of the following:  increased confusion or weakness     Notify your health care provider if you experience any of the following:     Activity as tolerated

## 2024-09-19 NOTE — ANESTHESIA PREPROCEDURE EVALUATION
09/19/2024  Roosevelt Moser is a 73 y.o., male.      ++Recent admission for left chest wall abscess and multiple rib fractures. Currently in rehab due to overall deconditioning     Pre-op Assessment    I have reviewed the Patient Summary Reports.    I have reviewed the NPO Status.      Review of Systems  Anesthesia Hx:  No problems with previous Anesthesia   History of prior surgery of interest to airway management or planning:          Denies Family Hx of Anesthesia complications.    Denies Personal Hx of Anesthesia complications.                    Social:  Non-Smoker, Social Alcohol Use       Cardiovascular:     Hypertension                                  Hypertension     Atrial Fibrillation     Pulmonary:        Sleep Apnea     Obstructive Sleep Apnea (MARA).           Renal/:  Chronic Renal Disease        Kidney Function/Disease             Musculoskeletal:  Arthritis        Arthritis          Neurological:    Neuromuscular Disease,           Arthritis                         Neuromuscular Disease   Endocrine:  Diabetes, type 2    Diabetes                    Morbid Obesity / BMI > 40      Physical Exam  General: Cooperative, Alert and Oriented    Chest/Lungs:  Normal Respiratory Rate    Heart:  Rate: Normal        Anesthesia Plan  Type of Anesthesia, risks & benefits discussed:    Anesthesia Type: Gen ETT  Intra-op Monitoring Plan: Standard ASA Monitors  Induction:  IV  Informed Consent: Informed consent signed with the Patient and all parties understand the risks and agree with anesthesia plan.  All questions answered.   ASA Score: 3    Ready For Surgery From Anesthesia Perspective.     .

## 2024-09-19 NOTE — TRANSFER OF CARE
"Anesthesia Transfer of Care Note    Patient: Roosevelt Moser    Procedure(s) Performed: Procedure(s) (LRB):  URETEROSCOPY (Left)  CYSTOSCOPY  LITHOTRIPSY, USING LASER (Left)  EXTRACTION - STONE (Left)  INSERTION, STENT, URETER (Left)    Patient location: PACU    Anesthesia Type: general    Transport from OR: Transported from OR on 6-10 L/min O2 by face mask with adequate spontaneous ventilation. Continuous SpO2 monitoring in transport    Post pain: adequate analgesia    Post assessment: no apparent anesthetic complications and tolerated procedure well    Post vital signs: stable    Level of consciousness: awake, alert and oriented    Nausea/Vomiting: no nausea/vomiting    Complications: none    Transfer of care protocol was followed      Last vitals: Visit Vitals  /67   Pulse 105   Temp 36.4 °C (97.5 °F) (Temporal)   Resp 20   Ht 6' 1" (1.854 m)   Wt 106.6 kg (235 lb)   SpO2 99%   BMI 31.00 kg/m²     "

## 2024-09-19 NOTE — OP NOTE
[facility]  Operative Note    Date: 09/19/2024    Pre-Op Diagnosis: left ureteral stone    Post-Op Diagnosis: Same    Procedure(s) Performed:   1.  Left ureteroscopy  2.  Cystoscopy  3.  Laser lithotripsy of large >2cm stone  4.  Stone basket extraction  5.  Fluoro < 1 h  6. Ureteral stent placement    Specimen(s): Left ureteral stone    Staff Surgeon: Yaya Mulligan MD    Assistant Surgeon: DEJAH GRANGER MD    Anesthesia: General endotracheal anesthesia    Indications: Roosevelt Moser is a 73 y.o. male with a left ureteral stone, presenting for definitive stone management.  He currently does not have a JJ ureteral stent in place.      Findings:   --Large distal left ureteral stone encountered. Soft and dusted well. Larger fragments extracted.   --Stent with strings    1 baskets were used throughout the case.      Laser Fiber - thulium  Fiber Diameter - 272    Estimated Blood Loss: min    Drains: 6 Fr x 28 cm JJ ureteral stent with strings    Procedure in detail:  After informed consent was obtained, the patient was brought the the cystoscopy suite and placed in the supine position.  SCDs were applied and working.  Anesthesia was administered.  The patient was then placed in the dorsal lithotomy position and prepped and draped in the usual sterile fashion.      A rigid cystoscope in a 22 Fr sheath was introduced into the patient's urethra.  This passed easily.  The entire urethra was visualized which showed no strictures or masses.  Formal cystoscopy was performed which revealed no masses or lesions suspicious for malignancy, no bladder stones, no bladder diverticuli, grade 2 trabeculations.  The ureteral orifices were visualized in the normal anatomic position bilaterally.     A motion wire was passed up the left ureteral orifice and up into the kidney.  This passed easily and placement was confirmed using fluoro.  The cystoscope was removed keeping the guidewire in place and the wire was secured to  the drape.      An 8 Fr rigid ureteroscope was passed into the patient's bladder alongside the wire under direct vision.  It was then passed through the left ureteral orifice alongside the wire.  A stone was encountered at the level of distal ureter.  A 272 micron laser fiber was passed through the ureteroscope.  The stone was fragmented using the laser.  The laser fiber was removed and a Nitinol tipless basket was introduced through the ureteroscope.  Stone fragments were removed and placed in the bladder.  The ureteroscope was removed keeping the motion wire in place.  A cystoscope was reinserted and the bladder was irrigated to remove the stone fragments.  The bladder was drained the cystoscope removed keeping the wire in place.      A 6 Fr x 28 cm JJ ureteral stent with strings was passed over the wire and up into the renal pelvis using fluoro.  When the coil appeared to be in good position in the kidney and the radio-opaque marker of the pusher was at the inferior pubis, the wire was removed under continuous fluoro.  Good coils were seen in the kidney and the bladder using fluoro.      Stones were evacuated from bladder.     The patient tolerated the procedure well and was transferred to the recovery room in stable condition.      Disposition:  The patient will follow up with [facility] in 3 months with a renal u/s. He will pull stent with strings on Tuesday, 9/24.

## 2024-09-23 ENCOUNTER — TELEPHONE (OUTPATIENT)
Dept: INFECTIOUS DISEASES | Facility: CLINIC | Age: 74
End: 2024-09-23
Payer: MEDICARE

## 2024-09-23 NOTE — TELEPHONE ENCOUNTER
I spoke with patient's wife who stated she is concerned  That patient is not on antibiotics .    He has a virtual appt with Dr Calvin on Thursday 9/26/24    She is concerned and is requesting patient  be placed  On antibiotics for the infection.    Please advise     ELOISA LEMON  9/23/24

## 2024-09-23 NOTE — TELEPHONE ENCOUNTER
Patients wife called said her  is in [facility] they still have not recevied blood work orders an want to make sure we received the MRI results that was doen on 08/26/24 . His ribs were still infected an fractured please call her back an let her know hwat else she can do .

## 2024-09-24 NOTE — PROGRESS NOTES
Subjective:        The chief complaint leading to consultation is:  Follow up  The patient location is:  Home  Visit type: Virtual visit with synchronous audio/video or audio only  This was a video visit in lieu of in-person visit due to the coronavirus emergency. Patient acknowledged and consented to the video visit encounter.     HPI  He is known to me from previous encounter during a recent hospitalization.  He has a left shoulder abscess managed with I&D.  Also a right 3rd toe ulcer with infection managed with the amputation.  Hospital course complicated by development of left chest abscess with transfer to [facility] where he had I&D cultures were negative.  He received prolonged antibiotics which he has completed.  Infection clinically resolved.  He was seen for follow up 08/29/2024 and was doing well.      He apparently had a follow up MRI of the chest documented rib osteomyelitis.  It appears he was informed that he still needs antibiotics.    Outdoor activities:  Participates in PT/OT  Travel:  No recent travel  Implants:  None  Antibiotic history:  As in HPI    Past Medical History:   Diagnosis Date    A-fib 2024    Diabetes mellitus, type 2 2021    Guillain-[place] 10/2003    History of Guillain-[place] syndrome 10/03/2022    Hyperlipidemia     Hypertension 2016    Sleep apnea        Past Surgical History:   Procedure Laterality Date    ARTHROTOMY OF SHOULDER Left 6/21/2024    Procedure: ARTHROTOMY, SHOULDER;  Surgeon: Roman Paulson MD;  Location: [facility];  Service: Orthopedics;  Laterality: Left;    CYSTOSCOPY  9/19/2024    Procedure: CYSTOSCOPY;  Surgeon: Yaya Mulligan MD;  Location: [facility] OR [address] [place] [address];  Service: Urology;;    EXTRACTION - STONE Left 9/19/2024    Procedure: EXTRACTION - STONE;  Surgeon: Yaya Mulligan MD;  Location: [facility] OR [address] [place] [address];  Service: Urology;  Laterality: Left;    INCISION AND DRAINAGE OF ABSCESS N/A 7/4/2024    Procedure: INCISION AND DRAINAGE, ABSCESS;   Surgeon: Gus Escobedo MD;  Location: [facility];  Service: General;  Laterality: N/A;  Abcess of anterior chest wall    IRRIGATION AND DEBRIDEMENT OF UPPER EXTREMITY Left 6/24/2024    Procedure: IRRIGATION AND DEBRIDEMENT, UPPER EXTREMITY;  Surgeon: Nathan Cooper MD;  Location: [facility] OR;  Service: Orthopedics;  Laterality: Left;    IRRIGATION AND DEBRIDEMENT OF UPPER EXTREMITY Left 6/27/2024    Procedure: IRRIGATION AND DEBRIDEMENT, UPPER EXTREMITY;  Surgeon: Nathan Cooper MD;  Location: [facility] OR;  Service: Orthopedics;  Laterality: Left;    LASER LITHOTRIPSY Left 9/19/2024    Procedure: LITHOTRIPSY, USING LASER;  Surgeon: Yaya Mulligan MD;  [facility];  Service: Urology;  Laterality: Left;    OPEN REDUCTION AND INTERNAL FIXATION (ORIF) OF FRACTURE OF PROXIMAL HUMERUS Left 3/25/2024    Procedure: ORIF, FRACTURE, HUMERUS, PROXIMAL/IM HANNA, LEFT;  Surgeon: Nathan Cooper MD;  Location: [facility] OR;  Service: Orthopedics;  Laterality: Left;  Accumed, Synthes Small Frag set Avelino verified 3/22/24 ark    THORACOSCOPIC DECORTICATION OF LUNG Right 7/5/2024    Procedure: VATS, WITH DECORTICATION, LUNG;  Surgeon: Gus Escobedo MD;  Location: [facility];  Service: Cardiothoracic;  Laterality: Right;    TOE AMPUTATION Right 7/1/2024    Procedure: AMPUTATION, TOE;  Surgeon: Stevie Zhu DPM;  Location: [facility];  Service: Podiatry;  Laterality: Right;    URETERAL STENT PLACEMENT Left 9/19/2024    Procedure: INSERTION, STENT, URETER;  Surgeon: Yaya Mulligan MD;  [facility];  Service: Urology;  Laterality: Left;    URETEROSCOPY Left 9/19/2024    Procedure: URETEROSCOPY;  Surgeon: Yaya Mulligan MD;  [address]  Service: Urology;  Laterality: Left;       No family history on file.    Social History     Socioeconomic History    Marital status:    Tobacco Use    Smoking status: Never    Smokeless tobacco: Never   Substance and Sexual Activity    Alcohol use: Yes      Comment: Once a year    Drug use: No    Sexual activity: Yes     Social Determinants of Health     Financial Resource Strain: Low Risk  (8/3/2024)    Overall Financial Resource Strain (CARDIA)     Difficulty of Paying Living Expenses: Not hard at all   Food Insecurity: No Food Insecurity (8/3/2024)    Hunger Vital Sign     Worried About Running Out of Food in the Last Year: Never true     Ran Out of Food in the Last Year: Never true   Transportation Needs: No Transportation Needs (7/8/2024)    TRANSPORTATION NEEDS     Transportation : No   Physical Activity: Inactive (8/3/2024)    Exercise Vital Sign     Days of Exercise per Week: 0 days     Minutes of Exercise per Session: 0 min   Stress: No Stress Concern Present (8/3/2024)    [employer] [place] [employer] - Occupational Stress Questionnaire     Feeling of Stress : Not at all   Housing Stability: Low Risk  (8/3/2024)    Housing Stability Vital Sign     Unable to Pay for Housing in the Last Year: No     Homeless in the Last Year: No       Review of patient's allergies indicates:  No Known Allergies    Current Outpatient Medications   Medication Instructions    albuterol-ipratropium (DUO-NEB) 2.5 mg-0.5 mg/3 mL nebulizer solution 3 mLs, Nebulization, Every 4 hours PRN, Rescue    aluminum & magnesium hydroxide-simethicone (MYLANTA MAX STRENGTH) 400-400-40 mg/5 mL suspension 15 mLs, Oral, 4 times daily PRN    apixaban (ELIQUIS) 5 mg, Oral, 2 times daily    atorvastatin (LIPITOR) 10 mg, Oral, Daily    co-enzyme Q-10 30 mg, Oral, Daily    ergocalciferol, vitamin D2, (VITAMIN D ORAL) 1 tablet, Oral, Daily    ferrous sulfate 325 mg, Oral, Daily    HYDROcodone-acetaminophen (NORCO) 5-325 mg per tablet 1 tablet, Oral, 4 times daily PRN    insulin aspart U-100 (NOVOLOG) 0-10 Units, Subcutaneous, Before meals & nightly PRN    insulin glargine U-100 (Lantus) 15 Units, Subcutaneous, Daily    ondansetron (ZOFRAN-ODT) 8 mg, Oral, Every 8 hours PRN    senna-docusate  8.6-50 mg (PERICOLACE) 8.6-50 mg per tablet 1 tablet, Oral, 2 times daily PRN    tamsulosin (FLOMAX) 0.4 mg, Oral, Daily         Review of Systems   as in HPI    OBJECTIVE          Physical Exam  this was a virtual visit with only visual inspection    General:  Lying quietly in bed.  In no acute distress   Respiratory: Comfortable  Psych: Good mood, normal affect.     Wounds:  None  VAD:  None  ISOLATION:  Not applicable    PRIOR  MICROBIOLOGY:  Reviewed old cultures  Susceptibility data from last 90 days.  Collected Specimen Info Organism   07/03/24 Blood from Peripheral, Hand, Left No growth after 5 days.       LAST 7 DAYS MICROBIOLOGY   Microbiology Results (last 7 days)       ** No results found for the last 168 hours. **            CURRENT/PREVIOUS VISIT EKG  Results for orders placed or performed during the hospital encounter of 06/14/24   EKG 12-lead    Collection Time: 06/14/24 12:16 PM   Result Value Ref Range    QRS Duration 106 ms    [facility] QTC Calculation 443 ms    Narrative    Test Reason : R53.1,    Vent. Rate : 120 BPM     Atrial Rate : 159 BPM     P-R Int : 000 ms          QRS Dur : 106 ms      QT Int : 314 ms       P-R-T Axes : 000 -54 093 degrees     QTc Int : 443 ms    Atrial fibrillation with rapid ventricular response  Left axis deviation  Low voltage QRS  Inferior infarct ,age undetermined  Cannot rule out Anterior infarct ,age undetermined  Abnormal ECG  When compared with ECG of 25-MAR-2024 09:14,  Vent. rate has increased BY  46 BPM  Minimal criteria for Anterior infarct are now Present  Confirmed by Jesús HATHAWAY, Hansel AMIN ([id number]) on 6/20/2024 8:43:02 PM    Referred By: [id number]   SELF           Confirmed By:Hansel Espinosa MD       Significant Labs: All pertinent labs within the past 24 hours have been reviewed.     Significant Imaging: I have reviewed all relevant and available imaging results/findings within the past 24 hours.    Assessment & Plan   Left chest wall abscess with rib  osteomyelitis.  He already completed a prolonged antibiotics for this indication.  Wound has healed.  MRI resolution lags behind clinical resolution of infection.  Counseled and educated and reassured.  Monitor for now.  No new antibiotics indicated at this time.    2. Debility.  Continue PT/OT.    Patient's wife was very anxious.  Wanting patient to be on antibiotics.  I tried to explain bedside echo that I do not think antibiotics is indicated at this time.  We will continue to monitor patient.  We will order CBC, BMP, ESR and CRP.    Vasquez Calvin MD   Date of Service: 09/24/2024  6:43 PM    Total time spent with patient: 40 minutes    Each patient to whom he or she provides medical services by telemedicine is:  (1) informed of the relationship between the physician and patient and the respective role of any other health care provider with respect to management of the patient; and (2) notified that he or she may decline to receive medical services by telemedicine and may withdraw from such care at any time.

## 2024-09-25 NOTE — TELEPHONE ENCOUNTER
Good morning   His MRI and labs are scanned under Media ( date is 9/09/24)   I printed them and will place on your desk as well  Thank you   ELOISA LEMON  9/25/24

## 2024-09-26 ENCOUNTER — OFFICE VISIT (OUTPATIENT)
Dept: INFECTIOUS DISEASES | Facility: CLINIC | Age: 74
End: 2024-09-26
Payer: MEDICARE

## 2024-09-26 DIAGNOSIS — S22.32XD CLOSED FRACTURE OF ONE RIB OF LEFT SIDE WITH ROUTINE HEALING, SUBSEQUENT ENCOUNTER: ICD-10-CM

## 2024-09-26 DIAGNOSIS — M86.9 OSTEOMYELITIS OF MULTIPLE SITES, UNSPECIFIED TYPE: Primary | ICD-10-CM

## 2024-09-26 DIAGNOSIS — L02.213 CHEST WALL ABSCESS: ICD-10-CM

## 2024-09-26 PROCEDURE — 3060F POS MICROALBUMINURIA REV: CPT | Mod: HCNC,CPTII,95, | Performed by: INTERNAL MEDICINE

## 2024-09-26 PROCEDURE — 1157F ADVNC CARE PLAN IN RCRD: CPT | Mod: HCNC,CPTII,95, | Performed by: INTERNAL MEDICINE

## 2024-09-26 PROCEDURE — 3066F NEPHROPATHY DOC TX: CPT | Mod: HCNC,CPTII,95, | Performed by: INTERNAL MEDICINE

## 2024-09-26 PROCEDURE — 99214 OFFICE O/P EST MOD 30 MIN: CPT | Mod: HCNC,95,, | Performed by: INTERNAL MEDICINE

## 2024-09-26 PROCEDURE — 3044F HG A1C LEVEL LT 7.0%: CPT | Mod: HCNC,CPTII,95, | Performed by: INTERNAL MEDICINE

## 2024-09-26 PROCEDURE — 4010F ACE/ARB THERAPY RXD/TAKEN: CPT | Mod: HCNC,CPTII,95, | Performed by: INTERNAL MEDICINE

## 2024-10-01 NOTE — PATIENT INSTRUCTIONS
I reviewed the MRI and CT reports.  Both are fairly consistent the this 7 rib abnormality is most likely fracture that is healing.  At this time I do not think antibiotics is needed especially because his chest wounds have healed well.  Continue PT/OT  We will reassess in 6 weeks  I am okay with you getting a 2nd opinion from another ID physician on this issue.

## 2024-10-01 NOTE — PROGRESS NOTES
Subjective:        The chief complaint leading to consultation is:  Follow up  [facility]  Visit type: Virtual visit with synchronous audio/video or audio only  This was a video visit in lieu of in-person visit due to the coronavirus emergency. Patient acknowledged and consented to the video visit encounter.     HPI  He is known to me from previous encounter during a recent hospitalization.  He has a left shoulder abscess managed with I&D.  Also a right 3rd toe ulcer with infection managed with the amputation.  Hospital course complicated by development of left chest abscess with transfer to [facility] where he had I&D cultures were negative.  He received prolonged antibiotics which he has completed.  Infection clinically resolved.  He was seen for follow up 08/29/2024 and was doing well.      He apparently had a follow up MRI of the chest documented rib osteomyelitis.  It appears he was informed that he still needs antibiotics.    09/26/2024:  He was seen and evaluated.  MRI chest reviewed.  Raised concern for possible 7th rib fracture process osteomyelitis.  Recommended CT for further evaluation CT chest 08/22/2024 documented a suspected left anterior 7th rib fracture appreciated subcutaneous stranding.  There was also other incidental findings including nonunion fracture of the left femoris recommended follow up CT in 1-3 months.  Lab data 09/25/2024 reviewed.  Creatinine 1.09, AST 14, ALT 10, hematocrit 35.    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    Hyperlipidemia     Hypertension 2016    Sleep apnea        Past Surgical History:   Procedure Laterality Date    ARTHROTOMY OF SHOULDER Left 6/21/2024    Procedure: ARTHROTOMY,  SHOULDER;  Surgeon: Roman Paulson MD;  Location: [facility] OR;  Service: Orthopedics;  Laterality: Left;    CYSTOSCOPY  9/19/2024    Procedure: CYSTOSCOPY;  Surgeon: Yaya Mulligan MD;  Location: [facility] OR 1ST FLR;  Service: Urology;;    EXTRACTION - STONE Left 9/19/2024    Procedure: EXTRACTION - STONE;  Surgeon: Yaya Mulligan MD;  Location: [facility] OR 1ST FLR;  Service: Urology;  Laterality: Left;    INCISION AND DRAINAGE OF ABSCESS N/A 7/4/2024    Procedure: INCISION AND DRAINAGE, ABSCESS;  Surgeon: Gus Escobedo MD;  Location: [facility] OR;  Service: General;  Laterality: N/A;  Abcess of anterior chest wall    IRRIGATION AND DEBRIDEMENT OF UPPER EXTREMITY Left 6/24/2024    Procedure: IRRIGATION AND DEBRIDEMENT, UPPER EXTREMITY;  Surgeon: Nathan Cooper MD;  Location: [facility] OR;  Service: Orthopedics;  Laterality: Left;    IRRIGATION AND DEBRIDEMENT OF UPPER EXTREMITY Left 6/27/2024    Procedure: IRRIGATION AND DEBRIDEMENT, UPPER EXTREMITY;  Surgeon: Nathan Cooper MD;  Location: [facility] OR;  Service: Orthopedics;  Laterality: Left;    LASER LITHOTRIPSY Left 9/19/2024    Procedure: LITHOTRIPSY, USING LASER;  Surgeon: Yaya Mulligan MD;  Location: [facility] OR 1ST FLR;  Service: Urology;  Laterality: Left;    OPEN REDUCTION AND INTERNAL FIXATION (ORIF) OF FRACTURE OF PROXIMAL HUMERUS Left 3/25/2024    Procedure: ORIF, FRACTURE, HUMERUS, PROXIMAL/IM HANNA, LEFT;  Surgeon: Nathan Cooper MD;  Location: [facility] OR;  Service: Orthopedics;  Laterality: Left;  Accumed, Synthes Small Frag set Avelino verified 3/22/24 ark    THORACOSCOPIC DECORTICATION OF LUNG Right 7/5/2024    Procedure: VATS, WITH DECORTICATION, LUNG;  Surgeon: Gus Escobedo MD;  Location: [facility];  Service: Cardiothoracic;  Laterality: Right;    TOE AMPUTATION Right 7/1/2024    Procedure: AMPUTATION, TOE;  Surgeon: Stevie Zhu DPM;  Location: [facility] OR;  Service: Podiatry;  Laterality: Right;    URETERAL STENT PLACEMENT Left 9/19/2024    Procedure:  INSERTION, STENT, URETER;  Surgeon: Yaya Mulligan MD;  Location: [facility] OR [address] [place] [address];  Service: Urology;  Laterality: Left;    URETEROSCOPY Left 9/19/2024    Procedure: URETEROSCOPY;  Surgeon: Yaya Mulligan MD;  Location: [facility] OR [address] [place] [address];  Service: Urology;  Laterality: Left;       No family history on file.    Social History     Socioeconomic History    Marital status:    Tobacco Use    Smoking status: Never    Smokeless tobacco: Never   Substance and Sexual Activity    Alcohol use: Yes     Comment: Once a year    Drug use: No    Sexual activity: Yes     Social Drivers of Health     Financial Resource Strain: Low Risk  (8/3/2024)    Overall Financial Resource Strain (CARDIA)     Difficulty of Paying Living Expenses: Not hard at all   Food Insecurity: No Food Insecurity (8/3/2024)    Hunger Vital Sign     Worried About Running Out of Food in the Last Year: Never true     Ran Out of Food in the Last Year: Never true   Transportation Needs: No Transportation Needs (7/8/2024)    TRANSPORTATION NEEDS     Transportation : No   Physical Activity: Inactive (8/3/2024)    Exercise Vital Sign     Days of Exercise per Week: 0 days     Minutes of Exercise per Session: 0 min   Stress: No Stress Concern Present (8/3/2024)    Citizen of [place] of Occupational Health - Occupational Stress Questionnaire     Feeling of Stress : Not at all   Housing Stability: Low Risk  (8/3/2024)    Housing Stability Vital Sign     Unable to Pay for Housing in the Last Year: No     Homeless in the Last Year: No       Review of patient's allergies indicates:  No Known Allergies    Current Outpatient Medications   Medication Instructions    albuterol-ipratropium (DUO-NEB) 2.5 mg-0.5 mg/3 mL nebulizer solution 3 mLs, Nebulization, Every 4 hours PRN, Rescue    aluminum & magnesium hydroxide-simethicone (MYLANTA MAX STRENGTH) 400-400-40 mg/5 mL suspension 15 mLs, Oral, 4 times daily PRN    apixaban (ELIQUIS) 5 mg, Oral, 2 times daily     atorvastatin (LIPITOR) 10 mg, Oral, Daily    co-enzyme Q-10 30 mg, Oral, Daily    ergocalciferol, vitamin D2, (VITAMIN D ORAL) 1 tablet, Oral, Daily    ferrous sulfate 325 mg, Oral, Daily    HYDROcodone-acetaminophen (NORCO) 5-325 mg per tablet 1 tablet, Oral, 4 times daily PRN    insulin aspart U-100 (NOVOLOG) 0-10 Units, Subcutaneous, Before meals & nightly PRN    insulin glargine U-100 (Lantus) 15 Units, Subcutaneous, Daily    ondansetron (ZOFRAN-ODT) 8 mg, Oral, Every 8 hours PRN    senna-docusate 8.6-50 mg (PERICOLACE) 8.6-50 mg per tablet 1 tablet, Oral, 2 times daily PRN    tamsulosin (FLOMAX) 0.4 mg, Oral, Daily         Review of Systems   as in HPI    OBJECTIVE          Physical Exam  this was a virtual visit with only visual inspection    General:  Lying quietly in bed.  In no acute distress   Respiratory: Comfortable  Psych: Good mood, normal affect.     Wounds:  None  VAD:  None  ISOLATION:  Not applicable    PRIOR  MICROBIOLOGY:  Reviewed old cultures  Susceptibility data from last 90 days.  Collected Specimen Info Organism   07/03/24 Blood from Peripheral, Hand, Left No growth after 5 days.       LAST 7 DAYS MICROBIOLOGY   Microbiology Results (last 7 days)       ** No results found for the last 168 hours. **            CURRENT/PREVIOUS VISIT EKG  Results for orders placed or performed during the hospital encounter of 06/14/24   EKG 12-lead    Collection Time: 06/14/24 12:16 PM   Result Value Ref Range    QRS Duration 106 ms    [facility] QTC Calculation 443 ms    Narrative    Test Reason : R53.1,    Vent. Rate : 120 BPM     Atrial Rate : 159 BPM     P-R Int : 000 ms          QRS Dur : 106 ms      QT Int : 314 ms       P-R-T Axes : 000 -54 093 degrees     QTc Int : 443 ms    Atrial fibrillation with rapid ventricular response  Left axis deviation  Low voltage QRS  Inferior infarct ,age undetermined  Cannot rule out Anterior infarct ,age undetermined  Abnormal ECG  When compared with ECG of 25-MAR-2024  09:14,  Vent. rate has increased BY  46 BPM  Minimal criteria for Anterior infarct are now Present  Confirmed by Jesús HATHAWAY, Hansel AMIN ([id number]) on 6/20/2024 8:43:02 PM    Referred By: [email or web address]   SELF           Confirmed By:Hansel Espinosa MD       Significant Labs: All pertinent labs within the past 24 hours have been reviewed.     Significant Imaging: I have reviewed all relevant and available imaging results/findings within the past 24 hours.    Assessment & Plan   Left chest wall abscess.  He already completed antibiotics for this indication.  Wound has healed.  MRI read as probable Acute rib fracture with soft tissue contusion and bone marrow edema with possibility of osteomyelitis that could not be excluded.  CT chest was read as the same fracture.  His wounds remain healed.  This will be monitored expectantly.  Asked to do not see any indication for antibiotics at this time.     2. Debility.  Continue PT/OT.    Patient's wife remains very anxious about patient not being on any antibiotics.  My explanations and reasoning were not well received.  We will continue to monitor.  He is at risk for infections given his underlying challenges including diabetes mellitus, debility, recent left shoulder and arm abscess and previous right foot osteomyelitis.    I will plan to see again in about 6 weeks.    Vasquez Calvin MD   Date of Service: 10/01/2024  6:43 PM    Total time spent with patient: 40 minutes    Each patient to whom he or she provides medical services by telemedicine is:  (1) informed of the relationship between the physician and patient and the respective role of any other health care provider with respect to management of the patient; and (2) notified that he or she may decline to receive medical services by telemedicine and may withdraw from such care at any time.

## 2024-10-03 ENCOUNTER — TELEPHONE (OUTPATIENT)
Dept: INFECTIOUS DISEASES | Facility: CLINIC | Age: 74
End: 2024-10-03
Payer: MEDICARE

## 2024-10-29 NOTE — ASSESSMENT & PLAN NOTE
-Monitor renal function panel.    [Follow Up] : a follow up visit [Patient] : patient [Parents] : parents [FreeTextEntry1] : Left cuboid fracture, sustained on 9/1/24

## 2025-03-14 ENCOUNTER — PATIENT MESSAGE (OUTPATIENT)
Dept: ADMINISTRATIVE | Facility: HOSPITAL | Age: 75
End: 2025-03-14
Payer: MEDICARE

## 2025-03-27 ENCOUNTER — PATIENT MESSAGE (OUTPATIENT)
Dept: ADMINISTRATIVE | Facility: HOSPITAL | Age: 75
End: 2025-03-27
Payer: MEDICARE

## 2025-03-27 ENCOUNTER — PATIENT OUTREACH (OUTPATIENT)
Dept: ADMINISTRATIVE | Facility: HOSPITAL | Age: 75
End: 2025-03-27
Payer: MEDICARE

## 2025-03-27 NOTE — PROGRESS NOTES
Population Health Chart Review & Patient Outreach Details      Additional [facility] Health Notes:      Routine Dilated Eye Exam  (Diabetic Retinopathy screening)     Non-compliant report chart audits for Eye Exam for Patients with Diabetes     Outreach to patient in reference to a routine Eye Exam                Updates Requested / Reviewed:             Health Maintenance Topics Overdue:      VB Score: 4     Colon Cancer Screening  Eye Exam  Hemoglobin A1c  Lipid Panel    Influenza Vaccine  Pneumonia Vaccine  Tetanus Vaccine  Shingles/Zoster Vaccine  RSV Vaccine                  Health Maintenance Topic(s) Outreach Outcomes & Actions Taken:  Eye Exam - Outreach Outcomes & Actions Taken  : OV SCHEDULED, PLACED IN APPT NOTES

## 2025-04-24 DIAGNOSIS — E78.5 HYPERLIPIDEMIA ASSOCIATED WITH TYPE 2 DIABETES MELLITUS: ICD-10-CM

## 2025-04-24 DIAGNOSIS — E78.5 HYPERLIPIDEMIA, UNSPECIFIED HYPERLIPIDEMIA TYPE: ICD-10-CM

## 2025-04-24 DIAGNOSIS — E11.69 HYPERLIPIDEMIA ASSOCIATED WITH TYPE 2 DIABETES MELLITUS: ICD-10-CM

## 2025-04-25 RX ORDER — ATORVASTATIN CALCIUM 10 MG/1
10 TABLET, FILM COATED ORAL DAILY
Qty: 90 TABLET | Refills: 3 | Status: SHIPPED | OUTPATIENT
Start: 2025-04-25 | End: 2026-04-25

## 2025-05-15 ENCOUNTER — PATIENT MESSAGE (OUTPATIENT)
Dept: ADMINISTRATIVE | Facility: HOSPITAL | Age: 75
End: 2025-05-15
Payer: MEDICARE

## 2025-06-09 ENCOUNTER — TELEPHONE (OUTPATIENT)
Dept: PHARMACY | Facility: CLINIC | Age: 75
End: 2025-06-09
Payer: MEDICARE

## 2025-06-09 NOTE — TELEPHONE ENCOUNTER
[facility]/Population Health Chart Review & Patient Outreach Details For Medication Adherence Project    Reason for Outreach Encounter: 3rd Party payor non-compliance report ([employer], [employer], [employer], etc)  2.  Patient Outreach Method: Reviewed Patient Chart  3.   Medication in question: atorvastatin   LAST FILLED: n/a  Hyperlipidemia Medications              atorvastatin (LIPITOR) 10 MG tablet Take 1 tablet (10 mg total) by mouth once daily.               4.  Reviewed and or Updates Made To: Patient Chart  5. Outreach Outcomes and/or actions taken: Medication discontinued    Additional Notes:

## 2025-07-02 ENCOUNTER — PATIENT OUTREACH (OUTPATIENT)
Dept: ADMINISTRATIVE | Facility: HOSPITAL | Age: 75
End: 2025-07-02
Payer: MEDICARE

## 2025-07-02 NOTE — PROGRESS NOTES
KESHAATION REVIEWED    COLON CANCER SCREENING    OFFERED FIT KIT    Non-compliant report chart audits for COLON CANCER SCREENING    Outreach to patient in reference to SCHEDULING A  COLON CANCER SCREENING

## (undated) DEVICE — GOWN AERO CHROME W/ TOWEL XL

## (undated) DEVICE — CATH THORACIC 28FR ST

## (undated) DEVICE — DRESSING TRANS 2X2 TEGADERM

## (undated) DEVICE — DRAPE ORTH SPLIT 77X108IN

## (undated) DEVICE — ELECTRODE REM PLYHSV RETURN 9

## (undated) DEVICE — SUCTION FRAZIER TIP SURG 12FR

## (undated) DEVICE — TOGA FLYTE PEEL AWAY XLARGE

## (undated) DEVICE — KIT COLLECTION E SWAB REGULAR

## (undated) DEVICE — SPONGE GZ WOVEN 12-PLY 4X4IN

## (undated) DEVICE — SOL POVIDONE PREP IODINE 4 OZ

## (undated) DEVICE — STRAP OR TABLE 5IN X 72IN

## (undated) DEVICE — SOL 9P NACL IRR PIC IL

## (undated) DEVICE — SCRUB 10% POVIDONE IODINE 4OZ

## (undated) DEVICE — TROCAR THORACOPORT 5.5MM DISP

## (undated) DEVICE — TIP YANKAUERS BULB NO VENT

## (undated) DEVICE — Device

## (undated) DEVICE — SOL POVIDONE SCRUB IODINE 4 OZ

## (undated) DEVICE — CANNISTER XLR8 W/ TUB 400CC

## (undated) DEVICE — PACK CUSTOM UNIV BASIN SLI

## (undated) DEVICE — SUT VICRYL 2-0 36 CT-1

## (undated) DEVICE — GUIDEWIRE POLARUS 3 2MMX20IN S
Type: IMPLANTABLE DEVICE | Site: HUMERUS | Status: NON-FUNCTIONAL
Removed: 2024-03-25

## (undated) DEVICE — DRAPE INCISE IOBAN 2 23X17IN

## (undated) DEVICE — STOCKINETTE IMPERV INTRM 9X44

## (undated) DEVICE — ALCOHOL 70% ANTISEPTIC ISO 4OZ

## (undated) DEVICE — SUT STRATAFIX SPRL PS-2 3-0

## (undated) DEVICE — DRESSING N ADH OIL EMUL 3X3

## (undated) DEVICE — SUT 4.0 ETHILON

## (undated) DEVICE — ADHESIVE DERMABOND ADVANCED

## (undated) DEVICE — GLOVE BIOGEL PI MICRO SZ 7.5

## (undated) DEVICE — SOL NACL IRR 1000ML BTL

## (undated) DEVICE — SUT CTD VICRYL 1 UND BR

## (undated) DEVICE — STAPLER SKIN PROXIMATE WIDE

## (undated) DEVICE — SUT 2-0 12-18IN SILK

## (undated) DEVICE — BNDG COFLEX FOAM LF2 ST 6X5YD

## (undated) DEVICE — SUT ETHILON 4-0 PS2 18 BLK

## (undated) DEVICE — CONNECTOR TUBING STR 5 IN 1

## (undated) DEVICE — SOL NACL IRR 3000ML

## (undated) DEVICE — TOWEL OR DISP STRL BLUE 4/PK

## (undated) DEVICE — HYDROGEN PEROXIDE HDX10 3% 4OZ

## (undated) DEVICE — KIT ANTIFOG W/SPONG & FLUID

## (undated) DEVICE — TOURNIQUET SB QC DP 34X4IN

## (undated) DEVICE — SPONGE BULKEE II ABSRB 6X6.75

## (undated) DEVICE — PAD ABDOMINAL STERILE 8X10IN

## (undated) DEVICE — MARKER DUAL TIP SKIN W/ RULER

## (undated) DEVICE — TUBE SUCTION FRAZIER VENT 10FR

## (undated) DEVICE — GLOVE SURG ULTRA TOUCH 8.5

## (undated) DEVICE — SUT STRATAFIX SPIRAL VIOLET

## (undated) DEVICE — NDL ECLIPSE SAF REG 25GX1.5IN

## (undated) DEVICE — STAPLER SKIN ROTATING HEAD

## (undated) DEVICE — SUT FIBERWIRE #5 1/2 X 38

## (undated) DEVICE — SUT ETHILON 3/0 18IN PS-1

## (undated) DEVICE — SUT 0 VICRYL PLUS CT-1 27IN

## (undated) DEVICE — SWAB CULTURETTE SINGLE

## (undated) DEVICE — SUT 0 VICRYL / CT-1

## (undated) DEVICE — INTERPULSE SET

## (undated) DEVICE — TUBE LUKI ASP COLL 6 1/4

## (undated) DEVICE — DISSECTOR KITTNER 5MM T 45CM S

## (undated) DEVICE — SLING ORTHOPEDIC LARGE

## (undated) DEVICE — GOWN X-LG STERILE BACK

## (undated) DEVICE — SUT ETHILON 2-0 FSLX 30 BLK

## (undated) DEVICE — SUT MONOCRYL 3-0 PS-1

## (undated) DEVICE — PACK SIRUS BASIC V SURG STRL

## (undated) DEVICE — DRAPE STERI INSTRUMENT 1018

## (undated) DEVICE — LINER SUCTION 3000CC

## (undated) DEVICE — SUT 3/0 48IN PROLENE BL MO

## (undated) DEVICE — TRAY GENERAL LAPAROSCOPY SMH

## (undated) DEVICE — COVER TABLE 44X90 STERILE

## (undated) DEVICE — GOWN TOGA SYS PEELWY ZIP 2 XL

## (undated) DEVICE — BOOT SHORT 1PC LOW PROF LRG

## (undated) DEVICE — SUT X425H ETHIBOND 1-0

## (undated) DEVICE — COVER MAYO STND XL 30X57IN

## (undated) DEVICE — DRESSING TELFA N ADH 3X8

## (undated) DEVICE — BANDAGE ESMARK ELASTIC ST 4X9

## (undated) DEVICE — PAD CAST SPECIALIST STRL 4

## (undated) DEVICE — DRAPE STERI U-SHAPED 47X51IN

## (undated) DEVICE — SWAB CULTURETTE II DUAL

## (undated) DEVICE — DRAIN EVACUATOR 400CC 1/4IN

## (undated) DEVICE — SOL IRRI STRL WATER 1000ML

## (undated) DEVICE — SUT 3-0 VICRYL / RB-1

## (undated) DEVICE — DRAPE INCISE IOBAN 2 23X33IN

## (undated) DEVICE — ADHESIVE MASTISOL VIAL 48/BX

## (undated) DEVICE — DRAPE U SPLIT SHEET 54X76IN

## (undated) DEVICE — GLOVE SENSICARE PI GRN 8

## (undated) DEVICE — DRAPE INVISISHIELD TOWEL SMALL

## (undated) DEVICE — PADDING WYTEX UNDRCST 6INX4YD

## (undated) DEVICE — SPONGE LAP 18X18 PREWASHED

## (undated) DEVICE — SUT ETHILON 3-0 FS-1 30

## (undated) DEVICE — SYR 10CC LUER LOCK

## (undated) DEVICE — TRAY DRY SPONGE SCRUB W FOAM

## (undated) DEVICE — DRESSING XEROFORM NONADH 1X8IN

## (undated) DEVICE — SUT 2 30IN SILK BLK BRAIDE

## (undated) DEVICE — BANDAGE MATRIX HK LOOP 6IN 5YD

## (undated) DEVICE — BANDAGE ESMARK ELASTIC ST 6X9

## (undated) DEVICE — DURAPREP SURG SCRUB 26ML

## (undated) DEVICE — APPLICATOR CHLORAPREP ORN 26ML

## (undated) DEVICE — DEGREASER SKIN MS-4000

## (undated) DEVICE — BLADE SURG CARBON STEEL #10

## (undated) DEVICE — SUT SILK 2-0 PS 18IN BLACK

## (undated) DEVICE — YANKAUER OPEN TIP W/O VENT

## (undated) DEVICE — CORD MPLR STR PLUG DISP 10FT

## (undated) DEVICE — TRAY MINOR SMH

## (undated) DEVICE — DRAIN CHEST DRY SUCTION

## (undated) DEVICE — DRAPE ORTHOMAX BAR

## (undated) DEVICE — NDL MAYO 2

## (undated) DEVICE — DRESSING GAUZE XEROFORM 5X9

## (undated) DEVICE — PADDING CAST SPECIALIST 6X4YD

## (undated) DEVICE — ELECTRODE BLADE TEFLON 6

## (undated) DEVICE — WRAP SHLDR HIP ACCU THRM PACK

## (undated) DEVICE — SOCKINETTE IMPERVIOUS 12X48IN

## (undated) DEVICE — PENCIL SMOKE EVAC ROCKER 70MM

## (undated) DEVICE — GLOVE BIOGEL ECLIPSE SZ 8.5

## (undated) DEVICE — BLADE SURG #15 CARBON STEEL

## (undated) DEVICE — SUT 2-0 VICRYL / CT-1

## (undated) DEVICE — CONTAINER SPECIMEN OR STER 4OZ

## (undated) DEVICE — TRAY CATH 1-LYR URIMTR 16FR

## (undated) DEVICE — GOWN POLY REINF BRTH SLV XL

## (undated) DEVICE — SYR ONLY LUER LOCK 20CC

## (undated) DEVICE — YANKAUER FLEX NO VENT HI CAP

## (undated) DEVICE — TAPE SILK 3IN

## (undated) DEVICE — DRAPE EXTREMITY ORTHOMAX

## (undated) DEVICE — STRIP MEDI WND CLSR 1/2X4IN

## (undated) DEVICE — SLEEVE SCD EXPRESS KNEE MEDIUM

## (undated) DEVICE — DRAPE FLUID WARMER ORS 44X44IN

## (undated) DEVICE — GLOVE SENSICARE PI GRN 8.5

## (undated) DEVICE — TROCAR THORACICI 10-12MM

## (undated) DEVICE — DRESSING GAUZE OIL EMUL 3X8

## (undated) DEVICE — DRAPE THREE-QTR REINF 53X77IN

## (undated) DEVICE — TAPE MEDIPORE 3 X 10YD

## (undated) DEVICE — DRAPE LAPSCP CHOLE 122X102X78

## (undated) DEVICE — BLADE SAG DUAL 18MMX1.27MMX90M

## (undated) DEVICE — DRAPE INSTR MAGNETIC 10X16IN

## (undated) DEVICE — SUT VICRYL 3-0 27 SH

## (undated) DEVICE — GLOVE SENSICARE PI ALOE 7.5

## (undated) DEVICE — BIT DRILL POLARUS SURGIBIT LON

## (undated) DEVICE — BANDAGE MATRIX HK LOOP 4IN 5YD

## (undated) DEVICE — PACK EXTREMITY ORTHOMAX

## (undated) DEVICE — GOWN POLY REINF BRTH SLV LG

## (undated) DEVICE — SUT 3/0 36IN COATED VICRYL